# Patient Record
Sex: MALE | Race: BLACK OR AFRICAN AMERICAN | NOT HISPANIC OR LATINO | URBAN - METROPOLITAN AREA
[De-identification: names, ages, dates, MRNs, and addresses within clinical notes are randomized per-mention and may not be internally consistent; named-entity substitution may affect disease eponyms.]

---

## 2023-11-27 ENCOUNTER — EMERGENCY (EMERGENCY)
Facility: HOSPITAL | Age: 28
LOS: 0 days | Discharge: HOME | End: 2023-11-28
Attending: STUDENT IN AN ORGANIZED HEALTH CARE EDUCATION/TRAINING PROGRAM
Payer: MEDICAID

## 2023-11-27 VITALS
TEMPERATURE: 98 F | RESPIRATION RATE: 18 BRPM | DIASTOLIC BLOOD PRESSURE: 83 MMHG | HEIGHT: 65 IN | SYSTOLIC BLOOD PRESSURE: 137 MMHG | OXYGEN SATURATION: 99 % | HEART RATE: 64 BPM | WEIGHT: 154.98 LBS

## 2023-11-27 DIAGNOSIS — R10.12 LEFT UPPER QUADRANT PAIN: ICD-10-CM

## 2023-11-27 DIAGNOSIS — L93.2 OTHER LOCAL LUPUS ERYTHEMATOSUS: ICD-10-CM

## 2023-11-27 DIAGNOSIS — K59.00 CONSTIPATION, UNSPECIFIED: ICD-10-CM

## 2023-11-27 DIAGNOSIS — R51.9 HEADACHE, UNSPECIFIED: ICD-10-CM

## 2023-11-27 DIAGNOSIS — R11.2 NAUSEA WITH VOMITING, UNSPECIFIED: ICD-10-CM

## 2023-11-27 DIAGNOSIS — R50.9 FEVER, UNSPECIFIED: ICD-10-CM

## 2023-11-27 LAB
ALBUMIN SERPL ELPH-MCNC: 3.6 G/DL — SIGNIFICANT CHANGE UP (ref 3.3–5)
ALBUMIN SERPL ELPH-MCNC: 3.6 G/DL — SIGNIFICANT CHANGE UP (ref 3.3–5)
ALP SERPL-CCNC: 51 U/L — SIGNIFICANT CHANGE UP (ref 40–120)
ALP SERPL-CCNC: 51 U/L — SIGNIFICANT CHANGE UP (ref 40–120)
ALT FLD-CCNC: 42 U/L — SIGNIFICANT CHANGE UP (ref 12–78)
ALT FLD-CCNC: 42 U/L — SIGNIFICANT CHANGE UP (ref 12–78)
ANION GAP SERPL CALC-SCNC: 5 MMOL/L — SIGNIFICANT CHANGE UP (ref 5–17)
ANION GAP SERPL CALC-SCNC: 5 MMOL/L — SIGNIFICANT CHANGE UP (ref 5–17)
AST SERPL-CCNC: 42 U/L — HIGH (ref 15–37)
AST SERPL-CCNC: 42 U/L — HIGH (ref 15–37)
BASOPHILS # BLD AUTO: 0 K/UL — SIGNIFICANT CHANGE UP (ref 0–0.2)
BASOPHILS # BLD AUTO: 0 K/UL — SIGNIFICANT CHANGE UP (ref 0–0.2)
BASOPHILS NFR BLD AUTO: 0 % — SIGNIFICANT CHANGE UP (ref 0–2)
BASOPHILS NFR BLD AUTO: 0 % — SIGNIFICANT CHANGE UP (ref 0–2)
BILIRUB SERPL-MCNC: 0.7 MG/DL — SIGNIFICANT CHANGE UP (ref 0.2–1.2)
BILIRUB SERPL-MCNC: 0.7 MG/DL — SIGNIFICANT CHANGE UP (ref 0.2–1.2)
BUN SERPL-MCNC: 7 MG/DL — SIGNIFICANT CHANGE UP (ref 7–23)
BUN SERPL-MCNC: 7 MG/DL — SIGNIFICANT CHANGE UP (ref 7–23)
CALCIUM SERPL-MCNC: 9.3 MG/DL — SIGNIFICANT CHANGE UP (ref 8.5–10.1)
CALCIUM SERPL-MCNC: 9.3 MG/DL — SIGNIFICANT CHANGE UP (ref 8.5–10.1)
CHLORIDE SERPL-SCNC: 105 MMOL/L — SIGNIFICANT CHANGE UP (ref 96–108)
CHLORIDE SERPL-SCNC: 105 MMOL/L — SIGNIFICANT CHANGE UP (ref 96–108)
CO2 SERPL-SCNC: 28 MMOL/L — SIGNIFICANT CHANGE UP (ref 22–31)
CO2 SERPL-SCNC: 28 MMOL/L — SIGNIFICANT CHANGE UP (ref 22–31)
CREAT SERPL-MCNC: 1.33 MG/DL — HIGH (ref 0.5–1.3)
CREAT SERPL-MCNC: 1.33 MG/DL — HIGH (ref 0.5–1.3)
EGFR: 74 ML/MIN/1.73M2 — SIGNIFICANT CHANGE UP
EGFR: 74 ML/MIN/1.73M2 — SIGNIFICANT CHANGE UP
EOSINOPHIL # BLD AUTO: 0 K/UL — SIGNIFICANT CHANGE UP (ref 0–0.5)
EOSINOPHIL # BLD AUTO: 0 K/UL — SIGNIFICANT CHANGE UP (ref 0–0.5)
EOSINOPHIL NFR BLD AUTO: 0 % — SIGNIFICANT CHANGE UP (ref 0–6)
EOSINOPHIL NFR BLD AUTO: 0 % — SIGNIFICANT CHANGE UP (ref 0–6)
GLUCOSE SERPL-MCNC: 97 MG/DL — SIGNIFICANT CHANGE UP (ref 70–99)
GLUCOSE SERPL-MCNC: 97 MG/DL — SIGNIFICANT CHANGE UP (ref 70–99)
HCT VFR BLD CALC: 36.2 % — LOW (ref 39–50)
HCT VFR BLD CALC: 36.2 % — LOW (ref 39–50)
HGB BLD-MCNC: 12.7 G/DL — LOW (ref 13–17)
HGB BLD-MCNC: 12.7 G/DL — LOW (ref 13–17)
LIDOCAIN IGE QN: 37 U/L — SIGNIFICANT CHANGE UP (ref 13–75)
LIDOCAIN IGE QN: 37 U/L — SIGNIFICANT CHANGE UP (ref 13–75)
LYMPHOCYTES # BLD AUTO: 2.48 K/UL — SIGNIFICANT CHANGE UP (ref 1–3.3)
LYMPHOCYTES # BLD AUTO: 2.48 K/UL — SIGNIFICANT CHANGE UP (ref 1–3.3)
LYMPHOCYTES # BLD AUTO: 50 % — HIGH (ref 13–44)
LYMPHOCYTES # BLD AUTO: 50 % — HIGH (ref 13–44)
MANUAL SMEAR VERIFICATION: SIGNIFICANT CHANGE UP
MANUAL SMEAR VERIFICATION: SIGNIFICANT CHANGE UP
MCHC RBC-ENTMCNC: 31.5 PG — SIGNIFICANT CHANGE UP (ref 27–34)
MCHC RBC-ENTMCNC: 31.5 PG — SIGNIFICANT CHANGE UP (ref 27–34)
MCHC RBC-ENTMCNC: 35.1 G/DL — SIGNIFICANT CHANGE UP (ref 32–36)
MCHC RBC-ENTMCNC: 35.1 G/DL — SIGNIFICANT CHANGE UP (ref 32–36)
MCV RBC AUTO: 89.8 FL — SIGNIFICANT CHANGE UP (ref 80–100)
MCV RBC AUTO: 89.8 FL — SIGNIFICANT CHANGE UP (ref 80–100)
MONOCYTES # BLD AUTO: 0.4 K/UL — SIGNIFICANT CHANGE UP (ref 0–0.9)
MONOCYTES # BLD AUTO: 0.4 K/UL — SIGNIFICANT CHANGE UP (ref 0–0.9)
MONOCYTES NFR BLD AUTO: 8 % — SIGNIFICANT CHANGE UP (ref 2–14)
MONOCYTES NFR BLD AUTO: 8 % — SIGNIFICANT CHANGE UP (ref 2–14)
NEUTROPHILS # BLD AUTO: 2.08 K/UL — SIGNIFICANT CHANGE UP (ref 1.8–7.4)
NEUTROPHILS # BLD AUTO: 2.08 K/UL — SIGNIFICANT CHANGE UP (ref 1.8–7.4)
NEUTROPHILS NFR BLD AUTO: 41 % — LOW (ref 43–77)
NEUTROPHILS NFR BLD AUTO: 41 % — LOW (ref 43–77)
NEUTS BAND # BLD: 1 % — SIGNIFICANT CHANGE UP (ref 0–8)
NEUTS BAND # BLD: 1 % — SIGNIFICANT CHANGE UP (ref 0–8)
NRBC # BLD: 0 /100 — SIGNIFICANT CHANGE UP (ref 0–0)
NRBC # BLD: 0 /100 — SIGNIFICANT CHANGE UP (ref 0–0)
NRBC # BLD: SIGNIFICANT CHANGE UP /100 WBCS (ref 0–0)
NRBC # BLD: SIGNIFICANT CHANGE UP /100 WBCS (ref 0–0)
PLAT MORPH BLD: NORMAL — SIGNIFICANT CHANGE UP
PLAT MORPH BLD: NORMAL — SIGNIFICANT CHANGE UP
PLATELET # BLD AUTO: 141 K/UL — LOW (ref 150–400)
PLATELET # BLD AUTO: 141 K/UL — LOW (ref 150–400)
POTASSIUM SERPL-MCNC: 4.3 MMOL/L — SIGNIFICANT CHANGE UP (ref 3.5–5.3)
POTASSIUM SERPL-MCNC: 4.3 MMOL/L — SIGNIFICANT CHANGE UP (ref 3.5–5.3)
POTASSIUM SERPL-SCNC: 4.3 MMOL/L — SIGNIFICANT CHANGE UP (ref 3.5–5.3)
POTASSIUM SERPL-SCNC: 4.3 MMOL/L — SIGNIFICANT CHANGE UP (ref 3.5–5.3)
PROT SERPL-MCNC: 8.8 GM/DL — HIGH (ref 6–8.3)
PROT SERPL-MCNC: 8.8 GM/DL — HIGH (ref 6–8.3)
RBC # BLD: 4.03 M/UL — LOW (ref 4.2–5.8)
RBC # BLD: 4.03 M/UL — LOW (ref 4.2–5.8)
RBC # FLD: 13.2 % — SIGNIFICANT CHANGE UP (ref 10.3–14.5)
RBC # FLD: 13.2 % — SIGNIFICANT CHANGE UP (ref 10.3–14.5)
RBC BLD AUTO: NORMAL — SIGNIFICANT CHANGE UP
RBC BLD AUTO: NORMAL — SIGNIFICANT CHANGE UP
SODIUM SERPL-SCNC: 138 MMOL/L — SIGNIFICANT CHANGE UP (ref 135–145)
SODIUM SERPL-SCNC: 138 MMOL/L — SIGNIFICANT CHANGE UP (ref 135–145)
WBC # BLD: 4.96 K/UL — SIGNIFICANT CHANGE UP (ref 3.8–10.5)
WBC # BLD: 4.96 K/UL — SIGNIFICANT CHANGE UP (ref 3.8–10.5)
WBC # FLD AUTO: 4.96 K/UL — SIGNIFICANT CHANGE UP (ref 3.8–10.5)
WBC # FLD AUTO: 4.96 K/UL — SIGNIFICANT CHANGE UP (ref 3.8–10.5)

## 2023-11-27 PROCEDURE — 74177 CT ABD & PELVIS W/CONTRAST: CPT | Mod: 26,MA

## 2023-11-27 PROCEDURE — 99285 EMERGENCY DEPT VISIT HI MDM: CPT

## 2023-11-27 RX ORDER — ACETAMINOPHEN 500 MG
1000 TABLET ORAL ONCE
Refills: 0 | Status: COMPLETED | OUTPATIENT
Start: 2023-11-27 | End: 2023-11-27

## 2023-11-27 RX ORDER — SODIUM CHLORIDE 9 MG/ML
1000 INJECTION INTRAMUSCULAR; INTRAVENOUS; SUBCUTANEOUS ONCE
Refills: 0 | Status: COMPLETED | OUTPATIENT
Start: 2023-11-27 | End: 2023-11-27

## 2023-11-27 RX ORDER — METOCLOPRAMIDE HCL 10 MG
10 TABLET ORAL ONCE
Refills: 0 | Status: COMPLETED | OUTPATIENT
Start: 2023-11-27 | End: 2023-11-27

## 2023-11-27 RX ADMIN — SODIUM CHLORIDE 1000 MILLILITER(S): 9 INJECTION INTRAMUSCULAR; INTRAVENOUS; SUBCUTANEOUS at 21:26

## 2023-11-27 RX ADMIN — Medication 400 MILLIGRAM(S): at 21:27

## 2023-11-27 RX ADMIN — Medication 10 MILLIGRAM(S): at 21:27

## 2023-11-27 NOTE — ED PROVIDER NOTE - OBJECTIVE STATEMENT
28 yr old with hx of Lupus on Plaquenil presenting with 3 days of abdominal and HA. His abdominal pain is concentrated to the LUQ with radiating pain to his back. His HA is frontal and constant. Last normal bowel movement 4 days ago. Denies blood per rectum, toilet bowl, or toilet paper. Denies recent travel or sick contacts. Endorses constipation, nausea, subjective fever, and one episode of non-bloody vomiting today. No dizziness, urinary symptoms, CP, SoB, Diarrhea.

## 2023-11-27 NOTE — ED PROVIDER NOTE - PHYSICAL EXAMINATION
GEN: Pt in NAD, A&O x3. GCS 15  RESP: No chest wall tenderness, CTA b/l, no wheezes, rales, or rhonchi.   CARDIAC: RRR, clear distinct S1, S2, no murmurs, gallops, or rubs.   ABD: Abdomen non-distended, firm, (+) LUQ, LLQ. (-) RUQ, RLQ, Epigastric or Suprapubic TTP.   VASC: 2+ radial pulses b/l. No edema or tenderness of the lower extremities.

## 2023-11-27 NOTE — ED PROVIDER NOTE - NSFOLLOWUPINSTRUCTIONS_ED_ALL_ED_FT
You were seen in the ED for abdominal pain.    Your labs and imaging did not show acute findings.    Follow up with your primary care doctor.     Many things can cause abdominal pain. Many times, abdominal pain is not caused by a disease and will improve without treatment. Your health care provider will do a physical exam to determine if there is a dangerous cause of your pain; blood tests and imaging may help determine the cause of your pain. However, in many cases, no cause may be found and you may need further testing as an outpatient. Monitor your abdominal pain for any changes.     SEEK IMMEDIATE MEDICAL CARE IF YOU HAVE ANY OF THE FOLLOWING SYMPTOMS: worsening abdominal pain, uncontrollable vomiting, profuse diarrhea, inability to have bowel movements or pass gas, black or bloody stools, fever accompanying chest pain or back pain, or fainting. These symptoms may represent a serious problem that is an emergency. Do not wait to see if the symptoms will go away. Get medical help right away. Call 911 and do not drive yourself to the hospital.

## 2023-11-27 NOTE — ED ADULT TRIAGE NOTE - CHIEF COMPLAINT QUOTE
pt c/o headache,  LUQ abdominal radiating to the back  and nausea for 3 days. denies vomiting, diarrhea or gu symptoms. denies fever or chills. history of Lupus.

## 2023-11-27 NOTE — ED ADULT NURSE NOTE - BREATH SOUNDS, MLM
CLINICAL PHARMACY NOTE: MEDS TO BEDS    Total # of Prescriptions Filled: 3   The following medications were delivered to the patient:  ASPIRIN 81 MG CHEW   LIPITOR 40 MG   TOPROL XL 25 MG     Additional Documentation:   PICKED UP @ PHARMACY @ 4:09PM Clear

## 2023-11-27 NOTE — ED PROVIDER NOTE - PATIENT PORTAL LINK FT
You can access the FollowMyHealth Patient Portal offered by [facility] by registering at the following website: [email or web address]. By joining [facility]’s FollowMyHealth portal, you will also be able to view your health information using other applications (apps) compatible with our system.

## 2023-11-27 NOTE — ED PROVIDER NOTE - PROGRESS NOTE DETAILS
Labs and imaging WNL. UA negative.  Patient reports feeling improved.  Will discharge home to f/u with PMD.

## 2023-11-28 VITALS
SYSTOLIC BLOOD PRESSURE: 122 MMHG | RESPIRATION RATE: 18 BRPM | HEART RATE: 58 BPM | TEMPERATURE: 98 F | DIASTOLIC BLOOD PRESSURE: 77 MMHG | OXYGEN SATURATION: 99 %

## 2023-11-28 LAB
APPEARANCE UR: CLEAR — SIGNIFICANT CHANGE UP
APPEARANCE UR: CLEAR — SIGNIFICANT CHANGE UP
BILIRUB UR-MCNC: NEGATIVE — SIGNIFICANT CHANGE UP
BILIRUB UR-MCNC: NEGATIVE — SIGNIFICANT CHANGE UP
COLOR SPEC: YELLOW — SIGNIFICANT CHANGE UP
COLOR SPEC: YELLOW — SIGNIFICANT CHANGE UP
DIFF PNL FLD: NEGATIVE — SIGNIFICANT CHANGE UP
DIFF PNL FLD: NEGATIVE — SIGNIFICANT CHANGE UP
GLUCOSE UR QL: NEGATIVE MG/DL — SIGNIFICANT CHANGE UP
GLUCOSE UR QL: NEGATIVE MG/DL — SIGNIFICANT CHANGE UP
KETONES UR-MCNC: NEGATIVE MG/DL — SIGNIFICANT CHANGE UP
KETONES UR-MCNC: NEGATIVE MG/DL — SIGNIFICANT CHANGE UP
LEUKOCYTE ESTERASE UR-ACNC: NEGATIVE — SIGNIFICANT CHANGE UP
LEUKOCYTE ESTERASE UR-ACNC: NEGATIVE — SIGNIFICANT CHANGE UP
NITRITE UR-MCNC: NEGATIVE — SIGNIFICANT CHANGE UP
NITRITE UR-MCNC: NEGATIVE — SIGNIFICANT CHANGE UP
PH UR: 6.5 — SIGNIFICANT CHANGE UP (ref 5–8)
PH UR: 6.5 — SIGNIFICANT CHANGE UP (ref 5–8)
PROT UR-MCNC: NEGATIVE MG/DL — SIGNIFICANT CHANGE UP
PROT UR-MCNC: NEGATIVE MG/DL — SIGNIFICANT CHANGE UP
SP GR SPEC: >1.03 — HIGH (ref 1–1.03)
SP GR SPEC: >1.03 — HIGH (ref 1–1.03)
UROBILINOGEN FLD QL: 0.2 MG/DL — SIGNIFICANT CHANGE UP (ref 0.2–1)
UROBILINOGEN FLD QL: 0.2 MG/DL — SIGNIFICANT CHANGE UP (ref 0.2–1)

## 2023-12-12 ENCOUNTER — INPATIENT (INPATIENT)
Facility: HOSPITAL | Age: 28
LOS: 9 days | Discharge: ANOTHER HOSPITAL | End: 2023-12-22
Attending: INTERNAL MEDICINE | Admitting: INTERNAL MEDICINE
Payer: MEDICAID

## 2023-12-12 VITALS
TEMPERATURE: 98 F | HEIGHT: 65 IN | HEART RATE: 93 BPM | SYSTOLIC BLOOD PRESSURE: 132 MMHG | OXYGEN SATURATION: 98 % | DIASTOLIC BLOOD PRESSURE: 75 MMHG | WEIGHT: 154.98 LBS | RESPIRATION RATE: 18 BRPM

## 2023-12-12 DIAGNOSIS — M32.9 SYSTEMIC LUPUS ERYTHEMATOSUS, UNSPECIFIED: ICD-10-CM

## 2023-12-12 DIAGNOSIS — R59.0 LOCALIZED ENLARGED LYMPH NODES: ICD-10-CM

## 2023-12-12 DIAGNOSIS — R09.89 OTHER SPECIFIED SYMPTOMS AND SIGNS INVOLVING THE CIRCULATORY AND RESPIRATORY SYSTEMS: ICD-10-CM

## 2023-12-12 DIAGNOSIS — I26.99 OTHER PULMONARY EMBOLISM WITHOUT ACUTE COR PULMONALE: ICD-10-CM

## 2023-12-12 DIAGNOSIS — E87.1 HYPO-OSMOLALITY AND HYPONATREMIA: ICD-10-CM

## 2023-12-12 LAB
ALBUMIN SERPL ELPH-MCNC: 2.8 G/DL — LOW (ref 3.3–5)
ALBUMIN SERPL ELPH-MCNC: 2.8 G/DL — LOW (ref 3.3–5)
ALP SERPL-CCNC: 58 U/L — SIGNIFICANT CHANGE UP (ref 40–120)
ALP SERPL-CCNC: 58 U/L — SIGNIFICANT CHANGE UP (ref 40–120)
ALT FLD-CCNC: 66 U/L — SIGNIFICANT CHANGE UP (ref 12–78)
ALT FLD-CCNC: 66 U/L — SIGNIFICANT CHANGE UP (ref 12–78)
ANION GAP SERPL CALC-SCNC: 6 MMOL/L — SIGNIFICANT CHANGE UP (ref 5–17)
ANION GAP SERPL CALC-SCNC: 6 MMOL/L — SIGNIFICANT CHANGE UP (ref 5–17)
ANION GAP SERPL CALC-SCNC: 8 MMOL/L — SIGNIFICANT CHANGE UP (ref 5–17)
ANION GAP SERPL CALC-SCNC: 8 MMOL/L — SIGNIFICANT CHANGE UP (ref 5–17)
APTT BLD: 29.8 SEC — SIGNIFICANT CHANGE UP (ref 24.5–35.6)
APTT BLD: 29.8 SEC — SIGNIFICANT CHANGE UP (ref 24.5–35.6)
APTT BLD: 63.1 SEC — HIGH (ref 24.5–35.6)
APTT BLD: 63.1 SEC — HIGH (ref 24.5–35.6)
AST SERPL-CCNC: 53 U/L — HIGH (ref 15–37)
AST SERPL-CCNC: 53 U/L — HIGH (ref 15–37)
BASOPHILS # BLD AUTO: 0 K/UL — SIGNIFICANT CHANGE UP (ref 0–0.2)
BASOPHILS # BLD AUTO: 0 K/UL — SIGNIFICANT CHANGE UP (ref 0–0.2)
BASOPHILS # BLD AUTO: 0.03 K/UL — SIGNIFICANT CHANGE UP (ref 0–0.2)
BASOPHILS # BLD AUTO: 0.03 K/UL — SIGNIFICANT CHANGE UP (ref 0–0.2)
BASOPHILS NFR BLD AUTO: 0 % — SIGNIFICANT CHANGE UP (ref 0–2)
BASOPHILS NFR BLD AUTO: 0 % — SIGNIFICANT CHANGE UP (ref 0–2)
BASOPHILS NFR BLD AUTO: 0.3 % — SIGNIFICANT CHANGE UP (ref 0–2)
BASOPHILS NFR BLD AUTO: 0.3 % — SIGNIFICANT CHANGE UP (ref 0–2)
BILIRUB SERPL-MCNC: 1.1 MG/DL — SIGNIFICANT CHANGE UP (ref 0.2–1.2)
BILIRUB SERPL-MCNC: 1.1 MG/DL — SIGNIFICANT CHANGE UP (ref 0.2–1.2)
BUN SERPL-MCNC: 14 MG/DL — SIGNIFICANT CHANGE UP (ref 7–23)
BUN SERPL-MCNC: 14 MG/DL — SIGNIFICANT CHANGE UP (ref 7–23)
BUN SERPL-MCNC: 15 MG/DL — SIGNIFICANT CHANGE UP (ref 7–23)
BUN SERPL-MCNC: 15 MG/DL — SIGNIFICANT CHANGE UP (ref 7–23)
BURR CELLS BLD QL SMEAR: PRESENT — SIGNIFICANT CHANGE UP
BURR CELLS BLD QL SMEAR: PRESENT — SIGNIFICANT CHANGE UP
CALCIUM SERPL-MCNC: 8.3 MG/DL — LOW (ref 8.5–10.1)
CALCIUM SERPL-MCNC: 8.3 MG/DL — LOW (ref 8.5–10.1)
CALCIUM SERPL-MCNC: 8.7 MG/DL — SIGNIFICANT CHANGE UP (ref 8.5–10.1)
CALCIUM SERPL-MCNC: 8.7 MG/DL — SIGNIFICANT CHANGE UP (ref 8.5–10.1)
CHLORIDE SERPL-SCNC: 95 MMOL/L — LOW (ref 96–108)
CHLORIDE SERPL-SCNC: 95 MMOL/L — LOW (ref 96–108)
CHLORIDE SERPL-SCNC: 97 MMOL/L — SIGNIFICANT CHANGE UP (ref 96–108)
CHLORIDE SERPL-SCNC: 97 MMOL/L — SIGNIFICANT CHANGE UP (ref 96–108)
CO2 SERPL-SCNC: 25 MMOL/L — SIGNIFICANT CHANGE UP (ref 22–31)
CREAT SERPL-MCNC: 1.24 MG/DL — SIGNIFICANT CHANGE UP (ref 0.5–1.3)
CREAT SERPL-MCNC: 1.24 MG/DL — SIGNIFICANT CHANGE UP (ref 0.5–1.3)
CREAT SERPL-MCNC: 1.27 MG/DL — SIGNIFICANT CHANGE UP (ref 0.5–1.3)
CREAT SERPL-MCNC: 1.27 MG/DL — SIGNIFICANT CHANGE UP (ref 0.5–1.3)
D DIMER BLD IA.RAPID-MCNC: 1364 NG/ML DDU — HIGH
D DIMER BLD IA.RAPID-MCNC: 1364 NG/ML DDU — HIGH
DACRYOCYTES BLD QL SMEAR: SLIGHT — SIGNIFICANT CHANGE UP
DACRYOCYTES BLD QL SMEAR: SLIGHT — SIGNIFICANT CHANGE UP
EGFR: 78 ML/MIN/1.73M2 — SIGNIFICANT CHANGE UP
EGFR: 78 ML/MIN/1.73M2 — SIGNIFICANT CHANGE UP
EGFR: 81 ML/MIN/1.73M2 — SIGNIFICANT CHANGE UP
EGFR: 81 ML/MIN/1.73M2 — SIGNIFICANT CHANGE UP
EOSINOPHIL # BLD AUTO: 0 K/UL — SIGNIFICANT CHANGE UP (ref 0–0.5)
EOSINOPHIL NFR BLD AUTO: 0 % — SIGNIFICANT CHANGE UP (ref 0–6)
GLUCOSE SERPL-MCNC: 111 MG/DL — HIGH (ref 70–99)
GLUCOSE SERPL-MCNC: 111 MG/DL — HIGH (ref 70–99)
GLUCOSE SERPL-MCNC: 97 MG/DL — SIGNIFICANT CHANGE UP (ref 70–99)
GLUCOSE SERPL-MCNC: 97 MG/DL — SIGNIFICANT CHANGE UP (ref 70–99)
HCT VFR BLD CALC: 30.7 % — LOW (ref 39–50)
HCT VFR BLD CALC: 30.7 % — LOW (ref 39–50)
HCT VFR BLD CALC: 31.8 % — LOW (ref 39–50)
HCT VFR BLD CALC: 31.8 % — LOW (ref 39–50)
HGB BLD-MCNC: 10.8 G/DL — LOW (ref 13–17)
HGB BLD-MCNC: 10.8 G/DL — LOW (ref 13–17)
HGB BLD-MCNC: 11.4 G/DL — LOW (ref 13–17)
HGB BLD-MCNC: 11.4 G/DL — LOW (ref 13–17)
IMM GRANULOCYTES NFR BLD AUTO: 0.9 % — SIGNIFICANT CHANGE UP (ref 0–0.9)
IMM GRANULOCYTES NFR BLD AUTO: 0.9 % — SIGNIFICANT CHANGE UP (ref 0–0.9)
LACTATE SERPL-SCNC: 1.2 MMOL/L — SIGNIFICANT CHANGE UP (ref 0.7–2)
LACTATE SERPL-SCNC: 1.2 MMOL/L — SIGNIFICANT CHANGE UP (ref 0.7–2)
LYMPHOCYTES # BLD AUTO: 0.76 K/UL — LOW (ref 1–3.3)
LYMPHOCYTES # BLD AUTO: 0.76 K/UL — LOW (ref 1–3.3)
LYMPHOCYTES # BLD AUTO: 1.93 K/UL — SIGNIFICANT CHANGE UP (ref 1–3.3)
LYMPHOCYTES # BLD AUTO: 1.93 K/UL — SIGNIFICANT CHANGE UP (ref 1–3.3)
LYMPHOCYTES # BLD AUTO: 10 % — LOW (ref 13–44)
LYMPHOCYTES # BLD AUTO: 10 % — LOW (ref 13–44)
LYMPHOCYTES # BLD AUTO: 20.6 % — SIGNIFICANT CHANGE UP (ref 13–44)
LYMPHOCYTES # BLD AUTO: 20.6 % — SIGNIFICANT CHANGE UP (ref 13–44)
MAGNESIUM SERPL-MCNC: 2 MG/DL — SIGNIFICANT CHANGE UP (ref 1.6–2.6)
MAGNESIUM SERPL-MCNC: 2 MG/DL — SIGNIFICANT CHANGE UP (ref 1.6–2.6)
MANUAL SMEAR VERIFICATION: SIGNIFICANT CHANGE UP
MANUAL SMEAR VERIFICATION: SIGNIFICANT CHANGE UP
MCHC RBC-ENTMCNC: 31 PG — SIGNIFICANT CHANGE UP (ref 27–34)
MCHC RBC-ENTMCNC: 31 PG — SIGNIFICANT CHANGE UP (ref 27–34)
MCHC RBC-ENTMCNC: 31.7 PG — SIGNIFICANT CHANGE UP (ref 27–34)
MCHC RBC-ENTMCNC: 31.7 PG — SIGNIFICANT CHANGE UP (ref 27–34)
MCHC RBC-ENTMCNC: 35.2 G/DL — SIGNIFICANT CHANGE UP (ref 32–36)
MCHC RBC-ENTMCNC: 35.2 G/DL — SIGNIFICANT CHANGE UP (ref 32–36)
MCHC RBC-ENTMCNC: 35.8 G/DL — SIGNIFICANT CHANGE UP (ref 32–36)
MCHC RBC-ENTMCNC: 35.8 G/DL — SIGNIFICANT CHANGE UP (ref 32–36)
MCV RBC AUTO: 88.2 FL — SIGNIFICANT CHANGE UP (ref 80–100)
MCV RBC AUTO: 88.2 FL — SIGNIFICANT CHANGE UP (ref 80–100)
MCV RBC AUTO: 88.3 FL — SIGNIFICANT CHANGE UP (ref 80–100)
MCV RBC AUTO: 88.3 FL — SIGNIFICANT CHANGE UP (ref 80–100)
MONOCYTES # BLD AUTO: 0.46 K/UL — SIGNIFICANT CHANGE UP (ref 0–0.9)
MONOCYTES # BLD AUTO: 0.46 K/UL — SIGNIFICANT CHANGE UP (ref 0–0.9)
MONOCYTES # BLD AUTO: 1.19 K/UL — HIGH (ref 0–0.9)
MONOCYTES # BLD AUTO: 1.19 K/UL — HIGH (ref 0–0.9)
MONOCYTES NFR BLD AUTO: 12.7 % — SIGNIFICANT CHANGE UP (ref 2–14)
MONOCYTES NFR BLD AUTO: 12.7 % — SIGNIFICANT CHANGE UP (ref 2–14)
MONOCYTES NFR BLD AUTO: 6 % — SIGNIFICANT CHANGE UP (ref 2–14)
MONOCYTES NFR BLD AUTO: 6 % — SIGNIFICANT CHANGE UP (ref 2–14)
NEUTROPHILS # BLD AUTO: 5.62 K/UL — SIGNIFICANT CHANGE UP (ref 1.8–7.4)
NEUTROPHILS # BLD AUTO: 5.62 K/UL — SIGNIFICANT CHANGE UP (ref 1.8–7.4)
NEUTROPHILS # BLD AUTO: 6.12 K/UL — SIGNIFICANT CHANGE UP (ref 1.8–7.4)
NEUTROPHILS # BLD AUTO: 6.12 K/UL — SIGNIFICANT CHANGE UP (ref 1.8–7.4)
NEUTROPHILS NFR BLD AUTO: 65.5 % — SIGNIFICANT CHANGE UP (ref 43–77)
NEUTROPHILS NFR BLD AUTO: 65.5 % — SIGNIFICANT CHANGE UP (ref 43–77)
NEUTROPHILS NFR BLD AUTO: 74 % — SIGNIFICANT CHANGE UP (ref 43–77)
NEUTROPHILS NFR BLD AUTO: 74 % — SIGNIFICANT CHANGE UP (ref 43–77)
NRBC # BLD: 0 /100 WBCS — SIGNIFICANT CHANGE UP (ref 0–0)
NRBC # BLD: 0 /100 WBCS — SIGNIFICANT CHANGE UP (ref 0–0)
NRBC # BLD: 0 /100 — SIGNIFICANT CHANGE UP (ref 0–0)
NRBC # BLD: 0 /100 — SIGNIFICANT CHANGE UP (ref 0–0)
NRBC # BLD: SIGNIFICANT CHANGE UP /100 WBCS (ref 0–0)
NRBC # BLD: SIGNIFICANT CHANGE UP /100 WBCS (ref 0–0)
NT-PROBNP SERPL-SCNC: 96 PG/ML — SIGNIFICANT CHANGE UP (ref 0–125)
NT-PROBNP SERPL-SCNC: 96 PG/ML — SIGNIFICANT CHANGE UP (ref 0–125)
PHOSPHATE SERPL-MCNC: 3.2 MG/DL — SIGNIFICANT CHANGE UP (ref 2.5–4.5)
PHOSPHATE SERPL-MCNC: 3.2 MG/DL — SIGNIFICANT CHANGE UP (ref 2.5–4.5)
PLAT MORPH BLD: NORMAL — SIGNIFICANT CHANGE UP
PLAT MORPH BLD: NORMAL — SIGNIFICANT CHANGE UP
PLATELET # BLD AUTO: 205 K/UL — SIGNIFICANT CHANGE UP (ref 150–400)
PLATELET # BLD AUTO: 205 K/UL — SIGNIFICANT CHANGE UP (ref 150–400)
PLATELET # BLD AUTO: 255 K/UL — SIGNIFICANT CHANGE UP (ref 150–400)
PLATELET # BLD AUTO: 255 K/UL — SIGNIFICANT CHANGE UP (ref 150–400)
POTASSIUM SERPL-MCNC: 3.8 MMOL/L — SIGNIFICANT CHANGE UP (ref 3.5–5.3)
POTASSIUM SERPL-MCNC: 3.8 MMOL/L — SIGNIFICANT CHANGE UP (ref 3.5–5.3)
POTASSIUM SERPL-MCNC: 4.1 MMOL/L — SIGNIFICANT CHANGE UP (ref 3.5–5.3)
POTASSIUM SERPL-MCNC: 4.1 MMOL/L — SIGNIFICANT CHANGE UP (ref 3.5–5.3)
POTASSIUM SERPL-SCNC: 3.8 MMOL/L — SIGNIFICANT CHANGE UP (ref 3.5–5.3)
POTASSIUM SERPL-SCNC: 3.8 MMOL/L — SIGNIFICANT CHANGE UP (ref 3.5–5.3)
POTASSIUM SERPL-SCNC: 4.1 MMOL/L — SIGNIFICANT CHANGE UP (ref 3.5–5.3)
POTASSIUM SERPL-SCNC: 4.1 MMOL/L — SIGNIFICANT CHANGE UP (ref 3.5–5.3)
PROCALCITONIN SERPL-MCNC: 16.8 NG/ML — HIGH (ref 0.02–0.1)
PROCALCITONIN SERPL-MCNC: 16.8 NG/ML — HIGH (ref 0.02–0.1)
PROT SERPL-MCNC: 8 GM/DL — SIGNIFICANT CHANGE UP (ref 6–8.3)
PROT SERPL-MCNC: 8 GM/DL — SIGNIFICANT CHANGE UP (ref 6–8.3)
RAPID RVP RESULT: SIGNIFICANT CHANGE UP
RAPID RVP RESULT: SIGNIFICANT CHANGE UP
RBC # BLD: 3.48 M/UL — LOW (ref 4.2–5.8)
RBC # BLD: 3.48 M/UL — LOW (ref 4.2–5.8)
RBC # BLD: 3.6 M/UL — LOW (ref 4.2–5.8)
RBC # BLD: 3.6 M/UL — LOW (ref 4.2–5.8)
RBC # FLD: 13.9 % — SIGNIFICANT CHANGE UP (ref 10.3–14.5)
RBC # FLD: 13.9 % — SIGNIFICANT CHANGE UP (ref 10.3–14.5)
RBC # FLD: 14.1 % — SIGNIFICANT CHANGE UP (ref 10.3–14.5)
RBC # FLD: 14.1 % — SIGNIFICANT CHANGE UP (ref 10.3–14.5)
RBC BLD AUTO: NORMAL — SIGNIFICANT CHANGE UP
RBC BLD AUTO: NORMAL — SIGNIFICANT CHANGE UP
SARS-COV-2 RNA SPEC QL NAA+PROBE: SIGNIFICANT CHANGE UP
SARS-COV-2 RNA SPEC QL NAA+PROBE: SIGNIFICANT CHANGE UP
SODIUM SERPL-SCNC: 126 MMOL/L — LOW (ref 135–145)
SODIUM SERPL-SCNC: 126 MMOL/L — LOW (ref 135–145)
SODIUM SERPL-SCNC: 130 MMOL/L — LOW (ref 135–145)
SODIUM SERPL-SCNC: 130 MMOL/L — LOW (ref 135–145)
T4 FREE SERPL-MCNC: 1.4 NG/DL — SIGNIFICANT CHANGE UP (ref 0.9–1.8)
T4 FREE SERPL-MCNC: 1.4 NG/DL — SIGNIFICANT CHANGE UP (ref 0.9–1.8)
TROPONIN I, HIGH SENSITIVITY RESULT: 15.5 NG/L — SIGNIFICANT CHANGE UP
TROPONIN I, HIGH SENSITIVITY RESULT: 15.5 NG/L — SIGNIFICANT CHANGE UP
TSH SERPL-MCNC: 1.97 UU/ML — SIGNIFICANT CHANGE UP (ref 0.36–3.74)
TSH SERPL-MCNC: 1.97 UU/ML — SIGNIFICANT CHANGE UP (ref 0.36–3.74)
VARIANT LYMPHS # BLD: 10 % — HIGH (ref 0–6)
VARIANT LYMPHS # BLD: 10 % — HIGH (ref 0–6)
WBC # BLD: 7.6 K/UL — SIGNIFICANT CHANGE UP (ref 3.8–10.5)
WBC # BLD: 7.6 K/UL — SIGNIFICANT CHANGE UP (ref 3.8–10.5)
WBC # BLD: 9.35 K/UL — SIGNIFICANT CHANGE UP (ref 3.8–10.5)
WBC # BLD: 9.35 K/UL — SIGNIFICANT CHANGE UP (ref 3.8–10.5)
WBC # FLD AUTO: 7.6 K/UL — SIGNIFICANT CHANGE UP (ref 3.8–10.5)
WBC # FLD AUTO: 7.6 K/UL — SIGNIFICANT CHANGE UP (ref 3.8–10.5)
WBC # FLD AUTO: 9.35 K/UL — SIGNIFICANT CHANGE UP (ref 3.8–10.5)
WBC # FLD AUTO: 9.35 K/UL — SIGNIFICANT CHANGE UP (ref 3.8–10.5)

## 2023-12-12 PROCEDURE — 36000 PLACE NEEDLE IN VEIN: CPT

## 2023-12-12 PROCEDURE — 99223 1ST HOSP IP/OBS HIGH 75: CPT

## 2023-12-12 PROCEDURE — 71275 CT ANGIOGRAPHY CHEST: CPT | Mod: 26,MA

## 2023-12-12 PROCEDURE — 93010 ELECTROCARDIOGRAM REPORT: CPT

## 2023-12-12 PROCEDURE — 71045 X-RAY EXAM CHEST 1 VIEW: CPT | Mod: 26

## 2023-12-12 PROCEDURE — 99291 CRITICAL CARE FIRST HOUR: CPT

## 2023-12-12 PROCEDURE — 76937 US GUIDE VASCULAR ACCESS: CPT | Mod: 26

## 2023-12-12 PROCEDURE — 93970 EXTREMITY STUDY: CPT | Mod: 26

## 2023-12-12 RX ORDER — ACETAMINOPHEN 500 MG
650 TABLET ORAL EVERY 6 HOURS
Refills: 0 | Status: DISCONTINUED | OUTPATIENT
Start: 2023-12-12 | End: 2023-12-15

## 2023-12-12 RX ORDER — SODIUM CHLORIDE 9 MG/ML
1000 INJECTION INTRAMUSCULAR; INTRAVENOUS; SUBCUTANEOUS
Refills: 0 | Status: DISCONTINUED | OUTPATIENT
Start: 2023-12-12 | End: 2023-12-13

## 2023-12-12 RX ORDER — HEPARIN SODIUM 5000 [USP'U]/ML
5500 INJECTION INTRAVENOUS; SUBCUTANEOUS ONCE
Refills: 0 | Status: COMPLETED | OUTPATIENT
Start: 2023-12-12 | End: 2023-12-12

## 2023-12-12 RX ORDER — GUAIFENESIN/DEXTROMETHORPHAN 600MG-30MG
10 TABLET, EXTENDED RELEASE 12 HR ORAL EVERY 4 HOURS
Refills: 0 | Status: DISCONTINUED | OUTPATIENT
Start: 2023-12-12 | End: 2023-12-16

## 2023-12-12 RX ORDER — KETOROLAC TROMETHAMINE 30 MG/ML
15 SYRINGE (ML) INJECTION ONCE
Refills: 0 | Status: DISCONTINUED | OUTPATIENT
Start: 2023-12-12 | End: 2023-12-12

## 2023-12-12 RX ORDER — FAMOTIDINE 10 MG/ML
20 INJECTION INTRAVENOUS ONCE
Refills: 0 | Status: COMPLETED | OUTPATIENT
Start: 2023-12-12 | End: 2023-12-12

## 2023-12-12 RX ORDER — IPRATROPIUM/ALBUTEROL SULFATE 18-103MCG
3 AEROSOL WITH ADAPTER (GRAM) INHALATION EVERY 6 HOURS
Refills: 0 | Status: DISCONTINUED | OUTPATIENT
Start: 2023-12-12 | End: 2023-12-20

## 2023-12-12 RX ORDER — NEOMYCIN/POLYMYXIN B/DEXAMETHA 0.1 %
1 SUSPENSION, DROPS(FINAL DOSAGE FORM)(ML) OPHTHALMIC (EYE) THREE TIMES A DAY
Refills: 0 | Status: DISCONTINUED | OUTPATIENT
Start: 2023-12-12 | End: 2023-12-20

## 2023-12-12 RX ORDER — NEOMYCIN/POLYMYXIN B/HYDROCORT
1 SUSPENSION, DROPS(FINAL DOSAGE FORM)(ML) OTIC (EAR) DAILY
Refills: 0 | Status: DISCONTINUED | OUTPATIENT
Start: 2023-12-12 | End: 2023-12-12

## 2023-12-12 RX ORDER — LANOLIN ALCOHOL/MO/W.PET/CERES
3 CREAM (GRAM) TOPICAL AT BEDTIME
Refills: 0 | Status: DISCONTINUED | OUTPATIENT
Start: 2023-12-12 | End: 2023-12-22

## 2023-12-12 RX ORDER — HEPARIN SODIUM 5000 [USP'U]/ML
5500 INJECTION INTRAVENOUS; SUBCUTANEOUS EVERY 6 HOURS
Refills: 0 | Status: DISCONTINUED | OUTPATIENT
Start: 2023-12-12 | End: 2023-12-13

## 2023-12-12 RX ORDER — OXYCODONE HYDROCHLORIDE 5 MG/1
5 TABLET ORAL EVERY 6 HOURS
Refills: 0 | Status: DISCONTINUED | OUTPATIENT
Start: 2023-12-12 | End: 2023-12-15

## 2023-12-12 RX ORDER — IBUPROFEN 200 MG
400 TABLET ORAL ONCE
Refills: 0 | Status: COMPLETED | OUTPATIENT
Start: 2023-12-12 | End: 2023-12-12

## 2023-12-12 RX ORDER — HEPARIN SODIUM 5000 [USP'U]/ML
INJECTION INTRAVENOUS; SUBCUTANEOUS
Qty: 25000 | Refills: 0 | Status: DISCONTINUED | OUTPATIENT
Start: 2023-12-12 | End: 2023-12-13

## 2023-12-12 RX ORDER — HYDROXYCHLOROQUINE SULFATE 200 MG
400 TABLET ORAL
Refills: 0 | Status: DISCONTINUED | OUTPATIENT
Start: 2023-12-12 | End: 2023-12-21

## 2023-12-12 RX ORDER — SODIUM CHLORIDE 9 MG/ML
1000 INJECTION INTRAMUSCULAR; INTRAVENOUS; SUBCUTANEOUS ONCE
Refills: 0 | Status: COMPLETED | OUTPATIENT
Start: 2023-12-12 | End: 2023-12-12

## 2023-12-12 RX ORDER — MORPHINE SULFATE 50 MG/1
2 CAPSULE, EXTENDED RELEASE ORAL EVERY 6 HOURS
Refills: 0 | Status: DISCONTINUED | OUTPATIENT
Start: 2023-12-12 | End: 2023-12-16

## 2023-12-12 RX ORDER — SODIUM CHLORIDE 9 MG/ML
4 INJECTION INTRAMUSCULAR; INTRAVENOUS; SUBCUTANEOUS EVERY 12 HOURS
Refills: 0 | Status: DISCONTINUED | OUTPATIENT
Start: 2023-12-12 | End: 2023-12-20

## 2023-12-12 RX ORDER — HEPARIN SODIUM 5000 [USP'U]/ML
2500 INJECTION INTRAVENOUS; SUBCUTANEOUS EVERY 6 HOURS
Refills: 0 | Status: DISCONTINUED | OUTPATIENT
Start: 2023-12-12 | End: 2023-12-13

## 2023-12-12 RX ORDER — ACETAMINOPHEN 500 MG
1000 TABLET ORAL ONCE
Refills: 0 | Status: COMPLETED | OUTPATIENT
Start: 2023-12-12 | End: 2023-12-12

## 2023-12-12 RX ADMIN — Medication 650 MILLIGRAM(S): at 17:30

## 2023-12-12 RX ADMIN — Medication 100 MILLIGRAM(S): at 16:40

## 2023-12-12 RX ADMIN — SODIUM CHLORIDE 1000 MILLILITER(S): 9 INJECTION INTRAMUSCULAR; INTRAVENOUS; SUBCUTANEOUS at 11:16

## 2023-12-12 RX ADMIN — Medication 10 MILLILITER(S): at 22:08

## 2023-12-12 RX ADMIN — Medication 3 MILLIGRAM(S): at 21:38

## 2023-12-12 RX ADMIN — Medication 400 MILLIGRAM(S): at 17:43

## 2023-12-12 RX ADMIN — HEPARIN SODIUM 1200 UNIT(S)/HR: 5000 INJECTION INTRAVENOUS; SUBCUTANEOUS at 21:47

## 2023-12-12 RX ADMIN — HEPARIN SODIUM 5500 UNIT(S): 5000 INJECTION INTRAVENOUS; SUBCUTANEOUS at 14:34

## 2023-12-12 RX ADMIN — Medication 30 MILLILITER(S): at 09:31

## 2023-12-12 RX ADMIN — SODIUM CHLORIDE 1000 MILLILITER(S): 9 INJECTION INTRAMUSCULAR; INTRAVENOUS; SUBCUTANEOUS at 10:16

## 2023-12-12 RX ADMIN — Medication 1 DROP(S): at 22:09

## 2023-12-12 RX ADMIN — MORPHINE SULFATE 2 MILLIGRAM(S): 50 CAPSULE, EXTENDED RELEASE ORAL at 19:22

## 2023-12-12 RX ADMIN — HEPARIN SODIUM 1200 UNIT(S)/HR: 5000 INJECTION INTRAVENOUS; SUBCUTANEOUS at 19:39

## 2023-12-12 RX ADMIN — Medication 400 MILLIGRAM(S): at 22:08

## 2023-12-12 RX ADMIN — Medication 400 MILLIGRAM(S): at 23:11

## 2023-12-12 RX ADMIN — Medication 15 MILLIGRAM(S): at 10:00

## 2023-12-12 RX ADMIN — Medication 15 MILLIGRAM(S): at 09:30

## 2023-12-12 RX ADMIN — MORPHINE SULFATE 2 MILLIGRAM(S): 50 CAPSULE, EXTENDED RELEASE ORAL at 13:46

## 2023-12-12 RX ADMIN — Medication 650 MILLIGRAM(S): at 16:39

## 2023-12-12 RX ADMIN — Medication 400 MILLIGRAM(S): at 20:40

## 2023-12-12 RX ADMIN — FAMOTIDINE 20 MILLIGRAM(S): 10 INJECTION INTRAVENOUS at 09:30

## 2023-12-12 RX ADMIN — MORPHINE SULFATE 2 MILLIGRAM(S): 50 CAPSULE, EXTENDED RELEASE ORAL at 20:09

## 2023-12-12 RX ADMIN — Medication 1000 MILLIGRAM(S): at 21:54

## 2023-12-12 RX ADMIN — HEPARIN SODIUM 1200 UNIT(S)/HR: 5000 INJECTION INTRAVENOUS; SUBCUTANEOUS at 14:37

## 2023-12-12 RX ADMIN — SODIUM CHLORIDE 125 MILLILITER(S): 9 INJECTION INTRAMUSCULAR; INTRAVENOUS; SUBCUTANEOUS at 17:43

## 2023-12-12 RX ADMIN — HEPARIN SODIUM 1200 UNIT(S)/HR: 5000 INJECTION INTRAVENOUS; SUBCUTANEOUS at 16:34

## 2023-12-12 RX ADMIN — Medication 3 MILLILITER(S): at 19:01

## 2023-12-12 NOTE — CONSULT NOTE ADULT - ASSESSMENT
Assessment: 29 y/o M with PMH lupus (dx in Sept & on Plaquenil) presenting to the ED c/o CP and SOB. Patient endorses 1-3 days of chest pain, mostly left sided, and difficulty breathing.    Dx: Sugmental/subsegmental PE, b/l pleural effusions (small), ?pulmonary infarcts     Recommendations:   - CTA shows acute right upper lobe and left lower lobe segmental/subsegmental   pulmonary emboli. No CT evidence of right heart strain. + elevated d-dimer.   - Full dose AC per protocol w/ close therapeutic monitoring   - Full PE w/u with DVT studies to r/o DVTs & echo to assess for heart strain   - Upon assessment patient hemodynamically stable on RA with o2 saturation >95%   - Does have SOB, more so on exertion. Also endorses chest pain on the left side correlates with dx   - Pain management, IV morphine can consider adding lidocaine patch to area of most concern   - Denies recent travel, personal or family hx of bleeding or clotting disorders. Most likely r/t hypercoagulable state from autoimmune deficiency and lupus as dx   - CT of chest shows "new bilateral lower lobe consolidations with areas of central clearing. Pneumonia and pulmonary infarcts are in the differential. New bilateral pleural effusions, small on the left and trace on the right. Bilateral axillary and supraclavicular adenopathy of unclear etiology"   - Given patient has no fevers, chills or leukocytosis the findings are most likely due to pulmonary infarct and less likely PNA.   - Would monitor off abx for now and if has clinical evidence of infection can consider starting.   - Check procal, RVP, sputum culture   - Start duonebs & hypersal with chest PT to promote airway clearance   - Incentive spirometry   - Heme consulted for for adenopathy on CT and recent hx of lupus on active tx.   - Rest of care per primary team     *Discussed with Pulmonary attending MD Beasley.    Assessment: 29 y/o M with PMH lupus (dx in Sept & on Plaquenil) presenting to the ED c/o CP and SOB. Patient endorses 1-3 days of chest pain, mostly left sided, and difficulty breathing.    Dx: Sugmental/subsegmental PE, b/l pleural effusions (small), ?pulmonary infarcts     Recommendations:   - CTA shows acute right upper lobe and left lower lobe segmental/subsegmental   pulmonary emboli. No CT evidence of right heart strain. + elevated d-dimer.   - Full dose AC per protocol w/ close therapeutic monitoring   - Full PE w/u with DVT studies to r/o DVTs & echo to assess for heart strain   - Upon assessment patient hemodynamically stable on RA with o2 saturation >95%   - Does have SOB, more so on exertion. Also endorses chest pain on the left side correlates with dx   - Pain management, IV morphine can consider adding lidocaine patch to area of most concern   - Denies recent travel, personal or family hx of bleeding or clotting disorders. Most likely d/t hypercoagulable state from autoimmune deficiency and lupus as dx   - CT of chest shows "new bilateral lower lobe consolidations with areas of central clearing. Pneumonia and pulmonary infarcts are in the differential. New bilateral pleural effusions, small on the left and trace on the right. Bilateral axillary and supraclavicular adenopathy of unclear etiology"   - Given patient has no fevers, chills or leukocytosis the findings are most likely due to pulmonary infarct and less likely PNA.   - Would monitor off abx for now and if has clinical evidence of infection can consider starting.   - Check procal, RVP, sputum culture   - Start duonebs & hypersal with chest PT to promote airway clearance   - Incentive spirometry   - Heme consulted for adenopathy on CT and recent hx of lupus on active tx.   - Rest of care per primary team     *Discussed with Pulmonary attending MD Beasley.    Assessment: 27 y/o M with PMH lupus (dx in Sept & on Plaquenil) presenting to the ED c/o CP and SOB. Patient endorses 1-3 days of chest pain, mostly left sided, and difficulty breathing.    Dx: Sugmental/subsegmental PE, b/l pleural effusions (small), ?pulmonary infarcts     Recommendations:   - CTA shows acute right upper lobe and left lower lobe segmental/subsegmental   pulmonary emboli. No CT evidence of right heart strain. + elevated d-dimer.   - Full dose AC per protocol w/ close therapeutic monitoring   - Full PE w/u with DVT studies to r/o DVTs & echo to assess for heart strain   - Upon assessment patient hemodynamically stable on RA with o2 saturation >95%   - Does have SOB, more so on exertion. Also endorses chest pain on the left side correlates with dx   - Pain management, IV morphine can consider adding lidocaine patch to area of most concern   - Denies recent travel, personal or family hx of bleeding or clotting disorders. Most likely d/t hypercoagulable state from autoimmune deficiency and lupus as dx   - CT of chest shows "new bilateral lower lobe consolidations with areas of central clearing. Pneumonia and pulmonary infarcts are in the differential. New bilateral pleural effusions, small on the left and trace on the right. Bilateral axillary and supraclavicular adenopathy of unclear etiology"   - Given patient has no fevers, chills or leukocytosis the findings are most likely due to pulmonary infarct and less likely PNA.   - Would monitor off abx for now and if has clinical evidence of infection can consider starting.   - Check procal, RVP, sputum culture   - Start duonebs & hypersal with chest PT to promote airway clearance   - Incentive spirometry   - Heme consulted for adenopathy on CT and recent hx of lupus on active tx.   - Rest of care per primary team     *Discussed with Pulmonary attending MD Beaslye.

## 2023-12-12 NOTE — CONSULT NOTE ADULT - NS ATTEND AMEND GEN_ALL_CORE FT
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 (i.e., aPL), but given slight elevation in creatinine, would also need to rule out nephrotic syndrome as well  - Given no evidence of infectious etiology, pulmonary infarction is likely cause of pulmonary consolidation  - Agree with full-dose anticoagulation for PE  - Recommend rheumatology and/or hematology F/U for w/u of hypercoagulable state  - Oxygen as needed to maintain spO2 =95%  - Repeat chest CT in 4–6 weeks to evaluate interval change  - DuoNEBs with hyperSAL and chest PT to improve airway clearance  - Incentive spirometry, OOB to chair when possible to reduce chances of atelectasis, as well as symptomatic treatment of pleurisy to allow him to take deeper breaths / avoid splinting  - Please feel free to call with any questions

## 2023-12-12 NOTE — ED ADULT NURSE NOTE - CHIEF COMPLAINT QUOTE
pt c/o chest pain and difficulty breathing started yesterday. seen in [facility] yesterday for lung pain. hx: lupus pt c/o chest pain and difficulty breathing started yesterday. seen in [facility] yesterday for lung pain. hx: lupus

## 2023-12-12 NOTE — ED ADULT NURSE NOTE - NSFALLUNIVINTERV_ED_ALL_ED
Bed/Stretcher in lowest position, wheels locked, appropriate side rails in place/Call bell, personal items and telephone in reach/Instruct patient to call for assistance before getting out of bed/chair/stretcher/Non-slip footwear applied when patient is off stretcher/[place] to call system/Physically safe environment - no spills, clutter or unnecessary equipment/Purposeful proactive rounding/Room/bathroom lighting operational, light cord in reach Bed/Stretcher in lowest position, wheels locked, appropriate side rails in place/Call bell, personal items and telephone in reach/Instruct patient to call for assistance before getting out of bed/chair/stretcher/Non-slip footwear applied when patient is off stretcher/[place] to call system/Physically safe environment - no spills, clutter or unnecessary equipment/Purposeful proactive rounding/Room/bathroom lighting operational, light cord in reach

## 2023-12-12 NOTE — ED ADULT NURSE NOTE - OBJECTIVE STATEMENT
29 yo male,A&Ox4, presents to ED c/o chest pain and difficulty breathing started yesterday, worsened this morning. Patient reports pain is sharp and nonradiating that started while at rest. No aggravating or relieving factors. Pt denies dizziness, N/V/diarrhea, diaphoresis, abdominal pain, no history of similar pain in the past. Seen in [facility] yesterday for lung pain, per patient "nothing was found" PMH: lupus on Plaquenil 27 yo male,A&Ox4, presents to ED c/o chest pain and difficulty breathing started yesterday, worsened this morning. Patient reports pain is sharp and nonradiating that started while at rest. No aggravating or relieving factors. Pt denies dizziness, N/V/diarrhea, diaphoresis, abdominal pain, no history of similar pain in the past. Seen in [facility] yesterday for lung pain, per patient "nothing was found" PMH: lupus on Plaquenil

## 2023-12-12 NOTE — CONSULT NOTE ADULT - ASSESSMENT
28-year-old male with recently diagnosed lupus now presents with unprovoked  bilateral pulmonary emboli and is noted to have mild generalized lymphadenopathy on CT scan.  Agree with IV heparin and plans for transition to Eliquis, to be continued for 3 to 6 months.  Thrombophilia workup can be pursued as an outpatient.  In the setting of recently diagnosed lupus, one wonders about the possibility of an autoimmune etiology, and anticardiolipin and beta-2 glycoprotein antibody studies can be sent during this hospitalization.    The adenopathy noted on CT scan is relatively small in diameter, and can certainly be seen in the setting of lupus.  Would defer lymph node biopsy at the present time given the need for full-dose anticoagulation and the relatively low likelihood of malignancy.

## 2023-12-12 NOTE — H&P ADULT - PROBLEM SELECTOR PLAN 3
CT chest  ( I personally review) noted Bilateral axillary and supraclavicular adenopathy of unclear etiology  Hematology consulted--> team message sent to Dr Amaya

## 2023-12-12 NOTE — ED PROVIDER NOTE - CLINICAL SUMMARY MEDICAL DECISION MAKING FREE TEXT BOX
27 y/o M with PMH lupus on plaquenil presenting to the ED c/o CP.  Vitals stable.  EKG w/ TWI V4-V6.  Given hx of lupus, concern for PE vs ACS vs other acute pathology  Plan for labs/CXR including d-dimer to evaluate for PE, trop/bnp to eval for ACS/CHF in setting of lupus    D-dimer elevated, CTA w/bilateral PE, no evidence of R heart strain, trop/bnp negative. Will start heparin gtt and admit, Dr. Carl aware

## 2023-12-12 NOTE — H&P ADULT - NSHPPHYSICALEXAM_GEN_ALL_CORE
CONSTITUTIONAL: alert and cooperative, mild distress due to pain  EYES: PERRL, no scleral icterus  ENT: Mucosa moist, tongue normal  NECK: Neck supple, trachea midline, non-tender.  CARDIAC: Normal S1 and S2. Regular rate and rhythms. No Pedal edema. Peripheral pulses intact  LUNGS: Equal air entry both lungs. No rales, rhonchi, wheezing. Increase respiratory effort.   ABDOMEN: Soft, nondistended, nontender. No guarding or rebound tenderness. No hepatomegaly or splenomegaly. Bowel sound normal.   MUSCULOSKELETAL: Normocephalic, atraumatic. No significant deformity or joint abnormality  NEUROLOGICAL: No gross motor or sensory deficits  SKIN: no lesions or eruptions. Normal turgor  PSYCHIATRIC: A&O x 3, appropriate mood and affect

## 2023-12-12 NOTE — CONSULT NOTE ADULT - SUBJECTIVE AND OBJECTIVE BOX
Patient is a 28y old  Male who presents with a chief complaint of Chest pain & SOB.    HPI: 29 y/o M with PMH lupus (dx in Sept & on Plaquenil) presenting to the ED c/o CP and SOB. Patient endorses 1-3 days of chest pain, mostly left sided, and difficulty breathing. Patient endorses 1-3 days of chest pain, mostly left sided, and difficulty breathing. Reports he went to [place] yesterday and was discharged home on Po abx after a CXR.      *CTA in ER showed acute right upper lobe and left lower lobe segmental/subsegmental pulmonary emboli with no evidence of heart strain. New bilateral lower lobe consolidations with areas of central clearing. Pneumonia and pulmonary infarcts are in the differential. New bilateral pleural effusions, small on the left and trace on the right. Bilateral axillary and supraclavicular adenopathy of unclear etiology       Allergies: No Known Allergies      MEDICATIONS  (STANDING):  heparin   Injectable 5500 Unit(s) IV Push once  heparin  Infusion.  Unit(s)/Hr (12 mL/Hr) IV Continuous <Continuous>  hydroxychloroquine 400 milliGRAM(s) Oral two times a day  sodium chloride 0.9%. 1000 milliLiter(s) (125 mL/Hr) IV Continuous <Continuous>    MEDICATIONS  (PRN):  acetaminophen     Tablet .. 650 milliGRAM(s) Oral every 6 hours PRN Mild Pain (1 - 3)  benzonatate 100 milliGRAM(s) Oral three times a day PRN Cough  heparin   Injectable 2500 Unit(s) IV Push every 6 hours PRN For aPTT between 40 - 57  heparin   Injectable 5500 Unit(s) IV Push every 6 hours PRN For aPTT less than 40  melatonin 3 milliGRAM(s) Oral at bedtime PRN Insomnia  morphine  - Injectable 2 milliGRAM(s) IV Push every 6 hours PRN Severe Pain (7 - 10)  oxyCODONE    IR 5 milliGRAM(s) Oral every 6 hours PRN Moderate Pain (4 - 6)      Daily Height in cm: 165.1 (12 Dec 2023 08:40)    Daily     Drug Dosing Weight  Height (cm): 165.1 (12 Dec 2023 08:40)  Weight (kg): 70.3 (12 Dec 2023 08:40)  BMI (kg/m2): 25.8 (12 Dec 2023 08:40)  BSA (m2): 1.77 (12 Dec 2023 08:40)    PAST MEDICAL & SURGICAL HISTORY:  Lupus     Social:  - Denies ETOH, smoking or illicit drug use   - No family hx of clotting or bleeding disorders    REVIEW OF SYSTEMS: See HPI     Vital Signs Last 24 Hrs  T(C): 36.9 (12 Dec 2023 08:40), Max: 36.9 (12 Dec 2023 08:40)  T(F): 98.5 (12 Dec 2023 08:40), Max: 98.5 (12 Dec 2023 08:40)  HR: 87 (12 Dec 2023 10:52) (87 - 93)  BP: 135/83 (12 Dec 2023 10:52) (132/75 - 135/83)  BP(mean): --  ABP: --  ABP(mean): --  RR: 20 (12 Dec 2023 10:52) (18 - 20)  SpO2: 99% (12 Dec 2023 10:52) (98% - 99%)    O2 Parameters below as of 12 Dec 2023 10:52  Patient On (Oxygen Delivery Method): room air    PE:   General: ill appearing male sitting at the edge of the bed, + pain guarding on left side near ribs   NEURO: A0x4. cooperative   HEENT: Pupils equal and symmetrically reactive to light.  PULM: Clear to auscultation bilaterally.  CVS: Regular rate and rhythm, no murmurs, rubs, or gallops.  ABD: Soft, nondistended, no masses.  EXT: No edema.  SKIN: Warm and well perfused, no rashes.      LABS:  CBC Full  -  ( 12 Dec 2023 09:05 )  WBC Count : 7.60 K/uL  RBC Count : 3.48 M/uL  Hemoglobin : 10.8 g/dL  Hematocrit : 30.7 %  Platelet Count - Automated : 205 K/uL  Mean Cell Volume : 88.2 fl  Mean Cell Hemoglobin : 31.0 pg  Mean Cell Hemoglobin Concentration : 35.2 g/dL  Auto Neutrophil # : 5.62 K/uL  Auto Lymphocyte # : 0.76 K/uL  Auto Monocyte # : 0.46 K/uL  Auto Eosinophil # : 0.00 K/uL  Auto Basophil # : 0.00 K/uL  Auto Neutrophil % : 74.0 %  Auto Lymphocyte % : 10.0 %  Auto Monocyte % : 6.0 %  Auto Eosinophil % : 0.0 %  Auto Basophil % : 0.0 %    12-12    126<L>  |  95<L>  |  15  ----------------------------<  97  3.8   |  25  |  1.27    Ca    8.3<L>      12 Dec 2023 09:05    TPro  8.0  /  Alb  2.8<L>  /  TBili  1.1  /  DBili  x   /  AST  53<H>  /  ALT  66  /  AlkPhos  58  12-12    CAPILLARY BLOOD GLUCOSE        PT/INR - ( 12 Dec 2023 09:05 )   PT: 16.3 sec;   INR: 1.38 ratio         PTT - ( 12 Dec 2023 09:05 )  PTT:36.4 sec  Urinalysis Basic - ( 12 Dec 2023 09:05 )    Color: x / Appearance: x / SG: x / pH: x  Gluc: 97 mg/dL / Ketone: x  / Bili: x / Urobili: x   Blood: x / Protein: x / Nitrite: x   Leuk Esterase: x / RBC: x / WBC x   Sq Epi: x / Non Sq Epi: x / Bacteria: x        Radiology     < from: Xray Chest 1 View- PORTABLE-Urgent (12.12.23 @ 09:13) >  FINDINGS/  IMPRESSION:  Infiltrate left lung base. Follow-up recommended.    No significant pleural effusion    Heart size cannot be accurately assessed in this projection.    --- End of Report ---    < end of copied text >    < from: CT Angio Chest PE Protocol w/ IV Cont (12.12.23 @ 10:27) >    IMPRESSION:    Acute right upper lobe and left lower lobe segmental/subsegmental   pulmonary emboli. No CT evidence of right heart strain.    New bilateral lower lobe consolidations with areas of central clearing.   Pneumonia and pulmonary infarcts are in the differential. New bilateral   pleural effusions, small on the left and trace on the right. Follow to   resolution with repeat chest CT in one month, or sooner as clinically   warranted.    Bilateral axillary and supraclavicular adenopathy of unclear etiology.   Consider broad differential including infectious, inflammatory, and   neoplastic etiologies.     discussed these above findings with Dr. Carson Blum on   12/12/2023 at 12:33 PM, with read back.    Probable hepatic steatosis while the partially imaged liver. Correlated   with LFTs.    --- End of Report ---    < end of copied text >   Patient is a 28y old  Male who presents with a chief complaint of Chest pain & SOB.    HPI: 27 y/o M with PMH lupus (dx in Sept & on Plaquenil) presenting to the ED c/o CP and SOB. Patient endorses 1-3 days of chest pain, mostly left sided, and difficulty breathing. Patient endorses 1-3 days of chest pain, mostly left sided, and difficulty breathing. Reports he went to [place] yesterday and was discharged home on Po abx after a CXR.      *CTA in ER showed acute right upper lobe and left lower lobe segmental/subsegmental pulmonary emboli with no evidence of heart strain. New bilateral lower lobe consolidations with areas of central clearing. Pneumonia and pulmonary infarcts are in the differential. New bilateral pleural effusions, small on the left and trace on the right. Bilateral axillary and supraclavicular adenopathy of unclear etiology       Allergies: No Known Allergies      MEDICATIONS  (STANDING):  heparin   Injectable 5500 Unit(s) IV Push once  heparin  Infusion.  Unit(s)/Hr (12 mL/Hr) IV Continuous <Continuous>  hydroxychloroquine 400 milliGRAM(s) Oral two times a day  sodium chloride 0.9%. 1000 milliLiter(s) (125 mL/Hr) IV Continuous <Continuous>    MEDICATIONS  (PRN):  acetaminophen     Tablet .. 650 milliGRAM(s) Oral every 6 hours PRN Mild Pain (1 - 3)  benzonatate 100 milliGRAM(s) Oral three times a day PRN Cough  heparin   Injectable 2500 Unit(s) IV Push every 6 hours PRN For aPTT between 40 - 57  heparin   Injectable 5500 Unit(s) IV Push every 6 hours PRN For aPTT less than 40  melatonin 3 milliGRAM(s) Oral at bedtime PRN Insomnia  morphine  - Injectable 2 milliGRAM(s) IV Push every 6 hours PRN Severe Pain (7 - 10)  oxyCODONE    IR 5 milliGRAM(s) Oral every 6 hours PRN Moderate Pain (4 - 6)      Daily Height in cm: 165.1 (12 Dec 2023 08:40)    Daily     Drug Dosing Weight  Height (cm): 165.1 (12 Dec 2023 08:40)  Weight (kg): 70.3 (12 Dec 2023 08:40)  BMI (kg/m2): 25.8 (12 Dec 2023 08:40)  BSA (m2): 1.77 (12 Dec 2023 08:40)    PAST MEDICAL & SURGICAL HISTORY:  Lupus     Social:  - Denies ETOH, smoking or illicit drug use   - No family hx of clotting or bleeding disorders    REVIEW OF SYSTEMS: See HPI     Vital Signs Last 24 Hrs  T(C): 36.9 (12 Dec 2023 08:40), Max: 36.9 (12 Dec 2023 08:40)  T(F): 98.5 (12 Dec 2023 08:40), Max: 98.5 (12 Dec 2023 08:40)  HR: 87 (12 Dec 2023 10:52) (87 - 93)  BP: 135/83 (12 Dec 2023 10:52) (132/75 - 135/83)  BP(mean): --  ABP: --  ABP(mean): --  RR: 20 (12 Dec 2023 10:52) (18 - 20)  SpO2: 99% (12 Dec 2023 10:52) (98% - 99%)    O2 Parameters below as of 12 Dec 2023 10:52  Patient On (Oxygen Delivery Method): room air    PE:   General: ill appearing male sitting at the edge of the bed, + pain guarding on left side near ribs   NEURO: A0x4. cooperative   HEENT: Pupils equal and symmetrically reactive to light.  PULM: Clear to auscultation bilaterally.  CVS: Regular rate and rhythm, no murmurs, rubs, or gallops.  ABD: Soft, nondistended, no masses.  EXT: No edema.  SKIN: Warm and well perfused, no rashes.      LABS:  CBC Full  -  ( 12 Dec 2023 09:05 )  WBC Count : 7.60 K/uL  RBC Count : 3.48 M/uL  Hemoglobin : 10.8 g/dL  Hematocrit : 30.7 %  Platelet Count - Automated : 205 K/uL  Mean Cell Volume : 88.2 fl  Mean Cell Hemoglobin : 31.0 pg  Mean Cell Hemoglobin Concentration : 35.2 g/dL  Auto Neutrophil # : 5.62 K/uL  Auto Lymphocyte # : 0.76 K/uL  Auto Monocyte # : 0.46 K/uL  Auto Eosinophil # : 0.00 K/uL  Auto Basophil # : 0.00 K/uL  Auto Neutrophil % : 74.0 %  Auto Lymphocyte % : 10.0 %  Auto Monocyte % : 6.0 %  Auto Eosinophil % : 0.0 %  Auto Basophil % : 0.0 %    12-12    126<L>  |  95<L>  |  15  ----------------------------<  97  3.8   |  25  |  1.27    Ca    8.3<L>      12 Dec 2023 09:05    TPro  8.0  /  Alb  2.8<L>  /  TBili  1.1  /  DBili  x   /  AST  53<H>  /  ALT  66  /  AlkPhos  58  12-12    CAPILLARY BLOOD GLUCOSE        PT/INR - ( 12 Dec 2023 09:05 )   PT: 16.3 sec;   INR: 1.38 ratio         PTT - ( 12 Dec 2023 09:05 )  PTT:36.4 sec  Urinalysis Basic - ( 12 Dec 2023 09:05 )    Color: x / Appearance: x / SG: x / pH: x  Gluc: 97 mg/dL / Ketone: x  / Bili: x / Urobili: x   Blood: x / Protein: x / Nitrite: x   Leuk Esterase: x / RBC: x / WBC x   Sq Epi: x / Non Sq Epi: x / Bacteria: x        Radiology     < from: Xray Chest 1 View- PORTABLE-Urgent (12.12.23 @ 09:13) >  FINDINGS/  IMPRESSION:  Infiltrate left lung base. Follow-up recommended.    No significant pleural effusion    Heart size cannot be accurately assessed in this projection.    --- End of Report ---    < end of copied text >    < from: CT Angio Chest PE Protocol w/ IV Cont (12.12.23 @ 10:27) >    IMPRESSION:    Acute right upper lobe and left lower lobe segmental/subsegmental   pulmonary emboli. No CT evidence of right heart strain.    New bilateral lower lobe consolidations with areas of central clearing.   Pneumonia and pulmonary infarcts are in the differential. New bilateral   pleural effusions, small on the left and trace on the right. Follow to   resolution with repeat chest CT in one month, or sooner as clinically   warranted.    Bilateral axillary and supraclavicular adenopathy of unclear etiology.   Consider broad differential including infectious, inflammatory, and   neoplastic etiologies.     discussed these above findings with Dr. Carson Blum on   12/12/2023 at 12:33 PM, with read back.    Probable hepatic steatosis while the partially imaged liver. Correlated   with LFTs.    --- End of Report ---    < end of copied text >

## 2023-12-12 NOTE — ED PROVIDER NOTE - CRITICAL CARE ATTENDING CONTRIBUTION TO CARE
(Inserted Image. Unable to display)                       2021  Re:  ELI ECHOLS  :  1972        [facility]   [address], [address]  [place] [address]      Dear    [facility],    The following patient has been referred to your office/practice:  ELI ECHOLS     Appointment is pending. Please contact patient to schedule.        Please refer to the attached clinical documentation for a summary of ELI's care.  Please do not hesitate to contact our office if any additional clinical questions arise. All relevant records and transition of care documents should be mailed or faxed.     Your assistance in providing continuity of care is appreciated.         Sincerely,   [address]  P) [phone] (F) [phone]   HPI/ROS/PE/MDM

## 2023-12-12 NOTE — H&P ADULT - PROBLEM SELECTOR PLAN 4
diagnosed in 09/2023 after noted to have skin rash  Currently on Plaquenil 400mg bid  Continue outpatient rheumatology follow up

## 2023-12-12 NOTE — ED ADULT TRIAGE NOTE - CHIEF COMPLAINT QUOTE
pt c/o chest pain and difficulty breathing started yesterday. seen in [facility] yesterday for lung pain. hx: lupus pt c/o chest pain and difficulty breathing started yesterday. seen in [facility] yesterday for lung pain. hx: lupus

## 2023-12-12 NOTE — H&P ADULT - ASSESSMENT
28 years old male with h/o Lupus ( diagnosed in 09/2023, on Plaquenil 400mg bid) present to ED with complain of chest pain and SOB. Patient reported left sided pleuritic chest pain which started 3 days ago. Pain is progressively worsened, associated with SOB and cough. Patient was seen in OSH ED yesterday and was prescribed antibiotics. Patient reported significantly worsening of left sided pleuritic chest pain today. No fever or chills. Patient reported loss of appetite and had a few episode of diarrhea for last 2 days. No recent travel history. No family h/o clotting disorder  Hemodynamically stable, afebrile, sat well at [facility]. No leukocytosis, plt 205, ddimer 1364, Na 126, Cl 95, Cr 1.27, hsTnT 15.5, BNP 96. CTA chest with acute right upper lobe and left lower lobe segmental/subsegmental pulmonary emboli. No CT evidence of right heart strain. New bilateral lower lobe consolidations with areas of central clearing. Pneumonia and pulmonary infarcts are in the differential. New bilateral pleural effusions, small on the left and trace on the right. Bilateral axillary and supraclavicular adenopathy of unclear etiology

## 2023-12-12 NOTE — H&P ADULT - PROBLEM SELECTOR PLAN 2
Low Na and low chloride. Recent decrease oral intake and has diarrhea for 2 days  Likely hypovolemic hyponatremia  Received 1L NS, continue with gentle IV NS  Repeat BMP, check serum/urine osmolality, TSH, free thyroxine, AM cortisol

## 2023-12-12 NOTE — CONSULT NOTE ADULT - SUBJECTIVE AND OBJECTIVE BOX
This 28-year-old male was admitted with left pleuritic chest pain and dyspnea over the prior 3 days, and was found on CT angiography to have acute right upper and left lower lobe segmental/subsegmental pulmonary embolization.  Heparin infusion has been initiated.  A diagnosis of lupus was recently made in September of this year, and patient started on Plaquenil 400 mg twice daily.  Lower extremity ultrasonography is negative for DVT    CHETS CT ANGIO:  LUNGS AND AIRWAYS: Distal airways impaction of the lower lobes. New   bilateral lower lobe consolidation with areas of central clearing for   example on images [id number] series 4.  PLEURA: New small layering left pleural effusion and new trace right   pleural effusion. No pneumothorax.  MEDIASTINUM AND MARY ELLEN: Bilateral axillary and supraclavicular adenopathy.   For reference, right axillary lymph node measures 2.2 x 1.7 cm, image 47   series 5. Partially imaged left axillary node measures 1.9 x 1.6 cm,   image 68 series 5. Right supraclavicular node measures 1.8 x 1.6 cm,   image 7 series 5. Left supraclavicular node measures 1.6 x 1.0 cm, image   9 series 5. Small volume mediastinal and hilar nodes. The esophagus is   nondistended.  VESSELS: Acute right upper lobe and left lower lobe   segmental/subsegmental pulmonary emboli. Main pulmonary artery size is   within normal limits. Normal caliber of the thoracic aorta. Imaged great   vessels are patent.  HEART: Heart size is qualitatively prominent. Trace pericardial fluid.  CHEST WALL AND LOWER NECK: No chest wall hematoma. Axillary and   supraclavicular adenopathy as described above. Thyroid is unremarkable.  VISUALIZED UPPER ABDOMEN: Probable hepatic steatosis while the partially   imaged liver. Correlated with LFTs.  BONES: Degenerative changes.    IMPRESSION:    Acute right upper lobe and left lower lobe segmental/subsegmental   pulmonary emboli. No CT evidence of right heart strain.    New bilateral lower lobe consolidations with areas of central clearing.   Pneumonia and pulmonary infarcts are in the differential. New bilateral   pleural effusions, small on the left and trace on the right. Follow to   resolution with repeat chest CT in one month, or sooner as clinically   warranted.    Bilateral axillary and supraclavicular adenopathy of unclear etiology.   Consider broad differential including infectious, inflammatory, and   neoplastic etiologies.    On Abdomina/pelvic CT:    RETROPERITONEUM/LYMPH NODES: Mildly enlarged bilateral external iliac   chain lymph nodes; representative right sided lymph node measuring 2.6 x   1.4 cm (series 2:117), and representative left-sided lymph node measuring   2.2 x 0.9 cm (series 2:114),

## 2023-12-12 NOTE — PATIENT PROFILE ADULT - FALL HARM RISK - HARM RISK INTERVENTIONS
Communicate Risk of Fall with Harm to all staff/Orthostatic vital signs/Reinforce activity limits and safety measures with patient and family/Tailored Fall Risk Interventions/Visual Cue: Yellow wristband and red socks/Bed in lowest position, wheels locked, appropriate side rails in place/Call bell, personal items and telephone in reach/Instruct patient to call for assistance before getting out of bed or chair/Non-slip footwear when patient is out of bed/[place] to call system/Physically safe environment - no spills, clutter or unnecessary equipment/Purposeful Proactive Rounding/Room/bathroom lighting operational, light cord in reach Communicate Risk of Fall with Harm to all staff/Orthostatic vital signs/Reinforce activity limits and safety measures with patient and family/Tailored Fall Risk Interventions/Visual Cue: Yellow wristband and red socks/Bed in lowest position, wheels locked, appropriate side rails in place/Call bell, personal items and telephone in reach/Instruct patient to call for assistance before getting out of bed or chair/Non-slip footwear when patient is out of bed/[place] to call system/Physically safe environment - no spills, clutter or unnecessary equipment/Purposeful Proactive Rounding/Room/bathroom lighting operational, light cord in reach

## 2023-12-12 NOTE — ED PROVIDER NOTE - PHYSICAL EXAMINATION
GENERAL: Awake, alert, appears uncomfortable  HEENT: NC/AT, moist mucous membranes  LUNGS: CTAB, no wheezes or crackles   CARDIAC: RRR, no m/r/g  ABDOMEN: Soft, non tender, non distended, no rebound, no guarding  EXT: No edema, no calf tenderness, no deformities.  NEURO: A&Ox3. Moving all extremities.  SKIN: Warm and dry. No rash.  PSYCH: Normal affect.

## 2023-12-12 NOTE — ED PROVIDER NOTE - OBJECTIVE STATEMENT
27 y/o M with PMH lupus on plaquenil presenting to the ED c/o CP.  Patient endorses 1 day of chest pain, mostly left sided, and difficulty breathing.  No fever, chills, N/V/D.  Reports he went to [place] yesterday and was discharged.  No hx of DVT, no recent travel, no leg swelling. 27 y/o M with PMH lupus on plaquenil presenting to the ED c/o CP.  Patient endorses 1 day of chest pain, mostly left sided, and difficulty breathing.  No fever, chills, N/V/D.  Reports he went to [place] yesterday and was discharged.  No hx of DVT, no recent travel, no leg swelling.

## 2023-12-12 NOTE — ED ADULT TRIAGE NOTE - NS ED NURSE AMBULANCES
[facility] [place]

## 2023-12-12 NOTE — ED PROVIDER NOTE - EKG ADDITIONAL QUESTION - PERFORMED INDEPENDENT VISUALIZATION
If you need to see a   -  just ask the nurse to call us. We look forward to serving your family!!         Syd Ivey Yes

## 2023-12-12 NOTE — H&P ADULT - HISTORY OF PRESENT ILLNESS
28 years old male with h/o Lupus ( diagnosed in 09/2023, on Plaquenil 400mg bid) present to ED with complain of chest pain and SOB. Patient reported left sided pleuritic chest pain which started 3 days ago. Pain is progressively worsened, associated with SOB and cough. Patient was seen in OSH ED yesterday and was prescribed antibiotics. Patient reported significantly worsening of left sided pleuritic chest pain today. No fever or chills. Patient reported loss of appetite and had a few episode of diarrhea for last 2 days. No recent travel history. No family h/o clotting disorder  Hemodynamically stable, afebrile, sat well at [facility]. No leukocytosis, plt 205, ddimer 1364, Na 126, Cl 95, Cr 1.27, hsTnT 15.5, BNP 96. CTA chest with acute right upper lobe and left lower lobe segmental/subsegmental pulmonary emboli. No CT evidence of right heart strain. New bilateral lower lobe consolidations with areas of central clearing. Pneumonia and pulmonary infarcts are in the differential. New bilateral pleural effusions, small on the left and trace on the right. Bilateral axillary and supraclavicular adenopathy of unclear etiology    SH: no toxic habits  FH: no family h/o autoimmune or clotting disorder

## 2023-12-13 LAB
ALBUMIN SERPL ELPH-MCNC: 2.3 G/DL — LOW (ref 3.3–5)
ALBUMIN SERPL ELPH-MCNC: 2.3 G/DL — LOW (ref 3.3–5)
ALP SERPL-CCNC: 54 U/L — SIGNIFICANT CHANGE UP (ref 40–120)
ALP SERPL-CCNC: 54 U/L — SIGNIFICANT CHANGE UP (ref 40–120)
ALT FLD-CCNC: 55 U/L — SIGNIFICANT CHANGE UP (ref 12–78)
ALT FLD-CCNC: 55 U/L — SIGNIFICANT CHANGE UP (ref 12–78)
ANION GAP SERPL CALC-SCNC: 5 MMOL/L — SIGNIFICANT CHANGE UP (ref 5–17)
ANION GAP SERPL CALC-SCNC: 5 MMOL/L — SIGNIFICANT CHANGE UP (ref 5–17)
APTT BLD: 42.9 SEC — HIGH (ref 24.5–35.6)
APTT BLD: 42.9 SEC — HIGH (ref 24.5–35.6)
APTT BLD: 68.4 SEC — HIGH (ref 24.5–35.6)
APTT BLD: 68.4 SEC — HIGH (ref 24.5–35.6)
APTT BLD: 69.7 SEC — HIGH (ref 24.5–35.6)
APTT BLD: 69.7 SEC — HIGH (ref 24.5–35.6)
AST SERPL-CCNC: 36 U/L — SIGNIFICANT CHANGE UP (ref 15–37)
AST SERPL-CCNC: 36 U/L — SIGNIFICANT CHANGE UP (ref 15–37)
B2 GLYCOPROT1 AB SER QL: NEGATIVE — SIGNIFICANT CHANGE UP
BILIRUB SERPL-MCNC: 0.7 MG/DL — SIGNIFICANT CHANGE UP (ref 0.2–1.2)
BILIRUB SERPL-MCNC: 0.7 MG/DL — SIGNIFICANT CHANGE UP (ref 0.2–1.2)
BUN SERPL-MCNC: 11 MG/DL — SIGNIFICANT CHANGE UP (ref 7–23)
BUN SERPL-MCNC: 11 MG/DL — SIGNIFICANT CHANGE UP (ref 7–23)
CALCIUM SERPL-MCNC: 8 MG/DL — LOW (ref 8.5–10.1)
CALCIUM SERPL-MCNC: 8 MG/DL — LOW (ref 8.5–10.1)
CARDIOLIPIN AB SER-ACNC: POSITIVE
CARDIOLIPIN AB SER-ACNC: POSITIVE
CHLORIDE SERPL-SCNC: 95 MMOL/L — LOW (ref 96–108)
CHLORIDE SERPL-SCNC: 95 MMOL/L — LOW (ref 96–108)
CO2 SERPL-SCNC: 23 MMOL/L — SIGNIFICANT CHANGE UP (ref 22–31)
CO2 SERPL-SCNC: 23 MMOL/L — SIGNIFICANT CHANGE UP (ref 22–31)
CREAT SERPL-MCNC: 1.14 MG/DL — SIGNIFICANT CHANGE UP (ref 0.5–1.3)
CREAT SERPL-MCNC: 1.14 MG/DL — SIGNIFICANT CHANGE UP (ref 0.5–1.3)
DRVVT RATIO: 1.17 RATIO — SIGNIFICANT CHANGE UP (ref 0–1.21)
DRVVT RATIO: 1.17 RATIO — SIGNIFICANT CHANGE UP (ref 0–1.21)
DRVVT SCREEN TO CONFIRM RATIO: SIGNIFICANT CHANGE UP
DRVVT SCREEN TO CONFIRM RATIO: SIGNIFICANT CHANGE UP
EGFR: 89 ML/MIN/1.73M2 — SIGNIFICANT CHANGE UP
EGFR: 89 ML/MIN/1.73M2 — SIGNIFICANT CHANGE UP
GLUCOSE SERPL-MCNC: 125 MG/DL — HIGH (ref 70–99)
GLUCOSE SERPL-MCNC: 125 MG/DL — HIGH (ref 70–99)
HCT VFR BLD CALC: 31 % — LOW (ref 39–50)
HCT VFR BLD CALC: 31 % — LOW (ref 39–50)
HCT VFR BLD CALC: 31.8 % — LOW (ref 39–50)
HCT VFR BLD CALC: 31.8 % — LOW (ref 39–50)
HGB BLD-MCNC: 10.8 G/DL — LOW (ref 13–17)
HGB BLD-MCNC: 10.8 G/DL — LOW (ref 13–17)
HGB BLD-MCNC: 11 G/DL — LOW (ref 13–17)
HGB BLD-MCNC: 11 G/DL — LOW (ref 13–17)
MAGNESIUM SERPL-MCNC: 2.1 MG/DL — SIGNIFICANT CHANGE UP (ref 1.6–2.6)
MAGNESIUM SERPL-MCNC: 2.1 MG/DL — SIGNIFICANT CHANGE UP (ref 1.6–2.6)
MCHC RBC-ENTMCNC: 31.1 PG — SIGNIFICANT CHANGE UP (ref 27–34)
MCHC RBC-ENTMCNC: 31.1 PG — SIGNIFICANT CHANGE UP (ref 27–34)
MCHC RBC-ENTMCNC: 31.2 PG — SIGNIFICANT CHANGE UP (ref 27–34)
MCHC RBC-ENTMCNC: 31.2 PG — SIGNIFICANT CHANGE UP (ref 27–34)
MCHC RBC-ENTMCNC: 34.6 G/DL — SIGNIFICANT CHANGE UP (ref 32–36)
MCHC RBC-ENTMCNC: 34.6 G/DL — SIGNIFICANT CHANGE UP (ref 32–36)
MCHC RBC-ENTMCNC: 34.8 G/DL — SIGNIFICANT CHANGE UP (ref 32–36)
MCHC RBC-ENTMCNC: 34.8 G/DL — SIGNIFICANT CHANGE UP (ref 32–36)
MCV RBC AUTO: 89.6 FL — SIGNIFICANT CHANGE UP (ref 80–100)
MCV RBC AUTO: 89.6 FL — SIGNIFICANT CHANGE UP (ref 80–100)
MCV RBC AUTO: 89.8 FL — SIGNIFICANT CHANGE UP (ref 80–100)
MCV RBC AUTO: 89.8 FL — SIGNIFICANT CHANGE UP (ref 80–100)
NORMALIZED SCT PPP-RTO: 0.7 RATIO — SIGNIFICANT CHANGE UP (ref 0–1.16)
NORMALIZED SCT PPP-RTO: 0.7 RATIO — SIGNIFICANT CHANGE UP (ref 0–1.16)
NORMALIZED SCT PPP-RTO: SIGNIFICANT CHANGE UP
NORMALIZED SCT PPP-RTO: SIGNIFICANT CHANGE UP
NRBC # BLD: 0 /100 WBCS — SIGNIFICANT CHANGE UP (ref 0–0)
OSMOLALITY SERPL: 268 MOSMOL/KG — LOW (ref 275–300)
OSMOLALITY SERPL: 268 MOSMOL/KG — LOW (ref 275–300)
OSMOLALITY UR: 490 MOSM/KG — SIGNIFICANT CHANGE UP (ref 50–1200)
OSMOLALITY UR: 490 MOSM/KG — SIGNIFICANT CHANGE UP (ref 50–1200)
PHOSPHATE SERPL-MCNC: 3.7 MG/DL — SIGNIFICANT CHANGE UP (ref 2.5–4.5)
PHOSPHATE SERPL-MCNC: 3.7 MG/DL — SIGNIFICANT CHANGE UP (ref 2.5–4.5)
PLATELET # BLD AUTO: 233 K/UL — SIGNIFICANT CHANGE UP (ref 150–400)
PLATELET # BLD AUTO: 233 K/UL — SIGNIFICANT CHANGE UP (ref 150–400)
PLATELET # BLD AUTO: 275 K/UL — SIGNIFICANT CHANGE UP (ref 150–400)
PLATELET # BLD AUTO: 275 K/UL — SIGNIFICANT CHANGE UP (ref 150–400)
POTASSIUM SERPL-MCNC: 4.1 MMOL/L — SIGNIFICANT CHANGE UP (ref 3.5–5.3)
POTASSIUM SERPL-MCNC: 4.1 MMOL/L — SIGNIFICANT CHANGE UP (ref 3.5–5.3)
POTASSIUM SERPL-SCNC: 4.1 MMOL/L — SIGNIFICANT CHANGE UP (ref 3.5–5.3)
POTASSIUM SERPL-SCNC: 4.1 MMOL/L — SIGNIFICANT CHANGE UP (ref 3.5–5.3)
PROT SERPL-MCNC: 7.5 GM/DL — SIGNIFICANT CHANGE UP (ref 6–8.3)
PROT SERPL-MCNC: 7.5 GM/DL — SIGNIFICANT CHANGE UP (ref 6–8.3)
RBC # BLD: 3.46 M/UL — LOW (ref 4.2–5.8)
RBC # BLD: 3.46 M/UL — LOW (ref 4.2–5.8)
RBC # BLD: 3.54 M/UL — LOW (ref 4.2–5.8)
RBC # BLD: 3.54 M/UL — LOW (ref 4.2–5.8)
RBC # FLD: 14.4 % — SIGNIFICANT CHANGE UP (ref 10.3–14.5)
RBC # FLD: 14.4 % — SIGNIFICANT CHANGE UP (ref 10.3–14.5)
RBC # FLD: 14.6 % — HIGH (ref 10.3–14.5)
RBC # FLD: 14.6 % — HIGH (ref 10.3–14.5)
SODIUM SERPL-SCNC: 123 MMOL/L — LOW (ref 135–145)
SODIUM SERPL-SCNC: 123 MMOL/L — LOW (ref 135–145)
URATE SERPL-MCNC: 6 MG/DL — SIGNIFICANT CHANGE UP (ref 3.4–8.8)
URATE SERPL-MCNC: 6 MG/DL — SIGNIFICANT CHANGE UP (ref 3.4–8.8)
WBC # BLD: 9.66 K/UL — SIGNIFICANT CHANGE UP (ref 3.8–10.5)
WBC # BLD: 9.66 K/UL — SIGNIFICANT CHANGE UP (ref 3.8–10.5)
WBC # BLD: 9.99 K/UL — SIGNIFICANT CHANGE UP (ref 3.8–10.5)
WBC # BLD: 9.99 K/UL — SIGNIFICANT CHANGE UP (ref 3.8–10.5)
WBC # FLD AUTO: 9.66 K/UL — SIGNIFICANT CHANGE UP (ref 3.8–10.5)
WBC # FLD AUTO: 9.66 K/UL — SIGNIFICANT CHANGE UP (ref 3.8–10.5)
WBC # FLD AUTO: 9.99 K/UL — SIGNIFICANT CHANGE UP (ref 3.8–10.5)
WBC # FLD AUTO: 9.99 K/UL — SIGNIFICANT CHANGE UP (ref 3.8–10.5)

## 2023-12-13 PROCEDURE — 99233 SBSQ HOSP IP/OBS HIGH 50: CPT

## 2023-12-13 RX ORDER — HEPARIN SODIUM 5000 [USP'U]/ML
3000 INJECTION INTRAVENOUS; SUBCUTANEOUS EVERY 6 HOURS
Refills: 0 | Status: DISCONTINUED | OUTPATIENT
Start: 2023-12-13 | End: 2023-12-19

## 2023-12-13 RX ORDER — HEPARIN SODIUM 5000 [USP'U]/ML
1200 INJECTION INTRAVENOUS; SUBCUTANEOUS
Qty: 25000 | Refills: 0 | Status: DISCONTINUED | OUTPATIENT
Start: 2023-12-13 | End: 2023-12-19

## 2023-12-13 RX ORDER — SENNA PLUS 8.6 MG/1
2 TABLET ORAL AT BEDTIME
Refills: 0 | Status: DISCONTINUED | OUTPATIENT
Start: 2023-12-13 | End: 2023-12-20

## 2023-12-13 RX ORDER — POLYETHYLENE GLYCOL 3350 17 G/17G
17 POWDER, FOR SOLUTION ORAL EVERY 12 HOURS
Refills: 0 | Status: DISCONTINUED | OUTPATIENT
Start: 2023-12-13 | End: 2023-12-20

## 2023-12-13 RX ORDER — ACETAMINOPHEN 500 MG
1000 TABLET ORAL ONCE
Refills: 0 | Status: COMPLETED | OUTPATIENT
Start: 2023-12-13 | End: 2023-12-13

## 2023-12-13 RX ORDER — HEPARIN SODIUM 5000 [USP'U]/ML
6000 INJECTION INTRAVENOUS; SUBCUTANEOUS EVERY 6 HOURS
Refills: 0 | Status: DISCONTINUED | OUTPATIENT
Start: 2023-12-13 | End: 2023-12-19

## 2023-12-13 RX ORDER — LIDOCAINE 4 G/100G
1 CREAM TOPICAL DAILY
Refills: 0 | Status: DISCONTINUED | OUTPATIENT
Start: 2023-12-13 | End: 2023-12-22

## 2023-12-13 RX ADMIN — MORPHINE SULFATE 2 MILLIGRAM(S): 50 CAPSULE, EXTENDED RELEASE ORAL at 10:20

## 2023-12-13 RX ADMIN — Medication 1 DROP(S): at 15:36

## 2023-12-13 RX ADMIN — HEPARIN SODIUM 1200 UNIT(S)/HR: 5000 INJECTION INTRAVENOUS; SUBCUTANEOUS at 07:12

## 2023-12-13 RX ADMIN — Medication 400 MILLIGRAM(S): at 17:35

## 2023-12-13 RX ADMIN — OXYCODONE HYDROCHLORIDE 5 MILLIGRAM(S): 5 TABLET ORAL at 14:56

## 2023-12-13 RX ADMIN — MORPHINE SULFATE 2 MILLIGRAM(S): 50 CAPSULE, EXTENDED RELEASE ORAL at 03:22

## 2023-12-13 RX ADMIN — OXYCODONE HYDROCHLORIDE 5 MILLIGRAM(S): 5 TABLET ORAL at 08:56

## 2023-12-13 RX ADMIN — Medication 1 DROP(S): at 21:40

## 2023-12-13 RX ADMIN — MORPHINE SULFATE 2 MILLIGRAM(S): 50 CAPSULE, EXTENDED RELEASE ORAL at 04:22

## 2023-12-13 RX ADMIN — SENNA PLUS 2 TABLET(S): 8.6 TABLET ORAL at 21:39

## 2023-12-13 RX ADMIN — Medication 100 MILLIGRAM(S): at 20:41

## 2023-12-13 RX ADMIN — HEPARIN SODIUM 1200 UNIT(S)/HR: 5000 INJECTION INTRAVENOUS; SUBCUTANEOUS at 05:43

## 2023-12-13 RX ADMIN — LIDOCAINE 1 PATCH: 4 CREAM TOPICAL at 17:33

## 2023-12-13 RX ADMIN — Medication 400 MILLIGRAM(S): at 20:41

## 2023-12-13 RX ADMIN — HEPARIN SODIUM 1400 UNIT(S)/HR: 5000 INJECTION INTRAVENOUS; SUBCUTANEOUS at 19:33

## 2023-12-13 RX ADMIN — HEPARIN SODIUM 1400 UNIT(S)/HR: 5000 INJECTION INTRAVENOUS; SUBCUTANEOUS at 18:51

## 2023-12-13 RX ADMIN — Medication 100 MILLIGRAM(S): at 05:21

## 2023-12-13 RX ADMIN — SODIUM CHLORIDE 4 MILLILITER(S): 9 INJECTION INTRAMUSCULAR; INTRAVENOUS; SUBCUTANEOUS at 05:11

## 2023-12-13 RX ADMIN — Medication 1000 MILLIGRAM(S): at 23:36

## 2023-12-13 RX ADMIN — Medication 3 MILLILITER(S): at 11:10

## 2023-12-13 RX ADMIN — MORPHINE SULFATE 2 MILLIGRAM(S): 50 CAPSULE, EXTENDED RELEASE ORAL at 09:16

## 2023-12-13 RX ADMIN — Medication 10 MILLILITER(S): at 23:31

## 2023-12-13 RX ADMIN — HEPARIN SODIUM 1200 UNIT(S)/HR: 5000 INJECTION INTRAVENOUS; SUBCUTANEOUS at 00:21

## 2023-12-13 RX ADMIN — OXYCODONE HYDROCHLORIDE 5 MILLIGRAM(S): 5 TABLET ORAL at 07:48

## 2023-12-13 RX ADMIN — Medication 3 MILLILITER(S): at 05:10

## 2023-12-13 RX ADMIN — SODIUM CHLORIDE 4 MILLILITER(S): 9 INJECTION INTRAMUSCULAR; INTRAVENOUS; SUBCUTANEOUS at 17:10

## 2023-12-13 RX ADMIN — Medication 3 MILLILITER(S): at 17:10

## 2023-12-13 RX ADMIN — LIDOCAINE 1 PATCH: 4 CREAM TOPICAL at 20:16

## 2023-12-13 RX ADMIN — Medication 100 MILLIGRAM(S): at 00:14

## 2023-12-13 RX ADMIN — OXYCODONE HYDROCHLORIDE 5 MILLIGRAM(S): 5 TABLET ORAL at 01:20

## 2023-12-13 RX ADMIN — HEPARIN SODIUM 1400 UNIT(S)/HR: 5000 INJECTION INTRAVENOUS; SUBCUTANEOUS at 13:15

## 2023-12-13 RX ADMIN — Medication 400 MILLIGRAM(S): at 05:21

## 2023-12-13 RX ADMIN — OXYCODONE HYDROCHLORIDE 5 MILLIGRAM(S): 5 TABLET ORAL at 00:13

## 2023-12-13 RX ADMIN — Medication 1 DROP(S): at 05:21

## 2023-12-13 RX ADMIN — HEPARIN SODIUM 3000 UNIT(S): 5000 INJECTION INTRAVENOUS; SUBCUTANEOUS at 11:50

## 2023-12-13 RX ADMIN — HEPARIN SODIUM 1400 UNIT(S)/HR: 5000 INJECTION INTRAVENOUS; SUBCUTANEOUS at 11:47

## 2023-12-13 RX ADMIN — SODIUM CHLORIDE 125 MILLILITER(S): 9 INJECTION INTRAMUSCULAR; INTRAVENOUS; SUBCUTANEOUS at 03:42

## 2023-12-13 RX ADMIN — Medication 3 MILLILITER(S): at 01:21

## 2023-12-13 RX ADMIN — SODIUM CHLORIDE 4 MILLILITER(S): 9 INJECTION INTRAMUSCULAR; INTRAVENOUS; SUBCUTANEOUS at 01:21

## 2023-12-13 RX ADMIN — OXYCODONE HYDROCHLORIDE 5 MILLIGRAM(S): 5 TABLET ORAL at 13:13

## 2023-12-13 RX ADMIN — MORPHINE SULFATE 2 MILLIGRAM(S): 50 CAPSULE, EXTENDED RELEASE ORAL at 19:59

## 2023-12-13 RX ADMIN — Medication 650 MILLIGRAM(S): at 01:31

## 2023-12-13 RX ADMIN — Medication 650 MILLIGRAM(S): at 00:13

## 2023-12-13 RX ADMIN — Medication 10 MILLILITER(S): at 03:44

## 2023-12-13 RX ADMIN — MORPHINE SULFATE 2 MILLIGRAM(S): 50 CAPSULE, EXTENDED RELEASE ORAL at 20:58

## 2023-12-13 NOTE — CONSULT NOTE ADULT - SUBJECTIVE AND OBJECTIVE BOX
[facility] NEPHROLOGY SERVICES, [facility]  NEPHROLOGY AND HYPERTENSION  [address]  [address]  [place] [address]  [phone]    MD AMBER HURT MD YELENA ROSENBERG, MD BINNY KOSHY, MD CHRISTOPHER CAPUTO, MD EDWARD BOVER, MD      Information from chart:  "Patient is a 28y old  Male who presents with a chief complaint of acute pulmonary embolism (12 Dec 2023 22:14)    HPI:  28 years old male with h/o Lupus ( diagnosed in 2023, on Plaquenil 400mg bid) present to ED with complain of chest pain and SOB. Patient reported left sided pleuritic chest pain which started 3 days ago. Pain is progressively worsened, associated with SOB and cough. Patient was seen in OSH ED yesterday and was prescribed antibiotics. Patient reported significantly worsening of left sided pleuritic chest pain today. No fever or chills. Patient reported loss of appetite and had a few episode of diarrhea for last 2 days. No recent travel history. No family h/o clotting disorder  Hemodynamically stable, afebrile, sat well at [facility]. No leukocytosis, plt 205, ddimer 1364, Na 126, Cl 95, Cr 1.27, hsTnT 15.5, BNP 96. CTA chest with acute right upper lobe and left lower lobe segmental/subsegmental pulmonary emboli. No CT evidence of right heart strain. New bilateral lower lobe consolidations with areas of central clearing. Pneumonia and pulmonary infarcts are in the differential. New bilateral pleural effusions, small on the left and trace on the right. Bilateral axillary and supraclavicular adenopathy of unclear etiology.     Noted Na trending lower with normal saline       SH: no toxic habits  FH: no family h/o autoimmune or clotting disorder (12 Dec 2023 13:27)   "    PAST MEDICAL & SURGICAL HISTORY:  No pertinent past medical history        FAMILY HISTORY:    Allergies    No Known Allergies    Intolerances      Home Medications:  hydroxychloroquine 400 mg oral tablet: 1 tab(s) orally 2 times a day (12 Dec 2023 13:20)    MEDICATIONS  (STANDING):  albuterol/ipratropium for Nebulization 3 milliLiter(s) Nebulizer every 6 hours  dexamethasone/neomycin/polymyxin Suspension 1 Drop(s) Left EYE three times a day  heparin  Infusion. 1200 Unit(s)/Hr (12 mL/Hr) IV Continuous <Continuous>  hydroxychloroquine 400 milliGRAM(s) Oral two times a day  lidocaine   4% Patch 1 Patch Transdermal daily  sodium chloride 3%  Inhalation 4 milliLiter(s) Inhalation every 12 hours    MEDICATIONS  (PRN):  acetaminophen     Tablet .. 650 milliGRAM(s) Oral every 6 hours PRN Mild Pain (1 - 3)  benzonatate 100 milliGRAM(s) Oral three times a day PRN Cough  guaifenesin/dextromethorphan Oral Liquid 10 milliLiter(s) Oral every 4 hours PRN Cough  heparin   Injectable 3000 Unit(s) IV Push every 6 hours PRN For aPTT between 40 - 57  heparin   Injectable 6000 Unit(s) IV Push every 6 hours PRN For aPTT less than 40  melatonin 3 milliGRAM(s) Oral at bedtime PRN Insomnia  morphine  - Injectable 2 milliGRAM(s) IV Push every 6 hours PRN Severe Pain (7 - 10)  oxyCODONE    IR 5 milliGRAM(s) Oral every 6 hours PRN Moderate Pain (4 - 6)    Vital Signs Last 24 Hrs  T(C): 37 (13 Dec 2023 16:28), Max: 39.2 (12 Dec 2023 20:00)  T(F): 98.6 (13 Dec 2023 16:28), Max: 102.6 (12 Dec 2023 20:00)  HR: 112 (13 Dec 2023 16:28) (75 - 112)  BP: 134/77 (13 Dec 2023 16:28) (118/84 - 159/93)  BP(mean): --  RR: 20 (13 Dec 2023 16:28) (17 - 30)  SpO2: 93% (13 Dec 2023 16:28) (93% - 100%)    Parameters below as of 13 Dec 2023 10:34  Patient On (Oxygen Delivery Method): room air        Daily     Daily Weight in k (13 Dec 2023 05:15)    23 @ 07:01  -  23 @ 07:00  --------------------------------------------------------  IN: 0 mL / OUT: 400 mL / NET: -400 mL    12-13-23 @ 07:01  -  23 @ 17:33  --------------------------------------------------------  IN: 640 mL / OUT: 0 mL / NET: 640 mL      CAPILLARY BLOOD GLUCOSE        PHYSICAL EXAM:      T(C): 37 (23 @ 16:28), Max: 39.2 (23 @ 20:00)  HR: 112 (23 @ 16:28) (75 - 112)  BP: 134/77 (23 @ 16:28) (118/84 - 159/93)  RR: 20 (23 @ 16:28) (17 - 30)  SpO2: 93% (23 @ 16:28) (93% - 100%)  Wt(kg): --  Lungs clear  Heart S1S2  Abd soft NT ND  Extremities:   tr edema                  123<L>  |  95<L>  |  11  ----------------------------<  125<H>  4.1   |  23  |  1.14    Ca    8.0<L>      13 Dec 2023 04:37  Phos  3.7       Mg     2.1         TPro  7.5  /  Alb  2.3<L>  /  TBili  0.7  /  DBili  x   /  AST  36  /  ALT  55  /  AlkPhos  54                            11.0   9.99  )-----------( 275      ( 13 Dec 2023 11:00 )             31.8     Creatinine Trend: 1.14<--, 1.24<--, 1.27<--, 1.33<--  Urinalysis Basic - ( 13 Dec 2023 04:37 )    Color: x / Appearance: x / SG: x / pH: x  Gluc: 125 mg/dL / Ketone: x  / Bili: x / Urobili: x   Blood: x / Protein: x / Nitrite: x   Leuk Esterase: x / RBC: x / WBC x   Sq Epi: x / Non Sq Epi: x / Bacteria: x    Trend Bun/Cr  23 @ 04:37  BUN/CR -  11 / 1.14  23 @ 13:20  BUN/CR -  14 / .24  23 @ 09:05  BUN/CR -  15 / .          Assessment   Acute on chronic hyponatremia suspected SIADH due to pain   Pulm embolism, lupus anticoagulant   Risk for SLE nephritis     Plan  Discontinue NS  Loop diuretic challenge  Follow CrCl;   UA micro urine prot/ creatinine  ABDIAS; DsDNA, C3, C4    Tanner Boyd MD [facility] NEPHROLOGY SERVICES, [facility]  NEPHROLOGY AND HYPERTENSION  [address]  [address]  [place] [address]  [phone]    MD AMBER HURT MD YELENA ROSENBERG, MD BINNY KOSHY, MD CHRISTOPHER CAPUTO, MD EDWARD BOVER, MD      Information from chart:  "Patient is a 28y old  Male who presents with a chief complaint of acute pulmonary embolism (12 Dec 2023 22:14)    HPI:  28 years old male with h/o Lupus ( diagnosed in 2023, on Plaquenil 400mg bid) present to ED with complain of chest pain and SOB. Patient reported left sided pleuritic chest pain which started 3 days ago. Pain is progressively worsened, associated with SOB and cough. Patient was seen in OSH ED yesterday and was prescribed antibiotics. Patient reported significantly worsening of left sided pleuritic chest pain today. No fever or chills. Patient reported loss of appetite and had a few episode of diarrhea for last 2 days. No recent travel history. No family h/o clotting disorder  Hemodynamically stable, afebrile, sat well at [facility]. No leukocytosis, plt 205, ddimer 1364, Na 126, Cl 95, Cr 1.27, hsTnT 15.5, BNP 96. CTA chest with acute right upper lobe and left lower lobe segmental/subsegmental pulmonary emboli. No CT evidence of right heart strain. New bilateral lower lobe consolidations with areas of central clearing. Pneumonia and pulmonary infarcts are in the differential. New bilateral pleural effusions, small on the left and trace on the right. Bilateral axillary and supraclavicular adenopathy of unclear etiology.     Noted Na trending lower with normal saline       SH: no toxic habits  FH: no family h/o autoimmune or clotting disorder (12 Dec 2023 13:27)   "    PAST MEDICAL & SURGICAL HISTORY:  No pertinent past medical history        FAMILY HISTORY:    Allergies    No Known Allergies    Intolerances      Home Medications:  hydroxychloroquine 400 mg oral tablet: 1 tab(s) orally 2 times a day (12 Dec 2023 13:20)    MEDICATIONS  (STANDING):  albuterol/ipratropium for Nebulization 3 milliLiter(s) Nebulizer every 6 hours  dexamethasone/neomycin/polymyxin Suspension 1 Drop(s) Left EYE three times a day  heparin  Infusion. 1200 Unit(s)/Hr (12 mL/Hr) IV Continuous <Continuous>  hydroxychloroquine 400 milliGRAM(s) Oral two times a day  lidocaine   4% Patch 1 Patch Transdermal daily  sodium chloride 3%  Inhalation 4 milliLiter(s) Inhalation every 12 hours    MEDICATIONS  (PRN):  acetaminophen     Tablet .. 650 milliGRAM(s) Oral every 6 hours PRN Mild Pain (1 - 3)  benzonatate 100 milliGRAM(s) Oral three times a day PRN Cough  guaifenesin/dextromethorphan Oral Liquid 10 milliLiter(s) Oral every 4 hours PRN Cough  heparin   Injectable 3000 Unit(s) IV Push every 6 hours PRN For aPTT between 40 - 57  heparin   Injectable 6000 Unit(s) IV Push every 6 hours PRN For aPTT less than 40  melatonin 3 milliGRAM(s) Oral at bedtime PRN Insomnia  morphine  - Injectable 2 milliGRAM(s) IV Push every 6 hours PRN Severe Pain (7 - 10)  oxyCODONE    IR 5 milliGRAM(s) Oral every 6 hours PRN Moderate Pain (4 - 6)    Vital Signs Last 24 Hrs  T(C): 37 (13 Dec 2023 16:28), Max: 39.2 (12 Dec 2023 20:00)  T(F): 98.6 (13 Dec 2023 16:28), Max: 102.6 (12 Dec 2023 20:00)  HR: 112 (13 Dec 2023 16:28) (75 - 112)  BP: 134/77 (13 Dec 2023 16:28) (118/84 - 159/93)  BP(mean): --  RR: 20 (13 Dec 2023 16:28) (17 - 30)  SpO2: 93% (13 Dec 2023 16:28) (93% - 100%)    Parameters below as of 13 Dec 2023 10:34  Patient On (Oxygen Delivery Method): room air        Daily     Daily Weight in k (13 Dec 2023 05:15)    23 @ 07:01  -  23 @ 07:00  --------------------------------------------------------  IN: 0 mL / OUT: 400 mL / NET: -400 mL    12-13-23 @ 07:01  -  23 @ 17:33  --------------------------------------------------------  IN: 640 mL / OUT: 0 mL / NET: 640 mL      CAPILLARY BLOOD GLUCOSE        PHYSICAL EXAM:      T(C): 37 (23 @ 16:28), Max: 39.2 (23 @ 20:00)  HR: 112 (23 @ 16:28) (75 - 112)  BP: 134/77 (23 @ 16:28) (118/84 - 159/93)  RR: 20 (23 @ 16:28) (17 - 30)  SpO2: 93% (23 @ 16:28) (93% - 100%)  Wt(kg): --  Lungs clear  Heart S1S2  Abd soft NT ND  Extremities:   tr edema                  123<L>  |  95<L>  |  11  ----------------------------<  125<H>  4.1   |  23  |  1.14    Ca    8.0<L>      13 Dec 2023 04:37  Phos  3.7       Mg     2.1         TPro  7.5  /  Alb  2.3<L>  /  TBili  0.7  /  DBili  x   /  AST  36  /  ALT  55  /  AlkPhos  54                            11.0   9.99  )-----------( 275      ( 13 Dec 2023 11:00 )             31.8     Creatinine Trend: 1.14<--, 1.24<--, 1.27<--, 1.33<--  Urinalysis Basic - ( 13 Dec 2023 04:37 )    Color: x / Appearance: x / SG: x / pH: x  Gluc: 125 mg/dL / Ketone: x  / Bili: x / Urobili: x   Blood: x / Protein: x / Nitrite: x   Leuk Esterase: x / RBC: x / WBC x   Sq Epi: x / Non Sq Epi: x / Bacteria: x    Trend Bun/Cr  23 @ 04:37  BUN/CR -  11 / 1.14  23 @ 13:20  BUN/CR -  14 / .24  23 @ 09:05  BUN/CR -  15 / .          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

## 2023-12-13 NOTE — PROGRESS NOTE ADULT - SUBJECTIVE AND OBJECTIVE BOX
[facility] Division of Hospital Medicine  Echo Fishman MD  Available via MS Teams  Pager: [phone]    SUBJECTIVE / OVERNIGHT EVENTS:  - no events overnght, this will significant chest pain, mostly pleuritic in nature. osman any nausea, vomiting, headaches, diarrhea, constipation.     MEDICATIONS  (STANDING):  albuterol/ipratropium for Nebulization 3 milliLiter(s) Nebulizer every 6 hours  dexamethasone/neomycin/polymyxin Suspension 1 Drop(s) Left EYE three times a day  heparin  Infusion. 1200 Unit(s)/Hr (12 mL/Hr) IV Continuous <Continuous>  hydroxychloroquine 400 milliGRAM(s) Oral two times a day  lidocaine   4% Patch 1 Patch Transdermal daily  sodium chloride 3%  Inhalation 4 milliLiter(s) Inhalation every 12 hours    MEDICATIONS  (PRN):  acetaminophen     Tablet .. 650 milliGRAM(s) Oral every 6 hours PRN Mild Pain (1 - 3)  benzonatate 100 milliGRAM(s) Oral three times a day PRN Cough  guaifenesin/dextromethorphan Oral Liquid 10 milliLiter(s) Oral every 4 hours PRN Cough  heparin   Injectable 6000 Unit(s) IV Push every 6 hours PRN For aPTT less than 40  heparin   Injectable 3000 Unit(s) IV Push every 6 hours PRN For aPTT between 40 - 57  melatonin 3 milliGRAM(s) Oral at bedtime PRN Insomnia  morphine  - Injectable 2 milliGRAM(s) IV Push every 6 hours PRN Severe Pain (7 - 10)  oxyCODONE    IR 5 milliGRAM(s) Oral every 6 hours PRN Moderate Pain (4 - 6)      I&O's Summary    12 Dec 2023 07:01  -  13 Dec 2023 07:00  --------------------------------------------------------  IN: 0 mL / OUT: 400 mL / NET: -400 mL    13 Dec 2023 07:01  -  13 Dec 2023 18:18  --------------------------------------------------------  IN: 640 mL / OUT: 0 mL / NET: 640 mL        PHYSICAL EXAM:  Vital Signs Last 24 Hrs  T(C): 37 (13 Dec 2023 16:28), Max: 39.2 (12 Dec 2023 20:00)  T(F): 98.6 (13 Dec 2023 16:28), Max: 102.6 (12 Dec 2023 20:00)  HR: 112 (13 Dec 2023 16:28) (75 - 112)  BP: 134/77 (13 Dec 2023 16:28) (118/84 - 159/93)  BP(mean): --  RR: 20 (13 Dec 2023 16:28) (17 - 30)  SpO2: 93% (13 Dec 2023 16:28) (93% - 100%)    Parameters below as of 13 Dec 2023 10:34  Patient On (Oxygen Delivery Method): room air      CONSTITUTIONAL: alert and cooperative, mild distress due to pain  EYES: PERRL, no scleral icterus  ENT: Mucosa moist, tongue normal  NECK: Neck supple, trachea midline, non-tender.  CARDIAC: Normal S1 and S2. Regular rate and rhythms. No Pedal edema. Peripheral pulses intact  LUNGS: Equal air entry both lungs. No rales, rhonchi, wheezing. Increase respiratory effort.   ABDOMEN: Soft, nondistended, nontender. No guarding or rebound tenderness. No hepatomegaly or splenomegaly. Bowel sound normal.   MUSCULOSKELETAL: Normocephalic, atraumatic. No significant deformity or joint abnormality  NEUROLOGICAL: No gross motor or sensory deficits  SKIN: no lesions or eruptions. Normal turgor  PSYCHIATRIC: A&O x 3, appropriate mood and affect    LABS:                        11.0   9.99  )-----------( 275      ( 13 Dec 2023 11:00 )             31.8     12-13    123<L>  |  95<L>  |  11  ----------------------------<  125<H>  4.1   |  23  |  1.14    Ca    8.0<L>      13 Dec 2023 04:37  Phos  3.7     12-13  Mg     2.1     12-13    TPro  7.5  /  Alb  2.3<L>  /  TBili  0.7  /  DBili  x   /  AST  36  /  ALT  55  /  AlkPhos  54  12-13    PT/INR - ( 12 Dec 2023 09:05 )   PT: 16.3 sec;   INR: 1.38 ratio         PTT - ( 13 Dec 2023 11:00 )  PTT:42.9 sec      Urinalysis Basic - ( 13 Dec 2023 04:37 )    Color: x / Appearance: x / SG: x / pH: x  Gluc: 125 mg/dL / Ketone: x  / Bili: x / Urobili: x   Blood: x / Protein: x / Nitrite: x   Leuk Esterase: x / RBC: x / WBC x   Sq Epi: x / Non Sq Epi: x / Bacteria: x        SARS-CoV-2: NotDetec (12 Dec 2023 15:30) [facility] Division of Hospital Medicine  Echo Fishman MD  Available via MS Teams  Pager: [phone]    SUBJECTIVE / OVERNIGHT EVENTS:  - no events overnght, this will significant chest pain, mostly pleuritic in nature. osman any nausea, vomiting, headaches, diarrhea, constipation.     MEDICATIONS  (STANDING):  albuterol/ipratropium for Nebulization 3 milliLiter(s) Nebulizer every 6 hours  dexamethasone/neomycin/polymyxin Suspension 1 Drop(s) Left EYE three times a day  heparin  Infusion. 1200 Unit(s)/Hr (12 mL/Hr) IV Continuous <Continuous>  hydroxychloroquine 400 milliGRAM(s) Oral two times a day  lidocaine   4% Patch 1 Patch Transdermal daily  sodium chloride 3%  Inhalation 4 milliLiter(s) Inhalation every 12 hours    MEDICATIONS  (PRN):  acetaminophen     Tablet .. 650 milliGRAM(s) Oral every 6 hours PRN Mild Pain (1 - 3)  benzonatate 100 milliGRAM(s) Oral three times a day PRN Cough  guaifenesin/dextromethorphan Oral Liquid 10 milliLiter(s) Oral every 4 hours PRN Cough  heparin   Injectable 6000 Unit(s) IV Push every 6 hours PRN For aPTT less than 40  heparin   Injectable 3000 Unit(s) IV Push every 6 hours PRN For aPTT between 40 - 57  melatonin 3 milliGRAM(s) Oral at bedtime PRN Insomnia  morphine  - Injectable 2 milliGRAM(s) IV Push every 6 hours PRN Severe Pain (7 - 10)  oxyCODONE    IR 5 milliGRAM(s) Oral every 6 hours PRN Moderate Pain (4 - 6)      I&O's Summary    12 Dec 2023 07:01  -  13 Dec 2023 07:00  --------------------------------------------------------  IN: 0 mL / OUT: 400 mL / NET: -400 mL    13 Dec 2023 07:01  -  13 Dec 2023 18:18  --------------------------------------------------------  IN: 640 mL / OUT: 0 mL / NET: 640 mL        PHYSICAL EXAM:  Vital Signs Last 24 Hrs  T(C): 37 (13 Dec 2023 16:28), Max: 39.2 (12 Dec 2023 20:00)  T(F): 98.6 (13 Dec 2023 16:28), Max: 102.6 (12 Dec 2023 20:00)  HR: 112 (13 Dec 2023 16:28) (75 - 112)  BP: 134/77 (13 Dec 2023 16:28) (118/84 - 159/93)  BP(mean): --  RR: 20 (13 Dec 2023 16:28) (17 - 30)  SpO2: 93% (13 Dec 2023 16:28) (93% - 100%)    Parameters below as of 13 Dec 2023 10:34  Patient On (Oxygen Delivery Method): room air      CONSTITUTIONAL: alert and cooperative, mild distress due to pain  EYES: PERRL, no scleral icterus  ENT: Mucosa moist, tongue normal  NECK: Neck supple, trachea midline, non-tender.  CARDIAC: Normal S1 and S2. Regular rate and rhythms. No Pedal edema. Peripheral pulses intact  LUNGS: Equal air entry both lungs. No rales, rhonchi, wheezing. Increase respiratory effort.   ABDOMEN: Soft, nondistended, nontender. No guarding or rebound tenderness. No hepatomegaly or splenomegaly. Bowel sound normal.   MUSCULOSKELETAL: Normocephalic, atraumatic. No significant deformity or joint abnormality  NEUROLOGICAL: No gross motor or sensory deficits  SKIN: no lesions or eruptions. Normal turgor  PSYCHIATRIC: A&O x 3, appropriate mood and affect    LABS:                        11.0   9.99  )-----------( 275      ( 13 Dec 2023 11:00 )             31.8     12-13    123<L>  |  95<L>  |  11  ----------------------------<  125<H>  4.1   |  23  |  1.14    Ca    8.0<L>      13 Dec 2023 04:37  Phos  3.7     12-13  Mg     2.1     12-13    TPro  7.5  /  Alb  2.3<L>  /  TBili  0.7  /  DBili  x   /  AST  36  /  ALT  55  /  AlkPhos  54  12-13    PT/INR - ( 12 Dec 2023 09:05 )   PT: 16.3 sec;   INR: 1.38 ratio         PTT - ( 13 Dec 2023 11:00 )  PTT:42.9 sec      Urinalysis Basic - ( 13 Dec 2023 04:37 )    Color: x / Appearance: x / SG: x / pH: x  Gluc: 125 mg/dL / Ketone: x  / Bili: x / Urobili: x   Blood: x / Protein: x / Nitrite: x   Leuk Esterase: x / RBC: x / WBC x   Sq Epi: x / Non Sq Epi: x / Bacteria: x        SARS-CoV-2: NotDetec (12 Dec 2023 15:30)

## 2023-12-14 DIAGNOSIS — R50.9 FEVER, UNSPECIFIED: ICD-10-CM

## 2023-12-14 LAB
ALBUMIN SERPL ELPH-MCNC: 2.2 G/DL — LOW (ref 3.3–5)
ALBUMIN SERPL ELPH-MCNC: 2.2 G/DL — LOW (ref 3.3–5)
ALP SERPL-CCNC: 55 U/L — SIGNIFICANT CHANGE UP (ref 40–120)
ALP SERPL-CCNC: 55 U/L — SIGNIFICANT CHANGE UP (ref 40–120)
ALT FLD-CCNC: 64 U/L — SIGNIFICANT CHANGE UP (ref 12–78)
ALT FLD-CCNC: 64 U/L — SIGNIFICANT CHANGE UP (ref 12–78)
ANION GAP SERPL CALC-SCNC: 8 MMOL/L — SIGNIFICANT CHANGE UP (ref 5–17)
ANION GAP SERPL CALC-SCNC: 8 MMOL/L — SIGNIFICANT CHANGE UP (ref 5–17)
APPEARANCE UR: CLEAR — SIGNIFICANT CHANGE UP
APPEARANCE UR: CLEAR — SIGNIFICANT CHANGE UP
APTT BLD: 82.9 SEC — HIGH (ref 24.5–35.6)
APTT BLD: 82.9 SEC — HIGH (ref 24.5–35.6)
AST SERPL-CCNC: 87 U/L — HIGH (ref 15–37)
AST SERPL-CCNC: 87 U/L — HIGH (ref 15–37)
B2 GLYCOPROT1 AB SER QL: NEGATIVE — SIGNIFICANT CHANGE UP
B2 GLYCOPROT1 AB SER QL: NEGATIVE — SIGNIFICANT CHANGE UP
BACTERIA # UR AUTO: ABNORMAL /HPF
BACTERIA # UR AUTO: ABNORMAL /HPF
BILIRUB SERPL-MCNC: 0.7 MG/DL — SIGNIFICANT CHANGE UP (ref 0.2–1.2)
BILIRUB SERPL-MCNC: 0.7 MG/DL — SIGNIFICANT CHANGE UP (ref 0.2–1.2)
BILIRUB UR-MCNC: NEGATIVE — SIGNIFICANT CHANGE UP
BILIRUB UR-MCNC: NEGATIVE — SIGNIFICANT CHANGE UP
BUN SERPL-MCNC: 12 MG/DL — SIGNIFICANT CHANGE UP (ref 7–23)
BUN SERPL-MCNC: 12 MG/DL — SIGNIFICANT CHANGE UP (ref 7–23)
C3 SERPL-MCNC: 79 MG/DL — LOW (ref 81–157)
C3 SERPL-MCNC: 79 MG/DL — LOW (ref 81–157)
C4 SERPL-MCNC: 12 MG/DL — LOW (ref 13–39)
C4 SERPL-MCNC: 12 MG/DL — LOW (ref 13–39)
CALCIUM SERPL-MCNC: 8.3 MG/DL — LOW (ref 8.5–10.1)
CALCIUM SERPL-MCNC: 8.3 MG/DL — LOW (ref 8.5–10.1)
CARDIOLIPIN AB SER-ACNC: POSITIVE
CARDIOLIPIN AB SER-ACNC: POSITIVE
CARDIOLIPIN IGM SER-MCNC: 13 MPL — HIGH (ref 0–12.5)
CARDIOLIPIN IGM SER-MCNC: 13 MPL — HIGH (ref 0–12.5)
CARDIOLIPIN IGM SER-MCNC: 13.4 GPL — HIGH (ref 0–12.5)
CARDIOLIPIN IGM SER-MCNC: 13.4 GPL — HIGH (ref 0–12.5)
CHLORIDE SERPL-SCNC: 94 MMOL/L — LOW (ref 96–108)
CHLORIDE SERPL-SCNC: 94 MMOL/L — LOW (ref 96–108)
CO2 SERPL-SCNC: 21 MMOL/L — LOW (ref 22–31)
CO2 SERPL-SCNC: 21 MMOL/L — LOW (ref 22–31)
COLOR SPEC: YELLOW — SIGNIFICANT CHANGE UP
COLOR SPEC: YELLOW — SIGNIFICANT CHANGE UP
CORTIS AM PEAK SERPL-MCNC: 20.2 UG/DL — HIGH (ref 6–18.4)
CORTIS AM PEAK SERPL-MCNC: 20.2 UG/DL — HIGH (ref 6–18.4)
CREAT SERPL-MCNC: 1.08 MG/DL — SIGNIFICANT CHANGE UP (ref 0.5–1.3)
CREAT SERPL-MCNC: 1.08 MG/DL — SIGNIFICANT CHANGE UP (ref 0.5–1.3)
DEPRECATED CARDIOLIPIN IGA SER: 5.4 APL — SIGNIFICANT CHANGE UP (ref 0–12.5)
DEPRECATED CARDIOLIPIN IGA SER: 5.4 APL — SIGNIFICANT CHANGE UP (ref 0–12.5)
DIFF PNL FLD: ABNORMAL
DIFF PNL FLD: ABNORMAL
DSDNA AB SER-ACNC: 15 IU/ML — SIGNIFICANT CHANGE UP
DSDNA AB SER-ACNC: 15 IU/ML — SIGNIFICANT CHANGE UP
EGFR: 95 ML/MIN/1.73M2 — SIGNIFICANT CHANGE UP
EGFR: 95 ML/MIN/1.73M2 — SIGNIFICANT CHANGE UP
EPI CELLS # UR: PRESENT
EPI CELLS # UR: PRESENT
GLUCOSE SERPL-MCNC: 113 MG/DL — HIGH (ref 70–99)
GLUCOSE SERPL-MCNC: 113 MG/DL — HIGH (ref 70–99)
GLUCOSE UR QL: NEGATIVE MG/DL — SIGNIFICANT CHANGE UP
GLUCOSE UR QL: NEGATIVE MG/DL — SIGNIFICANT CHANGE UP
HCT VFR BLD CALC: 29.3 % — LOW (ref 39–50)
HCT VFR BLD CALC: 29.3 % — LOW (ref 39–50)
HGB BLD-MCNC: 10.2 G/DL — LOW (ref 13–17)
HGB BLD-MCNC: 10.2 G/DL — LOW (ref 13–17)
KETONES UR-MCNC: NEGATIVE MG/DL — SIGNIFICANT CHANGE UP
KETONES UR-MCNC: NEGATIVE MG/DL — SIGNIFICANT CHANGE UP
LEUKOCYTE ESTERASE UR-ACNC: NEGATIVE — SIGNIFICANT CHANGE UP
LEUKOCYTE ESTERASE UR-ACNC: NEGATIVE — SIGNIFICANT CHANGE UP
MAGNESIUM SERPL-MCNC: 1.9 MG/DL — SIGNIFICANT CHANGE UP (ref 1.6–2.6)
MAGNESIUM SERPL-MCNC: 1.9 MG/DL — SIGNIFICANT CHANGE UP (ref 1.6–2.6)
MCHC RBC-ENTMCNC: 31.4 PG — SIGNIFICANT CHANGE UP (ref 27–34)
MCHC RBC-ENTMCNC: 31.4 PG — SIGNIFICANT CHANGE UP (ref 27–34)
MCHC RBC-ENTMCNC: 34.8 G/DL — SIGNIFICANT CHANGE UP (ref 32–36)
MCHC RBC-ENTMCNC: 34.8 G/DL — SIGNIFICANT CHANGE UP (ref 32–36)
MCV RBC AUTO: 90.2 FL — SIGNIFICANT CHANGE UP (ref 80–100)
MCV RBC AUTO: 90.2 FL — SIGNIFICANT CHANGE UP (ref 80–100)
NITRITE UR-MCNC: NEGATIVE — SIGNIFICANT CHANGE UP
NITRITE UR-MCNC: NEGATIVE — SIGNIFICANT CHANGE UP
NRBC # BLD: 0 /100 WBCS — SIGNIFICANT CHANGE UP (ref 0–0)
NRBC # BLD: 0 /100 WBCS — SIGNIFICANT CHANGE UP (ref 0–0)
PH UR: 6 — SIGNIFICANT CHANGE UP (ref 5–8)
PH UR: 6 — SIGNIFICANT CHANGE UP (ref 5–8)
PHOSPHATE SERPL-MCNC: 3.2 MG/DL — SIGNIFICANT CHANGE UP (ref 2.5–4.5)
PHOSPHATE SERPL-MCNC: 3.2 MG/DL — SIGNIFICANT CHANGE UP (ref 2.5–4.5)
PLATELET # BLD AUTO: 240 K/UL — SIGNIFICANT CHANGE UP (ref 150–400)
PLATELET # BLD AUTO: 240 K/UL — SIGNIFICANT CHANGE UP (ref 150–400)
POTASSIUM SERPL-MCNC: 4.2 MMOL/L — SIGNIFICANT CHANGE UP (ref 3.5–5.3)
POTASSIUM SERPL-MCNC: 4.2 MMOL/L — SIGNIFICANT CHANGE UP (ref 3.5–5.3)
POTASSIUM SERPL-SCNC: 4.2 MMOL/L — SIGNIFICANT CHANGE UP (ref 3.5–5.3)
POTASSIUM SERPL-SCNC: 4.2 MMOL/L — SIGNIFICANT CHANGE UP (ref 3.5–5.3)
PROT SERPL-MCNC: 7.4 GM/DL — SIGNIFICANT CHANGE UP (ref 6–8.3)
PROT SERPL-MCNC: 7.4 GM/DL — SIGNIFICANT CHANGE UP (ref 6–8.3)
PROT UR-MCNC: SIGNIFICANT CHANGE UP MG/DL
PROT UR-MCNC: SIGNIFICANT CHANGE UP MG/DL
RBC # BLD: 3.25 M/UL — LOW (ref 4.2–5.8)
RBC # BLD: 3.25 M/UL — LOW (ref 4.2–5.8)
RBC # FLD: 14.5 % — SIGNIFICANT CHANGE UP (ref 10.3–14.5)
RBC # FLD: 14.5 % — SIGNIFICANT CHANGE UP (ref 10.3–14.5)
RBC CASTS # UR COMP ASSIST: SIGNIFICANT CHANGE UP /HPF (ref 0–4)
RBC CASTS # UR COMP ASSIST: SIGNIFICANT CHANGE UP /HPF (ref 0–4)
SODIUM SERPL-SCNC: 123 MMOL/L — LOW (ref 135–145)
SODIUM SERPL-SCNC: 123 MMOL/L — LOW (ref 135–145)
SP GR SPEC: 1 — SIGNIFICANT CHANGE UP (ref 1–1.03)
SP GR SPEC: 1 — SIGNIFICANT CHANGE UP (ref 1–1.03)
UROBILINOGEN FLD QL: 0.2 MG/DL — SIGNIFICANT CHANGE UP (ref 0.2–1)
UROBILINOGEN FLD QL: 0.2 MG/DL — SIGNIFICANT CHANGE UP (ref 0.2–1)
WBC # BLD: 12.3 K/UL — HIGH (ref 3.8–10.5)
WBC # BLD: 12.3 K/UL — HIGH (ref 3.8–10.5)
WBC # FLD AUTO: 12.3 K/UL — HIGH (ref 3.8–10.5)
WBC # FLD AUTO: 12.3 K/UL — HIGH (ref 3.8–10.5)
WBC UR QL: SIGNIFICANT CHANGE UP /HPF (ref 0–5)
WBC UR QL: SIGNIFICANT CHANGE UP /HPF (ref 0–5)

## 2023-12-14 PROCEDURE — 71045 X-RAY EXAM CHEST 1 VIEW: CPT | Mod: 26

## 2023-12-14 PROCEDURE — 76700 US EXAM ABDOM COMPLETE: CPT | Mod: 26

## 2023-12-14 PROCEDURE — 99233 SBSQ HOSP IP/OBS HIGH 50: CPT

## 2023-12-14 RX ORDER — AZITHROMYCIN 500 MG/1
TABLET, FILM COATED ORAL
Refills: 0 | Status: DISCONTINUED | OUTPATIENT
Start: 2023-12-14 | End: 2023-12-16

## 2023-12-14 RX ORDER — PIPERACILLIN AND TAZOBACTAM 4; .5 G/20ML; G/20ML
3.38 INJECTION, POWDER, LYOPHILIZED, FOR SOLUTION INTRAVENOUS EVERY 8 HOURS
Refills: 0 | Status: COMPLETED | OUTPATIENT
Start: 2023-12-15 | End: 2023-12-22

## 2023-12-14 RX ORDER — AZITHROMYCIN 500 MG/1
500 TABLET, FILM COATED ORAL EVERY 24 HOURS
Refills: 0 | Status: DISCONTINUED | OUTPATIENT
Start: 2023-12-15 | End: 2023-12-16

## 2023-12-14 RX ORDER — PIPERACILLIN AND TAZOBACTAM 4; .5 G/20ML; G/20ML
3.38 INJECTION, POWDER, LYOPHILIZED, FOR SOLUTION INTRAVENOUS ONCE
Refills: 0 | Status: COMPLETED | OUTPATIENT
Start: 2023-12-14 | End: 2023-12-14

## 2023-12-14 RX ORDER — SODIUM CHLORIDE 9 MG/ML
1 INJECTION INTRAMUSCULAR; INTRAVENOUS; SUBCUTANEOUS THREE TIMES A DAY
Refills: 0 | Status: DISCONTINUED | OUTPATIENT
Start: 2023-12-14 | End: 2023-12-19

## 2023-12-14 RX ORDER — AZITHROMYCIN 500 MG/1
500 TABLET, FILM COATED ORAL ONCE
Refills: 0 | Status: COMPLETED | OUTPATIENT
Start: 2023-12-14 | End: 2023-12-14

## 2023-12-14 RX ORDER — FUROSEMIDE 40 MG
20 TABLET ORAL ONCE
Refills: 0 | Status: COMPLETED | OUTPATIENT
Start: 2023-12-14 | End: 2023-12-14

## 2023-12-14 RX ORDER — ACETAMINOPHEN 500 MG
1000 TABLET ORAL ONCE
Refills: 0 | Status: COMPLETED | OUTPATIENT
Start: 2023-12-14 | End: 2023-12-14

## 2023-12-14 RX ORDER — PIPERACILLIN AND TAZOBACTAM 4; .5 G/20ML; G/20ML
3.38 INJECTION, POWDER, LYOPHILIZED, FOR SOLUTION INTRAVENOUS ONCE
Refills: 0 | Status: COMPLETED | OUTPATIENT
Start: 2023-12-15 | End: 2023-12-15

## 2023-12-14 RX ADMIN — Medication 400 MILLIGRAM(S): at 18:20

## 2023-12-14 RX ADMIN — OXYCODONE HYDROCHLORIDE 5 MILLIGRAM(S): 5 TABLET ORAL at 01:08

## 2023-12-14 RX ADMIN — HEPARIN SODIUM 1400 UNIT(S)/HR: 5000 INJECTION INTRAVENOUS; SUBCUTANEOUS at 02:07

## 2023-12-14 RX ADMIN — SODIUM CHLORIDE 4 MILLILITER(S): 9 INJECTION INTRAMUSCULAR; INTRAVENOUS; SUBCUTANEOUS at 05:45

## 2023-12-14 RX ADMIN — Medication 3 MILLILITER(S): at 00:41

## 2023-12-14 RX ADMIN — LIDOCAINE 1 PATCH: 4 CREAM TOPICAL at 20:19

## 2023-12-14 RX ADMIN — HEPARIN SODIUM 1400 UNIT(S)/HR: 5000 INJECTION INTRAVENOUS; SUBCUTANEOUS at 07:31

## 2023-12-14 RX ADMIN — Medication 10 MILLILITER(S): at 17:06

## 2023-12-14 RX ADMIN — Medication 3 MILLILITER(S): at 12:00

## 2023-12-14 RX ADMIN — Medication 1 DROP(S): at 22:11

## 2023-12-14 RX ADMIN — LIDOCAINE 1 PATCH: 4 CREAM TOPICAL at 23:07

## 2023-12-14 RX ADMIN — Medication 1 DROP(S): at 05:03

## 2023-12-14 RX ADMIN — Medication 20 MILLIGRAM(S): at 11:39

## 2023-12-14 RX ADMIN — Medication 1 DROP(S): at 13:08

## 2023-12-14 RX ADMIN — Medication 400 MILLIGRAM(S): at 05:03

## 2023-12-14 RX ADMIN — MORPHINE SULFATE 2 MILLIGRAM(S): 50 CAPSULE, EXTENDED RELEASE ORAL at 12:39

## 2023-12-14 RX ADMIN — SODIUM CHLORIDE 1 GRAM(S): 9 INJECTION INTRAMUSCULAR; INTRAVENOUS; SUBCUTANEOUS at 22:11

## 2023-12-14 RX ADMIN — Medication 3 MILLILITER(S): at 05:45

## 2023-12-14 RX ADMIN — PIPERACILLIN AND TAZOBACTAM 200 GRAM(S): 4; .5 INJECTION, POWDER, LYOPHILIZED, FOR SOLUTION INTRAVENOUS at 18:16

## 2023-12-14 RX ADMIN — AZITHROMYCIN 255 MILLIGRAM(S): 500 TABLET, FILM COATED ORAL at 16:57

## 2023-12-14 RX ADMIN — Medication 400 MILLIGRAM(S): at 17:07

## 2023-12-14 RX ADMIN — OXYCODONE HYDROCHLORIDE 5 MILLIGRAM(S): 5 TABLET ORAL at 00:24

## 2023-12-14 RX ADMIN — HEPARIN SODIUM 1400 UNIT(S)/HR: 5000 INJECTION INTRAVENOUS; SUBCUTANEOUS at 06:13

## 2023-12-14 RX ADMIN — OXYCODONE HYDROCHLORIDE 5 MILLIGRAM(S): 5 TABLET ORAL at 14:40

## 2023-12-14 RX ADMIN — HEPARIN SODIUM 1400 UNIT(S)/HR: 5000 INJECTION INTRAVENOUS; SUBCUTANEOUS at 19:41

## 2023-12-14 RX ADMIN — MORPHINE SULFATE 2 MILLIGRAM(S): 50 CAPSULE, EXTENDED RELEASE ORAL at 04:58

## 2023-12-14 RX ADMIN — OXYCODONE HYDROCHLORIDE 5 MILLIGRAM(S): 5 TABLET ORAL at 15:40

## 2023-12-14 RX ADMIN — PIPERACILLIN AND TAZOBACTAM 25 GRAM(S): 4; .5 INJECTION, POWDER, LYOPHILIZED, FOR SOLUTION INTRAVENOUS at 20:15

## 2023-12-14 RX ADMIN — LIDOCAINE 1 PATCH: 4 CREAM TOPICAL at 05:06

## 2023-12-14 RX ADMIN — LIDOCAINE 1 PATCH: 4 CREAM TOPICAL at 11:41

## 2023-12-14 RX ADMIN — MORPHINE SULFATE 2 MILLIGRAM(S): 50 CAPSULE, EXTENDED RELEASE ORAL at 05:49

## 2023-12-14 RX ADMIN — SODIUM CHLORIDE 1 GRAM(S): 9 INJECTION INTRAMUSCULAR; INTRAVENOUS; SUBCUTANEOUS at 16:00

## 2023-12-14 RX ADMIN — Medication 400 MILLIGRAM(S): at 06:10

## 2023-12-14 RX ADMIN — Medication 1000 MILLIGRAM(S): at 20:18

## 2023-12-14 RX ADMIN — MORPHINE SULFATE 2 MILLIGRAM(S): 50 CAPSULE, EXTENDED RELEASE ORAL at 11:39

## 2023-12-14 NOTE — PROGRESS NOTE ADULT - ASSESSMENT
28 years old male with h/o Lupus ( diagnosed in 09/2023, on Plaquenil 400mg bid) present to ED with complain of chest pain and SOB. Patient reported left sided pleuritic chest pain which started 3 days ago. Found to have acute bilateral PE, started on heparin gtt. Course complicated by persist fever, worsening Left infiltrate, now being treated also for Pneumonia? Course also complicated by worsening hyponatremia, likely SIADH.

## 2023-12-14 NOTE — PROGRESS NOTE ADULT - SUBJECTIVE AND OBJECTIVE BOX
[facility] Division of Hospital Medicine  Echo Fishman MD  Available via MS Teams  Pager: [phone]    SUBJECTIVE / OVERNIGHT EVENTS:  - overnight events noted. patient febrile, has ongoing chest pain. has LE edema. says his chest pain has improved but still persists at this time.     MEDICATIONS  (STANDING):  albuterol/ipratropium for Nebulization 3 milliLiter(s) Nebulizer every 6 hours  azithromycin  IVPB      dexamethasone/neomycin/polymyxin Suspension 1 Drop(s) Left EYE three times a day  heparin  Infusion. 1200 Unit(s)/Hr (12 mL/Hr) IV Continuous <Continuous>  hydroxychloroquine 400 milliGRAM(s) Oral two times a day  lidocaine   4% Patch 1 Patch Transdermal daily  piperacillin/tazobactam IVPB. 3.375 Gram(s) IV Intermittent once  piperacillin/tazobactam IVPB.- 3.375 Gram(s) IV Intermittent once  polyethylene glycol 3350 17 Gram(s) Oral every 12 hours  senna 2 Tablet(s) Oral at bedtime  sodium chloride 1 Gram(s) Oral three times a day  sodium chloride 3%  Inhalation 4 milliLiter(s) Inhalation every 12 hours    MEDICATIONS  (PRN):  acetaminophen     Tablet .. 650 milliGRAM(s) Oral every 6 hours PRN Mild Pain (1 - 3)  benzonatate 100 milliGRAM(s) Oral three times a day PRN Cough  guaifenesin/dextromethorphan Oral Liquid 10 milliLiter(s) Oral every 4 hours PRN Cough  heparin   Injectable 3000 Unit(s) IV Push every 6 hours PRN For aPTT between 40 - 57  heparin   Injectable 6000 Unit(s) IV Push every 6 hours PRN For aPTT less than 40  melatonin 3 milliGRAM(s) Oral at bedtime PRN Insomnia  morphine  - Injectable 2 milliGRAM(s) IV Push every 6 hours PRN Severe Pain (7 - 10)  oxyCODONE    IR 5 milliGRAM(s) Oral every 6 hours PRN Moderate Pain (4 - 6)      I&O's Summary    13 Dec 2023 07:01  -  14 Dec 2023 07:00  --------------------------------------------------------  IN: 640 mL / OUT: 700 mL / NET: -60 mL    14 Dec 2023 07:01  -  14 Dec 2023 17:10  --------------------------------------------------------  IN: 480 mL / OUT: 0 mL / NET: 480 mL        PHYSICAL EXAM:  Vital Signs Last 24 Hrs  T(C): 39.4 (14 Dec 2023 16:47), Max: 39.4 (14 Dec 2023 16:47)  T(F): 102.9 (14 Dec 2023 16:47), Max: 102.9 (14 Dec 2023 16:47)  HR: 109 (14 Dec 2023 16:47) (84 - 123)  BP: 135/79 (14 Dec 2023 16:47) (109/89 - 135/79)  BP(mean): --  RR: 20 (14 Dec 2023 16:47) (19 - 20)  SpO2: 94% (14 Dec 2023 16:47) (92% - 98%)    Parameters below as of 14 Dec 2023 16:47  Patient On (Oxygen Delivery Method): room air      CONSTITUTIONAL: alert and cooperative, mild distress due to pain  EYES: PERRL, no scleral icterus  ENT: Mucosa moist, tongue normal  NECK: Neck supple, trachea midline, non-tender.  CARDIAC: Normal S1 and S2. Regular rate and rhythms. No Pedal edema. Peripheral pulses intact  LUNGS: decreased breath sounds in the left lung field. no wheezing, rales, rhonchi   ABDOMEN: Soft, nondistended, nontender. No guarding or rebound tenderness. No hepatomegaly or splenomegaly. Bowel sound normal.   MUSCULOSKELETAL: Normocephalic, atraumatic. No significant deformity or joint abnormality  NEUROLOGICAL: No gross motor or sensory deficits  SKIN: no lesions or eruptions. Normal turgor  PSYCHIATRIC: A&O x 3, appropriate mood and affect    LABS:                        10.2   12.30 )-----------( 240      ( 14 Dec 2023 07:00 )             29.3     12-14    123<L>  |  94<L>  |  12  ----------------------------<  113<H>  4.2   |  21<L>  |  1.08    Ca    8.3<L>      14 Dec 2023 07:00  Phos  3.2     12  Mg     1.9     -    TPro  7.4  /  Alb  2.2<L>  /  TBili  0.7  /  DBili  x   /  AST  87<H>  /  ALT  64  /  AlkPhos  55  12-14    PTT - ( 14 Dec 2023 00:30 )  PTT:82.9 sec      Urinalysis Basic - ( 14 Dec 2023 13:25 )    Color: Yellow / Appearance: Clear / S.005 / pH: x  Gluc: x / Ketone: Negative mg/dL  / Bili: Negative / Urobili: 0.2 mg/dL   Blood: x / Protein: Trace mg/dL / Nitrite: Negative   Leuk Esterase: Negative / RBC: x / WBC x   Sq Epi: x / Non Sq Epi: x / Bacteria: x        Culture - Blood (collected 12 Dec 2023 20:44)  Source: .Blood Blood-Peripheral  Preliminary Report (14 Dec 2023 03:03):    No growth at 24 hours    Culture - Blood (collected 12 Dec 2023 20:30)  Source: .Blood Blood-Peripheral  Preliminary Report (14 Dec 2023 03:03):    No growth at 24 hours      SARS-CoV-2: NotDetec (12 Dec 2023 15:30)   [facility] Division of Hospital Medicine  Echo Fishman MD  Available via MS Teams  Pager: [phone]    SUBJECTIVE / OVERNIGHT EVENTS:  - overnight events noted. patient febrile, has ongoing chest pain. has LE edema. says his chest pain has improved but still persists at this time.     MEDICATIONS  (STANDING):  albuterol/ipratropium for Nebulization 3 milliLiter(s) Nebulizer every 6 hours  azithromycin  IVPB      dexamethasone/neomycin/polymyxin Suspension 1 Drop(s) Left EYE three times a day  heparin  Infusion. 1200 Unit(s)/Hr (12 mL/Hr) IV Continuous <Continuous>  hydroxychloroquine 400 milliGRAM(s) Oral two times a day  lidocaine   4% Patch 1 Patch Transdermal daily  piperacillin/tazobactam IVPB. 3.375 Gram(s) IV Intermittent once  piperacillin/tazobactam IVPB.- 3.375 Gram(s) IV Intermittent once  polyethylene glycol 3350 17 Gram(s) Oral every 12 hours  senna 2 Tablet(s) Oral at bedtime  sodium chloride 1 Gram(s) Oral three times a day  sodium chloride 3%  Inhalation 4 milliLiter(s) Inhalation every 12 hours    MEDICATIONS  (PRN):  acetaminophen     Tablet .. 650 milliGRAM(s) Oral every 6 hours PRN Mild Pain (1 - 3)  benzonatate 100 milliGRAM(s) Oral three times a day PRN Cough  guaifenesin/dextromethorphan Oral Liquid 10 milliLiter(s) Oral every 4 hours PRN Cough  heparin   Injectable 3000 Unit(s) IV Push every 6 hours PRN For aPTT between 40 - 57  heparin   Injectable 6000 Unit(s) IV Push every 6 hours PRN For aPTT less than 40  melatonin 3 milliGRAM(s) Oral at bedtime PRN Insomnia  morphine  - Injectable 2 milliGRAM(s) IV Push every 6 hours PRN Severe Pain (7 - 10)  oxyCODONE    IR 5 milliGRAM(s) Oral every 6 hours PRN Moderate Pain (4 - 6)      I&O's Summary    13 Dec 2023 07:01  -  14 Dec 2023 07:00  --------------------------------------------------------  IN: 640 mL / OUT: 700 mL / NET: -60 mL    14 Dec 2023 07:01  -  14 Dec 2023 17:10  --------------------------------------------------------  IN: 480 mL / OUT: 0 mL / NET: 480 mL        PHYSICAL EXAM:  Vital Signs Last 24 Hrs  T(C): 39.4 (14 Dec 2023 16:47), Max: 39.4 (14 Dec 2023 16:47)  T(F): 102.9 (14 Dec 2023 16:47), Max: 102.9 (14 Dec 2023 16:47)  HR: 109 (14 Dec 2023 16:47) (84 - 123)  BP: 135/79 (14 Dec 2023 16:47) (109/89 - 135/79)  BP(mean): --  RR: 20 (14 Dec 2023 16:47) (19 - 20)  SpO2: 94% (14 Dec 2023 16:47) (92% - 98%)    Parameters below as of 14 Dec 2023 16:47  Patient On (Oxygen Delivery Method): room air      CONSTITUTIONAL: alert and cooperative, mild distress due to pain  EYES: PERRL, no scleral icterus  ENT: Mucosa moist, tongue normal  NECK: Neck supple, trachea midline, non-tender.  CARDIAC: Normal S1 and S2. Regular rate and rhythms. No Pedal edema. Peripheral pulses intact  LUNGS: decreased breath sounds in the left lung field. no wheezing, rales, rhonchi   ABDOMEN: Soft, nondistended, nontender. No guarding or rebound tenderness. No hepatomegaly or splenomegaly. Bowel sound normal.   MUSCULOSKELETAL: Normocephalic, atraumatic. No significant deformity or joint abnormality  NEUROLOGICAL: No gross motor or sensory deficits  SKIN: no lesions or eruptions. Normal turgor  PSYCHIATRIC: A&O x 3, appropriate mood and affect    LABS:                        10.2   12.30 )-----------( 240      ( 14 Dec 2023 07:00 )             29.3     12-14    123<L>  |  94<L>  |  12  ----------------------------<  113<H>  4.2   |  21<L>  |  1.08    Ca    8.3<L>      14 Dec 2023 07:00  Phos  3.2     12  Mg     1.9     -    TPro  7.4  /  Alb  2.2<L>  /  TBili  0.7  /  DBili  x   /  AST  87<H>  /  ALT  64  /  AlkPhos  55  12-14    PTT - ( 14 Dec 2023 00:30 )  PTT:82.9 sec      Urinalysis Basic - ( 14 Dec 2023 13:25 )    Color: Yellow / Appearance: Clear / S.005 / pH: x  Gluc: x / Ketone: Negative mg/dL  / Bili: Negative / Urobili: 0.2 mg/dL   Blood: x / Protein: Trace mg/dL / Nitrite: Negative   Leuk Esterase: Negative / RBC: x / WBC x   Sq Epi: x / Non Sq Epi: x / Bacteria: x        Culture - Blood (collected 12 Dec 2023 20:44)  Source: .Blood Blood-Peripheral  Preliminary Report (14 Dec 2023 03:03):    No growth at 24 hours    Culture - Blood (collected 12 Dec 2023 20:30)  Source: .Blood Blood-Peripheral  Preliminary Report (14 Dec 2023 03:03):    No growth at 24 hours      SARS-CoV-2: NotDetec (12 Dec 2023 15:30)

## 2023-12-14 NOTE — PROVIDER CONTACT NOTE (MEDICATION) - SITUATION
Phlebotomist attempted to draw blood cultures but unsuccessful. Another phlebotomist will attempt.   Patient febrile at this time with temp. 102.9

## 2023-12-14 NOTE — PROGRESS NOTE ADULT - SUBJECTIVE AND OBJECTIVE BOX
NEPHROLOGY PROGRESS NOTE    CHIEF COMPLAINT:  Hyponatremia    HPI:  Serum Na about same.  Still with chest wall pain and sob at rest.      EXAM:  Vital Signs Last 24 Hrs  T(C): 36.6 (14 Dec 2023 09:14), Max: 39.3 (13 Dec 2023 19:41)  T(F): 97.9 (14 Dec 2023 09:14), Max: 102.8 (13 Dec 2023 19:41)  HR: 94 (14 Dec 2023 09:14) (84 - 123)  BP: 133/84 (14 Dec 2023 09:14) (109/89 - 134/82)  BP(mean): --  RR: 19 (14 Dec 2023 09:14) (19 - 20)  SpO2: 97% (14 Dec 2023 09:14) (92% - 98%)    Parameters below as of 14 Dec 2023 09:14  Patient On (Oxygen Delivery Method): room air      I&O's Summary    13 Dec 2023 07:01  -  14 Dec 2023 07:00  --------------------------------------------------------  IN: 640 mL / OUT: 700 mL / NET: -60 mL    14 Dec 2023 07:01  -  14 Dec 2023 12:31  --------------------------------------------------------  IN: 240 mL / OUT: 0 mL / NET: 240 mL      Daily     Daily Weight in k.5 (14 Dec 2023 04:54)    Conversant, in no apparent distress  Normal respiratory effort, lungs clear bilaterally  Heart RRR with no murmur, no peripheral edema    LABS                        10.2   12.30 )-----------( 240      ( 14 Dec 2023 07:00 )             29.3     12-14    123<L>  |  94<L>  |  12  ----------------------------<  113<H>  4.2   |  21<L>  |  1.08    Ca    8.3<L>      14 Dec 2023 07:00  Phos  3.2     12-14  Mg     1.9     12-14    TPro  7.4  /  Alb  2.2<L>  /  TBili  0.7  /  DBili  x   /  AST  87<H>  /  ALT  64  /  AlkPhos  55  12-14    UA 6.0    Urine osmolality 490      Assessment   Acute on chronic hyponatremia 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

## 2023-12-15 LAB
ALBUMIN SERPL ELPH-MCNC: 2.1 G/DL — LOW (ref 3.3–5)
ALBUMIN SERPL ELPH-MCNC: 2.1 G/DL — LOW (ref 3.3–5)
ALP SERPL-CCNC: 52 U/L — SIGNIFICANT CHANGE UP (ref 40–120)
ALP SERPL-CCNC: 52 U/L — SIGNIFICANT CHANGE UP (ref 40–120)
ALT FLD-CCNC: 59 U/L — SIGNIFICANT CHANGE UP (ref 12–78)
ALT FLD-CCNC: 59 U/L — SIGNIFICANT CHANGE UP (ref 12–78)
ANION GAP SERPL CALC-SCNC: 7 MMOL/L — SIGNIFICANT CHANGE UP (ref 5–17)
ANION GAP SERPL CALC-SCNC: 7 MMOL/L — SIGNIFICANT CHANGE UP (ref 5–17)
APTT BLD: 57.6 SEC — HIGH (ref 24.5–35.6)
APTT BLD: 57.6 SEC — HIGH (ref 24.5–35.6)
AST SERPL-CCNC: 72 U/L — HIGH (ref 15–37)
AST SERPL-CCNC: 72 U/L — HIGH (ref 15–37)
BILIRUB SERPL-MCNC: 0.7 MG/DL — SIGNIFICANT CHANGE UP (ref 0.2–1.2)
BILIRUB SERPL-MCNC: 0.7 MG/DL — SIGNIFICANT CHANGE UP (ref 0.2–1.2)
BUN SERPL-MCNC: 13 MG/DL — SIGNIFICANT CHANGE UP (ref 7–23)
BUN SERPL-MCNC: 13 MG/DL — SIGNIFICANT CHANGE UP (ref 7–23)
CALCIUM SERPL-MCNC: 8.2 MG/DL — LOW (ref 8.5–10.1)
CALCIUM SERPL-MCNC: 8.2 MG/DL — LOW (ref 8.5–10.1)
CHLORIDE SERPL-SCNC: 98 MMOL/L — SIGNIFICANT CHANGE UP (ref 96–108)
CHLORIDE SERPL-SCNC: 98 MMOL/L — SIGNIFICANT CHANGE UP (ref 96–108)
CHLORIDE UR-SCNC: <20 MMOL/L — SIGNIFICANT CHANGE UP
CHLORIDE UR-SCNC: <20 MMOL/L — SIGNIFICANT CHANGE UP
CO2 SERPL-SCNC: 22 MMOL/L — SIGNIFICANT CHANGE UP (ref 22–31)
CO2 SERPL-SCNC: 22 MMOL/L — SIGNIFICANT CHANGE UP (ref 22–31)
CREAT SERPL-MCNC: 1.3 MG/DL — SIGNIFICANT CHANGE UP (ref 0.5–1.3)
CREAT SERPL-MCNC: 1.3 MG/DL — SIGNIFICANT CHANGE UP (ref 0.5–1.3)
EGFR: 76 ML/MIN/1.73M2 — SIGNIFICANT CHANGE UP
EGFR: 76 ML/MIN/1.73M2 — SIGNIFICANT CHANGE UP
EPEC DNA STL QL NAA+PROBE: DETECTED
EPEC DNA STL QL NAA+PROBE: DETECTED
GI PCR PANEL: DETECTED
GI PCR PANEL: DETECTED
GLUCOSE SERPL-MCNC: 124 MG/DL — HIGH (ref 70–99)
GLUCOSE SERPL-MCNC: 124 MG/DL — HIGH (ref 70–99)
HCT VFR BLD CALC: 27.7 % — LOW (ref 39–50)
HCT VFR BLD CALC: 27.7 % — LOW (ref 39–50)
HGB BLD-MCNC: 9.8 G/DL — LOW (ref 13–17)
HGB BLD-MCNC: 9.8 G/DL — LOW (ref 13–17)
LEGIONELLA AG UR QL: NEGATIVE — SIGNIFICANT CHANGE UP
LEGIONELLA AG UR QL: NEGATIVE — SIGNIFICANT CHANGE UP
MAGNESIUM SERPL-MCNC: 2.3 MG/DL — SIGNIFICANT CHANGE UP (ref 1.6–2.6)
MAGNESIUM SERPL-MCNC: 2.3 MG/DL — SIGNIFICANT CHANGE UP (ref 1.6–2.6)
MCHC RBC-ENTMCNC: 31.3 PG — SIGNIFICANT CHANGE UP (ref 27–34)
MCHC RBC-ENTMCNC: 31.3 PG — SIGNIFICANT CHANGE UP (ref 27–34)
MCHC RBC-ENTMCNC: 35.4 G/DL — SIGNIFICANT CHANGE UP (ref 32–36)
MCHC RBC-ENTMCNC: 35.4 G/DL — SIGNIFICANT CHANGE UP (ref 32–36)
MCV RBC AUTO: 88.5 FL — SIGNIFICANT CHANGE UP (ref 80–100)
MCV RBC AUTO: 88.5 FL — SIGNIFICANT CHANGE UP (ref 80–100)
NRBC # BLD: 0 /100 WBCS — SIGNIFICANT CHANGE UP (ref 0–0)
NRBC # BLD: 0 /100 WBCS — SIGNIFICANT CHANGE UP (ref 0–0)
PHOSPHATE SERPL-MCNC: 3.3 MG/DL — SIGNIFICANT CHANGE UP (ref 2.5–4.5)
PHOSPHATE SERPL-MCNC: 3.3 MG/DL — SIGNIFICANT CHANGE UP (ref 2.5–4.5)
PLATELET # BLD AUTO: 278 K/UL — SIGNIFICANT CHANGE UP (ref 150–400)
PLATELET # BLD AUTO: 278 K/UL — SIGNIFICANT CHANGE UP (ref 150–400)
POTASSIUM SERPL-MCNC: 4.3 MMOL/L — SIGNIFICANT CHANGE UP (ref 3.5–5.3)
POTASSIUM SERPL-MCNC: 4.3 MMOL/L — SIGNIFICANT CHANGE UP (ref 3.5–5.3)
POTASSIUM SERPL-SCNC: 4.3 MMOL/L — SIGNIFICANT CHANGE UP (ref 3.5–5.3)
POTASSIUM SERPL-SCNC: 4.3 MMOL/L — SIGNIFICANT CHANGE UP (ref 3.5–5.3)
PROCALCITONIN SERPL-MCNC: 5.49 NG/ML — HIGH (ref 0.02–0.1)
PROCALCITONIN SERPL-MCNC: 5.49 NG/ML — HIGH (ref 0.02–0.1)
PROT SERPL-MCNC: 7.4 GM/DL — SIGNIFICANT CHANGE UP (ref 6–8.3)
PROT SERPL-MCNC: 7.4 GM/DL — SIGNIFICANT CHANGE UP (ref 6–8.3)
RBC # BLD: 3.13 M/UL — LOW (ref 4.2–5.8)
RBC # BLD: 3.13 M/UL — LOW (ref 4.2–5.8)
RBC # FLD: 14.3 % — SIGNIFICANT CHANGE UP (ref 10.3–14.5)
RBC # FLD: 14.3 % — SIGNIFICANT CHANGE UP (ref 10.3–14.5)
S PNEUM AG UR QL: NEGATIVE — SIGNIFICANT CHANGE UP
S PNEUM AG UR QL: NEGATIVE — SIGNIFICANT CHANGE UP
SODIUM SERPL-SCNC: 127 MMOL/L — LOW (ref 135–145)
SODIUM SERPL-SCNC: 127 MMOL/L — LOW (ref 135–145)
SODIUM UR-SCNC: <20 MMOL/L — SIGNIFICANT CHANGE UP
SODIUM UR-SCNC: <20 MMOL/L — SIGNIFICANT CHANGE UP
WBC # BLD: 12.5 K/UL — HIGH (ref 3.8–10.5)
WBC # BLD: 12.5 K/UL — HIGH (ref 3.8–10.5)
WBC # FLD AUTO: 12.5 K/UL — HIGH (ref 3.8–10.5)
WBC # FLD AUTO: 12.5 K/UL — HIGH (ref 3.8–10.5)

## 2023-12-15 PROCEDURE — 99233 SBSQ HOSP IP/OBS HIGH 50: CPT | Mod: 25

## 2023-12-15 RX ORDER — SODIUM CHLORIDE 9 MG/ML
1000 INJECTION INTRAMUSCULAR; INTRAVENOUS; SUBCUTANEOUS
Refills: 0 | Status: DISCONTINUED | OUTPATIENT
Start: 2023-12-15 | End: 2023-12-16

## 2023-12-15 RX ORDER — ACETAMINOPHEN 500 MG
1000 TABLET ORAL ONCE
Refills: 0 | Status: COMPLETED | OUTPATIENT
Start: 2023-12-15 | End: 2023-12-15

## 2023-12-15 RX ORDER — ACETAMINOPHEN 500 MG
650 TABLET ORAL EVERY 6 HOURS
Refills: 0 | Status: DISCONTINUED | OUTPATIENT
Start: 2023-12-15 | End: 2023-12-22

## 2023-12-15 RX ADMIN — Medication 3 MILLILITER(S): at 23:43

## 2023-12-15 RX ADMIN — Medication 650 MILLIGRAM(S): at 16:09

## 2023-12-15 RX ADMIN — PIPERACILLIN AND TAZOBACTAM 25 GRAM(S): 4; .5 INJECTION, POWDER, LYOPHILIZED, FOR SOLUTION INTRAVENOUS at 05:30

## 2023-12-15 RX ADMIN — Medication 400 MILLIGRAM(S): at 05:11

## 2023-12-15 RX ADMIN — Medication 1 DROP(S): at 05:12

## 2023-12-15 RX ADMIN — SODIUM CHLORIDE 1 GRAM(S): 9 INJECTION INTRAMUSCULAR; INTRAVENOUS; SUBCUTANEOUS at 13:38

## 2023-12-15 RX ADMIN — HEPARIN SODIUM 1400 UNIT(S)/HR: 5000 INJECTION INTRAVENOUS; SUBCUTANEOUS at 19:29

## 2023-12-15 RX ADMIN — LIDOCAINE 1 PATCH: 4 CREAM TOPICAL at 11:18

## 2023-12-15 RX ADMIN — OXYCODONE HYDROCHLORIDE 5 MILLIGRAM(S): 5 TABLET ORAL at 20:39

## 2023-12-15 RX ADMIN — Medication 400 MILLIGRAM(S): at 17:14

## 2023-12-15 RX ADMIN — Medication 10 MILLILITER(S): at 11:23

## 2023-12-15 RX ADMIN — LIDOCAINE 1 PATCH: 4 CREAM TOPICAL at 20:23

## 2023-12-15 RX ADMIN — OXYCODONE HYDROCHLORIDE 5 MILLIGRAM(S): 5 TABLET ORAL at 22:00

## 2023-12-15 RX ADMIN — HEPARIN SODIUM 1400 UNIT(S)/HR: 5000 INJECTION INTRAVENOUS; SUBCUTANEOUS at 08:30

## 2023-12-15 RX ADMIN — PIPERACILLIN AND TAZOBACTAM 25 GRAM(S): 4; .5 INJECTION, POWDER, LYOPHILIZED, FOR SOLUTION INTRAVENOUS at 23:16

## 2023-12-15 RX ADMIN — LIDOCAINE 1 PATCH: 4 CREAM TOPICAL at 23:03

## 2023-12-15 RX ADMIN — SODIUM CHLORIDE 1 GRAM(S): 9 INJECTION INTRAMUSCULAR; INTRAVENOUS; SUBCUTANEOUS at 05:13

## 2023-12-15 RX ADMIN — Medication 650 MILLIGRAM(S): at 18:47

## 2023-12-15 RX ADMIN — PIPERACILLIN AND TAZOBACTAM 25 GRAM(S): 4; .5 INJECTION, POWDER, LYOPHILIZED, FOR SOLUTION INTRAVENOUS at 19:28

## 2023-12-15 RX ADMIN — Medication 1 DROP(S): at 13:37

## 2023-12-15 RX ADMIN — Medication 1 DROP(S): at 22:04

## 2023-12-15 RX ADMIN — Medication 1000 MILLIGRAM(S): at 07:30

## 2023-12-15 RX ADMIN — PIPERACILLIN AND TAZOBACTAM 25 GRAM(S): 4; .5 INJECTION, POWDER, LYOPHILIZED, FOR SOLUTION INTRAVENOUS at 13:38

## 2023-12-15 RX ADMIN — HEPARIN SODIUM 1400 UNIT(S)/HR: 5000 INJECTION INTRAVENOUS; SUBCUTANEOUS at 07:28

## 2023-12-15 RX ADMIN — SODIUM CHLORIDE 100 MILLILITER(S): 9 INJECTION INTRAMUSCULAR; INTRAVENOUS; SUBCUTANEOUS at 23:44

## 2023-12-15 RX ADMIN — HEPARIN SODIUM 1400 UNIT(S)/HR: 5000 INJECTION INTRAVENOUS; SUBCUTANEOUS at 00:38

## 2023-12-15 RX ADMIN — AZITHROMYCIN 255 MILLIGRAM(S): 500 TABLET, FILM COATED ORAL at 17:52

## 2023-12-15 RX ADMIN — Medication 400 MILLIGRAM(S): at 05:13

## 2023-12-15 RX ADMIN — SODIUM CHLORIDE 4 MILLILITER(S): 9 INJECTION INTRAMUSCULAR; INTRAVENOUS; SUBCUTANEOUS at 17:15

## 2023-12-15 RX ADMIN — Medication 10 MILLILITER(S): at 20:41

## 2023-12-15 RX ADMIN — HEPARIN SODIUM 1400 UNIT(S)/HR: 5000 INJECTION INTRAVENOUS; SUBCUTANEOUS at 19:32

## 2023-12-15 RX ADMIN — Medication 3 MILLILITER(S): at 17:02

## 2023-12-15 RX ADMIN — SODIUM CHLORIDE 1 GRAM(S): 9 INJECTION INTRAMUSCULAR; INTRAVENOUS; SUBCUTANEOUS at 22:01

## 2023-12-15 NOTE — CHART NOTE - NSCHARTNOTEFT_GEN_A_CORE
12/15/2023  [facility]  [address], [address]  [phone]      To Whom It May Concern:    Please be advised that HunterRafal was admitted to [facility] from 12/12/2023- ongoing.  Thank-you.      Please feel free to contact me should you have any questions.         Sincerely,    Bruno ELIZONDO 12/15/2023  [facility]  [address], [address]  [phone]      To Whom It May Concern:    Please be advised that HunterRafal was admitted to [facility] from 12/12/2023- ongoing.  Thank-you.      Please feel free to contact me should you have any questions.         Sincerely,    Bruno ELIZONDO

## 2023-12-15 NOTE — PROGRESS NOTE ADULT - PROBLEM SELECTOR PLAN 3
CT chest  ( I personally review) noted Bilateral axillary and supraclavicular adenopathy of unclear etiology  Hematology consulted--> team message sent to Dr Amaya
Likely SIADH in nature  - nephro consulted, trial 20 iv lasix, starting salt tabs as of 12/14  - Improving
Likely SIADH in nature  - nephro consulted, trial 20 iv lasix, starting salt tabs as of 12/14

## 2023-12-15 NOTE — PROGRESS NOTE ADULT - PROBLEM SELECTOR PLAN 1
presents with pleuritic chest pain and CTA chest with acute right upper lobe and left lower lobe segmental/subsegmental pulmonary emboli. No CT evidence of right heart strain. New bilateral lower lobe consolidations with areas of central clearing. Pneumonia and pulmonary infarcts are in the differential. New bilateral pleural effusions, small on the left and trace on the right  -IV heparin, monitor PTT per protocol. Therapeutic monitoring is necessary with IV heparin  -ECHO w/o right heart strain  -Pain control---> IV morphine prn for severe pain  -Hematology consulted---> f/u lupus anticoagulant and other hypercoagulable workup. pending workup may need to be on  coumadin instead of a DOAC
presents with pleuritic chest pain and CTA chest with acute right upper lobe and left lower lobe segmental/subsegmental pulmonary emboli. No CT evidence of right heart strain. New bilateral lower lobe consolidations with areas of central clearing. Pneumonia and pulmonary infarcts are in the differential. New bilateral pleural effusions, small on the left and trace on the right  -IV heparin, monitor PTT per protocol. Therapeutic monitoring is necessary with IV heparin  -ECHO w/o right heart strain  -Pain control---> IV morphine prn for severe pain  -Hematology consulted---> f/u lupus anticoagulant and other hypercoagulable workup. pending workup may need to be on  coumadin instead of a DOAC
present with pleuritic chest pain and CTA chest with acute right upper lobe and left lower lobe segmental/subsegmental pulmonary emboli. No CT evidence of right heart strain. New bilateral lower lobe consolidations with areas of central clearing. Pneumonia and pulmonary infarcts are in the differential. New bilateral pleural effusions, small on the left and trace on the right  -IV heparin, monitor PTT per protocol. Therapeutic monitoring is necessary with IV heparin  -ECHO w/o right heart strain  -Pain control---> IV morphine prn for severe pain  -Hematology consulted---> f/u lupus anticoagulant and other hypercoagulable workup

## 2023-12-15 NOTE — CHART NOTE - NSCHARTNOTEFT_GEN_A_CORE
S) 28 years old male with h/o Lupus ( diagnosed in 09/2023, on Plaquenil 400mg bid) present to ED with complain of chest pain and SOB. Patient reported left sided pleuritic chest pain which started 3 days ago. Found to have acute bilateral PE, started on heparin gtt. Course complicated by persist fever, worsening Left infiltrate, now being treated also for Pneumonia? Course also complicated by worsening hyponatremia, likely SIADH.  RN called with + Stool for E. Coli (EPEC), and persistent fever. Patient has been on Zosyn and Azithromax to include atypical and pseudomonas coverage.   Creat increased from 1.08 to 1.3 today  Patient admits to 3 episodes of Diarrhea today, no hematochezia.  Blood cultures no growth at 24 hours, Neg Legionella.   Meds  acetaminophen     Tablet .. 650 milliGRAM(s) Oral every 6 hours PRN  albuterol/ipratropium for Nebulization 3 milliLiter(s) Nebulizer every 6 hours  azithromycin  IVPB 500 milliGRAM(s) IV Intermittent every 24 hours  azithromycin  IVPB      benzonatate 100 milliGRAM(s) Oral three times a day PRN  dexamethasone/neomycin/polymyxin Suspension 1 Drop(s) Left EYE three times a day  guaifenesin/dextromethorphan Oral Liquid 10 milliLiter(s) Oral every 4 hours PRN  heparin   Injectable 6000 Unit(s) IV Push every 6 hours PRN  heparin   Injectable 3000 Unit(s) IV Push every 6 hours PRN  heparin  Infusion. 1200 Unit(s)/Hr IV Continuous <Continuous>  hydroxychloroquine 400 milliGRAM(s) Oral two times a day  lidocaine   4% Patch 1 Patch Transdermal daily  melatonin 3 milliGRAM(s) Oral at bedtime PRN  morphine  - Injectable 2 milliGRAM(s) IV Push every 6 hours PRN  oxyCODONE    IR 5 milliGRAM(s) Oral every 6 hours PRN  piperacillin/tazobactam IVPB.. 3.375 Gram(s) IV Intermittent every 8 hours  polyethylene glycol 3350 17 Gram(s) Oral every 12 hours  senna 2 Tablet(s) Oral at bedtime  sodium chloride 1 Gram(s) Oral three times a day  sodium chloride 3%  Inhalation 4 milliLiter(s) Inhalation every 12 hours    O)  T(C): 38.9 (12-15-23 @ 18:53), Max: 39.3 (12-15-23 @ 04:36)  HR: 99 (12-15-23 @ 20:25) (93 - 110)  BP: 131/83 (12-15-23 @ 20:25) (117/78 - 149/80)  RR: 18 (12-15-23 @ 16:06) (18 - 20)  SpO2: 100% (12-15-23 @ 16:06) (92% - 100%)  PHYSICAL EXAM:  GENERAL: well-groomed, well-developed male sitting in chair  NECK: Supple, No JVD, Normal thyroid  HEART: Regular rate and rhythm; No murmurs, rubs, or gallops  RESPIRATORY: CTA B/L, No W/R/R  ABDOMEN: Soft, Nontender, Nondistended; Bowel sounds present  NEUROLOGY: A&Ox3, nonfocal, moving all extremities  EXTREMITIES:  2+ Peripheral Pulses, No clubbing, cyanosis, or edema  SKIN: warm, dry, normal color, no rash or abnormal lesions      12-15    127<L>  |  98  |  13  ----------------------------<  124<H>  4.3   |  22  |  1.30    Ca    8.2<L>      15 Dec 2023 07:25  Phos  3.3     12-15  Mg     2.3     12-15    TPro  7.4  /  Alb  2.1<L>  /  TBili  0.7  /  DBili  x   /  AST  72<H>  /  ALT  59  /  AlkPhos  52  12-15                          9.8    12.50 )-----------( 278      ( 15 Dec 2023 07:25 )             27.7     LIVER FUNCTIONS - ( 15 Dec 2023 07:25 )  Alb: 2.1 g/dL / Pro: 7.4 gm/dL / ALK PHOS: 52 U/L / ALT: 59 U/L / AST: 72 U/L / GGT: x         A/P  28 years old male with h/o Lupus ( diagnosed in 09/2023, on Plaquenil 400mg bid) present to ED with complain of chest pain and SOB. Patient reported left sided pleuritic chest pain which started 3 days ago. Found to have acute bilateral PE, started on heparin gtt. Course complicated by persist fever, worsening Left infiltrate, now being treated also for Pneumonia? Course also complicated by worsening hyponatremia, likely SIADH.  RN called with + Stool for E. Coli (EPEC), and persistent fever. Patient has been on Zosyn and Azithromax to include atypical and pseudomonas coverage.   Creat increased from 1.08 to 1.3 today  Patient admits to 3 episodes of Diarrhea today, no hematochezia.  Blood cultures no growth at 24 hours, Neg Legionella.   -IV fluids  ml hour  -IV Tylenol 1000mg IVPB  -Follow up Blood cultures

## 2023-12-15 NOTE — PROGRESS NOTE ADULT - PROBLEM SELECTOR PLAN 2
Persistent fever despite being on anticoagulation for PE  - procalcitonin significantly elevated  - CXR 12/14 Showing left pleff with infiltrate.  treating now for pneumonia, starting on Zosyn to cover pseudomonas because patient has been in multiple hospital settings for the past 3 months, also starting azithromycin to cover atypicals and legionella ( pt also has hyponatremia and elevated lfts)  - neg urine legionella and urine strep  - UA negative  - f/u repeat BCx x2 (drawn 12/14)  - ID Consult in the AM
Low Na and low chloride. Recent decrease oral intake and has diarrhea for 2 days  Likely hypovolemic hyponatremia  Received 1L NS, continue with gentle IV NS  Repeat BMP, check serum/urine osmolality, TSH, free thyroxine, AM cortisol  nephro consulted given worsening Na, f/u recs
Persistent fever despite being on anticoagulation for PE  - procalcitonin significantly elevated  - CXR 12/14 howing left pleff with infiltrate.  treating now for pneumonia, starting on Zosyn to cover pseudomonas because patient has been in multiple hospital settings for the past 3 months, also starting azithromycin to cover atypicals and legionella ( pt also has hyponatremia and elevated lfts)  - f/u urine legionella and urine strep  - UA negative  - f/u BCx x2 (drawn 12/14)  - if fever persists despite abx, would consult ID

## 2023-12-15 NOTE — PROGRESS NOTE ADULT - SUBJECTIVE AND OBJECTIVE BOX
[facility] NEPHROLOGY SERVICES, [facility]  NEPHROLOGY AND HYPERTENSION  [address]  [address]  [place] [address]  [phone]    MD AMBER HURT MD YELENA ROSENBERG, MD BINNY KOSHY, MD CHRISTOPHER CAPUTO, MD EDWARD BOVER, MD          Patient events noted    MEDICATIONS  (STANDING):  albuterol/ipratropium for Nebulization 3 milliLiter(s) Nebulizer every 6 hours  azithromycin  IVPB      azithromycin  IVPB 500 milliGRAM(s) IV Intermittent every 24 hours  dexamethasone/neomycin/polymyxin Suspension 1 Drop(s) Left EYE three times a day  heparin  Infusion. 1200 Unit(s)/Hr (12 mL/Hr) IV Continuous <Continuous>  hydroxychloroquine 400 milliGRAM(s) Oral two times a day  lidocaine   4% Patch 1 Patch Transdermal daily  piperacillin/tazobactam IVPB.. 3.375 Gram(s) IV Intermittent every 8 hours  polyethylene glycol 3350 17 Gram(s) Oral every 12 hours  senna 2 Tablet(s) Oral at bedtime  sodium chloride 1 Gram(s) Oral three times a day  sodium chloride 0.9%. 1000 milliLiter(s) (100 mL/Hr) IV Continuous <Continuous>  sodium chloride 3%  Inhalation 4 milliLiter(s) Inhalation every 12 hours    MEDICATIONS  (PRN):  acetaminophen     Tablet .. 650 milliGRAM(s) Oral every 6 hours PRN Temp greater or equal to 38C (100.4F), Mild Pain (1 - 3)  benzonatate 100 milliGRAM(s) Oral three times a day PRN Cough  guaifenesin/dextromethorphan Oral Liquid 10 milliLiter(s) Oral every 4 hours PRN Cough  heparin   Injectable 3000 Unit(s) IV Push every 6 hours PRN For aPTT between 40 - 57  heparin   Injectable 6000 Unit(s) IV Push every 6 hours PRN For aPTT less than 40  melatonin 3 milliGRAM(s) Oral at bedtime PRN Insomnia  morphine  - Injectable 2 milliGRAM(s) IV Push every 6 hours PRN Severe Pain (7 - 10)  oxyCODONE    IR 5 milliGRAM(s) Oral every 6 hours PRN Moderate Pain (4 - 6)      12-14-23 @ 07:01  -  12-15-23 @ 07:00  --------------------------------------------------------  IN: 754 mL / OUT: 1800 mL / NET: -1046 mL    12-15-23 @ 07:01  -  12-15-23 @ 23:21  --------------------------------------------------------  IN: 714 mL / OUT: 600 mL / NET: 114 mL      PHYSICAL EXAM:      T(C): 38.9 (12-15-23 @ 18:53), Max: 39.3 (12-15-23 @ 04:36)  HR: 99 (12-15-23 @ 20:25) (93 - 110)  BP: 131/83 (12-15-23 @ 20:25) (117/78 - 149/80)  RR: 18 (12-15-23 @ 16:06) (18 - 20)  SpO2: 100% (12-15-23 @ 16:06) (92% - 100%)  Wt(kg): --  Lungs clear  Heart S1S2  Abd soft NT ND  Extremities:   tr edema                                    9.8    12.50 )-----------( 278      ( 15 Dec 2023 07:25 )             27.7     12-15    127<L>  |  98  |  13  ----------------------------<  124<H>  4.3   |  22  |  1.30    Ca    8.2<L>      15 Dec 2023 07:25  Phos  3.3     12-15  Mg     2.3     12-15    TPro  7.4  /  Alb  2.1<L>  /  TBili  0.7  /  DBili  x   /  AST  72<H>  /  ALT  59  /  AlkPhos  52  12-15      LIVER FUNCTIONS - ( 15 Dec 2023 07:25 )  Alb: 2.1 g/dL / Pro: 7.4 gm/dL / ALK PHOS: 52 U/L / ALT: 59 U/L / AST: 72 U/L / GGT: x           Creatinine Trend: 1.30<--, 1.08<--, 1.14<--, 1.24<--, 1.27<--, 1.33<--      Assessment   Acute on chronic hyponatremia 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 [facility] NEPHROLOGY SERVICES, [facility]  NEPHROLOGY AND HYPERTENSION  [address]  [address]  [place] [address]  [phone]    MD AMBER HURT MD YELENA ROSENBERG, MD BINNY KOSHY, MD CHRISTOPHER CAPUTO, MD EDWARD BOVER, MD          Patient events noted    MEDICATIONS  (STANDING):  albuterol/ipratropium for Nebulization 3 milliLiter(s) Nebulizer every 6 hours  azithromycin  IVPB      azithromycin  IVPB 500 milliGRAM(s) IV Intermittent every 24 hours  dexamethasone/neomycin/polymyxin Suspension 1 Drop(s) Left EYE three times a day  heparin  Infusion. 1200 Unit(s)/Hr (12 mL/Hr) IV Continuous <Continuous>  hydroxychloroquine 400 milliGRAM(s) Oral two times a day  lidocaine   4% Patch 1 Patch Transdermal daily  piperacillin/tazobactam IVPB.. 3.375 Gram(s) IV Intermittent every 8 hours  polyethylene glycol 3350 17 Gram(s) Oral every 12 hours  senna 2 Tablet(s) Oral at bedtime  sodium chloride 1 Gram(s) Oral three times a day  sodium chloride 0.9%. 1000 milliLiter(s) (100 mL/Hr) IV Continuous <Continuous>  sodium chloride 3%  Inhalation 4 milliLiter(s) Inhalation every 12 hours    MEDICATIONS  (PRN):  acetaminophen     Tablet .. 650 milliGRAM(s) Oral every 6 hours PRN Temp greater or equal to 38C (100.4F), Mild Pain (1 - 3)  benzonatate 100 milliGRAM(s) Oral three times a day PRN Cough  guaifenesin/dextromethorphan Oral Liquid 10 milliLiter(s) Oral every 4 hours PRN Cough  heparin   Injectable 3000 Unit(s) IV Push every 6 hours PRN For aPTT between 40 - 57  heparin   Injectable 6000 Unit(s) IV Push every 6 hours PRN For aPTT less than 40  melatonin 3 milliGRAM(s) Oral at bedtime PRN Insomnia  morphine  - Injectable 2 milliGRAM(s) IV Push every 6 hours PRN Severe Pain (7 - 10)  oxyCODONE    IR 5 milliGRAM(s) Oral every 6 hours PRN Moderate Pain (4 - 6)      12-14-23 @ 07:01  -  12-15-23 @ 07:00  --------------------------------------------------------  IN: 754 mL / OUT: 1800 mL / NET: -1046 mL    12-15-23 @ 07:01  -  12-15-23 @ 23:21  --------------------------------------------------------  IN: 714 mL / OUT: 600 mL / NET: 114 mL      PHYSICAL EXAM:      T(C): 38.9 (12-15-23 @ 18:53), Max: 39.3 (12-15-23 @ 04:36)  HR: 99 (12-15-23 @ 20:25) (93 - 110)  BP: 131/83 (12-15-23 @ 20:25) (117/78 - 149/80)  RR: 18 (12-15-23 @ 16:06) (18 - 20)  SpO2: 100% (12-15-23 @ 16:06) (92% - 100%)  Wt(kg): --  Lungs clear  Heart S1S2  Abd soft NT ND  Extremities:   tr edema                                    9.8    12.50 )-----------( 278      ( 15 Dec 2023 07:25 )             27.7     12-15    127<L>  |  98  |  13  ----------------------------<  124<H>  4.3   |  22  |  1.30    Ca    8.2<L>      15 Dec 2023 07:25  Phos  3.3     12-15  Mg     2.3     12-15    TPro  7.4  /  Alb  2.1<L>  /  TBili  0.7  /  DBili  x   /  AST  72<H>  /  ALT  59  /  AlkPhos  52  12-15      LIVER FUNCTIONS - ( 15 Dec 2023 07:25 )  Alb: 2.1 g/dL / Pro: 7.4 gm/dL / ALK PHOS: 52 U/L / ALT: 59 U/L / AST: 72 U/L / GGT: x           Creatinine Trend: 1.30<--, 1.08<--, 1.14<--, 1.24<--, 1.27<--, 1.33<--      Assessment   Acute on chronic hyponatremia 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

## 2023-12-15 NOTE — PROGRESS NOTE ADULT - PROBLEM SELECTOR PLAN 4
[IUD Placement] : intrauterine device (IUD) placement
CT chest  noted Bilateral axillary and supraclavicular adenopathy of unclear etiology  Hematology consulted--> team message sent to Dr Amaya
[Time out performed] : Pre-procedure time out performed.  Patient's name, date of birth and procedure confirmed.
[Consent Obtained] : Consent obtained
[Risks] : risks
[Benefits] : benefits
[Alternatives] : alternatives
[Patient] : patient
[Infection] : infection
[Bleeding] : bleeding
[Pain] : pain
[Expulsion] : expulsion
[Failure] : failure
[Uterine Perforation] : uterine perforation
[Neg Pregnancy Test] : negative pregnancy test
[Neg GC/Chlamydia] : negative GC/Chlamydia
[History of Unprotected Ali Molina] : no history of unprotected intercourse
[Ibuprofen ___ mg] : ibuprofen [unfilled] ~Umg
[Betadine] : Betadine
[Tenaculum] : Tenaculum
[Easy Passage] : Easy passage
CT chest  ( I personally review) noted Bilateral axillary and supraclavicular adenopathy of unclear etiology  Hematology consulted--> team message sent to Dr Amaya
[Sounded to ___ cm] : sounded to [unfilled] ~Ucm
[Post Placement Transvag. US] : post placement transvaginal ultrasound
[Mirena IUD] : Mirena IUD
[Tolerated Well] : Patient tolerated the procedure well
[No Complications] : No complications
[Motrin/Ibuprofen] : Motrin/Ibuprofen
[LMPDate] : 10/4/21
[de-identified] : [id number]
diagnosed in 09/2023 after noted to have skin rash  Currently on Plaquenil 400mg bid  Continue outpatient rheumatology follow up
[de-identified] : Dec 2023
[de-identified] : Oct 2027/2028
[de-identified] : Hemostatic w/ silver nitrite

## 2023-12-15 NOTE — PROGRESS NOTE ADULT - SUBJECTIVE AND OBJECTIVE BOX
[address]        Patient is a 28y old  Male who presents with a chief complaint of acute pulmonary embolism (14 Dec 2023 17:10)      SUBJECTIVE / OVERNIGHT EVENTS: No acute overnight events. This morning pt doing "okay." Complaining of diarrhea, multiple episodes this morning. Also w/ ongoing fevers and cough. Otherwise w/o complaints      MEDICATIONS  (STANDING):  albuterol/ipratropium for Nebulization 3 milliLiter(s) Nebulizer every 6 hours  azithromycin  IVPB 500 milliGRAM(s) IV Intermittent every 24 hours  azithromycin  IVPB      dexamethasone/neomycin/polymyxin Suspension 1 Drop(s) Left EYE three times a day  heparin  Infusion. 1200 Unit(s)/Hr (12 mL/Hr) IV Continuous <Continuous>  hydroxychloroquine 400 milliGRAM(s) Oral two times a day  lidocaine   4% Patch 1 Patch Transdermal daily  piperacillin/tazobactam IVPB.- 3.375 Gram(s) IV Intermittent once  piperacillin/tazobactam IVPB.. 3.375 Gram(s) IV Intermittent every 8 hours  polyethylene glycol 3350 17 Gram(s) Oral every 12 hours  senna 2 Tablet(s) Oral at bedtime  sodium chloride 1 Gram(s) Oral three times a day  sodium chloride 3%  Inhalation 4 milliLiter(s) Inhalation every 12 hours    MEDICATIONS  (PRN):  acetaminophen     Tablet .. 650 milliGRAM(s) Oral every 6 hours PRN Temp greater or equal to 38C (100.4F), Mild Pain (1 - 3)  benzonatate 100 milliGRAM(s) Oral three times a day PRN Cough  guaifenesin/dextromethorphan Oral Liquid 10 milliLiter(s) Oral every 4 hours PRN Cough  heparin   Injectable 3000 Unit(s) IV Push every 6 hours PRN For aPTT between 40 - 57  heparin   Injectable 6000 Unit(s) IV Push every 6 hours PRN For aPTT less than 40  melatonin 3 milliGRAM(s) Oral at bedtime PRN Insomnia  morphine  - Injectable 2 milliGRAM(s) IV Push every 6 hours PRN Severe Pain (7 - 10)  oxyCODONE    IR 5 milliGRAM(s) Oral every 6 hours PRN Moderate Pain (4 - 6)    Allergies    No Known Allergies    Intolerances        Vital Signs Last 24 Hrs  T(C): 39.2 (15 Dec 2023 16:06), Max: 39.3 (14 Dec 2023 18:49)  T(F): 102.6 (15 Dec 2023 16:06), Max: 102.8 (14 Dec 2023 18:49)  HR: 104 (15 Dec 2023 16:06) (93 - 110)  BP: 149/80 (15 Dec 2023 16:06) (117/78 - 149/80)  BP(mean): 91 (15 Dec 2023 10:16) (91 - 91)  RR: 18 (15 Dec 2023 16:06) (18 - 20)  SpO2: 100% (15 Dec 2023 16:06) (92% - 100%)    Parameters below as of 15 Dec 2023 16:06  Patient On (Oxygen Delivery Method): room air      Daily     Daily Weight in k (15 Dec 2023 04:36)  CAPILLARY BLOOD GLUCOSE        I&O's Summary    14 Dec 2023 07:01  -  15 Dec 2023 07:00  --------------------------------------------------------  IN: 754 mL / OUT: 1800 mL / NET: -1046 mL    15 Dec 2023 07:01  -  15 Dec 2023 17:18  --------------------------------------------------------  IN: 84 mL / OUT: 0 mL / NET: 84 mL        PHYSICAL EXAM:  GENERAL: NAD  HEAD:  Atraumatic, Normocephalic  EYES: EOMI,  conjunctiva and sclera clear  NECK: Supple  CHEST/LUNG: Decreased breath sounds B/L  HEART: Regular rate and rhythm; Normal S1 S2, No murmurs, rubs, or gallops  ABDOMEN: Soft, Nontender, Nondistended; Bowel sounds present  EXTREMITIES:  2+ Peripheral Pulses, No edema  PSYCH: AAOx3  NEUROLOGY: non-focal  SKIN: Warm and dry    LABS:                        9.8    12.50 )-----------( 278      ( 15 Dec 2023 07:25 )             27.7     Hgb Trend: 9.8<--, 10.2<--, 11.0<--, 10.8<--, 11.4<--  12-15    127<L>  |  98  |  13  ----------------------------<  124<H>  4.3   |  22  |  1.30    Ca    8.2<L>      15 Dec 2023 07:25  Phos  3.3     12-15  Mg     2.3     15    TPro  7.4  /  Alb  2.1<L>  /  TBili  0.7  /  DBili  x   /  AST  72<H>  /  ALT  59  /  AlkPhos  52  12-15    Creatinine Trend: 1.30<--, 1.08<--, 1.14<--, 1.24<--, 1.27<--, 1.33<--  LIVER FUNCTIONS - ( 15 Dec 2023 07:25 )  Alb: 2.1 g/dL / Pro: 7.4 gm/dL / ALK PHOS: 52 U/L / ALT: 59 U/L / AST: 72 U/L / GGT: x           PTT - ( 15 Dec 2023 07:25 )  PTT:57.6 sec      Urinalysis Basic - ( 15 Dec 2023 07:25 )    Color: x / Appearance: x / SG: x / pH: x  Gluc: 124 mg/dL / Ketone: x  / Bili: x / Urobili: x   Blood: x / Protein: x / Nitrite: x   Leuk Esterase: x / RBC: x / WBC x   Sq Epi: x / Non Sq Epi: x / Bacteria: x        RADIOLOGY & ADDITIONAL TESTS:    Imaging Personally Reviewed.    Consultant(s) Notes Reviewed.    Care Discussed with Consultants/Other Providers.

## 2023-12-15 NOTE — PROGRESS NOTE ADULT - PROBLEM SELECTOR PLAN 5
diagnosed in 09/2023 after noted to have skin rash  Currently on Plaquenil 400mg bid  Continue outpatient rheumatology follow up      DVT: HEPARIN gtt  DIET: Regular
diagnosed in 09/2023 after noted to have skin rash  Currently on Plaquenil 400mg bid  Continue outpatient rheumatology follow up

## 2023-12-15 NOTE — PROGRESS NOTE ADULT - TIME BILLING
reviewing laboratory data, consultants' recommendations, documentation in [place], performing medically appropriate examinations/evaluations, discussion with patient/family/RN/ACP/Residents and interdisciplinary staff (such as , social workers, etc), counseling patient/family/care giver, ordering medically appropriate medication, tests, or procedures reviewing laboratory data, consultants' recommendations, documentation in [place], performing medically appropriate examinations/evaluations, discussion with patient/family/RN/ACP/Residents and interdisciplinary staff (such as , social workers, etc), counseling patient/family/care giver, ordering medically appropriate medication, tests, or procedures

## 2023-12-16 LAB
ANA PAT FLD IF-IMP: ABNORMAL
ANA PAT FLD IF-IMP: ABNORMAL
ANA TITR SER: ABNORMAL
ANA TITR SER: ABNORMAL
ANION GAP SERPL CALC-SCNC: 7 MMOL/L — SIGNIFICANT CHANGE UP (ref 5–17)
ANION GAP SERPL CALC-SCNC: 7 MMOL/L — SIGNIFICANT CHANGE UP (ref 5–17)
APTT BLD: 61.6 SEC — HIGH (ref 24.5–35.6)
APTT BLD: 61.6 SEC — HIGH (ref 24.5–35.6)
BUN SERPL-MCNC: 14 MG/DL — SIGNIFICANT CHANGE UP (ref 7–23)
BUN SERPL-MCNC: 14 MG/DL — SIGNIFICANT CHANGE UP (ref 7–23)
CALCIUM SERPL-MCNC: 8.5 MG/DL — SIGNIFICANT CHANGE UP (ref 8.5–10.1)
CALCIUM SERPL-MCNC: 8.5 MG/DL — SIGNIFICANT CHANGE UP (ref 8.5–10.1)
CHLORIDE SERPL-SCNC: 99 MMOL/L — SIGNIFICANT CHANGE UP (ref 96–108)
CHLORIDE SERPL-SCNC: 99 MMOL/L — SIGNIFICANT CHANGE UP (ref 96–108)
CO2 SERPL-SCNC: 24 MMOL/L — SIGNIFICANT CHANGE UP (ref 22–31)
CO2 SERPL-SCNC: 24 MMOL/L — SIGNIFICANT CHANGE UP (ref 22–31)
CREAT SERPL-MCNC: 1.28 MG/DL — SIGNIFICANT CHANGE UP (ref 0.5–1.3)
CREAT SERPL-MCNC: 1.28 MG/DL — SIGNIFICANT CHANGE UP (ref 0.5–1.3)
EGFR: 78 ML/MIN/1.73M2 — SIGNIFICANT CHANGE UP
EGFR: 78 ML/MIN/1.73M2 — SIGNIFICANT CHANGE UP
GLUCOSE SERPL-MCNC: 131 MG/DL — HIGH (ref 70–99)
GLUCOSE SERPL-MCNC: 131 MG/DL — HIGH (ref 70–99)
HCT VFR BLD CALC: 28.3 % — LOW (ref 39–50)
HCT VFR BLD CALC: 28.3 % — LOW (ref 39–50)
HGB BLD-MCNC: 9.7 G/DL — LOW (ref 13–17)
HGB BLD-MCNC: 9.7 G/DL — LOW (ref 13–17)
LACTATE SERPL-SCNC: 0.8 MMOL/L — SIGNIFICANT CHANGE UP (ref 0.7–2)
LACTATE SERPL-SCNC: 0.8 MMOL/L — SIGNIFICANT CHANGE UP (ref 0.7–2)
MCHC RBC-ENTMCNC: 30.8 PG — SIGNIFICANT CHANGE UP (ref 27–34)
MCHC RBC-ENTMCNC: 30.8 PG — SIGNIFICANT CHANGE UP (ref 27–34)
MCHC RBC-ENTMCNC: 34.3 G/DL — SIGNIFICANT CHANGE UP (ref 32–36)
MCHC RBC-ENTMCNC: 34.3 G/DL — SIGNIFICANT CHANGE UP (ref 32–36)
MCV RBC AUTO: 89.8 FL — SIGNIFICANT CHANGE UP (ref 80–100)
MCV RBC AUTO: 89.8 FL — SIGNIFICANT CHANGE UP (ref 80–100)
MRSA PCR RESULT.: SIGNIFICANT CHANGE UP
MRSA PCR RESULT.: SIGNIFICANT CHANGE UP
NRBC # BLD: 0 /100 WBCS — SIGNIFICANT CHANGE UP (ref 0–0)
NRBC # BLD: 0 /100 WBCS — SIGNIFICANT CHANGE UP (ref 0–0)
PLATELET # BLD AUTO: 277 K/UL — SIGNIFICANT CHANGE UP (ref 150–400)
PLATELET # BLD AUTO: 277 K/UL — SIGNIFICANT CHANGE UP (ref 150–400)
POTASSIUM SERPL-MCNC: 4 MMOL/L — SIGNIFICANT CHANGE UP (ref 3.5–5.3)
POTASSIUM SERPL-MCNC: 4 MMOL/L — SIGNIFICANT CHANGE UP (ref 3.5–5.3)
POTASSIUM SERPL-SCNC: 4 MMOL/L — SIGNIFICANT CHANGE UP (ref 3.5–5.3)
POTASSIUM SERPL-SCNC: 4 MMOL/L — SIGNIFICANT CHANGE UP (ref 3.5–5.3)
RBC # BLD: 3.15 M/UL — LOW (ref 4.2–5.8)
RBC # BLD: 3.15 M/UL — LOW (ref 4.2–5.8)
RBC # FLD: 14.5 % — SIGNIFICANT CHANGE UP (ref 10.3–14.5)
RBC # FLD: 14.5 % — SIGNIFICANT CHANGE UP (ref 10.3–14.5)
S AUREUS DNA NOSE QL NAA+PROBE: DETECTED
S AUREUS DNA NOSE QL NAA+PROBE: DETECTED
SODIUM SERPL-SCNC: 130 MMOL/L — LOW (ref 135–145)
SODIUM SERPL-SCNC: 130 MMOL/L — LOW (ref 135–145)
WBC # BLD: 14.41 K/UL — HIGH (ref 3.8–10.5)
WBC # BLD: 14.41 K/UL — HIGH (ref 3.8–10.5)
WBC # FLD AUTO: 14.41 K/UL — HIGH (ref 3.8–10.5)
WBC # FLD AUTO: 14.41 K/UL — HIGH (ref 3.8–10.5)

## 2023-12-16 PROCEDURE — 99232 SBSQ HOSP IP/OBS MODERATE 35: CPT

## 2023-12-16 RX ORDER — IBUPROFEN 200 MG
400 TABLET ORAL ONCE
Refills: 0 | Status: COMPLETED | OUTPATIENT
Start: 2023-12-16 | End: 2023-12-16

## 2023-12-16 RX ADMIN — LIDOCAINE 1 PATCH: 4 CREAM TOPICAL at 20:45

## 2023-12-16 RX ADMIN — HEPARIN SODIUM 1400 UNIT(S)/HR: 5000 INJECTION INTRAVENOUS; SUBCUTANEOUS at 07:40

## 2023-12-16 RX ADMIN — LIDOCAINE 1 PATCH: 4 CREAM TOPICAL at 11:22

## 2023-12-16 RX ADMIN — Medication 3 MILLILITER(S): at 11:04

## 2023-12-16 RX ADMIN — Medication 1 DROP(S): at 05:32

## 2023-12-16 RX ADMIN — PIPERACILLIN AND TAZOBACTAM 25 GRAM(S): 4; .5 INJECTION, POWDER, LYOPHILIZED, FOR SOLUTION INTRAVENOUS at 22:17

## 2023-12-16 RX ADMIN — SODIUM CHLORIDE 4 MILLILITER(S): 9 INJECTION INTRAMUSCULAR; INTRAVENOUS; SUBCUTANEOUS at 05:19

## 2023-12-16 RX ADMIN — Medication 3 MILLILITER(S): at 23:55

## 2023-12-16 RX ADMIN — PIPERACILLIN AND TAZOBACTAM 25 GRAM(S): 4; .5 INJECTION, POWDER, LYOPHILIZED, FOR SOLUTION INTRAVENOUS at 05:32

## 2023-12-16 RX ADMIN — HEPARIN SODIUM 1400 UNIT(S)/HR: 5000 INJECTION INTRAVENOUS; SUBCUTANEOUS at 19:12

## 2023-12-16 RX ADMIN — Medication 600 MILLIGRAM(S): at 18:19

## 2023-12-16 RX ADMIN — Medication 1 DROP(S): at 22:18

## 2023-12-16 RX ADMIN — PIPERACILLIN AND TAZOBACTAM 25 GRAM(S): 4; .5 INJECTION, POWDER, LYOPHILIZED, FOR SOLUTION INTRAVENOUS at 13:23

## 2023-12-16 RX ADMIN — Medication 650 MILLIGRAM(S): at 23:58

## 2023-12-16 RX ADMIN — SODIUM CHLORIDE 1 GRAM(S): 9 INJECTION INTRAMUSCULAR; INTRAVENOUS; SUBCUTANEOUS at 13:24

## 2023-12-16 RX ADMIN — LIDOCAINE 1 PATCH: 4 CREAM TOPICAL at 23:45

## 2023-12-16 RX ADMIN — Medication 650 MILLIGRAM(S): at 16:50

## 2023-12-16 RX ADMIN — Medication 400 MILLIGRAM(S): at 02:23

## 2023-12-16 RX ADMIN — Medication 3 MILLILITER(S): at 05:19

## 2023-12-16 RX ADMIN — Medication 400 MILLIGRAM(S): at 17:16

## 2023-12-16 RX ADMIN — Medication 650 MILLIGRAM(S): at 15:54

## 2023-12-16 RX ADMIN — HEPARIN SODIUM 1400 UNIT(S)/HR: 5000 INJECTION INTRAVENOUS; SUBCUTANEOUS at 07:21

## 2023-12-16 RX ADMIN — SODIUM CHLORIDE 1 GRAM(S): 9 INJECTION INTRAMUSCULAR; INTRAVENOUS; SUBCUTANEOUS at 22:18

## 2023-12-16 RX ADMIN — Medication 400 MILLIGRAM(S): at 01:47

## 2023-12-16 RX ADMIN — Medication 3 MILLILITER(S): at 17:20

## 2023-12-16 RX ADMIN — SODIUM CHLORIDE 100 MILLILITER(S): 9 INJECTION INTRAMUSCULAR; INTRAVENOUS; SUBCUTANEOUS at 07:23

## 2023-12-16 RX ADMIN — SODIUM CHLORIDE 4 MILLILITER(S): 9 INJECTION INTRAMUSCULAR; INTRAVENOUS; SUBCUTANEOUS at 17:20

## 2023-12-16 RX ADMIN — Medication 400 MILLIGRAM(S): at 05:32

## 2023-12-16 RX ADMIN — SODIUM CHLORIDE 1 GRAM(S): 9 INJECTION INTRAMUSCULAR; INTRAVENOUS; SUBCUTANEOUS at 05:32

## 2023-12-16 RX ADMIN — HEPARIN SODIUM 1400 UNIT(S)/HR: 5000 INJECTION INTRAVENOUS; SUBCUTANEOUS at 15:52

## 2023-12-16 RX ADMIN — Medication 1 DROP(S): at 13:23

## 2023-12-16 NOTE — PHARMACOTHERAPY INTERVENTION NOTE - COMMENTS
Recommended discontinuation of azithromycin since legionella antigen test came back as negative. 
Recommended Maxitrol (Neomycin, Polymyxin B, Dexamethasone) opthalmic solution to be used as otic solution for pt's ear infection due to Cortisporin (Neomycin, Polymyxin B, Hydrocortisone) otic solution being OOS. Both solutions contain antibiotics + corticosteroid. Also recommended increasing frequency (from daily to 3-4 times daily) due to larger surface area.

## 2023-12-16 NOTE — PROGRESS NOTE ADULT - ASSESSMENT
28 years old male with h/o Lupus ( diagnosed in 09/2023, on Plaquenil 400mg bid) present to ED with complain of chest pain and SOB. Patient reported left sided pleuritic chest pain which started 3 days ago. Found to have acute bilateral PE, started on heparin gtt. Course complicated by persist fever, worsening Left infiltrate, now being treated also for Pneumonia? Course also complicated by worsening hyponatremia, likely SIADH.  28 years old male with h/o Lupus ( diagnosed in 09/2023, on Plaquenil 400mg bid) present to ED with complain of chest pain and SOB. Patient reported left sided pleuritic chest pain which started 3 days ago.       Found to have acute bilateral PE, started on heparin gtt. Course complicated by persist fever, worsening Left infiltrate, now being     treated also for Pneumonia? Course also complicated by worsening hyponatremia, likely SIADH.     Continue NACL tabs TID.     Continue Zosyn for possible LLL pneumonia and Duonebs every 6 hours.     Plaquenil 400 mg bid for SLE.     Follow SMA-7 in AM.       28 years old male with h/o Lupus ( diagnosed in 09/2023, on Plaquenil 400mg bid) present to ED with complain of chest pain and SOB. Patient reported left sided pleuritic chest pain which started 3 days ago.       Found to have acute bilateral PE, started on heparin gtt. Course complicated by persist fever, worsening Left infiltrate, now being     treated also for Pneumonia with Zosyn.     Course also complicated by worsening hyponatremia, continue NACL tabs TID.     Continue Zosyn for possible LLL pneumonia and Duonebs every 6 hours.     Plaquenil 400 mg bid for SLE.     Follow SMA-7 in AM.     Transition to NOAC on Monday.

## 2023-12-16 NOTE — PROGRESS NOTE ADULT - SUBJECTIVE AND OBJECTIVE BOX
INTERVAL HPI/OVERNIGHT EVENTS:  Pt seen and examined at bedside.     Allergies/Intolerance: No Known Allergies      MEDICATIONS  (STANDING):  albuterol/ipratropium for Nebulization 3 milliLiter(s) Nebulizer every 6 hours  azithromycin  IVPB      azithromycin  IVPB 500 milliGRAM(s) IV Intermittent every 24 hours  dexamethasone/neomycin/polymyxin Suspension 1 Drop(s) Left EYE three times a day  heparin  Infusion. 1200 Unit(s)/Hr (12 mL/Hr) IV Continuous <Continuous>  hydroxychloroquine 400 milliGRAM(s) Oral two times a day  lidocaine   4% Patch 1 Patch Transdermal daily  piperacillin/tazobactam IVPB.. 3.375 Gram(s) IV Intermittent every 8 hours  polyethylene glycol 3350 17 Gram(s) Oral every 12 hours  senna 2 Tablet(s) Oral at bedtime  sodium chloride 1 Gram(s) Oral three times a day  sodium chloride 0.9%. 1000 milliLiter(s) (100 mL/Hr) IV Continuous <Continuous>  sodium chloride 3%  Inhalation 4 milliLiter(s) Inhalation every 12 hours    MEDICATIONS  (PRN):  acetaminophen     Tablet .. 650 milliGRAM(s) Oral every 6 hours PRN Temp greater or equal to 38C (100.4F), Mild Pain (1 - 3)  benzonatate 100 milliGRAM(s) Oral three times a day PRN Cough  guaifenesin/dextromethorphan Oral Liquid 10 milliLiter(s) Oral every 4 hours PRN Cough  heparin   Injectable 3000 Unit(s) IV Push every 6 hours PRN For aPTT between 40 - 57  heparin   Injectable 6000 Unit(s) IV Push every 6 hours PRN For aPTT less than 40  melatonin 3 milliGRAM(s) Oral at bedtime PRN Insomnia  morphine  - Injectable 2 milliGRAM(s) IV Push every 6 hours PRN Severe Pain (7 - 10)  oxyCODONE    IR 5 milliGRAM(s) Oral every 6 hours PRN Moderate Pain (4 - 6)        ROS: all systems reviewed and wnl      PHYSICAL EXAMINATION:  Vital Signs Last 24 Hrs  T(C): 36.4 (16 Dec 2023 10:16), Max: 39.4 (15 Dec 2023 23:48)  T(F): 97.6 (16 Dec 2023 10:16), Max: 103 (15 Dec 2023 23:48)  HR: 87 (16 Dec 2023 10:16) (79 - 106)  BP: 125/82 (16 Dec 2023 10:16) (112/70 - 149/80)  BP(mean): 96 (16 Dec 2023 10:16) (96 - 96)  RR: 18 (16 Dec 2023 10:16) (18 - 19)  SpO2: 96% (16 Dec 2023 10:16) (93% - 100%)    Parameters below as of 16 Dec 2023 06:31  Patient On (Oxygen Delivery Method): room air      CAPILLARY BLOOD GLUCOSE          12-15 @ 07:01  -  12-16 @ 07:00  --------------------------------------------------------  IN: 714 mL / OUT: 1200 mL / NET: -486 mL        GENERAL:   NECK: supple, No JVD  CHEST/LUNG: clear to auscultation bilaterally; no rales, rhonchi, or wheezing b/l  HEART: normal S1, S2  ABDOMEN: BS+, soft, ND, NT   EXTREMITIES:  pulses palpable; no clubbing, cyanosis, or edema b/l LEs    LABS:                        9.7    14.41 )-----------( 277      ( 16 Dec 2023 05:40 )             28.3     12-16    130<L>  |  99  |  14  ----------------------------<  131<H>  4.0   |  24  |  1.28    Ca    8.5      16 Dec 2023 05:40  Phos  3.3     12-15  Mg     2.3     12-15    TPro  7.4  /  Alb  2.1<L>  /  TBili  0.7  /  DBili  x   /  AST  72<H>  /  ALT  59  /  AlkPhos  52  12-15    PTT - ( 16 Dec 2023 05:40 )  PTT:61.6 sec  Urinalysis Basic - ( 16 Dec 2023 05:40 )    Color: x / Appearance: x / SG: x / pH: x  Gluc: 131 mg/dL / Ketone: x  / Bili: x / Urobili: x   Blood: x / Protein: x / Nitrite: x   Leuk Esterase: x / RBC: x / WBC x   Sq Epi: x / Non Sq Epi: x / Bacteria: x

## 2023-12-17 LAB
ANION GAP SERPL CALC-SCNC: 7 MMOL/L — SIGNIFICANT CHANGE UP (ref 5–17)
ANION GAP SERPL CALC-SCNC: 7 MMOL/L — SIGNIFICANT CHANGE UP (ref 5–17)
APTT BLD: 56 SEC — HIGH (ref 24.5–35.6)
APTT BLD: 56 SEC — HIGH (ref 24.5–35.6)
APTT BLD: 69.3 SEC — HIGH (ref 24.5–35.6)
APTT BLD: 69.3 SEC — HIGH (ref 24.5–35.6)
APTT BLD: 69.4 SEC — HIGH (ref 24.5–35.6)
APTT BLD: 69.4 SEC — HIGH (ref 24.5–35.6)
BUN SERPL-MCNC: 14 MG/DL — SIGNIFICANT CHANGE UP (ref 7–23)
BUN SERPL-MCNC: 14 MG/DL — SIGNIFICANT CHANGE UP (ref 7–23)
CALCIUM SERPL-MCNC: 8.8 MG/DL — SIGNIFICANT CHANGE UP (ref 8.5–10.1)
CALCIUM SERPL-MCNC: 8.8 MG/DL — SIGNIFICANT CHANGE UP (ref 8.5–10.1)
CHLORIDE SERPL-SCNC: 101 MMOL/L — SIGNIFICANT CHANGE UP (ref 96–108)
CHLORIDE SERPL-SCNC: 101 MMOL/L — SIGNIFICANT CHANGE UP (ref 96–108)
CO2 SERPL-SCNC: 23 MMOL/L — SIGNIFICANT CHANGE UP (ref 22–31)
CO2 SERPL-SCNC: 23 MMOL/L — SIGNIFICANT CHANGE UP (ref 22–31)
CREAT SERPL-MCNC: 1.1 MG/DL — SIGNIFICANT CHANGE UP (ref 0.5–1.3)
CREAT SERPL-MCNC: 1.1 MG/DL — SIGNIFICANT CHANGE UP (ref 0.5–1.3)
CULTURE RESULTS: SIGNIFICANT CHANGE UP
CULTURE RESULTS: SIGNIFICANT CHANGE UP
EGFR: 93 ML/MIN/1.73M2 — SIGNIFICANT CHANGE UP
EGFR: 93 ML/MIN/1.73M2 — SIGNIFICANT CHANGE UP
GLUCOSE SERPL-MCNC: 117 MG/DL — HIGH (ref 70–99)
GLUCOSE SERPL-MCNC: 117 MG/DL — HIGH (ref 70–99)
POTASSIUM SERPL-MCNC: 3.9 MMOL/L — SIGNIFICANT CHANGE UP (ref 3.5–5.3)
POTASSIUM SERPL-MCNC: 3.9 MMOL/L — SIGNIFICANT CHANGE UP (ref 3.5–5.3)
POTASSIUM SERPL-SCNC: 3.9 MMOL/L — SIGNIFICANT CHANGE UP (ref 3.5–5.3)
POTASSIUM SERPL-SCNC: 3.9 MMOL/L — SIGNIFICANT CHANGE UP (ref 3.5–5.3)
SODIUM SERPL-SCNC: 131 MMOL/L — LOW (ref 135–145)
SODIUM SERPL-SCNC: 131 MMOL/L — LOW (ref 135–145)
SPECIMEN SOURCE: SIGNIFICANT CHANGE UP
SPECIMEN SOURCE: SIGNIFICANT CHANGE UP

## 2023-12-17 PROCEDURE — 99232 SBSQ HOSP IP/OBS MODERATE 35: CPT

## 2023-12-17 RX ORDER — ACETAMINOPHEN 500 MG
1000 TABLET ORAL ONCE
Refills: 0 | Status: COMPLETED | OUTPATIENT
Start: 2023-12-17 | End: 2023-12-17

## 2023-12-17 RX ADMIN — Medication 1 DROP(S): at 05:41

## 2023-12-17 RX ADMIN — Medication 400 MILLIGRAM(S): at 02:00

## 2023-12-17 RX ADMIN — HEPARIN SODIUM 1600 UNIT(S)/HR: 5000 INJECTION INTRAVENOUS; SUBCUTANEOUS at 16:20

## 2023-12-17 RX ADMIN — Medication 3 MILLILITER(S): at 23:07

## 2023-12-17 RX ADMIN — Medication 650 MILLIGRAM(S): at 12:22

## 2023-12-17 RX ADMIN — Medication 3 MILLILITER(S): at 17:05

## 2023-12-17 RX ADMIN — PIPERACILLIN AND TAZOBACTAM 25 GRAM(S): 4; .5 INJECTION, POWDER, LYOPHILIZED, FOR SOLUTION INTRAVENOUS at 13:01

## 2023-12-17 RX ADMIN — HEPARIN SODIUM 1600 UNIT(S)/HR: 5000 INJECTION INTRAVENOUS; SUBCUTANEOUS at 19:24

## 2023-12-17 RX ADMIN — Medication 600 MILLIGRAM(S): at 17:26

## 2023-12-17 RX ADMIN — HEPARIN SODIUM 1400 UNIT(S)/HR: 5000 INJECTION INTRAVENOUS; SUBCUTANEOUS at 07:05

## 2023-12-17 RX ADMIN — HEPARIN SODIUM 1600 UNIT(S)/HR: 5000 INJECTION INTRAVENOUS; SUBCUTANEOUS at 08:26

## 2023-12-17 RX ADMIN — Medication 3 MILLIGRAM(S): at 19:42

## 2023-12-17 RX ADMIN — Medication 3 MILLILITER(S): at 05:30

## 2023-12-17 RX ADMIN — Medication 600 MILLIGRAM(S): at 05:40

## 2023-12-17 RX ADMIN — SODIUM CHLORIDE 1 GRAM(S): 9 INJECTION INTRAMUSCULAR; INTRAVENOUS; SUBCUTANEOUS at 05:41

## 2023-12-17 RX ADMIN — PIPERACILLIN AND TAZOBACTAM 25 GRAM(S): 4; .5 INJECTION, POWDER, LYOPHILIZED, FOR SOLUTION INTRAVENOUS at 22:10

## 2023-12-17 RX ADMIN — Medication 400 MILLIGRAM(S): at 17:26

## 2023-12-17 RX ADMIN — PIPERACILLIN AND TAZOBACTAM 25 GRAM(S): 4; .5 INJECTION, POWDER, LYOPHILIZED, FOR SOLUTION INTRAVENOUS at 05:40

## 2023-12-17 RX ADMIN — SODIUM CHLORIDE 4 MILLILITER(S): 9 INJECTION INTRAMUSCULAR; INTRAVENOUS; SUBCUTANEOUS at 17:51

## 2023-12-17 RX ADMIN — Medication 1 DROP(S): at 13:01

## 2023-12-17 RX ADMIN — Medication 1000 MILLIGRAM(S): at 03:06

## 2023-12-17 RX ADMIN — Medication 3 MILLILITER(S): at 11:06

## 2023-12-17 RX ADMIN — SODIUM CHLORIDE 4 MILLILITER(S): 9 INJECTION INTRAMUSCULAR; INTRAVENOUS; SUBCUTANEOUS at 05:30

## 2023-12-17 RX ADMIN — HEPARIN SODIUM 1600 UNIT(S)/HR: 5000 INJECTION INTRAVENOUS; SUBCUTANEOUS at 09:45

## 2023-12-17 RX ADMIN — Medication 650 MILLIGRAM(S): at 11:24

## 2023-12-17 RX ADMIN — SENNA PLUS 2 TABLET(S): 8.6 TABLET ORAL at 22:10

## 2023-12-17 RX ADMIN — HEPARIN SODIUM 3000 UNIT(S): 5000 INJECTION INTRAVENOUS; SUBCUTANEOUS at 08:31

## 2023-12-17 RX ADMIN — Medication 1 DROP(S): at 22:10

## 2023-12-17 RX ADMIN — SODIUM CHLORIDE 1 GRAM(S): 9 INJECTION INTRAMUSCULAR; INTRAVENOUS; SUBCUTANEOUS at 22:10

## 2023-12-17 RX ADMIN — SODIUM CHLORIDE 1 GRAM(S): 9 INJECTION INTRAMUSCULAR; INTRAVENOUS; SUBCUTANEOUS at 13:01

## 2023-12-17 RX ADMIN — HEPARIN SODIUM 1600 UNIT(S)/HR: 5000 INJECTION INTRAVENOUS; SUBCUTANEOUS at 22:59

## 2023-12-17 RX ADMIN — Medication 400 MILLIGRAM(S): at 05:40

## 2023-12-17 RX ADMIN — Medication 650 MILLIGRAM(S): at 01:36

## 2023-12-17 NOTE — PROGRESS NOTE ADULT - SUBJECTIVE AND OBJECTIVE BOX
INTERVAL HPI/OVERNIGHT EVENTS:  Pt seen and examined at bedside.     Allergies/Intolerance: No Known Allergies      MEDICATIONS  (STANDING):  albuterol/ipratropium for Nebulization 3 milliLiter(s) Nebulizer every 6 hours  dexamethasone/neomycin/polymyxin Suspension 1 Drop(s) Left EYE three times a day  guaiFENesin  milliGRAM(s) Oral every 12 hours  heparin  Infusion. 1200 Unit(s)/Hr (12 mL/Hr) IV Continuous <Continuous>  hydroxychloroquine 400 milliGRAM(s) Oral two times a day  lidocaine   4% Patch 1 Patch Transdermal daily  piperacillin/tazobactam IVPB.. 3.375 Gram(s) IV Intermittent every 8 hours  polyethylene glycol 3350 17 Gram(s) Oral every 12 hours  senna 2 Tablet(s) Oral at bedtime  sodium chloride 1 Gram(s) Oral three times a day  sodium chloride 3%  Inhalation 4 milliLiter(s) Inhalation every 12 hours    MEDICATIONS  (PRN):  acetaminophen     Tablet .. 650 milliGRAM(s) Oral every 6 hours PRN Temp greater or equal to 38C (100.4F), Mild Pain (1 - 3)  heparin   Injectable 3000 Unit(s) IV Push every 6 hours PRN For aPTT between 40 - 57  heparin   Injectable 6000 Unit(s) IV Push every 6 hours PRN For aPTT less than 40  melatonin 3 milliGRAM(s) Oral at bedtime PRN Insomnia  oxyCODONE    IR 5 milliGRAM(s) Oral every 6 hours PRN Moderate Pain (4 - 6)        ROS: all systems reviewed and wnl      PHYSICAL EXAMINATION:  Vital Signs Last 24 Hrs  T(C): 36.7 (17 Dec 2023 04:40), Max: 39.2 (16 Dec 2023 23:34)  T(F): 98 (17 Dec 2023 04:40), Max: 102.6 (16 Dec 2023 23:34)  HR: 80 (17 Dec 2023 05:58) (80 - 113)  BP: 121/77 (17 Dec 2023 04:40) (121/77 - 163/97)  BP(mean): 96 (16 Dec 2023 10:16) (96 - 96)  RR: 18 (17 Dec 2023 04:40) (18 - 18)  SpO2: 98% (17 Dec 2023 05:58) (92% - 99%)    Parameters below as of 17 Dec 2023 05:58  Patient On (Oxygen Delivery Method): room air      CAPILLARY BLOOD GLUCOSE          12-16 @ 07:01  -  12-17 @ 07:00  --------------------------------------------------------  IN: 1968 mL / OUT: 0 mL / NET: 1968 mL        GENERAL: stable, in chair, on NC, no wheeze, some pleurisy.   NECK: supple, No JVD  CHEST/LUNG: clear to auscultation bilaterally; no rales, rhonchi, or wheezing b/l  HEART: normal S1, S2  ABDOMEN: BS+, soft, ND, NT   EXTREMITIES:  pulses palpable; no clubbing, cyanosis, or edema b/l LEs    LABS:                        9.7    14.41 )-----------( 277      ( 16 Dec 2023 05:40 )             28.3     12-17    131<L>  |  101  |  14  ----------------------------<  117<H>  3.9   |  23  |  1.10    Ca    8.8      17 Dec 2023 05:50      PTT - ( 17 Dec 2023 05:50 )  PTT:56.0 sec  Urinalysis Basic - ( 17 Dec 2023 05:50 )    Color: x / Appearance: x / SG: x / pH: x  Gluc: 117 mg/dL / Ketone: x  / Bili: x / Urobili: x   Blood: x / Protein: x / Nitrite: x   Leuk Esterase: x / RBC: x / WBC x   Sq Epi: x / Non Sq Epi: x / Bacteria: x

## 2023-12-17 NOTE — PROVIDER CONTACT NOTE (OTHER) - RECOMMENDATIONS
MD Magallanes made aware.
WESLEY Marin notified and made aware.
MD Magallanes and ROCKY Pardo made aware.

## 2023-12-17 NOTE — PROGRESS NOTE ADULT - ASSESSMENT
28 years old male with h/o Lupus ( diagnosed in 09/2023, on Plaquenil 400mg bid) present to ED with complain of chest pain and SOB. Patient reported left sided pleuritic chest pain which started 3 days ago.       Found to have acute bilateral PE, started on heparin gtt. Course complicated by persist fever, worsening Left infiltrate, now being     treated also for Pneumonia with Zosyn.     Course also complicated by worsening hyponatremia, continue NACL tabs TID.     Continue Zosyn for possible LLL pneumonia and Duonebs every 6 hours.     Plaquenil 400 mg bid for SLE.     Follow SMA-7 in AM.     Transition to NOAC on Monday.       28 years old male with h/o Lupus ( diagnosed in 09/2023, on Plaquenil 400mg bid) present to ED with complain of chest pain and SOB. Patient reported left sided pleuritic chest pain which started 3 days ago.       Found to have acute bilateral PE, started on heparin gtt. Course complicated by persist fever, worsening Left infiltrate, now being     treated also for Pneumonia with Zosyn.     Course also complicated by worsening hyponatremia, continue NACL tabs TID. NA improved to 131.     Continue Zosyn for possible LLL pneumonia and Duonebs every 6 hours.     Plaquenil 400 mg bid for SLE.     Follow SMA-7 in AM.     Transition to NOAC on Monday.     Will ask for Rheum eval Monday for SLE and PE.

## 2023-12-18 LAB
APTT BLD: 71.9 SEC — HIGH (ref 24.5–35.6)
APTT BLD: 71.9 SEC — HIGH (ref 24.5–35.6)
CULTURE RESULTS: SIGNIFICANT CHANGE UP
SPECIMEN SOURCE: SIGNIFICANT CHANGE UP

## 2023-12-18 PROCEDURE — 99232 SBSQ HOSP IP/OBS MODERATE 35: CPT

## 2023-12-18 RX ORDER — APIXABAN 2.5 MG/1
1 TABLET, FILM COATED ORAL
Qty: 70 | Refills: 1
Start: 2023-12-18 | End: 2024-02-15

## 2023-12-18 RX ORDER — GUAIFENESIN/DEXTROMETHORPHAN 600MG-30MG
10 TABLET, EXTENDED RELEASE 12 HR ORAL EVERY 4 HOURS
Refills: 0 | Status: DISCONTINUED | OUTPATIENT
Start: 2023-12-18 | End: 2023-12-22

## 2023-12-18 RX ADMIN — Medication 3 MILLIGRAM(S): at 21:54

## 2023-12-18 RX ADMIN — Medication 400 MILLIGRAM(S): at 05:44

## 2023-12-18 RX ADMIN — Medication 650 MILLIGRAM(S): at 17:00

## 2023-12-18 RX ADMIN — Medication 10 MILLIGRAM(S): at 15:51

## 2023-12-18 RX ADMIN — HEPARIN SODIUM 1600 UNIT(S)/HR: 5000 INJECTION INTRAVENOUS; SUBCUTANEOUS at 19:29

## 2023-12-18 RX ADMIN — Medication 3 MILLILITER(S): at 17:31

## 2023-12-18 RX ADMIN — Medication 650 MILLIGRAM(S): at 05:44

## 2023-12-18 RX ADMIN — Medication 10 MILLILITER(S): at 13:34

## 2023-12-18 RX ADMIN — PIPERACILLIN AND TAZOBACTAM 25 GRAM(S): 4; .5 INJECTION, POWDER, LYOPHILIZED, FOR SOLUTION INTRAVENOUS at 05:47

## 2023-12-18 RX ADMIN — SODIUM CHLORIDE 4 MILLILITER(S): 9 INJECTION INTRAMUSCULAR; INTRAVENOUS; SUBCUTANEOUS at 05:43

## 2023-12-18 RX ADMIN — Medication 400 MILLIGRAM(S): at 17:09

## 2023-12-18 RX ADMIN — SODIUM CHLORIDE 1 GRAM(S): 9 INJECTION INTRAMUSCULAR; INTRAVENOUS; SUBCUTANEOUS at 13:20

## 2023-12-18 RX ADMIN — PIPERACILLIN AND TAZOBACTAM 25 GRAM(S): 4; .5 INJECTION, POWDER, LYOPHILIZED, FOR SOLUTION INTRAVENOUS at 13:20

## 2023-12-18 RX ADMIN — HEPARIN SODIUM 1600 UNIT(S)/HR: 5000 INJECTION INTRAVENOUS; SUBCUTANEOUS at 01:49

## 2023-12-18 RX ADMIN — HEPARIN SODIUM 1600 UNIT(S)/HR: 5000 INJECTION INTRAVENOUS; SUBCUTANEOUS at 07:11

## 2023-12-18 RX ADMIN — POLYETHYLENE GLYCOL 3350 17 GRAM(S): 17 POWDER, FOR SOLUTION ORAL at 17:09

## 2023-12-18 RX ADMIN — HEPARIN SODIUM 1600 UNIT(S)/HR: 5000 INJECTION INTRAVENOUS; SUBCUTANEOUS at 09:02

## 2023-12-18 RX ADMIN — Medication 3 MILLILITER(S): at 11:05

## 2023-12-18 RX ADMIN — Medication 650 MILLIGRAM(S): at 16:06

## 2023-12-18 RX ADMIN — Medication 600 MILLIGRAM(S): at 05:44

## 2023-12-18 RX ADMIN — PIPERACILLIN AND TAZOBACTAM 25 GRAM(S): 4; .5 INJECTION, POWDER, LYOPHILIZED, FOR SOLUTION INTRAVENOUS at 21:54

## 2023-12-18 RX ADMIN — Medication 1 DROP(S): at 13:20

## 2023-12-18 RX ADMIN — HEPARIN SODIUM 1600 UNIT(S)/HR: 5000 INJECTION INTRAVENOUS; SUBCUTANEOUS at 18:15

## 2023-12-18 RX ADMIN — SENNA PLUS 2 TABLET(S): 8.6 TABLET ORAL at 21:54

## 2023-12-18 RX ADMIN — SODIUM CHLORIDE 1 GRAM(S): 9 INJECTION INTRAMUSCULAR; INTRAVENOUS; SUBCUTANEOUS at 05:44

## 2023-12-18 RX ADMIN — SODIUM CHLORIDE 4 MILLILITER(S): 9 INJECTION INTRAMUSCULAR; INTRAVENOUS; SUBCUTANEOUS at 18:09

## 2023-12-18 RX ADMIN — Medication 3 MILLILITER(S): at 05:43

## 2023-12-18 RX ADMIN — Medication 1 DROP(S): at 05:43

## 2023-12-18 RX ADMIN — SODIUM CHLORIDE 1 GRAM(S): 9 INJECTION INTRAMUSCULAR; INTRAVENOUS; SUBCUTANEOUS at 21:54

## 2023-12-18 NOTE — PROGRESS NOTE ADULT - SUBJECTIVE AND OBJECTIVE BOX
INTERVAL HPI/OVERNIGHT EVENTS:  Pt seen and examined at bedside.     Allergies/Intolerance: No Known Allergies      MEDICATIONS  (STANDING):  albuterol/ipratropium for Nebulization 3 milliLiter(s) Nebulizer every 6 hours  dexamethasone/neomycin/polymyxin Suspension 1 Drop(s) Left EYE three times a day  guaiFENesin  milliGRAM(s) Oral every 12 hours  heparin  Infusion. 1200 Unit(s)/Hr (12 mL/Hr) IV Continuous <Continuous>  hydroxychloroquine 400 milliGRAM(s) Oral two times a day  lidocaine   4% Patch 1 Patch Transdermal daily  piperacillin/tazobactam IVPB.. 3.375 Gram(s) IV Intermittent every 8 hours  polyethylene glycol 3350 17 Gram(s) Oral every 12 hours  senna 2 Tablet(s) Oral at bedtime  sodium chloride 1 Gram(s) Oral three times a day  sodium chloride 3%  Inhalation 4 milliLiter(s) Inhalation every 12 hours    MEDICATIONS  (PRN):  acetaminophen     Tablet .. 650 milliGRAM(s) Oral every 6 hours PRN Temp greater or equal to 38C (100.4F), Mild Pain (1 - 3)  heparin   Injectable 3000 Unit(s) IV Push every 6 hours PRN For aPTT between 40 - 57  heparin   Injectable 6000 Unit(s) IV Push every 6 hours PRN For aPTT less than 40  melatonin 3 milliGRAM(s) Oral at bedtime PRN Insomnia  oxyCODONE    IR 5 milliGRAM(s) Oral every 6 hours PRN Moderate Pain (4 - 6)        ROS: all systems reviewed and wnl      PHYSICAL EXAMINATION:  Vital Signs Last 24 Hrs  T(C): 38.1 (18 Dec 2023 05:09), Max: 39.1 (17 Dec 2023 11:08)  T(F): 100.6 (18 Dec 2023 05:09), Max: 102.4 (17 Dec 2023 11:08)  HR: 100 (18 Dec 2023 06:50) (81 - 119)  BP: 144/77 (18 Dec 2023 05:09) (142/92 - 146/77)  BP(mean): --  RR: 18 (18 Dec 2023 05:09) (18 - 18)  SpO2: 98% (18 Dec 2023 06:50) (93% - 98%)    Parameters below as of 18 Dec 2023 06:50  Patient On (Oxygen Delivery Method): room air      CAPILLARY BLOOD GLUCOSE          12-17 @ 07:01  -  12-18 @ 07:00  --------------------------------------------------------  IN: 892 mL / OUT: 600 mL / NET: 292 mL        GENERAL: stable, less cough yesterday, less pleurisy.   NECK: supple, No JVD  CHEST/LUNG: clear to auscultation bilaterally; no rales, rhonchi, or wheezing b/l  HEART: normal S1, S2  ABDOMEN: BS+, soft, ND, NT   EXTREMITIES:  pulses palpable; no clubbing, cyanosis, or edema b/l LEs    LABS:    12-17    131<L>  |  101  |  14  ----------------------------<  117<H>  3.9   |  23  |  1.10    Ca    8.8      17 Dec 2023 05:50      PTT - ( 18 Dec 2023 07:30 )  PTT:71.9 sec  Urinalysis Basic - ( 17 Dec 2023 05:50 )    Color: x / Appearance: x / SG: x / pH: x  Gluc: 117 mg/dL / Ketone: x  / Bili: x / Urobili: x   Blood: x / Protein: x / Nitrite: x   Leuk Esterase: x / RBC: x / WBC x   Sq Epi: x / Non Sq Epi: x / Bacteria: x             INTERVAL HPI/OVERNIGHT EVENTS:  Pt seen and examined at bedside.     Allergies/Intolerance: No Known Allergies      MEDICATIONS  (STANDING):  albuterol/ipratropium for Nebulization 3 milliLiter(s) Nebulizer every 6 hours  dexamethasone/neomycin/polymyxin Suspension 1 Drop(s) Left EYE three times a day  guaiFENesin  milliGRAM(s) Oral every 12 hours  heparin  Infusion. 1200 Unit(s)/Hr (12 mL/Hr) IV Continuous <Continuous>  hydroxychloroquine 400 milliGRAM(s) Oral two times a day  lidocaine   4% Patch 1 Patch Transdermal daily  piperacillin/tazobactam IVPB.. 3.375 Gram(s) IV Intermittent every 8 hours  polyethylene glycol 3350 17 Gram(s) Oral every 12 hours  senna 2 Tablet(s) Oral at bedtime  sodium chloride 1 Gram(s) Oral three times a day  sodium chloride 3%  Inhalation 4 milliLiter(s) Inhalation every 12 hours    MEDICATIONS  (PRN):  acetaminophen     Tablet .. 650 milliGRAM(s) Oral every 6 hours PRN Temp greater or equal to 38C (100.4F), Mild Pain (1 - 3)  heparin   Injectable 3000 Unit(s) IV Push every 6 hours PRN For aPTT between 40 - 57  heparin   Injectable 6000 Unit(s) IV Push every 6 hours PRN For aPTT less than 40  melatonin 3 milliGRAM(s) Oral at bedtime PRN Insomnia  oxyCODONE    IR 5 milliGRAM(s) Oral every 6 hours PRN Moderate Pain (4 - 6)        ROS: all systems reviewed and wnl      PHYSICAL EXAMINATION:  Vital Signs Last 24 Hrs  T(C): 38.1 (18 Dec 2023 05:09), Max: 39.1 (17 Dec 2023 11:08)  T(F): 100.6 (18 Dec 2023 05:09), Max: 102.4 (17 Dec 2023 11:08)  HR: 100 (18 Dec 2023 06:50) (81 - 119)  BP: 144/77 (18 Dec 2023 05:09) (142/92 - 146/77)  BP(mean): --  RR: 18 (18 Dec 2023 05:09) (18 - 18)  SpO2: 98% (18 Dec 2023 06:50) (93% - 98%)    Parameters below as of 18 Dec 2023 06:50  Patient On (Oxygen Delivery Method): room air      CAPILLARY BLOOD GLUCOSE          12-17 @ 07:01  -  12-18 @ 07:00  --------------------------------------------------------  IN: 892 mL / OUT: 600 mL / NET: 292 mL        GENERAL: stable, less cough yesterday, less pleurisy. RA sats normal.    NECK: supple, No JVD  CHEST/LUNG: clear to auscultation bilaterally; no rales, rhonchi, or wheezing b/l  HEART: normal S1, S2  ABDOMEN: BS+, soft, ND, NT   EXTREMITIES:  pulses palpable; no clubbing, cyanosis, or edema b/l LEs    LABS:    12-17    131<L>  |  101  |  14  ----------------------------<  117<H>  3.9   |  23  |  1.10    Ca    8.8      17 Dec 2023 05:50      PTT - ( 18 Dec 2023 07:30 )  PTT:71.9 sec  Urinalysis Basic - ( 17 Dec 2023 05:50 )    Color: x / Appearance: x / SG: x / pH: x  Gluc: 117 mg/dL / Ketone: x  / Bili: x / Urobili: x   Blood: x / Protein: x / Nitrite: x   Leuk Esterase: x / RBC: x / WBC x   Sq Epi: x / Non Sq Epi: x / Bacteria: x

## 2023-12-18 NOTE — PROGRESS NOTE ADULT - SUBJECTIVE AND OBJECTIVE BOX
[facility] NEPHROLOGY SERVICES, [facility]  NEPHROLOGY AND HYPERTENSION  [address]  [address]  [place] [address]  [phone]    MD AMBER HURT MD YELENA ROSENBERG, MD BINNY KOSHY, MD CHRISTOPHER CAPUTO, MD EDWARD BOVER, MD          Patient events noted  No distress    MEDICATIONS  (STANDING):  albuterol/ipratropium for Nebulization 3 milliLiter(s) Nebulizer every 6 hours  bisacodyl Suppository 10 milliGRAM(s) Rectal daily  dexamethasone/neomycin/polymyxin Suspension 1 Drop(s) Left EYE three times a day  heparin  Infusion. 1200 Unit(s)/Hr (12 mL/Hr) IV Continuous <Continuous>  hydroxychloroquine 400 milliGRAM(s) Oral two times a day  lidocaine   4% Patch 1 Patch Transdermal daily  piperacillin/tazobactam IVPB.. 3.375 Gram(s) IV Intermittent every 8 hours  polyethylene glycol 3350 17 Gram(s) Oral every 12 hours  senna 2 Tablet(s) Oral at bedtime  sodium chloride 1 Gram(s) Oral three times a day  sodium chloride 3%  Inhalation 4 milliLiter(s) Inhalation every 12 hours    MEDICATIONS  (PRN):  acetaminophen     Tablet .. 650 milliGRAM(s) Oral every 6 hours PRN Temp greater or equal to 38C (100.4F), Mild Pain (1 - 3)  guaifenesin/dextromethorphan Oral Liquid 10 milliLiter(s) Oral every 4 hours PRN Cough  heparin   Injectable 6000 Unit(s) IV Push every 6 hours PRN For aPTT less than 40  heparin   Injectable 3000 Unit(s) IV Push every 6 hours PRN For aPTT between 40 - 57  melatonin 3 milliGRAM(s) Oral at bedtime PRN Insomnia  oxyCODONE    IR 5 milliGRAM(s) Oral every 6 hours PRN Moderate Pain (4 - 6)      12-17-23 @ 07:01  -  12-18-23 @ 07:00  --------------------------------------------------------  IN: 892 mL / OUT: 600 mL / NET: 292 mL    12-18-23 @ 07:01  -  12-18-23 @ 22:58  --------------------------------------------------------  IN: 892 mL / OUT: 0 mL / NET: 892 mL      PHYSICAL EXAM:      T(C): 36.6 (12-18-23 @ 17:00), Max: 38.4 (12-18-23 @ 16:06)  HR: 106 (12-18-23 @ 18:09) (85 - 106)  BP: 166/76 (12-18-23 @ 16:06) (107/69 - 166/76)  RR: 18 (12-18-23 @ 16:06) (18 - 18)  SpO2: 98% (12-18-23 @ 18:09) (93% - 99%)  Wt(kg): --  Lungs clear  Heart S1S2  Abd soft NT ND  Extremities:   tr edema      Double Stranded DNA Antibody (12.14.23 @ 07:00)    Double Stranded DNA Antibody: 15: Method: EIA            Reference Ranges            Interpretation            <= 29    IU/mL     Negative            30 - 75  IU/mL     Borderline            > 75      IU/mL     Positive IU/mL    Anti-Nuclear Antibody in AM (12.14.23 @ 07:00)    Anti Nuclear Factor Titer: 1:1280: Antinuclear AB (ABDIAS), IFA Method   ABDIAS Pattern: Speckled              12-17    131<L>  |  101  |  14  ----------------------------<  117<H>  3.9   |  23  |  1.10    Ca    8.8      17 Dec 2023 05:50          Creatinine Trend: 1.10<--, 1.28<--, 1.30<--, 1.08<--, 1.14<--, 1.24<--      Assessment   Acute on chronic hyponatremia 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 [facility] NEPHROLOGY SERVICES, [facility]  NEPHROLOGY AND HYPERTENSION  [address]  [address]  [place] [address]  [phone]    MD AMBER HURT MD YELENA ROSENBERG, MD BINNY KOSHY, MD CHRISTOPHER CAPUTO, MD EDWARD BOVER, MD          Patient events noted  No distress    MEDICATIONS  (STANDING):  albuterol/ipratropium for Nebulization 3 milliLiter(s) Nebulizer every 6 hours  bisacodyl Suppository 10 milliGRAM(s) Rectal daily  dexamethasone/neomycin/polymyxin Suspension 1 Drop(s) Left EYE three times a day  heparin  Infusion. 1200 Unit(s)/Hr (12 mL/Hr) IV Continuous <Continuous>  hydroxychloroquine 400 milliGRAM(s) Oral two times a day  lidocaine   4% Patch 1 Patch Transdermal daily  piperacillin/tazobactam IVPB.. 3.375 Gram(s) IV Intermittent every 8 hours  polyethylene glycol 3350 17 Gram(s) Oral every 12 hours  senna 2 Tablet(s) Oral at bedtime  sodium chloride 1 Gram(s) Oral three times a day  sodium chloride 3%  Inhalation 4 milliLiter(s) Inhalation every 12 hours    MEDICATIONS  (PRN):  acetaminophen     Tablet .. 650 milliGRAM(s) Oral every 6 hours PRN Temp greater or equal to 38C (100.4F), Mild Pain (1 - 3)  guaifenesin/dextromethorphan Oral Liquid 10 milliLiter(s) Oral every 4 hours PRN Cough  heparin   Injectable 6000 Unit(s) IV Push every 6 hours PRN For aPTT less than 40  heparin   Injectable 3000 Unit(s) IV Push every 6 hours PRN For aPTT between 40 - 57  melatonin 3 milliGRAM(s) Oral at bedtime PRN Insomnia  oxyCODONE    IR 5 milliGRAM(s) Oral every 6 hours PRN Moderate Pain (4 - 6)      12-17-23 @ 07:01  -  12-18-23 @ 07:00  --------------------------------------------------------  IN: 892 mL / OUT: 600 mL / NET: 292 mL    12-18-23 @ 07:01  -  12-18-23 @ 22:58  --------------------------------------------------------  IN: 892 mL / OUT: 0 mL / NET: 892 mL      PHYSICAL EXAM:      T(C): 36.6 (12-18-23 @ 17:00), Max: 38.4 (12-18-23 @ 16:06)  HR: 106 (12-18-23 @ 18:09) (85 - 106)  BP: 166/76 (12-18-23 @ 16:06) (107/69 - 166/76)  RR: 18 (12-18-23 @ 16:06) (18 - 18)  SpO2: 98% (12-18-23 @ 18:09) (93% - 99%)  Wt(kg): --  Lungs clear  Heart S1S2  Abd soft NT ND  Extremities:   tr edema      Double Stranded DNA Antibody (12.14.23 @ 07:00)    Double Stranded DNA Antibody: 15: Method: EIA            Reference Ranges            Interpretation            <= 29    IU/mL     Negative            30 - 75  IU/mL     Borderline            > 75      IU/mL     Positive IU/mL    Anti-Nuclear Antibody in AM (12.14.23 @ 07:00)    Anti Nuclear Factor Titer: 1:1280: Antinuclear AB (ABDIAS), IFA Method   ABDIAS Pattern: Speckled              12-17    131<L>  |  101  |  14  ----------------------------<  117<H>  3.9   |  23  |  1.10    Ca    8.8      17 Dec 2023 05:50          Creatinine Trend: 1.10<--, 1.28<--, 1.30<--, 1.08<--, 1.14<--, 1.24<--      Assessment   Acute on chronic hyponatremia 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

## 2023-12-18 NOTE — PROGRESS NOTE ADULT - ASSESSMENT
28 years old male with h/o Lupus ( diagnosed in 09/2023, on Plaquenil 400mg bid) present to ED with complain of chest pain and SOB. Patient reported left sided pleuritic chest pain which started 3 days ago.       Found to have acute bilateral PE, started on heparin gtt. Course complicated by persist fever, worsening Left infiltrate, now being     treated also for Pneumonia with Zosyn.     Course also complicated by worsening hyponatremia, continue NACL tabs TID. NA improved to 131.     Continue Zosyn for possible LLL pneumonia and Duonebs every 6 hours.     Plaquenil 400 mg bid for SLE.     Follow SMA-7 in AM.     Transition to NOAC on Monday.     Will ask for Rheum eval Monday for SLE and PE.        28 years old male with h/o Lupus ( diagnosed in 09/2023, on Plaquenil 400mg bid) present to ED with complain of chest pain and SOB. Patient reported left sided pleuritic chest pain which started 3 days ago.         Found to have acute bilateral PE, started on heparin gtt. Course complicated by persist fever, worsening Left infiltrate, now being     treated also for Pneumonia with Zosyn. Urine legionella AG negative, stopped Zithromax.     Course also complicated by worsening hyponatremia, continue NACL tabs TID. NA improved to 131.     Continue Zosyn for possible LLL pneumonia and Duonebs every 6 hours.     Plaquenil 400 mg bid for SLE.     Follow SMA-7 in AM.     Transition to NOAC on Monday.     Will ask for Rheum eval Monday for SLE and PE.

## 2023-12-19 LAB
APTT BLD: 54.7 SEC — HIGH (ref 24.5–35.6)
APTT BLD: 54.7 SEC — HIGH (ref 24.5–35.6)
CULTURE RESULTS: SIGNIFICANT CHANGE UP
SPECIMEN SOURCE: SIGNIFICANT CHANGE UP

## 2023-12-19 PROCEDURE — 99232 SBSQ HOSP IP/OBS MODERATE 35: CPT

## 2023-12-19 RX ORDER — APIXABAN 2.5 MG/1
10 TABLET, FILM COATED ORAL
Refills: 0 | Status: DISCONTINUED | OUTPATIENT
Start: 2023-12-19 | End: 2023-12-21

## 2023-12-19 RX ADMIN — Medication 10 MILLILITER(S): at 10:57

## 2023-12-19 RX ADMIN — Medication 650 MILLIGRAM(S): at 15:51

## 2023-12-19 RX ADMIN — Medication 3 MILLILITER(S): at 11:01

## 2023-12-19 RX ADMIN — HEPARIN SODIUM 1600 UNIT(S)/HR: 5000 INJECTION INTRAVENOUS; SUBCUTANEOUS at 07:31

## 2023-12-19 RX ADMIN — Medication 400 MILLIGRAM(S): at 19:42

## 2023-12-19 RX ADMIN — SODIUM CHLORIDE 1 GRAM(S): 9 INJECTION INTRAMUSCULAR; INTRAVENOUS; SUBCUTANEOUS at 05:43

## 2023-12-19 RX ADMIN — Medication 3 MILLILITER(S): at 00:08

## 2023-12-19 RX ADMIN — SODIUM CHLORIDE 4 MILLILITER(S): 9 INJECTION INTRAMUSCULAR; INTRAVENOUS; SUBCUTANEOUS at 05:16

## 2023-12-19 RX ADMIN — HEPARIN SODIUM 1800 UNIT(S)/HR: 5000 INJECTION INTRAVENOUS; SUBCUTANEOUS at 09:31

## 2023-12-19 RX ADMIN — PIPERACILLIN AND TAZOBACTAM 25 GRAM(S): 4; .5 INJECTION, POWDER, LYOPHILIZED, FOR SOLUTION INTRAVENOUS at 15:15

## 2023-12-19 RX ADMIN — APIXABAN 10 MILLIGRAM(S): 2.5 TABLET, FILM COATED ORAL at 22:18

## 2023-12-19 RX ADMIN — HEPARIN SODIUM 1800 UNIT(S)/HR: 5000 INJECTION INTRAVENOUS; SUBCUTANEOUS at 09:27

## 2023-12-19 RX ADMIN — PIPERACILLIN AND TAZOBACTAM 25 GRAM(S): 4; .5 INJECTION, POWDER, LYOPHILIZED, FOR SOLUTION INTRAVENOUS at 05:45

## 2023-12-19 RX ADMIN — Medication 400 MILLIGRAM(S): at 05:43

## 2023-12-19 RX ADMIN — Medication 650 MILLIGRAM(S): at 05:44

## 2023-12-19 RX ADMIN — HEPARIN SODIUM 3000 UNIT(S): 5000 INJECTION INTRAVENOUS; SUBCUTANEOUS at 09:36

## 2023-12-19 RX ADMIN — Medication 650 MILLIGRAM(S): at 23:57

## 2023-12-19 RX ADMIN — Medication 3 MILLILITER(S): at 05:11

## 2023-12-19 RX ADMIN — PIPERACILLIN AND TAZOBACTAM 25 GRAM(S): 4; .5 INJECTION, POWDER, LYOPHILIZED, FOR SOLUTION INTRAVENOUS at 22:18

## 2023-12-19 RX ADMIN — Medication 650 MILLIGRAM(S): at 23:25

## 2023-12-19 RX ADMIN — Medication 650 MILLIGRAM(S): at 16:55

## 2023-12-19 RX ADMIN — APIXABAN 10 MILLIGRAM(S): 2.5 TABLET, FILM COATED ORAL at 10:56

## 2023-12-19 NOTE — PROGRESS NOTE ADULT - ASSESSMENT
28 years old male with h/o Lupus ( diagnosed in 09/2023, on Plaquenil 400mg bid) present to ED with complain of chest pain and SOB. Patient reported left sided pleuritic chest pain which started 3 days ago.         Found to have acute bilateral PE, started on heparin gtt. Course complicated by persist fever, worsening Left infiltrate, now being     treated also for Pneumonia with Zosyn. Urine legionella AG negative, stopped Zithromax.     Course also complicated by worsening hyponatremia, continue NACL tabs TID. NA improved to 131.     Continue Zosyn for possible LLL pneumonia and Duonebs every 6 hours.     Plaquenil 400 mg bid for SLE.     Follow SMA-7 in AM.     Transition to NOAC on Monday.     Will ask for Rheum eval Monday for SLE and PE.        28 years old male with h/o Lupus ( diagnosed in 09/2023, on Plaquenil 400mg bid) present to ED with complain of chest pain and SOB. Patient reported left sided pleuritic chest pain which started 3 days ago.         Found to have acute bilateral PE, started on heparin gtt. Course complicated by persist fever, worsening Left infiltrate, now being     treated also for Pneumonia with Zosyn. Urine legionella AG negative, stopped Zithromax.     Course also complicated by worsening hyponatremia, continue NACL tabs TID. NA improved to 131.     Continue Zosyn for possible LLL pneumonia and Duonebs every 6 hours.     Plaquenil 400 mg bid for SLE.     Follow SMA-7 in AM.     Spoke to Heme attending on call in [facility], agree with transition to NOAC today. Stop IV Heparin.      Will ask for Rheum eval Monday for SLE and PE.      Pulmonary was called to manage superimposed pneumonia.     PT eval, mobilize.       28 years old male with h/o Lupus ( diagnosed in 09/2023, on Plaquenil 400mg bid) present to ED with complain of chest pain and SOB. Patient reported left sided pleuritic chest pain which started 3 days ago.         Found to have acute bilateral PE, started on heparin gtt. Course complicated by persist fever, worsening Left infiltrate, now being     treated also for Pneumonia with Zosyn. Urine legionella AG negative, stopped Zithromax.     Course also complicated by worsening hyponatremia, continue NACL tabs TID. NA improved to 131.     Continue Zosyn for possible LLL pneumonia and Duonebs every 6 hours.     Plaquenil 400 mg bid for SLE.     Follow SMA-7 in AM.     Spoke to Heme attending on call in [facility], agree with transition to NOAC today. Stop IV Heparin.      Will ask for Rheum eval Monday for SLE and PE.      Pulmonary was called to manage superimposed pneumonia.     PT eval, mobilize.       28 years old male with h/o Lupus ( diagnosed in 09/2023, on Plaquenil 400mg bid) present to ED with complain of chest pain and SOB. Patient reported left sided pleuritic chest pain which started 3 days ago.         Found to have acute bilateral PE, started on heparin gtt. Course complicated by persist fever, worsening Left infiltrate, now being     treated also for Pneumonia with Zosyn. Urine legionella AG negative, stopped Zithromax.     Course also complicated by worsening hyponatremia, continue NACL tabs TID. NA improved to 131.     Continue Zosyn for possible LLL pneumonia and Duonebs every 6 hours.     Plaquenil 400 mg bid for SLE.     Follow SMA-7 in AM.     Spoke to Heme attending on call in [facility], agree with transition to NOAC today. Stop IV Heparin.      Will ask for Rheum eval Monday for SLE and PE.      Pulmonary was called to manage superimposed pneumonia.     PT eval, mobilize.  CXR in AM.       28 years old male with h/o Lupus ( diagnosed in 09/2023, on Plaquenil 400mg bid) present to ED with complain of chest pain and SOB. Patient reported left sided pleuritic chest pain which started 3 days ago.         Found to have acute bilateral PE, started on heparin gtt. Course complicated by persist fever, worsening Left infiltrate, now being     treated also for Pneumonia with Zosyn. Urine legionella AG negative, stopped Zithromax.     Course also complicated by worsening hyponatremia, continue NACL tabs TID. NA improved to 131.     Continue Zosyn for possible LLL pneumonia and Duonebs every 6 hours.     Plaquenil 400 mg bid for SLE.     Follow SMA-7 in AM.     Spoke to Heme attending on call in [facility], agree with transition to NOAC today. Stop IV Heparin.      Will ask for Rheum eval Monday for SLE and PE.      Pulmonary was called to manage superimposed pneumonia.     PT eval, mobilize.  CXR in AM.       28 years old male with h/o Lupus ( diagnosed in 09/2023, on Plaquenil 400mg bid) present to ED with complain of chest pain and SOB. Patient reported left sided pleuritic chest pain which started 3 days ago.         Found to have acute bilateral PE, started on heparin gtt. Course complicated by persist fever, worsening Left infiltrate, now being     treated also for Pneumonia with Zosyn. Urine legionella AG negative, stopped Zithromax.     Course also complicated by worsening hyponatremia, continue NACL tabs TID. NA improved to 131.     Continue Zosyn for possible LLL pneumonia and Duonebs every 6 hours.     Plaquenil 400 mg bid for SLE.     Follow SMA-7 in AM.     Spoke to Heme attending on call in [facility], agree with transition to NOAC today. Stop IV Heparin.      Will ask for Rheum eval Monday for SLE and PE.      Pulmonary was called to manage superimposed pneumonia.     PT eval, mobilize.  CXR in AM.     Spoke to Mom on phone today, all questions were answered. [phone]. Michele.       28 years old male with h/o Lupus ( diagnosed in 09/2023, on Plaquenil 400mg bid) present to ED with complain of chest pain and SOB. Patient reported left sided pleuritic chest pain which started 3 days ago.         Found to have acute bilateral PE, started on heparin gtt. Course complicated by persist fever, worsening Left infiltrate, now being     treated also for Pneumonia with Zosyn. Urine legionella AG negative, stopped Zithromax.     Course also complicated by worsening hyponatremia, continue NACL tabs TID. NA improved to 131.     Continue Zosyn for possible LLL pneumonia and Duonebs every 6 hours.     Plaquenil 400 mg bid for SLE.     Follow SMA-7 in AM.     Spoke to Heme attending on call in [facility], agree with transition to NOAC today. Stop IV Heparin.      Will ask for Rheum eval Monday for SLE and PE.      Pulmonary was called to manage superimposed pneumonia.     PT eval, mobilize.  CXR in AM.     Spoke to Mom on phone today, all questions were answered. [phone]. Michele.       28 years old male with h/o Lupus ( diagnosed in 09/2023, on Plaquenil 400mg bid) present to ED with complain of chest pain and SOB. Patient reported left sided pleuritic chest pain which started 3 days ago.         Found to have acute bilateral PE, started on heparin gtt. Course complicated by persist fever, worsening Left infiltrate, now being     treated also for Pneumonia with Zosyn. Urine legionella AG negative, stopped Zithromax.     Course also complicated by worsening hyponatremia, continue NACL tabs TID. NA improved to 131.     Continue Zosyn for possible LLL pneumonia and Duonebs every 6 hours.     Plaquenil 400 mg bid for SLE.     Follow SMA-7 in AM.     Spoke to Heme attending on call in [facility], agree with transition to NOAC today. Stop IV Heparin.      Will ask for Rheum eval Monday for SLE and PE.      Pulmonary was called to manage superimposed pneumonia.     PT eval, mobilize.  CXR in AM.     Plan: Spoke to Mom on phone today, all questions were answered. [phone]. Aprile.     Recovering well from PE and bilateral pneumonia. Ambulated well today with PCA.       28 years old male with h/o Lupus ( diagnosed in 09/2023, on Plaquenil 400mg bid) present to ED with complain of chest pain and SOB. Patient reported left sided pleuritic chest pain which started 3 days ago.         Found to have acute bilateral PE, started on heparin gtt. Course complicated by persist fever, worsening Left infiltrate, now being     treated also for Pneumonia with Zosyn. Urine legionella AG negative, stopped Zithromax.     Course also complicated by worsening hyponatremia, continue NACL tabs TID. NA improved to 131.     Continue Zosyn for possible LLL pneumonia and Duonebs every 6 hours.     Plaquenil 400 mg bid for SLE.     Follow SMA-7 in AM.     Spoke to Heme attending on call in [facility], agree with transition to NOAC today. Stop IV Heparin.      Will ask for Rheum eval Monday for SLE and PE.      Pulmonary was called to manage superimposed pneumonia.     PT eval, mobilize.  CXR in AM.     Plan: Spoke to Mom on phone today, all questions were answered. [phone]. Aprile.     Recovering well from PE and bilateral pneumonia. Ambulated well today with PCA.

## 2023-12-19 NOTE — PROGRESS NOTE ADULT - SUBJECTIVE AND OBJECTIVE BOX
INTERVAL HPI/OVERNIGHT EVENTS:  Pt seen and examined at bedside.     Allergies/Intolerance: No Known Allergies      MEDICATIONS  (STANDING):  albuterol/ipratropium for Nebulization 3 milliLiter(s) Nebulizer every 6 hours  bisacodyl Suppository 10 milliGRAM(s) Rectal daily  dexamethasone/neomycin/polymyxin Suspension 1 Drop(s) Left EYE three times a day  heparin  Infusion. 1200 Unit(s)/Hr (12 mL/Hr) IV Continuous <Continuous>  hydroxychloroquine 400 milliGRAM(s) Oral two times a day  lidocaine   4% Patch 1 Patch Transdermal daily  piperacillin/tazobactam IVPB.. 3.375 Gram(s) IV Intermittent every 8 hours  polyethylene glycol 3350 17 Gram(s) Oral every 12 hours  senna 2 Tablet(s) Oral at bedtime  sodium chloride 1 Gram(s) Oral three times a day  sodium chloride 3%  Inhalation 4 milliLiter(s) Inhalation every 12 hours    MEDICATIONS  (PRN):  acetaminophen     Tablet .. 650 milliGRAM(s) Oral every 6 hours PRN Temp greater or equal to 38C (100.4F), Mild Pain (1 - 3)  guaifenesin/dextromethorphan Oral Liquid 10 milliLiter(s) Oral every 4 hours PRN Cough  heparin   Injectable 3000 Unit(s) IV Push every 6 hours PRN For aPTT between 40 - 57  heparin   Injectable 6000 Unit(s) IV Push every 6 hours PRN For aPTT less than 40  melatonin 3 milliGRAM(s) Oral at bedtime PRN Insomnia  oxyCODONE    IR 5 milliGRAM(s) Oral every 6 hours PRN Moderate Pain (4 - 6)        ROS: all systems reviewed and wnl      PHYSICAL EXAMINATION:  Vital Signs Last 24 Hrs  T(C): 38.2 (19 Dec 2023 06:45), Max: 39.4 (19 Dec 2023 04:57)  T(F): 100.7 (19 Dec 2023 06:45), Max: 102.9 (19 Dec 2023 04:57)  HR: 96 (19 Dec 2023 06:35) (85 - 106)  BP: 160/90 (19 Dec 2023 04:57) (107/69 - 166/76)  BP(mean): --  RR: 20 (19 Dec 2023 04:57) (18 - 20)  SpO2: 95% (19 Dec 2023 06:35) (93% - 99%)    Parameters below as of 19 Dec 2023 06:35  Patient On (Oxygen Delivery Method): room air      CAPILLARY BLOOD GLUCOSE          12-18 @ 07:01  -  12-19 @ 07:00  --------------------------------------------------------  IN: 892 mL / OUT: 200 mL / NET: 692 mL        GENERAL: stable, RA sats normal, less cough.   NECK: supple, No JVD  CHEST/LUNG: clear to auscultation bilaterally; no rales, rhonchi, or wheezing b/l  HEART: normal S1, S2  ABDOMEN: BS+, soft, ND, NT   EXTREMITIES:  pulses palpable; no clubbing, cyanosis, or edema b/l LEs      LABS:          PTT - ( 18 Dec 2023 07:30 )  PTT:71.9 sec

## 2023-12-19 NOTE — DIETITIAN INITIAL EVALUATION ADULT - PERTINENT MEDS FT
MEDICATIONS  (STANDING):  albuterol/ipratropium for Nebulization 3 milliLiter(s) Nebulizer every 6 hours  apixaban 10 milliGRAM(s) Oral <User Schedule>  bisacodyl Suppository 10 milliGRAM(s) Rectal daily  dexamethasone/neomycin/polymyxin Suspension 1 Drop(s) Left EYE three times a day  hydroxychloroquine 400 milliGRAM(s) Oral two times a day  lidocaine   4% Patch 1 Patch Transdermal daily  piperacillin/tazobactam IVPB.. 3.375 Gram(s) IV Intermittent every 8 hours  polyethylene glycol 3350 17 Gram(s) Oral every 12 hours  senna 2 Tablet(s) Oral at bedtime  sodium chloride 1 Gram(s) Oral three times a day  sodium chloride 3%  Inhalation 4 milliLiter(s) Inhalation every 12 hours    MEDICATIONS  (PRN):  acetaminophen     Tablet .. 650 milliGRAM(s) Oral every 6 hours PRN Temp greater or equal to 38C (100.4F), Mild Pain (1 - 3)  guaifenesin/dextromethorphan Oral Liquid 10 milliLiter(s) Oral every 4 hours PRN Cough  melatonin 3 milliGRAM(s) Oral at bedtime PRN Insomnia  oxyCODONE    IR 5 milliGRAM(s) Oral every 6 hours PRN Moderate Pain (4 - 6)

## 2023-12-19 NOTE — DIETITIAN INITIAL EVALUATION ADULT - NS FNS DIET ORDER
Diet, Regular:   1000mL Fluid Restriction ([id number]) (12-14-23 @ 12:34) [Active] Diet, Regular:   1000mL Fluid Restriction ([id number]) (12-14-23 @ 12:34) [Active]

## 2023-12-19 NOTE — DIETITIAN INITIAL EVALUATION ADULT - ADD RECOMMEND
Continue to provide assistance and encouragement with PO intake; Monitor and replete electrolytes as needed

## 2023-12-19 NOTE — DIETITIAN INITIAL EVALUATION ADULT - ORAL INTAKE PTA/DIET HISTORY
Pt reports good appetite and PO intake PTA. States no diet restrictions and no known food allergies PTA.

## 2023-12-19 NOTE — DIETITIAN INITIAL EVALUATION ADULT - PROBLEM SELECTOR PLAN 1
present with pleuritic chest pain  elevated ddimer  CTA chest  ( I personally review) with acute right upper lobe and left lower lobe segmental/subsegmental pulmonary emboli. No CT evidence of right heart strain. New bilateral lower lobe consolidations with areas of central clearing. Pneumonia and pulmonary infarcts are in the differential. New bilateral pleural effusions, small on the left and trace on the right  IV heparin, monitor PTT per protocol. Therapeutic monitoring is necessary with IV heparin  ECHO to evaluate for right heart strain  LE doppler to R/O DVT  CT chest finding of consolidation likely due to pulmonary infarct. Less likely pneumonia. Monitor off antibiotics. Check procalcitonin  Pain control---> IV morphine prn for severe pain  Check lupus profile  Hematology consulted---> team message send to Dr Amaya  Pul consulted

## 2023-12-19 NOTE — DIETITIAN INITIAL EVALUATION ADULT - OTHER INFO
Denies difficulty chewing/swallowing. Reports weight stable. States  pounds. Amenable to Ensure Plus High Protein x 2/day (provides 700 kcal, 40 g protein); discussed using nutritional supplement to optimize PO intake. Made aware RD remains available.

## 2023-12-19 NOTE — DIETITIAN INITIAL EVALUATION ADULT - PERTINENT LABORATORY DATA
12-17 Na131 mmol/L<L> Glu 117 mg/dL<H> K+ 3.9 mmol/L Cr  1.10 mg/dL BUN 14 mg/dL 12-15 Phos 3.3 mg/dL 12-15 Alb 2.1 g/dL<L>

## 2023-12-19 NOTE — CONSULT NOTE ADULT - SUBJECTIVE AND OBJECTIVE BOX
Patient is a 28y old  Male who presents with a chief complaint of PULMONARY EMBOLISM    HPI:  28 years old male with Lupus ( diagnosed in 09/2023, on Plaquenil 400mg bid).   Presented to ED with complain of chest pain and SOB which started 3 days ago. Pain progressively worsened, associated with SOB and cough. Was seen in [facility] ED day prior. told has Rhinovirus  was prescribed antibiotics.   Came with worsening symptoms, also had fever, chills and cough.  Admitted with bilateral PE.   Hospital course complicated with pneumonia and hyponatremia.  Denies tobacco or substance abuse    PAST MEDICAL & SURGICAL HISTORY:  No pertinent past medical history    SOCIAL HISTORY:     Allergies  No Known Allergies    MEDICATIONS  (STANDING):  albuterol/ipratropium for Nebulization 3 milliLiter(s) Nebulizer every 6 hours  apixaban 10 milliGRAM(s) Oral <User Schedule>  bisacodyl Suppository 10 milliGRAM(s) Rectal daily  dexamethasone/neomycin/polymyxin Suspension 1 Drop(s) Left EYE three times a day  hydroxychloroquine 400 milliGRAM(s) Oral two times a day  lidocaine   4% Patch 1 Patch Transdermal daily  piperacillin/tazobactam IVPB.. 3.375 Gram(s) IV Intermittent every 8 hours  polyethylene glycol 3350 17 Gram(s) Oral every 12 hours  senna 2 Tablet(s) Oral at bedtime  sodium chloride 3%  Inhalation 4 milliLiter(s) Inhalation every 12 hours    MEDICATIONS  (PRN):  acetaminophen     Tablet .. 650 milliGRAM(s) Oral every 6 hours PRN Temp greater or equal to 38C (100.4F), Mild Pain (1 - 3)  guaifenesin/dextromethorphan Oral Liquid 10 milliLiter(s) Oral every 4 hours PRN Cough  melatonin 3 milliGRAM(s) Oral at bedtime PRN Insomnia  oxyCODONE    IR 5 milliGRAM(s) Oral every 6 hours PRN Moderate Pain (4 - 6)    Vital Signs Last 24 Hrs  T(C): 37.3 (19 Dec 2023 18:51), Max: 39.4 (19 Dec 2023 04:57)  T(F): 99.1 (19 Dec 2023 18:51), Max: 102.9 (19 Dec 2023 04:57)  HR: 74 (19 Dec 2023 18:51) (74 - 101)  BP: 144/77 (19 Dec 2023 18:51) (138/86 - 160/90)  BP(mean): --  RR: 19 (19 Dec 2023 18:51) (19 - 20)  SpO2: 95% (19 Dec 2023 18:51) (93% - 99%)    Parameters below as of 19 Dec 2023 18:51  Patient On (Oxygen Delivery Method): room air    PHYSICAL EXAM:  GEN:         Awake, responsive and comfortable.  HEENT:     Normal.    RESP:     no wheezing  CVS:         Regular rate and rhythm.   ABD:         Soft, non-tender, non-distended;   SKIN:           Warm and dry.  EXTR:            No clubbing, cyanosis or edema  CNS:              Intact sensory and motor function.  PSYCH:        cooperative, no anxiety or depression    PTT - ( 19 Dec 2023 07:50 )  PTT:54.7 sec    Culture - Stool (collected 12-15-23 @ 13:20)  Source: .Stool Feces  Final Report (12-17-23 @ 17:28):    No enteric pathogens isolated.    (Stool culture examined for Salmonella,    Shigella, Campylobacter, Aeromonas, Plesiomonas,    Vibrio, E.coli O157 and Yersinia)    Culture - Blood (collected 12-14-23 @ 16:45)  Source: .Blood Blood-Peripheral  Preliminary Report (12-18-23 @ 22:01):    No growth at 4 days    Culture - Blood (collected 12-14-23 @ 16:35)  Source: .Blood Blood-Peripheral  Preliminary Report (12-18-23 @ 22:01):    No growth at 4 days    Culture - Blood (collected 12-12-23 @ 20:44)  Source: .Blood Blood-Peripheral  Final Report (12-18-23 @ 03:00):    No growth at 5 days    Culture - Blood (collected 12-12-23 @ 20:30)  Source: .Blood Blood-Peripheral  Final Report (12-18-23 @ 03:00):    No growth at 5 days    RADIOLOGY & ADDITIONAL STUDIES:  < from: Xray Chest 1 View- PORTABLE-Urgent (Xray Chest 1 View- PORTABLE-Urgent .) (12.14.23 @ 10:24) >  ACC: [id number] EXAM:  XR CHEST PORTABLE URGENT 1V   ORDERED BY: ERIN MINOR     PROCEDURE DATE:  12/14/2023      INTERPRETATION:  AP chest on December 14, 2023 at 10:17 AM. Patient has   increasing fever and leukocytosis.    Heart magnified bytechnique.    On December 12 there is a left base infiltrate.    On present examination this infiltrate has increased and now there is an   adjacent moderate effusion.    IMPRESSION: Significantly increasing left base lung and pleural findings.    ANNIE FARRIS MD; Attending Radiologist  This document has been electronically signed. Dec 14 2023 12:09PM    ASSESSMENT AND PLAN:   ·	SOB.  ·	Bilateral PE.  ·	Pneumonia.  ·	Suspect Atelectasis.  ·	Hyponatremia.  ·	Lupus    SPO2 95% on room air at rest. No distress noted.  Changed to Eliquis.  Continue antibiotics, will get procalcitonin in am.  Encourage Incentive spirometry.  Discussed with mom at bed side.  Patient is a 28y old  Male who presents with a chief complaint of PULMONARY EMBOLISM    HPI:  28 years old male with Lupus ( diagnosed in 09/2023, on Plaquenil 400mg bid).   Presented to ED with complain of chest pain and SOB which started 3 days ago. Pain progressively worsened, associated with SOB and cough. Was seen in [facility] ED day prior. told has Rhinovirus  was prescribed antibiotics.   Came with worsening symptoms, also had fever, chills and cough.  Admitted with bilateral PE.   Hospital course complicated with pneumonia and hyponatremia.  Denies tobacco or substance abuse    PAST MEDICAL & SURGICAL HISTORY:  No pertinent past medical history    SOCIAL HISTORY:     Allergies  No Known Allergies    MEDICATIONS  (STANDING):  albuterol/ipratropium for Nebulization 3 milliLiter(s) Nebulizer every 6 hours  apixaban 10 milliGRAM(s) Oral <User Schedule>  bisacodyl Suppository 10 milliGRAM(s) Rectal daily  dexamethasone/neomycin/polymyxin Suspension 1 Drop(s) Left EYE three times a day  hydroxychloroquine 400 milliGRAM(s) Oral two times a day  lidocaine   4% Patch 1 Patch Transdermal daily  piperacillin/tazobactam IVPB.. 3.375 Gram(s) IV Intermittent every 8 hours  polyethylene glycol 3350 17 Gram(s) Oral every 12 hours  senna 2 Tablet(s) Oral at bedtime  sodium chloride 3%  Inhalation 4 milliLiter(s) Inhalation every 12 hours    MEDICATIONS  (PRN):  acetaminophen     Tablet .. 650 milliGRAM(s) Oral every 6 hours PRN Temp greater or equal to 38C (100.4F), Mild Pain (1 - 3)  guaifenesin/dextromethorphan Oral Liquid 10 milliLiter(s) Oral every 4 hours PRN Cough  melatonin 3 milliGRAM(s) Oral at bedtime PRN Insomnia  oxyCODONE    IR 5 milliGRAM(s) Oral every 6 hours PRN Moderate Pain (4 - 6)    Vital Signs Last 24 Hrs  T(C): 37.3 (19 Dec 2023 18:51), Max: 39.4 (19 Dec 2023 04:57)  T(F): 99.1 (19 Dec 2023 18:51), Max: 102.9 (19 Dec 2023 04:57)  HR: 74 (19 Dec 2023 18:51) (74 - 101)  BP: 144/77 (19 Dec 2023 18:51) (138/86 - 160/90)  BP(mean): --  RR: 19 (19 Dec 2023 18:51) (19 - 20)  SpO2: 95% (19 Dec 2023 18:51) (93% - 99%)    Parameters below as of 19 Dec 2023 18:51  Patient On (Oxygen Delivery Method): room air    PHYSICAL EXAM:  GEN:         Awake, responsive and comfortable.  HEENT:     Normal.    RESP:     no wheezing  CVS:         Regular rate and rhythm.   ABD:         Soft, non-tender, non-distended;   SKIN:           Warm and dry.  EXTR:            No clubbing, cyanosis or edema  CNS:              Intact sensory and motor function.  PSYCH:        cooperative, no anxiety or depression    PTT - ( 19 Dec 2023 07:50 )  PTT:54.7 sec    Culture - Stool (collected 12-15-23 @ 13:20)  Source: .Stool Feces  Final Report (12-17-23 @ 17:28):    No enteric pathogens isolated.    (Stool culture examined for Salmonella,    Shigella, Campylobacter, Aeromonas, Plesiomonas,    Vibrio, E.coli O157 and Yersinia)    Culture - Blood (collected 12-14-23 @ 16:45)  Source: .Blood Blood-Peripheral  Preliminary Report (12-18-23 @ 22:01):    No growth at 4 days    Culture - Blood (collected 12-14-23 @ 16:35)  Source: .Blood Blood-Peripheral  Preliminary Report (12-18-23 @ 22:01):    No growth at 4 days    Culture - Blood (collected 12-12-23 @ 20:44)  Source: .Blood Blood-Peripheral  Final Report (12-18-23 @ 03:00):    No growth at 5 days    Culture - Blood (collected 12-12-23 @ 20:30)  Source: .Blood Blood-Peripheral  Final Report (12-18-23 @ 03:00):    No growth at 5 days    RADIOLOGY & ADDITIONAL STUDIES:  < from: Xray Chest 1 View- PORTABLE-Urgent (Xray Chest 1 View- PORTABLE-Urgent .) (12.14.23 @ 10:24) >  ACC: [id number] EXAM:  XR CHEST PORTABLE URGENT 1V   ORDERED BY: ERIN MINOR     PROCEDURE DATE:  12/14/2023      INTERPRETATION:  AP chest on December 14, 2023 at 10:17 AM. Patient has   increasing fever and leukocytosis.    Heart magnified bytechnique.    On December 12 there is a left base infiltrate.    On present examination this infiltrate has increased and now there is an   adjacent moderate effusion.    IMPRESSION: Significantly increasing left base lung and pleural findings.    ANNIE FARRIS MD; Attending Radiologist  This document has been electronically signed. Dec 14 2023 12:09PM    ASSESSMENT AND PLAN:   ·	SOB.  ·	Bilateral PE.  ·	Pneumonia.  ·	Suspect Atelectasis.  ·	Hyponatremia.  ·	Lupus    SPO2 95% on room air at rest. No distress noted.  Changed to Eliquis.  Continue antibiotics, will get procalcitonin in am.  Encourage Incentive spirometry.  Discussed with mom at bed side.

## 2023-12-20 LAB
ANION GAP SERPL CALC-SCNC: 8 MMOL/L — SIGNIFICANT CHANGE UP (ref 5–17)
ANION GAP SERPL CALC-SCNC: 8 MMOL/L — SIGNIFICANT CHANGE UP (ref 5–17)
BUN SERPL-MCNC: 13 MG/DL — SIGNIFICANT CHANGE UP (ref 7–23)
BUN SERPL-MCNC: 13 MG/DL — SIGNIFICANT CHANGE UP (ref 7–23)
CALCIUM SERPL-MCNC: 8.5 MG/DL — SIGNIFICANT CHANGE UP (ref 8.5–10.1)
CALCIUM SERPL-MCNC: 8.5 MG/DL — SIGNIFICANT CHANGE UP (ref 8.5–10.1)
CHLORIDE SERPL-SCNC: 102 MMOL/L — SIGNIFICANT CHANGE UP (ref 96–108)
CHLORIDE SERPL-SCNC: 102 MMOL/L — SIGNIFICANT CHANGE UP (ref 96–108)
CO2 SERPL-SCNC: 21 MMOL/L — LOW (ref 22–31)
CO2 SERPL-SCNC: 21 MMOL/L — LOW (ref 22–31)
CREAT SERPL-MCNC: 0.96 MG/DL — SIGNIFICANT CHANGE UP (ref 0.5–1.3)
CREAT SERPL-MCNC: 0.96 MG/DL — SIGNIFICANT CHANGE UP (ref 0.5–1.3)
EGFR: 110 ML/MIN/1.73M2 — SIGNIFICANT CHANGE UP
EGFR: 110 ML/MIN/1.73M2 — SIGNIFICANT CHANGE UP
GLUCOSE SERPL-MCNC: 96 MG/DL — SIGNIFICANT CHANGE UP (ref 70–99)
GLUCOSE SERPL-MCNC: 96 MG/DL — SIGNIFICANT CHANGE UP (ref 70–99)
GRAM STN FLD: ABNORMAL
GRAM STN FLD: ABNORMAL
HCT VFR BLD CALC: 27.8 % — LOW (ref 39–50)
HCT VFR BLD CALC: 27.8 % — LOW (ref 39–50)
HGB BLD-MCNC: 9.4 G/DL — LOW (ref 13–17)
HGB BLD-MCNC: 9.4 G/DL — LOW (ref 13–17)
MCHC RBC-ENTMCNC: 30.9 PG — SIGNIFICANT CHANGE UP (ref 27–34)
MCHC RBC-ENTMCNC: 30.9 PG — SIGNIFICANT CHANGE UP (ref 27–34)
MCHC RBC-ENTMCNC: 33.8 G/DL — SIGNIFICANT CHANGE UP (ref 32–36)
MCHC RBC-ENTMCNC: 33.8 G/DL — SIGNIFICANT CHANGE UP (ref 32–36)
MCV RBC AUTO: 91.4 FL — SIGNIFICANT CHANGE UP (ref 80–100)
MCV RBC AUTO: 91.4 FL — SIGNIFICANT CHANGE UP (ref 80–100)
NRBC # BLD: 0 /100 WBCS — SIGNIFICANT CHANGE UP (ref 0–0)
NRBC # BLD: 0 /100 WBCS — SIGNIFICANT CHANGE UP (ref 0–0)
PLATELET # BLD AUTO: 231 K/UL — SIGNIFICANT CHANGE UP (ref 150–400)
PLATELET # BLD AUTO: 231 K/UL — SIGNIFICANT CHANGE UP (ref 150–400)
POTASSIUM SERPL-MCNC: 4.1 MMOL/L — SIGNIFICANT CHANGE UP (ref 3.5–5.3)
POTASSIUM SERPL-MCNC: 4.1 MMOL/L — SIGNIFICANT CHANGE UP (ref 3.5–5.3)
POTASSIUM SERPL-SCNC: 4.1 MMOL/L — SIGNIFICANT CHANGE UP (ref 3.5–5.3)
POTASSIUM SERPL-SCNC: 4.1 MMOL/L — SIGNIFICANT CHANGE UP (ref 3.5–5.3)
PROCALCITONIN SERPL-MCNC: 0.8 NG/ML — HIGH (ref 0.02–0.1)
PROCALCITONIN SERPL-MCNC: 0.8 NG/ML — HIGH (ref 0.02–0.1)
RBC # BLD: 3.04 M/UL — LOW (ref 4.2–5.8)
RBC # BLD: 3.04 M/UL — LOW (ref 4.2–5.8)
RBC # FLD: 14.3 % — SIGNIFICANT CHANGE UP (ref 10.3–14.5)
RBC # FLD: 14.3 % — SIGNIFICANT CHANGE UP (ref 10.3–14.5)
SODIUM SERPL-SCNC: 131 MMOL/L — LOW (ref 135–145)
SODIUM SERPL-SCNC: 131 MMOL/L — LOW (ref 135–145)
SPECIMEN SOURCE: SIGNIFICANT CHANGE UP
SPECIMEN SOURCE: SIGNIFICANT CHANGE UP
WBC # BLD: 12.96 K/UL — HIGH (ref 3.8–10.5)
WBC # BLD: 12.96 K/UL — HIGH (ref 3.8–10.5)
WBC # FLD AUTO: 12.96 K/UL — HIGH (ref 3.8–10.5)
WBC # FLD AUTO: 12.96 K/UL — HIGH (ref 3.8–10.5)

## 2023-12-20 PROCEDURE — 71045 X-RAY EXAM CHEST 1 VIEW: CPT | Mod: 26

## 2023-12-20 PROCEDURE — 99222 1ST HOSP IP/OBS MODERATE 55: CPT

## 2023-12-20 PROCEDURE — 99232 SBSQ HOSP IP/OBS MODERATE 35: CPT

## 2023-12-20 RX ORDER — AZITHROMYCIN 500 MG/1
500 TABLET, FILM COATED ORAL ONCE
Refills: 0 | Status: COMPLETED | OUTPATIENT
Start: 2023-12-20 | End: 2023-12-20

## 2023-12-20 RX ORDER — ALBUTEROL 90 UG/1
2 AEROSOL, METERED ORAL EVERY 6 HOURS
Refills: 0 | Status: DISCONTINUED | OUTPATIENT
Start: 2023-12-20 | End: 2023-12-20

## 2023-12-20 RX ORDER — AZITHROMYCIN 500 MG/1
TABLET, FILM COATED ORAL
Refills: 0 | Status: DISCONTINUED | OUTPATIENT
Start: 2023-12-20 | End: 2023-12-21

## 2023-12-20 RX ORDER — LACTOBACILLUS ACIDOPHILUS 100MM CELL
1 CAPSULE ORAL
Refills: 0 | Status: DISCONTINUED | OUTPATIENT
Start: 2023-12-20 | End: 2023-12-22

## 2023-12-20 RX ORDER — IPRATROPIUM/ALBUTEROL SULFATE 18-103MCG
3 AEROSOL WITH ADAPTER (GRAM) INHALATION EVERY 6 HOURS
Refills: 0 | Status: DISCONTINUED | OUTPATIENT
Start: 2023-12-20 | End: 2023-12-20

## 2023-12-20 RX ORDER — IPRATROPIUM/ALBUTEROL SULFATE 18-103MCG
3 AEROSOL WITH ADAPTER (GRAM) INHALATION EVERY 6 HOURS
Refills: 0 | Status: DISCONTINUED | OUTPATIENT
Start: 2023-12-20 | End: 2023-12-22

## 2023-12-20 RX ORDER — AZITHROMYCIN 500 MG/1
500 TABLET, FILM COATED ORAL EVERY 24 HOURS
Refills: 0 | Status: DISCONTINUED | OUTPATIENT
Start: 2023-12-21 | End: 2023-12-21

## 2023-12-20 RX ADMIN — Medication 3 MILLILITER(S): at 00:30

## 2023-12-20 RX ADMIN — Medication 650 MILLIGRAM(S): at 12:00

## 2023-12-20 RX ADMIN — Medication 650 MILLIGRAM(S): at 05:16

## 2023-12-20 RX ADMIN — PIPERACILLIN AND TAZOBACTAM 25 GRAM(S): 4; .5 INJECTION, POWDER, LYOPHILIZED, FOR SOLUTION INTRAVENOUS at 22:04

## 2023-12-20 RX ADMIN — Medication 650 MILLIGRAM(S): at 17:10

## 2023-12-20 RX ADMIN — PIPERACILLIN AND TAZOBACTAM 25 GRAM(S): 4; .5 INJECTION, POWDER, LYOPHILIZED, FOR SOLUTION INTRAVENOUS at 13:00

## 2023-12-20 RX ADMIN — Medication 650 MILLIGRAM(S): at 06:15

## 2023-12-20 RX ADMIN — APIXABAN 10 MILLIGRAM(S): 2.5 TABLET, FILM COATED ORAL at 10:25

## 2023-12-20 RX ADMIN — APIXABAN 10 MILLIGRAM(S): 2.5 TABLET, FILM COATED ORAL at 22:04

## 2023-12-20 RX ADMIN — PIPERACILLIN AND TAZOBACTAM 25 GRAM(S): 4; .5 INJECTION, POWDER, LYOPHILIZED, FOR SOLUTION INTRAVENOUS at 05:17

## 2023-12-20 RX ADMIN — Medication 1 TABLET(S): at 17:10

## 2023-12-20 RX ADMIN — Medication 400 MILLIGRAM(S): at 17:10

## 2023-12-20 RX ADMIN — Medication 400 MILLIGRAM(S): at 05:16

## 2023-12-20 RX ADMIN — AZITHROMYCIN 500 MILLIGRAM(S): 500 TABLET, FILM COATED ORAL at 15:50

## 2023-12-20 RX ADMIN — Medication 650 MILLIGRAM(S): at 23:11

## 2023-12-20 NOTE — PROGRESS NOTE ADULT - ASSESSMENT
28 years old male with h/o Lupus ( diagnosed in 09/2023, on Plaquenil 400mg bid) present to ED with complain of chest pain and SOB. Patient reported left sided pleuritic chest pain which started 3 days ago.         Found to have acute bilateral PE, started on heparin gtt. Course complicated by persist fever, worsening Left infiltrate, now being     treated also for Pneumonia with Zosyn. Urine legionella AG negative, stopped Zithromax.     Course also complicated by worsening hyponatremia, continue NACL tabs TID. NA improved to 131.     Continue Zosyn for possible LLL pneumonia and Duonebs every 6 hours.     Plaquenil 400 mg bid for SLE.     Follow SMA-7 in AM.     Spoke to Heme attending on call in [facility], agree with transition to NOAC today. Stop IV Heparin.      Will ask for Rheum eval Monday for SLE and PE.      Pulmonary was called to manage superimposed pneumonia.     PT eval, mobilize.  CXR in AM.     Plan: Spoke to Mom on phone today, all questions were answered. [phone]. [employer].     Recovering well from PE and bilateral pneumonia. Ambulated well today with PCA.       28 years old male with h/o Lupus ( diagnosed in 09/2023, on Plaquenil 400mg bid) present to ED with complain of chest pain and SOB. Patient reported left sided pleuritic chest pain which started 3 days ago.         Found to have acute bilateral PE, started on heparin gtt. Course complicated by persist fever, worsening Left infiltrate, now being     treated also for Pneumonia with Zosyn. Urine legionella AG negative, stopped Zithromax.     Course also complicated by worsening hyponatremia, continue NACL tabs TID. NA improved to 131.     Continue Zosyn for possible LLL pneumonia and Duonebs every 6 hours.     Plaquenil 400 mg bid for SLE.     Follow SMA-7 in AM.     Spoke to Heme attending on call in [facility], agree with transition to NOAC today. Stop IV Heparin.      Will ask for Rheum eval Monday for SLE and PE.      Pulmonary was called to manage superimposed pneumonia.     PT eval, mobilize.  CXR in AM.     Plan: Spoke to Mom on phone today, all questions were answered. [phone]. [employer].     Recovering well from PE and bilateral pneumonia. Ambulated well today with PCA.       28 years old male with h/o Lupus ( diagnosed in 09/2023, on Plaquenil 400mg bid) present to ED with complain of chest pain and SOB. Patient reported left sided pleuritic chest pain which started 3 days ago.         Found to have acute bilateral PE, started on heparin gtt. Course complicated by persist fever, worsening Left infiltrate, now being     treated also for Pneumonia with Zosyn. Urine legionella AG negative, stopped Zithromax.     Course also complicated by worsening hyponatremia, continue NACL tabs TID. NA improved to 131.     Continue Zosyn for possible LLL pneumonia and Duonebs every 6 hours.     Plaquenil 400 mg bid for SLE.     Follow SMA-7 in AM.       Spoke to Heme attending on call in [facility], agree with transition to NOAC today. Stop IV Heparin.  No evidence   for APL syndrome per Heme.       Pulmonary was called to manage superimposed pneumonia.     PT eval, mobilize.  CXR in AM.     Plan: Spoke to Mom on phone today, all questions were answered. [phone]. [place].     Recovering well from PE and bilateral pneumonia. Ambulated well today with PCA.     Discharge home next 1-2 days.       28 years old male with h/o Lupus ( diagnosed in 09/2023, on Plaquenil 400mg bid) present to ED with complain of chest pain and SOB. Patient reported left sided pleuritic chest pain which started 3 days ago.         Found to have acute bilateral PE, started on heparin gtt. Course complicated by persist fever, worsening Left infiltrate, now being     treated also for Pneumonia with Zosyn. Urine legionella AG negative, stopped Zithromax.     Course also complicated by worsening hyponatremia, continue NACL tabs TID. NA improved to 131.     Continue Zosyn for possible LLL pneumonia and Duonebs every 6 hours.     Plaquenil 400 mg bid for SLE.     Follow SMA-7 in AM.       Spoke to Heme attending on call in [facility], agree with transition to NOAC today. Stop IV Heparin.  No evidence   for APL syndrome per Heme.       Pulmonary was called to manage superimposed pneumonia.     PT eval, mobilize.  CXR in AM.     Plan: Spoke to Mom on phone today, all questions were answered. [phone]. [employer].     Recovering well from PE and bilateral pneumonia. Ambulated well today with PCA.     Discharge home next 1-2 days.

## 2023-12-20 NOTE — CONSULT NOTE ADULT - ASSESSMENT
A/P-  28 year old male with lupus admitted with sob and found to have PE and superimposed pneumonia based on chest Ct report.    patient seems to have improved clinically as far as sob and he saturates well on RA  minimal leukocytosis  low grade temp   blood cx- negative x4 from 12/12 and 12/14  urine legionell aGA- negative    plan-  check sputum cx  check mycoplasma IGM.  no objection to continue with current zosyn pending further results.  advise to also add zithromax to cover for atypicals pending legionella and mycoplasma results.  check UA and urine cx as well .  low grade  temps could also be secondary to PE itself.  will monitor temp       All labs and imaging and chart notes reviewed.     All above discussed with patient and patient verbalizes full understanding of all above and agrees with above plan of care.    Thank you for this consultation.    also d/w  the hsopitalist.    Toya Arreaga MD  Infectious Disease Attending    for any questions please do not hesitate to contact me either via teams or by calling [phone]         A/P-  28 year old male with lupus admitted with sob and found to have PE and superimposed pneumonia based on chest Ct report.    patient seems to have improved clinically as far as sob and he saturates well on RA  minimal leukocytosis  low grade temp   blood cx- negative x4 from 12/12 and 12/14  urine legionell aGA- negative    plan-  check sputum cx  check mycoplasma IGM.  no objection to continue with current zosyn pending further results.  advise to also add zithromax to cover for atypicals pending legionella and mycoplasma results.  check UA and urine cx as well .  low grade  temps could also be secondary to PE itself.  will monitor temp       All labs and imaging and chart notes reviewed.     All above discussed with patient and patient verbalizes full understanding of all above and agrees with above plan of care.    Thank you for this consultation.    also d/w  the hsopitalist.    Toya Arreaga MD  Infectious Disease Attending    for any questions please do not hesitate to contact me either via teams or by calling [phone]

## 2023-12-20 NOTE — PHYSICAL THERAPY INITIAL EVALUATION ADULT - PERTINENT HX OF CURRENT PROBLEM, REHAB EVAL
28 years old male with Lupus ( diagnosed in 09/2023, on Plaquenil 400mg bid).   Presented to ED with complain of chest pain and SOB which started 3 days ago. Pain progressively worsened, associated with SOB and cough. Was seen in [facility] ED day prior. told has Rhinovirus  was prescribed antibiotics.   Came with worsening symptoms, also had fever, chills and cough.  Admitted with bilateral PE.   Hospital course complicated with pneumonia and hyponatremia. 28 years old male with Lupus ( diagnosed in 09/2023, on Plaquenil 400mg bid).   Presented to ED with complain of chest pain and SOB which started 3 days ago. Pain progressively worsened, associated with SOB and cough. Was seen in [facility] ED day prior. told has Rhinovirus  was prescribed antibiotics.   Came with worsening symptoms, also had fever, chills and cough.  Admitted with bilateral PE.   Hospital course complicated with pneumonia and hyponatremia.

## 2023-12-20 NOTE — PROGRESS NOTE ADULT - SUBJECTIVE AND OBJECTIVE BOX
Testing for antiphospholipid syndrome in this patient yielded a negative beta-2 glycoprotein, negative lupus anticoagulant, and a positive screening total anticardiolipin antibody, with low titer IgG and IgM subtypes.  Suggest Eliquis 5 mg twice daily, and repeat anticardiolipin IgG and IgM testing in 12 weeks.

## 2023-12-20 NOTE — PHYSICAL THERAPY INITIAL EVALUATION ADULT - GENERAL OBSERVATIONS, REHAB EVAL
Pt was seen in supine, hep lock+,  alert and Ox4. Pt was instructed abdominal breathing prior to P.T. intervention.

## 2023-12-20 NOTE — CONSULT NOTE ADULT - SUBJECTIVE AND OBJECTIVE BOX
HPI:  28 years old male with h/o Lupus ( diagnosed in 09/2023, on Plaquenil 400mg bid) present to ED with complain of chest pain and SOB. Patient reported left sided pleuritic chest pain which started 3 days ago. Pain is progressively worsened, associated with SOB and cough. Patient was seen in OSH ED yesterday and was prescribed antibiotics. Patient reported significantly worsening of left sided pleuritic chest pain today. No fever or chills. Patient reported loss of appetite and had a few episode of diarrhea for last 2 days. No recent travel history. No family h/o clotting disorder  Hemodynamically stable, afebrile, sat well at [facility]. No leukocytosis, plt 205, ddimer 1364, Na 126, Cl 95, Cr 1.27, hsTnT 15.5, BNP 96. CTA chest with acute right upper lobe and left lower lobe segmental/subsegmental pulmonary emboli. No CT evidence of right heart strain. New bilateral lower lobe consolidations with areas of central clearing. Pneumonia and pulmonary infarcts are in the differential. New bilateral pleural effusions, small on the left and trace on the right. Bilateral axillary and supraclavicular adenopathy of unclear etiology    SH: no toxic habits  FH: no family h/o autoimmune or clotting disorder (12 Dec 2023 13:27)      PAST MEDICAL & SURGICAL HISTORY:  No pertinent past medical history          Allergies    No Known Allergies    Intolerances        ANTIMICROBIALS:  hydroxychloroquine 400 two times a day  piperacillin/tazobactam IVPB.. 3.375 every 8 hours      OTHER MEDS:  acetaminophen     Tablet .. 650 milliGRAM(s) Oral every 6 hours PRN  albuterol    90 MICROgram(s) HFA Inhaler 2 Puff(s) Inhalation every 6 hours PRN  apixaban 10 milliGRAM(s) Oral <User Schedule>  guaifenesin/dextromethorphan Oral Liquid 10 milliLiter(s) Oral every 4 hours PRN  lidocaine   4% Patch 1 Patch Transdermal daily  melatonin 3 milliGRAM(s) Oral at bedtime PRN      SOCIAL HISTORY:    Marital Status:    Occupation:   Lives with:     Substance Use (street drugs):   Tobacco Usage:    Alcohol Usage: Social EtOH    FAMILY HISTORY:      ROS:  Unobtainable because:   All other systems negative     Constitutional: no fever, no chills, no weight loss, no night sweats  Eye: no eye pain, no redness, no vision changes  ENT:  no sore throat, no rhinorrhea  Cardiovascular:  no chest pain, no palpitation  Respiratory:  no SOB, no cough  GI:  no abd pain, no vomiting, no diarrhea  urinary: no dysuria, no hematuria, no flank pain  : no  discharge or bleeding  musculoskeletal:  no joint pain, no joint swelling  skin:  no rash  neurology:  no headache, no seizure, no change in mental status  psych: no anxiety, no depression     Physical Exam:    General:    NAD, non toxic  Head: atraumatic, normocephalic  Eyes: normal sclera and conjunctiva  ENT:   no oropharyngeal lesions, no LAD, neck supple  Cardio:    regular S1,S2, no murmur  Respiratory:   clear to auscultation b/l, no wheezing  abd:   soft, BS +, not tender, no hepatosplenomegaly  :     no CVAT, no suprapubic tenderness, no omer  Musculoskeletal : no joint swelling, no edema  Skin:    no rash  vascular:  normal pulses  Neurologic:     no focal deficits  psych: normal affect, no suicidal ideation      Drug Dosing Weight  Height (cm): 165.1 (12 Dec 2023 08:40)  Weight (kg): 75.1 (13 Dec 2023 00:08)  BMI (kg/m2): 27.6 (13 Dec 2023 00:08)  BSA (m2): 1.83 (13 Dec 2023 00:08)    Vital Signs Last 24 Hrs  T(F): 101 (12-20-23 @ 10:41), Max: 103 (12-15-23 @ 23:48)    Vital Signs Last 24 Hrs  HR: 97 (12-20-23 @ 10:41) (74 - 100)  BP: 147/94 (12-20-23 @ 10:41) (131/90 - 158/102)  RR: 18 (12-20-23 @ 10:41)  SpO2: 96% (12-20-23 @ 10:41) (93% - 100%)  Wt(kg): --                          9.4    12.96 )-----------( 231      ( 20 Dec 2023 06:53 )             27.8       12-20    131<L>  |  102  |  13  ----------------------------<  96  4.1   |  21<L>  |  0.96    Ca    8.5      20 Dec 2023 06:53        Urinalysis Basic - ( 20 Dec 2023 06:53 )    Color: x / Appearance: x / SG: x / pH: x  Gluc: 96 mg/dL / Ketone: x  / Bili: x / Urobili: x   Blood: x / Protein: x / Nitrite: x   Leuk Esterase: x / RBC: x / WBC x   Sq Epi: x / Non Sq Epi: x / Bacteria: x        MICROBIOLOGY:  v  .Stool Feces  12-15-23   No enteric pathogens isolated.  (Stool culture examined for Salmonella,  Shigella, Campylobacter, Aeromonas, Plesiomonas,  Vibrio, E.coli O157 and Yersinia)  --  --      .Blood Blood-Peripheral  12-14-23   No growth at 5 days  --  --      .Blood Blood-Peripheral  12-14-23   No growth at 5 days  --  --      .Blood Blood-Peripheral  12-12-23   No growth at 5 days  --  --      .Blood Blood-Peripheral  12-12-23   No growth at 5 days  --  --                  RADIOLOGY:       HPI:    Patient is a 28 year old male with pmh of  Lupus ( diagnosed in 09/2023, on Plaquenil 400mg bid) presented on 12/12  to ED with complain of chest pain and SOB.  Patient states he had  left sided pleuritic chest pain which started 3 days prior to admission here.  on admission here patient was found to have PE along with b/l pleural effusions   patient has been on on eliquis now for the PE and ID consultation is requested to help with antibiotic management for ? pneumonia.  He has been on zosyn for past 5 days as per hospitalist and clinically has  improved as per hospitalist     SH: no toxic habits  FH: no family h/o autoimmune or clotting disorder (12 Dec 2023 13:27)      PAST MEDICAL & SURGICAL HISTORY:  No pertinent past medical history      Allergies    No Known Drug  Allergies        ANTIMICROBIALS:  hydroxychloroquine 400 two times a day  piperacillin/tazobactam IVPB.. 3.375 every 8 hours      OTHER MEDS:  acetaminophen     Tablet .. 650 milliGRAM(s) Oral every 6 hours PRN  albuterol    90 MICROgram(s) HFA Inhaler 2 Puff(s) Inhalation every 6 hours PRN  apixaban 10 milliGRAM(s) Oral <User Schedule>  guaifenesin/dextromethorphan Oral Liquid 10 milliLiter(s) Oral every 4 hours PRN  lidocaine   4% Patch 1 Patch Transdermal daily  melatonin 3 milliGRAM(s) Oral at bedtime PRN      SOCIAL HISTORY:    Substance Use (street drugs):   Tobacco Usage:    Alcohol Usage: Social EtOH      ROS:      Constitutional: on and off fever, no chills, no weight loss, no night sweats  Eye: no eye pain, no redness, no vision changes  ENT:  no sore throat, no rhinorrhea  Cardiovascular:  no chest pain, no palpitation  Respiratory:  sob has decreased   GI:  no abd pain, no vomiting, no diarrhea  urinary: no dysuria, no hematuria, no flank pain  : no  discharge or bleeding  musculoskeletal:  no joint pain, no joint swelling  skin:  no rash  neurology:  no headache, no seizure, no change in mental status  psych: no anxiety, no depression     Physical Exam:    General:    NAD, non toxic  Head: atraumatic, normocephalic  Eyes: normal sclera and conjunctiva  ENT:   no oropharyngeal lesions, no LAD, neck supple  Cardio:    regular S1,S2, no murmur  Respiratory:   decreased breath sounds at bases, no wheezing  abd:   soft, BS +, not tender, no distention  :     no CVAT, no suprapubic tenderness, no omer  Musculoskeletal : no joint swelling, no edema  Skin:    no rash  vascular:  normal pulses  Neurologic:     no focal deficits  psych: normal affect      Drug Dosing Weight  Height (cm): 165.1 (12 Dec 2023 08:40)  Weight (kg): 75.1 (13 Dec 2023 00:08)  BMI (kg/m2): 27.6 (13 Dec 2023 00:08)  BSA (m2): 1.83 (13 Dec 2023 00:08)    Vital Signs Last 24 Hrs  T(F): 101 (12-20-23 @ 10:41), Max: 103 (12-15-23 @ 23:48)    Vital Signs Last 24 Hrs  HR: 97 (12-20-23 @ 10:41) (74 - 100)  BP: 147/94 (12-20-23 @ 10:41) (131/90 - 158/102)  RR: 18 (12-20-23 @ 10:41)  SpO2: 96% (12-20-23 @ 10:41) (93% - 100%)  Wt(kg): --                          9.4    12.96 )-----------( 231      ( 20 Dec 2023 06:53 )             27.8       12-20    131<L>  |  102  |  13  ----------------------------<  96  4.1   |  21<L>  |  0.96    Ca    8.5      20 Dec 2023 06:53        Urinalysis Basic - ( 20 Dec 2023 06:53 )    Color: x / Appearance: x / SG: x / pH: x  Gluc: 96 mg/dL / Ketone: x  / Bili: x / Urobili: x   Blood: x / Protein: x / Nitrite: x   Leuk Esterase: x / RBC: x / WBC x   Sq Epi: x / Non Sq Epi: x / Bacteria: x        MICROBIOLOGY:  v  .Stool Feces  12-15-23   No enteric pathogens isolated.  (Stool culture examined for Salmonella,  Shigella, Campylobacter, Aeromonas, Plesiomonas,  Vibrio, E.coli O157 and Yersinia)  --  --      .Blood Blood-Peripheral  12-14-23   No growth at 5 days  --  --      .Blood Blood-Peripheral  12-14-23   No growth at 5 days  --  --      .Blood Blood-Peripheral  12-12-23   No growth at 5 days  --  --      .Blood Blood-Peripheral  12-12-23   No growth at 5 days  --  --          RADIOLOGY:    < from: CT Angio Chest PE Protocol w/ IV Cont (12.12.23 @ 10:27) >    ACC: [id number] EXAM:  CT ANGIO CHEST PULM ART [facility]   ORDERED BY: LOLA BLUM     PROCEDURE DATE:  12/12/2023          INTERPRETATION:  CLINICAL INFORMATION: Chest pain, lupus. Elevated   d-dimer.    COMPARISON: CT abdomen pelvis 11/27/2023.    CONTRAST/COMPLICATIONS:  IV Contrast: Omnipaque 350  70 cc administered   30 cc discarded  Oral Contrast: NONE  Complications: None reported at time of study completion    PROCEDURE:  CT Angiography of the Chest.  Sagittal and coronal reformats were performed as well as 3D (MIP)   reconstructions.    FINDINGS:    LUNGS AND AIRWAYS: Distal airways impaction of the lower lobes. New   bilateral lower lobe consolidation with areas of central clearing for   example on images 303-305 series 4.  PLEURA: New small layering left pleural effusion and new trace right   pleural effusion. No pneumothorax.  MEDIASTINUM AND MARY ELLEN: Bilateral axillary and supraclavicular adenopathy.   For reference, right axillary lymph node measures 2.2 x 1.7 cm, image 47   series 5. Partially imaged left axillary node measures 1.9 x 1.6 cm,   image 68 series 5. Right supraclavicular node measures 1.8 x 1.6 cm,   image 7 series 5. Left supraclavicular node measures 1.6 x 1.0 cm, image   9 series 5. Small volume mediastinal and hilar nodes. The esophagus is   nondistended.  VESSELS: Acute right upper lobe and left lower lobe   segmental/subsegmental pulmonary emboli. Main pulmonary artery size is   within normal limits. Normal caliber of the thoracic aorta. Imaged great   vessels are patent.  HEART: Heart size is qualitatively prominent. Trace pericardial fluid.  CHEST WALL AND LOWER NECK: No chest wall hematoma. Axillary and   supraclavicular adenopathy as described above. Thyroid is unremarkable.  VISUALIZED UPPER ABDOMEN: Probable hepatic steatosis while the partially   imaged liver. Correlated with LFTs.  BONES: Degenerative changes.    IMPRESSION:    Acute right upper lobe and left lower lobe segmental/subsegmental   pulmonary emboli. No CT evidence of right heart strain.    New bilateral lower lobe consolidations with areas of central clearing.   Pneumonia and pulmonary infarcts are in the differential. New bilateral   pleural effusions, small on the left and trace on the right. Follow to   resolution with repeat chest CT in one month, or sooner as clinically   warranted.    Bilateral axillary and supraclavicular adenopathy of unclear etiology.   Consider broad differential including infectious, inflammatory, and   neoplastic etiologies.     discussed these above findings with Dr. Lola Blum on   12/12/2023 at 12:33 PM, with read back.    Probable hepatic steatosis while the partially imaged liver. Correlated   with LFTs.    --- End of Report ---            ERIC SETH M.D., ATTENDING RADIOLOGIST  This document has been electronically signed. Dec 12 2023 12:36PM    < end of copied text >       HPI:    Patient is a 28 year old male with pmh of  Lupus ( diagnosed in 09/2023, on Plaquenil 400mg bid) presented on 12/12  to ED with complain of chest pain and SOB.  Patient states he had  left sided pleuritic chest pain which started 3 days prior to admission here.  on admission here patient was found to have PE along with b/l pleural effusions   patient has been on on eliquis now for the PE and ID consultation is requested to help with antibiotic management for ? pneumonia.  He has been on zosyn for past 5 days as per hospitalist and clinically has  improved as per hospitalist     SH: no toxic habits  FH: no family h/o autoimmune or clotting disorder (12 Dec 2023 13:27)      PAST MEDICAL & SURGICAL HISTORY:  No pertinent past medical history      Allergies    No Known Drug  Allergies        ANTIMICROBIALS:  hydroxychloroquine 400 two times a day  piperacillin/tazobactam IVPB.. 3.375 every 8 hours      OTHER MEDS:  acetaminophen     Tablet .. 650 milliGRAM(s) Oral every 6 hours PRN  albuterol    90 MICROgram(s) HFA Inhaler 2 Puff(s) Inhalation every 6 hours PRN  apixaban 10 milliGRAM(s) Oral <User Schedule>  guaifenesin/dextromethorphan Oral Liquid 10 milliLiter(s) Oral every 4 hours PRN  lidocaine   4% Patch 1 Patch Transdermal daily  melatonin 3 milliGRAM(s) Oral at bedtime PRN      SOCIAL HISTORY:    Substance Use (street drugs):   Tobacco Usage:    Alcohol Usage: Social EtOH      ROS:      Constitutional: on and off fever, no chills, no weight loss, no night sweats  Eye: no eye pain, no redness, no vision changes  ENT:  no sore throat, no rhinorrhea  Cardiovascular:  no chest pain, no palpitation  Respiratory:  sob has decreased   GI:  no abd pain, no vomiting, no diarrhea  urinary: no dysuria, no hematuria, no flank pain  : no  discharge or bleeding  musculoskeletal:  no joint pain, no joint swelling  skin:  no rash  neurology:  no headache, no seizure, no change in mental status  psych: no anxiety, no depression     Physical Exam:    General:    NAD, non toxic  Head: atraumatic, normocephalic  Eyes: normal sclera and conjunctiva  ENT:   no oropharyngeal lesions, no LAD, neck supple  Cardio:    regular S1,S2, no murmur  Respiratory:   decreased breath sounds at bases, no wheezing  abd:   soft, BS +, not tender, no distention  :     no CVAT, no suprapubic tenderness, no omer  Musculoskeletal : no joint swelling, no edema  Skin:    no rash  vascular:  normal pulses  Neurologic:     no focal deficits  psych: normal affect      Drug Dosing Weight  Height (cm): 165.1 (12 Dec 2023 08:40)  Weight (kg): 75.1 (13 Dec 2023 00:08)  BMI (kg/m2): 27.6 (13 Dec 2023 00:08)  BSA (m2): 1.83 (13 Dec 2023 00:08)    Vital Signs Last 24 Hrs  T(F): 101 (12-20-23 @ 10:41), Max: 103 (12-15-23 @ 23:48)    Vital Signs Last 24 Hrs  HR: 97 (12-20-23 @ 10:41) (74 - 100)  BP: 147/94 (12-20-23 @ 10:41) (131/90 - 158/102)  RR: 18 (12-20-23 @ 10:41)  SpO2: 96% (12-20-23 @ 10:41) (93% - 100%)  Wt(kg): --                          9.4    12.96 )-----------( 231      ( 20 Dec 2023 06:53 )             27.8       12-20    131<L>  |  102  |  13  ----------------------------<  96  4.1   |  21<L>  |  0.96    Ca    8.5      20 Dec 2023 06:53        Urinalysis Basic - ( 20 Dec 2023 06:53 )    Color: x / Appearance: x / SG: x / pH: x  Gluc: 96 mg/dL / Ketone: x  / Bili: x / Urobili: x   Blood: x / Protein: x / Nitrite: x   Leuk Esterase: x / RBC: x / WBC x   Sq Epi: x / Non Sq Epi: x / Bacteria: x        MICROBIOLOGY:  v  .Stool Feces  12-15-23   No enteric pathogens isolated.  (Stool culture examined for Salmonella,  Shigella, Campylobacter, Aeromonas, Plesiomonas,  Vibrio, E.coli O157 and Yersinia)  --  --      .Blood Blood-Peripheral  12-14-23   No growth at 5 days  --  --      .Blood Blood-Peripheral  12-14-23   No growth at 5 days  --  --      .Blood Blood-Peripheral  12-12-23   No growth at 5 days  --  --      .Blood Blood-Peripheral  12-12-23   No growth at 5 days  --  --          RADIOLOGY:    < from: CT Angio Chest PE Protocol w/ IV Cont (12.12.23 @ 10:27) >    ACC: [id number] EXAM:  CT ANGIO CHEST PULM ART [facility]   ORDERED BY: LOLA BLUM     PROCEDURE DATE:  12/12/2023          INTERPRETATION:  CLINICAL INFORMATION: Chest pain, lupus. Elevated   d-dimer.    COMPARISON: CT abdomen pelvis 11/27/2023.    CONTRAST/COMPLICATIONS:  IV Contrast: Omnipaque 350  70 cc administered   30 cc discarded  Oral Contrast: NONE  Complications: None reported at time of study completion    PROCEDURE:  CT Angiography of the Chest.  Sagittal and coronal reformats were performed as well as 3D (MIP)   reconstructions.    FINDINGS:    LUNGS AND AIRWAYS: Distal airways impaction of the lower lobes. New   bilateral lower lobe consolidation with areas of central clearing for   example on images [id number] series 4.  PLEURA: New small layering left pleural effusion and new trace right   pleural effusion. No pneumothorax.  MEDIASTINUM AND MARY ELLEN: Bilateral axillary and supraclavicular adenopathy.   For reference, right axillary lymph node measures 2.2 x 1.7 cm, image 47   series 5. Partially imaged left axillary node measures 1.9 x 1.6 cm,   image 68 series 5. Right supraclavicular node measures 1.8 x 1.6 cm,   image 7 series 5. Left supraclavicular node measures 1.6 x 1.0 cm, image   9 series 5. Small volume mediastinal and hilar nodes. The esophagus is   nondistended.  VESSELS: Acute right upper lobe and left lower lobe   segmental/subsegmental pulmonary emboli. Main pulmonary artery size is   within normal limits. Normal caliber of the thoracic aorta. Imaged great   vessels are patent.  HEART: Heart size is qualitatively prominent. Trace pericardial fluid.  CHEST WALL AND LOWER NECK: No chest wall hematoma. Axillary and   supraclavicular adenopathy as described above. Thyroid is unremarkable.  VISUALIZED UPPER ABDOMEN: Probable hepatic steatosis while the partially   imaged liver. Correlated with LFTs.  BONES: Degenerative changes.    IMPRESSION:    Acute right upper lobe and left lower lobe segmental/subsegmental   pulmonary emboli. No CT evidence of right heart strain.    New bilateral lower lobe consolidations with areas of central clearing.   Pneumonia and pulmonary infarcts are in the differential. New bilateral   pleural effusions, small on the left and trace on the right. Follow to   resolution with repeat chest CT in one month, or sooner as clinically   warranted.    Bilateral axillary and supraclavicular adenopathy of unclear etiology.   Consider broad differential including infectious, inflammatory, and   neoplastic etiologies.     discussed these above findings with Dr. Lola Blum on   12/12/2023 at 12:33 PM, with read back.    Probable hepatic steatosis while the partially imaged liver. Correlated   with LFTs.    --- End of Report ---            ERIC ESTH M.D., ATTENDING RADIOLOGIST  This document has been electronically signed. Dec 12 2023 12:36PM    < end of copied text >

## 2023-12-21 DIAGNOSIS — I26.99 OTHER PULMONARY EMBOLISM WITHOUT ACUTE COR PULMONALE: ICD-10-CM

## 2023-12-21 LAB
ALBUMIN SERPL ELPH-MCNC: 1.7 G/DL — LOW (ref 3.3–5)
ALBUMIN SERPL ELPH-MCNC: 1.7 G/DL — LOW (ref 3.3–5)
ALP SERPL-CCNC: 72 U/L — SIGNIFICANT CHANGE UP (ref 40–120)
ALP SERPL-CCNC: 72 U/L — SIGNIFICANT CHANGE UP (ref 40–120)
ALT FLD-CCNC: 97 U/L — HIGH (ref 12–78)
ALT FLD-CCNC: 97 U/L — HIGH (ref 12–78)
ANION GAP SERPL CALC-SCNC: 7 MMOL/L — SIGNIFICANT CHANGE UP (ref 5–17)
ANION GAP SERPL CALC-SCNC: 7 MMOL/L — SIGNIFICANT CHANGE UP (ref 5–17)
AST SERPL-CCNC: 85 U/L — HIGH (ref 15–37)
AST SERPL-CCNC: 85 U/L — HIGH (ref 15–37)
BASOPHILS # BLD AUTO: 0.02 K/UL — SIGNIFICANT CHANGE UP (ref 0–0.2)
BASOPHILS # BLD AUTO: 0.02 K/UL — SIGNIFICANT CHANGE UP (ref 0–0.2)
BASOPHILS NFR BLD AUTO: 0.2 % — SIGNIFICANT CHANGE UP (ref 0–2)
BASOPHILS NFR BLD AUTO: 0.2 % — SIGNIFICANT CHANGE UP (ref 0–2)
BILIRUB SERPL-MCNC: 0.7 MG/DL — SIGNIFICANT CHANGE UP (ref 0.2–1.2)
BILIRUB SERPL-MCNC: 0.7 MG/DL — SIGNIFICANT CHANGE UP (ref 0.2–1.2)
BUN SERPL-MCNC: 9 MG/DL — SIGNIFICANT CHANGE UP (ref 7–23)
BUN SERPL-MCNC: 9 MG/DL — SIGNIFICANT CHANGE UP (ref 7–23)
CALCIUM SERPL-MCNC: 7.8 MG/DL — LOW (ref 8.5–10.1)
CALCIUM SERPL-MCNC: 7.8 MG/DL — LOW (ref 8.5–10.1)
CHLORIDE SERPL-SCNC: 98 MMOL/L — SIGNIFICANT CHANGE UP (ref 96–108)
CHLORIDE SERPL-SCNC: 98 MMOL/L — SIGNIFICANT CHANGE UP (ref 96–108)
CO2 SERPL-SCNC: 22 MMOL/L — SIGNIFICANT CHANGE UP (ref 22–31)
CO2 SERPL-SCNC: 22 MMOL/L — SIGNIFICANT CHANGE UP (ref 22–31)
CREAT SERPL-MCNC: 0.99 MG/DL — SIGNIFICANT CHANGE UP (ref 0.5–1.3)
CREAT SERPL-MCNC: 0.99 MG/DL — SIGNIFICANT CHANGE UP (ref 0.5–1.3)
EGFR: 106 ML/MIN/1.73M2 — SIGNIFICANT CHANGE UP
EGFR: 106 ML/MIN/1.73M2 — SIGNIFICANT CHANGE UP
EOSINOPHIL # BLD AUTO: 0.01 K/UL — SIGNIFICANT CHANGE UP (ref 0–0.5)
EOSINOPHIL # BLD AUTO: 0.01 K/UL — SIGNIFICANT CHANGE UP (ref 0–0.5)
EOSINOPHIL NFR BLD AUTO: 0.1 % — SIGNIFICANT CHANGE UP (ref 0–6)
EOSINOPHIL NFR BLD AUTO: 0.1 % — SIGNIFICANT CHANGE UP (ref 0–6)
GLUCOSE SERPL-MCNC: 108 MG/DL — HIGH (ref 70–99)
GLUCOSE SERPL-MCNC: 108 MG/DL — HIGH (ref 70–99)
HCT VFR BLD CALC: 26.3 % — LOW (ref 39–50)
HCT VFR BLD CALC: 26.3 % — LOW (ref 39–50)
HGB BLD-MCNC: 9 G/DL — LOW (ref 13–17)
HGB BLD-MCNC: 9 G/DL — LOW (ref 13–17)
IMM GRANULOCYTES NFR BLD AUTO: 1.3 % — HIGH (ref 0–0.9)
IMM GRANULOCYTES NFR BLD AUTO: 1.3 % — HIGH (ref 0–0.9)
LYMPHOCYTES # BLD AUTO: 1.96 K/UL — SIGNIFICANT CHANGE UP (ref 1–3.3)
LYMPHOCYTES # BLD AUTO: 1.96 K/UL — SIGNIFICANT CHANGE UP (ref 1–3.3)
LYMPHOCYTES # BLD AUTO: 19.5 % — SIGNIFICANT CHANGE UP (ref 13–44)
LYMPHOCYTES # BLD AUTO: 19.5 % — SIGNIFICANT CHANGE UP (ref 13–44)
M PNEUMO IGM SER-ACNC: 0.21 INDEX — SIGNIFICANT CHANGE UP (ref 0–0.9)
M PNEUMO IGM SER-ACNC: 0.21 INDEX — SIGNIFICANT CHANGE UP (ref 0–0.9)
MAGNESIUM SERPL-MCNC: 1.9 MG/DL — SIGNIFICANT CHANGE UP (ref 1.6–2.6)
MAGNESIUM SERPL-MCNC: 1.9 MG/DL — SIGNIFICANT CHANGE UP (ref 1.6–2.6)
MCHC RBC-ENTMCNC: 30.9 PG — SIGNIFICANT CHANGE UP (ref 27–34)
MCHC RBC-ENTMCNC: 30.9 PG — SIGNIFICANT CHANGE UP (ref 27–34)
MCHC RBC-ENTMCNC: 34.2 G/DL — SIGNIFICANT CHANGE UP (ref 32–36)
MCHC RBC-ENTMCNC: 34.2 G/DL — SIGNIFICANT CHANGE UP (ref 32–36)
MCV RBC AUTO: 90.4 FL — SIGNIFICANT CHANGE UP (ref 80–100)
MCV RBC AUTO: 90.4 FL — SIGNIFICANT CHANGE UP (ref 80–100)
MONOCYTES # BLD AUTO: 0.99 K/UL — HIGH (ref 0–0.9)
MONOCYTES # BLD AUTO: 0.99 K/UL — HIGH (ref 0–0.9)
MONOCYTES NFR BLD AUTO: 9.9 % — SIGNIFICANT CHANGE UP (ref 2–14)
MONOCYTES NFR BLD AUTO: 9.9 % — SIGNIFICANT CHANGE UP (ref 2–14)
MYCOPLASMA AG SPEC QL: NEGATIVE — SIGNIFICANT CHANGE UP
MYCOPLASMA AG SPEC QL: NEGATIVE — SIGNIFICANT CHANGE UP
NEUTROPHILS # BLD AUTO: 6.93 K/UL — SIGNIFICANT CHANGE UP (ref 1.8–7.4)
NEUTROPHILS # BLD AUTO: 6.93 K/UL — SIGNIFICANT CHANGE UP (ref 1.8–7.4)
NEUTROPHILS NFR BLD AUTO: 69 % — SIGNIFICANT CHANGE UP (ref 43–77)
NEUTROPHILS NFR BLD AUTO: 69 % — SIGNIFICANT CHANGE UP (ref 43–77)
NRBC # BLD: 0 /100 WBCS — SIGNIFICANT CHANGE UP (ref 0–0)
NRBC # BLD: 0 /100 WBCS — SIGNIFICANT CHANGE UP (ref 0–0)
PHOSPHATE SERPL-MCNC: 2.8 MG/DL — SIGNIFICANT CHANGE UP (ref 2.5–4.5)
PHOSPHATE SERPL-MCNC: 2.8 MG/DL — SIGNIFICANT CHANGE UP (ref 2.5–4.5)
PLATELET # BLD AUTO: 219 K/UL — SIGNIFICANT CHANGE UP (ref 150–400)
PLATELET # BLD AUTO: 219 K/UL — SIGNIFICANT CHANGE UP (ref 150–400)
POTASSIUM SERPL-MCNC: 4 MMOL/L — SIGNIFICANT CHANGE UP (ref 3.5–5.3)
POTASSIUM SERPL-MCNC: 4 MMOL/L — SIGNIFICANT CHANGE UP (ref 3.5–5.3)
POTASSIUM SERPL-SCNC: 4 MMOL/L — SIGNIFICANT CHANGE UP (ref 3.5–5.3)
POTASSIUM SERPL-SCNC: 4 MMOL/L — SIGNIFICANT CHANGE UP (ref 3.5–5.3)
PROT SERPL-MCNC: 6.7 GM/DL — SIGNIFICANT CHANGE UP (ref 6–8.3)
PROT SERPL-MCNC: 6.7 GM/DL — SIGNIFICANT CHANGE UP (ref 6–8.3)
RAPID RVP RESULT: SIGNIFICANT CHANGE UP
RAPID RVP RESULT: SIGNIFICANT CHANGE UP
RBC # BLD: 2.91 M/UL — LOW (ref 4.2–5.8)
RBC # BLD: 2.91 M/UL — LOW (ref 4.2–5.8)
RBC # FLD: 13.9 % — SIGNIFICANT CHANGE UP (ref 10.3–14.5)
RBC # FLD: 13.9 % — SIGNIFICANT CHANGE UP (ref 10.3–14.5)
SARS-COV-2 RNA SPEC QL NAA+PROBE: SIGNIFICANT CHANGE UP
SARS-COV-2 RNA SPEC QL NAA+PROBE: SIGNIFICANT CHANGE UP
SODIUM SERPL-SCNC: 127 MMOL/L — LOW (ref 135–145)
SODIUM SERPL-SCNC: 127 MMOL/L — LOW (ref 135–145)
WBC # BLD: 10.04 K/UL — SIGNIFICANT CHANGE UP (ref 3.8–10.5)
WBC # BLD: 10.04 K/UL — SIGNIFICANT CHANGE UP (ref 3.8–10.5)
WBC # FLD AUTO: 10.04 K/UL — SIGNIFICANT CHANGE UP (ref 3.8–10.5)
WBC # FLD AUTO: 10.04 K/UL — SIGNIFICANT CHANGE UP (ref 3.8–10.5)

## 2023-12-21 PROCEDURE — 76937 US GUIDE VASCULAR ACCESS: CPT | Mod: 26

## 2023-12-21 PROCEDURE — 99255 IP/OBS CONSLTJ NEW/EST HI 80: CPT

## 2023-12-21 PROCEDURE — 99233 SBSQ HOSP IP/OBS HIGH 50: CPT

## 2023-12-21 PROCEDURE — 93010 ELECTROCARDIOGRAM REPORT: CPT

## 2023-12-21 PROCEDURE — 99223 1ST HOSP IP/OBS HIGH 75: CPT

## 2023-12-21 PROCEDURE — 99232 SBSQ HOSP IP/OBS MODERATE 35: CPT

## 2023-12-21 PROCEDURE — 70481 CT ORBIT/EAR/FOSSA W/DYE: CPT | Mod: 26

## 2023-12-21 PROCEDURE — 93308 TTE F-UP OR LMTD: CPT | Mod: 26

## 2023-12-21 PROCEDURE — 36600 WITHDRAWAL OF ARTERIAL BLOOD: CPT

## 2023-12-21 PROCEDURE — 76604 US EXAM CHEST: CPT | Mod: 26

## 2023-12-21 RX ORDER — IBUPROFEN 200 MG
400 TABLET ORAL ONCE
Refills: 0 | Status: COMPLETED | OUTPATIENT
Start: 2023-12-21 | End: 2023-12-21

## 2023-12-21 RX ORDER — ACETAMINOPHEN 500 MG
1000 TABLET ORAL ONCE
Refills: 0 | Status: COMPLETED | OUTPATIENT
Start: 2023-12-21 | End: 2023-12-21

## 2023-12-21 RX ORDER — HYDROXYCHLOROQUINE SULFATE 200 MG
200 TABLET ORAL
Refills: 0 | Status: DISCONTINUED | OUTPATIENT
Start: 2023-12-21 | End: 2023-12-22

## 2023-12-21 RX ORDER — KETOROLAC TROMETHAMINE 30 MG/ML
30 SYRINGE (ML) INJECTION ONCE
Refills: 0 | Status: DISCONTINUED | OUTPATIENT
Start: 2023-12-21 | End: 2023-12-21

## 2023-12-21 RX ORDER — MORPHINE SULFATE 50 MG/1
2 CAPSULE, EXTENDED RELEASE ORAL ONCE
Refills: 0 | Status: DISCONTINUED | OUTPATIENT
Start: 2023-12-21 | End: 2023-12-21

## 2023-12-21 RX ADMIN — Medication 30 MILLIGRAM(S): at 18:34

## 2023-12-21 RX ADMIN — PIPERACILLIN AND TAZOBACTAM 25 GRAM(S): 4; .5 INJECTION, POWDER, LYOPHILIZED, FOR SOLUTION INTRAVENOUS at 21:35

## 2023-12-21 RX ADMIN — Medication 400 MILLIGRAM(S): at 00:18

## 2023-12-21 RX ADMIN — Medication 1000 MILLIGRAM(S): at 01:21

## 2023-12-21 RX ADMIN — PIPERACILLIN AND TAZOBACTAM 25 GRAM(S): 4; .5 INJECTION, POWDER, LYOPHILIZED, FOR SOLUTION INTRAVENOUS at 05:32

## 2023-12-21 RX ADMIN — MORPHINE SULFATE 2 MILLIGRAM(S): 50 CAPSULE, EXTENDED RELEASE ORAL at 17:12

## 2023-12-21 RX ADMIN — Medication 400 MILLIGRAM(S): at 05:32

## 2023-12-21 RX ADMIN — Medication 650 MILLIGRAM(S): at 00:25

## 2023-12-21 RX ADMIN — Medication 1 TABLET(S): at 11:40

## 2023-12-21 RX ADMIN — Medication 400 MILLIGRAM(S): at 19:26

## 2023-12-21 RX ADMIN — APIXABAN 10 MILLIGRAM(S): 2.5 TABLET, FILM COATED ORAL at 09:01

## 2023-12-21 RX ADMIN — Medication 1 TABLET(S): at 09:01

## 2023-12-21 RX ADMIN — Medication 650 MILLIGRAM(S): at 10:53

## 2023-12-21 RX ADMIN — MORPHINE SULFATE 2 MILLIGRAM(S): 50 CAPSULE, EXTENDED RELEASE ORAL at 16:13

## 2023-12-21 RX ADMIN — Medication 1 TABLET(S): at 17:10

## 2023-12-21 RX ADMIN — Medication 30 MILLIGRAM(S): at 18:07

## 2023-12-21 RX ADMIN — PIPERACILLIN AND TAZOBACTAM 25 GRAM(S): 4; .5 INJECTION, POWDER, LYOPHILIZED, FOR SOLUTION INTRAVENOUS at 14:15

## 2023-12-21 RX ADMIN — AZITHROMYCIN 255 MILLIGRAM(S): 500 TABLET, FILM COATED ORAL at 13:09

## 2023-12-21 RX ADMIN — Medication 400 MILLIGRAM(S): at 18:38

## 2023-12-21 RX ADMIN — Medication 200 MILLIGRAM(S): at 17:10

## 2023-12-21 RX ADMIN — Medication 650 MILLIGRAM(S): at 11:50

## 2023-12-21 NOTE — PROGRESS NOTE ADULT - ASSESSMENT
28 years old male with h/o Lupus ( diagnosed in 09/2023, on Plaquenil 400mg bid) present to ED with complain of chest pain and SOB. Patient reported left sided pleuritic chest pain which started 3 days ago.         Acute bilateral PE - was on heparin drip.  Now on eliquis   PNA - on zosyn/azithromycin.  Patient continues to have fever    ID reassessment.   Urine legionella AG negative    Hyponatremia, continue NACL tabs TID. NA improved to 131.   Plaquenil 400 mg bid for SLE.       Plan: Spoke to Mom on phone today, all questions were answered. [phone]. April.     Recovering well from PE and bilateral pneumonia. Ambulated well today with PCA.           28 years old male with h/o Lupus ( diagnosed in 09/2023, on Plaquenil 400mg bid) present to ED with complain of chest pain and SOB. Patient reported left sided pleuritic chest pain which started 3 days ago.         Acute bilateral PE - was on heparin drip.  Now on eliquis   PNA - on zosyn/azithromycin.  Patient continues to have fever    ID reassessment.   Urine legionella AG negative    Hyponatremia, continue NACL tabs TID. NA improved to 131.   Plaquenil 400 mg bid for SLE.       Plan: Spoke to Mom on phone today, all questions were answered. [phone]. April.     Recovering well from PE and bilateral pneumonia. Ambulated well today with PCA.

## 2023-12-21 NOTE — ACUTE INTERFACILITY TRANSFER NOTE - HOSPITAL COURSE
28 years old male with h/o Lupus ( diagnosed in 09/2023, on Plaquenil 400mg bid) present to ED with complain of chest pain and SOB. Patient reported left sided pleuritic chest pain which started 3 days ago. Pain is progressively worsened, associated with SOB and cough. Patient was seen in OSH ED yesterday and was prescribed antibiotics. Patient reported significantly worsening of left sided pleuritic chest pain today. No fever or chills. Patient reported loss of appetite and had a few episode of diarrhea for last 2 days. No recent travel history. No family h/o clotting disorder  Hemodynamically stable, afebrile, sat well at [facility]. No leukocytosis, plt 205, ddimer 1364, Na 126, Cl 95, Cr 1.27, hsTnT 15.5, BNP 96. CTA chest with acute right upper lobe and left lower lobe segmental/subsegmental pulmonary emboli. No CT evidence of right heart strain. New bilateral lower lobe consolidations with areas of central clearing. Pneumonia and pulmonary infarcts are in the differential. New bilateral pleural effusions, small on the left and trace on the right. Bilateral axillary and supraclavicular adenopathy of unclear etiology.     Pt with continued fevers while being treated for SLE and A/C for PE. Pt started on antibiotics. Repeat CXR with worsening left pleural effusion and pt with new hypoxemia requiring NC. Thoracic consulted for eval and recommending transfer to [facility] under Dr Sosa for further thoracic eval.  28 years old male with h/o Lupus ( diagnosed in 09/2023, on Plaquenil 400mg bid) present to ED with complain of chest pain and SOB. Patient reported left sided pleuritic chest pain which started 3 days ago. Pain is progressively worsened, associated with SOB and cough. Patient was seen in OSH ED yesterday and was prescribed antibiotics. Patient reported significantly worsening of left sided pleuritic chest pain today. No fever or chills. Patient reported loss of appetite and had a few episode of diarrhea for last 2 days. No recent travel history. No family h/o clotting disorder  Hemodynamically stable, afebrile, sat well at [facility]. No leukocytosis, plt 205, ddimer 1364, Na 126, Cl 95, Cr 1.27, hsTnT 15.5, BNP 96. CTA chest with acute right upper lobe and left lower lobe segmental/subsegmental pulmonary emboli. No CT evidence of right heart strain. New bilateral lower lobe consolidations with areas of central clearing. Pneumonia and pulmonary infarcts are in the differential. New bilateral pleural effusions, small on the left and trace on the right. Bilateral axillary and supraclavicular adenopathy of unclear etiology.     Pt with continued fevers while being treated for SLE and A/C for PE. Pt started on antibiotics. Repeat CXR with worsening left pleural effusion and pt with new hypoxemia requiring NC. Thoracic consulted for eval and recommending transfer to [facility] under Dr Sosa for further thoracic eval.

## 2023-12-21 NOTE — PROGRESS NOTE ADULT - ASSESSMENT
A/P-  28 year old male with lupus admitted with sob and found to have PE and superimposed pneumonia based on chest Ct report.    patient seems to have improved clinically as far as sob and he saturates well on RA  minimal leukocytosis  low grade temp on and off  blood cx- negative x4 from 12/12 and 12/14  urine legionella AG- negative  mycoplasma IGM- negative  sputum cx- Negative      plan-  no objection to continue with current zosyn for possible pneumonia as per Ct report.  d/c zithromax.  check another set of blood cx.  check another RVP now in light of fever today.  check UA and urine cx as well .  low grade  temps could also be secondary to PE itself or rhumatologic etiology as patient does have elevated NA and anti- ds DNA .  rhum consult.  as for the left ear region/mastoid tenderness advise to obtain left ear/mastoid region CT to evaluate further.      all above was d/w patient and his Mother and they verbalize full understanding of all above and agree with above plan of care.      d/w hospitalist .    Toya Arreaga MD  Infectious Disease Attending    for any questions please do not hesitate to contact me either via teams or by calling [phone]     A/P-  28 year old male with lupus admitted with sob and found to have PE and superimposed pneumonia based on chest Ct report.    patient seems to have improved clinically as far as sob and he saturates well on RA  minimal leukocytosis  low grade temp on and off  blood cx- negative x4 from 12/12 and 12/14  urine legionella AG- negative  mycoplasma IGM- negative  sputum cx- Negative      plan-  no objection to continue with current zosyn for possible pneumonia as per Ct report.  d/c zithromax.  check another set of blood cx.  check another RVP now in light of fever today.  check UA and urine cx as well .  low grade  temps could also be secondary to PE itself or rhumatologic etiology as patient does have elevated NA and anti- ds DNA .  rhum consult.  as for the left ear region/mastoid tenderness advise to obtain left ear/mastoid region CT to evaluate further.      all above was d/w patient and his Mother and they verbalize full understanding of all above and agree with above plan of care.      d/w hospitalist .    Toya Arreaga MD  Infectious Disease Attending    for any questions please do not hesitate to contact me either via teams or by calling [phone]     A/P-  28 year old male with lupus admitted with sob and found to have PE and superimposed pneumonia based on chest Ct report.    patient seems to have improved clinically as far as sob and he saturates well on RA  minimal leukocytosis  T-max-102.8 today  blood cx- negative x4 from 12/12 and 12/14  urine legionella AG- negative  mycoplasma IGM- negative  sputum cx- Negative      plan-  no objection to continue with current zosyn for possible pneumonia as per Ct report.  d/c zithromax.  check another set of blood cx.  check another RVP now in light of fever today.  check UA and urine cx as well .  low grade  temps could also be secondary to PE itself or rhumatologic etiology as patient does have elevated NA and anti- ds DNA .  rhum consult.  as for the left ear region/mastoid tenderness advise to obtain left ear/mastoid region CT to evaluate further.      all above was d/w patient and his Mother and they verbalize full understanding of all above and agree with above plan of care.      d/w hospitalist .    Toya Arreaga MD  Infectious Disease Attending    for any questions please do not hesitate to contact me either via teams or by calling [phone]     A/P-  28 year old male with lupus admitted with sob and found to have PE and superimposed pneumonia based on chest Ct report.    patient seems to have improved clinically as far as sob and he saturates well on RA  minimal leukocytosis  T-max-102.8 today  blood cx- negative x4 from 12/12 and 12/14  urine legionella AG- negative  mycoplasma IGM- negative  sputum cx- Negative      plan-  no objection to continue with current zosyn for possible pneumonia as per Ct report.  d/c zithromax.  check another set of blood cx.  check another RVP now in light of fever today.  check UA and urine cx as well .  low grade  temps could also be secondary to PE itself or rhumatologic etiology as patient does have elevated NA and anti- ds DNA .  rhum consult.  as for the left ear region/mastoid tenderness advise to obtain left ear/mastoid region CT to evaluate further.      all above was d/w patient and his Mother and they verbalize full understanding of all above and agree with above plan of care.      d/w hospitalist .    Toya Arreaga MD  Infectious Disease Attending    for any questions please do not hesitate to contact me either via teams or by calling [phone]

## 2023-12-21 NOTE — CONSULT NOTE ADULT - SUBJECTIVE AND OBJECTIVE BOX
Pt seen and examined.  Full consult to follow. HISTORY OF PRESENT ILLNESS:    Patient is a 28y Male    HPI:  28 years old male with h/o Lupus ( diagnosed in 09/2023, on Plaquenil 400mg bid) present to ED with complain of chest pain and SOB. Patient reported left sided pleuritic chest pain which started 3 days ago. Pain is progressively worsened, associated with SOB and cough. Patient was seen in OSH ED yesterday and was prescribed antibiotics. Patient reported significantly worsening of left sided pleuritic chest pain today. No fever or chills. Patient reported loss of appetite and had a few episode of diarrhea for last 2 days. No recent travel history. No family h/o clotting disorder  Hemodynamically stable, afebrile, sat well at [facility]. No leukocytosis, plt 205, ddimer 1364, Na 126, Cl 95, Cr 1.27, hsTnT 15.5, BNP 96. CTA chest with acute right upper lobe and left lower lobe segmental/subsegmental pulmonary emboli. No CT evidence of right heart strain. New bilateral lower lobe consolidations with areas of central clearing. Pneumonia and pulmonary infarcts are in the differential. New bilateral pleural effusions, small on the left and trace on the right. Bilateral axillary and supraclavicular adenopathy of unclear etiology.  Currently feeling better on A/C.  Of note, pt reports that he was diagnosed with SLE in 9/23, after developing a rash, described as "dark spots".  A skin bx reportedly was c/w lupus.  He also c/o oral ulcers.      SH: no toxic habits  FH: no family h/o autoimmune or clotting disorder (12 Dec 2023 13:27)      PAST MEDICAL & SURGICAL HISTORY:  No pertinent past medical history      ROS:  (+)intermittent arthralgias, (+)alopecia.  No fever/chills, wt loss, night sweats, photosensitivity, dry eye/dry mouth, Raynaud's, dysphagia, focal weakness, or eye symptoms.      MEDICATIONS  (STANDING):  apixaban 10 milliGRAM(s) Oral <User Schedule>  azithromycin  IVPB 500 milliGRAM(s) IV Intermittent every 24 hours  azithromycin  IVPB      hydroxychloroquine 400 milliGRAM(s) Oral two times a day  lactobacillus acidophilus 1 Tablet(s) Oral three times a day with meals  lidocaine   4% Patch 1 Patch Transdermal daily  piperacillin/tazobactam IVPB.. 3.375 Gram(s) IV Intermittent every 8 hours    MEDICATIONS  (PRN):  acetaminophen     Tablet .. 650 milliGRAM(s) Oral every 6 hours PRN Temp greater or equal to 38C (100.4F), Mild Pain (1 - 3)  albuterol/ipratropium for Nebulization 3 milliLiter(s) Nebulizer every 6 hours PRN Shortness of Breath and/or Wheezing  guaifenesin/dextromethorphan Oral Liquid 10 milliLiter(s) Oral every 4 hours PRN Cough  melatonin 3 milliGRAM(s) Oral at bedtime PRN Insomnia      Allergies    No Known Allergies    Intolerances        FAMILY HISTORY: NC      SOCIAL HISTORY:  Tobacco--   none            Vital Signs Last 24 Hrs  T(C): 39.3 (21 Dec 2023 11:50), Max: 39.4 (20 Dec 2023 16:22)  T(F): 102.8 (21 Dec 2023 11:50), Max: 102.9 (20 Dec 2023 16:22)  HR: 98 (21 Dec 2023 10:51) (84 - 101)  BP: 150/81 (21 Dec 2023 10:51) (143/87 - 171/96)  BP(mean): --  RR: 18 (21 Dec 2023 10:51) (18 - 18)  SpO2: 92% (21 Dec 2023 10:51) (92% - 96%)    Parameters below as of 21 Dec 2023 10:51  Patient On (Oxygen Delivery Method): room air        PHYSICAL EXAM:  General :  NAD  HEENT--  (+)oral ulcer on hard palate  Nodes--  no LAD  Lungs--  CTA B/L  Heart--  RRR, nlS1 &S2 normal;   Abdomen--  soft, NT, ND +BS  Skin:  no rashes  Musculoskeletal exam:  No synovitis;  normal ROM in all joints    LABS:                        9.4    12.96 )-----------( 231      ( 20 Dec 2023 06:53 )             27.8     12-20    131<L>  |  102  |  13  ----------------------------<  96  4.1   |  21<L>  |  0.96    Ca    8.5      20 Dec 2023 06:53        Urinalysis Basic - ( 20 Dec 2023 06:53 )    Color: x / Appearance: x / SG: x / pH: x  Gluc: 96 mg/dL / Ketone: x  / Bili: x / Urobili: x   Blood: x / Protein: x / Nitrite: x   Leuk Esterase: x / RBC: x / WBC x   Sq Epi: x / Non Sq Epi: x / Bacteria: x    Double Stranded DNA Antibody (12.14.23 @ 07:00)    Double Stranded DNA Antibody: 15: Method: EIA            Reference Ranges            Interpretation            <= 29    IU/mL     Negative            30 - 75  IU/mL     Borderline            > 75      IU/mL     Positive IU/mL    C3 Complement, Serum (12.14.23 @ 07:00)    C3 Complement, Serum: 79 mg/dL    C4 Complement, Serum (12.14.23 @ 07:00)    C4 Complement, Serum: 12 mg/dL    Lupus Profile (12.12.23 @ 13:20)    Anticardiolipin Antibody Level, Total: Positive: Some false positive results are to be expected when comparing the JORGE  Screen to specific IgG and IgM anticardiolipin tests.  A slight  oversensitivity is built into the JORGE Screen as a margin to ensure that  weakly positive samples will not be missed.  In addition, some samples  may have low-level concentrations of IgG, IgA, and IgM anticardiolipin  antibodies that would be found below the positive cutoff for each  individual test yet the additive effect might cause the JORGE Screen assay  resultsto be positive.  Method: EIA   Beta 2 Glycoprotein 1 Antibody Screen: Negative   Activated Partial Thromboplastin Time: 29.8: The recommended therapeutic heparin range (full dose) is 58-99 seconds.  Argatroban range is 1.5 to 3.0 times of the baseline APTT value, not to  exceed 100 seconds.  Routine coagulation results should be interpreted with caution when  taking Factor Xa inhibitors or direct thrombin inhibitors; blood sampling  prior to drug intake is recommended. sec   DRVVT Ratio: 1.17 Ratio   DRVVT Interpretation: LA NEG: Either positive Silica Clotting Time (SCT) or positive Diluted Matti  Viper Venom Time (dRVVT) indicate the presence of Lupus Anticoagulant.  The results should be interpreted with caution when taking Factor Xa  inhibitors or direct thrombin inhibitors (DOACs). Lupus anticoagulant  testing should be performed at least 48 hours after the last dose, and  longer in patients with renal impairment   Silica Clotting Time S/C Ratio: 0.70 Ratio   Silica Clotting Time Interpretation: LA NEG: Either positive Silica Clotting Time (SCT) or positive Diluted Matti  Viper Venom Time (dRVVT) indicate the presence of Lupus Anticoagulant.  The results should be interpreted with caution when taking heparin, LMW  heparin, Factor Xa inhibitors or direct thrombin inhibitors (DOACs).  Lupus anticoagulant testing should be performed at least 12 hours after  the last dose of heparin or LMW heparin, 48 hours after the last dose of  DOACs, and longer in patients with renal impairment.    Anticardiolipin Antibody Level, Total (12.13.23 @ 11:00)    Anticardiolipin Antibody Level, Total: Positive: Some false positive results are to be expected when comparing the JORGE  Screen to specific IgG and IgM anticardiolipin tests.  A slight  oversensitivity is built into the JORGE Screen as a margin to ensure that  weakly positive samples will not be missed.  In addition, some samples  may have low-level concentrations of IgG, IgA, and IgM anticardiolipin  antibodies that would be found below the positive cutoff for each  individual test yet the additive effect might cause the JORGE Screen assay  resultsto be positive.  Method: EIA    Anticardiolipin IgM, Serum (12.12.23 @ 13:20)    Anticardiolipin IgM, Serum: 13.0: Phospholipid Units (APL) or (GPL) or (MPL)  Negative <12.5  Indeterminate 12.6-19.9  Weak Positive 20.0-80.0  Positive >80.0 MPL    Anticardiolipin IgG, Serum (12.12.23 @ 13:20)    Anticardiolipin IgG, Serum: 13.4 GPL    Beta 2 Glycoprotein 1 Antibody Screen (12.13.23 @ 11:00)    Beta 2 Glycoprotein 1 Antibody Screen: Negative      RADIOLOGY & ADDITIONAL STUDIES:    < from: CT Angio Chest PE Protocol w/ IV Cont (12.12.23 @ 10:27) >  EXAM:  CT ANGIO CHEST PULM ART [facility]   ORDERED BY: CARSON BLUM     PROCEDURE DATE:  12/12/2023          INTERPRETATION:  CLINICAL INFORMATION: Chest pain, lupus. Elevated   d-dimer.    COMPARISON: CT abdomen pelvis 11/27/2023.    CONTRAST/COMPLICATIONS:  IV Contrast: Omnipaque 350  70 cc administered   30 cc discarded  Oral Contrast: NONE  Complications: None reported at time of study completion    PROCEDURE:  CT Angiography of the Chest.  Sagittal and coronal reformats were performed as well as 3D (MIP)   reconstructions.    FINDINGS:    LUNGS AND AIRWAYS: Distal airways impaction of the lower lobes. New   bilateral lower lobe consolidation with areas of central clearing for   example on images 303-305 series 4.  PLEURA: New small layering left pleural effusion and new trace right   pleural effusion. No pneumothorax.  MEDIASTINUM AND MARY ELLEN: Bilateral axillary and supraclavicular adenopathy.   For reference, right axillary lymph node measures 2.2 x 1.7 cm, image 47   series 5. Partially imaged left axillary node measures 1.9 x 1.6 cm,   image 68 series 5. Right supraclavicular node measures 1.8 x 1.6 cm,   image 7 series 5. Left supraclavicular node measures 1.6 x 1.0 cm, image   9 series 5. Small volume mediastinal and hilar nodes. The esophagus is   nondistended.  VESSELS: Acute right upper lobe and left lower lobe   segmental/subsegmental pulmonary emboli. Main pulmonary artery size is   within normal limits. Normal caliber of the thoracic aorta. Imaged great   vessels are patent.  HEART: Heart size is qualitatively prominent. Trace pericardial fluid.  CHEST WALL AND LOWER NECK: No chest wall hematoma. Axillary and   supraclavicular adenopathy as described above. Thyroid is unremarkable.  VISUALIZED UPPER ABDOMEN: Probable hepatic steatosis while the partially   imaged liver. Correlated with LFTs.  BONES: Degenerative changes.    IMPRESSION:    Acute right upper lobe and left lower lobe segmental/subsegmental   pulmonary emboli. No CT evidence of right heart strain.    New bilateral lower lobe consolidations with areas of central clearing.   Pneumonia and pulmonary infarcts are in the differential. New bilateral   pleural effusions, small on the left and trace on the right. Follow to   resolution with repeat chest CT in one month, or sooner as clinically   warranted.    Bilateral axillary and supraclavicular adenopathy of unclear etiology.   Consider broad differential including infectious, inflammatory, and   neoplastic etiologies.     discussed these above findings with Dr. Carson Blum on   12/12/2023 at 12:33 PM, with read back.    Probable hepatic steatosis while the partially imaged liver. Correlated   with LFTs.    < end of copied text >   HISTORY OF PRESENT ILLNESS:    Patient is a 28y Male    HPI:  28 years old male with h/o Lupus ( diagnosed in 09/2023, on Plaquenil 400mg bid) present to ED with complain of chest pain and SOB. Patient reported left sided pleuritic chest pain which started 3 days ago. Pain is progressively worsened, associated with SOB and cough. Patient was seen in OSH ED yesterday and was prescribed antibiotics. Patient reported significantly worsening of left sided pleuritic chest pain today. No fever or chills. Patient reported loss of appetite and had a few episode of diarrhea for last 2 days. No recent travel history. No family h/o clotting disorder  Hemodynamically stable, afebrile, sat well at [facility]. No leukocytosis, plt 205, ddimer 1364, Na 126, Cl 95, Cr 1.27, hsTnT 15.5, BNP 96. CTA chest with acute right upper lobe and left lower lobe segmental/subsegmental pulmonary emboli. No CT evidence of right heart strain. New bilateral lower lobe consolidations with areas of central clearing. Pneumonia and pulmonary infarcts are in the differential. New bilateral pleural effusions, small on the left and trace on the right. Bilateral axillary and supraclavicular adenopathy of unclear etiology.  Currently feeling better on A/C.  Of note, pt reports that he was diagnosed with SLE in 9/23, after developing a rash, described as "dark spots".  A skin bx reportedly was c/w lupus.  He also c/o oral ulcers.      SH: no toxic habits  FH: no family h/o autoimmune or clotting disorder (12 Dec 2023 13:27)      PAST MEDICAL & SURGICAL HISTORY:  No pertinent past medical history      ROS:  (+)intermittent arthralgias, (+)alopecia.  No fever/chills, wt loss, night sweats, photosensitivity, dry eye/dry mouth, Raynaud's, dysphagia, focal weakness, or eye symptoms.      MEDICATIONS  (STANDING):  apixaban 10 milliGRAM(s) Oral <User Schedule>  azithromycin  IVPB 500 milliGRAM(s) IV Intermittent every 24 hours  azithromycin  IVPB      hydroxychloroquine 400 milliGRAM(s) Oral two times a day  lactobacillus acidophilus 1 Tablet(s) Oral three times a day with meals  lidocaine   4% Patch 1 Patch Transdermal daily  piperacillin/tazobactam IVPB.. 3.375 Gram(s) IV Intermittent every 8 hours    MEDICATIONS  (PRN):  acetaminophen     Tablet .. 650 milliGRAM(s) Oral every 6 hours PRN Temp greater or equal to 38C (100.4F), Mild Pain (1 - 3)  albuterol/ipratropium for Nebulization 3 milliLiter(s) Nebulizer every 6 hours PRN Shortness of Breath and/or Wheezing  guaifenesin/dextromethorphan Oral Liquid 10 milliLiter(s) Oral every 4 hours PRN Cough  melatonin 3 milliGRAM(s) Oral at bedtime PRN Insomnia      Allergies    No Known Allergies    Intolerances        FAMILY HISTORY: NC      SOCIAL HISTORY:  Tobacco--   none            Vital Signs Last 24 Hrs  T(C): 39.3 (21 Dec 2023 11:50), Max: 39.4 (20 Dec 2023 16:22)  T(F): 102.8 (21 Dec 2023 11:50), Max: 102.9 (20 Dec 2023 16:22)  HR: 98 (21 Dec 2023 10:51) (84 - 101)  BP: 150/81 (21 Dec 2023 10:51) (143/87 - 171/96)  BP(mean): --  RR: 18 (21 Dec 2023 10:51) (18 - 18)  SpO2: 92% (21 Dec 2023 10:51) (92% - 96%)    Parameters below as of 21 Dec 2023 10:51  Patient On (Oxygen Delivery Method): room air        PHYSICAL EXAM:  General :  NAD  HEENT--  (+)oral ulcer on hard palate  Nodes--  no LAD  Lungs--  CTA B/L  Heart--  RRR, nlS1 &S2 normal;   Abdomen--  soft, NT, ND +BS  Skin:  no rashes  Musculoskeletal exam:  No synovitis;  normal ROM in all joints    LABS:                        9.4    12.96 )-----------( 231      ( 20 Dec 2023 06:53 )             27.8     12-20    131<L>  |  102  |  13  ----------------------------<  96  4.1   |  21<L>  |  0.96    Ca    8.5      20 Dec 2023 06:53        Urinalysis Basic - ( 20 Dec 2023 06:53 )    Color: x / Appearance: x / SG: x / pH: x  Gluc: 96 mg/dL / Ketone: x  / Bili: x / Urobili: x   Blood: x / Protein: x / Nitrite: x   Leuk Esterase: x / RBC: x / WBC x   Sq Epi: x / Non Sq Epi: x / Bacteria: x    Double Stranded DNA Antibody (12.14.23 @ 07:00)    Double Stranded DNA Antibody: 15: Method: EIA            Reference Ranges            Interpretation            <= 29    IU/mL     Negative            30 - 75  IU/mL     Borderline            > 75      IU/mL     Positive IU/mL    C3 Complement, Serum (12.14.23 @ 07:00)    C3 Complement, Serum: 79 mg/dL    C4 Complement, Serum (12.14.23 @ 07:00)    C4 Complement, Serum: 12 mg/dL    Lupus Profile (12.12.23 @ 13:20)    Anticardiolipin Antibody Level, Total: Positive: Some false positive results are to be expected when comparing the JORGE  Screen to specific IgG and IgM anticardiolipin tests.  A slight  oversensitivity is built into the JORGE Screen as a margin to ensure that  weakly positive samples will not be missed.  In addition, some samples  may have low-level concentrations of IgG, IgA, and IgM anticardiolipin  antibodies that would be found below the positive cutoff for each  individual test yet the additive effect might cause the JORGE Screen assay  resultsto be positive.  Method: EIA   Beta 2 Glycoprotein 1 Antibody Screen: Negative   Activated Partial Thromboplastin Time: 29.8: The recommended therapeutic heparin range (full dose) is 58-99 seconds.  Argatroban range is 1.5 to 3.0 times of the baseline APTT value, not to  exceed 100 seconds.  Routine coagulation results should be interpreted with caution when  taking Factor Xa inhibitors or direct thrombin inhibitors; blood sampling  prior to drug intake is recommended. sec   DRVVT Ratio: 1.17 Ratio   DRVVT Interpretation: LA NEG: Either positive Silica Clotting Time (SCT) or positive Diluted Matti  Viper Venom Time (dRVVT) indicate the presence of Lupus Anticoagulant.  The results should be interpreted with caution when taking Factor Xa  inhibitors or direct thrombin inhibitors (DOACs). Lupus anticoagulant  testing should be performed at least 48 hours after the last dose, and  longer in patients with renal impairment   Silica Clotting Time S/C Ratio: 0.70 Ratio   Silica Clotting Time Interpretation: LA NEG: Either positive Silica Clotting Time (SCT) or positive Diluted Matti  Viper Venom Time (dRVVT) indicate the presence of Lupus Anticoagulant.  The results should be interpreted with caution when taking heparin, LMW  heparin, Factor Xa inhibitors or direct thrombin inhibitors (DOACs).  Lupus anticoagulant testing should be performed at least 12 hours after  the last dose of heparin or LMW heparin, 48 hours after the last dose of  DOACs, and longer in patients with renal impairment.    Anticardiolipin Antibody Level, Total (12.13.23 @ 11:00)    Anticardiolipin Antibody Level, Total: Positive: Some false positive results are to be expected when comparing the JORGE  Screen to specific IgG and IgM anticardiolipin tests.  A slight  oversensitivity is built into the JORGE Screen as a margin to ensure that  weakly positive samples will not be missed.  In addition, some samples  may have low-level concentrations of IgG, IgA, and IgM anticardiolipin  antibodies that would be found below the positive cutoff for each  individual test yet the additive effect might cause the JORGE Screen assay  resultsto be positive.  Method: EIA    Anticardiolipin IgM, Serum (12.12.23 @ 13:20)    Anticardiolipin IgM, Serum: 13.0: Phospholipid Units (APL) or (GPL) or (MPL)  Negative <12.5  Indeterminate 12.6-19.9  Weak Positive 20.0-80.0  Positive >80.0 MPL    Anticardiolipin IgG, Serum (12.12.23 @ 13:20)    Anticardiolipin IgG, Serum: 13.4 GPL    Beta 2 Glycoprotein 1 Antibody Screen (12.13.23 @ 11:00)    Beta 2 Glycoprotein 1 Antibody Screen: Negative      RADIOLOGY & ADDITIONAL STUDIES:    < from: CT Angio Chest PE Protocol w/ IV Cont (12.12.23 @ 10:27) >  EXAM:  CT ANGIO CHEST PULM ART [facility]   ORDERED BY: CARSON BLUM     PROCEDURE DATE:  12/12/2023          INTERPRETATION:  CLINICAL INFORMATION: Chest pain, lupus. Elevated   d-dimer.    COMPARISON: CT abdomen pelvis 11/27/2023.    CONTRAST/COMPLICATIONS:  IV Contrast: Omnipaque 350  70 cc administered   30 cc discarded  Oral Contrast: NONE  Complications: None reported at time of study completion    PROCEDURE:  CT Angiography of the Chest.  Sagittal and coronal reformats were performed as well as 3D (MIP)   reconstructions.    FINDINGS:    LUNGS AND AIRWAYS: Distal airways impaction of the lower lobes. New   bilateral lower lobe consolidation with areas of central clearing for   example on images [id number] series 4.  PLEURA: New small layering left pleural effusion and new trace right   pleural effusion. No pneumothorax.  MEDIASTINUM AND MARY ELLEN: Bilateral axillary and supraclavicular adenopathy.   For reference, right axillary lymph node measures 2.2 x 1.7 cm, image 47   series 5. Partially imaged left axillary node measures 1.9 x 1.6 cm,   image 68 series 5. Right supraclavicular node measures 1.8 x 1.6 cm,   image 7 series 5. Left supraclavicular node measures 1.6 x 1.0 cm, image   9 series 5. Small volume mediastinal and hilar nodes. The esophagus is   nondistended.  VESSELS: Acute right upper lobe and left lower lobe   segmental/subsegmental pulmonary emboli. Main pulmonary artery size is   within normal limits. Normal caliber of the thoracic aorta. Imaged great   vessels are patent.  HEART: Heart size is qualitatively prominent. Trace pericardial fluid.  CHEST WALL AND LOWER NECK: No chest wall hematoma. Axillary and   supraclavicular adenopathy as described above. Thyroid is unremarkable.  VISUALIZED UPPER ABDOMEN: Probable hepatic steatosis while the partially   imaged liver. Correlated with LFTs.  BONES: Degenerative changes.    IMPRESSION:    Acute right upper lobe and left lower lobe segmental/subsegmental   pulmonary emboli. No CT evidence of right heart strain.    New bilateral lower lobe consolidations with areas of central clearing.   Pneumonia and pulmonary infarcts are in the differential. New bilateral   pleural effusions, small on the left and trace on the right. Follow to   resolution with repeat chest CT in one month, or sooner as clinically   warranted.    Bilateral axillary and supraclavicular adenopathy of unclear etiology.   Consider broad differential including infectious, inflammatory, and   neoplastic etiologies.     discussed these above findings with Dr. Carson Blum on   12/12/2023 at 12:33 PM, with read back.    Probable hepatic steatosis while the partially imaged liver. Correlated   with LFTs.    < end of copied text >

## 2023-12-21 NOTE — CONSULT NOTE ADULT - SUBJECTIVE AND OBJECTIVE BOX
HPI:  28 years old male with h/o Lupus ( diagnosed in 09/2023, on Plaquenil 400mg bid) present to ED with complain of chest pain and SOB. Patient reported left sided pleuritic chest pain which started 3 days ago. Pain is progressively worsened, associated with SOB and cough. Patient was seen in OSH ED yesterday and was prescribed antibiotics. Patient reported significantly worsening of left sided pleuritic chest pain today. No fever or chills. Patient reported loss of appetite and had a few episode of diarrhea for last 2 days. No recent travel history. No family h/o clotting disorder  Hemodynamically stable, afebrile, sat well at [facility]. No leukocytosis, plt 205, ddimer 1364, Na 126, Cl 95, Cr 1.27, hsTnT 15.5, BNP 96.     CTA chest with acute right upper lobe and left lower lobe segmental/subsegmental pulmonary emboli.   No CT evidence of right heart strain.   New bilateral lower lobe consolidations with areas of central clearing. Pneumonia and pulmonary infarcts are in the differential. New bilateral pleural effusions, small on the left and trace on the right. Bilateral axillary and supraclavicular adenopathy of unclear etiology    Patient was started on heparin ggt transitioned to eliquis on 12/19,  patient with worsening SOB/ hypoxia requiring nasal cannula oxygen supplementation  12/21 Repeat Xray shows increased left Pleural effusion,   Thoracic team consulted for possible pigtail insertion   Bedside US: Large left ffusion with septations. Right simple effusion , + pericardial effusion       SH: no toxic habits  FH: no family h/o autoimmune or clotting disorder (12 Dec 2023 13:27)      PAST MEDICAL & SURGICAL HISTORY:  12/2023 Lupus       REVIEW OF SYSTEMS  General:No Weight change/ Fatigue/ HA/Dizzy	  Skin/Breast: No Rashes/ Lesions/ Masses  Ophthalmologic: No Blurry vision/ Glaucoma/ Blindness	  ENMT: No Hearing loss/ Drainage/ Lesions	    Respiratory and Thorax: + cough, +left sided chest pain with inspriation  	  Cardiovascular: No Chest pain/ Palpitations/ Diaphoresis	  Gastrointestinal: No Nausea/ Vomiting/ Constipation/ Appetite Change	  Genitourinary: No Heamturia/ Dysuria/ Frequency change/ Impotence	  Musculoskeletal: No Pain/ Weakness/ Claudication	  Neurological: No Seizures/ TIA/CVA/ Parastesias	  Psychiatric: No Dementia/ Depression/ SI/HI	  Hematology/Lymphatics: No hx of bleeding/ Edema	  Endocrine:	No Hyperglycemia/ Hypoglycemia  Allergic/Immunologic:	 No Anaphylaxis/ Intolerance/ Recent illnesses    MEDICATIONS  (STANDING):  hydroxychloroquine 200 milliGRAM(s) Oral two times a day  ketorolac   Injectable 30 milliGRAM(s) IV Push once  lactobacillus acidophilus 1 Tablet(s) Oral three times a day with meals  lidocaine   4% Patch 1 Patch Transdermal daily  piperacillin/tazobactam IVPB.. 3.375 Gram(s) IV Intermittent every 8 hours    MEDICATIONS  (PRN):  acetaminophen     Tablet .. 650 milliGRAM(s) Oral every 6 hours PRN Temp greater or equal to 38C (100.4F), Mild Pain (1 - 3)  albuterol/ipratropium for Nebulization 3 milliLiter(s) Nebulizer every 6 hours PRN Shortness of Breath and/or Wheezing  guaifenesin/dextromethorphan Oral Liquid 10 milliLiter(s) Oral every 4 hours PRN Cough  melatonin 3 milliGRAM(s) Oral at bedtime PRN Insomnia      Allergies:  No Known Allergies    SOCIAL HISTORY:  FAMILY HISTORY:      Vital Signs Last 24 Hrs  T(C): 39.3 (21 Dec 2023 15:59), Max: 39.4 (20 Dec 2023 23:39)  T(F): 102.8 (21 Dec 2023 15:59), Max: 102.9 (20 Dec 2023 23:39)  HR: 93 (21 Dec 2023 15:59) (84 - 99)  BP: 163/86 (21 Dec 2023 15:59) (143/87 - 163/86)  BP(mean): --  RR: 18 (21 Dec 2023 15:59) (18 - 18)  SpO2: 92% (21 Dec 2023 15:59) (92% - 96%)    Parameters below as of 21 Dec 2023 10:51  Patient On (Oxygen Delivery Method): room air    General: WN/WD NAD  Neurology: Awake, nonfocal, BROWN x 4  Eyes: Scleras clear, PERRLA/ EOMI, Gross vision intact  ENT:Gross hearing intact, grossly patent pharynx, no stridor  Neck: Neck supple, trachea midline, No JVD,   Respiratory: CTA B/L, No wheezing, rales, rhonchi, poor inspiration effort   CV: RRR, S1S2 tachycardic , no murmurs, rubs or gallops  Abdominal: Soft, NT, ND +BS,   Extremities: No edema, + peripheral pulses  Skin: No Rashes, Hematoma, Ecchymosis  Lymphatic: No Neck, axilla, groin LAD  Psych: Oriented x 3, normal affect      LABS:                        9.4    12.96 )-----------( 231      ( 20 Dec 2023 06:53 )             27.8     12-20    131<L>  |  102  |  13  ----------------------------<  96  4.1   |  21<L>  |  0.96    Ca    8.5      20 Dec 2023 06:53        Urinalysis Basic - ( 20 Dec 2023 06:53 )    Color: x / Appearance: x / SG: x / pH: x  Gluc: 96 mg/dL / Ketone: x  / Bili: x / Urobili: x   Blood: x / Protein: x / Nitrite: x   Leuk Esterase: x / RBC: x / WBC x   Sq Epi: x / Non Sq Epi: x / Bacteria: x    RADIOLOGY & ADDITIONAL STUDIES:   Xray Chest 1 View- PORTABLE-Routine (Xray Chest 1 View- PORTABLE-Routine in AM.) (12.20.23 @ 07:37) >  IMPRESSION:    Large left pleural effusion and compressive atelectasis.  Trace right effusion and linear atelectasis in the right midlung    < end of copied text >  < from: CT Angio Chest PE Protocol w/ IV Cont (12.12.23 @ 10:27) >  IMPRESSION:    Acute right upper lobe and left lower lobe segmental/subsegmental   pulmonary emboli. No CT evidence of right heart strain.    New bilateral lower lobe consolidations with areas of central clearing.   Pneumonia and pulmonary infarcts are in the differential. New bilateral   pleural effusions, small on the left and trace on the right. Follow to   resolution with repeat chest CT in one month, or sooner as clinically   warranted.    Bilateral axillary and supraclavicular adenopathy of unclear etiology.   Consider broad differential including infectious, inflammatory, and   neoplastic etiologies.     discussed these above findings with Dr. Carson Blum on   12/12/2023 at 12:33 PM, with read back.    Probable hepatic steatosis while the partially imaged liver. Correlated   with LFTs.    < end of copied text >      ASSESSMENT:   28y Male with recent diagnosis of Lupus ( 9/2023) admitted 12/12 with B/L Pulmonary embolism. Now with increased dyspnea, pleuritic chest pain,  hypoxia, and fever. CXR shows increased left pleural effusion. Bedside US shows large left septated effusion anterior, lateral and posterior. Right: smalll effusion simple appearing. Cardiac + pericardial effusion noted.   - lower extremity dopplers negative for DVT,   - GI PCR + e coli  - mRSA PCR + Staph aureus   - w/u for APS negative to date (low-positive anticardiolipin IgM and IgG not significant)       PLAN:    - patient with complex/ septated effusion on left increased in size   - now requiring nasal cannula 2l   - patient currently on eliquis for B/L PE  - will discontinue eliquis , patient will need to be started on heparin ggt in the AM in anticipation of procedure  - will transfer [facility] under Dr Sosa service   - will further evaluate best plan of action , pigtail v. surgical intervention   - continue  antibiotics pip-tazo for Pna ( last day will be 12/22)  - continue hydroxychloroquine for SLE   - rheum/ hemonc recommend follow up test 12 weeks for APS    - transfer center notified by primary team   - family / patient updated, hospitalist aware of transfer   -discussed with Dr Sosa, Dr Mcbride,  Or[he    HPI:  28 years old male with h/o Lupus ( diagnosed in 09/2023, on Plaquenil 400mg bid) present to ED with complain of chest pain and SOB. Patient reported left sided pleuritic chest pain which started 3 days ago. Pain is progressively worsened, associated with SOB and cough. Patient was seen in OSH ED yesterday and was prescribed antibiotics. Patient reported significantly worsening of left sided pleuritic chest pain today. No fever or chills. Patient reported loss of appetite and had a few episode of diarrhea for last 2 days. No recent travel history. No family h/o clotting disorder  Hemodynamically stable, afebrile, sat well at [facility]. No leukocytosis, plt 205, ddimer 1364, Na 126, Cl 95, Cr 1.27, hsTnT 15.5, BNP 96.     CTA chest with acute right upper lobe and left lower lobe segmental/subsegmental pulmonary emboli.   No CT evidence of right heart strain.   New bilateral lower lobe consolidations with areas of central clearing. Pneumonia and pulmonary infarcts are in the differential. New bilateral pleural effusions, small on the left and trace on the right. Bilateral axillary and supraclavicular adenopathy of unclear etiology    Patient was started on heparin ggt transitioned to eliquis on 12/19,  patient with worsening SOB/ hypoxia requiring nasal cannula oxygen supplementation  12/21 Repeat Xray shows increased left Pleural effusion,   Thoracic team consulted for possible pigtail insertion   Bedside US: Large left ffusion with septations. Right simple effusion , + pericardial effusion       SH: no toxic habits  FH: no family h/o autoimmune or clotting disorder (12 Dec 2023 13:27)      PAST MEDICAL & SURGICAL HISTORY:  12/2023 Lupus       REVIEW OF SYSTEMS  General:No Weight change/ Fatigue/ HA/Dizzy	  Skin/Breast: No Rashes/ Lesions/ Masses  Ophthalmologic: No Blurry vision/ Glaucoma/ Blindness	  ENMT: No Hearing loss/ Drainage/ Lesions	    Respiratory and Thorax: + cough, +left sided chest pain with inspriation  	  Cardiovascular: No Chest pain/ Palpitations/ Diaphoresis	  Gastrointestinal: No Nausea/ Vomiting/ Constipation/ Appetite Change	  Genitourinary: No Heamturia/ Dysuria/ Frequency change/ Impotence	  Musculoskeletal: No Pain/ Weakness/ Claudication	  Neurological: No Seizures/ TIA/CVA/ Parastesias	  Psychiatric: No Dementia/ Depression/ SI/HI	  Hematology/Lymphatics: No hx of bleeding/ Edema	  Endocrine:	No Hyperglycemia/ Hypoglycemia  Allergic/Immunologic:	 No Anaphylaxis/ Intolerance/ Recent illnesses    MEDICATIONS  (STANDING):  hydroxychloroquine 200 milliGRAM(s) Oral two times a day  ketorolac   Injectable 30 milliGRAM(s) IV Push once  lactobacillus acidophilus 1 Tablet(s) Oral three times a day with meals  lidocaine   4% Patch 1 Patch Transdermal daily  piperacillin/tazobactam IVPB.. 3.375 Gram(s) IV Intermittent every 8 hours    MEDICATIONS  (PRN):  acetaminophen     Tablet .. 650 milliGRAM(s) Oral every 6 hours PRN Temp greater or equal to 38C (100.4F), Mild Pain (1 - 3)  albuterol/ipratropium for Nebulization 3 milliLiter(s) Nebulizer every 6 hours PRN Shortness of Breath and/or Wheezing  guaifenesin/dextromethorphan Oral Liquid 10 milliLiter(s) Oral every 4 hours PRN Cough  melatonin 3 milliGRAM(s) Oral at bedtime PRN Insomnia      Allergies:  No Known Allergies    SOCIAL HISTORY:  FAMILY HISTORY:      Vital Signs Last 24 Hrs  T(C): 39.3 (21 Dec 2023 15:59), Max: 39.4 (20 Dec 2023 23:39)  T(F): 102.8 (21 Dec 2023 15:59), Max: 102.9 (20 Dec 2023 23:39)  HR: 93 (21 Dec 2023 15:59) (84 - 99)  BP: 163/86 (21 Dec 2023 15:59) (143/87 - 163/86)  BP(mean): --  RR: 18 (21 Dec 2023 15:59) (18 - 18)  SpO2: 92% (21 Dec 2023 15:59) (92% - 96%)    Parameters below as of 21 Dec 2023 10:51  Patient On (Oxygen Delivery Method): room air    General: WN/WD NAD  Neurology: Awake, nonfocal, BROWN x 4  Eyes: Scleras clear, PERRLA/ EOMI, Gross vision intact  ENT:Gross hearing intact, grossly patent pharynx, no stridor  Neck: Neck supple, trachea midline, No JVD,   Respiratory: CTA B/L, No wheezing, rales, rhonchi, poor inspiration effort   CV: RRR, S1S2 tachycardic , no murmurs, rubs or gallops  Abdominal: Soft, NT, ND +BS,   Extremities: No edema, + peripheral pulses  Skin: No Rashes, Hematoma, Ecchymosis  Lymphatic: No Neck, axilla, groin LAD  Psych: Oriented x 3, normal affect      LABS:                        9.4    12.96 )-----------( 231      ( 20 Dec 2023 06:53 )             27.8     12-20    131<L>  |  102  |  13  ----------------------------<  96  4.1   |  21<L>  |  0.96    Ca    8.5      20 Dec 2023 06:53        Urinalysis Basic - ( 20 Dec 2023 06:53 )    Color: x / Appearance: x / SG: x / pH: x  Gluc: 96 mg/dL / Ketone: x  / Bili: x / Urobili: x   Blood: x / Protein: x / Nitrite: x   Leuk Esterase: x / RBC: x / WBC x   Sq Epi: x / Non Sq Epi: x / Bacteria: x    RADIOLOGY & ADDITIONAL STUDIES:   Xray Chest 1 View- PORTABLE-Routine (Xray Chest 1 View- PORTABLE-Routine in AM.) (12.20.23 @ 07:37) >  IMPRESSION:    Large left pleural effusion and compressive atelectasis.  Trace right effusion and linear atelectasis in the right midlung    < end of copied text >  < from: CT Angio Chest PE Protocol w/ IV Cont (12.12.23 @ 10:27) >  IMPRESSION:    Acute right upper lobe and left lower lobe segmental/subsegmental   pulmonary emboli. No CT evidence of right heart strain.    New bilateral lower lobe consolidations with areas of central clearing.   Pneumonia and pulmonary infarcts are in the differential. New bilateral   pleural effusions, small on the left and trace on the right. Follow to   resolution with repeat chest CT in one month, or sooner as clinically   warranted.    Bilateral axillary and supraclavicular adenopathy of unclear etiology.   Consider broad differential including infectious, inflammatory, and   neoplastic etiologies.     discussed these above findings with Dr. Casron Blum on   12/12/2023 at 12:33 PM, with read back.    Probable hepatic steatosis while the partially imaged liver. Correlated   with LFTs.    < end of copied text >      ASSESSMENT:   28y Male with recent diagnosis of Lupus ( 9/2023) admitted 12/12 with B/L Pulmonary embolism. Now with increased dyspnea, pleuritic chest pain,  hypoxia, and fever. CXR shows increased left pleural effusion. Bedside US shows large left septated effusion anterior, lateral and posterior. Right: smalll effusion simple appearing. Cardiac + pericardial effusion noted.   - lower extremity dopplers negative for DVT,   - GI PCR + e coli  - mRSA PCR + Staph aureus   - w/u for APS negative to date (low-positive anticardiolipin IgM and IgG not significant)       PLAN:    - patient with complex/ septated effusion on left increased in size   - now requiring nasal cannula 2l   - patient currently on eliquis for B/L PE  - will discontinue eliquis , patient will need to be started on heparin ggt in the AM in anticipation of procedure  - will transfer to [facility] under Dr Sosa service   - will further evaluate best plan of action , pigtail v. surgical intervention   - continue  antibiotics pip-tazo for Pna ( last day will be 12/22)  - continue hydroxychloroquine for SLE   - rheum/ hemonc recommend follow up test 12 weeks for APS    - transfer center notified by primary team   - family / patient updated, hospitalist aware of transfer   -discussed with Dr Sosa, Dr Mcbride,  Or[he

## 2023-12-21 NOTE — CHART NOTE - NSCHARTNOTEFT_GEN_A_CORE
: Reed BROWN    INDICATION: SOB, hypoxia, chest pain     PROCEDURE:  [x ] LIMITED ECHO  [x ] LIMITED CHEST  [ ] LIMITED RETROPERITONEAL  [ ] LIMITED ABDOMINAL  [ ] LIMITED DVT  [ ] NEEDLE GUIDANCE VASCULAR  [ ] NEEDLE GUIDANCE THORACENTESIS  [ ] NEEDLE GUIDANCE PARACENTESIS  [ ] NEEDLE GUIDANCE PERICARDIOCENTESIS  [ ] OTHER    FINDINGS:    Large L complex pleural effusion with septations and near complete atelectasis vs consolidation of L lung   Small to moderate simple appearing R pleural effusion. Scattered focal B-lines in anterior R lung field    Grossly normal LV function, RV < LV in size. Small pericardial effusion, no echo evidence of RA/RV collapse. IVC indeterminate    INTERPRETATION:    Large L complex pleural effusion with concern for empyema vs hemorrhagic effusion with adjacent consolidation vs atelectasis   Grossly normal LV function with small pericardial effusion         Images uploaded on [employer] : Reed BROWN    INDICATION: SOB, hypoxia, chest pain     PROCEDURE:  [x ] LIMITED ECHO  [x ] LIMITED CHEST  [ ] LIMITED RETROPERITONEAL  [ ] LIMITED ABDOMINAL  [ ] LIMITED DVT  [ ] NEEDLE GUIDANCE VASCULAR  [ ] NEEDLE GUIDANCE THORACENTESIS  [ ] NEEDLE GUIDANCE PARACENTESIS  [ ] NEEDLE GUIDANCE PERICARDIOCENTESIS  [ ] OTHER    FINDINGS:    Large L complex pleural effusion with septations and near complete atelectasis vs consolidation of L lung   Small to moderate simple appearing R pleural effusion. Scattered focal B-lines in anterior R lung field    Grossly normal LV function, RV < LV in size. Small pericardial effusion, no echo evidence of RA/RV collapse. IVC indeterminate    INTERPRETATION:    Large L complex pleural effusion with concern for empyema vs hemorrhagic effusion with adjacent consolidation vs atelectasis   Grossly normal LV function with small pericardial effusion         Images uploaded on "[employer] Path

## 2023-12-21 NOTE — PROGRESS NOTE ADULT - SUBJECTIVE AND OBJECTIVE BOX
28 years old male with h/o Lupus ( diagnosed in 09/2023, on Plaquenil 400mg bid) present to ED with complain of chest pain and SOB. Patient reported left sided pleuritic chest pain which started 3 days ago.  Has been found to have acute bilateral PE (now on eliquis) and PNA      Patient seen and examined at bedside  Patient noted to have fever 102.9 overnight and rpt temp of 101.2 this am  Patient currently using ice packs, has no other active complaints  Will reach out to ID team today for reassessment of antibiotics  Case discussed with mother - April - over the phone ()         MEDICATIONS  (STANDING):  albuterol/ipratropium for Nebulization 3 milliLiter(s) Nebulizer every 6 hours  apixaban 10 milliGRAM(s) Oral <User Schedule>  bisacodyl Suppository 10 milliGRAM(s) Rectal daily  dexamethasone/neomycin/polymyxin Suspension 1 Drop(s) Left EYE three times a day  hydroxychloroquine 400 milliGRAM(s) Oral two times a day  lidocaine   4% Patch 1 Patch Transdermal daily  piperacillin/tazobactam IVPB.. 3.375 Gram(s) IV Intermittent every 8 hours  polyethylene glycol 3350 17 Gram(s) Oral every 12 hours  senna 2 Tablet(s) Oral at bedtime  sodium chloride 3%  Inhalation 4 milliLiter(s) Inhalation every 12 hours    MEDICATIONS  (PRN):  acetaminophen     Tablet .. 650 milliGRAM(s) Oral every 6 hours PRN Temp greater or equal to 38C (100.4F), Mild Pain (1 - 3)  guaifenesin/dextromethorphan Oral Liquid 10 milliLiter(s) Oral every 4 hours PRN Cough  melatonin 3 milliGRAM(s) Oral at bedtime PRN Insomnia        Physical exam:  General: patient in no acute distress, resting comfortably  Head:  Atraumatic, Normocephalic  Eyes: EOMI, PERRLA, clear sclera  Neck: Supple, thyroid nontender, non enlarged  Cardio: S1/S2 +ve, regular rate and rhythm, no M/G/R  Resp: decreased breath sounds on the L side, worse at base   GI: abdomen soft, nontender, non distended, no guarding, BS +ve x 4  Ext: no significant pedal edema  Neuro: CN 2-12 intact, no significant motor or sensory deficits.  Skin: No rashes or lesions         Recent Vitals  T(C): 39.3 (12-21-23 @ 11:50), Max: 39.4 (12-20-23 @ 16:22)  HR: 98 (12-21-23 @ 10:51) (84 - 101)  BP: 150/81 (12-21-23 @ 10:51) (143/87 - 171/96)  RR: 18 (12-21-23 @ 10:51) (18 - 18)  SpO2: 92% (12-21-23 @ 10:51) (92% - 96%)                        9.4    12.96 )-----------( 231      ( 20 Dec 2023 06:53 )             27.8     12-20    131<L>  |  102  |  13  ----------------------------<  96  4.1   |  21<L>  |  0.96    Ca    8.5      20 Dec 2023 06:53          Urinalysis Basic - ( 20 Dec 2023 06:53 )    Color: x / Appearance: x / SG: x / pH: x  Gluc: 96 mg/dL / Ketone: x  / Bili: x / Urobili: x   Blood: x / Protein: x / Nitrite: x   Leuk Esterase: x / RBC: x / WBC x   Sq Epi: x / Non Sq Epi: x / Bacteria: x         28 years old male with h/o Lupus ( diagnosed in 09/2023, on Plaquenil 400mg bid) present to ED with complain of chest pain and SOB. Patient reported left sided pleuritic chest pain which started 3 days ago.  Has been found to have acute bilateral PE (now on eliquis) and PNA      Patient seen and examined at bedside  Patient noted to have fever 102.9 overnight and rpt temp of 101.2 this am  Patient currently using ice packs, has no other active complaints  Will reach out to ID team today for reassessment of antibiotics  Case discussed with mother - April - over the phone ()     Addendum  Patient reports substernal CP and worsening dyspnea  EKG, morphine stat ordered  Case discussed with pulm/crit care for thorocentesis      MEDICATIONS  (STANDING):  albuterol/ipratropium for Nebulization 3 milliLiter(s) Nebulizer every 6 hours  apixaban 10 milliGRAM(s) Oral <User Schedule>  bisacodyl Suppository 10 milliGRAM(s) Rectal daily  dexamethasone/neomycin/polymyxin Suspension 1 Drop(s) Left EYE three times a day  hydroxychloroquine 400 milliGRAM(s) Oral two times a day  lidocaine   4% Patch 1 Patch Transdermal daily  piperacillin/tazobactam IVPB.. 3.375 Gram(s) IV Intermittent every 8 hours  polyethylene glycol 3350 17 Gram(s) Oral every 12 hours  senna 2 Tablet(s) Oral at bedtime  sodium chloride 3%  Inhalation 4 milliLiter(s) Inhalation every 12 hours    MEDICATIONS  (PRN):  acetaminophen     Tablet .. 650 milliGRAM(s) Oral every 6 hours PRN Temp greater or equal to 38C (100.4F), Mild Pain (1 - 3)  guaifenesin/dextromethorphan Oral Liquid 10 milliLiter(s) Oral every 4 hours PRN Cough  melatonin 3 milliGRAM(s) Oral at bedtime PRN Insomnia        Physical exam:  General: patient in no acute distress, resting comfortably  Head:  Atraumatic, Normocephalic  Eyes: EOMI, PERRLA, clear sclera  Neck: Supple, thyroid nontender, non enlarged  Cardio: S1/S2 +ve, regular rate and rhythm, no M/G/R  Resp: decreased breath sounds on the L side, worse at base   GI: abdomen soft, nontender, non distended, no guarding, BS +ve x 4  Ext: no significant pedal edema  Neuro: CN 2-12 intact, no significant motor or sensory deficits.  Skin: No rashes or lesions         Recent Vitals  T(C): 39.3 (12-21-23 @ 11:50), Max: 39.4 (12-20-23 @ 16:22)  HR: 98 (12-21-23 @ 10:51) (84 - 101)  BP: 150/81 (12-21-23 @ 10:51) (143/87 - 171/96)  RR: 18 (12-21-23 @ 10:51) (18 - 18)  SpO2: 92% (12-21-23 @ 10:51) (92% - 96%)                        9.4    12.96 )-----------( 231      ( 20 Dec 2023 06:53 )             27.8     12-20    131<L>  |  102  |  13  ----------------------------<  96  4.1   |  21<L>  |  0.96    Ca    8.5      20 Dec 2023 06:53          Urinalysis Basic - ( 20 Dec 2023 06:53 )    Color: x / Appearance: x / SG: x / pH: x  Gluc: 96 mg/dL / Ketone: x  / Bili: x / Urobili: x   Blood: x / Protein: x / Nitrite: x   Leuk Esterase: x / RBC: x / WBC x   Sq Epi: x / Non Sq Epi: x / Bacteria: x

## 2023-12-21 NOTE — PROGRESS NOTE ADULT - SUBJECTIVE AND OBJECTIVE BOX
[facility]  INFECTIOUS DISEASES   [address] [place] [address]  Tel: [phone]     Fax: [phone]  ==============================================================================  MD Jim Abrams, DO Bharati Murcia, NP   ==============================================================================      TAYLA MARIE  MRN-[id number]  28y ([birth year])      Interval History:    ROS:    [ ] Unobtainable because:  [ ] All other systems negative except as noted    Constitutional: no fever, no chills  Head: no trauma  Eyes: no vision changes, no eye pain  ENT:  no sore throat, no rhinorrhea  Cardiovascular:  no chest pain, no palpitation  Respiratory:  no SOB, no cough  GI:  no abd pain, no vomiting, no diarrhea  urinary: no dysuria, no hematuria, no flank pain  musculoskeletal:  no joint pain, no joint swelling  skin:  no rash  neurology:  no headache, no seizure, no change in mental status  psych: no anxiety, no depression         Allergies  No Known Allergies        ANTIMICROBIALS:  azithromycin  IVPB 500 every 24 hours  azithromycin  IVPB    hydroxychloroquine 400 two times a day  piperacillin/tazobactam IVPB.. 3.375 every 8 hours        Physical Exam:  Vital Signs Last 24 Hrs  T(C): 39.3 (21 Dec 2023 11:50), Max: 39.4 (20 Dec 2023 16:22)  T(F): 102.8 (21 Dec 2023 11:50), Max: 102.9 (20 Dec 2023 16:22)  HR: 98 (21 Dec 2023 10:51) (84 - 101)  BP: 150/81 (21 Dec 2023 10:51) (143/87 - 171/96)  BP(mean): --  RR: 18 (21 Dec 2023 10:51) (18 - 18)  SpO2: 92% (21 Dec 2023 10:51) (92% - 96%)    Parameters below as of 21 Dec 2023 10:51  Patient On (Oxygen Delivery Method): room air        12-20-23 @ 07:01  -  12-21-23 @ 07:00  --------------------------------------------------------  IN: 480 mL / OUT: 1000 mL / NET: -520 mL      General:    NAD,  non toxic  Head: atraumatic, normocephalic  Eye: normal sclera and conjunctiva  ENT:    no oral lesions, neck supple  Cardio:     regular S1, S2,  no murmur  Respiratory:    clear b/l,    no wheezing  abd:     soft,   BS +,   no tenderness  :   no CVAT,  no suprapubic tenderness,   no  omer  Musculoskeletal:   no joint swelling,   no edema  vascular: no central lines, +PIV   Skin:    no rash  Neurologic:     no focal deficit  psych: normal affect    WBC Count: 12.96 K/uL (12-20 @ 06:53)  WBC Count: 14.41 K/uL (12-16 @ 05:40)  WBC Count: 12.50 K/uL (12-15 @ 07:25)                            9.4    12.96 )-----------( 231      ( 20 Dec 2023 06:53 )             27.8       12-20    131<L>  |  102  |  13  ----------------------------<  96  4.1   |  21<L>  |  0.96    Ca    8.5      20 Dec 2023 06:53        Urinalysis Basic - ( 20 Dec 2023 06:53 )    Color: x / Appearance: x / SG: x / pH: x  Gluc: 96 mg/dL / Ketone: x  / Bili: x / Urobili: x   Blood: x / Protein: x / Nitrite: x   Leuk Esterase: x / RBC: x / WBC x   Sq Epi: x / Non Sq Epi: x / Bacteria: x          Creatinine Trend: 0.96<--, 1.10<--, 1.28<--, 1.30<--, 1.08<--, 1.14<--      MICROBIOLOGY:  v  .Sputum Sputum  12-20-23 --  --    Few Squamous epithelial cells per low power field  Few polymorphonuclear leukocytes per low power field  Few Gram Positive Cocci in Pairs and Chains per oil power field      .Stool Feces  12-15-23   No enteric pathogens isolated.  (Stool culture examined for Salmonella,  Shigella, Campylobacter, Aeromonas, Plesiomonas,  Vibrio, E.coli O157 and Yersinia)  --  --      .Blood Blood-Peripheral  12-14-23   No growth at 5 days  --  --      .Blood Blood-Peripheral  12-14-23   No growth at 5 days  --  --      .Blood Blood-Peripheral  12-12-23   No growth at 5 days  --  --      .Blood Blood-Peripheral  12-12-23   No growth at 5 days  --  --                        D-Dimer Assay, Quantitative: 1364 (12-12)    Procalcitonin, Serum: 0.80 (12-20-23 @ 06:53)  Procalcitonin, Serum: 5.49 (12-14-23 @ 16:45)          RADIOLOGY:   [facility]  INFECTIOUS DISEASES   [address] [place] [address]  Tel: [phone]     Fax: [phone]  ==============================================================================  MD Jim Abrams, DO Bharati Murcia, NP   ==============================================================================      TAYLA MARIE  MRN-[id number]  28y ([birth year])      Interval History:    ROS:    [ ] Unobtainable because:  [ ] All other systems negative except as noted    Constitutional: no fever, no chills  Head: no trauma  Eyes: no vision changes, no eye pain  ENT:  no sore throat, no rhinorrhea  Cardiovascular:  no chest pain, no palpitation  Respiratory:  no SOB, no cough  GI:  no abd pain, no vomiting, no diarrhea  urinary: no dysuria, no hematuria, no flank pain  musculoskeletal:  no joint pain, no joint swelling  skin:  no rash  neurology:  no headache, no seizure, no change in mental status  psych: no anxiety, no depression         Allergies  No Known Allergies        ANTIMICROBIALS:  azithromycin  IVPB 500 every 24 hours  azithromycin  IVPB    hydroxychloroquine 400 two times a day  piperacillin/tazobactam IVPB.. 3.375 every 8 hours        Physical Exam:  Vital Signs Last 24 Hrs  T(C): 39.3 (21 Dec 2023 11:50), Max: 39.4 (20 Dec 2023 16:22)  T(F): 102.8 (21 Dec 2023 11:50), Max: 102.9 (20 Dec 2023 16:22)  HR: 98 (21 Dec 2023 10:51) (84 - 101)  BP: 150/81 (21 Dec 2023 10:51) (143/87 - 171/96)  BP(mean): --  RR: 18 (21 Dec 2023 10:51) (18 - 18)  SpO2: 92% (21 Dec 2023 10:51) (92% - 96%)    Parameters below as of 21 Dec 2023 10:51  Patient On (Oxygen Delivery Method): room air        12-20-23 @ 07:01  -  12-21-23 @ 07:00  --------------------------------------------------------  IN: 480 mL / OUT: 1000 mL / NET: -520 mL      General:    NAD,  non toxic  Head: atraumatic, normocephalic  Eye: normal sclera and conjunctiva  ENT:    no oral lesions, neck supple  Cardio:     regular S1, S2,  no murmur  Respiratory:    clear b/l,    no wheezing  abd:     soft,   BS +,   no tenderness  :   no CVAT,  no suprapubic tenderness,   no  omer  Musculoskeletal:   no joint swelling,   no edema  vascular: no central lines, +PIV   Skin:    no rash  Neurologic:     no focal deficit  psych: normal affect    WBC Count: 12.96 K/uL (12-20 @ 06:53)  WBC Count: 14.41 K/uL (12-16 @ 05:40)  WBC Count: 12.50 K/uL (12-15 @ 07:25)                            9.4    12.96 )-----------( 231      ( 20 Dec 2023 06:53 )             27.8       12-20    131<L>  |  102  |  13  ----------------------------<  96  4.1   |  21<L>  |  0.96    Ca    8.5      20 Dec 2023 06:53        Urinalysis Basic - ( 20 Dec 2023 06:53 )    Color: x / Appearance: x / SG: x / pH: x  Gluc: 96 mg/dL / Ketone: x  / Bili: x / Urobili: x   Blood: x / Protein: x / Nitrite: x   Leuk Esterase: x / RBC: x / WBC x   Sq Epi: x / Non Sq Epi: x / Bacteria: x          Creatinine Trend: 0.96<--, 1.10<--, 1.28<--, 1.30<--, 1.08<--, 1.14<--      MICROBIOLOGY:  v  .Sputum Sputum  12-20-23 --  --    Few Squamous epithelial cells per low power field  Few polymorphonuclear leukocytes per low power field  Few Gram Positive Cocci in Pairs and Chains per oil power field      .Stool Feces  12-15-23   No enteric pathogens isolated.  (Stool culture examined for Salmonella,  Shigella, Campylobacter, Aeromonas, Plesiomonas,  Vibrio, E.coli O157 and Yersinia)  --  --      .Blood Blood-Peripheral  12-14-23   No growth at 5 days  --  --      .Blood Blood-Peripheral  12-14-23   No growth at 5 days  --  --      .Blood Blood-Peripheral  12-12-23   No growth at 5 days  --  --      .Blood Blood-Peripheral  12-12-23   No growth at 5 days  --  --                        D-Dimer Assay, Quantitative: 1364 (12-12)    Procalcitonin, Serum: 0.80 (12-20-23 @ 06:53)  Procalcitonin, Serum: 5.49 (12-14-23 @ 16:45)          RADIOLOGY:   [facility]  INFECTIOUS DISEASES   [address] [place] [address]  Tel: [phone]     Fax: [phone]  ==============================================================================  MD Jim Abrams, DO Bharati Murcia, NP   ==============================================================================      TAYLA MARIE  MRN-[id number]  28y ([birth year])      Interval History:  Patient seen and examined today.  he continues to have low grade temps.  denies  any sob at rest.  denies any dysurea, denies any diarrhea.    he requested for me to speak to his Mother Day youssef  Called Day Marie ( patient's mother) at [phone] and she explains that patient also has been having left ear pain and that his hearing is diminished in left ear and that he ahs been using ear drops since admission here.  stated to patient's mom that this is first time i'm told of this but will evaluate this further.      ROS:      Constitutional: + fever on and off, no chills  Head: no trauma  Eyes: no vision changes, no eye pain  ENT:  no sore throat, no rhinorrhea  upon asking patient further he does mention left ear slight pain but denies any discharge from it  Cardiovascular:  no chest pain, no palpitation  Respiratory:  no SOB at rest, dry cough  GI:  no abd pain, no vomiting, no diarrhea  urinary: no dysuria, no hematuria, no flank pain  musculoskeletal:  no joint pain, no joint swelling  neurology:  no headache, no seizure, no change in mental status  psych: no anxiety, no depression         Allergies  No Known Drug Allergies        ANTIMICROBIALS:  azithromycin  IVPB 500 every 24 hours  azithromycin  IVPB    hydroxychloroquine 400 two times a day  piperacillin/tazobactam IVPB.. 3.375 every 8 hours        Physical Exam:  Vital Signs Last 24 Hrs  T(C): 39.3 (21 Dec 2023 11:50), Max: 39.4 (20 Dec 2023 16:22)  T(F): 102.8 (21 Dec 2023 11:50), Max: 102.9 (20 Dec 2023 16:22)  HR: 98 (21 Dec 2023 10:51) (84 - 101)  BP: 150/81 (21 Dec 2023 10:51) (143/87 - 171/96)  BP(mean): --  RR: 18 (21 Dec 2023 10:51) (18 - 18)  SpO2: 92% (21 Dec 2023 10:51) (92% - 96%)    Parameters below as of 21 Dec 2023 10:51  Patient On (Oxygen Delivery Method): room air        12-20-23 @ 07:01  -  12-21-23 @ 07:00  --------------------------------------------------------  IN: 480 mL / OUT: 1000 mL / NET: -520 mL      General:    NAD,  non toxic  Head: atraumatic, normocephalic  Eye: normal sclera and conjunctiva  ENT:    no oral lesions, neck supple  left ear no erythema, no discharge seen, TM not well visualized, no pain on ear lobe but minimal tenderness in the surrounding anterior /mastoid region .  Cardio:     regular S1, S2,  no murmur  Respiratory:    no wheezing, decreased breath sounds at bases   abd:     soft,   BS +,   no tenderness, ND  :   no CVAT,  no suprapubic tenderness  Musculoskeletal:   no joint swelling,   no edema  vascular: no central lines, +PIV   Skin:    no rash  Neurologic:     no focal deficit  psych: normal affect    WBC Count: 12.96 K/uL (12-20 @ 06:53)  WBC Count: 14.41 K/uL (12-16 @ 05:40)  WBC Count: 12.50 K/uL (12-15 @ 07:25)                            9.4    12.96 )-----------( 231      ( 20 Dec 2023 06:53 )             27.8       12-20    131<L>  |  102  |  13  ----------------------------<  96  4.1   |  21<L>  |  0.96    Ca    8.5      20 Dec 2023 06:53        Urinalysis Basic - ( 20 Dec 2023 06:53 )    Color: x / Appearance: x / SG: x / pH: x  Gluc: 96 mg/dL / Ketone: x  / Bili: x / Urobili: x   Blood: x / Protein: x / Nitrite: x   Leuk Esterase: x / RBC: x / WBC x   Sq Epi: x / Non Sq Epi: x / Bacteria: x          Creatinine Trend: 0.96<--, 1.10<--, 1.28<--, 1.30<--, 1.08<--, 1.14<--      MICROBIOLOGY:  v  .Sputum Sputum  12-20-23 --  --    Few Squamous epithelial cells per low power field  Few polymorphonuclear leukocytes per low power field  Few Gram Positive Cocci in Pairs and Chains per oil power field      .Stool Feces  12-15-23   No enteric pathogens isolated.  (Stool culture examined for Salmonella,  Shigella, Campylobacter, Aeromonas, Plesiomonas,  Vibrio, E.coli O157 and Yersinia)  --  --      .Blood Blood-Peripheral  12-14-23   No growth at 5 days  --  --      .Blood Blood-Peripheral  12-14-23   No growth at 5 days  --  --      .Blood Blood-Peripheral  12-12-23   No growth at 5 days  --  --      .Blood Blood-Peripheral  12-12-23   No growth at 5 days  --  --      D-Dimer Assay, Quantitative: 1364 (12-12)    Procalcitonin, Serum: 0.80 (12-20-23 @ 06:53)  Procalcitonin, Serum: 5.49 (12-14-23 @ 16:45)          RADIOLOGY:   [facility]  INFECTIOUS DISEASES   [address] [place] [address]  Tel: [phone]     Fax: [phone]  ==============================================================================  MD Jim Abrams, DO Bharati Murcia, NP   ==============================================================================      TAYLA MARIE  MRN-[id number]  28y ([birth year])      Interval History:  Patient seen and examined today.  he continues to have low grade temps.  denies  any sob at rest.  denies any dysurea, denies any diarrhea.    he requested for me to speak to his Mother Day youssef  Called Day Marie ( patient's mother) at [phone] and she explains that patient also has been having left ear pain and that his hearing is diminished in left ear and that he ahs been using ear drops since admission here.  stated to patient's mom that this is first time i'm told of this but will evaluate this further.      ROS:      Constitutional: + fever on and off, no chills  Head: no trauma  Eyes: no vision changes, no eye pain  ENT:  no sore throat, no rhinorrhea  upon asking patient further he does mention left ear slight pain but denies any discharge from it  Cardiovascular:  no chest pain, no palpitation  Respiratory:  no SOB at rest, dry cough  GI:  no abd pain, no vomiting, no diarrhea  urinary: no dysuria, no hematuria, no flank pain  musculoskeletal:  no joint pain, no joint swelling  neurology:  no headache, no seizure, no change in mental status  psych: no anxiety, no depression         Allergies  No Known Drug Allergies        ANTIMICROBIALS:  azithromycin  IVPB 500 every 24 hours  azithromycin  IVPB    hydroxychloroquine 400 two times a day  piperacillin/tazobactam IVPB.. 3.375 every 8 hours        Physical Exam:  Vital Signs Last 24 Hrs  T(C): 39.3 (21 Dec 2023 11:50), Max: 39.4 (20 Dec 2023 16:22)  T(F): 102.8 (21 Dec 2023 11:50), Max: 102.9 (20 Dec 2023 16:22)  HR: 98 (21 Dec 2023 10:51) (84 - 101)  BP: 150/81 (21 Dec 2023 10:51) (143/87 - 171/96)  BP(mean): --  RR: 18 (21 Dec 2023 10:51) (18 - 18)  SpO2: 92% (21 Dec 2023 10:51) (92% - 96%)    Parameters below as of 21 Dec 2023 10:51  Patient On (Oxygen Delivery Method): room air        12-20-23 @ 07:01  -  12-21-23 @ 07:00  --------------------------------------------------------  IN: 480 mL / OUT: 1000 mL / NET: -520 mL      General:    NAD,  non toxic  Head: atraumatic, normocephalic  Eye: normal sclera and conjunctiva  ENT:    no oral lesions, neck supple  left ear no erythema, no discharge seen, TM not well visualized, no pain on ear lobe but minimal tenderness in the surrounding anterior /mastoid region .  Cardio:     regular S1, S2,  no murmur  Respiratory:    no wheezing, decreased breath sounds at bases   abd:     soft,   BS +,   no tenderness, ND  :   no CVAT,  no suprapubic tenderness  Musculoskeletal:   no joint swelling,   no edema  vascular: no central lines, +PIV   Skin:    no rash  Neurologic:     no focal deficit  psych: normal affect    WBC Count: 12.96 K/uL (12-20 @ 06:53)  WBC Count: 14.41 K/uL (12-16 @ 05:40)  WBC Count: 12.50 K/uL (12-15 @ 07:25)                            9.4    12.96 )-----------( 231      ( 20 Dec 2023 06:53 )             27.8       12-20    131<L>  |  102  |  13  ----------------------------<  96  4.1   |  21<L>  |  0.96    Ca    8.5      20 Dec 2023 06:53        Urinalysis Basic - ( 20 Dec 2023 06:53 )    Color: x / Appearance: x / SG: x / pH: x  Gluc: 96 mg/dL / Ketone: x  / Bili: x / Urobili: x   Blood: x / Protein: x / Nitrite: x   Leuk Esterase: x / RBC: x / WBC x   Sq Epi: x / Non Sq Epi: x / Bacteria: x          Creatinine Trend: 0.96<--, 1.10<--, 1.28<--, 1.30<--, 1.08<--, 1.14<--      MICROBIOLOGY:  v  .Sputum Sputum  12-20-23 --  --    Few Squamous epithelial cells per low power field  Few polymorphonuclear leukocytes per low power field  Few Gram Positive Cocci in Pairs and Chains per oil power field      .Stool Feces  12-15-23   No enteric pathogens isolated.  (Stool culture examined for Salmonella,  Shigella, Campylobacter, Aeromonas, Plesiomonas,  Vibrio, E.coli O157 and Yersinia)  --  --      .Blood Blood-Peripheral  12-14-23   No growth at 5 days  --  --      .Blood Blood-Peripheral  12-14-23   No growth at 5 days  --  --      .Blood Blood-Peripheral  12-12-23   No growth at 5 days  --  --      .Blood Blood-Peripheral  12-12-23   No growth at 5 days  --  --      D-Dimer Assay, Quantitative: 1364 (12-12)    Procalcitonin, Serum: 0.80 (12-20-23 @ 06:53)  Procalcitonin, Serum: 5.49 (12-14-23 @ 16:45)          RADIOLOGY:

## 2023-12-21 NOTE — PROCEDURE NOTE - NSINDICATIONS_GEN_A_CORE
couldn't draw venous blood/arterial puncture to obtain ABG's
emergency venous access/fluid administration

## 2023-12-21 NOTE — CHART NOTE - NSCHARTNOTEFT_GEN_A_CORE
Reason for Evaluation: Pulmonary team consulted by Dr. Vaughn for evaluation of enlarging left pleural effusion with dyspnea and hypoxia requiring 2 L NC.     Interval Events since last House Pulm evaluation on 12/12/23:  28 year old M with h/o SLE dxd in 9/23 on Plaquenil, initially presented on 12/12 with SOB and chest pain. He was found to have acute b/l segmental/ subsegmental PEs with b/l lower lobe consolidation with central clearing as well as b/l axillary and supraclavicular lymphadenopathy. He was anticoagulated with a heparin gtt and then transitioned to Eliquis. His course was complicated by progressive SOB, fever,  left sided chest pain and fever. On 12/14 his serum procalcitonin level was 5.49. He has been treated with IV Zosyn and received a few doses of Azithromycin. Bcxs NGTD. SCx with NRF and Staph aureus. GI PCR with enteropathogenic E coli. He continues to have 9/10, sharp left sided, non radiating chest pain with breathing which is minimally relieved with Morphine IV. He is also requiring 2 L O2 via NC to maintain sats. Of note his H/H has been trending down over the course of his hospitalization.     Exam:  Gen: Appears in pain, splinting significantly when trying to breathe in, no accessory mx use  HEENT: No palpable cervical LN, trachea midline  Pulm: Minimal air entry b/l lung fields, L BS absent in most of the lung, R decreased BS in base, no wheezing or crackles   Abd: Soft, NT, ND, BS+  LE: Minimal b/l LE edema, pulses b/l LEs  Neuro: Awake, grossly non focal     Labs:   Reviewed    POCUS:  Large L pleural effusion with near complete atelectasis of the L lung, extensive septations and stranding  throughout effusion   Small to moderate simple R pleural effusion, no stranding. Scattered B-lines in R anterior lung field    A/P:  - Complex L pleural effusion (DDx parapneumonic vs hemorrhagic vs serositis from Lupus)   - Given complexity of effusion Thoracic surgery consulted for CT placement   - Please stop Eliquis for now. Can start a heparin gtt tomorrow morning with a plan to hold 6 hours prior to procedure   - Please optimize pain control to minimize splinting. Consider Toradol prn   - Cont IV Abx coverage per ID  - Requires incentive spirometry   - Cont supplemental O2 to keep sat > 90 %  - Low threshold for ICU evalv if any change in clinical status   - Plan discussed in detail with patient and his Mom at bedside. All Qs answered to the best of my ability  - Plan d/w pulm NP Makayla, hospitalist team and thoracic surgery

## 2023-12-21 NOTE — CONSULT NOTE ADULT - ASSESSMENT
28 year old male with recently diagnosed SLE (rashes, oral ulcers, +ABDIAS) was admitted w/ pleuritic chest pain and found to have acute PE.  Etiology of the PE is unclear.  Thrombosis is not a common manifestation of SLE itself, though more commonly can be associated with antiphospholipid syndrome, which often occurs concurrently w/ SLE.  w/u for APS negative to date (low-positive anticardiolipin IgM and IgG not significant).  Pt does not exhibit any obvious signs/symptoms of active SLE at this time.    - Will decrease Plaquenil to 200mg BID (max dose is 400mg/day).    - Continue Eliquis as per heme.    - Agree w/ re-testing for APS in 12 weeks.    - Pt should f/u with his outpatient rheumatologist within 1-2 weeks after D/C.

## 2023-12-21 NOTE — PROGRESS NOTE ADULT - SUBJECTIVE AND OBJECTIVE BOX
INTERVAL HPI:  28 years old male with Lupus ( diagnosed in 09/2023, on Plaquenil 400mg bid).   Presented to ED with complain of chest pain and SOB which started 3 days ago. Pain progressively worsened, associated with SOB and cough. Was seen in [facility] ED day prior. told has Rhinovirus  was prescribed antibiotics.   Came with worsening symptoms, also had fever, chills and cough.  Admitted with bilateral PE.   Hospital course complicated with pneumonia and hyponatremia.  Denies tobacco or substance abuse    OVERNIGHT EVENTS:  Temperature spike to 102.8 noted.  Feeling sick.    Bedside US shows large left septated effusion anterior, lateral and posterior.    Vital Signs Last 24 Hrs  T(C): 37.5 (21 Dec 2023 19:26), Max: 39.4 (20 Dec 2023 23:39)  T(F): 99.5 (21 Dec 2023 19:26), Max: 102.9 (20 Dec 2023 23:39)  HR: 93 (21 Dec 2023 15:59) (84 - 98)  BP: 163/86 (21 Dec 2023 15:59) (150/81 - 163/86)  BP(mean): --  RR: 18 (21 Dec 2023 15:59) (18 - 18)  SpO2: 92% (21 Dec 2023 15:59) (92% - 96%)    Parameters below as of 21 Dec 2023 15:59  Patient On (Oxygen Delivery Method): room air    PHYSICAL EXAM:  GEN:         Awake, responsive.  HEENT:    Normal.    RESP:       no distress  CVS:          Regular rate and rhythm.     MEDICATIONS  (STANDING):  hydroxychloroquine 200 milliGRAM(s) Oral two times a day  lactobacillus acidophilus 1 Tablet(s) Oral three times a day with meals  lidocaine   4% Patch 1 Patch Transdermal daily  piperacillin/tazobactam IVPB.. 3.375 Gram(s) IV Intermittent every 8 hours    MEDICATIONS  (PRN):  acetaminophen     Tablet .. 650 milliGRAM(s) Oral every 6 hours PRN Temp greater or equal to 38C (100.4F), Mild Pain (1 - 3)  albuterol/ipratropium for Nebulization 3 milliLiter(s) Nebulizer every 6 hours PRN Shortness of Breath and/or Wheezing  guaifenesin/dextromethorphan Oral Liquid 10 milliLiter(s) Oral every 4 hours PRN Cough  melatonin 3 milliGRAM(s) Oral at bedtime PRN Insomnia    LABS:                        9.0    10.04 )-----------( 219      ( 21 Dec 2023 19:00 )             26.3     12-21    127<L>  |  98  |  9   ----------------------------<  108<H>  4.0   |  22  |  0.99    Ca    7.8<L>      21 Dec 2023 19:00  Phos  2.8     12-21  Mg     1.9     12-21    TPro  6.7  /  Alb  1.7<L>  /  TBili  0.7  /  DBili  x   /  AST  85<H>  /  ALT  97<H>  /  AlkPhos  72  12-21    Urinalysis Basic - ( 21 Dec 2023 19:00 )    Color: x / Appearance: x / SG: x / pH: x  Gluc: 108 mg/dL / Ketone: x  / Bili: x / Urobili: x   Blood: x / Protein: x / Nitrite: x   Leuk Esterase: x / RBC: x / WBC x   Sq Epi: x / Non Sq Epi: x / Bacteria: x    ASSESSMENT AND PLAN:   SOB.  Bilateral PE.  Pneumonia.  Suspect Atelectasis.  Hyponatremia.  Lupus    SPO2 95% on room air at rest. No distress noted.  Changed to Eliquis.  Continue antibiotics, will get procalcitonin in am.  Encourage Incentive spirometry.  Discussed with mom at bed side.    12/20/23:  Awake, responsive and comfortable. Temperature up to 101, SPO2 94% on room air.  Seen by ID, Now on Zosyn and Zithromax.  Dr. Magallanes discussed with Hematology, no evidence of Anti phospholipid syndrome, so apixaban continued.    12/21/23: Temperature spike to 102.8 noted.  Feeling sick.    Bedside US shows large left septated effusion anterior, lateral and posterior.  Being transferred to [facility] for left pigtail/VATs.  Continue O2 and antibiotics.  Discussed with mother at bed side.         INTERVAL HPI:  28 years old male with Lupus ( diagnosed in 09/2023, on Plaquenil 400mg bid).   Presented to ED with complain of chest pain and SOB which started 3 days ago. Pain progressively worsened, associated with SOB and cough. Was seen in [facility] ED day prior. told has Rhinovirus  was prescribed antibiotics.   Came with worsening symptoms, also had fever, chills and cough.  Admitted with bilateral PE.   Hospital course complicated with pneumonia and hyponatremia.  Denies tobacco or substance abuse    OVERNIGHT EVENTS:  Temperature spike to 102.8 noted.  Feeling sick.    Bedside US shows large left septated effusion anterior, lateral and posterior.    Vital Signs Last 24 Hrs  T(C): 37.5 (21 Dec 2023 19:26), Max: 39.4 (20 Dec 2023 23:39)  T(F): 99.5 (21 Dec 2023 19:26), Max: 102.9 (20 Dec 2023 23:39)  HR: 93 (21 Dec 2023 15:59) (84 - 98)  BP: 163/86 (21 Dec 2023 15:59) (150/81 - 163/86)  BP(mean): --  RR: 18 (21 Dec 2023 15:59) (18 - 18)  SpO2: 92% (21 Dec 2023 15:59) (92% - 96%)    Parameters below as of 21 Dec 2023 15:59  Patient On (Oxygen Delivery Method): room air    PHYSICAL EXAM:  GEN:         Awake, responsive.  HEENT:    Normal.    RESP:       no distress  CVS:          Regular rate and rhythm.     MEDICATIONS  (STANDING):  hydroxychloroquine 200 milliGRAM(s) Oral two times a day  lactobacillus acidophilus 1 Tablet(s) Oral three times a day with meals  lidocaine   4% Patch 1 Patch Transdermal daily  piperacillin/tazobactam IVPB.. 3.375 Gram(s) IV Intermittent every 8 hours    MEDICATIONS  (PRN):  acetaminophen     Tablet .. 650 milliGRAM(s) Oral every 6 hours PRN Temp greater or equal to 38C (100.4F), Mild Pain (1 - 3)  albuterol/ipratropium for Nebulization 3 milliLiter(s) Nebulizer every 6 hours PRN Shortness of Breath and/or Wheezing  guaifenesin/dextromethorphan Oral Liquid 10 milliLiter(s) Oral every 4 hours PRN Cough  melatonin 3 milliGRAM(s) Oral at bedtime PRN Insomnia    LABS:                        9.0    10.04 )-----------( 219      ( 21 Dec 2023 19:00 )             26.3     12-21    127<L>  |  98  |  9   ----------------------------<  108<H>  4.0   |  22  |  0.99    Ca    7.8<L>      21 Dec 2023 19:00  Phos  2.8     12-21  Mg     1.9     12-21    TPro  6.7  /  Alb  1.7<L>  /  TBili  0.7  /  DBili  x   /  AST  85<H>  /  ALT  97<H>  /  AlkPhos  72  12-21    Urinalysis Basic - ( 21 Dec 2023 19:00 )    Color: x / Appearance: x / SG: x / pH: x  Gluc: 108 mg/dL / Ketone: x  / Bili: x / Urobili: x   Blood: x / Protein: x / Nitrite: x   Leuk Esterase: x / RBC: x / WBC x   Sq Epi: x / Non Sq Epi: x / Bacteria: x    ASSESSMENT AND PLAN:   SOB.  Bilateral PE.  Pneumonia.  Suspect Atelectasis.  Hyponatremia.  Lupus    SPO2 95% on room air at rest. No distress noted.  Changed to Eliquis.  Continue antibiotics, will get procalcitonin in am.  Encourage Incentive spirometry.  Discussed with mom at bed side.    12/20/23:  Awake, responsive and comfortable. Temperature up to 101, SPO2 94% on room air.  Seen by ID, Now on Zosyn and Zithromax.  Dr. Magallanes discussed with Hematology, no evidence of Anti phospholipid syndrome, so apixaban continued.    12/21/23: Temperature spike to 102.8 noted.  Feeling sick.    Bedside US shows large left septated effusion anterior, lateral and posterior.  Being transferred to [facility] for left pigtail/VATs.  Continue O2 and antibiotics.  Discussed with mother at bed side.

## 2023-12-21 NOTE — CONSULT NOTE ADULT - REASON FOR ADMISSION
acute pulmonary embolism
PE/SOB

## 2023-12-21 NOTE — CONSULT NOTE ADULT - CONSULT REQUESTED DATE/TIME
12-Dec-2023 22:15
20-Dec-2023 12:16
21-Dec-2023 18:00
13-Dec-2023 17:33
12-Dec-2023 13:00
19-Dec-2023 19:09
21-Dec-2023 08:47

## 2023-12-22 ENCOUNTER — INPATIENT (INPATIENT)
Facility: HOSPITAL | Age: 28
LOS: 37 days | Discharge: HOME | End: 2024-01-29
Attending: INTERNAL MEDICINE | Admitting: INTERNAL MEDICINE
Payer: MEDICAID

## 2023-12-22 VITALS
RESPIRATION RATE: 18 BRPM | TEMPERATURE: 99 F | WEIGHT: 165.79 LBS | OXYGEN SATURATION: 92 % | HEART RATE: 92 BPM | SYSTOLIC BLOOD PRESSURE: 148 MMHG | DIASTOLIC BLOOD PRESSURE: 89 MMHG

## 2023-12-22 VITALS
TEMPERATURE: 102 F | OXYGEN SATURATION: 96 % | HEART RATE: 92 BPM | WEIGHT: 152.56 LBS | RESPIRATION RATE: 20 BRPM | SYSTOLIC BLOOD PRESSURE: 161 MMHG | DIASTOLIC BLOOD PRESSURE: 92 MMHG | HEIGHT: 65 IN

## 2023-12-22 DIAGNOSIS — I26.99 OTHER PULMONARY EMBOLISM WITHOUT ACUTE COR PULMONALE: ICD-10-CM

## 2023-12-22 DIAGNOSIS — J90 PLEURAL EFFUSION, NOT ELSEWHERE CLASSIFIED: ICD-10-CM

## 2023-12-22 LAB
ALP SERPL-CCNC: 83 U/L — SIGNIFICANT CHANGE UP (ref 40–120)
ALP SERPL-CCNC: 83 U/L — SIGNIFICANT CHANGE UP (ref 40–120)
ALT FLD-CCNC: 111 U/L — HIGH (ref 12–78)
ALT FLD-CCNC: 111 U/L — HIGH (ref 12–78)
ANION GAP SERPL CALC-SCNC: 7 MMOL/L — SIGNIFICANT CHANGE UP (ref 5–17)
ANION GAP SERPL CALC-SCNC: 7 MMOL/L — SIGNIFICANT CHANGE UP (ref 5–17)
APTT BLD: 34.7 SEC — SIGNIFICANT CHANGE UP (ref 24.5–35.6)
APTT BLD: 34.7 SEC — SIGNIFICANT CHANGE UP (ref 24.5–35.6)
APTT BLD: 48.3 SEC — HIGH (ref 24.5–35.6)
APTT BLD: 48.3 SEC — HIGH (ref 24.5–35.6)
AST SERPL-CCNC: 104 U/L — HIGH (ref 15–37)
AST SERPL-CCNC: 104 U/L — HIGH (ref 15–37)
BILIRUB SERPL-MCNC: 0.7 MG/DL — SIGNIFICANT CHANGE UP (ref 0.2–1.2)
BILIRUB SERPL-MCNC: 0.7 MG/DL — SIGNIFICANT CHANGE UP (ref 0.2–1.2)
BUN SERPL-MCNC: 14 MG/DL — SIGNIFICANT CHANGE UP (ref 7–23)
BUN SERPL-MCNC: 14 MG/DL — SIGNIFICANT CHANGE UP (ref 7–23)
CALCIUM SERPL-MCNC: 8.4 MG/DL — LOW (ref 8.5–10.1)
CALCIUM SERPL-MCNC: 8.4 MG/DL — LOW (ref 8.5–10.1)
CHLORIDE SERPL-SCNC: 99 MMOL/L — SIGNIFICANT CHANGE UP (ref 96–108)
CHLORIDE SERPL-SCNC: 99 MMOL/L — SIGNIFICANT CHANGE UP (ref 96–108)
CO2 SERPL-SCNC: 22 MMOL/L — SIGNIFICANT CHANGE UP (ref 22–31)
CO2 SERPL-SCNC: 22 MMOL/L — SIGNIFICANT CHANGE UP (ref 22–31)
CREAT SERPL-MCNC: 1.13 MG/DL — SIGNIFICANT CHANGE UP (ref 0.5–1.3)
CREAT SERPL-MCNC: 1.13 MG/DL — SIGNIFICANT CHANGE UP (ref 0.5–1.3)
CULTURE RESULTS: ABNORMAL
CULTURE RESULTS: ABNORMAL
EGFR: 90 ML/MIN/1.73M2 — SIGNIFICANT CHANGE UP
EGFR: 90 ML/MIN/1.73M2 — SIGNIFICANT CHANGE UP
GLUCOSE SERPL-MCNC: 137 MG/DL — HIGH (ref 70–99)
GLUCOSE SERPL-MCNC: 137 MG/DL — HIGH (ref 70–99)
GRAM STN FLD: ABNORMAL
GRAM STN FLD: ABNORMAL
HCT VFR BLD CALC: 27.5 % — LOW (ref 39–50)
HCT VFR BLD CALC: 27.5 % — LOW (ref 39–50)
HCT VFR BLD CALC: 29.2 % — LOW (ref 39–50)
HCT VFR BLD CALC: 29.2 % — LOW (ref 39–50)
HGB BLD-MCNC: 9.4 G/DL — LOW (ref 13–17)
HGB BLD-MCNC: 9.4 G/DL — LOW (ref 13–17)
HGB BLD-MCNC: 9.8 G/DL — LOW (ref 13–17)
HGB BLD-MCNC: 9.8 G/DL — LOW (ref 13–17)
INR BLD: 2.33 RATIO — HIGH (ref 0.85–1.18)
INR BLD: 2.33 RATIO — HIGH (ref 0.85–1.18)
MAGNESIUM SERPL-MCNC: 1.8 MG/DL — SIGNIFICANT CHANGE UP (ref 1.6–2.6)
MAGNESIUM SERPL-MCNC: 1.8 MG/DL — SIGNIFICANT CHANGE UP (ref 1.6–2.6)
MCHC RBC-ENTMCNC: 30.3 PG — SIGNIFICANT CHANGE UP (ref 27–34)
MCHC RBC-ENTMCNC: 30.3 PG — SIGNIFICANT CHANGE UP (ref 27–34)
MCHC RBC-ENTMCNC: 30.9 PG — SIGNIFICANT CHANGE UP (ref 27–34)
MCHC RBC-ENTMCNC: 30.9 PG — SIGNIFICANT CHANGE UP (ref 27–34)
MCHC RBC-ENTMCNC: 33.6 G/DL — SIGNIFICANT CHANGE UP (ref 32–36)
MCHC RBC-ENTMCNC: 33.6 G/DL — SIGNIFICANT CHANGE UP (ref 32–36)
MCHC RBC-ENTMCNC: 34.2 G/DL — SIGNIFICANT CHANGE UP (ref 32–36)
MCHC RBC-ENTMCNC: 34.2 G/DL — SIGNIFICANT CHANGE UP (ref 32–36)
MCV RBC AUTO: 90.4 FL — SIGNIFICANT CHANGE UP (ref 80–100)
MCV RBC AUTO: 90.4 FL — SIGNIFICANT CHANGE UP (ref 80–100)
MCV RBC AUTO: 90.5 FL — SIGNIFICANT CHANGE UP (ref 80–100)
MCV RBC AUTO: 90.5 FL — SIGNIFICANT CHANGE UP (ref 80–100)
NRBC # BLD: 0 /100 WBCS — SIGNIFICANT CHANGE UP (ref 0–0)
PLATELET # BLD AUTO: 261 K/UL — SIGNIFICANT CHANGE UP (ref 150–400)
PLATELET # BLD AUTO: 261 K/UL — SIGNIFICANT CHANGE UP (ref 150–400)
PLATELET # BLD AUTO: 266 K/UL — SIGNIFICANT CHANGE UP (ref 150–400)
PLATELET # BLD AUTO: 266 K/UL — SIGNIFICANT CHANGE UP (ref 150–400)
POTASSIUM SERPL-MCNC: 4.1 MMOL/L — SIGNIFICANT CHANGE UP (ref 3.5–5.3)
POTASSIUM SERPL-MCNC: 4.1 MMOL/L — SIGNIFICANT CHANGE UP (ref 3.5–5.3)
POTASSIUM SERPL-SCNC: 4.1 MMOL/L — SIGNIFICANT CHANGE UP (ref 3.5–5.3)
POTASSIUM SERPL-SCNC: 4.1 MMOL/L — SIGNIFICANT CHANGE UP (ref 3.5–5.3)
PROTHROM AB SERPL-ACNC: 27.1 SEC — HIGH (ref 9.5–13)
PROTHROM AB SERPL-ACNC: 27.1 SEC — HIGH (ref 9.5–13)
RBC # BLD: 3.04 M/UL — LOW (ref 4.2–5.8)
RBC # BLD: 3.04 M/UL — LOW (ref 4.2–5.8)
RBC # BLD: 3.23 M/UL — LOW (ref 4.2–5.8)
RBC # BLD: 3.23 M/UL — LOW (ref 4.2–5.8)
RBC # FLD: 14 % — SIGNIFICANT CHANGE UP (ref 10.3–14.5)
RBC # FLD: 14 % — SIGNIFICANT CHANGE UP (ref 10.3–14.5)
RBC # FLD: 14.3 % — SIGNIFICANT CHANGE UP (ref 10.3–14.5)
RBC # FLD: 14.3 % — SIGNIFICANT CHANGE UP (ref 10.3–14.5)
SODIUM SERPL-SCNC: 128 MMOL/L — LOW (ref 135–145)
SODIUM SERPL-SCNC: 128 MMOL/L — LOW (ref 135–145)
SPECIMEN SOURCE: SIGNIFICANT CHANGE UP
SPECIMEN SOURCE: SIGNIFICANT CHANGE UP
WBC # BLD: 10.38 K/UL — SIGNIFICANT CHANGE UP (ref 3.8–10.5)
WBC # BLD: 10.38 K/UL — SIGNIFICANT CHANGE UP (ref 3.8–10.5)
WBC # BLD: 8.1 K/UL — SIGNIFICANT CHANGE UP (ref 3.8–10.5)
WBC # BLD: 8.1 K/UL — SIGNIFICANT CHANGE UP (ref 3.8–10.5)
WBC # FLD AUTO: 10.38 K/UL — SIGNIFICANT CHANGE UP (ref 3.8–10.5)
WBC # FLD AUTO: 10.38 K/UL — SIGNIFICANT CHANGE UP (ref 3.8–10.5)
WBC # FLD AUTO: 8.1 K/UL — SIGNIFICANT CHANGE UP (ref 3.8–10.5)
WBC # FLD AUTO: 8.1 K/UL — SIGNIFICANT CHANGE UP (ref 3.8–10.5)

## 2023-12-22 PROCEDURE — 99232 SBSQ HOSP IP/OBS MODERATE 35: CPT

## 2023-12-22 PROCEDURE — 99233 SBSQ HOSP IP/OBS HIGH 50: CPT

## 2023-12-22 RX ORDER — KETOROLAC TROMETHAMINE 30 MG/ML
30 SYRINGE (ML) INJECTION ONCE
Refills: 0 | Status: DISCONTINUED | OUTPATIENT
Start: 2023-12-22 | End: 2023-12-22

## 2023-12-22 RX ORDER — HEPARIN SODIUM 5000 [USP'U]/ML
6000 INJECTION INTRAVENOUS; SUBCUTANEOUS EVERY 6 HOURS
Refills: 0 | Status: DISCONTINUED | OUTPATIENT
Start: 2023-12-22 | End: 2023-12-22

## 2023-12-22 RX ORDER — IBUPROFEN 200 MG
400 TABLET ORAL ONCE
Refills: 0 | Status: COMPLETED | OUTPATIENT
Start: 2023-12-22 | End: 2023-12-22

## 2023-12-22 RX ORDER — HEPARIN SODIUM 5000 [USP'U]/ML
3000 INJECTION INTRAVENOUS; SUBCUTANEOUS EVERY 6 HOURS
Refills: 0 | Status: DISCONTINUED | OUTPATIENT
Start: 2023-12-22 | End: 2023-12-22

## 2023-12-22 RX ORDER — DIPHENHYDRAMINE HCL 50 MG
25 CAPSULE ORAL ONCE
Refills: 0 | Status: COMPLETED | OUTPATIENT
Start: 2023-12-22 | End: 2023-12-22

## 2023-12-22 RX ORDER — SODIUM CHLORIDE 9 MG/ML
2 INJECTION INTRAMUSCULAR; INTRAVENOUS; SUBCUTANEOUS EVERY 8 HOURS
Refills: 0 | Status: COMPLETED | OUTPATIENT
Start: 2023-12-22 | End: 2023-12-23

## 2023-12-22 RX ORDER — DEXAMETHASONE 0.5 MG/5ML
6 ELIXIR ORAL ONCE
Refills: 0 | Status: COMPLETED | OUTPATIENT
Start: 2023-12-22 | End: 2023-12-22

## 2023-12-22 RX ORDER — KETOROLAC TROMETHAMINE 30 MG/ML
15 SYRINGE (ML) INJECTION EVERY 8 HOURS
Refills: 0 | Status: DISCONTINUED | OUTPATIENT
Start: 2023-12-22 | End: 2023-12-22

## 2023-12-22 RX ORDER — PIPERACILLIN AND TAZOBACTAM 4; .5 G/20ML; G/20ML
3.38 INJECTION, POWDER, LYOPHILIZED, FOR SOLUTION INTRAVENOUS EVERY 8 HOURS
Refills: 0 | Status: DISCONTINUED | OUTPATIENT
Start: 2023-12-22 | End: 2023-12-25

## 2023-12-22 RX ORDER — HYDROXYCHLOROQUINE SULFATE 200 MG
200 TABLET ORAL
Refills: 0 | Status: DISCONTINUED | OUTPATIENT
Start: 2023-12-22 | End: 2024-01-29

## 2023-12-22 RX ORDER — MORPHINE SULFATE 50 MG/1
2 CAPSULE, EXTENDED RELEASE ORAL ONCE
Refills: 0 | Status: DISCONTINUED | OUTPATIENT
Start: 2023-12-22 | End: 2023-12-22

## 2023-12-22 RX ORDER — CIPROFLOXACIN HCL 0.3 %
2 DROPS OPHTHALMIC (EYE)
Refills: 0 | Status: DISCONTINUED | OUTPATIENT
Start: 2023-12-22 | End: 2024-01-26

## 2023-12-22 RX ORDER — ACETAMINOPHEN 500 MG
650 TABLET ORAL EVERY 6 HOURS
Refills: 0 | Status: DISCONTINUED | OUTPATIENT
Start: 2023-12-22 | End: 2023-12-24

## 2023-12-22 RX ORDER — FUROSEMIDE 40 MG
20 TABLET ORAL DAILY
Refills: 0 | Status: DISCONTINUED | OUTPATIENT
Start: 2023-12-22 | End: 2023-12-22

## 2023-12-22 RX ORDER — HEPARIN SODIUM 5000 [USP'U]/ML
1500 INJECTION INTRAVENOUS; SUBCUTANEOUS
Qty: 25000 | Refills: 0 | Status: DISCONTINUED | OUTPATIENT
Start: 2023-12-22 | End: 2023-12-23

## 2023-12-22 RX ORDER — LIDOCAINE 4 G/100G
5 CREAM TOPICAL THREE TIMES A DAY
Refills: 0 | Status: DISCONTINUED | OUTPATIENT
Start: 2023-12-22 | End: 2024-01-29

## 2023-12-22 RX ORDER — PIPERACILLIN AND TAZOBACTAM 4; .5 G/20ML; G/20ML
3.38 INJECTION, POWDER, LYOPHILIZED, FOR SOLUTION INTRAVENOUS EVERY 8 HOURS
Refills: 0 | Status: DISCONTINUED | OUTPATIENT
Start: 2023-12-22 | End: 2023-12-22

## 2023-12-22 RX ORDER — LIDOCAINE 4 G/100G
1 CREAM TOPICAL DAILY
Refills: 0 | Status: DISCONTINUED | OUTPATIENT
Start: 2023-12-22 | End: 2023-12-27

## 2023-12-22 RX ORDER — CIPROFLOXACIN HCL 0.3 %
2 DROPS OPHTHALMIC (EYE)
Refills: 0 | Status: DISCONTINUED | OUTPATIENT
Start: 2023-12-22 | End: 2023-12-22

## 2023-12-22 RX ORDER — HEPARIN SODIUM 5000 [USP'U]/ML
INJECTION INTRAVENOUS; SUBCUTANEOUS
Qty: 25000 | Refills: 0 | Status: DISCONTINUED | OUTPATIENT
Start: 2023-12-22 | End: 2023-12-22

## 2023-12-22 RX ORDER — OXYCODONE AND ACETAMINOPHEN 5; 325 MG/1; MG/1
2 TABLET ORAL EVERY 6 HOURS
Refills: 0 | Status: DISCONTINUED | OUTPATIENT
Start: 2023-12-22 | End: 2023-12-22

## 2023-12-22 RX ORDER — LACTOBACILLUS ACIDOPHILUS 100MM CELL
1 CAPSULE ORAL
Refills: 0 | Status: DISCONTINUED | OUTPATIENT
Start: 2023-12-22 | End: 2023-12-25

## 2023-12-22 RX ORDER — FUROSEMIDE 40 MG
2 TABLET ORAL DAILY
Refills: 0 | Status: DISCONTINUED | OUTPATIENT
Start: 2023-12-22 | End: 2023-12-22

## 2023-12-22 RX ORDER — IPRATROPIUM/ALBUTEROL SULFATE 18-103MCG
3 AEROSOL WITH ADAPTER (GRAM) INHALATION EVERY 6 HOURS
Refills: 0 | Status: DISCONTINUED | OUTPATIENT
Start: 2023-12-22 | End: 2024-01-29

## 2023-12-22 RX ORDER — SODIUM CHLORIDE 9 MG/ML
2 INJECTION INTRAMUSCULAR; INTRAVENOUS; SUBCUTANEOUS EVERY 8 HOURS
Refills: 0 | Status: DISCONTINUED | OUTPATIENT
Start: 2023-12-22 | End: 2023-12-22

## 2023-12-22 RX ORDER — LIDOCAINE 4 G/100G
5 CREAM TOPICAL THREE TIMES A DAY
Refills: 0 | Status: DISCONTINUED | OUTPATIENT
Start: 2023-12-22 | End: 2023-12-22

## 2023-12-22 RX ADMIN — PIPERACILLIN AND TAZOBACTAM 25 GRAM(S): 4; .5 INJECTION, POWDER, LYOPHILIZED, FOR SOLUTION INTRAVENOUS at 13:45

## 2023-12-22 RX ADMIN — HEPARIN SODIUM 3000 UNIT(S): 5000 INJECTION INTRAVENOUS; SUBCUTANEOUS at 17:13

## 2023-12-22 RX ADMIN — Medication 6 MILLIGRAM(S): at 17:06

## 2023-12-22 RX ADMIN — SODIUM CHLORIDE 2 GRAM(S): 9 INJECTION INTRAMUSCULAR; INTRAVENOUS; SUBCUTANEOUS at 11:53

## 2023-12-22 RX ADMIN — Medication 650 MILLIGRAM(S): at 23:14

## 2023-12-22 RX ADMIN — Medication 20 MILLIGRAM(S): at 11:53

## 2023-12-22 RX ADMIN — Medication 1 TABLET(S): at 19:17

## 2023-12-22 RX ADMIN — Medication 400 MILLIGRAM(S): at 06:19

## 2023-12-22 RX ADMIN — Medication 200 MILLIGRAM(S): at 05:57

## 2023-12-22 RX ADMIN — Medication 650 MILLIGRAM(S): at 16:09

## 2023-12-22 RX ADMIN — SODIUM CHLORIDE 2 GRAM(S): 9 INJECTION INTRAMUSCULAR; INTRAVENOUS; SUBCUTANEOUS at 13:50

## 2023-12-22 RX ADMIN — Medication 1 TABLET(S): at 09:16

## 2023-12-22 RX ADMIN — Medication 650 MILLIGRAM(S): at 23:39

## 2023-12-22 RX ADMIN — Medication 25 MILLIGRAM(S): at 19:17

## 2023-12-22 RX ADMIN — HEPARIN SODIUM 1500 UNIT(S)/HR: 5000 INJECTION INTRAVENOUS; SUBCUTANEOUS at 19:36

## 2023-12-22 RX ADMIN — HEPARIN SODIUM 1300 UNIT(S)/HR: 5000 INJECTION INTRAVENOUS; SUBCUTANEOUS at 09:19

## 2023-12-22 RX ADMIN — HEPARIN SODIUM 1500 UNIT(S)/HR: 5000 INJECTION INTRAVENOUS; SUBCUTANEOUS at 18:07

## 2023-12-22 RX ADMIN — Medication 30 MILLIGRAM(S): at 09:31

## 2023-12-22 RX ADMIN — Medication 30 MILLIGRAM(S): at 08:55

## 2023-12-22 RX ADMIN — Medication 650 MILLIGRAM(S): at 05:50

## 2023-12-22 RX ADMIN — HEPARIN SODIUM 1500 UNIT(S)/HR: 5000 INJECTION INTRAVENOUS; SUBCUTANEOUS at 23:24

## 2023-12-22 RX ADMIN — HEPARIN SODIUM 1500 UNIT(S)/HR: 5000 INJECTION INTRAVENOUS; SUBCUTANEOUS at 17:09

## 2023-12-22 RX ADMIN — Medication 1 TABLET(S): at 11:52

## 2023-12-22 RX ADMIN — Medication 3 MILLIGRAM(S): at 03:31

## 2023-12-22 RX ADMIN — PIPERACILLIN AND TAZOBACTAM 25 GRAM(S): 4; .5 INJECTION, POWDER, LYOPHILIZED, FOR SOLUTION INTRAVENOUS at 05:57

## 2023-12-22 RX ADMIN — Medication 200 MILLIGRAM(S): at 19:17

## 2023-12-22 RX ADMIN — Medication 650 MILLIGRAM(S): at 04:30

## 2023-12-22 NOTE — PROGRESS NOTE ADULT - SUBJECTIVE AND OBJECTIVE BOX
Patient is a 28y old  Male who presents with a chief complaint of acute pulmonary embolism (22 Dec 2023 14:04)    INTERVAL HPI/OVERNIGHT EVENTS:    MEDICATIONS  (STANDING):  ciprofloxacin  0.3% Ophthalmic Solution for Otic Use 2 Drop(s) Both Ears two times a day  heparin  Infusion.  Unit(s)/Hr (13 mL/Hr) IV Continuous <Continuous>  hydroxychloroquine 200 milliGRAM(s) Oral two times a day  lactobacillus acidophilus 1 Tablet(s) Oral three times a day with meals  lidocaine   4% Patch 1 Patch Transdermal daily  piperacillin/tazobactam IVPB.. 3.375 Gram(s) IV Intermittent every 8 hours  sodium chloride 2 Gram(s) Oral every 8 hours    MEDICATIONS  (PRN):  acetaminophen     Tablet .. 650 milliGRAM(s) Oral every 6 hours PRN Temp greater or equal to 38C (100.4F), Mild Pain (1 - 3)  albuterol/ipratropium for Nebulization 3 milliLiter(s) Nebulizer every 6 hours PRN Shortness of Breath and/or Wheezing  guaifenesin/dextromethorphan Oral Liquid 10 milliLiter(s) Oral every 4 hours PRN Cough  heparin   Injectable 3000 Unit(s) IV Push every 6 hours PRN For aPTT between 40 - 57  heparin   Injectable 6000 Unit(s) IV Push every 6 hours PRN For aPTT less than 40  lidocaine 2% Viscous 5 milliLiter(s) Swish and Spit three times a day PRN Mouth Care  melatonin 3 milliGRAM(s) Oral at bedtime PRN Insomnia    Allergies    No Known Allergies    Intolerances      REVIEW OF SYSTEMS:  All other systems reviewed and are negative    Vital Signs Last 24 Hrs  T(C): 37.2 (22 Dec 2023 18:24), Max: 39.4 (22 Dec 2023 04:30)  T(F): 98.9 (22 Dec 2023 18:24), Max: 103 (22 Dec 2023 04:30)  HR: 92 (22 Dec 2023 18:24) (74 - 108)  BP: 148/89 (22 Dec 2023 18:24) (133/69 - 154/80)  BP(mean): --  RR: 18 (22 Dec 2023 18:24) (18 - 18)  SpO2: 92% (22 Dec 2023 18:24) (92% - 97%)    Parameters below as of 22 Dec 2023 18:24  Patient On (Oxygen Delivery Method): room air      Daily     Daily Weight in k.1 (22 Dec 2023 04:30)  I&O's Summary    21 Dec 2023 07:01  -  22 Dec 2023 07:00  --------------------------------------------------------  IN: 830 mL / OUT: 300 mL / NET: 530 mL    22 Dec 2023 07:01  -  22 Dec 2023 20:34  --------------------------------------------------------  IN: 292 mL / OUT: 0 mL / NET: 292 mL      CAPILLARY BLOOD GLUCOSE        PHYSICAL EXAM:  GENERAL: NAD, well-groomed, well-developed. comfortable  HEAD:  Atraumatic, Normocephalic  NECK: Supple, No JVD, Normal thyroid  NERVOUS SYSTEM:  Alert & Oriented X 4. speaks in short sentences   CHEST/LUNG: no abnormal breathe sounds   HEART: Regular rate and rhythm; No murmurs, rubs, or gallops  ABDOMEN: Soft, Nontender, Nondistended; Bowel sounds present  EXTREMITIES: no edema    Labs                          9.4    8.10  )-----------( 261      ( 22 Dec 2023 14:56 )             27.5     12-    128<L>  |  99  |  14  ----------------------------<  137<H>  4.1   |  22  |  1.13    Ca    8.4<L>      22 Dec 2023 06:50  Phos  2.8     12-  Mg     1.8         TPro  x   /  Alb  x   /  TBili  0.7  /  DBili  x   /  AST  104<H>  /  ALT  111<H>  /  AlkPhos  83  12-    PT/INR - ( 22 Dec 2023 09:15 )   PT: 27.1 sec;   INR: 2.33 ratio         PTT - ( 22 Dec 2023 14:56 )  PTT:48.3 sec      Urinalysis Basic - ( 22 Dec 2023 06:50 )    Color: x / Appearance: x / SG: x / pH: x  Gluc: 137 mg/dL / Ketone: x  / Bili: x / Urobili: x   Blood: x / Protein: x / Nitrite: x   Leuk Esterase: x / RBC: x / WBC x   Sq Epi: x / Non Sq Epi: x / Bacteria: x        Culture - Sputum (collected 20 Dec 2023 13:50)  Source: .Sputum Sputum  Gram Stain (22 Dec 2023 08:24):    Few Squamous epithelial cells per low power field    Few polymorphonuclear leukocytes per low power field    Few Gram Positive Cocci in Pairs and Chains per oil power field  Final Report (22 Dec 2023 08:24):    Normal Respiratory Inge present

## 2023-12-22 NOTE — PROVIDER CONTACT NOTE (OTHER) - REASON
If aPTT result is needed before starting heparin drip
Pt on heparin drip and 2 aPTT not therapeutic yet; aPTT being drawn q24 hours instead q6
Pt's temperature was 100.6F
Patient febrile. Complaining of 8/10 chest pain
Pt temp was 102.4F, gave Tylenol PO. After reassessment, temp is 100.8

## 2023-12-22 NOTE — H&P ADULT - HISTORY OF PRESENT ILLNESS
28 years old male with h/o Lupus ( diagnosed in 09/2023, on Plaquenil present to [place] ED on 12/12/23  with complain of chest pain and SOB. Patient reported left sided pleuritic chest pain which started 3 days ago. Pain is progressively worsened, associated with SOB and cough. Patient was seen in OSH ED and was prescribed antibiotics. Patient reported significantly worsening of left sided pleuritic chest pain today. No fever or chills. Patient reported loss of appetite and had a few episode of diarrhea for last 2 days. CTA chest with acute right upper lobe and left lower lobe segmental/subsegmental pulmonary emboli. No CT evidence of right heart strain. New bilateral lower lobe consolidations with areas of central clearing. Pneumonia and pulmonary infarcts are in the differential. New bilateral pleural effusions, small on the left and trace on the right. Bilateral axillary and supraclavicular adenopathy of unclear etiology. Patient was started on heparin ggt transitioned to eliquis on 12/19,  patient with worsening SOB/ hypoxia requiring nasal cannula oxygen supplementation. 12/20  Repeat Xray shows increased large left pleural effusion and compressive atelectasis trace right effusion and linear atelectasis. Thoracic team consulted for possible pigtail insertion Bedside US: Large left effusion with septations. Right simple effusion , + pericardial effusion- lower extremity dopplers negative for DVT,   - GI PCR + e coli  - mRSA PCR + Staph aureus   . Patient transferred to [facility] for further management.     28 years old male with h/o Lupus ( diagnosed in 09/2023, on Plaquenil present to [place] ED on 12/12/23  with complain of chest pain and SOB. Patient reported left sided pleuritic chest pain which started 3 days ago. Pain is progressively worsened, associated with SOB and cough. Patient was seen in OSH ED and was prescribed antibiotics. Patient reported significantly worsening of left sided pleuritic chest pain today. No fever or chills. Patient reported loss of appetite and had a few episode of diarrhea for last 2 days. CTA chest with acute right upper lobe and left lower lobe segmental/subsegmental pulmonary emboli. No CT evidence of right heart strain. New bilateral lower lobe consolidations with areas of central clearing. Pneumonia and pulmonary infarcts are in the differential. New bilateral pleural effusions, small on the left and trace on the right. Bilateral axillary and supraclavicular adenopathy of unclear etiology. Patient was started on heparin ggt transitioned to eliquis on 12/19,  patient with worsening SOB/ hypoxia requiring nasal cannula oxygen supplementation. 12/20  Repeat Xray shows increased large left pleural effusion and compressive atelectasis trace right effusion and linear atelectasis. Thoracic team consulted for possible pigtail insertion Bedside US: Large left effusion with septations. Right simple effusion , + pericardial effusion- lower extremity dopplers negative for DVT,   - GI PCR + e coli  - mRSA PCR + Staph aureus   . Patient transferred to [facility] for further management.

## 2023-12-22 NOTE — H&P ADULT - NSHPLABSRESULTS_GEN_ALL_CORE
(12-22 @ 14:56)                      9.4  8.10 )-----------( 261                 27.5    Neutrophils = -- (--%)  Lymphocytes = -- (--%)  Eosinophils = -- (--%)  Basophils = -- (--%)  Monocytes = -- (--%)  Bands = --%    12-22    128<L>  |  99  |  14  ----------------------------<  137<H>  4.1   |  22  |  1.13    Ca    8.4<L>      22 Dec 2023 06:50  Phos  2.8     12-21  Mg     1.8     12-22    TPro  x   /  Alb  x   /  TBili  0.7  /  DBili  x   /  AST  104<H>  /  ALT  111<H>  /  AlkPhos  83  12-22    ( 22 Dec 2023 09:15 )   PT: 27.1 sec;   INR: 2.33 ratio;       PTT:48.3 sec      RVP:(12-21 @ 18:50)  NotDetec    Urinalysis Basic - ( 22 Dec 2023 06:50 )    Color: x / Appearance: x / SG: x / pH: x  Gluc: 137 mg/dL / Ketone: x  / Bili: x / Urobili: x   Blood: x / Protein: x / Nitrite: x   Leuk Esterase: x / RBC: x / WBC x   Sq Epi: x / Non Sq Epi: x / Bacteria: x       CXR: 12/20  shows increased large left pleural effusion and compressive atelectasis trace right effusion and linear atelectasis

## 2023-12-22 NOTE — PROVIDER CONTACT NOTE (OTHER) - SITUATION
Pt is on a heparin drip at 14ml/hr. aPTT is being drawn every 24 hours, but aPTT has only one therapeutic value as of today. Therapeutic is 58-99.
Pt complained of chills. Temperature was taken and was 100.6F.
Pt spiked a fever of 102.4F. Tylenol PO was given. After reassessment of the temp, pt temp is 100.8F
patient axox4 reports chest pain 8/10 generalized worse on left side. Patient febrile to 102.8
Pt will be starting on a heparin drip this morning. As per ROCKY Benavides, aPTT just needs to get drawn and do not need the aPTT result before starting the heparin infusion.

## 2023-12-22 NOTE — PROGRESS NOTE ADULT - SUBJECTIVE AND OBJECTIVE BOX
CHIEF COMPLAINT:  ===============  CHEST PAIN  PULMONARY EMBOLISM    INTERVAL EVENTS:  ==================    - T max 103  - pending transfer to [facility] under Dr Sosa      REVIEW OF SYSTEMS:  ==================  - + fever, no chills  - no HA, no dizziness  - no visual changes, no auditory changes  - no sore throat, no sinus congestion  - +SOB, + cough  - no chest pain, no palpitations  - no abdominal pain, no N/V/D  - no dysuria, no hematuria  - no myalgias, no arthralgias  - no pain in extremity, no swelling   - no rashes, no pruritis  - no neuro deficits  - no psychiatric concerns       HPI:  28 years old male with h/o Lupus ( diagnosed in 09/2023, on Plaquenil 400mg bid) present to ED with complain of chest pain and SOB. Patient reported left sided pleuritic chest pain which started 3 days ago. Pain is progressively worsened, associated with SOB and cough. Patient was seen in OSH ED yesterday and was prescribed antibiotics. Patient reported significantly worsening of left sided pleuritic chest pain today. No fever or chills. Patient reported loss of appetite and had a few episode of diarrhea for last 2 days. No recent travel history. No family h/o clotting disorder  Hemodynamically stable, afebrile, sat well at [facility]. No leukocytosis, plt 205, ddimer 1364, Na 126, Cl 95, Cr 1.27, hsTnT 15.5, BNP 96.     CTA chest with acute right upper lobe and left lower lobe segmental/subsegmental pulmonary emboli.   No CT evidence of right heart strain.   New bilateral lower lobe consolidations with areas of central clearing. Pneumonia and pulmonary infarcts are in the differential. New bilateral pleural effusions, small on the left and trace on the right. Bilateral axillary and supraclavicular adenopathy of unclear etiology    Patient was started on heparin ggt transitioned to eliquis on 12/19,  patient with worsening SOB/ hypoxia requiring nasal cannula oxygen supplementation  12/21 Repeat Xray shows increased left Pleural effusion,   Thoracic team consulted for possible pigtail insertion   Bedside US: Large left ffusion with septations. Right simple effusion , + pericardial effusion         OBJECTIVE:  ===========    ICU Vital Signs Last 24 Hrs  T(C): 38.1 (22 Dec 2023 07:06), Max: 39.4 (22 Dec 2023 04:30)  T(F): 100.5 (22 Dec 2023 07:06), Max: 103 (22 Dec 2023 04:30)  HR: 108 (22 Dec 2023 04:30) (74 - 108)  BP: 154/80 (22 Dec 2023 04:30) (138/87 - 163/86)  RR: 18 (22 Dec 2023 04:30) (18 - 18)  SpO2: 92% (22 Dec 2023 04:30) (92% - 97%)    O2 Parameters below as of 22 Dec 2023 04:30  Patient On (Oxygen Delivery Method): room air        12-21 @ 07:01  -  12-22 @ 07:00  --------------------------------------------------------  IN: 830 mL / OUT: 300 mL / NET: 530 mL      CAPILLARY BLOOD GLUCOSE      PHYSICAL EXAM:  ==============  GENERAL: NAD, well-groomed, well-developed  HEAD:  Atraumatic, Normocephalic  EYES: EOMI, PERRLA, conjunctiva and sclera clear  ENMT: No tonsillar erythema, exudates, or enlargement; Moist mucous membranes, Good dentition, No lesions  NECK: Supple, No JVD, Normal thyroid  NERVOUS SYSTEM:  Alert & Oriented X3, Good concentration; Motor Strength 5/5 B/L upper and lower extremities; DTRs 2+ intact and symmetric  CHEST/LUNG:   HEART: Regular rate and rhythm; No murmurs, rubs, or gallops  ABDOMEN: Soft, Nontender, Nondistended; Bowel sounds present  EXTREMITIES:  2+ Peripheral Pulses, No clubbing, cyanosis, or edema  LYMPH: No lymphadenopathy noted  SKIN: No rashes or lesions        HOSPITAL MEDICATIONS:  ======================  heparin  Infusion.  Unit(s)/Hr IV Continuous <Continuous>  hydroxychloroquine 200 milliGRAM(s) Oral two times a day  albuterol/ipratropium for Nebulization 3 milliLiter(s) Nebulizer every 6 hours PRN  guaifenesin/dextromethorphan Oral Liquid 10 milliLiter(s) Oral every 4 hours PRN  acetaminophen     Tablet .. 650 milliGRAM(s) Oral every 6 hours PRN  ketorolac   Injectable 30 milliGRAM(s) IV Push once  melatonin 3 milliGRAM(s) Oral at bedtime PRN  lidocaine   4% Patch 1 Patch Transdermal daily  lactobacillus acidophilus 1 Tablet(s) Oral three times a day with meals      LABS:  =====                          9.8    10.38 )-----------( 266      ( 22 Dec 2023 06:50 )             29.2     Hgb Trend: 9.8<--, 9.0<--, 9.4<--, 9.7<--  12-22    128<L>  |  99  |  14  ----------------------------<  137<H>  4.1   |  22  |  1.13    Ca    8.4<L>      22 Dec 2023 06:50  Phos  2.8     12-21  Mg     1.8     12-22    TPro  x   /  Alb  x   /  TBili  0.7  /  DBili  x   /  AST  104<H>  /  ALT  111<H>  /  AlkPhos  83  12-22    Creatinine Trend: 1.13<--, 0.99<--, 0.96<--, 1.10<--, 1.28<--, 1.30<--    Lactate, Blood: 1.2  Lactate, Blood: 0.8  Procalcitonin, Serum: 0.80 ng/mL (12-20-23 @ 06:53)  Procalcitonin, Serum: 5.49 ng/mL (12-14-23 @ 16:45)  Procalcitonin, Serum: 16.80 ng/mL (12-12-23 @ 13:20)    Urinalysis Basic - ( 22 Dec 2023 06:50 )    Color: x / Appearance: x / SG: x / pH: x  Gluc: 137 mg/dL / Ketone: x  / Bili: x / Urobili: x   Blood: x / Protein: x / Nitrite: x   Leuk Esterase: x / RBC: x / WBC x   Sq Epi: x / Non Sq Epi: x / Bacteria: x      MICROBIOLOGY:     Culture - Sputum  Source: .Sputum Sputum  Gram Stain (prelim) (12-21-23 @ 15:04):    Few Squamous epithelial cells per low power field    Few polymorphonuclear leukocytes per low power field    Few Gram Positive Cocci in Pairs and Chains per oil power field  Preliminary Report (12-21-23 @ 15:04):    Normal Respiratory Inge present    Culture - Blood  Source: .Blood Blood-Peripheral  Final Report (12-19-23 @ 22:01):    No growth at 5 days    Culture - Blood  Source: .Blood Blood-Peripheral  Final Report (12-19-23 @ 22:01):    No growth at 5 days    Culture - Blood  Source: .Blood Blood-Peripheral  Final Report (12-18-23 @ 03:00):    No growth at 5 days    Culture - Blood  Source: .Blood Blood-Peripheral  Final Report (12-18-23 @ 03:00):    No growth at 5 days    hydroxychloroquine 200 two times a day      Legionella Antigen, Urine: Negative (12-14-23 @ 13:25)  MRSA PCR Result.: NotDetec (12-15-23 @ 05:00)  Rapid RVP Result: NotDetec (12-21-23 @ 18:50)  Rapid RVP Result: NotDetec (12-12-23 @ 15:30)      RADIOLOGY:  ==========  < from: CT Internal Auditory Canals w/ IV Cont (12.21.23 @ 18:24) >  IMPRESSION:  Right temporal bone: Normal study.  Left temporal bone: Nonspecific partial opacification of the middle ear   and mastoid air cells. No bony erosive changes indicative of mastoiditis.   Clinical correlation with otoscopy is recommended for possible infectious   etiology.    Moderate symmetrical enlargement nasopharyngeal soft tissues,   nonspecific. Inflammatory paranasal sinusdisease; please correlate   clinically for acute sinusitis.    < end of copied text >  < from: US Abdomen Complete (12.14.23 @ 09:37) >  IMPRESSION:  No specific acute pathology.  Mild hepatomegaly.  Small bilateral pleural effusions    < end of copied text >  < from: CT Angio Chest PE Protocol w/ IV Cont (12.12.23 @ 10:27) >  IMPRESSION:    Acute right upper lobe and left lower lobe segmental/subsegmental   pulmonary emboli. No CT evidence of right heart strain.    New bilateral lower lobe consolidations with areas of central clearing.   Pneumonia and pulmonary infarcts are in the differential. New bilateral   pleural effusions, small on the left and trace on the right. Follow to   resolution with repeat chest CT in one month, or sooner as clinically   warranted.    Bilateral axillary and supraclavicular adenopathy of unclear etiology.   Consider broad differential including infectious, inflammatory, and   neoplastic etiologies.     discussed these above findings with Dr. Carson Blum on   12/12/2023 at 12:33 PM, with read back.    Probable hepatic steatosis while the partially imaged liver. Correlated   with LFTs.    < end of copied text >    PULMONARY:  ============    albuterol/ipratropium for Nebulization 3 Nebulizer every 6 hours PRN  guaifenesin/dextromethorphan Oral Liquid 10 Oral every 4 hours PRN      CARDIAC:  ========  PHYSICIAN INTERPRETATION:  Left Ventricle: Normal left ventricular size and wall thicknesses, with   normal systolic and diastolic function.  Global LV systolic function was normal. Spectral Doppler shows normal   pattern of LV diastolic filling. Normal LV filling pressures.  Right Ventricle: Normal right ventricular size and function.  Left Atrium: The left atrium is normal in size.  Right Atrium: The right atrium is normal in size.  Pericardium: There is no evidence of pericardial effusion.  Mitral Valve: Structurally normal mitral valve, with normal leaflet   excursion. Trace mitral valve regurgitation is seen.  Tricuspid Valve: Structurally normal tricuspid valve, with normal leaflet   excursion. Trivial tricuspid regurgitation is visualized.  Aortic Valve: Normal trileaflet aortic valve with normal opening. No   evidence of aortic valve regurgitation is seen.  Pulmonic Valve: Structurally normal pulmonic valve, with normal leaflet   excursion. Trace pulmonic valve regurgitation.  Aorta: The aortic root and ascending aortaare structurally normal, with   no evidence of dilitation.  Pulmonary Artery: The main pulmonary artery is normal in size.      Summary:   1. Normal global left ventricular systolic function.   2. Trace mitral valve regurgitation.   CHIEF COMPLAINT:  ===============  CHEST PAIN  PULMONARY EMBOLISM    INTERVAL EVENTS:  ==================    - T max 103  - pending transfer to [facility] under Dr Sosa      REVIEW OF SYSTEMS:  ==================  - + fever, no chills  - no HA, no dizziness  - no visual changes, no auditory changes  - no sore throat, no sinus congestion  - +SOB, + cough  - no chest pain, no palpitations  - no abdominal pain, no N/V/D  - no dysuria, no hematuria  - no myalgias, no arthralgias  - no pain in extremity, no swelling   - no rashes, no pruritis  - no neuro deficits  - no psychiatric concerns       HPI:  28 years old male with h/o Lupus ( diagnosed in 09/2023, on Plaquenil 400mg bid) present to ED with complain of chest pain and SOB. Patient reported left sided pleuritic chest pain which started 3 days ago. Pain is progressively worsened, associated with SOB and cough. Patient was seen in OSH ED yesterday and was prescribed antibiotics. Patient reported significantly worsening of left sided pleuritic chest pain today. No fever or chills. Patient reported loss of appetite and had a few episode of diarrhea for last 2 days. No recent travel history. No family h/o clotting disorder  Hemodynamically stable, afebrile, sat well at [facility]. No leukocytosis, plt 205, ddimer 1364, Na 126, Cl 95, Cr 1.27, hsTnT 15.5, BNP 96.     CTA chest with acute right upper lobe and left lower lobe segmental/subsegmental pulmonary emboli.   No CT evidence of right heart strain.   New bilateral lower lobe consolidations with areas of central clearing. Pneumonia and pulmonary infarcts are in the differential. New bilateral pleural effusions, small on the left and trace on the right. Bilateral axillary and supraclavicular adenopathy of unclear etiology    Patient was started on heparin ggt transitioned to eliquis on 12/19,  patient with worsening SOB/ hypoxia requiring nasal cannula oxygen supplementation  12/21 Repeat Xray shows increased left Pleural effusion,   Thoracic team consulted for possible pigtail insertion   Bedside US: Large left ffusion with septations. Right simple effusion , + pericardial effusion         OBJECTIVE:  ===========    ICU Vital Signs Last 24 Hrs  T(C): 38.1 (22 Dec 2023 07:06), Max: 39.4 (22 Dec 2023 04:30)  T(F): 100.5 (22 Dec 2023 07:06), Max: 103 (22 Dec 2023 04:30)  HR: 108 (22 Dec 2023 04:30) (74 - 108)  BP: 154/80 (22 Dec 2023 04:30) (138/87 - 163/86)  RR: 18 (22 Dec 2023 04:30) (18 - 18)  SpO2: 92% (22 Dec 2023 04:30) (92% - 97%)    O2 Parameters below as of 22 Dec 2023 04:30  Patient On (Oxygen Delivery Method): room air        12-21 @ 07:01  -  12-22 @ 07:00  --------------------------------------------------------  IN: 830 mL / OUT: 300 mL / NET: 530 mL      CAPILLARY BLOOD GLUCOSE      PHYSICAL EXAM:  ==============  GENERAL: NAD, well-groomed, well-developed  HEAD:  Atraumatic, Normocephalic  EYES: EOMI, PERRLA, conjunctiva and sclera clear  ENMT: No tonsillar erythema, exudates, or enlargement; Moist mucous membranes, Good dentition, No lesions  NECK: Supple, No JVD, Normal thyroid  NERVOUS SYSTEM:  Alert & Oriented X3, Good concentration; Motor Strength 5/5 B/L upper and lower extremities; DTRs 2+ intact and symmetric  CHEST/LUNG:   HEART: Regular rate and rhythm; No murmurs, rubs, or gallops  ABDOMEN: Soft, Nontender, Nondistended; Bowel sounds present  EXTREMITIES:  2+ Peripheral Pulses, No clubbing, cyanosis, or edema  LYMPH: No lymphadenopathy noted  SKIN: No rashes or lesions        HOSPITAL MEDICATIONS:  ======================  heparin  Infusion.  Unit(s)/Hr IV Continuous <Continuous>  hydroxychloroquine 200 milliGRAM(s) Oral two times a day  albuterol/ipratropium for Nebulization 3 milliLiter(s) Nebulizer every 6 hours PRN  guaifenesin/dextromethorphan Oral Liquid 10 milliLiter(s) Oral every 4 hours PRN  acetaminophen     Tablet .. 650 milliGRAM(s) Oral every 6 hours PRN  ketorolac   Injectable 30 milliGRAM(s) IV Push once  melatonin 3 milliGRAM(s) Oral at bedtime PRN  lidocaine   4% Patch 1 Patch Transdermal daily  lactobacillus acidophilus 1 Tablet(s) Oral three times a day with meals      LABS:  =====                          9.8    10.38 )-----------( 266      ( 22 Dec 2023 06:50 )             29.2     Hgb Trend: 9.8<--, 9.0<--, 9.4<--, 9.7<--  12-22    128<L>  |  99  |  14  ----------------------------<  137<H>  4.1   |  22  |  1.13    Ca    8.4<L>      22 Dec 2023 06:50  Phos  2.8     12-21  Mg     1.8     12-22    TPro  x   /  Alb  x   /  TBili  0.7  /  DBili  x   /  AST  104<H>  /  ALT  111<H>  /  AlkPhos  83  12-22    Creatinine Trend: 1.13<--, 0.99<--, 0.96<--, 1.10<--, 1.28<--, 1.30<--    Lactate, Blood: 1.2  Lactate, Blood: 0.8  Procalcitonin, Serum: 0.80 ng/mL (12-20-23 @ 06:53)  Procalcitonin, Serum: 5.49 ng/mL (12-14-23 @ 16:45)  Procalcitonin, Serum: 16.80 ng/mL (12-12-23 @ 13:20)    Urinalysis Basic - ( 22 Dec 2023 06:50 )    Color: x / Appearance: x / SG: x / pH: x  Gluc: 137 mg/dL / Ketone: x  / Bili: x / Urobili: x   Blood: x / Protein: x / Nitrite: x   Leuk Esterase: x / RBC: x / WBC x   Sq Epi: x / Non Sq Epi: x / Bacteria: x      MICROBIOLOGY:     Culture - Sputum  Source: .Sputum Sputum  Gram Stain (prelim) (12-21-23 @ 15:04):    Few Squamous epithelial cells per low power field    Few polymorphonuclear leukocytes per low power field    Few Gram Positive Cocci in Pairs and Chains per oil power field  Preliminary Report (12-21-23 @ 15:04):    Normal Respiratory Inge present    Culture - Blood  Source: .Blood Blood-Peripheral  Final Report (12-19-23 @ 22:01):    No growth at 5 days    Culture - Blood  Source: .Blood Blood-Peripheral  Final Report (12-19-23 @ 22:01):    No growth at 5 days    Culture - Blood  Source: .Blood Blood-Peripheral  Final Report (12-18-23 @ 03:00):    No growth at 5 days    Culture - Blood  Source: .Blood Blood-Peripheral  Final Report (12-18-23 @ 03:00):    No growth at 5 days    hydroxychloroquine 200 two times a day      Legionella Antigen, Urine: Negative (12-14-23 @ 13:25)  MRSA PCR Result.: NotDetec (12-15-23 @ 05:00)  Rapid RVP Result: NotDetec (12-21-23 @ 18:50)  Rapid RVP Result: NotDetec (12-12-23 @ 15:30)      RADIOLOGY:  ==========  < from: CT Internal Auditory Canals w/ IV Cont (12.21.23 @ 18:24) >  IMPRESSION:  Right temporal bone: Normal study.  Left temporal bone: Nonspecific partial opacification of the middle ear   and mastoid air cells. No bony erosive changes indicative of mastoiditis.   Clinical correlation with otoscopy is recommended for possible infectious   etiology.    Moderate symmetrical enlargement nasopharyngeal soft tissues,   nonspecific. Inflammatory paranasal sinusdisease; please correlate   clinically for acute sinusitis.    < end of copied text >  < from: US Abdomen Complete (12.14.23 @ 09:37) >  IMPRESSION:  No specific acute pathology.  Mild hepatomegaly.  Small bilateral pleural effusions    < end of copied text >  < from: CT Angio Chest PE Protocol w/ IV Cont (12.12.23 @ 10:27) >  IMPRESSION:    Acute right upper lobe and left lower lobe segmental/subsegmental   pulmonary emboli. No CT evidence of right heart strain.    New bilateral lower lobe consolidations with areas of central clearing.   Pneumonia and pulmonary infarcts are in the differential. New bilateral   pleural effusions, small on the left and trace on the right. Follow to   resolution with repeat chest CT in one month, or sooner as clinically   warranted.    Bilateral axillary and supraclavicular adenopathy of unclear etiology.   Consider broad differential including infectious, inflammatory, and   neoplastic etiologies.     discussed these above findings with Dr. Carson Blum on   12/12/2023 at 12:33 PM, with read back.    Probable hepatic steatosis while the partially imaged liver. Correlated   with LFTs.    < end of copied text >    PULMONARY:  ============    albuterol/ipratropium for Nebulization 3 Nebulizer every 6 hours PRN  guaifenesin/dextromethorphan Oral Liquid 10 Oral every 4 hours PRN      CARDIAC:  ========  PHYSICIAN INTERPRETATION:  Left Ventricle: Normal left ventricular size and wall thicknesses, with   normal systolic and diastolic function.  Global LV systolic function was normal. Spectral Doppler shows normal   pattern of LV diastolic filling. Normal LV filling pressures.  Right Ventricle: Normal right ventricular size and function.  Left Atrium: The left atrium is normal in size.  Right Atrium: The right atrium is normal in size.  Pericardium: There is no evidence of pericardial effusion.  Mitral Valve: Structurally normal mitral valve, with normal leaflet   excursion. Trace mitral valve regurgitation is seen.  Tricuspid Valve: Structurally normal tricuspid valve, with normal leaflet   excursion. Trivial tricuspid regurgitation is visualized.  Aortic Valve: Normal trileaflet aortic valve with normal opening. No   evidence of aortic valve regurgitation is seen.  Pulmonic Valve: Structurally normal pulmonic valve, with normal leaflet   excursion. Trace pulmonic valve regurgitation.  Aorta: The aortic root and ascending aortaare structurally normal, with   no evidence of dilitation.  Pulmonary Artery: The main pulmonary artery is normal in size.      Summary:   1. Normal global left ventricular systolic function.   2. Trace mitral valve regurgitation.

## 2023-12-22 NOTE — PROVIDER CONTACT NOTE (OTHER) - ASSESSMENT
No distress noted. Ice packs was given to pt.
No signs of distress noted.
Pt complaining of pain; NP Makayla ordered Toradol. Toradol will be given
No signs of distress noted. Ice packs were given to pt.
102.8 oral  117 HR  130/80   20RR  95% on RA

## 2023-12-22 NOTE — CHART NOTE - NSCHARTNOTEFT_GEN_A_CORE
Pt seen and examined at bedside with NP  Brought to my attention that pt was having trouble breathing, has neck pain, unable to speak  Pt is in no acute distress  Mild erythema in the back of the throat, no tonsillitis or inflamed tonsils  Airway is patent, spo2 >90% on room air breathing comfortably  Per pt's mother at bedside, pt has history of where speaking would come and go   Per pt pain started after he took percocet  Unclear if symptoms coincide with percocet. my suspicion is low but due to pt's sick state will treat with abundance of caution and treat    PLAN  - transfer to TELE for closer monitoring  - decadron 6 mg IV x 1, benadryl for possible reaction to percocet  - CT neck with IV contrast to rule out abscess (retrophargyneal)   - no changes to abx at this time     - care plan discussed with pt and pt's mother at bedside. all questions answered

## 2023-12-22 NOTE — PROVIDER CONTACT NOTE (OTHER) - ACTION/TREATMENT ORDERED:
No actions at this time. MD Magallanes notified.
Tylenol PO given. MD Magallanes notified.
will start heparin infusion once aPTT is drawn
Morphine 2mg IV push for chest pain  IV tylenols 1000mg to be given for fever
WESLEY Marin stated to draw another aPTT in 6 hours; keep the heparin drip at 14ml/hr.

## 2023-12-22 NOTE — PROGRESS NOTE ADULT - SUBJECTIVE AND OBJECTIVE BOX
[facility]  INFECTIOUS DISEASES   [address] [place] [address]  Tel: [phone]     Fax: [phone]  ==============================================================================  MD Jim Abrams, DO Bharati Murcia, NP   ==============================================================================      TAYLA MARIE  MRN-[id number]  28y ([birth year])      Interval History:    ROS:    [ ] Unobtainable because:  [ ] All other systems negative except as noted    Constitutional: no fever, no chills  Head: no trauma  Eyes: no vision changes, no eye pain  ENT:  no sore throat, no rhinorrhea  Cardiovascular:  no chest pain, no palpitation  Respiratory:  no SOB, no cough  GI:  no abd pain, no vomiting, no diarrhea  urinary: no dysuria, no hematuria, no flank pain  musculoskeletal:  no joint pain, no joint swelling  skin:  no rash  neurology:  no headache, no seizure, no change in mental status  psych: no anxiety, no depression         Allergies  No Known Allergies        ANTIMICROBIALS:  hydroxychloroquine 200 two times a day  piperacillin/tazobactam IVPB.. 3.375 every 8 hours        Physical Exam:  Vital Signs Last 24 Hrs  T(C): 38.1 (22 Dec 2023 07:06), Max: 39.4 (22 Dec 2023 04:30)  T(F): 100.5 (22 Dec 2023 07:06), Max: 103 (22 Dec 2023 04:30)  HR: 108 (22 Dec 2023 04:30) (74 - 108)  BP: 154/80 (22 Dec 2023 04:30) (138/87 - 163/86)  BP(mean): --  RR: 18 (22 Dec 2023 04:30) (18 - 18)  SpO2: 92% (22 Dec 2023 04:30) (92% - 97%)    Parameters below as of 22 Dec 2023 04:30  Patient On (Oxygen Delivery Method): room air        12-21-23 @ 07:01  -  12-22-23 @ 07:00  --------------------------------------------------------  IN: 830 mL / OUT: 300 mL / NET: 530 mL      General:    NAD,  non toxic  Head: atraumatic, normocephalic  Eye: normal sclera and conjunctiva  ENT:    no oral lesions, neck supple  Cardio:     regular S1, S2,  no murmur  Respiratory:    clear b/l,    no wheezing  abd:     soft,   BS +,   no tenderness  :   no CVAT,  no suprapubic tenderness,   no  omer  Musculoskeletal:   no joint swelling,   no edema  vascular: no central lines, +PIV   Skin:    no rash  Neurologic:     no focal deficit  psych: normal affect    WBC Count: 10.38 K/uL (12-22 @ 06:50)  WBC Count: 10.04 K/uL (12-21 @ 19:00)  WBC Count: 12.96 K/uL (12-20 @ 06:53)  WBC Count: 14.41 K/uL (12-16 @ 05:40)                            9.8    10.38 )-----------( 266      ( 22 Dec 2023 06:50 )             29.2       12-22    128<L>  |  99  |  14  ----------------------------<  137<H>  4.1   |  22  |  1.13    Ca    8.4<L>      22 Dec 2023 06:50  Phos  2.8     12-21  Mg     1.8     12-22    TPro  x   /  Alb  x   /  TBili  0.7  /  DBili  x   /  AST  104<H>  /  ALT  111<H>  /  AlkPhos  83  12-22      Urinalysis Basic - ( 22 Dec 2023 06:50 )    Color: x / Appearance: x / SG: x / pH: x  Gluc: 137 mg/dL / Ketone: x  / Bili: x / Urobili: x   Blood: x / Protein: x / Nitrite: x   Leuk Esterase: x / RBC: x / WBC x   Sq Epi: x / Non Sq Epi: x / Bacteria: x          Creatinine Trend: 1.13<--, 0.99<--, 0.96<--, 1.10<--, 1.28<--, 1.30<--      MICROBIOLOGY:  v  .Sputum Sputum  12-20-23   Normal Respiratory Inge present  --    Few Squamous epithelial cells per low power field  Few polymorphonuclear leukocytes per low power field  Few Gram Positive Cocci in Pairs and Chains per oil power field      .Stool Feces  12-15-23   No enteric pathogens isolated.  (Stool culture examined for Salmonella,  Shigella, Campylobacter, Aeromonas, Plesiomonas,  Vibrio, E.coli O157 and Yersinia)  --  --      .Blood Blood-Peripheral  12-14-23   No growth at 5 days  --  --      .Blood Blood-Peripheral  12-14-23   No growth at 5 days  --  --      .Blood Blood-Peripheral  12-12-23   No growth at 5 days  --  --      .Blood Blood-Peripheral  12-12-23   No growth at 5 days  --  --            Rapid RVP Result: NotDetec (12-21 @ 18:50)              D-Dimer Assay, Quantitative: 1364 (12-12)    Procalcitonin, Serum: 0.80 (12-20-23 @ 06:53)    SARS-CoV-2: NotDetec (12-21-23 @ 18:50)  Rapid RVP Result: NotDetec (12-21-23 @ 18:50)        RADIOLOGY:   [facility]  INFECTIOUS DISEASES   [address] [place] [address]  Tel: [phone]     Fax: [phone]  ==============================================================================  MD Jim Abrams, DO Bharati Murcia, NP   ==============================================================================      TAYLA MARIE  MRN-[id number]  28y ([birth year])      Interval History:    ROS:    [ ] Unobtainable because:  [ ] All other systems negative except as noted    Constitutional: no fever, no chills  Head: no trauma  Eyes: no vision changes, no eye pain  ENT:  no sore throat, no rhinorrhea  Cardiovascular:  no chest pain, no palpitation  Respiratory:  no SOB, no cough  GI:  no abd pain, no vomiting, no diarrhea  urinary: no dysuria, no hematuria, no flank pain  musculoskeletal:  no joint pain, no joint swelling  skin:  no rash  neurology:  no headache, no seizure, no change in mental status  psych: no anxiety, no depression         Allergies  No Known Allergies        ANTIMICROBIALS:  hydroxychloroquine 200 two times a day  piperacillin/tazobactam IVPB.. 3.375 every 8 hours        Physical Exam:  Vital Signs Last 24 Hrs  T(C): 38.1 (22 Dec 2023 07:06), Max: 39.4 (22 Dec 2023 04:30)  T(F): 100.5 (22 Dec 2023 07:06), Max: 103 (22 Dec 2023 04:30)  HR: 108 (22 Dec 2023 04:30) (74 - 108)  BP: 154/80 (22 Dec 2023 04:30) (138/87 - 163/86)  BP(mean): --  RR: 18 (22 Dec 2023 04:30) (18 - 18)  SpO2: 92% (22 Dec 2023 04:30) (92% - 97%)    Parameters below as of 22 Dec 2023 04:30  Patient On (Oxygen Delivery Method): room air        12-21-23 @ 07:01  -  12-22-23 @ 07:00  --------------------------------------------------------  IN: 830 mL / OUT: 300 mL / NET: 530 mL      General:    NAD,  non toxic  Head: atraumatic, normocephalic  Eye: normal sclera and conjunctiva  ENT:    no oral lesions, neck supple  Cardio:     regular S1, S2,  no murmur  Respiratory:    clear b/l,    no wheezing  abd:     soft,   BS +,   no tenderness  :   no CVAT,  no suprapubic tenderness,   no  omer  Musculoskeletal:   no joint swelling,   no edema  vascular: no central lines, +PIV   Skin:    no rash  Neurologic:     no focal deficit  psych: normal affect    WBC Count: 10.38 K/uL (12-22 @ 06:50)  WBC Count: 10.04 K/uL (12-21 @ 19:00)  WBC Count: 12.96 K/uL (12-20 @ 06:53)  WBC Count: 14.41 K/uL (12-16 @ 05:40)                            9.8    10.38 )-----------( 266      ( 22 Dec 2023 06:50 )             29.2       12-22    128<L>  |  99  |  14  ----------------------------<  137<H>  4.1   |  22  |  1.13    Ca    8.4<L>      22 Dec 2023 06:50  Phos  2.8     12-21  Mg     1.8     12-22    TPro  x   /  Alb  x   /  TBili  0.7  /  DBili  x   /  AST  104<H>  /  ALT  111<H>  /  AlkPhos  83  12-22      Urinalysis Basic - ( 22 Dec 2023 06:50 )    Color: x / Appearance: x / SG: x / pH: x  Gluc: 137 mg/dL / Ketone: x  / Bili: x / Urobili: x   Blood: x / Protein: x / Nitrite: x   Leuk Esterase: x / RBC: x / WBC x   Sq Epi: x / Non Sq Epi: x / Bacteria: x          Creatinine Trend: 1.13<--, 0.99<--, 0.96<--, 1.10<--, 1.28<--, 1.30<--      MICROBIOLOGY:  v  .Sputum Sputum  12-20-23   Normal Respiratory Inge present  --    Few Squamous epithelial cells per low power field  Few polymorphonuclear leukocytes per low power field  Few Gram Positive Cocci in Pairs and Chains per oil power field      .Stool Feces  12-15-23   No enteric pathogens isolated.  (Stool culture examined for Salmonella,  Shigella, Campylobacter, Aeromonas, Plesiomonas,  Vibrio, E.coli O157 and Yersinia)  --  --      .Blood Blood-Peripheral  12-14-23   No growth at 5 days  --  --      .Blood Blood-Peripheral  12-14-23   No growth at 5 days  --  --      .Blood Blood-Peripheral  12-12-23   No growth at 5 days  --  --      .Blood Blood-Peripheral  12-12-23   No growth at 5 days  --  --            Rapid RVP Result: NotDetec (12-21 @ 18:50)              D-Dimer Assay, Quantitative: 1364 (12-12)    Procalcitonin, Serum: 0.80 (12-20-23 @ 06:53)    SARS-CoV-2: NotDetec (12-21-23 @ 18:50)  Rapid RVP Result: NotDetec (12-21-23 @ 18:50)        RADIOLOGY:   [facility]  INFECTIOUS DISEASES   [address] [place] [address]  Tel: [phone]     Fax: [phone]  ==============================================================================  MD Jim Abrams, DO Bharati Murcia, NP   ==============================================================================      TAYLA MARIE  MRN-[id number]  28y ([birth year])      Interval History:  patient seen and evaluated today.  sitting in chair watching movie on his phone.  had t-max of 103  still has some cough  denies sob at rest.  also states his left ear feels same, denies pain but states feels pressure inside ear    ROS:        Constitutional: + fever no chills  Head: no trauma  Eyes: no vision changes, no eye pain  ENT: left ear area pressure  Cardiovascular:  no chest pain, no palpitation  Respiratory:  no SOB at rest, + cough  GI:  no abd pain, no vomiting, no diarrhea  urinary: no dysuria, no hematuria, no flank pain  musculoskeletal:  no joint pain, no joint swelling  skin:  no rash  neurology:  no headache, no seizure, no change in mental status          Allergies  No Known Drug Allergies        ANTIMICROBIALS:  hydroxychloroquine 200 two times a day  piperacillin/tazobactam IVPB.. 3.375 every 8 hours        Physical Exam:  Vital Signs Last 24 Hrs  T(C): 38.1 (22 Dec 2023 07:06), Max: 39.4 (22 Dec 2023 04:30)  T(F): 100.5 (22 Dec 2023 07:06), Max: 103 (22 Dec 2023 04:30)  HR: 108 (22 Dec 2023 04:30) (74 - 108)  BP: 154/80 (22 Dec 2023 04:30) (138/87 - 163/86)  BP(mean): --  RR: 18 (22 Dec 2023 04:30) (18 - 18)  SpO2: 92% (22 Dec 2023 04:30) (92% - 97%)    Parameters below as of 22 Dec 2023 04:30  Patient On (Oxygen Delivery Method): room air        12-21-23 @ 07:01  -  12-22-23 @ 07:00  --------------------------------------------------------  IN: 830 mL / OUT: 300 mL / NET: 530 mL    Head: atraumatic, normocephalic  Eye: normal sclera and conjunctiva  ENT:    no oral lesions, neck supple  left ear no erythema, no discharge seen, TM not well visualized, no pain on ear lobe but minimal tenderness in the surrounding anterior /mastoid region .  Cardio:     regular S1, S2,  no murmur  Respiratory:    no wheezing, decreased breath sounds at bases   abd:     soft,   BS +,   no tenderness, ND  :   no CVAT,  no suprapubic tenderness  Musculoskeletal:   no joint swelling,   no edema  vascular: no central lines, +PIV   Skin:    no rash  Neurologic:     no focal deficit      WBC Count: 10.38 K/uL (12-22 @ 06:50)  WBC Count: 10.04 K/uL (12-21 @ 19:00)  WBC Count: 12.96 K/uL (12-20 @ 06:53)  WBC Count: 14.41 K/uL (12-16 @ 05:40)                            9.8    10.38 )-----------( 266      ( 22 Dec 2023 06:50 )             29.2       12-22    128<L>  |  99  |  14  ----------------------------<  137<H>  4.1   |  22  |  1.13    Ca    8.4<L>      22 Dec 2023 06:50  Phos  2.8     12-21  Mg     1.8     12-22    TPro  x   /  Alb  x   /  TBili  0.7  /  DBili  x   /  AST  104<H>  /  ALT  111<H>  /  AlkPhos  83  12-22      Urinalysis Basic - ( 22 Dec 2023 06:50 )    Color: x / Appearance: x / SG: x / pH: x  Gluc: 137 mg/dL / Ketone: x  / Bili: x / Urobili: x   Blood: x / Protein: x / Nitrite: x   Leuk Esterase: x / RBC: x / WBC x   Sq Epi: x / Non Sq Epi: x / Bacteria: x          Creatinine Trend: 1.13<--, 0.99<--, 0.96<--, 1.10<--, 1.28<--, 1.30<--      MICROBIOLOGY:  v  .Sputum Sputum  12-20-23   Normal Respiratory Inge present  --    Few Squamous epithelial cells per low power field  Few polymorphonuclear leukocytes per low power field  Few Gram Positive Cocci in Pairs and Chains per oil power field      .Stool Feces  12-15-23   No enteric pathogens isolated.  (Stool culture examined for Salmonella,  Shigella, Campylobacter, Aeromonas, Plesiomonas,  Vibrio, E.coli O157 and Yersinia)  --  --      .Blood Blood-Peripheral  12-14-23   No growth at 5 days  --  --      .Blood Blood-Peripheral  12-14-23   No growth at 5 days  --  --      .Blood Blood-Peripheral  12-12-23   No growth at 5 days  --  --      .Blood Blood-Peripheral  12-12-23   No growth at 5 days  --  --        Rapid RVP Result: NotDetec (12-21 @ 18:50)      D-Dimer Assay, Quantitative: 1364 (12-12)    Procalcitonin, Serum: 0.80 (12-20-23 @ 06:53)    SARS-CoV-2: NotDetec (12-21-23 @ 18:50)  Rapid RVP Result: NotDetec (12-21-23 @ 18:50)        RADIOLOGY:    < from: CT Internal Auditory Canals w/ IV Cont (12.21.23 @ 18:24) >  ACC: [id number] EXAM:  CT IAC IC   ORDERED BY: VICENTE JOHNSON     PROCEDURE DATE:  12/21/2023          INTERPRETATION:  HISTORY: Minimal low-grade temp and leukocytosis.   History of lupus with acute bilateral PTE as and possible pneumonia.   Evaluate left mastoid.    TECHNIQUE: CT scan of the temporal bones with contrast.  Axial thin slice images with coronal and sagittal reformations were also   obtained.  80 ml of Omnipaque 350 was administered without untoward event.  20 ml   discarded.    COMPARISON: No prior study available for comparison.    FINDINGS:  Right temporal bone:  The external auditory canal is patent. The mastoid air cells and middle   ear cavities are well aerated. The ossicles are intact. The cochlea   demonstrates a normal turn with an intact central bony modiolus. The   vestibules and semicircular canals are unremarkable. The vestibular   aqueducts are not dilated. The round and oval windows appear patent. The   course of the facial nerve is normal. The bony coursefor the carotid   artery and jugular fossa at the skullbase is unremarkable with respect to   the middle ear cavity.    Left temporal bone:  The external auditory canal is patent. Nonspecific opacification of the   left middle ear cavity and mastoid air cells. No bony erosive changes.   The ossicles are intact. The cochlea demonstrates a normal turn with an   intact central bony modiolus. The vestibules and semicircular canals are   unremarkable. The vestibular aqueducts are not dilated. The round and   oval windows appear patent. The course of the facial nerve is normal. The   bony course for the carotid artery and jugular fossa at the skullbase is   unremarkable with respect to the middle ear cavity.    Nonspecific moderate symmetrical prominence of the nasopharyngeal soft   tissues which near occlusion of the nasopharyngeal airway. Mild mucosal   thickening with foamy secretions in both maxillary sinuses and small   air-fluid level on the right. Small amount of foamy secretions posterior   right ethmoid air cell. The visualized intracranial compartment   demonstrates no acute abnormality.    IMPRESSION:  Right temporal bone: Normal study.  Left temporal bone: Nonspecific partial opacification of the middle ear   and mastoid air cells. No bony erosive changes indicative of mastoiditis.   Clinical correlation with otoscopy is recommended for possible infectious   etiology.    Moderate symmetrical enlargement nasopharyngeal soft tissues,   nonspecific. Inflammatory paranasal sinusdisease; please correlate   clinically for acute sinusitis.    --- End of Report ---            DURGA JOHNSON MD; Attending Radiologist  This document has been electronically signed. Dec 21 2023  8:01PM    < end of copied text >   [facility]  INFECTIOUS DISEASES   [address] [place] [address]  Tel: [phone]     Fax: [phone]  ==============================================================================  MD Jim Abrams, DO Bharati Murcia, NP   ==============================================================================      TAYLA MARIE  MRN-[id number]  28y ([birth year])      Interval History:  patient seen and evaluated today.  sitting in chair watching movie on his phone.  had t-max of 103  still has some cough  denies sob at rest.  also states his left ear feels same, denies pain but states feels pressure inside ear    ROS:        Constitutional: + fever no chills  Head: no trauma  Eyes: no vision changes, no eye pain  ENT: left ear area pressure  Cardiovascular:  no chest pain, no palpitation  Respiratory:  no SOB at rest, + cough  GI:  no abd pain, no vomiting, no diarrhea  urinary: no dysuria, no hematuria, no flank pain  musculoskeletal:  no joint pain, no joint swelling  skin:  no rash  neurology:  no headache, no seizure, no change in mental status          Allergies  No Known Drug Allergies        ANTIMICROBIALS:  hydroxychloroquine 200 two times a day  piperacillin/tazobactam IVPB.. 3.375 every 8 hours        Physical Exam:  Vital Signs Last 24 Hrs  T(C): 38.1 (22 Dec 2023 07:06), Max: 39.4 (22 Dec 2023 04:30)  T(F): 100.5 (22 Dec 2023 07:06), Max: 103 (22 Dec 2023 04:30)  HR: 108 (22 Dec 2023 04:30) (74 - 108)  BP: 154/80 (22 Dec 2023 04:30) (138/87 - 163/86)  BP(mean): --  RR: 18 (22 Dec 2023 04:30) (18 - 18)  SpO2: 92% (22 Dec 2023 04:30) (92% - 97%)    Parameters below as of 22 Dec 2023 04:30  Patient On (Oxygen Delivery Method): room air        12-21-23 @ 07:01  -  12-22-23 @ 07:00  --------------------------------------------------------  IN: 830 mL / OUT: 300 mL / NET: 530 mL    Head: atraumatic, normocephalic  Eye: normal sclera and conjunctiva  ENT:    no oral lesions, neck supple  left ear no erythema, no discharge seen, TM not well visualized, no pain on ear lobe but minimal tenderness in the surrounding anterior /mastoid region .  Cardio:     regular S1, S2,  no murmur  Respiratory:    no wheezing, decreased breath sounds at bases   abd:     soft,   BS +,   no tenderness, ND  :   no CVAT,  no suprapubic tenderness  Musculoskeletal:   no joint swelling,   no edema  vascular: no central lines, +PIV   Skin:    no rash  Neurologic:     no focal deficit      WBC Count: 10.38 K/uL (12-22 @ 06:50)  WBC Count: 10.04 K/uL (12-21 @ 19:00)  WBC Count: 12.96 K/uL (12-20 @ 06:53)  WBC Count: 14.41 K/uL (12-16 @ 05:40)                            9.8    10.38 )-----------( 266      ( 22 Dec 2023 06:50 )             29.2       12-22    128<L>  |  99  |  14  ----------------------------<  137<H>  4.1   |  22  |  1.13    Ca    8.4<L>      22 Dec 2023 06:50  Phos  2.8     12-21  Mg     1.8     12-22    TPro  x   /  Alb  x   /  TBili  0.7  /  DBili  x   /  AST  104<H>  /  ALT  111<H>  /  AlkPhos  83  12-22      Urinalysis Basic - ( 22 Dec 2023 06:50 )    Color: x / Appearance: x / SG: x / pH: x  Gluc: 137 mg/dL / Ketone: x  / Bili: x / Urobili: x   Blood: x / Protein: x / Nitrite: x   Leuk Esterase: x / RBC: x / WBC x   Sq Epi: x / Non Sq Epi: x / Bacteria: x          Creatinine Trend: 1.13<--, 0.99<--, 0.96<--, 1.10<--, 1.28<--, 1.30<--      MICROBIOLOGY:  v  .Sputum Sputum  12-20-23   Normal Respiratory Inge present  --    Few Squamous epithelial cells per low power field  Few polymorphonuclear leukocytes per low power field  Few Gram Positive Cocci in Pairs and Chains per oil power field      .Stool Feces  12-15-23   No enteric pathogens isolated.  (Stool culture examined for Salmonella,  Shigella, Campylobacter, Aeromonas, Plesiomonas,  Vibrio, E.coli O157 and Yersinia)  --  --      .Blood Blood-Peripheral  12-14-23   No growth at 5 days  --  --      .Blood Blood-Peripheral  12-14-23   No growth at 5 days  --  --      .Blood Blood-Peripheral  12-12-23   No growth at 5 days  --  --      .Blood Blood-Peripheral  12-12-23   No growth at 5 days  --  --        Rapid RVP Result: NotDetec (12-21 @ 18:50)      D-Dimer Assay, Quantitative: 1364 (12-12)    Procalcitonin, Serum: 0.80 (12-20-23 @ 06:53)    SARS-CoV-2: NotDetec (12-21-23 @ 18:50)  Rapid RVP Result: NotDetec (12-21-23 @ 18:50)        RADIOLOGY:    < from: CT Internal Auditory Canals w/ IV Cont (12.21.23 @ 18:24) >  ACC: [id number] EXAM:  CT IAC IC   ORDERED BY: VICENTE JOHNSON     PROCEDURE DATE:  12/21/2023          INTERPRETATION:  HISTORY: Minimal low-grade temp and leukocytosis.   History of lupus with acute bilateral PTE as and possible pneumonia.   Evaluate left mastoid.    TECHNIQUE: CT scan of the temporal bones with contrast.  Axial thin slice images with coronal and sagittal reformations were also   obtained.  80 ml of Omnipaque 350 was administered without untoward event.  20 ml   discarded.    COMPARISON: No prior study available for comparison.    FINDINGS:  Right temporal bone:  The external auditory canal is patent. The mastoid air cells and middle   ear cavities are well aerated. The ossicles are intact. The cochlea   demonstrates a normal turn with an intact central bony modiolus. The   vestibules and semicircular canals are unremarkable. The vestibular   aqueducts are not dilated. The round and oval windows appear patent. The   course of the facial nerve is normal. The bony coursefor the carotid   artery and jugular fossa at the skullbase is unremarkable with respect to   the middle ear cavity.    Left temporal bone:  The external auditory canal is patent. Nonspecific opacification of the   left middle ear cavity and mastoid air cells. No bony erosive changes.   The ossicles are intact. The cochlea demonstrates a normal turn with an   intact central bony modiolus. The vestibules and semicircular canals are   unremarkable. The vestibular aqueducts are not dilated. The round and   oval windows appear patent. The course of the facial nerve is normal. The   bony course for the carotid artery and jugular fossa at the skullbase is   unremarkable with respect to the middle ear cavity.    Nonspecific moderate symmetrical prominence of the nasopharyngeal soft   tissues which near occlusion of the nasopharyngeal airway. Mild mucosal   thickening with foamy secretions in both maxillary sinuses and small   air-fluid level on the right. Small amount of foamy secretions posterior   right ethmoid air cell. The visualized intracranial compartment   demonstrates no acute abnormality.    IMPRESSION:  Right temporal bone: Normal study.  Left temporal bone: Nonspecific partial opacification of the middle ear   and mastoid air cells. No bony erosive changes indicative of mastoiditis.   Clinical correlation with otoscopy is recommended for possible infectious   etiology.    Moderate symmetrical enlargement nasopharyngeal soft tissues,   nonspecific. Inflammatory paranasal sinusdisease; please correlate   clinically for acute sinusitis.    --- End of Report ---            DURGA JOHNSON MD; Attending Radiologist  This document has been electronically signed. Dec 21 2023  8:01PM    < end of copied text >

## 2023-12-22 NOTE — H&P ADULT - NSHPREVIEWOFSYSTEMS_GEN_ALL_CORE
REVIEW OF SYSTEMS      General:No Weight change/ Fatigue/ HA/Dizzy	    Skin/Breast: No Rashes/ Lesions/ Masses  	  Ophthalmologic: No Blurry vision/ Glaucoma/ Blindness  	  ENMT: No Hearing loss/ Drainage/ Lesions	    Respiratory and Thorax:+ Cough/ SOB  	  Cardiovascular: +Chest pain    Gastrointestinal: No Nausea/ Vomiting/ Constipation/ +Loss of Appetite 	    Genitourinary: No Heamturia/ Dysuria/ Frequency change/ Impotence	    Musculoskeletal: No Pain/ Weakness/ Claudication	    Neurological: No Seizures/ TIA/CVA/ Parastesias	    Psychiatric: No Dementia/ Depression/ SI/HI	    Hematology/Lymphatics: No hx of bleeding/ Edema	    Endocrine:	No Hyperglycemia/ Hypoglycemia    Allergic/Immunologic:	 No Anaphylaxis/ Intolerance/ Recent illnesses

## 2023-12-22 NOTE — CHART NOTE - NSCHARTNOTEFT_GEN_A_CORE
Transfer update;    12/22 1600: spoke to transfer center regarding pending transfer. Still no bed availability.

## 2023-12-22 NOTE — CHART NOTE - NSCHARTNOTEFT_GEN_A_CORE
To whom it may concern,  Pt Rafal Hunter- March 17,1995- has been hospitalized at [facility] since 12/10/2023-12/22/2023. His mother Michele Harrison has been by bedside today on 12/22/2023.  Please reach out to me at [place] [phone] if there are any questions.    very respectfully,  Sukhdev Cintron DO  Hospitalist To whom it may concern,  Pt Rafal Hunter- March 17,1995- has been hospitalized at [facility] since 12/10/2023-12/22/2023. His mother Michele Harrison has been by bedside today on 12/22/2023.  Please reach out to me at [place] [phone] if there are any questions.    very respectfully,  Sukhdev Cintron DO  Hospitalist

## 2023-12-22 NOTE — PROGRESS NOTE ADULT - REASON FOR ADMISSION
acute pulmonary embolism

## 2023-12-22 NOTE — PROGRESS NOTE ADULT - SUBJECTIVE AND OBJECTIVE BOX
HPI:  28 years old male with h/o Lupus ( diagnosed in 09/2023, on Plaquenil 400mg bid) present to ED with complain of chest pain and SOB. Patient reported left sided pleuritic chest pain which started 3 days ago. Pain is progressively worsened, associated with SOB and cough. Patient was seen in OSH ED yesterday and was prescribed antibiotics. Patient reported significantly worsening of left sided pleuritic chest pain today. No fever or chills. Patient reported loss of appetite and had a few episode of diarrhea for last 2 days. No recent travel history. No family h/o clotting disorder  Hemodynamically stable, afebrile, sat well at [facility]. No leukocytosis, plt 205, ddimer 1364, Na 126, Cl 95, Cr 1.27, hsTnT 15.5, BNP 96. CTA chest with acute right upper lobe and left lower lobe segmental/subsegmental pulmonary emboli. No CT evidence of right heart strain. New bilateral lower lobe consolidations with areas of central clearing. Pneumonia and pulmonary infarcts are in the differential. New bilateral pleural effusions, small on the left and trace on the right. Bilateral axillary and supraclavicular adenopathy of unclear etiology    SH: no toxic habits  FH: no family h/o autoimmune or clotting disorder (12 Dec 2023 13:27)      PAST MEDICAL & SURGICAL HISTORY:  No pertinent past medical history    REVIEW OF SYSTEMS  General:No Weight change/ Fatigue/ HA/Dizzy	  Skin/Breast: No Rashes/ Lesions/ Masses  Ophthalmologic: No Blurry vision/ Glaucoma/ Blindness	  ENMT: No Hearing loss/ Drainage/ Lesions	  Respiratory and Thorax: + cough, +left sided chest pain with inspriation	  Cardiovascular: No Chest pain/ Palpitations/ Diaphoresis	  Gastrointestinal: No Nausea/ Vomiting/ Constipation/ Appetite Change	  Genitourinary: No Heamturia/ Dysuria/ Frequency change/ Impotence	  Musculoskeletal: No Pain/ Weakness/ Claudication	  Neurological: No Seizures/ TIA/CVA/ Parastesias	  Psychiatric: No Dementia/ Depression/ SI/HI	  Hematology/Lymphatics: No hx of bleeding/ Edema	  Allergic/Immunologic:	 No Anaphylaxis/ Intolerance/ Recent illnesses    MEDICATIONS  (STANDING):  heparin  Infusion.  Unit(s)/Hr (13 mL/Hr) IV Continuous <Continuous>  hydroxychloroquine 200 milliGRAM(s) Oral two times a day  lactobacillus acidophilus 1 Tablet(s) Oral three times a day with meals  lidocaine   4% Patch 1 Patch Transdermal daily  piperacillin/tazobactam IVPB.. 3.375 Gram(s) IV Intermittent every 8 hours  sodium chloride 2 Gram(s) Oral every 8 hours    MEDICATIONS  (PRN):  acetaminophen     Tablet .. 650 milliGRAM(s) Oral every 6 hours PRN Temp greater or equal to 38C (100.4F), Mild Pain (1 - 3)  albuterol/ipratropium for Nebulization 3 milliLiter(s) Nebulizer every 6 hours PRN Shortness of Breath and/or Wheezing  guaifenesin/dextromethorphan Oral Liquid 10 milliLiter(s) Oral every 4 hours PRN Cough  heparin   Injectable 3000 Unit(s) IV Push every 6 hours PRN For aPTT between 40 - 57  heparin   Injectable 6000 Unit(s) IV Push every 6 hours PRN For aPTT less than 40  melatonin 3 milliGRAM(s) Oral at bedtime PRN Insomnia      Allergies  No Known Allergies      Vital Signs Last 24 Hrs  T(C): 37.5 (22 Dec 2023 13:15), Max: 39.4 (22 Dec 2023 04:30)  T(F): 99.5 (22 Dec 2023 13:15), Max: 103 (22 Dec 2023 04:30)  HR: 102 (22 Dec 2023 13:15) (74 - 108)  BP: 152/98 (22 Dec 2023 13:15) (138/87 - 163/86)  BP(mean): --  RR: 18 (22 Dec 2023 13:15) (18 - 18)  SpO2: 93% (22 Dec 2023 13:15) (92% - 97%)    Parameters below as of 22 Dec 2023 13:15  Patient On (Oxygen Delivery Method): room air    General: WN/WD NAD  Neurology: Awake, nonfocal, BROWN x 4  Eyes: Scleras clear, PERRLA/ EOMI, Gross vision intact  ENT:Gross hearing intact, grossly patent pharynx, no stridor  Neck: Neck supple, trachea midline, No JVD,   Respiratory: + coarse BS on left , CTA R   CV: RRR, S1S2, no murmurs, rubs or gallops  Abdominal: Soft, NT, ND +BS,   Extremities: No edema, + peripheral pulses  Skin: No Rashes, Hematoma, Ecchymosis  Lymphatic: No Neck, axilla, groin LAD  Psych: Oriented x 3, normal affect      LABS:                        9.8    10.38 )-----------( 266      ( 22 Dec 2023 06:50 )             29.2     12-22    128<L>  |  99  |  14  ----------------------------<  137<H>  4.1   |  22  |  1.13    Ca    8.4<L>      22 Dec 2023 06:50  Phos  2.8     12-21  Mg     1.8     12-22    TPro  x   /  Alb  x   /  TBili  0.7  /  DBili  x   /  AST  104<H>  /  ALT  111<H>  /  AlkPhos  83  12-22    PT/INR - ( 22 Dec 2023 09:15 )   PT: 27.1 sec;   INR: 2.33 ratio         PTT - ( 22 Dec 2023 09:15 )  PTT:34.7 sec  Urinalysis Basic - ( 22 Dec 2023 06:50 )    Color: x / Appearance: x / SG: x / pH: x  Gluc: 137 mg/dL / Ketone: x  / Bili: x / Urobili: x   Blood: x / Protein: x / Nitrite: x   Leuk Esterase: x / RBC: x / WBC x   Sq Epi: x / Non Sq Epi: x / Bacteria: x    RADIOLOGY & ADDITIONAL STUDIES:      ASSESSMENT:   28y Male with recent diagnosis of Lupus ( 9/2023) admitted 12/12 with B/L Pulmonary embolism. Now with increased dyspnea, pleuritic chest pain,  hypoxia, and fever. CXR shows increased left pleural effusion. Bedside US shows large left septated effusion anterior, lateral and posterior. Right: smalll effusion simple appearing. Cardiac + pericardial effusion noted.   - lower extremity dopplers negative for DVT,   - GI PCR + e coli  - mRSA PCR + Staph aureus   - w/u for APS negative to date (low-positive anticardiolipin IgM and IgG not significant)       PLAN:    - patient with complex/ septated effusion on left increased in size   - on room air today - o2 saturation 93%  - heparin ggt started this AM   - transfer to [facility] under Dr Sosa service - pending    - will further evaluate best plan of action , pigtail v. surgical intervention   - resumed antibiotics pip-tazo for Pna ( last day will be 12/22)  - continue hydroxychloroquine for SLE   - rheum/ hemonc recommend follow up test 12 weeks for APS    - transfer center notified by primary team   - family / patient updated, hospitalist aware of transfer   -discussed with Dr Sosa, Dr Mcbride,  Or[he            HPI:  28 years old male with h/o Lupus ( diagnosed in 09/2023, on Plaquenil 400mg bid) present to ED with complain of chest pain and SOB. Patient reported left sided pleuritic chest pain which started 3 days ago. Pain is progressively worsened, associated with SOB and cough. Patient was seen in OSH ED yesterday and was prescribed antibiotics. Patient reported significantly worsening of left sided pleuritic chest pain today. No fever or chills. Patient reported loss of appetite and had a few episode of diarrhea for last 2 days. No recent travel history. No family h/o clotting disorder  Hemodynamically stable, afebrile, sat well at [facility]. No leukocytosis, plt 205, ddimer 1364, Na 126, Cl 95, Cr 1.27, hsTnT 15.5, BNP 96. CTA chest with acute right upper lobe and left lower lobe segmental/subsegmental pulmonary emboli. No CT evidence of right heart strain. New bilateral lower lobe consolidations with areas of central clearing. Pneumonia and pulmonary infarcts are in the differential. New bilateral pleural effusions, small on the left and trace on the right. Bilateral axillary and supraclavicular adenopathy of unclear etiology    SH: no toxic habits  FH: no family h/o autoimmune or clotting disorder (12 Dec 2023 13:27)      PAST MEDICAL & SURGICAL HISTORY:  No pertinent past medical history    REVIEW OF SYSTEMS  General:No Weight change/ Fatigue/ HA/Dizzy	  Skin/Breast: No Rashes/ Lesions/ Masses  Ophthalmologic: No Blurry vision/ Glaucoma/ Blindness	  ENMT: No Hearing loss/ Drainage/ Lesions	  Respiratory and Thorax: + cough, +left sided chest pain with inspriation	  Cardiovascular: No Chest pain/ Palpitations/ Diaphoresis	  Gastrointestinal: No Nausea/ Vomiting/ Constipation/ Appetite Change	  Genitourinary: No Heamturia/ Dysuria/ Frequency change/ Impotence	  Musculoskeletal: No Pain/ Weakness/ Claudication	  Neurological: No Seizures/ TIA/CVA/ Parastesias	  Psychiatric: No Dementia/ Depression/ SI/HI	  Hematology/Lymphatics: No hx of bleeding/ Edema	  Allergic/Immunologic:	 No Anaphylaxis/ Intolerance/ Recent illnesses    MEDICATIONS  (STANDING):  heparin  Infusion.  Unit(s)/Hr (13 mL/Hr) IV Continuous <Continuous>  hydroxychloroquine 200 milliGRAM(s) Oral two times a day  lactobacillus acidophilus 1 Tablet(s) Oral three times a day with meals  lidocaine   4% Patch 1 Patch Transdermal daily  piperacillin/tazobactam IVPB.. 3.375 Gram(s) IV Intermittent every 8 hours  sodium chloride 2 Gram(s) Oral every 8 hours    MEDICATIONS  (PRN):  acetaminophen     Tablet .. 650 milliGRAM(s) Oral every 6 hours PRN Temp greater or equal to 38C (100.4F), Mild Pain (1 - 3)  albuterol/ipratropium for Nebulization 3 milliLiter(s) Nebulizer every 6 hours PRN Shortness of Breath and/or Wheezing  guaifenesin/dextromethorphan Oral Liquid 10 milliLiter(s) Oral every 4 hours PRN Cough  heparin   Injectable 3000 Unit(s) IV Push every 6 hours PRN For aPTT between 40 - 57  heparin   Injectable 6000 Unit(s) IV Push every 6 hours PRN For aPTT less than 40  melatonin 3 milliGRAM(s) Oral at bedtime PRN Insomnia      Allergies  No Known Allergies      Vital Signs Last 24 Hrs  T(C): 37.5 (22 Dec 2023 13:15), Max: 39.4 (22 Dec 2023 04:30)  T(F): 99.5 (22 Dec 2023 13:15), Max: 103 (22 Dec 2023 04:30)  HR: 102 (22 Dec 2023 13:15) (74 - 108)  BP: 152/98 (22 Dec 2023 13:15) (138/87 - 163/86)  BP(mean): --  RR: 18 (22 Dec 2023 13:15) (18 - 18)  SpO2: 93% (22 Dec 2023 13:15) (92% - 97%)    Parameters below as of 22 Dec 2023 13:15  Patient On (Oxygen Delivery Method): room air    General: WN/WD NAD  Neurology: Awake, nonfocal, BROWN x 4  Eyes: Scleras clear, PERRLA/ EOMI, Gross vision intact  ENT:Gross hearing intact, grossly patent pharynx, no stridor  Neck: Neck supple, trachea midline, No JVD,   Respiratory: + coarse BS on left , CTA R   CV: RRR, S1S2, no murmurs, rubs or gallops  Abdominal: Soft, NT, ND +BS,   Extremities: No edema, + peripheral pulses  Skin: No Rashes, Hematoma, Ecchymosis  Lymphatic: No Neck, axilla, groin LAD  Psych: Oriented x 3, normal affect      LABS:                        9.8    10.38 )-----------( 266      ( 22 Dec 2023 06:50 )             29.2     12-22    128<L>  |  99  |  14  ----------------------------<  137<H>  4.1   |  22  |  1.13    Ca    8.4<L>      22 Dec 2023 06:50  Phos  2.8     12-21  Mg     1.8     12-22    TPro  x   /  Alb  x   /  TBili  0.7  /  DBili  x   /  AST  104<H>  /  ALT  111<H>  /  AlkPhos  83  12-22    PT/INR - ( 22 Dec 2023 09:15 )   PT: 27.1 sec;   INR: 2.33 ratio         PTT - ( 22 Dec 2023 09:15 )  PTT:34.7 sec  Urinalysis Basic - ( 22 Dec 2023 06:50 )    Color: x / Appearance: x / SG: x / pH: x  Gluc: 137 mg/dL / Ketone: x  / Bili: x / Urobili: x   Blood: x / Protein: x / Nitrite: x   Leuk Esterase: x / RBC: x / WBC x   Sq Epi: x / Non Sq Epi: x / Bacteria: x    RADIOLOGY & ADDITIONAL STUDIES:      ASSESSMENT:   28y Male with recent diagnosis of Lupus ( 9/2023) admitted 12/12 with B/L Pulmonary embolism. Now with increased dyspnea, pleuritic chest pain,  hypoxia, and fever. CXR shows increased left pleural effusion. Bedside US shows large left septated effusion anterior, lateral and posterior. Right: smalll effusion simple appearing. Cardiac + pericardial effusion noted.   - lower extremity dopplers negative for DVT,   - GI PCR + e coli  - mRSA PCR + Staph aureus   - w/u for APS negative to date (low-positive anticardiolipin IgM and IgG not significant)       PLAN:    - patient with complex/ septated effusion on left increased in size   - on room air today - o2 saturation 93%  - heparin ggt started this AM   - transfer to [facility] under Dr Sosa service - pending    - will further evaluate best plan of action , pigtail v. surgical intervention   - resumed antibiotics pip-tazo for Pna ( last day will be 12/22)  - continue hydroxychloroquine for SLE   - rheum/ hemonc recommend follow up test 12 weeks for APS    - transfer center notified by primary team   - family / patient updated, hospitalist aware of transfer   -discussed with Dr Sosa, Dr Mcbride,  Or[he

## 2023-12-22 NOTE — H&P ADULT - NSHPPHYSICALEXAM_GEN_ALL_CORE
Vital Signs Last 24 Hrs  T(C): 38.7 (22 Dec 2023 22:28), Max: 39.4 (22 Dec 2023 04:30)  T(F): 101.6 (22 Dec 2023 22:28), Max: 103 (22 Dec 2023 04:30)  HR: 92 (22 Dec 2023 22:28) (74 - 108)  BP: 161/92 (22 Dec 2023 22:28) (133/69 - 161/92)  BP(mean): --  RR: 20 (22 Dec 2023 22:28) (18 - 20)  SpO2: 96% (22 Dec 2023 22:28) (92% - 97%)    Parameters below as of 22 Dec 2023 22:28  Patient On (Oxygen Delivery Method): room air    General: NAD  Neurology: A&Ox3, nonfocal, BROWN x 4  Eyes: PERRLA/ EOMI, Gross vision intact  ENT/Neck: Neck supple, trachea midline, No JVD, Gross hearing intact  Respiratory: CTA B/L, No wheezing, rales, rhonchi  CV: RRR, S1S2, no murmurs, rubs or gallops  Abdominal: Soft, NT, ND +BS,   Extremities: No edema, + peripheral pulses  Skin: No Rashes, Hematoma, Ecchymosis

## 2023-12-22 NOTE — PROGRESS NOTE ADULT - ASSESSMENT
A/P:    28 male recent Diagnosis with Lupus ( Plaquenil), admitted with B/L PE ( no heart strain) now found to have large complex left pleural effusion.      - Complex L pleural effusion (DDx parapneumonic vs hemorrhagic vs serositis from Lupus)   - CT surgery consulted and accepted to service , pending transfer to [facility] when bed available  - eliquis discontinued, will start heparin ggt this AM,  will hold prior to procedure   - Please optimize pain control to minimize splinting. Consider Toradol prn   - Cont IV Abx coverage per ID  - Requires incentive spirometry   - Cont supplemental O2 to keep sat > 90 %  - Low threshold for ICU evaluation  if any change in clinical status     - GI PCR + ecoli EPEC   - CT auditory canals - +Nonspecific partial opacification of the middle ear and mastoid air cells. possible sinusitis   -appreciate ID recommendations   -continue zosyn    - spoke with transfer center this Am, patient will transfer pending bed availability , will d/c telemetry      A/P:    28 male recent Diagnosis with Lupus ( Plaquenil), admitted with B/L PE ( no heart strain) now found to have large complex left pleural effusion.      - Complex L pleural effusion (DDx parapneumonic vs hemorrhagic vs serositis from Lupus)   - CT surgery consulted and accepted to service , pending transfer to [facility] when bed available  - eliquis discontinued, will start heparin ggt this AM,  will hold prior to procedure   - Please optimize pain control to minimize splinting. Consider Toradol prn   - Cont IV Abx coverage per ID  - Requires incentive spirometry   - Cont supplemental O2 to keep sat > 90 %  - Low threshold for ICU evaluation  if any change in clinical status     - GI PCR + ecoli EPEC   - CT auditory canals - +Nonspecific partial opacification of the middle ear and mastoid air cells. possible sinusitis   -appreciate ID recommendations   -continue zosyn    - spoke with transfer center this Am, patient will transfer pending bed availability , will d/c telemetry

## 2023-12-22 NOTE — PROGRESS NOTE ADULT - SUBJECTIVE AND OBJECTIVE BOX
[facility] NEPHROLOGY SERVICES, [facility]  NEPHROLOGY AND HYPERTENSION  [address]  [address]  [place] [address]  [phone]    MD AMBER HURT MD YELENA ROSENBERG, MD BINNY KOSHY, MD CHRISTOPHER CAPUTO, MD EDWARD BOVER, MD          Patient events noted    MEDICATIONS  (STANDING):  ciprofloxacin  0.3% Ophthalmic Solution for Otic Use 2 Drop(s) Both Ears two times a day  heparin  Infusion.  Unit(s)/Hr (13 mL/Hr) IV Continuous <Continuous>  hydroxychloroquine 200 milliGRAM(s) Oral two times a day  lactobacillus acidophilus 1 Tablet(s) Oral three times a day with meals  lidocaine   4% Patch 1 Patch Transdermal daily  piperacillin/tazobactam IVPB.. 3.375 Gram(s) IV Intermittent every 8 hours  sodium chloride 2 Gram(s) Oral every 8 hours    MEDICATIONS  (PRN):  acetaminophen     Tablet .. 650 milliGRAM(s) Oral every 6 hours PRN Temp greater or equal to 38C (100.4F), Mild Pain (1 - 3)  albuterol/ipratropium for Nebulization 3 milliLiter(s) Nebulizer every 6 hours PRN Shortness of Breath and/or Wheezing  guaifenesin/dextromethorphan Oral Liquid 10 milliLiter(s) Oral every 4 hours PRN Cough  heparin   Injectable 6000 Unit(s) IV Push every 6 hours PRN For aPTT less than 40  heparin   Injectable 3000 Unit(s) IV Push every 6 hours PRN For aPTT between 40 - 57  lidocaine 2% Viscous 5 milliLiter(s) Swish and Spit three times a day PRN Mouth Care  melatonin 3 milliGRAM(s) Oral at bedtime PRN Insomnia      12-21-23 @ 07:01  -  12-22-23 @ 07:00  --------------------------------------------------------  IN: 830 mL / OUT: 300 mL / NET: 530 mL    12-22-23 @ 07:01  -  12-22-23 @ 22:23  --------------------------------------------------------  IN: 292 mL / OUT: 0 mL / NET: 292 mL      PHYSICAL EXAM:      T(C): 37.2 (12-22-23 @ 18:24), Max: 39.4 (12-22-23 @ 04:30)  HR: 92 (12-22-23 @ 18:24) (74 - 108)  BP: 148/89 (12-22-23 @ 18:24) (133/69 - 154/80)  RR: 18 (12-22-23 @ 18:24) (18 - 18)  SpO2: 92% (12-22-23 @ 18:24) (92% - 97%)  Wt(kg): --  Lungs clear ant decreased BS L>R   Heart S1S2  Abd soft NT ND  Extremities:   tr edema                                    9.4    8.10  )-----------( 261      ( 22 Dec 2023 14:56 )             27.5     12-22    128<L>  |  99  |  14  ----------------------------<  137<H>  4.1   |  22  |  1.13    Ca    8.4<L>      22 Dec 2023 06:50  Phos  2.8     12-21  Mg     1.8     12-22    TPro  x   /  Alb  x   /  TBili  0.7  /  DBili  x   /  AST  104<H>  /  ALT  111<H>  /  AlkPhos  83  12-22      LIVER FUNCTIONS - ( 22 Dec 2023 06:50 )  Alb: x     / Pro: x     / ALK PHOS: 83 U/L / ALT: 111 U/L / AST: 104 U/L / GGT: x           Creatinine Trend: 1.13<--, 0.99<--, 0.96<--, 1.10<--, 1.28<--, 1.30<--      Assessment   Acute on chronic hyponatremia 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  Unit(s)/Hr (13 mL/Hr) IV Continuous <Continuous>  hydroxychloroquine 200 milliGRAM(s) Oral two times a day  lactobacillus acidophilus 1 Tablet(s) Oral three times a day with meals  lidocaine   4% Patch 1 Patch Transdermal daily  piperacillin/tazobactam IVPB.. 3.375 Gram(s) IV Intermittent every 8 hours  sodium chloride 2 Gram(s) Oral every 8 hours    MEDICATIONS  (PRN):  acetaminophen     Tablet .. 650 milliGRAM(s) Oral every 6 hours PRN Temp greater or equal to 38C (100.4F), Mild Pain (1 - 3)  albuterol/ipratropium for Nebulization 3 milliLiter(s) Nebulizer every 6 hours PRN Shortness of Breath and/or Wheezing  guaifenesin/dextromethorphan Oral Liquid 10 milliLiter(s) Oral every 4 hours PRN Cough  heparin   Injectable 6000 Unit(s) IV Push every 6 hours PRN For aPTT less than 40  heparin   Injectable 3000 Unit(s) IV Push every 6 hours PRN For aPTT between 40 - 57  lidocaine 2% Viscous 5 milliLiter(s) Swish and Spit three times a day PRN Mouth Care  melatonin 3 milliGRAM(s) Oral at bedtime PRN Insomnia      12-21-23 @ 07:01  -  12-22-23 @ 07:00  --------------------------------------------------------  IN: 830 mL / OUT: 300 mL / NET: 530 mL    12-22-23 @ 07:01  -  12-22-23 @ 22:23  --------------------------------------------------------  IN: 292 mL / OUT: 0 mL / NET: 292 mL      PHYSICAL EXAM:      T(C): 37.2 (12-22-23 @ 18:24), Max: 39.4 (12-22-23 @ 04:30)  HR: 92 (12-22-23 @ 18:24) (74 - 108)  BP: 148/89 (12-22-23 @ 18:24) (133/69 - 154/80)  RR: 18 (12-22-23 @ 18:24) (18 - 18)  SpO2: 92% (12-22-23 @ 18:24) (92% - 97%)  Wt(kg): --  Lungs clear ant decreased BS L>R   Heart S1S2  Abd soft NT ND  Extremities:   tr edema                                    9.4    8.10  )-----------( 261      ( 22 Dec 2023 14:56 )             27.5     12-22    128<L>  |  99  |  14  ----------------------------<  137<H>  4.1   |  22  |  1.13    Ca    8.4<L>      22 Dec 2023 06:50  Phos  2.8     12-21  Mg     1.8     12-22    TPro  x   /  Alb  x   /  TBili  0.7  /  DBili  x   /  AST  104<H>  /  ALT  111<H>  /  AlkPhos  83  12-22      LIVER FUNCTIONS - ( 22 Dec 2023 06:50 )  Alb: x     / Pro: x     / ALK PHOS: 83 U/L / ALT: 111 U/L / AST: 104 U/L / GGT: x           Creatinine Trend: 1.13<--, 0.99<--, 0.96<--, 1.10<--, 1.28<--, 1.30<--      Assessment   Acute on chronic hyponatremia 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

## 2023-12-22 NOTE — PROGRESS NOTE ADULT - PROVIDER SPECIALTY LIST ADULT
Hospitalist
Hospitalist
Infectious Disease
Infectious Disease
Nephrology
Thoracic Surgery
Hospitalist
Pulmonology
Internal Medicine
Nephrology
Pulmonology
Heme/Onc
Hospitalist
Nephrology
Nephrology
Pulmonology
Hospitalist
Hospitalist
Internal Medicine

## 2023-12-22 NOTE — PROGRESS NOTE ADULT - ASSESSMENT
27 yo male w/ recently diagnosed Lupus admitted to TELE for acute PE and PNA. Hospital course complicated by 1) discovery of large increasing in size left sided complex pleural effusion. CT Surgery consulted and recommends transfer to [facility] under the Dr Sosa's service 2) mild hyponatremia 128 and started on salt tablets.     HEME/ONC  # Lupus  # bilateral PE  # bilateral axillary/ supraclavicular lymphadenopathy  - RHEUMATOLOGY consulted - plaquenil 400 mg po bid -> 200 mg po bid. no evidence of APS at this time. will need follow up in 12 weeks for retesting ~ March 2024  - US doppler ble- no dvt   - on heparin drip for PE     PULMNOLOGY  # Community acquired pneumonia  # bilateral PE  # recurrent fever  # complex left pleural effusion  - CT surgery recommended transfer to [facility] to Dr Sosa service for evaluation for possible VATS  - CT chest shows area of bilateral consolidation   - ECHO showed no right sided heart strain  - on zosyn  - recurrent fever may be secondary to PE    NEPHROLOGY  # hyponatremia  - sodium 128  - on Nacl 2 gram TID  - goal sodium 131    PLAN  - c/w TELE  - transfer to [facility]   - outpt follow with HEME/ONC for lymphadenopathy. APS workup  - c/w heparin drip 27 yo male w/ recently diagnosed Lupus admitted to TELE for acute PE and PNA. Hospital course complicated by 1) discovery of large increasing in size left sided complex pleural effusion. CT Surgery consulted and recommends transfer to [facility] under the Dr Sosa's service 2) mild hyponatremia 128 and started on salt tablets.     HEME/ONC  # Lupus  # bilateral PE  # bilateral axillary/ supraclavicular lymphadenopathy  - RHEUMATOLOGY consulted - plaquenil 400 mg po bid -> 200 mg po bid. no evidence of APS at this time. will need follow up in 12 weeks for retesting ~ March 2024  - US doppler ble- no dvt   - on heparin drip for PE     PULMNOLOGY  # Community acquired pneumonia  # bilateral PE  # recurrent fever  # complex left pleural effusion  - CT surgery recommended transfer to [facility] to Dr Sosa service for evaluation for possible VATS  - CT chest shows area of bilateral consolidation   - ECHO showed no right sided heart strain  - on zosyn  - recurrent fever may be secondary to PE    NEPHROLOGY  # hyponatremia  - sodium 128  - on Nacl 2 gram TID  - goal sodium 131    PLAN  - c/w TELE  - transfer to [facility]   - outpt follow with HEME/ONC for lymphadenopathy. APS workup  - c/w heparin drip

## 2023-12-22 NOTE — PROGRESS NOTE ADULT - NS ATTEND AMEND GEN_ALL_CORE FT
28 male recent Diagnosis with Lupus ( on Plaquenil), admitted with B/L PE (no RV strain) now found to have large complex left pleural effusion.   Pt appears comfortable in chair, on ra w/ sat 94%. Denies SOB now but has pleuritic L CP assoc most w/ coughing.   Planned for transfer to [facility] for possible VATS. Chest tube deferred pt clinically stable and he has been on full a/c making risk of bleeding high    - cont abx, f/u ID reccs  - sputum cx negative and mrsa negative  - maintain sat>90%, currently on room air w/ good sat  - cont to monitor clinically  - f/u Thoracic surgery reccs, transfer to [facility] today pending bed availability  - cont a/c for PE w/ hep gtt  - monitor for bleeding  - seen by RHeum and not felt to be APLS 28 male recent Diagnosis with Lupus ( on Plaquenil), admitted with B/L PE (no RV strain) now found to have large complex left pleural effusion.   Pt appears comfortable in chair, on ra w/ sat 94%. Denies SOB now but has pleuritic L CP assoc most w/ coughing.   Planned for transfer to [facility] for possible VATS. Chest tube deferred pt clinically stable and he has been on full a/c making risk of bleeding high    - cont abx, f/u ID reccs  - sputum cx negative and mrsa negative  - maintain sat>90%, currently on room air w/ good sat  - cont to monitor clinically  - f/u Thoracic surgery reccs, transfer to [facility] today pending bed availability  - cont a/c for PE w/ hep gtt  - monitor for bleeding  - seen by RHeum and not felt to be APLS 28 male recent Diagnosis with Lupus ( on Plaquenil), admitted with B/L PE (no RV strain) now found to have large complex left pleural effusion.   Pt appears comfortable in chair, on ra w/ sat 94%. Denies SOB now but has pleuritic L CP assoc most w/ coughing.   Planned for transfer to [facility] for possible VATS. Chest tube deferred for right now as pt clinically stable and he has been on full a/c w/ noac up until yesterday making risk of bleeding high    - cont abx, f/u ID reccs  - sputum cx negative and mrsa negative  - maintain sat>90%, currently on room air w/ good sat  - cont to monitor clinically  - f/u Thoracic surgery reccs, transfer to [facility] today pending bed availability  - cont a/c for PE w/ hep gtt  - monitor for bleeding  - seen by RHeum and not felt to be APLS 28 male recent Diagnosis with Lupus ( on Plaquenil), admitted with B/L PE (no RV strain) now found to have large complex left pleural effusion.   Pt appears comfortable in chair, on ra w/ sat 94%. Denies SOB now but has pleuritic L CP assoc most w/ coughing.   Planned for transfer to [facility] for possible VATS. Chest tube deferred for right now as pt clinically stable and he has been on full a/c w/ noac up until yesterday making risk of bleeding high    - cont abx, f/u ID reccs  - sputum cx negative and mrsa negative  - maintain sat>90%, currently on room air w/ good sat  - cont to monitor clinically  - f/u Thoracic surgery reccs, transfer to [facility] today pending bed availability  - cont a/c for PE w/ hep gtt  - monitor for bleeding  - seen by RHeum and not felt to be APLS

## 2023-12-22 NOTE — CHART NOTE - NSCHARTNOTEFT_GEN_A_CORE
Patient is a 28y old  Male who presents with a chief complaint of acute pulmonary embolism (22 Dec 2023 14:04)    Called by RN to evaluate pt. with c/o throat pain and inability to speak   28 years old male with h/o Lupus ( diagnosed in 09/2023, on Plaquenil 400mg bid) present to ED with complain of chest pain and SOB. Patient reported left sided pleuritic chest pain which started 3 days ago.  Has been found to have acute bilateral PE (now on eliquis) and PNA  Dr. Cintron at bedside, pt's mother at bedside pt. reported that after taking percocet his throat started to hurt. My reports that patient has episodes where one minute he is able to speak then later he is not able to speak       Vital Signs Last 24 Hrs  T(C): 37.4 (22 Dec 2023 17:11), Max: 39.4 (22 Dec 2023 04:30)  T(F): 99.4 (22 Dec 2023 17:11), Max: 103 (22 Dec 2023 04:30)  HR: 101 (22 Dec 2023 17:11) (74 - 108)  BP: 135/83 (22 Dec 2023 17:11) (133/69 - 154/80)  BP(mean): --  RR: 18 (22 Dec 2023 17:11) (18 - 18)  SpO2: 93% (22 Dec 2023 17:11) (92% - 97%)        PT/INR - ( 22 Dec 2023 09:15 )   PT: 27.1 sec;   INR: 2.33 ratio         PTT - ( 22 Dec 2023 14:56 )  PTT:48.3 sec                        9.4    8.10  )-----------( 261      ( 22 Dec 2023 14:56 )             27.5     12-22    128<L>  |  99  |  14  ----------------------------<  137<H>  4.1   |  22  |  1.13    Ca    8.4<L>      22 Dec 2023 06:50  Phos  2.8     12-21  Mg     1.8     12-22    TPro  x   /  Alb  x   /  TBili  0.7  /  DBili  x   /  AST  104<H>  /  ALT  111<H>  /  AlkPhos  83  12-22    LIVER FUNCTIONS - ( 22 Dec 2023 06:50 )  Alb: x     / Pro: x     / ALK PHOS: 83 U/L / ALT: 111 U/L / AST: 104 U/L / GGT: x                                   CAPILLARY BLOOD GLUCOSE        acetaminophen     Tablet .. 650 milliGRAM(s) Oral every 6 hours PRN  albuterol/ipratropium for Nebulization 3 milliLiter(s) Nebulizer every 6 hours PRN  diphenhydrAMINE Injectable 25 milliGRAM(s) IV Push once  guaifenesin/dextromethorphan Oral Liquid 10 milliLiter(s) Oral every 4 hours PRN  heparin   Injectable 3000 Unit(s) IV Push every 6 hours PRN  heparin   Injectable 6000 Unit(s) IV Push every 6 hours PRN  heparin  Infusion.  Unit(s)/Hr IV Continuous <Continuous>  hydroxychloroquine 200 milliGRAM(s) Oral two times a day  ketorolac   Injectable 15 milliGRAM(s) IV Push every 8 hours PRN  lactobacillus acidophilus 1 Tablet(s) Oral three times a day with meals  lidocaine   4% Patch 1 Patch Transdermal daily  lidocaine 2% Viscous 5 milliLiter(s) Swish and Spit three times a day PRN  melatonin 3 milliGRAM(s) Oral at bedtime PRN  oxycodone    5 mG/acetaminophen 325 mG 2 Tablet(s) Oral every 6 hours PRN  oxycodone    5 mG/acetaminophen 325 mG 1 Tablet(s) Oral every 6 hours PRN  piperacillin/tazobactam IVPB.. 3.375 Gram(s) IV Intermittent every 8 hours  sodium chloride 2 Gram(s) Oral every 8 hours            REVIEW OF SYSTEMS:  CONSTITUTIONAL: No fever, weight loss, or fatigue  EYES: No eye pain, visual disturbances, or discharge  ENMT: + throat pain  NECK: No pain or stiffness  BREASTS: No pain, masses, or nipple discharge  RESPIRATORY: No cough, wheezing, chills or hemoptysis; No shortness of breath  CARDIOVASCULAR: No chest pain, palpitations, dizziness, or leg swelling  GASTROINTESTINAL: No abdominal or epigastric pain. No nausea, vomiting, or hematemesis; No diarrhea or constipation. No melena or hematochezia.  GENITOURINARY: No dysuria, frequency, hematuria, or incontinence  NEUROLOGICAL: No headaches, memory loss, loss of strength, numbness, or tremors  SKIN: No itching, burning, rashes, or lesions   ENDOCRINE: No heat or cold intolerance; No hair loss  MUSCULOSKELETAL: No joint pain or swelling; No muscle, back, or extremity pain  PSYCHIATRIC: No depression, anxiety, mood swings, or difficulty sleeping    PHYSICAL EXAM:  GENERAL: NAD  ENMT: No tonsillar erythema, exudates, or enlargement; Moist mucous membranes, +redness   NECK: Supple, No JVD, Normal thyroid  NERVOUS SYSTEM:  Alert & Oriented X3,   CHEST/LUNG: Clear to auscultation bilaterally;  HEART: Regular rate and rhythm; No murmurs, rubs, or gallops  ABDOMEN: Soft, Nontender, Nondistended; Bowel sounds present  EXTREMITIES:  2+ Peripheral Pulses, No clubbing, cyanosis, or edema  LYMPH: No lymphadenopathy noted          Assessment:   Patient is a 28y old  Male who presents with a chief complaint of acute pulmonary embolism (22 Dec 2023 14:04)    Called by RN to evaluate pt. with c/o throat pain and inability to speak   28 years old male with h/o Lupus ( diagnosed in 09/2023, on Plaquenil 400mg bid) present to ED with complain of chest pain and SOB. Patient reported left sided pleuritic chest pain which started 3 days ago.  Has been found to have acute bilateral PE (now on eliquis) and PNA  Dr. Cintron at bedside, pt's mother at bedside pt. reported that after taking percocet his throat started to hurt. My reports that patient has episodes where one minute he is able to speak then later he is not able to speak         Plan:  -Decadron 6mg IV x1   -Benadryl 25mg IVP x1   -Cat scan of neck with contrast ordered   -Percocet discontinued  -Lidocaine swish and spit ordered prn  -Pt. transferred to telemetry unit 2C   - D/w Dr. Cintron  aware and agrees with the plan  -Awaiting transfer to [facility] for further medical management Patient is a 28y old  Male who presents with a chief complaint of acute pulmonary embolism (22 Dec 2023 14:04)    Called by RN to evaluate pt. with c/o throat pain and inability to speak   28 years old male with h/o Lupus ( diagnosed in 09/2023, on Plaquenil 400mg bid) present to ED with complain of chest pain and SOB. Patient reported left sided pleuritic chest pain which started 3 days ago.  Has been found to have acute bilateral PE (now on eliquis) and PNA  Dr. Cintron at bedside, pt's mother at bedside pt. reported that after taking percocet his throat started to hurt. My reports that patient has episodes where one minute he is able to speak then later he is not able to speak       Vital Signs Last 24 Hrs  T(C): 37.4 (22 Dec 2023 17:11), Max: 39.4 (22 Dec 2023 04:30)  T(F): 99.4 (22 Dec 2023 17:11), Max: 103 (22 Dec 2023 04:30)  HR: 101 (22 Dec 2023 17:11) (74 - 108)  BP: 135/83 (22 Dec 2023 17:11) (133/69 - 154/80)  BP(mean): --  RR: 18 (22 Dec 2023 17:11) (18 - 18)  SpO2: 93% (22 Dec 2023 17:11) (92% - 97%)        PT/INR - ( 22 Dec 2023 09:15 )   PT: 27.1 sec;   INR: 2.33 ratio         PTT - ( 22 Dec 2023 14:56 )  PTT:48.3 sec                        9.4    8.10  )-----------( 261      ( 22 Dec 2023 14:56 )             27.5     12-22    128<L>  |  99  |  14  ----------------------------<  137<H>  4.1   |  22  |  1.13    Ca    8.4<L>      22 Dec 2023 06:50  Phos  2.8     12-21  Mg     1.8     12-22    TPro  x   /  Alb  x   /  TBili  0.7  /  DBili  x   /  AST  104<H>  /  ALT  111<H>  /  AlkPhos  83  12-22    LIVER FUNCTIONS - ( 22 Dec 2023 06:50 )  Alb: x     / Pro: x     / ALK PHOS: 83 U/L / ALT: 111 U/L / AST: 104 U/L / GGT: x                                   CAPILLARY BLOOD GLUCOSE        acetaminophen     Tablet .. 650 milliGRAM(s) Oral every 6 hours PRN  albuterol/ipratropium for Nebulization 3 milliLiter(s) Nebulizer every 6 hours PRN  diphenhydrAMINE Injectable 25 milliGRAM(s) IV Push once  guaifenesin/dextromethorphan Oral Liquid 10 milliLiter(s) Oral every 4 hours PRN  heparin   Injectable 3000 Unit(s) IV Push every 6 hours PRN  heparin   Injectable 6000 Unit(s) IV Push every 6 hours PRN  heparin  Infusion.  Unit(s)/Hr IV Continuous <Continuous>  hydroxychloroquine 200 milliGRAM(s) Oral two times a day  ketorolac   Injectable 15 milliGRAM(s) IV Push every 8 hours PRN  lactobacillus acidophilus 1 Tablet(s) Oral three times a day with meals  lidocaine   4% Patch 1 Patch Transdermal daily  lidocaine 2% Viscous 5 milliLiter(s) Swish and Spit three times a day PRN  melatonin 3 milliGRAM(s) Oral at bedtime PRN  oxycodone    5 mG/acetaminophen 325 mG 2 Tablet(s) Oral every 6 hours PRN  oxycodone    5 mG/acetaminophen 325 mG 1 Tablet(s) Oral every 6 hours PRN  piperacillin/tazobactam IVPB.. 3.375 Gram(s) IV Intermittent every 8 hours  sodium chloride 2 Gram(s) Oral every 8 hours            REVIEW OF SYSTEMS:  CONSTITUTIONAL: No fever, weight loss, or fatigue  EYES: No eye pain, visual disturbances, or discharge  ENMT: + throat pain  NECK: No pain or stiffness  BREASTS: No pain, masses, or nipple discharge  RESPIRATORY: No cough, wheezing, chills or hemoptysis; No shortness of breath  CARDIOVASCULAR: No chest pain, palpitations, dizziness, or leg swelling  GASTROINTESTINAL: No abdominal or epigastric pain. No nausea, vomiting, or hematemesis; No diarrhea or constipation. No melena or hematochezia.  GENITOURINARY: No dysuria, frequency, hematuria, or incontinence  NEUROLOGICAL: No headaches, memory loss, loss of strength, numbness, or tremors  SKIN: No itching, burning, rashes, or lesions   ENDOCRINE: No heat or cold intolerance; No hair loss  MUSCULOSKELETAL: No joint pain or swelling; No muscle, back, or extremity pain  PSYCHIATRIC: No depression, anxiety, mood swings, or difficulty sleeping    PHYSICAL EXAM:  GENERAL: NAD  ENMT: No tonsillar erythema, exudates, or enlargement; Moist mucous membranes, +redness   NECK: Supple, No JVD, Normal thyroid  NERVOUS SYSTEM:  Alert & Oriented X3,   CHEST/LUNG: Clear to auscultation bilaterally;  HEART: Regular rate and rhythm; No murmurs, rubs, or gallops  ABDOMEN: Soft, Nontender, Nondistended; Bowel sounds present  EXTREMITIES:  2+ Peripheral Pulses, No clubbing, cyanosis, or edema  LYMPH: No lymphadenopathy noted          Assessment:   Patient is a 28y old  Male who presents with a chief complaint of acute pulmonary embolism (22 Dec 2023 14:04)    Called by RN to evaluate pt. with c/o throat pain and inability to speak   28 years old male with h/o Lupus ( diagnosed in 09/2023, on Plaquenil 400mg bid) present to ED with complain of chest pain and SOB. Patient reported left sided pleuritic chest pain which started 3 days ago.  Has been found to have acute bilateral PE (now on eliquis) and PNA  Dr. Cintron at bedside, pt's mother at bedside pt. reported that after taking percocet his throat started to hurt. My reports that patient has episodes where one minute he is able to speak then later he is not able to speak         Plan:  -Decadron 6mg IV x1   -Benadryl 25mg IVP x1   -Cat scan of neck with contrast ordered   -Percocet discontinued  -Lidocaine swish and spit ordered prn  -Pt. transferred to telemetry unit 2C   - D/w Dr. Cintron  aware and agrees with the plan  -Awaiting transfer to [facility] for further medical management Patient is a 28y old  Male who presents with a chief complaint of acute pulmonary embolism (22 Dec 2023 14:04)    Called by RN to evaluate pt. with c/o throat pain and inability to speak   28 years old male with h/o Lupus ( diagnosed in 09/2023, on Plaquenil 400mg bid) present to ED with complain of chest pain and SOB. Patient reported left sided pleuritic chest pain which started 3 days ago.  Has been found to have acute bilateral PE (now on eliquis) and PNA  Dr. Cintron at bedside, pt's mother at bedside pt. reported that after taking percocet his throat started to hurt. Mother reports that patient has episodes where one minute he is able to speak then later he is not able to speak       Vital Signs Last 24 Hrs  T(C): 37.4 (22 Dec 2023 17:11), Max: 39.4 (22 Dec 2023 04:30)  T(F): 99.4 (22 Dec 2023 17:11), Max: 103 (22 Dec 2023 04:30)  HR: 101 (22 Dec 2023 17:11) (74 - 108)  BP: 135/83 (22 Dec 2023 17:11) (133/69 - 154/80)  BP(mean): --  RR: 18 (22 Dec 2023 17:11) (18 - 18)  SpO2: 93% (22 Dec 2023 17:11) (92% - 97%)        PT/INR - ( 22 Dec 2023 09:15 )   PT: 27.1 sec;   INR: 2.33 ratio         PTT - ( 22 Dec 2023 14:56 )  PTT:48.3 sec                        9.4    8.10  )-----------( 261      ( 22 Dec 2023 14:56 )             27.5     12-22    128<L>  |  99  |  14  ----------------------------<  137<H>  4.1   |  22  |  1.13    Ca    8.4<L>      22 Dec 2023 06:50  Phos  2.8     12-21  Mg     1.8     12-22    TPro  x   /  Alb  x   /  TBili  0.7  /  DBili  x   /  AST  104<H>  /  ALT  111<H>  /  AlkPhos  83  12-22    LIVER FUNCTIONS - ( 22 Dec 2023 06:50 )  Alb: x     / Pro: x     / ALK PHOS: 83 U/L / ALT: 111 U/L / AST: 104 U/L / GGT: x                                   CAPILLARY BLOOD GLUCOSE        acetaminophen     Tablet .. 650 milliGRAM(s) Oral every 6 hours PRN  albuterol/ipratropium for Nebulization 3 milliLiter(s) Nebulizer every 6 hours PRN  diphenhydrAMINE Injectable 25 milliGRAM(s) IV Push once  guaifenesin/dextromethorphan Oral Liquid 10 milliLiter(s) Oral every 4 hours PRN  heparin   Injectable 3000 Unit(s) IV Push every 6 hours PRN  heparin   Injectable 6000 Unit(s) IV Push every 6 hours PRN  heparin  Infusion.  Unit(s)/Hr IV Continuous <Continuous>  hydroxychloroquine 200 milliGRAM(s) Oral two times a day  ketorolac   Injectable 15 milliGRAM(s) IV Push every 8 hours PRN  lactobacillus acidophilus 1 Tablet(s) Oral three times a day with meals  lidocaine   4% Patch 1 Patch Transdermal daily  lidocaine 2% Viscous 5 milliLiter(s) Swish and Spit three times a day PRN  melatonin 3 milliGRAM(s) Oral at bedtime PRN  oxycodone    5 mG/acetaminophen 325 mG 2 Tablet(s) Oral every 6 hours PRN  oxycodone    5 mG/acetaminophen 325 mG 1 Tablet(s) Oral every 6 hours PRN  piperacillin/tazobactam IVPB.. 3.375 Gram(s) IV Intermittent every 8 hours  sodium chloride 2 Gram(s) Oral every 8 hours            REVIEW OF SYSTEMS:  CONSTITUTIONAL: No fever, weight loss, or fatigue  EYES: No eye pain, visual disturbances, or discharge  ENMT: + throat pain  NECK: No pain or stiffness  BREASTS: No pain, masses, or nipple discharge  RESPIRATORY: No cough, wheezing, chills or hemoptysis; No shortness of breath  CARDIOVASCULAR: No chest pain, palpitations, dizziness, or leg swelling  GASTROINTESTINAL: No abdominal or epigastric pain. No nausea, vomiting, or hematemesis; No diarrhea or constipation. No melena or hematochezia.  GENITOURINARY: No dysuria, frequency, hematuria, or incontinence  NEUROLOGICAL: No headaches, memory loss, loss of strength, numbness, or tremors  SKIN: No itching, burning, rashes, or lesions   ENDOCRINE: No heat or cold intolerance; No hair loss  MUSCULOSKELETAL: No joint pain or swelling; No muscle, back, or extremity pain  PSYCHIATRIC: No depression, anxiety, mood swings, or difficulty sleeping    PHYSICAL EXAM:  GENERAL: NAD  ENMT: No tonsillar erythema, exudates, or enlargement; Moist mucous membranes, +redness   NECK: Supple, No JVD, Normal thyroid  NERVOUS SYSTEM:  Alert & Oriented X3,   CHEST/LUNG: Clear to auscultation bilaterally;  HEART: Regular rate and rhythm; No murmurs, rubs, or gallops  ABDOMEN: Soft, Nontender, Nondistended; Bowel sounds present  EXTREMITIES:  2+ Peripheral Pulses, No clubbing, cyanosis, or edema  LYMPH: No lymphadenopathy noted          Assessment:   Patient is a 28y old  Male who presents with a chief complaint of acute pulmonary embolism (22 Dec 2023 14:04)    Called by RN to evaluate pt. with c/o throat pain and inability to speak   28 years old male with h/o Lupus ( diagnosed in 09/2023, on Plaquenil 400mg bid) present to ED with complain of chest pain and SOB. Patient reported left sided pleuritic chest pain which started 3 days ago.  Has been found to have acute bilateral PE (now on eliquis) and PNA  Dr. Cintron at bedside, pt's mother at bedside pt. reported that after taking percocet his throat started to hurt. Mother reports that patient has episodes where one minute he is able to speak then later he is not able to speak         Plan:  -Decadron 6mg IV x1   -Benadryl 25mg IVP x1   -Cat scan of neck with contrast ordered   -Percocet discontinued  -Lidocaine swish and spit ordered prn  -Pt. transferred to telemetry unit 2C   - D/w Dr. Cintron  aware and agrees with the plan  -Awaiting transfer to [facility] for further medical management Patient is a 28y old  Male who presents with a chief complaint of acute pulmonary embolism (22 Dec 2023 14:04)    Called by RN to evaluate pt. with c/o throat pain and inability to speak   28 years old male with h/o Lupus ( diagnosed in 09/2023, on Plaquenil 400mg bid) present to ED with complain of chest pain and SOB. Patient reported left sided pleuritic chest pain which started 3 days ago.  Has been found to have acute bilateral PE (now on eliquis) and PNA  Dr. Cintron at bedside, pt's mother at bedside pt. reported that after taking percocet his throat started to hurt. Mother reports that patient has episodes where one minute he is able to speak then later he is not able to speak       Vital Signs Last 24 Hrs  T(C): 37.4 (22 Dec 2023 17:11), Max: 39.4 (22 Dec 2023 04:30)  T(F): 99.4 (22 Dec 2023 17:11), Max: 103 (22 Dec 2023 04:30)  HR: 101 (22 Dec 2023 17:11) (74 - 108)  BP: 135/83 (22 Dec 2023 17:11) (133/69 - 154/80)  BP(mean): --  RR: 18 (22 Dec 2023 17:11) (18 - 18)  SpO2: 93% (22 Dec 2023 17:11) (92% - 97%)        PT/INR - ( 22 Dec 2023 09:15 )   PT: 27.1 sec;   INR: 2.33 ratio         PTT - ( 22 Dec 2023 14:56 )  PTT:48.3 sec                        9.4    8.10  )-----------( 261      ( 22 Dec 2023 14:56 )             27.5     12-22    128<L>  |  99  |  14  ----------------------------<  137<H>  4.1   |  22  |  1.13    Ca    8.4<L>      22 Dec 2023 06:50  Phos  2.8     12-21  Mg     1.8     12-22    TPro  x   /  Alb  x   /  TBili  0.7  /  DBili  x   /  AST  104<H>  /  ALT  111<H>  /  AlkPhos  83  12-22    LIVER FUNCTIONS - ( 22 Dec 2023 06:50 )  Alb: x     / Pro: x     / ALK PHOS: 83 U/L / ALT: 111 U/L / AST: 104 U/L / GGT: x                                   CAPILLARY BLOOD GLUCOSE        acetaminophen     Tablet .. 650 milliGRAM(s) Oral every 6 hours PRN  albuterol/ipratropium for Nebulization 3 milliLiter(s) Nebulizer every 6 hours PRN  diphenhydrAMINE Injectable 25 milliGRAM(s) IV Push once  guaifenesin/dextromethorphan Oral Liquid 10 milliLiter(s) Oral every 4 hours PRN  heparin   Injectable 3000 Unit(s) IV Push every 6 hours PRN  heparin   Injectable 6000 Unit(s) IV Push every 6 hours PRN  heparin  Infusion.  Unit(s)/Hr IV Continuous <Continuous>  hydroxychloroquine 200 milliGRAM(s) Oral two times a day  ketorolac   Injectable 15 milliGRAM(s) IV Push every 8 hours PRN  lactobacillus acidophilus 1 Tablet(s) Oral three times a day with meals  lidocaine   4% Patch 1 Patch Transdermal daily  lidocaine 2% Viscous 5 milliLiter(s) Swish and Spit three times a day PRN  melatonin 3 milliGRAM(s) Oral at bedtime PRN  oxycodone    5 mG/acetaminophen 325 mG 2 Tablet(s) Oral every 6 hours PRN  oxycodone    5 mG/acetaminophen 325 mG 1 Tablet(s) Oral every 6 hours PRN  piperacillin/tazobactam IVPB.. 3.375 Gram(s) IV Intermittent every 8 hours  sodium chloride 2 Gram(s) Oral every 8 hours            REVIEW OF SYSTEMS:  CONSTITUTIONAL: No fever, weight loss, or fatigue  EYES: No eye pain, visual disturbances, or discharge  ENMT: + throat pain  NECK: No pain or stiffness  BREASTS: No pain, masses, or nipple discharge  RESPIRATORY: No cough, wheezing, chills or hemoptysis; No shortness of breath  CARDIOVASCULAR: No chest pain, palpitations, dizziness, or leg swelling  GASTROINTESTINAL: No abdominal or epigastric pain. No nausea, vomiting, or hematemesis; No diarrhea or constipation. No melena or hematochezia.  GENITOURINARY: No dysuria, frequency, hematuria, or incontinence  NEUROLOGICAL: No headaches, memory loss, loss of strength, numbness, or tremors  SKIN: No itching, burning, rashes, or lesions   ENDOCRINE: No heat or cold intolerance; No hair loss  MUSCULOSKELETAL: No joint pain or swelling; No muscle, back, or extremity pain  PSYCHIATRIC: No depression, anxiety, mood swings, or difficulty sleeping    PHYSICAL EXAM:  GENERAL: NAD  ENMT: No tonsillar erythema, exudates, or enlargement; Moist mucous membranes, +redness   NECK: Supple, No JVD, Normal thyroid  NERVOUS SYSTEM:  Alert & Oriented X3,   CHEST/LUNG: Clear to auscultation bilaterally;  HEART: Regular rate and rhythm; No murmurs, rubs, or gallops  ABDOMEN: Soft, Nontender, Nondistended; Bowel sounds present  EXTREMITIES:  2+ Peripheral Pulses, No clubbing, cyanosis, or edema  LYMPH: No lymphadenopathy noted          Assessment:   Patient is a 28y old  Male who presents with a chief complaint of acute pulmonary embolism (22 Dec 2023 14:04)    Called by RN to evaluate pt. with c/o throat pain and inability to speak   28 years old male with h/o Lupus ( diagnosed in 09/2023, on Plaquenil 400mg bid) present to ED with complain of chest pain and SOB. Patient reported left sided pleuritic chest pain which started 3 days ago.  Has been found to have acute bilateral PE (now on eliquis) and PNA  Dr. Cintron at bedside, pt's mother at bedside pt. reported that after taking percocet his throat started to hurt. Mother reports that patient has episodes where one minute he is able to speak then later he is not able to speak         Plan:  -Decadron 6mg IV x1   -Benadryl 25mg IVP x1   -Cat scan of neck with contrast ordered   -Percocet discontinued  -Lidocaine swish and spit ordered prn  -Pt. transferred to telemetry unit 2C   - D/w Dr. Cintron  aware and agrees with the plan  -Awaiting transfer to [facility] for further medical management

## 2023-12-22 NOTE — H&P ADULT - ASSESSMENT
28y Male with recent diagnosis of Lupus ( 9/2023) admitted 12/12 with B/L Pulmonary embolism. Now with increased dyspnea, pleuritic chest pain,  hypoxia, and fever. CXR shows increased left pleural effusion. Bedside US shows large left septated effusion anterior, lateral and posterior. Right: smalll effusion simple appearing. Cardiac + pericardial effusion noted.   - lower extremity dopplers negative for DVT,   - GI PCR + e coli  - mRSA PCR + Staph aureus     Plan:   Admit to Thoracic Surgery- Dr. Sosa   Neuro: Pain management  Pulm: Encourage coughing, deep breathing and use of incentive spirometry. Wean off supplemental oxygen as able. Daily CXR. CT chest   Cardio: Monitor telemetry/alarms  GI: Tolerating diet.   Renal: monitor urine output, supplement electrolytes as needed, sodium tabs, restrict po fluid intake   Vasc: Heparin Gtt; hold heparin gtt for PTC placement   Heme: Stable H/H. .   ID: Continue Zosyn, consult ID in am   Therapy: OOB/ambulate  Tubes: Hold heparin for placement of PTC   Discussed with Ian

## 2023-12-22 NOTE — PROVIDER CONTACT NOTE (OTHER) - BACKGROUND
Pt was admitted for pulmonary embolism. Pt also is being treated for pneumonia
Patient admitted with PE on heparin drip.  Received with Lidocaine patch present on left chest wall.
Pt admitted for pulmonary embolism, now on heparin drip
Pt was admitted for pulmonary embolism and pneumonia. Pt was discontinued on heparin and put on eliquis. Pt will be restarted on heparin as per NP Makayla.
Pt came to ED complaining of chest pain and SOB. Pt was admitted for pulmonary embolism.

## 2023-12-22 NOTE — PROGRESS NOTE ADULT - ASSESSMENT
A/P-  28 year old male with lupus admitted with sob and found to have PE and superimposed pneumonia based on chest Ct report.    as pr hospitalist  patient is awaiting transfer to [facility] for pleural effusions tap since there are complex  septated effusions and high risk tap as per pulmonary note   no leukocytosis   T-max-103 today  blood cx- negative x 4 from 12/12 and 12/14  urine legionella AG- negative  mycoplasma IGM- negative  sputum cx- Negative  RVP- negative   auditory canal CT report-IMPRESSION:  Right temporal bone: Normal study.  Left temporal bone: Nonspecific partial opacification of the middle ear   and mastoid air cells. No bony erosive changes indicative of mastoiditis.   Clinical correlation with otoscopy is recommended for possible infectious   etiology.        plan-  no objection to continue with current zosyn for possible pneumonia as per Ct report.  check another set of blood cx.  check UA and urine cx as well .  low grade  temps could also be secondary to PE itself or rhumatologic etiology as patient does have elevated NA and anti- ds DNA .  rhum consult.  as for the left ear region/mastoid tenderness as per Ct report no bony erosive changes but partial opacification of left middle ear.  advise ENT eval and advise cipro otic drops to be applied to left ear pending ENT eval.    d/w hospitalist  hospitalist     Toya Arreaga MD  Infectious Disease Attending     is covering the ID service this weekend and next Monday and tuesday and if any questions he can be reached  either via teams or by calling [phone]   A/P-  28 year old male with lupus admitted with sob and found to have PE and superimposed pneumonia based on chest Ct report.    as pr hospitalist  patient is awaiting transfer to [facility] for pleural effusions tap since there are complex  septated effusions and high risk tap as per pulmonary note   no leukocytosis   T-max-103 today  blood cx- negative x 4 from 12/12 and 12/14  urine legionella AG- negative  mycoplasma IGM- negative  sputum cx- Negative  RVP- negative   auditory canal CT report-IMPRESSION:  Right temporal bone: Normal study.  Left temporal bone: Nonspecific partial opacification of the middle ear   and mastoid air cells. No bony erosive changes indicative of mastoiditis.   Clinical correlation with otoscopy is recommended for possible infectious   etiology.        plan-  no objection to continue with current zosyn for possible pneumonia as per Ct report.  check another set of blood cx.  check UA and urine cx as well .  low grade  temps could also be secondary to PE itself or rhumatologic etiology as patient does have elevated NA and anti- ds DNA .  rhum consult.  as for the left ear region/mastoid tenderness as per Ct report no bony erosive changes but partial opacification of left middle ear.  advise ENT eval and advise cipro otic drops to be applied to left ear pending ENT eval.    d/w hospitalist  hospitalist     Toya Arreaga MD  Infectious Disease Attending     is covering the ID service this weekend and next Monday and tuesday and if any questions he can be reached  either via teams or by calling [phone]

## 2023-12-22 NOTE — CHART NOTE - NSCHARTNOTESELECT_GEN_ALL_CORE
Event Note
Event Note
POCUS Pulmonary Team/Event Note
Pulmonary Attending Follow Up/Event Note
Event Note
Work Note

## 2023-12-23 LAB
ALBUMIN SERPL ELPH-MCNC: 3 G/DL — LOW (ref 3.3–5)
ALBUMIN SERPL ELPH-MCNC: 3 G/DL — LOW (ref 3.3–5)
ALP SERPL-CCNC: 86 U/L — SIGNIFICANT CHANGE UP (ref 40–120)
ALP SERPL-CCNC: 86 U/L — SIGNIFICANT CHANGE UP (ref 40–120)
ALT FLD-CCNC: 88 U/L — HIGH (ref 4–41)
ALT FLD-CCNC: 88 U/L — HIGH (ref 4–41)
ANION GAP SERPL CALC-SCNC: 12 MMOL/L — SIGNIFICANT CHANGE UP (ref 7–14)
ANION GAP SERPL CALC-SCNC: 12 MMOL/L — SIGNIFICANT CHANGE UP (ref 7–14)
ANION GAP SERPL CALC-SCNC: 14 MMOL/L — SIGNIFICANT CHANGE UP (ref 7–14)
ANION GAP SERPL CALC-SCNC: 14 MMOL/L — SIGNIFICANT CHANGE UP (ref 7–14)
APTT BLD: 65.6 SEC — HIGH (ref 24.5–35.6)
APTT BLD: 65.6 SEC — HIGH (ref 24.5–35.6)
AST SERPL-CCNC: 100 U/L — HIGH (ref 4–40)
AST SERPL-CCNC: 100 U/L — HIGH (ref 4–40)
B PERT DNA SPEC QL NAA+PROBE: SIGNIFICANT CHANGE UP
B PERT DNA SPEC QL NAA+PROBE: SIGNIFICANT CHANGE UP
B PERT+PARAPERT DNA PNL SPEC NAA+PROBE: SIGNIFICANT CHANGE UP
B PERT+PARAPERT DNA PNL SPEC NAA+PROBE: SIGNIFICANT CHANGE UP
BILIRUB SERPL-MCNC: 0.6 MG/DL — SIGNIFICANT CHANGE UP (ref 0.2–1.2)
BILIRUB SERPL-MCNC: 0.6 MG/DL — SIGNIFICANT CHANGE UP (ref 0.2–1.2)
BLD GP AB SCN SERPL QL: NEGATIVE — SIGNIFICANT CHANGE UP
BLD GP AB SCN SERPL QL: NEGATIVE — SIGNIFICANT CHANGE UP
BORDETELLA PARAPERTUSSIS (RAPRVP): SIGNIFICANT CHANGE UP
BORDETELLA PARAPERTUSSIS (RAPRVP): SIGNIFICANT CHANGE UP
BUN SERPL-MCNC: 16 MG/DL — SIGNIFICANT CHANGE UP (ref 7–23)
C PNEUM DNA SPEC QL NAA+PROBE: SIGNIFICANT CHANGE UP
C PNEUM DNA SPEC QL NAA+PROBE: SIGNIFICANT CHANGE UP
CALCIUM SERPL-MCNC: 8.5 MG/DL — SIGNIFICANT CHANGE UP (ref 8.4–10.5)
CALCIUM SERPL-MCNC: 8.5 MG/DL — SIGNIFICANT CHANGE UP (ref 8.4–10.5)
CALCIUM SERPL-MCNC: 8.7 MG/DL — SIGNIFICANT CHANGE UP (ref 8.4–10.5)
CALCIUM SERPL-MCNC: 8.7 MG/DL — SIGNIFICANT CHANGE UP (ref 8.4–10.5)
CHLORIDE SERPL-SCNC: 94 MMOL/L — LOW (ref 98–107)
CHLORIDE SERPL-SCNC: 94 MMOL/L — LOW (ref 98–107)
CHLORIDE SERPL-SCNC: 96 MMOL/L — LOW (ref 98–107)
CHLORIDE SERPL-SCNC: 96 MMOL/L — LOW (ref 98–107)
CO2 SERPL-SCNC: 20 MMOL/L — LOW (ref 22–31)
CO2 SERPL-SCNC: 20 MMOL/L — LOW (ref 22–31)
CO2 SERPL-SCNC: 21 MMOL/L — LOW (ref 22–31)
CO2 SERPL-SCNC: 21 MMOL/L — LOW (ref 22–31)
CREAT SERPL-MCNC: 1 MG/DL — SIGNIFICANT CHANGE UP (ref 0.5–1.3)
CREAT SERPL-MCNC: 1 MG/DL — SIGNIFICANT CHANGE UP (ref 0.5–1.3)
CREAT SERPL-MCNC: 1.02 MG/DL — SIGNIFICANT CHANGE UP (ref 0.5–1.3)
CREAT SERPL-MCNC: 1.02 MG/DL — SIGNIFICANT CHANGE UP (ref 0.5–1.3)
EGFR: 102 ML/MIN/1.73M2 — SIGNIFICANT CHANGE UP
EGFR: 102 ML/MIN/1.73M2 — SIGNIFICANT CHANGE UP
EGFR: 104 ML/MIN/1.73M2 — SIGNIFICANT CHANGE UP
EGFR: 104 ML/MIN/1.73M2 — SIGNIFICANT CHANGE UP
FLUAV SUBTYP SPEC NAA+PROBE: SIGNIFICANT CHANGE UP
FLUAV SUBTYP SPEC NAA+PROBE: SIGNIFICANT CHANGE UP
FLUBV RNA SPEC QL NAA+PROBE: SIGNIFICANT CHANGE UP
FLUBV RNA SPEC QL NAA+PROBE: SIGNIFICANT CHANGE UP
GLUCOSE SERPL-MCNC: 107 MG/DL — HIGH (ref 70–99)
GLUCOSE SERPL-MCNC: 107 MG/DL — HIGH (ref 70–99)
GLUCOSE SERPL-MCNC: 136 MG/DL — HIGH (ref 70–99)
GLUCOSE SERPL-MCNC: 136 MG/DL — HIGH (ref 70–99)
GRAM STN FLD: ABNORMAL
GRAM STN FLD: ABNORMAL
HADV DNA SPEC QL NAA+PROBE: SIGNIFICANT CHANGE UP
HADV DNA SPEC QL NAA+PROBE: SIGNIFICANT CHANGE UP
HCOV 229E RNA SPEC QL NAA+PROBE: SIGNIFICANT CHANGE UP
HCOV 229E RNA SPEC QL NAA+PROBE: SIGNIFICANT CHANGE UP
HCOV HKU1 RNA SPEC QL NAA+PROBE: SIGNIFICANT CHANGE UP
HCOV HKU1 RNA SPEC QL NAA+PROBE: SIGNIFICANT CHANGE UP
HCOV NL63 RNA SPEC QL NAA+PROBE: SIGNIFICANT CHANGE UP
HCOV NL63 RNA SPEC QL NAA+PROBE: SIGNIFICANT CHANGE UP
HCOV OC43 RNA SPEC QL NAA+PROBE: SIGNIFICANT CHANGE UP
HCOV OC43 RNA SPEC QL NAA+PROBE: SIGNIFICANT CHANGE UP
HCT VFR BLD CALC: 28.3 % — LOW (ref 39–50)
HCT VFR BLD CALC: 28.3 % — LOW (ref 39–50)
HCT VFR BLD CALC: 32.2 % — LOW (ref 39–50)
HCT VFR BLD CALC: 32.2 % — LOW (ref 39–50)
HGB BLD-MCNC: 10.8 G/DL — LOW (ref 13–17)
HGB BLD-MCNC: 10.8 G/DL — LOW (ref 13–17)
HGB BLD-MCNC: 9.8 G/DL — LOW (ref 13–17)
HGB BLD-MCNC: 9.8 G/DL — LOW (ref 13–17)
HMPV RNA SPEC QL NAA+PROBE: SIGNIFICANT CHANGE UP
HMPV RNA SPEC QL NAA+PROBE: SIGNIFICANT CHANGE UP
HPIV1 RNA SPEC QL NAA+PROBE: SIGNIFICANT CHANGE UP
HPIV1 RNA SPEC QL NAA+PROBE: SIGNIFICANT CHANGE UP
HPIV2 RNA SPEC QL NAA+PROBE: SIGNIFICANT CHANGE UP
HPIV2 RNA SPEC QL NAA+PROBE: SIGNIFICANT CHANGE UP
HPIV3 RNA SPEC QL NAA+PROBE: SIGNIFICANT CHANGE UP
HPIV3 RNA SPEC QL NAA+PROBE: SIGNIFICANT CHANGE UP
HPIV4 RNA SPEC QL NAA+PROBE: SIGNIFICANT CHANGE UP
HPIV4 RNA SPEC QL NAA+PROBE: SIGNIFICANT CHANGE UP
INR BLD: 2.08 RATIO — HIGH (ref 0.85–1.18)
INR BLD: 2.08 RATIO — HIGH (ref 0.85–1.18)
INR BLD: 2.1 RATIO — HIGH (ref 0.85–1.18)
INR BLD: 2.1 RATIO — HIGH (ref 0.85–1.18)
M PNEUMO DNA SPEC QL NAA+PROBE: SIGNIFICANT CHANGE UP
M PNEUMO DNA SPEC QL NAA+PROBE: SIGNIFICANT CHANGE UP
MAGNESIUM SERPL-MCNC: 2 MG/DL — SIGNIFICANT CHANGE UP (ref 1.6–2.6)
MAGNESIUM SERPL-MCNC: 2 MG/DL — SIGNIFICANT CHANGE UP (ref 1.6–2.6)
MCHC RBC-ENTMCNC: 30.6 PG — SIGNIFICANT CHANGE UP (ref 27–34)
MCHC RBC-ENTMCNC: 30.6 PG — SIGNIFICANT CHANGE UP (ref 27–34)
MCHC RBC-ENTMCNC: 30.7 PG — SIGNIFICANT CHANGE UP (ref 27–34)
MCHC RBC-ENTMCNC: 30.7 PG — SIGNIFICANT CHANGE UP (ref 27–34)
MCHC RBC-ENTMCNC: 33.5 GM/DL — SIGNIFICANT CHANGE UP (ref 32–36)
MCHC RBC-ENTMCNC: 33.5 GM/DL — SIGNIFICANT CHANGE UP (ref 32–36)
MCHC RBC-ENTMCNC: 34.6 GM/DL — SIGNIFICANT CHANGE UP (ref 32–36)
MCHC RBC-ENTMCNC: 34.6 GM/DL — SIGNIFICANT CHANGE UP (ref 32–36)
MCV RBC AUTO: 88.7 FL — SIGNIFICANT CHANGE UP (ref 80–100)
MCV RBC AUTO: 88.7 FL — SIGNIFICANT CHANGE UP (ref 80–100)
MCV RBC AUTO: 91.2 FL — SIGNIFICANT CHANGE UP (ref 80–100)
MCV RBC AUTO: 91.2 FL — SIGNIFICANT CHANGE UP (ref 80–100)
NRBC # BLD: 0 /100 WBCS — SIGNIFICANT CHANGE UP (ref 0–0)
NRBC # FLD: 0 K/UL — SIGNIFICANT CHANGE UP (ref 0–0)
PHOSPHATE SERPL-MCNC: 3.5 MG/DL — SIGNIFICANT CHANGE UP (ref 2.5–4.5)
PHOSPHATE SERPL-MCNC: 3.5 MG/DL — SIGNIFICANT CHANGE UP (ref 2.5–4.5)
PLATELET # BLD AUTO: 309 K/UL — SIGNIFICANT CHANGE UP (ref 150–400)
PLATELET # BLD AUTO: 309 K/UL — SIGNIFICANT CHANGE UP (ref 150–400)
PLATELET # BLD AUTO: 348 K/UL — SIGNIFICANT CHANGE UP (ref 150–400)
PLATELET # BLD AUTO: 348 K/UL — SIGNIFICANT CHANGE UP (ref 150–400)
POTASSIUM SERPL-MCNC: 4.8 MMOL/L — SIGNIFICANT CHANGE UP (ref 3.5–5.3)
POTASSIUM SERPL-MCNC: 4.8 MMOL/L — SIGNIFICANT CHANGE UP (ref 3.5–5.3)
POTASSIUM SERPL-MCNC: 4.9 MMOL/L — SIGNIFICANT CHANGE UP (ref 3.5–5.3)
POTASSIUM SERPL-MCNC: 4.9 MMOL/L — SIGNIFICANT CHANGE UP (ref 3.5–5.3)
POTASSIUM SERPL-SCNC: 4.8 MMOL/L — SIGNIFICANT CHANGE UP (ref 3.5–5.3)
POTASSIUM SERPL-SCNC: 4.8 MMOL/L — SIGNIFICANT CHANGE UP (ref 3.5–5.3)
POTASSIUM SERPL-SCNC: 4.9 MMOL/L — SIGNIFICANT CHANGE UP (ref 3.5–5.3)
POTASSIUM SERPL-SCNC: 4.9 MMOL/L — SIGNIFICANT CHANGE UP (ref 3.5–5.3)
PROT SERPL-MCNC: 7.9 G/DL — SIGNIFICANT CHANGE UP (ref 6–8.3)
PROT SERPL-MCNC: 7.9 G/DL — SIGNIFICANT CHANGE UP (ref 6–8.3)
PROTHROM AB SERPL-ACNC: 22.8 SEC — HIGH (ref 9.5–13)
PROTHROM AB SERPL-ACNC: 22.8 SEC — HIGH (ref 9.5–13)
PROTHROM AB SERPL-ACNC: 23.2 SEC — HIGH (ref 9.5–13)
PROTHROM AB SERPL-ACNC: 23.2 SEC — HIGH (ref 9.5–13)
RAPID RVP RESULT: SIGNIFICANT CHANGE UP
RAPID RVP RESULT: SIGNIFICANT CHANGE UP
RBC # BLD: 3.19 M/UL — LOW (ref 4.2–5.8)
RBC # BLD: 3.19 M/UL — LOW (ref 4.2–5.8)
RBC # BLD: 3.53 M/UL — LOW (ref 4.2–5.8)
RBC # BLD: 3.53 M/UL — LOW (ref 4.2–5.8)
RBC # FLD: 14 % — SIGNIFICANT CHANGE UP (ref 10.3–14.5)
RBC # FLD: 14 % — SIGNIFICANT CHANGE UP (ref 10.3–14.5)
RBC # FLD: 14.4 % — SIGNIFICANT CHANGE UP (ref 10.3–14.5)
RBC # FLD: 14.4 % — SIGNIFICANT CHANGE UP (ref 10.3–14.5)
RH IG SCN BLD-IMP: POSITIVE — SIGNIFICANT CHANGE UP
RH IG SCN BLD-IMP: POSITIVE — SIGNIFICANT CHANGE UP
RSV RNA SPEC QL NAA+PROBE: SIGNIFICANT CHANGE UP
RSV RNA SPEC QL NAA+PROBE: SIGNIFICANT CHANGE UP
RV+EV RNA SPEC QL NAA+PROBE: SIGNIFICANT CHANGE UP
RV+EV RNA SPEC QL NAA+PROBE: SIGNIFICANT CHANGE UP
SARS-COV-2 RNA SPEC QL NAA+PROBE: SIGNIFICANT CHANGE UP
SARS-COV-2 RNA SPEC QL NAA+PROBE: SIGNIFICANT CHANGE UP
SODIUM SERPL-SCNC: 128 MMOL/L — LOW (ref 135–145)
SODIUM SERPL-SCNC: 128 MMOL/L — LOW (ref 135–145)
SODIUM SERPL-SCNC: 129 MMOL/L — LOW (ref 135–145)
SODIUM SERPL-SCNC: 129 MMOL/L — LOW (ref 135–145)
SPECIMEN SOURCE: SIGNIFICANT CHANGE UP
SPECIMEN SOURCE: SIGNIFICANT CHANGE UP
WBC # BLD: 7.92 K/UL — SIGNIFICANT CHANGE UP (ref 3.8–10.5)
WBC # BLD: 7.92 K/UL — SIGNIFICANT CHANGE UP (ref 3.8–10.5)
WBC # BLD: 8.28 K/UL — SIGNIFICANT CHANGE UP (ref 3.8–10.5)
WBC # BLD: 8.28 K/UL — SIGNIFICANT CHANGE UP (ref 3.8–10.5)
WBC # FLD AUTO: 7.92 K/UL — SIGNIFICANT CHANGE UP (ref 3.8–10.5)
WBC # FLD AUTO: 7.92 K/UL — SIGNIFICANT CHANGE UP (ref 3.8–10.5)
WBC # FLD AUTO: 8.28 K/UL — SIGNIFICANT CHANGE UP (ref 3.8–10.5)
WBC # FLD AUTO: 8.28 K/UL — SIGNIFICANT CHANGE UP (ref 3.8–10.5)

## 2023-12-23 PROCEDURE — 99255 IP/OBS CONSLTJ NEW/EST HI 80: CPT | Mod: GC

## 2023-12-23 PROCEDURE — 71045 X-RAY EXAM CHEST 1 VIEW: CPT | Mod: 26

## 2023-12-23 PROCEDURE — 99232 SBSQ HOSP IP/OBS MODERATE 35: CPT

## 2023-12-23 PROCEDURE — 99254 IP/OBS CNSLTJ NEW/EST MOD 60: CPT | Mod: GC

## 2023-12-23 PROCEDURE — 71260 CT THORAX DX C+: CPT | Mod: 26

## 2023-12-23 PROCEDURE — 93010 ELECTROCARDIOGRAM REPORT: CPT

## 2023-12-23 PROCEDURE — 70491 CT SOFT TISSUE NECK W/DYE: CPT | Mod: 26

## 2023-12-23 RX ORDER — HEPARIN SODIUM 5000 [USP'U]/ML
1500 INJECTION INTRAVENOUS; SUBCUTANEOUS
Qty: 25000 | Refills: 0 | Status: DISCONTINUED | OUTPATIENT
Start: 2023-12-23 | End: 2023-12-24

## 2023-12-23 RX ORDER — PHYTONADIONE (VIT K1) 5 MG
10 TABLET ORAL ONCE
Refills: 0 | Status: COMPLETED | OUTPATIENT
Start: 2023-12-23 | End: 2023-12-23

## 2023-12-23 RX ORDER — VANCOMYCIN HCL 1 G
1000 VIAL (EA) INTRAVENOUS EVERY 12 HOURS
Refills: 0 | Status: DISCONTINUED | OUTPATIENT
Start: 2023-12-23 | End: 2023-12-23

## 2023-12-23 RX ORDER — IBUPROFEN 200 MG
600 TABLET ORAL ONCE
Refills: 0 | Status: COMPLETED | OUTPATIENT
Start: 2023-12-23 | End: 2023-12-23

## 2023-12-23 RX ORDER — ACETAMINOPHEN 500 MG
1000 TABLET ORAL ONCE
Refills: 0 | Status: DISCONTINUED | OUTPATIENT
Start: 2023-12-23 | End: 2023-12-24

## 2023-12-23 RX ORDER — HEPARIN SODIUM 5000 [USP'U]/ML
15 INJECTION INTRAVENOUS; SUBCUTANEOUS
Qty: 25000 | Refills: 0 | Status: DISCONTINUED | OUTPATIENT
Start: 2023-12-23 | End: 2023-12-23

## 2023-12-23 RX ADMIN — Medication 200 MILLIGRAM(S): at 00:15

## 2023-12-23 RX ADMIN — Medication 650 MILLIGRAM(S): at 06:35

## 2023-12-23 RX ADMIN — HEPARIN SODIUM 1500 UNIT(S)/HR: 5000 INJECTION INTRAVENOUS; SUBCUTANEOUS at 01:28

## 2023-12-23 RX ADMIN — Medication 40 MILLIGRAM(S): at 17:26

## 2023-12-23 RX ADMIN — SODIUM CHLORIDE 2 GRAM(S): 9 INJECTION INTRAMUSCULAR; INTRAVENOUS; SUBCUTANEOUS at 14:14

## 2023-12-23 RX ADMIN — PIPERACILLIN AND TAZOBACTAM 25 GRAM(S): 4; .5 INJECTION, POWDER, LYOPHILIZED, FOR SOLUTION INTRAVENOUS at 06:00

## 2023-12-23 RX ADMIN — HEPARIN SODIUM 1500 UNIT(S)/HR: 5000 INJECTION INTRAVENOUS; SUBCUTANEOUS at 01:25

## 2023-12-23 RX ADMIN — Medication 200 MILLIGRAM(S): at 05:59

## 2023-12-23 RX ADMIN — Medication 650 MILLIGRAM(S): at 00:01

## 2023-12-23 RX ADMIN — Medication 600 MILLIGRAM(S): at 17:28

## 2023-12-23 RX ADMIN — Medication 600 MILLIGRAM(S): at 16:01

## 2023-12-23 RX ADMIN — Medication 650 MILLIGRAM(S): at 14:37

## 2023-12-23 RX ADMIN — Medication 1 TABLET(S): at 08:38

## 2023-12-23 RX ADMIN — HEPARIN SODIUM 15 UNIT(S)/HR: 5000 INJECTION INTRAVENOUS; SUBCUTANEOUS at 19:18

## 2023-12-23 RX ADMIN — Medication 2 DROP(S): at 05:59

## 2023-12-23 RX ADMIN — Medication 650 MILLIGRAM(S): at 14:13

## 2023-12-23 RX ADMIN — Medication 200 MILLIGRAM(S): at 17:28

## 2023-12-23 RX ADMIN — Medication 1 TABLET(S): at 17:28

## 2023-12-23 RX ADMIN — SODIUM CHLORIDE 2 GRAM(S): 9 INJECTION INTRAMUSCULAR; INTRAVENOUS; SUBCUTANEOUS at 05:58

## 2023-12-23 RX ADMIN — PIPERACILLIN AND TAZOBACTAM 25 GRAM(S): 4; .5 INJECTION, POWDER, LYOPHILIZED, FOR SOLUTION INTRAVENOUS at 14:14

## 2023-12-23 RX ADMIN — PIPERACILLIN AND TAZOBACTAM 25 GRAM(S): 4; .5 INJECTION, POWDER, LYOPHILIZED, FOR SOLUTION INTRAVENOUS at 23:11

## 2023-12-23 RX ADMIN — HEPARIN SODIUM 15 UNIT(S)/HR: 5000 INJECTION INTRAVENOUS; SUBCUTANEOUS at 16:16

## 2023-12-23 RX ADMIN — Medication 1 TABLET(S): at 11:36

## 2023-12-23 RX ADMIN — Medication 102 MILLIGRAM(S): at 15:13

## 2023-12-23 RX ADMIN — Medication 2 DROP(S): at 17:29

## 2023-12-23 NOTE — CONSULT NOTE ADULT - SUBJECTIVE AND OBJECTIVE BOX
28 years old male with h/o Lupus ( diagnosed in 09/2023, on Plaquenil present to [place] ED on 12/12/23  with complain of chest pain and SOB. Patient reported left sided pleuritic chest pain which started 3 days ago. Pain is progressively worsened, associated with SOB and cough. Patient was seen in OSH ED and was prescribed antibiotics. Patient reported significantly worsening of left sided pleuritic chest pain today. No fever or chills. Patient reported loss of appetite and had a few episode of diarrhea for last 2 days. CTA chest with acute right upper lobe and left lower lobe segmental/subsegmental pulmonary emboli. No CT evidence of right heart strain. New bilateral lower lobe consolidations with areas of central clearing. Pneumonia and pulmonary infarcts are in the differential. New bilateral pleural effusions, small on the left and trace on the right. Bilateral axillary and supraclavicular adenopathy of unclear etiology. Patient was started on heparin ggt transitioned to eliquis on 12/19,  patient with worsening SOB/ hypoxia requiring nasal cannula oxygen supplementation. 12/20  Repeat Xray shows increased moderate left pleural effusion and compressive atelectasis trace right effusion and linear atelectasis. Thoracic team consulted for possible pigtail insertion Bedside US: Large left effusion with septations. Right simple effusion , + pericardial effusion- lower extremity dopplers negative for DVT.      Subjective: Patient seen and examined. No new events except as noted.     REVIEW OF SYSTEMS:    CONSTITUTIONAL: No weakness, fevers or chills  EYES/ENT: No visual changes;  No vertigo or throat pain   NECK: No pain or stiffness  RESPIRATORY: No cough, wheezing, hemoptysis; No shortness of breath  CARDIOVASCULAR: No chest pain or palpitations  GASTROINTESTINAL: No abdominal or epigastric pain. No nausea, vomiting, or hematemesis; No diarrhea or constipation. No melena or hematochezia.  GENITOURINARY: No dysuria, frequency or hematuria  NEUROLOGICAL: No numbness or weakness  SKIN: No itching, burning, rashes, or lesions   All other review of systems is negative unless indicated above.    PAST MEDICAL & SURGICAL HISTORY:  LE (lupus erythematosus)  Pulmonary embolism  No significant past surgical history    MEDICATIONS:  MEDICATIONS  (STANDING):  acetaminophen   IVPB .. 1000 milliGRAM(s) IV Intermittent once  ciprofloxacin  0.3% Ophthalmic Solution for Otic Use 2 Drop(s) Both Ears two times a day  heparin  Infusion 1500 Unit(s)/Hr (15 mL/Hr) IV Continuous <Continuous>  hydroxychloroquine 200 milliGRAM(s) Oral two times a day  lactobacillus acidophilus 1 Tablet(s) Oral three times a day with meals  lidocaine   4% Patch 1 Patch Transdermal daily  methylPREDNISolone sodium succinate Injectable 40 milliGRAM(s) IV Push daily  piperacillin/tazobactam IVPB.. 3.375 Gram(s) IV Intermittent every 8 hours    Home Medications:  hydroxychloroquine 400 mg oral tablet: 1 tab(s) orally 2 times a day (22 Dec 2023 23:06)    Allergies    No Known Allergies    Intolerances          PHYSICAL EXAM:  T(C): 38.7 (12-23-23 @ 20:16), Max: 39.5 (12-23-23 @ 16:04)  HR: 105 (12-23-23 @ 20:16) (92 - 113)  BP: 152/74 (12-23-23 @ 20:16) (146/74 - 170/91)  RR: 18 (12-23-23 @ 20:16) (17 - 20)  SpO2: 100% (12-23-23 @ 20:16) (94% - 100%)  Wt(kg): --  I&O's Summary    23 Dec 2023 07:01  -  23 Dec 2023 21:57  --------------------------------------------------------  IN: 420 mL / OUT: 700 mL / NET: -280 mL      Height (cm): 165.1 (12-22 @ 22:28)  Weight (kg): 69.2 (12-22 @ 22:28)  BMI (kg/m2): 25.4 (12-22 @ 22:28)  BSA (m2): 1.76 (12-22 @ 22:28)    Appearance: Normal	  HEENT:  PERRLA   Lymphatic: No lymphadenopathy   Cardiovascular: Normal S1 S2, no JVD  Respiratory: normal effort , clear  Gastrointestinal:  Soft, Non-tender  Skin: No rashes,  warm to touch  Psychiatry:  Mood & affect appropriate  Musculuskeletal: No edema    recent labs, Imaging and EKGs personally reviewed     Culture - Sputum (collected 12-23-23 @ 14:20)  Source: .Sputum Sputum  Gram Stain (12-23-23 @ 21:09):    Few polymorphonuclear leukocytes per low power field    Moderate Squamous epithelial cells per low power field    Rare Gram Negative Rods per oil power field    12-23-23 @ 07:01  -  12-23-23 @ 21:57  --------------------------------------------------------  IN: 420 mL / OUT: 700 mL / NET: -280 mL                          10.8   8.28  )-----------( 348      ( 23 Dec 2023 05:50 )             32.2               12-23    128<L>  |  94<L>  |  16  ----------------------------<  107<H>  4.8   |  20<L>  |  1.02    Ca    8.7      23 Dec 2023 05:50  Phos  3.5     12-23  Mg     2.00     12-23    TPro  7.9  /  Alb  3.0<L>  /  TBili  0.6  /  DBili  x   /  AST  100<H>  /  ALT  88<H>  /  AlkPhos  86  12-23    PT/INR - ( 23 Dec 2023 05:50 )   PT: 23.2 sec;   INR: 2.10 ratio         PTT - ( 23 Dec 2023 00:01 )  PTT:65.6 sec                   Urinalysis Basic - ( 23 Dec 2023 05:50 )    Color: x / Appearance: x / SG: x / pH: x  Gluc: 107 mg/dL / Ketone: x  / Bili: x / Urobili: x   Blood: x / Protein: x / Nitrite: x   Leuk Esterase: x / RBC: x / WBC x   Sq Epi: x / Non Sq Epi: x / Bacteria: x               28 years old male with h/o Lupus ( diagnosed in 09/2023, on Plaquenil present to [place] ED on 12/12/23  with complain of chest pain and SOB. Patient reported left sided pleuritic chest pain which started 3 days ago. Pain is progressively worsened, associated with SOB and cough. Patient was seen in OSH ED and was prescribed antibiotics. Patient reported significantly worsening of left sided pleuritic chest pain today. No fever or chills. Patient reported loss of appetite and had a few episode of diarrhea for last 2 days. CTA chest with acute right upper lobe and left lower lobe segmental/subsegmental pulmonary emboli. No CT evidence of right heart strain. New bilateral lower lobe consolidations with areas of central clearing. Pneumonia and pulmonary infarcts are in the differential. New bilateral pleural effusions, small on the left and trace on the right. Bilateral axillary and supraclavicular adenopathy of unclear etiology. Patient was started on heparin ggt transitioned to eliquis on 12/19,  patient with worsening SOB/ hypoxia requiring nasal cannula oxygen supplementation. 12/20  Repeat Xray shows increased moderate left pleural effusion and compressive atelectasis trace right effusion and linear atelectasis. Thoracic team consulted for possible pigtail insertion Bedside US: Large left effusion with septations. Right simple effusion , + pericardial effusion- lower extremity dopplers negative for DVT.      Subjective: Patient seen and examined. No new events except as noted.     REVIEW OF SYSTEMS:    CONSTITUTIONAL: No weakness, fevers or chills  EYES/ENT: No visual changes;  No vertigo or throat pain   NECK: No pain or stiffness  RESPIRATORY: No cough, wheezing, hemoptysis; No shortness of breath  CARDIOVASCULAR: No chest pain or palpitations  GASTROINTESTINAL: No abdominal or epigastric pain. No nausea, vomiting, or hematemesis; No diarrhea or constipation. No melena or hematochezia.  GENITOURINARY: No dysuria, frequency or hematuria  NEUROLOGICAL: No numbness or weakness  SKIN: No itching, burning, rashes, or lesions   All other review of systems is negative unless indicated above.    PAST MEDICAL & SURGICAL HISTORY:  LE (lupus erythematosus)  Pulmonary embolism  No significant past surgical history    MEDICATIONS:  MEDICATIONS  (STANDING):  acetaminophen   IVPB .. 1000 milliGRAM(s) IV Intermittent once  ciprofloxacin  0.3% Ophthalmic Solution for Otic Use 2 Drop(s) Both Ears two times a day  heparin  Infusion 1500 Unit(s)/Hr (15 mL/Hr) IV Continuous <Continuous>  hydroxychloroquine 200 milliGRAM(s) Oral two times a day  lactobacillus acidophilus 1 Tablet(s) Oral three times a day with meals  lidocaine   4% Patch 1 Patch Transdermal daily  methylPREDNISolone sodium succinate Injectable 40 milliGRAM(s) IV Push daily  piperacillin/tazobactam IVPB.. 3.375 Gram(s) IV Intermittent every 8 hours    Home Medications:  hydroxychloroquine 400 mg oral tablet: 1 tab(s) orally 2 times a day (22 Dec 2023 23:06)    Allergies    No Known Allergies    Intolerances          PHYSICAL EXAM:  T(C): 38.7 (12-23-23 @ 20:16), Max: 39.5 (12-23-23 @ 16:04)  HR: 105 (12-23-23 @ 20:16) (92 - 113)  BP: 152/74 (12-23-23 @ 20:16) (146/74 - 170/91)  RR: 18 (12-23-23 @ 20:16) (17 - 20)  SpO2: 100% (12-23-23 @ 20:16) (94% - 100%)  Wt(kg): --  I&O's Summary    23 Dec 2023 07:01  -  23 Dec 2023 21:57  --------------------------------------------------------  IN: 420 mL / OUT: 700 mL / NET: -280 mL      Height (cm): 165.1 (12-22 @ 22:28)  Weight (kg): 69.2 (12-22 @ 22:28)  BMI (kg/m2): 25.4 (12-22 @ 22:28)  BSA (m2): 1.76 (12-22 @ 22:28)    Appearance: Normal	  HEENT:  PERRLA   Lymphatic: No lymphadenopathy   Cardiovascular: Normal S1 S2, no JVD  Respiratory: normal effort , clear  Gastrointestinal:  Soft, Non-tender  Skin: No rashes,  warm to touch  Psychiatry:  Mood & affect appropriate  Musculuskeletal: No edema    recent labs, Imaging and EKGs personally reviewed     Culture - Sputum (collected 12-23-23 @ 14:20)  Source: .Sputum Sputum  Gram Stain (12-23-23 @ 21:09):    Few polymorphonuclear leukocytes per low power field    Moderate Squamous epithelial cells per low power field    Rare Gram Negative Rods per oil power field    12-23-23 @ 07:01  -  12-23-23 @ 21:57  --------------------------------------------------------  IN: 420 mL / OUT: 700 mL / NET: -280 mL                          10.8   8.28  )-----------( 348      ( 23 Dec 2023 05:50 )             32.2               12-23    128<L>  |  94<L>  |  16  ----------------------------<  107<H>  4.8   |  20<L>  |  1.02    Ca    8.7      23 Dec 2023 05:50  Phos  3.5     12-23  Mg     2.00     12-23    TPro  7.9  /  Alb  3.0<L>  /  TBili  0.6  /  DBili  x   /  AST  100<H>  /  ALT  88<H>  /  AlkPhos  86  12-23    PT/INR - ( 23 Dec 2023 05:50 )   PT: 23.2 sec;   INR: 2.10 ratio         PTT - ( 23 Dec 2023 00:01 )  PTT:65.6 sec                   Urinalysis Basic - ( 23 Dec 2023 05:50 )    Color: x / Appearance: x / SG: x / pH: x  Gluc: 107 mg/dL / Ketone: x  / Bili: x / Urobili: x   Blood: x / Protein: x / Nitrite: x   Leuk Esterase: x / RBC: x / WBC x   Sq Epi: x / Non Sq Epi: x / Bacteria: x

## 2023-12-23 NOTE — CONSULT NOTE ADULT - ATTENDING COMMENTS
I personally interviewed and examined the patient. Agree with rheumatology fellow's note with my edits as above   will start steroids empirically if no evidence of infection  d/w with heme service , ID and ct sx service

## 2023-12-23 NOTE — CONSULT NOTE ADULT - SUBJECTIVE AND OBJECTIVE BOX
HPI:  28 years old male with h/o Lupus ( diagnosed in 09/2023, on Plaquenil present to [place] ED on 12/12/23  with complain of chest pain and SOB. Patient reported left sided pleuritic chest pain which started 3 days ago. Pain is progressively worsened, associated with SOB and cough. Patient was seen in OSH ED and was prescribed antibiotics. Patient reported significantly worsening of left sided pleuritic chest pain today. No fever or chills. Patient reported loss of appetite and had a few episode of diarrhea for last 2 days. CTA chest with acute right upper lobe and left lower lobe segmental/subsegmental pulmonary emboli. No CT evidence of right heart strain. New bilateral lower lobe consolidations with areas of central clearing. Pneumonia and pulmonary infarcts are in the differential. New bilateral pleural effusions, small on the left and trace on the right. Bilateral axillary and supraclavicular adenopathy of unclear etiology. Patient was started on heparin ggt transitioned to eliquis on 12/19,  patient with worsening SOB/ hypoxia requiring nasal cannula oxygen supplementation. 12/20  Repeat Xray shows increased large left pleural effusion and compressive atelectasis trace right effusion and linear atelectasis. Thoracic team consulted for possible pigtail insertion Bedside US: Large left effusion with septations. Right simple effusion , + pericardial effusion- lower extremity dopplers negative for DVT,   - GI PCR + e coli  - mRSA PCR + Staph aureus   . Patient transferred to [facility] for further management.     (22 Dec 2023 22:52)      14 point ROS otherwise negative    PAST MEDICAL & SURGICAL HISTORY:  LE (lupus erythematosus)      Pulmonary embolism      No significant past surgical history          Allergies    No Known Allergies    Intolerances        MEDICATIONS  (STANDING):  acetaminophen   IVPB .. 1000 milliGRAM(s) IV Intermittent once  ciprofloxacin  0.3% Ophthalmic Solution for Otic Use 2 Drop(s) Both Ears two times a day  heparin  Infusion. 1500 Unit(s)/Hr (15 mL/Hr) IV Continuous <Continuous>  hydroxychloroquine 200 milliGRAM(s) Oral two times a day  lactobacillus acidophilus 1 Tablet(s) Oral three times a day with meals  lidocaine   4% Patch 1 Patch Transdermal daily  phytonadione  IVPB 10 milliGRAM(s) IV Intermittent once  piperacillin/tazobactam IVPB.. 3.375 Gram(s) IV Intermittent every 8 hours  sodium chloride 2 Gram(s) Oral every 8 hours  vancomycin  IVPB 1000 milliGRAM(s) IV Intermittent every 12 hours    MEDICATIONS  (PRN):  acetaminophen     Tablet .. 650 milliGRAM(s) Oral every 6 hours PRN Temp greater or equal to 38C (100.4F)  albuterol/ipratropium for Nebulization 3 milliLiter(s) Nebulizer every 6 hours PRN Shortness of Breath and/or Wheezing  guaiFENesin Oral Liquid (Sugar-Free) 200 milliGRAM(s) Oral every 6 hours PRN Cough  lidocaine 2% Viscous 5 milliLiter(s) Swish and Spit three times a day PRN Mouth Care      FAMILY HISTORY:  No pertinent family history in first degree relatives        SOCIAL HISTORY: No EtOH, no tobacco    Height (cm): 165.1 (12-22 @ 22:28)  Weight (kg): 69.2 (12-22 @ 22:28), 73.2 (12-22 @ 18:24)  BMI (kg/m2): 25.4 (12-22 @ 22:28)  BSA (m2): 1.76 (12-22 @ 22:28)    VITALS:   T(F): 102.9 (12-23-23 @ 10:00), Max: 102.9 (12-23-23 @ 06:30)  HR: 98 (12-23-23 @ 10:00)  BP: 158/108 (12-23-23 @ 10:00)  RR: 18 (12-23-23 @ 10:00)  SpO2: 100% (12-23-23 @ 10:00)  Wt(kg): --    PHYSICAL EXAM    GENERAL: NAD, well-developed  HEAD:  Atraumatic, Normocephalic  EYES: EOMI, PERRLA, conjunctiva and sclera clear  NECK: Supple, No JVD  CHEST/LUNG: Clear to auscultation bilaterally; No wheeze  HEART: Regular rate and rhythm; No murmurs, rubs, or gallops  ABDOMEN: Soft, Nontender, Nondistended; Bowel sounds present  EXTREMITIES:  2+ Peripheral Pulses, No clubbing, cyanosis, or edema  NEUROLOGY: non-focal  SKIN: No rashes or lesions    LABS:                         10.8   8.28  )-----------( 348      ( 23 Dec 2023 05:50 )             32.2     12-23    128<L>  |  94<L>  |  16  ----------------------------<  107<H>  4.8   |  20<L>  |  1.02    Ca    8.7      23 Dec 2023 05:50  Phos  3.5     12-23  Mg     2.00     12-23    TPro  7.9  /  Alb  3.0<L>  /  TBili  0.6  /  DBili  x   /  AST  100<H>  /  ALT  88<H>  /  AlkPhos  86  12-23    Magnesium: 2.00 mg/dL (12-23 @ 00:01)  Phosphorus: 3.5 mg/dL (12-23 @ 00:01)    PT/INR - ( 23 Dec 2023 05:50 )   PT: 23.2 sec;   INR: 2.10 ratio         PTT - ( 23 Dec 2023 00:01 )  PTT:65.6 sec  .Blood Blood-Peripheral  12-21 @ 19:00   No growth at 24 hours  --  --      .Blood Blood-Peripheral  12-21 @ 18:45   No growth at 24 hours  --  --      .Sputum Sputum  12-20 @ 13:50   Normal Respiratory Inge present  --    Few Squamous epithelial cells per low power field  Few polymorphonuclear leukocytes per low power field  Few Gram Positive Cocci in Pairs and Chains per oil power field      .Stool Feces  12-15 @ 13:20   No enteric pathogens isolated.  (Stool culture examined for Salmonella,  Shigella, Campylobacter, Aeromonas, Plesiomonas,  Vibrio, E.coli O157 and Yersinia)  --  --      .Blood Blood-Peripheral  12-14 @ 16:45   No growth at 5 days  --  --      .Blood Blood-Peripheral  12-14 @ 16:35   No growth at 5 days  --  --      .Blood Blood-Peripheral  12-12 @ 20:44   No growth at 5 days  --  --      .Blood Blood-Peripheral  12-12 @ 20:30   No growth at 5 days  --  --          IMAGING: HPI:  28 years old male with h/o Lupus ( diagnosed in 09/2023, on Plaquenil present to [place] ED on 12/12/23  with complain of chest pain and SOB. Patient reported left sided pleuritic chest pain which started 3 days ago. Pain is progressively worsened, associated with SOB and cough. Patient was seen in OSH ED and was prescribed antibiotics. Patient reported significantly worsening of left sided pleuritic chest pain today. No fever or chills. Patient reported loss of appetite and had a few episode of diarrhea for last 2 days. CTA chest with acute right upper lobe and left lower lobe segmental/subsegmental pulmonary emboli. No CT evidence of right heart strain. New bilateral lower lobe consolidations with areas of central clearing. Pneumonia and pulmonary infarcts are in the differential. New bilateral pleural effusions, small on the left and trace on the right. Bilateral axillary and supraclavicular adenopathy of unclear etiology. Patient was started on heparin ggt transitioned to eliquis on 12/19,  patient with worsening SOB/ hypoxia requiring nasal cannula oxygen supplementation. 12/20  Repeat Xray shows increased large left pleural effusion and compressive atelectasis trace right effusion and linear atelectasis. Thoracic team consulted for possible pigtail insertion Bedside US: Large left effusion with septations. Right simple effusion , + pericardial effusion- lower extremity dopplers negative for DVT,   - GI PCR + e coli  - mRSA PCR + Staph aureus   . Patient transferred to [facility] for further management.     (22 Dec 2023 22:52)      14 point ROS otherwise negative    PAST MEDICAL & SURGICAL HISTORY:  LE (lupus erythematosus)      Pulmonary embolism      No significant past surgical history          Allergies    No Known Allergies    Intolerances        MEDICATIONS  (STANDING):  acetaminophen   IVPB .. 1000 milliGRAM(s) IV Intermittent once  ciprofloxacin  0.3% Ophthalmic Solution for Otic Use 2 Drop(s) Both Ears two times a day  heparin  Infusion. 1500 Unit(s)/Hr (15 mL/Hr) IV Continuous <Continuous>  hydroxychloroquine 200 milliGRAM(s) Oral two times a day  lactobacillus acidophilus 1 Tablet(s) Oral three times a day with meals  lidocaine   4% Patch 1 Patch Transdermal daily  phytonadione  IVPB 10 milliGRAM(s) IV Intermittent once  piperacillin/tazobactam IVPB.. 3.375 Gram(s) IV Intermittent every 8 hours  sodium chloride 2 Gram(s) Oral every 8 hours  vancomycin  IVPB 1000 milliGRAM(s) IV Intermittent every 12 hours    MEDICATIONS  (PRN):  acetaminophen     Tablet .. 650 milliGRAM(s) Oral every 6 hours PRN Temp greater or equal to 38C (100.4F)  albuterol/ipratropium for Nebulization 3 milliLiter(s) Nebulizer every 6 hours PRN Shortness of Breath and/or Wheezing  guaiFENesin Oral Liquid (Sugar-Free) 200 milliGRAM(s) Oral every 6 hours PRN Cough  lidocaine 2% Viscous 5 milliLiter(s) Swish and Spit three times a day PRN Mouth Care      FAMILY HISTORY:  No pertinent family history in first degree relatives        SOCIAL HISTORY: No EtOH, no tobacco    Height (cm): 165.1 (12-22 @ 22:28)  Weight (kg): 69.2 (12-22 @ 22:28), 73.2 (12-22 @ 18:24)  BMI (kg/m2): 25.4 (12-22 @ 22:28)  BSA (m2): 1.76 (12-22 @ 22:28)    VITALS:   T(F): 102.9 (12-23-23 @ 10:00), Max: 102.9 (12-23-23 @ 06:30)  HR: 98 (12-23-23 @ 10:00)  BP: 158/108 (12-23-23 @ 10:00)  RR: 18 (12-23-23 @ 10:00)  SpO2: 100% (12-23-23 @ 10:00)  Wt(kg): --    PHYSICAL EXAM    GENERAL: NAD, well-developed  HEAD:  Atraumatic, Normocephalic  EYES: EOMI, PERRLA, conjunctiva and sclera clear  NECK: Supple, No JVD  CHEST/LUNG: Clear to auscultation bilaterally; No wheeze  HEART: Regular rate and rhythm; No murmurs, rubs, or gallops  ABDOMEN: Soft, Nontender, Nondistended; Bowel sounds present  EXTREMITIES:  2+ Peripheral Pulses, No clubbing, cyanosis, or edema  NEUROLOGY: non-focal  SKIN: No rashes or lesions    LABS:                         10.8   8.28  )-----------( 348      ( 23 Dec 2023 05:50 )             32.2     12-23    128<L>  |  94<L>  |  16  ----------------------------<  107<H>  4.8   |  20<L>  |  1.02    Ca    8.7      23 Dec 2023 05:50  Phos  3.5     12-23  Mg     2.00     12-23    TPro  7.9  /  Alb  3.0<L>  /  TBili  0.6  /  DBili  x   /  AST  100<H>  /  ALT  88<H>  /  AlkPhos  86  12-23    Magnesium: 2.00 mg/dL (12-23 @ 00:01)  Phosphorus: 3.5 mg/dL (12-23 @ 00:01)    PT/INR - ( 23 Dec 2023 05:50 )   PT: 23.2 sec;   INR: 2.10 ratio         PTT - ( 23 Dec 2023 00:01 )  PTT:65.6 sec  .Blood Blood-Peripheral  12-21 @ 19:00   No growth at 24 hours  --  --      .Blood Blood-Peripheral  12-21 @ 18:45   No growth at 24 hours  --  --      .Sputum Sputum  12-20 @ 13:50   Normal Respiratory Inge present  --    Few Squamous epithelial cells per low power field  Few polymorphonuclear leukocytes per low power field  Few Gram Positive Cocci in Pairs and Chains per oil power field      .Stool Feces  12-15 @ 13:20   No enteric pathogens isolated.  (Stool culture examined for Salmonella,  Shigella, Campylobacter, Aeromonas, Plesiomonas,  Vibrio, E.coli O157 and Yersinia)  --  --      .Blood Blood-Peripheral  12-14 @ 16:45   No growth at 5 days  --  --      .Blood Blood-Peripheral  12-14 @ 16:35   No growth at 5 days  --  --      .Blood Blood-Peripheral  12-12 @ 20:44   No growth at 5 days  --  --      .Blood Blood-Peripheral  12-12 @ 20:30   No growth at 5 days  --  --          IMAGING: HPI:  28 years old male with h/o Lupus ( diagnosed in 09/2023, on Plaquenil present to [place] ED on 12/12/23  with complain of chest pain and SOB. Patient reported left sided pleuritic chest pain which started 3 days ago. Pain is progressively worsened, associated with SOB and cough. Patient was seen in OSH ED and was prescribed antibiotics. Patient reported significantly worsening of left sided pleuritic chest pain today. No fever or chills. Patient reported loss of appetite and had a few episode of diarrhea for last 2 days. CTA chest with acute right upper lobe and left lower lobe segmental/subsegmental pulmonary emboli. No CT evidence of right heart strain. New bilateral lower lobe consolidations with areas of central clearing. Pneumonia and pulmonary infarcts are in the differential. New bilateral pleural effusions, small on the left and trace on the right. Bilateral axillary and supraclavicular adenopathy of unclear etiology. Patient was started on heparin ggt transitioned to eliquis on 12/19,  patient with worsening SOB/ hypoxia requiring nasal cannula oxygen supplementation. 12/20  Repeat Xray shows increased large left pleural effusion and compressive atelectasis trace right effusion and linear atelectasis. Thoracic team consulted for possible pigtail insertion Bedside US: Large left effusion with septations. Right simple effusion , + pericardial effusion- lower extremity dopplers negative for DVT,   - GI PCR + e coli  - mRSA PCR + Staph aureus   . Patient transferred to [facility] for further management.     (22 Dec 2023 22:52)      14 point ROS otherwise negative    PAST MEDICAL & SURGICAL HISTORY:  LE (lupus erythematosus)      Pulmonary embolism      No significant past surgical history          Allergies    No Known Allergies    Intolerances        MEDICATIONS  (STANDING):  acetaminophen   IVPB .. 1000 milliGRAM(s) IV Intermittent once  ciprofloxacin  0.3% Ophthalmic Solution for Otic Use 2 Drop(s) Both Ears two times a day  heparin  Infusion. 1500 Unit(s)/Hr (15 mL/Hr) IV Continuous <Continuous>  hydroxychloroquine 200 milliGRAM(s) Oral two times a day  lactobacillus acidophilus 1 Tablet(s) Oral three times a day with meals  lidocaine   4% Patch 1 Patch Transdermal daily  phytonadione  IVPB 10 milliGRAM(s) IV Intermittent once  piperacillin/tazobactam IVPB.. 3.375 Gram(s) IV Intermittent every 8 hours  sodium chloride 2 Gram(s) Oral every 8 hours  vancomycin  IVPB 1000 milliGRAM(s) IV Intermittent every 12 hours    MEDICATIONS  (PRN):  acetaminophen     Tablet .. 650 milliGRAM(s) Oral every 6 hours PRN Temp greater or equal to 38C (100.4F)  albuterol/ipratropium for Nebulization 3 milliLiter(s) Nebulizer every 6 hours PRN Shortness of Breath and/or Wheezing  guaiFENesin Oral Liquid (Sugar-Free) 200 milliGRAM(s) Oral every 6 hours PRN Cough  lidocaine 2% Viscous 5 milliLiter(s) Swish and Spit three times a day PRN Mouth Care      FAMILY HISTORY:  No pertinent family history in first degree relatives        SOCIAL HISTORY: No EtOH, no tobacco    Height (cm): 165.1 (12-22 @ 22:28)  Weight (kg): 69.2 (12-22 @ 22:28), 73.2 (12-22 @ 18:24)  BMI (kg/m2): 25.4 (12-22 @ 22:28)  BSA (m2): 1.76 (12-22 @ 22:28)    VITALS:   T(F): 102.9 (12-23-23 @ 10:00), Max: 102.9 (12-23-23 @ 06:30)  HR: 98 (12-23-23 @ 10:00)  BP: 158/108 (12-23-23 @ 10:00)  RR: 18 (12-23-23 @ 10:00)  SpO2: 100% (12-23-23 @ 10:00)  Wt(kg): --    PHYSICAL EXAM    GENERAL: +Distressed, diaphoretic, well-developed, leaning forward for comfort  HEAD:  Atraumatic, Normocephalic  EYES: EOMI, PERRLA, conjunctiva and sclera clear  NECK: Supple, No JVD  CHEST/LUNG: Clear to auscultation bilaterally; No wheeze  HEART: Regular rate and rhythm; No murmurs, rubs, or gallops  ABDOMEN: Soft, Nontender, Nondistended; Bowel sounds present  EXTREMITIES:  2+ Peripheral Pulses, No clubbing, cyanosis, or edema  NEUROLOGY: non-focal  SKIN: No rashes or lesions    LABS:                         10.8   8.28  )-----------( 348      ( 23 Dec 2023 05:50 )             32.2     12-23    128<L>  |  94<L>  |  16  ----------------------------<  107<H>  4.8   |  20<L>  |  1.02    Ca    8.7      23 Dec 2023 05:50  Phos  3.5     12-23  Mg     2.00     12-23    TPro  7.9  /  Alb  3.0<L>  /  TBili  0.6  /  DBili  x   /  AST  100<H>  /  ALT  88<H>  /  AlkPhos  86  12-23    Magnesium: 2.00 mg/dL (12-23 @ 00:01)  Phosphorus: 3.5 mg/dL (12-23 @ 00:01)    PT/INR - ( 23 Dec 2023 05:50 )   PT: 23.2 sec;   INR: 2.10 ratio         PTT - ( 23 Dec 2023 00:01 )  PTT:65.6 sec  .Blood Blood-Peripheral  12-21 @ 19:00   No growth at 24 hours  --  --      .Blood Blood-Peripheral  12-21 @ 18:45   No growth at 24 hours  --  --      .Sputum Sputum  12-20 @ 13:50   Normal Respiratory Inge present  --    Few Squamous epithelial cells per low power field  Few polymorphonuclear leukocytes per low power field  Few Gram Positive Cocci in Pairs and Chains per oil power field      .Stool Feces  12-15 @ 13:20   No enteric pathogens isolated.  (Stool culture examined for Salmonella,  Shigella, Campylobacter, Aeromonas, Plesiomonas,  Vibrio, E.coli O157 and Yersinia)  --  --      .Blood Blood-Peripheral  12-14 @ 16:45   No growth at 5 days  --  --      .Blood Blood-Peripheral  12-14 @ 16:35   No growth at 5 days  --  --      .Blood Blood-Peripheral  12-12 @ 20:44   No growth at 5 days  --  --      .Blood Blood-Peripheral  12-12 @ 20:30   No growth at 5 days  --  --          IMAGING: HPI:  28 years old male with h/o Lupus ( diagnosed in 09/2023, on Plaquenil present to [place] ED on 12/12/23  with complain of chest pain and SOB. Patient reported left sided pleuritic chest pain which started 3 days ago. Pain is progressively worsened, associated with SOB and cough. Patient was seen in OSH ED and was prescribed antibiotics. Patient reported significantly worsening of left sided pleuritic chest pain today. No fever or chills. Patient reported loss of appetite and had a few episode of diarrhea for last 2 days. CTA chest with acute right upper lobe and left lower lobe segmental/subsegmental pulmonary emboli. No CT evidence of right heart strain. New bilateral lower lobe consolidations with areas of central clearing. Pneumonia and pulmonary infarcts are in the differential. New bilateral pleural effusions, small on the left and trace on the right. Bilateral axillary and supraclavicular adenopathy of unclear etiology. Patient was started on heparin ggt transitioned to eliquis on 12/19,  patient with worsening SOB/ hypoxia requiring nasal cannula oxygen supplementation. 12/20  Repeat Xray shows increased large left pleural effusion and compressive atelectasis trace right effusion and linear atelectasis. Thoracic team consulted for possible pigtail insertion Bedside US: Large left effusion with septations. Right simple effusion , + pericardial effusion- lower extremity dopplers negative for DVT,   - GI PCR + e coli  - mRSA PCR + Staph aureus   . Patient transferred to [facility] for further management.     (22 Dec 2023 22:52)      14 point ROS otherwise negative    PAST MEDICAL & SURGICAL HISTORY:  LE (lupus erythematosus)      Pulmonary embolism      No significant past surgical history          Allergies    No Known Allergies    Intolerances        MEDICATIONS  (STANDING):  acetaminophen   IVPB .. 1000 milliGRAM(s) IV Intermittent once  ciprofloxacin  0.3% Ophthalmic Solution for Otic Use 2 Drop(s) Both Ears two times a day  heparin  Infusion. 1500 Unit(s)/Hr (15 mL/Hr) IV Continuous <Continuous>  hydroxychloroquine 200 milliGRAM(s) Oral two times a day  lactobacillus acidophilus 1 Tablet(s) Oral three times a day with meals  lidocaine   4% Patch 1 Patch Transdermal daily  phytonadione  IVPB 10 milliGRAM(s) IV Intermittent once  piperacillin/tazobactam IVPB.. 3.375 Gram(s) IV Intermittent every 8 hours  sodium chloride 2 Gram(s) Oral every 8 hours  vancomycin  IVPB 1000 milliGRAM(s) IV Intermittent every 12 hours    MEDICATIONS  (PRN):  acetaminophen     Tablet .. 650 milliGRAM(s) Oral every 6 hours PRN Temp greater or equal to 38C (100.4F)  albuterol/ipratropium for Nebulization 3 milliLiter(s) Nebulizer every 6 hours PRN Shortness of Breath and/or Wheezing  guaiFENesin Oral Liquid (Sugar-Free) 200 milliGRAM(s) Oral every 6 hours PRN Cough  lidocaine 2% Viscous 5 milliLiter(s) Swish and Spit three times a day PRN Mouth Care      FAMILY HISTORY:  No pertinent family history in first degree relatives        SOCIAL HISTORY: No EtOH, no tobacco    Height (cm): 165.1 (12-22 @ 22:28)  Weight (kg): 69.2 (12-22 @ 22:28), 73.2 (12-22 @ 18:24)  BMI (kg/m2): 25.4 (12-22 @ 22:28)  BSA (m2): 1.76 (12-22 @ 22:28)    VITALS:   T(F): 102.9 (12-23-23 @ 10:00), Max: 102.9 (12-23-23 @ 06:30)  HR: 98 (12-23-23 @ 10:00)  BP: 158/108 (12-23-23 @ 10:00)  RR: 18 (12-23-23 @ 10:00)  SpO2: 100% (12-23-23 @ 10:00)  Wt(kg): --    PHYSICAL EXAM    GENERAL: +Distressed, diaphoretic, well-developed, leaning forward for comfort  HEAD:  Atraumatic, Normocephalic  EYES: EOMI, PERRLA, conjunctiva and sclera clear  NECK: Supple, No JVD  CHEST/LUNG: Clear to auscultation bilaterally; No wheeze  HEART: Regular rate and rhythm; No murmurs, rubs, or gallops  ABDOMEN: Soft, Nontender, Nondistended; Bowel sounds present  EXTREMITIES:  2+ Peripheral Pulses, No clubbing, cyanosis, or edema  NEUROLOGY: non-focal  SKIN: No rashes or lesions    LABS:                         10.8   8.28  )-----------( 348      ( 23 Dec 2023 05:50 )             32.2     12-23    128<L>  |  94<L>  |  16  ----------------------------<  107<H>  4.8   |  20<L>  |  1.02    Ca    8.7      23 Dec 2023 05:50  Phos  3.5     12-23  Mg     2.00     12-23    TPro  7.9  /  Alb  3.0<L>  /  TBili  0.6  /  DBili  x   /  AST  100<H>  /  ALT  88<H>  /  AlkPhos  86  12-23    Magnesium: 2.00 mg/dL (12-23 @ 00:01)  Phosphorus: 3.5 mg/dL (12-23 @ 00:01)    PT/INR - ( 23 Dec 2023 05:50 )   PT: 23.2 sec;   INR: 2.10 ratio         PTT - ( 23 Dec 2023 00:01 )  PTT:65.6 sec  .Blood Blood-Peripheral  12-21 @ 19:00   No growth at 24 hours  --  --      .Blood Blood-Peripheral  12-21 @ 18:45   No growth at 24 hours  --  --      .Sputum Sputum  12-20 @ 13:50   Normal Respiratory Inge present  --    Few Squamous epithelial cells per low power field  Few polymorphonuclear leukocytes per low power field  Few Gram Positive Cocci in Pairs and Chains per oil power field      .Stool Feces  12-15 @ 13:20   No enteric pathogens isolated.  (Stool culture examined for Salmonella,  Shigella, Campylobacter, Aeromonas, Plesiomonas,  Vibrio, E.coli O157 and Yersinia)  --  --      .Blood Blood-Peripheral  12-14 @ 16:45   No growth at 5 days  --  --      .Blood Blood-Peripheral  12-14 @ 16:35   No growth at 5 days  --  --      .Blood Blood-Peripheral  12-12 @ 20:44   No growth at 5 days  --  --      .Blood Blood-Peripheral  12-12 @ 20:30   No growth at 5 days  --  --          IMAGING: HPI:  28 years old male with h/o Lupus ( diagnosed in 09/2023, on Plaquenil present to [place] ED on 12/12/23  with complain of chest pain and SOB. Patient reported left sided pleuritic chest pain which started 3 days ago. Pain is progressively worsened, associated with SOB and cough. Patient was seen in OSH ED and was prescribed antibiotics. Patient reported significantly worsening of left sided pleuritic chest pain today. No fever or chills. Patient reported loss of appetite and had a few episode of diarrhea for last 2 days. CTA chest with acute right upper lobe and left lower lobe segmental/subsegmental pulmonary emboli. No CT evidence of right heart strain. New bilateral lower lobe consolidations with areas of central clearing. Pneumonia and pulmonary infarcts are in the differential. New bilateral pleural effusions, small on the left and trace on the right. Bilateral axillary and supraclavicular adenopathy of unclear etiology. Patient was started on heparin ggt transitioned to eliquis on 12/19,  patient with worsening SOB/ hypoxia requiring nasal cannula oxygen supplementation. 12/20  Repeat Xray shows increased moderate left pleural effusion and compressive atelectasis trace right effusion and linear atelectasis. Thoracic team consulted for possible pigtail insertion Bedside US: Large left effusion with septations. Right simple effusion , + pericardial effusion- lower extremity dopplers negative for DVT,   - GI PCR + e coli  - mRSA PCR + Staph aureus   . Patient transferred to [facility] for [facility] management.     (22 Dec 2023 22:52)      14 point ROS otherwise negative    PAST MEDICAL & SURGICAL HISTORY:  LE (lupus erythematosus)      Pulmonary embolism      No significant past surgical history          Allergies    No Known Allergies    Intolerances        MEDICATIONS  (STANDING):  acetaminophen   IVPB .. 1000 milliGRAM(s) IV Intermittent once  ciprofloxacin  0.3% Ophthalmic Solution for Otic Use 2 Drop(s) Both Ears two times a day  heparin  Infusion. 1500 Unit(s)/Hr (15 mL/Hr) IV Continuous <Continuous>  hydroxychloroquine 200 milliGRAM(s) Oral two times a day  lactobacillus acidophilus 1 Tablet(s) Oral three times a day with meals  lidocaine   4% Patch 1 Patch Transdermal daily  phytonadione  IVPB 10 milliGRAM(s) IV Intermittent once  piperacillin/tazobactam IVPB.. 3.375 Gram(s) IV Intermittent every 8 hours  sodium chloride 2 Gram(s) Oral every 8 hours  vancomycin  IVPB 1000 milliGRAM(s) IV Intermittent every 12 hours    MEDICATIONS  (PRN):  acetaminophen     Tablet .. 650 milliGRAM(s) Oral every 6 hours PRN Temp greater or equal to 38C (100.4F)  albuterol/ipratropium for Nebulization 3 milliLiter(s) Nebulizer every 6 hours PRN Shortness of Breath and/or Wheezing  guaiFENesin Oral Liquid (Sugar-Free) 200 milliGRAM(s) Oral every 6 hours PRN Cough  lidocaine 2% Viscous 5 milliLiter(s) Swish and Spit three times a day PRN Mouth Care      FAMILY HISTORY:  No pertinent family history in first degree relatives        SOCIAL HISTORY: No EtOH, no tobacco    Height (cm): 165.1 (12-22 @ 22:28)  Weight (kg): 69.2 (12-22 @ 22:28), 73.2 (12-22 @ 18:24)  BMI (kg/m2): 25.4 (12-22 @ 22:28)  BSA (m2): 1.76 (12-22 @ 22:28)    VITALS:   T(F): 102.9 (12-23-23 @ 10:00), Max: 102.9 (12-23-23 @ 06:30)  HR: 98 (12-23-23 @ 10:00)  BP: 158/108 (12-23-23 @ 10:00)  RR: 18 (12-23-23 @ 10:00)  SpO2: 100% (12-23-23 @ 10:00)  Wt(kg): --    PHYSICAL EXAM    GENERAL: +Distressed, diaphoretic, well-developed, leaning forward for comfort  HEAD:  Atraumatic, Normocephalic  EYES: EOMI, PERRLA, conjunctiva and sclera clear  NECK: Supple, No JVD  CHEST/LUNG: Clear to auscultation bilaterally; No wheeze  HEART: Regular rate and rhythm; No murmurs, rubs, or gallops  ABDOMEN: Soft, Nontender, Nondistended; Bowel sounds present  EXTREMITIES:  2+ Peripheral Pulses, No clubbing, cyanosis, or edema  NEUROLOGY: non-focal  SKIN: No rashes or lesions    LABS:                         10.8   8.28  )-----------( 348      ( 23 Dec 2023 05:50 )             32.2     12-23    128<L>  |  94<L>  |  16  ----------------------------<  107<H>  4.8   |  20<L>  |  1.02    Ca    8.7      23 Dec 2023 05:50  Phos  3.5     12-23  Mg     2.00     12-23    TPro  7.9  /  Alb  3.0<L>  /  TBili  0.6  /  DBili  x   /  AST  100<H>  /  ALT  88<H>  /  AlkPhos  86  12-23    Magnesium: 2.00 mg/dL (12-23 @ 00:01)  Phosphorus: 3.5 mg/dL (12-23 @ 00:01)    PT/INR - ( 23 Dec 2023 05:50 )   PT: 23.2 sec;   INR: 2.10 ratio         PTT - ( 23 Dec 2023 00:01 )  PTT:65.6 sec  .Blood Blood-Peripheral  12-21 @ 19:00   No growth at 24 hours  --  --      .Blood Blood-Peripheral  12-21 @ 18:45   No growth at 24 hours  --  --      .Sputum Sputum  12-20 @ 13:50   Normal Respiratory Inge present  --    Few Squamous epithelial cells per low power field  Few polymorphonuclear leukocytes per low power field  Few Gram Positive Cocci in Pairs and Chains per oil power field      .Stool Feces  12-15 @ 13:20   No enteric pathogens isolated.  (Stool culture examined for Salmonella,  Shigella, Campylobacter, Aeromonas, Plesiomonas,  Vibrio, E.coli O157 and Yersinia)  --  --      .Blood Blood-Peripheral  12-14 @ 16:45   No growth at 5 days  --  --      .Blood Blood-Peripheral  12-14 @ 16:35   No growth at 5 days  --  --      .Blood Blood-Peripheral  12-12 @ 20:44   No growth at 5 days  --  --      .Blood Blood-Peripheral  12-12 @ 20:30   No growth at 5 days  --  --          IMAGING: HPI:  28 years old male with h/o Lupus ( diagnosed in 09/2023, on Plaquenil present to [place] ED on 12/12/23  with complain of chest pain and SOB. Patient reported left sided pleuritic chest pain which started 3 days ago. Pain is progressively worsened, associated with SOB and cough. Patient was seen in OSH ED and was prescribed antibiotics. Patient reported significantly worsening of left sided pleuritic chest pain today. No fever or chills. Patient reported loss of appetite and had a few episode of diarrhea for last 2 days. CTA chest with acute right upper lobe and left lower lobe segmental/subsegmental pulmonary emboli. No CT evidence of right heart strain. New bilateral lower lobe consolidations with areas of central clearing. Pneumonia and pulmonary infarcts are in the differential. New bilateral pleural effusions, small on the left and trace on the right. Bilateral axillary and supraclavicular adenopathy of unclear etiology. Patient was started on heparin ggt transitioned to eliquis on 12/19,  patient with worsening SOB/ hypoxia requiring nasal cannula oxygen supplementation. 12/20  Repeat Xray shows increased moderate left pleural effusion and compressive atelectasis trace right effusion and linear atelectasis. Thoracic team consulted for possible pigtail insertion Bedside US: Large left effusion with septations. Right simple effusion , + pericardial effusion- lower extremity dopplers negative for DVT,   - GI PCR + e coli  - mRSA PCR + Staph aureus   . Patient transferred to [facility] for further management.     (22 Dec 2023 22:52)      14 point ROS otherwise negative    PAST MEDICAL & SURGICAL HISTORY:  LE (lupus erythematosus)      Pulmonary embolism      No significant past surgical history          Allergies    No Known Allergies    Intolerances        MEDICATIONS  (STANDING):  acetaminophen   IVPB .. 1000 milliGRAM(s) IV Intermittent once  ciprofloxacin  0.3% Ophthalmic Solution for Otic Use 2 Drop(s) Both Ears two times a day  heparin  Infusion. 1500 Unit(s)/Hr (15 mL/Hr) IV Continuous <Continuous>  hydroxychloroquine 200 milliGRAM(s) Oral two times a day  lactobacillus acidophilus 1 Tablet(s) Oral three times a day with meals  lidocaine   4% Patch 1 Patch Transdermal daily  phytonadione  IVPB 10 milliGRAM(s) IV Intermittent once  piperacillin/tazobactam IVPB.. 3.375 Gram(s) IV Intermittent every 8 hours  sodium chloride 2 Gram(s) Oral every 8 hours  vancomycin  IVPB 1000 milliGRAM(s) IV Intermittent every 12 hours    MEDICATIONS  (PRN):  acetaminophen     Tablet .. 650 milliGRAM(s) Oral every 6 hours PRN Temp greater or equal to 38C (100.4F)  albuterol/ipratropium for Nebulization 3 milliLiter(s) Nebulizer every 6 hours PRN Shortness of Breath and/or Wheezing  guaiFENesin Oral Liquid (Sugar-Free) 200 milliGRAM(s) Oral every 6 hours PRN Cough  lidocaine 2% Viscous 5 milliLiter(s) Swish and Spit three times a day PRN Mouth Care      FAMILY HISTORY:  No pertinent family history in first degree relatives        SOCIAL HISTORY: No EtOH, no tobacco    Height (cm): 165.1 (12-22 @ 22:28)  Weight (kg): 69.2 (12-22 @ 22:28), 73.2 (12-22 @ 18:24)  BMI (kg/m2): 25.4 (12-22 @ 22:28)  BSA (m2): 1.76 (12-22 @ 22:28)    VITALS:   T(F): 102.9 (12-23-23 @ 10:00), Max: 102.9 (12-23-23 @ 06:30)  HR: 98 (12-23-23 @ 10:00)  BP: 158/108 (12-23-23 @ 10:00)  RR: 18 (12-23-23 @ 10:00)  SpO2: 100% (12-23-23 @ 10:00)  Wt(kg): --    PHYSICAL EXAM    GENERAL: +Distressed, diaphoretic, well-developed, leaning forward for comfort  HEAD:  Atraumatic, Normocephalic  EYES: EOMI, PERRLA, conjunctiva and sclera clear  NECK: Supple, No JVD  CHEST/LUNG: Clear to auscultation bilaterally; No wheeze  HEART: Regular rate and rhythm; No murmurs, rubs, or gallops  ABDOMEN: Soft, Nontender, Nondistended; Bowel sounds present  EXTREMITIES:  2+ Peripheral Pulses, No clubbing, cyanosis, or edema  NEUROLOGY: non-focal  SKIN: No rashes or lesions    LABS:                         10.8   8.28  )-----------( 348      ( 23 Dec 2023 05:50 )             32.2     12-23    128<L>  |  94<L>  |  16  ----------------------------<  107<H>  4.8   |  20<L>  |  1.02    Ca    8.7      23 Dec 2023 05:50  Phos  3.5     12-23  Mg     2.00     12-23    TPro  7.9  /  Alb  3.0<L>  /  TBili  0.6  /  DBili  x   /  AST  100<H>  /  ALT  88<H>  /  AlkPhos  86  12-23    Magnesium: 2.00 mg/dL (12-23 @ 00:01)  Phosphorus: 3.5 mg/dL (12-23 @ 00:01)    PT/INR - ( 23 Dec 2023 05:50 )   PT: 23.2 sec;   INR: 2.10 ratio         PTT - ( 23 Dec 2023 00:01 )  PTT:65.6 sec  .Blood Blood-Peripheral  12-21 @ 19:00   No growth at 24 hours  --  --      .Blood Blood-Peripheral  12-21 @ 18:45   No growth at 24 hours  --  --      .Sputum Sputum  12-20 @ 13:50   Normal Respiratory Inge present  --    Few Squamous epithelial cells per low power field  Few polymorphonuclear leukocytes per low power field  Few Gram Positive Cocci in Pairs and Chains per oil power field      .Stool Feces  12-15 @ 13:20   No enteric pathogens isolated.  (Stool culture examined for Salmonella,  Shigella, Campylobacter, Aeromonas, Plesiomonas,  Vibrio, E.coli O157 and Yersinia)  --  --      .Blood Blood-Peripheral  12-14 @ 16:45   No growth at 5 days  --  --      .Blood Blood-Peripheral  12-14 @ 16:35   No growth at 5 days  --  --      .Blood Blood-Peripheral  12-12 @ 20:44   No growth at 5 days  --  --      .Blood Blood-Peripheral  12-12 @ 20:30   No growth at 5 days  --  --          IMAGING:

## 2023-12-23 NOTE — CONSULT NOTE ADULT - SUBJECTIVE AND OBJECTIVE BOX
TAYLA MARIE  [id number]    HISTORY OF PRESENT ILLNESS:  HPI:  28 years old male with h/o Lupus ( diagnosed in 09/2023, on Plaquenil present to [place] ED on 12/12/23  with complain of chest pain and SOB. Patient reported left sided pleuritic chest pain which started 3 days ago. Pain is progressively worsened, associated with SOB and cough. Patient was seen in OSH ED and was prescribed antibiotics. Patient reported significantly worsening of left sided pleuritic chest pain today. No fever or chills. Patient reported loss of appetite and had a few episode of diarrhea for last 2 days. CTA chest with acute right upper lobe and left lower lobe segmental/subsegmental pulmonary emboli. No CT evidence of right heart strain. New bilateral lower lobe consolidations with areas of central clearing. Pneumonia and pulmonary infarcts are in the differential. New bilateral pleural effusions, small on the left and trace on the right. Bilateral axillary and supraclavicular adenopathy of unclear etiology. Patient was started on heparin ggt transitioned to eliquis on 12/19,  patient with worsening SOB/ hypoxia requiring nasal cannula oxygen supplementation. 12/20  Repeat Xray shows increased large left pleural effusion and compressive atelectasis trace right effusion and linear atelectasis. Thoracic team consulted for possible pigtail insertion Bedside US: Large left effusion with septations. Right simple effusion , + pericardial effusion- lower extremity dopplers negative for DVT,   - GI PCR + e coli  - mRSA PCR + Staph aureus   . Patient transferred to [facility] for further management.     (22 Dec 2023 22:52)    Rheumatology consulted for c/f SLE flare.   Per pt and his mother who was bedside, pt dx with SLE in 9/2023. Presented for oral ulcers, rash on back of head that caused hair loss. Also noted to have mild CP and dyspnea since September as well. [facility] [place], does not know his rheumatologists name.   Has been on broad spectrum abx since 12/14, however continues to have fevers. In setting of known SLE, pleural effusions, new PE (possibly from APS?) and persistent fevers despite abx, there is higher concern for SLE flare.   Currently only on Plaquenil, was not previously prescribed prednisone.  Has had a tough time getting follow up with his primary rheumatologist.   Reports improvement in his rash and oral ulcers. Denies joint pain.   However, continues to have pleuritic chest pain and fevers. Also endorsing weight loss and poor PO intake.        Rheum ROS  See above       MEDICATIONS  (STANDING):  acetaminophen   IVPB .. 1000 milliGRAM(s) IV Intermittent once  ciprofloxacin  0.3% Ophthalmic Solution for Otic Use 2 Drop(s) Both Ears two times a day  heparin  Infusion. 1500 Unit(s)/Hr (15 mL/Hr) IV Continuous <Continuous>  hydroxychloroquine 200 milliGRAM(s) Oral two times a day  lactobacillus acidophilus 1 Tablet(s) Oral three times a day with meals  lidocaine   4% Patch 1 Patch Transdermal daily  phytonadione  IVPB 10 milliGRAM(s) IV Intermittent once  piperacillin/tazobactam IVPB.. 3.375 Gram(s) IV Intermittent every 8 hours  vancomycin  IVPB 1000 milliGRAM(s) IV Intermittent every 12 hours    MEDICATIONS  (PRN):  acetaminophen     Tablet .. 650 milliGRAM(s) Oral every 6 hours PRN Temp greater or equal to 38C (100.4F)  albuterol/ipratropium for Nebulization 3 milliLiter(s) Nebulizer every 6 hours PRN Shortness of Breath and/or Wheezing  guaiFENesin Oral Liquid (Sugar-Free) 200 milliGRAM(s) Oral every 6 hours PRN Cough  lidocaine 2% Viscous 5 milliLiter(s) Swish and Spit three times a day PRN Mouth Care      Allergies    No Known Allergies    Intolerances        PERTINENT MEDICATION HISTORY:    SOCIAL HISTORY:  OCCUPATION:  TRAVEL HISTORY:    FAMILY HISTORY:  No pertinent family history in first degree relatives        Vital Signs Last 24 Hrs  T(C): 39 (23 Dec 2023 12:05), Max: 39.4 (23 Dec 2023 06:30)  T(F): 102.2 (23 Dec 2023 12:05), Max: 102.9 (23 Dec 2023 06:30)  HR: 113 (23 Dec 2023 12:05) (92 - 113)  BP: 148/95 (23 Dec 2023 12:05) (133/69 - 170/91)  BP(mean): --  RR: 18 (23 Dec 2023 12:05) (18 - 20)  SpO2: 95% (23 Dec 2023 12:05) (92% - 100%)    Parameters below as of 23 Dec 2023 12:05  Patient On (Oxygen Delivery Method): room air        Physical Exam:  General: NAD, +rigors   HEENT: EOMI, no oral ulcers   CVS: RRR  Resp: Decreased breath sounds bilaterally in lower lung fields   GI: non-distended   MSK: no synovitis   Neuro: AAOx3  Skin: no longer has rash on back of head (healing), however has dark/purple flat lesions on bilateral hands. No tender or itchy     LABS:                        10.8   8.28  )-----------( 348      ( 23 Dec 2023 05:50 )             32.2     12-23    128<L>  |  94<L>  |  16  ----------------------------<  107<H>  4.8   |  20<L>  |  1.02    Ca    8.7      23 Dec 2023 05:50  Phos  3.5     12-23  Mg     2.00     12-23    TPro  7.9  /  Alb  3.0<L>  /  TBili  0.6  /  DBili  x   /  AST  100<H>  /  ALT  88<H>  /  AlkPhos  86  12-23    PT/INR - ( 23 Dec 2023 05:50 )   PT: 23.2 sec;   INR: 2.10 ratio         PTT - ( 23 Dec 2023 00:01 )  PTT:65.6 sec  Urinalysis Basic - ( 23 Dec 2023 05:50 )    Color: x / Appearance: x / SG: x / pH: x  Gluc: 107 mg/dL / Ketone: x  / Bili: x / Urobili: x   Blood: x / Protein: x / Nitrite: x   Leuk Esterase: x / RBC: x / WBC x   Sq Epi: x / Non Sq Epi: x / Bacteria: x        Rheumatology Work Up    C3 Complement, Serum: 79 mg/dL *L* [81 - 157] (12-14-23 @ 07:00)  C4 Complement, Serum: 12 mg/dL *L* [13 - 39] (12-14-23 @ 07:00)  Anti Nuclear Factor Titer: 1:1280 *!* [Antinuclear AB (ABDIAS), IFA Method] (12-14-23 @ 07:00)  Double Stranded DNA Antibody: 15 IU/mL [Method: EIA            Reference Ranges            Interpretation            <= 29    IU/mL     Negative            30 - 75  IU/mL     Borderline            > 75      IU/mL     Positive] (12-14-23 @ 07:00)  C3 Complement, Serum: 79 mg/dL (12.14.23 @ 07:00)  C4 Complement, Serum: 12 mg/dL (12.14.23 @ 07:00)      RADIOLOGY & ADDITIONAL STUDIES:    < from: CT Chest w/ IV Cont (12.23.23 @ 13:03) >  IMPRESSION:    Moderate pleural effusions, loculated on the left, new since 12/12/2023.    New nonspecific the very small peripheral groundglass in the right upper   lobe.    < end of copied text >    < from: CT Angio Chest PE Protocol w/ IV Cont (12.12.23 @ 10:27) >  IMPRESSION:    Acute right upper lobe and left lower lobe segmental/subsegmental   pulmonary emboli. No CT evidence of right heart strain.    New bilateral lower lobe consolidations with areas of central clearing.   Pneumonia and pulmonary infarcts are in the differential. New bilateral   pleural effusions, small on the left and trace on the right. Follow to   resolution with repeat chest CT in one month, or sooner as clinically   warranted.    Bilateral axillary and supraclavicular adenopathy of unclear etiology.   Consider broad differential including infectious, inflammatory, and   neoplastic etiologies.    < end of copied text >     TAYLA MARIE  [id number]    HISTORY OF PRESENT ILLNESS:  HPI:  28 years old male with h/o Lupus ( diagnosed in 09/2023, on Plaquenil present to [place] ED on 12/12/23  with complain of chest pain and SOB. Patient reported left sided pleuritic chest pain which started 3 days ago. Pain is progressively worsened, associated with SOB and cough. Patient was seen in OSH ED and was prescribed antibiotics. Patient reported significantly worsening of left sided pleuritic chest pain today. No fever or chills. Patient reported loss of appetite and had a few episode of diarrhea for last 2 days. CTA chest with acute right upper lobe and left lower lobe segmental/subsegmental pulmonary emboli. No CT evidence of right heart strain. New bilateral lower lobe consolidations with areas of central clearing. Pneumonia and pulmonary infarcts are in the differential. New bilateral pleural effusions, small on the left and trace on the right. Bilateral axillary and supraclavicular adenopathy of unclear etiology. Patient was started on heparin ggt transitioned to eliquis on 12/19,  patient with worsening SOB/ hypoxia requiring nasal cannula oxygen supplementation. 12/20  Repeat Xray shows increased large left pleural effusion and compressive atelectasis trace right effusion and linear atelectasis. Thoracic team consulted for possible pigtail insertion Bedside US: Large left effusion with septations. Right simple effusion , + pericardial effusion- lower extremity dopplers negative for DVT,   - GI PCR + e coli  - mRSA PCR + Staph aureus   . Patient transferred to [facility] for further management.     (22 Dec 2023 22:52)    Rheumatology consulted for c/f SLE flare.   Per pt and his mother who was bedside, pt dx with SLE in 9/2023. Presented for oral ulcers, rash on back of head that caused hair loss. Also noted to have mild CP and dyspnea since September as well. [facility] [place], does not know his rheumatologists name.   Has been on broad spectrum abx since 12/14, however continues to have fevers. In setting of known SLE, pleural effusions, new PE (possibly from APS?) and persistent fevers despite abx, there is higher concern for SLE flare.   Currently only on Plaquenil, was not previously prescribed prednisone.  Has had a tough time getting follow up with his primary rheumatologist.   Reports improvement in his rash and oral ulcers. Denies joint pain.   However, continues to have pleuritic chest pain and fevers. Also endorsing weight loss and poor PO intake.        Rheum ROS  See above       MEDICATIONS  (STANDING):  acetaminophen   IVPB .. 1000 milliGRAM(s) IV Intermittent once  ciprofloxacin  0.3% Ophthalmic Solution for Otic Use 2 Drop(s) Both Ears two times a day  heparin  Infusion. 1500 Unit(s)/Hr (15 mL/Hr) IV Continuous <Continuous>  hydroxychloroquine 200 milliGRAM(s) Oral two times a day  lactobacillus acidophilus 1 Tablet(s) Oral three times a day with meals  lidocaine   4% Patch 1 Patch Transdermal daily  phytonadione  IVPB 10 milliGRAM(s) IV Intermittent once  piperacillin/tazobactam IVPB.. 3.375 Gram(s) IV Intermittent every 8 hours  vancomycin  IVPB 1000 milliGRAM(s) IV Intermittent every 12 hours    MEDICATIONS  (PRN):  acetaminophen     Tablet .. 650 milliGRAM(s) Oral every 6 hours PRN Temp greater or equal to 38C (100.4F)  albuterol/ipratropium for Nebulization 3 milliLiter(s) Nebulizer every 6 hours PRN Shortness of Breath and/or Wheezing  guaiFENesin Oral Liquid (Sugar-Free) 200 milliGRAM(s) Oral every 6 hours PRN Cough  lidocaine 2% Viscous 5 milliLiter(s) Swish and Spit three times a day PRN Mouth Care      Allergies    No Known Allergies    Intolerances        PERTINENT MEDICATION HISTORY:    SOCIAL HISTORY:  OCCUPATION:  TRAVEL HISTORY:    FAMILY HISTORY:  No pertinent family history in first degree relatives        Vital Signs Last 24 Hrs  T(C): 39 (23 Dec 2023 12:05), Max: 39.4 (23 Dec 2023 06:30)  T(F): 102.2 (23 Dec 2023 12:05), Max: 102.9 (23 Dec 2023 06:30)  HR: 113 (23 Dec 2023 12:05) (92 - 113)  BP: 148/95 (23 Dec 2023 12:05) (133/69 - 170/91)  BP(mean): --  RR: 18 (23 Dec 2023 12:05) (18 - 20)  SpO2: 95% (23 Dec 2023 12:05) (92% - 100%)    Parameters below as of 23 Dec 2023 12:05  Patient On (Oxygen Delivery Method): room air        Physical Exam:  General: NAD, +rigors   HEENT: EOMI, no oral ulcers   CVS: RRR  Resp: Decreased breath sounds bilaterally in lower lung fields   GI: non-distended   MSK: no synovitis   Neuro: AAOx3  Skin: no longer has rash on back of head (healing), however has dark/purple flat lesions on bilateral hands. No tender or itchy     LABS:                        10.8   8.28  )-----------( 348      ( 23 Dec 2023 05:50 )             32.2     12-23    128<L>  |  94<L>  |  16  ----------------------------<  107<H>  4.8   |  20<L>  |  1.02    Ca    8.7      23 Dec 2023 05:50  Phos  3.5     12-23  Mg     2.00     12-23    TPro  7.9  /  Alb  3.0<L>  /  TBili  0.6  /  DBili  x   /  AST  100<H>  /  ALT  88<H>  /  AlkPhos  86  12-23    PT/INR - ( 23 Dec 2023 05:50 )   PT: 23.2 sec;   INR: 2.10 ratio         PTT - ( 23 Dec 2023 00:01 )  PTT:65.6 sec  Urinalysis Basic - ( 23 Dec 2023 05:50 )    Color: x / Appearance: x / SG: x / pH: x  Gluc: 107 mg/dL / Ketone: x  / Bili: x / Urobili: x   Blood: x / Protein: x / Nitrite: x   Leuk Esterase: x / RBC: x / WBC x   Sq Epi: x / Non Sq Epi: x / Bacteria: x        Rheumatology Work Up    C3 Complement, Serum: 79 mg/dL *L* [81 - 157] (12-14-23 @ 07:00)  C4 Complement, Serum: 12 mg/dL *L* [13 - 39] (12-14-23 @ 07:00)  Anti Nuclear Factor Titer: 1:1280 *!* [Antinuclear AB (ABDIAS), IFA Method] (12-14-23 @ 07:00)  Double Stranded DNA Antibody: 15 IU/mL [Method: EIA            Reference Ranges            Interpretation            <= 29    IU/mL     Negative            30 - 75  IU/mL     Borderline            > 75      IU/mL     Positive] (12-14-23 @ 07:00)  C3 Complement, Serum: 79 mg/dL (12.14.23 @ 07:00)  C4 Complement, Serum: 12 mg/dL (12.14.23 @ 07:00)      RADIOLOGY & ADDITIONAL STUDIES:    < from: CT Chest w/ IV Cont (12.23.23 @ 13:03) >  IMPRESSION:    Moderate pleural effusions, loculated on the left, new since 12/12/2023.    New nonspecific the very small peripheral groundglass in the right upper   lobe.    < end of copied text >    < from: CT Angio Chest PE Protocol w/ IV Cont (12.12.23 @ 10:27) >  IMPRESSION:    Acute right upper lobe and left lower lobe segmental/subsegmental   pulmonary emboli. No CT evidence of right heart strain.    New bilateral lower lobe consolidations with areas of central clearing.   Pneumonia and pulmonary infarcts are in the differential. New bilateral   pleural effusions, small on the left and trace on the right. Follow to   resolution with repeat chest CT in one month, or sooner as clinically   warranted.    Bilateral axillary and supraclavicular adenopathy of unclear etiology.   Consider broad differential including infectious, inflammatory, and   neoplastic etiologies.    < end of copied text >

## 2023-12-23 NOTE — PROGRESS NOTE ADULT - SUBJECTIVE AND OBJECTIVE BOX
Patient seen and examined at bedside, resting comfortably in bed, on RA, in NAD.  Patient transferred from [facility] overnight.  Patient noted to be febrile on arrival, again this morning. Tylenol given with improvement.  L PTC to be placed at bedside today. Hep ggt off at 06:00.    Vital Signs Last 24 Hrs  T(C): 36.8 (23 Dec 2023 08:39), Max: 39.4 (23 Dec 2023 06:30)  T(F): 98.3 (23 Dec 2023 08:39), Max: 102.9 (23 Dec 2023 06:30)  HR: 97 (23 Dec 2023 08:39) (92 - 102)  BP: 146/89 (23 Dec 2023 08:39) (133/69 - 170/91)  BP(mean): --  RR: 18 (23 Dec 2023 08:39) (18 - 20)  SpO2: 94% (23 Dec 2023 08:39) (92% - 97%)    Parameters below as of 23 Dec 2023 08:39  Patient On (Oxygen Delivery Method): room air        General: A&Ox3, NAD  HEENT: Normocephalic. Vision and hearing grossly intact, EOMI. Airway grossly patent, no stridor. Complaint of throat pain.  CV: S1S2, RRR, well perfused  Resp: Breathing comfortably on RA, no resp distress, no accessory muscle use  GI: Soft, NT, ND  MSK: BROWN x4  Pysch: Appropriate affect  Tubes: n/a    Relevant labs, radiology and Medications reviewed                        10.8   8.28  )-----------( 348      ( 23 Dec 2023 05:50 )             32.2     12-23    128<L>  |  94<L>  |  16  ----------------------------<  107<H>  4.8   |  20<L>  |  1.02    Ca    8.7      23 Dec 2023 05:50  Phos  3.5     12-23  Mg     2.00     12-23    TPro  7.9  /  Alb  3.0<L>  /  TBili  0.6  /  DBili  x   /  AST  100<H>  /  ALT  88<H>  /  AlkPhos  86  12-23    PT/INR - ( 23 Dec 2023 05:50 )   PT: 23.2 sec;   INR: 2.10 ratio         PTT - ( 23 Dec 2023 00:01 )  PTT:65.6 sec  MEDICATIONS  (STANDING):  ciprofloxacin  0.3% Ophthalmic Solution for Otic Use 2 Drop(s) Both Ears two times a day  heparin  Infusion. 1500 Unit(s)/Hr (15 mL/Hr) IV Continuous <Continuous>  hydroxychloroquine 200 milliGRAM(s) Oral two times a day  lactobacillus acidophilus 1 Tablet(s) Oral three times a day with meals  lidocaine   4% Patch 1 Patch Transdermal daily  piperacillin/tazobactam IVPB.. 3.375 Gram(s) IV Intermittent every 8 hours  sodium chloride 2 Gram(s) Oral every 8 hours    MEDICATIONS  (PRN):  acetaminophen     Tablet .. 650 milliGRAM(s) Oral every 6 hours PRN Temp greater or equal to 38C (100.4F)  albuterol/ipratropium for Nebulization 3 milliLiter(s) Nebulizer every 6 hours PRN Shortness of Breath and/or Wheezing  guaiFENesin Oral Liquid (Sugar-Free) 200 milliGRAM(s) Oral every 6 hours PRN Cough  lidocaine 2% Viscous 5 milliLiter(s) Swish and Spit three times a day PRN Mouth Care    Pertinent Physical Exam  I&O's Summary    23 Dec 2023 07:01  -  23 Dec 2023 08:45  --------------------------------------------------------  IN: 300 mL / OUT: 0 mL / NET: 300 mL      Assessment  28y Male with recent diagnosis of Lupus ( 9/2023) admitted 12/12 with B/L Pulmonary embolism. Now with increased dyspnea, pleuritic chest pain,  hypoxia, and fever. CXR shows increased left pleural effusion. Bedside US shows large left septated effusion anterior, lateral and posterior. Right: smalll effusion simple appearing. Cardiac + pericardial effusion noted. LE Doppler Neg for DVT. GI PCR + e coli, mRSA PCR + Staph aureus.  12/23 - plan for L PTC at bedside today.    Plan:   Neuro: Pain management  Pulm: Encourage coughing, deep breathing and use of incentive spirometry. Wean off supplemental oxygen as able. Daily CXR. CT chest   Cardio: Monitor telemetry/alarms  GI: Tolerating diet.   Renal: monitor urine output, supplement electrolytes as needed, sodium tabs, restrict po fluid intake   Vasc: Heparin Gtt; holding heparin gtt for PTC placement, can restart once PTC in place.  Heme: Stable H/H.   ID: Continue Zosyn. ID consulted, tylenol for fevers.  Therapy: OOB/ambulate  Tubes: Holding heparin for placement of PTC  Discussed with Ian      Patient seen and examined at bedside, resting comfortably in bed, on RA, in NAD.  Patient transferred from [facility] overnight.  Patient noted to be febrile on arrival, again this morning. Tylenol given with improvement.  L PTC to be placed at bedside today. Hep ggt off at 06:00.    Vital Signs Last 24 Hrs  T(C): 36.8 (23 Dec 2023 08:39), Max: 39.4 (23 Dec 2023 06:30)  T(F): 98.3 (23 Dec 2023 08:39), Max: 102.9 (23 Dec 2023 06:30)  HR: 97 (23 Dec 2023 08:39) (92 - 102)  BP: 146/89 (23 Dec 2023 08:39) (133/69 - 170/91)  BP(mean): --  RR: 18 (23 Dec 2023 08:39) (18 - 20)  SpO2: 94% (23 Dec 2023 08:39) (92% - 97%)    Parameters below as of 23 Dec 2023 08:39  Patient On (Oxygen Delivery Method): room air        General: A&Ox3, NAD  HEENT: Normocephalic. Vision and hearing grossly intact, EOMI. Airway grossly patent, no stridor. Complaint of throat pain.  CV: S1S2, RRR, well perfused  Resp: Breathing comfortably on RA, no resp distress, no accessory muscle use  GI: Soft, NT, ND  MSK: BROWN x4  Pysch: Appropriate affect  Tubes: n/a    Relevant labs, radiology and Medications reviewed                        10.8   8.28  )-----------( 348      ( 23 Dec 2023 05:50 )             32.2     12-23    128<L>  |  94<L>  |  16  ----------------------------<  107<H>  4.8   |  20<L>  |  1.02    Ca    8.7      23 Dec 2023 05:50  Phos  3.5     12-23  Mg     2.00     12-23    TPro  7.9  /  Alb  3.0<L>  /  TBili  0.6  /  DBili  x   /  AST  100<H>  /  ALT  88<H>  /  AlkPhos  86  12-23    PT/INR - ( 23 Dec 2023 05:50 )   PT: 23.2 sec;   INR: 2.10 ratio         PTT - ( 23 Dec 2023 00:01 )  PTT:65.6 sec  MEDICATIONS  (STANDING):  ciprofloxacin  0.3% Ophthalmic Solution for Otic Use 2 Drop(s) Both Ears two times a day  heparin  Infusion. 1500 Unit(s)/Hr (15 mL/Hr) IV Continuous <Continuous>  hydroxychloroquine 200 milliGRAM(s) Oral two times a day  lactobacillus acidophilus 1 Tablet(s) Oral three times a day with meals  lidocaine   4% Patch 1 Patch Transdermal daily  piperacillin/tazobactam IVPB.. 3.375 Gram(s) IV Intermittent every 8 hours  sodium chloride 2 Gram(s) Oral every 8 hours    MEDICATIONS  (PRN):  acetaminophen     Tablet .. 650 milliGRAM(s) Oral every 6 hours PRN Temp greater or equal to 38C (100.4F)  albuterol/ipratropium for Nebulization 3 milliLiter(s) Nebulizer every 6 hours PRN Shortness of Breath and/or Wheezing  guaiFENesin Oral Liquid (Sugar-Free) 200 milliGRAM(s) Oral every 6 hours PRN Cough  lidocaine 2% Viscous 5 milliLiter(s) Swish and Spit three times a day PRN Mouth Care    Pertinent Physical Exam  I&O's Summary    23 Dec 2023 07:01  -  23 Dec 2023 08:45  --------------------------------------------------------  IN: 300 mL / OUT: 0 mL / NET: 300 mL      Assessment  28y Male with recent diagnosis of Lupus ( 9/2023) admitted 12/12 with B/L Pulmonary embolism. Now with increased dyspnea, pleuritic chest pain,  hypoxia, and fever. CXR shows increased left pleural effusion. Bedside US shows large left septated effusion anterior, lateral and posterior. Right: smalll effusion simple appearing. Cardiac + pericardial effusion noted. LE Doppler Neg for DVT. GI PCR + e coli, mRSA PCR + Staph aureus.  12/23 - plan for L PTC at bedside today.    Plan:   Neuro: Pain management  Pulm: Encourage coughing, deep breathing and use of incentive spirometry. Wean off supplemental oxygen as able. Daily CXR. CT chest   Cardio: Monitor telemetry/alarms  GI: Tolerating diet.   Renal: monitor urine output, supplement electrolytes as needed, sodium tabs, restrict po fluid intake   Vasc: Heparin Gtt; holding heparin gtt for PTC placement, can restart once PTC in place.  Heme: Stable H/H.   ID: Continue Zosyn. ID consulted, tylenol for fevers.  Therapy: OOB/ambulate  Tubes: Holding heparin for placement of PTC  Discussed with Ian      Patient seen and examined at bedside, resting comfortably in bed, on RA, in NAD.  Patient transferred from [facility] overnight.  Patient noted to be febrile on arrival, again this morning. Tylenol given with improvement.  L PTC to be placed at bedside today if INR ok. Hep ggt off at 06:00.    Vital Signs Last 24 Hrs  T(C): 36.8 (23 Dec 2023 08:39), Max: 39.4 (23 Dec 2023 06:30)  T(F): 98.3 (23 Dec 2023 08:39), Max: 102.9 (23 Dec 2023 06:30)  HR: 97 (23 Dec 2023 08:39) (92 - 102)  BP: 146/89 (23 Dec 2023 08:39) (133/69 - 170/91)  BP(mean): --  RR: 18 (23 Dec 2023 08:39) (18 - 20)  SpO2: 94% (23 Dec 2023 08:39) (92% - 97%)    Parameters below as of 23 Dec 2023 08:39  Patient On (Oxygen Delivery Method): room air      General: A&Ox3, NAD  HEENT: Normocephalic. Vision and hearing grossly intact, EOMI. Airway grossly patent, no stridor. Complaint of throat pain.  CV: S1S2, RRR, well perfused  Resp: Breathing comfortably on RA, no resp distress, no accessory muscle use  GI: Soft, NT, ND  MSK: BROWN x4  Pysch: Appropriate affect  Tubes: n/a    Relevant labs, radiology and Medications reviewed                        10.8   8.28  )-----------( 348      ( 23 Dec 2023 05:50 )             32.2     12-23    128<L>  |  94<L>  |  16  ----------------------------<  107<H>  4.8   |  20<L>  |  1.02    Ca    8.7      23 Dec 2023 05:50  Phos  3.5     12-23  Mg     2.00     12-23    TPro  7.9  /  Alb  3.0<L>  /  TBili  0.6  /  DBili  x   /  AST  100<H>  /  ALT  88<H>  /  AlkPhos  86  12-23    PT/INR - ( 23 Dec 2023 05:50 )   PT: 23.2 sec;   INR: 2.10 ratio         PTT - ( 23 Dec 2023 00:01 )  PTT:65.6 sec  MEDICATIONS  (STANDING):  ciprofloxacin  0.3% Ophthalmic Solution for Otic Use 2 Drop(s) Both Ears two times a day  heparin  Infusion. 1500 Unit(s)/Hr (15 mL/Hr) IV Continuous <Continuous>  hydroxychloroquine 200 milliGRAM(s) Oral two times a day  lactobacillus acidophilus 1 Tablet(s) Oral three times a day with meals  lidocaine   4% Patch 1 Patch Transdermal daily  piperacillin/tazobactam IVPB.. 3.375 Gram(s) IV Intermittent every 8 hours  sodium chloride 2 Gram(s) Oral every 8 hours    MEDICATIONS  (PRN):  acetaminophen     Tablet .. 650 milliGRAM(s) Oral every 6 hours PRN Temp greater or equal to 38C (100.4F)  albuterol/ipratropium for Nebulization 3 milliLiter(s) Nebulizer every 6 hours PRN Shortness of Breath and/or Wheezing  guaiFENesin Oral Liquid (Sugar-Free) 200 milliGRAM(s) Oral every 6 hours PRN Cough  lidocaine 2% Viscous 5 milliLiter(s) Swish and Spit three times a day PRN Mouth Care    Pertinent Physical Exam  I&O's Summary    23 Dec 2023 07:01  -  23 Dec 2023 08:45  --------------------------------------------------------  IN: 300 mL / OUT: 0 mL / NET: 300 mL      Assessment  28y Male with recent diagnosis of Lupus ( 9/2023) admitted 12/12 with B/L Pulmonary embolism. Now with increased dyspnea, pleuritic chest pain,  hypoxia, and fever. CXR shows increased left pleural effusion. Bedside US shows large left septated effusion anterior, lateral and posterior. Right: smalll effusion simple appearing. Cardiac + pericardial effusion noted. LE Doppler Neg for DVT. GI PCR + e coli, mRSA PCR + Staph aureus.  12/23 - plan for L PTC at bedside today if PTT/INR ok.    Plan:   Neuro: Pain management  Pulm: Encourage coughing, deep breathing and use of incentive spirometry. Wean off supplemental oxygen as able. Daily CXR. CT chest   Cardio: Monitor telemetry/alarms  GI: Tolerating diet.   Renal: monitor urine output, supplement electrolytes as needed, sodium tabs, restrict po fluid intake   Vasc: Heparin Gtt; holding heparin gtt for PTC placement, can restart once PTC in place.  Heme: Stable H/H.   ID: Continue Zosyn. ID consulted, tylenol for fevers.  Therapy: OOB/ambulate  Tubes: Holding heparin for placement of PTC  Discussed with Ian      Patient seen and examined at bedside, resting comfortably in bed, on RA, in NAD.  Patient transferred from [facility] overnight.  Patient noted to be febrile on arrival, again this morning. Tylenol given with improvement.  L PTC to be placed at bedside today if INR ok. Hep ggt off at 06:00.    Vital Signs Last 24 Hrs  T(C): 36.8 (23 Dec 2023 08:39), Max: 39.4 (23 Dec 2023 06:30)  T(F): 98.3 (23 Dec 2023 08:39), Max: 102.9 (23 Dec 2023 06:30)  HR: 97 (23 Dec 2023 08:39) (92 - 102)  BP: 146/89 (23 Dec 2023 08:39) (133/69 - 170/91)  BP(mean): --  RR: 18 (23 Dec 2023 08:39) (18 - 20)  SpO2: 94% (23 Dec 2023 08:39) (92% - 97%)    Parameters below as of 23 Dec 2023 08:39  Patient On (Oxygen Delivery Method): room air      General: A&Ox3, NAD  HEENT: Normocephalic. Vision and hearing grossly intact, EOMI. Airway grossly patent, no stridor. Complaint of throat pain.  CV: S1S2, RRR, well perfused  Resp: Breathing comfortably on RA, no resp distress, no accessory muscle use  GI: Soft, NT, ND  MSK: BROWN x4  Pysch: Appropriate affect  Tubes: n/a    Relevant labs, radiology and Medications reviewed                        10.8   8.28  )-----------( 348      ( 23 Dec 2023 05:50 )             32.2     12-23    128<L>  |  94<L>  |  16  ----------------------------<  107<H>  4.8   |  20<L>  |  1.02    Ca    8.7      23 Dec 2023 05:50  Phos  3.5     12-23  Mg     2.00     12-23    TPro  7.9  /  Alb  3.0<L>  /  TBili  0.6  /  DBili  x   /  AST  100<H>  /  ALT  88<H>  /  AlkPhos  86  12-23    PT/INR - ( 23 Dec 2023 05:50 )   PT: 23.2 sec;   INR: 2.10 ratio         PTT - ( 23 Dec 2023 00:01 )  PTT:65.6 sec  MEDICATIONS  (STANDING):  ciprofloxacin  0.3% Ophthalmic Solution for Otic Use 2 Drop(s) Both Ears two times a day  heparin  Infusion. 1500 Unit(s)/Hr (15 mL/Hr) IV Continuous <Continuous>  hydroxychloroquine 200 milliGRAM(s) Oral two times a day  lactobacillus acidophilus 1 Tablet(s) Oral three times a day with meals  lidocaine   4% Patch 1 Patch Transdermal daily  piperacillin/tazobactam IVPB.. 3.375 Gram(s) IV Intermittent every 8 hours  sodium chloride 2 Gram(s) Oral every 8 hours    MEDICATIONS  (PRN):  acetaminophen     Tablet .. 650 milliGRAM(s) Oral every 6 hours PRN Temp greater or equal to 38C (100.4F)  albuterol/ipratropium for Nebulization 3 milliLiter(s) Nebulizer every 6 hours PRN Shortness of Breath and/or Wheezing  guaiFENesin Oral Liquid (Sugar-Free) 200 milliGRAM(s) Oral every 6 hours PRN Cough  lidocaine 2% Viscous 5 milliLiter(s) Swish and Spit three times a day PRN Mouth Care    Pertinent Physical Exam  I&O's Summary    23 Dec 2023 07:01  -  23 Dec 2023 08:45  --------------------------------------------------------  IN: 300 mL / OUT: 0 mL / NET: 300 mL      Assessment  28y Male with recent diagnosis of Lupus ( 9/2023) admitted 12/12 with B/L Pulmonary embolism. Now with increased dyspnea, pleuritic chest pain,  hypoxia, and fever. CXR shows increased left pleural effusion. Bedside US shows large left septated effusion anterior, lateral and posterior. Right: smalll effusion simple appearing. Cardiac + pericardial effusion noted. LE Doppler Neg for DVT. GI PCR + e coli, mRSA PCR + Staph aureus.  12/23 - plan for L PTC at bedside today if PTT/INR ok.    Plan:   Neuro: Pain management  Pulm: Encourage coughing, deep breathing and use of incentive spirometry. Wean off supplemental oxygen as able. Daily CXR. CT chest   Cardio: Monitor telemetry/alarms  GI: Tolerating diet.   Renal: monitor urine output, supplement electrolytes as needed, sodium tabs, restrict po fluid intake   Vasc: Heparin Gtt; holding heparin gtt for PTC placement, can restart once PTC in place.  Heme: Stable H/H.   ID: Continue Zosyn. ID consulted, tylenol for fevers.  Therapy: OOB/ambulate  Tubes: Holding heparin for placement of PTC  Discussed with Ian

## 2023-12-23 NOTE — PATIENT PROFILE ADULT - FALL HARM RISK - HARM RISK INTERVENTIONS
Communicate Risk of Fall with Harm to all staff/Reinforce activity limits and safety measures with patient and family/Tailored Fall Risk Interventions/Visual Cue: Yellow wristband and red socks/Bed in lowest position, wheels locked, appropriate side rails in place/Call bell, personal items and telephone in reach/Instruct patient to call for assistance before getting out of bed or chair/Non-slip footwear when patient is out of bed/[place] to call system/Physically safe environment - no spills, clutter or unnecessary equipment/Purposeful Proactive Rounding/Room/bathroom lighting operational, light cord in reach Communicate Risk of Fall with Harm to all staff/Reinforce activity limits and safety measures with patient and family/Tailored Fall Risk Interventions/Visual Cue: Yellow wristband and red socks/Bed in lowest position, wheels locked, appropriate side rails in place/Call bell, personal items and telephone in reach/Instruct patient to call for assistance before getting out of bed or chair/Non-slip footwear when patient is out of bed/[place] to call system/Physically safe environment - no spills, clutter or unnecessary equipment/Purposeful Proactive Rounding/Room/bathroom lighting operational, light cord in reach

## 2023-12-23 NOTE — CONSULT NOTE ADULT - ATTENDING COMMENTS
27 yo M with recent diagnosis of Lupus (9/2023) admitted 12/12 with B/L Pulmonary embolism. Now with increased dyspnea, pleuritic chest pain,  hypoxia, and fever. CXR shows increased left pleural effusion. Bedside US shows moderate left septated effusion anterior, lateral and posterior. Right: small effusion simple appearing. Pericardial effusion noted (although recent echo was negative for pericardial effusion). Lower extremity dopplers negative for DVT. The patient has an elevated PT which is likely due to poor po intake as he has lost 30 pounds since 9/23. Would replete with 10 mg po vitamin K and repeat PT/INR in AM. He should have heparin resumed immediately as it is critical to anticoagulate with recent PE. Would recommend holding off on thoracentesis and starting steroids as per rheumatology as effusions may improve with steroid. Hypercoagulable work up with high suspicion for APLS. Would plan to discharge patient on warfarin with monitoring of INR setup outpatient. Would need bridge to warfarin f/ hep ggt with goal INR 2-3.  Will set up with hematology at [facility] upon discharge. Will need a second set of labs to confirm diagnosis of APLS. Hematology will follow with you. 27 yo M with recent diagnosis of Lupus (9/2023) admitted 12/12 with B/L Pulmonary embolism. Now with increased dyspnea, pleuritic chest pain,  hypoxia, and fever. CXR shows increased left pleural effusion. Bedside US shows moderate left septated effusion anterior, lateral and posterior. Right: small effusion simple appearing. Pericardial effusion noted (although recent echo was negative for pericardial effusion). Lower extremity dopplers negative for DVT. The patient has an elevated PT which is likely due to poor po intake as he has lost 30 pounds since 9/23. Would replete with 10 mg po vitamin K and repeat PT/INR in AM. He should have heparin resumed immediately as it is critical to anticoagulate with recent PE. Would recommend holding off on thoracentesis and starting steroids as per rheumatology as effusions may improve with steroid. Hypercoagulable work up with high suspicion for APLS. Would plan to discharge patient on warfarin with monitoring of INR setup outpatient. Would need bridge to warfarin f/ hep ggt with goal INR 2-3.  Will set up with hematology at [facility] upon discharge. Will need a second set of labs to confirm diagnosis of APLS. Hematology will follow with you.

## 2023-12-23 NOTE — CONSULT NOTE ADULT - ASSESSMENT
28 years old male with h/o Lupus ( diagnosed in 09/2023, on Plaquenil present to [place] ED on 12/12/23  with complain of chest pain and SOB. Patient reported left sided pleuritic chest pain which started 3 days ago. Pain is progressively worsened, associated with SOB and cough. Patient was seen in OSH ED and was prescribed antibiotics. Patient reported significantly worsening of left sided pleuritic chest pain today. No fever or chills. Patient reported loss of appetite and had a few episode of diarrhea for last 2 days. CTA chest with acute right upper lobe and left lower lobe segmental/subsegmental pulmonary emboli. No CT evidence of right heart strain. New bilateral lower lobe consolidations with areas of central clearing. Pneumonia and pulmonary infarcts are in the differential. New bilateral pleural effusions, small on the left and trace on the right. Bilateral axillary and supraclavicular adenopathy of unclear etiology. Patient was started on heparin ggt transitioned to eliquis on 12/19,  patient with worsening SOB/ hypoxia requiring nasal cannula oxygen supplementation. 12/20  Repeat Xray shows increased moderate left pleural effusion and compressive atelectasis trace right effusion and linear atelectasis. Thoracic team consulted for possible pigtail insertion Bedside US: Large left effusion with septations. Right simple effusion , + pericardial effusion- lower extremity dopplers negative for DVT.    # Pulm effusion/ Fever   S/P Chest tube   TS care appreciated   O2 supplement   monitor output   Zosyn for now     #PE   on heparin   DVT negative  Hemeonceval     #Hxof lupus  on hydroxychloroquine   Heme eval called     DVT andgIPPX 28 years old male with h/o Lupus ( diagnosed in 09/2023, on Plaquenil present to [place] ED on 12/12/23  with complain of chest pain and SOB. Patient reported left sided pleuritic chest pain which started 3 days ago. Pain is progressively worsened, associated with SOB and cough. Patient was seen in OSH ED and was prescribed antibiotics. Patient reported significantly worsening of left sided pleuritic chest pain today. No fever or chills. Patient reported loss of appetite and had a few episode of diarrhea for last 2 days. CTA chest with acute right upper lobe and left lower lobe segmental/subsegmental pulmonary emboli. No CT evidence of right heart strain. New bilateral lower lobe consolidations with areas of central clearing. Pneumonia and pulmonary infarcts are in the differential. New bilateral pleural effusions, small on the left and trace on the right. Bilateral axillary and supraclavicular adenopathy of unclear etiology. Patient was started on heparin ggt transitioned to eliquis on 12/19,  patient with worsening SOB/ hypoxia requiring nasal cannula oxygen supplementation. 12/20  Repeat Xray shows increased moderate left pleural effusion and compressive atelectasis trace right effusion and linear atelectasis. Thoracic team consulted for possible pigtail insertion Bedside US: Large left effusion with septations. Right simple effusion , + pericardial effusion- lower extremity dopplers negative for DVT.    # Pulm effusion/ Fever   S/P Chest tube   TS care appreciated   O2 supplement   monitor output   Zosyn for now     #PE   on heparin   DVT negative  Hemeonceval     #Hxof lupus  on hydroxychloroquine   Heme eval called     DVT andgIPPX

## 2023-12-23 NOTE — CONSULT NOTE ADULT - TIME BILLING
Reviewing the chart, interpreting lab data, discussing case with fellow, interview and examination of patient, and documentation. Pt is at high risk of mortality due to his disease.

## 2023-12-23 NOTE — CONSULT NOTE ADULT - ASSESSMENT
28 years old male with h/o Lupus ( diagnosed in 09/2023, on Plaquenil present to [place] ED on 12/12/23  with complain of chest pain and SOB, found to have bilateral acute PE and bilateral pleural effusions. Transferred to [facility] for CT surgery evaluation. Also with fevers, has been on broad spectrum abx with no improvement. Rheumatology consulted for evaluation of SLE flare.     #C/f SLE Flare  #Fevers  #Bilateral pleural effusions/pleuritis   -SLE dx in Sept 2023 due to rash, oral ulcers, CP and dyspnea. Per pt's mother, underwent skin biopsy that confirmed lesions consistent with lupus. Has been on Plaquenil has monotherapy.   -At [place]: ABDIAS 1:1280, dsDNA 15, C3 79, C4 12.   -Despite broad spectrum abx, pt continues to fever. Also noted to not have leukocytosis and unremarkable recent infectious workup. Fevers, pleural effusions, pleuritic chest pain, weight loss and poor PO intake could be manifestations of SLE flare  -CT Chest with moderate pleural effusions, loculated on L (new since 12/12) and new nonspecific very small peripheral GGO in RUL   Blood cultures (12/12, 12/14, 12/21): no growth  Stool Cx (12/15): no growth, GI PCR +EPEC  Sputum (12/20): normal [place]    #Bilateral Acute PE  #C/f APS   -Currently on heparin drip   -Hematology following-working up for APS and causes of elevated PT-INR. At present have high suspicion for APS, will discharge with warfarin    Recommendations  -Will order SLE serologies to monitor disease activity   -If not objection with ID, recommend starting pt on IV solumedrol 40mg for possible SLE flare. Will discuss with ID service.   -Hematology following, appreciate recs  -Will continue to follow     Discussed with Dr. Ulysses Landaverde MD   PGY-4  Reachable on TEAMS  Pager [phone]  Rheumatology Fellow     28 years old male with h/o Lupus ( diagnosed in 09/2023, on Plaquenil present to [place] ED on 12/12/23  with complain of chest pain and SOB, found to have bilateral acute PE and bilateral pleural effusions. Transferred to [facility] for CT surgery evaluation. Also with fevers, has been on broad spectrum abx with no improvement. Rheumatology consulted for evaluation of SLE flare.     #C/f SLE Flare  #Fevers  #Bilateral pleural effusions/pleuritis   -SLE dx in Sept 2023 due to rash, oral ulcers, CP and dyspnea. Per pt's mother, underwent skin biopsy that confirmed lesions consistent with lupus. Has been on Plaquenil has monotherapy.   -At [place]: ABDIAS 1:1280, dsDNA 15, C3 79, C4 12.   -Despite broad spectrum abx, pt continues to fever. Also noted to not have leukocytosis and unremarkable recent infectious workup. Fevers, pleural effusions, pleuritic chest pain, weight loss and poor PO intake could be manifestations of SLE flare  -CT Chest with moderate pleural effusions, loculated on L (new since 12/12) and new nonspecific very small peripheral GGO in RUL   Blood cultures (12/12, 12/14, 12/21): no growth  Stool Cx (12/15): no growth, GI PCR +EPEC  Sputum (12/20): normal [place]    #Bilateral Acute PE  #C/f APS   -Currently on heparin drip   -Hematology following-working up for APS and causes of elevated PT-INR. At present have high suspicion for APS, will discharge with warfarin    Recommendations  -Will order SLE serologies to monitor disease activity   -If not objection with ID, recommend starting pt on IV solumedrol 40mg for possible SLE flare. Will discuss with ID service.   -Hematology following, appreciate recs  -Will continue to follow     Discussed with Dr. Ulysses Landaverde MD   PGY-4  Reachable on TEAMS  Pager [phone]  Rheumatology Fellow     28 years old male with h/o Lupus ( diagnosed in 09/2023, on Plaquenil present to [place] ED on 12/12/23  with complain of chest pain and SOB, found to have bilateral acute PE and bilateral pleural effusions. Transferred to [facility] for CT surgery evaluation. Also with fevers, has been on broad spectrum abx with no improvement. Rheumatology consulted for evaluation of SLE flare.     #C/f SLE Flare  #Fevers  #Bilateral pleural effusions/pleuritis   -SLE dx in Sept 2023 due to rash, oral ulcers, CP and dyspnea. Per pt's mother, underwent skin biopsy that confirmed lesions consistent with lupus. Has been on Plaquenil has monotherapy.   -At [place]: ABDIAS 1:1280, dsDNA 15, C3 79, C4 12.   -Despite broad spectrum abx, pt continues to fever. Also noted to not have leukocytosis and unremarkable recent infectious workup. Fevers, pleural effusions, pleuritic chest pain, weight loss and poor PO intake could be manifestations of SLE flare  -CT Chest with moderate pleural effusions, loculated on L (new since 12/12) and new nonspecific very small peripheral GGO in RUL   Blood cultures (12/12, 12/14, 12/21): no growth  Stool Cx (12/15): no growth, GI PCR +EPEC  Sputum (12/20): normal [place]    #Bilateral Acute PE  #C/f APS   -Currently on heparin drip   -Hematology following-working up for APS and causes of elevated PT-INR. At present have high suspicion for APS, will discharge with warfarin    Recommendations  -Will order SLE serologies to monitor disease activity   -If not objection with ID, recommend starting pt on IV solumedrol 40mg daily for possible SLE flare. Will discuss with ID service.   -Hematology following, appreciate recs  -Will continue to follow     Discussed with Dr. Ulysses Landaverde MD   PGY-4  Reachable on TEAMS  Pager [phone]  Rheumatology Fellow     28 years old male with h/o Lupus ( diagnosed in 09/2023, on Plaquenil present to [place] ED on 12/12/23  with complain of chest pain and SOB, found to have bilateral acute PE and bilateral pleural effusions. Transferred to [facility] for CT surgery evaluation. Also with fevers, has been on broad spectrum abx with no improvement. Rheumatology consulted for evaluation of SLE flare.     #C/f SLE Flare  #Fevers  #Bilateral pleural effusions/pleuritis   -SLE dx in Sept 2023 due to rash, oral ulcers, CP and dyspnea. Per pt's mother, underwent skin biopsy that confirmed lesions consistent with lupus. Has been on Plaquenil has monotherapy.   -At [place]: ABDIAS 1:1280, dsDNA 15, C3 79, C4 12.   -Despite broad spectrum abx, pt continues to fever. Also noted to not have leukocytosis and unremarkable recent infectious workup. Fevers, pleural effusions, pleuritic chest pain, weight loss and poor PO intake could be manifestations of SLE flare  -CT Chest with moderate pleural effusions, loculated on L (new since 12/12) and new nonspecific very small peripheral GGO in RUL   Blood cultures (12/12, 12/14, 12/21): no growth  Stool Cx (12/15): no growth, GI PCR +EPEC  Sputum (12/20): normal [place]    #Bilateral Acute PE  #C/f APS   -Currently on heparin drip   -Hematology following-working up for APS and causes of elevated PT-INR. At present have high suspicion for APS, will discharge with warfarin    Recommendations  -Will order SLE serologies to monitor disease activity   -If not objection with ID, recommend starting pt on IV solumedrol 40mg daily for possible SLE flare. Will discuss with ID service.   -Hematology following, appreciate recs  -Will continue to follow     Discussed with Dr. Ulysses Landaverde MD   PGY-4  Reachable on TEAMS  Pager [phone]  Rheumatology Fellow     28 years old male with h/o Lupus ( diagnosed in 09/2023, on Plaquenil present to [place] ED on 12/12/23  with complain of chest pain and SOB, found to have bilateral acute PE and bilateral pleural effusions. Transferred to [facility] for CT surgery evaluation. Also with fevers, has been on broad spectrum abx with no improvement. Rheumatology consulted for evaluation of SLE flare.   was dx with SLE in Sept 2023 due to rash, oral ulcers, CP and dyspnea. Per pt's mother, underwent skin biopsy that confirmed lesions consistent with lupus. Has been on Plaquenil has monotherapy.   -At [place]: ABDIAS 1:1280, dsDNA 15, C3 79, C4 12.     Despite broad spectrum abx, pt continues to fever. Also noted to not have leukocytosis and unremarkable recent infectious workup. Fevers, pleural effusions, pleuritic chest pain, weight loss and poor PO intake could be manifestations of SLE flare  -CT Chest with moderate pleural effusions, loculated on L (new since 12/12) and new nonspecific very small peripheral GGO in RUL   Blood cultures (12/12, 12/14, 12/21): no growth  Stool Cx (12/15): no growth, GI PCR +EPEC  Sputum (12/20): normal [place]      # SLE, high clinical activity ( patchy alopecia, discoid lesions and erythematous rash, oral ulcers, serositis, fever),   -- Fevers likely due to lupus activity rather then infection  -- Bilateral pleural effusions/pleuritis - sms started in 9/2023 and progressed in last few weeks- highly suggestive that it is lupus related    # Bilateral Acute PE with pulmonary infarcts  - r/o APS - serological evaluation for APS- pending  -Currently on heparin drip   -Hematology following-working up for APS and causes of elevated PT-INR. At present have high suspicion for APS, will discharge with warfarin    Recommendations  -Will order SLE serologies to monitor disease activity   -If not objection with ID, recommend starting pt on IV solumedrol 40mg daily for possible SLE flare. Will discuss with ID service.   -Hematology following, appreciate recs  -Will continue to follow     Discussed with Dr. Ulysses Landaverde MD   PGY-4  Reachable on TEAMS  Pager [phone]  Rheumatology Fellow     28 years old male with h/o Lupus ( diagnosed in 09/2023, on Plaquenil present to [place] ED on 12/12/23  with complain of chest pain and SOB, found to have bilateral acute PE and bilateral pleural effusions. Transferred to [facility] for CT surgery evaluation. Also with fevers, has been on broad spectrum abx with no improvement. Rheumatology consulted for evaluation of SLE flare.   was dx with SLE in Sept 2023 due to rash, oral ulcers, CP and dyspnea. Per pt's mother, underwent skin biopsy that confirmed lesions consistent with lupus. Has been on Plaquenil has monotherapy.   -At [place]: ABDIAS 1:1280, dsDNA 15, C3 79, C4 12.     Despite broad spectrum abx, pt continues to fever. Also noted to not have leukocytosis and unremarkable recent infectious workup. Fevers, pleural effusions, pleuritic chest pain, weight loss and poor PO intake could be manifestations of SLE flare  -CT Chest with moderate pleural effusions, loculated on L (new since 12/12) and new nonspecific very small peripheral GGO in RUL   Blood cultures (12/12, 12/14, 12/21): no growth  Stool Cx (12/15): no growth, GI PCR +EPEC  Sputum (12/20): normal [place]      # SLE, high clinical activity ( patchy alopecia, discoid lesions and erythematous rash, oral ulcers, serositis, fever),   -- Fevers likely due to lupus activity rather then infection  -- Bilateral pleural effusions/pleuritis - sms started in 9/2023 and progressed in last few weeks- highly suggestive that it is lupus related    # Bilateral Acute PE with pulmonary infarcts  - r/o APS - serological evaluation for APS- pending  -Currently on heparin drip   -Hematology following-working up for APS and causes of elevated PT-INR. At present have high suspicion for APS, will discharge with warfarin    Recommendations  -Will order SLE serologies to monitor disease activity   -If not objection with ID, recommend starting pt on IV solumedrol 40mg daily for possible SLE flare. Will discuss with ID service.   -Hematology following, appreciate recs  -Will continue to follow     Discussed with Dr. Ulysses Landaverde MD   PGY-4  Reachable on TEAMS  Pager [phone]  Rheumatology Fellow

## 2023-12-23 NOTE — CONSULT NOTE ADULT - ASSESSMENT
28y Male with recent diagnosis of Lupus ( 9/2023) admitted 12/12 with B/L Pulmonary embolism. Now with increased dyspnea, pleuritic chest pain,  hypoxia, and fever. CXR shows increased left pleural effusion. Bedside US shows large left septated effusion anterior, lateral and posterior. Right: smalll effusion simple appearing. Cardiac + pericardial effusion noted. Lower extremity dopplers negative for DVT. GI PCR + e coli. mRSA PCR + Staph aureus. Hematology consulted for hypercoagulable workup and correction of INR myke-procedurally.     Labs today: WBC 8.3, Hgb 10.8, , INR 2.1, PT 23.2, , ALT 88.     Patient's mother endorses weight loss and poor po intake, which could have led to vitamin deficiencies such as Vit K. Will replete Vit K and recheck INR tomorrow. High INR can also occur in liver disease. Patient has elevated LFTs.    Plan:   # Hypercoagulable workup  # High INR  - Please restart hep ggt given high risk of coagulopathy, suspect APLS (anticardiolipin Ab positive x 1).  - Would add oxygen for comfort  - Management of lupus per Rheumatology   - Hypercoagulable work up appropriate: obtain APLS workup (lupus anticoagulation profile, beta-2-glycoprotein).   - Please give 10 mg IV Vitamin K x 1 today, team will check INR tomorrow to see if it corrected. Check PT/INR tomorrow am.  - Recommend workup for transaminitis per primary team (HIV, hepatitis panel, etc)  - CBC w/ diff daily  - Can obtain Factor V Leiden, JAK2 mutation, and Prothrombin Gene Mutation.   - Given high suspicion for APLS, would plan to discharge patient on warfarin. Would need bridge to warfarin with goal INR 2-3  - Will set up with hematology at [facility] upon discharge. Will need a second set of labs to confirm diagnosis of APLS.     Note incomplete until attending signs    ***************************************************************  Carolyn Cabello, PGY4  Fellow Hematology/Oncology  pager: [phone]   Available on [employer] Teams  After 5pm or on weekends please contact  to page on-call fellow   ***************************************************************   28y Male with recent diagnosis of Lupus ( 9/2023) admitted 12/12 with B/L Pulmonary embolism. Now with increased dyspnea, pleuritic chest pain,  hypoxia, and fever. CXR shows increased left pleural effusion. Bedside US shows large left septated effusion anterior, lateral and posterior. Right: smalll effusion simple appearing. Cardiac + pericardial effusion noted. Lower extremity dopplers negative for DVT. GI PCR + e coli. mRSA PCR + Staph aureus. Hematology consulted for hypercoagulable workup and correction of INR myke-procedurally.     Labs today: WBC 8.3, Hgb 10.8, , INR 2.1, PT 23.2, , ALT 88.     Patient's mother endorses weight loss and poor po intake, which could have led to vitamin deficiencies such as Vit K. Will replete Vit K and recheck INR tomorrow. High INR can also occur in liver disease. Patient has elevated LFTs.    Plan:   # Hypercoagulable workup  # High INR  - Please restart hep ggt given high risk of coagulopathy, suspect APLS (anticardiolipin Ab positive x 1).  - Would add oxygen for comfort  - Management of lupus per Rheumatology   - Hypercoagulable work up appropriate: obtain APLS workup (lupus anticoagulation profile, beta-2-glycoprotein).   - Please give 10 mg IV Vitamin K x 1 today, team will check INR tomorrow to see if it corrected. Check PT/INR tomorrow am.  - Recommend workup for transaminitis per primary team (HIV, hepatitis panel, etc)  - CBC w/ diff daily  - Can obtain Factor V Leiden, JAK2 mutation, and Prothrombin Gene Mutation.   - Given high suspicion for APLS, would plan to discharge patient on warfarin. Would need bridge to warfarin with goal INR 2-3  - Will set up with hematology at [facility] upon discharge. Will need a second set of labs to confirm diagnosis of APLS.     Note incomplete until attending signs    ***************************************************************  Carolyn Cabello, PGY4  Fellow Hematology/Oncology  pager: [phone]   Available on [employer] Teams  After 5pm or on weekends please contact  to page on-call fellow   ***************************************************************   28y Male with recent diagnosis of Lupus ( 9/2023) admitted 12/12 with B/L Pulmonary embolism. Now with increased dyspnea, pleuritic chest pain,  hypoxia, and fever. CXR shows increased left pleural effusion. Bedside US shows large left septated effusion anterior, lateral and posterior. Right: smalll effusion simple appearing. Cardiac + pericardial effusion noted. Lower extremity dopplers negative for DVT. GI PCR + e coli. mRSA PCR + Staph aureus. Hematology consulted for hypercoagulable workup and correction of INR myke-procedurally.     Labs today: WBC 8.3, Hgb 10.8, , INR 2.1, PT 23.2, , ALT 88.   CTA chest (12/12/23): Acute right upper lobe and left lower lobe   segmental/subsegmental pulmonary emboli. Main pulmonary artery size is   within normal limits. Normal caliber of the thoracic aorta. Imaged great   vessels are patent.  CT chest (12/23/23): new left located pleural effusion    Patient's mother endorses weight loss and poor po intake, which could have led to vitamin deficiencies such as Vit K. Will replete Vit K and recheck INR tomorrow. High INR can also occur in liver disease. Patient has elevated LFTs.    Plan:   # Hypercoagulable workup  # High INR  - Please restart hep ggt given high risk of coagulopathy, suspect APLS (anticardiolipin Ab positive x 1).  - Would add oxygen for comfort  - Management of lupus per Rheumatology   - Infectious workup and management per ID  - Hypercoagulable work up appropriate: obtain APLS workup (lupus anticoagulation profile, beta-2-glycoprotein).   - Please give 10 mg IV Vitamin K x 1 today, team will check INR tomorrow to see if it corrected. Check PT/INR tomorrow am.  - Please repeat TTE, given no pericardial effusion seen on TTE 12/11/23 with new ultrasound findings of pericardial effusion in the ED.   - Recommend workup for transaminitis per primary team (HIV, hepatitis panel, etc)  - CBC w/ diff daily  - Can obtain Factor V Leiden, JAK2 mutation, and Prothrombin Gene Mutation.   - Given high suspicion for APLS, would plan to discharge patient on warfarin with monitoring of INR setup outpatient. Would need bridge to warfarin f/ hep ggt with goal INR 2-3.  - Will set up with hematology at [facility] upon discharge. Will need a second set of labs to confirm diagnosis of APLS.     Note incomplete until attending signs    ***************************************************************  Carolyn Cabello, PGY4  Fellow Hematology/Oncology  pager: [phone]   Available on [employer] Teams  After 5pm or on weekends please contact  to page on-call fellow   ***************************************************************   28y Male with recent diagnosis of Lupus ( 9/2023) admitted 12/12 with B/L Pulmonary embolism. Now with increased dyspnea, pleuritic chest pain,  hypoxia, and fever. CXR shows increased left pleural effusion. Bedside US shows large left septated effusion anterior, lateral and posterior. Right: smalll effusion simple appearing. Cardiac + pericardial effusion noted. Lower extremity dopplers negative for DVT. GI PCR + e coli. mRSA PCR + Staph aureus. Hematology consulted for hypercoagulable workup and correction of INR myke-procedurally.     Labs today: WBC 8.3, Hgb 10.8, , INR 2.1, PT 23.2, , ALT 88.   CTA chest (12/12/23): Acute right upper lobe and left lower lobe   segmental/subsegmental pulmonary emboli. Main pulmonary artery size is   within normal limits. Normal caliber of the thoracic aorta. Imaged great   vessels are patent.  CT chest (12/23/23): new left located pleural effusion    Patient's mother endorses weight loss and poor po intake, which could have led to vitamin deficiencies such as Vit K. Will replete Vit K and recheck INR tomorrow. High INR can also occur in liver disease. Patient has elevated LFTs.    Plan:   # Hypercoagulable workup  # High INR  - Please restart hep ggt given high risk of coagulopathy, suspect APLS (anticardiolipin Ab positive x 1).  - Would add oxygen for comfort  - Management of lupus per Rheumatology   - Infectious workup and management per ID  - Hypercoagulable work up appropriate: obtain APLS workup (lupus anticoagulation profile, beta-2-glycoprotein).   - Please give 10 mg IV Vitamin K x 1 today, team will check INR tomorrow to see if it corrected. Check PT/INR tomorrow am.  - Please repeat TTE, given no pericardial effusion seen on TTE 12/11/23 with new ultrasound findings of pericardial effusion in the ED.   - Recommend workup for transaminitis per primary team (HIV, hepatitis panel, etc)  - CBC w/ diff daily  - Can obtain Factor V Leiden, JAK2 mutation, and Prothrombin Gene Mutation.   - Given high suspicion for APLS, would plan to discharge patient on warfarin with monitoring of INR setup outpatient. Would need bridge to warfarin f/ hep ggt with goal INR 2-3.  - Will set up with hematology at [facility] upon discharge. Will need a second set of labs to confirm diagnosis of APLS.     Note incomplete until attending signs    ***************************************************************  Carolyn Cabello, PGY4  Fellow Hematology/Oncology  pager: [phone]   Available on [employer] Teams  After 5pm or on weekends please contact  to page on-call fellow   ***************************************************************   29 yo Male with recent diagnosis of Lupus (9/2023) admitted 12/12 with B/L Pulmonary embolism. Now with increased dyspnea, pleuritic chest pain,  hypoxia, and fever. CXR shows increased left pleural effusion. Bedside US shows moderate left septated effusion anterior, lateral and posterior. Right: small effusion simple appearing. Cardiac and pericardial effusion noted (although recent echo was negative for pericardial effusion) Lower extremity dopplers negative for DVT. GI PCR + e coli. mRSA PCR + Staph aureus. Hematology consulted for hypercoagulable workup and correction of INR myke-procedurally.     Labs today: WBC 8.3, Hgb 10.8, , INR 2.1, PT 23.2, , ALT 88.   CTA chest (12/12/23): Acute right upper lobe and left lower lobe   segmental/subsegmental pulmonary emboli. Main pulmonary artery size is   within normal limits. Normal caliber of the thoracic aorta. Imaged great   vessels are patent.  CT chest (12/23/23): new left located pleural effusion    Patient's mother endorses weight loss and poor po intake, which could have led to vitamin deficiencies such as Vit K. Will replete Vit K and recheck INR tomorrow. High INR can also occur in liver disease. Patient has elevated LFTs.    Plan:   # Hypercoagulable workup  # High INR  - Please restart heparin ggt given high risk of coagulopathy, suspect APLS (anticardiolipin Ab positive x 1).  - Would add oxygen for comfort  - Management of lupus per Rheumatology   - Infectious workup and management per ID  - Hypercoagulable work up appropriate: obtain APLS workup (lupus anticoagulation profile, beta-2-glycoprotein).   - Please give 10 mg PO Vitamin K x 1 today, team will check INR tomorrow to see if it corrected. Check PT/INR tomorrow am.  - Please repeat TTE, given no pericardial effusion seen on TTE 12/11/23 with new ultrasound findings of pericardial effusion in the ED.   - Recommend workup for transaminitis per primary team (HIV, hepatitis panel, etc)  - CBC w/ diff daily  - Can obtain Factor V Leiden, JAK2 mutation, and Prothrombin Gene Mutation.   - Given high suspicion for APLS, would plan to discharge patient on warfarin with monitoring of INR setup outpatient. Would need bridge to warfarin f/ hep ggt with goal INR 2-3.  - Will set up with hematology at [facility] upon discharge. Will need a second set of labs to confirm diagnosis of APLS.       Carolyn Cabello, PGY4  Fellow Hematology/Oncology  pager: [phone]   Available on [employer] Teams  After 5pm or on weekends please contact  to page on-call fellow   ***************************************************************   27 yo Male with recent diagnosis of Lupus (9/2023) admitted 12/12 with B/L Pulmonary embolism. Now with increased dyspnea, pleuritic chest pain,  hypoxia, and fever. CXR shows increased left pleural effusion. Bedside US shows moderate left septated effusion anterior, lateral and posterior. Right: small effusion simple appearing. Cardiac and pericardial effusion noted (although recent echo was negative for pericardial effusion) Lower extremity dopplers negative for DVT. GI PCR + e coli. mRSA PCR + Staph aureus. Hematology consulted for hypercoagulable workup and correction of INR myke-procedurally.     Labs today: WBC 8.3, Hgb 10.8, , INR 2.1, PT 23.2, , ALT 88.   CTA chest (12/12/23): Acute right upper lobe and left lower lobe   segmental/subsegmental pulmonary emboli. Main pulmonary artery size is   within normal limits. Normal caliber of the thoracic aorta. Imaged great   vessels are patent.  CT chest (12/23/23): new left located pleural effusion    Patient's mother endorses weight loss and poor po intake, which could have led to vitamin deficiencies such as Vit K. Will replete Vit K and recheck INR tomorrow. High INR can also occur in liver disease. Patient has elevated LFTs.    Plan:   # Hypercoagulable workup  # High INR  - Please restart heparin ggt given high risk of coagulopathy, suspect APLS (anticardiolipin Ab positive x 1).  - Would add oxygen for comfort  - Management of lupus per Rheumatology   - Infectious workup and management per ID  - Hypercoagulable work up appropriate: obtain APLS workup (lupus anticoagulation profile, beta-2-glycoprotein).   - Please give 10 mg PO Vitamin K x 1 today, team will check INR tomorrow to see if it corrected. Check PT/INR tomorrow am.  - Please repeat TTE, given no pericardial effusion seen on TTE 12/11/23 with new ultrasound findings of pericardial effusion in the ED.   - Recommend workup for transaminitis per primary team (HIV, hepatitis panel, etc)  - CBC w/ diff daily  - Can obtain Factor V Leiden, JAK2 mutation, and Prothrombin Gene Mutation.   - Given high suspicion for APLS, would plan to discharge patient on warfarin with monitoring of INR setup outpatient. Would need bridge to warfarin f/ hep ggt with goal INR 2-3.  - Will set up with hematology at [facility] upon discharge. Will need a second set of labs to confirm diagnosis of APLS.       Carolyn Cabello, PGY4  Fellow Hematology/Oncology  pager: [phone]   Available on [employer] Teams  After 5pm or on weekends please contact  to page on-call fellow   ***************************************************************

## 2023-12-23 NOTE — CONSULT NOTE ADULT - SUBJECTIVE AND OBJECTIVE BOX
Patient is a 28y old  Male who presents with a chief complaint of   HPI:  28 years old male with h/o Lupus ( diagnosed in 09/2023, on Plaquenil present to [place] ED on 12/12/23  with complain of chest pain and SOB. Patient reported left sided pleuritic chest pain which started 3 days ago. Pain is progressively worsened, associated with SOB and cough. Patient was seen in [facility] and was prescribed antibiotics. Patient reported significantly worsening of left sided pleuritic chest pain today. No fever or chills. Patient reported loss of appetite and had a few episode of diarrhea for last 2 days. CTA chest with acute right upper lobe and left lower lobe segmental/subsegmental pulmonary emboli. No CT evidence of right heart strain. New bilateral lower lobe consolidations with areas of central clearing. Pneumonia and pulmonary infarcts are in the differential. New bilateral pleural effusions, small on the left and trace on the right. Bilateral axillary and supraclavicular adenopathy of unclear etiology. Patient was started on heparin ggt transitioned to eliquis on 12/19,  patient with worsening SOB/ hypoxia requiring nasal cannula oxygen supplementation. 12/20  Repeat Xray shows increased large left pleural effusion and compressive atelectasis trace right effusion and linear atelectasis. Thoracic team consulted for possible pigtail insertion Bedside US: Large left effusion with septations. Right simple effusion , + pericardial effusion- lower extremity dopplers negative for DVT,   - GI PCR + e coli  - mRSA PCR + Staph aureus   . Patient transferred to [facility] for further management.     (22 Dec 2023 22:52)       REVIEW OF SYSTEMS  Constitutional: No fevers, chills, weight loss or fatigue   Skin: No rash, no phlebitis	  Eyes: No discharge	  ENMT: No sore throat, oral thrush, ulcers or exudate  Respiratory: No cough, no SOB  Cardiovascular:  No chest pain, palpitations or edema   Gastrointestinal: No pain, nausea, vomiting, diarrhea or constipation	  Genitourinary: No dysuria, discharge or flank pain  MSK: No arthralgias or back pain   Neurological: No HA, no weakness, no seizures, no AMS       prior hospital charts reviewed [V]  primary team notes reviewed [V]  other consultant notes reviewed [V]    PAST MEDICAL & SURGICAL HISTORY:  LE (lupus erythematosus)      Pulmonary embolism      No significant past surgical history          SOCIAL HISTORY:  - Denied smoking/vaping/alcohol/recreational drug use    FAMILY HISTORY:  No pertinent family history in first degree relatives        Allergies  No Known Allergies        ANTIMICROBIALS:  hydroxychloroquine 200 two times a day  piperacillin/tazobactam IVPB.. 3.375 every 8 hours  vancomycin  IVPB 1000 every 12 hours      ANTIMICROBIALS (past 90 days):  MEDICATIONS  (STANDING):  hydroxychloroquine   200 milliGRAM(s) Oral (12-23-23 @ 05:59)    piperacillin/tazobactam IVPB..   25 mL/Hr IV Intermittent (12-23-23 @ 06:00)        OTHER MEDS:   MEDICATIONS  (STANDING):  acetaminophen     Tablet .. 650 every 6 hours PRN  albuterol/ipratropium for Nebulization 3 every 6 hours PRN  guaiFENesin Oral Liquid (Sugar-Free) 200 every 6 hours PRN  heparin  Infusion. 1500 <Continuous>      VITALS:  Vital Signs Last 24 Hrs  T(F): 102.9 (12-23-23 @ 10:00), Max: 103 (12-22-23 @ 04:30)    Vital Signs Last 24 Hrs  HR: 98 (12-23-23 @ 10:00) (92 - 102)  BP: 158/108 (12-23-23 @ 10:00) (133/69 - 170/91)  RR: 18 (12-23-23 @ 10:00)  SpO2: 100% (12-23-23 @ 10:00) (92% - 100%)  Wt(kg): --    EXAM:  General: Patient in no acute distress   HEENT: NCAT, EOMI, PERRL, no oral lesions  CV: S1+S2, no m/r/g appreciated   Lungs: No respiratory distress, CTAB  Abd: Soft, nontender, no guarding, no rebound tenderness, + bowel sounds   Ext: No cyanosis, no edema  Neuro: Alert and oriented, no focal deficits, CN II-XII grossly intact   Skin: No rash   IV: No phlebitis      Labs:                        10.8   8.28  )-----------( 348      ( 23 Dec 2023 05:50 )             32.2     12-23    128<L>  |  94<L>  |  16  ----------------------------<  107<H>  4.8   |  20<L>  |  1.02    Ca    8.7      23 Dec 2023 05:50  Phos  3.5     12-23  Mg     2.00     12-23    TPro  7.9  /  Alb  3.0<L>  /  TBili  0.6  /  DBili  x   /  AST  100<H>  /  ALT  88<H>  /  AlkPhos  86  12-23      WBC Trend:  WBC Count: 8.28 (12-23-23 @ 05:50)  WBC Count: 7.92 (12-23-23 @ 00:01)  WBC Count: 8.10 (12-22-23 @ 14:56)  WBC Count: 10.38 (12-22-23 @ 06:50)      Auto Neutrophil #: 6.93 K/uL (12-21-23 @ 19:00)  Auto Neutrophil #: 6.12 K/uL (12-12-23 @ 20:44)  Auto Neutrophil #: 5.62 K/uL (12-12-23 @ 09:05)  Auto Neutrophil #: 2.08 K/uL (11-27-23 @ 21:30)  Band Neutrophils %: 1.0 % (11-27-23 @ 21:30)      Creatine Trend:  Creatinine: 1.02 (12-23)  Creatinine: 1.00 (12-23)  Creatinine: 1.13 (12-22)  Creatinine: 0.99 (12-21)      Liver Biochemical Testing Trend:  Alanine Aminotransferase (ALT/SGPT): 88 *H* (12-23)  Alanine Aminotransferase (ALT/SGPT): 111 *H* (12-22)  Alanine Aminotransferase (ALT/SGPT): 97 *H* (12-21)  Alanine Aminotransferase (ALT/SGPT): 59 (12-15)  Alanine Aminotransferase (ALT/SGPT): 64 (12-14)  Aspartate Aminotransferase (AST/SGOT): 100 (12-23-23 @ 05:50)  Aspartate Aminotransferase (AST/SGOT): 104 (12-22-23 @ 06:50)  Aspartate Aminotransferase (AST/SGOT): 85 (12-21-23 @ 19:00)  Aspartate Aminotransferase (AST/SGOT): 72 (12-15-23 @ 07:25)  Aspartate Aminotransferase (AST/SGOT): 87 (12-14-23 @ 07:00)  Bilirubin Total: 0.6 (12-23)  Bilirubin Total: 0.7 (12-22)  Bilirubin Total: 0.7 (12-21)  Bilirubin Total: 0.7 (12-15)  Bilirubin Total: 0.7 (12-14)      Trend LDH      Auto Eosinophil %: 0.1 % (12-21-23 @ 19:00)      Urinalysis Basic - ( 23 Dec 2023 05:50 )    Color: x / Appearance: x / SG: x / pH: x  Gluc: 107 mg/dL / Ketone: x  / Bili: x / Urobili: x   Blood: x / Protein: x / Nitrite: x   Leuk Esterase: x / RBC: x / WBC x   Sq Epi: x / Non Sq Epi: x / Bacteria: x        MICROBIOLOGY:    MRSA PCR Result.: NotDetec (12-15-23 @ 05:00)      Culture - Blood (collected 21 Dec 2023 19:00)  Source: .Blood Blood-Peripheral  Preliminary Report:    No growth at 24 hours    Culture - Blood (collected 21 Dec 2023 18:45)  Source: .Blood Blood-Peripheral  Preliminary Report:    No growth at 24 hours    Culture - Sputum (collected 20 Dec 2023 13:50)  Source: .Sputum Sputum  Final Report:    Normal Respiratory Inge present    Culture - Stool (collected 15 Dec 2023 13:20)  Source: .Stool Feces  Final Report:    No enteric pathogens isolated.    (Stool culture examined for Salmonella,    Shigella, Campylobacter, Aeromonas, Plesiomonas,    Vibrio, E.coli O157 and Yersinia)    Culture - Blood (collected 14 Dec 2023 16:45)  Source: .Blood Blood-Peripheral  Final Report:    No growth at 5 days    Culture - Blood (collected 14 Dec 2023 16:35)  Source: .Blood Blood-Peripheral  Final Report:    No growth at 5 days    Culture - Blood (collected 12 Dec 2023 20:44)  Source: .Blood Blood-Peripheral  Final Report:    No growth at 5 days    Culture - Blood (collected 12 Dec 2023 20:30)  Source: .Blood Blood-Peripheral  Final Report:    No growth at 5 days    Rapid RVP Result: NotDetec (12-21 @ 18:50)    Procalcitonin, Serum: 0.80 (12-20)    RADIOLOGY:  imaging below personally reviewed    < from: CT Internal Auditory Canals w/ IV Cont (12.21.23 @ 18:24) >  IMPRESSION:  Right temporal bone: Normal study.  Left temporal bone: Nonspecific partial opacification of the middle ear   and mastoid air cells. No bony erosive changes indicative of mastoiditis.   Clinical correlation with otoscopy is recommended for possible infectious   etiology.    Moderate symmetrical enlargement nasopharyngeal soft tissues,   nonspecific. Inflammatory paranasal sinusdisease; please correlate   clinically for acute sinusitis.    < end of copied text >  < from: Xray Chest 1 View- PORTABLE-Routine (Xray Chest 1 View- PORTABLE-Routine in AM.) (12.20.23 @ 07:37) >  IMPRESSION:    Large left pleural effusion and compressive atelectasis.  Trace right effusion and linear atelectasis in the right midlung    < end of copied text >  < from: CT Angio Chest PE Protocol w/ IV Cont (12.12.23 @ 10:27) >  IMPRESSION:    Acute right upper lobe and left lower lobe segmental/subsegmental   pulmonary emboli. No CT evidence of right heart strain.    New bilateral lower lobe consolidations with areas of central clearing.   Pneumonia and pulmonary infarcts are in the differential. New bilateral   pleural effusions, small on the left and trace on the right. Follow to   resolution with repeat chest CT in one month, or sooner as clinically   warranted.    Bilateral axillary and supraclavicular adenopathy of unclear etiology.    < end of copied text >   Patient is a 28y old  Male who presents with a chief complaint of   HPI:  28 years old male with h/o Lupus ( diagnosed in 09/2023, on Plaquenil present to [place] ED on 12/12/23  with complain of chest pain and SOB. Patient reported left sided pleuritic chest pain which started 3 days ago. Pain is progressively worsened, associated with SOB and cough. Patient was seen in [facility] and was prescribed antibiotics. Patient reported significantly worsening of left sided pleuritic chest pain today. No fever or chills. Patient reported loss of appetite and had a few episode of diarrhea for last 2 days. CTA chest with acute right upper lobe and left lower lobe segmental/subsegmental pulmonary emboli. No CT evidence of right heart strain. New bilateral lower lobe consolidations with areas of central clearing. Pneumonia and pulmonary infarcts are in the differential. New bilateral pleural effusions, small on the left and trace on the right. Bilateral axillary and supraclavicular adenopathy of unclear etiology. Patient was started on heparin ggt transitioned to eliquis on 12/19,  patient with worsening SOB/ hypoxia requiring nasal cannula oxygen supplementation. 12/20  Repeat Xray shows increased large left pleural effusion and compressive atelectasis trace right effusion and linear atelectasis. Thoracic team consulted for possible pigtail insertion Bedside US: Large left effusion with septations. Right simple effusion , + pericardial effusion- lower extremity dopplers negative for DVT,   - GI PCR + e coli  - mRSA PCR + Staph aureus   . Patient transferred to [facility] for further management.     (22 Dec 2023 22:52)       REVIEW OF SYSTEMS  Constitutional: No fevers, chills, weight loss or fatigue   Skin: No rash, no phlebitis	  Eyes: No discharge	  ENMT: No sore throat, oral thrush, ulcers or exudate  Respiratory: No cough, no SOB  Cardiovascular:  No chest pain, palpitations or edema   Gastrointestinal: No pain, nausea, vomiting, diarrhea or constipation	  Genitourinary: No dysuria, discharge or flank pain  MSK: No arthralgias or back pain   Neurological: No HA, no weakness, no seizures, no AMS       prior hospital charts reviewed [V]  primary team notes reviewed [V]  other consultant notes reviewed [V]    PAST MEDICAL & SURGICAL HISTORY:  LE (lupus erythematosus)      Pulmonary embolism      No significant past surgical history          SOCIAL HISTORY:  - Denied smoking/vaping/alcohol/recreational drug use    FAMILY HISTORY:  No pertinent family history in first degree relatives        Allergies  No Known Allergies        ANTIMICROBIALS:  hydroxychloroquine 200 two times a day  piperacillin/tazobactam IVPB.. 3.375 every 8 hours  vancomycin  IVPB 1000 every 12 hours      ANTIMICROBIALS (past 90 days):  MEDICATIONS  (STANDING):  hydroxychloroquine   200 milliGRAM(s) Oral (12-23-23 @ 05:59)    piperacillin/tazobactam IVPB..   25 mL/Hr IV Intermittent (12-23-23 @ 06:00)        OTHER MEDS:   MEDICATIONS  (STANDING):  acetaminophen     Tablet .. 650 every 6 hours PRN  albuterol/ipratropium for Nebulization 3 every 6 hours PRN  guaiFENesin Oral Liquid (Sugar-Free) 200 every 6 hours PRN  heparin  Infusion. 1500 <Continuous>      VITALS:  Vital Signs Last 24 Hrs  T(F): 102.9 (12-23-23 @ 10:00), Max: 103 (12-22-23 @ 04:30)    Vital Signs Last 24 Hrs  HR: 98 (12-23-23 @ 10:00) (92 - 102)  BP: 158/108 (12-23-23 @ 10:00) (133/69 - 170/91)  RR: 18 (12-23-23 @ 10:00)  SpO2: 100% (12-23-23 @ 10:00) (92% - 100%)  Wt(kg): --    EXAM:  General: Patient in no acute distress   HEENT: NCAT, EOMI, PERRL, no oral lesions  CV: S1+S2, no m/r/g appreciated   Lungs: No respiratory distress, CTAB  Abd: Soft, nontender, no guarding, no rebound tenderness, + bowel sounds   Ext: No cyanosis, no edema  Neuro: Alert and oriented, no focal deficits, CN II-XII grossly intact   Skin: No rash   IV: No phlebitis      Labs:                        10.8   8.28  )-----------( 348      ( 23 Dec 2023 05:50 )             32.2     12-23    128<L>  |  94<L>  |  16  ----------------------------<  107<H>  4.8   |  20<L>  |  1.02    Ca    8.7      23 Dec 2023 05:50  Phos  3.5     12-23  Mg     2.00     12-23    TPro  7.9  /  Alb  3.0<L>  /  TBili  0.6  /  DBili  x   /  AST  100<H>  /  ALT  88<H>  /  AlkPhos  86  12-23      WBC Trend:  WBC Count: 8.28 (12-23-23 @ 05:50)  WBC Count: 7.92 (12-23-23 @ 00:01)  WBC Count: 8.10 (12-22-23 @ 14:56)  WBC Count: 10.38 (12-22-23 @ 06:50)      Auto Neutrophil #: 6.93 K/uL (12-21-23 @ 19:00)  Auto Neutrophil #: 6.12 K/uL (12-12-23 @ 20:44)  Auto Neutrophil #: 5.62 K/uL (12-12-23 @ 09:05)  Auto Neutrophil #: 2.08 K/uL (11-27-23 @ 21:30)  Band Neutrophils %: 1.0 % (11-27-23 @ 21:30)      Creatine Trend:  Creatinine: 1.02 (12-23)  Creatinine: 1.00 (12-23)  Creatinine: 1.13 (12-22)  Creatinine: 0.99 (12-21)      Liver Biochemical Testing Trend:  Alanine Aminotransferase (ALT/SGPT): 88 *H* (12-23)  Alanine Aminotransferase (ALT/SGPT): 111 *H* (12-22)  Alanine Aminotransferase (ALT/SGPT): 97 *H* (12-21)  Alanine Aminotransferase (ALT/SGPT): 59 (12-15)  Alanine Aminotransferase (ALT/SGPT): 64 (12-14)  Aspartate Aminotransferase (AST/SGOT): 100 (12-23-23 @ 05:50)  Aspartate Aminotransferase (AST/SGOT): 104 (12-22-23 @ 06:50)  Aspartate Aminotransferase (AST/SGOT): 85 (12-21-23 @ 19:00)  Aspartate Aminotransferase (AST/SGOT): 72 (12-15-23 @ 07:25)  Aspartate Aminotransferase (AST/SGOT): 87 (12-14-23 @ 07:00)  Bilirubin Total: 0.6 (12-23)  Bilirubin Total: 0.7 (12-22)  Bilirubin Total: 0.7 (12-21)  Bilirubin Total: 0.7 (12-15)  Bilirubin Total: 0.7 (12-14)      Trend LDH      Auto Eosinophil %: 0.1 % (12-21-23 @ 19:00)      Urinalysis Basic - ( 23 Dec 2023 05:50 )    Color: x / Appearance: x / SG: x / pH: x  Gluc: 107 mg/dL / Ketone: x  / Bili: x / Urobili: x   Blood: x / Protein: x / Nitrite: x   Leuk Esterase: x / RBC: x / WBC x   Sq Epi: x / Non Sq Epi: x / Bacteria: x        MICROBIOLOGY:    MRSA PCR Result.: NotDetec (12-15-23 @ 05:00)      Culture - Blood (collected 21 Dec 2023 19:00)  Source: .Blood Blood-Peripheral  Preliminary Report:    No growth at 24 hours    Culture - Blood (collected 21 Dec 2023 18:45)  Source: .Blood Blood-Peripheral  Preliminary Report:    No growth at 24 hours    Culture - Sputum (collected 20 Dec 2023 13:50)  Source: .Sputum Sputum  Final Report:    Normal Respiratory Inge present    Culture - Stool (collected 15 Dec 2023 13:20)  Source: .Stool Feces  Final Report:    No enteric pathogens isolated.    (Stool culture examined for Salmonella,    Shigella, Campylobacter, Aeromonas, Plesiomonas,    Vibrio, E.coli O157 and Yersinia)    Culture - Blood (collected 14 Dec 2023 16:45)  Source: .Blood Blood-Peripheral  Final Report:    No growth at 5 days    Culture - Blood (collected 14 Dec 2023 16:35)  Source: .Blood Blood-Peripheral  Final Report:    No growth at 5 days    Culture - Blood (collected 12 Dec 2023 20:44)  Source: .Blood Blood-Peripheral  Final Report:    No growth at 5 days    Culture - Blood (collected 12 Dec 2023 20:30)  Source: .Blood Blood-Peripheral  Final Report:    No growth at 5 days    Rapid RVP Result: NotDetec (12-21 @ 18:50)    Procalcitonin, Serum: 0.80 (12-20)    RADIOLOGY:  imaging below personally reviewed    < from: CT Internal Auditory Canals w/ IV Cont (12.21.23 @ 18:24) >  IMPRESSION:  Right temporal bone: Normal study.  Left temporal bone: Nonspecific partial opacification of the middle ear   and mastoid air cells. No bony erosive changes indicative of mastoiditis.   Clinical correlation with otoscopy is recommended for possible infectious   etiology.    Moderate symmetrical enlargement nasopharyngeal soft tissues,   nonspecific. Inflammatory paranasal sinusdisease; please correlate   clinically for acute sinusitis.    < end of copied text >  < from: Xray Chest 1 View- PORTABLE-Routine (Xray Chest 1 View- PORTABLE-Routine in AM.) (12.20.23 @ 07:37) >  IMPRESSION:    Large left pleural effusion and compressive atelectasis.  Trace right effusion and linear atelectasis in the right midlung    < end of copied text >  < from: CT Angio Chest PE Protocol w/ IV Cont (12.12.23 @ 10:27) >  IMPRESSION:    Acute right upper lobe and left lower lobe segmental/subsegmental   pulmonary emboli. No CT evidence of right heart strain.    New bilateral lower lobe consolidations with areas of central clearing.   Pneumonia and pulmonary infarcts are in the differential. New bilateral   pleural effusions, small on the left and trace on the right. Follow to   resolution with repeat chest CT in one month, or sooner as clinically   warranted.    Bilateral axillary and supraclavicular adenopathy of unclear etiology.    < end of copied text >   Patient is a 28y old  Male who presents with a chief complaint of   HPI:  28 years old male with h/o Lupus ( diagnosed in 09/2023, on Plaquenil present to [place] ED on 12/12/23  with complain of chest pain and SOB. Patient reported left sided pleuritic chest pain which started 3 days ago. Pain is progressively worsened, associated with SOB and cough. Patient was seen in [facility] and was prescribed antibiotics. Patient reported significantly worsening of left sided pleuritic chest pain today. No fever or chills. Patient reported loss of appetite and had a few episode of diarrhea for last 2 days. CTA chest with acute right upper lobe and left lower lobe segmental/subsegmental pulmonary emboli. No CT evidence of right heart strain. New bilateral lower lobe consolidations with areas of central clearing. Pneumonia and pulmonary infarcts are in the differential. New bilateral pleural effusions, small on the left and trace on the right. Bilateral axillary and supraclavicular adenopathy of unclear etiology. Patient was started on heparin ggt transitioned to eliquis on 12/19,  patient with worsening SOB/ hypoxia requiring nasal cannula oxygen supplementation. 12/20  Repeat Xray shows increased large left pleural effusion and compressive atelectasis trace right effusion and linear atelectasis. Thoracic team consulted for possible pigtail insertion Bedside US: Large left effusion with septations. Right simple effusion , + pericardial effusion- lower extremity dopplers negative for DVT,   - GI PCR + e coli  - mRSA PCR + Staph aureus   . Patient transferred to [facility] for further management.     (22 Dec 2023 22:52)       REVIEW OF SYSTEMS  Constitutional: +Fever, fatigue  Skin: No rash, no phlebitis	  Eyes: No discharge	  ENMT: +Sore throat  Respiratory: +Intermittent cough, no SOB  Cardiovascular:  No chest pain, palpitations or edema   Gastrointestinal: No pain, nausea, vomiting, diarrhea or constipation	  Genitourinary: No dysuria, discharge or flank pain  MSK: No arthralgias or back pain   Neurological: No HA, no weakness, no seizures, no AMS       prior hospital charts reviewed [V]  primary team notes reviewed [V]  other consultant notes reviewed [V]    PAST MEDICAL & SURGICAL HISTORY:  LE (lupus erythematosus)      Pulmonary embolism      No significant past surgical history      SOCIAL HISTORY:  - Denied smoking/vaping/alcohol/recreational drug use    FAMILY HISTORY:  No pertinent family history in first degree relatives        Allergies  No Known Allergies        ANTIMICROBIALS:  hydroxychloroquine 200 two times a day  piperacillin/tazobactam IVPB.. 3.375 every 8 hours  vancomycin  IVPB 1000 every 12 hours      ANTIMICROBIALS (past 90 days):  MEDICATIONS  (STANDING):  hydroxychloroquine   200 milliGRAM(s) Oral (12-23-23 @ 05:59)    piperacillin/tazobactam IVPB..   25 mL/Hr IV Intermittent (12-23-23 @ 06:00)        OTHER MEDS:   MEDICATIONS  (STANDING):  acetaminophen     Tablet .. 650 every 6 hours PRN  albuterol/ipratropium for Nebulization 3 every 6 hours PRN  guaiFENesin Oral Liquid (Sugar-Free) 200 every 6 hours PRN  heparin  Infusion. 1500 <Continuous>      VITALS:  Vital Signs Last 24 Hrs  T(F): 102.9 (12-23-23 @ 10:00), Max: 103 (12-22-23 @ 04:30)    Vital Signs Last 24 Hrs  HR: 98 (12-23-23 @ 10:00) (92 - 102)  BP: 158/108 (12-23-23 @ 10:00) (133/69 - 170/91)  RR: 18 (12-23-23 @ 10:00)  SpO2: 100% (12-23-23 @ 10:00) (92% - 100%)  Wt(kg): --    EXAM:  General: Patient in no acute distress  HEENT: erythematous throat  CV: S1+S2, no m/r/g appreciated  Lungs: No respiratory distress, decreased breath sounds to mid-lung fields b/l  Abd: Soft, nontender, no guarding  Ext: No edema  Neuro: Alert and oriented, no focal deficits  Skin: No rash   IV: No phlebitis      Labs:                        10.8   8.28  )-----------( 348      ( 23 Dec 2023 05:50 )             32.2     12-23    128<L>  |  94<L>  |  16  ----------------------------<  107<H>  4.8   |  20<L>  |  1.02    Ca    8.7      23 Dec 2023 05:50  Phos  3.5     12-23  Mg     2.00     12-23    TPro  7.9  /  Alb  3.0<L>  /  TBili  0.6  /  DBili  x   /  AST  100<H>  /  ALT  88<H>  /  AlkPhos  86  12-23      WBC Trend:  WBC Count: 8.28 (12-23-23 @ 05:50)  WBC Count: 7.92 (12-23-23 @ 00:01)  WBC Count: 8.10 (12-22-23 @ 14:56)  WBC Count: 10.38 (12-22-23 @ 06:50)      Auto Neutrophil #: 6.93 K/uL (12-21-23 @ 19:00)  Auto Neutrophil #: 6.12 K/uL (12-12-23 @ 20:44)  Auto Neutrophil #: 5.62 K/uL (12-12-23 @ 09:05)  Auto Neutrophil #: 2.08 K/uL (11-27-23 @ 21:30)  Band Neutrophils %: 1.0 % (11-27-23 @ 21:30)      Creatine Trend:  Creatinine: 1.02 (12-23)  Creatinine: 1.00 (12-23)  Creatinine: 1.13 (12-22)  Creatinine: 0.99 (12-21)      Liver Biochemical Testing Trend:  Alanine Aminotransferase (ALT/SGPT): 88 *H* (12-23)  Alanine Aminotransferase (ALT/SGPT): 111 *H* (12-22)  Alanine Aminotransferase (ALT/SGPT): 97 *H* (12-21)  Alanine Aminotransferase (ALT/SGPT): 59 (12-15)  Alanine Aminotransferase (ALT/SGPT): 64 (12-14)  Aspartate Aminotransferase (AST/SGOT): 100 (12-23-23 @ 05:50)  Aspartate Aminotransferase (AST/SGOT): 104 (12-22-23 @ 06:50)  Aspartate Aminotransferase (AST/SGOT): 85 (12-21-23 @ 19:00)  Aspartate Aminotransferase (AST/SGOT): 72 (12-15-23 @ 07:25)  Aspartate Aminotransferase (AST/SGOT): 87 (12-14-23 @ 07:00)  Bilirubin Total: 0.6 (12-23)  Bilirubin Total: 0.7 (12-22)  Bilirubin Total: 0.7 (12-21)  Bilirubin Total: 0.7 (12-15)  Bilirubin Total: 0.7 (12-14)    Auto Eosinophil %: 0.1 % (12-21-23 @ 19:00)      Urinalysis Basic - ( 23 Dec 2023 05:50 )    Color: x / Appearance: x / SG: x / pH: x  Gluc: 107 mg/dL / Ketone: x  / Bili: x / Urobili: x   Blood: x / Protein: x / Nitrite: x   Leuk Esterase: x / RBC: x / WBC x   Sq Epi: x / Non Sq Epi: x / Bacteria: x        MICROBIOLOGY:    MRSA PCR Result.: NotDetec (12-15-23 @ 05:00)      Culture - Blood (collected 21 Dec 2023 19:00)  Source: .Blood Blood-Peripheral  Preliminary Report:    No growth at 24 hours    Culture - Blood (collected 21 Dec 2023 18:45)  Source: .Blood Blood-Peripheral  Preliminary Report:    No growth at 24 hours    Culture - Sputum (collected 20 Dec 2023 13:50)  Source: .Sputum Sputum  Final Report:    Normal Respiratory Inge present    Culture - Stool (collected 15 Dec 2023 13:20)  Source: .Stool Feces  Final Report:    No enteric pathogens isolated.    (Stool culture examined for Salmonella,    Shigella, Campylobacter, Aeromonas, Plesiomonas,    Vibrio, E.coli O157 and Yersinia)    Culture - Blood (collected 14 Dec 2023 16:45)  Source: .Blood Blood-Peripheral  Final Report:    No growth at 5 days    Culture - Blood (collected 14 Dec 2023 16:35)  Source: .Blood Blood-Peripheral  Final Report:    No growth at 5 days    Culture - Blood (collected 12 Dec 2023 20:44)  Source: .Blood Blood-Peripheral  Final Report:    No growth at 5 days    Culture - Blood (collected 12 Dec 2023 20:30)  Source: .Blood Blood-Peripheral  Final Report:    No growth at 5 days    Rapid RVP Result: NotDetec (12-21 @ 18:50)    Procalcitonin, Serum: 0.80 (12-20)    RADIOLOGY:  imaging below personally reviewed    < from: CT Internal Auditory Canals w/ IV Cont (12.21.23 @ 18:24) >  IMPRESSION:  Right temporal bone: Normal study.  Left temporal bone: Nonspecific partial opacification of the middle ear   and mastoid air cells. No bony erosive changes indicative of mastoiditis.   Clinical correlation with otoscopy is recommended for possible infectious   etiology.    Moderate symmetrical enlargement nasopharyngeal soft tissues,   nonspecific. Inflammatory paranasal sinusdisease; please correlate   clinically for acute sinusitis.    < end of copied text >  < from: Xray Chest 1 View- PORTABLE-Routine (Xray Chest 1 View- PORTABLE-Routine in AM.) (12.20.23 @ 07:37) >  IMPRESSION:    Large left pleural effusion and compressive atelectasis.  Trace right effusion and linear atelectasis in the right midlung    < end of copied text >  < from: CT Angio Chest PE Protocol w/ IV Cont (12.12.23 @ 10:27) >  IMPRESSION:    Acute right upper lobe and left lower lobe segmental/subsegmental   pulmonary emboli. No CT evidence of right heart strain.    New bilateral lower lobe consolidations with areas of central clearing.   Pneumonia and pulmonary infarcts are in the differential. New bilateral   pleural effusions, small on the left and trace on the right. Follow to   resolution with repeat chest CT in one month, or sooner as clinically   warranted.    Bilateral axillary and supraclavicular adenopathy of unclear etiology.    < end of copied text >   Patient is a 28y old  Male who presents with a chief complaint of   HPI:  28 years old male with h/o Lupus ( diagnosed in 09/2023, on Plaquenil present to [place] ED on 12/12/23  with complain of chest pain and SOB. Patient reported left sided pleuritic chest pain which started 3 days ago. Pain is progressively worsened, associated with SOB and cough. Patient was seen in [facility] and was prescribed antibiotics. Patient reported significantly worsening of left sided pleuritic chest pain today. No fever or chills. Patient reported loss of appetite and had a few episode of diarrhea for last 2 days. CTA chest with acute right upper lobe and left lower lobe segmental/subsegmental pulmonary emboli. No CT evidence of right heart strain. New bilateral lower lobe consolidations with areas of central clearing. Pneumonia and pulmonary infarcts are in the differential. New bilateral pleural effusions, small on the left and trace on the right. Bilateral axillary and supraclavicular adenopathy of unclear etiology. Patient was started on heparin ggt transitioned to eliquis on 12/19,  patient with worsening SOB/ hypoxia requiring nasal cannula oxygen supplementation. 12/20  Repeat Xray shows increased large left pleural effusion and compressive atelectasis trace right effusion and linear atelectasis. Thoracic team consulted for possible pigtail insertion Bedside US: Large left effusion with septations. Right simple effusion , + pericardial effusion- lower extremity dopplers negative for DVT,   - GI PCR + e coli  - mRSA PCR + Staph aureus   . Patient transferred to [facility] for further management.     (22 Dec 2023 22:52)       REVIEW OF SYSTEMS  Constitutional: +Fever, fatigue  Skin: No rash, no phlebitis	  Eyes: No discharge	  ENMT: +Sore throat  Respiratory: +Intermittent cough, no SOB  Cardiovascular:  No chest pain, palpitations or edema   Gastrointestinal: No pain, nausea, vomiting, diarrhea or constipation	  Genitourinary: No dysuria, discharge or flank pain  MSK: No arthralgias or back pain   Neurological: No HA, no weakness, no seizures, no AMS       prior hospital charts reviewed [V]  primary team notes reviewed [V]  other consultant notes reviewed [V]    PAST MEDICAL & SURGICAL HISTORY:  LE (lupus erythematosus)      Pulmonary embolism      No significant past surgical history      SOCIAL HISTORY:  - Denied smoking/vaping/alcohol/recreational drug use    FAMILY HISTORY:  No pertinent family history in first degree relatives        Allergies  No Known Allergies        ANTIMICROBIALS:  hydroxychloroquine 200 two times a day  piperacillin/tazobactam IVPB.. 3.375 every 8 hours  vancomycin  IVPB 1000 every 12 hours      ANTIMICROBIALS (past 90 days):  MEDICATIONS  (STANDING):  hydroxychloroquine   200 milliGRAM(s) Oral (12-23-23 @ 05:59)    piperacillin/tazobactam IVPB..   25 mL/Hr IV Intermittent (12-23-23 @ 06:00)        OTHER MEDS:   MEDICATIONS  (STANDING):  acetaminophen     Tablet .. 650 every 6 hours PRN  albuterol/ipratropium for Nebulization 3 every 6 hours PRN  guaiFENesin Oral Liquid (Sugar-Free) 200 every 6 hours PRN  heparin  Infusion. 1500 <Continuous>      VITALS:  Vital Signs Last 24 Hrs  T(F): 102.9 (12-23-23 @ 10:00), Max: 103 (12-22-23 @ 04:30)    Vital Signs Last 24 Hrs  HR: 98 (12-23-23 @ 10:00) (92 - 102)  BP: 158/108 (12-23-23 @ 10:00) (133/69 - 170/91)  RR: 18 (12-23-23 @ 10:00)  SpO2: 100% (12-23-23 @ 10:00) (92% - 100%)  Wt(kg): --    EXAM:  General: Patient in no acute distress  HEENT: erythematous throat  CV: S1+S2, no m/r/g appreciated  Lungs: No respiratory distress, decreased breath sounds to mid-lung fields b/l  Abd: Soft, nontender, no guarding  Ext: No edema  Neuro: Alert and oriented, no focal deficits  Skin: No rash   IV: No phlebitis      Labs:                        10.8   8.28  )-----------( 348      ( 23 Dec 2023 05:50 )             32.2     12-23    128<L>  |  94<L>  |  16  ----------------------------<  107<H>  4.8   |  20<L>  |  1.02    Ca    8.7      23 Dec 2023 05:50  Phos  3.5     12-23  Mg     2.00     12-23    TPro  7.9  /  Alb  3.0<L>  /  TBili  0.6  /  DBili  x   /  AST  100<H>  /  ALT  88<H>  /  AlkPhos  86  12-23      WBC Trend:  WBC Count: 8.28 (12-23-23 @ 05:50)  WBC Count: 7.92 (12-23-23 @ 00:01)  WBC Count: 8.10 (12-22-23 @ 14:56)  WBC Count: 10.38 (12-22-23 @ 06:50)      Auto Neutrophil #: 6.93 K/uL (12-21-23 @ 19:00)  Auto Neutrophil #: 6.12 K/uL (12-12-23 @ 20:44)  Auto Neutrophil #: 5.62 K/uL (12-12-23 @ 09:05)  Auto Neutrophil #: 2.08 K/uL (11-27-23 @ 21:30)  Band Neutrophils %: 1.0 % (11-27-23 @ 21:30)      Creatine Trend:  Creatinine: 1.02 (12-23)  Creatinine: 1.00 (12-23)  Creatinine: 1.13 (12-22)  Creatinine: 0.99 (12-21)      Liver Biochemical Testing Trend:  Alanine Aminotransferase (ALT/SGPT): 88 *H* (12-23)  Alanine Aminotransferase (ALT/SGPT): 111 *H* (12-22)  Alanine Aminotransferase (ALT/SGPT): 97 *H* (12-21)  Alanine Aminotransferase (ALT/SGPT): 59 (12-15)  Alanine Aminotransferase (ALT/SGPT): 64 (12-14)  Aspartate Aminotransferase (AST/SGOT): 100 (12-23-23 @ 05:50)  Aspartate Aminotransferase (AST/SGOT): 104 (12-22-23 @ 06:50)  Aspartate Aminotransferase (AST/SGOT): 85 (12-21-23 @ 19:00)  Aspartate Aminotransferase (AST/SGOT): 72 (12-15-23 @ 07:25)  Aspartate Aminotransferase (AST/SGOT): 87 (12-14-23 @ 07:00)  Bilirubin Total: 0.6 (12-23)  Bilirubin Total: 0.7 (12-22)  Bilirubin Total: 0.7 (12-21)  Bilirubin Total: 0.7 (12-15)  Bilirubin Total: 0.7 (12-14)    Auto Eosinophil %: 0.1 % (12-21-23 @ 19:00)      Urinalysis Basic - ( 23 Dec 2023 05:50 )    Color: x / Appearance: x / SG: x / pH: x  Gluc: 107 mg/dL / Ketone: x  / Bili: x / Urobili: x   Blood: x / Protein: x / Nitrite: x   Leuk Esterase: x / RBC: x / WBC x   Sq Epi: x / Non Sq Epi: x / Bacteria: x        MICROBIOLOGY:    MRSA PCR Result.: NotDetec (12-15-23 @ 05:00)      Culture - Blood (collected 21 Dec 2023 19:00)  Source: .Blood Blood-Peripheral  Preliminary Report:    No growth at 24 hours    Culture - Blood (collected 21 Dec 2023 18:45)  Source: .Blood Blood-Peripheral  Preliminary Report:    No growth at 24 hours    Culture - Sputum (collected 20 Dec 2023 13:50)  Source: .Sputum Sputum  Final Report:    Normal Respiratory Inge present    Culture - Stool (collected 15 Dec 2023 13:20)  Source: .Stool Feces  Final Report:    No enteric pathogens isolated.    (Stool culture examined for Salmonella,    Shigella, Campylobacter, Aeromonas, Plesiomonas,    Vibrio, E.coli O157 and Yersinia)    Culture - Blood (collected 14 Dec 2023 16:45)  Source: .Blood Blood-Peripheral  Final Report:    No growth at 5 days    Culture - Blood (collected 14 Dec 2023 16:35)  Source: .Blood Blood-Peripheral  Final Report:    No growth at 5 days    Culture - Blood (collected 12 Dec 2023 20:44)  Source: .Blood Blood-Peripheral  Final Report:    No growth at 5 days    Culture - Blood (collected 12 Dec 2023 20:30)  Source: .Blood Blood-Peripheral  Final Report:    No growth at 5 days    Rapid RVP Result: NotDetec (12-21 @ 18:50)    Procalcitonin, Serum: 0.80 (12-20)    RADIOLOGY:  imaging below personally reviewed    < from: CT Internal Auditory Canals w/ IV Cont (12.21.23 @ 18:24) >  IMPRESSION:  Right temporal bone: Normal study.  Left temporal bone: Nonspecific partial opacification of the middle ear   and mastoid air cells. No bony erosive changes indicative of mastoiditis.   Clinical correlation with otoscopy is recommended for possible infectious   etiology.    Moderate symmetrical enlargement nasopharyngeal soft tissues,   nonspecific. Inflammatory paranasal sinusdisease; please correlate   clinically for acute sinusitis.    < end of copied text >  < from: Xray Chest 1 View- PORTABLE-Routine (Xray Chest 1 View- PORTABLE-Routine in AM.) (12.20.23 @ 07:37) >  IMPRESSION:    Large left pleural effusion and compressive atelectasis.  Trace right effusion and linear atelectasis in the right midlung    < end of copied text >  < from: CT Angio Chest PE Protocol w/ IV Cont (12.12.23 @ 10:27) >  IMPRESSION:    Acute right upper lobe and left lower lobe segmental/subsegmental   pulmonary emboli. No CT evidence of right heart strain.    New bilateral lower lobe consolidations with areas of central clearing.   Pneumonia and pulmonary infarcts are in the differential. New bilateral   pleural effusions, small on the left and trace on the right. Follow to   resolution with repeat chest CT in one month, or sooner as clinically   warranted.    Bilateral axillary and supraclavicular adenopathy of unclear etiology.    < end of copied text >   Patient is a 28y old  Male who presents with a chief complaint of   HPI:  28 years old male with h/o Lupus ( diagnosed in 09/2023, on Plaquenil present to [place] ED on 12/12/23  with complain of chest pain and SOB. Patient reported left sided pleuritic chest pain which started 3 days ago. Pain is progressively worsened, associated with SOB and cough. Patient was seen in [facility] ED and was prescribed antibiotics. Patient reported significantly worsening of left sided pleuritic chest pain today. No fever or chills. Patient reported loss of appetite and had a few episode of diarrhea for last 2 days. CTA chest with acute right upper lobe and left lower lobe segmental/subsegmental pulmonary emboli. No CT evidence of right heart strain. New bilateral lower lobe consolidations with areas of central clearing. Pneumonia and pulmonary infarcts are in the differential. New bilateral pleural effusions, small on the left and trace on the right. Bilateral axillary and supraclavicular adenopathy of unclear etiology. Patient was started on heparin ggt transitioned to eliquis on 12/19,  patient with worsening SOB/ hypoxia requiring nasal cannula oxygen supplementation. 12/20  Repeat Xray shows increased large left pleural effusion and compressive atelectasis trace right effusion and linear atelectasis. Thoracic team consulted for possible pigtail insertion Bedside US: Large left effusion with septations. Right simple effusion , + pericardial effusion- lower extremity dopplers negative for DVT,   - GI PCR + e coli  - mRSA PCR + Staph aureus   . Patient transferred to [facility] for further management.     (22 Dec 2023 22:52)       REVIEW OF SYSTEMS  Constitutional: +Fever, fatigue  Skin: No rash, no phlebitis	  Eyes: No discharge	  ENMT: +Sore throat  Respiratory: +Intermittent cough, no SOB  Cardiovascular:  No chest pain, palpitations or edema   Gastrointestinal: No pain, nausea, vomiting, diarrhea or constipation	  Genitourinary: No dysuria, discharge or flank pain  MSK: No arthralgias or back pain   Neurological: No HA, no weakness, no seizures, no AMS       prior hospital charts reviewed [V]  primary team notes reviewed [V]  other consultant notes reviewed [V]    PAST MEDICAL & SURGICAL HISTORY:  LE (lupus erythematosus)      Pulmonary embolism      No significant past surgical history      SOCIAL HISTORY:  - Denied smoking/vaping/alcohol/recreational drug use    FAMILY HISTORY:  No pertinent family history in first degree relatives        Allergies  No Known Allergies        ANTIMICROBIALS:  hydroxychloroquine 200 two times a day  piperacillin/tazobactam IVPB.. 3.375 every 8 hours  vancomycin  IVPB 1000 every 12 hours      ANTIMICROBIALS (past 90 days):  MEDICATIONS  (STANDING):  hydroxychloroquine   200 milliGRAM(s) Oral (12-23-23 @ 05:59)    piperacillin/tazobactam IVPB..   25 mL/Hr IV Intermittent (12-23-23 @ 06:00)        OTHER MEDS:   MEDICATIONS  (STANDING):  acetaminophen     Tablet .. 650 every 6 hours PRN  albuterol/ipratropium for Nebulization 3 every 6 hours PRN  guaiFENesin Oral Liquid (Sugar-Free) 200 every 6 hours PRN  heparin  Infusion. 1500 <Continuous>      VITALS:  Vital Signs Last 24 Hrs  T(F): 102.9 (12-23-23 @ 10:00), Max: 103 (12-22-23 @ 04:30)    Vital Signs Last 24 Hrs  HR: 98 (12-23-23 @ 10:00) (92 - 102)  BP: 158/108 (12-23-23 @ 10:00) (133/69 - 170/91)  RR: 18 (12-23-23 @ 10:00)  SpO2: 100% (12-23-23 @ 10:00) (92% - 100%)  Wt(kg): --    EXAM:  General: Mild respiratory distress  HEENT: erythematous throat  CV: S1+S2, no m/r/g appreciated  Lungs: Mild respiratory distress, decreased breath sounds to mid-lung fields b/l  Abd: Soft, nontender, no guarding  Ext: No edema  Neuro: Alert and oriented, no focal deficits  Skin: No rash   IV: No phlebitis      Labs:                        10.8   8.28  )-----------( 348      ( 23 Dec 2023 05:50 )             32.2     12-23    128<L>  |  94<L>  |  16  ----------------------------<  107<H>  4.8   |  20<L>  |  1.02    Ca    8.7      23 Dec 2023 05:50  Phos  3.5     12-23  Mg     2.00     12-23    TPro  7.9  /  Alb  3.0<L>  /  TBili  0.6  /  DBili  x   /  AST  100<H>  /  ALT  88<H>  /  AlkPhos  86  12-23      WBC Trend:  WBC Count: 8.28 (12-23-23 @ 05:50)  WBC Count: 7.92 (12-23-23 @ 00:01)  WBC Count: 8.10 (12-22-23 @ 14:56)  WBC Count: 10.38 (12-22-23 @ 06:50)      Auto Neutrophil #: 6.93 K/uL (12-21-23 @ 19:00)  Auto Neutrophil #: 6.12 K/uL (12-12-23 @ 20:44)  Auto Neutrophil #: 5.62 K/uL (12-12-23 @ 09:05)  Auto Neutrophil #: 2.08 K/uL (11-27-23 @ 21:30)  Band Neutrophils %: 1.0 % (11-27-23 @ 21:30)      Creatine Trend:  Creatinine: 1.02 (12-23)  Creatinine: 1.00 (12-23)  Creatinine: 1.13 (12-22)  Creatinine: 0.99 (12-21)      Liver Biochemical Testing Trend:  Alanine Aminotransferase (ALT/SGPT): 88 *H* (12-23)  Alanine Aminotransferase (ALT/SGPT): 111 *H* (12-22)  Alanine Aminotransferase (ALT/SGPT): 97 *H* (12-21)  Alanine Aminotransferase (ALT/SGPT): 59 (12-15)  Alanine Aminotransferase (ALT/SGPT): 64 (12-14)  Aspartate Aminotransferase (AST/SGOT): 100 (12-23-23 @ 05:50)  Aspartate Aminotransferase (AST/SGOT): 104 (12-22-23 @ 06:50)  Aspartate Aminotransferase (AST/SGOT): 85 (12-21-23 @ 19:00)  Aspartate Aminotransferase (AST/SGOT): 72 (12-15-23 @ 07:25)  Aspartate Aminotransferase (AST/SGOT): 87 (12-14-23 @ 07:00)  Bilirubin Total: 0.6 (12-23)  Bilirubin Total: 0.7 (12-22)  Bilirubin Total: 0.7 (12-21)  Bilirubin Total: 0.7 (12-15)  Bilirubin Total: 0.7 (12-14)    Auto Eosinophil %: 0.1 % (12-21-23 @ 19:00)      Urinalysis Basic - ( 23 Dec 2023 05:50 )    Color: x / Appearance: x / SG: x / pH: x  Gluc: 107 mg/dL / Ketone: x  / Bili: x / Urobili: x   Blood: x / Protein: x / Nitrite: x   Leuk Esterase: x / RBC: x / WBC x   Sq Epi: x / Non Sq Epi: x / Bacteria: x        MICROBIOLOGY:    MRSA PCR Result.: NotDetec (12-15-23 @ 05:00)      Culture - Blood (collected 21 Dec 2023 19:00)  Source: .Blood Blood-Peripheral  Preliminary Report:    No growth at 24 hours    Culture - Blood (collected 21 Dec 2023 18:45)  Source: .Blood Blood-Peripheral  Preliminary Report:    No growth at 24 hours    Culture - Sputum (collected 20 Dec 2023 13:50)  Source: .Sputum Sputum  Final Report:    Normal Respiratory Inge present    Culture - Stool (collected 15 Dec 2023 13:20)  Source: .Stool Feces  Final Report:    No enteric pathogens isolated.    (Stool culture examined for Salmonella,    Shigella, Campylobacter, Aeromonas, Plesiomonas,    Vibrio, E.coli O157 and Yersinia)    Culture - Blood (collected 14 Dec 2023 16:45)  Source: .Blood Blood-Peripheral  Final Report:    No growth at 5 days    Culture - Blood (collected 14 Dec 2023 16:35)  Source: .Blood Blood-Peripheral  Final Report:    No growth at 5 days    Culture - Blood (collected 12 Dec 2023 20:44)  Source: .Blood Blood-Peripheral  Final Report:    No growth at 5 days    Culture - Blood (collected 12 Dec 2023 20:30)  Source: .Blood Blood-Peripheral  Final Report:    No growth at 5 days    Rapid RVP Result: NotDetec (12-21 @ 18:50)    Procalcitonin, Serum: 0.80 (12-20)    RADIOLOGY:  imaging below personally reviewed    < from: CT Internal Auditory Canals w/ IV Cont (12.21.23 @ 18:24) >  IMPRESSION:  Right temporal bone: Normal study.  Left temporal bone: Nonspecific partial opacification of the middle ear   and mastoid air cells. No bony erosive changes indicative of mastoiditis.   Clinical correlation with otoscopy is recommended for possible infectious   etiology.    Moderate symmetrical enlargement nasopharyngeal soft tissues,   nonspecific. Inflammatory paranasal sinusdisease; please correlate   clinically for acute sinusitis.    < end of copied text >  < from: Xray Chest 1 View- PORTABLE-Routine (Xray Chest 1 View- PORTABLE-Routine in AM.) (12.20.23 @ 07:37) >  IMPRESSION:    Large left pleural effusion and compressive atelectasis.  Trace right effusion and linear atelectasis in the right midlung    < end of copied text >  < from: CT Angio Chest PE Protocol w/ IV Cont (12.12.23 @ 10:27) >  IMPRESSION:    Acute right upper lobe and left lower lobe segmental/subsegmental   pulmonary emboli. No CT evidence of right heart strain.    New bilateral lower lobe consolidations with areas of central clearing.   Pneumonia and pulmonary infarcts are in the differential. New bilateral   pleural effusions, small on the left and trace on the right. Follow to   resolution with repeat chest CT in one month, or sooner as clinically   warranted.    Bilateral axillary and supraclavicular adenopathy of unclear etiology.    < end of copied text >   Patient is a 28y old  Male who presents with a chief complaint of   HPI:  28 years old male with h/o Lupus ( diagnosed in 09/2023, on Plaquenil present to [place] ED on 12/12/23  with complain of chest pain and SOB. Patient reported left sided pleuritic chest pain which started 3 days ago. Pain is progressively worsened, associated with SOB and cough. Patient was seen in [facility] and was prescribed antibiotics. Patient reported significantly worsening of left sided pleuritic chest pain today. No fever or chills. Patient reported loss of appetite and had a few episode of diarrhea for last 2 days. CTA chest with acute right upper lobe and left lower lobe segmental/subsegmental pulmonary emboli. No CT evidence of right heart strain. New bilateral lower lobe consolidations with areas of central clearing. Pneumonia and pulmonary infarcts are in the differential. New bilateral pleural effusions, small on the left and trace on the right. Bilateral axillary and supraclavicular adenopathy of unclear etiology. Patient was started on heparin ggt transitioned to eliquis on 12/19,  patient with worsening SOB/ hypoxia requiring nasal cannula oxygen supplementation. 12/20  Repeat Xray shows increased large left pleural effusion and compressive atelectasis trace right effusion and linear atelectasis. Thoracic team consulted for possible pigtail insertion Bedside US: Large left effusion with septations. Right simple effusion , + pericardial effusion- lower extremity dopplers negative for DVT,   - GI PCR + e coli  - mRSA PCR + Staph aureus   . Patient transferred to [facility] for [facility] management.     (22 Dec 2023 22:52)       REVIEW OF SYSTEMS  Constitutional: +Fever, fatigue  Skin: No rash, no phlebitis	  Eyes: No discharge	  ENMT: +Sore throat  Respiratory: +Intermittent cough, no SOB  Cardiovascular:  No chest pain, palpitations or edema   Gastrointestinal: No pain, nausea, vomiting, diarrhea or constipation	  Genitourinary: No dysuria, discharge or flank pain  MSK: No arthralgias or back pain   Neurological: No HA, no weakness, no seizures, no AMS       prior hospital charts reviewed [V]  primary team notes reviewed [V]  other consultant notes reviewed [V]    PAST MEDICAL & SURGICAL HISTORY:  LE (lupus erythematosus)      Pulmonary embolism      No significant past surgical history      SOCIAL HISTORY:  - Denied smoking/vaping/alcohol/recreational drug use    FAMILY HISTORY:  No pertinent family history in first degree relatives        Allergies  No Known Allergies        ANTIMICROBIALS:  hydroxychloroquine 200 two times a day  piperacillin/tazobactam IVPB.. 3.375 every 8 hours  vancomycin  IVPB 1000 every 12 hours      ANTIMICROBIALS (past 90 days):  MEDICATIONS  (STANDING):  hydroxychloroquine   200 milliGRAM(s) Oral (12-23-23 @ 05:59)    piperacillin/tazobactam IVPB..   25 mL/Hr IV Intermittent (12-23-23 @ 06:00)        OTHER MEDS:   MEDICATIONS  (STANDING):  acetaminophen     Tablet .. 650 every 6 hours PRN  albuterol/ipratropium for Nebulization 3 every 6 hours PRN  guaiFENesin Oral Liquid (Sugar-Free) 200 every 6 hours PRN  heparin  Infusion. 1500 <Continuous>      VITALS:  Vital Signs Last 24 Hrs  T(F): 102.9 (12-23-23 @ 10:00), Max: 103 (12-22-23 @ 04:30)    Vital Signs Last 24 Hrs  HR: 98 (12-23-23 @ 10:00) (92 - 102)  BP: 158/108 (12-23-23 @ 10:00) (133/69 - 170/91)  RR: 18 (12-23-23 @ 10:00)  SpO2: 100% (12-23-23 @ 10:00) (92% - 100%)  Wt(kg): --    EXAM:  General: Mild respiratory distress  HEENT: erythematous throat  CV: S1+S2, no m/r/g appreciated  Lungs: Mild respiratory distress, decreased breath sounds to mid-lung fields b/l  Abd: Soft, nontender, no guarding  Ext: No edema  Neuro: Alert and oriented, no focal deficits  Skin: No rash   IV: No phlebitis      Labs:                        10.8   8.28  )-----------( 348      ( 23 Dec 2023 05:50 )             32.2     12-23    128<L>  |  94<L>  |  16  ----------------------------<  107<H>  4.8   |  20<L>  |  1.02    Ca    8.7      23 Dec 2023 05:50  Phos  3.5     12-23  Mg     2.00     12-23    TPro  7.9  /  Alb  3.0<L>  /  TBili  0.6  /  DBili  x   /  AST  100<H>  /  ALT  88<H>  /  AlkPhos  86  12-23      WBC Trend:  WBC Count: 8.28 (12-23-23 @ 05:50)  WBC Count: 7.92 (12-23-23 @ 00:01)  WBC Count: 8.10 (12-22-23 @ 14:56)  WBC Count: 10.38 (12-22-23 @ 06:50)      Auto Neutrophil #: 6.93 K/uL (12-21-23 @ 19:00)  Auto Neutrophil #: 6.12 K/uL (12-12-23 @ 20:44)  Auto Neutrophil #: 5.62 K/uL (12-12-23 @ 09:05)  Auto Neutrophil #: 2.08 K/uL (11-27-23 @ 21:30)  Band Neutrophils %: 1.0 % (11-27-23 @ 21:30)      Creatine Trend:  Creatinine: 1.02 (12-23)  Creatinine: 1.00 (12-23)  Creatinine: 1.13 (12-22)  Creatinine: 0.99 (12-21)      Liver Biochemical Testing Trend:  Alanine Aminotransferase (ALT/SGPT): 88 *H* (12-23)  Alanine Aminotransferase (ALT/SGPT): 111 *H* (12-22)  Alanine Aminotransferase (ALT/SGPT): 97 *H* (12-21)  Alanine Aminotransferase (ALT/SGPT): 59 (12-15)  Alanine Aminotransferase (ALT/SGPT): 64 (12-14)  Aspartate Aminotransferase (AST/SGOT): 100 (12-23-23 @ 05:50)  Aspartate Aminotransferase (AST/SGOT): 104 (12-22-23 @ 06:50)  Aspartate Aminotransferase (AST/SGOT): 85 (12-21-23 @ 19:00)  Aspartate Aminotransferase (AST/SGOT): 72 (12-15-23 @ 07:25)  Aspartate Aminotransferase (AST/SGOT): 87 (12-14-23 @ 07:00)  Bilirubin Total: 0.6 (12-23)  Bilirubin Total: 0.7 (12-22)  Bilirubin Total: 0.7 (12-21)  Bilirubin Total: 0.7 (12-15)  Bilirubin Total: 0.7 (12-14)    Auto Eosinophil %: 0.1 % (12-21-23 @ 19:00)      Urinalysis Basic - ( 23 Dec 2023 05:50 )    Color: x / Appearance: x / SG: x / pH: x  Gluc: 107 mg/dL / Ketone: x  / Bili: x / Urobili: x   Blood: x / Protein: x / Nitrite: x   Leuk Esterase: x / RBC: x / WBC x   Sq Epi: x / Non Sq Epi: x / Bacteria: x        MICROBIOLOGY:    MRSA PCR Result.: NotDetec (12-15-23 @ 05:00)      Culture - Blood (collected 21 Dec 2023 19:00)  Source: .Blood Blood-Peripheral  Preliminary Report:    No growth at 24 hours    Culture - Blood (collected 21 Dec 2023 18:45)  Source: .Blood Blood-Peripheral  Preliminary Report:    No growth at 24 hours    Culture - Sputum (collected 20 Dec 2023 13:50)  Source: .Sputum Sputum  Final Report:    Normal Respiratory Inge present    Culture - Stool (collected 15 Dec 2023 13:20)  Source: .Stool Feces  Final Report:    No enteric pathogens isolated.    (Stool culture examined for Salmonella,    Shigella, Campylobacter, Aeromonas, Plesiomonas,    Vibrio, E.coli O157 and Yersinia)    Culture - Blood (collected 14 Dec 2023 16:45)  Source: .Blood Blood-Peripheral  Final Report:    No growth at 5 days    Culture - Blood (collected 14 Dec 2023 16:35)  Source: .Blood Blood-Peripheral  Final Report:    No growth at 5 days    Culture - Blood (collected 12 Dec 2023 20:44)  Source: .Blood Blood-Peripheral  Final Report:    No growth at 5 days    Culture - Blood (collected 12 Dec 2023 20:30)  Source: .Blood Blood-Peripheral  Final Report:    No growth at 5 days    Rapid RVP Result: NotDetec (12-21 @ 18:50)    Procalcitonin, Serum: 0.80 (12-20)    RADIOLOGY:  imaging below personally reviewed    < from: CT Internal Auditory Canals w/ IV Cont (12.21.23 @ 18:24) >  IMPRESSION:  Right temporal bone: Normal study.  Left temporal bone: Nonspecific partial opacification of the middle ear   and mastoid air cells. No bony erosive changes indicative of mastoiditis.   Clinical correlation with otoscopy is recommended for possible infectious   etiology.    Moderate symmetrical enlargement nasopharyngeal soft tissues,   nonspecific. Inflammatory paranasal sinusdisease; please correlate   clinically for acute sinusitis.    < end of copied text >  < from: Xray Chest 1 View- PORTABLE-Routine (Xray Chest 1 View- PORTABLE-Routine in AM.) (12.20.23 @ 07:37) >  IMPRESSION:    Large left pleural effusion and compressive atelectasis.  Trace right effusion and linear atelectasis in the right midlung    < end of copied text >  < from: CT Angio Chest PE Protocol w/ IV Cont (12.12.23 @ 10:27) >  IMPRESSION:    Acute right upper lobe and left lower lobe segmental/subsegmental   pulmonary emboli. No CT evidence of right heart strain.    New bilateral lower lobe consolidations with areas of central clearing.   Pneumonia and pulmonary infarcts are in the differential. New bilateral   pleural effusions, small on the left and trace on the right. Follow to   resolution with repeat chest CT in one month, or sooner as clinically   warranted.    Bilateral axillary and supraclavicular adenopathy of unclear etiology.    < end of copied text >

## 2023-12-23 NOTE — CONSULT NOTE ADULT - ASSESSMENT
28y Male with recent diagnosis of Lupus ( 9/2023) admitted 12/12 with B/L Pulmonary embolism. Now with increased dyspnea, pleuritic chest pain,  hypoxia, and fever.    Was being seen at [facility] and transferred for thoracic eval  At OSH was found to have PE and possible superimposed pneumonia. Also with large left effusion with septations  Had leukocytosis, now resolved, received decadron 12/22  Fever to 102.9  Elevated LFTs  UA no pyuria  RVP negative  Strep Pneumo Ag, mycoplasma IgM negative  MRSA PCR negative  EPEC+ 12/15    Radiology  CTA chest 12/12: Acute right upper lobe and left lower lobe segmental/subsegmental   pulmonary emboli. No CT evidence of right heart strain. New bilateral lower lobe consolidations with areas of central clearing. Pneumonia and pulmonary infarcts are in the differential. New bilateral pleural effusions, small on the left and trace on the right. Bilateral axillary and supraclavicular adenopathy of unclear etiology.  TTE 12/12: grossly wnl  US abdomen 12/14: No specific acute pathology.Mild hepatomegaly. Small bilateral pleural effusions  CXR 12/20: Large left pleural effusion and compressive atelectasis. Trace right effusion and linear atelectasis in the right midlung    Micro:  Blood cultures (12/12, 12/14, 12/21): no growth  Stool Cx (12/15): no growth, GI PCR +EPEC  Sputum (12/20): normal [place]    Abx:  Zosyn (12/14-)  Vanco (12/23-)  Azithro (12/14-12/16, 12/20) 28y Male with recent diagnosis of Lupus ( 9/2023) admitted 12/12 with B/L Pulmonary embolism. Now with increased dyspnea, pleuritic chest pain,  hypoxia, and fever.    Was being seen at [facility] and transferred for thoracic eval  At OSH was found to have PE and possible superimposed pneumonia. Also with large left effusion with septations  Had leukocytosis, now resolved, received decadron 12/22  Fever to 102.9  Elevated LFTs  UA no pyuria  RVP negative  Strep Pneumo Ag, mycoplasma IgM negative  MRSA PCR negative  EPEC+ 12/15    Radiology  CTA chest 12/12: Acute right upper lobe and left lower lobe segmental/subsegmental   pulmonary emboli. No CT evidence of right heart strain. New bilateral lower lobe consolidations with areas of central clearing. Pneumonia and pulmonary infarcts are in the differential. New bilateral pleural effusions, small on the left and trace on the right. Bilateral axillary and supraclavicular adenopathy of unclear etiology.  TTE 12/12: grossly wnl  US abdomen 12/14: No specific acute pathology.Mild hepatomegaly. Small bilateral pleural effusions  CXR 12/20: Large left pleural effusion and compressive atelectasis. Trace right effusion and linear atelectasis in the right midlung    Micro:  Blood cultures (12/12, 12/14, 12/21): no growth  Stool Cx (12/15): no growth, GI PCR +EPEC  Sputum (12/20): normal [place]    Abx:  Zosyn (12/14-)  Vanco (12/23-)  Azithro (12/14-12/16, 12/20) 28y Male with recent diagnosis of Lupus ( 9/2023) admitted 12/12 with B/L Pulmonary embolism. Now with increased dyspnea, pleuritic chest pain,  hypoxia, and fever.    Was being seen at [facility] and transferred for thoracic eval  At OSH was found to have PE and possible superimposed pneumonia. Also with large left effusion with septations  Had leukocytosis, now resolved, received decadron 12/22  Fever to 102.9  Elevated LFTs  UA no pyuria  RVP negative  Strep Pneumo Ag, legionella, mycoplasma IgM negative  MRSA PCR negative  EPEC+ 12/15 - diarrhea has since resolved    Radiology  CTA chest 12/12: Acute right upper lobe and left lower lobe segmental/subsegmental pulmonary emboli. No CT evidence of right heart strain. New bilateral lower lobe consolidations with areas of central clearing. Pneumonia and pulmonary infarcts are in the differential. New bilateral pleural effusions, small on the left and trace on the right. Bilateral axillary and supraclavicular adenopathy of unclear etiology.  TTE 12/12: grossly wnl  US abdomen 12/14: No specific acute pathology. Mild hepatomegaly. Small bilateral pleural effusions  CXR 12/20: Large left pleural effusion and compressive atelectasis. Trace right effusion and linear atelectasis in the right midlung  CT chest 12/23: Moderate pleural effusions, loculated on the left, new since 12/12/2023. New nonspecific the very small peripheral groundglass in the right upper lobe  CT neck 12/23: Small level 1 and level 2 and level 5 lymph nodes without significant enlargement. No drainable collections    Micro:  Blood cultures (12/12, 12/14, 12/21): no growth  Stool Cx (12/15): no growth, GI PCR +EPEC  Sputum (12/20): normal [place]    Abx:  Zosyn (12/14-)  Vanco (12/23-)  Azithro (12/14-12/16, 12/20)    #Fever  #Pleural effusions  #Possible superimposed pneumonia  #Pulmonary embolism  #Lymphadenoapthy  #Known SLE    The persistence of fever despite broad-spectrum antibiotics for over a week suggests either an underlying untreated source of infection (in this case, possibly infected pleural fluid) or in the setting of his rheumatological disorder. Also with possible contribution from known PE.    Suggest:  - Continue Zosyn  - Would dc vanco  - F/u pending cultures (blood, urine, sputum - in lab)  - If draining pleural fluid, please send for cell count, LDH, protein, glucose, bacterial culture, fungal culture, and AFB culture  - No ID contraindication at this time for steroid if needed    Mendel Maurer, PGY4  ID Fellow  [employer] Teams Preferred  After 5pm/weekends call [phone] 28y Male with recent diagnosis of Lupus ( 9/2023) admitted 12/12 with B/L Pulmonary embolism. Now with increased dyspnea, pleuritic chest pain,  hypoxia, and fever.    Was being seen at [facility] and transferred for thoracic eval  At OSH was found to have PE and possible superimposed pneumonia. Also with large left effusion with septations  Had leukocytosis, now resolved, received decadron 12/22  Fever to 102.9  Elevated LFTs  UA no pyuria  RVP negative  Strep Pneumo Ag, legionella, mycoplasma IgM negative  MRSA PCR negative  EPEC+ 12/15 - diarrhea has since resolved    Radiology  CTA chest 12/12: Acute right upper lobe and left lower lobe segmental/subsegmental pulmonary emboli. No CT evidence of right heart strain. New bilateral lower lobe consolidations with areas of central clearing. Pneumonia and pulmonary infarcts are in the differential. New bilateral pleural effusions, small on the left and trace on the right. Bilateral axillary and supraclavicular adenopathy of unclear etiology.  TTE 12/12: grossly wnl  US abdomen 12/14: No specific acute pathology. Mild hepatomegaly. Small bilateral pleural effusions  CXR 12/20: Large left pleural effusion and compressive atelectasis. Trace right effusion and linear atelectasis in the right midlung  CT chest 12/23: Moderate pleural effusions, loculated on the left, new since 12/12/2023. New nonspecific the very small peripheral groundglass in the right upper lobe  CT neck 12/23: Small level 1 and level 2 and level 5 lymph nodes without significant enlargement. No drainable collections    Micro:  Blood cultures (12/12, 12/14, 12/21): no growth  Stool Cx (12/15): no growth, GI PCR +EPEC  Sputum (12/20): normal [place]    Abx:  Zosyn (12/14-)  Vanco (12/23-)  Azithro (12/14-12/16, 12/20)    #Fever  #Pleural effusions  #Possible superimposed pneumonia  #Pulmonary embolism  #Lymphadenoapthy  #Known SLE    The persistence of fever despite broad-spectrum antibiotics for over a week suggests either an underlying untreated source of infection (in this case, possibly infected pleural fluid) or in the setting of his rheumatological disorder. Also with possible contribution from known PE.    Suggest:  - Continue Zosyn  - Would dc vanco  - F/u pending cultures (blood, urine, sputum - in lab)  - If draining pleural fluid, please send for cell count, LDH, protein, glucose, bacterial culture, fungal culture, and AFB culture  - No ID contraindication at this time for steroid if needed    Mendel Maurer, PGY4  ID Fellow  [employer] Teams Preferred  After 5pm/weekends call [phone] 28y Male with recent diagnosis of Lupus ( 9/2023) admitted 12/12 with B/L Pulmonary embolism. Now with increased dyspnea, pleuritic chest pain,  hypoxia, and fever.    Was being seen at [facility] and transferred for thoracic eval  At OSH was found to have PE and possible superimposed pneumonia. Also with large left effusion with septations  Had leukocytosis, now resolved, received decadron 12/22  Fever to 102.9  Elevated LFTs  UA no pyuria  RVP negative  Strep Pneumo Ag, legionella, mycoplasma IgM negative  MRSA PCR negative  EPEC+ 12/15 - diarrhea has since resolved    Radiology  CTA chest 12/12: Acute right upper lobe and left lower lobe segmental/subsegmental pulmonary emboli. No CT evidence of right heart strain. New bilateral lower lobe consolidations with areas of central clearing. Pneumonia and pulmonary infarcts are in the differential. New bilateral pleural effusions, small on the left and trace on the right. Bilateral axillary and supraclavicular adenopathy of unclear etiology.  TTE 12/12: grossly wnl  US abdomen 12/14: No specific acute pathology. Mild hepatomegaly. Small bilateral pleural effusions  CXR 12/20: Large left pleural effusion and compressive atelectasis. Trace right effusion and linear atelectasis in the right midlung  CT chest 12/23: Moderate pleural effusions, loculated on the left, new since 12/12/2023. New nonspecific the very small peripheral groundglass in the right upper lobe  CT neck 12/23: Small level 1 and level 2 and level 5 lymph nodes without significant enlargement. No drainable collections    Micro:  Blood cultures (12/12, 12/14, 12/21): no growth  Stool Cx (12/15): no growth, GI PCR +EPEC  Sputum (12/20): normal [place]    Abx:  Zosyn (12/14-)  Vanco (12/23-)  Azithro (12/14-12/16, 12/20)    #Fever  #Pleural effusions  #Possible superimposed pneumonia  #Pulmonary embolism  #Lymphadenoapthy  #Known SLE    The persistence of fever despite broad-spectrum antibiotics for over a week suggests either an underlying untreated source of infection (in this case, possibly infected pleural fluid) or in the setting of his rheumatological disorder. Also with possible contribution from known PE.    Suggest:  - Continue Zosyn  - Would dc vanco  - F/u pending cultures (blood, urine, sputum - in lab)  - If draining pleural fluid, please send for cell count, LDH, protein, glucose, bacterial culture, fungal culture  - No ID contraindication at this time for steroid if needed    Mendel Maurer, PGY4  ID Fellow  [employer] Teams Preferred  After 5pm/weekends call [phone] 28y Male with recent diagnosis of Lupus ( 9/2023) admitted 12/12 with B/L Pulmonary embolism. Now with increased dyspnea, pleuritic chest pain,  hypoxia, and fever.    Was being seen at [facility] and transferred for thoracic eval  At OSH was found to have PE and possible superimposed pneumonia. Also with large left effusion with septations  Had leukocytosis, now resolved, received decadron 12/22  Fever to 102.9  Elevated LFTs  UA no pyuria  RVP negative  Strep Pneumo Ag, legionella, mycoplasma IgM negative  MRSA PCR negative  EPEC+ 12/15 - diarrhea has since resolved    Radiology  CTA chest 12/12: Acute right upper lobe and left lower lobe segmental/subsegmental pulmonary emboli. No CT evidence of right heart strain. New bilateral lower lobe consolidations with areas of central clearing. Pneumonia and pulmonary infarcts are in the differential. New bilateral pleural effusions, small on the left and trace on the right. Bilateral axillary and supraclavicular adenopathy of unclear etiology.  TTE 12/12: grossly wnl  US abdomen 12/14: No specific acute pathology. Mild hepatomegaly. Small bilateral pleural effusions  CXR 12/20: Large left pleural effusion and compressive atelectasis. Trace right effusion and linear atelectasis in the right midlung  CT chest 12/23: Moderate pleural effusions, loculated on the left, new since 12/12/2023. New nonspecific the very small peripheral groundglass in the right upper lobe  CT neck 12/23: Small level 1 and level 2 and level 5 lymph nodes without significant enlargement. No drainable collections    Micro:  Blood cultures (12/12, 12/14, 12/21): no growth  Stool Cx (12/15): no growth, GI PCR +EPEC  Sputum (12/20): normal [place]    Abx:  Zosyn (12/14-)  Vanco (12/23-)  Azithro (12/14-12/16, 12/20)    #Fever  #Pleural effusions  #Possible superimposed pneumonia  #Pulmonary embolism  #Lymphadenoapthy  #Known SLE    The persistence of fever despite broad-spectrum antibiotics for over a week suggests either an underlying untreated source of infection (in this case, possibly infected pleural fluid) or in the setting of his rheumatological disorder. Also with possible contribution from known PE.    Suggest:  - Continue Zosyn  - Would dc vanco  - F/u pending cultures (blood, urine, sputum - in lab)  - If draining pleural fluid, please send for cell count, LDH, protein, glucose, bacterial culture, fungal culture  - No ID contraindication at this time for steroid if needed    Mendel Maurer, PGY4  ID Fellow  [employer] Teams Preferred  After 5pm/weekends call [phone]

## 2023-12-24 LAB
ALBUMIN FLD-MCNC: 2.3 G/DL — SIGNIFICANT CHANGE UP
ALBUMIN FLD-MCNC: 2.3 G/DL — SIGNIFICANT CHANGE UP
ALBUMIN SERPL ELPH-MCNC: 2.9 G/DL — LOW (ref 3.3–5)
ALBUMIN SERPL ELPH-MCNC: 2.9 G/DL — LOW (ref 3.3–5)
ALP SERPL-CCNC: 80 U/L — SIGNIFICANT CHANGE UP (ref 40–120)
ALP SERPL-CCNC: 80 U/L — SIGNIFICANT CHANGE UP (ref 40–120)
ALT FLD-CCNC: 90 U/L — HIGH (ref 4–41)
ALT FLD-CCNC: 90 U/L — HIGH (ref 4–41)
ANION GAP SERPL CALC-SCNC: 18 MMOL/L — HIGH (ref 7–14)
ANION GAP SERPL CALC-SCNC: 18 MMOL/L — HIGH (ref 7–14)
APTT BLD: 69.1 SEC — HIGH (ref 24.5–35.6)
APTT BLD: 69.1 SEC — HIGH (ref 24.5–35.6)
AST SERPL-CCNC: 136 U/L — HIGH (ref 4–40)
AST SERPL-CCNC: 136 U/L — HIGH (ref 4–40)
B PERT IGG+IGM PNL SER: ABNORMAL
B PERT IGG+IGM PNL SER: ABNORMAL
BASOPHILS # BLD AUTO: 0.02 K/UL — SIGNIFICANT CHANGE UP (ref 0–0.2)
BASOPHILS # BLD AUTO: 0.02 K/UL — SIGNIFICANT CHANGE UP (ref 0–0.2)
BASOPHILS NFR BLD AUTO: 0.2 % — SIGNIFICANT CHANGE UP (ref 0–2)
BASOPHILS NFR BLD AUTO: 0.2 % — SIGNIFICANT CHANGE UP (ref 0–2)
BILIRUB DIRECT SERPL-MCNC: <0.2 MG/DL — SIGNIFICANT CHANGE UP (ref 0–0.3)
BILIRUB DIRECT SERPL-MCNC: <0.2 MG/DL — SIGNIFICANT CHANGE UP (ref 0–0.3)
BILIRUB INDIRECT FLD-MCNC: >0.4 MG/DL — SIGNIFICANT CHANGE UP (ref 0–1)
BILIRUB INDIRECT FLD-MCNC: >0.4 MG/DL — SIGNIFICANT CHANGE UP (ref 0–1)
BILIRUB SERPL-MCNC: 0.6 MG/DL — SIGNIFICANT CHANGE UP (ref 0.2–1.2)
BILIRUB SERPL-MCNC: 0.6 MG/DL — SIGNIFICANT CHANGE UP (ref 0.2–1.2)
BUN SERPL-MCNC: 18 MG/DL — SIGNIFICANT CHANGE UP (ref 7–23)
BUN SERPL-MCNC: 18 MG/DL — SIGNIFICANT CHANGE UP (ref 7–23)
C3 SERPL-MCNC: 83 MG/DL — LOW (ref 90–180)
C3 SERPL-MCNC: 83 MG/DL — LOW (ref 90–180)
C4 SERPL-MCNC: 13 MG/DL — SIGNIFICANT CHANGE UP (ref 10–40)
C4 SERPL-MCNC: 13 MG/DL — SIGNIFICANT CHANGE UP (ref 10–40)
CALCIUM SERPL-MCNC: 8.5 MG/DL — SIGNIFICANT CHANGE UP (ref 8.4–10.5)
CALCIUM SERPL-MCNC: 8.5 MG/DL — SIGNIFICANT CHANGE UP (ref 8.4–10.5)
CHLORIDE SERPL-SCNC: 95 MMOL/L — LOW (ref 98–107)
CHLORIDE SERPL-SCNC: 95 MMOL/L — LOW (ref 98–107)
CO2 SERPL-SCNC: 16 MMOL/L — LOW (ref 22–31)
CO2 SERPL-SCNC: 16 MMOL/L — LOW (ref 22–31)
COLOR FLD: ABNORMAL
COLOR FLD: ABNORMAL
CREAT SERPL-MCNC: 1 MG/DL — SIGNIFICANT CHANGE UP (ref 0.5–1.3)
CREAT SERPL-MCNC: 1 MG/DL — SIGNIFICANT CHANGE UP (ref 0.5–1.3)
CRP SERPL-MCNC: 193.4 MG/L — HIGH
CRP SERPL-MCNC: 193.4 MG/L — HIGH
CULTURE RESULTS: NO GROWTH — SIGNIFICANT CHANGE UP
CULTURE RESULTS: NO GROWTH — SIGNIFICANT CHANGE UP
EGFR: 104 ML/MIN/1.73M2 — SIGNIFICANT CHANGE UP
EGFR: 104 ML/MIN/1.73M2 — SIGNIFICANT CHANGE UP
EOSINOPHIL # BLD AUTO: 0 K/UL — SIGNIFICANT CHANGE UP (ref 0–0.5)
EOSINOPHIL # BLD AUTO: 0 K/UL — SIGNIFICANT CHANGE UP (ref 0–0.5)
EOSINOPHIL # FLD: 0 % — SIGNIFICANT CHANGE UP
EOSINOPHIL # FLD: 0 % — SIGNIFICANT CHANGE UP
EOSINOPHIL NFR BLD AUTO: 0 % — SIGNIFICANT CHANGE UP (ref 0–6)
EOSINOPHIL NFR BLD AUTO: 0 % — SIGNIFICANT CHANGE UP (ref 0–6)
FLUID INTAKE SUBSTANCE CLASS: SIGNIFICANT CHANGE UP
FLUID INTAKE SUBSTANCE CLASS: SIGNIFICANT CHANGE UP
FOLATE+VIT B12 SERBLD-IMP: 0 % — SIGNIFICANT CHANGE UP
FOLATE+VIT B12 SERBLD-IMP: 0 % — SIGNIFICANT CHANGE UP
GLUCOSE FLD-MCNC: 118 MG/DL — SIGNIFICANT CHANGE UP
GLUCOSE FLD-MCNC: 118 MG/DL — SIGNIFICANT CHANGE UP
GLUCOSE SERPL-MCNC: 163 MG/DL — HIGH (ref 70–99)
GLUCOSE SERPL-MCNC: 163 MG/DL — HIGH (ref 70–99)
GRAM STN FLD: SIGNIFICANT CHANGE UP
GRAM STN FLD: SIGNIFICANT CHANGE UP
HAV IGM SER-ACNC: SIGNIFICANT CHANGE UP
HAV IGM SER-ACNC: SIGNIFICANT CHANGE UP
HBV CORE IGM SER-ACNC: SIGNIFICANT CHANGE UP
HBV CORE IGM SER-ACNC: SIGNIFICANT CHANGE UP
HBV SURFACE AG SER-ACNC: SIGNIFICANT CHANGE UP
HBV SURFACE AG SER-ACNC: SIGNIFICANT CHANGE UP
HCT VFR BLD CALC: 31.4 % — LOW (ref 39–50)
HCT VFR BLD CALC: 31.4 % — LOW (ref 39–50)
HCV AB S/CO SERPL IA: 0.13 S/CO — SIGNIFICANT CHANGE UP (ref 0–0.99)
HCV AB S/CO SERPL IA: 0.13 S/CO — SIGNIFICANT CHANGE UP (ref 0–0.99)
HCV AB SERPL-IMP: SIGNIFICANT CHANGE UP
HCV AB SERPL-IMP: SIGNIFICANT CHANGE UP
HGB BLD-MCNC: 10.5 G/DL — LOW (ref 13–17)
HGB BLD-MCNC: 10.5 G/DL — LOW (ref 13–17)
HIV 1+2 AB+HIV1 P24 AG SERPL QL IA: SIGNIFICANT CHANGE UP
HIV 1+2 AB+HIV1 P24 AG SERPL QL IA: SIGNIFICANT CHANGE UP
IANC: 6.85 K/UL — SIGNIFICANT CHANGE UP (ref 1.8–7.4)
IANC: 6.85 K/UL — SIGNIFICANT CHANGE UP (ref 1.8–7.4)
IMM GRANULOCYTES NFR BLD AUTO: 4.3 % — HIGH (ref 0–0.9)
IMM GRANULOCYTES NFR BLD AUTO: 4.3 % — HIGH (ref 0–0.9)
INR BLD: 1.54 RATIO — HIGH (ref 0.85–1.18)
INR BLD: 1.54 RATIO — HIGH (ref 0.85–1.18)
LDH SERPL L TO P-CCNC: 478 U/L — SIGNIFICANT CHANGE UP
LDH SERPL L TO P-CCNC: 478 U/L — SIGNIFICANT CHANGE UP
LDH SERPL L TO P-CCNC: 532 U/L — HIGH (ref 135–225)
LDH SERPL L TO P-CCNC: 532 U/L — HIGH (ref 135–225)
LUPUS ANTICOAGULANT PROFILE RESULT: SIGNIFICANT CHANGE UP
LUPUS ANTICOAGULANT PROFILE RESULT: SIGNIFICANT CHANGE UP
LYMPHOCYTES # BLD AUTO: 1.25 K/UL — SIGNIFICANT CHANGE UP (ref 1–3.3)
LYMPHOCYTES # BLD AUTO: 1.25 K/UL — SIGNIFICANT CHANGE UP (ref 1–3.3)
LYMPHOCYTES # BLD AUTO: 14.5 % — SIGNIFICANT CHANGE UP (ref 13–44)
LYMPHOCYTES # BLD AUTO: 14.5 % — SIGNIFICANT CHANGE UP (ref 13–44)
LYMPHOCYTES # FLD: 19 % — SIGNIFICANT CHANGE UP
LYMPHOCYTES # FLD: 19 % — SIGNIFICANT CHANGE UP
MAGNESIUM SERPL-MCNC: 2 MG/DL — SIGNIFICANT CHANGE UP (ref 1.6–2.6)
MAGNESIUM SERPL-MCNC: 2 MG/DL — SIGNIFICANT CHANGE UP (ref 1.6–2.6)
MCHC RBC-ENTMCNC: 31 PG — SIGNIFICANT CHANGE UP (ref 27–34)
MCHC RBC-ENTMCNC: 31 PG — SIGNIFICANT CHANGE UP (ref 27–34)
MCHC RBC-ENTMCNC: 33.4 GM/DL — SIGNIFICANT CHANGE UP (ref 32–36)
MCHC RBC-ENTMCNC: 33.4 GM/DL — SIGNIFICANT CHANGE UP (ref 32–36)
MCV RBC AUTO: 92.6 FL — SIGNIFICANT CHANGE UP (ref 80–100)
MCV RBC AUTO: 92.6 FL — SIGNIFICANT CHANGE UP (ref 80–100)
MESOTHL CELL # FLD: 0 % — SIGNIFICANT CHANGE UP
MESOTHL CELL # FLD: 0 % — SIGNIFICANT CHANGE UP
MONOCYTES # BLD AUTO: 0.15 K/UL — SIGNIFICANT CHANGE UP (ref 0–0.9)
MONOCYTES # BLD AUTO: 0.15 K/UL — SIGNIFICANT CHANGE UP (ref 0–0.9)
MONOCYTES NFR BLD AUTO: 1.7 % — LOW (ref 2–14)
MONOCYTES NFR BLD AUTO: 1.7 % — LOW (ref 2–14)
MONOS+MACROS # FLD: 50 % — SIGNIFICANT CHANGE UP
MONOS+MACROS # FLD: 50 % — SIGNIFICANT CHANGE UP
NEUTROPHILS # BLD AUTO: 6.85 K/UL — SIGNIFICANT CHANGE UP (ref 1.8–7.4)
NEUTROPHILS # BLD AUTO: 6.85 K/UL — SIGNIFICANT CHANGE UP (ref 1.8–7.4)
NEUTROPHILS NFR BLD AUTO: 79.3 % — HIGH (ref 43–77)
NEUTROPHILS NFR BLD AUTO: 79.3 % — HIGH (ref 43–77)
NEUTROPHILS-BODY FLUID: 29 % — SIGNIFICANT CHANGE UP
NEUTROPHILS-BODY FLUID: 29 % — SIGNIFICANT CHANGE UP
NRBC # BLD: 0 /100 WBCS — SIGNIFICANT CHANGE UP (ref 0–0)
NRBC # BLD: 0 /100 WBCS — SIGNIFICANT CHANGE UP (ref 0–0)
NRBC # FLD: 0 K/UL — SIGNIFICANT CHANGE UP (ref 0–0)
NRBC # FLD: 0 K/UL — SIGNIFICANT CHANGE UP (ref 0–0)
OTHER CELLS FLD MANUAL: 2 % — SIGNIFICANT CHANGE UP
OTHER CELLS FLD MANUAL: 2 % — SIGNIFICANT CHANGE UP
PHOSPHATE SERPL-MCNC: 3.6 MG/DL — SIGNIFICANT CHANGE UP (ref 2.5–4.5)
PHOSPHATE SERPL-MCNC: 3.6 MG/DL — SIGNIFICANT CHANGE UP (ref 2.5–4.5)
PLATELET # BLD AUTO: 371 K/UL — SIGNIFICANT CHANGE UP (ref 150–400)
PLATELET # BLD AUTO: 371 K/UL — SIGNIFICANT CHANGE UP (ref 150–400)
POTASSIUM SERPL-MCNC: 5.1 MMOL/L — SIGNIFICANT CHANGE UP (ref 3.5–5.3)
POTASSIUM SERPL-MCNC: 5.1 MMOL/L — SIGNIFICANT CHANGE UP (ref 3.5–5.3)
POTASSIUM SERPL-SCNC: 5.1 MMOL/L — SIGNIFICANT CHANGE UP (ref 3.5–5.3)
POTASSIUM SERPL-SCNC: 5.1 MMOL/L — SIGNIFICANT CHANGE UP (ref 3.5–5.3)
PROT FLD-MCNC: 5.3 G/DL — SIGNIFICANT CHANGE UP
PROT FLD-MCNC: 5.3 G/DL — SIGNIFICANT CHANGE UP
PROT SERPL-MCNC: 7.8 G/DL — SIGNIFICANT CHANGE UP (ref 6–8.3)
PROT SERPL-MCNC: 7.8 G/DL — SIGNIFICANT CHANGE UP (ref 6–8.3)
PROTHROM AB SERPL-ACNC: 17 SEC — HIGH (ref 9.5–13)
PROTHROM AB SERPL-ACNC: 17 SEC — HIGH (ref 9.5–13)
RBC # BLD: 3.39 M/UL — LOW (ref 4.2–5.8)
RBC # BLD: 3.39 M/UL — LOW (ref 4.2–5.8)
RBC # FLD: 14.2 % — SIGNIFICANT CHANGE UP (ref 10.3–14.5)
RBC # FLD: 14.2 % — SIGNIFICANT CHANGE UP (ref 10.3–14.5)
RCV VOL RI: HIGH CELLS/UL (ref 0–5)
RCV VOL RI: HIGH CELLS/UL (ref 0–5)
SODIUM SERPL-SCNC: 129 MMOL/L — LOW (ref 135–145)
SODIUM SERPL-SCNC: 129 MMOL/L — LOW (ref 135–145)
SPECIMEN SOURCE FLD: SIGNIFICANT CHANGE UP
SPECIMEN SOURCE FLD: SIGNIFICANT CHANGE UP
SPECIMEN SOURCE: SIGNIFICANT CHANGE UP
TOTAL CELLS COUNTED, BODY FLUID: 100 CELLS — SIGNIFICANT CHANGE UP
TOTAL CELLS COUNTED, BODY FLUID: 100 CELLS — SIGNIFICANT CHANGE UP
TOTAL NUCLEATED CELL COUNT, BODY FLUID: 728 CELLS/UL — HIGH (ref 0–5)
TOTAL NUCLEATED CELL COUNT, BODY FLUID: 728 CELLS/UL — HIGH (ref 0–5)
TUBE TYPE: SIGNIFICANT CHANGE UP
TUBE TYPE: SIGNIFICANT CHANGE UP
WBC # BLD: 8.64 K/UL — SIGNIFICANT CHANGE UP (ref 3.8–10.5)
WBC # BLD: 8.64 K/UL — SIGNIFICANT CHANGE UP (ref 3.8–10.5)
WBC # FLD AUTO: 8.64 K/UL — SIGNIFICANT CHANGE UP (ref 3.8–10.5)
WBC # FLD AUTO: 8.64 K/UL — SIGNIFICANT CHANGE UP (ref 3.8–10.5)

## 2023-12-24 PROCEDURE — 32555 ASPIRATE PLEURA W/ IMAGING: CPT

## 2023-12-24 PROCEDURE — 99232 SBSQ HOSP IP/OBS MODERATE 35: CPT

## 2023-12-24 PROCEDURE — 93010 ELECTROCARDIOGRAM REPORT: CPT

## 2023-12-24 PROCEDURE — 88108 CYTOPATH CONCENTRATE TECH: CPT | Mod: 26

## 2023-12-24 PROCEDURE — 99232 SBSQ HOSP IP/OBS MODERATE 35: CPT | Mod: 25

## 2023-12-24 PROCEDURE — 71045 X-RAY EXAM CHEST 1 VIEW: CPT | Mod: 26

## 2023-12-24 PROCEDURE — 99233 SBSQ HOSP IP/OBS HIGH 50: CPT | Mod: GC

## 2023-12-24 RX ORDER — HEPARIN SODIUM 5000 [USP'U]/ML
1500 INJECTION INTRAVENOUS; SUBCUTANEOUS
Qty: 25000 | Refills: 0 | Status: DISCONTINUED | OUTPATIENT
Start: 2023-12-24 | End: 2023-12-25

## 2023-12-24 RX ORDER — ONDANSETRON 8 MG/1
4 TABLET, FILM COATED ORAL ONCE
Refills: 0 | Status: COMPLETED | OUTPATIENT
Start: 2023-12-24 | End: 2023-12-24

## 2023-12-24 RX ORDER — PHYTONADIONE (VIT K1) 5 MG
5 TABLET ORAL ONCE
Refills: 0 | Status: COMPLETED | OUTPATIENT
Start: 2023-12-24 | End: 2023-12-24

## 2023-12-24 RX ORDER — ACETAMINOPHEN 500 MG
1000 TABLET ORAL ONCE
Refills: 0 | Status: COMPLETED | OUTPATIENT
Start: 2023-12-24 | End: 2023-12-24

## 2023-12-24 RX ADMIN — Medication 1 TABLET(S): at 12:48

## 2023-12-24 RX ADMIN — Medication 400 MILLIGRAM(S): at 09:52

## 2023-12-24 RX ADMIN — Medication 400 MILLIGRAM(S): at 16:35

## 2023-12-24 RX ADMIN — HEPARIN SODIUM 15 UNIT(S)/HR: 5000 INJECTION INTRAVENOUS; SUBCUTANEOUS at 19:23

## 2023-12-24 RX ADMIN — PIPERACILLIN AND TAZOBACTAM 25 GRAM(S): 4; .5 INJECTION, POWDER, LYOPHILIZED, FOR SOLUTION INTRAVENOUS at 14:50

## 2023-12-24 RX ADMIN — Medication 1 TABLET(S): at 16:41

## 2023-12-24 RX ADMIN — PIPERACILLIN AND TAZOBACTAM 25 GRAM(S): 4; .5 INJECTION, POWDER, LYOPHILIZED, FOR SOLUTION INTRAVENOUS at 22:41

## 2023-12-24 RX ADMIN — Medication 200 MILLIGRAM(S): at 05:58

## 2023-12-24 RX ADMIN — Medication 1 TABLET(S): at 09:02

## 2023-12-24 RX ADMIN — ONDANSETRON 4 MILLIGRAM(S): 8 TABLET, FILM COATED ORAL at 14:50

## 2023-12-24 RX ADMIN — PIPERACILLIN AND TAZOBACTAM 25 GRAM(S): 4; .5 INJECTION, POWDER, LYOPHILIZED, FOR SOLUTION INTRAVENOUS at 06:00

## 2023-12-24 RX ADMIN — Medication 2 DROP(S): at 22:41

## 2023-12-24 RX ADMIN — Medication 5 MILLIGRAM(S): at 22:41

## 2023-12-24 RX ADMIN — Medication 200 MILLIGRAM(S): at 17:43

## 2023-12-24 RX ADMIN — Medication 40 MILLIGRAM(S): at 05:58

## 2023-12-24 RX ADMIN — HEPARIN SODIUM 15 UNIT(S)/HR: 5000 INJECTION INTRAVENOUS; SUBCUTANEOUS at 16:34

## 2023-12-24 NOTE — PROGRESS NOTE ADULT - SUBJECTIVE AND OBJECTIVE BOX
Subjective: "I feel better today. I can breathe better and my appetite is better"     Vital Signs:  Vital Signs Last 24 Hrs  T(C): 36.8 (12-24-23 @ 03:59), Max: 39.5 (12-23-23 @ 16:04)  T(F): 98.3 (12-24-23 @ 03:59), Max: 103.1 (12-23-23 @ 16:04)  HR: 108 (12-24-23 @ 03:59) (87 - 113)  BP: 135/90 (12-24-23 @ 03:59) (135/90 - 158/108)  RR: 18 (12-24-23 @ 03:59) (17 - 18)  SpO2: 99% (12-24-23 @ 03:59) (95% - 100%) on (O2)    Telemetry/Alarms: ST to 120s overnight  Relevant labs, radiology and Medications reviewed           CXR with left loculated pleural effusion.              10.5   8.64  )-----------( 371      ( 24 Dec 2023 05:38 )             31.4     12-24    129<L>  |  95<L>  |  18  ----------------------------<  163<H>  5.1   |  16<L>  |  1.00    Ca    8.5      24 Dec 2023 05:38  Phos  3.6     12-24  Mg     2.00     12-24    TPro  7.9  /  Alb  3.0<L>  /  TBili  0.6  /  DBili  x   /  AST  100<H>  /  ALT  88<H>  /  AlkPhos  86  12-23    PT/INR - ( 24 Dec 2023 05:38 )   PT: 17.0 sec;   INR: 1.54 ratio         PTT - ( 24 Dec 2023 05:38 )  PTT:69.1 sec  MEDICATIONS  (STANDING):  acetaminophen   IVPB .. 1000 milliGRAM(s) IV Intermittent once  ciprofloxacin  0.3% Ophthalmic Solution for Otic Use 2 Drop(s) Both Ears two times a day  hydroxychloroquine 200 milliGRAM(s) Oral two times a day  lactobacillus acidophilus 1 Tablet(s) Oral three times a day with meals  lidocaine   4% Patch 1 Patch Transdermal daily  methylPREDNISolone sodium succinate Injectable 40 milliGRAM(s) IV Push daily  piperacillin/tazobactam IVPB.. 3.375 Gram(s) IV Intermittent every 8 hours    MEDICATIONS  (PRN):  albuterol/ipratropium for Nebulization 3 milliLiter(s) Nebulizer every 6 hours PRN Shortness of Breath and/or Wheezing  guaiFENesin Oral Liquid (Sugar-Free) 200 milliGRAM(s) Oral every 6 hours PRN Cough  lidocaine 2% Viscous 5 milliLiter(s) Swish and Spit three times a day PRN Mouth Care    General: WD NAD  Neurology: A&Ox3, nonfocal, BROWN x 4  Eyes: PERRLA/ EOMI, Gross vision intact  ENT/Neck: Neck supple, trachea midline, No JVD, Gross hearing intact  Respiratory: CTA B/L, No wheezing, rales, rhonchi  CV: RRR, S1S2, no murmurs, rubs or gallops + tachycardia  Abdominal: Soft, NT, ND +BS, + BM yesterday. Voiding.   Extremities: No edema, + peripheral pulses  Skin: No Rashes, Hematoma, Ecchymosis    I&O's Summary    23 Dec 2023 07:01  -  24 Dec 2023 07:00  --------------------------------------------------------  IN: 915 mL / OUT: 1975 mL / NET: -1060 mL    Social: Pt is a student  Not   No smoking. NO ETOH    Assessment  28y Male  w/ PAST MEDICAL & SURGICAL HISTORY:  LE (lupus erythematosus)      Pulmonary embolism      No significant past surgical history    Assessment  28y Male with recent diagnosis of Lupus ( 9/2023) admitted 12/12 with B/L Pulmonary embolism. Now with increased dyspnea, pleuritic chest pain,  hypoxia, and fever. CXR shows increased left pleural effusion. Bedside US shows large left septated effusion anterior, lateral and posterior. Right: smalll effusion simple appearing. Cardiac + pericardial effusion noted. LE Doppler Neg for DVT. GI PCR + e coli, mRSA PCR + Staph aureus.  12/23 - plan for L PTC at bedside however INR too high. Pt seen by Rheum, Heme/Onc, Medicine, ID. Cont to be febrile to 103. Cultures sent. Pt to cont on Zosyn only per ID> Started on Steroids. Mult bld tests ordered for continued w/u. Given Vitamin K x 1 to reduce INR.       PLAN  Neuro: Pain management + tremor to hands/upper extremities. Per pt, been ongoing for weeks. Possible Rigors.   Pulm: Encourage coughing, deep breathing and use of incentive spirometry. Wean off supplemental oxygen as able. Daily CXR. Plan left sided Thoracentesis today. Continue steroids per Rheum  Cardio: Monitor telemetry/alarms. Pt ST. Likely from fevers. Will hold Hep gtt this am for planned Thora today. Will resume once completed. MOnitor tele. Pt hypertensive, will monitor b/p.   GI: Tolerating diet. Continue stool softeners.  Renal: monitor urine output, supplement electrolytes as needed  Vasc: Heparin gtt for recent PE. Holding for procedure today.   Heme: Stable H/H. . INR improved to 1.5.   ID: Cont Zosyn. F/u cultures. Trend fevers.   Therapy: OOB/ambulate  Disposition: Aim to D/C to home once medically stable.   Discussed with Cardiothoracic Team at AM rounds.

## 2023-12-24 NOTE — PROGRESS NOTE ADULT - ASSESSMENT
This is a 29 y/o M new diagnosis of SLE (9/23, started on hydroxychloroquine) initially admitted to [facility] on 12/12 w/ CP and SOB, found to have RUL and LLL segmental/subsegmental PE, started on heparin, c/f superimposed PNA, on Zosyn. Pt with b/l effusions, L > R with loculations, transferred to [facility] for thoracic evaluation. Pt with persistent fevers despite Zosyn from 12/14/23.  ID now consulted for persistent fevers     Work up:  Had leukocytosis, now resolved, received decadron 12/22  Fever to 102.9  Elevated LFTs  UA no pyuria  RVP negative  Strep Pneumo Ag, legionella, mycoplasma IgM negative  MRSA PCR negative  EPEC+ 12/15 - diarrhea has since resolved    Imaging:   CTA chest 12/12: Acute right upper lobe and left lower lobe segmental/subsegmental pulmonary emboli. No CT evidence of right heart strain. New bilateral lower lobe consolidations with areas of central clearing. Pneumonia and pulmonary infarcts are in the differential. New bilateral pleural effusions, small on the left and trace on the right. Bilateral axillary and supraclavicular adenopathy of unclear etiology.  TTE 12/12: grossly wnl  CXR 12/20: Large left pleural effusion and compressive atelectasis. Trace right effusion and linear atelectasis in the right midlung  CT chest 12/23: Moderate pleural effusions, loculated on the left, new since 12/12/2023. New nonspecific the very small peripheral groundglass in the right upper lobe  CT neck 12/23: Small level 1 and level 2 and level 5 lymph nodes without significant enlargement. No drainable collections    Micro:  Blood cultures (12/12, 12/14, 12/21): no growth  Stool Cx (12/15): no growth, GI PCR +EPEC  Sputum (12/20): normal [place]    Abx:  Zosyn (12/14-)  Vanco (12/23-)  Azithro (12/14-12/16, 12/20)    #Fever  #Pleural effusions  #Possible superimposed pneumonia  #Pulmonary embolism  #Lymphadenopathy  #Known SLE    Overall 29 y/o M w/ SLE on Hydroxychloroquine admitted to [facility] for b/l PE w/ superimposed PNA, transferred to [facility] on 12/22 for thoracic eval of b/l L > R effusions, L loculated. Pt was Zosyn since 12/14, started on Vancomycin here. Despite board therapy with Zosyn, pt continues to have fevers to 102. No leukocytosis.   Fevers if 2/2 PNA should have responded w/ 10 day course of Zosyn, MRSA swab was negative, Vancomycin likely not necessary.   May be 2/2 SLE flare rather than infectious process, however would continue with Zosyn for now pending R thoracentesis and studies.   Pt improved today, likely 2/2 initiation of steroids     Plan:   - C/w Zosyn 3.375 g q8  - F/u pending cultures   - If draining pleural fluid, please send for cell count, LDH, protein, glucose, bacterial culture, fungal culture  - No ID contraindication at this time for steroid if needed    Thank you for this consult. Inpatient ID team will follow.    Logna Ku M.D.  Attending Physician  Division of Infectious Diseases  Department of Medicine    Please contact through MS Teams message.  Office: [phone] (after 5 PM or weekend). This is a 27 y/o M new diagnosis of SLE (9/23, started on hydroxychloroquine) initially admitted to [facility] on 12/12 w/ CP and SOB, found to have RUL and LLL segmental/subsegmental PE, started on heparin, c/f superimposed PNA, on Zosyn. Pt with b/l effusions, L > R with loculations, transferred to [facility] for thoracic evaluation. Pt with persistent fevers despite Zosyn from 12/14/23.  ID now consulted for persistent fevers     Work up:  Had leukocytosis, now resolved, received decadron 12/22  Fever to 102.9  Elevated LFTs  UA no pyuria  RVP negative  Strep Pneumo Ag, legionella, mycoplasma IgM negative  MRSA PCR negative  EPEC+ 12/15 - diarrhea has since resolved    Imaging:   CTA chest 12/12: Acute right upper lobe and left lower lobe segmental/subsegmental pulmonary emboli. No CT evidence of right heart strain. New bilateral lower lobe consolidations with areas of central clearing. Pneumonia and pulmonary infarcts are in the differential. New bilateral pleural effusions, small on the left and trace on the right. Bilateral axillary and supraclavicular adenopathy of unclear etiology.  TTE 12/12: grossly wnl  CXR 12/20: Large left pleural effusion and compressive atelectasis. Trace right effusion and linear atelectasis in the right midlung  CT chest 12/23: Moderate pleural effusions, loculated on the left, new since 12/12/2023. New nonspecific the very small peripheral groundglass in the right upper lobe  CT neck 12/23: Small level 1 and level 2 and level 5 lymph nodes without significant enlargement. No drainable collections    Micro:  Blood cultures (12/12, 12/14, 12/21): no growth  Stool Cx (12/15): no growth, GI PCR +EPEC  Sputum (12/20): normal [place]    Abx:  Zosyn (12/14-)  Vanco (12/23-)  Azithro (12/14-12/16, 12/20)    #Fever  #Pleural effusions  #Possible superimposed pneumonia  #Pulmonary embolism  #Lymphadenopathy  #Known SLE    Overall 27 y/o M w/ SLE on Hydroxychloroquine admitted to [facility] for b/l PE w/ superimposed PNA, transferred to [facility] on 12/22 for thoracic eval of b/l L > R effusions, L loculated. Pt was Zosyn since 12/14, started on Vancomycin here. Despite board therapy with Zosyn, pt continues to have fevers to 102. No leukocytosis.   Fevers if 2/2 PNA should have responded w/ 10 day course of Zosyn, MRSA swab was negative, Vancomycin likely not necessary.   May be 2/2 SLE flare rather than infectious process, however would continue with Zosyn for now pending R thoracentesis and studies.   Pt improved today, likely 2/2 initiation of steroids     Plan:   - C/w Zosyn 3.375 g q8  - F/u pending cultures   - If draining pleural fluid, please send for cell count, LDH, protein, glucose, bacterial culture, fungal culture  - No ID contraindication at this time for steroid if needed    Thank you for this consult. Inpatient ID team will follow.    Logan Ku M.D.  Attending Physician  Division of Infectious Diseases  Department of Medicine    Please contact through MS Teams message.  Office: [phone] (after 5 PM or weekend).

## 2023-12-24 NOTE — PROGRESS NOTE ADULT - SUBJECTIVE AND OBJECTIVE BOX
INTERVAL HPI/OVERNIGHT EVENTS:  Patient S&E at bedside. No o/n events,     VITAL SIGNS:  T(F): 100.3 (12-24-23 @ 12:54)  HR: 106 (12-24-23 @ 12:54)  BP: 141/85 (12-24-23 @ 12:54)  RR: 18 (12-24-23 @ 12:54)  SpO2: 98% (12-24-23 @ 12:54)  Wt(kg): --    PHYSICAL EXAM:    Constitutional: NAD  Eyes: EOMI, sclera non-icteric  Neck: supple, no masses, no JVD  Respiratory: CTA b/l, good air entry b/l  Cardiovascular: RRR, no M/R/G  Gastrointestinal: soft, NTND, no masses palpable, + BS, no hepatosplenomegaly  Extremities: no c/c/e  Neurological: AAOx3      MEDICATIONS  (STANDING):  ciprofloxacin  0.3% Ophthalmic Solution for Otic Use 2 Drop(s) Both Ears two times a day  hydroxychloroquine 200 milliGRAM(s) Oral two times a day  lactobacillus acidophilus 1 Tablet(s) Oral three times a day with meals  lidocaine   4% Patch 1 Patch Transdermal daily  methylPREDNISolone sodium succinate Injectable 40 milliGRAM(s) IV Push daily  ondansetron Injectable 4 milliGRAM(s) IV Push once  piperacillin/tazobactam IVPB.. 3.375 Gram(s) IV Intermittent every 8 hours    MEDICATIONS  (PRN):  albuterol/ipratropium for Nebulization 3 milliLiter(s) Nebulizer every 6 hours PRN Shortness of Breath and/or Wheezing  guaiFENesin Oral Liquid (Sugar-Free) 200 milliGRAM(s) Oral every 6 hours PRN Cough  lidocaine 2% Viscous 5 milliLiter(s) Swish and Spit three times a day PRN Mouth Care      Allergies    No Known Allergies    Intolerances        LABS:                        10.5   8.64  )-----------( 371      ( 24 Dec 2023 05:38 )             31.4     12-24    129<L>  |  95<L>  |  18  ----------------------------<  163<H>  5.1   |  16<L>  |  1.00    Ca    8.5      24 Dec 2023 05:38  Phos  3.6     12-24  Mg     2.00     12-24    TPro  7.9  /  Alb  3.0<L>  /  TBili  0.6  /  DBili  x   /  AST  100<H>  /  ALT  88<H>  /  AlkPhos  86  12-23    PT/INR - ( 24 Dec 2023 05:38 )   PT: 17.0 sec;   INR: 1.54 ratio         PTT - ( 24 Dec 2023 05:38 )  PTT:69.1 sec  Urinalysis Basic - ( 24 Dec 2023 05:38 )    Color: x / Appearance: x / SG: x / pH: x  Gluc: 163 mg/dL / Ketone: x  / Bili: x / Urobili: x   Blood: x / Protein: x / Nitrite: x   Leuk Esterase: x / RBC: x / WBC x   Sq Epi: x / Non Sq Epi: x / Bacteria: x        RADIOLOGY & ADDITIONAL TESTS:  Studies reviewed.   INTERVAL HPI/OVERNIGHT EVENTS:  Patient S&E at bedside. No o/n events, feeling much better since starting steroids    VITAL SIGNS:  T(F): 100.3 (12-24-23 @ 12:54)  HR: 106 (12-24-23 @ 12:54)  BP: 141/85 (12-24-23 @ 12:54)  RR: 18 (12-24-23 @ 12:54)  SpO2: 98% (12-24-23 @ 12:54)  Wt(kg): --    PHYSICAL EXAM:    Constitutional: NAD  Eyes: EOMI, sclera non-icteric  Neck: supple, no masses, no JVD  Respiratory: CTA b/l, good air entry b/l  Cardiovascular: RRR, no M/R/G  Gastrointestinal: soft, NTND, no masses palpable, + BS, no hepatosplenomegaly  Extremities: no c/c/e  Neurological: AAOx3      MEDICATIONS  (STANDING):  ciprofloxacin  0.3% Ophthalmic Solution for Otic Use 2 Drop(s) Both Ears two times a day  hydroxychloroquine 200 milliGRAM(s) Oral two times a day  lactobacillus acidophilus 1 Tablet(s) Oral three times a day with meals  lidocaine   4% Patch 1 Patch Transdermal daily  methylPREDNISolone sodium succinate Injectable 40 milliGRAM(s) IV Push daily  ondansetron Injectable 4 milliGRAM(s) IV Push once  piperacillin/tazobactam IVPB.. 3.375 Gram(s) IV Intermittent every 8 hours    MEDICATIONS  (PRN):  albuterol/ipratropium for Nebulization 3 milliLiter(s) Nebulizer every 6 hours PRN Shortness of Breath and/or Wheezing  guaiFENesin Oral Liquid (Sugar-Free) 200 milliGRAM(s) Oral every 6 hours PRN Cough  lidocaine 2% Viscous 5 milliLiter(s) Swish and Spit three times a day PRN Mouth Care      Allergies    No Known Allergies    Intolerances        LABS:                        10.5   8.64  )-----------( 371      ( 24 Dec 2023 05:38 )             31.4     12-24    129<L>  |  95<L>  |  18  ----------------------------<  163<H>  5.1   |  16<L>  |  1.00    Ca    8.5      24 Dec 2023 05:38  Phos  3.6     12-24  Mg     2.00     12-24    TPro  7.9  /  Alb  3.0<L>  /  TBili  0.6  /  DBili  x   /  AST  100<H>  /  ALT  88<H>  /  AlkPhos  86  12-23    PT/INR - ( 24 Dec 2023 05:38 )   PT: 17.0 sec;   INR: 1.54 ratio         PTT - ( 24 Dec 2023 05:38 )  PTT:69.1 sec  Urinalysis Basic - ( 24 Dec 2023 05:38 )    Color: x / Appearance: x / SG: x / pH: x  Gluc: 163 mg/dL / Ketone: x  / Bili: x / Urobili: x   Blood: x / Protein: x / Nitrite: x   Leuk Esterase: x / RBC: x / WBC x   Sq Epi: x / Non Sq Epi: x / Bacteria: x        RADIOLOGY & ADDITIONAL TESTS:  Studies reviewed.

## 2023-12-24 NOTE — PROGRESS NOTE ADULT - ASSESSMENT
28 years old male with h/o Lupus ( diagnosed in 09/2023, on Plaquenil present to [place] ED on 12/12/23  with complain of chest pain and SOB. Patient reported left sided pleuritic chest pain which started 3 days ago. Pain is progressively worsened, associated with SOB and cough. Patient was seen in OSH ED and was prescribed antibiotics. Patient reported significantly worsening of left sided pleuritic chest pain today. No fever or chills. Patient reported loss of appetite and had a few episode of diarrhea for last 2 days. CTA chest with acute right upper lobe and left lower lobe segmental/subsegmental pulmonary emboli. No CT evidence of right heart strain. New bilateral lower lobe consolidations with areas of central clearing. Pneumonia and pulmonary infarcts are in the differential. New bilateral pleural effusions, small on the left and trace on the right. Bilateral axillary and supraclavicular adenopathy of unclear etiology. Patient was started on heparin ggt transitioned to eliquis on 12/19,  patient with worsening SOB/ hypoxia requiring nasal cannula oxygen supplementation. 12/20  Repeat Xray shows increased moderate left pleural effusion and compressive atelectasis trace right effusion and linear atelectasis. Thoracic team consulted for possible pigtail insertion Bedside US: Large left effusion with septations. Right simple effusion , + pericardial effusion- lower extremity dopplers negative for DVT.    # Pulm effusion/ Fever   Plan for thoracentesis  cont to hold heprin   TS care appreciated   O2 supplement   monitor output   Zosyn for now     #PE   on heparin , resume after thoracentesis   DVT negative  Heme onceval   belloley lupus related     #Hxof lupus  on hydroxychloroquine   Heme eval called   Rheum eval   steroids per rheum     DVT andgIPPX 28 years old male with h/o Lupus ( diagnosed in 09/2023, on Plaquenil present to [place] ED on 12/12/23  with complain of chest pain and SOB. Patient reported left sided pleuritic chest pain which started 3 days ago. Pain is progressively worsened, associated with SOB and cough. Patient was seen in OSH ED and was prescribed antibiotics. Patient reported significantly worsening of left sided pleuritic chest pain today. No fever or chills. Patient reported loss of appetite and had a few episode of diarrhea for last 2 days. CTA chest with acute right upper lobe and left lower lobe segmental/subsegmental pulmonary emboli. No CT evidence of right heart strain. New bilateral lower lobe consolidations with areas of central clearing. Pneumonia and pulmonary infarcts are in the differential. New bilateral pleural effusions, small on the left and trace on the right. Bilateral axillary and supraclavicular adenopathy of unclear etiology. Patient was started on heparin ggt transitioned to eliquis on 12/19,  patient with worsening SOB/ hypoxia requiring nasal cannula oxygen supplementation. 12/20  Repeat Xray shows increased moderate left pleural effusion and compressive atelectasis trace right effusion and linear atelectasis. Thoracic team consulted for possible pigtail insertion Bedside US: Large left effusion with septations. Right simple effusion , + pericardial effusion- lower extremity dopplers negative for DVT.    # Pulm effusion/ Fever   Plan for thoracentesis  cont to hold heprin   TS care appreciated   O2 supplement   monitor output   Zosyn for now     #PE   on heparin , resume after thoracentesis   DVT negative  Heme onceval   belloley lupus related     #Hxof lupus  on hydroxychloroquine   Heme eval called   Rheum eval   steroids per rheum     DVT andgIPPX

## 2023-12-24 NOTE — CHART NOTE - NSCHARTNOTEFT_GEN_A_CORE
[facility] [employer] ACP COVERAGE    Prelim CXR: INTERPRETATION:  Moderate left loculated effusion. Small riight effusion.   No pneumothorax.    Heparin gtt resumed at 15cc/hr based on last therapeutic value. Repeat ptt in 6 hrs.    RADHA Burgos PA-C  [id number] [facility] MEDICINE ACP COVERAGE    Prelim CXR: INTERPRETATION:  Moderate left loculated effusion. Small riight effusion.   No pneumothorax.    Heparin gtt resumed at 15cc/hr based on last therapeutic value. Repeat ptt in 6 hrs.    RADHA Burgos PA-C  [id number]

## 2023-12-24 NOTE — PROGRESS NOTE ADULT - SUBJECTIVE AND OBJECTIVE BOX
TAYLA MARIE  [id number]    Subjective:  started on IV solumedrol, s/p 2 doses. Reports feeling much better, about 50% (energy and including pleuritic chest pain).   Was afebrile early AM, however spiked fever of 104.1 and was noted to be tachycardic.   Undergoing diagnostic thoracentesis today.     Objective:   Vital Signs Last 24 Hrs  T(C): 39.3 (24 Dec 2023 10:30), Max: 40.1 (24 Dec 2023 09:00)  T(F): 102.8 (24 Dec 2023 10:30), Max: 104.1 (24 Dec 2023 09:00)  HR: 104 (24 Dec 2023 10:30) (87 - 119)  BP: 150/88 (24 Dec 2023 10:30) (135/90 - 161/69)  BP(mean): --  RR: 18 (24 Dec 2023 09:00) (17 - 18)  SpO2: 93% (24 Dec 2023 09:00) (93% - 100%)    Parameters below as of 24 Dec 2023 09:00  Patient On (Oxygen Delivery Method): room air      Physical Exam:  General: NAD  HEENT: EOMI, +ulcers on palate   CVS: tachycardic  Resp: Decreased breath sounds bilaterally in lower lung fields   GI: non-distended   MSK: no synovitis   Neuro: AAOx3  Skin: no longer has rash on back of head (healing), however has dark/purple flat lesions on bilateral hands. No tender or itchy       LABS:  cret                        10.5   8.64  )-----------( 371      ( 24 Dec 2023 05:38 )             31.4     12-24    129<L>  |  95<L>  |  18  ----------------------------<  163<H>  5.1   |  16<L>  |  1.00    Ca    8.5      24 Dec 2023 05:38  Phos  3.6     12-24  Mg     2.00     12-24    TPro  7.9  /  Alb  3.0<L>  /  TBili  0.6  /  DBili  x   /  AST  100<H>  /  ALT  88<H>  /  AlkPhos  86  12-23    PT/INR - ( 24 Dec 2023 05:38 )   PT: 17.0 sec;   INR: 1.54 ratio         PTT - ( 24 Dec 2023 05:38 )  PTT:69.1 sec          Rheumatology Work Up    C3 Complement, Serum: 83 mg/dL (12.24.23 @ 05:38)  C4 Complement, Serum: 13 mg/dL (12.24.23 @ 05:38)  C3 Complement, Serum: 79 mg/dL *L* [81 - 157] (12-14-23 @ 07:00)  C4 Complement, Serum: 12 mg/dL *L* [13 - 39] (12-14-23 @ 07:00)  Anti Nuclear Factor Titer: 1:1280 *!* [Antinuclear AB (ABDIAS), IFA Method] (12-14-23 @ 07:00)  Double Stranded DNA Antibody: 15 IU/mL [Method: EIA            Reference Ranges            Interpretation            <= 29    IU/mL     Negative            30 - 75  IU/mL     Borderline            > 75      IU/mL     Positive] (12-14-23 @ 07:00    RADIOLOGY & ADDITIONAL STUDIES:    < from: CT Chest w/ IV Cont (12.23.23 @ 13:03) >  IMPRESSION:    Moderate pleural effusions, loculated on the left, new since 12/12/2023.    New nonspecific the very small peripheral groundglass in the right upper   lobe.    < end of copied text >    < from: CT Angio Chest PE Protocol w/ IV Cont (12.12.23 @ 10:27) >  IMPRESSION:    Acute right upper lobe and left lower lobe segmental/subsegmental   pulmonary emboli. No CT evidence of right heart strain.    New bilateral lower lobe consolidations with areas of central clearing.   Pneumonia and pulmonary infarcts are in the differential. New bilateral   pleural effusions, small on the left and trace on the right. Follow to   resolution with repeat chest CT in one month, or sooner as clinically   warranted.    Bilateral axillary and supraclavicular adenopathy of unclear etiology.   Consider broad differential including infectious, inflammatory, and   neoplastic etiologies.    < end of copied text >     TAYLA MARIE  [id number]    Subjective:  started on IV solumedrol, s/p 2 doses. Reports feeling much better, about 50% (energy and including pleuritic chest pain).   Was afebrile early AM, however spiked fever of 104.1 and was noted to be tachycardic.   Undergoing diagnostic thoracentesis today.     Objective:   Vital Signs Last 24 Hrs  T(C): 39.3 (24 Dec 2023 10:30), Max: 40.1 (24 Dec 2023 09:00)  T(F): 102.8 (24 Dec 2023 10:30), Max: 104.1 (24 Dec 2023 09:00)  HR: 104 (24 Dec 2023 10:30) (87 - 119)  BP: 150/88 (24 Dec 2023 10:30) (135/90 - 161/69)  BP(mean): --  RR: 18 (24 Dec 2023 09:00) (17 - 18)  SpO2: 93% (24 Dec 2023 09:00) (93% - 100%)    Parameters below as of 24 Dec 2023 09:00  Patient On (Oxygen Delivery Method): room air      Physical Exam:  General: NAD  HEENT: EOMI, +ulcers on palate   CVS: tachycardic  Resp: Decreased breath sounds bilaterally in lower lung fields   GI: non-distended   MSK: no synovitis   Neuro: AAOx3  Skin: no longer has rash on back of head (healing), however has dark/purple flat lesions on bilateral hands. No tender or itchy       LABS:  cret                        10.5   8.64  )-----------( 371      ( 24 Dec 2023 05:38 )             31.4     12-24    129<L>  |  95<L>  |  18  ----------------------------<  163<H>  5.1   |  16<L>  |  1.00    Ca    8.5      24 Dec 2023 05:38  Phos  3.6     12-24  Mg     2.00     12-24    TPro  7.9  /  Alb  3.0<L>  /  TBili  0.6  /  DBili  x   /  AST  100<H>  /  ALT  88<H>  /  AlkPhos  86  12-23    PT/INR - ( 24 Dec 2023 05:38 )   PT: 17.0 sec;   INR: 1.54 ratio         PTT - ( 24 Dec 2023 05:38 )  PTT:69.1 sec          Rheumatology Work Up    C3 Complement, Serum: 83 mg/dL (12.24.23 @ 05:38)  C4 Complement, Serum: 13 mg/dL (12.24.23 @ 05:38)  C3 Complement, Serum: 79 mg/dL *L* [81 - 157] (12-14-23 @ 07:00)  C4 Complement, Serum: 12 mg/dL *L* [13 - 39] (12-14-23 @ 07:00)  Anti Nuclear Factor Titer: 1:1280 *!* [Antinuclear AB (ABDIAS), IFA Method] (12-14-23 @ 07:00)  Double Stranded DNA Antibody: 15 IU/mL [Method: EIA            Reference Ranges            Interpretation            <= 29    IU/mL     Negative            30 - 75  IU/mL     Borderline            > 75      IU/mL     Positive] (12-14-23 @ 07:00    RADIOLOGY & ADDITIONAL STUDIES:    < from: CT Chest w/ IV Cont (12.23.23 @ 13:03) >  IMPRESSION:    Moderate pleural effusions, loculated on the left, new since 12/12/2023.    New nonspecific the very small peripheral groundglass in the right upper   lobe.    < end of copied text >    < from: CT Angio Chest PE Protocol w/ IV Cont (12.12.23 @ 10:27) >  IMPRESSION:    Acute right upper lobe and left lower lobe segmental/subsegmental   pulmonary emboli. No CT evidence of right heart strain.    New bilateral lower lobe consolidations with areas of central clearing.   Pneumonia and pulmonary infarcts are in the differential. New bilateral   pleural effusions, small on the left and trace on the right. Follow to   resolution with repeat chest CT in one month, or sooner as clinically   warranted.    Bilateral axillary and supraclavicular adenopathy of unclear etiology.   Consider broad differential including infectious, inflammatory, and   neoplastic etiologies.    < end of copied text >     TAYLA MARIE  [id number]    Subjective:  started on IV solumedrol, s/p 2 doses. Reports feeling much better, about 50% (energy and including pleuritic chest pain).   Was afebrile early AM, however spiked fever of 104.1 and was noted to be tachycardic.   Undergoing diagnostic thoracentesis today.     Objective:   Vital Signs Last 24 Hrs  T(C): 39.3 (24 Dec 2023 10:30), Max: 40.1 (24 Dec 2023 09:00)  T(F): 102.8 (24 Dec 2023 10:30), Max: 104.1 (24 Dec 2023 09:00)  HR: 104 (24 Dec 2023 10:30) (87 - 119)  BP: 150/88 (24 Dec 2023 10:30) (135/90 - 161/69)  BP(mean): --  RR: 18 (24 Dec 2023 09:00) (17 - 18)  SpO2: 93% (24 Dec 2023 09:00) (93% - 100%)    Parameters below as of 24 Dec 2023 09:00  Patient On (Oxygen Delivery Method): room air      Physical Exam:  General: NAD  HEENT: EOMI, +ulcers on palate   CVS: tachycardic  Resp: Decreased breath sounds bilaterally in lower lung fields   GI: non-distended   MSK: no synovitis   Neuro: AAOx3  Skin: no longer has rash on back of head (healing), however has dark/purple flat lesions on bilateral hands. No tender or itchy       LABS:  cret                        10.5   8.64  )-----------( 371      ( 24 Dec 2023 05:38 )             31.4     12-24    129<L>  |  95<L>  |  18  ----------------------------<  163<H>  5.1   |  16<L>  |  1.00    Ca    8.5      24 Dec 2023 05:38  Phos  3.6     12-24  Mg     2.00     12-24    TPro  7.9  /  Alb  3.0<L>  /  TBili  0.6  /  DBili  x   /  AST  100<H>  /  ALT  88<H>  /  AlkPhos  86  12-23    PT/INR - ( 24 Dec 2023 05:38 )   PT: 17.0 sec;   INR: 1.54 ratio         PTT - ( 24 Dec 2023 05:38 )  PTT:69.1 sec          Rheumatology Work Up    C3 Complement, Serum: 83 mg/dL (12.24.23 @ 05:38)  C4 Complement, Serum: 13 mg/dL (12.24.23 @ 05:38)  C3 Complement, Serum: 79 mg/dL *L* [81 - 157] (12-14-23 @ 07:00)  C4 Complement, Serum: 12 mg/dL *L* [13 - 39] (12-14-23 @ 07:00)  Anti Nuclear Factor Titer: 1:1280 *!* [Antinuclear AB (ABDIAS), IFA Method] (12-14-23 @ 07:00)  Double Stranded DNA Antibody: 15 IU/mL [Method: EIA            Reference Ranges            Interpretation            <= 29    IU/mL     Negative            30 - 75  IU/mL     Borderline            > 75      IU/mL     Positive] (12-14-23 @ 07:00        RADIOLOGY & ADDITIONAL STUDIES:    < from: CT Chest w/ IV Cont (12.23.23 @ 13:03) >  IMPRESSION:    Moderate pleural effusions, loculated on the left, new since 12/12/2023.    New nonspecific the very small peripheral groundglass in the right upper   lobe.    < end of copied text >    < from: CT Angio Chest PE Protocol w/ IV Cont (12.12.23 @ 10:27) >  IMPRESSION:    Acute right upper lobe and left lower lobe segmental/subsegmental   pulmonary emboli. No CT evidence of right heart strain.    New bilateral lower lobe consolidations with areas of central clearing.   Pneumonia and pulmonary infarcts are in the differential. New bilateral   pleural effusions, small on the left and trace on the right. Follow to   resolution with repeat chest CT in one month, or sooner as clinically   warranted.    Bilateral axillary and supraclavicular adenopathy of unclear etiology.   Consider broad differential including infectious, inflammatory, and   neoplastic etiologies.    < end of copied text >     TAYLA MARIE  [id number]    Subjective:  started on IV solumedrol, s/p 2 doses. Reports feeling much better, about 50% (energy and including pleuritic chest pain).   Was afebrile early AM, however spiked fever of 104.1 and was noted to be tachycardic.   Undergoing diagnostic thoracentesis today.     Objective:   Vital Signs Last 24 Hrs  T(C): 39.3 (24 Dec 2023 10:30), Max: 40.1 (24 Dec 2023 09:00)  T(F): 102.8 (24 Dec 2023 10:30), Max: 104.1 (24 Dec 2023 09:00)  HR: 104 (24 Dec 2023 10:30) (87 - 119)  BP: 150/88 (24 Dec 2023 10:30) (135/90 - 161/69)  BP(mean): --  RR: 18 (24 Dec 2023 09:00) (17 - 18)  SpO2: 93% (24 Dec 2023 09:00) (93% - 100%)    Parameters below as of 24 Dec 2023 09:00  Patient On (Oxygen Delivery Method): room air      Physical Exam:  General: NAD  HEENT: EOMI, +ulcers on palate   CVS: tachycardic  Resp: Decreased breath sounds bilaterally in lower lung fields   GI: non-distended   MSK: no synovitis   Neuro: AAOx3  Skin: no longer has rash on back of head (healing), however has dark/purple flat lesions on bilateral hands. No tender or itchy       LABS:  cret                        10.5   8.64  )-----------( 371      ( 24 Dec 2023 05:38 )             31.4     12-24    129<L>  |  95<L>  |  18  ----------------------------<  163<H>  5.1   |  16<L>  |  1.00    Ca    8.5      24 Dec 2023 05:38  Phos  3.6     12-24  Mg     2.00     12-24    TPro  7.9  /  Alb  3.0<L>  /  TBili  0.6  /  DBili  x   /  AST  100<H>  /  ALT  88<H>  /  AlkPhos  86  12-23    PT/INR - ( 24 Dec 2023 05:38 )   PT: 17.0 sec;   INR: 1.54 ratio         PTT - ( 24 Dec 2023 05:38 )  PTT:69.1 sec          Rheumatology Work Up    C3 Complement, Serum: 83 mg/dL (12.24.23 @ 05:38)  C4 Complement, Serum: 13 mg/dL (12.24.23 @ 05:38)  C3 Complement, Serum: 79 mg/dL *L* [81 - 157] (12-14-23 @ 07:00)  C4 Complement, Serum: 12 mg/dL *L* [13 - 39] (12-14-23 @ 07:00)  Anti Nuclear Factor Titer: 1:1280 *!* [Antinuclear AB (ABDIAS), IFA Method] (12-14-23 @ 07:00)  Double Stranded DNA Antibody: 15 IU/mL [Method: EIA            Reference Ranges            Interpretation            <= 29    IU/mL     Negative            30 - 75  IU/mL     Borderline            > 75      IU/mL     Positive] (12-14-23 @ 07:00        RADIOLOGY & ADDITIONAL STUDIES:    < from: CT Chest w/ IV Cont (12.23.23 @ 13:03) >  IMPRESSION:    Moderate pleural effusions, loculated on the left, new since 12/12/2023.    New nonspecific the very small peripheral groundglass in the right upper   lobe.    < end of copied text >    < from: CT Angio Chest PE Protocol w/ IV Cont (12.12.23 @ 10:27) >  IMPRESSION:    Acute right upper lobe and left lower lobe segmental/subsegmental   pulmonary emboli. No CT evidence of right heart strain.    New bilateral lower lobe consolidations with areas of central clearing.   Pneumonia and pulmonary infarcts are in the differential. New bilateral   pleural effusions, small on the left and trace on the right. Follow to   resolution with repeat chest CT in one month, or sooner as clinically   warranted.    Bilateral axillary and supraclavicular adenopathy of unclear etiology.   Consider broad differential including infectious, inflammatory, and   neoplastic etiologies.    < end of copied text >

## 2023-12-24 NOTE — PROGRESS NOTE ADULT - SUBJECTIVE AND OBJECTIVE BOX
Infectious Diseases Follow Up:    Patient is a 28y old  Male who presents with a chief complaint of Dyspnea and CP (23 Dec 2023 15:07)      Interval History/ROS:  Pt afebrile overnight, last fever at 8 pm.  Pt feeling much better today, off O2.     Allergies  No Known Allergies        ANTIMICROBIALS:  hydroxychloroquine 200 two times a day  piperacillin/tazobactam IVPB.. 3.375 every 8 hours      Current Abx:     Previous Abx     OTHER MEDS:  MEDICATIONS  (STANDING):  acetaminophen     Tablet .. 650 every 6 hours PRN  acetaminophen   IVPB .. 1000 once  albuterol/ipratropium for Nebulization 3 every 6 hours PRN  guaiFENesin Oral Liquid (Sugar-Free) 200 every 6 hours PRN  methylPREDNISolone sodium succinate Injectable 40 daily      Vital Signs Last 24 Hrs  T(C): 36.8 (24 Dec 2023 03:59), Max: 39.5 (23 Dec 2023 16:04)  T(F): 98.3 (24 Dec 2023 03:59), Max: 103.1 (23 Dec 2023 16:04)  HR: 108 (24 Dec 2023 03:59) (87 - 113)  BP: 135/90 (24 Dec 2023 03:59) (135/90 - 158/108)  BP(mean): --  RR: 18 (24 Dec 2023 03:59) (17 - 18)  SpO2: 99% (24 Dec 2023 03:59) (95% - 100%)    Parameters below as of 24 Dec 2023 03:59  Patient On (Oxygen Delivery Method): room air        PHYSICAL EXAM:  GENERAL: NAD, well-developed  HEAD:  Atraumatic, Normocephalic  EYES: EOMI, PERRLA, conjunctiva and sclera clear  NECK: Supple, No JVD  CHEST/LUNG: Clear to auscultation bilaterally; No wheeze  HEART: Regular rate and rhythm; No murmurs, rubs, or gallops  ABDOMEN: Soft, Nontender, Nondistended; Bowel sounds present  EXTREMITIES:  2+ Peripheral Pulses, No clubbing, cyanosis, or edema  PSYCH: AAOx3,                           10.5   8.64  )-----------( 371      ( 24 Dec 2023 05:38 )             31.4       12-24    129<L>  |  95<L>  |  18  ----------------------------<  163<H>  5.1   |  16<L>  |  1.00    Ca    8.5      24 Dec 2023 05:38  Phos  3.6     12-24  Mg     2.00     12-24    TPro  7.9  /  Alb  3.0<L>  /  TBili  0.6  /  DBili  x   /  AST  100<H>  /  ALT  88<H>  /  AlkPhos  86  12-23      Urinalysis Basic - ( 24 Dec 2023 05:38 )    Color: x / Appearance: x / SG: x / pH: x  Gluc: 163 mg/dL / Ketone: x  / Bili: x / Urobili: x   Blood: x / Protein: x / Nitrite: x   Leuk Esterase: x / RBC: x / WBC x   Sq Epi: x / Non Sq Epi: x / Bacteria: x        MICROBIOLOGY:  v  .Sputum Sputum  12-23-23 --  --    Few polymorphonuclear leukocytes per low power field  Moderate Squamous epithelial cells per low power field  Rare Gram Negative Rods per oil power field      .Blood Blood-Peripheral  12-21-23   No growth at 48 Hours  --  --      .Blood Blood-Peripheral  12-21-23   No growth at 48 Hours  --  --      .Sputum Sputum  12-20-23   Normal Respiratory Inge present  --    Few Squamous epithelial cells per low power field  Few polymorphonuclear leukocytes per low power field  Few Gram Positive Cocci in Pairs and Chains per oil power field      .Stool Feces  12-15-23   No enteric pathogens isolated.  (Stool culture examined for Salmonella,  Shigella, Campylobacter, Aeromonas, Plesiomonas,  Vibrio, E.coli O157 and Yersinia)  --  --      .Blood Blood-Peripheral  12-14-23   No growth at 5 days  --  --      .Blood Blood-Peripheral  12-14-23   No growth at 5 days  --  --      .Blood Blood-Peripheral  12-12-23   No growth at 5 days  --  --      .Blood Blood-Peripheral  12-12-23   No growth at 5 days  --  --          Rapid RVP Result: NotDetec (12-23 @ 14:34)  Rapid RVP Result: NotDetec (12-21 @ 18:50)        RADIOLOGY:

## 2023-12-24 NOTE — CHART NOTE - NSCHARTNOTEFT_GEN_A_CORE
Spoke with attending in regards to Heme/Onc recommendations of an additional Vitamin K 5mg IV. INR today is 1.52 and s/p thoracentesis. Will hold off on administering Vitamin K at this time. Spoke with medicine attending in regards to Heme/Onc recommendations of an additional Vitamin K 5mg PO. INR today is 1.52 and s/p thoracentesis. Will hold off on administering Vitamin K at this time.    Update: Spoke with Heme/Onc, okay to give Vitamin K 5mg PO. Repeat PT/INR & APTT to resume Hep Gtt once CXR is resulted.

## 2023-12-24 NOTE — PROGRESS NOTE ADULT - SUBJECTIVE AND OBJECTIVE BOX
Name of Patient : TAYLA MARIE  MRN: [id number]  Date of visit: 12-24-23       Subjective: Patient seen and examined. No new events except as noted.     REVIEW OF SYSTEMS:    CONSTITUTIONAL: No weakness, fevers or chills  EYES/ENT: No visual changes;  No vertigo or throat pain   NECK: No pain or stiffness  RESPIRATORY: No cough, wheezing, hemoptysis; No shortness of breath  CARDIOVASCULAR: No chest pain or palpitations  GASTROINTESTINAL: No abdominal or epigastric pain. No nausea, vomiting, or hematemesis; No diarrhea or constipation. No melena or hematochezia.  GENITOURINARY: No dysuria, frequency or hematuria  NEUROLOGICAL: No numbness or weakness  SKIN: No itching, burning, rashes, or lesions   All other review of systems is negative unless indicated above.    MEDICATIONS:  MEDICATIONS  (STANDING):  ciprofloxacin  0.3% Ophthalmic Solution for Otic Use 2 Drop(s) Both Ears two times a day  heparin  Infusion 1500 Unit(s)/Hr (15 mL/Hr) IV Continuous <Continuous>  hydroxychloroquine 200 milliGRAM(s) Oral two times a day  lactobacillus acidophilus 1 Tablet(s) Oral three times a day with meals  lidocaine   4% Patch 1 Patch Transdermal daily  methylPREDNISolone sodium succinate Injectable 40 milliGRAM(s) IV Push daily  phytonadione   Solution 5 milliGRAM(s) Oral once  piperacillin/tazobactam IVPB.. 3.375 Gram(s) IV Intermittent every 8 hours      PHYSICAL EXAM:  T(C): 39.4 (12-24-23 @ 16:11), Max: 40.1 (12-24-23 @ 09:00)  HR: 103 (12-24-23 @ 16:11) (87 - 119)  BP: 138/80 (12-24-23 @ 16:11) (135/90 - 161/69)  RR: 17 (12-24-23 @ 16:11) (17 - 18)  SpO2: 98% (12-24-23 @ 16:11) (93% - 100%)  Wt(kg): --  I&O's Summary    23 Dec 2023 07:01  -  24 Dec 2023 07:00  --------------------------------------------------------  IN: 915 mL / OUT: 1975 mL / NET: -1060 mL    24 Dec 2023 07:01  -  24 Dec 2023 18:28  --------------------------------------------------------  IN: 380 mL / OUT: 800 mL / NET: -420 mL          Appearance: Normal	  HEENT:  PERRLA   Lymphatic: No lymphadenopathy   Cardiovascular: Normal S1 S2, no JVD  Respiratory: normal effort , clear  Gastrointestinal:  Soft, Non-tender  Skin: No rashes,  warm to touch  Psychiatry:  Mood & affect appropriate  Musculuskeletal: No edema    recent labs, Imaging and EKGs personally reviewed     12-23-23 @ 07:01  -  12-24-23 @ 07:00  --------------------------------------------------------  IN: 915 mL / OUT: 1975 mL / NET: -1060 mL    12-24-23 @ 07:01  -  12-24-23 @ 18:28  --------------------------------------------------------  IN: 380 mL / OUT: 800 mL / NET: -420 mL                          10.5   8.64  )-----------( 371      ( 24 Dec 2023 05:38 )             31.4               12-24    129<L>  |  95<L>  |  18  ----------------------------<  163<H>  5.1   |  16<L>  |  1.00    Ca    8.5      24 Dec 2023 05:38  Phos  3.6     12-24  Mg     2.00     12-24    TPro  7.9  /  Alb  3.0<L>  /  TBili  0.6  /  DBili  x   /  AST  100<H>  /  ALT  88<H>  /  AlkPhos  86  12-23    PT/INR - ( 24 Dec 2023 05:38 )   PT: 17.0 sec;   INR: 1.54 ratio         PTT - ( 24 Dec 2023 05:38 )  PTT:69.1 sec                   Urinalysis Basic - ( 24 Dec 2023 05:38 )    Color: x / Appearance: x / SG: x / pH: x  Gluc: 163 mg/dL / Ketone: x  / Bili: x / Urobili: x   Blood: x / Protein: x / Nitrite: x   Leuk Esterase: x / RBC: x / WBC x   Sq Epi: x / Non Sq Epi: x / Bacteria: x               Name of Patient : TAYLA MARIE  MRN: [id number]  Date of visit: 12-24-23       Subjective: Patient seen and examined. No new events except as noted.     REVIEW OF SYSTEMS:    CONSTITUTIONAL: No weakness, fevers or chills  EYES/ENT: No visual changes;  No vertigo or throat pain   NECK: No pain or stiffness  RESPIRATORY: No cough, wheezing, hemoptysis; No shortness of breath  CARDIOVASCULAR: No chest pain or palpitations  GASTROINTESTINAL: No abdominal or epigastric pain. No nausea, vomiting, or hematemesis; No diarrhea or constipation. No melena or hematochezia.  GENITOURINARY: No dysuria, frequency or hematuria  NEUROLOGICAL: No numbness or weakness  SKIN: No itching, burning, rashes, or lesions   All other review of systems is negative unless indicated above.    MEDICATIONS:  MEDICATIONS  (STANDING):  ciprofloxacin  0.3% Ophthalmic Solution for Otic Use 2 Drop(s) Both Ears two times a day  heparin  Infusion 1500 Unit(s)/Hr (15 mL/Hr) IV Continuous <Continuous>  hydroxychloroquine 200 milliGRAM(s) Oral two times a day  lactobacillus acidophilus 1 Tablet(s) Oral three times a day with meals  lidocaine   4% Patch 1 Patch Transdermal daily  methylPREDNISolone sodium succinate Injectable 40 milliGRAM(s) IV Push daily  phytonadione   Solution 5 milliGRAM(s) Oral once  piperacillin/tazobactam IVPB.. 3.375 Gram(s) IV Intermittent every 8 hours      PHYSICAL EXAM:  T(C): 39.4 (12-24-23 @ 16:11), Max: 40.1 (12-24-23 @ 09:00)  HR: 103 (12-24-23 @ 16:11) (87 - 119)  BP: 138/80 (12-24-23 @ 16:11) (135/90 - 161/69)  RR: 17 (12-24-23 @ 16:11) (17 - 18)  SpO2: 98% (12-24-23 @ 16:11) (93% - 100%)  Wt(kg): --  I&O's Summary    23 Dec 2023 07:01  -  24 Dec 2023 07:00  --------------------------------------------------------  IN: 915 mL / OUT: 1975 mL / NET: -1060 mL    24 Dec 2023 07:01  -  24 Dec 2023 18:28  --------------------------------------------------------  IN: 380 mL / OUT: 800 mL / NET: -420 mL          Appearance: Normal	  HEENT:  PERRLA   Lymphatic: No lymphadenopathy   Cardiovascular: Normal S1 S2, no JVD  Respiratory: normal effort , clear  Gastrointestinal:  Soft, Non-tender  Skin: No rashes,  warm to touch  Psychiatry:  Mood & affect appropriate  Musculuskeletal: No edema    recent labs, Imaging and EKGs personally reviewed     12-23-23 @ 07:01  -  12-24-23 @ 07:00  --------------------------------------------------------  IN: 915 mL / OUT: 1975 mL / NET: -1060 mL    12-24-23 @ 07:01  -  12-24-23 @ 18:28  --------------------------------------------------------  IN: 380 mL / OUT: 800 mL / NET: -420 mL                          10.5   8.64  )-----------( 371      ( 24 Dec 2023 05:38 )             31.4               12-24    129<L>  |  95<L>  |  18  ----------------------------<  163<H>  5.1   |  16<L>  |  1.00    Ca    8.5      24 Dec 2023 05:38  Phos  3.6     12-24  Mg     2.00     12-24    TPro  7.9  /  Alb  3.0<L>  /  TBili  0.6  /  DBili  x   /  AST  100<H>  /  ALT  88<H>  /  AlkPhos  86  12-23    PT/INR - ( 24 Dec 2023 05:38 )   PT: 17.0 sec;   INR: 1.54 ratio         PTT - ( 24 Dec 2023 05:38 )  PTT:69.1 sec                   Urinalysis Basic - ( 24 Dec 2023 05:38 )    Color: x / Appearance: x / SG: x / pH: x  Gluc: 163 mg/dL / Ketone: x  / Bili: x / Urobili: x   Blood: x / Protein: x / Nitrite: x   Leuk Esterase: x / RBC: x / WBC x   Sq Epi: x / Non Sq Epi: x / Bacteria: x

## 2023-12-24 NOTE — PROGRESS NOTE ADULT - ASSESSMENT
27 yo Male with recent diagnosis of Lupus (9/2023) admitted 12/12 with B/L Pulmonary embolism. Now with increased dyspnea, pleuritic chest pain,  hypoxia, and fever. CXR shows increased left pleural effusion. Bedside US shows moderate left septated effusion anterior, lateral and posterior. Right: small effusion simple appearing. Cardiac and pericardial effusion noted (although recent echo was negative for pericardial effusion) Lower extremity dopplers negative for DVT. GI PCR + e coli. mRSA PCR + Staph aureus. Hematology consulted for hypercoagulable workup and correction of INR myke-procedurally.     CTA chest (12/12/23): Acute right upper lobe and left lower lobe   segmental/subsegmental pulmonary emboli. Main pulmonary artery size is   within normal limits. Normal caliber of the thoracic aorta. Imaged great   vessels are patent.  CT chest (12/23/23): new left located pleural effusion    Patient's mother endorses weight loss and poor po intake, which could have led to vitamin deficiencies such as Vit K. Will replete Vit K and recheck INR tomorrow. High INR can also occur in liver disease. Patient has elevated LFTs.  -INR improved to 1.54 with one dose of vitamin K supporting diagnosis of nutritional deficiency    Plan:   # Hypercoagulable workup  - Continue heparin ggt given high risk of coagulopathy, suspect APLS (anticardiolipin Ab positive x 1).  - Heparin held for thoracentesis on 12/24, but should resume post procedure given bilateral PE.  - Management of lupus per Rheumatology. Patient with decreased chest pain since starting steroid.  - Infectious workup and management per ID  - Hypercoagulable work up appropriate: obtain APLS workup (lupus anticoagulation profile, beta-2-glycoprotein).   - Can obtain Factor V Leiden, JAK2 mutation, and Prothrombin Gene Mutation.   - Would give another dose of vitamin K 5 mg po and monitor PT  - Please repeat TTE, given no pericardial effusion seen on TTE 12/11/23 with new ultrasound findings of pericardial effusion in the ED.   - Given high suspicion for APLS, would plan to discharge patient on warfarin with monitoring of INR setup outpatient. Would need bridge to warfarin f/ hep ggt with goal INR 2-3.  - Will set up with hematology at [facility] upon discharge. Will need a second set of labs to confirm diagnosis of APLS. 27 yo Male with recent diagnosis of Lupus (9/2023) admitted 12/12 with B/L Pulmonary embolism. Now with increased dyspnea, pleuritic chest pain,  hypoxia, and fever. CXR shows increased left pleural effusion. Bedside US shows moderate left septated effusion anterior, lateral and posterior. Right: small effusion simple appearing. Cardiac and pericardial effusion noted (although recent echo was negative for pericardial effusion) Lower extremity dopplers negative for DVT. GI PCR + e coli. mRSA PCR + Staph aureus. Hematology consulted for hypercoagulable workup and correction of INR myke-procedurally.     CTA chest (12/12/23): Acute right upper lobe and left lower lobe   segmental/subsegmental pulmonary emboli. Main pulmonary artery size is   within normal limits. Normal caliber of the thoracic aorta. Imaged great   vessels are patent.  CT chest (12/23/23): new left located pleural effusion    Patient's mother endorses weight loss and poor po intake, which could have led to vitamin deficiencies such as Vit K. Will replete Vit K and recheck INR tomorrow. High INR can also occur in liver disease. Patient has elevated LFTs.  -INR improved to 1.54 with one dose of vitamin K supporting diagnosis of nutritional deficiency    Plan:   # Hypercoagulable workup  - Continue heparin ggt given high risk of coagulopathy, suspect APLS (anticardiolipin Ab positive x 1).  - Heparin held for thoracentesis on 12/24, but should resume post procedure given bilateral PE.  - Management of lupus per Rheumatology. Patient with decreased chest pain since starting steroid.  - Infectious workup and management per ID  - Hypercoagulable work up appropriate: obtain APLS workup (lupus anticoagulation profile, beta-2-glycoprotein).   - Can obtain Factor V Leiden, JAK2 mutation, and Prothrombin Gene Mutation.   - Would give another dose of vitamin K 5 mg po and monitor PT  - Please repeat TTE, given no pericardial effusion seen on TTE 12/11/23 with new ultrasound findings of pericardial effusion in the ED.   - Given high suspicion for APLS, would plan to discharge patient on warfarin with monitoring of INR setup outpatient. Would need bridge to warfarin f/ hep ggt with goal INR 2-3.  - Will set up with hematology at [facility] upon discharge. Will need a second set of labs to confirm diagnosis of APLS.

## 2023-12-24 NOTE — PROGRESS NOTE ADULT - ATTENDING COMMENTS
This is a 29 y/o M new diagnosis of SLE (9/23, started on hydroxychloroquine) initially admitted to [facility] on 12/12 w/ CP and SOB, found to have RUL and LLL segmental/subsegmental PE, started on heparin, c/f superimposed PNA, on Zosyn. Pt with b/l effusions, L > R with loculations, transferred to [facility] for thoracic evaluation. Pt with persistent fevers despite Zosyn from 12/14/23.  ID now consulted for persistent fevers     Work up:  Had leukocytosis, now resolved, received decadron 12/22  Fever to 102.9  Elevated LFTs  UA no pyuria  RVP negative  Strep Pneumo Ag, legionella, mycoplasma IgM negative  MRSA PCR negative  EPEC+ 12/15 - diarrhea has since resolved    Imaging:   CTA chest 12/12: Acute right upper lobe and left lower lobe segmental/subsegmental pulmonary emboli. No CT evidence of right heart strain. New bilateral lower lobe consolidations with areas of central clearing. Pneumonia and pulmonary infarcts are in the differential. New bilateral pleural effusions, small on the left and trace on the right. Bilateral axillary and supraclavicular adenopathy of unclear etiology.  TTE 12/12: grossly wnl  CXR 12/20: Large left pleural effusion and compressive atelectasis. Trace right effusion and linear atelectasis in the right midlung  CT chest 12/23: Moderate pleural effusions, loculated on the left, new since 12/12/2023. New nonspecific the very small peripheral groundglass in the right upper lobe  CT neck 12/23: Small level 1 and level 2 and level 5 lymph nodes without significant enlargement. No drainable collections    Micro:  Blood cultures (12/12, 12/14, 12/21): no growth  Stool Cx (12/15): no growth, GI PCR +EPEC  Sputum (12/20): normal [place]    Abx:  Zosyn (12/14-)  Vanco (12/23-)  Azithro (12/14-12/16, 12/20)    #Fever  #Pleural effusions  #Possible superimposed pneumonia  #Pulmonary embolism  #Lymphadenopathy  #Known SLE    Overall 29 y/o M w/ SLE on Hydroxychloroquine admitted to [facility] for b/l PE w/ superimposed PNA, transferred to [facility] on 12/22 for thoracic eval of b/l L > R effusions, L loculated. Pt was Zosyn since 12/14, started on Vancomycin here. Despite board therapy with Zosyn, pt continues to have fevers to 102. No leukocytosis.   Fevers if 2/2 PNA should have responded w/ 10 day course of Zosyn, MRSA swab was negative, Vancomycin likely not necessary.   May be 2/2 SLE flare rather than infectious process, however would continue with Zosyn for now pending R thoracentesis and studies.     Plan:   - Can d/c Vancomycin at this time, c/w Zosyn 3.375 g q8  - F/u pending cultures (blood, urine, sputum - in lab)  - If draining pleural fluid, please send for cell count, LDH, protein, glucose, bacterial culture, fungal culture  - No ID contraindication at this time for steroid if needed    Thank you for this consult. Inpatient ID team will follow.    Logan Ku M.D.  Attending Physician  Division of Infectious Diseases  Department of Medicine    Please contact through MS Teams message.  Office: [phone] (after 5 PM or weekend) This is a 29 y/o M new diagnosis of SLE (9/23, started on hydroxychloroquine) initially admitted to [facility] on 12/12 w/ CP and SOB, found to have RUL and LLL segmental/subsegmental PE, started on heparin, c/f superimposed PNA, on Zosyn. Pt with b/l effusions, L > R with loculations, transferred to [facility] for thoracic evaluation. Pt with persistent fevers despite Zosyn from 12/14/23.  ID now consulted for persistent fevers     Work up:  Had leukocytosis, now resolved, received decadron 12/22  Fever to 102.9  Elevated LFTs  UA no pyuria  RVP negative  Strep Pneumo Ag, legionella, mycoplasma IgM negative  MRSA PCR negative  EPEC+ 12/15 - diarrhea has since resolved    Imaging:   CTA chest 12/12: Acute right upper lobe and left lower lobe segmental/subsegmental pulmonary emboli. No CT evidence of right heart strain. New bilateral lower lobe consolidations with areas of central clearing. Pneumonia and pulmonary infarcts are in the differential. New bilateral pleural effusions, small on the left and trace on the right. Bilateral axillary and supraclavicular adenopathy of unclear etiology.  TTE 12/12: grossly wnl  CXR 12/20: Large left pleural effusion and compressive atelectasis. Trace right effusion and linear atelectasis in the right midlung  CT chest 12/23: Moderate pleural effusions, loculated on the left, new since 12/12/2023. New nonspecific the very small peripheral groundglass in the right upper lobe  CT neck 12/23: Small level 1 and level 2 and level 5 lymph nodes without significant enlargement. No drainable collections    Micro:  Blood cultures (12/12, 12/14, 12/21): no growth  Stool Cx (12/15): no growth, GI PCR +EPEC  Sputum (12/20): normal [place]    Abx:  Zosyn (12/14-)  Vanco (12/23-)  Azithro (12/14-12/16, 12/20)    #Fever  #Pleural effusions  #Possible superimposed pneumonia  #Pulmonary embolism  #Lymphadenopathy  #Known SLE    Overall 29 y/o M w/ SLE on Hydroxychloroquine admitted to [facility] for b/l PE w/ superimposed PNA, transferred to [facility] on 12/22 for thoracic eval of b/l L > R effusions, L loculated. Pt was Zosyn since 12/14, started on Vancomycin here. Despite board therapy with Zosyn, pt continues to have fevers to 102. No leukocytosis.   Fevers if 2/2 PNA should have responded w/ 10 day course of Zosyn, MRSA swab was negative, Vancomycin likely not necessary.   May be 2/2 SLE flare rather than infectious process, however would continue with Zosyn for now pending R thoracentesis and studies.     Plan:   - Can d/c Vancomycin at this time, c/w Zosyn 3.375 g q8  - F/u pending cultures (blood, urine, sputum - in lab)  - If draining pleural fluid, please send for cell count, LDH, protein, glucose, bacterial culture, fungal culture  - No ID contraindication at this time for steroid if needed    Thank you for this consult. Inpatient ID team will follow.    Logan Ku M.D.  Attending Physician  Division of Infectious Diseases  Department of Medicine    Please contact through MS Teams message.  Office: [phone] (after 5 PM or weekend)

## 2023-12-24 NOTE — PROGRESS NOTE ADULT - ASSESSMENT
28 years old male with h/o Lupus ( diagnosed in 09/2023, on Plaquenil present to [place] ED on 12/12/23  with complain of chest pain and SOB, found to have bilateral acute PE and bilateral pleural effusions. Transferred to [facility] for CT surgery evaluation. Also with fevers, has been on broad spectrum abx with no improvement. Rheumatology consulted for evaluation of SLE flare.   was dx with SLE in Sept 2023 due to rash, oral ulcers, CP and dyspnea. Per pt's mother, underwent skin biopsy that confirmed lesions consistent with lupus. Has been on Plaquenil has monotherapy.   -At [place]: ABDIAS 1:1280, dsDNA 15, C3 79, C4 12.     Despite broad spectrum abx, pt continues to fever. Also noted to not have leukocytosis and unremarkable recent infectious workup. Fevers, pleural effusions, pleuritic chest pain, weight loss and poor PO intake could be manifestations of SLE flare  -CT Chest with moderate pleural effusions, loculated on L (new since 12/12) and new nonspecific very small peripheral GGO in RUL   Blood cultures (12/12, 12/14, 12/21): no growth  Stool Cx (12/15): no growth, GI PCR +EPEC  Sputum (12/20): normal [place]      # SLE, high clinical activity ( patchy alopecia, discoid lesions and erythematous rash, oral ulcers, serositis, fever),   -- Fevers likely due to lupus activity rather then infection. However does have elevated CRP (which is not typical in SLE) and loculated L pleural effusion. Will undergo diagnostic thoracentesis   -- However, does feel better with the start of IV solumedrol 40mg (started 12/23)   -- Bilateral pleural effusions/pleuritis - sms started in 9/2023 and progressed in last few weeks- highly suggestive that it is lupus related    # Bilateral Acute PE with pulmonary infarcts  - r/o APS - serological evaluation for APS- pending  -Currently on heparin drip   -Hematology following-working up for APS and causes of elevated PT-INR. At present have high suspicion for APS, will discharge with warfarin    Recommendations  - F/u GOLDEN, Sjogren's, dsDNA, and APS labs  - Will continue with IV solumedrol 40mg daily (okay with ID), however agree with complete infectious work up including dx thoracentesis with loculated L pleural effusion   - Hematology following, appreciate recs  - Will continue to follow     Discussed with Dr. Ulysses Landaverde MD   PGY-4  Reachable on TEAMS  Pager [phone]  Rheumatology Fellow     28 years old male with h/o Lupus ( diagnosed in 09/2023, on Plaquenil present to [place] ED on 12/12/23  with complain of chest pain and SOB, found to have bilateral acute PE and bilateral pleural effusions. Transferred to [facility] for CT surgery evaluation. Also with fevers, has been on broad spectrum abx with no improvement. Rheumatology consulted for evaluation of SLE flare.   was dx with SLE in Sept 2023 due to rash, oral ulcers, CP and dyspnea. Per pt's mother, underwent skin biopsy that confirmed lesions consistent with lupus. Has been on Plaquenil has monotherapy.   -At [place]: ABDIAS 1:1280, dsDNA 15, C3 79, C4 12.     Despite broad spectrum abx, pt continues to fever. Also noted to not have leukocytosis and unremarkable recent infectious workup. Fevers, pleural effusions, pleuritic chest pain, weight loss and poor PO intake could be manifestations of SLE flare  -CT Chest with moderate pleural effusions, loculated on L (new since 12/12) and new nonspecific very small peripheral GGO in RUL   Blood cultures (12/12, 12/14, 12/21): no growth  Stool Cx (12/15): no growth, GI PCR +EPEC  Sputum (12/20): normal [place]      # SLE, high clinical activity ( patchy alopecia, discoid lesions and erythematous rash, oral ulcers, serositis, fever),   -- Fevers likely due to lupus activity rather then infection. However does have elevated CRP (which is not typical in SLE) and loculated L pleural effusion. Will undergo diagnostic thoracentesis   -- However, does feel better with the start of IV solumedrol 40mg (started 12/23)   -- Bilateral pleural effusions/pleuritis - sms started in 9/2023 and progressed in last few weeks- highly suggestive that it is lupus related    # Bilateral Acute PE with pulmonary infarcts  - r/o APS - serological evaluation for APS- pending  -Currently on heparin drip   -Hematology following-working up for APS and causes of elevated PT-INR. At present have high suspicion for APS, will discharge with warfarin    Recommendations  - F/u GOLDEN, Sjogren's, dsDNA, and APS labs  - Will continue with IV solumedrol 40mg daily (okay with ID), however agree with complete infectious work up including dx thoracentesis with loculated L pleural effusion   - Hematology following, appreciate recs  - Will continue to follow     Discussed with Dr. Ulysses Landaverde MD   PGY-4  Reachable on TEAMS  Pager [phone]  Rheumatology Fellow     28 years old male with h/o Lupus ( diagnosed in 09/2023, on Plaquenil present to [place] ED on 12/12/23  with complain of chest pain and SOB, found to have bilateral acute PE and bilateral pleural effusions. Transferred to [facility] for CT surgery evaluation. Also with fevers, has been on broad spectrum abx with no improvement. Rheumatology consulted for evaluation of SLE flare.   was dx with SLE in Sept 2023 due to rash, oral ulcers, CP and dyspnea. Per pt's mother, underwent skin biopsy that confirmed lesions consistent with lupus. Has been on Plaquenil has monotherapy.   -At [place]: ABDIAS 1:1280, dsDNA 15, C3 79, C4 12.     Despite broad spectrum abx, pt continues to fever. Also noted to not have leukocytosis and unremarkable recent infectious workup. Fevers, pleural effusions, pleuritic chest pain, weight loss and poor PO intake could be manifestations of SLE flare  -CT Chest with moderate pleural effusions, loculated on L (new since 12/12) and new nonspecific very small peripheral GGO in RUL   Blood cultures (12/12, 12/14, 12/21): no growth  Stool Cx (12/15): no growth, GI PCR +EPEC  Sputum (12/20): normal [place]      # SLE, high clinical activity ( patchy alopecia, discoid lesions and erythematous rash, oral ulcers, serositis, fever),   -- Fevers is possible due to lupus activity rather then infection. However does have elevated CRP (which is not typical in SLE) and loculated L pleural effusion. Will undergo diagnostic thoracentesis   -- However, does feel better with the start of IV solumedrol 40mg (started 12/23)   -- Bilateral pleural effusions/pleuritis - sms started in 9/2023 and progressed in last few weeks- highly suggestive that it is lupus related    # Bilateral Acute PE with pulmonary infarcts  - r/o APS - serological evaluation for APS- pending  -Currently on heparin drip   -Hematology following-working up for APS and causes of elevated PT-INR. At present have high suspicion for APS, will discharge with warfarin    Recommendations  - F/u GOLDEN, Sjogren's, dsDNA, and APS labs  - Will continue with IV solumedrol 40mg daily (okay with ID), however agree with complete infectious work up including dx thoracentesis with loculated L pleural effusion   - Hematology following, appreciate recs  - Will continue to follow     Discussed with Dr. Ulysses Landaverde MD   PGY-4  Reachable on TEAMS  Pager [phone]  Rheumatology Fellow     28 years old male with h/o Lupus ( diagnosed in 09/2023, on Plaquenil present to [place] ED on 12/12/23  with complain of chest pain and SOB, found to have bilateral acute PE and bilateral pleural effusions. Transferred to [facility] for CT surgery evaluation. Also with fevers, has been on broad spectrum abx with no improvement. Rheumatology consulted for evaluation of SLE flare.   was dx with SLE in Sept 2023 due to rash, oral ulcers, CP and dyspnea. Per pt's mother, underwent skin biopsy that confirmed lesions consistent with lupus. Has been on Plaquenil has monotherapy.   -At [place]: ABDIAS 1:1280, dsDNA 15, C3 79, C4 12.     Despite broad spectrum abx, pt continues to fever. Also noted to not have leukocytosis and unremarkable recent infectious workup. Fevers, pleural effusions, pleuritic chest pain, weight loss and poor PO intake could be manifestations of SLE flare  -CT Chest with moderate pleural effusions, loculated on L (new since 12/12) and new nonspecific very small peripheral GGO in RUL   Blood cultures (12/12, 12/14, 12/21): no growth  Stool Cx (12/15): no growth, GI PCR +EPEC  Sputum (12/20): normal [place]      # SLE, high clinical activity ( patchy alopecia, discoid lesions and erythematous rash, oral ulcers, serositis, fever),   -- Fevers is possible due to lupus activity rather then infection. However does have elevated CRP (which is not typical in SLE) and loculated L pleural effusion. Will undergo diagnostic thoracentesis   -- However, does feel better with the start of IV solumedrol 40mg (started 12/23)   -- Bilateral pleural effusions/pleuritis - sms started in 9/2023 and progressed in last few weeks- highly suggestive that it is lupus related    # Bilateral Acute PE with pulmonary infarcts  - r/o APS - serological evaluation for APS- pending  -Currently on heparin drip   -Hematology following-working up for APS and causes of elevated PT-INR. At present have high suspicion for APS, will discharge with warfarin    Recommendations  - F/u GOLDEN, Sjogren's, dsDNA, and APS labs  - Will continue with IV solumedrol 40mg daily (okay with ID), however agree with complete infectious work up including dx thoracentesis with loculated L pleural effusion   - Hematology following, appreciate recs  - Will continue to follow     Discussed with Dr. Ulysses Landaverde MD   PGY-4  Reachable on TEAMS  Pager [phone]  Rheumatology Fellow

## 2023-12-25 LAB
A1C WITH ESTIMATED AVERAGE GLUCOSE RESULT: 5.5 % — SIGNIFICANT CHANGE UP (ref 4–5.6)
A1C WITH ESTIMATED AVERAGE GLUCOSE RESULT: 5.5 % — SIGNIFICANT CHANGE UP (ref 4–5.6)
ALBUMIN FLD-MCNC: 2.1 G/DL — SIGNIFICANT CHANGE UP
ALBUMIN FLD-MCNC: 2.1 G/DL — SIGNIFICANT CHANGE UP
ALBUMIN SERPL ELPH-MCNC: 2.7 G/DL — LOW (ref 3.3–5)
ALP SERPL-CCNC: 70 U/L — SIGNIFICANT CHANGE UP (ref 40–120)
ALP SERPL-CCNC: 70 U/L — SIGNIFICANT CHANGE UP (ref 40–120)
ALP SERPL-CCNC: 75 U/L — SIGNIFICANT CHANGE UP (ref 40–120)
ALP SERPL-CCNC: 75 U/L — SIGNIFICANT CHANGE UP (ref 40–120)
ALT FLD-CCNC: 112 U/L — HIGH (ref 4–41)
ALT FLD-CCNC: 112 U/L — HIGH (ref 4–41)
ALT FLD-CCNC: 98 U/L — HIGH (ref 4–41)
ALT FLD-CCNC: 98 U/L — HIGH (ref 4–41)
ANION GAP SERPL CALC-SCNC: 10 MMOL/L — SIGNIFICANT CHANGE UP (ref 7–14)
ANION GAP SERPL CALC-SCNC: 10 MMOL/L — SIGNIFICANT CHANGE UP (ref 7–14)
ANION GAP SERPL CALC-SCNC: 12 MMOL/L — SIGNIFICANT CHANGE UP (ref 7–14)
ANION GAP SERPL CALC-SCNC: 12 MMOL/L — SIGNIFICANT CHANGE UP (ref 7–14)
ANISOCYTOSIS BLD QL: SLIGHT — SIGNIFICANT CHANGE UP
ANISOCYTOSIS BLD QL: SLIGHT — SIGNIFICANT CHANGE UP
APPEARANCE CSF: ABNORMAL
APPEARANCE CSF: ABNORMAL
APPEARANCE SPUN FLD: COLORLESS — SIGNIFICANT CHANGE UP
APPEARANCE SPUN FLD: COLORLESS — SIGNIFICANT CHANGE UP
APPEARANCE UR: CLEAR — SIGNIFICANT CHANGE UP
APPEARANCE UR: CLEAR — SIGNIFICANT CHANGE UP
APTT BLD: 102.2 SEC — HIGH (ref 24.5–35.6)
APTT BLD: 102.2 SEC — HIGH (ref 24.5–35.6)
APTT BLD: 38.9 SEC — HIGH (ref 24.5–35.6)
APTT BLD: 38.9 SEC — HIGH (ref 24.5–35.6)
APTT BLD: 68.5 SEC — HIGH (ref 24.5–35.6)
APTT BLD: 68.5 SEC — HIGH (ref 24.5–35.6)
AST SERPL-CCNC: 152 U/L — HIGH (ref 4–40)
AST SERPL-CCNC: 152 U/L — HIGH (ref 4–40)
AST SERPL-CCNC: 217 U/L — HIGH (ref 4–40)
AST SERPL-CCNC: 217 U/L — HIGH (ref 4–40)
B PERT IGG+IGM PNL SER: ABNORMAL
B PERT IGG+IGM PNL SER: ABNORMAL
BACTERIA # UR AUTO: NEGATIVE /HPF — SIGNIFICANT CHANGE UP
BACTERIA # UR AUTO: NEGATIVE /HPF — SIGNIFICANT CHANGE UP
BASE EXCESS BLDV CALC-SCNC: -1.9 MMOL/L — SIGNIFICANT CHANGE UP (ref -2–3)
BASE EXCESS BLDV CALC-SCNC: -1.9 MMOL/L — SIGNIFICANT CHANGE UP (ref -2–3)
BASE EXCESS BLDV CALC-SCNC: -3.3 MMOL/L — LOW (ref -2–3)
BASE EXCESS BLDV CALC-SCNC: -3.3 MMOL/L — LOW (ref -2–3)
BASOPHILS # BLD AUTO: 0.01 K/UL — SIGNIFICANT CHANGE UP (ref 0–0.2)
BASOPHILS # BLD AUTO: 0.01 K/UL — SIGNIFICANT CHANGE UP (ref 0–0.2)
BASOPHILS NFR BLD AUTO: 0.2 % — SIGNIFICANT CHANGE UP (ref 0–2)
BASOPHILS NFR BLD AUTO: 0.2 % — SIGNIFICANT CHANGE UP (ref 0–2)
BILIRUB SERPL-MCNC: 0.6 MG/DL — SIGNIFICANT CHANGE UP (ref 0.2–1.2)
BILIRUB SERPL-MCNC: 0.6 MG/DL — SIGNIFICANT CHANGE UP (ref 0.2–1.2)
BILIRUB SERPL-MCNC: 0.8 MG/DL — SIGNIFICANT CHANGE UP (ref 0.2–1.2)
BILIRUB SERPL-MCNC: 0.8 MG/DL — SIGNIFICANT CHANGE UP (ref 0.2–1.2)
BILIRUB UR-MCNC: NEGATIVE — SIGNIFICANT CHANGE UP
BILIRUB UR-MCNC: NEGATIVE — SIGNIFICANT CHANGE UP
BLOOD GAS VENOUS COMPREHENSIVE RESULT: SIGNIFICANT CHANGE UP
BUN SERPL-MCNC: 18 MG/DL — SIGNIFICANT CHANGE UP (ref 7–23)
BUN SERPL-MCNC: 18 MG/DL — SIGNIFICANT CHANGE UP (ref 7–23)
BUN SERPL-MCNC: 19 MG/DL — SIGNIFICANT CHANGE UP (ref 7–23)
BUN SERPL-MCNC: 19 MG/DL — SIGNIFICANT CHANGE UP (ref 7–23)
CALCIUM SERPL-MCNC: 8.2 MG/DL — LOW (ref 8.4–10.5)
CALCIUM SERPL-MCNC: 8.2 MG/DL — LOW (ref 8.4–10.5)
CALCIUM SERPL-MCNC: 8.3 MG/DL — LOW (ref 8.4–10.5)
CALCIUM SERPL-MCNC: 8.3 MG/DL — LOW (ref 8.4–10.5)
CHLORIDE BLDV-SCNC: 92 MMOL/L — LOW (ref 96–108)
CHLORIDE BLDV-SCNC: 92 MMOL/L — LOW (ref 96–108)
CHLORIDE BLDV-SCNC: 93 MMOL/L — LOW (ref 96–108)
CHLORIDE BLDV-SCNC: 93 MMOL/L — LOW (ref 96–108)
CHLORIDE SERPL-SCNC: 90 MMOL/L — LOW (ref 98–107)
CHLORIDE SERPL-SCNC: 90 MMOL/L — LOW (ref 98–107)
CHLORIDE SERPL-SCNC: 91 MMOL/L — LOW (ref 98–107)
CHLORIDE SERPL-SCNC: 91 MMOL/L — LOW (ref 98–107)
CK MB BLD-MCNC: 0.1 % — SIGNIFICANT CHANGE UP (ref 0–2.5)
CK MB BLD-MCNC: 0.1 % — SIGNIFICANT CHANGE UP (ref 0–2.5)
CK MB CFR SERPL CALC: 1.3 NG/ML — SIGNIFICANT CHANGE UP
CK MB CFR SERPL CALC: 1.3 NG/ML — SIGNIFICANT CHANGE UP
CK SERPL-CCNC: 1031 U/L — HIGH (ref 30–200)
CK SERPL-CCNC: 1031 U/L — HIGH (ref 30–200)
CK SERPL-CCNC: 1188 U/L — HIGH (ref 30–200)
CK SERPL-CCNC: 1188 U/L — HIGH (ref 30–200)
CO2 BLDV-SCNC: 22.2 MMOL/L — SIGNIFICANT CHANGE UP (ref 22–26)
CO2 BLDV-SCNC: 22.2 MMOL/L — SIGNIFICANT CHANGE UP (ref 22–26)
CO2 BLDV-SCNC: 22.3 MMOL/L — SIGNIFICANT CHANGE UP (ref 22–26)
CO2 BLDV-SCNC: 22.3 MMOL/L — SIGNIFICANT CHANGE UP (ref 22–26)
CO2 SERPL-SCNC: 20 MMOL/L — LOW (ref 22–31)
COLOR CSF: SIGNIFICANT CHANGE UP
COLOR CSF: SIGNIFICANT CHANGE UP
COLOR FLD: ABNORMAL
COLOR FLD: ABNORMAL
COLOR SPEC: YELLOW — SIGNIFICANT CHANGE UP
COLOR SPEC: YELLOW — SIGNIFICANT CHANGE UP
CREAT ?TM UR-MCNC: 99 MG/DL — SIGNIFICANT CHANGE UP
CREAT ?TM UR-MCNC: 99 MG/DL — SIGNIFICANT CHANGE UP
CREAT SERPL-MCNC: 1.18 MG/DL — SIGNIFICANT CHANGE UP (ref 0.5–1.3)
CREAT SERPL-MCNC: 1.18 MG/DL — SIGNIFICANT CHANGE UP (ref 0.5–1.3)
CREAT SERPL-MCNC: 1.23 MG/DL — SIGNIFICANT CHANGE UP (ref 0.5–1.3)
CREAT SERPL-MCNC: 1.23 MG/DL — SIGNIFICANT CHANGE UP (ref 0.5–1.3)
CULTURE RESULTS: ABNORMAL
CULTURE RESULTS: ABNORMAL
DIFF PNL FLD: ABNORMAL
DIFF PNL FLD: ABNORMAL
EGFR: 82 ML/MIN/1.73M2 — SIGNIFICANT CHANGE UP
EGFR: 82 ML/MIN/1.73M2 — SIGNIFICANT CHANGE UP
EGFR: 86 ML/MIN/1.73M2 — SIGNIFICANT CHANGE UP
EGFR: 86 ML/MIN/1.73M2 — SIGNIFICANT CHANGE UP
EOSINOPHIL # BLD AUTO: 0 K/UL — SIGNIFICANT CHANGE UP (ref 0–0.5)
EOSINOPHIL # BLD AUTO: 0 K/UL — SIGNIFICANT CHANGE UP (ref 0–0.5)
EOSINOPHIL NFR BLD AUTO: 0 % — SIGNIFICANT CHANGE UP (ref 0–6)
EOSINOPHIL NFR BLD AUTO: 0 % — SIGNIFICANT CHANGE UP (ref 0–6)
ERYTHROCYTE [SEDIMENTATION RATE] IN BLOOD: 108 MM/HR — HIGH (ref 1–15)
ERYTHROCYTE [SEDIMENTATION RATE] IN BLOOD: 108 MM/HR — HIGH (ref 1–15)
ESTIMATED AVERAGE GLUCOSE: 111 — SIGNIFICANT CHANGE UP
ESTIMATED AVERAGE GLUCOSE: 111 — SIGNIFICANT CHANGE UP
FLUID INTAKE SUBSTANCE CLASS: SIGNIFICANT CHANGE UP
FLUID INTAKE SUBSTANCE CLASS: SIGNIFICANT CHANGE UP
GAS PNL BLDV: 120 MMOL/L — CRITICAL LOW (ref 136–145)
GAS PNL BLDV: 120 MMOL/L — CRITICAL LOW (ref 136–145)
GAS PNL BLDV: SIGNIFICANT CHANGE UP MMOL/L (ref 136–145)
GAS PNL BLDV: SIGNIFICANT CHANGE UP MMOL/L (ref 136–145)
GLUCOSE BLDC GLUCOMTR-MCNC: 162 MG/DL — HIGH (ref 70–99)
GLUCOSE BLDC GLUCOMTR-MCNC: 162 MG/DL — HIGH (ref 70–99)
GLUCOSE BLDC GLUCOMTR-MCNC: 168 MG/DL — HIGH (ref 70–99)
GLUCOSE BLDC GLUCOMTR-MCNC: 168 MG/DL — HIGH (ref 70–99)
GLUCOSE BLDV-MCNC: 143 MG/DL — HIGH (ref 70–99)
GLUCOSE BLDV-MCNC: 143 MG/DL — HIGH (ref 70–99)
GLUCOSE BLDV-MCNC: 152 MG/DL — HIGH (ref 70–99)
GLUCOSE BLDV-MCNC: 152 MG/DL — HIGH (ref 70–99)
GLUCOSE CSF-MCNC: 52 MG/DL — SIGNIFICANT CHANGE UP (ref 40–70)
GLUCOSE CSF-MCNC: 52 MG/DL — SIGNIFICANT CHANGE UP (ref 40–70)
GLUCOSE FLD-MCNC: 122 MG/DL — SIGNIFICANT CHANGE UP
GLUCOSE FLD-MCNC: 122 MG/DL — SIGNIFICANT CHANGE UP
GLUCOSE SERPL-MCNC: 111 MG/DL — HIGH (ref 70–99)
GLUCOSE SERPL-MCNC: 111 MG/DL — HIGH (ref 70–99)
GLUCOSE SERPL-MCNC: 149 MG/DL — HIGH (ref 70–99)
GLUCOSE SERPL-MCNC: 149 MG/DL — HIGH (ref 70–99)
GLUCOSE UR QL: NEGATIVE MG/DL — SIGNIFICANT CHANGE UP
GLUCOSE UR QL: NEGATIVE MG/DL — SIGNIFICANT CHANGE UP
GRAN CASTS # UR COMP ASSIST: PRESENT
GRAN CASTS # UR COMP ASSIST: PRESENT
HCO3 BLDV-SCNC: 21 MMOL/L — LOW (ref 22–29)
HCT VFR BLD CALC: 26.1 % — LOW (ref 39–50)
HCT VFR BLD CALC: 26.1 % — LOW (ref 39–50)
HCT VFR BLDA CALC: 30 % — LOW (ref 39–51)
HGB BLD CALC-MCNC: 10.1 G/DL — LOW (ref 12.6–17.4)
HGB BLD-MCNC: 9.1 G/DL — LOW (ref 13–17)
HGB BLD-MCNC: 9.1 G/DL — LOW (ref 13–17)
IANC: 3.29 K/UL — SIGNIFICANT CHANGE UP (ref 1.8–7.4)
IANC: 3.29 K/UL — SIGNIFICANT CHANGE UP (ref 1.8–7.4)
IMM GRANULOCYTES NFR BLD AUTO: 2.2 % — HIGH (ref 0–0.9)
IMM GRANULOCYTES NFR BLD AUTO: 2.2 % — HIGH (ref 0–0.9)
INR BLD: 1.47 RATIO — HIGH (ref 0.85–1.18)
INR BLD: 1.47 RATIO — HIGH (ref 0.85–1.18)
INR BLD: 1.59 RATIO — HIGH (ref 0.85–1.18)
KETONES UR-MCNC: NEGATIVE MG/DL — SIGNIFICANT CHANGE UP
KETONES UR-MCNC: NEGATIVE MG/DL — SIGNIFICANT CHANGE UP
LACTATE BLDV-MCNC: 1.3 MMOL/L — SIGNIFICANT CHANGE UP (ref 0.5–2)
LACTATE BLDV-MCNC: 1.3 MMOL/L — SIGNIFICANT CHANGE UP (ref 0.5–2)
LACTATE BLDV-MCNC: 1.5 MMOL/L — SIGNIFICANT CHANGE UP (ref 0.5–2)
LACTATE BLDV-MCNC: 1.5 MMOL/L — SIGNIFICANT CHANGE UP (ref 0.5–2)
LDH SERPL L TO P-CCNC: 354 U/L — SIGNIFICANT CHANGE UP
LDH SERPL L TO P-CCNC: 354 U/L — SIGNIFICANT CHANGE UP
LEUKOCYTE ESTERASE UR-ACNC: NEGATIVE — SIGNIFICANT CHANGE UP
LEUKOCYTE ESTERASE UR-ACNC: NEGATIVE — SIGNIFICANT CHANGE UP
LYMPHOCYTES # BLD AUTO: 1.84 K/UL — SIGNIFICANT CHANGE UP (ref 1–3.3)
LYMPHOCYTES # BLD AUTO: 1.84 K/UL — SIGNIFICANT CHANGE UP (ref 1–3.3)
LYMPHOCYTES # BLD AUTO: 33.3 % — SIGNIFICANT CHANGE UP (ref 13–44)
LYMPHOCYTES # BLD AUTO: 33.3 % — SIGNIFICANT CHANGE UP (ref 13–44)
LYMPHOCYTES # CSF: 24 % — SIGNIFICANT CHANGE UP
LYMPHOCYTES # CSF: 24 % — SIGNIFICANT CHANGE UP
LYMPHOCYTES # FLD: 76 % — SIGNIFICANT CHANGE UP
LYMPHOCYTES # FLD: 76 % — SIGNIFICANT CHANGE UP
MAGNESIUM SERPL-MCNC: 1.9 MG/DL — SIGNIFICANT CHANGE UP (ref 1.6–2.6)
MAGNESIUM SERPL-MCNC: 1.9 MG/DL — SIGNIFICANT CHANGE UP (ref 1.6–2.6)
MANUAL SMEAR VERIFICATION: SIGNIFICANT CHANGE UP
MANUAL SMEAR VERIFICATION: SIGNIFICANT CHANGE UP
MCHC RBC-ENTMCNC: 30.7 PG — SIGNIFICANT CHANGE UP (ref 27–34)
MCHC RBC-ENTMCNC: 30.7 PG — SIGNIFICANT CHANGE UP (ref 27–34)
MCHC RBC-ENTMCNC: 34.9 GM/DL — SIGNIFICANT CHANGE UP (ref 32–36)
MCHC RBC-ENTMCNC: 34.9 GM/DL — SIGNIFICANT CHANGE UP (ref 32–36)
MCV RBC AUTO: 88.2 FL — SIGNIFICANT CHANGE UP (ref 80–100)
MCV RBC AUTO: 88.2 FL — SIGNIFICANT CHANGE UP (ref 80–100)
METAMYELOCYTES # FLD: 0.9 % — SIGNIFICANT CHANGE UP (ref 0–1)
METAMYELOCYTES # FLD: 0.9 % — SIGNIFICANT CHANGE UP (ref 0–1)
MONOCYTES # BLD AUTO: 0.27 K/UL — SIGNIFICANT CHANGE UP (ref 0–0.9)
MONOCYTES # BLD AUTO: 0.27 K/UL — SIGNIFICANT CHANGE UP (ref 0–0.9)
MONOCYTES NFR BLD AUTO: 4.9 % — SIGNIFICANT CHANGE UP (ref 2–14)
MONOCYTES NFR BLD AUTO: 4.9 % — SIGNIFICANT CHANGE UP (ref 2–14)
MONOS+MACROS # FLD: 19 % — SIGNIFICANT CHANGE UP
MONOS+MACROS # FLD: 19 % — SIGNIFICANT CHANGE UP
MONOS+MACROS NFR CSF: 1 % — SIGNIFICANT CHANGE UP
MONOS+MACROS NFR CSF: 1 % — SIGNIFICANT CHANGE UP
NEUTROPHILS # BLD AUTO: 3.29 K/UL — SIGNIFICANT CHANGE UP (ref 1.8–7.4)
NEUTROPHILS # BLD AUTO: 3.29 K/UL — SIGNIFICANT CHANGE UP (ref 1.8–7.4)
NEUTROPHILS # CSF: 74 % — SIGNIFICANT CHANGE UP
NEUTROPHILS # CSF: 74 % — SIGNIFICANT CHANGE UP
NEUTROPHILS NFR BLD AUTO: 59.4 % — SIGNIFICANT CHANGE UP (ref 43–77)
NEUTROPHILS NFR BLD AUTO: 59.4 % — SIGNIFICANT CHANGE UP (ref 43–77)
NEUTROPHILS-BODY FLUID: 5 % — SIGNIFICANT CHANGE UP
NEUTROPHILS-BODY FLUID: 5 % — SIGNIFICANT CHANGE UP
NEUTS BAND # BLD: 1.8 % — SIGNIFICANT CHANGE UP (ref 0–6)
NEUTS BAND # BLD: 1.8 % — SIGNIFICANT CHANGE UP (ref 0–6)
NITRITE UR-MCNC: NEGATIVE — SIGNIFICANT CHANGE UP
NITRITE UR-MCNC: NEGATIVE — SIGNIFICANT CHANGE UP
NRBC # BLD: 0 /100 WBCS — SIGNIFICANT CHANGE UP (ref 0–0)
NRBC # BLD: 0 /100 WBCS — SIGNIFICANT CHANGE UP (ref 0–0)
NRBC # FLD: 0 K/UL — SIGNIFICANT CHANGE UP (ref 0–0)
NRBC # FLD: 0 K/UL — SIGNIFICANT CHANGE UP (ref 0–0)
NRBC NFR CSF: 6 CELLS/UL — HIGH (ref 0–5)
NRBC NFR CSF: 6 CELLS/UL — HIGH (ref 0–5)
OVALOCYTES BLD QL SMEAR: SLIGHT — SIGNIFICANT CHANGE UP
OVALOCYTES BLD QL SMEAR: SLIGHT — SIGNIFICANT CHANGE UP
PCO2 BLDV: 30 MMHG — LOW (ref 42–55)
PCO2 BLDV: 30 MMHG — LOW (ref 42–55)
PCO2 BLDV: 35 MMHG — LOW (ref 42–55)
PCO2 BLDV: 35 MMHG — LOW (ref 42–55)
PH BLDV: 7.39 — SIGNIFICANT CHANGE UP (ref 7.32–7.43)
PH BLDV: 7.39 — SIGNIFICANT CHANGE UP (ref 7.32–7.43)
PH BLDV: 7.46 — HIGH (ref 7.32–7.43)
PH BLDV: 7.46 — HIGH (ref 7.32–7.43)
PH UR: 6 — SIGNIFICANT CHANGE UP (ref 5–8)
PH UR: 6 — SIGNIFICANT CHANGE UP (ref 5–8)
PHOSPHATE SERPL-MCNC: 2.3 MG/DL — LOW (ref 2.5–4.5)
PHOSPHATE SERPL-MCNC: 2.3 MG/DL — LOW (ref 2.5–4.5)
PLAT MORPH BLD: NORMAL — SIGNIFICANT CHANGE UP
PLAT MORPH BLD: NORMAL — SIGNIFICANT CHANGE UP
PLATELET # BLD AUTO: 332 K/UL — SIGNIFICANT CHANGE UP (ref 150–400)
PLATELET # BLD AUTO: 332 K/UL — SIGNIFICANT CHANGE UP (ref 150–400)
PLATELET COUNT - ESTIMATE: NORMAL — SIGNIFICANT CHANGE UP
PLATELET COUNT - ESTIMATE: NORMAL — SIGNIFICANT CHANGE UP
PO2 BLDV: 57 MMHG — HIGH (ref 25–45)
PO2 BLDV: 57 MMHG — HIGH (ref 25–45)
PO2 BLDV: 94 MMHG — HIGH (ref 25–45)
PO2 BLDV: 94 MMHG — HIGH (ref 25–45)
POIKILOCYTOSIS BLD QL AUTO: SLIGHT — SIGNIFICANT CHANGE UP
POIKILOCYTOSIS BLD QL AUTO: SLIGHT — SIGNIFICANT CHANGE UP
POLYCHROMASIA BLD QL SMEAR: SLIGHT — SIGNIFICANT CHANGE UP
POLYCHROMASIA BLD QL SMEAR: SLIGHT — SIGNIFICANT CHANGE UP
POTASSIUM BLDV-SCNC: 4.4 MMOL/L — SIGNIFICANT CHANGE UP (ref 3.5–5.1)
POTASSIUM BLDV-SCNC: 4.4 MMOL/L — SIGNIFICANT CHANGE UP (ref 3.5–5.1)
POTASSIUM BLDV-SCNC: 4.7 MMOL/L — SIGNIFICANT CHANGE UP (ref 3.5–5.1)
POTASSIUM BLDV-SCNC: 4.7 MMOL/L — SIGNIFICANT CHANGE UP (ref 3.5–5.1)
POTASSIUM SERPL-MCNC: 4.4 MMOL/L — SIGNIFICANT CHANGE UP (ref 3.5–5.3)
POTASSIUM SERPL-MCNC: 4.4 MMOL/L — SIGNIFICANT CHANGE UP (ref 3.5–5.3)
POTASSIUM SERPL-MCNC: 4.6 MMOL/L — SIGNIFICANT CHANGE UP (ref 3.5–5.3)
POTASSIUM SERPL-MCNC: 4.6 MMOL/L — SIGNIFICANT CHANGE UP (ref 3.5–5.3)
POTASSIUM SERPL-SCNC: 4.4 MMOL/L — SIGNIFICANT CHANGE UP (ref 3.5–5.3)
POTASSIUM SERPL-SCNC: 4.4 MMOL/L — SIGNIFICANT CHANGE UP (ref 3.5–5.3)
POTASSIUM SERPL-SCNC: 4.6 MMOL/L — SIGNIFICANT CHANGE UP (ref 3.5–5.3)
POTASSIUM SERPL-SCNC: 4.6 MMOL/L — SIGNIFICANT CHANGE UP (ref 3.5–5.3)
PROCALCITONIN SERPL-MCNC: 8.09 NG/ML — HIGH (ref 0.02–0.1)
PROCALCITONIN SERPL-MCNC: 8.09 NG/ML — HIGH (ref 0.02–0.1)
PROLACTIN SERPL-MCNC: 15.4 NG/ML — SIGNIFICANT CHANGE UP (ref 4.1–18.4)
PROLACTIN SERPL-MCNC: 15.4 NG/ML — SIGNIFICANT CHANGE UP (ref 4.1–18.4)
PROT ?TM UR-MCNC: 243 MG/DL — SIGNIFICANT CHANGE UP
PROT ?TM UR-MCNC: 243 MG/DL — SIGNIFICANT CHANGE UP
PROT CSF-MCNC: 50 MG/DL — HIGH (ref 15–45)
PROT CSF-MCNC: 50 MG/DL — HIGH (ref 15–45)
PROT FLD-MCNC: 4.8 G/DL — SIGNIFICANT CHANGE UP
PROT FLD-MCNC: 4.8 G/DL — SIGNIFICANT CHANGE UP
PROT SERPL-MCNC: 7.1 G/DL — SIGNIFICANT CHANGE UP (ref 6–8.3)
PROT UR-MCNC: 300 MG/DL
PROT UR-MCNC: 300 MG/DL
PROTHROM AB SERPL-ACNC: 16.4 SEC — HIGH (ref 9.5–13)
PROTHROM AB SERPL-ACNC: 16.4 SEC — HIGH (ref 9.5–13)
PROTHROM AB SERPL-ACNC: 17.7 SEC — HIGH (ref 9.5–13)
RBC # BLD: 2.96 M/UL — LOW (ref 4.2–5.8)
RBC # BLD: 2.96 M/UL — LOW (ref 4.2–5.8)
RBC # CSF: HIGH CELLS/UL (ref 0–0)
RBC # CSF: HIGH CELLS/UL (ref 0–0)
RBC # FLD: 13.9 % — SIGNIFICANT CHANGE UP (ref 10.3–14.5)
RBC # FLD: 13.9 % — SIGNIFICANT CHANGE UP (ref 10.3–14.5)
RBC BLD AUTO: ABNORMAL
RBC BLD AUTO: ABNORMAL
RBC CASTS # UR COMP ASSIST: 2 /HPF — SIGNIFICANT CHANGE UP (ref 0–4)
RBC CASTS # UR COMP ASSIST: 2 /HPF — SIGNIFICANT CHANGE UP (ref 0–4)
RCV VOL RI: HIGH CELLS/UL (ref 0–5)
RCV VOL RI: HIGH CELLS/UL (ref 0–5)
REVIEW: SIGNIFICANT CHANGE UP
REVIEW: SIGNIFICANT CHANGE UP
SAO2 % BLDV: 85.9 % — SIGNIFICANT CHANGE UP (ref 67–88)
SAO2 % BLDV: 85.9 % — SIGNIFICANT CHANGE UP (ref 67–88)
SAO2 % BLDV: 98.3 % — HIGH (ref 67–88)
SAO2 % BLDV: 98.3 % — HIGH (ref 67–88)
SODIUM SERPL-SCNC: 121 MMOL/L — LOW (ref 135–145)
SODIUM SERPL-SCNC: 121 MMOL/L — LOW (ref 135–145)
SODIUM SERPL-SCNC: 122 MMOL/L — LOW (ref 135–145)
SODIUM SERPL-SCNC: 122 MMOL/L — LOW (ref 135–145)
SODIUM UR-SCNC: 86 MMOL/L — SIGNIFICANT CHANGE UP
SODIUM UR-SCNC: 86 MMOL/L — SIGNIFICANT CHANGE UP
SP GR SPEC: 1.03 — SIGNIFICANT CHANGE UP (ref 1–1.03)
SP GR SPEC: 1.03 — SIGNIFICANT CHANGE UP (ref 1–1.03)
SPECIMEN SOURCE: SIGNIFICANT CHANGE UP
SPECIMEN SOURCE: SIGNIFICANT CHANGE UP
SQUAMOUS # UR AUTO: 4 /HPF — SIGNIFICANT CHANGE UP (ref 0–5)
SQUAMOUS # UR AUTO: 4 /HPF — SIGNIFICANT CHANGE UP (ref 0–5)
TOTAL CELLS COUNTED, BODY FLUID: 100 CELLS — SIGNIFICANT CHANGE UP
TOTAL CELLS COUNTED, BODY FLUID: 100 CELLS — SIGNIFICANT CHANGE UP
TOTAL CELLS COUNTED, SPINAL FLUID: 100 CELLS — SIGNIFICANT CHANGE UP
TOTAL CELLS COUNTED, SPINAL FLUID: 100 CELLS — SIGNIFICANT CHANGE UP
TOTAL NUCLEATED CELL COUNT, BODY FLUID: 285 CELLS/UL — HIGH (ref 0–5)
TOTAL NUCLEATED CELL COUNT, BODY FLUID: 285 CELLS/UL — HIGH (ref 0–5)
TROPONIN T, HIGH SENSITIVITY RESULT: 26 NG/L — SIGNIFICANT CHANGE UP
TROPONIN T, HIGH SENSITIVITY RESULT: 26 NG/L — SIGNIFICANT CHANGE UP
TUBE TYPE: SIGNIFICANT CHANGE UP
UROBILINOGEN FLD QL: 0.2 MG/DL — SIGNIFICANT CHANGE UP (ref 0.2–1)
UROBILINOGEN FLD QL: 0.2 MG/DL — SIGNIFICANT CHANGE UP (ref 0.2–1)
UUN UR-MCNC: 800.3 MG/DL — SIGNIFICANT CHANGE UP
UUN UR-MCNC: 800.3 MG/DL — SIGNIFICANT CHANGE UP
VARIANT LYMPHS # BLD: 2.7 % — SIGNIFICANT CHANGE UP (ref 0–6)
VARIANT LYMPHS # BLD: 2.7 % — SIGNIFICANT CHANGE UP (ref 0–6)
WBC # BLD: 5.53 K/UL — SIGNIFICANT CHANGE UP (ref 3.8–10.5)
WBC # BLD: 5.53 K/UL — SIGNIFICANT CHANGE UP (ref 3.8–10.5)
WBC # FLD AUTO: 5.53 K/UL — SIGNIFICANT CHANGE UP (ref 3.8–10.5)
WBC # FLD AUTO: 5.53 K/UL — SIGNIFICANT CHANGE UP (ref 3.8–10.5)
WBC UR QL: 2 /HPF — SIGNIFICANT CHANGE UP (ref 0–5)
WBC UR QL: 2 /HPF — SIGNIFICANT CHANGE UP (ref 0–5)

## 2023-12-25 PROCEDURE — 93306 TTE W/DOPPLER COMPLETE: CPT | Mod: 26

## 2023-12-25 PROCEDURE — 70450 CT HEAD/BRAIN W/O DYE: CPT | Mod: 26

## 2023-12-25 PROCEDURE — 71045 X-RAY EXAM CHEST 1 VIEW: CPT | Mod: 26,76

## 2023-12-25 PROCEDURE — 99291 CRITICAL CARE FIRST HOUR: CPT | Mod: GC

## 2023-12-25 PROCEDURE — 99232 SBSQ HOSP IP/OBS MODERATE 35: CPT

## 2023-12-25 PROCEDURE — 95720 EEG PHY/QHP EA INCR W/VEEG: CPT

## 2023-12-25 PROCEDURE — 99291 CRITICAL CARE FIRST HOUR: CPT

## 2023-12-25 PROCEDURE — ZZZZZ: CPT

## 2023-12-25 PROCEDURE — 93308 TTE F-UP OR LMTD: CPT | Mod: 26,GC

## 2023-12-25 PROCEDURE — 93010 ELECTROCARDIOGRAM REPORT: CPT

## 2023-12-25 PROCEDURE — 99233 SBSQ HOSP IP/OBS HIGH 50: CPT | Mod: GC

## 2023-12-25 PROCEDURE — 76604 US EXAM CHEST: CPT | Mod: 26,GC

## 2023-12-25 RX ORDER — VANCOMYCIN HCL 1 G
1000 VIAL (EA) INTRAVENOUS EVERY 12 HOURS
Refills: 0 | Status: DISCONTINUED | OUTPATIENT
Start: 2023-12-25 | End: 2023-12-27

## 2023-12-25 RX ORDER — SODIUM CHLORIDE 9 MG/ML
1 INJECTION INTRAMUSCULAR; INTRAVENOUS; SUBCUTANEOUS THREE TIMES A DAY
Refills: 0 | Status: DISCONTINUED | OUTPATIENT
Start: 2023-12-25 | End: 2024-01-29

## 2023-12-25 RX ORDER — DEXMEDETOMIDINE HYDROCHLORIDE IN 0.9% SODIUM CHLORIDE 4 UG/ML
1 INJECTION INTRAVENOUS
Qty: 400 | Refills: 0 | Status: DISCONTINUED | OUTPATIENT
Start: 2023-12-25 | End: 2023-12-26

## 2023-12-25 RX ORDER — LEVETIRACETAM 250 MG/1
1000 TABLET, FILM COATED ORAL ONCE
Refills: 0 | Status: DISCONTINUED | OUTPATIENT
Start: 2023-12-25 | End: 2023-12-25

## 2023-12-25 RX ORDER — LANOLIN ALCOHOL/MO/W.PET/CERES
3 CREAM (GRAM) TOPICAL AT BEDTIME
Refills: 0 | Status: DISCONTINUED | OUTPATIENT
Start: 2023-12-25 | End: 2023-12-26

## 2023-12-25 RX ORDER — MORPHINE SULFATE 50 MG/1
2 CAPSULE, EXTENDED RELEASE ORAL ONCE
Refills: 0 | Status: DISCONTINUED | OUTPATIENT
Start: 2023-12-25 | End: 2023-12-25

## 2023-12-25 RX ORDER — DEXMEDETOMIDINE HYDROCHLORIDE IN 0.9% SODIUM CHLORIDE 4 UG/ML
1 INJECTION INTRAVENOUS
Qty: 200 | Refills: 0 | Status: DISCONTINUED | OUTPATIENT
Start: 2023-12-25 | End: 2023-12-25

## 2023-12-25 RX ORDER — HEPARIN SODIUM 5000 [USP'U]/ML
1600 INJECTION INTRAVENOUS; SUBCUTANEOUS
Qty: 25000 | Refills: 0 | Status: DISCONTINUED | OUTPATIENT
Start: 2023-12-25 | End: 2023-12-25

## 2023-12-25 RX ORDER — SODIUM CHLORIDE 9 MG/ML
1000 INJECTION, SOLUTION INTRAVENOUS
Refills: 0 | Status: DISCONTINUED | OUTPATIENT
Start: 2023-12-25 | End: 2023-12-25

## 2023-12-25 RX ORDER — ACETAMINOPHEN 500 MG
1000 TABLET ORAL ONCE
Refills: 0 | Status: COMPLETED | OUTPATIENT
Start: 2023-12-25 | End: 2023-12-25

## 2023-12-25 RX ORDER — CEFEPIME 1 G/1
2000 INJECTION, POWDER, FOR SOLUTION INTRAMUSCULAR; INTRAVENOUS EVERY 8 HOURS
Refills: 0 | Status: DISCONTINUED | OUTPATIENT
Start: 2023-12-25 | End: 2024-01-02

## 2023-12-25 RX ADMIN — HEPARIN SODIUM 16 UNIT(S)/HR: 5000 INJECTION INTRAVENOUS; SUBCUTANEOUS at 07:58

## 2023-12-25 RX ADMIN — PIPERACILLIN AND TAZOBACTAM 25 GRAM(S): 4; .5 INJECTION, POWDER, LYOPHILIZED, FOR SOLUTION INTRAVENOUS at 06:33

## 2023-12-25 RX ADMIN — Medication 200 MILLIGRAM(S): at 06:33

## 2023-12-25 RX ADMIN — Medication 2 DROP(S): at 17:26

## 2023-12-25 RX ADMIN — Medication 400 MILLIGRAM(S): at 02:29

## 2023-12-25 RX ADMIN — Medication 250 MILLIGRAM(S): at 17:23

## 2023-12-25 RX ADMIN — CEFEPIME 100 MILLIGRAM(S): 1 INJECTION, POWDER, FOR SOLUTION INTRAMUSCULAR; INTRAVENOUS at 22:46

## 2023-12-25 RX ADMIN — CEFEPIME 100 MILLIGRAM(S): 1 INJECTION, POWDER, FOR SOLUTION INTRAMUSCULAR; INTRAVENOUS at 14:45

## 2023-12-25 RX ADMIN — Medication 1 MILLIGRAM(S): at 10:49

## 2023-12-25 RX ADMIN — HEPARIN SODIUM 16 UNIT(S)/HR: 5000 INJECTION INTRAVENOUS; SUBCUTANEOUS at 02:30

## 2023-12-25 RX ADMIN — Medication 1000 MILLIGRAM(S): at 02:29

## 2023-12-25 RX ADMIN — Medication 40 MILLIGRAM(S): at 06:33

## 2023-12-25 RX ADMIN — MORPHINE SULFATE 2 MILLIGRAM(S): 50 CAPSULE, EXTENDED RELEASE ORAL at 10:06

## 2023-12-25 RX ADMIN — Medication 2 DROP(S): at 06:34

## 2023-12-25 RX ADMIN — Medication 400 MILLIGRAM(S): at 19:01

## 2023-12-25 RX ADMIN — Medication 200 MILLIGRAM(S): at 01:05

## 2023-12-25 RX ADMIN — Medication 400 MILLIGRAM(S): at 09:45

## 2023-12-25 RX ADMIN — SODIUM CHLORIDE 1 GRAM(S): 9 INJECTION INTRAMUSCULAR; INTRAVENOUS; SUBCUTANEOUS at 23:03

## 2023-12-25 RX ADMIN — Medication 200 MILLIGRAM(S): at 22:46

## 2023-12-25 NOTE — PROGRESS NOTE ADULT - SUBJECTIVE AND OBJECTIVE BOX
Name of Patient : TAYLA MARIE  MRN: [id number]  Date of visit: 23       Subjective: Patient seen and examined. No new events except as noted.   worsening SOba nd chest pain  cont to have fever  RRT   ICU     REVIEW OF SYSTEMS:  limited     MEDICATIONS:  MEDICATIONS  (STANDING):  cefepime   IVPB 2000 milliGRAM(s) IV Intermittent every 8 hours  ciprofloxacin  0.3% Ophthalmic Solution for Otic Use 2 Drop(s) Both Ears two times a day  dexMEDEtomidine Infusion 1 MICROgram(s)/kG/Hr (17.3 mL/Hr) IV Continuous <Continuous>  hydroxychloroquine 200 milliGRAM(s) Oral two times a day  lidocaine   4% Patch 1 Patch Transdermal daily  sodium chloride 1 Gram(s) Oral three times a day  vancomycin  IVPB 1000 milliGRAM(s) IV Intermittent every 12 hours      PHYSICAL EXAM:  T(C): 37.4 (23 @ 21:00), Max: 39.5 (23 @ 01:09)  HR: 95 (23 @ 21:00) (89 - 135)  BP: 122/73 (23 @ 21:00) (114/62 - 144/80)  RR: 17 (23 @ 21:00) (16 - 25)  SpO2: 99% (23 @ 21:00) (95% - 100%)  Wt(kg): --  I&O's Summary    24 Dec 2023 07:  -  25 Dec 2023 07:00  --------------------------------------------------------  IN: 1119 mL / OUT: 1180 mL / NET: -61 mL    25 Dec 2023 07:  -  25 Dec 2023 22:32  --------------------------------------------------------  IN: 398 mL / OUT: 725 mL / NET: -327 mL          Appearance: Normal	  HEENT:  PERRLA   Lymphatic: No lymphadenopathy   Cardiovascular: Normal S1 S2, no JVD  Respiratory: normal effort , clear  Gastrointestinal:  Soft, Non-tender  Skin: No rashes,  warm to touch  Psychiatry:  Mood & affect appropriate  Musculuskeletal: No edema    recent labs, Imaging and EKGs personally reviewed     23 @ 07:01  -  23 @ 07:00  --------------------------------------------------------  IN: 1119 mL / OUT: 1180 mL / NET: -61 mL    23 @ 07:01  -  23 @ 22:32  --------------------------------------------------------  IN: 398 mL / OUT: 725 mL / NET: -327 mL                          9.1    5.53  )-----------( 332      ( 25 Dec 2023 03:15 )             26.1               12    122<L>  |  90<L>  |  18  ----------------------------<  149<H>  4.4   |  20<L>  |  1.23    Ca    8.2<L>      25 Dec 2023 10:10  Phos  2.3       Mg     1.90         TPro  7.1  /  Alb  2.7<L>  /  TBili  0.8  /  DBili  x   /  AST  217<H>  /  ALT  112<H>  /  AlkPhos  75      PT/INR - ( 25 Dec 2023 14:50 )   PT: 16.4 sec;   INR: 1.47 ratio         PTT - ( 25 Dec 2023 14:50 )  PTT:38.9 sec       CARDIAC MARKERS ( 25 Dec 2023 10:10 )  x     / x     / 1188 U/L / x     / 1.3 ng/mL  CARDIAC MARKERS ( 25 Dec 2023 03:15 )  x     / x     / 1031 U/L / x     / x                  Urinalysis Basic - ( 25 Dec 2023 12:55 )    Color: Yellow / Appearance: Clear / S.026 / pH: x  Gluc: x / Ketone: Negative mg/dL  / Bili: Negative / Urobili: 0.2 mg/dL   Blood: x / Protein: 300 mg/dL / Nitrite: Negative   Leuk Esterase: Negative / RBC: 2 /HPF / WBC 2 /HPF   Sq Epi: x / Non Sq Epi: 4 /HPF / Bacteria: Negative /HPF               Name of Patient : TAYLA MARIE  MRN: [id number]  Date of visit: 23       Subjective: Patient seen and examined. No new events except as noted.   worsening SOba nd chest pain  cont to have fever  RRT   ICU     REVIEW OF SYSTEMS:  limited     MEDICATIONS:  MEDICATIONS  (STANDING):  cefepime   IVPB 2000 milliGRAM(s) IV Intermittent every 8 hours  ciprofloxacin  0.3% Ophthalmic Solution for Otic Use 2 Drop(s) Both Ears two times a day  dexMEDEtomidine Infusion 1 MICROgram(s)/kG/Hr (17.3 mL/Hr) IV Continuous <Continuous>  hydroxychloroquine 200 milliGRAM(s) Oral two times a day  lidocaine   4% Patch 1 Patch Transdermal daily  sodium chloride 1 Gram(s) Oral three times a day  vancomycin  IVPB 1000 milliGRAM(s) IV Intermittent every 12 hours      PHYSICAL EXAM:  T(C): 37.4 (23 @ 21:00), Max: 39.5 (23 @ 01:09)  HR: 95 (23 @ 21:00) (89 - 135)  BP: 122/73 (23 @ 21:00) (114/62 - 144/80)  RR: 17 (23 @ 21:00) (16 - 25)  SpO2: 99% (23 @ 21:00) (95% - 100%)  Wt(kg): --  I&O's Summary    24 Dec 2023 07:  -  25 Dec 2023 07:00  --------------------------------------------------------  IN: 1119 mL / OUT: 1180 mL / NET: -61 mL    25 Dec 2023 07:  -  25 Dec 2023 22:32  --------------------------------------------------------  IN: 398 mL / OUT: 725 mL / NET: -327 mL          Appearance: Normal	  HEENT:  PERRLA   Lymphatic: No lymphadenopathy   Cardiovascular: Normal S1 S2, no JVD  Respiratory: normal effort , clear  Gastrointestinal:  Soft, Non-tender  Skin: No rashes,  warm to touch  Psychiatry:  Mood & affect appropriate  Musculuskeletal: No edema    recent labs, Imaging and EKGs personally reviewed     23 @ 07:01  -  23 @ 07:00  --------------------------------------------------------  IN: 1119 mL / OUT: 1180 mL / NET: -61 mL    23 @ 07:01  -  23 @ 22:32  --------------------------------------------------------  IN: 398 mL / OUT: 725 mL / NET: -327 mL                          9.1    5.53  )-----------( 332      ( 25 Dec 2023 03:15 )             26.1               12    122<L>  |  90<L>  |  18  ----------------------------<  149<H>  4.4   |  20<L>  |  1.23    Ca    8.2<L>      25 Dec 2023 10:10  Phos  2.3       Mg     1.90         TPro  7.1  /  Alb  2.7<L>  /  TBili  0.8  /  DBili  x   /  AST  217<H>  /  ALT  112<H>  /  AlkPhos  75      PT/INR - ( 25 Dec 2023 14:50 )   PT: 16.4 sec;   INR: 1.47 ratio         PTT - ( 25 Dec 2023 14:50 )  PTT:38.9 sec       CARDIAC MARKERS ( 25 Dec 2023 10:10 )  x     / x     / 1188 U/L / x     / 1.3 ng/mL  CARDIAC MARKERS ( 25 Dec 2023 03:15 )  x     / x     / 1031 U/L / x     / x                  Urinalysis Basic - ( 25 Dec 2023 12:55 )    Color: Yellow / Appearance: Clear / S.026 / pH: x  Gluc: x / Ketone: Negative mg/dL  / Bili: Negative / Urobili: 0.2 mg/dL   Blood: x / Protein: 300 mg/dL / Nitrite: Negative   Leuk Esterase: Negative / RBC: 2 /HPF / WBC 2 /HPF   Sq Epi: x / Non Sq Epi: 4 /HPF / Bacteria: Negative /HPF

## 2023-12-25 NOTE — PROGRESS NOTE ADULT - SUBJECTIVE AND OBJECTIVE BOX
Infectious Diseases Follow Up:    Patient is a 28y old  Male who presents with a chief complaint of Dyspnea, chest pain, PE (24 Dec 2023 14:49)      Interval History/ROS:  Pt continues to have high fevers. Pt w/ cough, chest pain, diarrhea.     Allergies  No Known Allergies        ANTIMICROBIALS:  hydroxychloroquine 200 two times a day  piperacillin/tazobactam IVPB.. 3.375 every 8 hours      Current Abx:     Previous Abx     OTHER MEDS:  MEDICATIONS  (STANDING):  acetaminophen   IVPB .. 1000 once  albuterol/ipratropium for Nebulization 3 every 6 hours PRN  guaiFENesin Oral Liquid (Sugar-Free) 200 every 6 hours PRN  heparin  Infusion 1600 <Continuous>  methylPREDNISolone sodium succinate Injectable 40 daily      Vital Signs Last 24 Hrs  T(C): 37.8 (25 Dec 2023 04:07), Max: 39.5 (25 Dec 2023 01:09)  T(F): 100 (25 Dec 2023 04:07), Max: 103.1 (25 Dec 2023 01:09)  HR: 89 (25 Dec 2023 04:07) (86 - 106)  BP: 142/84 (25 Dec 2023 04:07) (137/80 - 150/88)  BP(mean): --  RR: 18 (25 Dec 2023 04:07) (17 - 18)  SpO2: 99% (25 Dec 2023 04:07) (98% - 100%)    Parameters below as of 25 Dec 2023 04:07  Patient On (Oxygen Delivery Method): room air        PHYSICAL EXAM:  GENERAL: NAD, tired appearing   HEAD:  Atraumatic, Normocephalic  EYES: EOMI, PERRLA, conjunctiva and sclera clear  NECK: Supple, No JVD  CHEST/LUNG: Clear to auscultation bilaterally; No wheeze  HEART: +tachy  ABDOMEN: Soft, Nontender, Nondistended; Bowel sounds present  EXTREMITIES:  2+ Peripheral Pulses, No clubbing, cyanosis, or edema  PSYCH: AAOx3                          9.1    5.53  )-----------( 332      ( 25 Dec 2023 03:15 )             26.1       12-25    121<L>  |  91<L>  |  19  ----------------------------<  111<H>  4.6   |  20<L>  |  1.18    Ca    8.3<L>      25 Dec 2023 03:15  Phos  3.6     12-24  Mg     2.00     12-24    TPro  7.1  /  Alb  2.7<L>  /  TBili  0.6  /  DBili  x   /  AST  152<H>  /  ALT  98<H>  /  AlkPhos  70  12-25      Urinalysis Basic - ( 25 Dec 2023 03:15 )    Color: x / Appearance: x / SG: x / pH: x  Gluc: 111 mg/dL / Ketone: x  / Bili: x / Urobili: x   Blood: x / Protein: x / Nitrite: x   Leuk Esterase: x / RBC: x / WBC x   Sq Epi: x / Non Sq Epi: x / Bacteria: x        MICROBIOLOGY:  v  Pleural Fl Pleural Fluid  12-24-23 --  --    polymorphonuclear leukocytes seen  No organisms seen  by cytocentrifuge      Clean Catch Clean Catch (Midstream)  12-23-23   No growth  --    Few polymorphonuclear leukocytes per low power field  Moderate Squamous epithelial cells per low power field  Rare Gram Negative Rods per oil power field      .Blood Blood-Peripheral  12-23-23   No growth at 24 hours  --  --      .Blood Blood-Peripheral  12-21-23   No growth at 72 Hours  --  --      .Blood Blood-Peripheral  12-21-23   No growth at 72 Hours  --  --      .Sputum Sputum  12-20-23   Normal Respiratory Inge present  --    Few Squamous epithelial cells per low power field  Few polymorphonuclear leukocytes per low power field  Few Gram Positive Cocci in Pairs and Chains per oil power field      .Stool Feces  12-15-23   No enteric pathogens isolated.  (Stool culture examined for Salmonella,  Shigella, Campylobacter, Aeromonas, Plesiomonas,  Vibrio, E.coli O157 and Yersinia)  --  --      .Blood Blood-Peripheral  12-14-23   No growth at 5 days  --  --      .Blood Blood-Peripheral  12-14-23   No growth at 5 days  --  --      .Blood Blood-Peripheral  12-12-23   No growth at 5 days  --  --      .Blood Blood-Peripheral  12-12-23   No growth at 5 days  --  --          Rapid RVP Result: NotDetec (12-23 @ 14:34)  Rapid RVP Result: NotDetec (12-21 @ 18:50)        RADIOLOGY:

## 2023-12-25 NOTE — RAPID RESPONSE TEAM SUMMARY - NSUNITLOCATIONRRT_GEN_ALL_CORE
[facility] Discharge Summary    Jodi Biswas MRN# [id number]   Age: 34 year old YOB: 1989     Date of Admission:  10/21/2023  Date of Discharge::  10/21/23   Admitting Physician:  Harjit Marinelli MD  Discharge Physician:  Harjit Marinelli MD    PCP: La [facility]          Admission Diagnoses:   Nephrolithiasis [N20.0]            Discharge Diagnosis:     UTI, obstructing kidney stone in ureter          Procedures:   Cystoscopy and right JJ ureteral stent placement.          Medications Prior to Admission:     No medications prior to admission.             Discharge Medications:     Discharge Medication List as of 10/21/2023  4:19 PM        START taking these medications    Details   acetaminophen (TYLENOL) 500 MG tablet Take 1-2 tablets (500-1,000 mg) by mouth every 6 hours as needed for mild pain, Disp-30 tablet, R-0, E-Prescribe      nitroFURantoin macrocrystal-monohydrate (MACROBID) 100 MG capsule Take 1 capsule (100 mg) by mouth 2 times daily for 5 days, Disp-10 capsule, R-0, E-Prescribe      oxyBUTYnin (DITROPAN) 5 MG tablet Take 1 tablet (5 mg) by mouth 3 times daily as needed for bladder spasms, Disp-30 tablet, R-0, E-Prescribe      phenazopyridine (PYRIDIUM) 100 MG tablet Take 1 tablet (100 mg) by mouth 3 times daily as needed for urinary tract discomfort, Disp-9 tablet, R-0, E-Prescribe      polyethylene glycol (MIRALAX) 17 GM/Dose powder Take 17 g (1 Capful) by mouth daily, Disp-289 g, R-0, E-Prescribe      !! tamsulosin (FLOMAX) 0.4 MG capsule Take 1 capsule (0.4 mg) by mouth daily, Disp-30 capsule, R-0, E-Prescribe       !! - Potential duplicate medications found. Please discuss with provider.        CONTINUE these medications which have NOT CHANGED    Details   ketoconazole (NIZORAL) 2 % external shampoo Apply topically daily Leave on scalp for 5-10 minutes before rinsingHistorical      ketorolac (TORADOL) 10 MG tablet Take 1 tablet (10 mg) by mouth every 6 hours as needed  for moderate pain, Disp-20 tablet, R-0, E-Prescribe      ondansetron (ZOFRAN ODT) 4 MG ODT tab Take 1 tablet (4 mg) by mouth every 8 hours as needed for nausea, Disp-10 tablet, R-0, E-Prescribe      !! tamsulosin (FLOMAX) 0.4 MG capsule Take 1 capsule (0.4 mg) by mouth daily, Disp-7 capsule, R-0, E-Prescribe      terbinafine (LAMISIL) 250 MG tablet Take 250 mg by mouth daily, Historical       !! - Potential duplicate medications found. Please discuss with provider.                Consultations:   No consultations were requested during this admission          Brief History of Illness:   Jodi Biswas is a 34 year old female who presented with a right obstructing ureteral stone in setting of fever           Hospital Course:   Taken to OR, cystoscopy and right JJ stent placed without complication.            Discharge Instructions and Follow-Up:     Discharge diet: Regular   Discharge activity: Activity as tolerated   Discharge follow-up: Urology [facility] will arrange outpatient ureteroscopy holmium laser lithotripsy.   Wound care: none           Discharge Disposition:     Discharged to home      Attestation:  I have reviewed today's vital signs, notes, medications, labs and imaging.  Amount of time performed on this discharge summary: 5 minutes.    Harjit Marinelli MD                     8T

## 2023-12-25 NOTE — PROCEDURE NOTE - ADDITIONAL PROCEDURE DETAILS
CSF sampled and drained clear; while collecting third tube, CSF became serosanguinous; procedure then aborted and needle removed. Patient sedated although no change in mental status; PERRLA and neuro exam unremarkable with intact, unchanged strength in extremities and pt denied numbness/tingling.
Ultrasound guided assessment of most accessible pocket

## 2023-12-25 NOTE — RAPID RESPONSE TEAM SUMMARY - NSSITUATIONBACKGROUNDRRT_GEN_ALL_CORE
27 y/o M new diagnosis of SLE (9/23, started on hydroxychloroquine) initially admitted to [facility] on 12/12 w/ CP and SOB, found to have RUL and LLL segmental/subsegmental PE, started on heparin, c/f superimposed PNA, on Zosyn. Pt with b/l effusions, L > R with loculations, transferred to [facility] and now s/p thoracentesis 12/24 (held hep gtt on 12/24, but resumed 12/25). Pt with persistent fevers despite Zosyn from 12/14/23, bcx 12/23 NGTD. Pt followed by heme/onc, ID, rheum, and CT thoracic for pleuff. Two rapids called consecutively.   1st Rapid called today for acute central chest pain, pleuritic in nature.   2nd Rapid for witnessed sz by RN/MAR s/p pocus with MICU  29 y/o M new diagnosis of SLE (9/23, started on hydroxychloroquine) initially admitted to [facility] on 12/12 w/ CP and SOB, found to have RUL and LLL segmental/subsegmental PE, started on heparin, c/f superimposed PNA, on Zosyn. Pt with b/l effusions, L > R with loculations, transferred to [facility] and now s/p thoracentesis 12/24 (held hep gtt on 12/24, but resumed 12/25). Pt with persistent fevers despite Zosyn from 12/14/23, bcx 12/23 NGTD. Pt followed by heme/onc, ID, rheum, and CT thoracic for pleuff. Two rapids called consecutively.   1st Rapid called today for acute central chest pain, pleuritic in nature.   2nd Rapid for witnessed sz by RN/MAR s/p pocus with MICU

## 2023-12-25 NOTE — CONSULT NOTE ADULT - SUBJECTIVE AND OBJECTIVE BOX
Neurology - Consult Note    -  Spectra: [phone] ([facility]), [phone] ([facility])  -    HPI: Patient TAYLA MARIE is a 28y (1995) wo/man with a PMHx significant for ***      Review of Systems:  INCOMPLETE   CONSTITUTIONAL: No fevers or chills  EYES AND ENT: No visual changes or no throat pain   NECK: No pain or stiffness  RESPIRATORY: No hemoptysis or shortness of breath  CARDIOVASCULAR: No chest pain or palpitations  GASTROINTESTINAL: No melena or hematochezia  GENITOURINARY: No dysuria or hematuria  NEUROLOGICAL: +As stated in HPI above  SKIN: No itching, burning, rashes, or lesions   All other review of systems is negative unless indicated above.    Allergies:  No Known Allergies      PMHx/PSHx/Family Hx: As above, otherwise see below   No pertinent past medical history    LE (lupus erythematosus)    Pulmonary embolism        Social Hx:  No current use of tobacco, alcohol, or illicit drugs  Lives with ***    Medications:  MEDICATIONS  (STANDING):  cefepime   IVPB 2000 milliGRAM(s) IV Intermittent every 8 hours  ciprofloxacin  0.3% Ophthalmic Solution for Otic Use 2 Drop(s) Both Ears two times a day  hydroxychloroquine 200 milliGRAM(s) Oral two times a day  lactated ringers. 1000 milliLiter(s) (200 mL/Hr) IV Continuous <Continuous>  lidocaine   4% Patch 1 Patch Transdermal daily  sodium chloride 1 Gram(s) Oral three times a day  vancomycin  IVPB 1000 milliGRAM(s) IV Intermittent every 12 hours    MEDICATIONS  (PRN):  albuterol/ipratropium for Nebulization 3 milliLiter(s) Nebulizer every 6 hours PRN Shortness of Breath and/or Wheezing  guaiFENesin Oral Liquid (Sugar-Free) 200 milliGRAM(s) Oral every 6 hours PRN Cough  lidocaine 2% Viscous 5 milliLiter(s) Swish and Spit three times a day PRN Mouth Care      Vitals:  T(C): 39.2 (12-25-23 @ 12:00), Max: 39.5 (12-25-23 @ 01:09)  HR: 99 (12-25-23 @ 14:00) (86 - 135)  BP: 116/72 (12-25-23 @ 14:00) (114/62 - 144/80)  RR: 16 (12-25-23 @ 14:00) (16 - 25)  SpO2: 98% (12-25-23 @ 14:00) (98% - 100%)    Physical Examination: INCOMPLETE  General - NAD  Cardiovascular - Peripheral pulses palpable, no edema  Eyes - Fundoscopy with flat, sharp optic discs and no hemorrhage or exudates; Fundoscopy not well visualized; Fundoscopy not performed due to safety precautions in the setting of the COVID-19 pandemic    Neurologic Exam:  Mental status - Awake, Alert, Oriented to person, place, and time. Speech fluent, repetition and naming intact. Follows simple and complex commands. Attention/concentration, recent and remote memory (including registration and recall), and fund of knowledge intact    Cranial nerves - PERRLA, VFF, EOMI, face sensation (V1-V3) intact b/l, facial strength intact without asymmetry b/l, hearing intact b/l, palate with symmetric elevation, trapezius OR sternocleidomastiod 5/5 strength b/l, tongue midline on protrusion with full lateral movement    Motor - Normal bulk and tone throughout. No pronator drift.  Strength testing            Deltoid      Biceps      Triceps     Wrist Extension    Wrist Flexion     Interossei         R            5                 5               5                     5                              5                        5                 5  L             5                 5               5                     5                              5                        5                 5              Hip Flexion    Hip Extension    Knee Flexion    Knee Extension    Dorsiflexion    Plantar Flexion  R              5                           5                       5                           5                            5                          5  L              5                           5                        5                           5                            5                          5    Sensation - Light touch/temperature OR pain/vibration intact throughout    DTR's -             Biceps      Triceps     Brachioradialis      Patellar    Ankle    Toes/plantar response  R             2+             2+                  2+                       2+            2+                 Down  L              2+             2+                 2+                        2+           2+                 Down    Coordination - Finger to Nose intact b/l. No tremors appreciated    Gait and station - Normal casual gait. Romberg (-)    Labs:                        9.1    5.53  )-----------( 332      ( 25 Dec 2023 03:15 )             26.1     12-25    122<L>  |  90<L>  |  18  ----------------------------<  149<H>  4.4   |  20<L>  |  1.23    Ca    8.2<L>      25 Dec 2023 10:10  Phos  2.3     12-25  Mg     1.90     12-25    TPro  7.1  /  Alb  2.7<L>  /  TBili  0.8  /  DBili  x   /  AST  217<H>  /  ALT  112<H>  /  AlkPhos  75  12-25    CAPILLARY BLOOD GLUCOSE      POCT Blood Glucose.: 162 mg/dL (25 Dec 2023 10:43)    LIVER FUNCTIONS - ( 25 Dec 2023 10:10 )  Alb: 2.7 g/dL / Pro: 7.1 g/dL / ALK PHOS: 75 U/L / ALT: 112 U/L / AST: 217 U/L / GGT: x             Culture - Body Fluid with Gram Stain (collected 24 Dec 2023 17:18)  Source: Pleural Fl Pleural Fluid  Gram Stain (24 Dec 2023 23:39):    polymorphonuclear leukocytes seen    No organisms seen    by cytocentrifuge    Culture - Sputum (collected 23 Dec 2023 14:20)  Source: .Sputum Sputum  Gram Stain (23 Dec 2023 21:09):    Few polymorphonuclear leukocytes per low power field    Moderate Squamous epithelial cells per low power field    Rare Gram Negative Rods per oil power field  Final Report (25 Dec 2023 08:17):    Normal Respiratory Inge present    Culture - Urine (collected 23 Dec 2023 14:20)  Source: Clean Catch Clean Catch (Midstream)  Final Report (24 Dec 2023 19:40):    No growth    Culture - Blood (collected 23 Dec 2023 11:36)  Source: .Blood Blood-Peripheral  Preliminary Report (25 Dec 2023 15:01):    No growth at 48 Hours      PT/INR - ( 25 Dec 2023 14:50 )   PT: 16.4 sec;   INR: 1.47 ratio         PTT - ( 25 Dec 2023 14:50 )  PTT:38.9 sec        Radiology:    CT Head No Cont (12.25.23 @ 13:53)   Findings:  The lateral ventricles have a normal configuration.  There is no evidence of acute hemorrhage, mass or mass-effect in the   posterior fossa or in the supratentorial region.  Evaluation of the osseous structures with the appropriate window appears   unremarkable.  The visualized paranasal sinuses mastoid and middle ear regions appear   clear.    Impression:  Unremarkable noncontrast head CT.  If symptoms continue MRI can be done for further evaluation if there are   no contraindications.   Neurology - Consult Note    -  Spectra: [phone] ([facility]), [phone] ([facility])  -    HPI: Patient TAYLA MARIE is a 28y (1995) wo/man with a PMHx significant for ***      Review of Systems:  INCOMPLETE   CONSTITUTIONAL: No fevers or chills  EYES AND ENT: No visual changes or no throat pain   NECK: No pain or stiffness  RESPIRATORY: No hemoptysis or shortness of breath  CARDIOVASCULAR: No chest pain or palpitations  GASTROINTESTINAL: No melena or hematochezia  GENITOURINARY: No dysuria or hematuria  NEUROLOGICAL: +As stated in HPI above  SKIN: No itching, burning, rashes, or lesions   All other review of systems is negative unless indicated above.    Allergies:  No Known Allergies      PMHx/PSHx/Family Hx: As above, otherwise see below   No pertinent past medical history    LE (lupus erythematosus)    Pulmonary embolism        Social Hx:  No current use of tobacco, alcohol, or illicit drugs  Lives with ***    Medications:  MEDICATIONS  (STANDING):  cefepime   IVPB 2000 milliGRAM(s) IV Intermittent every 8 hours  ciprofloxacin  0.3% Ophthalmic Solution for Otic Use 2 Drop(s) Both Ears two times a day  hydroxychloroquine 200 milliGRAM(s) Oral two times a day  lactated ringers. 1000 milliLiter(s) (200 mL/Hr) IV Continuous <Continuous>  lidocaine   4% Patch 1 Patch Transdermal daily  sodium chloride 1 Gram(s) Oral three times a day  vancomycin  IVPB 1000 milliGRAM(s) IV Intermittent every 12 hours    MEDICATIONS  (PRN):  albuterol/ipratropium for Nebulization 3 milliLiter(s) Nebulizer every 6 hours PRN Shortness of Breath and/or Wheezing  guaiFENesin Oral Liquid (Sugar-Free) 200 milliGRAM(s) Oral every 6 hours PRN Cough  lidocaine 2% Viscous 5 milliLiter(s) Swish and Spit three times a day PRN Mouth Care      Vitals:  T(C): 39.2 (12-25-23 @ 12:00), Max: 39.5 (12-25-23 @ 01:09)  HR: 99 (12-25-23 @ 14:00) (86 - 135)  BP: 116/72 (12-25-23 @ 14:00) (114/62 - 144/80)  RR: 16 (12-25-23 @ 14:00) (16 - 25)  SpO2: 98% (12-25-23 @ 14:00) (98% - 100%)    Physical Examination: INCOMPLETE  General - NAD  Cardiovascular - Peripheral pulses palpable, no edema  Eyes - Fundoscopy with flat, sharp optic discs and no hemorrhage or exudates; Fundoscopy not well visualized; Fundoscopy not performed due to safety precautions in the setting of the COVID-19 pandemic    Neurologic Exam:  Mental status - Awake, Alert, Oriented to person, place, and time. Speech fluent, repetition and naming intact. Follows simple and complex commands. Attention/concentration, recent and remote memory (including registration and recall), and fund of knowledge intact    Cranial nerves - PERRLA, VFF, EOMI, face sensation (V1-V3) intact b/l, facial strength intact without asymmetry b/l, hearing intact b/l, palate with symmetric elevation, trapezius OR sternocleidomastiod 5/5 strength b/l, tongue midline on protrusion with full lateral movement    Motor - Normal bulk and tone throughout. No pronator drift.  Strength testing            Deltoid      Biceps      Triceps     Wrist Extension    Wrist Flexion     Interossei         R            5                 5               5                     5                              5                        5                 5  L             5                 5               5                     5                              5                        5                 5              Hip Flexion    Hip Extension    Knee Flexion    Knee Extension    Dorsiflexion    Plantar Flexion  R              5                           5                       5                           5                            5                          5  L              5                           5                        5                           5                            5                          5    Sensation - Light touch/temperature OR pain/vibration intact throughout    DTR's -             Biceps      Triceps     Brachioradialis      Patellar    Ankle    Toes/plantar response  R             2+             2+                  2+                       2+            2+                 Down  L              2+             2+                 2+                        2+           2+                 Down    Coordination - Finger to Nose intact b/l. No tremors appreciated    Gait and station - Normal casual gait. Romberg (-)    Labs:                        9.1    5.53  )-----------( 332      ( 25 Dec 2023 03:15 )             26.1     12-25    122<L>  |  90<L>  |  18  ----------------------------<  149<H>  4.4   |  20<L>  |  1.23    Ca    8.2<L>      25 Dec 2023 10:10  Phos  2.3     12-25  Mg     1.90     12-25    TPro  7.1  /  Alb  2.7<L>  /  TBili  0.8  /  DBili  x   /  AST  217<H>  /  ALT  112<H>  /  AlkPhos  75  12-25    CAPILLARY BLOOD GLUCOSE      POCT Blood Glucose.: 162 mg/dL (25 Dec 2023 10:43)    LIVER FUNCTIONS - ( 25 Dec 2023 10:10 )  Alb: 2.7 g/dL / Pro: 7.1 g/dL / ALK PHOS: 75 U/L / ALT: 112 U/L / AST: 217 U/L / GGT: x             Culture - Body Fluid with Gram Stain (collected 24 Dec 2023 17:18)  Source: Pleural Fl Pleural Fluid  Gram Stain (24 Dec 2023 23:39):    polymorphonuclear leukocytes seen    No organisms seen    by cytocentrifuge    Culture - Sputum (collected 23 Dec 2023 14:20)  Source: .Sputum Sputum  Gram Stain (23 Dec 2023 21:09):    Few polymorphonuclear leukocytes per low power field    Moderate Squamous epithelial cells per low power field    Rare Gram Negative Rods per oil power field  Final Report (25 Dec 2023 08:17):    Normal Respiratory Inge present    Culture - Urine (collected 23 Dec 2023 14:20)  Source: Clean Catch Clean Catch (Midstream)  Final Report (24 Dec 2023 19:40):    No growth    Culture - Blood (collected 23 Dec 2023 11:36)  Source: .Blood Blood-Peripheral  Preliminary Report (25 Dec 2023 15:01):    No growth at 48 Hours      PT/INR - ( 25 Dec 2023 14:50 )   PT: 16.4 sec;   INR: 1.47 ratio         PTT - ( 25 Dec 2023 14:50 )  PTT:38.9 sec        Radiology:    CT Head No Cont (12.25.23 @ 13:53)   Findings:  The lateral ventricles have a normal configuration.  There is no evidence of acute hemorrhage, mass or mass-effect in the   posterior fossa or in the supratentorial region.  Evaluation of the osseous structures with the appropriate window appears   unremarkable.  The visualized paranasal sinuses mastoid and middle ear regions appear   clear.    Impression:  Unremarkable noncontrast head CT.  If symptoms continue MRI can be done for further evaluation if there are   no contraindications.   Neurology - Consult Note    -  Spectra: [phone] ([facility]), [phone] ([facility])  -    HPI: Patient TAYLA MARIE is a 28y (1995) man with a PMHx significant for      27 y/o M w/ SLE on Hydroxychloroquine admitted to [facility] VS for b/l PE w/ superimposed PNA, transferred to [facility] on 12/22 for thoracic eval of b/l L > R effusions, L loculated. Pt was Zosyn since 12/14, started on Vancomycin here. Despite board therapy with Zosyn, pt continues to have fevers to 102. No leukocytosis.   Fevers if 2/2 PNA should have responded w/ 10 day course of Zosyn, MRSA swab was negative, Vancomycin likely not necessary.   May be 2/2 SLE flare rather than infectious process, however would continue with Zosyn for now pending R thoracentesis and studies.   Pt improved today, likely 2/2 initiation of steroids, however continues to have high fevers, without leukocytosis. Does not appear that the fevers are infectious, however not improved on steroids. Will have to consider further imaging if continues.     PE - on heparin drip  sepsis- unclear source. followed by ID  RRT called for SOB and CP  Witnessed 40 sec episode of tonic clonic convulsion, urination. spontaneously resolved. Gave 1mg ativa  Temp 104    No AMS  rigors, stiff    Review of Systems:  INCOMPLETE   CONSTITUTIONAL: No fevers or chills  EYES AND ENT: No visual changes or no throat pain   NECK: No pain or stiffness  RESPIRATORY: No hemoptysis or shortness of breath  CARDIOVASCULAR: No chest pain or palpitations  GASTROINTESTINAL: No melena or hematochezia  GENITOURINARY: No dysuria or hematuria  NEUROLOGICAL: +As stated in HPI above  SKIN: No itching, burning, rashes, or lesions   All other review of systems is negative unless indicated above.    Allergies:  No Known Allergies      PMHx/PSHx/Family Hx: As above, otherwise see below   No pertinent past medical history    LE (lupus erythematosus)    Pulmonary embolism        Social Hx:  No current use of tobacco, alcohol, or illicit drugs  Lives with ***    Medications:  MEDICATIONS  (STANDING):  cefepime   IVPB 2000 milliGRAM(s) IV Intermittent every 8 hours  ciprofloxacin  0.3% Ophthalmic Solution for Otic Use 2 Drop(s) Both Ears two times a day  hydroxychloroquine 200 milliGRAM(s) Oral two times a day  lactated ringers. 1000 milliLiter(s) (200 mL/Hr) IV Continuous <Continuous>  lidocaine   4% Patch 1 Patch Transdermal daily  sodium chloride 1 Gram(s) Oral three times a day  vancomycin  IVPB 1000 milliGRAM(s) IV Intermittent every 12 hours    MEDICATIONS  (PRN):  albuterol/ipratropium for Nebulization 3 milliLiter(s) Nebulizer every 6 hours PRN Shortness of Breath and/or Wheezing  guaiFENesin Oral Liquid (Sugar-Free) 200 milliGRAM(s) Oral every 6 hours PRN Cough  lidocaine 2% Viscous 5 milliLiter(s) Swish and Spit three times a day PRN Mouth Care      Vitals:  T(C): 39.2 (12-25-23 @ 12:00), Max: 39.5 (12-25-23 @ 01:09)  HR: 99 (12-25-23 @ 14:00) (86 - 135)  BP: 116/72 (12-25-23 @ 14:00) (114/62 - 144/80)  RR: 16 (12-25-23 @ 14:00) (16 - 25)  SpO2: 98% (12-25-23 @ 14:00) (98% - 100%)    Physical Examination: INCOMPLETE  General - NAD  Cardiovascular - Peripheral pulses palpable, no edema  Eyes - Fundoscopy with flat, sharp optic discs and no hemorrhage or exudates; Fundoscopy not well visualized; Fundoscopy not performed due to safety precautions in the setting of the COVID-19 pandemic    Neurologic Exam:  Mental status - Awake, Alert, Oriented to person, place, and time. Speech fluent, repetition and naming intact. Follows simple and complex commands. Attention/concentration, recent and remote memory (including registration and recall), and fund of knowledge intact    Cranial nerves - PERRLA, VFF, EOMI, face sensation (V1-V3) intact b/l, facial strength intact without asymmetry b/l, hearing intact b/l, palate with symmetric elevation, trapezius OR sternocleidomastiod 5/5 strength b/l, tongue midline on protrusion with full lateral movement    Motor - Normal bulk and tone throughout. No pronator drift.  Strength testing            Deltoid      Biceps      Triceps     Wrist Extension    Wrist Flexion     Interossei         R            5                 5               5                     5                              5                        5                 5  L             5                 5               5                     5                              5                        5                 5              Hip Flexion    Hip Extension    Knee Flexion    Knee Extension    Dorsiflexion    Plantar Flexion  R              5                           5                       5                           5                            5                          5  L              5                           5                        5                           5                            5                          5    Sensation - Light touch/temperature OR pain/vibration intact throughout    DTR's -             Biceps      Triceps     Brachioradialis      Patellar    Ankle    Toes/plantar response  R             2+             2+                  2+                       2+            2+                 Down  L              2+             2+                 2+                        2+           2+                 Down    Coordination - Finger to Nose intact b/l. No tremors appreciated    Gait and station - Normal casual gait. Romberg (-)    Labs:                        9.1    5.53  )-----------( 332      ( 25 Dec 2023 03:15 )             26.1     12-25    122<L>  |  90<L>  |  18  ----------------------------<  149<H>  4.4   |  20<L>  |  1.23    Ca    8.2<L>      25 Dec 2023 10:10  Phos  2.3     12-25  Mg     1.90     12-25    TPro  7.1  /  Alb  2.7<L>  /  TBili  0.8  /  DBili  x   /  AST  217<H>  /  ALT  112<H>  /  AlkPhos  75  12-25    CAPILLARY BLOOD GLUCOSE      POCT Blood Glucose.: 162 mg/dL (25 Dec 2023 10:43)    LIVER FUNCTIONS - ( 25 Dec 2023 10:10 )  Alb: 2.7 g/dL / Pro: 7.1 g/dL / ALK PHOS: 75 U/L / ALT: 112 U/L / AST: 217 U/L / GGT: x             Culture - Body Fluid with Gram Stain (collected 24 Dec 2023 17:18)  Source: Pleural Fl Pleural Fluid  Gram Stain (24 Dec 2023 23:39):    polymorphonuclear leukocytes seen    No organisms seen    by cytocentrifuge    Culture - Sputum (collected 23 Dec 2023 14:20)  Source: .Sputum Sputum  Gram Stain (23 Dec 2023 21:09):    Few polymorphonuclear leukocytes per low power field    Moderate Squamous epithelial cells per low power field    Rare Gram Negative Rods per oil power field  Final Report (25 Dec 2023 08:17):    Normal Respiratory Inge present    Culture - Urine (collected 23 Dec 2023 14:20)  Source: Clean Catch Clean Catch (Midstream)  Final Report (24 Dec 2023 19:40):    No growth    Culture - Blood (collected 23 Dec 2023 11:36)  Source: .Blood Blood-Peripheral  Preliminary Report (25 Dec 2023 15:01):    No growth at 48 Hours      PT/INR - ( 25 Dec 2023 14:50 )   PT: 16.4 sec;   INR: 1.47 ratio         PTT - ( 25 Dec 2023 14:50 )  PTT:38.9 sec        Radiology:    CT Head No Cont (12.25.23 @ 13:53)   Findings:  The lateral ventricles have a normal configuration.  There is no evidence of acute hemorrhage, mass or mass-effect in the   posterior fossa or in the supratentorial region.  Evaluation of the osseous structures with the appropriate window appears   unremarkable.  The visualized paranasal sinuses mastoid and middle ear regions appear   clear.    Impression:  Unremarkable noncontrast head CT.  If symptoms continue MRI can be done for further evaluation if there are   no contraindications.   Neurology - Consult Note    -  Spectra: [phone] ([facility]), [phone] ([facility])  -    HPI: Patient TAYLA MARIE is a 28y (1995) man with a PMHx significant for      29 y/o M w/ SLE on Hydroxychloroquine admitted to [facility] VS for b/l PE w/ superimposed PNA, transferred to [facility] on 12/22 for thoracic eval of b/l L > R effusions, L loculated. Pt was Zosyn since 12/14, started on Vancomycin here. Despite board therapy with Zosyn, pt continues to have fevers to 102. No leukocytosis.   Fevers if 2/2 PNA should have responded w/ 10 day course of Zosyn, MRSA swab was negative, Vancomycin likely not necessary.   May be 2/2 SLE flare rather than infectious process, however would continue with Zosyn for now pending R thoracentesis and studies.   Pt improved today, likely 2/2 initiation of steroids, however continues to have high fevers, without leukocytosis. Does not appear that the fevers are infectious, however not improved on steroids. Will have to consider further imaging if continues.     PE - on heparin drip  sepsis- unclear source. followed by ID  RRT called for SOB and CP  Witnessed 40 sec episode of tonic clonic convulsion, urination. spontaneously resolved. Gave 1mg ativa  Temp 104    No AMS  rigors, stiff    Review of Systems:  INCOMPLETE   CONSTITUTIONAL: No fevers or chills  EYES AND ENT: No visual changes or no throat pain   NECK: No pain or stiffness  RESPIRATORY: No hemoptysis or shortness of breath  CARDIOVASCULAR: No chest pain or palpitations  GASTROINTESTINAL: No melena or hematochezia  GENITOURINARY: No dysuria or hematuria  NEUROLOGICAL: +As stated in HPI above  SKIN: No itching, burning, rashes, or lesions   All other review of systems is negative unless indicated above.    Allergies:  No Known Allergies      PMHx/PSHx/Family Hx: As above, otherwise see below   No pertinent past medical history    LE (lupus erythematosus)    Pulmonary embolism        Social Hx:  No current use of tobacco, alcohol, or illicit drugs  Lives with ***    Medications:  MEDICATIONS  (STANDING):  cefepime   IVPB 2000 milliGRAM(s) IV Intermittent every 8 hours  ciprofloxacin  0.3% Ophthalmic Solution for Otic Use 2 Drop(s) Both Ears two times a day  hydroxychloroquine 200 milliGRAM(s) Oral two times a day  lactated ringers. 1000 milliLiter(s) (200 mL/Hr) IV Continuous <Continuous>  lidocaine   4% Patch 1 Patch Transdermal daily  sodium chloride 1 Gram(s) Oral three times a day  vancomycin  IVPB 1000 milliGRAM(s) IV Intermittent every 12 hours    MEDICATIONS  (PRN):  albuterol/ipratropium for Nebulization 3 milliLiter(s) Nebulizer every 6 hours PRN Shortness of Breath and/or Wheezing  guaiFENesin Oral Liquid (Sugar-Free) 200 milliGRAM(s) Oral every 6 hours PRN Cough  lidocaine 2% Viscous 5 milliLiter(s) Swish and Spit three times a day PRN Mouth Care      Vitals:  T(C): 39.2 (12-25-23 @ 12:00), Max: 39.5 (12-25-23 @ 01:09)  HR: 99 (12-25-23 @ 14:00) (86 - 135)  BP: 116/72 (12-25-23 @ 14:00) (114/62 - 144/80)  RR: 16 (12-25-23 @ 14:00) (16 - 25)  SpO2: 98% (12-25-23 @ 14:00) (98% - 100%)    Physical Examination: INCOMPLETE  General - NAD  Cardiovascular - Peripheral pulses palpable, no edema  Eyes - Fundoscopy with flat, sharp optic discs and no hemorrhage or exudates; Fundoscopy not well visualized; Fundoscopy not performed due to safety precautions in the setting of the COVID-19 pandemic    Neurologic Exam:  Mental status - Awake, Alert, Oriented to person, place, and time. Speech fluent, repetition and naming intact. Follows simple and complex commands. Attention/concentration, recent and remote memory (including registration and recall), and fund of knowledge intact    Cranial nerves - PERRLA, VFF, EOMI, face sensation (V1-V3) intact b/l, facial strength intact without asymmetry b/l, hearing intact b/l, palate with symmetric elevation, trapezius OR sternocleidomastiod 5/5 strength b/l, tongue midline on protrusion with full lateral movement    Motor - Normal bulk and tone throughout. No pronator drift.  Strength testing            Deltoid      Biceps      Triceps     Wrist Extension    Wrist Flexion     Interossei         R            5                 5               5                     5                              5                        5                 5  L             5                 5               5                     5                              5                        5                 5              Hip Flexion    Hip Extension    Knee Flexion    Knee Extension    Dorsiflexion    Plantar Flexion  R              5                           5                       5                           5                            5                          5  L              5                           5                        5                           5                            5                          5    Sensation - Light touch/temperature OR pain/vibration intact throughout    DTR's -             Biceps      Triceps     Brachioradialis      Patellar    Ankle    Toes/plantar response  R             2+             2+                  2+                       2+            2+                 Down  L              2+             2+                 2+                        2+           2+                 Down    Coordination - Finger to Nose intact b/l. No tremors appreciated    Gait and station - Normal casual gait. Romberg (-)    Labs:                        9.1    5.53  )-----------( 332      ( 25 Dec 2023 03:15 )             26.1     12-25    122<L>  |  90<L>  |  18  ----------------------------<  149<H>  4.4   |  20<L>  |  1.23    Ca    8.2<L>      25 Dec 2023 10:10  Phos  2.3     12-25  Mg     1.90     12-25    TPro  7.1  /  Alb  2.7<L>  /  TBili  0.8  /  DBili  x   /  AST  217<H>  /  ALT  112<H>  /  AlkPhos  75  12-25    CAPILLARY BLOOD GLUCOSE      POCT Blood Glucose.: 162 mg/dL (25 Dec 2023 10:43)    LIVER FUNCTIONS - ( 25 Dec 2023 10:10 )  Alb: 2.7 g/dL / Pro: 7.1 g/dL / ALK PHOS: 75 U/L / ALT: 112 U/L / AST: 217 U/L / GGT: x             Culture - Body Fluid with Gram Stain (collected 24 Dec 2023 17:18)  Source: Pleural Fl Pleural Fluid  Gram Stain (24 Dec 2023 23:39):    polymorphonuclear leukocytes seen    No organisms seen    by cytocentrifuge    Culture - Sputum (collected 23 Dec 2023 14:20)  Source: .Sputum Sputum  Gram Stain (23 Dec 2023 21:09):    Few polymorphonuclear leukocytes per low power field    Moderate Squamous epithelial cells per low power field    Rare Gram Negative Rods per oil power field  Final Report (25 Dec 2023 08:17):    Normal Respiratory Inge present    Culture - Urine (collected 23 Dec 2023 14:20)  Source: Clean Catch Clean Catch (Midstream)  Final Report (24 Dec 2023 19:40):    No growth    Culture - Blood (collected 23 Dec 2023 11:36)  Source: .Blood Blood-Peripheral  Preliminary Report (25 Dec 2023 15:01):    No growth at 48 Hours      PT/INR - ( 25 Dec 2023 14:50 )   PT: 16.4 sec;   INR: 1.47 ratio         PTT - ( 25 Dec 2023 14:50 )  PTT:38.9 sec        Radiology:    CT Head No Cont (12.25.23 @ 13:53)   Findings:  The lateral ventricles have a normal configuration.  There is no evidence of acute hemorrhage, mass or mass-effect in the   posterior fossa or in the supratentorial region.  Evaluation of the osseous structures with the appropriate window appears   unremarkable.  The visualized paranasal sinuses mastoid and middle ear regions appear   clear.    Impression:  Unremarkable noncontrast head CT.  If symptoms continue MRI can be done for further evaluation if there are   no contraindications.   Neurology - Consult Note    -  Spectra: [id number] ([facility]), [id number] ([facility])  -    HPI: Patient TAYLA MARIE is a 28y (1995) man with a PMHx significant for      29 y/o M w/ SLE on Hydroxychloroquine admitted to [facility] VS for b/l PE w/ superimposed PNA, transferred to [facility] on 12/22 for thoracic eval of b/l L > R effusions, L loculated. Pt was Zosyn since 12/14, started on Vancomycin here. Despite board therapy with Zosyn, pt continues to have fevers to 102. No leukocytosis.   Fevers if 2/2 PNA should have responded w/ 10 day course of Zosyn, MRSA swab was negative, Vancomycin likely not necessary.   May be 2/2 SLE flare rather than infectious process, however would continue with Zosyn for now pending R thoracentesis and studies.   Pt improved today, likely 2/2 initiation of steroids, however continues to have high fevers, without leukocytosis. Does not appear that the fevers are infectious, however not improved on steroids. Will have to consider further imaging if continues.     PE - on heparin drip  sepsis- unclear source. followed by ID  RRT called for SOB and CP  Witnessed 40 sec episode of tonic clonic convulsion, urination. spontaneously resolved. Gave 1mg ativan   Temp 104    No AMS  rigors, stiff    Review of Systems:    CONSTITUTIONAL: No fevers or chills  EYES AND ENT: No visual changes or no throat pain   NECK: No pain or stiffness  RESPIRATORY: No hemoptysis or shortness of breath  CARDIOVASCULAR: No chest pain or palpitations  GASTROINTESTINAL: No melena or hematochezia  GENITOURINARY: No dysuria or hematuria  NEUROLOGICAL: +As stated in HPI above  SKIN: No itching, burning, rashes, or lesions   All other review of systems is negative unless indicated above.    Allergies:  No Known Allergies      PMHx/PSHx/Family Hx: As above, otherwise see below   LE (lupus erythematosus)  Pulmonary embolism      Social Hx:  No current use of tobacco, alcohol, or illicit drugs    Medications:  MEDICATIONS  (STANDING):  cefepime   IVPB 2000 milliGRAM(s) IV Intermittent every 8 hours  ciprofloxacin  0.3% Ophthalmic Solution for Otic Use 2 Drop(s) Both Ears two times a day  hydroxychloroquine 200 milliGRAM(s) Oral two times a day  lactated ringers. 1000 milliLiter(s) (200 mL/Hr) IV Continuous <Continuous>  lidocaine   4% Patch 1 Patch Transdermal daily  sodium chloride 1 Gram(s) Oral three times a day  vancomycin  IVPB 1000 milliGRAM(s) IV Intermittent every 12 hours    MEDICATIONS  (PRN):  albuterol/ipratropium for Nebulization 3 milliLiter(s) Nebulizer every 6 hours PRN Shortness of Breath and/or Wheezing  guaiFENesin Oral Liquid (Sugar-Free) 200 milliGRAM(s) Oral every 6 hours PRN Cough  lidocaine 2% Viscous 5 milliLiter(s) Swish and Spit three times a day PRN Mouth Care      Vitals:  T(C): 39.2 (12-25-23 @ 12:00), Max: 39.5 (12-25-23 @ 01:09)  HR: 99 (12-25-23 @ 14:00) (86 - 135)  BP: 116/72 (12-25-23 @ 14:00) (114/62 - 144/80)  RR: 16 (12-25-23 @ 14:00) (16 - 25)  SpO2: 98% (12-25-23 @ 14:00) (98% - 100%)    Physical Examination:   General - NAD  Cardiovascular - Peripheral pulses palpable, no edema  Eyes -  Fundoscopy not performed due to safety precautions in the setting of the COVID-19 pandemic    Neurologic Exam:  Mental status - Awake, Alert, Oriented to person, place- hospital, and time Dec 2023. Speech fluent, repetition and naming intact. Follows simple and complex commands. Attention/concentration, recent and remote memory (including registration and recall), and fund of knowledge intact    Cranial nerves - PERRLA, VFF, EOMI, face sensation (V1-V3) intact b/l, facial strength intact without asymmetry b/l, hearing intact b/l, palate with symmetric elevation, trapezius  5/5 strength b/l, tongue midline on protrusion with full lateral movement    Motor - Normal bulk and tone throughout. No pronator drift.  Strength testing            Deltoid      Biceps      Triceps     Wrist Extension    Wrist Flexion     Interossei         R            5                 5               5                     5               5                    5                 5  L             5                 5               5                     5               5                   5                 5              Hip Flexion    Hip Extension    Knee Flexion    Knee Extension    Dorsiflexion    Plantar Flexion  R              5                           5                       5                   5                   5                          5  L              5                           5                        5                   5                   5                          5    Sensation - Light touch intact throughout    DTR's -             Biceps      Triceps     Brachioradialis      Patellar    Ankle    Toes/plantar response  R             2+             2+                  2+               4+            4+                 Down  L              2+             2+                 2+                4+           4+                 Down    B/L nonsustained ankle clonus    Coordination - Finger to Nose intact b/l. No tremors appreciated    Gait and station - Not assessed ue to mental status    Labs:                        9.1    5.53  )-----------( 332      ( 25 Dec 2023 03:15 )             26.1     12-25    122<L>  |  90<L>  |  18  ----------------------------<  149<H>  4.4   |  20<L>  |  1.23    Ca    8.2<L>      25 Dec 2023 10:10  Phos  2.3     12-25  Mg     1.90     12-25    TPro  7.1  /  Alb  2.7<L>  /  TBili  0.8  /  DBili  x   /  AST  217<H>  /  ALT  112<H>  /  AlkPhos  75  12-25    CAPILLARY BLOOD GLUCOSE      POCT Blood Glucose.: 162 mg/dL (25 Dec 2023 10:43)    LIVER FUNCTIONS - ( 25 Dec 2023 10:10 )  Alb: 2.7 g/dL / Pro: 7.1 g/dL / ALK PHOS: 75 U/L / ALT: 112 U/L / AST: 217 U/L / GGT: x             Culture - Body Fluid with Gram Stain (collected 24 Dec 2023 17:18)  Source: Pleural Fl Pleural Fluid  Gram Stain (24 Dec 2023 23:39):    polymorphonuclear leukocytes seen    No organisms seen    by cytocentrifuge    Culture - Sputum (collected 23 Dec 2023 14:20)  Source: .Sputum Sputum  Gram Stain (23 Dec 2023 21:09):    Few polymorphonuclear leukocytes per low power field    Moderate Squamous epithelial cells per low power field    Rare Gram Negative Rods per oil power field  Final Report (25 Dec 2023 08:17):    Normal Respiratory Inge present    Culture - Urine (collected 23 Dec 2023 14:20)  Source: Clean Catch Clean Catch (Midstream)  Final Report (24 Dec 2023 19:40):    No growth    Culture - Blood (collected 23 Dec 2023 11:36)  Source: .Blood Blood-Peripheral  Preliminary Report (25 Dec 2023 15:01):    No growth at 48 Hours      PT/INR - ( 25 Dec 2023 14:50 )   PT: 16.4 sec;   INR: 1.47 ratio         PTT - ( 25 Dec 2023 14:50 )  PTT:38.9 sec      Radiology:    CT Head No Cont (12.25.23 @ 13:53)   Findings:  The lateral ventricles have a normal configuration.  There is no evidence of acute hemorrhage, mass or mass-effect in the   posterior fossa or in the supratentorial region.  Evaluation of the osseous structures with the appropriate window appears   unremarkable.  The visualized paranasal sinuses mastoid and middle ear regions appear   clear.    Impression:  Unremarkable noncontrast head CT.  If symptoms continue MRI can be done for further evaluation if there are   no contraindications.    EEG Prelim 12/25  EEG preliminary read (not final) on the initial recording hour(s) = x 1.5  No seizures recorded.  Final report to follow tomorrow morning after completion of study.     Neurology - Consult Note    -  Spectra: [phone] ([facility]), [phone] ([facility])  -    HPI: Patient TAYLA MARIE is a 28y (1995) man with a PMHx significant for      27 y/o M w/ SLE on Hydroxychloroquine admitted to [facility] VS for b/l PE w/ superimposed PNA, transferred to [facility] on 12/22 for thoracic eval of b/l L > R effusions, L loculated. Pt was Zosyn since 12/14, started on Vancomycin here. Despite board therapy with Zosyn, pt continues to have fevers to 102. No leukocytosis.   Fevers if 2/2 PNA should have responded w/ 10 day course of Zosyn, MRSA swab was negative, Vancomycin likely not necessary.   May be 2/2 SLE flare rather than infectious process, however would continue with Zosyn for now pending R thoracentesis and studies.   Pt improved today, likely 2/2 initiation of steroids, however continues to have high fevers, without leukocytosis. Does not appear that the fevers are infectious, however not improved on steroids. Will have to consider further imaging if continues.     PE - on heparin drip  sepsis- unclear source. followed by ID  RRT called for SOB and CP  Witnessed 40 sec episode of tonic clonic convulsion, urination. spontaneously resolved. Gave 1mg ativan   Temp 104    No AMS  rigors, stiff    Review of Systems:    CONSTITUTIONAL: No fevers or chills  EYES AND ENT: No visual changes or no throat pain   NECK: No pain or stiffness  RESPIRATORY: No hemoptysis or shortness of breath  CARDIOVASCULAR: No chest pain or palpitations  GASTROINTESTINAL: No melena or hematochezia  GENITOURINARY: No dysuria or hematuria  NEUROLOGICAL: +As stated in HPI above  SKIN: No itching, burning, rashes, or lesions   All other review of systems is negative unless indicated above.    Allergies:  No Known Allergies      PMHx/PSHx/Family Hx: As above, otherwise see below   LE (lupus erythematosus)  Pulmonary embolism      Social Hx:  No current use of tobacco, alcohol, or illicit drugs    Medications:  MEDICATIONS  (STANDING):  cefepime   IVPB 2000 milliGRAM(s) IV Intermittent every 8 hours  ciprofloxacin  0.3% Ophthalmic Solution for Otic Use 2 Drop(s) Both Ears two times a day  hydroxychloroquine 200 milliGRAM(s) Oral two times a day  lactated ringers. 1000 milliLiter(s) (200 mL/Hr) IV Continuous <Continuous>  lidocaine   4% Patch 1 Patch Transdermal daily  sodium chloride 1 Gram(s) Oral three times a day  vancomycin  IVPB 1000 milliGRAM(s) IV Intermittent every 12 hours    MEDICATIONS  (PRN):  albuterol/ipratropium for Nebulization 3 milliLiter(s) Nebulizer every 6 hours PRN Shortness of Breath and/or Wheezing  guaiFENesin Oral Liquid (Sugar-Free) 200 milliGRAM(s) Oral every 6 hours PRN Cough  lidocaine 2% Viscous 5 milliLiter(s) Swish and Spit three times a day PRN Mouth Care      Vitals:  T(C): 39.2 (12-25-23 @ 12:00), Max: 39.5 (12-25-23 @ 01:09)  HR: 99 (12-25-23 @ 14:00) (86 - 135)  BP: 116/72 (12-25-23 @ 14:00) (114/62 - 144/80)  RR: 16 (12-25-23 @ 14:00) (16 - 25)  SpO2: 98% (12-25-23 @ 14:00) (98% - 100%)    Physical Examination:   General - NAD  Cardiovascular - Peripheral pulses palpable, no edema  Eyes -  Fundoscopy not performed due to safety precautions in the setting of the COVID-19 pandemic    Neurologic Exam:  Mental status - Awake, Alert, Oriented to person, place- hospital, and time Dec 2023. Speech fluent, repetition and naming intact. Follows simple and complex commands. Attention/concentration, recent and remote memory (including registration and recall), and fund of knowledge intact    Cranial nerves - PERRLA, VFF, EOMI, face sensation (V1-V3) intact b/l, facial strength intact without asymmetry b/l, hearing intact b/l, palate with symmetric elevation, trapezius  5/5 strength b/l, tongue midline on protrusion with full lateral movement    Motor - Normal bulk and tone throughout. No pronator drift.  Strength testing            Deltoid      Biceps      Triceps     Wrist Extension    Wrist Flexion     Interossei         R            5                 5               5                     5               5                    5                 5  L             5                 5               5                     5               5                   5                 5              Hip Flexion    Hip Extension    Knee Flexion    Knee Extension    Dorsiflexion    Plantar Flexion  R              5                           5                       5                   5                   5                          5  L              5                           5                        5                   5                   5                          5    Sensation - Light touch intact throughout    DTR's -             Biceps      Triceps     Brachioradialis      Patellar    Ankle    Toes/plantar response  R             2+             2+                  2+               4+            4+                 Down  L              2+             2+                 2+                4+           4+                 Down    B/L nonsustained ankle clonus    Coordination - Finger to Nose intact b/l. No tremors appreciated    Gait and station - Not assessed ue to mental status    Labs:                        9.1    5.53  )-----------( 332      ( 25 Dec 2023 03:15 )             26.1     12-25    122<L>  |  90<L>  |  18  ----------------------------<  149<H>  4.4   |  20<L>  |  1.23    Ca    8.2<L>      25 Dec 2023 10:10  Phos  2.3     12-25  Mg     1.90     12-25    TPro  7.1  /  Alb  2.7<L>  /  TBili  0.8  /  DBili  x   /  AST  217<H>  /  ALT  112<H>  /  AlkPhos  75  12-25    CAPILLARY BLOOD GLUCOSE      POCT Blood Glucose.: 162 mg/dL (25 Dec 2023 10:43)    LIVER FUNCTIONS - ( 25 Dec 2023 10:10 )  Alb: 2.7 g/dL / Pro: 7.1 g/dL / ALK PHOS: 75 U/L / ALT: 112 U/L / AST: 217 U/L / GGT: x             Culture - Body Fluid with Gram Stain (collected 24 Dec 2023 17:18)  Source: Pleural Fl Pleural Fluid  Gram Stain (24 Dec 2023 23:39):    polymorphonuclear leukocytes seen    No organisms seen    by cytocentrifuge    Culture - Sputum (collected 23 Dec 2023 14:20)  Source: .Sputum Sputum  Gram Stain (23 Dec 2023 21:09):    Few polymorphonuclear leukocytes per low power field    Moderate Squamous epithelial cells per low power field    Rare Gram Negative Rods per oil power field  Final Report (25 Dec 2023 08:17):    Normal Respiratory Inge present    Culture - Urine (collected 23 Dec 2023 14:20)  Source: Clean Catch Clean Catch (Midstream)  Final Report (24 Dec 2023 19:40):    No growth    Culture - Blood (collected 23 Dec 2023 11:36)  Source: .Blood Blood-Peripheral  Preliminary Report (25 Dec 2023 15:01):    No growth at 48 Hours      PT/INR - ( 25 Dec 2023 14:50 )   PT: 16.4 sec;   INR: 1.47 ratio         PTT - ( 25 Dec 2023 14:50 )  PTT:38.9 sec      Radiology:    CT Head No Cont (12.25.23 @ 13:53)   Findings:  The lateral ventricles have a normal configuration.  There is no evidence of acute hemorrhage, mass or mass-effect in the   posterior fossa or in the supratentorial region.  Evaluation of the osseous structures with the appropriate window appears   unremarkable.  The visualized paranasal sinuses mastoid and middle ear regions appear   clear.    Impression:  Unremarkable noncontrast head CT.  If symptoms continue MRI can be done for further evaluation if there are   no contraindications.    EEG Prelim 12/25  EEG preliminary read (not final) on the initial recording hour(s) = x 1.5  No seizures recorded.  Final report to follow tomorrow morning after completion of study.     Neurology - Consult Note    -  Spectra: [phone] ([facility]), [phone] ([facility])  -    HPI: Patient TAYLA MARIE is a 28y (1995) man with a PMHx significant for SLE on Hydroxychloroquine initially admitted to [facility] VS on 12/12, found to have b/l PE w/ superimposed PNA, transferred to [facility] on 12/22 for thoracic eval of b/l L > R effusions, L loculated. Patient was on heparin drip. Followed by ID and rheum due to perisistent fevers, no clear source identified (was on Vanc, Zosyn). RRT called on 12/25 for SOB and chest pain. During RRT, patient was witnessed to have an episode of generalized convulsion lasting ~45 seconds that resolved spontaneously, associated with urinary incontinence. Patient given 1mg IV ativan. Patient opened eyes to voice and noxious stimuli but unable to follow commands or respond to questions, withdrawing all extremities spontaneously. Appeared to be shivering. Patient found to be febrile to 104F, tachycardic, hyponatremic to 121.  CTH non-con was normal.  Prior to event, patient A&Ox4, denied headache. No history of seizures previously. Patient transferred to MICU and re-evaluated, found to be A&Ox3, sleepy but arousable. Nonfocal exam, but found to have B/L ankle clonus. Patient was started on EEG, prelim read with no seizures.       Review of Systems:    CONSTITUTIONAL: No fevers or chills  EYES AND ENT: No visual changes or no throat pain   NECK: No pain or stiffness  RESPIRATORY: No hemoptysis or shortness of breath  CARDIOVASCULAR: No chest pain or palpitations  GASTROINTESTINAL: No melena or hematochezia  GENITOURINARY: No dysuria or hematuria  NEUROLOGICAL: +As stated in HPI above  SKIN: No itching, burning, rashes, or lesions   All other review of systems is negative unless indicated above.    Allergies:  No Known Allergies      PMHx/PSHx/Family Hx: As above, otherwise see below   LE (lupus erythematosus)  Pulmonary embolism      Social Hx:  No current use of tobacco, alcohol, or illicit drugs    Medications:  MEDICATIONS  (STANDING):  cefepime   IVPB 2000 milliGRAM(s) IV Intermittent every 8 hours  ciprofloxacin  0.3% Ophthalmic Solution for Otic Use 2 Drop(s) Both Ears two times a day  hydroxychloroquine 200 milliGRAM(s) Oral two times a day  lactated ringers. 1000 milliLiter(s) (200 mL/Hr) IV Continuous <Continuous>  lidocaine   4% Patch 1 Patch Transdermal daily  sodium chloride 1 Gram(s) Oral three times a day  vancomycin  IVPB 1000 milliGRAM(s) IV Intermittent every 12 hours    MEDICATIONS  (PRN):  albuterol/ipratropium for Nebulization 3 milliLiter(s) Nebulizer every 6 hours PRN Shortness of Breath and/or Wheezing  guaiFENesin Oral Liquid (Sugar-Free) 200 milliGRAM(s) Oral every 6 hours PRN Cough  lidocaine 2% Viscous 5 milliLiter(s) Swish and Spit three times a day PRN Mouth Care      Vitals:  T(C): 39.2 (12-25-23 @ 12:00), Max: 39.5 (12-25-23 @ 01:09)  HR: 99 (12-25-23 @ 14:00) (86 - 135)  BP: 116/72 (12-25-23 @ 14:00) (114/62 - 144/80)  RR: 16 (12-25-23 @ 14:00) (16 - 25)  SpO2: 98% (12-25-23 @ 14:00) (98% - 100%)    Physical Examination:   General - NAD  Cardiovascular - Peripheral pulses palpable, no edema  Eyes -  Fundoscopy not performed due to safety precautions in the setting of the COVID-19 pandemic  No nuchal rigidity     Neurologic Exam:  Mental status - Awake, Alert, Oriented to person, place- hospital, and time Dec 2023. Speech fluent, repetition and naming intact. Follows simple and complex commands. Attention/concentration, recent and remote memory (including registration and recall), and fund of knowledge intact    Cranial nerves - PERRLA, VFF, EOMI, face sensation (V1-V3) intact b/l, facial strength intact without asymmetry b/l, hearing intact b/l, palate with symmetric elevation, trapezius  5/5 strength b/l, tongue midline on protrusion with full lateral movement    Motor - Normal bulk and tone throughout. No pronator drift.  Strength testing            Deltoid      Biceps      Triceps     Wrist Extension    Wrist Flexion     Interossei         R            5                 5               5                     5               5                    5                 5  L             5                 5               5                     5               5                   5                 5              Hip Flexion    Hip Extension    Knee Flexion    Knee Extension    Dorsiflexion    Plantar Flexion  R              5                           5                       5                   5                   5                          5  L              5                           5                        5                   5                   5                          5    Sensation - Light touch intact throughout    DTR's -             Biceps      Triceps     Brachioradialis      Patellar    Ankle    Toes/plantar response  R             2+             2+                  2+               4+            4+                 Down  L              2+             2+                 2+                4+           4+                 Down    B/L nonsustained ankle clonus    Coordination - Finger to Nose intact b/l. No tremors appreciated    Gait and station - Not assessed ue to mental status    Labs:                        9.1    5.53  )-----------( 332      ( 25 Dec 2023 03:15 )             26.1     12-25    122<L>  |  90<L>  |  18  ----------------------------<  149<H>  4.4   |  20<L>  |  1.23    Ca    8.2<L>      25 Dec 2023 10:10  Phos  2.3     12-25  Mg     1.90     12-25    TPro  7.1  /  Alb  2.7<L>  /  TBili  0.8  /  DBili  x   /  AST  217<H>  /  ALT  112<H>  /  AlkPhos  75  12-25    CAPILLARY BLOOD GLUCOSE      POCT Blood Glucose.: 162 mg/dL (25 Dec 2023 10:43)    LIVER FUNCTIONS - ( 25 Dec 2023 10:10 )  Alb: 2.7 g/dL / Pro: 7.1 g/dL / ALK PHOS: 75 U/L / ALT: 112 U/L / AST: 217 U/L / GGT: x             Culture - Body Fluid with Gram Stain (collected 24 Dec 2023 17:18)  Source: Pleural Fl Pleural Fluid  Gram Stain (24 Dec 2023 23:39):    polymorphonuclear leukocytes seen    No organisms seen    by cytocentrifuge    Culture - Sputum (collected 23 Dec 2023 14:20)  Source: .Sputum Sputum  Gram Stain (23 Dec 2023 21:09):    Few polymorphonuclear leukocytes per low power field    Moderate Squamous epithelial cells per low power field    Rare Gram Negative Rods per oil power field  Final Report (25 Dec 2023 08:17):    Normal Respiratory Inge present    Culture - Urine (collected 23 Dec 2023 14:20)  Source: Clean Catch Clean Catch (Midstream)  Final Report (24 Dec 2023 19:40):    No growth    Culture - Blood (collected 23 Dec 2023 11:36)  Source: .Blood Blood-Peripheral  Preliminary Report (25 Dec 2023 15:01):    No growth at 48 Hours      PT/INR - ( 25 Dec 2023 14:50 )   PT: 16.4 sec;   INR: 1.47 ratio         PTT - ( 25 Dec 2023 14:50 )  PTT:38.9 sec      Radiology:    CT Head No Cont (12.25.23 @ 13:53)   Findings:  The lateral ventricles have a normal configuration.  There is no evidence of acute hemorrhage, mass or mass-effect in the   posterior fossa or in the supratentorial region.  Evaluation of the osseous structures with the appropriate window appears   unremarkable.  The visualized paranasal sinuses mastoid and middle ear regions appear   clear.    Impression:  Unremarkable noncontrast head CT.  If symptoms continue MRI can be done for further evaluation if there are   no contraindications.    EEG Prelim 12/25  EEG preliminary read (not final) on the initial recording hour(s) = x 1.5  No seizures recorded.  Final report to follow tomorrow morning after completion of study.     Neurology - Consult Note    -  Spectra: [id number] ([facility]), [id number] ([facility])  -    HPI: Patient TAYLA MARIE is a 28y (1995) man with a PMHx significant for SLE on Hydroxychloroquine initially admitted to [facility] VS on 12/12, found to have b/l PE w/ superimposed PNA, transferred to [facility] on 12/22 for thoracic eval of b/l L > R effusions, L loculated. Patient was on heparin drip. Followed by ID and rheum due to perisistent fevers, no clear source identified (was on Vanc, Zosyn). RRT called on 12/25 for SOB and chest pain. During RRT, patient was witnessed to have an episode of generalized convulsion lasting ~45 seconds that resolved spontaneously, associated with urinary incontinence. Patient given 1mg IV ativan. Patient opened eyes to voice and noxious stimuli but unable to follow commands or respond to questions, withdrawing all extremities spontaneously. Appeared to be shivering. Patient found to be febrile to 104F, tachycardic, hyponatremic to 121.  CTH non-con was normal.  Prior to event, patient A&Ox4, denied headache. No history of seizures previously. Patient transferred to MICU and re-evaluated, found to be A&Ox3, sleepy but arousable. Nonfocal exam, but found to have B/L ankle clonus. Patient was started on EEG, prelim read with no seizures.       Review of Systems:    CONSTITUTIONAL: No fevers or chills  EYES AND ENT: No visual changes or no throat pain   NECK: No pain or stiffness  RESPIRATORY: No hemoptysis or shortness of breath  CARDIOVASCULAR: No chest pain or palpitations  GASTROINTESTINAL: No melena or hematochezia  GENITOURINARY: No dysuria or hematuria  NEUROLOGICAL: +As stated in HPI above  SKIN: No itching, burning, rashes, or lesions   All other review of systems is negative unless indicated above.    Allergies:  No Known Allergies      PMHx/PSHx/Family Hx: As above, otherwise see below   LE (lupus erythematosus)  Pulmonary embolism      Social Hx:  No current use of tobacco, alcohol, or illicit drugs    Medications:  MEDICATIONS  (STANDING):  cefepime   IVPB 2000 milliGRAM(s) IV Intermittent every 8 hours  ciprofloxacin  0.3% Ophthalmic Solution for Otic Use 2 Drop(s) Both Ears two times a day  hydroxychloroquine 200 milliGRAM(s) Oral two times a day  lactated ringers. 1000 milliLiter(s) (200 mL/Hr) IV Continuous <Continuous>  lidocaine   4% Patch 1 Patch Transdermal daily  sodium chloride 1 Gram(s) Oral three times a day  vancomycin  IVPB 1000 milliGRAM(s) IV Intermittent every 12 hours    MEDICATIONS  (PRN):  albuterol/ipratropium for Nebulization 3 milliLiter(s) Nebulizer every 6 hours PRN Shortness of Breath and/or Wheezing  guaiFENesin Oral Liquid (Sugar-Free) 200 milliGRAM(s) Oral every 6 hours PRN Cough  lidocaine 2% Viscous 5 milliLiter(s) Swish and Spit three times a day PRN Mouth Care      Vitals:  T(C): 39.2 (12-25-23 @ 12:00), Max: 39.5 (12-25-23 @ 01:09)  HR: 99 (12-25-23 @ 14:00) (86 - 135)  BP: 116/72 (12-25-23 @ 14:00) (114/62 - 144/80)  RR: 16 (12-25-23 @ 14:00) (16 - 25)  SpO2: 98% (12-25-23 @ 14:00) (98% - 100%)    Physical Examination:   General - NAD  Cardiovascular - Peripheral pulses palpable, no edema  Eyes -  Fundoscopy not performed due to safety precautions in the setting of the COVID-19 pandemic  No nuchal rigidity     Neurologic Exam:  Mental status - Awake, Alert, Oriented to person, place- hospital, and time Dec 2023. Speech fluent, repetition and naming intact. Follows simple and complex commands. Attention/concentration, recent and remote memory (including registration and recall), and fund of knowledge intact    Cranial nerves - PERRLA, VFF, EOMI, face sensation (V1-V3) intact b/l, facial strength intact without asymmetry b/l, hearing intact b/l, palate with symmetric elevation, trapezius  5/5 strength b/l, tongue midline on protrusion with full lateral movement    Motor - Normal bulk and tone throughout. No pronator drift.  Strength testing            Deltoid      Biceps      Triceps     Wrist Extension    Wrist Flexion     Interossei         R            5                 5               5                     5               5                    5                 5  L             5                 5               5                     5               5                   5                 5              Hip Flexion    Hip Extension    Knee Flexion    Knee Extension    Dorsiflexion    Plantar Flexion  R              5                           5                       5                   5                   5                          5  L              5                           5                        5                   5                   5                          5    Sensation - Light touch intact throughout    DTR's -             Biceps      Triceps     Brachioradialis      Patellar    Ankle    Toes/plantar response  R             2+             2+                  2+               4+            4+                 Down  L              2+             2+                 2+                4+           4+                 Down    B/L nonsustained ankle clonus    Coordination - Finger to Nose intact b/l. No tremors appreciated    Gait and station - Not assessed ue to mental status    Labs:                        9.1    5.53  )-----------( 332      ( 25 Dec 2023 03:15 )             26.1     12-25    122<L>  |  90<L>  |  18  ----------------------------<  149<H>  4.4   |  20<L>  |  1.23    Ca    8.2<L>      25 Dec 2023 10:10  Phos  2.3     12-25  Mg     1.90     12-25    TPro  7.1  /  Alb  2.7<L>  /  TBili  0.8  /  DBili  x   /  AST  217<H>  /  ALT  112<H>  /  AlkPhos  75  12-25    CAPILLARY BLOOD GLUCOSE      POCT Blood Glucose.: 162 mg/dL (25 Dec 2023 10:43)    LIVER FUNCTIONS - ( 25 Dec 2023 10:10 )  Alb: 2.7 g/dL / Pro: 7.1 g/dL / ALK PHOS: 75 U/L / ALT: 112 U/L / AST: 217 U/L / GGT: x             Culture - Body Fluid with Gram Stain (collected 24 Dec 2023 17:18)  Source: Pleural Fl Pleural Fluid  Gram Stain (24 Dec 2023 23:39):    polymorphonuclear leukocytes seen    No organisms seen    by cytocentrifuge    Culture - Sputum (collected 23 Dec 2023 14:20)  Source: .Sputum Sputum  Gram Stain (23 Dec 2023 21:09):    Few polymorphonuclear leukocytes per low power field    Moderate Squamous epithelial cells per low power field    Rare Gram Negative Rods per oil power field  Final Report (25 Dec 2023 08:17):    Normal Respiratory Inge present    Culture - Urine (collected 23 Dec 2023 14:20)  Source: Clean Catch Clean Catch (Midstream)  Final Report (24 Dec 2023 19:40):    No growth    Culture - Blood (collected 23 Dec 2023 11:36)  Source: .Blood Blood-Peripheral  Preliminary Report (25 Dec 2023 15:01):    No growth at 48 Hours      PT/INR - ( 25 Dec 2023 14:50 )   PT: 16.4 sec;   INR: 1.47 ratio         PTT - ( 25 Dec 2023 14:50 )  PTT:38.9 sec      Radiology:    CT Head No Cont (12.25.23 @ 13:53)   Findings:  The lateral ventricles have a normal configuration.  There is no evidence of acute hemorrhage, mass or mass-effect in the   posterior fossa or in the supratentorial region.  Evaluation of the osseous structures with the appropriate window appears   unremarkable.  The visualized paranasal sinuses mastoid and middle ear regions appear   clear.    Impression:  Unremarkable noncontrast head CT.  If symptoms continue MRI can be done for further evaluation if there are   no contraindications.    EEG Prelim 12/25  EEG preliminary read (not final) on the initial recording hour(s) = x 1.5  No seizures recorded.  Final report to follow tomorrow morning after completion of study.

## 2023-12-25 NOTE — PROGRESS NOTE ADULT - ASSESSMENT
This is a 29 y/o M new diagnosis of SLE (9/23, started on hydroxychloroquine) initially admitted to [facility] on 12/12 w/ CP and SOB, found to have RUL and LLL segmental/subsegmental PE, started on heparin, c/f superimposed PNA, on Zosyn. Pt with b/l effusions, L > R with loculations, transferred to [facility] for thoracic evaluation. Pt with persistent fevers despite Zosyn from 12/14/23.  ID now consulted for persistent fevers     Work up:  Had leukocytosis, now resolved, received decadron 12/22  Fever to 102.9  Elevated LFTs  UA no pyuria  RVP negative  Strep Pneumo Ag, legionella, mycoplasma IgM negative  MRSA PCR negative  EPEC+ 12/15 - diarrhea has since resolved    Imaging:   CTA chest 12/12: Acute right upper lobe and left lower lobe segmental/subsegmental pulmonary emboli. No CT evidence of right heart strain. New bilateral lower lobe consolidations with areas of central clearing. Pneumonia and pulmonary infarcts are in the differential. New bilateral pleural effusions, small on the left and trace on the right. Bilateral axillary and supraclavicular adenopathy of unclear etiology.  TTE 12/12: grossly wnl  CXR 12/20: Large left pleural effusion and compressive atelectasis. Trace right effusion and linear atelectasis in the right midlung  CT chest 12/23: Moderate pleural effusions, loculated on the left, new since 12/12/2023. New nonspecific the very small peripheral groundglass in the right upper lobe  CT neck 12/23: Small level 1 and level 2 and level 5 lymph nodes without significant enlargement. No drainable collections    Micro:  Blood cultures (12/12, 12/14, 12/21): no growth  Stool Cx (12/15): no growth, GI PCR +EPEC  Sputum (12/20): normal [place]    Abx:  Zosyn (12/14-)  Vanco (12/23-)  Azithro (12/14-12/16, 12/20)    #Fever  #Pleural effusions  #Possible superimposed pneumonia  #Pulmonary embolism  #Lymphadenopathy  #Known SLE    Overall 29 y/o M w/ SLE on Hydroxychloroquine admitted to [facility] for b/l PE w/ superimposed PNA, transferred to [facility] on 12/22 for thoracic eval of b/l L > R effusions, L loculated. Pt was Zosyn since 12/14, started on Vancomycin here. Despite board therapy with Zosyn, pt continues to have fevers to 102. No leukocytosis.   Fevers if 2/2 PNA should have responded w/ 10 day course of Zosyn, MRSA swab was negative, Vancomycin likely not necessary.   May be 2/2 SLE flare rather than infectious process, however would continue with Zosyn for now pending R thoracentesis and studies.   Pt improved today, likely 2/2 initiation of steroids, however continues to have high fevers, without leukocytosis. Does not appear that the fevers are infectious, however not improved on steroids. Will have to consider further imaging if continues.     S/p R thoracentesis on 12/24, nucleated cell count ~400 when corrected for RBCs, does not appear to be infected    Plan:   - C/w Zosyn 3.375 g q8  - F/u Cx   - F/u plan for loculated L pleural effusion  - appreciate rheumatology recs     Thank you for this consult. Inpatient ID team will follow.    Logan Ku M.D.  Attending Physician  Division of Infectious Diseases  Department of Medicine    Please contact through MS Teams message.  Office: [phone] (after 5 PM or weekend). This is a 29 y/o M new diagnosis of SLE (9/23, started on hydroxychloroquine) initially admitted to [facility] on 12/12 w/ CP and SOB, found to have RUL and LLL segmental/subsegmental PE, started on heparin, c/f superimposed PNA, on Zosyn. Pt with b/l effusions, L > R with loculations, transferred to [facility] for thoracic evaluation. Pt with persistent fevers despite Zosyn from 12/14/23.  ID now consulted for persistent fevers     Work up:  Had leukocytosis, now resolved, received decadron 12/22  Fever to 102.9  Elevated LFTs  UA no pyuria  RVP negative  Strep Pneumo Ag, legionella, mycoplasma IgM negative  MRSA PCR negative  EPEC+ 12/15 - diarrhea has since resolved    Imaging:   CTA chest 12/12: Acute right upper lobe and left lower lobe segmental/subsegmental pulmonary emboli. No CT evidence of right heart strain. New bilateral lower lobe consolidations with areas of central clearing. Pneumonia and pulmonary infarcts are in the differential. New bilateral pleural effusions, small on the left and trace on the right. Bilateral axillary and supraclavicular adenopathy of unclear etiology.  TTE 12/12: grossly wnl  CXR 12/20: Large left pleural effusion and compressive atelectasis. Trace right effusion and linear atelectasis in the right midlung  CT chest 12/23: Moderate pleural effusions, loculated on the left, new since 12/12/2023. New nonspecific the very small peripheral groundglass in the right upper lobe  CT neck 12/23: Small level 1 and level 2 and level 5 lymph nodes without significant enlargement. No drainable collections    Micro:  Blood cultures (12/12, 12/14, 12/21): no growth  Stool Cx (12/15): no growth, GI PCR +EPEC  Sputum (12/20): normal [place]    Abx:  Zosyn (12/14-)  Vanco (12/23-)  Azithro (12/14-12/16, 12/20)    #Fever  #Pleural effusions  #Possible superimposed pneumonia  #Pulmonary embolism  #Lymphadenopathy  #Known SLE    Overall 29 y/o M w/ SLE on Hydroxychloroquine admitted to [facility] for b/l PE w/ superimposed PNA, transferred to [facility] on 12/22 for thoracic eval of b/l L > R effusions, L loculated. Pt was Zosyn since 12/14, started on Vancomycin here. Despite board therapy with Zosyn, pt continues to have fevers to 102. No leukocytosis.   Fevers if 2/2 PNA should have responded w/ 10 day course of Zosyn, MRSA swab was negative, Vancomycin likely not necessary.   May be 2/2 SLE flare rather than infectious process, however would continue with Zosyn for now pending R thoracentesis and studies.   Pt improved today, likely 2/2 initiation of steroids, however continues to have high fevers, without leukocytosis. Does not appear that the fevers are infectious, however not improved on steroids. Will have to consider further imaging if continues.     S/p R thoracentesis on 12/24, nucleated cell count ~400 when corrected for RBCs, does not appear to be infected    Plan:   - C/w Zosyn 3.375 g q8  - F/u Cx   - F/u plan for loculated L pleural effusion  - appreciate rheumatology recs     Thank you for this consult. Inpatient ID team will follow.    Logan Ku M.D.  Attending Physician  Division of Infectious Diseases  Department of Medicine    Please contact through MS Teams message.  Office: [phone] (after 5 PM or weekend).

## 2023-12-25 NOTE — RAPID RESPONSE TEAM SUMMARY - NSADDTLFINDINGSRRT_GEN_ALL_CORE
1st Rapid   Vitals: -110, on RA sat 100%, BP 140s/, BGL +120, Febrile oral 103F/Rectal 104F  Exam: AOX3, appears uncomfortable, clear lung sounds, tachycardic, no murmur, no rubs; soft abd, +urinary incontinent  - Pt with tachycardia and hyperthermia 103oral/rectal 104, initiated cooling protocol,   - Pt with CP, obtained bedside EKG c/f sinus tachy with +v2/v3 changes in t wave, otherwise no ST changes, ordered cardiac enzymes, troponin, +routine labs and POCUS echo, previous echo from [facility] 12/12 with mild MR and no pericardial effusion  - Pt with SOB, sat > 95%, placed on NC for comfort   - Pain ordered 2 mg morphine   - CXR 12/24 left loculated pleural effusion and right pleural effusion  - CXR 12/25 prior to rapid: consistent with right pleural effusion, sat on RA   - Meds: Pt on IV tylenol, hep gtt, and zosyn s/p pred 40 IV   - Infectious: 12/24 cultured, h/o of persistently elevated fevers 103+, ID following, cultures neg, no wbc, +esr/crp, on zosyn, no nuchal rigidity, cough pending repeat rvp/covid ; rheum: pending workup on steriods  - DDx: pericardial effusion vs. pericarditis vs. PE vs cardiac etiology vs. other unknown source   - Cardio consulted, but deferred till lab works resulted   - Hyponatremia: 120, prior 129, acute drop, pending repeat, monitoring closely  - Given that the pt's vitals were stable, hyperthermic protocol started, already cultured, cardiac workup initiated, rapid was concluded with a good plan in place. In the interim, MICU fellow/res arrived at bedside for POCUS echo which showed +r complex pleural effusion, small pericardial effusion, and no right heart starin on limited exam. Discussed findings with primary team and discussed starting on NSAID if concerned for pericarditis, however given recent thoracentesis and risk for bleeding and reccs form CT thoracic to hold, we held any NSAID.   - As soon as the bedside echo was completed and I was reviewing with primary team the RN informed me that the pt was having a sz and a 2nd rapid was called   - @ approximatley 1030, pt had witnessed tonic/clonic sz lasting 40 seconds with post ictal confusion, agitation, urinary/bowel incontinence. Foaming at mouth, s/p suctioned Neurology at bedside, and pt with rigidity, vitals stabalized during post-ictal period, BGL 160s  - At this time MICU at bedside, discussed plan with neuro and MICU fellow, gave 1 mg ativan in post-ictal period and ordered CTH stat, broadened abx to vancomycin  - sz most likely 2/2 high fever vs. hyponatremia (held hypertonic 3% saline until MICU) vs infectious vs. med/other etiology  - Pt stabilized and transferred to MICU  1st Rapid   Vitals: -110, on RA sat 100%, BP 140s/, BGL +120, Febrile oral 103F/Rectal 104F  Exam: AOX3, appears uncomfortable, clear lung sounds, tachycardic, no murmur, no rubs; soft abd, +urinary incontinent  - Pt with tachycardia and hyperthermia 103oral/rectal 104, initiated cooling protocol,   - Pt with CP, obtained bedside EKG c/f sinus tachy with +v2/v3 changes in t wave, otherwise no ST changes, ordered cardiac enzymes, troponin, +routine labs and POCUS echo, previous echo from [facility] 12/12 with mild MR and no pericardial effusion  - Pt with SOB, sat > 95%, placed on NC for comfort   - Pain ordered 2 mg morphine   - CXR 12/24 left loculated pleural effusion and right pleural effusion  - CXR 12/25 prior to rapid: consistent with right pleural effusion, sat on RA   - Meds: Pt on IV tylenol, hep gtt, and zosyn s/p pred 40 IV   - Infectious: 12/24 cultured, h/o of persistently elevated fevers 103+, ID following, cultures neg, no wbc, +esr/crp, on zosyn, no nuchal rigidity, cough pending repeat rvp/covid ; rheum: pending workup on steriods  - DDx: pericardial effusion vs. pericarditis vs. PE vs cardiac etiology vs. other unknown source   - Cardio consulted, but deferred till lab works resulted   - Hyponatremia: 120, prior 129, acute drop, pending repeat, monitoring closely  - Given that the pt's vitals were stable, hyperthermic protocol started, already cultured, cardiac workup initiated, rapid was concluded with a good plan in place. In the interim, MICU fellow/res arrived at bedside for POCUS echo which showed +r complex pleural effusion, small pericardial effusion, and no right heart starin on limited exam. Discussed findings with primary team and discussed starting on NSAID if concerned for pericarditis, however given recent thoracentesis and risk for bleeding and reccs form CT thoracic to hold, we held any NSAID.   - As soon as the bedside echo was completed and I was reviewing with primary team the RN informed me that the pt was having a sz and a 2nd rapid was called   - @ approximatley 1030, pt had witnessed tonic/clonic sz lasting 40 seconds with post ictal confusion, agitation, urinary/bowel incontinence. Foaming at mouth, s/p suctioned Neurology at bedside, and pt with rigidity, vitals stabalized during post-ictal period, BGL 160s  - At this time MICU at bedside, discussed plan with neuro and MICU fellow, gave 1 mg ativan in post-ictal period and ordered CTH stat, broadened abx to vancomycin  - sz most likely 2/2 high fever vs. hyponatremia (held hypertonic 3% saline until MICU) vs infectious vs. med/other etiology  - Pt stabilized and transferred to MICU  1st Rapid   Vitals: -110, on RA sat 100%, BP 140s/, BGL +120, Febrile oral 103F/Rectal 104F  Exam: AOX3, appears uncomfortable, clear lung sounds, tachycardic, no murmur, no rubs; soft abd, +urinary incontinent  - Pt with tachycardia and hyperthermia 103oral/rectal 104, initiated cooling protocol,   - Pt with CP, obtained bedside EKG c/f sinus tachy with +v2/v3 changes in t wave, otherwise no ST changes, ordered cardiac enzymes, troponin, +routine labs and POCUS echo, previous echo from [facility] 12/12 with mild MR and no pericardial effusion  - Pt with SOB, sat > 95%, placed on NC for comfort   - Pain ordered 2 mg morphine   - CXR 12/24 left loculated pleural effusion and right pleural effusion  - CXR 12/25 prior to rapid: consistent with right pleural effusion, sat on RA   - Meds: Pt on IV tylenol, hep gtt, and zosyn s/p pred 40 IV   - Infectious: 12/24 cultured, h/o of persistently elevated fevers 103+, ID following, cultures neg, no wbc, +esr/crp, on zosyn, no nuchal rigidity, cough pending repeat rvp/covid ; rheum: pending workup on steriods  - DDx: pericardial effusion vs. pericarditis vs. PE vs cardiac etiology vs. other unknown source   - Cardio consulted, but deferred till lab works resulted   - Hyponatremia: 120, prior 129, acute drop, pending repeat, monitoring closely  - Given that the pt's vitals were stable, hyperthermic protocol started, already cultured, cardiac workup initiated, rapid was concluded with a good plan in place. In the interim, MICU fellow/res arrived at bedside for POCUS echo which showed +r complex pleural effusion, small pericardial effusion, and no right heart starin on limited exam. Discussed findings with primary team and discussed starting on NSAID if concerned for pericarditis, however given recent thoracentesis and risk for bleeding and reccs form CT thoracic to hold, we held any NSAID.   - As soon as the bedside echo was completed and I was reviewing with primary team the RN informed me that the pt was having a sz and a 2nd rapid was called   - @ approximatley 1030, pt had witnessed tonic/clonic sz lasting 40 seconds with post ictal confusion, agitation, urinary/bowel incontinence. Foaming at mouth, s/p suctioned Neurology at bedside, and pt with rigidity, vitals stabalized during post-ictal period, BGL 160s  - At this time MICU at bedside, discussed plan with neuro and MICU fellow, gave 1 mg ativan in post-ictal period and ordered CTH stat, broadened abx to vancomycin  - sz most likely 2/2 high fever vs. hyponatremia (held hypertonic 3% saline until MICU) vs bleed (hep gtt held) vs. infectious vs. med/other etiology, pending CTH, possible LP, omer placed, baseline mental status AOX4 - sent full set of cultures/bcx/ucx  - Pt stabilized and transferred to MICU  1st Rapid   Vitals: -110, on RA sat 100%, BP 140s/, BGL +120, Febrile oral 103F/Rectal 104F  Exam: AOX3, appears uncomfortable, clear lung sounds, tachycardic, no murmur, no rubs; soft abd, +urinary incontinent  - Pt with tachycardia and hyperthermia 103oral/rectal 104, initiated cooling protocol,   - Pt with CP, obtained bedside EKG c/f sinus tachy with +v2/v3 changes in t wave, otherwise no ST changes, ordered cardiac enzymes, troponin, +routine labs and POCUS echo, previous echo from [facility] 12/12 with mild MR and no pericardial effusion  - Pt with SOB, sat > 95%, placed on NC for comfort   - Pain ordered 2 mg morphine   - CXR 12/24 left loculated pleural effusion and right pleural effusion  - CXR 12/25 prior to rapid: consistent with right pleural effusion, sat on RA   - Meds: Pt on IV tylenol, hep gtt, and zosyn s/p pred 40 IV   - Infectious: 12/24 cultured, h/o of persistently elevated fevers 103+, ID following, cultures neg, no wbc, +esr/crp, on zosyn, no nuchal rigidity, cough pending repeat rvp/covid ; rheum: pending workup on steriods  - DDx: pericardial effusion vs. pericarditis vs. PE vs cardiac etiology vs. other unknown source   - Cardio consulted, but deferred till lab works resulted   - Hyponatremia: 120, prior 129, acute drop, pending repeat, monitoring closely  - Given that the pt's vitals were stable, hyperthermic protocol started, already cultured, cardiac workup initiated, rapid was concluded with a good plan in place. In the interim, MICU fellow/res arrived at bedside for POCUS echo which showed +r complex pleural effusion, small pericardial effusion, and no right heart starin on limited exam. Discussed findings with primary team and discussed starting on NSAID if concerned for pericarditis, however given recent thoracentesis and risk for bleeding and reccs form CT thoracic to hold, we held any NSAID.   - As soon as the bedside echo was completed and I was reviewing with primary team the RN informed me that the pt was having a sz and a 2nd rapid was called   - @ approximatley 1030, pt had witnessed tonic/clonic sz lasting 40 seconds with post ictal confusion, agitation, urinary/bowel incontinence. Foaming at mouth, s/p suctioned Neurology at bedside, and pt with rigidity, vitals stabalized during post-ictal period, BGL 160s  - At this time MICU at bedside, discussed plan with neuro and MICU fellow, gave 1 mg ativan in post-ictal period and ordered CTH stat, broadened abx to vancomycin  - sz most likely 2/2 high fever vs. hyponatremia (held hypertonic 3% saline until MICU) vs bleed (hep gtt held) vs. infectious vs. med/other etiology, pending CTH, possible LP, omer placed, baseline mental status AOX4 - sent full set of cultures/bcx/ucx  - Pt stabilized and transferred to MICU

## 2023-12-25 NOTE — CHART NOTE - NSCHARTNOTEFT_GEN_A_CORE
: Jose Ramon Canales    INDICATION:  AMS. Evaluation of Pleural effusions and Pericardial effusion    PROCEDURE:  [ x] LIMITED ECHO  [ x] LIMITED CHEST  [ ] LIMITED RETROPERITONEAL  [ ] LIMITED ABDOMINAL  [ ] LIMITED DVT  [ ] NEEDLE GUIDANCE VASCULAR  [ ] NEEDLE GUIDANCE THORACENTESIS  [ ] NEEDLE GUIDANCE PARACENTESIS  [ ] NEEDLE GUIDANCE PERICARDIOCENTESIS  [ ] OTHER    FINDINGS:  Lungs: A line predominance. Right small pleural effusion with fibrinous stand. Left lung moderate pleural effusion with septations.  Heart: Normal LVSF. LV > RV. LVOT VTI 19.03 cm. MV E 75 .12 cm/s. MV A 51.79. MV E/A 1.45. MV lateral e' 9 cm/s. MV E/e' 8.3. TAPSE 2.4 cm. Trace circumferential pericardial effusion. No evidence of RA/RV diastolic collapse. Minimal variation noted on mitral inflow. IVC 1.71 cm with >50% respirophasic variation.     INTERPRETATION:  Complex pleural effusions.  Small circumferential pericardial effusion without evidence of tamponade.     Images uploaded on [employer] : Jose Ramon Canales    INDICATION:  AMS. Evaluation of Pleural effusions and Pericardial effusion    PROCEDURE:  [ x] LIMITED ECHO  [ x] LIMITED CHEST  [ ] LIMITED RETROPERITONEAL  [ ] LIMITED ABDOMINAL  [ ] LIMITED DVT  [ ] NEEDLE GUIDANCE VASCULAR  [ ] NEEDLE GUIDANCE THORACENTESIS  [ ] NEEDLE GUIDANCE PARACENTESIS  [ ] NEEDLE GUIDANCE PERICARDIOCENTESIS  [ ] OTHER    FINDINGS:  Lungs: A line predominance. Right small pleural effusion with fibrinous stand. Left lung moderate pleural effusion with septations.  Heart: Normal LVSF. LV > RV. LVOT VTI 19.03 cm. MV E 75 .12 cm/s. MV A 51.79. MV E/A 1.45. MV lateral e' 9 cm/s. MV E/e' 8.3. TAPSE 2.4 cm. Trace circumferential pericardial effusion. No evidence of RA/RV diastolic collapse. Minimal variation noted on mitral inflow. IVC 1.71 cm with >50% respirophasic variation.     INTERPRETATION:  Complex pleural effusions.  Small circumferential pericardial effusion without evidence of tamponade.     Images uploaded on [employer]    Attending Attestation:  I was present during the key portions of the procedure and immediately available during the entire procedure.  Sunny Mcleod MD  Attending  Pulmonary & Critical Care Medicine : Jose Ramon Canales    INDICATION:  AMS. Evaluation of Pleural effusions and Pericardial effusion    PROCEDURE:  [ x] LIMITED ECHO  [ x] LIMITED CHEST  [ ] LIMITED RETROPERITONEAL  [ ] LIMITED ABDOMINAL  [ ] LIMITED DVT  [ ] NEEDLE GUIDANCE VASCULAR  [ ] NEEDLE GUIDANCE THORACENTESIS  [ ] NEEDLE GUIDANCE PARACENTESIS  [ ] NEEDLE GUIDANCE PERICARDIOCENTESIS  [ ] OTHER    FINDINGS:  Lungs: A line predominance. Right small pleural effusion with fibrinous stand. Left lung moderate pleural effusion with septations.  Heart: Normal LVSF. LV > RV. LVOT VTI 19.03 cm. MV E 75 .12 cm/s. MV A 51.79. MV E/A 1.45. MV lateral e' 9 cm/s. MV E/e' 8.3. TAPSE 2.4 cm. Trace circumferential pericardial effusion. No evidence of RA/RV diastolic collapse. Minimal variation noted on mitral inflow. IVC 1.71 cm with >50% respirophasic variation.     INTERPRETATION:  Complex pleural effusions.  Small circumferential pericardial effusion without evidence of tamponade.     Images uploaded on [employer]    Attending Attestation:  I was present during the key portions of the procedure and immediately available during the entire procedure.  Sunny Mcleod MD  Attending  Pulmonary & Critical Care Medicine

## 2023-12-25 NOTE — CHART NOTE - NSCHARTNOTEFT_GEN_A_CORE
Notified by RN patient complaining of chest pain w/ shortness of breath. Patient was seen sitting upright in room in apparent discomfort and A&Ox3. Patient was transferred to bed and notable loose bowel movement was on chair. Patient endorses 6/10 non-radiating chest pain that is accompanied with difficulty breathing that had some relief when moved from an upright position on the chair to a supine position in their bed. Patient also endorses minor symptoms of nausea but denies abdominal pain, headache, changes in vision, palpitations, or coolness to extremities.     T(C): 103.1F  HR: 120bpm  BP: 144/72  RR: 20rpm  SpO2: 100% on room air  Finger Stick Glucose: 162    CONSTITUTIONAL: Well groomed, in apparent distress  EYES: PERRLA and symmetric, EOMI, No conjunctival or scleral injection, non-icteric  RESP: short of breath, no use of accessory muscles; CTA b/l  CV: sinus tachycardia, +S1S2, no MRG; no JVD; no peripheral edema  GI: Soft, NT, ND, no rebound, no guarding; no palpable masses; no hepatosplenomegaly; no hernia palpated  SKIN: warm to touch, No rashes or ulcers noted; no subcutaneous nodules or induration palpable  NEURO: CN II-XII intact; normal reflexes in upper and lower extremities, sensation intact in upper and lower extremities b/l to light touch   PSYCH: Appropriate insight/judgment; A+O x 3, mood and affect appropriate, recent/remote memory intact    EKG w/ no ST elevations, heightened T wave in V3   CXR on 12/24 w/ unremarkable heart size, small left loculated pleural effusion and small right pleural effusion, w/ no pneumothorax    Attending made aware. Patient was given 1G Ofirmev. Chest Xray, Troponin, and comprehensive metabolic panel were ordered.     With all work-up being negative, patient continued chest pain, SOB, and apparent distress. A medical rapid response was called.     Medical Rapid Response team arrived at bedside. MICU was called to have POCUS performed which showed R heart strain, complex R-sided pleural effusion, CBC, CMP. MG, Phos, and VBG, were drawn. Cardiology was called, suggested CP may be secondary to pericarditis as morning ESR and CRP were elevated. Patient was placed on 2L NC and given 2mg Morphine IV which gave relief of the chest pain. POCUS notable for urinary retention, omer catheter with UA and UCx were ordered. Rapid response ended by MAR.     Shortly after patient had a tonic-clonic seizure lasting ~40 seconds with a post-ictal phase. Medical Rapid Response team was called again. Patient was placed on Zoll, non-rebreather, and given cooling blankets. Neuro came to bedside and patient given Ativan 1mg IV. Patient ultimately transferred to MICU.     Attending made aware. Spoke with family.

## 2023-12-25 NOTE — CONSULT NOTE ADULT - ASSESSMENT
Impression:      Recommendations:    [] CTH noncon   [] Continuous Video EEG  [] Recommend LP  CSF - antineural antibodies, c3 c4  [] Recommend   [] Obtain MRI brain w and w/o when stable   [] Seizure, fall and aspiration precautions.  Avoid sleep deprivation.  [] Seizure rescue medications for focal seizure lasting >3 min which doesn't resolve and/or any GTC:  ---->1st line: 2mg ativan IVP. May repeat x 1 if doesn't break within 3 minutes. (Max dose 0.1 mg/kg)    ---->2nd line: Vimpat 100mg IVP x 1.   [] If any seizure activity noted, please note: time, if upper/lower or focal onset symptoms (e.g. head turn, eye deviation, upper/lower tonic/clonic arm initially), vital sign derangements, airway protection, urinary or bowel incontinence, duration of seizure, tongue bite, and/or post-ictal time to return of baseline.   [] Evaluate for provoking factors including UA, Urine Cx, Blood Cx x2, CMP, Mg, Utox, CBC w/ diff, EtOH level, CXR, COVID-19 test  [] Given concern for seizure, advise patient to not drive, operate heavy machinery, avoid heights, pools, bathtubs, locked doors until cleared by further follow up outpatient by neurology.      Case seen and discussed with Neurology attending Dr. De La Cruz, please await attending attestation.       Impression:  Episode of witnessed generalized convulsion lasting ~45 seconds that resolved spontaneously, associated with urinary incontinence and confusion afterwards concerning for seizure. First lifetime event. Patient found to be febrile to 104F, tachycardic, hyponatremic to 121. Likely provoked seizure in setting of acute illness and metabolic abnormalities. Prior to event, patient A&Ox3, denied headache.  Lower concern for menin      Recommendations:    [x] CTH noncon, reviewed   [x] s/p Ativan 1mg   [ ] Continuous Video EEG, will monitor off anti-seizure medications for now   [ ] Recommend LP given fever without source and new onset seizure. Can obtain   Obtain CSF C3, C4, antineuronal antibodies  [ ] Obtain MRI brain w and w/o when stable   [ ] Seizure, fall and aspiration precautions.  Avoid sleep deprivation.  [ ] Seizure rescue medications for focal seizure lasting >3 min which doesn't resolve and/or any GTC:  ---->1st line: 1mg ativan IVP. May repeat x 1 if doesn't break within 3 minutes. (Max dose 0.1 mg/kg)    [ ] If any seizure activity noted, please note: time, if upper/lower or focal onset symptoms (e.g. head turn, eye deviation, upper/lower tonic/clonic arm initially), vital sign derangements, airway protection, urinary or bowel incontinence, duration of seizure, tongue bite, and/or post-ictal time to return of baseline.   [ ] Given concern for seizure, advise patient to not drive, operate heavy machinery, avoid heights, pools, bathtubs, locked doors until cleared by further follow up outpatient by neurology.  [] Remainder of care per MICU team      Case seen and discussed with Neurology attending Dr. De La Cruz, please await attending attestation.       Impression:  Episode of witnessed generalized convulsion lasting ~45 seconds that resolved spontaneously, associated with urinary incontinence and confusion afterwards concerning for seizure. First lifetime event. Patient found to be febrile to 104F, tachycardic, hyponatremic to 121. Likely provoked seizure in setting of acute illness and metabolic abnormalities. Prior to event, patient A&Ox3, denied headache.  Lower suspicion for meningoencephalitis (mental status at baseline, denies headache, no nuchal rigidity on exam) however will need to rule out CNS infection given fever and new seizure    Recommendations:    [x] CTH noncon, reviewed   [x] s/p Ativan 1mg   [ ] Continuous Video EEG, will monitor off anti-seizure medications for now   [ ] Recommend LP given fever without source and new onset seizure  Please order CSF glucose, protein, cell count, cryptococcal, IgG, Meningoencephalitis PCR Panel, culture/ gram stain, fungal, HSV, syphilis, west nile, lyme, AFB smear and culture, flow cytometry and cytology, CSF lyme, CSF electrophoresis. Per Rheum, please obtain CSF C3, C4, antineuronal antibodies  [ ] Obtain MRI brain w and w/o when stable   [ ] Seizure, fall and aspiration precautions.  Avoid sleep deprivation.  [ ] Seizure rescue medications for focal seizure lasting >3 min which doesn't resolve and/or any GTC:  ---->1st line: 1mg ativan IVP. May repeat x 1 if doesn't break within 3 minutes. (Max dose 0.1 mg/kg)    [ ] If any seizure activity noted, please note: time, if upper/lower or focal onset symptoms (e.g. head turn, eye deviation, upper/lower tonic/clonic arm initially), vital sign derangements, airway protection, urinary or bowel incontinence, duration of seizure, tongue bite, and/or post-ictal time to return of baseline.   [ ] Given concern for seizure, advise patient to not drive, operate heavy machinery, avoid heights, pools, bathtubs, locked doors until cleared by further follow up outpatient by neurology.  [] Remainder of care per MICU team      Case seen and discussed with Neurology attending Dr. De La Cruz, please await attending attestation. Mr. MARIE is a 28y (1995) man with a PMHx significant for SLE on Hydroxychloroquine initially admitted to [facility] on 12/12, found to have b/l PE w/ superimposed PNA, transferred to [facility] on 12/22 for thoracic eval of b/l L > R effusions, L loculated. Patient was on heparin drip. Followed by ID and rheum due to perisistent fevers, no clear source identified (was on Vanc, Zosyn). RRT called on 12/25 for SOB and chest pain. During RRT, patient was witnessed to have an episode of generalized convulsion lasting ~45 seconds that resolved spontaneously, associated with urinary incontinence. Patient given 1mg IV ativan. Patient opened eyes to voice and noxious stimuli but unable to follow commands or respond to questions, withdrawing all extremities spontaneously. Appeared to be shivering. Patient found to be febrile to 104F, tachycardic, hyponatremic to 121.  CTH non-con was normal.  Prior to event, patient A&Ox4, denied headache. No history of seizures previously. Patient transferred to MICU and re-evaluated, found to be A&Ox3, sleepy but arousable. Nonfocal exam, but found to have B/L ankle clonus. Patient was started on EEG, prelim read with no seizures.     Impression:  Episode of witnessed generalized convulsion lasting ~45 seconds that resolved spontaneously, associated with urinary incontinence and confusion afterwards concerning for seizure. First lifetime event. Patient found to be febrile to 104F, tachycardic, hyponatremic to 121. Likely provoked seizure in setting of acute illness and metabolic abnormalities. Prior to event, patient A&Ox3, denied headache.  Lower suspicion for meningoencephalitis (mental status at baseline, denies headache, no nuchal rigidity on exam) however will need to rule out CNS infection given fever and new seizure    Recommendations:    [x] CTH noncon, reviewed   [x] s/p Ativan 1mg   [ ] Continuous Video EEG, will monitor off anti-seizure medications for now   [ ] Recommend LP given fever without source and new onset seizure  Please order CSF glucose, protein, cell count, cryptococcal, IgG, Meningoencephalitis PCR Panel, culture/ gram stain, fungal, HSV, syphilis, [place] n[place], lyme, AFB smear and culture, flow cytometry and cytology, CSF lyme, CSF electrophoresis. Per Rheum, please obtain CSF C3, C4, antineuronal antibodies  [ ] Obtain MRI brain w and w/o when stable   [ ] Seizure, fall and aspiration precautions.  Avoid sleep deprivation.  [ ] Seizure rescue medications for focal seizure lasting >3 min which doesn't resolve and/or any GTC:  ---->1st line: 1mg ativan IVP. May repeat x 1 if doesn't break within 3 minutes. (Max dose 0.1 mg/kg)    [ ] If any seizure activity noted, please note: time, if upper/lower or focal onset symptoms (e.g. head turn, eye deviation, upper/lower tonic/clonic arm initially), vital sign derangements, airway protection, urinary or bowel incontinence, duration of seizure, tongue bite, and/or post-ictal time to return of baseline.   [ ] Given concern for seizure, advise patient to not drive, operate heavy machinery, avoid heights, pools, bathtubs, locked doors until cleared by further follow up outpatient by neurology.  [] Remainder of care per MICU team      Case seen and discussed with Neurology attending Dr. De La Cruz, please await attending attestation. Mr. MARIE is a 28y (1995) man with a PMHx significant for SLE on Hydroxychloroquine initially admitted to [facility] on 12/12, found to have b/l PE w/ superimposed PNA, transferred to [facility] on 12/22 for thoracic eval of b/l L > R effusions, L loculated. Patient was on heparin drip. Followed by ID and rheum due to perisistent fevers, no clear source identified (was on Vanc, Zosyn). RRT called on 12/25 for SOB and chest pain. During RRT, patient was witnessed to have an episode of generalized convulsion lasting ~45 seconds that resolved spontaneously, associated with urinary incontinence. Patient given 1mg IV ativan. Patient opened eyes to voice and noxious stimuli but unable to follow commands or respond to questions, withdrawing all extremities spontaneously. Appeared to be shivering. Patient found to be febrile to 104F, tachycardic, hyponatremic to 121.  CTH non-con was normal.  Prior to event, patient A&Ox4, denied headache. No history of seizures previously. Patient transferred to MICU and re-evaluated, found to be A&Ox3, sleepy but arousable. Nonfocal exam, but found to have B/L ankle clonus. Patient was started on EEG, prelim read with no seizures.     Impression:  Episode of witnessed generalized convulsion lasting ~45 seconds that resolved spontaneously, associated with urinary incontinence and confusion afterwards concerning for seizure. First lifetime event. Patient found to be febrile to 104F, tachycardic, hyponatremic to 121. Likely provoked seizure in setting of acute illness and metabolic abnormalities. Prior to event, patient A&Ox3, denied headache.  Lower suspicion for meningoencephalitis (mental status at baseline, denies headache, no nuchal rigidity on exam) however will need to rule out CNS infection given fever and new seizure    Recommendations:    [x] CTH noncon, reviewed   [x] s/p Ativan 1mg   [ ] Continuous Video EEG, will monitor off anti-seizure medications for now   [ ] Recommend LP given fever without source and new onset seizure  Please order CSF glucose, protein, cell count, cryptococcal, IgG, Meningoencephalitis PCR Panel, culture/ gram stain, fungal, HSV, syphilis, [place] n[place], lyme, AFB smear and culture, flow cytometry and cytology, CSF lyme, CSF electrophoresis. Per Rheum, please obtain CSF C3, C4, antineuronal antibodies  [ ] Obtain MRI brain w and w/o when stable   [ ] Seizure, fall and aspiration precautions.  Avoid sleep deprivation.  [ ] Seizure rescue medications for focal seizure lasting >3 min which doesn't resolve and/or any GTC:  ---->1st line: 1mg ativan IVP. May repeat x 1 if doesn't break within 3 minutes. (Max dose 0.1 mg/kg)    [ ] If any seizure activity noted, please note: time, if upper/lower or focal onset symptoms (e.g. head turn, eye deviation, upper/lower tonic/clonic arm initially), vital sign derangements, airway protection, urinary or bowel incontinence, duration of seizure, tongue bite, and/or post-ictal time to return of baseline.   [ ] Given concern for seizure, advise patient to not drive, operate heavy machinery, avoid heights, pools, bathtubs, locked doors until cleared by further follow up outpatient by neurology.  [] Remainder of care per MICU team      Case seen and discussed with Neurology attending Dr. De La Cruz, please await attending attestation. Mr. MARIE is a 28y (1995) man with a PMHx significant for SLE on Hydroxychloroquine initially admitted to [facility] on 12/12, found to have b/l PE w/ superimposed PNA, transferred to [facility] on 12/22 for thoracic eval of b/l L > R effusions, L loculated. Patient was on heparin drip. Followed by ID and rheum due to perisistent fevers, no clear source identified (was on Vanc, Zosyn). RRT called on 12/25 for SOB and chest pain. During RRT, patient was witnessed to have an episode of generalized convulsion lasting ~45 seconds that resolved spontaneously, associated with urinary incontinence. Patient given 1mg IV ativan. Patient opened eyes to voice and noxious stimuli but unable to follow commands or respond to questions, withdrawing all extremities spontaneously. Appeared to be shivering. Patient found to be febrile to 104F, tachycardic, hyponatremic to 121.  CTH non-con was normal.  Prior to event, patient A&Ox4, denied headache. No history of seizures previously. Patient transferred to MICU and re-evaluated, found to be A&Ox3, sleepy but arousable. Nonfocal exam, but found to have B/L ankle clonus. Patient was started on EEG, prelim read with no seizures.     Impression:  Episode of witnessed generalized convulsion lasting ~45 seconds that resolved spontaneously, associated with urinary incontinence and confusion afterwards concerning for seizure. First lifetime event. Patient found to be febrile to 104F, tachycardic, hyponatremic to 121. Likely provoked seizure in setting of acute illness and metabolic abnormalities. Prior to event, patient A&Ox3, denied headache.  Lower suspicion for meningoencephalitis (mental status at baseline, denies headache, no nuchal rigidity on exam) however will need to rule out CNS infection given fever and new seizure    Recommendations:    [x] CTH noncon, reviewed   [x] s/p Ativan 1mg   [ ] Continuous Video EEG, will monitor off anti-seizure medications for now   [ ] Recommend LP given fever without source and new onset seizure  Please order CSF glucose, protein, cell count, cryptococcal, IgG, Meningoencephalitis PCR Panel, culture/ gram stain, fungal, HSV, syphilis, [place], lyme, AFB smear and culture, flow cytometry and cytology, CSF lyme, CSF electrophoresis. Per Rheum, please obtain CSF C3, C4, antineuronal antibodies  [ ] Obtain MRI brain w and w/o when stable   [ ] Seizure, fall and aspiration precautions.  Avoid sleep deprivation.  [ ] Seizure rescue medications for focal seizure lasting >3 min which doesn't resolve and/or any GTC:  ---->1st line: 1mg ativan IVP. May repeat x 1 if doesn't break within 3 minutes. (Max dose 0.1 mg/kg)    [ ] If any seizure activity noted, please note: time, if upper/lower or focal onset symptoms (e.g. head turn, eye deviation, upper/lower tonic/clonic arm initially), vital sign derangements, airway protection, urinary or bowel incontinence, duration of seizure, tongue bite, and/or post-ictal time to return of baseline.   [ ] Given concern for seizure, advise patient to not drive, operate heavy machinery, avoid heights, pools, bathtubs, locked doors until cleared by further follow up outpatient by neurology.  [] Address underlying metabolic, electrolyte abnormalities, evaluate for infection as you are doing  [] Remainder of care per MICU team      Case seen and discussed with Neurology attending Dr. De La Cruz, please await attending attestation. Mr. MARIE is a 28y (1995) man with a PMHx significant for SLE on Hydroxychloroquine initially admitted to [facility] on 12/12, found to have b/l PE w/ superimposed PNA, transferred to [facility] on 12/22 for thoracic eval of b/l L > R effusions, L loculated. Patient was on heparin drip. Followed by ID and rheum due to perisistent fevers, no clear source identified (was on Vanc, Zosyn). RRT called on 12/25 for SOB and chest pain. During RRT, patient was witnessed to have an episode of generalized convulsion lasting ~45 seconds that resolved spontaneously, associated with urinary incontinence. Patient given 1mg IV ativan. Patient opened eyes to voice and noxious stimuli but unable to follow commands or respond to questions, withdrawing all extremities spontaneously. Appeared to be shivering. Patient found to be febrile to 104F, tachycardic, hyponatremic to 121.  CTH non-con was normal.  Prior to event, patient A&Ox4, denied headache. No history of seizures previously. Patient transferred to MICU and re-evaluated, found to be A&Ox3, sleepy but arousable. Nonfocal exam, but found to have B/L ankle clonus. Patient was started on EEG, prelim read with no seizures.     Impression:  Episode of witnessed generalized convulsion lasting ~45 seconds that resolved spontaneously, associated with urinary incontinence and confusion afterwards concerning for seizure. First lifetime event. Patient found to be febrile to 104F, tachycardic, hyponatremic to 121. Likely provoked seizure in setting of acute illness and metabolic abnormalities. Prior to event, patient A&Ox3, denied headache.  Lower suspicion for meningoencephalitis (mental status at baseline, denies headache, no nuchal rigidity on exam) however will need to rule out CNS infection given fever and new seizure    Recommendations:    [x] CTH noncon, reviewed   [x] s/p Ativan 1mg   [ ] Continuous Video EEG, will monitor off anti-seizure medications for now   [ ] Recommend LP given fever without source and new onset seizure  Please order CSF glucose, protein, cell count, cryptococcal, IgG, Meningoencephalitis PCR Panel, culture/ gram stain, fungal, HSV, syphilis, [place] n[place], lyme, AFB smear and culture, flow cytometry and cytology, CSF lyme, CSF electrophoresis. Per Rheum, please obtain CSF C3, C4, antineuronal antibodies  [ ] Obtain MRI brain w and w/o when stable   [ ] Seizure, fall and aspiration precautions.  Avoid sleep deprivation.  [ ] Seizure rescue medications for focal seizure lasting >3 min which doesn't resolve and/or any GTC:  ---->1st line: 1mg ativan IVP. May repeat x 1 if doesn't break within 3 minutes. (Max dose 0.1 mg/kg)    [ ] If any seizure activity noted, please note: time, if upper/lower or focal onset symptoms (e.g. head turn, eye deviation, upper/lower tonic/clonic arm initially), vital sign derangements, airway protection, urinary or bowel incontinence, duration of seizure, tongue bite, and/or post-ictal time to return of baseline.   [ ] Given concern for seizure, advise patient to not drive, operate heavy machinery, avoid heights, pools, bathtubs, locked doors until cleared by further follow up outpatient by neurology.  [] Address underlying metabolic, electrolyte abnormalities, evaluate for infection as you are doing  [] Remainder of care per MICU team      Case seen and discussed with Neurology attending Dr. De La Cruz, please await attending attestation.

## 2023-12-25 NOTE — PROGRESS NOTE ADULT - ASSESSMENT
28 years old male with h/o Lupus ( diagnosed in 09/2023, on Plaquenil present to [place] ED on 12/12/23  with complain of chest pain and SOB. Patient reported left sided pleuritic chest pain which started 3 days ago. Pain is progressively worsened, associated with SOB and cough. Patient was seen in OSH ED and was prescribed antibiotics. Patient reported significantly worsening of left sided pleuritic chest pain today. No fever or chills. Patient reported loss of appetite and had a few episode of diarrhea for last 2 days. CTA chest with acute right upper lobe and left lower lobe segmental/subsegmental pulmonary emboli. No CT evidence of right heart strain. New bilateral lower lobe consolidations with areas of central clearing. Pneumonia and pulmonary infarcts are in the differential. New bilateral pleural effusions, small on the left and trace on the right. Bilateral axillary and supraclavicular adenopathy of unclear etiology. Patient was started on heparin ggt transitioned to eliquis on 12/19,  patient with worsening SOB/ hypoxia requiring nasal cannula oxygen supplementation. 12/20  Repeat Xray shows increased moderate left pleural effusion and compressive atelectasis trace right effusion and linear atelectasis. Thoracic team consulted for possible pigtail insertion Bedside US: Large left effusion with septations. Right simple effusion , + pericardial effusion- lower extremity dopplers negative for DVT.    # Pulm effusion/ Fever   Plan for thoracentesis  cont to hold heprin   TS care appreciated   O2 supplement   monitor output   Zosyn for now       # Fever  SIRS   ICU care   Abx       # Hyponatremia/ Seizure   RRT   ativan ICU eval called       #PE   on heparin , resume after thoracentesis   DVT negative  Heme onceval   belloley lupus related     #Hxof lupus  on hydroxychloroquine   Heme eval called   Rheum eval   steroids per rheum     DVT andgIPPX 28 years old male with h/o Lupus ( diagnosed in 09/2023, on Plaquenil present to [place] ED on 12/12/23  with complain of chest pain and SOB. Patient reported left sided pleuritic chest pain which started 3 days ago. Pain is progressively worsened, associated with SOB and cough. Patient was seen in OSH ED and was prescribed antibiotics. Patient reported significantly worsening of left sided pleuritic chest pain today. No fever or chills. Patient reported loss of appetite and had a few episode of diarrhea for last 2 days. CTA chest with acute right upper lobe and left lower lobe segmental/subsegmental pulmonary emboli. No CT evidence of right heart strain. New bilateral lower lobe consolidations with areas of central clearing. Pneumonia and pulmonary infarcts are in the differential. New bilateral pleural effusions, small on the left and trace on the right. Bilateral axillary and supraclavicular adenopathy of unclear etiology. Patient was started on heparin ggt transitioned to eliquis on 12/19,  patient with worsening SOB/ hypoxia requiring nasal cannula oxygen supplementation. 12/20  Repeat Xray shows increased moderate left pleural effusion and compressive atelectasis trace right effusion and linear atelectasis. Thoracic team consulted for possible pigtail insertion Bedside US: Large left effusion with septations. Right simple effusion , + pericardial effusion- lower extremity dopplers negative for DVT.    # Pulm effusion/ Fever   Plan for thoracentesis  cont to hold heprin   TS care appreciated   O2 supplement   monitor output   Zosyn for now       # Fever  SIRS   ICU care   Abx       # Hyponatremia/ Seizure   RRT   ativan ICU eval called       #PE   on heparin , resume after thoracentesis   DVT negative  Heme onceval   belloley lupus related     #Hxof lupus  on hydroxychloroquine   Heme eval called   Rheum eval   steroids per rheum     DVT andgIPPX 28 years old male with h/o Lupus ( diagnosed in 09/2023, on Plaquenil present to [place] ED on 12/12/23  with complain of chest pain and SOB. Patient reported left sided pleuritic chest pain which started 3 days ago. Pain is progressively worsened, associated with SOB and cough. Patient was seen in OSH ED and was prescribed antibiotics. Patient reported significantly worsening of left sided pleuritic chest pain today. No fever or chills. Patient reported loss of appetite and had a few episode of diarrhea for last 2 days. CTA chest with acute right upper lobe and left lower lobe segmental/subsegmental pulmonary emboli. No CT evidence of right heart strain. New bilateral lower lobe consolidations with areas of central clearing. Pneumonia and pulmonary infarcts are in the differential. New bilateral pleural effusions, small on the left and trace on the right. Bilateral axillary and supraclavicular adenopathy of unclear etiology. Patient was started on heparin ggt transitioned to eliquis on 12/19,  patient with worsening SOB/ hypoxia requiring nasal cannula oxygen supplementation. 12/20  Repeat Xray shows increased moderate left pleural effusion and compressive atelectasis trace right effusion and linear atelectasis. Thoracic team consulted for possible pigtail insertion Bedside US: Large left effusion with septations. Right simple effusion , + pericardial effusion- lower extremity dopplers negative for DVT.    # Pulm effusion/ Fever   S/P thoracentesis , followupp results   cont to hold heprin   TS care appreciated   O2 supplement   monitor output   Zosyn for now       # Fever  SIRS   ICU care   Abx       # Hyponatremia/ Seizure   RRT   ativan ICU eval called       #PE   on heparin , resume after thoracentesis   DVT negative  Heme onceval   belloley lupus related     #Hxof lupus  on hydroxychloroquine   Heme eval called   Rheum eval   steroids per rheum     DVT andgIPPX 28 years old male with h/o Lupus ( diagnosed in 09/2023, on Plaquenil present to [place] ED on 12/12/23  with complain of chest pain and SOB. Patient reported left sided pleuritic chest pain which started 3 days ago. Pain is progressively worsened, associated with SOB and cough. Patient was seen in OSH ED and was prescribed antibiotics. Patient reported significantly worsening of left sided pleuritic chest pain today. No fever or chills. Patient reported loss of appetite and had a few episode of diarrhea for last 2 days. CTA chest with acute right upper lobe and left lower lobe segmental/subsegmental pulmonary emboli. No CT evidence of right heart strain. New bilateral lower lobe consolidations with areas of central clearing. Pneumonia and pulmonary infarcts are in the differential. New bilateral pleural effusions, small on the left and trace on the right. Bilateral axillary and supraclavicular adenopathy of unclear etiology. Patient was started on heparin ggt transitioned to eliquis on 12/19,  patient with worsening SOB/ hypoxia requiring nasal cannula oxygen supplementation. 12/20  Repeat Xray shows increased moderate left pleural effusion and compressive atelectasis trace right effusion and linear atelectasis. Thoracic team consulted for possible pigtail insertion Bedside US: Large left effusion with septations. Right simple effusion , + pericardial effusion- lower extremity dopplers negative for DVT.    # Pulm effusion/ Fever   S/P thoracentesis , followupp results   cont to hold heprin   TS care appreciated   O2 supplement   monitor output   Zosyn for now       # Fever  SIRS   ICU care   Abx       # Hyponatremia/ Seizure   RRT   ativan ICU eval called       #PE   on heparin , resume after thoracentesis   DVT negative  Heme onceval   belloley lupus related     #Hxof lupus  on hydroxychloroquine   Heme eval called   Rheum eval   steroids per rheum     DVT andgIPPX

## 2023-12-25 NOTE — CONSULT NOTE ADULT - ATTENDING COMMENTS
Mr. Harrison is a 27 yo man with a witnessed GTC seizure - probable acute symptomatic seizure.   I agree with work up and management as above.   Thank you.    There is a high probability of sudden, clinically significant, or life threatening deterioration in the patient’s condition which requires the highest level of physician preparedness to intervene urgently.  Critical care time:  55 minutes. Mr. Harrison is a 29 yo man with a witnessed GTC seizure - probable acute symptomatic seizure.   I agree with work up and management as above.   Thank you.    There is a high probability of sudden, clinically significant, or life threatening deterioration in the patient’s condition which requires the highest level of physician preparedness to intervene urgently.  Critical care time:  55 minutes.

## 2023-12-25 NOTE — CHART NOTE - NSCHARTNOTEFT_GEN_A_CORE
MICU Accept Note    CHIEF COMPLAINT: PE    HPI / INTERVAL HISTORY:  28 years old male with h/o Lupus ( diagnosed in 09/2023, on Plaquenil present to [facility] on 12/12/23  with complain of chest pain and SOB. Patient reported left sided pleuritic chest pain which started 3 days ago. Pain is progressively worsened, associated with SOB and cough. Patient was seen in OSH ED and was prescribed antibiotics. Patient reported significantly worsening of left sided pleuritic chest pain today. No fever or chills. Patient reported loss of appetite and had a few episode of diarrhea for last 2 days. CTA chest with acute right upper lobe and left lower lobe segmental/subsegmental pulmonary emboli. No CT evidence of right heart strain. New bilateral lower lobe consolidations with areas of central clearing. Pneumonia and pulmonary infarcts are in the differential. New bilateral pleural effusions, small on the left and trace on the right. Bilateral axillary and supraclavicular adenopathy of unclear etiology. Patient was started on heparin ggt transitioned to eliquis on 12/19,  patient with worsening SOB/ hypoxia requiring nasal cannula oxygen supplementation. 12/20  Repeat Xray shows increased large left pleural effusion and compressive atelectasis trace right effusion and linear atelectasis. Thoracic team consulted for possible pigtail insertion Bedside US: Large left effusion with septations. Right simple effusion , + pericardial effusion- lower extremity dopplers negative for DVT,   - GI PCR + e coli  - mRSA PCR + Staph aureus   . Patient transferred to [facility] for further management    Patient had thoracentesis of L pleural effusion, 12/23 by thoracic surgery. Patient was on zosyn for fevers, no clear source, had RRT on 12/25 for acute chest pain and dyspnea but hemodynamically stable. Patient had a subsequent RRT shortly after for tonic-clonic seizure, 1 ativan given, seizure reportedly for 40 seconds or so. Patient was post-ictal after seizure, but responsive. His baseline mental status shortly prior was AOx4, describing where his chest pain as.     Patient transferred to MICU for further monitoring post-seizure, including infectious workup.     PAST MEDICAL & SURGICAL HISTORY:  LE (lupus erythematosus)      Pulmonary embolism      No significant past surgical history          FAMILY HISTORY:  No pertinent family history in first degree relatives        SOCIAL HISTORY:  See h&p    HOME MEDICATIONS:      Allergies    No Known Allergies    Intolerances          REVIEW OF SYSTEMS:  unable to obtain presently due to mental status.     OBJECTIVE:  ICU Vital Signs Last 24 Hrs  T(C): 39.2 (25 Dec 2023 12:00), Max: 39.5 (25 Dec 2023 01:09)  T(F): 102.5 (25 Dec 2023 12:00), Max: 103.1 (25 Dec 2023 01:09)  HR: 106 (25 Dec 2023 12:00) (86 - 135)  BP: 114/62 (25 Dec 2023 12:00) (114/62 - 144/80)  BP(mean): 73 (25 Dec 2023 12:00) (73 - 86)  ABP: --  ABP(mean): --  RR: 18 (25 Dec 2023 12:00) (17 - 25)  SpO2: 100% (25 Dec 2023 12:00) (98% - 100%)    O2 Parameters below as of 25 Dec 2023 12:00  Patient On (Oxygen Delivery Method): nasal cannula  O2 Flow (L/min): 3            12-24 @ 07:01  -  12-25 @ 07:00  --------------------------------------------------------  IN: 1119 mL / OUT: 1180 mL / NET: -61 mL      CAPILLARY BLOOD GLUCOSE      POCT Blood Glucose.: 162 mg/dL (25 Dec 2023 10:43)      PHYSICAL EXAM:  Appearance: No acute distress. lethargic s/p seizure / ativan.   HEENT:  PERRLA   Lymphatic: No lymphadenopathy   Cardiovascular: Normal S1 S2, no JVD  Respiratory: normal effort , clear  Gastrointestinal:  Soft, Non-tender  Skin: No rashes,  warm to touch  Psychiatry:  Lethargic.   Musculuskeletal: No edema or joint swelling appreciated.     LINES:     HOSPITAL MEDICATIONS:  MEDICATIONS  (STANDING):  acetaminophen   IVPB .. 1000 milliGRAM(s) IV Intermittent once  cefepime   IVPB 2000 milliGRAM(s) IV Intermittent every 8 hours  ciprofloxacin  0.3% Ophthalmic Solution for Otic Use 2 Drop(s) Both Ears two times a day  hydroxychloroquine 200 milliGRAM(s) Oral two times a day  lactated ringers. 1000 milliLiter(s) (200 mL/Hr) IV Continuous <Continuous>  lidocaine   4% Patch 1 Patch Transdermal daily  methylPREDNISolone sodium succinate Injectable 40 milliGRAM(s) IV Push daily  sodium chloride 1 Gram(s) Oral three times a day  vancomycin  IVPB 1000 milliGRAM(s) IV Intermittent every 12 hours    MEDICATIONS  (PRN):  albuterol/ipratropium for Nebulization 3 milliLiter(s) Nebulizer every 6 hours PRN Shortness of Breath and/or Wheezing  guaiFENesin Oral Liquid (Sugar-Free) 200 milliGRAM(s) Oral every 6 hours PRN Cough  lidocaine 2% Viscous 5 milliLiter(s) Swish and Spit three times a day PRN Mouth Care      LABS:                        9.1    5.53  )-----------( 332      ( 25 Dec 2023 03:15 )             26.1     Hgb Trend: 9.1<--, 10.5<--, 10.8<--, 9.8<--, 9.4<--  12-25    122<L>  |  90<L>  |  18  ----------------------------<  149<H>  4.4   |  20<L>  |  1.23    Ca    8.2<L>      25 Dec 2023 10:10  Phos  2.3     12-25  Mg     1.90     12-25    TPro  7.1  /  Alb  2.7<L>  /  TBili  0.8  /  DBili  x   /  AST  217<H>  /  ALT  112<H>  /  AlkPhos  75  12-25    Creatinine Trend: 1.23<--, 1.18<--, 1.00<--, 1.02<--, 1.00<--, 1.13<--  PT/INR - ( 25 Dec 2023 10:10 )   PT: 17.7 sec;   INR: 1.59 ratio         PTT - ( 25 Dec 2023 10:10 )  PTT:102.2 sec  Urinalysis Basic - ( 25 Dec 2023 10:10 )    Color: x / Appearance: x / SG: x / pH: x  Gluc: 149 mg/dL / Ketone: x  / Bili: x / Urobili: x   Blood: x / Protein: x / Nitrite: x   Leuk Esterase: x / RBC: x / WBC x   Sq Epi: x / Non Sq Epi: x / Bacteria: x    Venous Blood Gas:  12-25 @ 10:10  7.46/30/57/21/85.9  VBG Lactate: 1.5      MICROBIOLOGY:     RADIOLOGY & ADDITIONAL TESTS:      ASSESSMENT AND PLAN:  28 years old male with h/o Lupus ( diagnosed in 09/2023, on Plaquenil present to [facility] ED on 12/12/23  with complain of chest pain and SOB admitted for fevers, PE, pleural effusions, course c/b high fever and tonic-clonic seizure, transferred to MICU for post-ictal and infectious monitoring.     Neuro  #Seizure  Patient is lethargic i/s/o likely post-ictal / post- ativan state.  Protecting airway.  - monitor mental status  - VBG to eval   - neuro consulted, will f/u official recs though concerned this might be due to high fevers so was not keppra loaded  - vEEG when able  - CT head to r/o hemorrhage since was on heparin (hold hep gtt until CT head negative)    #Cardiovascular    #Respiratory    #GI/Nutrition    #/Renal    #Skin    #ID    #Endocrine    #Hematologic/DVT ppx    #Ethics MICU Accept Note    CHIEF COMPLAINT: PE    HPI / INTERVAL HISTORY:  28 years old male with h/o Lupus ( diagnosed in 09/2023, on Plaquenil present to [place] ED on 12/12/23  with complain of chest pain and SOB. Patient reported left sided pleuritic chest pain which started 3 days ago. Pain is progressively worsened, associated with SOB and cough. Patient was seen in OSH ED and was prescribed antibiotics. Patient reported significantly worsening of left sided pleuritic chest pain today. No fever or chills. Patient reported loss of appetite and had a few episode of diarrhea for last 2 days. CTA chest with acute right upper lobe and left lower lobe segmental/subsegmental pulmonary emboli. No CT evidence of right heart strain. New bilateral lower lobe consolidations with areas of central clearing. Pneumonia and pulmonary infarcts are in the differential. New bilateral pleural effusions, small on the left and trace on the right. Bilateral axillary and supraclavicular adenopathy of unclear etiology. Patient was started on heparin ggt transitioned to eliquis on 12/19,  patient with worsening SOB/ hypoxia requiring nasal cannula oxygen supplementation. 12/20  Repeat Xray shows increased large left pleural effusion and compressive atelectasis trace right effusion and linear atelectasis. Thoracic team consulted for possible pigtail insertion Bedside US: Large left effusion with septations. Right simple effusion , + pericardial effusion- lower extremity dopplers negative for DVT,   - GI PCR + e coli  - mRSA PCR + Staph aureus   . Patient transferred to [facility] for further management    Patient had thoracentesis of L pleural effusion, 12/23 by thoracic surgery. Patient was on zosyn for fevers, no clear source, had RRT on 12/25 for acute chest pain and dyspnea but hemodynamically stable. Patient had a subsequent RRT shortly after for tonic-clonic seizure, 1 ativan given, seizure reportedly for 40 seconds or so. Patient was post-ictal after seizure, but responsive. His baseline mental status shortly prior was AOx4, describing where his chest pain as.     Patient transferred to MICU for further monitoring post-seizure, including infectious workup.     PAST MEDICAL & SURGICAL HISTORY:  LE (lupus erythematosus)      Pulmonary embolism      No significant past surgical history          FAMILY HISTORY:  No pertinent family history in first degree relatives        SOCIAL HISTORY:  See h&p    HOME MEDICATIONS:      Allergies    No Known Allergies    Intolerances          REVIEW OF SYSTEMS:  unable to obtain presently due to mental status.     OBJECTIVE:  ICU Vital Signs Last 24 Hrs  T(C): 39.2 (25 Dec 2023 12:00), Max: 39.5 (25 Dec 2023 01:09)  T(F): 102.5 (25 Dec 2023 12:00), Max: 103.1 (25 Dec 2023 01:09)  HR: 106 (25 Dec 2023 12:00) (86 - 135)  BP: 114/62 (25 Dec 2023 12:00) (114/62 - 144/80)  BP(mean): 73 (25 Dec 2023 12:00) (73 - 86)  ABP: --  ABP(mean): --  RR: 18 (25 Dec 2023 12:00) (17 - 25)  SpO2: 100% (25 Dec 2023 12:00) (98% - 100%)    O2 Parameters below as of 25 Dec 2023 12:00  Patient On (Oxygen Delivery Method): nasal cannula  O2 Flow (L/min): 3            12-24 @ 07:01  -  12-25 @ 07:00  --------------------------------------------------------  IN: 1119 mL / OUT: 1180 mL / NET: -61 mL      CAPILLARY BLOOD GLUCOSE      POCT Blood Glucose.: 162 mg/dL (25 Dec 2023 10:43)      PHYSICAL EXAM:  Appearance: No acute distress. lethargic s/p seizure / ativan.   HEENT:  PERRLA   Lymphatic: No lymphadenopathy   Cardiovascular: Normal S1 S2, no JVD  Respiratory: normal effort , clear  Gastrointestinal:  Soft, Non-tender  Skin: No rashes,  warm to touch  Psychiatry:  Lethargic.   Musculuskeletal: No edema or joint swelling appreciated.     LINES:     HOSPITAL MEDICATIONS:  MEDICATIONS  (STANDING):  acetaminophen   IVPB .. 1000 milliGRAM(s) IV Intermittent once  cefepime   IVPB 2000 milliGRAM(s) IV Intermittent every 8 hours  ciprofloxacin  0.3% Ophthalmic Solution for Otic Use 2 Drop(s) Both Ears two times a day  hydroxychloroquine 200 milliGRAM(s) Oral two times a day  lactated ringers. 1000 milliLiter(s) (200 mL/Hr) IV Continuous <Continuous>  lidocaine   4% Patch 1 Patch Transdermal daily  methylPREDNISolone sodium succinate Injectable 40 milliGRAM(s) IV Push daily  sodium chloride 1 Gram(s) Oral three times a day  vancomycin  IVPB 1000 milliGRAM(s) IV Intermittent every 12 hours    MEDICATIONS  (PRN):  albuterol/ipratropium for Nebulization 3 milliLiter(s) Nebulizer every 6 hours PRN Shortness of Breath and/or Wheezing  guaiFENesin Oral Liquid (Sugar-Free) 200 milliGRAM(s) Oral every 6 hours PRN Cough  lidocaine 2% Viscous 5 milliLiter(s) Swish and Spit three times a day PRN Mouth Care      LABS:                        9.1    5.53  )-----------( 332      ( 25 Dec 2023 03:15 )             26.1     Hgb Trend: 9.1<--, 10.5<--, 10.8<--, 9.8<--, 9.4<--  12-25    122<L>  |  90<L>  |  18  ----------------------------<  149<H>  4.4   |  20<L>  |  1.23    Ca    8.2<L>      25 Dec 2023 10:10  Phos  2.3     12-25  Mg     1.90     12-25    TPro  7.1  /  Alb  2.7<L>  /  TBili  0.8  /  DBili  x   /  AST  217<H>  /  ALT  112<H>  /  AlkPhos  75  12-25    Creatinine Trend: 1.23<--, 1.18<--, 1.00<--, 1.02<--, 1.00<--, 1.13<--  PT/INR - ( 25 Dec 2023 10:10 )   PT: 17.7 sec;   INR: 1.59 ratio         PTT - ( 25 Dec 2023 10:10 )  PTT:102.2 sec  Urinalysis Basic - ( 25 Dec 2023 10:10 )    Color: x / Appearance: x / SG: x / pH: x  Gluc: 149 mg/dL / Ketone: x  / Bili: x / Urobili: x   Blood: x / Protein: x / Nitrite: x   Leuk Esterase: x / RBC: x / WBC x   Sq Epi: x / Non Sq Epi: x / Bacteria: x    Venous Blood Gas:  12-25 @ 10:10  7.46/30/57/21/85.9  VBG Lactate: 1.5      MICROBIOLOGY:     RADIOLOGY & ADDITIONAL TESTS:      ASSESSMENT AND PLAN:  28 years old male with h/o Lupus ( diagnosed in 09/2023, on Plaquenil present to [place] ED on 12/12/23  with complain of chest pain and SOB admitted for fevers, PE, pleural effusions, course c/b high fever and tonic-clonic seizure, transferred to MICU for post-ictal and infectious monitoring.     Neuro  #Seizure  Patient is lethargic i/s/o likely post-ictal / post- ativan state.  Protecting airway.  - monitor mental status  - VBG to eval   - neuro consulted, will f/u official recs though concerned this might be due to high fevers so was not keppra loaded  - vEEG when able  - CT head to r/o hemorrhage since was on heparin (hold hep gtt until CT head negative)    #Cardiovascular    #Respiratory    #GI/Nutrition    #/Renal    #Skin    #ID    #Endocrine    #Hematologic/DVT ppx    #Ethics MICU Accept Note    CHIEF COMPLAINT: PE    HPI / INTERVAL HISTORY:  28 years old male with h/o Lupus ( diagnosed in 09/2023, on Plaquenil present to [facility] ED on 12/12/23  with complain of chest pain and SOB. Patient reported left sided pleuritic chest pain which started 3 days ago. Pain is progressively worsened, associated with SOB and cough. Patient was seen in OSH ED and was prescribed antibiotics. Patient reported significantly worsening of left sided pleuritic chest pain today. No fever or chills. Patient reported loss of appetite and had a few episode of diarrhea for last 2 days. CTA chest with acute right upper lobe and left lower lobe segmental/subsegmental pulmonary emboli. No CT evidence of right heart strain. New bilateral lower lobe consolidations with areas of central clearing. Pneumonia and pulmonary infarcts are in the differential. New bilateral pleural effusions, small on the left and trace on the right. Bilateral axillary and supraclavicular adenopathy of unclear etiology. Patient was started on heparin ggt transitioned to eliquis on 12/19,  patient with worsening SOB/ hypoxia requiring nasal cannula oxygen supplementation. 12/20  Repeat Xray shows increased large left pleural effusion and compressive atelectasis trace right effusion and linear atelectasis. Thoracic team consulted for possible pigtail insertion Bedside US: Large left effusion with septations. Right simple effusion , + pericardial effusion- lower extremity dopplers negative for DVT,   - GI PCR + e coli  - mRSA PCR + Staph aureus   . Patient transferred to [facility] for further management    Patient had thoracentesis of L pleural effusion, 12/23 by thoracic surgery. Patient was on zosyn for fevers, no clear source, had RRT on 12/25 for acute chest pain and dyspnea but hemodynamically stable. Patient had a subsequent RRT shortly after for tonic-clonic seizure, 1 ativan given, seizure reportedly for 40 seconds or so. Patient was post-ictal after seizure, but responsive. His baseline mental status shortly prior was AOx4, describing where his chest pain as.     Patient transferred to MICU for further monitoring post-seizure, including infectious workup.     PAST MEDICAL & SURGICAL HISTORY:  LE (lupus erythematosus)      Pulmonary embolism      No significant past surgical history          FAMILY HISTORY:  No pertinent family history in first degree relatives        SOCIAL HISTORY:  See h&p    HOME MEDICATIONS:      Allergies    No Known Allergies    Intolerances          REVIEW OF SYSTEMS:  unable to obtain presently due to mental status.     OBJECTIVE:  ICU Vital Signs Last 24 Hrs  T(C): 39.2 (25 Dec 2023 12:00), Max: 39.5 (25 Dec 2023 01:09)  T(F): 102.5 (25 Dec 2023 12:00), Max: 103.1 (25 Dec 2023 01:09)  HR: 106 (25 Dec 2023 12:00) (86 - 135)  BP: 114/62 (25 Dec 2023 12:00) (114/62 - 144/80)  BP(mean): 73 (25 Dec 2023 12:00) (73 - 86)  ABP: --  ABP(mean): --  RR: 18 (25 Dec 2023 12:00) (17 - 25)  SpO2: 100% (25 Dec 2023 12:00) (98% - 100%)    O2 Parameters below as of 25 Dec 2023 12:00  Patient On (Oxygen Delivery Method): nasal cannula  O2 Flow (L/min): 3            12-24 @ 07:01  -  12-25 @ 07:00  --------------------------------------------------------  IN: 1119 mL / OUT: 1180 mL / NET: -61 mL      CAPILLARY BLOOD GLUCOSE      POCT Blood Glucose.: 162 mg/dL (25 Dec 2023 10:43)      PHYSICAL EXAM:  Appearance: No acute distress. lethargic s/p seizure / ativan.   HEENT:  PERRLA   Lymphatic: No lymphadenopathy   Cardiovascular: Normal S1 S2, no JVD  Respiratory: normal effort , clear  Gastrointestinal:  Soft, Non-tender  Skin: No rashes,  warm to touch  Psychiatry:  Lethargic.   Musculuskeletal: No edema or joint swelling appreciated.     LINES:     HOSPITAL MEDICATIONS:  MEDICATIONS  (STANDING):  acetaminophen   IVPB .. 1000 milliGRAM(s) IV Intermittent once  cefepime   IVPB 2000 milliGRAM(s) IV Intermittent every 8 hours  ciprofloxacin  0.3% Ophthalmic Solution for Otic Use 2 Drop(s) Both Ears two times a day  hydroxychloroquine 200 milliGRAM(s) Oral two times a day  lactated ringers. 1000 milliLiter(s) (200 mL/Hr) IV Continuous <Continuous>  lidocaine   4% Patch 1 Patch Transdermal daily  methylPREDNISolone sodium succinate Injectable 40 milliGRAM(s) IV Push daily  sodium chloride 1 Gram(s) Oral three times a day  vancomycin  IVPB 1000 milliGRAM(s) IV Intermittent every 12 hours    MEDICATIONS  (PRN):  albuterol/ipratropium for Nebulization 3 milliLiter(s) Nebulizer every 6 hours PRN Shortness of Breath and/or Wheezing  guaiFENesin Oral Liquid (Sugar-Free) 200 milliGRAM(s) Oral every 6 hours PRN Cough  lidocaine 2% Viscous 5 milliLiter(s) Swish and Spit three times a day PRN Mouth Care      LABS:                        9.1    5.53  )-----------( 332      ( 25 Dec 2023 03:15 )             26.1     Hgb Trend: 9.1<--, 10.5<--, 10.8<--, 9.8<--, 9.4<--  12-25    122<L>  |  90<L>  |  18  ----------------------------<  149<H>  4.4   |  20<L>  |  1.23    Ca    8.2<L>      25 Dec 2023 10:10  Phos  2.3     12-25  Mg     1.90     12-25    TPro  7.1  /  Alb  2.7<L>  /  TBili  0.8  /  DBili  x   /  AST  217<H>  /  ALT  112<H>  /  AlkPhos  75  12-25    Creatinine Trend: 1.23<--, 1.18<--, 1.00<--, 1.02<--, 1.00<--, 1.13<--  PT/INR - ( 25 Dec 2023 10:10 )   PT: 17.7 sec;   INR: 1.59 ratio         PTT - ( 25 Dec 2023 10:10 )  PTT:102.2 sec  Urinalysis Basic - ( 25 Dec 2023 10:10 )    Color: x / Appearance: x / SG: x / pH: x  Gluc: 149 mg/dL / Ketone: x  / Bili: x / Urobili: x   Blood: x / Protein: x / Nitrite: x   Leuk Esterase: x / RBC: x / WBC x   Sq Epi: x / Non Sq Epi: x / Bacteria: x    Venous Blood Gas:  12-25 @ 10:10  7.46/30/57/21/85.9  VBG Lactate: 1.5      MICROBIOLOGY:     RADIOLOGY & ADDITIONAL TESTS:      ASSESSMENT AND PLAN:  28 years old male with h/o Lupus ( diagnosed in 09/2023, on Plaquenil present to [facility] ED on 12/12/23  with complain of chest pain and SOB admitted for fevers, PE, pleural effusions, course c/b high fever and tonic-clonic seizure, transferred to MICU for post-ictal and infectious monitoring.     Neuro  #Seizure  Patient is lethargic i/s/o likely post-ictal / post- ativan state.  Protecting airway.  - monitor mental status  - VBG to eval   - neuro consulted, will f/u official recs though concerned this might be due to high fevers so was not keppra loaded  - vEEG after CT  - CT head to r/o hemorrhage since was on heparin (hold hep gtt until CT head negative)    #Cardiovascular  Pulmonary Embolism  - on hep gtt, holding until CT head to r/o hemorrhage, then can resume.   - no hypoxia  - no RV starin on POCUS  -- evaluate w formal TTE, ordered    #Respiratory  Saturating well on RA, PE treatment as above    #GI/Nutrition  ?Diarrhea  - reportedly + E coli at [facility] previously  - no further witnessed diarrhea, will monitor    #/Renal  Hyponatremia  ?ADH from pain. ? from urinary retention.   Questionable retention on POCUS  Omer inserted  - c/w omer, eventual TOV  - urine lytes  - serum osm    #ID  Fevers as high as 104.5, unclear source ?pnuemonia   Was on vanc and zosyn  - monitor for diarrhea, if has diarrhea can resend GI PCR and stool cx  - broadened to Vanc, cefepime   - repeat BCx  - sent UA and UCx  - repeat RVP  - considering LP to r/o meningitis   - f/u CT head    #Endocrine  ?Steroid induced hyperglycemia to 150s  - monitor FS glucose     #Hematologic/DVT ppx  SLE  - unclear if in active flair though ,   - rheum following, f/u recs  - heme following, f/u recs  - solumedrol 40 qd, no contraindications per ID    #Ethics  FULL CODE MICU Accept Note    CHIEF COMPLAINT: PE    HPI / INTERVAL HISTORY:  28 years old male with h/o Lupus ( diagnosed in 09/2023, on Plaquenil present to [place] ED on 12/12/23  with complain of chest pain and SOB. Patient reported left sided pleuritic chest pain which started 3 days ago. Pain is progressively worsened, associated with SOB and cough. Patient was seen in OSH ED and was prescribed antibiotics. Patient reported significantly worsening of left sided pleuritic chest pain today. No fever or chills. Patient reported loss of appetite and had a few episode of diarrhea for last 2 days. CTA chest with acute right upper lobe and left lower lobe segmental/subsegmental pulmonary emboli. No CT evidence of right heart strain. New bilateral lower lobe consolidations with areas of central clearing. Pneumonia and pulmonary infarcts are in the differential. New bilateral pleural effusions, small on the left and trace on the right. Bilateral axillary and supraclavicular adenopathy of unclear etiology. Patient was started on heparin ggt transitioned to eliquis on 12/19,  patient with worsening SOB/ hypoxia requiring nasal cannula oxygen supplementation. 12/20  Repeat Xray shows increased large left pleural effusion and compressive atelectasis trace right effusion and linear atelectasis. Thoracic team consulted for possible pigtail insertion Bedside US: Large left effusion with septations. Right simple effusion , + pericardial effusion- lower extremity dopplers negative for DVT,   - GI PCR + e coli  - mRSA PCR + Staph aureus   . Patient transferred to [facility] for further management    Patient had thoracentesis of L pleural effusion, 12/23 by thoracic surgery. Patient was on zosyn for fevers, no clear source, had RRT on 12/25 for acute chest pain and dyspnea but hemodynamically stable. Patient had a subsequent RRT shortly after for tonic-clonic seizure, 1 ativan given, seizure reportedly for 40 seconds or so. Patient was post-ictal after seizure, but responsive. His baseline mental status shortly prior was AOx4, describing where his chest pain as.     Patient transferred to MICU for further monitoring post-seizure, including infectious workup.     PAST MEDICAL & SURGICAL HISTORY:  LE (lupus erythematosus)      Pulmonary embolism      No significant past surgical history          FAMILY HISTORY:  No pertinent family history in first degree relatives        SOCIAL HISTORY:  See h&p    HOME MEDICATIONS:      Allergies    No Known Allergies    Intolerances          REVIEW OF SYSTEMS:  unable to obtain presently due to mental status.     OBJECTIVE:  ICU Vital Signs Last 24 Hrs  T(C): 39.2 (25 Dec 2023 12:00), Max: 39.5 (25 Dec 2023 01:09)  T(F): 102.5 (25 Dec 2023 12:00), Max: 103.1 (25 Dec 2023 01:09)  HR: 106 (25 Dec 2023 12:00) (86 - 135)  BP: 114/62 (25 Dec 2023 12:00) (114/62 - 144/80)  BP(mean): 73 (25 Dec 2023 12:00) (73 - 86)  ABP: --  ABP(mean): --  RR: 18 (25 Dec 2023 12:00) (17 - 25)  SpO2: 100% (25 Dec 2023 12:00) (98% - 100%)    O2 Parameters below as of 25 Dec 2023 12:00  Patient On (Oxygen Delivery Method): nasal cannula  O2 Flow (L/min): 3            12-24 @ 07:01  -  12-25 @ 07:00  --------------------------------------------------------  IN: 1119 mL / OUT: 1180 mL / NET: -61 mL      CAPILLARY BLOOD GLUCOSE      POCT Blood Glucose.: 162 mg/dL (25 Dec 2023 10:43)      PHYSICAL EXAM:  Appearance: No acute distress. lethargic s/p seizure / ativan.   HEENT:  PERRLA   Lymphatic: No lymphadenopathy   Cardiovascular: Normal S1 S2, no JVD  Respiratory: normal effort , clear  Gastrointestinal:  Soft, Non-tender  Skin: No rashes,  warm to touch  Psychiatry:  Lethargic.   Musculuskeletal: No edema or joint swelling appreciated.     LINES:     HOSPITAL MEDICATIONS:  MEDICATIONS  (STANDING):  acetaminophen   IVPB .. 1000 milliGRAM(s) IV Intermittent once  cefepime   IVPB 2000 milliGRAM(s) IV Intermittent every 8 hours  ciprofloxacin  0.3% Ophthalmic Solution for Otic Use 2 Drop(s) Both Ears two times a day  hydroxychloroquine 200 milliGRAM(s) Oral two times a day  lactated ringers. 1000 milliLiter(s) (200 mL/Hr) IV Continuous <Continuous>  lidocaine   4% Patch 1 Patch Transdermal daily  methylPREDNISolone sodium succinate Injectable 40 milliGRAM(s) IV Push daily  sodium chloride 1 Gram(s) Oral three times a day  vancomycin  IVPB 1000 milliGRAM(s) IV Intermittent every 12 hours    MEDICATIONS  (PRN):  albuterol/ipratropium for Nebulization 3 milliLiter(s) Nebulizer every 6 hours PRN Shortness of Breath and/or Wheezing  guaiFENesin Oral Liquid (Sugar-Free) 200 milliGRAM(s) Oral every 6 hours PRN Cough  lidocaine 2% Viscous 5 milliLiter(s) Swish and Spit three times a day PRN Mouth Care      LABS:                        9.1    5.53  )-----------( 332      ( 25 Dec 2023 03:15 )             26.1     Hgb Trend: 9.1<--, 10.5<--, 10.8<--, 9.8<--, 9.4<--  12-25    122<L>  |  90<L>  |  18  ----------------------------<  149<H>  4.4   |  20<L>  |  1.23    Ca    8.2<L>      25 Dec 2023 10:10  Phos  2.3     12-25  Mg     1.90     12-25    TPro  7.1  /  Alb  2.7<L>  /  TBili  0.8  /  DBili  x   /  AST  217<H>  /  ALT  112<H>  /  AlkPhos  75  12-25    Creatinine Trend: 1.23<--, 1.18<--, 1.00<--, 1.02<--, 1.00<--, 1.13<--  PT/INR - ( 25 Dec 2023 10:10 )   PT: 17.7 sec;   INR: 1.59 ratio         PTT - ( 25 Dec 2023 10:10 )  PTT:102.2 sec  Urinalysis Basic - ( 25 Dec 2023 10:10 )    Color: x / Appearance: x / SG: x / pH: x  Gluc: 149 mg/dL / Ketone: x  / Bili: x / Urobili: x   Blood: x / Protein: x / Nitrite: x   Leuk Esterase: x / RBC: x / WBC x   Sq Epi: x / Non Sq Epi: x / Bacteria: x    Venous Blood Gas:  12-25 @ 10:10  7.46/30/57/21/85.9  VBG Lactate: 1.5      MICROBIOLOGY:     RADIOLOGY & ADDITIONAL TESTS:      ASSESSMENT AND PLAN:  28 years old male with h/o Lupus ( diagnosed in 09/2023, on Plaquenil present to [place] ED on 12/12/23  with complain of chest pain and SOB admitted for fevers, PE, pleural effusions, course c/b high fever and tonic-clonic seizure, transferred to MICU for post-ictal and infectious monitoring.     Neuro  #Seizure  Patient is lethargic i/s/o likely post-ictal / post- ativan state.  Protecting airway.  - monitor mental status  - VBG to eval   - neuro consulted, will f/u official recs though concerned this might be due to high fevers so was not keppra loaded  - vEEG after CT  - CT head to r/o hemorrhage since was on heparin (hold hep gtt until CT head negative)    #Cardiovascular  Pulmonary Embolism  - on hep gtt, holding until CT head to r/o hemorrhage, then can resume.   - no hypoxia  - no RV starin on POCUS  -- evaluate w formal TTE, ordered    #Respiratory  Saturating well on RA, PE treatment as above    #GI/Nutrition  ?Diarrhea  - reportedly + E coli at [place] previously  - no further witnessed diarrhea, will monitor    #/Renal  Hyponatremia  ?ADH from pain. ? from urinary retention.   Questionable retention on POCUS  Omer inserted  - c/w omer, eventual TOV  - urine lytes  - serum osm    #ID  Fevers as high as 104.5, unclear source ?pnuemonia   Was on vanc and zosyn  - monitor for diarrhea, if has diarrhea can resend GI PCR and stool cx  - broadened to Vanc, cefepime   - repeat BCx  - sent UA and UCx  - repeat RVP  - considering LP to r/o meningitis   - f/u CT head    #Endocrine  ?Steroid induced hyperglycemia to 150s  - monitor FS glucose     #Hematologic/DVT ppx  SLE  - unclear if in active flair though ,   - rheum following, f/u recs  - heme following, f/u recs  - solumedrol 40 qd, no contraindications per ID    #Ethics  FULL CODE MICU Accept Note    CHIEF COMPLAINT: PE    HPI / INTERVAL HISTORY:  28 years old male with h/o Lupus ( diagnosed in 09/2023, on Plaquenil present to [place] ED on 12/12/23  with complain of chest pain and SOB. Patient reported left sided pleuritic chest pain which started 3 days ago. Pain is progressively worsened, associated with SOB and cough. Patient was seen in OSH ED and was prescribed antibiotics. Patient reported significantly worsening of left sided pleuritic chest pain today. No fever or chills. Patient reported loss of appetite and had a few episode of diarrhea for last 2 days. CTA chest with acute right upper lobe and left lower lobe segmental/subsegmental pulmonary emboli. No CT evidence of right heart strain. New bilateral lower lobe consolidations with areas of central clearing. Pneumonia and pulmonary infarcts are in the differential. New bilateral pleural effusions, small on the left and trace on the right. Bilateral axillary and supraclavicular adenopathy of unclear etiology. Patient was started on heparin ggt transitioned to eliquis on 12/19,  patient with worsening SOB/ hypoxia requiring nasal cannula oxygen supplementation. 12/20  Repeat Xray shows increased large left pleural effusion and compressive atelectasis trace right effusion and linear atelectasis. Thoracic team consulted for possible pigtail insertion Bedside US: Large left effusion with septations. Right simple effusion , + pericardial effusion- lower extremity dopplers negative for DVT,   - GI PCR + e coli  - mRSA PCR + Staph aureus   . Patient transferred to [facility] for further management    Patient had thoracentesis of L pleural effusion, 12/23 by thoracic surgery. Patient was on zosyn for fevers, no clear source, had RRT on 12/25 for acute chest pain and dyspnea but hemodynamically stable. Patient had a subsequent RRT shortly after for tonic-clonic seizure, 1 ativan given, seizure reportedly for 40 seconds or so. Patient was post-ictal after seizure, but responsive. His baseline mental status shortly prior was AOx4, describing where his chest pain as.     Patient transferred to MICU for further monitoring post-seizure, including infectious workup.     PAST MEDICAL & SURGICAL HISTORY:  LE (lupus erythematosus)      Pulmonary embolism      No significant past surgical history          FAMILY HISTORY:  No pertinent family history in first degree relatives        SOCIAL HISTORY:  See h&p    HOME MEDICATIONS:      Allergies    No Known Allergies    Intolerances          REVIEW OF SYSTEMS:  unable to obtain presently due to mental status.     OBJECTIVE:  ICU Vital Signs Last 24 Hrs  T(C): 39.2 (25 Dec 2023 12:00), Max: 39.5 (25 Dec 2023 01:09)  T(F): 102.5 (25 Dec 2023 12:00), Max: 103.1 (25 Dec 2023 01:09)  HR: 106 (25 Dec 2023 12:00) (86 - 135)  BP: 114/62 (25 Dec 2023 12:00) (114/62 - 144/80)  BP(mean): 73 (25 Dec 2023 12:00) (73 - 86)  ABP: --  ABP(mean): --  RR: 18 (25 Dec 2023 12:00) (17 - 25)  SpO2: 100% (25 Dec 2023 12:00) (98% - 100%)    O2 Parameters below as of 25 Dec 2023 12:00  Patient On (Oxygen Delivery Method): nasal cannula  O2 Flow (L/min): 3            12-24 @ 07:01  -  12-25 @ 07:00  --------------------------------------------------------  IN: 1119 mL / OUT: 1180 mL / NET: -61 mL      CAPILLARY BLOOD GLUCOSE      POCT Blood Glucose.: 162 mg/dL (25 Dec 2023 10:43)      PHYSICAL EXAM:  Appearance: No acute distress. lethargic s/p seizure / ativan.   HEENT:  PERRLA   Lymphatic: No lymphadenopathy   Cardiovascular: Normal S1 S2, no JVD  Respiratory: normal effort , clear  Gastrointestinal:  Soft, Non-tender  Skin: No rashes,  warm to touch  Psychiatry:  Lethargic.   Musculuskeletal: No edema or joint swelling appreciated.     LINES:     HOSPITAL MEDICATIONS:  MEDICATIONS  (STANDING):  acetaminophen   IVPB .. 1000 milliGRAM(s) IV Intermittent once  cefepime   IVPB 2000 milliGRAM(s) IV Intermittent every 8 hours  ciprofloxacin  0.3% Ophthalmic Solution for Otic Use 2 Drop(s) Both Ears two times a day  hydroxychloroquine 200 milliGRAM(s) Oral two times a day  lactated ringers. 1000 milliLiter(s) (200 mL/Hr) IV Continuous <Continuous>  lidocaine   4% Patch 1 Patch Transdermal daily  methylPREDNISolone sodium succinate Injectable 40 milliGRAM(s) IV Push daily  sodium chloride 1 Gram(s) Oral three times a day  vancomycin  IVPB 1000 milliGRAM(s) IV Intermittent every 12 hours    MEDICATIONS  (PRN):  albuterol/ipratropium for Nebulization 3 milliLiter(s) Nebulizer every 6 hours PRN Shortness of Breath and/or Wheezing  guaiFENesin Oral Liquid (Sugar-Free) 200 milliGRAM(s) Oral every 6 hours PRN Cough  lidocaine 2% Viscous 5 milliLiter(s) Swish and Spit three times a day PRN Mouth Care      LABS:                        9.1    5.53  )-----------( 332      ( 25 Dec 2023 03:15 )             26.1     Hgb Trend: 9.1<--, 10.5<--, 10.8<--, 9.8<--, 9.4<--  12-25    122<L>  |  90<L>  |  18  ----------------------------<  149<H>  4.4   |  20<L>  |  1.23    Ca    8.2<L>      25 Dec 2023 10:10  Phos  2.3     12-25  Mg     1.90     12-25    TPro  7.1  /  Alb  2.7<L>  /  TBili  0.8  /  DBili  x   /  AST  217<H>  /  ALT  112<H>  /  AlkPhos  75  12-25    Creatinine Trend: 1.23<--, 1.18<--, 1.00<--, 1.02<--, 1.00<--, 1.13<--  PT/INR - ( 25 Dec 2023 10:10 )   PT: 17.7 sec;   INR: 1.59 ratio         PTT - ( 25 Dec 2023 10:10 )  PTT:102.2 sec  Urinalysis Basic - ( 25 Dec 2023 10:10 )    Color: x / Appearance: x / SG: x / pH: x  Gluc: 149 mg/dL / Ketone: x  / Bili: x / Urobili: x   Blood: x / Protein: x / Nitrite: x   Leuk Esterase: x / RBC: x / WBC x   Sq Epi: x / Non Sq Epi: x / Bacteria: x    Venous Blood Gas:  12-25 @ 10:10  7.46/30/57/21/85.9  VBG Lactate: 1.5      MICROBIOLOGY:     RADIOLOGY & ADDITIONAL TESTS:      ASSESSMENT AND PLAN:  28 years old male with h/o Lupus ( diagnosed in 09/2023, on Plaquenil present to [place] ED on 12/12/23  with complain of chest pain and SOB admitted for fevers, PE, pleural effusions, course c/b high fever and tonic-clonic seizure, transferred to MICU for post-ictal and infectious monitoring.     Neuro  #Seizure  Patient is lethargic i/s/o likely post-ictal / post- ativan state.  Protecting airway.  - monitor mental status  - VBG to eval   - neuro consulted, will f/u official recs though concerned this might be due to high fevers so was not keppra loaded  - vEEG after CT  - CT head to r/o hemorrhage since was on heparin (hold hep gtt until CT head negative)    #Cardiovascular  Pulmonary Embolism  - on hep gtt, holding until CT head to r/o hemorrhage, then can resume.   - no hypoxia  - no RV starin on POCUS  -- evaluate w formal TTE, ordered    #Respiratory  Saturating well on RA, PE treatment as above    #GI/Nutrition  ?Diarrhea  - reportedly + E coli at [place] previously  - no further witnessed diarrhea, will monitor    #/Renal  Hyponatremia  ?ADH from pain. ? from urinary retention.   Questionable retention on POCUS  Omer inserted  - c/w omer, eventual TOV  - urine lytes  - serum osm    #ID  Fevers as high as 104.5, unclear source ?pnuemonia   Was on vanc and zosyn  - monitor for diarrhea, if has diarrhea can resend GI PCR and stool cx  - broadened to Vanc, cefepime   - repeat BCx  - sent UA and UCx  - repeat RVP  - considering LP to r/o meningitis   - f/u CT head    #Endocrine  ?Steroid induced hyperglycemia to 150s  - monitor FS glucose     #Hematologic/DVT ppx  SLE  - unclear if in active flair though ,   - rheum following, f/u recs  - heme following, f/u recs  - solumedrol 40 qd, no contraindications per ID    #Ethics  FULL CODE    ######################################ATTENDING ATTESTATION######################################    Critically ill patient requiring frequent bedside visits with therapy changes.    Attending: I have personally and independently provided 41 minutes of critical care services.  This excludes any time spent on separate procedures or teaching.    Patient is a 29 yo M w/ SLE (dx 09/2023, on Plaquenil) who initially p/w L pleuritc CP and SOB to [facility] 12/12/23. Patient was found to have acute RUL and LLL lobe segmental/subsegmental pulmonary emboli as well as  bilateral lower lobe consolidations with areas of central clearing, concerning for PNA vs pulmonary infarct. Patient was started on AC with course c/b worsening hypoxemic respiratory failure with development of pleural effusions. Patient was transferred to [facility] 12/22 for thoracic evaluation and management. Patient underwent R diagnostic thoracentesis on 12/22, which was exudative in nature with cultures NGTD. Patient has been on Zosyn empirically for fevers. Patient was also started on Solumedrol 40mg IV daily on 12/23 as per Rheum for likely SLE flare.     RRT on 12/25 AM for acute chest pain and dyspnea and with subsequent tonic-clonic seizure lasting 40 seconds. Transferred to MICU for closer monitoring and managment    #SIRS  #Seizure  #SLE  #PE  - Will change abx to Vanc/Cefepime for now given breakthrough fevers on Zosyn  - f/u ID recs  - f/u EEG, f/u prolactin  - Monitor Airway  - Hold heparin gtt for now, plan for LP and R thoracentesis  - Will repeat pan cultures, f/u procalcitonin  - Repeat RVP  - Hold steroids for now after discussion with Rheum. f/u Rheum recs  - f/u hypercoaguable labs/ f/u Heme recs    #GOC - Full Code    #POCUS - See POCUS note    #DVT ppx - Resume heparin gtt after procedures    Sunny Mcleod MD  Pulmonary & Critical Care MICU Accept Note    CHIEF COMPLAINT: PE    HPI / INTERVAL HISTORY:  28 years old male with h/o Lupus ( diagnosed in 09/2023, on Plaquenil present to [place] ED on 12/12/23  with complain of chest pain and SOB. Patient reported left sided pleuritic chest pain which started 3 days ago. Pain is progressively worsened, associated with SOB and cough. Patient was seen in OSH ED and was prescribed antibiotics. Patient reported significantly worsening of left sided pleuritic chest pain today. No fever or chills. Patient reported loss of appetite and had a few episode of diarrhea for last 2 days. CTA chest with acute right upper lobe and left lower lobe segmental/subsegmental pulmonary emboli. No CT evidence of right heart strain. New bilateral lower lobe consolidations with areas of central clearing. Pneumonia and pulmonary infarcts are in the differential. New bilateral pleural effusions, small on the left and trace on the right. Bilateral axillary and supraclavicular adenopathy of unclear etiology. Patient was started on heparin ggt transitioned to eliquis on 12/19,  patient with worsening SOB/ hypoxia requiring nasal cannula oxygen supplementation. 12/20  Repeat Xray shows increased large left pleural effusion and compressive atelectasis trace right effusion and linear atelectasis. Thoracic team consulted for possible pigtail insertion Bedside US: Large left effusion with septations. Right simple effusion , + pericardial effusion- lower extremity dopplers negative for DVT,   - GI PCR + e coli  - mRSA PCR + Staph aureus   . Patient transferred to [facility] for further management    Patient had thoracentesis of L pleural effusion, 12/23 by thoracic surgery. Patient was on zosyn for fevers, no clear source, had RRT on 12/25 for acute chest pain and dyspnea but hemodynamically stable. Patient had a subsequent RRT shortly after for tonic-clonic seizure, 1 ativan given, seizure reportedly for 40 seconds or so. Patient was post-ictal after seizure, but responsive. His baseline mental status shortly prior was AOx4, describing where his chest pain as.     Patient transferred to MICU for further monitoring post-seizure, including infectious workup.     PAST MEDICAL & SURGICAL HISTORY:  LE (lupus erythematosus)      Pulmonary embolism      No significant past surgical history          FAMILY HISTORY:  No pertinent family history in first degree relatives        SOCIAL HISTORY:  See h&p    HOME MEDICATIONS:      Allergies    No Known Allergies    Intolerances          REVIEW OF SYSTEMS:  unable to obtain presently due to mental status.     OBJECTIVE:  ICU Vital Signs Last 24 Hrs  T(C): 39.2 (25 Dec 2023 12:00), Max: 39.5 (25 Dec 2023 01:09)  T(F): 102.5 (25 Dec 2023 12:00), Max: 103.1 (25 Dec 2023 01:09)  HR: 106 (25 Dec 2023 12:00) (86 - 135)  BP: 114/62 (25 Dec 2023 12:00) (114/62 - 144/80)  BP(mean): 73 (25 Dec 2023 12:00) (73 - 86)  ABP: --  ABP(mean): --  RR: 18 (25 Dec 2023 12:00) (17 - 25)  SpO2: 100% (25 Dec 2023 12:00) (98% - 100%)    O2 Parameters below as of 25 Dec 2023 12:00  Patient On (Oxygen Delivery Method): nasal cannula  O2 Flow (L/min): 3            12-24 @ 07:01  -  12-25 @ 07:00  --------------------------------------------------------  IN: 1119 mL / OUT: 1180 mL / NET: -61 mL      CAPILLARY BLOOD GLUCOSE      POCT Blood Glucose.: 162 mg/dL (25 Dec 2023 10:43)      PHYSICAL EXAM:  Appearance: No acute distress. lethargic s/p seizure / ativan.   HEENT:  PERRLA   Lymphatic: No lymphadenopathy   Cardiovascular: Normal S1 S2, no JVD  Respiratory: normal effort , clear  Gastrointestinal:  Soft, Non-tender  Skin: No rashes,  warm to touch  Psychiatry:  Lethargic.   Musculuskeletal: No edema or joint swelling appreciated.     LINES:     HOSPITAL MEDICATIONS:  MEDICATIONS  (STANDING):  acetaminophen   IVPB .. 1000 milliGRAM(s) IV Intermittent once  cefepime   IVPB 2000 milliGRAM(s) IV Intermittent every 8 hours  ciprofloxacin  0.3% Ophthalmic Solution for Otic Use 2 Drop(s) Both Ears two times a day  hydroxychloroquine 200 milliGRAM(s) Oral two times a day  lactated ringers. 1000 milliLiter(s) (200 mL/Hr) IV Continuous <Continuous>  lidocaine   4% Patch 1 Patch Transdermal daily  methylPREDNISolone sodium succinate Injectable 40 milliGRAM(s) IV Push daily  sodium chloride 1 Gram(s) Oral three times a day  vancomycin  IVPB 1000 milliGRAM(s) IV Intermittent every 12 hours    MEDICATIONS  (PRN):  albuterol/ipratropium for Nebulization 3 milliLiter(s) Nebulizer every 6 hours PRN Shortness of Breath and/or Wheezing  guaiFENesin Oral Liquid (Sugar-Free) 200 milliGRAM(s) Oral every 6 hours PRN Cough  lidocaine 2% Viscous 5 milliLiter(s) Swish and Spit three times a day PRN Mouth Care      LABS:                        9.1    5.53  )-----------( 332      ( 25 Dec 2023 03:15 )             26.1     Hgb Trend: 9.1<--, 10.5<--, 10.8<--, 9.8<--, 9.4<--  12-25    122<L>  |  90<L>  |  18  ----------------------------<  149<H>  4.4   |  20<L>  |  1.23    Ca    8.2<L>      25 Dec 2023 10:10  Phos  2.3     12-25  Mg     1.90     12-25    TPro  7.1  /  Alb  2.7<L>  /  TBili  0.8  /  DBili  x   /  AST  217<H>  /  ALT  112<H>  /  AlkPhos  75  12-25    Creatinine Trend: 1.23<--, 1.18<--, 1.00<--, 1.02<--, 1.00<--, 1.13<--  PT/INR - ( 25 Dec 2023 10:10 )   PT: 17.7 sec;   INR: 1.59 ratio         PTT - ( 25 Dec 2023 10:10 )  PTT:102.2 sec  Urinalysis Basic - ( 25 Dec 2023 10:10 )    Color: x / Appearance: x / SG: x / pH: x  Gluc: 149 mg/dL / Ketone: x  / Bili: x / Urobili: x   Blood: x / Protein: x / Nitrite: x   Leuk Esterase: x / RBC: x / WBC x   Sq Epi: x / Non Sq Epi: x / Bacteria: x    Venous Blood Gas:  12-25 @ 10:10  7.46/30/57/21/85.9  VBG Lactate: 1.5      MICROBIOLOGY:     RADIOLOGY & ADDITIONAL TESTS:      ASSESSMENT AND PLAN:  28 years old male with h/o Lupus ( diagnosed in 09/2023, on Plaquenil present to [place] ED on 12/12/23  with complain of chest pain and SOB admitted for fevers, PE, pleural effusions, course c/b high fever and tonic-clonic seizure, transferred to MICU for post-ictal and infectious monitoring.     Neuro  #Seizure  Patient is lethargic i/s/o likely post-ictal / post- ativan state.  Protecting airway.  - monitor mental status  - VBG to eval   - neuro consulted, will f/u official recs though concerned this might be due to high fevers so was not keppra loaded  - vEEG after CT  - CT head to r/o hemorrhage since was on heparin (hold hep gtt until CT head negative)    #Cardiovascular  Pulmonary Embolism  - on hep gtt, holding until CT head to r/o hemorrhage, then can resume.   - no hypoxia  - no RV starin on POCUS  -- evaluate w formal TTE, ordered    #Respiratory  Saturating well on RA, PE treatment as above    #GI/Nutrition  ?Diarrhea  - reportedly + E coli at [place] previously  - no further witnessed diarrhea, will monitor    #/Renal  Hyponatremia  ?ADH from pain. ? from urinary retention.   Questionable retention on POCUS  Omer inserted  - c/w omer, eventual TOV  - urine lytes  - serum osm    #ID  Fevers as high as 104.5, unclear source ?pnuemonia   Was on vanc and zosyn  - monitor for diarrhea, if has diarrhea can resend GI PCR and stool cx  - broadened to Vanc, cefepime   - repeat BCx  - sent UA and UCx  - repeat RVP  - considering LP to r/o meningitis   - f/u CT head    #Endocrine  ?Steroid induced hyperglycemia to 150s  - monitor FS glucose     #Hematologic/DVT ppx  SLE  - unclear if in active flair though ,   - rheum following, f/u recs  - heme following, f/u recs  - solumedrol 40 qd, no contraindications per ID    #Ethics  FULL CODE    ######################################ATTENDING ATTESTATION######################################    Critically ill patient requiring frequent bedside visits with therapy changes.    Attending: I have personally and independently provided 41 minutes of critical care services.  This excludes any time spent on separate procedures or teaching.    Patient is a 29 yo M w/ SLE (dx 09/2023, on Plaquenil) who initially p/w L pleuritc CP and SOB to [facility] 12/12/23. Patient was found to have acute RUL and LLL lobe segmental/subsegmental pulmonary emboli as well as  bilateral lower lobe consolidations with areas of central clearing, concerning for PNA vs pulmonary infarct. Patient was started on AC with course c/b worsening hypoxemic respiratory failure with development of pleural effusions. Patient was transferred to [facility] 12/22 for thoracic evaluation and management. Patient underwent R diagnostic thoracentesis on 12/22, which was exudative in nature with cultures NGTD. Patient has been on Zosyn empirically for fevers. Patient was also started on Solumedrol 40mg IV daily on 12/23 as per Rheum for likely SLE flare.     RRT on 12/25 AM for acute chest pain and dyspnea and with subsequent tonic-clonic seizure lasting 40 seconds. Transferred to MICU for closer monitoring and managment    #SIRS  #Seizure  #SLE  #PE  - Will change abx to Vanc/Cefepime for now given breakthrough fevers on Zosyn  - f/u ID recs  - f/u EEG, f/u prolactin  - Monitor Airway  - Hold heparin gtt for now, plan for LP and R thoracentesis  - Will repeat pan cultures, f/u procalcitonin  - Repeat RVP  - Hold steroids for now after discussion with Rheum. f/u Rheum recs  - f/u hypercoaguable labs/ f/u Heme recs    #GOC - Full Code    #POCUS - See POCUS note    #DVT ppx - Resume heparin gtt after procedures    Sunny Mcleod MD  Pulmonary & Critical Care MICU Accept Note    CHIEF COMPLAINT: PE    HPI / INTERVAL HISTORY:  28 years old male with h/o Lupus ( diagnosed in 09/2023, on Plaquenil present to [place] ED on 12/12/23  with complain of chest pain and SOB. Patient reported left sided pleuritic chest pain which started 3 days ago. Pain is progressively worsened, associated with SOB and cough. Patient was seen in OSH ED and was prescribed antibiotics. Patient reported significantly worsening of left sided pleuritic chest pain today. No fever or chills. Patient reported loss of appetite and had a few episode of diarrhea for last 2 days. CTA chest with acute right upper lobe and left lower lobe segmental/subsegmental pulmonary emboli. No CT evidence of right heart strain. New bilateral lower lobe consolidations with areas of central clearing. Pneumonia and pulmonary infarcts are in the differential. New bilateral pleural effusions, small on the left and trace on the right. Bilateral axillary and supraclavicular adenopathy of unclear etiology. Patient was started on heparin ggt transitioned to eliquis on 12/19,  patient with worsening SOB/ hypoxia requiring nasal cannula oxygen supplementation. 12/20  Repeat Xray shows increased large left pleural effusion and compressive atelectasis trace right effusion and linear atelectasis. Thoracic team consulted for possible pigtail insertion Bedside US: Large left effusion with septations. Right simple effusion , + pericardial effusion- lower extremity dopplers negative for DVT,   - GI PCR + e coli  - mRSA PCR + Staph aureus   . Patient transferred to [facility] for further management    Patient had thoracentesis of L pleural effusion, 12/23 by thoracic surgery. Patient was on zosyn for fevers, no clear source, had RRT on 12/25 for acute chest pain and dyspnea but hemodynamically stable. Patient had a subsequent RRT shortly after for tonic-clonic seizure, 1 ativan given, seizure reportedly for 40 seconds or so. Patient was post-ictal after seizure, but responsive. His baseline mental status shortly prior was AOx4, describing where his chest pain as.     Patient transferred to MICU for further monitoring post-seizure, including infectious workup.     PAST MEDICAL & SURGICAL HISTORY:  LE (lupus erythematosus)      Pulmonary embolism      No significant past surgical history          FAMILY HISTORY:  No pertinent family history in first degree relatives        SOCIAL HISTORY:  See h&p    HOME MEDICATIONS:      Allergies    No Known Allergies    Intolerances          REVIEW OF SYSTEMS:  unable to obtain presently due to mental status.     OBJECTIVE:  ICU Vital Signs Last 24 Hrs  T(C): 39.2 (25 Dec 2023 12:00), Max: 39.5 (25 Dec 2023 01:09)  T(F): 102.5 (25 Dec 2023 12:00), Max: 103.1 (25 Dec 2023 01:09)  HR: 106 (25 Dec 2023 12:00) (86 - 135)  BP: 114/62 (25 Dec 2023 12:00) (114/62 - 144/80)  BP(mean): 73 (25 Dec 2023 12:00) (73 - 86)  ABP: --  ABP(mean): --  RR: 18 (25 Dec 2023 12:00) (17 - 25)  SpO2: 100% (25 Dec 2023 12:00) (98% - 100%)    O2 Parameters below as of 25 Dec 2023 12:00  Patient On (Oxygen Delivery Method): nasal cannula  O2 Flow (L/min): 3            12-24 @ 07:01  -  12-25 @ 07:00  --------------------------------------------------------  IN: 1119 mL / OUT: 1180 mL / NET: -61 mL      CAPILLARY BLOOD GLUCOSE      POCT Blood Glucose.: 162 mg/dL (25 Dec 2023 10:43)      PHYSICAL EXAM:  Appearance: No acute distress. lethargic s/p seizure / ativan.   HEENT:  PERRLA   Lymphatic: No lymphadenopathy   Cardiovascular: Normal S1 S2, no JVD  Respiratory: normal effort , clear  Gastrointestinal:  Soft, Non-tender  Skin: No rashes,  warm to touch  Psychiatry:  Lethargic.   Musculuskeletal: No edema or joint swelling appreciated.     LINES:     HOSPITAL MEDICATIONS:  MEDICATIONS  (STANDING):  acetaminophen   IVPB .. 1000 milliGRAM(s) IV Intermittent once  cefepime   IVPB 2000 milliGRAM(s) IV Intermittent every 8 hours  ciprofloxacin  0.3% Ophthalmic Solution for Otic Use 2 Drop(s) Both Ears two times a day  hydroxychloroquine 200 milliGRAM(s) Oral two times a day  lactated ringers. 1000 milliLiter(s) (200 mL/Hr) IV Continuous <Continuous>  lidocaine   4% Patch 1 Patch Transdermal daily  methylPREDNISolone sodium succinate Injectable 40 milliGRAM(s) IV Push daily  sodium chloride 1 Gram(s) Oral three times a day  vancomycin  IVPB 1000 milliGRAM(s) IV Intermittent every 12 hours    MEDICATIONS  (PRN):  albuterol/ipratropium for Nebulization 3 milliLiter(s) Nebulizer every 6 hours PRN Shortness of Breath and/or Wheezing  guaiFENesin Oral Liquid (Sugar-Free) 200 milliGRAM(s) Oral every 6 hours PRN Cough  lidocaine 2% Viscous 5 milliLiter(s) Swish and Spit three times a day PRN Mouth Care      LABS:                        9.1    5.53  )-----------( 332      ( 25 Dec 2023 03:15 )             26.1     Hgb Trend: 9.1<--, 10.5<--, 10.8<--, 9.8<--, 9.4<--  12-25    122<L>  |  90<L>  |  18  ----------------------------<  149<H>  4.4   |  20<L>  |  1.23    Ca    8.2<L>      25 Dec 2023 10:10  Phos  2.3     12-25  Mg     1.90     12-25    TPro  7.1  /  Alb  2.7<L>  /  TBili  0.8  /  DBili  x   /  AST  217<H>  /  ALT  112<H>  /  AlkPhos  75  12-25    Creatinine Trend: 1.23<--, 1.18<--, 1.00<--, 1.02<--, 1.00<--, 1.13<--  PT/INR - ( 25 Dec 2023 10:10 )   PT: 17.7 sec;   INR: 1.59 ratio         PTT - ( 25 Dec 2023 10:10 )  PTT:102.2 sec  Urinalysis Basic - ( 25 Dec 2023 10:10 )    Color: x / Appearance: x / SG: x / pH: x  Gluc: 149 mg/dL / Ketone: x  / Bili: x / Urobili: x   Blood: x / Protein: x / Nitrite: x   Leuk Esterase: x / RBC: x / WBC x   Sq Epi: x / Non Sq Epi: x / Bacteria: x    Venous Blood Gas:  12-25 @ 10:10  7.46/30/57/21/85.9  VBG Lactate: 1.5      MICROBIOLOGY:     RADIOLOGY & ADDITIONAL TESTS:      ASSESSMENT AND PLAN:  28 years old male with h/o Lupus ( diagnosed in 09/2023, on Plaquenil present to [place] ED on 12/12/23  with complain of chest pain and SOB admitted for fevers, PE, pleural effusions, course c/b high fever and tonic-clonic seizure, transferred to MICU for post-ictal and infectious monitoring.     Neuro  #Seizure  Patient is lethargic i/s/o likely post-ictal / post- ativan state.  Protecting airway.  - monitor mental status  - VBG to eval   - neuro consulted, will f/u official recs though concerned this might be due to high fevers so was not keppra loaded  - vEEG after CT  - CT head to r/o hemorrhage since was on heparin (hold hep gtt until CT head negative)    #Cardiovascular  Pulmonary Embolism  - on hep gtt, holding until CT head to r/o hemorrhage, then can resume.   - no hypoxia  - no RV starin on POCUS  -- evaluate w formal TTE, ordered    #Respiratory  Saturating well on RA, PE treatment as above    #GI/Nutrition  ?Diarrhea  - reportedly + E coli at [place] previously  - no further witnessed diarrhea, will monitor    #/Renal  Hyponatremia  ?ADH from pain. ? from urinary retention.   Questionable retention on POCUS  Omer inserted  - c/w omer, eventual TOV  - urine lytes  - serum osm    #ID  Fevers as high as 104.5, unclear source ?pnuemonia   Was on vanc and zosyn  - monitor for diarrhea, if has diarrhea can resend GI PCR and stool cx  - broadened to Vanc, cefepime   - repeat BCx  - sent UA and UCx  - repeat RVP  - considering LP to r/o meningitis   - f/u CT head    #Endocrine  ?Steroid induced hyperglycemia to 150s  - monitor FS glucose     #Hematologic/DVT ppx  SLE  - unclear if in active flair though ,   - rheum following, f/u recs  - heme following, f/u recs  - solumedrol 40 qd, no contraindications per ID    #Ethics  FULL CODE    ######################################ATTENDING ATTESTATION######################################    Critically ill patient requiring frequent bedside visits with therapy changes.    Attending: I have personally and independently provided 41 minutes of critical care services.  This excludes any time spent on separate procedures or teaching.    Patient is a 29 yo M w/ SLE (dx 09/2023, on Plaquenil) who initially p/w L pleuritc CP and SOB to [facility] 12/12/23. Patient was found to have acute RUL and LLL lobe segmental/subsegmental pulmonary emboli as well as  bilateral lower lobe consolidations with areas of central clearing, concerning for PNA vs pulmonary infarct. Patient was started on AC with course c/b worsening hypoxemic respiratory failure with development of pleural effusions. Patient was transferred to [facility] 12/22 for thoracic evaluation and management. Patient underwent R diagnostic thoracentesis on 12/22, which was exudative in nature with cultures NGTD. Patient has been on Zosyn empirically for fevers. Patient was also started on Solumedrol 40mg IV daily on 12/23 as per Rheum for likely SLE flare.     RRT on 12/25 AM for acute chest pain and dyspnea and with subsequent tonic-clonic seizure lasting 40 seconds. Transferred to MICU for closer monitoring and managment    #SIRS  #Seizure  #SLE  #PE  #Hyponatremia  - Will change abx to Vanc/Cefepime for now given breakthrough fevers on Zosyn  - f/u ID recs  - f/u EEG, f/u prolactin  - Monitor Airway  - Hold heparin gtt for now, plan for LP and R thoracentesis  - Will repeat pan cultures, f/u procalcitonin  - Repeat RVP  - Hold steroids for now after discussion with Rheum. f/u Rheum recs  - f/u hypercoaguable labs/ f/u Heme recs  - Check urine osm/lytes. Renal eval    #GOC - Full Code    #POCUS - See POCUS note    #DVT ppx - Resume heparin gtt after procedures    Sunny Mcleod MD  Pulmonary & Critical Care MICU Accept Note    CHIEF COMPLAINT: PE    HPI / INTERVAL HISTORY:  28 years old male with h/o Lupus ( diagnosed in 09/2023, on Plaquenil present to [place] ED on 12/12/23  with complain of chest pain and SOB. Patient reported left sided pleuritic chest pain which started 3 days ago. Pain is progressively worsened, associated with SOB and cough. Patient was seen in OSH ED and was prescribed antibiotics. Patient reported significantly worsening of left sided pleuritic chest pain today. No fever or chills. Patient reported loss of appetite and had a few episode of diarrhea for last 2 days. CTA chest with acute right upper lobe and left lower lobe segmental/subsegmental pulmonary emboli. No CT evidence of right heart strain. New bilateral lower lobe consolidations with areas of central clearing. Pneumonia and pulmonary infarcts are in the differential. New bilateral pleural effusions, small on the left and trace on the right. Bilateral axillary and supraclavicular adenopathy of unclear etiology. Patient was started on heparin ggt transitioned to eliquis on 12/19,  patient with worsening SOB/ hypoxia requiring nasal cannula oxygen supplementation. 12/20  Repeat Xray shows increased large left pleural effusion and compressive atelectasis trace right effusion and linear atelectasis. Thoracic team consulted for possible pigtail insertion Bedside US: Large left effusion with septations. Right simple effusion , + pericardial effusion- lower extremity dopplers negative for DVT,   - GI PCR + e coli  - mRSA PCR + Staph aureus   . Patient transferred to [facility] for further management    Patient had thoracentesis of L pleural effusion, 12/23 by thoracic surgery. Patient was on zosyn for fevers, no clear source, had RRT on 12/25 for acute chest pain and dyspnea but hemodynamically stable. Patient had a subsequent RRT shortly after for tonic-clonic seizure, 1 ativan given, seizure reportedly for 40 seconds or so. Patient was post-ictal after seizure, but responsive. His baseline mental status shortly prior was AOx4, describing where his chest pain as.     Patient transferred to MICU for further monitoring post-seizure, including infectious workup.     PAST MEDICAL & SURGICAL HISTORY:  LE (lupus erythematosus)      Pulmonary embolism      No significant past surgical history          FAMILY HISTORY:  No pertinent family history in first degree relatives        SOCIAL HISTORY:  See h&p    HOME MEDICATIONS:      Allergies    No Known Allergies    Intolerances          REVIEW OF SYSTEMS:  unable to obtain presently due to mental status.     OBJECTIVE:  ICU Vital Signs Last 24 Hrs  T(C): 39.2 (25 Dec 2023 12:00), Max: 39.5 (25 Dec 2023 01:09)  T(F): 102.5 (25 Dec 2023 12:00), Max: 103.1 (25 Dec 2023 01:09)  HR: 106 (25 Dec 2023 12:00) (86 - 135)  BP: 114/62 (25 Dec 2023 12:00) (114/62 - 144/80)  BP(mean): 73 (25 Dec 2023 12:00) (73 - 86)  ABP: --  ABP(mean): --  RR: 18 (25 Dec 2023 12:00) (17 - 25)  SpO2: 100% (25 Dec 2023 12:00) (98% - 100%)    O2 Parameters below as of 25 Dec 2023 12:00  Patient On (Oxygen Delivery Method): nasal cannula  O2 Flow (L/min): 3            12-24 @ 07:01  -  12-25 @ 07:00  --------------------------------------------------------  IN: 1119 mL / OUT: 1180 mL / NET: -61 mL      CAPILLARY BLOOD GLUCOSE      POCT Blood Glucose.: 162 mg/dL (25 Dec 2023 10:43)      PHYSICAL EXAM:  Appearance: No acute distress. lethargic s/p seizure / ativan.   HEENT:  PERRLA   Lymphatic: No lymphadenopathy   Cardiovascular: Normal S1 S2, no JVD  Respiratory: normal effort , clear  Gastrointestinal:  Soft, Non-tender  Skin: No rashes,  warm to touch  Psychiatry:  Lethargic.   Musculuskeletal: No edema or joint swelling appreciated.     LINES:     HOSPITAL MEDICATIONS:  MEDICATIONS  (STANDING):  acetaminophen   IVPB .. 1000 milliGRAM(s) IV Intermittent once  cefepime   IVPB 2000 milliGRAM(s) IV Intermittent every 8 hours  ciprofloxacin  0.3% Ophthalmic Solution for Otic Use 2 Drop(s) Both Ears two times a day  hydroxychloroquine 200 milliGRAM(s) Oral two times a day  lactated ringers. 1000 milliLiter(s) (200 mL/Hr) IV Continuous <Continuous>  lidocaine   4% Patch 1 Patch Transdermal daily  methylPREDNISolone sodium succinate Injectable 40 milliGRAM(s) IV Push daily  sodium chloride 1 Gram(s) Oral three times a day  vancomycin  IVPB 1000 milliGRAM(s) IV Intermittent every 12 hours    MEDICATIONS  (PRN):  albuterol/ipratropium for Nebulization 3 milliLiter(s) Nebulizer every 6 hours PRN Shortness of Breath and/or Wheezing  guaiFENesin Oral Liquid (Sugar-Free) 200 milliGRAM(s) Oral every 6 hours PRN Cough  lidocaine 2% Viscous 5 milliLiter(s) Swish and Spit three times a day PRN Mouth Care      LABS:                        9.1    5.53  )-----------( 332      ( 25 Dec 2023 03:15 )             26.1     Hgb Trend: 9.1<--, 10.5<--, 10.8<--, 9.8<--, 9.4<--  12-25    122<L>  |  90<L>  |  18  ----------------------------<  149<H>  4.4   |  20<L>  |  1.23    Ca    8.2<L>      25 Dec 2023 10:10  Phos  2.3     12-25  Mg     1.90     12-25    TPro  7.1  /  Alb  2.7<L>  /  TBili  0.8  /  DBili  x   /  AST  217<H>  /  ALT  112<H>  /  AlkPhos  75  12-25    Creatinine Trend: 1.23<--, 1.18<--, 1.00<--, 1.02<--, 1.00<--, 1.13<--  PT/INR - ( 25 Dec 2023 10:10 )   PT: 17.7 sec;   INR: 1.59 ratio         PTT - ( 25 Dec 2023 10:10 )  PTT:102.2 sec  Urinalysis Basic - ( 25 Dec 2023 10:10 )    Color: x / Appearance: x / SG: x / pH: x  Gluc: 149 mg/dL / Ketone: x  / Bili: x / Urobili: x   Blood: x / Protein: x / Nitrite: x   Leuk Esterase: x / RBC: x / WBC x   Sq Epi: x / Non Sq Epi: x / Bacteria: x    Venous Blood Gas:  12-25 @ 10:10  7.46/30/57/21/85.9  VBG Lactate: 1.5      MICROBIOLOGY:     RADIOLOGY & ADDITIONAL TESTS:      ASSESSMENT AND PLAN:  28 years old male with h/o Lupus ( diagnosed in 09/2023, on Plaquenil present to [place] ED on 12/12/23  with complain of chest pain and SOB admitted for fevers, PE, pleural effusions, course c/b high fever and tonic-clonic seizure, transferred to MICU for post-ictal and infectious monitoring.     Neuro  #Seizure  Patient is lethargic i/s/o likely post-ictal / post- ativan state.  Protecting airway.  - monitor mental status  - VBG to eval   - neuro consulted, will f/u official recs though concerned this might be due to high fevers so was not keppra loaded  - vEEG after CT  - CT head to r/o hemorrhage since was on heparin (hold hep gtt until CT head negative)    #Cardiovascular  Pulmonary Embolism  - on hep gtt, holding until CT head to r/o hemorrhage, then can resume.   - no hypoxia  - no RV starin on POCUS  -- evaluate w formal TTE, ordered    #Respiratory  Saturating well on RA, PE treatment as above    #GI/Nutrition  ?Diarrhea  - reportedly + E coli at [place] previously  - no further witnessed diarrhea, will monitor    #/Renal  Hyponatremia  ?ADH from pain. ? from urinary retention.   Questionable retention on POCUS  Omer inserted  - c/w omer, eventual TOV  - urine lytes  - serum osm    #ID  Fevers as high as 104.5, unclear source ?pnuemonia   Was on vanc and zosyn  - monitor for diarrhea, if has diarrhea can resend GI PCR and stool cx  - broadened to Vanc, cefepime   - repeat BCx  - sent UA and UCx  - repeat RVP  - considering LP to r/o meningitis   - f/u CT head    #Endocrine  ?Steroid induced hyperglycemia to 150s  - monitor FS glucose     #Hematologic/DVT ppx  SLE  - unclear if in active flair though ,   - rheum following, f/u recs  - heme following, f/u recs  - solumedrol 40 qd, no contraindications per ID    #Ethics  FULL CODE    ######################################ATTENDING ATTESTATION######################################    Critically ill patient requiring frequent bedside visits with therapy changes.    Attending: I have personally and independently provided 41 minutes of critical care services.  This excludes any time spent on separate procedures or teaching.    Patient is a 27 yo M w/ SLE (dx 09/2023, on Plaquenil) who initially p/w L pleuritc CP and SOB to [facility] 12/12/23. Patient was found to have acute RUL and LLL lobe segmental/subsegmental pulmonary emboli as well as  bilateral lower lobe consolidations with areas of central clearing, concerning for PNA vs pulmonary infarct. Patient was started on AC with course c/b worsening hypoxemic respiratory failure with development of pleural effusions. Patient was transferred to [facility] 12/22 for thoracic evaluation and management. Patient underwent R diagnostic thoracentesis on 12/22, which was exudative in nature with cultures NGTD. Patient has been on Zosyn empirically for fevers. Patient was also started on Solumedrol 40mg IV daily on 12/23 as per Rheum for likely SLE flare.     RRT on 12/25 AM for acute chest pain and dyspnea and with subsequent tonic-clonic seizure lasting 40 seconds. Transferred to MICU for closer monitoring and managment    #SIRS  #Seizure  #SLE  #PE  #Hyponatremia  - Will change abx to Vanc/Cefepime for now given breakthrough fevers on Zosyn  - f/u ID recs  - f/u EEG, f/u prolactin  - Monitor Airway  - Hold heparin gtt for now, plan for LP and R thoracentesis  - Will repeat pan cultures, f/u procalcitonin  - Repeat RVP  - Hold steroids for now after discussion with Rheum. f/u Rheum recs  - f/u hypercoaguable labs/ f/u Heme recs  - Check urine osm/lytes. Renal eval    #GOC - Full Code    #POCUS - See POCUS note    #DVT ppx - Resume heparin gtt after procedures    Sunny Mcleod MD  Pulmonary & Critical Care MICU Accept Note    CHIEF COMPLAINT: PE    HPI / INTERVAL HISTORY:  28 years old male with h/o Lupus ( diagnosed in 09/2023, on Plaquenil present to [place] ED on 12/12/23  with complain of chest pain and SOB. Patient reported left sided pleuritic chest pain which started 3 days ago. Pain is progressively worsened, associated with SOB and cough. Patient was seen in OSH ED and was prescribed antibiotics. Patient reported significantly worsening of left sided pleuritic chest pain today. No fever or chills. Patient reported loss of appetite and had a few episode of diarrhea for last 2 days. CTA chest with acute right upper lobe and left lower lobe segmental/subsegmental pulmonary emboli. No CT evidence of right heart strain. New bilateral lower lobe consolidations with areas of central clearing. Pneumonia and pulmonary infarcts are in the differential. New bilateral pleural effusions, small on the left and trace on the right. Bilateral axillary and supraclavicular adenopathy of unclear etiology. Patient was started on heparin ggt transitioned to eliquis on 12/19,  patient with worsening SOB/ hypoxia requiring nasal cannula oxygen supplementation. 12/20  Repeat Xray shows increased large left pleural effusion and compressive atelectasis trace right effusion and linear atelectasis. Thoracic team consulted for possible pigtail insertion Bedside US: Large left effusion with septations. Right simple effusion , + pericardial effusion- lower extremity dopplers negative for DVT,   - GI PCR + e coli  - mRSA PCR + Staph aureus   . Patient transferred to [facility] for [facility] management    Patient had thoracentesis of L pleural effusion, 12/23 by thoracic surgery. Patient was on zosyn for fevers, no clear source, had RRT on 12/25 for acute chest pain and dyspnea but hemodynamically stable. Patient had a subsequent RRT shortly after for tonic-clonic seizure, 1 ativan given, seizure reportedly for 40 seconds or so. Patient was post-ictal after seizure, but responsive. His baseline mental status shortly prior was AOx4, describing where his chest pain as.     Patient transferred to MICU for further monitoring post-seizure, including infectious workup.     PAST MEDICAL & SURGICAL HISTORY:  LE (lupus erythematosus)      Pulmonary embolism      No significant past surgical history          FAMILY HISTORY:  No pertinent family history in first degree relatives        SOCIAL HISTORY:  See h&p    HOME MEDICATIONS:      Allergies    No Known Allergies    Intolerances          REVIEW OF SYSTEMS:  unable to obtain presently due to mental status.     OBJECTIVE:  ICU Vital Signs Last 24 Hrs  T(C): 39.2 (25 Dec 2023 12:00), Max: 39.5 (25 Dec 2023 01:09)  T(F): 102.5 (25 Dec 2023 12:00), Max: 103.1 (25 Dec 2023 01:09)  HR: 106 (25 Dec 2023 12:00) (86 - 135)  BP: 114/62 (25 Dec 2023 12:00) (114/62 - 144/80)  BP(mean): 73 (25 Dec 2023 12:00) (73 - 86)  ABP: --  ABP(mean): --  RR: 18 (25 Dec 2023 12:00) (17 - 25)  SpO2: 100% (25 Dec 2023 12:00) (98% - 100%)    O2 Parameters below as of 25 Dec 2023 12:00  Patient On (Oxygen Delivery Method): nasal cannula  O2 Flow (L/min): 3            12-24 @ 07:01  -  12-25 @ 07:00  --------------------------------------------------------  IN: 1119 mL / OUT: 1180 mL / NET: -61 mL      CAPILLARY BLOOD GLUCOSE      POCT Blood Glucose.: 162 mg/dL (25 Dec 2023 10:43)      PHYSICAL EXAM:  Appearance: No acute distress. lethargic s/p seizure / ativan.   HEENT:  PERRLA   Lymphatic: No lymphadenopathy   Cardiovascular: Normal S1 S2, no JVD  Respiratory: normal effort , clear  Gastrointestinal:  Soft, Non-tender  Skin: No rashes,  warm to touch  Psychiatry:  Lethargic.   Musculuskeletal: No edema or joint swelling appreciated.     LINES:     HOSPITAL MEDICATIONS:  MEDICATIONS  (STANDING):  acetaminophen   IVPB .. 1000 milliGRAM(s) IV Intermittent once  cefepime   IVPB 2000 milliGRAM(s) IV Intermittent every 8 hours  ciprofloxacin  0.3% Ophthalmic Solution for Otic Use 2 Drop(s) Both Ears two times a day  hydroxychloroquine 200 milliGRAM(s) Oral two times a day  lactated ringers. 1000 milliLiter(s) (200 mL/Hr) IV Continuous <Continuous>  lidocaine   4% Patch 1 Patch Transdermal daily  methylPREDNISolone sodium succinate Injectable 40 milliGRAM(s) IV Push daily  sodium chloride 1 Gram(s) Oral three times a day  vancomycin  IVPB 1000 milliGRAM(s) IV Intermittent every 12 hours    MEDICATIONS  (PRN):  albuterol/ipratropium for Nebulization 3 milliLiter(s) Nebulizer every 6 hours PRN Shortness of Breath and/or Wheezing  guaiFENesin Oral Liquid (Sugar-Free) 200 milliGRAM(s) Oral every 6 hours PRN Cough  lidocaine 2% Viscous 5 milliLiter(s) Swish and Spit three times a day PRN Mouth Care      LABS:                        9.1    5.53  )-----------( 332      ( 25 Dec 2023 03:15 )             26.1     Hgb Trend: 9.1<--, 10.5<--, 10.8<--, 9.8<--, 9.4<--  12-25    122<L>  |  90<L>  |  18  ----------------------------<  149<H>  4.4   |  20<L>  |  1.23    Ca    8.2<L>      25 Dec 2023 10:10  Phos  2.3     12-25  Mg     1.90     12-25    TPro  7.1  /  Alb  2.7<L>  /  TBili  0.8  /  DBili  x   /  AST  217<H>  /  ALT  112<H>  /  AlkPhos  75  12-25    Creatinine Trend: 1.23<--, 1.18<--, 1.00<--, 1.02<--, 1.00<--, 1.13<--  PT/INR - ( 25 Dec 2023 10:10 )   PT: 17.7 sec;   INR: 1.59 ratio         PTT - ( 25 Dec 2023 10:10 )  PTT:102.2 sec  Urinalysis Basic - ( 25 Dec 2023 10:10 )    Color: x / Appearance: x / SG: x / pH: x  Gluc: 149 mg/dL / Ketone: x  / Bili: x / Urobili: x   Blood: x / Protein: x / Nitrite: x   Leuk Esterase: x / RBC: x / WBC x   Sq Epi: x / Non Sq Epi: x / Bacteria: x    Venous Blood Gas:  12-25 @ 10:10  7.46/30/57/21/85.9  VBG Lactate: 1.5      MICROBIOLOGY:     RADIOLOGY & ADDITIONAL TESTS:      ASSESSMENT AND PLAN:  28 years old male with h/o Lupus ( diagnosed in 09/2023, on Plaquenil present to [place] ED on 12/12/23  with complain of chest pain and SOB admitted for fevers, PE, pleural effusions, course c/b high fever and tonic-clonic seizure, transferred to MICU for post-ictal and infectious monitoring.     Neuro  #Seizure  Patient is lethargic i/s/o likely post-ictal / post- ativan state.  Protecting airway.  - monitor mental status  - VBG to eval   - neuro consulted, will f/u official recs though concerned this might be due to high fevers so was not keppra loaded  - vEEG after CT  - CT head to r/o hemorrhage since was on heparin (hold hep gtt until CT head negative)    #Cardiovascular  Pulmonary Embolism  - on hep gtt, holding until CT head to r/o hemorrhage, then can resume.   - no hypoxia  - no RV starin on POCUS  -- evaluate w formal TTE, ordered    #Respiratory  Saturating well on RA, PE treatment as above    #GI/Nutrition  ?Diarrhea  - reportedly + E coli at [place] previously  - no further witnessed diarrhea, will monitor    #/Renal  Hyponatremia  ?ADH from pain. ? from urinary retention.   Questionable retention on POCUS  Omer inserted  - c/w omer, eventual TOV  - urine lytes  - serum osm    #ID  Fevers as high as 104.5, unclear source ?pnuemonia   Was on vanc and zosyn  - monitor for diarrhea, if has diarrhea can resend GI PCR and stool cx  - broadened to Vanc, cefepime   - repeat BCx  - sent UA and UCx  - repeat RVP  - considering LP to r/o meningitis   - f/u CT head    #Endocrine  ?Steroid induced hyperglycemia to 150s  - monitor FS glucose     #Hematologic/DVT ppx  SLE  - unclear if in active flair though ,   - rheum following, f/u recs  - heme following, f/u recs  - solumedrol 40 qd, no contraindications per ID    #Ethics  FULL CODE    ######################################ATTENDING ATTESTATION######################################    Critically ill patient requiring frequent bedside visits with therapy changes.    Attending: I have personally and independently provided 41 minutes of critical care services.  This excludes any time spent on separate procedures or teaching.    Patient is a 29 yo M w/ SLE (dx 09/2023, on Plaquenil) who initially p/w L pleuritc CP and SOB to [facility] 12/12/23. Patient was found to have acute RUL and LLL lobe segmental/subsegmental pulmonary emboli as well as  bilateral lower lobe consolidations with areas of central clearing, concerning for PNA vs pulmonary infarct. Patient was started on AC with course c/b worsening hypoxemic respiratory failure with development of pleural effusions. Patient was transferred to [facility] 12/22 for thoracic evaluation and management. Patient underwent R diagnostic thoracentesis on 12/22, which was exudative in nature with cultures NGTD. Patient has been on Zosyn empirically for fevers. Patient was also started on Solumedrol 40mg IV daily on 12/23 as per Rheum for likely SLE flare.     RRT on 12/25 AM for acute chest pain and dyspnea and with subsequent tonic-clonic seizure lasting 40 seconds. Transferred to MICU for closer monitoring and management    Labs notable for no leukocytosis WBC 5.5, Na 122, Creatinine 1.23, INR 1.59, , , , , CK 1188, Procal 8.    #SIRS  #Seizure  #SLE  #PE  #Hyponatremia  #Rhabdo  - Will change abx to Vanc/Cefepime for now given breakthrough fevers on Zosyn  - f/u ID recs  - f/u CTH  - f/u EEG, f/u prolactin  - Monitor Airway  - Hold heparin gtt for now, plan for LP and R thoracentesis  - Will repeat pan cultures, f/u procalcitonin  - Repeat RVP  - Hold steroids for now after discussion with Rheum. f/u Rheum recs  - f/u hypercoaguable labs/ f/u Heme recs  - Check urine osm/lytes. Renal eval    #GOC - Full Code    #POCUS - See POCUS note    #DVT ppx - Resume heparin gtt after procedures    Sunny Mcleod MD  Pulmonary & Critical Care MICU Accept Note    CHIEF COMPLAINT: PE    HPI / INTERVAL HISTORY:  28 years old male with h/o Lupus ( diagnosed in 09/2023, on Plaquenil present to [facility] ED on 12/12/23  with complain of chest pain and SOB. Patient reported left sided pleuritic chest pain which started 3 days ago. Pain is progressively worsened, associated with SOB and cough. Patient was seen in OSH ED and was prescribed antibiotics. Patient reported significantly worsening of left sided pleuritic chest pain today. No fever or chills. Patient reported loss of appetite and had a few episode of diarrhea for last 2 days. CTA chest with acute right upper lobe and left lower lobe segmental/subsegmental pulmonary emboli. No CT evidence of right heart strain. New bilateral lower lobe consolidations with areas of central clearing. Pneumonia and pulmonary infarcts are in the differential. New bilateral pleural effusions, small on the left and trace on the right. Bilateral axillary and supraclavicular adenopathy of unclear etiology. Patient was started on heparin ggt transitioned to eliquis on 12/19,  patient with worsening SOB/ hypoxia requiring nasal cannula oxygen supplementation. 12/20  Repeat Xray shows increased large left pleural effusion and compressive atelectasis trace right effusion and linear atelectasis. Thoracic team consulted for possible pigtail insertion Bedside US: Large left effusion with septations. Right simple effusion , + pericardial effusion- lower extremity dopplers negative for DVT,   - GI PCR + e coli  - mRSA PCR + Staph aureus   . Patient transferred to [facility] for further management    Patient had thoracentesis of L pleural effusion, 12/23 by thoracic surgery. Patient was on zosyn for fevers, no clear source, had RRT on 12/25 for acute chest pain and dyspnea but hemodynamically stable. Patient had a subsequent RRT shortly after for tonic-clonic seizure, 1 ativan given, seizure reportedly for 40 seconds or so. Patient was post-ictal after seizure, but responsive. His baseline mental status shortly prior was AOx4, describing where his chest pain as.     Patient transferred to MICU for further monitoring post-seizure, including infectious workup.     PAST MEDICAL & SURGICAL HISTORY:  LE (lupus erythematosus)      Pulmonary embolism      No significant past surgical history          FAMILY HISTORY:  No pertinent family history in first degree relatives        SOCIAL HISTORY:  See h&p    HOME MEDICATIONS:      Allergies    No Known Allergies    Intolerances          REVIEW OF SYSTEMS:  unable to obtain presently due to mental status.     OBJECTIVE:  ICU Vital Signs Last 24 Hrs  T(C): 39.2 (25 Dec 2023 12:00), Max: 39.5 (25 Dec 2023 01:09)  T(F): 102.5 (25 Dec 2023 12:00), Max: 103.1 (25 Dec 2023 01:09)  HR: 106 (25 Dec 2023 12:00) (86 - 135)  BP: 114/62 (25 Dec 2023 12:00) (114/62 - 144/80)  BP(mean): 73 (25 Dec 2023 12:00) (73 - 86)  ABP: --  ABP(mean): --  RR: 18 (25 Dec 2023 12:00) (17 - 25)  SpO2: 100% (25 Dec 2023 12:00) (98% - 100%)    O2 Parameters below as of 25 Dec 2023 12:00  Patient On (Oxygen Delivery Method): nasal cannula  O2 Flow (L/min): 3            12-24 @ 07:01  -  12-25 @ 07:00  --------------------------------------------------------  IN: 1119 mL / OUT: 1180 mL / NET: -61 mL      CAPILLARY BLOOD GLUCOSE      POCT Blood Glucose.: 162 mg/dL (25 Dec 2023 10:43)      PHYSICAL EXAM:  Appearance: No acute distress. lethargic s/p seizure / ativan.   HEENT:  PERRLA   Lymphatic: No lymphadenopathy   Cardiovascular: Normal S1 S2, no JVD  Respiratory: normal effort , clear  Gastrointestinal:  Soft, Non-tender  Skin: No rashes,  warm to touch  Psychiatry:  Lethargic.   Musculuskeletal: No edema or joint swelling appreciated.     LINES:     HOSPITAL MEDICATIONS:  MEDICATIONS  (STANDING):  acetaminophen   IVPB .. 1000 milliGRAM(s) IV Intermittent once  cefepime   IVPB 2000 milliGRAM(s) IV Intermittent every 8 hours  ciprofloxacin  0.3% Ophthalmic Solution for Otic Use 2 Drop(s) Both Ears two times a day  hydroxychloroquine 200 milliGRAM(s) Oral two times a day  lactated ringers. 1000 milliLiter(s) (200 mL/Hr) IV Continuous <Continuous>  lidocaine   4% Patch 1 Patch Transdermal daily  methylPREDNISolone sodium succinate Injectable 40 milliGRAM(s) IV Push daily  sodium chloride 1 Gram(s) Oral three times a day  vancomycin  IVPB 1000 milliGRAM(s) IV Intermittent every 12 hours    MEDICATIONS  (PRN):  albuterol/ipratropium for Nebulization 3 milliLiter(s) Nebulizer every 6 hours PRN Shortness of Breath and/or Wheezing  guaiFENesin Oral Liquid (Sugar-Free) 200 milliGRAM(s) Oral every 6 hours PRN Cough  lidocaine 2% Viscous 5 milliLiter(s) Swish and Spit three times a day PRN Mouth Care      LABS:                        9.1    5.53  )-----------( 332      ( 25 Dec 2023 03:15 )             26.1     Hgb Trend: 9.1<--, 10.5<--, 10.8<--, 9.8<--, 9.4<--  12-25    122<L>  |  90<L>  |  18  ----------------------------<  149<H>  4.4   |  20<L>  |  1.23    Ca    8.2<L>      25 Dec 2023 10:10  Phos  2.3     12-25  Mg     1.90     12-25    TPro  7.1  /  Alb  2.7<L>  /  TBili  0.8  /  DBili  x   /  AST  217<H>  /  ALT  112<H>  /  AlkPhos  75  12-25    Creatinine Trend: 1.23<--, 1.18<--, 1.00<--, 1.02<--, 1.00<--, 1.13<--  PT/INR - ( 25 Dec 2023 10:10 )   PT: 17.7 sec;   INR: 1.59 ratio         PTT - ( 25 Dec 2023 10:10 )  PTT:102.2 sec  Urinalysis Basic - ( 25 Dec 2023 10:10 )    Color: x / Appearance: x / SG: x / pH: x  Gluc: 149 mg/dL / Ketone: x  / Bili: x / Urobili: x   Blood: x / Protein: x / Nitrite: x   Leuk Esterase: x / RBC: x / WBC x   Sq Epi: x / Non Sq Epi: x / Bacteria: x    Venous Blood Gas:  12-25 @ 10:10  7.46/30/57/21/85.9  VBG Lactate: 1.5      MICROBIOLOGY:     RADIOLOGY & ADDITIONAL TESTS:      ASSESSMENT AND PLAN:  28 years old male with h/o Lupus ( diagnosed in 09/2023, on Plaquenil present to [facility] ED on 12/12/23  with complain of chest pain and SOB admitted for fevers, PE, pleural effusions, course c/b high fever and tonic-clonic seizure, transferred to MICU for post-ictal and infectious monitoring.     Neuro  #Seizure  Patient is lethargic i/s/o likely post-ictal / post- ativan state.  Protecting airway.  - monitor mental status  - VBG to eval   - neuro consulted, will f/u official recs though concerned this might be due to high fevers so was not keppra loaded  - vEEG after CT  - CT head to r/o hemorrhage since was on heparin (hold hep gtt until CT head negative)    #Cardiovascular  Pulmonary Embolism  - on hep gtt, holding until CT head to r/o hemorrhage, then can resume.   - no hypoxia  - no RV starin on POCUS  -- evaluate w formal TTE, ordered    #Respiratory  Saturating well on RA, PE treatment as above    #GI/Nutrition  ?Diarrhea  - reportedly + E coli at [place] previously  - no further witnessed diarrhea, will monitor    #/Renal  Hyponatremia  ?ADH from pain. ? from urinary retention.   Questionable retention on POCUS  Omer inserted  - c/w omer, eventual TOV  - urine lytes  - serum osm    #ID  Fevers as high as 104.5, unclear source ?pnuemonia   Was on vanc and zosyn  - monitor for diarrhea, if has diarrhea can resend GI PCR and stool cx  - broadened to Vanc, cefepime   - repeat BCx  - sent UA and UCx  - repeat RVP  - considering LP to r/o meningitis   - f/u CT head    #Endocrine  ?Steroid induced hyperglycemia to 150s  - monitor FS glucose     #Hematologic/DVT ppx  SLE  - unclear if in active flair though ,   - rheum following, f/u recs  - heme following, f/u recs  - solumedrol 40 qd, no contraindications per ID    #Ethics  FULL CODE    ######################################ATTENDING ATTESTATION######################################    Critically ill patient requiring frequent bedside visits with therapy changes.    Attending: I have personally and independently provided 41 minutes of critical care services.  This excludes any time spent on separate procedures or teaching.    Patient is a 29 yo M w/ SLE (dx 09/2023, on Plaquenil) who initially p/w L pleuritc CP and SOB to [facility] 12/12/23. Patient was found to have acute RUL and LLL lobe segmental/subsegmental pulmonary emboli as well as  bilateral lower lobe consolidations with areas of central clearing, concerning for PNA vs pulmonary infarct. Patient was started on AC with course c/b worsening hypoxemic respiratory failure with development of pleural effusions. Patient was transferred to [facility] 12/22 for thoracic evaluation and management. Patient underwent R diagnostic thoracentesis on 12/22, which was exudative in nature with cultures NGTD. Patient has been on Zosyn empirically for fevers. Patient was also started on Solumedrol 40mg IV daily on 12/23 as per Rheum for likely SLE flare.     RRT on 12/25 AM for acute chest pain and dyspnea and with subsequent tonic-clonic seizure lasting 40 seconds. Transferred to MICU for closer monitoring and management    Labs notable for no leukocytosis WBC 5.5, Na 122, Creatinine 1.23, INR 1.59, , , , , CK 1188, Procal 8.    #SIRS  #Seizure  #SLE  #PE  #Hyponatremia  #Rhabdo  - Will change abx to Vanc/Cefepime for now given breakthrough fevers on Zosyn  - f/u ID recs  - f/u CTH  - f/u EEG, f/u prolactin  - Monitor Airway  - Hold heparin gtt for now, plan for LP and R thoracentesis  - Will repeat pan cultures, f/u procalcitonin  - Repeat RVP  - Hold steroids for now after discussion with Rheum. f/u Rheum recs  - f/u hypercoaguable labs/ f/u Heme recs  - Check urine osm/lytes. Renal eval    #GOC - Full Code    #POCUS - See POCUS note    #DVT ppx - Resume heparin gtt after procedures    Sunny Mcleod MD  Pulmonary & Critical Care

## 2023-12-25 NOTE — PROCEDURE NOTE - NSPROCDETAILS_GEN_ALL_CORE
location identified, draped/prepped, sterile technique used, needle inserted/introduced R sided thoracentesis/location identified, draped/prepped, sterile technique used, needle inserted/introduced

## 2023-12-25 NOTE — PROGRESS NOTE ADULT - SUBJECTIVE AND OBJECTIVE BOX
TAYLA MARIE  [id number]    Subjective:  RRT called on pt 2/2 tonic-clonic seizure and subsequently transferred to MICU.   Pt evaluated in MICU, awake. Stated earlier in the night was having CP, now subsided.   Continues to spike fevers despite IV solumedrol and abx.        Objective:   Vital Signs Last 24 Hrs  T(C): 39.3 (24 Dec 2023 10:30), Max: 40.1 (24 Dec 2023 09:00)  T(F): 102.8 (24 Dec 2023 10:30), Max: 104.1 (24 Dec 2023 09:00)  HR: 104 (24 Dec 2023 10:30) (87 - 119)  BP: 150/88 (24 Dec 2023 10:30) (135/90 - 161/69)  BP(mean): --  RR: 18 (24 Dec 2023 09:00) (17 - 18)  SpO2: 93% (24 Dec 2023 09:00) (93% - 100%)    Parameters below as of 24 Dec 2023 09:00  Patient On (Oxygen Delivery Method): room air      Physical Exam:  General: NAD  HEENT: EOMI, +ulcers on palate   CVS: tachycardic  Resp: Decreased breath sounds bilaterally in lower lung fields   GI: non-distended   MSK: no synovitis   Neuro: Awake, 5/5 upper and lower motor strength   Skin: no longer has rash on back of head (healing), however has dark/purple flat lesions on bilateral hands. No tender or itchy         LABS:  12-25 @ 10:10 Creatine 1188 U/L<H> [30 - 200]  12-25 @ 03:15 Creatine 1031 U/L<H> [30 - 200]  cret                        9.1    5.53  )-----------( 332      ( 25 Dec 2023 03:15 )             26.1     12-25    122<L>  |  90<L>  |  18  ----------------------------<  149<H>  4.4   |  20<L>  |  1.23    Ca    8.2<L>      25 Dec 2023 10:10  Phos  2.3     12-25  Mg     1.90     12-25    TPro  7.1  /  Alb  2.7<L>  /  TBili  0.8  /  DBili  x   /  AST  217<H>  /  ALT  112<H>  /  AlkPhos  75  12-25    PT/INR - ( 25 Dec 2023 14:50 )   PT: 16.4 sec;   INR: 1.47 ratio         PTT - ( 25 Dec 2023 14:50 )  PTT:38.9 sec        Rheumatology Work Up    C3 Complement, Serum: 83 mg/dL (12.24.23 @ 05:38)  C4 Complement, Serum: 13 mg/dL (12.24.23 @ 05:38)  C3 Complement, Serum: 79 mg/dL *L* [81 - 157] (12-14-23 @ 07:00)  C4 Complement, Serum: 12 mg/dL *L* [13 - 39] (12-14-23 @ 07:00)  Anti Nuclear Factor Titer: 1:1280 *!* [Antinuclear AB (ABDIAS), IFA Method] (12-14-23 @ 07:00)  Double Stranded DNA Antibody: 15 IU/mL [Method: EIA            Reference Ranges            Interpretation            <= 29    IU/mL     Negative            30 - 75  IU/mL     Borderline            > 75      IU/mL     Positive] (12-14-23 @ 07:00        RADIOLOGY & ADDITIONAL STUDIES:    < from: CT Chest w/ IV Cont (12.23.23 @ 13:03) >  IMPRESSION:    Moderate pleural effusions, loculated on the left, new since 12/12/2023.    New nonspecific the very small peripheral groundglass in the right upper   lobe.    < end of copied text >    < from: CT Angio Chest PE Protocol w/ IV Cont (12.12.23 @ 10:27) >  IMPRESSION:    Acute right upper lobe and left lower lobe segmental/subsegmental   pulmonary emboli. No CT evidence of right heart strain.    New bilateral lower lobe consolidations with areas of central clearing.   Pneumonia and pulmonary infarcts are in the differential. New bilateral   pleural effusions, small on the left and trace on the right. Follow to   resolution with repeat chest CT in one month, or sooner as clinically   warranted.    Bilateral axillary and supraclavicular adenopathy of unclear etiology.   Consider broad differential including infectious, inflammatory, and   neoplastic etiologies.    < end of copied text >     TAYLA MARIE  [id number]    Subjective:  RRT called on pt 2/2 tonic-clonic seizure and subsequently transferred to MICU.   Pt evaluated in MICU, awake. Stated earlier in the night was having CP, now subsided.   Continues to spike fevers despite IV solumedrol and abx.        Objective:   Vital Signs Last 24 Hrs  T(C): 39.3 (24 Dec 2023 10:30), Max: 40.1 (24 Dec 2023 09:00)  T(F): 102.8 (24 Dec 2023 10:30), Max: 104.1 (24 Dec 2023 09:00)  HR: 104 (24 Dec 2023 10:30) (87 - 119)  BP: 150/88 (24 Dec 2023 10:30) (135/90 - 161/69)  BP(mean): --  RR: 18 (24 Dec 2023 09:00) (17 - 18)  SpO2: 93% (24 Dec 2023 09:00) (93% - 100%)    Parameters below as of 24 Dec 2023 09:00  Patient On (Oxygen Delivery Method): room air      Physical Exam:  General: NAD  HEENT: EOMI, +ulcers on palate   CVS: tachycardic  Resp: Decreased breath sounds bilaterally in lower lung fields   GI: non-distended   MSK: no synovitis   Neuro: Awake, 5/5 upper and lower motor strength   Skin: no longer has rash on back of head (healing), however has dark/purple flat lesions on bilateral hands. No tender or itchy         LABS:  12-25 @ 10:10 Creatine 1188 U/L<H> [30 - 200]  12-25 @ 03:15 Creatine 1031 U/L<H> [30 - 200]  cret                        9.1    5.53  )-----------( 332      ( 25 Dec 2023 03:15 )             26.1     12-25    122<L>  |  90<L>  |  18  ----------------------------<  149<H>  4.4   |  20<L>  |  1.23    Ca    8.2<L>      25 Dec 2023 10:10  Phos  2.3     12-25  Mg     1.90     12-25    TPro  7.1  /  Alb  2.7<L>  /  TBili  0.8  /  DBili  x   /  AST  217<H>  /  ALT  112<H>  /  AlkPhos  75  12-25    PT/INR - ( 25 Dec 2023 14:50 )   PT: 16.4 sec;   INR: 1.47 ratio         PTT - ( 25 Dec 2023 14:50 )  PTT:38.9 sec        Rheumatology Work Up    C3 Complement, Serum: 83 mg/dL (12.24.23 @ 05:38)  C4 Complement, Serum: 13 mg/dL (12.24.23 @ 05:38)  C3 Complement, Serum: 79 mg/dL *L* [81 - 157] (12-14-23 @ 07:00)  C4 Complement, Serum: 12 mg/dL *L* [13 - 39] (12-14-23 @ 07:00)  Anti Nuclear Factor Titer: 1:1280 *!* [Antinuclear AB (ABDIAS), IFA Method] (12-14-23 @ 07:00)  Double Stranded DNA Antibody: 15 IU/mL [Method: EIA            Reference Ranges            Interpretation            <= 29    IU/mL     Negative            30 - 75  IU/mL     Borderline            > 75      IU/mL     Positive] (12-14-23 @ 07:00        RADIOLOGY & ADDITIONAL STUDIES:    < from: CT Chest w/ IV Cont (12.23.23 @ 13:03) >  IMPRESSION:    Moderate pleural effusions, loculated on the left, new since 12/12/2023.    New nonspecific the very small peripheral groundglass in the right upper   lobe.    < end of copied text >    < from: CT Angio Chest PE Protocol w/ IV Cont (12.12.23 @ 10:27) >  IMPRESSION:    Acute right upper lobe and left lower lobe segmental/subsegmental   pulmonary emboli. No CT evidence of right heart strain.    New bilateral lower lobe consolidations with areas of central clearing.   Pneumonia and pulmonary infarcts are in the differential. New bilateral   pleural effusions, small on the left and trace on the right. Follow to   resolution with repeat chest CT in one month, or sooner as clinically   warranted.    Bilateral axillary and supraclavicular adenopathy of unclear etiology.   Consider broad differential including infectious, inflammatory, and   neoplastic etiologies.    < end of copied text >     TAYLA MARIE  [id number]    Subjective:  RRT called on pt 2/2 tonic-clonic seizure and subsequently transferred to MICU.   Pt evaluated in MICU, awake. Stated earlier in the night was having CP, now subsided.   Continues to spike fevers despite IV solumedrol and abx.        Objective:   Vital Signs Last 24 Hrs  T(C): 39.3 (24 Dec 2023 10:30), Max: 40.1 (24 Dec 2023 09:00)  T(F): 102.8 (24 Dec 2023 10:30), Max: 104.1 (24 Dec 2023 09:00)  HR: 104 (24 Dec 2023 10:30) (87 - 119)  BP: 150/88 (24 Dec 2023 10:30) (135/90 - 161/69)  BP(mean): --  RR: 18 (24 Dec 2023 09:00) (17 - 18)  SpO2: 93% (24 Dec 2023 09:00) (93% - 100%)    Parameters below as of 24 Dec 2023 09:00  Patient On (Oxygen Delivery Method): room air      Physical Exam:  General: NAD  HEENT: EOMI, +ulcers on palate   CVS: tachycardic  Resp: Decreased breath sounds bilaterally in lower lung fields   GI: non-distended   MSK: no synovitis   Neuro: Awake, 5/5 upper and lower motor strength   Skin: no longer has rash on back of head (healing), however has dark/purple flat lesions on bilateral hands. No tender or itchy         LABS:  12-25 @ 10:10 Creatine 1188 U/L<H> [30 - 200]  12-25 @ 03:15 Creatine 1031 U/L<H> [30 - 200]  cret                        9.1    5.53  )-----------( 332      ( 25 Dec 2023 03:15 )             26.1     12-25    122<L>  |  90<L>  |  18  ----------------------------<  149<H>  4.4   |  20<L>  |  1.23    Ca    8.2<L>      25 Dec 2023 10:10  Phos  2.3     12-25  Mg     1.90     12-25    TPro  7.1  /  Alb  2.7<L>  /  TBili  0.8  /  DBili  x   /  AST  217<H>  /  ALT  112<H>  /  AlkPhos  75  12-25    PT/INR - ( 25 Dec 2023 14:50 )   PT: 16.4 sec;   INR: 1.47 ratio         PTT - ( 25 Dec 2023 14:50 )  PTT:38.9 sec        Rheumatology Work Up    C3 Complement, Serum: 83 mg/dL (12.24.23 @ 05:38)  C4 Complement, Serum: 13 mg/dL (12.24.23 @ 05:38)  C3 Complement, Serum: 79 mg/dL *L* [81 - 157] (12-14-23 @ 07:00)  C4 Complement, Serum: 12 mg/dL *L* [13 - 39] (12-14-23 @ 07:00)  Anti Nuclear Factor Titer: 1:1280 *!* [Antinuclear AB (ABDIAS), IFA Method] (12-14-23 @ 07:00)  Double Stranded DNA Antibody: 15 IU/mL [Method: EIA            Reference Ranges            Interpretation            <= 29    IU/mL     Negative            30 - 75  IU/mL     Borderline            > 75      IU/mL     Positive] (12-14-23 @ 07:00    GOLDEN ab - pending  Sjogren's ab- pending  Janine-1 ab - pending    RADIOLOGY & ADDITIONAL STUDIES:    < from: CT Chest w/ IV Cont (12.23.23 @ 13:03) >  IMPRESSION:    Moderate pleural effusions, loculated on the left, new since 12/12/2023.    New nonspecific the very small peripheral ground-glass in the right upper   lobe.    < end of copied text >    < from: CT Angio Chest PE Protocol w/ IV Cont (12.12.23 @ 10:27) >  IMPRESSION:    Acute right upper lobe and left lower lobe segmental/subsegmental   pulmonary emboli. No CT evidence of right heart strain.    New bilateral lower lobe consolidations with areas of central clearing.   Pneumonia and pulmonary infarcts are in the differential. New bilateral   pleural effusions, small on the left and trace on the right. Follow to   resolution with repeat chest CT in one month, or sooner as clinically   warranted.    Bilateral axillary and supraclavicular adenopathy of unclear etiology.   Consider broad differential including infectious, inflammatory, and   neoplastic etiologies.    < end of copied text >     TAYLA MARIE  [id number]    Subjective:  RRT called on pt 2/2 tonic-clonic seizure and subsequently transferred to MICU.   Pt evaluated in MICU, awake. Stated earlier in the night was having CP, now subsided.   Continues to spike fevers despite IV solumedrol and abx.        Objective:   Vital Signs Last 24 Hrs  T(C): 39.3 (24 Dec 2023 10:30), Max: 40.1 (24 Dec 2023 09:00)  T(F): 102.8 (24 Dec 2023 10:30), Max: 104.1 (24 Dec 2023 09:00)  HR: 104 (24 Dec 2023 10:30) (87 - 119)  BP: 150/88 (24 Dec 2023 10:30) (135/90 - 161/69)  BP(mean): --  RR: 18 (24 Dec 2023 09:00) (17 - 18)  SpO2: 93% (24 Dec 2023 09:00) (93% - 100%)    Parameters below as of 24 Dec 2023 09:00  Patient On (Oxygen Delivery Method): room air      Physical Exam:  General: NAD  HEENT: EOMI, +ulcers on palate   CVS: tachycardic  Resp: Decreased breath sounds bilaterally in lower lung fields   GI: non-distended   MSK: no synovitis   Neuro: Awake, 5/5 upper and lower motor strength   Skin: no longer has rash on back of head (healing), however has dark/purple flat lesions on bilateral hands. No tender or itchy         LABS:  12-25 @ 10:10 Creatine 1188 U/L<H> [30 - 200]  12-25 @ 03:15 Creatine 1031 U/L<H> [30 - 200]  cret                        9.1    5.53  )-----------( 332      ( 25 Dec 2023 03:15 )             26.1     12-25    122<L>  |  90<L>  |  18  ----------------------------<  149<H>  4.4   |  20<L>  |  1.23    Ca    8.2<L>      25 Dec 2023 10:10  Phos  2.3     12-25  Mg     1.90     12-25    TPro  7.1  /  Alb  2.7<L>  /  TBili  0.8  /  DBili  x   /  AST  217<H>  /  ALT  112<H>  /  AlkPhos  75  12-25    PT/INR - ( 25 Dec 2023 14:50 )   PT: 16.4 sec;   INR: 1.47 ratio         PTT - ( 25 Dec 2023 14:50 )  PTT:38.9 sec        Rheumatology Work Up    C3 Complement, Serum: 83 mg/dL (12.24.23 @ 05:38)  C4 Complement, Serum: 13 mg/dL (12.24.23 @ 05:38)  C3 Complement, Serum: 79 mg/dL *L* [81 - 157] (12-14-23 @ 07:00)  C4 Complement, Serum: 12 mg/dL *L* [13 - 39] (12-14-23 @ 07:00)  Anti Nuclear Factor Titer: 1:1280 *!* [Antinuclear AB (ABDIAS), IFA Method] (12-14-23 @ 07:00)  Double Stranded DNA Antibody: 15 IU/mL [Method: EIA            Reference Ranges            Interpretation            <= 29    IU/mL     Negative            30 - 75  IU/mL     Borderline            > 75      IU/mL     Positive] (12-14-23 @ 07:00    GOLDEN ab - pending  Sjogren's ab- pending  Janine-1 ab - pending    RADIOLOGY & ADDITIONAL STUDIES:    < from: CT Chest w/ IV Cont (12.23.23 @ 13:03) >  IMPRESSION:    Moderate pleural effusions, loculated on the left, new since 12/12/2023.    New nonspecific the very small peripheral ground-glass in the right upper   lobe.    < end of copied text >    < from: CT Angio Chest PE Protocol w/ IV Cont (12.12.23 @ 10:27) >  IMPRESSION:    Acute right upper lobe and left lower lobe segmental/subsegmental   pulmonary emboli. No CT evidence of right heart strain.    New bilateral lower lobe consolidations with areas of central clearing.   Pneumonia and pulmonary infarcts are in the differential. New bilateral   pleural effusions, small on the left and trace on the right. Follow to   resolution with repeat chest CT in one month, or sooner as clinically   warranted.    Bilateral axillary and supraclavicular adenopathy of unclear etiology.   Consider broad differential including infectious, inflammatory, and   neoplastic etiologies.    < end of copied text >

## 2023-12-25 NOTE — CHART NOTE - NSCHARTNOTEFT_GEN_A_CORE
EEG preliminary read (not final) on the initial recording hour(s) = x 1.5    No seizures recorded.    Final report to follow tomorrow morning after completion of study.    [facility] EEG Reading Room Ph#: [phone]  Epilepsy Answering Service after 5PM and before 8:30AM: Ph#: [phone] EEG preliminary read (not final) on the initial recording hour(s) = x 1.5    No seizures recorded.    Final report to follow tomorrow morning after completion of study.    [facility] EEG Reading Room Ph#: [phone]  Epilepsy Answering Service after 5PM and before 8:30AM: Ph#: [phone]

## 2023-12-25 NOTE — PROCEDURE NOTE - NSPROCDETAILS_GEN_ALL_CORE
see below/location identified, draped/prepped, sterile technique used, needle inserted/introduced/procedure aborted/area cleaned in sterile fashion

## 2023-12-26 LAB
24R-OH-CALCIDIOL SERPL-MCNC: 21 NG/ML — LOW (ref 30–80)
24R-OH-CALCIDIOL SERPL-MCNC: 21 NG/ML — LOW (ref 30–80)
ALBUMIN CSF-MCNC: 27.6 MG/DL — HIGH (ref 14–25)
ALBUMIN CSF-MCNC: 27.6 MG/DL — HIGH (ref 14–25)
ALBUMIN FLD-MCNC: 1.8 G/DL — SIGNIFICANT CHANGE UP
ALBUMIN FLD-MCNC: 1.8 G/DL — SIGNIFICANT CHANGE UP
ALBUMIN SERPL ELPH-MCNC: 2.7 G/DL — LOW (ref 3.3–5)
ALBUMIN SERPL ELPH-MCNC: 2.7 G/DL — LOW (ref 3.3–5)
ALBUMIN SERPL ELPH-MCNC: 2.8 G/DL — LOW (ref 3.3–5)
ALBUMIN SERPL ELPH-MCNC: 2.8 G/DL — LOW (ref 3.3–5)
ALBUMIN SERPL ELPH-MCNC: 2120 MG/DL — LOW (ref 3500–5200)
ALBUMIN SERPL ELPH-MCNC: 2120 MG/DL — LOW (ref 3500–5200)
ALP SERPL-CCNC: 75 U/L — SIGNIFICANT CHANGE UP (ref 40–120)
ALP SERPL-CCNC: 75 U/L — SIGNIFICANT CHANGE UP (ref 40–120)
ALP SERPL-CCNC: 79 U/L — SIGNIFICANT CHANGE UP (ref 40–120)
ALP SERPL-CCNC: 79 U/L — SIGNIFICANT CHANGE UP (ref 40–120)
ALT FLD-CCNC: 129 U/L — HIGH (ref 4–41)
ALT FLD-CCNC: 129 U/L — HIGH (ref 4–41)
ALT FLD-CCNC: 139 U/L — HIGH (ref 4–41)
ALT FLD-CCNC: 139 U/L — HIGH (ref 4–41)
ANION GAP SERPL CALC-SCNC: 9 MMOL/L — SIGNIFICANT CHANGE UP (ref 7–14)
ANTI-RIBONUCLEAR PROTEIN: >8 AI — HIGH
ANTI-RIBONUCLEAR PROTEIN: >8 AI — HIGH
APPEARANCE UR: CLEAR — SIGNIFICANT CHANGE UP
APPEARANCE UR: CLEAR — SIGNIFICANT CHANGE UP
APTT BLD: 28.2 SEC — SIGNIFICANT CHANGE UP (ref 24.5–35.6)
APTT BLD: 28.2 SEC — SIGNIFICANT CHANGE UP (ref 24.5–35.6)
APTT BLD: 29.2 SEC — SIGNIFICANT CHANGE UP (ref 24.5–35.6)
APTT BLD: 29.2 SEC — SIGNIFICANT CHANGE UP (ref 24.5–35.6)
AST SERPL-CCNC: 202 U/L — HIGH (ref 4–40)
AST SERPL-CCNC: 202 U/L — HIGH (ref 4–40)
AST SERPL-CCNC: 236 U/L — HIGH (ref 4–40)
AST SERPL-CCNC: 236 U/L — HIGH (ref 4–40)
B PERT DNA SPEC QL NAA+PROBE: SIGNIFICANT CHANGE UP
B PERT DNA SPEC QL NAA+PROBE: SIGNIFICANT CHANGE UP
B PERT IGG+IGM PNL SER: ABNORMAL
B PERT+PARAPERT DNA PNL SPEC NAA+PROBE: SIGNIFICANT CHANGE UP
B PERT+PARAPERT DNA PNL SPEC NAA+PROBE: SIGNIFICANT CHANGE UP
B2 GLYCOPROT1 AB SER QL: NEGATIVE — SIGNIFICANT CHANGE UP
B2 GLYCOPROT1 AB SER QL: NEGATIVE — SIGNIFICANT CHANGE UP
BACTERIA # UR AUTO: NEGATIVE /HPF — SIGNIFICANT CHANGE UP
BACTERIA # UR AUTO: NEGATIVE /HPF — SIGNIFICANT CHANGE UP
BASOPHILS # BLD AUTO: 0.04 K/UL — SIGNIFICANT CHANGE UP (ref 0–0.2)
BASOPHILS # BLD AUTO: 0.04 K/UL — SIGNIFICANT CHANGE UP (ref 0–0.2)
BASOPHILS # BLD AUTO: 0.08 K/UL — SIGNIFICANT CHANGE UP (ref 0–0.2)
BASOPHILS # BLD AUTO: 0.08 K/UL — SIGNIFICANT CHANGE UP (ref 0–0.2)
BASOPHILS NFR BLD AUTO: 0.9 % — SIGNIFICANT CHANGE UP (ref 0–2)
BASOPHILS NFR BLD AUTO: 0.9 % — SIGNIFICANT CHANGE UP (ref 0–2)
BASOPHILS NFR BLD AUTO: 1.3 % — SIGNIFICANT CHANGE UP (ref 0–2)
BASOPHILS NFR BLD AUTO: 1.3 % — SIGNIFICANT CHANGE UP (ref 0–2)
BILIRUB SERPL-MCNC: 0.6 MG/DL — SIGNIFICANT CHANGE UP (ref 0.2–1.2)
BILIRUB SERPL-MCNC: 0.6 MG/DL — SIGNIFICANT CHANGE UP (ref 0.2–1.2)
BILIRUB SERPL-MCNC: 0.7 MG/DL — SIGNIFICANT CHANGE UP (ref 0.2–1.2)
BILIRUB SERPL-MCNC: 0.7 MG/DL — SIGNIFICANT CHANGE UP (ref 0.2–1.2)
BILIRUB UR-MCNC: NEGATIVE — SIGNIFICANT CHANGE UP
BILIRUB UR-MCNC: NEGATIVE — SIGNIFICANT CHANGE UP
BLD GP AB SCN SERPL QL: NEGATIVE — SIGNIFICANT CHANGE UP
BLD GP AB SCN SERPL QL: NEGATIVE — SIGNIFICANT CHANGE UP
BORDETELLA PARAPERTUSSIS (RAPRVP): SIGNIFICANT CHANGE UP
BORDETELLA PARAPERTUSSIS (RAPRVP): SIGNIFICANT CHANGE UP
BUN SERPL-MCNC: 16 MG/DL — SIGNIFICANT CHANGE UP (ref 7–23)
BUN SERPL-MCNC: 16 MG/DL — SIGNIFICANT CHANGE UP (ref 7–23)
BUN SERPL-MCNC: 19 MG/DL — SIGNIFICANT CHANGE UP (ref 7–23)
BUN SERPL-MCNC: 19 MG/DL — SIGNIFICANT CHANGE UP (ref 7–23)
C NEOFORM RRNA SPEC NAA+PROBE-ACNC: SIGNIFICANT CHANGE UP
C NEOFORM RRNA SPEC NAA+PROBE-ACNC: SIGNIFICANT CHANGE UP
C PNEUM DNA SPEC QL NAA+PROBE: SIGNIFICANT CHANGE UP
C PNEUM DNA SPEC QL NAA+PROBE: SIGNIFICANT CHANGE UP
CALCIUM SERPL-MCNC: 8.3 MG/DL — LOW (ref 8.4–10.5)
CALCIUM SERPL-MCNC: 8.3 MG/DL — LOW (ref 8.4–10.5)
CALCIUM SERPL-MCNC: 8.5 MG/DL — SIGNIFICANT CHANGE UP (ref 8.4–10.5)
CALCIUM SERPL-MCNC: 8.5 MG/DL — SIGNIFICANT CHANGE UP (ref 8.4–10.5)
CAST: 2 /LPF — SIGNIFICANT CHANGE UP (ref 0–4)
CAST: 2 /LPF — SIGNIFICANT CHANGE UP (ref 0–4)
CHLORIDE SERPL-SCNC: 93 MMOL/L — LOW (ref 98–107)
CHLORIDE SERPL-SCNC: 93 MMOL/L — LOW (ref 98–107)
CHLORIDE SERPL-SCNC: 94 MMOL/L — LOW (ref 98–107)
CHLORIDE SERPL-SCNC: 94 MMOL/L — LOW (ref 98–107)
CK SERPL-CCNC: 1094 U/L — HIGH (ref 30–200)
CK SERPL-CCNC: 1094 U/L — HIGH (ref 30–200)
CK SERPL-CCNC: 1203 U/L — HIGH (ref 30–200)
CK SERPL-CCNC: 1203 U/L — HIGH (ref 30–200)
CMV DNA CSF QL NAA+PROBE: SIGNIFICANT CHANGE UP
CMV DNA CSF QL NAA+PROBE: SIGNIFICANT CHANGE UP
CO2 SERPL-SCNC: 21 MMOL/L — LOW (ref 22–31)
CO2 SERPL-SCNC: 21 MMOL/L — LOW (ref 22–31)
CO2 SERPL-SCNC: 22 MMOL/L — SIGNIFICANT CHANGE UP (ref 22–31)
CO2 SERPL-SCNC: 22 MMOL/L — SIGNIFICANT CHANGE UP (ref 22–31)
COLOR FLD: ABNORMAL
COLOR SPEC: YELLOW — SIGNIFICANT CHANGE UP
COLOR SPEC: YELLOW — SIGNIFICANT CHANGE UP
COMMENT - FLUIDS: SIGNIFICANT CHANGE UP
COMMENT - FLUIDS: SIGNIFICANT CHANGE UP
CREAT ?TM UR-MCNC: 97 MG/DL — SIGNIFICANT CHANGE UP
CREAT ?TM UR-MCNC: 97 MG/DL — SIGNIFICANT CHANGE UP
CREAT SERPL-MCNC: 0.9 MG/DL — SIGNIFICANT CHANGE UP (ref 0.5–1.3)
CREAT SERPL-MCNC: 0.9 MG/DL — SIGNIFICANT CHANGE UP (ref 0.5–1.3)
CREAT SERPL-MCNC: 1.04 MG/DL — SIGNIFICANT CHANGE UP (ref 0.5–1.3)
CREAT SERPL-MCNC: 1.04 MG/DL — SIGNIFICANT CHANGE UP (ref 0.5–1.3)
CSF PCR RESULT: SIGNIFICANT CHANGE UP
CSF PCR RESULT: SIGNIFICANT CHANGE UP
DIFF PNL FLD: ABNORMAL
DIFF PNL FLD: ABNORMAL
DSDNA AB FLD-ACNC: 0.2 AI — SIGNIFICANT CHANGE UP
DSDNA AB FLD-ACNC: 0.2 AI — SIGNIFICANT CHANGE UP
DSDNA AB SER-ACNC: 28 IU/ML — SIGNIFICANT CHANGE UP
DSDNA AB SER-ACNC: 28 IU/ML — SIGNIFICANT CHANGE UP
E COLI K1 DNA CSF QL NAA+NON-PROBE: SIGNIFICANT CHANGE UP
E COLI K1 DNA CSF QL NAA+NON-PROBE: SIGNIFICANT CHANGE UP
EGFR: 100 ML/MIN/1.73M2 — SIGNIFICANT CHANGE UP
EGFR: 100 ML/MIN/1.73M2 — SIGNIFICANT CHANGE UP
EGFR: 119 ML/MIN/1.73M2 — SIGNIFICANT CHANGE UP
EGFR: 119 ML/MIN/1.73M2 — SIGNIFICANT CHANGE UP
ENA JO1 AB SER-ACNC: <0.2 AI — SIGNIFICANT CHANGE UP
ENA JO1 AB SER-ACNC: <0.2 AI — SIGNIFICANT CHANGE UP
ENA SM AB FLD QL: >8 AI — HIGH
ENA SM AB FLD QL: >8 AI — HIGH
ENA SS-A AB FLD IA-ACNC: >8 AI — HIGH
ENA SS-A AB FLD IA-ACNC: >8 AI — HIGH
EOSINOPHIL # BLD AUTO: 0 K/UL — SIGNIFICANT CHANGE UP (ref 0–0.5)
EOSINOPHIL # BLD AUTO: 0 K/UL — SIGNIFICANT CHANGE UP (ref 0–0.5)
EOSINOPHIL # BLD AUTO: 0.01 K/UL — SIGNIFICANT CHANGE UP (ref 0–0.5)
EOSINOPHIL # BLD AUTO: 0.01 K/UL — SIGNIFICANT CHANGE UP (ref 0–0.5)
EOSINOPHIL # FLD: 0 % — SIGNIFICANT CHANGE UP
EOSINOPHIL NFR BLD AUTO: 0 % — SIGNIFICANT CHANGE UP (ref 0–6)
EOSINOPHIL NFR BLD AUTO: 0 % — SIGNIFICANT CHANGE UP (ref 0–6)
EOSINOPHIL NFR BLD AUTO: 0.2 % — SIGNIFICANT CHANGE UP (ref 0–6)
EOSINOPHIL NFR BLD AUTO: 0.2 % — SIGNIFICANT CHANGE UP (ref 0–6)
ESCHERICHIA COLI K1: SIGNIFICANT CHANGE UP
ESCHERICHIA COLI K1: SIGNIFICANT CHANGE UP
EV RNA CSF QL NAA+PROBE: SIGNIFICANT CHANGE UP
EV RNA CSF QL NAA+PROBE: SIGNIFICANT CHANGE UP
FLUAV SUBTYP SPEC NAA+PROBE: SIGNIFICANT CHANGE UP
FLUAV SUBTYP SPEC NAA+PROBE: SIGNIFICANT CHANGE UP
FLUBV RNA SPEC QL NAA+PROBE: SIGNIFICANT CHANGE UP
FLUBV RNA SPEC QL NAA+PROBE: SIGNIFICANT CHANGE UP
FLUID INTAKE SUBSTANCE CLASS: SIGNIFICANT CHANGE UP
FOLATE+VIT B12 SERBLD-IMP: 0 % — SIGNIFICANT CHANGE UP
GLUCOSE FLD-MCNC: 76 MG/DL — SIGNIFICANT CHANGE UP
GLUCOSE FLD-MCNC: 76 MG/DL — SIGNIFICANT CHANGE UP
GLUCOSE SERPL-MCNC: 108 MG/DL — HIGH (ref 70–99)
GLUCOSE SERPL-MCNC: 108 MG/DL — HIGH (ref 70–99)
GLUCOSE SERPL-MCNC: 115 MG/DL — HIGH (ref 70–99)
GLUCOSE SERPL-MCNC: 115 MG/DL — HIGH (ref 70–99)
GLUCOSE UR QL: NEGATIVE MG/DL — SIGNIFICANT CHANGE UP
GLUCOSE UR QL: NEGATIVE MG/DL — SIGNIFICANT CHANGE UP
GP B STREP DNA SPEC QL NAA+PROBE: SIGNIFICANT CHANGE UP
GP B STREP DNA SPEC QL NAA+PROBE: SIGNIFICANT CHANGE UP
GRAM STN FLD: SIGNIFICANT CHANGE UP
HADV DNA SPEC QL NAA+PROBE: SIGNIFICANT CHANGE UP
HADV DNA SPEC QL NAA+PROBE: SIGNIFICANT CHANGE UP
HAEM INFLU DNA SPEC QL NAA+PROBE: SIGNIFICANT CHANGE UP
HAEM INFLU DNA SPEC QL NAA+PROBE: SIGNIFICANT CHANGE UP
HAPTOGLOB SERPL-MCNC: 305 MG/DL — HIGH (ref 34–200)
HAPTOGLOB SERPL-MCNC: 305 MG/DL — HIGH (ref 34–200)
HAV IGM SER-ACNC: SIGNIFICANT CHANGE UP
HAV IGM SER-ACNC: SIGNIFICANT CHANGE UP
HBV CORE IGM SER-ACNC: SIGNIFICANT CHANGE UP
HBV CORE IGM SER-ACNC: SIGNIFICANT CHANGE UP
HBV SURFACE AB SER-ACNC: >1000 MIU/ML — SIGNIFICANT CHANGE UP
HBV SURFACE AB SER-ACNC: >1000 MIU/ML — SIGNIFICANT CHANGE UP
HBV SURFACE AG SER-ACNC: SIGNIFICANT CHANGE UP
HBV SURFACE AG SER-ACNC: SIGNIFICANT CHANGE UP
HCOV 229E RNA SPEC QL NAA+PROBE: SIGNIFICANT CHANGE UP
HCOV 229E RNA SPEC QL NAA+PROBE: SIGNIFICANT CHANGE UP
HCOV HKU1 RNA SPEC QL NAA+PROBE: SIGNIFICANT CHANGE UP
HCOV HKU1 RNA SPEC QL NAA+PROBE: SIGNIFICANT CHANGE UP
HCOV NL63 RNA SPEC QL NAA+PROBE: SIGNIFICANT CHANGE UP
HCOV NL63 RNA SPEC QL NAA+PROBE: SIGNIFICANT CHANGE UP
HCOV OC43 RNA SPEC QL NAA+PROBE: SIGNIFICANT CHANGE UP
HCOV OC43 RNA SPEC QL NAA+PROBE: SIGNIFICANT CHANGE UP
HCT VFR BLD CALC: 27.4 % — LOW (ref 39–50)
HCT VFR BLD CALC: 27.4 % — LOW (ref 39–50)
HCT VFR BLD CALC: 27.5 % — LOW (ref 39–50)
HCT VFR BLD CALC: 27.5 % — LOW (ref 39–50)
HCV AB S/CO SERPL IA: 0.12 S/CO — SIGNIFICANT CHANGE UP (ref 0–0.99)
HCV AB S/CO SERPL IA: 0.12 S/CO — SIGNIFICANT CHANGE UP (ref 0–0.99)
HCV AB SERPL-IMP: SIGNIFICANT CHANGE UP
HCV AB SERPL-IMP: SIGNIFICANT CHANGE UP
HGB BLD-MCNC: 9.2 G/DL — LOW (ref 13–17)
HGB BLD-MCNC: 9.2 G/DL — LOW (ref 13–17)
HGB BLD-MCNC: 9.3 G/DL — LOW (ref 13–17)
HGB BLD-MCNC: 9.3 G/DL — LOW (ref 13–17)
HHV6 DNA CSF QL NAA+PROBE: SIGNIFICANT CHANGE UP
HHV6 DNA CSF QL NAA+PROBE: SIGNIFICANT CHANGE UP
HMPV RNA SPEC QL NAA+PROBE: SIGNIFICANT CHANGE UP
HMPV RNA SPEC QL NAA+PROBE: SIGNIFICANT CHANGE UP
HPIV1 RNA SPEC QL NAA+PROBE: SIGNIFICANT CHANGE UP
HPIV1 RNA SPEC QL NAA+PROBE: SIGNIFICANT CHANGE UP
HPIV2 RNA SPEC QL NAA+PROBE: SIGNIFICANT CHANGE UP
HPIV2 RNA SPEC QL NAA+PROBE: SIGNIFICANT CHANGE UP
HPIV3 RNA SPEC QL NAA+PROBE: SIGNIFICANT CHANGE UP
HPIV3 RNA SPEC QL NAA+PROBE: SIGNIFICANT CHANGE UP
HPIV4 RNA SPEC QL NAA+PROBE: SIGNIFICANT CHANGE UP
HPIV4 RNA SPEC QL NAA+PROBE: SIGNIFICANT CHANGE UP
HSV1 DNA CSF QL NAA+PROBE: SIGNIFICANT CHANGE UP
HSV1 DNA CSF QL NAA+PROBE: SIGNIFICANT CHANGE UP
HSV2 DNA CSF QL NAA+PROBE: SIGNIFICANT CHANGE UP
HSV2 DNA CSF QL NAA+PROBE: SIGNIFICANT CHANGE UP
IANC: 2.66 K/UL — SIGNIFICANT CHANGE UP (ref 1.8–7.4)
IANC: 2.66 K/UL — SIGNIFICANT CHANGE UP (ref 1.8–7.4)
IANC: 2.69 K/UL — SIGNIFICANT CHANGE UP (ref 1.8–7.4)
IANC: 2.69 K/UL — SIGNIFICANT CHANGE UP (ref 1.8–7.4)
IGG CSF-MCNC: 12.8 MG/DL — HIGH
IGG CSF-MCNC: 12.8 MG/DL — HIGH
IGG FLD-MCNC: 1855 MG/DL — HIGH (ref 610–1660)
IGG FLD-MCNC: 1855 MG/DL — HIGH (ref 610–1660)
IGG SYNTH RATE SER+CSF CALC-MRATE: 4.3 MG/DAY — SIGNIFICANT CHANGE UP
IGG SYNTH RATE SER+CSF CALC-MRATE: 4.3 MG/DAY — SIGNIFICANT CHANGE UP
IGG/ALB CLEAR SER+CSF-RTO: 0.5 — SIGNIFICANT CHANGE UP
IGG/ALB CLEAR SER+CSF-RTO: 0.5 — SIGNIFICANT CHANGE UP
IGG/ALB CSF: 0.46 RATIO — HIGH
IGG/ALB CSF: 0.46 RATIO — HIGH
IGG/ALB SER: 0.88 RATIO — SIGNIFICANT CHANGE UP
IGG/ALB SER: 0.88 RATIO — SIGNIFICANT CHANGE UP
IMM GRANULOCYTES NFR BLD AUTO: 0.8 % — SIGNIFICANT CHANGE UP (ref 0–0.9)
IMM GRANULOCYTES NFR BLD AUTO: 0.8 % — SIGNIFICANT CHANGE UP (ref 0–0.9)
IMM GRANULOCYTES NFR BLD AUTO: 0.9 % — SIGNIFICANT CHANGE UP (ref 0–0.9)
IMM GRANULOCYTES NFR BLD AUTO: 0.9 % — SIGNIFICANT CHANGE UP (ref 0–0.9)
INR BLD: 1.23 RATIO — HIGH (ref 0.85–1.18)
INR BLD: 1.23 RATIO — HIGH (ref 0.85–1.18)
INR BLD: 1.28 RATIO — HIGH (ref 0.85–1.18)
INR BLD: 1.28 RATIO — HIGH (ref 0.85–1.18)
KETONES UR-MCNC: NEGATIVE MG/DL — SIGNIFICANT CHANGE UP
KETONES UR-MCNC: NEGATIVE MG/DL — SIGNIFICANT CHANGE UP
L MONOCYTOG DNA SPEC QL NAA+PROBE: SIGNIFICANT CHANGE UP
L MONOCYTOG DNA SPEC QL NAA+PROBE: SIGNIFICANT CHANGE UP
LDH CSF L TO P-CCNC: 27 U/L — SIGNIFICANT CHANGE UP
LDH CSF L TO P-CCNC: 27 U/L — SIGNIFICANT CHANGE UP
LDH FLD-CCNC: 27 U/L — SIGNIFICANT CHANGE UP
LDH FLD-CCNC: 27 U/L — SIGNIFICANT CHANGE UP
LDH SERPL L TO P-CCNC: 611 U/L — HIGH (ref 135–225)
LDH SERPL L TO P-CCNC: 611 U/L — HIGH (ref 135–225)
LDH SERPL L TO P-CCNC: 773 U/L — SIGNIFICANT CHANGE UP
LDH SERPL L TO P-CCNC: 773 U/L — SIGNIFICANT CHANGE UP
LDH SERPL L TO P-CCNC: 838 U/L — HIGH (ref 135–225)
LDH SERPL L TO P-CCNC: 838 U/L — HIGH (ref 135–225)
LEUKOCYTE ESTERASE UR-ACNC: NEGATIVE — SIGNIFICANT CHANGE UP
LEUKOCYTE ESTERASE UR-ACNC: NEGATIVE — SIGNIFICANT CHANGE UP
LYMPHOCYTES # BLD AUTO: 1.62 K/UL — SIGNIFICANT CHANGE UP (ref 1–3.3)
LYMPHOCYTES # BLD AUTO: 1.62 K/UL — SIGNIFICANT CHANGE UP (ref 1–3.3)
LYMPHOCYTES # BLD AUTO: 2.96 K/UL — SIGNIFICANT CHANGE UP (ref 1–3.3)
LYMPHOCYTES # BLD AUTO: 2.96 K/UL — SIGNIFICANT CHANGE UP (ref 1–3.3)
LYMPHOCYTES # BLD AUTO: 34.8 % — SIGNIFICANT CHANGE UP (ref 13–44)
LYMPHOCYTES # BLD AUTO: 34.8 % — SIGNIFICANT CHANGE UP (ref 13–44)
LYMPHOCYTES # BLD AUTO: 47.3 % — HIGH (ref 13–44)
LYMPHOCYTES # BLD AUTO: 47.3 % — HIGH (ref 13–44)
LYMPHOCYTES # FLD: 33 % — SIGNIFICANT CHANGE UP
LYMPHOCYTES # FLD: 33 % — SIGNIFICANT CHANGE UP
LYMPHOCYTES # FLD: 71 % — SIGNIFICANT CHANGE UP
LYMPHOCYTES # FLD: 71 % — SIGNIFICANT CHANGE UP
M PNEUMO DNA SPEC QL NAA+PROBE: SIGNIFICANT CHANGE UP
M PNEUMO DNA SPEC QL NAA+PROBE: SIGNIFICANT CHANGE UP
MCHC RBC-ENTMCNC: 30.3 PG — SIGNIFICANT CHANGE UP (ref 27–34)
MCHC RBC-ENTMCNC: 30.3 PG — SIGNIFICANT CHANGE UP (ref 27–34)
MCHC RBC-ENTMCNC: 30.5 PG — SIGNIFICANT CHANGE UP (ref 27–34)
MCHC RBC-ENTMCNC: 30.5 PG — SIGNIFICANT CHANGE UP (ref 27–34)
MCHC RBC-ENTMCNC: 33.6 GM/DL — SIGNIFICANT CHANGE UP (ref 32–36)
MCHC RBC-ENTMCNC: 33.6 GM/DL — SIGNIFICANT CHANGE UP (ref 32–36)
MCHC RBC-ENTMCNC: 33.8 GM/DL — SIGNIFICANT CHANGE UP (ref 32–36)
MCHC RBC-ENTMCNC: 33.8 GM/DL — SIGNIFICANT CHANGE UP (ref 32–36)
MCV RBC AUTO: 90.1 FL — SIGNIFICANT CHANGE UP (ref 80–100)
MCV RBC AUTO: 90.1 FL — SIGNIFICANT CHANGE UP (ref 80–100)
MCV RBC AUTO: 90.2 FL — SIGNIFICANT CHANGE UP (ref 80–100)
MCV RBC AUTO: 90.2 FL — SIGNIFICANT CHANGE UP (ref 80–100)
MESOTHL CELL # FLD: 0 % — SIGNIFICANT CHANGE UP
MONOCYTES # BLD AUTO: 0.27 K/UL — SIGNIFICANT CHANGE UP (ref 0–0.9)
MONOCYTES # BLD AUTO: 0.27 K/UL — SIGNIFICANT CHANGE UP (ref 0–0.9)
MONOCYTES # BLD AUTO: 0.5 K/UL — SIGNIFICANT CHANGE UP (ref 0–0.9)
MONOCYTES # BLD AUTO: 0.5 K/UL — SIGNIFICANT CHANGE UP (ref 0–0.9)
MONOCYTES NFR BLD AUTO: 5.8 % — SIGNIFICANT CHANGE UP (ref 2–14)
MONOCYTES NFR BLD AUTO: 5.8 % — SIGNIFICANT CHANGE UP (ref 2–14)
MONOCYTES NFR BLD AUTO: 8 % — SIGNIFICANT CHANGE UP (ref 2–14)
MONOCYTES NFR BLD AUTO: 8 % — SIGNIFICANT CHANGE UP (ref 2–14)
MONOS+MACROS # FLD: 10 % — SIGNIFICANT CHANGE UP
MONOS+MACROS # FLD: 10 % — SIGNIFICANT CHANGE UP
MONOS+MACROS # FLD: 15 % — SIGNIFICANT CHANGE UP
MONOS+MACROS # FLD: 15 % — SIGNIFICANT CHANGE UP
MYOGLOBIN SERPL-MCNC: 220 NG/ML — HIGH (ref 28–72)
MYOGLOBIN SERPL-MCNC: 220 NG/ML — HIGH (ref 28–72)
N MEN DNA SPEC QL NAA+PROBE: SIGNIFICANT CHANGE UP
N MEN DNA SPEC QL NAA+PROBE: SIGNIFICANT CHANGE UP
NEUTROPHILS # BLD AUTO: 2.66 K/UL — SIGNIFICANT CHANGE UP (ref 1.8–7.4)
NEUTROPHILS # BLD AUTO: 2.66 K/UL — SIGNIFICANT CHANGE UP (ref 1.8–7.4)
NEUTROPHILS # BLD AUTO: 2.69 K/UL — SIGNIFICANT CHANGE UP (ref 1.8–7.4)
NEUTROPHILS # BLD AUTO: 2.69 K/UL — SIGNIFICANT CHANGE UP (ref 1.8–7.4)
NEUTROPHILS NFR BLD AUTO: 42.4 % — LOW (ref 43–77)
NEUTROPHILS NFR BLD AUTO: 42.4 % — LOW (ref 43–77)
NEUTROPHILS NFR BLD AUTO: 57.6 % — SIGNIFICANT CHANGE UP (ref 43–77)
NEUTROPHILS NFR BLD AUTO: 57.6 % — SIGNIFICANT CHANGE UP (ref 43–77)
NEUTROPHILS-BODY FLUID: 14 % — SIGNIFICANT CHANGE UP
NEUTROPHILS-BODY FLUID: 14 % — SIGNIFICANT CHANGE UP
NEUTROPHILS-BODY FLUID: 57 % — SIGNIFICANT CHANGE UP
NEUTROPHILS-BODY FLUID: 57 % — SIGNIFICANT CHANGE UP
NIGHT BLUE STAIN TISS: SIGNIFICANT CHANGE UP
NIGHT BLUE STAIN TISS: SIGNIFICANT CHANGE UP
NITRITE UR-MCNC: NEGATIVE — SIGNIFICANT CHANGE UP
NITRITE UR-MCNC: NEGATIVE — SIGNIFICANT CHANGE UP
NRBC # BLD: 0 /100 WBCS — SIGNIFICANT CHANGE UP (ref 0–0)
NRBC # FLD: 0 K/UL — SIGNIFICANT CHANGE UP (ref 0–0)
OSMOLALITY SERPL: 273 MOSM/KG — LOW (ref 275–295)
OSMOLALITY SERPL: 273 MOSM/KG — LOW (ref 275–295)
OTHER CELLS FLD MANUAL: 0 % — SIGNIFICANT CHANGE UP
PARECHOVIRUS A RNA SPEC QL NAA+PROBE: SIGNIFICANT CHANGE UP
PARECHOVIRUS A RNA SPEC QL NAA+PROBE: SIGNIFICANT CHANGE UP
PH FLD: 8 — SIGNIFICANT CHANGE UP
PH FLD: 8 — SIGNIFICANT CHANGE UP
PH UR: 6 — SIGNIFICANT CHANGE UP (ref 5–8)
PH UR: 6 — SIGNIFICANT CHANGE UP (ref 5–8)
PLATELET # BLD AUTO: 269 K/UL — SIGNIFICANT CHANGE UP (ref 150–400)
PLATELET # BLD AUTO: 269 K/UL — SIGNIFICANT CHANGE UP (ref 150–400)
PLATELET # BLD AUTO: 273 K/UL — SIGNIFICANT CHANGE UP (ref 150–400)
PLATELET # BLD AUTO: 273 K/UL — SIGNIFICANT CHANGE UP (ref 150–400)
POTASSIUM SERPL-MCNC: 4.5 MMOL/L — SIGNIFICANT CHANGE UP (ref 3.5–5.3)
POTASSIUM SERPL-MCNC: 4.5 MMOL/L — SIGNIFICANT CHANGE UP (ref 3.5–5.3)
POTASSIUM SERPL-MCNC: 4.6 MMOL/L — SIGNIFICANT CHANGE UP (ref 3.5–5.3)
POTASSIUM SERPL-MCNC: 4.6 MMOL/L — SIGNIFICANT CHANGE UP (ref 3.5–5.3)
POTASSIUM SERPL-SCNC: 4.5 MMOL/L — SIGNIFICANT CHANGE UP (ref 3.5–5.3)
POTASSIUM SERPL-SCNC: 4.5 MMOL/L — SIGNIFICANT CHANGE UP (ref 3.5–5.3)
POTASSIUM SERPL-SCNC: 4.6 MMOL/L — SIGNIFICANT CHANGE UP (ref 3.5–5.3)
POTASSIUM SERPL-SCNC: 4.6 MMOL/L — SIGNIFICANT CHANGE UP (ref 3.5–5.3)
PROT ?TM UR-MCNC: 117 MG/DL — SIGNIFICANT CHANGE UP
PROT ?TM UR-MCNC: 117 MG/DL — SIGNIFICANT CHANGE UP
PROT FLD-MCNC: 4.3 G/DL — SIGNIFICANT CHANGE UP
PROT FLD-MCNC: 4.3 G/DL — SIGNIFICANT CHANGE UP
PROT SERPL-MCNC: 7.3 G/DL — SIGNIFICANT CHANGE UP (ref 6–8.3)
PROT SERPL-MCNC: 7.3 G/DL — SIGNIFICANT CHANGE UP (ref 6–8.3)
PROT SERPL-MCNC: 7.4 G/DL — SIGNIFICANT CHANGE UP (ref 6–8.3)
PROT SERPL-MCNC: 7.4 G/DL — SIGNIFICANT CHANGE UP (ref 6–8.3)
PROT UR-MCNC: 100 MG/DL
PROT UR-MCNC: 100 MG/DL
PROT/CREAT UR-RTO: 1.2 RATIO — HIGH (ref 0–0.2)
PROT/CREAT UR-RTO: 1.2 RATIO — HIGH (ref 0–0.2)
PROTHROM AB SERPL-ACNC: 13.7 SEC — HIGH (ref 9.5–13)
PROTHROM AB SERPL-ACNC: 13.7 SEC — HIGH (ref 9.5–13)
PROTHROM AB SERPL-ACNC: 14.4 SEC — HIGH (ref 9.5–13)
PROTHROM AB SERPL-ACNC: 14.4 SEC — HIGH (ref 9.5–13)
RAPID RVP RESULT: SIGNIFICANT CHANGE UP
RAPID RVP RESULT: SIGNIFICANT CHANGE UP
RBC # BLD: 2.72 M/UL — LOW (ref 4.2–5.8)
RBC # BLD: 2.72 M/UL — LOW (ref 4.2–5.8)
RBC # BLD: 3.04 M/UL — LOW (ref 4.2–5.8)
RBC # BLD: 3.04 M/UL — LOW (ref 4.2–5.8)
RBC # BLD: 3.05 M/UL — LOW (ref 4.2–5.8)
RBC # BLD: 3.05 M/UL — LOW (ref 4.2–5.8)
RBC # FLD: 14.4 % — SIGNIFICANT CHANGE UP (ref 10.3–14.5)
RBC CASTS # UR COMP ASSIST: 4 /HPF — SIGNIFICANT CHANGE UP (ref 0–4)
RBC CASTS # UR COMP ASSIST: 4 /HPF — SIGNIFICANT CHANGE UP (ref 0–4)
RCV VOL RI: HIGH CELLS/UL (ref 0–5)
RETICS #: 33.5 K/UL — SIGNIFICANT CHANGE UP (ref 25–125)
RETICS #: 33.5 K/UL — SIGNIFICANT CHANGE UP (ref 25–125)
RETICS/RBC NFR: 1.2 % — SIGNIFICANT CHANGE UP (ref 0.5–2.5)
RETICS/RBC NFR: 1.2 % — SIGNIFICANT CHANGE UP (ref 0.5–2.5)
REVIEW: SIGNIFICANT CHANGE UP
REVIEW: SIGNIFICANT CHANGE UP
RH IG SCN BLD-IMP: POSITIVE — SIGNIFICANT CHANGE UP
RH IG SCN BLD-IMP: POSITIVE — SIGNIFICANT CHANGE UP
RIBOSOMAL P AB SER-ACNC: 0.2 AI — SIGNIFICANT CHANGE UP
RIBOSOMAL P AB SER-ACNC: 0.2 AI — SIGNIFICANT CHANGE UP
RSV RNA SPEC QL NAA+PROBE: SIGNIFICANT CHANGE UP
RSV RNA SPEC QL NAA+PROBE: SIGNIFICANT CHANGE UP
RV+EV RNA SPEC QL NAA+PROBE: SIGNIFICANT CHANGE UP
RV+EV RNA SPEC QL NAA+PROBE: SIGNIFICANT CHANGE UP
S PNEUM DNA SPEC QL NAA+PROBE: SIGNIFICANT CHANGE UP
S PNEUM DNA SPEC QL NAA+PROBE: SIGNIFICANT CHANGE UP
SARS-COV-2 RNA SPEC QL NAA+PROBE: SIGNIFICANT CHANGE UP
SARS-COV-2 RNA SPEC QL NAA+PROBE: SIGNIFICANT CHANGE UP
SODIUM SERPL-SCNC: 123 MMOL/L — LOW (ref 135–145)
SODIUM SERPL-SCNC: 123 MMOL/L — LOW (ref 135–145)
SODIUM SERPL-SCNC: 125 MMOL/L — LOW (ref 135–145)
SODIUM SERPL-SCNC: 125 MMOL/L — LOW (ref 135–145)
SP GR SPEC: 1.02 — SIGNIFICANT CHANGE UP (ref 1–1.03)
SP GR SPEC: 1.02 — SIGNIFICANT CHANGE UP (ref 1–1.03)
SPECIMEN SOURCE FLD: 330 — SIGNIFICANT CHANGE UP
SPECIMEN SOURCE FLD: 330 — SIGNIFICANT CHANGE UP
SPECIMEN SOURCE: SIGNIFICANT CHANGE UP
SQUAMOUS # UR AUTO: 1 /HPF — SIGNIFICANT CHANGE UP (ref 0–5)
SQUAMOUS # UR AUTO: 1 /HPF — SIGNIFICANT CHANGE UP (ref 0–5)
TOTAL CELLS COUNTED, BODY FLUID: 100 CELLS — SIGNIFICANT CHANGE UP
TOTAL NUCLEATED CELL COUNT, BODY FLUID: 330 CELLS/UL — HIGH (ref 0–5)
TOTAL NUCLEATED CELL COUNT, BODY FLUID: 330 CELLS/UL — HIGH (ref 0–5)
TOTAL NUCLEATED CELL COUNT, BODY FLUID: 5683 CELLS/UL — HIGH (ref 0–5)
TOTAL NUCLEATED CELL COUNT, BODY FLUID: 5683 CELLS/UL — HIGH (ref 0–5)
TUBE TYPE: SIGNIFICANT CHANGE UP
UROBILINOGEN FLD QL: 0.2 MG/DL — SIGNIFICANT CHANGE UP (ref 0.2–1)
UROBILINOGEN FLD QL: 0.2 MG/DL — SIGNIFICANT CHANGE UP (ref 0.2–1)
VZV DNA CSF QL NAA+PROBE: SIGNIFICANT CHANGE UP
VZV DNA CSF QL NAA+PROBE: SIGNIFICANT CHANGE UP
WBC # BLD: 4.66 K/UL — SIGNIFICANT CHANGE UP (ref 3.8–10.5)
WBC # BLD: 4.66 K/UL — SIGNIFICANT CHANGE UP (ref 3.8–10.5)
WBC # BLD: 6.26 K/UL — SIGNIFICANT CHANGE UP (ref 3.8–10.5)
WBC # BLD: 6.26 K/UL — SIGNIFICANT CHANGE UP (ref 3.8–10.5)
WBC # FLD AUTO: 4.66 K/UL — SIGNIFICANT CHANGE UP (ref 3.8–10.5)
WBC # FLD AUTO: 4.66 K/UL — SIGNIFICANT CHANGE UP (ref 3.8–10.5)
WBC # FLD AUTO: 6.26 K/UL — SIGNIFICANT CHANGE UP (ref 3.8–10.5)
WBC # FLD AUTO: 6.26 K/UL — SIGNIFICANT CHANGE UP (ref 3.8–10.5)
WBC UR QL: 2 /HPF — SIGNIFICANT CHANGE UP (ref 0–5)
WBC UR QL: 2 /HPF — SIGNIFICANT CHANGE UP (ref 0–5)

## 2023-12-26 PROCEDURE — 99232 SBSQ HOSP IP/OBS MODERATE 35: CPT

## 2023-12-26 PROCEDURE — 76604 US EXAM CHEST: CPT | Mod: 26,GC

## 2023-12-26 PROCEDURE — 71045 X-RAY EXAM CHEST 1 VIEW: CPT | Mod: 26

## 2023-12-26 PROCEDURE — 99233 SBSQ HOSP IP/OBS HIGH 50: CPT | Mod: 57

## 2023-12-26 PROCEDURE — 99233 SBSQ HOSP IP/OBS HIGH 50: CPT | Mod: GC

## 2023-12-26 PROCEDURE — 99233 SBSQ HOSP IP/OBS HIGH 50: CPT

## 2023-12-26 PROCEDURE — 32557 INSERT CATH PLEURA W/ IMAGE: CPT | Mod: GC

## 2023-12-26 PROCEDURE — 99291 CRITICAL CARE FIRST HOUR: CPT | Mod: GC,25

## 2023-12-26 RX ORDER — HEPARIN SODIUM 5000 [USP'U]/ML
1600 INJECTION INTRAVENOUS; SUBCUTANEOUS
Qty: 25000 | Refills: 0 | Status: DISCONTINUED | OUTPATIENT
Start: 2023-12-26 | End: 2023-12-26

## 2023-12-26 RX ORDER — DORNASE ALFA 1 MG/ML
5 SOLUTION RESPIRATORY (INHALATION) EVERY 12 HOURS
Refills: 0 | Status: DISCONTINUED | OUTPATIENT
Start: 2023-12-26 | End: 2023-12-27

## 2023-12-26 RX ORDER — ACETAMINOPHEN 500 MG
650 TABLET ORAL EVERY 6 HOURS
Refills: 0 | Status: DISCONTINUED | OUTPATIENT
Start: 2023-12-26 | End: 2024-01-03

## 2023-12-26 RX ORDER — HEPARIN SODIUM 5000 [USP'U]/ML
1300 INJECTION INTRAVENOUS; SUBCUTANEOUS
Qty: 25000 | Refills: 0 | Status: DISCONTINUED | OUTPATIENT
Start: 2023-12-26 | End: 2024-01-10

## 2023-12-26 RX ORDER — DEXMEDETOMIDINE HYDROCHLORIDE IN 0.9% SODIUM CHLORIDE 4 UG/ML
0.2 INJECTION INTRAVENOUS
Qty: 400 | Refills: 0 | Status: DISCONTINUED | OUTPATIENT
Start: 2023-12-26 | End: 2023-12-28

## 2023-12-26 RX ORDER — LANOLIN ALCOHOL/MO/W.PET/CERES
3 CREAM (GRAM) TOPICAL
Refills: 0 | Status: DISCONTINUED | OUTPATIENT
Start: 2023-12-26 | End: 2024-01-29

## 2023-12-26 RX ORDER — SODIUM CHLORIDE 9 MG/ML
1000 INJECTION, SOLUTION INTRAVENOUS
Refills: 0 | Status: DISCONTINUED | OUTPATIENT
Start: 2023-12-26 | End: 2023-12-27

## 2023-12-26 RX ORDER — BENZOCAINE AND MENTHOL 5; 1 G/100ML; G/100ML
1 LIQUID ORAL
Refills: 0 | Status: DISCONTINUED | OUTPATIENT
Start: 2023-12-26 | End: 2024-01-29

## 2023-12-26 RX ORDER — HEPARIN SODIUM 5000 [USP'U]/ML
5500 INJECTION INTRAVENOUS; SUBCUTANEOUS EVERY 6 HOURS
Refills: 0 | Status: DISCONTINUED | OUTPATIENT
Start: 2023-12-26 | End: 2024-01-10

## 2023-12-26 RX ORDER — HEPARIN SODIUM 5000 [USP'U]/ML
2500 INJECTION INTRAVENOUS; SUBCUTANEOUS EVERY 6 HOURS
Refills: 0 | Status: DISCONTINUED | OUTPATIENT
Start: 2023-12-26 | End: 2024-01-10

## 2023-12-26 RX ORDER — SODIUM CHLORIDE 9 MG/ML
30 INJECTION INTRAMUSCULAR; INTRAVENOUS; SUBCUTANEOUS EVERY 12 HOURS
Refills: 0 | Status: DISCONTINUED | OUTPATIENT
Start: 2023-12-26 | End: 2023-12-27

## 2023-12-26 RX ORDER — CHLORHEXIDINE GLUCONATE 213 G/1000ML
1 SOLUTION TOPICAL DAILY
Refills: 0 | Status: DISCONTINUED | OUTPATIENT
Start: 2023-12-26 | End: 2024-01-29

## 2023-12-26 RX ORDER — ALTEPLASE 100 MG
10 KIT INTRAVENOUS EVERY 12 HOURS
Refills: 0 | Status: DISCONTINUED | OUTPATIENT
Start: 2023-12-26 | End: 2023-12-27

## 2023-12-26 RX ADMIN — Medication 200 MILLIGRAM(S): at 05:33

## 2023-12-26 RX ADMIN — Medication 200 MILLIGRAM(S): at 17:32

## 2023-12-26 RX ADMIN — LIDOCAINE 5 MILLILITER(S): 4 CREAM TOPICAL at 08:20

## 2023-12-26 RX ADMIN — Medication 2 DROP(S): at 17:32

## 2023-12-26 RX ADMIN — SODIUM CHLORIDE 1 GRAM(S): 9 INJECTION INTRAMUSCULAR; INTRAVENOUS; SUBCUTANEOUS at 21:02

## 2023-12-26 RX ADMIN — SODIUM CHLORIDE 1000 MILLILITER(S): 9 INJECTION, SOLUTION INTRAVENOUS at 18:46

## 2023-12-26 RX ADMIN — Medication 650 MILLIGRAM(S): at 17:32

## 2023-12-26 RX ADMIN — Medication 250 MILLIGRAM(S): at 05:34

## 2023-12-26 RX ADMIN — HEPARIN SODIUM 1300 UNIT(S)/HR: 5000 INJECTION INTRAVENOUS; SUBCUTANEOUS at 19:00

## 2023-12-26 RX ADMIN — SODIUM CHLORIDE 100 MILLILITER(S): 9 INJECTION, SOLUTION INTRAVENOUS at 17:33

## 2023-12-26 RX ADMIN — Medication 3 MILLIGRAM(S): at 21:02

## 2023-12-26 RX ADMIN — HEPARIN SODIUM 1600 UNIT(S)/HR: 5000 INJECTION INTRAVENOUS; SUBCUTANEOUS at 08:32

## 2023-12-26 RX ADMIN — CEFEPIME 100 MILLIGRAM(S): 1 INJECTION, POWDER, FOR SOLUTION INTRAMUSCULAR; INTRAVENOUS at 05:34

## 2023-12-26 RX ADMIN — Medication 250 MILLIGRAM(S): at 17:33

## 2023-12-26 RX ADMIN — Medication 2 DROP(S): at 05:34

## 2023-12-26 RX ADMIN — HEPARIN SODIUM 1300 UNIT(S)/HR: 5000 INJECTION INTRAVENOUS; SUBCUTANEOUS at 19:30

## 2023-12-26 RX ADMIN — CEFEPIME 100 MILLIGRAM(S): 1 INJECTION, POWDER, FOR SOLUTION INTRAMUSCULAR; INTRAVENOUS at 13:57

## 2023-12-26 RX ADMIN — Medication 650 MILLIGRAM(S): at 18:32

## 2023-12-26 RX ADMIN — CEFEPIME 100 MILLIGRAM(S): 1 INJECTION, POWDER, FOR SOLUTION INTRAMUSCULAR; INTRAVENOUS at 21:03

## 2023-12-26 RX ADMIN — SODIUM CHLORIDE 100 MILLILITER(S): 9 INJECTION, SOLUTION INTRAVENOUS at 19:30

## 2023-12-26 RX ADMIN — SODIUM CHLORIDE 1 GRAM(S): 9 INJECTION INTRAMUSCULAR; INTRAVENOUS; SUBCUTANEOUS at 13:58

## 2023-12-26 RX ADMIN — Medication 200 MILLIGRAM(S): at 23:50

## 2023-12-26 RX ADMIN — SODIUM CHLORIDE 1 GRAM(S): 9 INJECTION INTRAMUSCULAR; INTRAVENOUS; SUBCUTANEOUS at 05:33

## 2023-12-26 RX ADMIN — CHLORHEXIDINE GLUCONATE 1 APPLICATION(S): 213 SOLUTION TOPICAL at 17:34

## 2023-12-26 RX ADMIN — BENZOCAINE AND MENTHOL 1 LOZENGE: 5; 1 LIQUID ORAL at 08:54

## 2023-12-26 NOTE — DIETITIAN INITIAL EVALUATION ADULT - FACTORS AFF FOOD INTAKE
reports of "swollen tongue" & "sores in mouth", as well as "black spots on roof of mouth"/other (specify)

## 2023-12-26 NOTE — PROGRESS NOTE ADULT - ASSESSMENT
28 years old male with h/o Lupus ( diagnosed in 09/2023, on Plaquenil present to [place] ED on 12/12/23  with complain of chest pain and SOB. Patient reported left sided pleuritic chest pain which started 3 days ago. Pain is progressively worsened, associated with SOB and cough. Patient was seen in OSH ED and was prescribed antibiotics. Patient reported significantly worsening of left sided pleuritic chest pain today. No fever or chills. Patient reported loss of appetite and had a few episode of diarrhea for last 2 days. CTA chest with acute right upper lobe and left lower lobe segmental/subsegmental pulmonary emboli. No CT evidence of right heart strain. New bilateral lower lobe consolidations with areas of central clearing. Pneumonia and pulmonary infarcts are in the differential. New bilateral pleural effusions, small on the left and trace on the right. Bilateral axillary and supraclavicular adenopathy of unclear etiology. Patient was started on heparin ggt transitioned to eliquis on 12/19,  patient with worsening SOB/ hypoxia requiring nasal cannula oxygen supplementation. 12/20  Repeat Xray shows increased moderate left pleural effusion and compressive atelectasis trace right effusion and linear atelectasis. Thoracic team consulted for possible pigtail insertion Bedside US: Large left effusion with septations. Right simple effusion , + pericardial effusion- lower extremity dopplers negative for DVT.    # Pulm effusion/ Fever   S/P thoracentesis , followupp results   cont to hold heprin   TS care appreciated   O2 supplement   monitor output   Zosyn for now       # Fever  SIRS   ICU care appreciated   Abx       # [facility]atremia/ Seizure   [facility]       #PE   on heparin , resume after thoracentesis   DVT negative  Heme onceval   ailin lupus related     #Hxof lupus  on hydroxychloroquine   Heme eval called   Rheum eval   steroids per rheum     DVT andgIPPX 28 years old male with h/o Lupus ( diagnosed in 09/2023, on Plaquenil present to [place] ED on 12/12/23  with complain of chest pain and SOB. Patient reported left sided pleuritic chest pain which started 3 days ago. Pain is progressively worsened, associated with SOB and cough. Patient was seen in OSH ED and was prescribed antibiotics. Patient reported significantly worsening of left sided pleuritic chest pain today. No fever or chills. Patient reported loss of appetite and had a few episode of diarrhea for last 2 days. CTA chest with acute right upper lobe and left lower lobe segmental/subsegmental pulmonary emboli. No CT evidence of right heart strain. New bilateral lower lobe consolidations with areas of central clearing. Pneumonia and pulmonary infarcts are in the differential. New bilateral pleural effusions, small on the left and trace on the right. Bilateral axillary and supraclavicular adenopathy of unclear etiology. Patient was started on heparin ggt transitioned to eliquis on 12/19,  patient with worsening SOB/ hypoxia requiring nasal cannula oxygen supplementation. 12/20  Repeat Xray shows increased moderate left pleural effusion and compressive atelectasis trace right effusion and linear atelectasis. Thoracic team consulted for possible pigtail insertion Bedside US: Large left effusion with septations. Right simple effusion , + pericardial effusion- lower extremity dopplers negative for DVT.    # Pulm effusion/ Fever   S/P thoracentesis , followupp results   cont to hold heprin   TS care appreciated   O2 supplement   monitor output   Zosyn for now       # Fever  SIRS   ICU care appreciated   Abx       # Hyponatremia/ Seizure   [facility]       #PE   on heparin , resume after thoracentesis   DVT negative  Heme onceval   ailin lupus related     #Hxof lupus  on hydroxychloroquine   Heme eval called   Rheum eval   steroids per rheum     DVT andgIPPX

## 2023-12-26 NOTE — PROGRESS NOTE ADULT - SUBJECTIVE AND OBJECTIVE BOX
Name of Patient : TAYLA MARIE  MRN: [id number]  Date of visit: 23       Subjective: Patient seen and examined. No new events except as noted.   chest tube    REVIEW OF SYSTEMS:    CONSTITUTIONAL: No weakness, fevers or chills  EYES/ENT: No visual changes;  No vertigo or throat pain   NECK: No pain or stiffness  RESPIRATORY: No cough, wheezing, hemoptysis; No shortness of breath  CARDIOVASCULAR: No chest pain or palpitations  GASTROINTESTINAL: No abdominal or epigastric pain. No nausea, vomiting, or hematemesis; No diarrhea or constipation. No melena or hematochezia.  GENITOURINARY: No dysuria, frequency or hematuria  NEUROLOGICAL: No numbness or weakness  SKIN: No itching, burning, rashes, or lesions   All other review of systems is negative unless indicated above.    MEDICATIONS:  MEDICATIONS  (STANDING):  alteplase  Injectable for Pleural Effusion 10 milliGRAM(s) IntraPleural. every 12 hours  cefepime   IVPB 2000 milliGRAM(s) IV Intermittent every 8 hours  chlorhexidine 2% Cloths 1 Application(s) Topical daily  ciprofloxacin  0.3% Ophthalmic Solution for Otic Use 2 Drop(s) Both Ears two times a day  dexMEDEtomidine Infusion 0.2 MICROgram(s)/kG/Hr (3.46 mL/Hr) IV Continuous <Continuous>  dornase laura Solution for Pleural Effusion 5 milliGRAM(s) IntraPleural. every 12 hours  heparin  Infusion. 1300 Unit(s)/Hr (13 mL/Hr) IV Continuous <Continuous>  hydroxychloroquine 200 milliGRAM(s) Oral two times a day  lactated ringers. 1000 milliLiter(s) (100 mL/Hr) IV Continuous <Continuous>  lactated ringers. 1000 milliLiter(s) (1000 mL/Hr) IV Continuous <Continuous>  lidocaine   4% Patch 1 Patch Transdermal daily  melatonin 3 milliGRAM(s) Oral <User Schedule>  sodium chloride 1 Gram(s) Oral three times a day  sodium chloride 0.9% Solution for Pleural Effusion 30 milliLiter(s) IntraPleural. every 12 hours  vancomycin  IVPB 1000 milliGRAM(s) IV Intermittent every 12 hours      PHYSICAL EXAM:  T(C): 36.3 (23 @ 20:00), Max: 37.9 (23 @ 16:00)  HR: 82 (23 @ 22:00) (80 - 103)  BP: 117/75 (23 @ 22:00) (110/68 - 137/84)  RR: 16 (23 @ 22:00) (15 - 26)  SpO2: 100% (23 @ 22:00) (98% - 100%)  Wt(kg): --  I&O's Summary    25 Dec 2023 07:01  -  26 Dec 2023 07:  --------------------------------------------------------  IN: 848 mL / OUT: 1455 mL / NET: -607 mL    26 Dec 2023 07:  -  26 Dec 2023 23:01  --------------------------------------------------------  IN: 2503 mL / OUT: 1795 mL / NET: 708 mL          Appearance: Normal	  HEENT:  PERRLA   Lymphatic: No lymphadenopathy   Cardiovascular: Normal S1 S2, no JVD  Respiratory: normal effort , clear  Gastrointestinal:  Soft, Non-tender  Skin: No rashes,  warm to touch  Psychiatry:  Mood & affect appropriate  Musculuskeletal: No edema    recent labs, Imaging and EKGs personally reviewed     23 @ 07:01  -  23 @ 07:00  --------------------------------------------------------  IN: 848 mL / OUT: 1455 mL / NET: -607 mL    23 @ 07:01  -  23 @ 23:01  --------------------------------------------------------  IN: 2503 mL / OUT: 1795 mL / NET: 708 mL                            9.3    6.26  )-----------( 273      ( 26 Dec 2023 12:10 )             27.5                   125<L>  |  94<L>  |  16  ----------------------------<  115<H>  4.5   |  22  |  0.90    Ca    8.3<L>      26 Dec 2023 12:10  Phos  2.3       Mg     1.90         TPro  7.3  /  Alb  2.7<L>  /  TBili  0.6  /  DBili  x   /  AST  202<H>  /  ALT  129<H>  /  AlkPhos  75      PT/INR - ( 26 Dec 2023 12:10 )   PT: 13.7 sec;   INR: 1.23 ratio         PTT - ( 26 Dec 2023 12:10 )  PTT:29.2 sec       CARDIAC MARKERS ( 26 Dec 2023 12:10 )  x     / x     / 1094 U/L / x     / x      CARDIAC MARKERS ( 26 Dec 2023 00:50 )  x     / x     / 1203 U/L / x     / x      CARDIAC MARKERS ( 25 Dec 2023 10:10 )  x     / x     / 1188 U/L / x     / 1.3 ng/mL  CARDIAC MARKERS ( 25 Dec 2023 03:15 )  x     / x     / 1031 U/L / x     / x                  Urinalysis Basic - ( 26 Dec 2023 19:40 )    Color: Yellow / Appearance: Clear / S.020 / pH: x  Gluc: x / Ketone: Negative mg/dL  / Bili: Negative / Urobili: 0.2 mg/dL   Blood: x / Protein: 100 mg/dL / Nitrite: Negative   Leuk Esterase: Negative / RBC: 4 /HPF / WBC 2 /HPF   Sq Epi: x / Non Sq Epi: 1 /HPF / Bacteria: Negative /HPF             Name of Patient : TAYLA MARIE  MRN: [id number]  Date of visit: 23       Subjective: Patient seen and examined. No new events except as noted.   chest tube    REVIEW OF SYSTEMS:    CONSTITUTIONAL: No weakness, fevers or chills  EYES/ENT: No visual changes;  No vertigo or throat pain   NECK: No pain or stiffness  RESPIRATORY: No cough, wheezing, hemoptysis; No shortness of breath  CARDIOVASCULAR: No chest pain or palpitations  GASTROINTESTINAL: No abdominal or epigastric pain. No nausea, vomiting, or hematemesis; No diarrhea or constipation. No melena or hematochezia.  GENITOURINARY: No dysuria, frequency or hematuria  NEUROLOGICAL: No numbness or weakness  SKIN: No itching, burning, rashes, or lesions   All other review of systems is negative unless indicated above.    MEDICATIONS:  MEDICATIONS  (STANDING):  alteplase  Injectable for Pleural Effusion 10 milliGRAM(s) IntraPleural. every 12 hours  cefepime   IVPB 2000 milliGRAM(s) IV Intermittent every 8 hours  chlorhexidine 2% Cloths 1 Application(s) Topical daily  ciprofloxacin  0.3% Ophthalmic Solution for Otic Use 2 Drop(s) Both Ears two times a day  dexMEDEtomidine Infusion 0.2 MICROgram(s)/kG/Hr (3.46 mL/Hr) IV Continuous <Continuous>  dornase laura Solution for Pleural Effusion 5 milliGRAM(s) IntraPleural. every 12 hours  heparin  Infusion. 1300 Unit(s)/Hr (13 mL/Hr) IV Continuous <Continuous>  hydroxychloroquine 200 milliGRAM(s) Oral two times a day  lactated ringers. 1000 milliLiter(s) (100 mL/Hr) IV Continuous <Continuous>  lactated ringers. 1000 milliLiter(s) (1000 mL/Hr) IV Continuous <Continuous>  lidocaine   4% Patch 1 Patch Transdermal daily  melatonin 3 milliGRAM(s) Oral <User Schedule>  sodium chloride 1 Gram(s) Oral three times a day  sodium chloride 0.9% Solution for Pleural Effusion 30 milliLiter(s) IntraPleural. every 12 hours  vancomycin  IVPB 1000 milliGRAM(s) IV Intermittent every 12 hours      PHYSICAL EXAM:  T(C): 36.3 (23 @ 20:00), Max: 37.9 (23 @ 16:00)  HR: 82 (23 @ 22:00) (80 - 103)  BP: 117/75 (23 @ 22:00) (110/68 - 137/84)  RR: 16 (23 @ 22:00) (15 - 26)  SpO2: 100% (23 @ 22:00) (98% - 100%)  Wt(kg): --  I&O's Summary    25 Dec 2023 07:01  -  26 Dec 2023 07:  --------------------------------------------------------  IN: 848 mL / OUT: 1455 mL / NET: -607 mL    26 Dec 2023 07:  -  26 Dec 2023 23:01  --------------------------------------------------------  IN: 2503 mL / OUT: 1795 mL / NET: 708 mL          Appearance: Normal	  HEENT:  PERRLA   Lymphatic: No lymphadenopathy   Cardiovascular: Normal S1 S2, no JVD  Respiratory: normal effort , clear  Gastrointestinal:  Soft, Non-tender  Skin: No rashes,  warm to touch  Psychiatry:  Mood & affect appropriate  Musculuskeletal: No edema    recent labs, Imaging and EKGs personally reviewed     23 @ 07:01  -  23 @ 07:00  --------------------------------------------------------  IN: 848 mL / OUT: 1455 mL / NET: -607 mL    23 @ 07:01  -  23 @ 23:01  --------------------------------------------------------  IN: 2503 mL / OUT: 1795 mL / NET: 708 mL                            9.3    6.26  )-----------( 273      ( 26 Dec 2023 12:10 )             27.5                   125<L>  |  94<L>  |  16  ----------------------------<  115<H>  4.5   |  22  |  0.90    Ca    8.3<L>      26 Dec 2023 12:10  Phos  2.3       Mg     1.90         TPro  7.3  /  Alb  2.7<L>  /  TBili  0.6  /  DBili  x   /  AST  202<H>  /  ALT  129<H>  /  AlkPhos  75      PT/INR - ( 26 Dec 2023 12:10 )   PT: 13.7 sec;   INR: 1.23 ratio         PTT - ( 26 Dec 2023 12:10 )  PTT:29.2 sec       CARDIAC MARKERS ( 26 Dec 2023 12:10 )  x     / x     / 1094 U/L / x     / x      CARDIAC MARKERS ( 26 Dec 2023 00:50 )  x     / x     / 1203 U/L / x     / x      CARDIAC MARKERS ( 25 Dec 2023 10:10 )  x     / x     / 1188 U/L / x     / 1.3 ng/mL  CARDIAC MARKERS ( 25 Dec 2023 03:15 )  x     / x     / 1031 U/L / x     / x                  Urinalysis Basic - ( 26 Dec 2023 19:40 )    Color: Yellow / Appearance: Clear / S.020 / pH: x  Gluc: x / Ketone: Negative mg/dL  / Bili: Negative / Urobili: 0.2 mg/dL   Blood: x / Protein: 100 mg/dL / Nitrite: Negative   Leuk Esterase: Negative / RBC: 4 /HPF / WBC 2 /HPF   Sq Epi: x / Non Sq Epi: 1 /HPF / Bacteria: Negative /HPF

## 2023-12-26 NOTE — PROGRESS NOTE ADULT - SUBJECTIVE AND OBJECTIVE BOX
Infectious Diseases Follow Up:    Patient is a 28y old  Male who presents with a chief complaint of Dyspnea and CP (25 Dec 2023 16:21)      Interval History/ROS:  Persistent fevers yesterday to 104, pt with seizure, transferred to the MICU, S/p LP and thoracentesis  Pt was changed to Vancomycin/Cefepime, fevers improved ON.   Seen at bedside with mother, only complaint is chest pain. Pt not very talkative, seems to have difficulty with speaking.     Allergies  No Known Allergies        ANTIMICROBIALS:  cefepime   IVPB 2000 every 8 hours  hydroxychloroquine 200 two times a day  vancomycin  IVPB 1000 every 12 hours      Current Abx:     Previous Abx     OTHER MEDS:  MEDICATIONS  (STANDING):  albuterol/ipratropium for Nebulization 3 every 6 hours PRN  guaiFENesin Oral Liquid (Sugar-Free) 200 every 6 hours PRN  melatonin 3 at bedtime      Vital Signs Last 24 Hrs  T(C): 37.8 (26 Dec 2023 12:00), Max: 38.3 (25 Dec 2023 16:00)  T(F): 100.1 (26 Dec 2023 12:00), Max: 101 (25 Dec 2023 16:00)  HR: 103 (26 Dec 2023 13:00) (80 - 103)  BP: 127/85 (26 Dec 2023 13:00) (116/72 - 135/85)  BP(mean): 95 (26 Dec 2023 13:00) (79 - 101)  RR: 22 (26 Dec 2023 13:00) (15 - 26)  SpO2: 99% (26 Dec 2023 13:00) (91% - 100%)    Parameters below as of 26 Dec 2023 12:00  Patient On (Oxygen Delivery Method): room air        PHYSICAL EXAM:  GENERAL: NAD, well-developed  HEAD:  Atraumatic, Normocephalic  EYES: EOMI, PERRLA, conjunctiva and sclera clear  CHEST/LUNG: Clear to auscultation bilaterally; No wheeze  HEART: Regular rate and rhythm; No murmurs, rubs, or gallops  ABDOMEN: Soft, Nontender, Nondistended; Bowel sounds present  EXTREMITIES:  2+ Peripheral Pulses, No clubbing, cyanosis, or edema  PSYCH: AAOx3                        9.3    6.26  )-----------( 273      ( 26 Dec 2023 12:10 )             27.5       12-26    125<L>  |  94<L>  |  16  ----------------------------<  115<H>  4.5   |  22  |  0.90    Ca    8.3<L>      26 Dec 2023 12:10  Phos  2.3     12-25  Mg     1.90     12-25    TPro  7.3  /  Alb  2.7<L>  /  TBili  0.6  /  DBili  x   /  AST  202<H>  /  ALT  129<H>  /  AlkPhos  75  12-26      Urinalysis Basic - ( 26 Dec 2023 12:10 )    Color: x / Appearance: x / SG: x / pH: x  Gluc: 115 mg/dL / Ketone: x  / Bili: x / Urobili: x   Blood: x / Protein: x / Nitrite: x   Leuk Esterase: x / RBC: x / WBC x   Sq Epi: x / Non Sq Epi: x / Bacteria: x        MICROBIOLOGY:  v  Pleural Fl Pleural Fluid  12-25-23   Testing in progress  --  --      .CSF CSF  12-25-23   Testing in progress  --    No polymorphonuclear leukocytes seen  No organisms seen  by cytocentrifuge      .Blood Blood-Venous  12-25-23   No growth at 24 hours  --  --      Pleural Fl Pleural Fluid  12-24-23   No growth  --    polymorphonuclear leukocytes seen  No organisms seen  by cytocentrifuge      Clean Catch Clean Catch (Midstream)  12-23-23   No growth  --    Few polymorphonuclear leukocytes per low power field  Moderate Squamous epithelial cells per low power field  Rare Gram Negative Rods per oil power field      .Blood Blood-Peripheral  12-23-23   No growth at 48 Hours  --  --      .Blood Blood-Peripheral  12-21-23   No growth at 4 days  --  --      .Blood Blood-Peripheral  12-21-23   No growth at 4 days  --  --      .Sputum Sputum  12-20-23   Normal Respiratory Inge present  --    Few Squamous epithelial cells per low power field  Few polymorphonuclear leukocytes per low power field  Few Gram Positive Cocci in Pairs and Chains per oil power field      .Stool Feces  12-15-23   No enteric pathogens isolated.  (Stool culture examined for Salmonella,  Shigella, Campylobacter, Aeromonas, Plesiomonas,  Vibrio, E.coli O157 and Yersinia)  --  --      .Blood Blood-Peripheral  12-14-23   No growth at 5 days  --  --      .Blood Blood-Peripheral  12-14-23   No growth at 5 days  --  --      .Blood Blood-Peripheral  12-12-23   No growth at 5 days  --  --      .Blood Blood-Peripheral  12-12-23   No growth at 5 days  --  --          Rapid RVP Result: NotDetec (12-23 @ 14:34)  Rapid RVP Result: NotDetec (12-21 @ 18:50)        RADIOLOGY:

## 2023-12-26 NOTE — PROGRESS NOTE ADULT - ASSESSMENT
Mr. MARIE is a 28y (1995) man with a PMHx significant for SLE on Hydroxychloroquine initially admitted to [facility] VS on 12/12, found to have b/l PE w/ superimposed PNA, transferred to [facility] on 12/22 for thoracic eval of b/l L > R effusions, L loculated. Patient was on heparin drip. Followed by ID and rheum due to perisistent fevers, no clear source identified (was on Vanc, Zosyn). RRT called on 12/25 for SOB and chest pain. During RRT, patient was witnessed to have an episode of generalized convulsion lasting ~45 seconds that resolved spontaneously, associated with urinary incontinence. Patient given 1mg IV ativan. Patient transferred to MICU and re-evaluated, found to be A&Ox3, sleepy but arousable. Nonfocal exam, but found to have B/L ankle clonus. Patient was started on EEG, prelim read with no seizures. S/p LP, lower suspicion for CNS infection.     Impression:  Episode of witnessed generalized convulsion lasting ~45 seconds that resolved spontaneously, associated with urinary incontinence and confusion afterwards concerning for seizure. First lifetime event. Patient found to be febrile to 104F, tachycardic, hyponatremic to 121. Likely provoked seizure in setting of acute illness and metabolic abnormalities. Prior to event, patient A&Ox3, denied headache.  Lower suspicion for meningoencephalitis (mental status at baseline, denies headache, no nuchal rigidity on exam) however will need to rule out CNS infection given fever and new seizure. No further events overnight.     Recommendations:    [x] CTH noncon, reviewed   [x] s/p Ativan 1mg   [ ] Can discontinue EEG if final report shows no seizures or epileptiform abnormalities. Will monitor off anti-seizure medications for now   [ ] S/p LP- Prelim studies without evidence of CNS infection. glucose 50, protein 52. TNC 6, RBC 40611. CSF PCR panel negative. Will follow remainder of studies   [ ] Obtain MRI brain w and w/o when able  [ ] Seizure, fall and aspiration precautions.  Avoid sleep deprivation.  [ ] Seizure rescue medications for focal seizure lasting >3 min which doesn't resolve and/or any GTC:  ---->1st line: 1mg ativan IVP. May repeat x 1 if doesn't break within 3 minutes. (Max dose 0.1 mg/kg)    [ ] If any seizure activity noted, please note: time, if upper/lower or focal onset symptoms (e.g. head turn, eye deviation, upper/lower tonic/clonic arm initially), vital sign derangements, airway protection, urinary or bowel incontinence, duration of seizure, tongue bite, and/or post-ictal time to return of baseline.   [ ] Given concern for seizure, advise patient to not drive, operate heavy machinery, avoid heights, pools, bathtubs, locked doors until cleared by further follow up outpatient by neurology.  [] Address underlying metabolic, electrolyte abnormalities (hyponatremia), evaluate for infection as you are doing  [] Appreciate ID and rheumatology evaluation  [] Remainder of care per MICU team    Case seen and discussed with Neurology attending Dr. Noland, please await attending attestation.  Mr. MARIE is a 28y (1995) man with a PMHx significant for SLE on Hydroxychloroquine initially admitted to [facility] VS on 12/12, found to have b/l PE w/ superimposed PNA, transferred to [facility] on 12/22 for thoracic eval of b/l L > R effusions, L loculated. Patient was on heparin drip. Followed by ID and rheum due to perisistent fevers, no clear source identified (was on Vanc, Zosyn). RRT called on 12/25 for SOB and chest pain. During RRT, patient was witnessed to have an episode of generalized convulsion lasting ~45 seconds that resolved spontaneously, associated with urinary incontinence. Patient given 1mg IV ativan. Patient transferred to MICU and re-evaluated, found to be A&Ox3, sleepy but arousable. Nonfocal exam, but found to have B/L ankle clonus. Patient was started on EEG, prelim read with no seizures. S/p LP, lower suspicion for CNS infection.     Impression:  Episode of witnessed generalized convulsion lasting ~45 seconds that resolved spontaneously, associated with urinary incontinence and confusion afterwards concerning for seizure. First lifetime event. Patient found to be febrile to 104F, tachycardic, hyponatremic to 121. Likely provoked seizure in setting of acute illness and metabolic abnormalities. Prior to event, patient A&Ox3, denied headache.  Lower suspicion for meningoencephalitis (mental status at baseline, denies headache, no nuchal rigidity on exam) however will need to rule out CNS infection given fever and new seizure. No further events overnight.     Recommendations:    [x] CTH noncon, reviewed   [x] s/p Ativan 1mg   [ ] Can discontinue EEG if final report shows no seizures or epileptiform abnormalities. Will monitor off anti-seizure medications for now   [ ] S/p LP- Prelim studies without evidence of CNS infection. glucose 50, protein 52. TNC 6, RBC 71340. CSF PCR panel negative. Will follow remainder of studies   [ ] Obtain MRI brain w and w/o when able  [ ] Seizure, fall and aspiration precautions.  Avoid sleep deprivation.  [ ] Seizure rescue medications for focal seizure lasting >3 min which doesn't resolve and/or any GTC:  ---->1st line: 1mg ativan IVP. May repeat x 1 if doesn't break within 3 minutes. (Max dose 0.1 mg/kg)    [ ] If any seizure activity noted, please note: time, if upper/lower or focal onset symptoms (e.g. head turn, eye deviation, upper/lower tonic/clonic arm initially), vital sign derangements, airway protection, urinary or bowel incontinence, duration of seizure, tongue bite, and/or post-ictal time to return of baseline.   [ ] Given concern for seizure, advise patient to not drive, operate heavy machinery, avoid heights, pools, bathtubs, locked doors until cleared by further follow up outpatient by neurology.  [] Address underlying metabolic, electrolyte abnormalities (hyponatremia), evaluate for infection as you are doing  [] Appreciate ID and rheumatology evaluation  [] Remainder of care per MICU team    Case seen and discussed with Neurology attending Dr. Noland, please await attending attestation.

## 2023-12-26 NOTE — PROGRESS NOTE ADULT - ATTENDING COMMENTS
pt seen and examined by me personally  agree with above   pt's mother was at the bedside during the visit - she is aware of and in agreement with our impression and plans  will follow as data is collected

## 2023-12-26 NOTE — PROGRESS NOTE ADULT - SUBJECTIVE AND OBJECTIVE BOX
Subjective:    Pt seen and examined at bedside. ROS limited due to patient being intubated/sedated.    MEDICATIONS  (STANDING):  cefepime   IVPB 2000 milliGRAM(s) IV Intermittent every 8 hours  ciprofloxacin  0.3% Ophthalmic Solution for Otic Use 2 Drop(s) Both Ears two times a day  dexMEDEtomidine Infusion 1 MICROgram(s)/kG/Hr (17.3 mL/Hr) IV Continuous <Continuous>  hydroxychloroquine 200 milliGRAM(s) Oral two times a day  lactated ringers. 1000 milliLiter(s) (100 mL/Hr) IV Continuous <Continuous>  lidocaine   4% Patch 1 Patch Transdermal daily  melatonin 3 milliGRAM(s) Oral at bedtime  sodium chloride 1 Gram(s) Oral three times a day  vancomycin  IVPB 1000 milliGRAM(s) IV Intermittent every 12 hours    MEDICATIONS  (PRN):  albuterol/ipratropium for Nebulization 3 milliLiter(s) Nebulizer every 6 hours PRN Shortness of Breath and/or Wheezing  benzocaine/menthol Lozenge 1 Lozenge Oral four times a day PRN Sore Throat  guaiFENesin Oral Liquid (Sugar-Free) 200 milliGRAM(s) Oral every 6 hours PRN Cough  lidocaine 2% Viscous 5 milliLiter(s) Swish and Spit three times a day PRN Mouth Care      Allergies    No Known Allergies    Intolerances        DVT Prophylaxis: [ ] YES [ ] NO      Antibiotics: [ ] YES [ ] NO    Pain Scale (1-10):       Location:    Vital Signs Last 24 Hrs  T(C): 37.8 (26 Dec 2023 12:00), Max: 38.3 (25 Dec 2023 16:00)  T(F): 100.1 (26 Dec 2023 12:00), Max: 101 (25 Dec 2023 16:00)  HR: 99 (26 Dec 2023 14:00) (80 - 103)  BP: 129/86 (26 Dec 2023 14:00) (117/83 - 135/85)  BP(mean): 94 (26 Dec 2023 14:00) (79 - 101)  RR: 21 (26 Dec 2023 14:00) (15 - 26)  SpO2: 99% (26 Dec 2023 14:00) (91% - 100%)    Parameters below as of 26 Dec 2023 12:00  Patient On (Oxygen Delivery Method): room air        Drug Dosing Weight  Height (cm): 165.1 (22 Dec 2023 22:28)  Weight (kg): 69.2 (22 Dec 2023 22:28)  BMI (kg/m2): 25.4 (22 Dec 2023 22:28)  BSA (m2): 1.76 (22 Dec 2023 22:28)    PHYSICAL EXAM:  GENERAL: NAD, well-developed, intubated, sedated  CHEST/LUNG: Clear to auscultation bilaterally; No wheeze  HEART: Regular rate and rhythm; No murmurs, rubs, or gallops  ABDOMEN: Soft, Nontender, Nondistended; Bowel sounds present  EXTREMITIES:  2+ Peripheral Pulses, No clubbing, cyanosis, or edema  PSYCH: unable to assess  NEUROLOGY: non-focal, sedated  SKIN: No rashes or lesions        URINARY CATHETER: [ ] YES [ ] NO     LABS:  CBC Full  -  ( 26 Dec 2023 12:10 )  WBC Count : 6.26 K/uL  RBC Count : 3.05 M/uL  Hemoglobin : 9.3 g/dL  Hematocrit : 27.5 %  Platelet Count - Automated : 273 K/uL  Mean Cell Volume : 90.2 fL  Mean Cell Hemoglobin : 30.5 pg  Mean Cell Hemoglobin Concentration : 33.8 gm/dL  Auto Neutrophil # : 2.66 K/uL  Auto Lymphocyte # : 2.96 K/uL  Auto Monocyte # : 0.50 K/uL  Auto Eosinophil # : 0.01 K/uL  Auto Basophil # : 0.08 K/uL  Auto Neutrophil % : 42.4 %  Auto Lymphocyte % : 47.3 %  Auto Monocyte % : 8.0 %  Auto Eosinophil % : 0.2 %  Auto Basophil % : 1.3 %    12-26    125<L>  |  94<L>  |  16  ----------------------------<  115<H>  4.5   |  22  |  0.90    Ca    8.3<L>      26 Dec 2023 12:10  Phos  2.3     12-25  Mg     1.90     12-25    TPro  7.3  /  Alb  2.7<L>  /  TBili  0.6  /  DBili  x   /  AST  202<H>  /  ALT  129<H>  /  AlkPhos  75  12-26    PT/INR - ( 26 Dec 2023 12:10 )   PT: 13.7 sec;   INR: 1.23 ratio         PTT - ( 26 Dec 2023 12:10 )  PTT:29.2 sec  Urinalysis Basic - ( 26 Dec 2023 12:10 )    Color: x / Appearance: x / SG: x / pH: x  Gluc: 115 mg/dL / Ketone: x  / Bili: x / Urobili: x  Blood: x / Protein: x / Nitrite: x  Leuk Esterase: x / RBC: x / WBC x  Sq Epi: x / Non Sq Epi: x / Bacteria: x        CULTURES:    RADIOLOGY & ADDITIONAL STUDIES:

## 2023-12-26 NOTE — PROGRESS NOTE ADULT - ASSESSMENT
Subjective:    Pt seen and examined at bedside. ROS limited due to patient being intubated/sedated.    MEDICATIONS  (STANDING):  cefepime   IVPB 2000 milliGRAM(s) IV Intermittent every 8 hours  ciprofloxacin  0.3% Ophthalmic Solution for Otic Use 2 Drop(s) Both Ears two times a day  dexMEDEtomidine Infusion 1 MICROgram(s)/kG/Hr (17.3 mL/Hr) IV Continuous <Continuous>  hydroxychloroquine 200 milliGRAM(s) Oral two times a day  lactated ringers. 1000 milliLiter(s) (100 mL/Hr) IV Continuous <Continuous>  lidocaine   4% Patch 1 Patch Transdermal daily  melatonin 3 milliGRAM(s) Oral at bedtime  sodium chloride 1 Gram(s) Oral three times a day  vancomycin  IVPB 1000 milliGRAM(s) IV Intermittent every 12 hours    MEDICATIONS  (PRN):  albuterol/ipratropium for Nebulization 3 milliLiter(s) Nebulizer every 6 hours PRN Shortness of Breath and/or Wheezing  benzocaine/menthol Lozenge 1 Lozenge Oral four times a day PRN Sore Throat  guaiFENesin Oral Liquid (Sugar-Free) 200 milliGRAM(s) Oral every 6 hours PRN Cough  lidocaine 2% Viscous 5 milliLiter(s) Swish and Spit three times a day PRN Mouth Care      Allergies    No Known Allergies    Intolerances        DVT Prophylaxis: [ ] YES [ ] NO      Antibiotics: [ ] YES [ ] NO    Pain Scale (1-10):       Location:    Vital Signs Last 24 Hrs  T(C): 37.8 (26 Dec 2023 12:00), Max: 38.3 (25 Dec 2023 16:00)  T(F): 100.1 (26 Dec 2023 12:00), Max: 101 (25 Dec 2023 16:00)  HR: 99 (26 Dec 2023 14:00) (80 - 103)  BP: 129/86 (26 Dec 2023 14:00) (117/83 - 135/85)  BP(mean): 94 (26 Dec 2023 14:00) (79 - 101)  RR: 21 (26 Dec 2023 14:00) (15 - 26)  SpO2: 99% (26 Dec 2023 14:00) (91% - 100%)    Parameters below as of 26 Dec 2023 12:00  Patient On (Oxygen Delivery Method): room air        Drug Dosing Weight  Height (cm): 165.1 (22 Dec 2023 22:28)  Weight (kg): 69.2 (22 Dec 2023 22:28)  BMI (kg/m2): 25.4 (22 Dec 2023 22:28)  BSA (m2): 1.76 (22 Dec 2023 22:28)    PHYSICAL EXAM:  GENERAL: NAD, well-developed, intubated, sedated  CHEST/LUNG: Clear to auscultation bilaterally; No wheeze  HEART: Regular rate and rhythm; No murmurs, rubs, or gallops  ABDOMEN: Soft, Nontender, Nondistended; Bowel sounds present  EXTREMITIES:  2+ Peripheral Pulses, No clubbing, cyanosis, or edema  PSYCH: unable to assess  NEUROLOGY: non-focal, sedated  SKIN: No rashes or lesions        URINARY CATHETER: [ ] YES [ ] NO     LABS:  CBC Full  -  ( 26 Dec 2023 12:10 )  WBC Count : 6.26 K/uL  RBC Count : 3.05 M/uL  Hemoglobin : 9.3 g/dL  Hematocrit : 27.5 %  Platelet Count - Automated : 273 K/uL  Mean Cell Volume : 90.2 fL  Mean Cell Hemoglobin : 30.5 pg  Mean Cell Hemoglobin Concentration : 33.8 gm/dL  Auto Neutrophil # : 2.66 K/uL  Auto Lymphocyte # : 2.96 K/uL  Auto Monocyte # : 0.50 K/uL  Auto Eosinophil # : 0.01 K/uL  Auto Basophil # : 0.08 K/uL  Auto Neutrophil % : 42.4 %  Auto Lymphocyte % : 47.3 %  Auto Monocyte % : 8.0 %  Auto Eosinophil % : 0.2 %  Auto Basophil % : 1.3 %    12-26    125<L>  |  94<L>  |  16  ----------------------------<  115<H>  4.5   |  22  |  0.90    Ca    8.3<L>      26 Dec 2023 12:10  Phos  2.3     12-25  Mg     1.90     12-25    TPro  7.3  /  Alb  2.7<L>  /  TBili  0.6  /  DBili  x   /  AST  202<H>  /  ALT  129<H>  /  AlkPhos  75  12-26    PT/INR - ( 26 Dec 2023 12:10 )   PT: 13.7 sec;   INR: 1.23 ratio         PTT - ( 26 Dec 2023 12:10 )  PTT:29.2 sec  Urinalysis Basic - ( 26 Dec 2023 12:10 )    Color: x / Appearance: x / SG: x / pH: x  Gluc: 115 mg/dL / Ketone: x  / Bili: x / Urobili: x  Blood: x / Protein: x / Nitrite: x  Leuk Esterase: x / RBC: x / WBC x  Sq Epi: x / Non Sq Epi: x / Bacteria: x        CULTURES:    RADIOLOGY & ADDITIONAL STUDIES:    Johanna Rivers M.D.  Hematology and Oncology Fellow  [facility]  Email: [email or web address]  Cell: [phone] Subjective:    Pt seen and examined at bedside. ROS limited due to patient being intubated/sedated.    MEDICATIONS  (STANDING):  cefepime   IVPB 2000 milliGRAM(s) IV Intermittent every 8 hours  ciprofloxacin  0.3% Ophthalmic Solution for Otic Use 2 Drop(s) Both Ears two times a day  dexMEDEtomidine Infusion 1 MICROgram(s)/kG/Hr (17.3 mL/Hr) IV Continuous <Continuous>  hydroxychloroquine 200 milliGRAM(s) Oral two times a day  lactated ringers. 1000 milliLiter(s) (100 mL/Hr) IV Continuous <Continuous>  lidocaine   4% Patch 1 Patch Transdermal daily  melatonin 3 milliGRAM(s) Oral at bedtime  sodium chloride 1 Gram(s) Oral three times a day  vancomycin  IVPB 1000 milliGRAM(s) IV Intermittent every 12 hours    MEDICATIONS  (PRN):  albuterol/ipratropium for Nebulization 3 milliLiter(s) Nebulizer every 6 hours PRN Shortness of Breath and/or Wheezing  benzocaine/menthol Lozenge 1 Lozenge Oral four times a day PRN Sore Throat  guaiFENesin Oral Liquid (Sugar-Free) 200 milliGRAM(s) Oral every 6 hours PRN Cough  lidocaine 2% Viscous 5 milliLiter(s) Swish and Spit three times a day PRN Mouth Care      Allergies    No Known Allergies    Intolerances        DVT Prophylaxis: [ ] YES [ ] NO      Antibiotics: [ ] YES [ ] NO    Pain Scale (1-10):       Location:    Vital Signs Last 24 Hrs  T(C): 37.8 (26 Dec 2023 12:00), Max: 38.3 (25 Dec 2023 16:00)  T(F): 100.1 (26 Dec 2023 12:00), Max: 101 (25 Dec 2023 16:00)  HR: 99 (26 Dec 2023 14:00) (80 - 103)  BP: 129/86 (26 Dec 2023 14:00) (117/83 - 135/85)  BP(mean): 94 (26 Dec 2023 14:00) (79 - 101)  RR: 21 (26 Dec 2023 14:00) (15 - 26)  SpO2: 99% (26 Dec 2023 14:00) (91% - 100%)    Parameters below as of 26 Dec 2023 12:00  Patient On (Oxygen Delivery Method): room air        Drug Dosing Weight  Height (cm): 165.1 (22 Dec 2023 22:28)  Weight (kg): 69.2 (22 Dec 2023 22:28)  BMI (kg/m2): 25.4 (22 Dec 2023 22:28)  BSA (m2): 1.76 (22 Dec 2023 22:28)    PHYSICAL EXAM:  GENERAL: NAD, well-developed, intubated, sedated  CHEST/LUNG: Clear to auscultation bilaterally; No wheeze  HEART: Regular rate and rhythm; No murmurs, rubs, or gallops  ABDOMEN: Soft, Nontender, Nondistended; Bowel sounds present  EXTREMITIES:  2+ Peripheral Pulses, No clubbing, cyanosis, or edema  PSYCH: unable to assess  NEUROLOGY: non-focal, sedated  SKIN: No rashes or lesions        URINARY CATHETER: [ ] YES [ ] NO     LABS:  CBC Full  -  ( 26 Dec 2023 12:10 )  WBC Count : 6.26 K/uL  RBC Count : 3.05 M/uL  Hemoglobin : 9.3 g/dL  Hematocrit : 27.5 %  Platelet Count - Automated : 273 K/uL  Mean Cell Volume : 90.2 fL  Mean Cell Hemoglobin : 30.5 pg  Mean Cell Hemoglobin Concentration : 33.8 gm/dL  Auto Neutrophil # : 2.66 K/uL  Auto Lymphocyte # : 2.96 K/uL  Auto Monocyte # : 0.50 K/uL  Auto Eosinophil # : 0.01 K/uL  Auto Basophil # : 0.08 K/uL  Auto Neutrophil % : 42.4 %  Auto Lymphocyte % : 47.3 %  Auto Monocyte % : 8.0 %  Auto Eosinophil % : 0.2 %  Auto Basophil % : 1.3 %    12-26    125<L>  |  94<L>  |  16  ----------------------------<  115<H>  4.5   |  22  |  0.90    Ca    8.3<L>      26 Dec 2023 12:10  Phos  2.3     12-25  Mg     1.90     12-25    TPro  7.3  /  Alb  2.7<L>  /  TBili  0.6  /  DBili  x   /  AST  202<H>  /  ALT  129<H>  /  AlkPhos  75  12-26    PT/INR - ( 26 Dec 2023 12:10 )   PT: 13.7 sec;   INR: 1.23 ratio         PTT - ( 26 Dec 2023 12:10 )  PTT:29.2 sec  Urinalysis Basic - ( 26 Dec 2023 12:10 )    Color: x / Appearance: x / SG: x / pH: x  Gluc: 115 mg/dL / Ketone: x  / Bili: x / Urobili: x  Blood: x / Protein: x / Nitrite: x  Leuk Esterase: x / RBC: x / WBC x  Sq Epi: x / Non Sq Epi: x / Bacteria: x        CULTURES:    RADIOLOGY & ADDITIONAL STUDIES:    Johanna Rivers M.D.  Hematology and Oncology Fellow  [facility]  Email: [email or web address]  Cell: [phone] 27 yo male with a recent diagnosis of Lupus who was transferred fro [facility] for [facility] for a thoracic surgery evaluation for a bilateral pulmonary embolism as well as bilateral lower lobe consolidations with areas of central clearing, concerning for PNA vs pulmonary infarct. Hospital course has been prolonged and complicated acute hypoxic respiratory failure requiring intubation with development of pleural and cardiac effusions s/p r diagnostic thoracentesis (on 12/22, showing exudative fluid, as well as fevers (being treated with steroids and broad spectrum antibiotics. Hematology consulted for hypercoagulable workup given concern for extensive PE and coagulopathy.    #Pulmonary Embolism  #Hypercoagulable Workup  APLS labs showed a negative beta-2 glycoprotein, negative lupus anticoagulant, and a positive screening total anticardiolipin antibody, with low titer IgG and IgM subtypes. This is not consistent with APLS as patients need to have positive titers 12 weeks apart with IgG/IgM >40. Suspect that these clots were likely provoked in the setting of critical illness and chronic inflammatory state from Lupus (not APLS syndrome).  - Agree with Heparin gtt for now. Once no plan for further procedures, would transition patient to Eliquis 5 mg BID.    Case d/w dr. dias.    Johanna Rivers M.D.  Hematology and Medical Oncology Fellow  Pager: [phone]  For weekends and evenings (5 pm - 8 am), please page Heme/Onc fellow on call. 27 yo male with a recent diagnosis of Lupus who was transferred fro [facility] for [facility] for a thoracic surgery evaluation for a bilateral pulmonary embolism as well as bilateral lower lobe consolidations with areas of central clearing, concerning for PNA vs pulmonary infarct. Hospital course has been prolonged and complicated acute hypoxic respiratory failure requiring intubation with development of pleural and cardiac effusions s/p r diagnostic thoracentesis (on 12/22, showing exudative fluid, as well as fevers (being treated with steroids and broad spectrum antibiotics. Hematology consulted for hypercoagulable workup given concern for extensive PE and coagulopathy.    #Pulmonary Embolism  #Hypercoagulable Workup  APLS labs showed a negative beta-2 glycoprotein, negative lupus anticoagulant, and a positive screening total anticardiolipin antibody, with low titer IgG and IgM subtypes. This is not consistent with APLS as patients need to have positive titers 12 weeks apart with IgG/IgM >40. Suspect that these clots were likely provoked in the setting of critical illness and chronic inflammatory state from Lupus (not APLS syndrome).  - Agree with Heparin gtt for now. Once no plan for further procedures, would transition patient to Eliquis 5 mg BID.    Case d/w dr. dias.    Johanna Rivers M.D.  Hematology and Medical Oncology Fellow  Pager: [phone]  For weekends and evenings (5 pm - 8 am), please page Heme/Onc fellow on call. 27 yo male with a recent diagnosis of Lupus who was transferred fro [facility] for [facility] for a thoracic surgery evaluation for a bilateral pulmonary embolism as well as bilateral lower lobe consolidations with areas of central clearing, concerning for PNA vs pulmonary infarct. Hospital course has been prolonged and complicated acute hypoxic respiratory failure requiring intubation with development of pleural and cardiac effusions s/p r diagnostic thoracentesis (on 12/22, showing exudative fluid, as well as fevers (being treated with steroids and broad spectrum antibiotics. Hematology consulted for hypercoagulable workup given concern for extensive PE and coagulopathy.    #Pulmonary Embolism  #Hypercoagulable Workup  Per pt's mother, pt has no history of blood clots, was active the last few months, no family hx of clots, no recent injury or cigarette use, no steroids/testosterone use.  APLS labs showed a negative beta-2 glycoprotein, negative lupus anticoagulant, and a positive screening total anticardiolipin antibody, with low titer IgG and IgM subtypes. This is not consistent with APLS as patients need to have positive titers 12 weeks apart with IgG/IgM >40. Suspect that these clots were likely provoked in the setting of critical illness and chronic inflammatory state from Lupus (not APLS syndrome).  - Agree with Heparin gtt for now. Once no plan for further procedures, would transition patient to Eliquis 5 mg BID.    Case d/w Dr. Burch.    Johanna Rivers M.D.  Hematology and Medical Oncology Fellow  Pager: [phone]  For weekends and evenings (5 pm - 8 am), please page Heme/Onc fellow on call. 29 yo male with a recent diagnosis of Lupus who was transferred fro [facility] for [facility] for a thoracic surgery evaluation for a bilateral pulmonary embolism as well as bilateral lower lobe consolidations with areas of central clearing, concerning for PNA vs pulmonary infarct. Hospital course has been prolonged and complicated acute hypoxic respiratory failure requiring intubation with development of pleural and cardiac effusions s/p r diagnostic thoracentesis (on 12/22, showing exudative fluid, as well as fevers (being treated with steroids and broad spectrum antibiotics. Hematology consulted for hypercoagulable workup given concern for extensive PE and coagulopathy.    #Pulmonary Embolism  #Hypercoagulable Workup  Per pt's mother, pt has no history of blood clots, was active the last few months, no family hx of clots, no recent injury or cigarette use, no steroids/testosterone use.  APLS labs showed a negative beta-2 glycoprotein, negative lupus anticoagulant, and a positive screening total anticardiolipin antibody, with low titer IgG and IgM subtypes. This is not consistent with APLS as patients need to have positive titers 12 weeks apart with IgG/IgM >40. Suspect that these clots were likely provoked in the setting of critical illness and chronic inflammatory state from Lupus (not APLS syndrome).  - Agree with Heparin gtt for now. Once no plan for further procedures, would transition patient to Eliquis 5 mg BID.    Case d/w Dr. Burch.    Johanna Rivers M.D.  Hematology and Medical Oncology Fellow  Pager: [phone]  For weekends and evenings (5 pm - 8 am), please page Heme/Onc fellow on call.

## 2023-12-26 NOTE — DIETITIAN INITIAL EVALUATION ADULT - ORAL INTAKE PTA/DIET HISTORY
Per mother, Pt. was preparing his own foods, including soups, smoothies w fruit since Dx of lupus.  Is uncertain of vitamin/mineral/herbal supplement use.      Reports Pt. with nausea after initiating Rx with Plaquenil.

## 2023-12-26 NOTE — PROGRESS NOTE ADULT - ATTENDING COMMENTS
28M with recently diagnosed SLE (9/2023), who initially presented to [facility] with chest pain and found to have bilateral PE as well pericardial and pleural effusion. Hematology consulted for hypercoagulable workup and management of PE.     # PE: CT-A chest (12/12/23): acute right upper lobe and left lower lobe segmental/subsegmental pulmonary emboli. Lower extremity Dopplers negative. Mother reports that he has been very active prior to this hospitalization. APLS work up is negative. Suspect that his PE may have been in the setting of inflammation given his elevated CRP/ESR and pericardial/pleural effusions thought to be inflammatory in etiology.   - Heparin drip currently on hold for placement of chest tube today. Please resume when safe from a bleeding perspective. Can transition to therapeutic Eliquis when no more procedures are planned.  - Management of SLE per Rheumatology  - Follow up remainder of thrombophilia work up (Factor V Leiden, JAK2, Prothrombin gene mutation), but these are unlikely to change inpatient management.  - We will help arrange follow up at [facility] for outpatient continuity of care when ready for discharge.     # Anemia: Hgb 9.2. Most likely secondary to chronic disease/inflammation ().   - Trend CBC with differential daily. Continue supportive transfusions as needed to maintain Hgb > 7 and plt > 10  (> 20 if febrile, > 50 if bleeding). 28M with recently diagnosed SLE (9/2023), who initially presented to [facility] with chest pain and found to have bilateral PE as well pericardial and pleural effusion. Hematology consulted for hypercoagulable workup and management of PE.     # PE: CT-A chest (12/12/23): acute right upper lobe and left lower lobe segmental/subsegmental pulmonary emboli. Lower extremity Dopplers negative. Mother reports that he has been very active prior to this hospitalization. APLS work up is negative. Suspect that his PE may have been in the setting of inflammation given his elevated CRP/ESR and pericardial/pleural effusions thought to be inflammatory in etiology.   - Heparin drip currently on hold for placement of chest tube today. Please resume when safe from a bleeding perspective. Can transition to therapeutic Eliquis when no more procedures are planned.  - Management of SLE per Rheumatology  - Follow up remainder of thrombophilia work up (Factor V Leiden, JAK2, Prothrombin gene mutation), but these are unlikely to change inpatient management.  - We will help arrange follow up at [facility] for outpatient continuity of care when ready for discharge.     # Anemia: Hgb 9.2. Most likely secondary to chronic disease/inflammation ().   - Trend CBC with differential daily. Continue supportive transfusions as needed to maintain Hgb > 7 and plt > 10  (> 20 if febrile, > 50 if bleeding).

## 2023-12-26 NOTE — PROGRESS NOTE ADULT - SUBJECTIVE AND OBJECTIVE BOX
TAYLA MARIE  [id number]    INTERVAL HPI/OVERNIGHT EVENTS: No acute events. Evaluated in MICU. Mother at bedside. Reports occasional chest pain. Denies joint pain or new rashes. No additional seizures.       MEDICATIONS  (STANDING):  cefepime   IVPB 2000 milliGRAM(s) IV Intermittent every 8 hours  ciprofloxacin  0.3% Ophthalmic Solution for Otic Use 2 Drop(s) Both Ears two times a day  hydroxychloroquine 200 milliGRAM(s) Oral two times a day  lactated ringers. 1000 milliLiter(s) (100 mL/Hr) IV Continuous <Continuous>  lidocaine   4% Patch 1 Patch Transdermal daily  melatonin 3 milliGRAM(s) Oral at bedtime  sodium chloride 1 Gram(s) Oral three times a day  vancomycin  IVPB 1000 milliGRAM(s) IV Intermittent every 12 hours    MEDICATIONS  (PRN):  albuterol/ipratropium for Nebulization 3 milliLiter(s) Nebulizer every 6 hours PRN Shortness of Breath and/or Wheezing  benzocaine/menthol Lozenge 1 Lozenge Oral four times a day PRN Sore Throat  guaiFENesin Oral Liquid (Sugar-Free) 200 milliGRAM(s) Oral every 6 hours PRN Cough  lidocaine 2% Viscous 5 milliLiter(s) Swish and Spit three times a day PRN Mouth Care      Allergies    No Known Allergies    Intolerances        Review of Systems:   General: +fevers   HEENT: No blurry vision, dysphagia, or odynophagia  CVS: +chest pain   Resp: No SOB/wheezing  GI: No N/V/C/D/abdominal pain  MSK: no joint pain or swelling   Skin: No new rashes  Neuro: No headaches      Vital Signs Last 24 Hrs  T(C): 37.2 (26 Dec 2023 08:00), Max: 38.3 (25 Dec 2023 16:00)  T(F): 99 (26 Dec 2023 08:00), Max: 101 (25 Dec 2023 16:00)  HR: 99 (26 Dec 2023 11:00) (80 - 101)  BP: 131/86 (26 Dec 2023 11:00) (116/72 - 135/85)  BP(mean): 95 (26 Dec 2023 11:00) (79 - 101)  RR: 18 (26 Dec 2023 11:00) (15 - 26)  SpO2: 100% (26 Dec 2023 11:00) (91% - 100%)    Parameters below as of 26 Dec 2023 08:00  Patient On (Oxygen Delivery Method): room air        Physical Exam:  General: NAD, EEG in place, lethargic   HEENT: EOMI, +ulcers on palate   CVS: tachycardic, no mrumurs   Resp: Decreased breath sounds bilaterally in lower lung fields   GI: non-distended   MSK: no synovitis   Neuro: Awake, moving all extremities   Skin: dark/purple flat lesions on bilateral hands. No tender or itchy     LABS:                        9.2    4.66  )-----------( 269      ( 26 Dec 2023 00:50 )             27.4     12-26    123<L>  |  93<L>  |  19  ----------------------------<  108<H>  4.6   |  21<L>  |  1.04    Ca    8.5      26 Dec 2023 00:50  Phos  2.3     12-25  Mg     1.90     12-25    TPro  7.4  /  Alb  2.8<L>  /  TBili  0.7  /  DBili  x   /  AST  236<H>  /  ALT  139<H>  /  AlkPhos  79  12-26    PT/INR - ( 26 Dec 2023 00:50 )   PT: 14.4 sec;   INR: 1.28 ratio         PTT - ( 26 Dec 2023 00:50 )  PTT:28.2 sec  Urinalysis Basic - ( 26 Dec 2023 00:50 )    Color: x / Appearance: x / SG: x / pH: x  Gluc: 108 mg/dL / Ketone: x  / Bili: x / Urobili: x   Blood: x / Protein: x / Nitrite: x   Leuk Esterase: x / RBC: x / WBC x   Sq Epi: x / Non Sq Epi: x / Bacteria: x      C3 Complement, Serum: 83 mg/dL (12.24.23 @ 05:38)  C4 Complement, Serum: 13 mg/dL (12.24.23 @ 05:38)  C3 Complement, Serum: 79 mg/dL *L* [81 - 157] (12-14-23 @ 07:00)  C4 Complement, Serum: 12 mg/dL *L* [13 - 39] (12-14-23 @ 07:00)  Anti Nuclear Factor Titer: 1:1280 *!* [Antinuclear AB (ABDIAS), IFA Method] (12-14-23 @ 07:00)  Double Stranded DNA Antibody: 15 IU/mL [Method: EIA            Reference Ranges            Interpretation            <= 29    IU/mL     Negative            30 - 75  IU/mL     Borderline            > 75      IU/mL     Positive] (12-14-23 @ 07:00  Creatine Kinase, Serum: 1203 U/L (12.26.23 @ 00:50)     Procalcitonin, Serum: 8.09      RADIOLOGY & ADDITIONAL STUDIES:    < from: CT Chest w/ IV Cont (12.23.23 @ 13:03) >  IMPRESSION:    Moderate pleural effusions, loculated on the left, new since 12/12/2023.    New nonspecific the very small peripheral ground-glass in the right upper   lobe.    < end of copied text >    < from: CT Angio Chest PE Protocol w/ IV Cont (12.12.23 @ 10:27) >  IMPRESSION:    Acute right upper lobe and left lower lobe segmental/subsegmental   pulmonary emboli. No CT evidence of right heart strain.    New bilateral lower lobe consolidations with areas of central clearing.   Pneumonia and pulmonary infarcts are in the differential. New bilateral   pleural effusions, small on the left and trace on the right. Follow to   resolution with repeat chest CT in one month, or sooner as clinically   warranted.    Bilateral axillary and supraclavicular adenopathy of unclear etiology.   Consider broad differential including infectious, inflammatory, and   neoplastic etiologies.    < end of copied text >     TAYLA MARIE  [id number]    INTERVAL HPI/OVERNIGHT EVENTS: No acute events. Evaluated in MICU. Mother at bedside. Reports occasional chest pain. Denies joint pain or new rashes. No additional seizures.       MEDICATIONS  (STANDING):  cefepime   IVPB 2000 milliGRAM(s) IV Intermittent every 8 hours  ciprofloxacin  0.3% Ophthalmic Solution for Otic Use 2 Drop(s) Both Ears two times a day  hydroxychloroquine 200 milliGRAM(s) Oral two times a day  lactated ringers. 1000 milliLiter(s) (100 mL/Hr) IV Continuous <Continuous>  lidocaine   4% Patch 1 Patch Transdermal daily  melatonin 3 milliGRAM(s) Oral at bedtime  sodium chloride 1 Gram(s) Oral three times a day  vancomycin  IVPB 1000 milliGRAM(s) IV Intermittent every 12 hours    MEDICATIONS  (PRN):  albuterol/ipratropium for Nebulization 3 milliLiter(s) Nebulizer every 6 hours PRN Shortness of Breath and/or Wheezing  benzocaine/menthol Lozenge 1 Lozenge Oral four times a day PRN Sore Throat  guaiFENesin Oral Liquid (Sugar-Free) 200 milliGRAM(s) Oral every 6 hours PRN Cough  lidocaine 2% Viscous 5 milliLiter(s) Swish and Spit three times a day PRN Mouth Care      Allergies    No Known Allergies    Intolerances        Review of Systems:   General: +fevers   HEENT: No blurry vision, dysphagia, or odynophagia  CVS: +chest pain   Resp: No SOB/wheezing  GI: No N/V/C/D/abdominal pain  MSK: no joint pain or swelling   Skin: No new rashes  Neuro: No headaches      Vital Signs Last 24 Hrs  T(C): 37.2 (26 Dec 2023 08:00), Max: 38.3 (25 Dec 2023 16:00)  T(F): 99 (26 Dec 2023 08:00), Max: 101 (25 Dec 2023 16:00)  HR: 99 (26 Dec 2023 11:00) (80 - 101)  BP: 131/86 (26 Dec 2023 11:00) (116/72 - 135/85)  BP(mean): 95 (26 Dec 2023 11:00) (79 - 101)  RR: 18 (26 Dec 2023 11:00) (15 - 26)  SpO2: 100% (26 Dec 2023 11:00) (91% - 100%)    Parameters below as of 26 Dec 2023 08:00  Patient On (Oxygen Delivery Method): room air        Physical Exam:  General: NAD, EEG in place, lethargic   HEENT: EOMI, +ulcers on palate   CVS: tachycardic, no mrumurs   Resp: Decreased breath sounds bilaterally in lower lung fields   GI: non-distended   MSK: no synovitis   Neuro: Awake, moving all extremities   Skin: dark/purple flat lesions on bilateral hands. No tender or itchy     LABS:                        9.2    4.66  )-----------( 269      ( 26 Dec 2023 00:50 )             27.4     12-26    123<L>  |  93<L>  |  19  ----------------------------<  108<H>  4.6   |  21<L>  |  1.04    Ca    8.5      26 Dec 2023 00:50  Phos  2.3     12-25  Mg     1.90     12-25    TPro  7.4  /  Alb  2.8<L>  /  TBili  0.7  /  DBili  x   /  AST  236<H>  /  ALT  139<H>  /  AlkPhos  79  12-26    PT/INR - ( 26 Dec 2023 00:50 )   PT: 14.4 sec;   INR: 1.28 ratio         PTT - ( 26 Dec 2023 00:50 )  PTT:28.2 sec  Urinalysis Basic - ( 26 Dec 2023 00:50 )    Color: x / Appearance: x / SG: x / pH: x  Gluc: 108 mg/dL / Ketone: x  / Bili: x / Urobili: x   Blood: x / Protein: x / Nitrite: x   Leuk Esterase: x / RBC: x / WBC x   Sq Epi: x / Non Sq Epi: x / Bacteria: x      C3 Complement, Serum: 83 mg/dL (12.24.23 @ 05:38)  C4 Complement, Serum: 13 mg/dL (12.24.23 @ 05:38)  C3 Complement, Serum: 79 mg/dL *L* [81 - 157] (12-14-23 @ 07:00)  C4 Complement, Serum: 12 mg/dL *L* [13 - 39] (12-14-23 @ 07:00)  Anti Nuclear Factor Titer: 1:1280 *!* [Antinuclear AB (ABDIAS), IFA Method] (12-14-23 @ 07:00)  Double Stranded DNA Antibody: 15 IU/mL [Method: EIA            Reference Ranges            Interpretation            <= 29    IU/mL     Negative            30 - 75  IU/mL     Borderline            > 75      IU/mL     Positive] (12-14-23 @ 07:00  Creatine Kinase, Serum: 1203 U/L (12.26.23 @ 00:50)     Procalcitonin, Serum: 8.09      RADIOLOGY & ADDITIONAL STUDIES:    < from: CT Chest w/ IV Cont (12.23.23 @ 13:03) >  IMPRESSION:    Moderate pleural effusions, loculated on the left, new since 12/12/2023.    New nonspecific the very small peripheral ground-glass in the right upper   lobe.    < end of copied text >    < from: CT Angio Chest PE Protocol w/ IV Cont (12.12.23 @ 10:27) >  IMPRESSION:    Acute right upper lobe and left lower lobe segmental/subsegmental   pulmonary emboli. No CT evidence of right heart strain.    New bilateral lower lobe consolidations with areas of central clearing.   Pneumonia and pulmonary infarcts are in the differential. New bilateral   pleural effusions, small on the left and trace on the right. Follow to   resolution with repeat chest CT in one month, or sooner as clinically   warranted.    Bilateral axillary and supraclavicular adenopathy of unclear etiology.   Consider broad differential including infectious, inflammatory, and   neoplastic etiologies.    < end of copied text >     TAYLA MARIE  [id number]    INTERVAL HPI/OVERNIGHT EVENTS: No acute events. Evaluated in MICU. Mother at bedside, providing most history. Reports occasional chest pain. Denies joint pain or new rashes. No additional seizures.     MEDICATIONS  (STANDING):  cefepime   IVPB 2000 milliGRAM(s) IV Intermittent every 8 hours  ciprofloxacin  0.3% Ophthalmic Solution for Otic Use 2 Drop(s) Both Ears two times a day  hydroxychloroquine 200 milliGRAM(s) Oral two times a day  lactated ringers. 1000 milliLiter(s) (100 mL/Hr) IV Continuous <Continuous>  lidocaine   4% Patch 1 Patch Transdermal daily  melatonin 3 milliGRAM(s) Oral at bedtime  sodium chloride 1 Gram(s) Oral three times a day  vancomycin  IVPB 1000 milliGRAM(s) IV Intermittent every 12 hours    MEDICATIONS  (PRN):  albuterol/ipratropium for Nebulization 3 milliLiter(s) Nebulizer every 6 hours PRN Shortness of Breath and/or Wheezing  benzocaine/menthol Lozenge 1 Lozenge Oral four times a day PRN Sore Throat  guaiFENesin Oral Liquid (Sugar-Free) 200 milliGRAM(s) Oral every 6 hours PRN Cough  lidocaine 2% Viscous 5 milliLiter(s) Swish and Spit three times a day PRN Mouth Care      Allergies    No Known Allergies    Intolerances        Review of Systems:   General: +fevers   HEENT: No blurry vision  CVS: +chest pain   Resp: No SOB/wheezing  GI: No N/V/C/D/abdominal pain  MSK: no joint pain or swelling   Skin: No new rashes  Neuro: No headaches      Vital Signs Last 24 Hrs  T(C): 37.2 (26 Dec 2023 08:00), Max: 38.3 (25 Dec 2023 16:00)  T(F): 99 (26 Dec 2023 08:00), Max: 101 (25 Dec 2023 16:00)  HR: 99 (26 Dec 2023 11:00) (80 - 101)  BP: 131/86 (26 Dec 2023 11:00) (116/72 - 135/85)  BP(mean): 95 (26 Dec 2023 11:00) (79 - 101)  RR: 18 (26 Dec 2023 11:00) (15 - 26)  SpO2: 100% (26 Dec 2023 11:00) (91% - 100%)    Parameters below as of 26 Dec 2023 08:00  Patient On (Oxygen Delivery Method): room air        Physical Exam:  General: NAD, EEG leads in place, lethargic   HEENT: EOMI, +ulcers on palate   CVS: tachycardic, no murmurs   Resp: Decreased breath sounds bilaterally in lower lung fields   GI: non-distended   MSK: no synovitis   Neuro: Awake, moving all extremities   Skin: ?discoid rash on b/l ears, dark/purple flat lesions on bilateral hands and b/l great toes. Not tender or itchy     LABS:                        9.2    4.66  )-----------( 269      ( 26 Dec 2023 00:50 )             27.4     12-    123<L>  |  93<L>  |  19  ----------------------------<  108<H>  4.6   |  21<L>  |  1.04    Ca    8.5      26 Dec 2023 00:50  Phos  2.3     12  Mg     1.90     12    TPro  7.4  /  Alb  2.8<L>  /  TBili  0.7  /  DBili  x   /  AST  236<H>  /  ALT  139<H>  /  AlkPhos  79  12    PT/INR - ( 26 Dec 2023 00:50 )   PT: 14.4 sec;   INR: 1.28 ratio         PTT - ( 26 Dec 2023 00:50 )  PTT:28.2 sec  Urinalysis Basic - ( 26 Dec 2023 00:50 )    Color: x / Appearance: x / SG: x / pH: x  Gluc: 108 mg/dL / Ketone: x  / Bili: x / Urobili: x   Blood: x / Protein: x / Nitrite: x   Leuk Esterase: x / RBC: x / WBC x   Sq Epi: x / Non Sq Epi: x / Bacteria: x    GOLDEN Antibody Screening Test (23 @ 05:38)   SM (Pizano) Ab FBIA: >8.0 AI  Anti-Ribonuclear Protein: >8.0: Fluorescent Bead Immunoassy Sjogren's Syndrome Antibodies (23 @ 05:38)   Anti SS-A Antibody: >8.0 AI  Anti SS-B Antibody: 0.2: Fluorescent Bead Immunoassay CSF IgG Index (23 @ 17:30)   Quantitative Ig mg/dL  Albumin: 2120 mg/dL  IgG CSF: 12.8 mg/dL  CSF ALBU: 27.6 mg/dL  IgG/Albumin Ratio, Serum: 0.88 Ratio  IgG/Albumin Ratio, CSF: 0.46 Ratio  IgG Index: 0.5  IgG Synthesis: 4.3 mg/day    C3 Complement, Serum: 83 mg/dL (23 @ 05:38)  C4 Complement, Serum: 13 mg/dL (23 @ 05:38)  C3 Complement, Serum: 79 mg/dL *L* [81 - 157] (23 @ 07:00)  C4 Complement, Serum: 12 mg/dL *L* [13 - 39] (23 @ 07:00)  Anti Nuclear Factor Titer: 1:1280 *!* [Antinuclear AB (ABDIAS), IFA Method] (23 @ 07:00)  Double Stranded DNA Antibody: 15 IU/mL [Method: EIA            Reference Ranges            Interpretation            <= 29    IU/mL     Negative            30 - 75  IU/mL     Borderline            > 75      IU/mL     Positive] (23 @ 07:00  Creatine Kinase, Serum: 1203 U/L (23 @ 00:50)     Procalcitonin, Serum: 8.09    Urinalysis (23 @ 12:55)   Glucose Qualitative, Urine: Negative mg/dL  pH Urine: 6.0  Blood, Urine: Moderate  Color: Yellow  Urine Appearance: Clear  Bilirubin: Negative  Ketone - Urine: Negative mg/dL  Specific Gravity: 1.026  Protein, Urine: 300 mg/dL  Urobilinogen: 0.2 mg/dL  Nitrite: Negative  Leukocyte Esterase Concentration: NegativeTotal Protein, Random Urine: 243Creatinine, Random Urine (23 @ 12:55)   Creatinine, Random Urine: 99    RADIOLOGY & ADDITIONAL STUDIES:    < from: CT Chest w/ IV Cont (23 @ 13:03) >  IMPRESSION:    Moderate pleural effusions, loculated on the left, new since 2023.    New nonspecific the very small peripheral ground-glass in the right upper   lobe.    < end of copied text >    < from: CT Angio Chest PE Protocol w/ IV Cont (23 @ 10:27) >  IMPRESSION:    Acute right upper lobe and left lower lobe segmental/subsegmental   pulmonary emboli. No CT evidence of right heart strain.    New bilateral lower lobe consolidations with areas of central clearing.   Pneumonia and pulmonary infarcts are in the differential. New bilateral   pleural effusions, small on the left and trace on the right. Follow to   resolution with repeat chest CT in one month, or sooner as clinically   warranted.    Bilateral axillary and supraclavicular adenopathy of unclear etiology.   Consider broad differential including infectious, inflammatory, and   neoplastic etiologies.    < end of copied text >     TAYLA MARIE  [id number]    INTERVAL HPI/OVERNIGHT EVENTS: No acute events. Evaluated in MICU. Mother at bedside, providing most history. Reports occasional chest pain. Denies joint pain or new rashes. No additional seizures.     MEDICATIONS  (STANDING):  cefepime   IVPB 2000 milliGRAM(s) IV Intermittent every 8 hours  ciprofloxacin  0.3% Ophthalmic Solution for Otic Use 2 Drop(s) Both Ears two times a day  hydroxychloroquine 200 milliGRAM(s) Oral two times a day  lactated ringers. 1000 milliLiter(s) (100 mL/Hr) IV Continuous <Continuous>  lidocaine   4% Patch 1 Patch Transdermal daily  melatonin 3 milliGRAM(s) Oral at bedtime  sodium chloride 1 Gram(s) Oral three times a day  vancomycin  IVPB 1000 milliGRAM(s) IV Intermittent every 12 hours    MEDICATIONS  (PRN):  albuterol/ipratropium for Nebulization 3 milliLiter(s) Nebulizer every 6 hours PRN Shortness of Breath and/or Wheezing  benzocaine/menthol Lozenge 1 Lozenge Oral four times a day PRN Sore Throat  guaiFENesin Oral Liquid (Sugar-Free) 200 milliGRAM(s) Oral every 6 hours PRN Cough  lidocaine 2% Viscous 5 milliLiter(s) Swish and Spit three times a day PRN Mouth Care      Allergies    No Known Allergies    Intolerances        Review of Systems:   General: +fevers   HEENT: No blurry vision  CVS: +chest pain   Resp: No SOB/wheezing  GI: No N/V/C/D/abdominal pain  MSK: no joint pain or swelling   Skin: No new rashes  Neuro: No headaches      Vital Signs Last 24 Hrs  T(C): 37.2 (26 Dec 2023 08:00), Max: 38.3 (25 Dec 2023 16:00)  T(F): 99 (26 Dec 2023 08:00), Max: 101 (25 Dec 2023 16:00)  HR: 99 (26 Dec 2023 11:00) (80 - 101)  BP: 131/86 (26 Dec 2023 11:00) (116/72 - 135/85)  BP(mean): 95 (26 Dec 2023 11:00) (79 - 101)  RR: 18 (26 Dec 2023 11:00) (15 - 26)  SpO2: 100% (26 Dec 2023 11:00) (91% - 100%)    Parameters below as of 26 Dec 2023 08:00  Patient On (Oxygen Delivery Method): room air        Physical Exam:  General: NAD, EEG leads in place, lethargic   HEENT: EOMI, +ulcers on palate   CVS: tachycardic, no murmurs   Resp: Decreased breath sounds bilaterally in lower lung fields   GI: non-distended   MSK: no synovitis   Neuro: Awake, moving all extremities   Skin: ?discoid rash on b/l ears, dark/purple flat lesions on bilateral hands and b/l great toes. Not tender or itchy     LABS:                        9.2    4.66  )-----------( 269      ( 26 Dec 2023 00:50 )             27.4     12-    123<L>  |  93<L>  |  19  ----------------------------<  108<H>  4.6   |  21<L>  |  1.04    Ca    8.5      26 Dec 2023 00:50  Phos  2.3     12  Mg     1.90     12    TPro  7.4  /  Alb  2.8<L>  /  TBili  0.7  /  DBili  x   /  AST  236<H>  /  ALT  139<H>  /  AlkPhos  79  12    PT/INR - ( 26 Dec 2023 00:50 )   PT: 14.4 sec;   INR: 1.28 ratio         PTT - ( 26 Dec 2023 00:50 )  PTT:28.2 sec  Urinalysis Basic - ( 26 Dec 2023 00:50 )    Color: x / Appearance: x / SG: x / pH: x  Gluc: 108 mg/dL / Ketone: x  / Bili: x / Urobili: x   Blood: x / Protein: x / Nitrite: x   Leuk Esterase: x / RBC: x / WBC x   Sq Epi: x / Non Sq Epi: x / Bacteria: x    GOLDEN Antibody Screening Test (23 @ 05:38)   SM (Pizano) Ab FBIA: >8.0 AI  Anti-Ribonuclear Protein: >8.0: Fluorescent Bead Immunoassy Sjogren's Syndrome Antibodies (23 @ 05:38)   Anti SS-A Antibody: >8.0 AI  Anti SS-B Antibody: 0.2: Fluorescent Bead Immunoassay CSF IgG Index (23 @ 17:30)   Quantitative Ig mg/dL  Albumin: 2120 mg/dL  IgG CSF: 12.8 mg/dL  CSF ALBU: 27.6 mg/dL  IgG/Albumin Ratio, Serum: 0.88 Ratio  IgG/Albumin Ratio, CSF: 0.46 Ratio  IgG Index: 0.5  IgG Synthesis: 4.3 mg/day    C3 Complement, Serum: 83 mg/dL (23 @ 05:38)  C4 Complement, Serum: 13 mg/dL (23 @ 05:38)  C3 Complement, Serum: 79 mg/dL *L* [81 - 157] (23 @ 07:00)  C4 Complement, Serum: 12 mg/dL *L* [13 - 39] (23 @ 07:00)  Anti Nuclear Factor Titer: 1:1280 *!* [Antinuclear AB (ABDIAS), IFA Method] (23 @ 07:00)  Double Stranded DNA Antibody: 15 IU/mL [Method: EIA            Reference Ranges            Interpretation            <= 29    IU/mL     Negative            30 - 75  IU/mL     Borderline            > 75      IU/mL     Positive] (23 @ 07:00  Creatine Kinase, Serum: 1203 U/L (23 @ 00:50)     Procalcitonin, Serum: 8.09    Urinalysis (23 @ 12:55)   Glucose Qualitative, Urine: Negative mg/dL  pH Urine: 6.0  Blood, Urine: Moderate  Color: Yellow  Urine Appearance: Clear  Bilirubin: Negative  Ketone - Urine: Negative mg/dL  Specific Gravity: 1.026  Protein, Urine: 300 mg/dL  Urobilinogen: 0.2 mg/dL  Nitrite: Negative  Leukocyte Esterase Concentration: NegativeTotal Protein, Random Urine: 243Creatinine, Random Urine (23 @ 12:55)   Creatinine, Random Urine: 99    RADIOLOGY & ADDITIONAL STUDIES:    < from: CT Chest w/ IV Cont (23 @ 13:03) >  IMPRESSION:    Moderate pleural effusions, loculated on the left, new since 2023.    New nonspecific the very small peripheral ground-glass in the right upper   lobe.    < end of copied text >    < from: CT Angio Chest PE Protocol w/ IV Cont (23 @ 10:27) >  IMPRESSION:    Acute right upper lobe and left lower lobe segmental/subsegmental   pulmonary emboli. No CT evidence of right heart strain.    New bilateral lower lobe consolidations with areas of central clearing.   Pneumonia and pulmonary infarcts are in the differential. New bilateral   pleural effusions, small on the left and trace on the right. Follow to   resolution with repeat chest CT in one month, or sooner as clinically   warranted.    Bilateral axillary and supraclavicular adenopathy of unclear etiology.   Consider broad differential including infectious, inflammatory, and   neoplastic etiologies.    < end of copied text >

## 2023-12-26 NOTE — PROGRESS NOTE ADULT - NS ATTEST RISK PROBLEM GEN_ALL_CORE FT
Patient has bilateral PE, which carries a risk for organ failure, hypoxia, and other life-threatening complications.

## 2023-12-26 NOTE — DIETITIAN INITIAL EVALUATION ADULT - SIGNS/SYMPTOMS
Pt. reported with "mouth sores" & "swollen tongue" Significant >7% weight loss x 1mth, <50% PO energy intake x at least 7d,

## 2023-12-26 NOTE — PROGRESS NOTE ADULT - ASSESSMENT
28 years old male with h/o Lupus ( diagnosed in 09/2023, on Plaquenil present to [facility] on 12/12/23  with complain of chest pain and SOB admitted for fevers, PE, pleural effusions, course c/b high fever and tonic-clonic seizure, transferred to MICU for post-ictal and infectious monitoring.     Neuro  Seizure  Patient is lethargic i/s/o likely post-ictal / post- ativan state.  Protecting airway. VBG wnl, CT head unremarkable--no hemorrhage.   - vEEG after CT  - neuro following  - eventual MRI    Cardiovascular  Pulmonary Embolism  - on hep gtt  - no hypoxia  - no RV strain on TTE    Respiratory  Saturating well on RA, PE treatment as above    GI/Nutrition  ?Diarrhea  - reportedly + E coli at [employer] previously  - no further witnessed diarrhea, will monitor    /Renal  Hyponatremia  ?ADH from pain. ? from urinary retention.   Questionable retention on POCUS  Omer inserted  - c/w omer, eventual TOV  - urine lytes  - serum osm  - renal consult     ID  Fevers as high as 104.5, unclear source ?pnuemonia   Was on vanc and zosyn  - monitor for diarrhea, if has diarrhea can resend GI PCR and stool cx  - broadened to Vanc, cefepime   - repeat BCx  - sent UA and UCx  - repeat RVP  - LP done 12/25, so far unremarkable, will f/u studies and cultures    Endocrine  ?Steroid induced hyperglycemia to 150s  - monitor FS glucose     Hematologic/DVT ppx  SLE  - unclear if in active flair though ,   - rheum following, f/u recs  - heme following, f/u recs  - DCed steroids 12/25  - hypercoag workup    DVT PPx  - on Hep gtt for PEs    Ethics  FULL CODE 28 years old male with h/o Lupus ( diagnosed in 09/2023, on Plaquenil present to [place] ED on 12/12/23  with complain of chest pain and SOB admitted for fevers, PE, pleural effusions, course c/b high fever and tonic-clonic seizure, transferred to MICU for post-ictal and infectious monitoring.     Neuro  Seizure  Patient is lethargic i/s/o likely post-ictal / post- ativan state.  Protecting airway. VBG wnl, CT head unremarkable--no hemorrhage.   - vEEG after CT  - neuro following  - eventual MRI    Cardiovascular  Pulmonary Embolism  - on hep gtt  - no hypoxia  - no RV strain on TTE    Respiratory  Saturating well on RA, PE treatment as above    GI/Nutrition  ?Diarrhea  - reportedly + E coli at [place] previously  - no further witnessed diarrhea, will monitor    /Renal  Hyponatremia  ?ADH from pain. ? from urinary retention.   Questionable retention on POCUS  Omer inserted  - c/w omer, eventual TOV  - urine lytes  - serum osm  - renal consult     ID  Fevers as high as 104.5, unclear source ?pnuemonia   Was on vanc and zosyn  - monitor for diarrhea, if has diarrhea can resend GI PCR and stool cx  - broadened to Vanc, cefepime   - repeat BCx  - sent UA and UCx  - repeat RVP  - LP done 12/25, so far unremarkable, will f/u studies and cultures    Endocrine  ?Steroid induced hyperglycemia to 150s  - monitor FS glucose     Hematologic/DVT ppx  SLE  - unclear if in active flair though ,   - rheum following, f/u recs  - heme following, f/u recs  - DCed steroids 12/25  - hypercoag workup    DVT PPx  - on Hep gtt for PEs    Ethics  FULL CODE

## 2023-12-26 NOTE — PROGRESS NOTE ADULT - NUTRITIONAL ASSESSMENT
This patient has been assessed with a concern for Malnutrition and has been determined to have a diagnosis/diagnoses of Severe protein-calorie malnutrition.    This patient is being managed with:   Diet Regular-  1000mL Fluid Restriction ([id number])  Entered: Dec 22 2023 11:02PM   This patient has been assessed with a concern for Malnutrition and has been determined to have a diagnosis/diagnoses of Severe protein-calorie malnutrition.    This patient is being managed with:   Diet Regular-  1000mL Fluid Restriction ([id number])  Entered: Dec 22 2023 11:02PM

## 2023-12-26 NOTE — DIETITIAN INITIAL EVALUATION ADULT - RD TO REMAIN AVAILABLE
Mariola Echols RDN, CDN       pager [phone] or MS Teams/yes Mariola Echols RDN, CDN       pager [phone] or MS Teams/yes

## 2023-12-26 NOTE — PROGRESS NOTE ADULT - SUBJECTIVE AND OBJECTIVE BOX
MICU PROGRESS NOTE    INTERVAL HPI/OVERNIGHT EVENTS:  - S/p LP yest  - S/p thora last evening  - CT H unremarkable, EEG now in progress  - steroids stopped yesterday  - now on vanc and cefepime    SUBJECTIVE:     OBJECTIVE:    VITAL SIGNS:  ICU Vital Signs Last 24 Hrs  T(C): 36.4 (26 Dec 2023 04:00), Max: 39.4 (25 Dec 2023 09:18)  T(F): 97.5 (26 Dec 2023 04:00), Max: 103 (25 Dec 2023 09:18)  HR: 91 (26 Dec 2023 05:00) (80 - 135)  BP: 133/89 (26 Dec 2023 05:00) (114/62 - 144/80)  BP(mean): 99 (26 Dec 2023 05:00) (73 - 99)  ABP: --  ABP(mean): --  RR: 20 (26 Dec 2023 05:00) (15 - 25)  SpO2: 100% (26 Dec 2023 05:00) (91% - 100%)    O2 Parameters below as of 26 Dec 2023 05:00  Patient On (Oxygen Delivery Method): room air            VENTS:      I&O:    12-24 @ 07:01  -  12-25 @ 07:00  --------------------------------------------------------  IN: 1119 mL / OUT: 1180 mL / NET: -61 mL    12-25 @ 07:01  -  12-26 @ 06:17  --------------------------------------------------------  IN: 498 mL / OUT: 1250 mL / NET: -752 mL        PHYSICAL EXAM:  Appearance: No acute distress. lethargic s/p seizure / ativan.   HEENT:  PERRLA   Lymphatic: No lymphadenopathy   Cardiovascular: Normal S1 S2, no JVD  Respiratory: normal effort , clear  Gastrointestinal:  Soft, Non-tender  Skin: No rashes,  warm to touch  Psychiatry:  Lethargic.   Musculuskeletal: No edema or joint swelling appreciated.                                MEDICATIONS:  MEDICATIONS  (STANDING):  cefepime   IVPB 2000 milliGRAM(s) IV Intermittent every 8 hours  ciprofloxacin  0.3% Ophthalmic Solution for Otic Use 2 Drop(s) Both Ears two times a day  hydroxychloroquine 200 milliGRAM(s) Oral two times a day  lidocaine   4% Patch 1 Patch Transdermal daily  melatonin 3 milliGRAM(s) Oral at bedtime  sodium chloride 1 Gram(s) Oral three times a day  vancomycin  IVPB 1000 milliGRAM(s) IV Intermittent every 12 hours    MEDICATIONS  (PRN):  albuterol/ipratropium for Nebulization 3 milliLiter(s) Nebulizer every 6 hours PRN Shortness of Breath and/or Wheezing  guaiFENesin Oral Liquid (Sugar-Free) 200 milliGRAM(s) Oral every 6 hours PRN Cough  lidocaine 2% Viscous 5 milliLiter(s) Swish and Spit three times a day PRN Mouth Care      ALLERGIES:  Allergies    No Known Allergies    Intolerances        LABS:                        9.2    4.66  )-----------( 269      ( 26 Dec 2023 00:50 )             27.4     12-26    123<L>  |  93<L>  |  19  ----------------------------<  108<H>  4.6   |  21<L>  |  1.04    Ca    8.5      26 Dec 2023 00:50  Phos  2.3     12-25  Mg     1.90     12-25    TPro  7.4  /  Alb  2.8<L>  /  TBili  0.7  /  DBili  x   /  AST  236<H>  /  ALT  139<H>  /  AlkPhos  79  12-26    PT/INR - ( 26 Dec 2023 00:50 )   PT: 14.4 sec;   INR: 1.28 ratio         PTT - ( 26 Dec 2023 00:50 )  PTT:28.2 sec  Urinalysis Basic - ( 26 Dec 2023 00:50 )    Color: x / Appearance: x / SG: x / pH: x  Gluc: 108 mg/dL / Ketone: x  / Bili: x / Urobili: x   Blood: x / Protein: x / Nitrite: x   Leuk Esterase: x / RBC: x / WBC x   Sq Epi: x / Non Sq Epi: x / Bacteria: x    Culture - CSF with Gram Stain (collected 12-25-23 @ 17:30)  Source: .CSF CSF  Gram Stain (12-26-23 @ 00:52):    No polymorphonuclear leukocytes seen    No organisms seen    by cytocentrifuge      CAPILLARY BLOOD GLUCOSE      POCT Blood Glucose.: 162 mg/dL (25 Dec 2023 10:43)      RADIOLOGY & ADDITIONAL TESTS: Reviewed.

## 2023-12-26 NOTE — PROGRESS NOTE ADULT - ASSESSMENT
This is a 27 y/o M new diagnosis of SLE (9/23, started on hydroxychloroquine) initially admitted to [facility] on 12/12 w/ CP and SOB, found to have RUL and LLL segmental/subsegmental PE, started on heparin, c/f superimposed PNA, on Zosyn. Pt with b/l effusions, L > R with loculations, transferred to [facility] for thoracic evaluation. Pt with persistent fevers despite Zosyn from 12/14/23.  ID now consulted for persistent fevers     Work up:  UA no pyuria  RVP negative  Strep Pneumo Ag, legionella, mycoplasma IgM negative  MRSA PCR negative  EPEC+ 12/15 - diarrhea has since resolved    Imaging:   CTA chest 12/12: Acute right upper lobe and left lower lobe segmental/subsegmental pulmonary emboli. No CT evidence of right heart strain. New bilateral lower lobe consolidations with areas of central clearing. Pneumonia and pulmonary infarcts are in the differential. New bilateral pleural effusions, small on the left and trace on the right. Bilateral axillary and supraclavicular adenopathy of unclear etiology.  TTE 12/12: grossly wnl  CXR 12/20: Large left pleural effusion and compressive atelectasis. Trace right effusion and linear atelectasis in the right midlung  CT chest 12/23: Moderate pleural effusions, loculated on the left, new since 12/12/2023. New nonspecific the very small peripheral groundglass in the right upper lobe  CT neck 12/23: Small level 1 and level 2 and level 5 lymph nodes without significant enlargement. No drainable collections    Micro:  Blood cultures (12/12, 12/14, 12/21, 12/23, 12/25): NGTD  Stool Cx (12/15): no growth, GI PCR +EPEC  Sputum (12/20): normal [place]    Abx:  Zosyn (12/14-25)  Vanco (12/23, 25-)  Cefepime 12/25-  Azithro (12/14-12/16, 12/20)    #Seizure   #Fever  #Pleural effusions  #Possible superimposed pneumonia  #Pulmonary embolism  #Lymphadenopathy  #Known SLE    Overall 27 y/o M w/ SLE on Hydroxychloroquine admitted to [facility] for b/l PE w/ superimposed PNA, transferred to [facility] on 12/22 for thoracic eval of b/l L > R effusions, L loculated. Pt was Zosyn since 12/14, started on Vancomycin here. Despite board therapy with Zosyn, pt continues to have fevers to 102. No leukocytosis.   Fevers if 2/2 PNA should have responded w/ 10 day course of Zosyn, MRSA swab was negative, Vancomycin likely not necessary.   May be 2/2 SLE flare rather than infectious process, however would continue with Zosyn for now pending R thoracentesis and studies.     S/p L thoracentesis on 12/24, nucleated cell count ~400 when corrected for RBCs, does not appear to be infected. Pt was initiated on steroids on 12/23, however did not defervesce. Pt with continued fevers to 104, had seizure yesterday, now transferred to the MICU. Pt was changed to Vancomycin/Cefepime. LP in the MICU w/o significant findings of infection. Only 6 nucleated cells, PCR negative. R sided thora done in MICU, minimal nucleated cells after correction.   Procalcitonin was rechecked, elevated to 8. No leukocytosis, despite recent steroid course. Per rheumatology, if fevers were 2/2 SLE, would have improved w/ steroid course, holding steroids at this time.     Overall, no clear source of fevers at this time, pt continues to have fevers on Zosyn with elevated procal. Now on Vancomycin/Cefepime, with some improvement in fevers, however to noted have elevated temperatures. Will need to follow up Bcx and other Cx already sent.     Plan:   - C/w Vancomycin, Cefepime at this time. However no clear source of infection or pathogen isolated   - F/u BCx, pleural effusion Cx, and CSF Cx (unlikely to grow)   - Would obtain MRI per neurology recs   - appreciate rheumatology recs     Thank you for this consult. Inpatient ID team will follow.    Logan Ku M.D.  Attending Physician  Division of Infectious Diseases  Department of Medicine    Please contact through MS Teams message.  Office: [phone] (after 5 PM or weekend). This is a 29 y/o M new diagnosis of SLE (9/23, started on hydroxychloroquine) initially admitted to [facility] on 12/12 w/ CP and SOB, found to have RUL and LLL segmental/subsegmental PE, started on heparin, c/f superimposed PNA, on Zosyn. Pt with b/l effusions, L > R with loculations, transferred to [facility] for thoracic evaluation. Pt with persistent fevers despite Zosyn from 12/14/23.  ID now consulted for persistent fevers     Work up:  UA no pyuria  RVP negative  Strep Pneumo Ag, legionella, mycoplasma IgM negative  MRSA PCR negative  EPEC+ 12/15 - diarrhea has since resolved    Imaging:   CTA chest 12/12: Acute right upper lobe and left lower lobe segmental/subsegmental pulmonary emboli. No CT evidence of right heart strain. New bilateral lower lobe consolidations with areas of central clearing. Pneumonia and pulmonary infarcts are in the differential. New bilateral pleural effusions, small on the left and trace on the right. Bilateral axillary and supraclavicular adenopathy of unclear etiology.  TTE 12/12: grossly wnl  CXR 12/20: Large left pleural effusion and compressive atelectasis. Trace right effusion and linear atelectasis in the right midlung  CT chest 12/23: Moderate pleural effusions, loculated on the left, new since 12/12/2023. New nonspecific the very small peripheral groundglass in the right upper lobe  CT neck 12/23: Small level 1 and level 2 and level 5 lymph nodes without significant enlargement. No drainable collections    Micro:  Blood cultures (12/12, 12/14, 12/21, 12/23, 12/25): NGTD  Stool Cx (12/15): no growth, GI PCR +EPEC  Sputum (12/20): normal [place]    Abx:  Zosyn (12/14-25)  Vanco (12/23, 25-)  Cefepime 12/25-  Azithro (12/14-12/16, 12/20)    #Seizure   #Fever  #Pleural effusions  #Possible superimposed pneumonia  #Pulmonary embolism  #Lymphadenopathy  #Known SLE    Overall 29 y/o M w/ SLE on Hydroxychloroquine admitted to [facility] for b/l PE w/ superimposed PNA, transferred to [facility] on 12/22 for thoracic eval of b/l L > R effusions, L loculated. Pt was Zosyn since 12/14, started on Vancomycin here. Despite board therapy with Zosyn, pt continues to have fevers to 102. No leukocytosis.   Fevers if 2/2 PNA should have responded w/ 10 day course of Zosyn, MRSA swab was negative, Vancomycin likely not necessary.   May be 2/2 SLE flare rather than infectious process, however would continue with Zosyn for now pending R thoracentesis and studies.     S/p L thoracentesis on 12/24, nucleated cell count ~400 when corrected for RBCs, does not appear to be infected. Pt was initiated on steroids on 12/23, however did not defervesce. Pt with continued fevers to 104, had seizure yesterday, now transferred to the MICU. Pt was changed to Vancomycin/Cefepime. LP in the MICU w/o significant findings of infection. Only 6 nucleated cells, PCR negative. R sided thora done in MICU, minimal nucleated cells after correction.   Procalcitonin was rechecked, elevated to 8. No leukocytosis, despite recent steroid course. Per rheumatology, if fevers were 2/2 SLE, would have improved w/ steroid course, holding steroids at this time.     Overall, no clear source of fevers at this time, pt continues to have fevers on Zosyn with elevated procal. Now on Vancomycin/Cefepime, with some improvement in fevers, however to noted have elevated temperatures. Will need to follow up Bcx and other Cx already sent.     Plan:   - C/w Vancomycin, Cefepime at this time. However no clear source of infection or pathogen isolated   - F/u BCx, pleural effusion Cx, and CSF Cx (unlikely to grow)   - Would obtain MRI per neurology recs   - appreciate rheumatology recs     Thank you for this consult. Inpatient ID team will follow.    Logan Ku M.D.  Attending Physician  Division of Infectious Diseases  Department of Medicine    Please contact through MS Teams message.  Office: [phone] (after 5 PM or weekend).

## 2023-12-26 NOTE — DIETITIAN INITIAL EVALUATION ADULT - REASON FOR ADMISSION
29 yo M w recent Dx Lupus Erythematosus (Sept 2023).  Transferred to [facility] with b/l pleural effusions s/p thoracentesis, as well as pulmonary infarcts & pericardial effusion.  Pt. with L chest tube in place.      27 yo M w recent Dx Lupus Erythematosus (Sept 2023).  Transferred to [facility] with b/l pleural effusions s/p thoracentesis, as well as pulmonary infarcts & pericardial effusion.  Pt. with L chest tube in place.

## 2023-12-26 NOTE — PROGRESS NOTE ADULT - SUBJECTIVE AND OBJECTIVE BOX
*************************************  NEUROLOGY PROGRESS NOTE  **************************************    TAYLA MARIE  Male  MRN-[id number]    Subjective: Patient seen and examined at bedside with Neurology team and attending.    Interval History:    - S/p LP and repeat thoracentesis yesterday evening. LP without evidence of CNS infection, CSF PCR panel negative   - Connected to EEG, prelim report with no seizures. Awaiting final report   - Patient is A&Ox3, no further seizure like activity in the interval   - Na 123 this morning    VITAL SIGNS:  Vital Signs Last 24 Hrs  T(C): 37.2 (26 Dec 2023 08:00), Max: 39.3 (25 Dec 2023 11:40)  T(F): 99 (26 Dec 2023 08:00), Max: 102.7 (25 Dec 2023 11:40)  HR: 97 (26 Dec 2023 09:00) (80 - 106)  BP: 131/93 (26 Dec 2023 09:00) (114/62 - 140/74)  BP(mean): 101 (26 Dec 2023 09:00) (73 - 101)  RR: 23 (26 Dec 2023 09:00) (15 - 26)  SpO2: 100% (26 Dec 2023 09:00) (91% - 100%)    Parameters below as of 26 Dec 2023 08:00  Patient On (Oxygen Delivery Method): room air    Physical Examination:   General - NAD  Cardiovascular - Peripheral pulses palpable, no edema  Eyes -  Fundoscopy not performed due to safety precautions in the setting of the COVID-19 pandemic  No nuchal rigidity     Neurologic Exam:  Mental status - Awake, Alert, Oriented to person, place- hospital, and time Dec 2023. Patient preferring to nod in repsonse to questions however per MICU team was conversational earlier in the morning. Follows simple and complex commands.    Cranial nerves - PERRLA, VFF, EOMI, face sensation (V1-V3) intact b/l, facial strength intact without asymmetry b/l, hearing intact b/l, palate with symmetric elevation, trapezius  5/5 strength b/l, tongue midline on protrusion with full lateral movement    Motor - Normal bulk and tone throughout. No pronator drift.  Strength testing            Deltoid      Biceps      Triceps     Wrist Extension    Wrist Flexion     Interossei         R            5                 5               5                     5               5                    5                 5  L             5                 5               5                     5               5                   5                 5              Hip Flexion    Hip Extension    Knee Flexion    Knee Extension    Dorsiflexion    Plantar Flexion  R              5                           5                       5                   5                   5                          5  L              5                           5                        5                   5                   5                          5    Sensation - Light touch intact throughout    DTR's -             Biceps      Triceps     Brachioradialis      Patellar    Ankle    Toes/plantar response  R             2+             2+                  2+               4+            4+                 Down  L              2+             2+                 2+                4+           4+                 Down    B/L nonsustained ankle clonus    Coordination - Finger to Nose intact b/l. No tremors appreciated    Gait and station - Not assessed ue to mental status      LABS:                          9.2    4.66  )-----------( 269      ( 26 Dec 2023 00:50 )             27.4     12-26    123<L>  |  93<L>  |  19  ----------------------------<  108<H>  4.6   |  21<L>  |  1.04    Ca    8.5      26 Dec 2023 00:50  Phos  2.3     12-25  Mg     1.90     12-25    TPro  7.4  /  Alb  2.8<L>  /  TBili  0.7  /  DBili  x   /  AST  236<H>  /  ALT  139<H>  /  AlkPhos  79  12-26    PT/INR - ( 26 Dec 2023 00:50 )   PT: 14.4 sec;   INR: 1.28 ratio         PTT - ( 26 Dec 2023 00:50 )  PTT:28.2 sec      RADIOLOGY & ADDITIONAL STUDIES:      Radiology:    CT Head No Cont (12.25.23 @ 13:53)   Findings:  The lateral ventricles have a normal configuration.  There is no evidence of acute hemorrhage, mass or mass-effect in the   posterior fossa or in the supratentorial region.  Evaluation of the osseous structures with the appropriate window appears   unremarkable.  The visualized paranasal sinuses mastoid and middle ear regions appear   clear.    Impression:  Unremarkable noncontrast head CT.  If symptoms continue MRI can be done for further evaluation if there are   no contraindications.    EEG Prelim 12/25  EEG preliminary read (not final) on the initial recording hour(s) = x 1.5  No seizures recorded.  Final report to follow tomorrow morning after completion of study.       *************************************  NEUROLOGY PROGRESS NOTE  **************************************    TAYLA MARIE  Male  MRN-[id number]    Subjective: Patient seen and examined at bedside with Neurology team and attending.    Interval History:    - S/p LP and repeat thoracentesis yesterday evening. LP without evidence of CNS infection, CSF PCR panel negative   - Connected to EEG, prelim report with no seizures. Awaiting final report   - Patient is A&Ox3, no further seizure like activity in the interval   - Na 123 this morning    VITAL SIGNS:  Vital Signs Last 24 Hrs  T(C): 37.2 (26 Dec 2023 08:00), Max: 39.3 (25 Dec 2023 11:40)  T(F): 99 (26 Dec 2023 08:00), Max: 102.7 (25 Dec 2023 11:40)  HR: 97 (26 Dec 2023 09:00) (80 - 106)  BP: 131/93 (26 Dec 2023 09:00) (114/62 - 140/74)  BP(mean): 101 (26 Dec 2023 09:00) (73 - 101)  RR: 23 (26 Dec 2023 09:00) (15 - 26)  SpO2: 100% (26 Dec 2023 09:00) (91% - 100%)    Parameters below as of 26 Dec 2023 08:00  Patient On (Oxygen Delivery Method): room air    Physical Examination:   General - NAD  Cardiovascular - Peripheral pulses palpable, no edema  Eyes -  Fundoscopy not performed due to safety precautions in the setting of the COVID-19 pandemic  No nuchal rigidity     Neurologic Exam:  Mental status - Awake, Alert, Oriented to person, place- hospital, and time Dec 2023. Patient preferring to nod in repsonse to questions however per MICU team was conversational earlier in the morning. Follows simple and complex commands.    Cranial nerves - PERRLA, VFF, EOMI, face sensation (V1-V3) intact b/l, facial strength intact without asymmetry b/l, hearing intact b/l, palate with symmetric elevation, trapezius  5/5 strength b/l, tongue midline on protrusion with full lateral movement    Motor - Normal bulk and tone throughout. No pronator drift.  Strength testing            Deltoid      Biceps      Triceps     Wrist Extension    Wrist Flexion     Interossei         R            5                 5               5                     5               5                    5                 5  L             5                 5               5                     5               5                   5                 5              Hip Flexion    Hip Extension    Knee Flexion    Knee Extension    Dorsiflexion    Plantar Flexion  R              5                           5                       5                   5                   5                          5  L              5                           5                        5                   5                   5                          5    Sensation - Light touch intact throughout    DTR's -             Biceps      Triceps     Brachioradialis      Patellar    Ankle    Toes/plantar response  R             2+             2+                  2+               4+            4+                 Down  L              2+             2+                 2+                4+           4+                 Down    B/L nonsustained ankle clonus    Coordination - Finger to Nose intact b/l. No tremors appreciated    Gait and station - Not assessed ue to mental status      LABS:                          9.2    4.66  )-----------( 269      ( 26 Dec 2023 00:50 )             27.4     12-26    123<L>  |  93<L>  |  19  ----------------------------<  108<H>  4.6   |  21<L>  |  1.04    Ca    8.5      26 Dec 2023 00:50  Phos  2.3     12-25  Mg     1.90     12-25    TPro  7.4  /  Alb  2.8<L>  /  TBili  0.7  /  DBili  x   /  AST  236<H>  /  ALT  139<H>  /  AlkPhos  79  12-26    PT/INR - ( 26 Dec 2023 00:50 )   PT: 14.4 sec;   INR: 1.28 ratio         PTT - ( 26 Dec 2023 00:50 )  PTT:28.2 sec      RADIOLOGY & ADDITIONAL STUDIES:      Radiology:    CT Head No Cont (12.25.23 @ 13:53)   Findings:  The lateral ventricles have a normal configuration.  There is no evidence of acute hemorrhage, mass or mass-effect in the   posterior fossa or in the supratentorial region.  Evaluation of the osseous structures with the appropriate window appears   unremarkable.  The visualized paranasal sinuses mastoid and middle ear regions appear   clear.    Impression:  Unremarkable noncontrast head CT.  If symptoms continue MRI can be done for further evaluation if there are   no contraindications.    EEG Prelim 12/25  EEG preliminary read (not final) on the initial recording hour(s) = x 1.5  No seizures recorded.  Final report to follow tomorrow morning after completion of study.

## 2023-12-26 NOTE — PROGRESS NOTE ADULT - NUTRITIONAL ASSESSMENT
29 yo male with a recent diagnosis of Lupus who was transferred fro [facility] for [facility] for a thoracic surgery evaluation for a bilateral pulmonary embolism as well as bilateral lower lobe consolidations with areas of central clearing, concerning for PNA vs pulmonary infarct. Hospital course has been prolonged and complicated acute hypoxic respiratory failure requiring intubation with development of pleural and cardiac effusions s/p r diagnostic thoracentesis (on 12/22, showing exudative fluid, as well as fevers (being treated with steroids and broad spectrum antibiotics. Hematology consulted for hypercoagulable workup given concern for extensive PE and coagulopathy.    #Pulmonary Embolism  #Hypercoagulable Workup  APLS labs showed a negative beta-2 glycoprotein, negative lupus anticoagulant, and a positive screening total anticardiolipin antibody, with low titer IgG and IgM subtypes. This is not consistent with APLS as patients need to have positive titers 12 weeks apart with IgG/IgM >40. Suspect that these clots were likely provoked in the setting of critical illness and chronic inflammatory state from Lupus (not APLS syndrome).  - Agree with Heparin gtt for now. Once no plan for further procedures, would transition patient to Eliquis 5 mg BID.    Case d/w dr. dias.    Johanna Rivers M.D.  Hematology and Medical Oncology Fellow  Pager: [phone]  For weekends and evenings (5 pm - 8 am), please page Heme/Onc fellow on call. 27 yo male with a recent diagnosis of Lupus who was transferred fro [facility] for [facility] for a thoracic surgery evaluation for a bilateral pulmonary embolism as well as bilateral lower lobe consolidations with areas of central clearing, concerning for PNA vs pulmonary infarct. Hospital course has been prolonged and complicated acute hypoxic respiratory failure requiring intubation with development of pleural and cardiac effusions s/p r diagnostic thoracentesis (on 12/22, showing exudative fluid, as well as fevers (being treated with steroids and broad spectrum antibiotics. Hematology consulted for hypercoagulable workup given concern for extensive PE and coagulopathy.    #Pulmonary Embolism  #Hypercoagulable Workup  APLS labs showed a negative beta-2 glycoprotein, negative lupus anticoagulant, and a positive screening total anticardiolipin antibody, with low titer IgG and IgM subtypes. This is not consistent with APLS as patients need to have positive titers 12 weeks apart with IgG/IgM >40. Suspect that these clots were likely provoked in the setting of critical illness and chronic inflammatory state from Lupus (not APLS syndrome).  - Agree with Heparin gtt for now. Once no plan for further procedures, would transition patient to Eliquis 5 mg BID.    Case d/w dr. dias.    Johanna Rivers M.D.  Hematology and Medical Oncology Fellow  Pager: [phone]  For weekends and evenings (5 pm - 8 am), please page Heme/Onc fellow on call.

## 2023-12-26 NOTE — CONSULT NOTE ADULT - ASSESSMENT
PE  a/c as per ICU   no evidence of right heart strain   no significant evidence of myocardial injury   normal LV function     Pericardial effusion   inflammatory in setting of SLE   trace effusion   no sign of tamponade    Fevers  as per ID / Rheumatology

## 2023-12-26 NOTE — EEG REPORT - NS EEG TEXT BOX
TAYLA MARIE [id number]     Study Date: 		12-25-23 1440 - 0800 12-26-23  Duration in hours:  x 15 hr 37 min    --------------------------------------------------------------------------------------------------  History:  CC/ HPI Patient is a 28y old  Male who presents with a chief complaint of Dyspnea and CP (25 Dec 2023 16:21)    MEDICATIONS  (STANDING):  cefepime   IVPB 2000 milliGRAM(s) IV Intermittent every 8 hours  ciprofloxacin  0.3% Ophthalmic Solution for Otic Use 2 Drop(s) Both Ears two times a day  heparin  Infusion. 1600 Unit(s)/Hr (16 mL/Hr) IV Continuous <Continuous>  hydroxychloroquine 200 milliGRAM(s) Oral two times a day  lidocaine   4% Patch 1 Patch Transdermal daily  melatonin 3 milliGRAM(s) Oral at bedtime  sodium chloride 1 Gram(s) Oral three times a day  vancomycin  IVPB 1000 milliGRAM(s) IV Intermittent every 12 hours    --------------------------------------------------------------------------------------------------  Study Interpretation:    [Abbreviation Key:  PDR=alpha rhythm/posterior dominant rhythm. A-P=anterior posterior.  Amplitude: ‘very low’:<20; ‘low’:20-49; ‘medium’:; ‘high’:>150uV.  Persistence for periodic/rhythmic patterns (% of epoch) ‘rare’:<1%; ‘occasional’:1-10%; ‘frequent’:10-50%; ‘abundant’:50-90%; ‘continuous’:>90%.  Persistence for sporadic discharges: ‘rare’:<1/hr; ‘occasional’:1/min-1/hr; ‘frequent’:>1/min; ‘abundant’:>1/10 sec.  RPP=rhythmic and periodic patterns; GRDA=generalized rhythmic delta activity; FIRDA=frontal intermittent GRDA; LRDA=lateralized rhythmic delta activity; TIRDA=temporal intermittent rhythmic delta activity;  LPD=PLED=lateralized periodic discharges; GPD=generalized periodic discharges; BIPDs =bilateral independent periodic discharges; Mf=multifocal; SIRPDs=stimulus induced rhythmic, periodic, or ictal appearing discharges; BIRDs=brief potentially ictal rhythmic discharges >4 Hz, lasting .5-10s; PFA (paroxysmal bursts >13 Hz or =8 Hz <10s).  Modifiers: +F=with fast component; +S=with spike component; +R=with rhythmic component.  S-B=burst suppression pattern.  Max=maximal. N1-drowsy; N2-stage II sleep; N3-slow wave sleep. SSS/BETS=small sharp spikes/benign epileptiform transients of sleep. HV=hyperventilation; PS=photic stimulation]    FINDINGS:      Background:  Continuity: continuous  Symmetry: symmetric  PDR: 8 Hz activity, with amplitude to 40 uV, that attenuated to eye opening.    Reactivity: present  Voltage: normal (between 20-150uV)  Anterior Posterior Gradient: present  Other background findings: none  Breach: absent    Background Slowing:  Generalized slowing: intermittent irregular delta and theta activity.  Focal slowing: none was present.    State Changes:   -Drowsiness noted with increased slowing, attenuation of fast activity, vertex transients.  -Present with N2 sleep transients with symmetric spindles and K-complexes.    Sporadic Epileptiform Discharges:    None    Rhythmic and Periodic Patterns (RPPs):  None     Electrographic and Electroclinical seizures:  None    Other Clinical Events:  None    Activation Procedures:   -Hyperventilation was not performed.    -Photic stimulation was performed and did not elicit any abnormalities.       Artifacts:  Intermittent myogenic and movement artifacts were noted.    ECG:  The heart rate on single channel ECG was predominantly between 70-90 BPM.    EEG Classification / Summary:  Abnormal EEG study  Mild generalized background slowing    -----------------------------------------------------------------------------------------------------    Clinical Impression:  Mild diffuse/multi-focal cerebral dysfunction, not specific as to etiology.  There were no epileptiform abnormalities recorded.    This is a preliminary report pending attending review and attestation.    Karena Gage MD  Fellow, [facility]      -------------------------------------------------------------------------------------------------------  [facility] EEG Reading Room Ph#: [phone]  Epilepsy Answering Service after 5PM and before 8:30AM: Ph#: [phone]   TAYLA MARIE [id number]     Study Date: 		12-25-23 1440 - 0800 12-26-23  Duration in hours:  x 15 hr 37 min    --------------------------------------------------------------------------------------------------  History:  CC/ HPI Patient is a 28y old  Male who presents with a chief complaint of Dyspnea and CP (25 Dec 2023 16:21)    MEDICATIONS  (STANDING):  cefepime   IVPB 2000 milliGRAM(s) IV Intermittent every 8 hours  ciprofloxacin  0.3% Ophthalmic Solution for Otic Use 2 Drop(s) Both Ears two times a day  heparin  Infusion. 1600 Unit(s)/Hr (16 mL/Hr) IV Continuous <Continuous>  hydroxychloroquine 200 milliGRAM(s) Oral two times a day  lidocaine   4% Patch 1 Patch Transdermal daily  melatonin 3 milliGRAM(s) Oral at bedtime  sodium chloride 1 Gram(s) Oral three times a day  vancomycin  IVPB 1000 milliGRAM(s) IV Intermittent every 12 hours    --------------------------------------------------------------------------------------------------  Study Interpretation:    [Abbreviation Key:  PDR=alpha rhythm/posterior dominant rhythm. A-P=anterior posterior.  Amplitude: ‘very low’:<20; ‘low’:20-49; ‘medium’:; ‘high’:>150uV.  Persistence for periodic/rhythmic patterns (% of epoch) ‘rare’:<1%; ‘occasional’:1-10%; ‘frequent’:10-50%; ‘abundant’:50-90%; ‘continuous’:>90%.  Persistence for sporadic discharges: ‘rare’:<1/hr; ‘occasional’:1/min-1/hr; ‘frequent’:>1/min; ‘abundant’:>1/10 sec.  RPP=rhythmic and periodic patterns; GRDA=generalized rhythmic delta activity; FIRDA=frontal intermittent GRDA; LRDA=lateralized rhythmic delta activity; TIRDA=temporal intermittent rhythmic delta activity;  LPD=PLED=lateralized periodic discharges; GPD=generalized periodic discharges; BIPDs =bilateral independent periodic discharges; Mf=multifocal; SIRPDs=stimulus induced rhythmic, periodic, or ictal appearing discharges; BIRDs=brief potentially ictal rhythmic discharges >4 Hz, lasting .5-10s; PFA (paroxysmal bursts >13 Hz or =8 Hz <10s).  Modifiers: +F=with fast component; +S=with spike component; +R=with rhythmic component.  S-B=burst suppression pattern.  Max=maximal. N1-drowsy; N2-stage II sleep; N3-slow wave sleep. SSS/BETS=small sharp spikes/benign epileptiform transients of sleep. HV=hyperventilation; PS=photic stimulation]    FINDINGS:      Background:  Continuity: continuous  Symmetry: symmetric  PDR: 8 Hz activity, with amplitude to 40 uV, that attenuated to eye opening.    Reactivity: present  Voltage: normal (between 20-150uV)  Anterior Posterior Gradient: present  Other background findings: none  Breach: absent    Background Slowing:  Generalized slowing: intermittent irregular delta and theta activity.  Focal slowing: none was present.    State Changes:   -Drowsiness noted with increased slowing, attenuation of fast activity, vertex transients.  -Present with N2 sleep transients with symmetric spindles and K-complexes.    Sporadic Epileptiform Discharges:    None    Rhythmic and Periodic Patterns (RPPs):  None     Electrographic and Electroclinical seizures:  None    Other Clinical Events:  None    Activation Procedures:   -Hyperventilation was not performed.    -Photic stimulation was performed and did not elicit any abnormalities.       Artifacts:  Intermittent myogenic and movement artifacts were noted.    ECG:  The heart rate on single channel ECG was predominantly between 70-90 BPM.    EEG Classification / Summary:  Abnormal EEG study  Mild generalized background slowing    -----------------------------------------------------------------------------------------------------    Clinical Impression:  Mild diffuse/multi-focal cerebral dysfunction, not specific as to etiology.  There were no epileptiform abnormalities recorded.    This is a preliminary report pending attending review and attestation.    Karena Gage MD  Fellow, [facility]      -------------------------------------------------------------------------------------------------------  [facility] EEG Reading Room Ph#: [phone]  Epilepsy Answering Service after 5PM and before 8:30AM: Ph#: [phone]   TAYLA MARIE [id number]     Study Date: 		12-25-23 1440 - 0800 12-26-23  Duration in hours:  x 15 hr 37 min    --------------------------------------------------------------------------------------------------  History:  CC/ HPI Patient is a 28y old  Male who presents with a chief complaint of Dyspnea and CP (25 Dec 2023 16:21)    MEDICATIONS  (STANDING):  cefepime   IVPB 2000 milliGRAM(s) IV Intermittent every 8 hours  ciprofloxacin  0.3% Ophthalmic Solution for Otic Use 2 Drop(s) Both Ears two times a day  heparin  Infusion. 1600 Unit(s)/Hr (16 mL/Hr) IV Continuous <Continuous>  hydroxychloroquine 200 milliGRAM(s) Oral two times a day  lidocaine   4% Patch 1 Patch Transdermal daily  melatonin 3 milliGRAM(s) Oral at bedtime  sodium chloride 1 Gram(s) Oral three times a day  vancomycin  IVPB 1000 milliGRAM(s) IV Intermittent every 12 hours    --------------------------------------------------------------------------------------------------  Study Interpretation:    [Abbreviation Key:  PDR=alpha rhythm/posterior dominant rhythm. A-P=anterior posterior.  Amplitude: ‘very low’:<20; ‘low’:20-49; ‘medium’:; ‘high’:>150uV.  Persistence for periodic/rhythmic patterns (% of epoch) ‘rare’:<1%; ‘occasional’:1-10%; ‘frequent’:10-50%; ‘abundant’:50-90%; ‘continuous’:>90%.  Persistence for sporadic discharges: ‘rare’:<1/hr; ‘occasional’:1/min-1/hr; ‘frequent’:>1/min; ‘abundant’:>1/10 sec.  RPP=rhythmic and periodic patterns; GRDA=generalized rhythmic delta activity; FIRDA=frontal intermittent GRDA; LRDA=lateralized rhythmic delta activity; TIRDA=temporal intermittent rhythmic delta activity;  LPD=PLED=lateralized periodic discharges; GPD=generalized periodic discharges; BIPDs =bilateral independent periodic discharges; Mf=multifocal; SIRPDs=stimulus induced rhythmic, periodic, or ictal appearing discharges; BIRDs=brief potentially ictal rhythmic discharges >4 Hz, lasting .5-10s; PFA (paroxysmal bursts >13 Hz or =8 Hz <10s).  Modifiers: +F=with fast component; +S=with spike component; +R=with rhythmic component.  S-B=burst suppression pattern.  Max=maximal. N1-drowsy; N2-stage II sleep; N3-slow wave sleep. SSS/BETS=small sharp spikes/benign epileptiform transients of sleep. HV=hyperventilation; PS=photic stimulation]    FINDINGS:      Background:  Continuity: continuous  Symmetry: symmetric  PDR: 8 Hz activity, with amplitude to 40 uV, that attenuated to eye opening.    Reactivity: present  Voltage: normal (between 20-150uV)  Anterior Posterior Gradient: present  Other background findings: none  Breach: absent    Background Slowing:  Generalized slowing: intermittent irregular delta and theta activity.  Focal slowing: none was present.    State Changes:   -Drowsiness noted with increased slowing, attenuation of fast activity, vertex transients.  -Present with N2 sleep transients with symmetric spindles and K-complexes.    Sporadic Epileptiform Discharges:    None    Rhythmic and Periodic Patterns (RPPs):  None     Electrographic and Electroclinical seizures:  None    Other Clinical Events:  None    Activation Procedures:   -Hyperventilation was not performed.    -Photic stimulation was performed and did not elicit any abnormalities.       Artifacts:  Intermittent myogenic and movement artifacts were noted.    ECG:  The heart rate on single channel ECG was predominantly between 70-90 BPM.    EEG Classification / Summary:  Abnormal EEG study  Mild generalized background slowing    -----------------------------------------------------------------------------------------------------    Clinical Impression:  Mild diffuse/multi-focal cerebral dysfunction, not specific as to etiology.  There were no epileptiform abnormalities recorded.      Karena Gage MD  Fellow, [facility]    Timothy Dow MD  EEG/Epilepsy Attending       -------------------------------------------------------------------------------------------------------  [facility] EEG Reading Room Ph#: [phone]  Epilepsy Answering Service after 5PM and before 8:30AM: Ph#: [phone]   TAYLA MARIE [id number]     Study Date: 		12-25-23 1440 - 0800 12-26-23  Duration in hours:  x 15 hr 37 min    --------------------------------------------------------------------------------------------------  History:  CC/ HPI Patient is a 28y old  Male who presents with a chief complaint of Dyspnea and CP (25 Dec 2023 16:21)    MEDICATIONS  (STANDING):  cefepime   IVPB 2000 milliGRAM(s) IV Intermittent every 8 hours  ciprofloxacin  0.3% Ophthalmic Solution for Otic Use 2 Drop(s) Both Ears two times a day  heparin  Infusion. 1600 Unit(s)/Hr (16 mL/Hr) IV Continuous <Continuous>  hydroxychloroquine 200 milliGRAM(s) Oral two times a day  lidocaine   4% Patch 1 Patch Transdermal daily  melatonin 3 milliGRAM(s) Oral at bedtime  sodium chloride 1 Gram(s) Oral three times a day  vancomycin  IVPB 1000 milliGRAM(s) IV Intermittent every 12 hours    --------------------------------------------------------------------------------------------------  Study Interpretation:    [Abbreviation Key:  PDR=alpha rhythm/posterior dominant rhythm. A-P=anterior posterior.  Amplitude: ‘very low’:<20; ‘low’:20-49; ‘medium’:; ‘high’:>150uV.  Persistence for periodic/rhythmic patterns (% of epoch) ‘rare’:<1%; ‘occasional’:1-10%; ‘frequent’:10-50%; ‘abundant’:50-90%; ‘continuous’:>90%.  Persistence for sporadic discharges: ‘rare’:<1/hr; ‘occasional’:1/min-1/hr; ‘frequent’:>1/min; ‘abundant’:>1/10 sec.  RPP=rhythmic and periodic patterns; GRDA=generalized rhythmic delta activity; FIRDA=frontal intermittent GRDA; LRDA=lateralized rhythmic delta activity; TIRDA=temporal intermittent rhythmic delta activity;  LPD=PLED=lateralized periodic discharges; GPD=generalized periodic discharges; BIPDs =bilateral independent periodic discharges; Mf=multifocal; SIRPDs=stimulus induced rhythmic, periodic, or ictal appearing discharges; BIRDs=brief potentially ictal rhythmic discharges >4 Hz, lasting .5-10s; PFA (paroxysmal bursts >13 Hz or =8 Hz <10s).  Modifiers: +F=with fast component; +S=with spike component; +R=with rhythmic component.  S-B=burst suppression pattern.  Max=maximal. N1-drowsy; N2-stage II sleep; N3-slow wave sleep. SSS/BETS=small sharp spikes/benign epileptiform transients of sleep. HV=hyperventilation; PS=photic stimulation]    FINDINGS:      Background:  Continuity: continuous  Symmetry: symmetric  PDR: 8 Hz activity, with amplitude to 40 uV, that attenuated to eye opening.    Reactivity: present  Voltage: normal (between 20-150uV)  Anterior Posterior Gradient: present  Other background findings: none  Breach: absent    Background Slowing:  Generalized slowing: intermittent irregular delta and theta activity.  Focal slowing: none was present.    State Changes:   -Drowsiness noted with increased slowing, attenuation of fast activity, vertex transients.  -Present with N2 sleep transients with symmetric spindles and K-complexes.    Sporadic Epileptiform Discharges:    None    Rhythmic and Periodic Patterns (RPPs):  None     Electrographic and Electroclinical seizures:  None    Other Clinical Events:  None    Activation Procedures:   -Hyperventilation was not performed.    -Photic stimulation was performed and did not elicit any abnormalities.       Artifacts:  Intermittent myogenic and movement artifacts were noted.    ECG:  The heart rate on single channel ECG was predominantly between 70-90 BPM.    EEG Classification / Summary:  Abnormal EEG study  Mild generalized background slowing    -----------------------------------------------------------------------------------------------------    Clinical Impression:  Mild diffuse/multi-focal cerebral dysfunction, not specific as to etiology.  There were no epileptiform abnormalities recorded.      Karena Gage MD  Fellow, [facility]    Timothy Dow MD  EEG/Epilepsy Attending       -------------------------------------------------------------------------------------------------------  [facility] EEG Reading Room Ph#: [phone]  Epilepsy Answering Service after 5PM and before 8:30AM: Ph#: [phone]   TAYLA MARIE [id number]     Study Date: 		12-25-23 4219 - 4727 12-26-23  Duration in hours:  x 18 hr 53 min    --------------------------------------------------------------------------------------------------  History:  CC/ HPI Patient is a 28y old  Male who presents with a chief complaint of Dyspnea and CP (25 Dec 2023 16:21)    MEDICATIONS  (STANDING):  cefepime   IVPB 2000 milliGRAM(s) IV Intermittent every 8 hours  ciprofloxacin  0.3% Ophthalmic Solution for Otic Use 2 Drop(s) Both Ears two times a day  heparin  Infusion. 1600 Unit(s)/Hr (16 mL/Hr) IV Continuous <Continuous>  hydroxychloroquine 200 milliGRAM(s) Oral two times a day  lidocaine   4% Patch 1 Patch Transdermal daily  melatonin 3 milliGRAM(s) Oral at bedtime  sodium chloride 1 Gram(s) Oral three times a day  vancomycin  IVPB 1000 milliGRAM(s) IV Intermittent every 12 hours    --------------------------------------------------------------------------------------------------  Study Interpretation:    [Abbreviation Key:  PDR=alpha rhythm/posterior dominant rhythm. A-P=anterior posterior.  Amplitude: ‘very low’:<20; ‘low’:20-49; ‘medium’:; ‘high’:>150uV.  Persistence for periodic/rhythmic patterns (% of epoch) ‘rare’:<1%; ‘occasional’:1-10%; ‘frequent’:10-50%; ‘abundant’:50-90%; ‘continuous’:>90%.  Persistence for sporadic discharges: ‘rare’:<1/hr; ‘occasional’:1/min-1/hr; ‘frequent’:>1/min; ‘abundant’:>1/10 sec.  RPP=rhythmic and periodic patterns; GRDA=generalized rhythmic delta activity; FIRDA=frontal intermittent GRDA; LRDA=lateralized rhythmic delta activity; TIRDA=temporal intermittent rhythmic delta activity;  LPD=PLED=lateralized periodic discharges; GPD=generalized periodic discharges; BIPDs =bilateral independent periodic discharges; Mf=multifocal; SIRPDs=stimulus induced rhythmic, periodic, or ictal appearing discharges; BIRDs=brief potentially ictal rhythmic discharges >4 Hz, lasting .5-10s; PFA (paroxysmal bursts >13 Hz or =8 Hz <10s).  Modifiers: +F=with fast component; +S=with spike component; +R=with rhythmic component.  S-B=burst suppression pattern.  Max=maximal. N1-drowsy; N2-stage II sleep; N3-slow wave sleep. SSS/BETS=small sharp spikes/benign epileptiform transients of sleep. HV=hyperventilation; PS=photic stimulation]    FINDINGS:      Background:  Continuity: continuous  Symmetry: symmetric  PDR: 8 Hz activity, with amplitude to 40 uV, that attenuated to eye opening.    Reactivity: present  Voltage: normal (between 20-150uV)  Anterior Posterior Gradient: present  Other background findings: none  Breach: absent    Background Slowing:  Generalized slowing: intermittent irregular delta and theta activity.  Focal slowing: none was present.    State Changes:   -Drowsiness noted with increased slowing, attenuation of fast activity, vertex transients.  -Present with N2 sleep transients with symmetric spindles and K-complexes.    Sporadic Epileptiform Discharges:    None    Rhythmic and Periodic Patterns (RPPs):  None     Electrographic and Electroclinical seizures:  None    Other Clinical Events:  None    Activation Procedures:   -Hyperventilation was not performed.    -Photic stimulation was performed and did not elicit any abnormalities.       Artifacts:  Intermittent myogenic and movement artifacts were noted.    ECG:  The heart rate on single channel ECG was predominantly between 70-90 BPM.    EEG Classification / Summary:  Abnormal EEG study  Mild generalized background slowing    -----------------------------------------------------------------------------------------------------    Clinical Impression:  Mild diffuse/multi-focal cerebral dysfunction, not specific as to etiology.  There were no epileptiform abnormalities recorded.      Karena Gage MD  Fellow, [facility]    Timothy Dow MD  EEG/Epilepsy Attending       -------------------------------------------------------------------------------------------------------  [facility] EEG Reading Room Ph#: [phone]  Epilepsy Answering Service after 5PM and before 8:30AM: Ph#: [phone]   TAYLA MARIE [id number]     Study Date: 		12-25-23 5155 - 9255 12-26-23  Duration in hours:  x 18 hr 53 min    --------------------------------------------------------------------------------------------------  History:  CC/ HPI Patient is a 28y old  Male who presents with a chief complaint of Dyspnea and CP (25 Dec 2023 16:21)    MEDICATIONS  (STANDING):  cefepime   IVPB 2000 milliGRAM(s) IV Intermittent every 8 hours  ciprofloxacin  0.3% Ophthalmic Solution for Otic Use 2 Drop(s) Both Ears two times a day  heparin  Infusion. 1600 Unit(s)/Hr (16 mL/Hr) IV Continuous <Continuous>  hydroxychloroquine 200 milliGRAM(s) Oral two times a day  lidocaine   4% Patch 1 Patch Transdermal daily  melatonin 3 milliGRAM(s) Oral at bedtime  sodium chloride 1 Gram(s) Oral three times a day  vancomycin  IVPB 1000 milliGRAM(s) IV Intermittent every 12 hours    --------------------------------------------------------------------------------------------------  Study Interpretation:    [Abbreviation Key:  PDR=alpha rhythm/posterior dominant rhythm. A-P=anterior posterior.  Amplitude: ‘very low’:<20; ‘low’:20-49; ‘medium’:; ‘high’:>150uV.  Persistence for periodic/rhythmic patterns (% of epoch) ‘rare’:<1%; ‘occasional’:1-10%; ‘frequent’:10-50%; ‘abundant’:50-90%; ‘continuous’:>90%.  Persistence for sporadic discharges: ‘rare’:<1/hr; ‘occasional’:1/min-1/hr; ‘frequent’:>1/min; ‘abundant’:>1/10 sec.  RPP=rhythmic and periodic patterns; GRDA=generalized rhythmic delta activity; FIRDA=frontal intermittent GRDA; LRDA=lateralized rhythmic delta activity; TIRDA=temporal intermittent rhythmic delta activity;  LPD=PLED=lateralized periodic discharges; GPD=generalized periodic discharges; BIPDs =bilateral independent periodic discharges; Mf=multifocal; SIRPDs=stimulus induced rhythmic, periodic, or ictal appearing discharges; BIRDs=brief potentially ictal rhythmic discharges >4 Hz, lasting .5-10s; PFA (paroxysmal bursts >13 Hz or =8 Hz <10s).  Modifiers: +F=with fast component; +S=with spike component; +R=with rhythmic component.  S-B=burst suppression pattern.  Max=maximal. N1-drowsy; N2-stage II sleep; N3-slow wave sleep. SSS/BETS=small sharp spikes/benign epileptiform transients of sleep. HV=hyperventilation; PS=photic stimulation]    FINDINGS:      Background:  Continuity: continuous  Symmetry: symmetric  PDR: 8 Hz activity, with amplitude to 40 uV, that attenuated to eye opening.    Reactivity: present  Voltage: normal (between 20-150uV)  Anterior Posterior Gradient: present  Other background findings: none  Breach: absent    Background Slowing:  Generalized slowing: intermittent irregular delta and theta activity.  Focal slowing: none was present.    State Changes:   -Drowsiness noted with increased slowing, attenuation of fast activity, vertex transients.  -Present with N2 sleep transients with symmetric spindles and K-complexes.    Sporadic Epileptiform Discharges:    None    Rhythmic and Periodic Patterns (RPPs):  None     Electrographic and Electroclinical seizures:  None    Other Clinical Events:  None    Activation Procedures:   -Hyperventilation was not performed.    -Photic stimulation was performed and did not elicit any abnormalities.       Artifacts:  Intermittent myogenic and movement artifacts were noted.    ECG:  The heart rate on single channel ECG was predominantly between 70-90 BPM.    EEG Classification / Summary:  Abnormal EEG study  Mild generalized background slowing    -----------------------------------------------------------------------------------------------------    Clinical Impression:  Mild diffuse/multi-focal cerebral dysfunction, not specific as to etiology.  There were no epileptiform abnormalities recorded.      Karena Gage MD  Fellow, [facility]    Timothy Dow MD  EEG/Epilepsy Attending       -------------------------------------------------------------------------------------------------------  [facility] EEG Reading Room Ph#: [phone]  Epilepsy Answering Service after 5PM and before 8:30AM: Ph#: [phone]

## 2023-12-26 NOTE — DIETITIAN INITIAL EVALUATION ADULT - OTHER INFO
Spoke w RNs.  Met with Pt. earlier this morning to provide more tolerable foods considering oral discomfort (i.e. yogurt, oatmeal, pudding).  Upon return to complete full nutrition assessment, Pt. sleeping.  Mother present @ bedside.  +Weight loss over past "4-6 weeks".      Per chart, Pt. also noted with poor PO intake (<50%) on prior admission prior to transfer to [facility].  Documentation also indicates Pt.'s stated usual body weight was ~155lbs.     Spoke with nursing & physicians re: concern for oral/mouth integrity & to consider further workup, as well as consideration for PO mouthwash which may help decrease discomfort/pain so as to help promote PO intake. Spoke w RNs.  Met with Pt. earlier this morning to provide more tolerable foods considering oral discomfort (i.e. yogurt, oatmeal, pudding).  Upon return to complete full nutrition assessment, Pt. sleeping.  Mother present @ bedside.  +Weight loss over past "4-6 weeks".      Per chart, Pt. also noted with poor PO intake (<50%) on prior admission prior to transfer to [facility].  Documentation also indicates Pt.'s stated usual body weight was ~155lbs.     Spoke with nursing & physicians re: concern for oral/mouth integrity & to consider further workup, as well as consideration for PO mouthwash which may help decrease discomfort/pain so as to help promote PO intake.

## 2023-12-26 NOTE — PROGRESS NOTE ADULT - ATTENDING COMMENTS
28M PMH SLE (dx 09/2023, on Plaquenil) who [facility], found to have acute RUL and LLL segmental/subsegmental PE's as well as  bilateral lower lobe consolidations with areas of central clearing, concerning for PNA vs pulmonary infarct. He was started on A/C with course c/b worsening hypoxemic respiratory failure with development of pleural effusions. He was transferred to [facility] on 12/22 for thoracic surgery evaluation s/p diagnostic left thoracentesis on 12/22, which was exudative with negative cultures. He was started on methylprednisolone 40 mg IV daily on 12/23 for likely lupus flare, but then on 12/25 he developed acute chest pain, dyspnea, fever to 104.5, and GTC seizure, transferred to MICU for further care. Labs with significant hyponatremia and elevated CPK/AST/ALT due to mild acute rhabdo, now improved. Found today on bedside ultrasound to have a very loculated left pleural effusion with thick septations and dense echogenic material within, worrisome for empyema vs. contained hemothorax.    - Awake and alert, no further seizures, keep off AED's at this time. Eventually needs MRI. Neuro follow-up. No seizures on EEG  - HD stable, no RV strain on echo. Continue heparin gtt for PE's  - Doing well on room air with normal SpO2. Left chest tube placed to suction -20 cm H2O. Start intrapleural tPA/dornase laura and monitor chest tube output  - Diet as tolerates  - Hyponatremia slowly improving, suspect SIADH from pain vs. urinary retention. Continue salt tabs  - Continue empiric vancomycin and cefepime for fevers and sepsis, possibly due to empyema vs. pneumonia. Follow-up ID  - Appreciate rheum eval, hold steroids for now. Check ANCA, MPO, PR3, quantiferon, syphilis screen, myomarker panel, immunoglobulins, IgG subsets, hemolysis labs (LDH, haptoglobin, retics, direct Maikel), 25-OH vit D, 1,25-diOH vit D, ACE, iron with TIBC, ferritin, RF, and CCP  - Will consider Dermatology consult for biopsy of rash on palms   - Will need CT chest/abdomen/pelvis after drainage of left pleural effusion  - May need IR biopsy of axillary LN  - Appreciate heme follow-up, APLS workup negative. Follow-up Factor V Leiden, PT gene mutation, JAK2  - Prognosis guarded, full code, discussed with mother at bedside  CC time spent: 35 min 28M PMH SLE (dx 09/2023, on Plaquenil) who [facility], found to have acute RUL and LLL segmental/subsegmental PE's as well as  bilateral lower lobe consolidations with areas of central clearing, concerning for PNA vs pulmonary infarct. He was started on A/C with course c/b worsening hypoxemic respiratory failure with development of pleural effusions. He was transferred to [facility] on 12/22 for thoracic surgery evaluation s/p diagnostic left thoracentesis on 12/22, which was exudative with negative cultures. He was started on methylprednisolone 40 mg IV daily on 12/23 for likely lupus flare, but then on 12/25 he developed acute chest pain, dyspnea, fever to 104.5, and GTC seizure, transferred to MICU for further care. Labs with significant hyponatremia and elevated CPK/AST/ALT due to mild acute rhabdo, now improved. Found today on bedside ultrasound to have a very loculated left pleural effusion with thick septations and dense echogenic material within, worrisome for empyema vs. contained hemothorax.    - Awake and alert, no further seizures, keep off AED's at this time. Eventually needs MRI. Neuro follow-up. No seizures on EEG  - HD stable, no RV strain on echo. Continue heparin gtt for PE's  - Doing well on room air with normal SpO2. Left chest tube placed to suction -20 cm H2O. Start intrapleural tPA/dornase laura and monitor chest tube output  - Diet as tolerates  - Hyponatremia slowly improving, suspect SIADH from pain vs. urinary retention. Continue salt tabs  - Continue empiric vancomycin and cefepime for fevers and sepsis, possibly due to empyema vs. pneumonia. Follow-up ID  - Appreciate rheum eval, hold steroids for now. Check ANCA, MPO, PR3, quantiferon, syphilis screen, myomarker panel, immunoglobulins, IgG subsets, hemolysis labs (LDH, haptoglobin, retics, direct Maikel), 25-OH vit D, 1,25-diOH vit D, ACE, iron with TIBC, ferritin, RF, and CCP  - Will consider Dermatology consult for biopsy of rash on palms   - Will need CT chest/abdomen/pelvis after drainage of left pleural effusion  - May need IR biopsy of axillary LN  - Appreciate heme follow-up, APLS workup negative. Follow-up Factor V Leiden, PT gene mutation, JAK2  - Prognosis guarded, full code, discussed with mother at bedside  CC time spent: 35 min

## 2023-12-26 NOTE — DIETITIAN INITIAL EVALUATION ADULT - ADD RECOMMEND
--- Advise further workup of oral cavity.    --- Consider alternate means of nutrition due to prolonged inadequate PO intake  --- Suggest B-Complex supplement

## 2023-12-26 NOTE — PROGRESS NOTE ADULT - ATTENDING COMMENTS
seen and examined on rounds    28m with SLE  admitted with pleural effusions   s/p chest tubes  seizure in setting of high fever and hyponatremia to 121  EEG clear of dcs  LP done, traumatic with 6 tnc and 23k rbc, pr 50, viral pcr neg    ct head unrevealing    on exam he is alert and oriented x3  cn normal  Motor full power without drift  not walked for safety     impression is possible seizure in setting of low na and fever  hold seizure meds  ok to dc eeg  MRI brain pending   consider correcting hyponatremia with HTS, as likely contributory to seizure  d/w icu team at bedside

## 2023-12-26 NOTE — DIETITIAN INITIAL EVALUATION ADULT - PERTINENT MEDS FT
MEDICATIONS  (STANDING):  cefepime   IVPB 2000 milliGRAM(s) IV Intermittent every 8 hours  ciprofloxacin  0.3% Ophthalmic Solution for Otic Use 2 Drop(s) Both Ears two times a day  dexMEDEtomidine Infusion 1 MICROgram(s)/kG/Hr (17.3 mL/Hr) IV Continuous <Continuous>  hydroxychloroquine 200 milliGRAM(s) Oral two times a day  lactated ringers. 1000 milliLiter(s) (100 mL/Hr) IV Continuous <Continuous>  lidocaine   4% Patch 1 Patch Transdermal daily  melatonin 3 milliGRAM(s) Oral at bedtime  sodium chloride 1 Gram(s) Oral three times a day  vancomycin  IVPB 1000 milliGRAM(s) IV Intermittent every 12 hours    MEDICATIONS  (PRN):  albuterol/ipratropium for Nebulization 3 milliLiter(s) Nebulizer every 6 hours PRN Shortness of Breath and/or Wheezing  benzocaine/menthol Lozenge 1 Lozenge Oral four times a day PRN Sore Throat  guaiFENesin Oral Liquid (Sugar-Free) 200 milliGRAM(s) Oral every 6 hours PRN Cough  lidocaine 2% Viscous 5 milliLiter(s) Swish and Spit three times a day PRN Mouth Care

## 2023-12-26 NOTE — PROGRESS NOTE ADULT - ASSESSMENT
28 years old male with h/o Lupus ( diagnosed in 09/2023, on Plaquenil present to [place] ED on 12/12/23  with complain of chest pain and SOB, found to have bilateral acute PE and bilateral pleural effusions. Transferred to [facility] for CT surgery evaluation. Also with fevers, has been on broad spectrum abx with no improvement. Was dx with SLE in Sept 2023 due to rash, oral ulcers, CP and dyspnea. Per pt's mother, underwent skin biopsy that confirmed lesions consistent with lupus. Has been on Plaquenil has monotherapy. Rheumatology consulted given SLE history.     # Fevers  - While initially fever was thought to be possibly related to SLE, it did not resolve after 3 days of steroids (solumedrol 40 mg IV started 12/23) and patient had worsening of clinical status with new neurological symptoms, would r/o infection and hold steroids until work up is completed  - Fevers less likely related to SLE   - S/p LP 12/25 with preliminary studies negative for infection   - CT Chest with moderate pleural effusions, loculated on L (new since 12/12) and new nonspecific very small peripheral GGO in RUL. S/p thoracentesis x2, exudative. ?Loculated effusion contributing to fevers, planned for chest tube 12/26     # SLE with presence of clinical activity (patchy alopecia, discoid lesions and erythematous rash, oral ulcers, serositis, fever) and serological activity- hypocomplementemia  - At [place]: ABDIAS 1:1280, dsDNA 15, C3 79, C4 12.   - F/u additional serological work- up ( Sjogren's ab, GOLDEN ab, ribosomal ab)    # Generalized tonic-clonic seizure on 12/25  - ?secondary to high fever vs. hyponatremia, less likely neuropsychiatric SLE   - Neurology following  - S/p LP as above   - EEG negative for seizures, planned for MRI     # Elevated CK  - Elevated to 1000's this admission. Could be from fevers, recent seizure  - However will keep potential overlap with myositis on differential. Has normal strength exam. Will check Janine-1 ab  - Will monitor CK for now     # Bilateral Acute PE with pulmonary infarcts  - r/o APS - serological evaluation for APS- pending  - Currently on heparin drip   - Hematology following-working up for APS and causes of elevated PT- INR. At present have high suspicion for APS, will discharge with warfarin    Recommendations  - F/u GOLDEN, Sjogren's, dsDNA, and APS labs, Janine-1 ab, ribosomal ab  - F/u LP C3, C4, and neuronal ab studies   - Continue to hold steroids pending full infectious work up  - Trend CPK levels     INCOMPLETE PLEASE WAIT     Alexa Melendez MD  Rheumatology Fellow   Available on TEAMS 28 years old male with h/o Lupus ( diagnosed in 09/2023, on Plaquenil present to [place] ED on 12/12/23  with complain of chest pain and SOB, found to have bilateral acute PE and bilateral pleural effusions. Transferred to [facility] for CT surgery evaluation. Also with fevers, has been on broad spectrum abx with no improvement. Was dx with SLE in Sept 2023 due to rash, oral ulcers, CP and dyspnea. Per pt's mother, underwent skin biopsy that confirmed lesions consistent with lupus. Has been on Plaquenil has monotherapy. Rheumatology consulted given SLE history.     # Fevers  - While initially fever was thought to be possibly related to SLE, it did not resolve after 3 days of steroids (solumedrol 40 mg IV started 12/23) and patient had worsening of clinical status with new neurological symptoms, would r/o infection and hold steroids until work up is completed  - Fevers less likely related to SLE   - S/p LP 12/25 with preliminary studies negative for infection   - CT Chest with moderate pleural effusions, loculated on L (new since 12/12) and new nonspecific very small peripheral GGO in RUL. S/p thoracentesis x2, exudative. ?Loculated effusion contributing to fevers, planned for chest tube 12/26     # SLE with presence of clinical activity (patchy alopecia, discoid lesions and erythematous rash, oral ulcers, serositis, fever) and serological activity- hypocomplementemia  - At [place]: ABDIAS 1:1280, dsDNA 15, C3 79, C4 12.   - F/u additional serological work- up ( Sjogren's ab, GOLDEN ab, ribosomal ab)    # Generalized tonic-clonic seizure on 12/25  - ?secondary to high fever vs. hyponatremia, less likely neuropsychiatric SLE   - Neurology following  - S/p LP as above   - EEG negative for seizures, planned for MRI     # Elevated CK  - Elevated to 1000's this admission. Could be from fevers, recent seizure  - However will keep potential overlap with myositis on differential. Has normal strength exam. Will check Janine-1 ab  - Will monitor CK for now     # Bilateral Acute PE with pulmonary infarcts  - r/o APS - serological evaluation for APS- pending  - Currently on heparin drip   - Hematology following-working up for APS and causes of elevated PT- INR. At present have high suspicion for APS, will discharge with warfarin    Recommendations  - F/u GOLDEN, Sjogren's, dsDNA, and APS labs, Janine-1 ab, ribosomal ab  - F/u LP C3, C4, and neuronal ab studies   - Continue to hold steroids pending full infectious work up  - Trend CPK levels     INCOMPLETE PLEASE WAIT     Alexa Melendez MD  Rheumatology Fellow   Available on TEAMS 28 years old male with h/o Lupus ( diagnosed in 09/2023, on Plaquenil present to [place] ED on 12/12/23  with complain of chest pain and SOB, found to have bilateral acute PE and bilateral pleural effusions. Transferred to [facility] for CT surgery evaluation. Also with fevers, has been on broad spectrum abx with no improvement. Was dx with SLE in Sept 2023 due to rash, oral ulcers, CP and dyspnea. Per pt's mother, underwent skin biopsy that confirmed lesions consistent with lupus. Has been on Plaquenil has monotherapy. Rheumatology consulted given SLE history.     # Fevers  - While initially fever was thought to be possibly related to SLE, it did not resolve after 3 days of steroids (solumedrol 40 mg IV started 12/23) and patient had worsening of clinical status with new neurological symptoms, would r/o infection and hold steroids until work up is completed  - Fevers less likely related to SLE, higher suspicion for infection, procalcitonin significantly elevated at 8   - S/p LP 12/25 with preliminary studies negative for infection   - CT Chest with moderate pleural effusions, loculated on L (new since 12/12) and new nonspecific very small peripheral GGO in RUL. S/p thoracentesis x2, exudative. ?Loculated effusion contributing to fevers, planned for chest tube 12/26     # SLE with presence of clinical activity (patchy alopecia, discoid lesions and erythematous rash, oral ulcers, serositis, fever) and serological activity- hypocomplementemia  - At [place]: ABDIAS 1:1280, dsDNA 15, C3 79, C4 12.   - F/u additional serological work- up ( Sjogren's ab, GOLDEN ab, ribosomal ab)    # Generalized tonic-clonic seizure on 12/25  - ?secondary to high fever vs. hyponatremia, less likely neuropsychiatric SLE   - Neurology following  - S/p LP as above   - EEG negative for seizures, planned for MRI     # Elevated CK  - Elevated to 1000's this admission. Could be from fevers, recent seizure  - However will keep potential overlap with myositis on differential. Has normal strength exam. Will check Janine-1 ab  - Will monitor CK for now     # Bilateral Acute PE with pulmonary infarcts  - r/o APS - serological evaluation for APS- pending  - Currently on heparin drip   - Hematology following-working up for APS and causes of elevated PT- INR. At present have high suspicion for APS, will discharge with warfarin    Recommendations  - F/u GOLDEN, Sjogren's, dsDNA, and APS labs, Janine-1 ab, ribosomal ab  - F/u LP C3, C4, and neuronal ab studies   - Continue to hold steroids pending full infectious work up  - Trend CPK levels     INCOMPLETE PLEASE WAIT     Alexa Melendez MD  Rheumatology Fellow   Available on TEAMS 28 years old male with h/o Lupus ( diagnosed in 09/2023, on Plaquenil present to [place] ED on 12/12/23  with complain of chest pain and SOB, found to have bilateral acute PE and bilateral pleural effusions. Transferred to [facility] for CT surgery evaluation. Also with fevers, has been on broad spectrum abx with no improvement. Was dx with SLE in Sept 2023 due to rash, oral ulcers, CP and dyspnea. Per pt's mother, underwent skin biopsy that confirmed lesions consistent with lupus. Has been on Plaquenil has monotherapy. Rheumatology consulted given SLE history.     # Fevers  - While initially fever was thought to be possibly related to SLE, it did not resolve after 3 days of steroids (solumedrol 40 mg IV started 12/23) and patient had worsening of clinical status with new neurological symptoms, would r/o infection and hold steroids until work up is completed  - Fevers less likely related to SLE, higher suspicion for infection, procalcitonin significantly elevated at 8   - S/p LP 12/25 with preliminary studies negative for infection   - CT Chest with moderate pleural effusions, loculated on L (new since 12/12) and new nonspecific very small peripheral GGO in RUL. S/p thoracentesis x2, exudative. ?Loculated effusion contributing to fevers, planned for chest tube 12/26     # SLE with presence of clinical activity (patchy alopecia, discoid lesions and erythematous rash, oral ulcers, serositis, fever) and serological activity- hypocomplementemia  - At [place]: ABDIAS 1:1280, dsDNA 15, C3 79, C4 12.   - F/u additional serological work- up ( Sjogren's ab, GOLDEN ab, ribosomal ab)    # Generalized tonic-clonic seizure on 12/25  - ?secondary to high fever vs. hyponatremia, less likely neuropsychiatric SLE   - Neurology following  - S/p LP as above   - EEG negative for seizures, planned for MRI     # Elevated CK  - Elevated to 1000's this admission. Could be from fevers, recent seizure  - However will keep potential overlap with myositis on differential. Has normal strength exam. Will check Janine-1 ab  - Will monitor CK for now     # Bilateral Acute PE with pulmonary infarcts  - r/o APS - serological evaluation for APS- pending  - Currently on heparin drip   - Hematology following-working up for APS and causes of elevated PT- INR. At present have high suspicion for APS, will discharge with warfarin    Recommendations  - F/u GOLDEN, Sjogren's, dsDNA, and APS labs, Janine-1 ab, ribosomal ab  - F/u LP C3, C4, and neuronal ab studies   - Continue to hold steroids pending full infectious work up  - Trend CPK levels     INCOMPLETE PLEASE WAIT     Alexa Melendez MD  Rheumatology Fellow   Available on TEAMS 28 years old male with h/o Lupus ( diagnosed in 09/2023, on Plaquenil present to [place] ED on 12/12/23  with complain of chest pain and SOB, found to have bilateral acute PE and bilateral pleural effusions. Transferred to [facility] for CT surgery evaluation. Also with fevers, has been on broad spectrum abx with no improvement. Was dx with SLE in Sept 2023 due to rash, oral ulcers, CP and dyspnea. Per pt's mother, underwent skin biopsy that confirmed lesions consistent with lupus. Has been on Plaquenil has monotherapy. Rheumatology consulted given SLE history.     # Fevers  - While initially fever was thought to be possibly related to SLE, it did not resolve after 3 days of steroids (solumedrol 40 mg IV started 12/23) and patient had worsening of clinical status with new neurological symptoms, would r/o infection and hold steroids until work up is completed  - Fevers less likely related to SLE, higher suspicion for infection, procalcitonin significantly elevated at 8   - S/p LP 12/25 with preliminary studies negative for infection   - CT Chest with moderate pleural effusions, loculated on L (new since 12/12) and new nonspecific very small peripheral GGO in RUL. S/p thoracentesis x2, exudative. ?Loculated effusion contributing to fevers, planned for chest tube 12/26   - Broadened to vancomycin and cefepime     # SLE with presence of clinical activity (patchy alopecia, discoid lesions and erythematous rash, oral ulcers, serositis, fever) and serological activity- hypocomplementemia  - At [place]: ABDIAS 1:1280, dsDNA 15, C3 79, C4 12.   - F/u additional serological work- up ( Sjogren's ab, GOLDEN ab, ribosomal ab)    # Generalized tonic-clonic seizure on 12/25  - ?secondary to high fever vs. hyponatremia, less likely neuropsychiatric SLE   - Neurology following  - S/p LP as above   - EEG negative for seizures, planned for MRI     # Elevated CK  - Elevated to 1000's this admission. Could be from fevers, recent seizure  - However will keep potential overlap with myositis on differential. Has normal strength exam. Will check Janine-1 ab  - Will monitor CK for now     # Bilateral Acute PE with pulmonary infarcts  - r/o APS - serological evaluation for APS- pending  - Currently on heparin drip   - Hematology following-working up for APS and causes of elevated PT- INR. At present have high suspicion for APS, will discharge with warfarin    Recommendations  - F/u GOLDEN, Sjogren's, dsDNA, and APS labs, Janine-1 ab, ribosomal ab  - F/u LP C3, C4, and neuronal ab studies   - Continue to hold steroids pending full infectious work up  - Trend CPK levels     INCOMPLETE PLEASE WAIT     Alexa Melendez MD  Rheumatology Fellow   Available on TEAMS 28 years old male with h/o Lupus ( diagnosed in 09/2023, on Plaquenil present to [place] ED on 12/12/23  with complain of chest pain and SOB, found to have bilateral acute PE and bilateral pleural effusions. Transferred to [facility] for CT surgery evaluation. Also with fevers, has been on broad spectrum abx with no improvement. Was dx with SLE in Sept 2023 due to rash, oral ulcers, CP and dyspnea. Per pt's mother, underwent skin biopsy that confirmed lesions consistent with lupus. Has been on Plaquenil has monotherapy. Rheumatology consulted given SLE history.     # Fevers  - While initially fever was thought to be possibly related to SLE, it did not resolve after 3 days of steroids (solumedrol 40 mg IV started 12/23) and patient had worsening of clinical status with new neurological symptoms, would r/o infection and hold steroids until work up is completed  - Fevers less likely related to SLE, higher suspicion for infection, procalcitonin significantly elevated at 8   - S/p LP 12/25 with preliminary studies negative for infection   - CT Chest with moderate pleural effusions, loculated on L (new since 12/12) and new nonspecific very small peripheral GGO in RUL. S/p thoracentesis x2, exudative. ?Loculated effusion contributing to fevers, planned for chest tube 12/26   - Broadened to vancomycin and cefepime     # SLE with presence of clinical activity (patchy alopecia, discoid lesions and erythematous rash, oral ulcers, serositis, fever) and serological activity- hypocomplementemia  - At [place]: ABDIAS 1:1280, dsDNA 15, C3 79, C4 12.   - F/u additional serological work- up ( Sjogren's ab, GOLDEN ab, ribosomal ab)    # Generalized tonic-clonic seizure on 12/25  - ?secondary to high fever vs. hyponatremia, less likely neuropsychiatric SLE   - Neurology following  - S/p LP as above   - EEG negative for seizures, planned for MRI     # Elevated CK  - Elevated to 1000's this admission. Could be from fevers, recent seizure  - However will keep potential overlap with myositis on differential. Has normal strength exam. Will check Janine-1 ab  - Will monitor CK for now     # Bilateral Acute PE with pulmonary infarcts  - r/o APS - serological evaluation for APS- pending  - Currently on heparin drip   - Hematology following-working up for APS and causes of elevated PT- INR. At present have high suspicion for APS, will discharge with warfarin    Recommendations  - F/u GOLDEN, Sjogren's, dsDNA, and APS labs, Janine-1 ab, ribosomal ab  - F/u LP C3, C4, and neuronal ab studies   - Continue to hold steroids pending full infectious work up  - Trend CPK levels     INCOMPLETE PLEASE WAIT     Alexa Melendez MD  Rheumatology Fellow   Available on TEAMS 28 years old male with h/o Lupus ( diagnosed in 09/2023, on Plaquenil present to [place] ED on 12/12/23  with complain of chest pain and SOB, found to have bilateral acute PE and bilateral pleural effusions. Transferred to [facility] for CT surgery evaluation. Also with fevers, has been on broad spectrum abx with no improvement. Was dx with SLE in Sept 2023 due to rash, oral ulcers, CP and dyspnea. Per pt's mother, underwent skin biopsy that confirmed lesions consistent with lupus. Has been on Plaquenil has monotherapy. Rheumatology consulted given SLE history.     # Fevers of unclear etiology, differential includes autoimmune, infection, and malignancy   - While initially fever was thought to be possibly related to SLE, it did not resolve after 3 days of steroids (solumedrol 40 mg IV started 12/23) and patient had worsening of clinical status with new neurological symptoms, would r/o infection and hold steroids until work up is completed  - Fevers less likely related to SLE, higher suspicion for infection, procalcitonin significantly elevated at 8   - S/p LP 12/25 with preliminary studies negative for infection   - CSF with elevated IgG/albumin ratio and rash on palms and soles, ?syphilis as a mimicker   - CT Chest with moderate pleural effusions, loculated on L (new since 12/12) and new nonspecific very small peripheral GGO in RUL. S/p thoracentesis x2, exudative. ?Loculated effusion contributing to fevers, planned for chest tube 12/26   - Broadened to vancomycin and cefepime     # SLE with presence of clinical activity (patchy alopecia, discoid lesions and erythematous rash, oral ulcers, serositis, fever) and serologic activity- hypocomplementemia  - At [place]: ABDIAS 1:1280, dsDNA 15, C3 79, C4 12.   - Labs here with ABDIAS 1:280 speckled, dsDNA 28, Sm >8, RNP >8, SSA >8   - F/u additional serologies as below, concern for overlap disease with myositis     # Generalized tonic-clonic seizure on 12/25  - ?secondary to high fever vs. hyponatremia, less likely neuropsychiatric SLE   - Neurology following  - S/p LP as above   - EEG negative for seizures, planned for MRI     # Elevated CK  - Elevated to 1000's this admission. Could be from fevers, recent seizure  - Another consideration is overlap with myositis     # Bilateral Acute PE with pulmonary infarcts  - r/o APS - serological evaluation for APS- pending (although recent workup negative)   - Currently on heparin drip   - Hematology following-working up for APS and causes of elevated PT- INR  - U/A showed proteinuria with spot urine protein/Cr 2.5, ?nephrotic syndrome as a cause   - Another consideration is malignancy     Recommendations:   - F/u APS labs, Janine-1 ab, ribosomal ab, f/u LP C3, C4, and neuronal ab studies  - F/u additional infectious workup, including pleural fungal culture and cytology   - Would recommend additional workup: please send ANCA with MPO/PR3 reflex, U/A, urine protein/Cr, quantiferon, syphilis screen, PS, PS/PT, myomarker panel, immunoglobulins, IgG subsets, hemolysis labs (LDH, haptoglobin, retics, direct Maikel), vitamin D 25, vitamin D 1,25, ACE, iron with TIBC, ferritin, RF, and CCP  - Trend CPK levels   - Consider Dermatology consult for biopsy of rash on palms   - Recommend pan scan CT chest/abdomen/pelvis   - Recommend IR consult for potential biopsy of axillary LN   - Recommend evaluation of peripheral smear by Hematology   - Continue to hold steroids pending full infectious work up  - Will attempt to obtain collateral regarding diagnosis from [facility] and PCP Dr. Hendrickson at [place]   - We will continue to closely follow    Case discussed with Infectious Disease  Case discussed with MICU team  Case discussed with mother at bedside  Case discussed with Dr. Baker-Cesar Melendez MD  Rheumatology Fellow   Available on TEAMS 28 years old male with h/o Lupus ( diagnosed in 09/2023, on Plaquenil present to [place] ED on 12/12/23  with complain of chest pain and SOB, found to have bilateral acute PE and bilateral pleural effusions. Transferred to [facility] for CT surgery evaluation. Also with fevers, has been on broad spectrum abx with no improvement. Was dx with SLE in Sept 2023 due to rash, oral ulcers, CP and dyspnea. Per pt's mother, underwent skin biopsy that confirmed lesions consistent with lupus. Has been on Plaquenil has monotherapy. Rheumatology consulted given SLE history.     # Fevers of unclear etiology, differential includes autoimmune, infection, and malignancy   - While initially fever was thought to be possibly related to SLE, it did not resolve after 3 days of steroids (solumedrol 40 mg IV started 12/23) and patient had worsening of clinical status with new neurological symptoms, would r/o infection and hold steroids until work up is completed  - Fevers less likely related to SLE, higher suspicion for infection, procalcitonin significantly elevated at 8   - S/p LP 12/25 with preliminary studies negative for infection   - CSF with elevated IgG/albumin ratio and rash on palms and soles, ?syphilis as a mimicker   - CT Chest with moderate pleural effusions, loculated on L (new since 12/12) and new nonspecific very small peripheral GGO in RUL. S/p thoracentesis x2, exudative. ?Loculated effusion contributing to fevers, planned for chest tube 12/26   - Broadened to vancomycin and cefepime     # SLE with presence of clinical activity (patchy alopecia, discoid lesions and erythematous rash, oral ulcers, serositis, fever) and serologic activity- hypocomplementemia  - At [place]: ABDIAS 1:1280, dsDNA 15, C3 79, C4 12.   - Labs here with ABDIAS 1:280 speckled, dsDNA 28, Sm >8, RNP >8, SSA >8   - F/u additional serologies as below, concern for overlap disease with myositis     # Generalized tonic-clonic seizure on 12/25  - ?secondary to high fever vs. hyponatremia, less likely neuropsychiatric SLE   - Neurology following  - S/p LP as above   - EEG negative for seizures, planned for MRI     # Elevated CK  - Elevated to 1000's this admission. Could be from fevers, recent seizure  - Another consideration is overlap with myositis     # Bilateral Acute PE with pulmonary infarcts  - r/o APS - serological evaluation for APS- pending (although recent workup negative)   - Currently on heparin drip   - Hematology following-working up for APS and causes of elevated PT- INR  - U/A showed proteinuria with spot urine protein/Cr 2.5, ?nephrotic syndrome as a cause   - Another consideration is malignancy     Recommendations:   - F/u APS labs, Janine-1 ab, ribosomal ab, f/u LP C3, C4, and neuronal ab studies  - F/u additional infectious workup, including pleural fungal culture and cytology   - Would recommend additional workup: please send ANCA with MPO/PR3 reflex, U/A, urine protein/Cr, quantiferon, syphilis screen, PS, PS/PT, myomarker panel, immunoglobulins, IgG subsets, hemolysis labs (LDH, haptoglobin, retics, direct Maikel), vitamin D 25, vitamin D 1,25, ACE, iron with TIBC, ferritin, RF, and CCP  - Trend CPK levels   - Consider Dermatology consult for biopsy of rash on palms   - Recommend pan scan CT chest/abdomen/pelvis   - Recommend IR consult for potential biopsy of axillary LN   - Recommend evaluation of peripheral smear by Hematology   - Continue to hold steroids pending full infectious work up  - Will attempt to obtain collateral regarding diagnosis from [facility] and PCP Dr. Hendrickson at [place]   - We will continue to closely follow    Case discussed with Infectious Disease  Case discussed with MICU team  Case discussed with mother at bedside  Case discussed with Dr. Baker-Cesar Melendez MD  Rheumatology Fellow   Available on TEAMS 28 years old male with h/o Lupus ( diagnosed in 09/2023, on Plaquenil present to [place] ED on 12/12/23  with complain of chest pain and SOB, found to have bilateral acute PE and bilateral pleural effusions. Transferred to [facility] for CT surgery evaluation. Also with fevers, has been on broad spectrum abx with no improvement. Was dx with SLE in Sept 2023 due to rash, oral ulcers, CP and dyspnea. Per pt's mother, underwent skin biopsy that confirmed lesions consistent with lupus. Has been on Plaquenil has monotherapy. Rheumatology consulted given SLE history.     # Fevers of unclear etiology, differential includes autoimmune, infection, and malignancy   - While initially fever was thought to be possibly related to SLE, it did not resolve after 3 days of steroids (solumedrol 40 mg IV started 12/23) and patient had worsening of clinical status with new neurological symptoms, would r/o infection and hold steroids until work up is completed  - Fevers less likely related to SLE, higher suspicion for infection, procalcitonin significantly elevated at 8   - S/p LP 12/25 with preliminary studies negative for infection   - CSF with elevated IgG/albumin ratio and rash on palms and soles, ?syphilis as a mimicker   - CT Chest with moderate pleural effusions, loculated on L (new since 12/12) and new nonspecific very small peripheral GGO in RUL. S/p thoracentesis x2, exudative. ?Loculated effusion contributing to fevers, planned for chest tube 12/26   - Pleural effusions are hemorrhagic which can be seen with infection (such as TB, bacterial, viral), malignancy, connective tissue disease (such as SLE), cardiovascular conditions (given PEs), and bleeding disorders   - Broadened to vancomycin and cefepime     # SLE with presence of clinical activity (patchy alopecia, discoid lesions and erythematous rash, oral ulcers, serositis, fever) and serologic activity- hypocomplementemia  - At [place]: ABDIAS 1:1280, dsDNA 15, C3 79, C4 12.   - Labs here with ABDIAS 1:280 speckled, dsDNA 28, Sm >8, RNP >8, SSA >8   - F/u additional serologies as below, concern for overlap disease with myositis   - U/A also with 300 protein and moderate blood, with spot urine protein/Cr 2.5, ?kidney involvement     # Elevated CK  - Elevated to 1000's this admission. Could be from fevers, recent seizure  - Another consideration is overlap with myositis     # Bilateral Acute PE with pulmonary infarcts  - r/o APS - serological evaluation for APS- pending (although recent workup negative)   - Currently on heparin drip   - Hematology following-working up for APS and causes of elevated PT- INR  - U/A showed proteinuria with spot urine protein/Cr 2.5, ?nephrotic syndrome as a cause   - Another consideration is malignancy     # Generalized tonic-clonic seizure on 12/25  - ?secondary to high fever vs. hyponatremia, less likely neuropsychiatric SLE   - Neurology following  - S/p LP as above   - EEG negative for seizures, planned for MRI     Recommendations:   - F/u APS labs, Janine-1 ab, ribosomal ab, f/u LP C3, C4, and neuronal ab studies  - F/u additional infectious workup, including pleural fungal culture and cytology   - Would recommend additional workup: please send ANCA with MPO/PR3 reflex, U/A, urine protein/Cr, quantiferon, syphilis screen, PS, PS/PT, myomarker panel, immunoglobulins, IgG subsets, hemolysis labs (LDH, haptoglobin, retics, direct Maikel), vitamin D 25, vitamin D 1,25, ACE, iron with TIBC, ferritin, RF, and CCP  - Trend CPK levels   - Consider Dermatology consult for biopsy of rash on palms   - Recommend pan scan CT chest/abdomen/pelvis   - Recommend IR consult for potential biopsy of axillary LN   - Recommend evaluation of peripheral smear by Hematology   - Continue to hold steroids pending full infectious work up  - Will attempt to obtain collateral regarding diagnosis from [facility] and PCP Dr. Hendrickson at [place]   - We will continue to closely follow    Case discussed with Infectious Disease  Case discussed with MICU team  Case discussed with mother at bedside  Case discussed with Dr. Baker-Cesar Melendez MD  Rheumatology Fellow   Available on TEAMS 28 years old male with h/o Lupus ( diagnosed in 09/2023, on Plaquenil present to [place] ED on 12/12/23  with complain of chest pain and SOB, found to have bilateral acute PE and bilateral pleural effusions. [facility]. Also with fevers, has been on broad spectrum abx with no improvement. Was dx with SLE in Sept 2023 due to rash, oral ulcers, CP and dyspnea. Per pt's mother, underwent skin biopsy that confirmed lesions consistent with lupus. Has been on Plaquenil has monotherapy. Rheumatology consulted given SLE history.     # Fevers of unclear etiology, differential includes autoimmune, infection, and malignancy   - While initially fever was thought to be possibly related to SLE, it did not resolve after 3 days of steroids (solumedrol 40 mg IV started 12/23) and patient had worsening of clinical status with new neurological symptoms, would r/o infection and hold steroids until work up is completed  - Fevers less likely related to SLE, higher suspicion for infection, procalcitonin significantly elevated at 8   - S/p LP 12/25 with preliminary studies negative for infection   - CSF with elevated IgG/albumin ratio and rash on palms and soles, ?syphilis as a mimicker   - CT Chest with moderate pleural effusions, loculated on L (new since 12/12) and new nonspecific very small peripheral GGO in RUL. S/p thoracentesis x2, exudative. ?Loculated effusion contributing to fevers, planned for chest tube 12/26   - Pleural effusions are hemorrhagic which can be seen with infection (such as TB, bacterial, viral), malignancy, connective tissue disease (such as SLE), cardiovascular conditions (given PEs), and bleeding disorders   - Broadened to vancomycin and cefepime     # SLE with presence of clinical activity (patchy alopecia, discoid lesions and erythematous rash, oral ulcers, serositis, fever) and serologic activity- hypocomplementemia  - At [place]: ABDIAS 1:1280, dsDNA 15, C3 79, C4 12.   - Labs here with ABDIAS 1:280 speckled, dsDNA 28, Sm >8, RNP >8, SSA >8   - F/u additional serologies as below, concern for overlap disease with myositis   - U/A also with 300 protein and moderate blood, with spot urine protein/Cr 2.5, ?kidney involvement     # Elevated CK  - Elevated to 1000's this admission. Could be from fevers, recent seizure  - Another consideration is overlap with myositis     # Bilateral Acute PE with pulmonary infarcts  - r/o APS - serological evaluation for APS- pending (although recent workup negative)   - Currently on heparin drip   - Hematology following-working up for APS and causes of elevated PT- INR  - U/A showed proteinuria with spot urine protein/Cr 2.5, ?nephrotic syndrome as a cause   - Another consideration is malignancy     # Generalized tonic-clonic seizure on 12/25  - ?secondary to high fever vs. hyponatremia, less likely neuropsychiatric SLE   - Neurology following  - S/p LP as above   - EEG negative for seizures, planned for MRI     Recommendations:   - F/u APS labs, Janine-1 ab, ribosomal ab, f/u LP C3, C4, and neuronal ab studies  - F/u additional infectious workup, including pleural fungal culture and cytology   - Would recommend additional workup: please send ANCA with MPO/PR3 reflex, U/A, urine protein/Cr, quantiferon, syphilis screen, PS, PS/PT, myomarker panel, immunoglobulins, IgG subsets, hemolysis labs (LDH, haptoglobin, retics, direct Maikel), vitamin D 25, vitamin D 1,25, ACE, iron with TIBC, ferritin, RF, and CCP  - Trend CPK levels   - Consider Dermatology consult for biopsy of rash on palms   - Recommend pan scan CT chest/abdomen/pelvis   - Recommend IR consult for potential biopsy of axillary LN   - Recommend evaluation of peripheral smear by Hematology   - Continue to hold steroids pending full infectious work up  - Will attempt to obtain collateral regarding diagnosis from [facility] Dermatology and PCP Dr. Hendrickson at [place]   - We will continue to closely follow    Case discussed with Infectious Disease  Case discussed with MICU team  Case discussed with mother at bedside  Case discussed with Dr. Baekr-Cesar Melendez MD  Rheumatology Fellow   Available on TEAMS 28 years old male with h/o Lupus ( diagnosed in 09/2023, on Plaquenil present to [place] ED on 12/12/23  with complain of chest pain and SOB, found to have bilateral acute PE and bilateral pleural effusions. Transferred to [facility] for CT surgery evaluation. Also with fevers, has been on broad spectrum abx with no improvement. Was dx with SLE in Sept 2023 due to rash, oral ulcers, CP and dyspnea. Per pt's mother, underwent skin biopsy that confirmed lesions consistent with lupus. Has been on Plaquenil has monotherapy. Rheumatology consulted given SLE history.     # Fevers of unclear etiology, differential includes autoimmune, infection, and malignancy   - Also with b/l axillary and supraclavicular LAD   - While initially fever was thought to be possibly related to SLE, it did not resolve after 3 days of steroids (solumedrol 40 mg IV started 12/23) and patient had worsening of clinical status with new neurological symptoms, would r/o infection and hold steroids until work up is completed  - Fevers less likely related to SLE, higher suspicion for infection, procalcitonin significantly elevated at 8   - S/p LP 12/25 with preliminary studies negative for infection   - CSF with elevated IgG/albumin ratio and rash on palms and soles, ?syphilis as a mimicker   - CT Chest with moderate pleural effusions, loculated on L (new since 12/12) and new nonspecific very small peripheral GGO in RUL. S/p thoracentesis x2, exudative. ?Loculated effusion contributing to fevers, planned for chest tube 12/26   - Pleural effusions are hemorrhagic which can be seen with infection (such as TB, bacterial, viral), malignancy, connective tissue disease (such as SLE), cardiovascular conditions (given PEs), and bleeding disorders   - Broadened to vancomycin and cefepime     # SLE with presence of clinical activity (patchy alopecia, discoid lesions and erythematous rash, oral ulcers, serositis, fever) and serologic activity- hypocomplementemia  - At [place]: ABDIAS 1:1280, dsDNA 15, C3 79, C4 12.   - Labs here with ABDIAS 1:280 speckled, dsDNA 28, Sm >8, RNP >8, SSA >8   - F/u additional serologies as below, concern for overlap disease with myositis   - U/A also with 300 protein and moderate blood, with spot urine protein/Cr 2.5, ?kidney involvement     # Elevated CK  - Elevated to 1000's this admission. Could be from fevers, recent seizure  - Another consideration is overlap with myositis     # Bilateral Acute PE with pulmonary infarcts  - r/o APS - serological evaluation for APS- pending (although recent workup negative)   - Currently on heparin drip   - Hematology following-working up for APS and causes of elevated PT- INR  - U/A showed proteinuria with spot urine protein/Cr 2.5, ?nephrotic syndrome as a cause   - Another consideration is malignancy     # Generalized tonic-clonic seizure on 12/25  - ?secondary to high fever vs. hyponatremia, less likely neuropsychiatric SLE   - Neurology following  - S/p LP as above   - EEG negative for seizures, planned for MRI     Recommendations:   - F/u APS labs, Janine-1 ab, ribosomal ab, f/u LP C3, C4, and neuronal ab studies  - F/u additional infectious workup, including pleural fungal culture and cytology   - Would recommend additional workup: please send ANCA with MPO/PR3 reflex, U/A, urine protein/Cr, quantiferon, syphilis screen, PS, PS/PT, myomarker panel, immunoglobulins, IgG subsets, hemolysis labs (LDH, haptoglobin, retics, direct Maikel), vitamin D 25, vitamin D 1,25, ACE, iron with TIBC, ferritin, RF, and CCP  - Trend CPK levels   - Consider Dermatology consult for biopsy of rash on palms   - Recommend pan scan CT chest/abdomen/pelvis   - Recommend IR consult for potential biopsy of axillary LN   - Recommend evaluation of peripheral smear by Hematology   - Continue to hold steroids pending full infectious work up  - Will attempt to obtain collateral regarding diagnosis from [facility] and PCP Dr. Hendrickson at [place]   - We will continue to closely follow    Case discussed with Infectious Disease  Case discussed with MICU team  Case discussed with mother at bedside  Case discussed with Dr. Baker-Cesar Melendez MD  Rheumatology Fellow   Available on TEAMS 28 years old male with h/o Lupus ( diagnosed in 09/2023, on Plaquenil present to [place] ED on 12/12/23  with complain of chest pain and SOB, found to have bilateral acute PE and bilateral pleural effusions. Transferred to [facility] for CT surgery evaluation. Also with fevers, has been on broad spectrum abx with no improvement. Was dx with SLE in Sept 2023 due to rash, oral ulcers, CP and dyspnea. Per pt's mother, underwent skin biopsy that confirmed lesions consistent with lupus. Has been on Plaquenil has monotherapy. Rheumatology consulted given SLE history.     # Fevers of unclear etiology, differential includes autoimmune, infection, and malignancy   - Also with b/l axillary and supraclavicular LAD   - While initially fever was thought to be possibly related to SLE, it did not resolve after 3 days of steroids (solumedrol 40 mg IV started 12/23) and patient had worsening of clinical status with new neurological symptoms, would r/o infection and hold steroids until work up is completed  - Fevers less likely related to SLE, higher suspicion for infection, procalcitonin significantly elevated at 8   - S/p LP 12/25 with preliminary studies negative for infection   - CSF with elevated IgG/albumin ratio and rash on palms and soles, ?syphilis as a mimicker   - CT Chest with moderate pleural effusions, loculated on L (new since 12/12) and new nonspecific very small peripheral GGO in RUL. S/p thoracentesis x2, exudative. ?Loculated effusion contributing to fevers, planned for chest tube 12/26   - Pleural effusions are hemorrhagic which can be seen with infection (such as TB, bacterial, viral), malignancy, connective tissue disease (such as SLE), cardiovascular conditions (given PEs), and bleeding disorders   - Broadened to vancomycin and cefepime     # SLE with presence of clinical activity (patchy alopecia, discoid lesions and erythematous rash, oral ulcers, serositis, fever) and serologic activity- hypocomplementemia  - At [place]: ABDIAS 1:1280, dsDNA 15, C3 79, C4 12.   - Labs here with ABDIAS 1:280 speckled, dsDNA 28, Sm >8, RNP >8, SSA >8   - F/u additional serologies as below, concern for overlap disease with myositis   - U/A also with 300 protein and moderate blood, with spot urine protein/Cr 2.5, ?kidney involvement     # Elevated CK  - Elevated to 1000's this admission. Could be from fevers, recent seizure  - Another consideration is overlap with myositis     # Bilateral Acute PE with pulmonary infarcts  - r/o APS - serological evaluation for APS- pending (although recent workup negative)   - Currently on heparin drip   - Hematology following-working up for APS and causes of elevated PT- INR  - U/A showed proteinuria with spot urine protein/Cr 2.5, ?nephrotic syndrome as a cause   - Another consideration is malignancy     # Generalized tonic-clonic seizure on 12/25  - ?secondary to high fever vs. hyponatremia, less likely neuropsychiatric SLE   - Neurology following  - S/p LP as above   - EEG negative for seizures, planned for MRI     Recommendations:   - F/u APS labs, Janine-1 ab, ribosomal ab, f/u LP C3, C4, and neuronal ab studies  - F/u additional infectious workup, including pleural fungal culture and cytology   - Would recommend additional workup: please send ANCA with MPO/PR3 reflex, U/A, urine protein/Cr, quantiferon, syphilis screen, PS, PS/PT, myomarker panel, immunoglobulins, IgG subsets, hemolysis labs (LDH, haptoglobin, retics, direct Maikel), vitamin D 25, vitamin D 1,25, ACE, iron with TIBC, ferritin, RF, and CCP  - Trend CPK levels   - Consider Dermatology consult for biopsy of rash on palms   - Recommend pan scan CT chest/abdomen/pelvis   - Recommend IR consult for potential biopsy of axillary LN   - Recommend evaluation of peripheral smear by Hematology   - Continue to hold steroids pending full infectious work up  - Will attempt to obtain collateral regarding diagnosis from [facility] D[facility]at[facility] and PCP Dr. Hendrickson at [place]   - We will continue to closely follow    Case discussed with Infectious Disease  Case discussed with MICU team  Case discussed with mother at bedside  Case discussed with Dr. Baker-Cesar Melendez MD  Rheumatology Fellow   Available on TEAMS

## 2023-12-26 NOTE — PROGRESS NOTE ADULT - SUBJECTIVE AND OBJECTIVE BOX
pt seen and examined by Dr Sosa  100% on RA w EEG in progress    Vital Signs Last 24 Hrs  T(C): 37.2 (26 Dec 2023 08:00), Max: 39.3 (25 Dec 2023 11:40)  T(F): 99 (26 Dec 2023 08:00), Max: 102.7 (25 Dec 2023 11:40)  HR: 97 (26 Dec 2023 09:00) (80 - 106)  BP: 131/93 (26 Dec 2023 09:00) (114/62 - 140/74)  BP(mean): 101 (26 Dec 2023 09:00) (73 - 101)  RR: 23 (26 Dec 2023 09:00) (15 - 26)  SpO2: 100% (26 Dec 2023 09:00) (91% - 100%)    Parameters below as of 26 Dec 2023 08:00  Patient On (Oxygen Delivery Method): room air          PHYSICAL EXAM:  Appearance: No acute distress. lethargic s/p seizure / ativan.   HEENT:  PERRLA   Lymphatic: No lymphadenopathy   Cardiovascular: Normal S1 S2, no JVD  Respiratory: normal effort , clear  Gastrointestinal:  Soft, Non-tender  Skin: No rashes,  warm to touch  Psychiatry:  Lethargic.   Musculoskeletal: No edema or joint swelling appreciated.                         Social: Pt is a student  Not   No smoking. NO ETOH            ASSESSMENT  28y Male with recent diagnosis of Lupus ( 9/2023) admitted 12/12 with B/L Pulmonary embolism and L effusion s/p L thora 12/23 and R thora by MICU 12/25.  Pt in MICU now undergoing EEG.       PLAN  awaiting cultures  no thoracic issues   continue care per MICU team  please reconsult w concerns    Alicia OLSON [id number] pt seen and examined by Dr Sosa  100% on RA w EEG in progress    Vital Signs Last 24 Hrs  T(C): 37.2 (26 Dec 2023 08:00), Max: 39.3 (25 Dec 2023 11:40)  T(F): 99 (26 Dec 2023 08:00), Max: 102.7 (25 Dec 2023 11:40)  HR: 97 (26 Dec 2023 09:00) (80 - 106)  BP: 131/93 (26 Dec 2023 09:00) (114/62 - 140/74)  BP(mean): 101 (26 Dec 2023 09:00) (73 - 101)  RR: 23 (26 Dec 2023 09:00) (15 - 26)  SpO2: 100% (26 Dec 2023 09:00) (91% - 100%)    Parameters below as of 26 Dec 2023 08:00  Patient On (Oxygen Delivery Method): room air          PHYSICAL EXAM:  Appearance: No acute distress. lethargic s/p seizure / ativan.   HEENT:  PERRLA   Lymphatic: No lymphadenopathy   Cardiovascular: Normal S1 S2, no JVD  Respiratory: normal effort , clear  Gastrointestinal:  Soft, Non-tender  Skin: No rashes,  warm to touch  Psychiatry:  Lethargic.   Musculoskeletal: No edema or joint swelling appreciated.                         Social: Pt is a student  Not   No smoking. NO ETOH            ASSESSMENT  28y Male with recent diagnosis of Lupus ( 9/2023) admitted 12/12 with B/L Pulmonary embolism and L effusion s/p L thora 12/23 and R thora by MICU 12/25.  Pt in MICU now undergoing EEG.       PLAN  awaiting cultures  no thoracic issues   continue care per MICU team  please reconsult w concerns    Alicia OLSON [id number]

## 2023-12-26 NOTE — DIETITIAN INITIAL EVALUATION ADULT - PERTINENT LABORATORY DATA
12-26    125<L>  |  94<L>  |  16  ----------------------------<  115<H>  4.5   |  22  |  0.90    Ca    8.3<L>      26 Dec 2023 12:10  Phos  2.3     12-25  Mg     1.90     12-25      A1C with Estimated Average Glucose Result: 5.5 % (12-25-23 @ 03:15)

## 2023-12-26 NOTE — DIETITIAN INITIAL EVALUATION ADULT - NS FNS DIET ORDER
Diet, Regular:   1000mL Fluid Restriction ([id number]) (12-22-23 @ 23:03) [Active]   Diet, Regular:   1000mL Fluid Restriction ([id number]) (12-22-23 @ 23:03) [Active]

## 2023-12-26 NOTE — CHART NOTE - NSCHARTNOTEFT_GEN_A_CORE
Verbal Consent obtained.   Patient verification done via wrist pant.     Pt S/P Lt chest pigtail cathter for loculated pleural effusion.  MIST protocol initiated. Alteplase, Dornase and sterile saline was injected to left pigtail intrathoracic catheter using sterile technique via three way stopcock valve. Catheter left clamped. Patient tolerated the procedure well without any issue.    Returned after an hour and unclamped chest tube catheter. No issues.

## 2023-12-26 NOTE — PROCEDURE NOTE - NSINDICATIONS_GEN_A_CORE
pleural effusion
pleural effusion
CSF sampling/mental status change/suspected CNS infection
pleural effusion

## 2023-12-26 NOTE — CONSULT NOTE ADULT - SUBJECTIVE AND OBJECTIVE BOX
CHIEF COMPLAINT:Patient is a 28y old  Male who presents with a chief complaint of Dyspnea and CP (25 Dec 2023 16:21)      HISTORY OF PRESENT ILLNESS:    28 years old male with h/o Lupus ( diagnosed in 09/2023, on Plaquenil present to [facility] ED on 12/12/23  with complain of chest pain and SOB , Fevers found to have  PE, pleural effusions, course c/b high fever and tonic-clonic seizure, transferred to MICU for post-ictal and infectious monitoring. ROS limited due to post ictal state     PAST MEDICAL & SURGICAL HISTORY:  LE (lupus erythematosus)      Pulmonary embolism      No significant past surgical history              MEDICATIONS:    cefepime   IVPB 2000 milliGRAM(s) IV Intermittent every 8 hours  hydroxychloroquine 200 milliGRAM(s) Oral two times a day  vancomycin  IVPB 1000 milliGRAM(s) IV Intermittent every 12 hours    albuterol/ipratropium for Nebulization 3 milliLiter(s) Nebulizer every 6 hours PRN  guaiFENesin Oral Liquid (Sugar-Free) 200 milliGRAM(s) Oral every 6 hours PRN    melatonin 3 milliGRAM(s) Oral at bedtime        ciprofloxacin  0.3% Ophthalmic Solution for Otic Use 2 Drop(s) Both Ears two times a day  lidocaine   4% Patch 1 Patch Transdermal daily  lidocaine 2% Viscous 5 milliLiter(s) Swish and Spit three times a day PRN  sodium chloride 1 Gram(s) Oral three times a day      FAMILY HISTORY:  No pertinent family history in first degree relatives        Non-contributory    SOCIAL HISTORY:    No tobacco, drugs or etoh    Allergies    No Known Allergies    Intolerances    	    REVIEW OF SYSTEMS:  as above  The rest of the 14 points ROS reviewed and except above they are unremarkable.        PHYSICAL EXAM:  T(C): 36.4 (12-26-23 @ 04:00), Max: 39.4 (12-25-23 @ 09:18)  HR: 94 (12-26-23 @ 06:00) (80 - 135)  BP: 134/92 (12-26-23 @ 06:00) (114/62 - 144/80)  RR: 26 (12-26-23 @ 06:00) (15 - 26)  SpO2: 99% (12-26-23 @ 06:00) (91% - 100%)  Wt(kg): --  I&O's Summary    24 Dec 2023 07:01  -  25 Dec 2023 07:00  --------------------------------------------------------  IN: 1119 mL / OUT: 1180 mL / NET: -61 mL    25 Dec 2023 07:01  -  26 Dec 2023 06:52  --------------------------------------------------------  IN: 848 mL / OUT: 1405 mL / NET: -557 mL        JVP: Normal  Neck: supple  Lung: clear   CV: S1 S2 , Murmur:  Abd: soft  Ext: No edema  neuro: Awake   Psych: flat affect  Skin: normal      LABS/DATA:    TELEMETRY: 	  sinus   ECG:  	   	  CARDIAC MARKERS:        < from: TTE W or WO Ultrasound Enhancing Agent (12.25.23 @ 14:15) >     CONCLUSIONS:      1. The left ventricular cavity is normal. Left ventricular wall thickness is mildly increased. Left ventricular systolic function is normal with a calculated ejection fraction of 73 % by the Nolasco's biplane method of disks. There are no regional wall motion abnormalities seen. There is normal left ventricular diastolic function. Mild left ventricular hypertrophy. There is increased LV mass and concentric hypertrophy.   2. Normal right ventricular cavity size and systolic function.   3. Structurally normal mitral valve with normal leaflet excursion. There is trace mitral regurgitation.   4. Right pleural effusion noted.    ________________________________________________________________________________________  FINDINGS:    < end of copied text >                  26 <<== 12-25-23 @ 10:10                              9.2    4.66  )-----------( 269      ( 26 Dec 2023 00:50 )             27.4     12-26    123<L>  |  93<L>  |  19  ----------------------------<  108<H>  4.6   |  21<L>  |  1.04    Ca    8.5      26 Dec 2023 00:50  Phos  2.3     12-25  Mg     1.90     12-25    TPro  7.4  /  Alb  2.8<L>  /  TBili  0.7  /  DBili  x   /  AST  236<H>  /  ALT  139<H>  /  AlkPhos  79  12-26    proBNP:   Lipid Profile:   HgA1c:   TSH:            CHIEF COMPLAINT:Patient is a 28y old  Male who presents with a chief complaint of Dyspnea and CP (25 Dec 2023 16:21)      HISTORY OF PRESENT ILLNESS:    28 years old male with h/o Lupus ( diagnosed in 09/2023, on Plaquenil present to [facility] ED on 12/12/23  with complain of chest pain and SOB , Fevers found to have  PE, pleural effusions, course c/b high fever and tonic-clonic seizure, transferred to MICU for post-ictal and infectious monitoring. ROS limited due to post ictal state     PAST MEDICAL & SURGICAL HISTORY:  LE (lupus erythematosus)      Pulmonary embolism      No significant past surgical history              MEDICATIONS:    cefepime   IVPB 2000 milliGRAM(s) IV Intermittent every 8 hours  hydroxychloroquine 200 milliGRAM(s) Oral two times a day  vancomycin  IVPB 1000 milliGRAM(s) IV Intermittent every 12 hours    albuterol/ipratropium for Nebulization 3 milliLiter(s) Nebulizer every 6 hours PRN  guaiFENesin Oral Liquid (Sugar-Free) 200 milliGRAM(s) Oral every 6 hours PRN    melatonin 3 milliGRAM(s) Oral at bedtime        ciprofloxacin  0.3% Ophthalmic Solution for Otic Use 2 Drop(s) Both Ears two times a day  lidocaine   4% Patch 1 Patch Transdermal daily  lidocaine 2% Viscous 5 milliLiter(s) Swish and Spit three times a day PRN  sodium chloride 1 Gram(s) Oral three times a day      FAMILY HISTORY:  No pertinent family history in first degree relatives        Non-contributory    SOCIAL HISTORY:    No tobacco, drugs or etoh    Allergies    No Known Allergies    Intolerances    	    REVIEW OF SYSTEMS:  as above  The rest of the 14 points ROS reviewed and except above they are unremarkable.        PHYSICAL EXAM:  T(C): 36.4 (12-26-23 @ 04:00), Max: 39.4 (12-25-23 @ 09:18)  HR: 94 (12-26-23 @ 06:00) (80 - 135)  BP: 134/92 (12-26-23 @ 06:00) (114/62 - 144/80)  RR: 26 (12-26-23 @ 06:00) (15 - 26)  SpO2: 99% (12-26-23 @ 06:00) (91% - 100%)  Wt(kg): --  I&O's Summary    24 Dec 2023 07:01  -  25 Dec 2023 07:00  --------------------------------------------------------  IN: 1119 mL / OUT: 1180 mL / NET: -61 mL    25 Dec 2023 07:01  -  26 Dec 2023 06:52  --------------------------------------------------------  IN: 848 mL / OUT: 1405 mL / NET: -557 mL        JVP: Normal  Neck: supple  Lung: clear   CV: S1 S2 , Murmur:  Abd: soft  Ext: No edema  neuro: Awake   Psych: flat affect  Skin: normal      LABS/DATA:    TELEMETRY: 	  sinus   ECG:  	   	  CARDIAC MARKERS:        < from: TTE W or WO Ultrasound Enhancing Agent (12.25.23 @ 14:15) >     CONCLUSIONS:      1. The left ventricular cavity is normal. Left ventricular wall thickness is mildly increased. Left ventricular systolic function is normal with a calculated ejection fraction of 73 % by the Nolasco's biplane method of disks. There are no regional wall motion abnormalities seen. There is normal left ventricular diastolic function. Mild left ventricular hypertrophy. There is increased LV mass and concentric hypertrophy.   2. Normal right ventricular cavity size and systolic function.   3. Structurally normal mitral valve with normal leaflet excursion. There is trace mitral regurgitation.   4. Right pleural effusion noted.    ________________________________________________________________________________________  FINDINGS:    < end of copied text >                  26 <<== 12-25-23 @ 10:10                              9.2    4.66  )-----------( 269      ( 26 Dec 2023 00:50 )             27.4     12-26    123<L>  |  93<L>  |  19  ----------------------------<  108<H>  4.6   |  21<L>  |  1.04    Ca    8.5      26 Dec 2023 00:50  Phos  2.3     12-25  Mg     1.90     12-25    TPro  7.4  /  Alb  2.8<L>  /  TBili  0.7  /  DBili  x   /  AST  236<H>  /  ALT  139<H>  /  AlkPhos  79  12-26    proBNP:   Lipid Profile:   HgA1c:   TSH:

## 2023-12-27 DIAGNOSIS — E87.1 HYPO-OSMOLALITY AND HYPONATREMIA: ICD-10-CM

## 2023-12-27 DIAGNOSIS — R80.9 PROTEINURIA, UNSPECIFIED: ICD-10-CM

## 2023-12-27 LAB
ALBUMIN SERPL ELPH-MCNC: 2.6 G/DL — LOW (ref 3.3–5)
ALBUMIN SERPL ELPH-MCNC: 2.6 G/DL — LOW (ref 3.3–5)
ALBUMIN SERPL ELPH-MCNC: 2.7 G/DL — LOW (ref 3.3–5)
ALBUMIN SERPL ELPH-MCNC: 2.7 G/DL — LOW (ref 3.3–5)
ALP SERPL-CCNC: 72 U/L — SIGNIFICANT CHANGE UP (ref 40–120)
ALP SERPL-CCNC: 72 U/L — SIGNIFICANT CHANGE UP (ref 40–120)
ALP SERPL-CCNC: 75 U/L — SIGNIFICANT CHANGE UP (ref 40–120)
ALP SERPL-CCNC: 75 U/L — SIGNIFICANT CHANGE UP (ref 40–120)
ALT FLD-CCNC: 105 U/L — HIGH (ref 4–41)
ALT FLD-CCNC: 105 U/L — HIGH (ref 4–41)
ALT FLD-CCNC: 113 U/L — HIGH (ref 4–41)
ALT FLD-CCNC: 113 U/L — HIGH (ref 4–41)
ANION GAP SERPL CALC-SCNC: 10 MMOL/L — SIGNIFICANT CHANGE UP (ref 7–14)
APTT BLD: 53.7 SEC — HIGH (ref 24.5–35.6)
APTT BLD: 53.7 SEC — HIGH (ref 24.5–35.6)
APTT BLD: 70.6 SEC — HIGH (ref 24.5–35.6)
APTT BLD: 70.6 SEC — HIGH (ref 24.5–35.6)
APTT BLD: 81.6 SEC — HIGH (ref 24.5–35.6)
APTT BLD: 81.6 SEC — HIGH (ref 24.5–35.6)
AST SERPL-CCNC: 140 U/L — HIGH (ref 4–40)
AST SERPL-CCNC: 140 U/L — HIGH (ref 4–40)
AST SERPL-CCNC: 152 U/L — HIGH (ref 4–40)
AST SERPL-CCNC: 152 U/L — HIGH (ref 4–40)
BASE EXCESS BLDV CALC-SCNC: -2.6 MMOL/L — LOW (ref -2–3)
BASE EXCESS BLDV CALC-SCNC: -2.6 MMOL/L — LOW (ref -2–3)
BASOPHILS # BLD AUTO: 0.08 K/UL — SIGNIFICANT CHANGE UP (ref 0–0.2)
BASOPHILS NFR BLD AUTO: 0.8 % — SIGNIFICANT CHANGE UP (ref 0–2)
BASOPHILS NFR BLD AUTO: 0.8 % — SIGNIFICANT CHANGE UP (ref 0–2)
BASOPHILS NFR BLD AUTO: 1 % — SIGNIFICANT CHANGE UP (ref 0–2)
BASOPHILS NFR BLD AUTO: 1 % — SIGNIFICANT CHANGE UP (ref 0–2)
BILIRUB SERPL-MCNC: 0.5 MG/DL — SIGNIFICANT CHANGE UP (ref 0.2–1.2)
BLOOD GAS VENOUS COMPREHENSIVE RESULT: SIGNIFICANT CHANGE UP
BLOOD GAS VENOUS COMPREHENSIVE RESULT: SIGNIFICANT CHANGE UP
BUN SERPL-MCNC: 10 MG/DL — SIGNIFICANT CHANGE UP (ref 7–23)
BUN SERPL-MCNC: 10 MG/DL — SIGNIFICANT CHANGE UP (ref 7–23)
BUN SERPL-MCNC: 16 MG/DL — SIGNIFICANT CHANGE UP (ref 7–23)
BUN SERPL-MCNC: 16 MG/DL — SIGNIFICANT CHANGE UP (ref 7–23)
CALCIUM SERPL-MCNC: 8.1 MG/DL — LOW (ref 8.4–10.5)
CALCIUM SERPL-MCNC: 8.1 MG/DL — LOW (ref 8.4–10.5)
CALCIUM SERPL-MCNC: 8.4 MG/DL — SIGNIFICANT CHANGE UP (ref 8.4–10.5)
CALCIUM SERPL-MCNC: 8.4 MG/DL — SIGNIFICANT CHANGE UP (ref 8.4–10.5)
CHLORIDE BLDV-SCNC: 97 MMOL/L — SIGNIFICANT CHANGE UP (ref 96–108)
CHLORIDE BLDV-SCNC: 97 MMOL/L — SIGNIFICANT CHANGE UP (ref 96–108)
CHLORIDE SERPL-SCNC: 94 MMOL/L — LOW (ref 98–107)
CHLORIDE SERPL-SCNC: 94 MMOL/L — LOW (ref 98–107)
CHLORIDE SERPL-SCNC: 96 MMOL/L — LOW (ref 98–107)
CHLORIDE SERPL-SCNC: 96 MMOL/L — LOW (ref 98–107)
CK SERPL-CCNC: 714 U/L — HIGH (ref 30–200)
CK SERPL-CCNC: 714 U/L — HIGH (ref 30–200)
CO2 BLDV-SCNC: 23.8 MMOL/L — SIGNIFICANT CHANGE UP (ref 22–26)
CO2 BLDV-SCNC: 23.8 MMOL/L — SIGNIFICANT CHANGE UP (ref 22–26)
CO2 SERPL-SCNC: 21 MMOL/L — LOW (ref 22–31)
CO2 SERPL-SCNC: 21 MMOL/L — LOW (ref 22–31)
CO2 SERPL-SCNC: 22 MMOL/L — SIGNIFICANT CHANGE UP (ref 22–31)
CO2 SERPL-SCNC: 22 MMOL/L — SIGNIFICANT CHANGE UP (ref 22–31)
CREAT ?TM UR-MCNC: 82 MG/DL — SIGNIFICANT CHANGE UP
CREAT ?TM UR-MCNC: 82 MG/DL — SIGNIFICANT CHANGE UP
CREAT SERPL-MCNC: 0.65 MG/DL — SIGNIFICANT CHANGE UP (ref 0.5–1.3)
CREAT SERPL-MCNC: 0.65 MG/DL — SIGNIFICANT CHANGE UP (ref 0.5–1.3)
CREAT SERPL-MCNC: 0.83 MG/DL — SIGNIFICANT CHANGE UP (ref 0.5–1.3)
CREAT SERPL-MCNC: 0.83 MG/DL — SIGNIFICANT CHANGE UP (ref 0.5–1.3)
CULTURE RESULTS: SIGNIFICANT CHANGE UP
EGFR: 122 ML/MIN/1.73M2 — SIGNIFICANT CHANGE UP
EGFR: 122 ML/MIN/1.73M2 — SIGNIFICANT CHANGE UP
EGFR: 131 ML/MIN/1.73M2 — SIGNIFICANT CHANGE UP
EGFR: 131 ML/MIN/1.73M2 — SIGNIFICANT CHANGE UP
EOSINOPHIL # BLD AUTO: 0.03 K/UL — SIGNIFICANT CHANGE UP (ref 0–0.5)
EOSINOPHIL # BLD AUTO: 0.03 K/UL — SIGNIFICANT CHANGE UP (ref 0–0.5)
EOSINOPHIL # BLD AUTO: 0.04 K/UL — SIGNIFICANT CHANGE UP (ref 0–0.5)
EOSINOPHIL # BLD AUTO: 0.04 K/UL — SIGNIFICANT CHANGE UP (ref 0–0.5)
EOSINOPHIL NFR BLD AUTO: 0.3 % — SIGNIFICANT CHANGE UP (ref 0–6)
EOSINOPHIL NFR BLD AUTO: 0.3 % — SIGNIFICANT CHANGE UP (ref 0–6)
EOSINOPHIL NFR BLD AUTO: 0.5 % — SIGNIFICANT CHANGE UP (ref 0–6)
EOSINOPHIL NFR BLD AUTO: 0.5 % — SIGNIFICANT CHANGE UP (ref 0–6)
FERRITIN SERPL-MCNC: 9278 NG/ML — HIGH (ref 30–400)
FERRITIN SERPL-MCNC: 9278 NG/ML — HIGH (ref 30–400)
GAS PNL BLDV: 123 MMOL/L — LOW (ref 136–145)
GAS PNL BLDV: 123 MMOL/L — LOW (ref 136–145)
GLUCOSE BLDV-MCNC: 165 MG/DL — HIGH (ref 70–99)
GLUCOSE BLDV-MCNC: 165 MG/DL — HIGH (ref 70–99)
GLUCOSE SERPL-MCNC: 109 MG/DL — HIGH (ref 70–99)
GLUCOSE SERPL-MCNC: 109 MG/DL — HIGH (ref 70–99)
GLUCOSE SERPL-MCNC: 176 MG/DL — HIGH (ref 70–99)
GLUCOSE SERPL-MCNC: 176 MG/DL — HIGH (ref 70–99)
GRAM STN FLD: SIGNIFICANT CHANGE UP
GRAM STN FLD: SIGNIFICANT CHANGE UP
HCO3 BLDV-SCNC: 23 MMOL/L — SIGNIFICANT CHANGE UP (ref 22–29)
HCO3 BLDV-SCNC: 23 MMOL/L — SIGNIFICANT CHANGE UP (ref 22–29)
HCT VFR BLD CALC: 26.6 % — LOW (ref 39–50)
HCT VFR BLD CALC: 26.6 % — LOW (ref 39–50)
HCT VFR BLD CALC: 27.1 % — LOW (ref 39–50)
HCT VFR BLD CALC: 27.1 % — LOW (ref 39–50)
HCT VFR BLDA CALC: 28 % — LOW (ref 39–51)
HCT VFR BLDA CALC: 28 % — LOW (ref 39–51)
HGB BLD CALC-MCNC: 9.4 G/DL — LOW (ref 12.6–17.4)
HGB BLD CALC-MCNC: 9.4 G/DL — LOW (ref 12.6–17.4)
HGB BLD-MCNC: 9.1 G/DL — LOW (ref 13–17)
HGB BLD-MCNC: 9.1 G/DL — LOW (ref 13–17)
HGB BLD-MCNC: 9.2 G/DL — LOW (ref 13–17)
HGB BLD-MCNC: 9.2 G/DL — LOW (ref 13–17)
IANC: 3.21 K/UL — SIGNIFICANT CHANGE UP (ref 1.8–7.4)
IANC: 3.21 K/UL — SIGNIFICANT CHANGE UP (ref 1.8–7.4)
IANC: 4.27 K/UL — SIGNIFICANT CHANGE UP (ref 1.8–7.4)
IANC: 4.27 K/UL — SIGNIFICANT CHANGE UP (ref 1.8–7.4)
IGA FLD-MCNC: 237 MG/DL — SIGNIFICANT CHANGE UP (ref 84–499)
IGA FLD-MCNC: 237 MG/DL — SIGNIFICANT CHANGE UP (ref 84–499)
IGG FLD-MCNC: 1824 MG/DL — HIGH (ref 610–1660)
IGG FLD-MCNC: 1824 MG/DL — HIGH (ref 610–1660)
IGG4 SER-MCNC: 20.2 MG/DL — SIGNIFICANT CHANGE UP (ref 1–123)
IGG4 SER-MCNC: 20.2 MG/DL — SIGNIFICANT CHANGE UP (ref 1–123)
IGM SERPL-MCNC: 69 MG/DL — SIGNIFICANT CHANGE UP (ref 35–242)
IGM SERPL-MCNC: 69 MG/DL — SIGNIFICANT CHANGE UP (ref 35–242)
IMM GRANULOCYTES NFR BLD AUTO: 0.6 % — SIGNIFICANT CHANGE UP (ref 0–0.9)
IMM GRANULOCYTES NFR BLD AUTO: 0.6 % — SIGNIFICANT CHANGE UP (ref 0–0.9)
IMM GRANULOCYTES NFR BLD AUTO: 0.7 % — SIGNIFICANT CHANGE UP (ref 0–0.9)
IMM GRANULOCYTES NFR BLD AUTO: 0.7 % — SIGNIFICANT CHANGE UP (ref 0–0.9)
INR BLD: 1.32 RATIO — HIGH (ref 0.85–1.18)
INR BLD: 1.32 RATIO — HIGH (ref 0.85–1.18)
IRON SATN MFR SERPL: 18 % — SIGNIFICANT CHANGE UP (ref 14–50)
IRON SATN MFR SERPL: 18 % — SIGNIFICANT CHANGE UP (ref 14–50)
IRON SATN MFR SERPL: 36 UG/DL — LOW (ref 45–165)
IRON SATN MFR SERPL: 36 UG/DL — LOW (ref 45–165)
KAPPA LC SER QL IFE: 4.98 MG/DL — HIGH (ref 0.33–1.94)
KAPPA LC SER QL IFE: 4.98 MG/DL — HIGH (ref 0.33–1.94)
KAPPA/LAMBDA FREE LIGHT CHAIN RATIO, SERUM: 1.02 RATIO — SIGNIFICANT CHANGE UP (ref 0.26–1.65)
KAPPA/LAMBDA FREE LIGHT CHAIN RATIO, SERUM: 1.02 RATIO — SIGNIFICANT CHANGE UP (ref 0.26–1.65)
LACTATE BLDV-MCNC: 1 MMOL/L — SIGNIFICANT CHANGE UP (ref 0.5–2)
LACTATE BLDV-MCNC: 1 MMOL/L — SIGNIFICANT CHANGE UP (ref 0.5–2)
LAMBDA LC SER QL IFE: 4.89 MG/DL — HIGH (ref 0.57–2.63)
LAMBDA LC SER QL IFE: 4.89 MG/DL — HIGH (ref 0.57–2.63)
LDH SERPL L TO P-CCNC: 573 U/L — HIGH (ref 135–225)
LDH SERPL L TO P-CCNC: 573 U/L — HIGH (ref 135–225)
LYMPHOCYTES # BLD AUTO: 4.38 K/UL — HIGH (ref 1–3.3)
LYMPHOCYTES # BLD AUTO: 4.38 K/UL — HIGH (ref 1–3.3)
LYMPHOCYTES # BLD AUTO: 4.5 K/UL — HIGH (ref 1–3.3)
LYMPHOCYTES # BLD AUTO: 4.5 K/UL — HIGH (ref 1–3.3)
LYMPHOCYTES # BLD AUTO: 46 % — HIGH (ref 13–44)
LYMPHOCYTES # BLD AUTO: 46 % — HIGH (ref 13–44)
LYMPHOCYTES # BLD AUTO: 52 % — HIGH (ref 13–44)
LYMPHOCYTES # BLD AUTO: 52 % — HIGH (ref 13–44)
MAGNESIUM SERPL-MCNC: 1.8 MG/DL — SIGNIFICANT CHANGE UP (ref 1.6–2.6)
MAGNESIUM SERPL-MCNC: 1.8 MG/DL — SIGNIFICANT CHANGE UP (ref 1.6–2.6)
MCHC RBC-ENTMCNC: 30.6 PG — SIGNIFICANT CHANGE UP (ref 27–34)
MCHC RBC-ENTMCNC: 30.6 PG — SIGNIFICANT CHANGE UP (ref 27–34)
MCHC RBC-ENTMCNC: 31.3 PG — SIGNIFICANT CHANGE UP (ref 27–34)
MCHC RBC-ENTMCNC: 31.3 PG — SIGNIFICANT CHANGE UP (ref 27–34)
MCHC RBC-ENTMCNC: 33.6 GM/DL — SIGNIFICANT CHANGE UP (ref 32–36)
MCHC RBC-ENTMCNC: 33.6 GM/DL — SIGNIFICANT CHANGE UP (ref 32–36)
MCHC RBC-ENTMCNC: 34.6 GM/DL — SIGNIFICANT CHANGE UP (ref 32–36)
MCHC RBC-ENTMCNC: 34.6 GM/DL — SIGNIFICANT CHANGE UP (ref 32–36)
MCV RBC AUTO: 90.5 FL — SIGNIFICANT CHANGE UP (ref 80–100)
MCV RBC AUTO: 90.5 FL — SIGNIFICANT CHANGE UP (ref 80–100)
MCV RBC AUTO: 91.2 FL — SIGNIFICANT CHANGE UP (ref 80–100)
MCV RBC AUTO: 91.2 FL — SIGNIFICANT CHANGE UP (ref 80–100)
MONOCYTES # BLD AUTO: 0.66 K/UL — SIGNIFICANT CHANGE UP (ref 0–0.9)
MONOCYTES # BLD AUTO: 0.66 K/UL — SIGNIFICANT CHANGE UP (ref 0–0.9)
MONOCYTES # BLD AUTO: 0.84 K/UL — SIGNIFICANT CHANGE UP (ref 0–0.9)
MONOCYTES # BLD AUTO: 0.84 K/UL — SIGNIFICANT CHANGE UP (ref 0–0.9)
MONOCYTES NFR BLD AUTO: 7.8 % — SIGNIFICANT CHANGE UP (ref 2–14)
MONOCYTES NFR BLD AUTO: 7.8 % — SIGNIFICANT CHANGE UP (ref 2–14)
MONOCYTES NFR BLD AUTO: 8.6 % — SIGNIFICANT CHANGE UP (ref 2–14)
MONOCYTES NFR BLD AUTO: 8.6 % — SIGNIFICANT CHANGE UP (ref 2–14)
MPO AB + PR3 PNL SER: SIGNIFICANT CHANGE UP
MPO AB + PR3 PNL SER: SIGNIFICANT CHANGE UP
NEUTROPHILS # BLD AUTO: 3.21 K/UL — SIGNIFICANT CHANGE UP (ref 1.8–7.4)
NEUTROPHILS # BLD AUTO: 3.21 K/UL — SIGNIFICANT CHANGE UP (ref 1.8–7.4)
NEUTROPHILS # BLD AUTO: 4.27 K/UL — SIGNIFICANT CHANGE UP (ref 1.8–7.4)
NEUTROPHILS # BLD AUTO: 4.27 K/UL — SIGNIFICANT CHANGE UP (ref 1.8–7.4)
NEUTROPHILS NFR BLD AUTO: 38.1 % — LOW (ref 43–77)
NEUTROPHILS NFR BLD AUTO: 38.1 % — LOW (ref 43–77)
NEUTROPHILS NFR BLD AUTO: 43.6 % — SIGNIFICANT CHANGE UP (ref 43–77)
NEUTROPHILS NFR BLD AUTO: 43.6 % — SIGNIFICANT CHANGE UP (ref 43–77)
NRBC # BLD: 0 /100 WBCS — SIGNIFICANT CHANGE UP (ref 0–0)
NRBC # FLD: 0 K/UL — SIGNIFICANT CHANGE UP (ref 0–0)
NRBC # FLD: 0 K/UL — SIGNIFICANT CHANGE UP (ref 0–0)
NRBC # FLD: 0.02 K/UL — HIGH (ref 0–0)
NRBC # FLD: 0.02 K/UL — HIGH (ref 0–0)
PCO2 BLDV: 40 MMHG — LOW (ref 42–55)
PCO2 BLDV: 40 MMHG — LOW (ref 42–55)
PH BLDV: 7.36 — SIGNIFICANT CHANGE UP (ref 7.32–7.43)
PH BLDV: 7.36 — SIGNIFICANT CHANGE UP (ref 7.32–7.43)
PH FLD: 7.9 — SIGNIFICANT CHANGE UP
PH FLD: 7.9 — SIGNIFICANT CHANGE UP
PLATELET # BLD AUTO: 242 K/UL — SIGNIFICANT CHANGE UP (ref 150–400)
PLATELET # BLD AUTO: 242 K/UL — SIGNIFICANT CHANGE UP (ref 150–400)
PLATELET # BLD AUTO: 264 K/UL — SIGNIFICANT CHANGE UP (ref 150–400)
PLATELET # BLD AUTO: 264 K/UL — SIGNIFICANT CHANGE UP (ref 150–400)
PO2 BLDV: 65 MMHG — HIGH (ref 25–45)
PO2 BLDV: 65 MMHG — HIGH (ref 25–45)
POTASSIUM BLDV-SCNC: 4.3 MMOL/L — SIGNIFICANT CHANGE UP (ref 3.5–5.1)
POTASSIUM BLDV-SCNC: 4.3 MMOL/L — SIGNIFICANT CHANGE UP (ref 3.5–5.1)
POTASSIUM SERPL-MCNC: 4.2 MMOL/L — SIGNIFICANT CHANGE UP (ref 3.5–5.3)
POTASSIUM SERPL-SCNC: 4.2 MMOL/L — SIGNIFICANT CHANGE UP (ref 3.5–5.3)
PROT ?TM UR-MCNC: 90 MG/DL — SIGNIFICANT CHANGE UP
PROT ?TM UR-MCNC: 90 MG/DL — SIGNIFICANT CHANGE UP
PROT SERPL-MCNC: 6.6 G/DL — SIGNIFICANT CHANGE UP (ref 6–8.3)
PROT SERPL-MCNC: 6.6 G/DL — SIGNIFICANT CHANGE UP (ref 6–8.3)
PROT SERPL-MCNC: 6.9 G/DL — SIGNIFICANT CHANGE UP (ref 6–8.3)
PROT SERPL-MCNC: 6.9 G/DL — SIGNIFICANT CHANGE UP (ref 6–8.3)
PROT/CREAT UR-RTO: 1.1 RATIO — HIGH (ref 0–0.2)
PROT/CREAT UR-RTO: 1.1 RATIO — HIGH (ref 0–0.2)
PROTHROM AB SERPL-ACNC: 14.7 SEC — HIGH (ref 9.5–13)
PROTHROM AB SERPL-ACNC: 14.7 SEC — HIGH (ref 9.5–13)
RBC # BLD: 2.94 M/UL — LOW (ref 4.2–5.8)
RBC # BLD: 2.94 M/UL — LOW (ref 4.2–5.8)
RBC # BLD: 2.97 M/UL — LOW (ref 4.2–5.8)
RBC # BLD: 2.97 M/UL — LOW (ref 4.2–5.8)
RBC # FLD: 14.3 % — SIGNIFICANT CHANGE UP (ref 10.3–14.5)
RBC # FLD: 14.3 % — SIGNIFICANT CHANGE UP (ref 10.3–14.5)
RBC # FLD: 14.5 % — SIGNIFICANT CHANGE UP (ref 10.3–14.5)
RBC # FLD: 14.5 % — SIGNIFICANT CHANGE UP (ref 10.3–14.5)
RHEUMATOID FACT SERPL-ACNC: <10 IU/ML — SIGNIFICANT CHANGE UP (ref 0–13)
RHEUMATOID FACT SERPL-ACNC: <10 IU/ML — SIGNIFICANT CHANGE UP (ref 0–13)
SAO2 % BLDV: 93.2 % — HIGH (ref 67–88)
SAO2 % BLDV: 93.2 % — HIGH (ref 67–88)
SODIUM SERPL-SCNC: 126 MMOL/L — LOW (ref 135–145)
SODIUM SERPL-SCNC: 126 MMOL/L — LOW (ref 135–145)
SODIUM SERPL-SCNC: 127 MMOL/L — LOW (ref 135–145)
SODIUM SERPL-SCNC: 127 MMOL/L — LOW (ref 135–145)
SPECIMEN SOURCE: SIGNIFICANT CHANGE UP
T PALLIDUM AB TITR SER: NEGATIVE — SIGNIFICANT CHANGE UP
T PALLIDUM AB TITR SER: NEGATIVE — SIGNIFICANT CHANGE UP
TIBC SERPL-MCNC: 195 UG/DL — LOW (ref 220–430)
TIBC SERPL-MCNC: 195 UG/DL — LOW (ref 220–430)
UIBC SERPL-MCNC: 159 UG/DL — SIGNIFICANT CHANGE UP (ref 110–370)
UIBC SERPL-MCNC: 159 UG/DL — SIGNIFICANT CHANGE UP (ref 110–370)
VANCOMYCIN TROUGH SERPL-MCNC: 5.3 UG/ML — LOW (ref 10–20)
VANCOMYCIN TROUGH SERPL-MCNC: 5.3 UG/ML — LOW (ref 10–20)
VIT D25+D1,25 OH+D1,25 PNL SERPL-MCNC: 97.6 PG/ML — HIGH (ref 19.9–79.3)
VIT D25+D1,25 OH+D1,25 PNL SERPL-MCNC: 97.6 PG/ML — HIGH (ref 19.9–79.3)
WBC # BLD: 8.42 K/UL — SIGNIFICANT CHANGE UP (ref 3.8–10.5)
WBC # BLD: 8.42 K/UL — SIGNIFICANT CHANGE UP (ref 3.8–10.5)
WBC # BLD: 9.79 K/UL — SIGNIFICANT CHANGE UP (ref 3.8–10.5)
WBC # BLD: 9.79 K/UL — SIGNIFICANT CHANGE UP (ref 3.8–10.5)
WBC # FLD AUTO: 8.42 K/UL — SIGNIFICANT CHANGE UP (ref 3.8–10.5)
WBC # FLD AUTO: 8.42 K/UL — SIGNIFICANT CHANGE UP (ref 3.8–10.5)
WBC # FLD AUTO: 9.79 K/UL — SIGNIFICANT CHANGE UP (ref 3.8–10.5)
WBC # FLD AUTO: 9.79 K/UL — SIGNIFICANT CHANGE UP (ref 3.8–10.5)

## 2023-12-27 PROCEDURE — 99232 SBSQ HOSP IP/OBS MODERATE 35: CPT

## 2023-12-27 PROCEDURE — 99253 IP/OBS CNSLTJ NEW/EST LOW 45: CPT | Mod: GC

## 2023-12-27 PROCEDURE — 71045 X-RAY EXAM CHEST 1 VIEW: CPT | Mod: 26,76

## 2023-12-27 PROCEDURE — 99223 1ST HOSP IP/OBS HIGH 75: CPT | Mod: 25

## 2023-12-27 PROCEDURE — 31575 DIAGNOSTIC LARYNGOSCOPY: CPT

## 2023-12-27 PROCEDURE — 99233 SBSQ HOSP IP/OBS HIGH 50: CPT | Mod: 57

## 2023-12-27 PROCEDURE — 99223 1ST HOSP IP/OBS HIGH 75: CPT

## 2023-12-27 PROCEDURE — 76604 US EXAM CHEST: CPT | Mod: 26,GC

## 2023-12-27 PROCEDURE — 99233 SBSQ HOSP IP/OBS HIGH 50: CPT | Mod: GC

## 2023-12-27 PROCEDURE — 99291 CRITICAL CARE FIRST HOUR: CPT | Mod: GC

## 2023-12-27 RX ORDER — VANCOMYCIN HCL 1 G
VIAL (EA) INTRAVENOUS
Refills: 0 | Status: DISCONTINUED | OUTPATIENT
Start: 2023-12-27 | End: 2023-12-27

## 2023-12-27 RX ORDER — LIDOCAINE 4 G/100G
1 CREAM TOPICAL EVERY 24 HOURS
Refills: 0 | Status: DISCONTINUED | OUTPATIENT
Start: 2023-12-27 | End: 2024-01-29

## 2023-12-27 RX ORDER — HEPARIN SODIUM 5000 [USP'U]/ML
1300 INJECTION INTRAVENOUS; SUBCUTANEOUS
Qty: 25000 | Refills: 0 | Status: DISCONTINUED | OUTPATIENT
Start: 2023-12-27 | End: 2023-12-27

## 2023-12-27 RX ORDER — DIPHENHYDRAMINE HYDROCHLORIDE AND LIDOCAINE HYDROCHLORIDE AND ALUMINUM HYDROXIDE AND MAGNESIUM HYDRO
15 KIT EVERY 4 HOURS
Refills: 0 | Status: DISCONTINUED | OUTPATIENT
Start: 2023-12-27 | End: 2024-01-29

## 2023-12-27 RX ORDER — DIPHENHYDRAMINE HYDROCHLORIDE AND LIDOCAINE HYDROCHLORIDE AND ALUMINUM HYDROXIDE AND MAGNESIUM HYDRO
15 KIT EVERY 4 HOURS
Refills: 0 | Status: DISCONTINUED | OUTPATIENT
Start: 2023-12-27 | End: 2023-12-27

## 2023-12-27 RX ORDER — VANCOMYCIN HCL 1 G
1500 VIAL (EA) INTRAVENOUS ONCE
Refills: 0 | Status: COMPLETED | OUTPATIENT
Start: 2023-12-27 | End: 2023-12-27

## 2023-12-27 RX ORDER — VANCOMYCIN HCL 1 G
1500 VIAL (EA) INTRAVENOUS EVERY 12 HOURS
Refills: 0 | Status: DISCONTINUED | OUTPATIENT
Start: 2023-12-27 | End: 2023-12-27

## 2023-12-27 RX ORDER — SODIUM CHLORIDE 9 MG/ML
75 INJECTION INTRAMUSCULAR; INTRAVENOUS; SUBCUTANEOUS ONCE
Refills: 0 | Status: COMPLETED | OUTPATIENT
Start: 2023-12-27 | End: 2023-12-27

## 2023-12-27 RX ORDER — OXYCODONE HYDROCHLORIDE 5 MG/1
2.5 TABLET ORAL ONCE
Refills: 0 | Status: DISCONTINUED | OUTPATIENT
Start: 2023-12-27 | End: 2023-12-27

## 2023-12-27 RX ORDER — OXYCODONE HYDROCHLORIDE 5 MG/1
5 TABLET ORAL EVERY 6 HOURS
Refills: 0 | Status: DISCONTINUED | OUTPATIENT
Start: 2023-12-27 | End: 2024-01-03

## 2023-12-27 RX ORDER — LIDOCAINE 4 G/100G
1 CREAM TOPICAL ONCE
Refills: 0 | Status: COMPLETED | OUTPATIENT
Start: 2023-12-27 | End: 2023-12-27

## 2023-12-27 RX ORDER — SODIUM CHLORIDE 9 MG/ML
1000 INJECTION, SOLUTION INTRAVENOUS
Refills: 0 | Status: DISCONTINUED | OUTPATIENT
Start: 2023-12-27 | End: 2023-12-27

## 2023-12-27 RX ADMIN — HEPARIN SODIUM 2500 UNIT(S): 5000 INJECTION INTRAVENOUS; SUBCUTANEOUS at 01:49

## 2023-12-27 RX ADMIN — DEXMEDETOMIDINE HYDROCHLORIDE IN 0.9% SODIUM CHLORIDE 3.46 MICROGRAM(S)/KG/HR: 4 INJECTION INTRAVENOUS at 19:32

## 2023-12-27 RX ADMIN — LIDOCAINE 1 PATCH: 4 CREAM TOPICAL at 19:26

## 2023-12-27 RX ADMIN — HEPARIN SODIUM 1400 UNIT(S)/HR: 5000 INJECTION INTRAVENOUS; SUBCUTANEOUS at 01:45

## 2023-12-27 RX ADMIN — Medication 2 DROP(S): at 17:52

## 2023-12-27 RX ADMIN — SODIUM CHLORIDE 1 GRAM(S): 9 INJECTION INTRAMUSCULAR; INTRAVENOUS; SUBCUTANEOUS at 13:14

## 2023-12-27 RX ADMIN — Medication 200 MILLIGRAM(S): at 17:52

## 2023-12-27 RX ADMIN — SODIUM CHLORIDE 1 GRAM(S): 9 INJECTION INTRAMUSCULAR; INTRAVENOUS; SUBCUTANEOUS at 05:36

## 2023-12-27 RX ADMIN — LIDOCAINE 1 PATCH: 4 CREAM TOPICAL at 00:44

## 2023-12-27 RX ADMIN — OXYCODONE HYDROCHLORIDE 5 MILLIGRAM(S): 5 TABLET ORAL at 20:21

## 2023-12-27 RX ADMIN — SODIUM CHLORIDE 1 GRAM(S): 9 INJECTION INTRAMUSCULAR; INTRAVENOUS; SUBCUTANEOUS at 22:18

## 2023-12-27 RX ADMIN — Medication 300 MILLIGRAM(S): at 06:36

## 2023-12-27 RX ADMIN — LIDOCAINE 1 PATCH: 4 CREAM TOPICAL at 12:15

## 2023-12-27 RX ADMIN — SODIUM CHLORIDE 12.5 MILLILITER(S): 9 INJECTION INTRAMUSCULAR; INTRAVENOUS; SUBCUTANEOUS at 19:46

## 2023-12-27 RX ADMIN — CEFEPIME 100 MILLIGRAM(S): 1 INJECTION, POWDER, FOR SOLUTION INTRAMUSCULAR; INTRAVENOUS at 05:35

## 2023-12-27 RX ADMIN — CHLORHEXIDINE GLUCONATE 1 APPLICATION(S): 213 SOLUTION TOPICAL at 11:12

## 2023-12-27 RX ADMIN — HEPARIN SODIUM 1400 UNIT(S)/HR: 5000 INJECTION INTRAVENOUS; SUBCUTANEOUS at 08:36

## 2023-12-27 RX ADMIN — LIDOCAINE 1 PATCH: 4 CREAM TOPICAL at 06:11

## 2023-12-27 RX ADMIN — LIDOCAINE 1 PATCH: 4 CREAM TOPICAL at 14:43

## 2023-12-27 RX ADMIN — HEPARIN SODIUM 1400 UNIT(S)/HR: 5000 INJECTION INTRAVENOUS; SUBCUTANEOUS at 19:32

## 2023-12-27 RX ADMIN — DEXMEDETOMIDINE HYDROCHLORIDE IN 0.9% SODIUM CHLORIDE 3.46 MICROGRAM(S)/KG/HR: 4 INJECTION INTRAVENOUS at 18:49

## 2023-12-27 RX ADMIN — CEFEPIME 100 MILLIGRAM(S): 1 INJECTION, POWDER, FOR SOLUTION INTRAMUSCULAR; INTRAVENOUS at 22:18

## 2023-12-27 RX ADMIN — DEXMEDETOMIDINE HYDROCHLORIDE IN 0.9% SODIUM CHLORIDE 3.46 MICROGRAM(S)/KG/HR: 4 INJECTION INTRAVENOUS at 01:50

## 2023-12-27 RX ADMIN — Medication 3 MILLIGRAM(S): at 21:01

## 2023-12-27 RX ADMIN — Medication 650 MILLIGRAM(S): at 14:42

## 2023-12-27 RX ADMIN — OXYCODONE HYDROCHLORIDE 2.5 MILLIGRAM(S): 5 TABLET ORAL at 06:38

## 2023-12-27 RX ADMIN — Medication 600 MILLIGRAM(S): at 11:12

## 2023-12-27 RX ADMIN — HEPARIN SODIUM 1400 UNIT(S)/HR: 5000 INJECTION INTRAVENOUS; SUBCUTANEOUS at 15:08

## 2023-12-27 RX ADMIN — Medication 650 MILLIGRAM(S): at 22:20

## 2023-12-27 RX ADMIN — CEFEPIME 100 MILLIGRAM(S): 1 INJECTION, POWDER, FOR SOLUTION INTRAMUSCULAR; INTRAVENOUS at 14:42

## 2023-12-27 RX ADMIN — OXYCODONE HYDROCHLORIDE 2.5 MILLIGRAM(S): 5 TABLET ORAL at 06:08

## 2023-12-27 RX ADMIN — OXYCODONE HYDROCHLORIDE 5 MILLIGRAM(S): 5 TABLET ORAL at 20:30

## 2023-12-27 RX ADMIN — Medication 200 MILLIGRAM(S): at 05:35

## 2023-12-27 RX ADMIN — BENZOCAINE AND MENTHOL 1 LOZENGE: 5; 1 LIQUID ORAL at 23:44

## 2023-12-27 RX ADMIN — Medication 2 DROP(S): at 05:35

## 2023-12-27 RX ADMIN — SODIUM CHLORIDE 100 MILLILITER(S): 9 INJECTION, SOLUTION INTRAVENOUS at 18:49

## 2023-12-27 NOTE — PROGRESS NOTE ADULT - ASSESSMENT
28 years old male with h/o Lupus ( diagnosed in 09/2023, on Plaquenil present to [place] ED on 12/12/23  with complain of chest pain and SOB admitted for fevers, PE, pleural effusions, course c/b high fever and tonic-clonic seizure, transferred to MICU for post-ictal and infectious monitoring.     Neuro  Seizure  Patient is lethargic i/s/o likely post-ictal / post- ativan state.  Protecting airway. VBG wnl, CT head unremarkable--no hemorrhage.   - vEEG, some multifocal mild dysfunction  -- will f/u neuro   - neuro following  - eventual MRI    Cardiovascular  Pulmonary Embolism  - on hep gtt  - no hypoxia  - no RV strain on TTE    Respiratory  B/l pleural effusions, complex, L > R  S/p L sided chest tube, poor lung re-expansion. S/p myst  - CT surgery consulted, will consider VATS procedure  - holding off on alteplase/myst for now  - willl hold heparin gtt if procedure scheduled    GI/Nutrition  No active issues  Regular diet    /Renal  Hyponatremia  ?ADH from pain. ? from urinary retention.   Questionable retention on POCUS  Omer inserted  - c/w omer, eventual TOV  - urine Na high consistent with high ADH  - serum osm  - renal consult    ID  Fevers as high as 104.5, unclear source ?pnuemonia   Was on vanc and zosyn  RVP neg  Another potential source is pleural effusions, ?empyemas  - f/u pleural fluid studies   - monitor for diarrhea, if has diarrhea can resend GI PCR and stool cx  - broadened to Vanc, cefepime   - repeat BCx  - sent UA and UCx  - LP done 12/25, so far unremarkable, will f/u studies and cultures    Endocrine  No active issues.     Hematologic/DVT ppx  SLE  - unclear if in active flair though ,   - rheum following, f/u recs  -- recommending derm consult for hand lesions  -- extensive rheum panel sent  - heme following, f/u recs  - DCed steroids 12/25  - hypercoag workup pending    DVT PPx  - on Hep gtt for PEs    Ethics  FULL CODE 28 years old male with h/o Lupus ( diagnosed in 09/2023, on Plaquenil present to [facility] ED on 12/12/23  with complain of chest pain and SOB admitted for fevers, PE, pleural effusions, course c/b high fever and tonic-clonic seizure, transferred to MICU for post-ictal and infectious monitoring.     Neuro  Seizure  Patient is lethargic i/s/o likely post-ictal / post- ativan state.  Protecting airway. VBG wnl, CT head unremarkable--no hemorrhage.   - vEEG, some multifocal mild dysfunction  -- will f/u neuro   - neuro following  - eventual MRI    Cardiovascular  Pulmonary Embolism  - on hep gtt  - no hypoxia  - no RV strain on TTE    Respiratory  B/l pleural effusions, complex, L > R  S/p L sided chest tube, poor lung re-expansion. S/p myst  - CT surgery consulted, will consider VATS procedure  - holding off on alteplase/myst for now  - willl hold heparin gtt if procedure scheduled    GI/Nutrition  No active issues  Regular diet    /Renal  Hyponatremia  ?ADH from pain. ? from urinary retention.   Questionable retention on POCUS  Omer inserted  - c/w omer, eventual TOV  - urine Na high consistent with high ADH  - serum osm  - renal consult    ID  Fevers as high as 104.5, unclear source ?pnuemonia   Was on vanc and zosyn  RVP neg  Another potential source is pleural effusions, ?empyemas  - f/u pleural fluid studies   - monitor for diarrhea, if has diarrhea can resend GI PCR and stool cx  - broadened to Vanc, cefepime   - repeat BCx  - sent UA and UCx  - LP done 12/25, so far unremarkable, will f/u studies and cultures    Endocrine  No active issues.     Hematologic/DVT ppx  SLE  - unclear if in active flair though ,   - rheum following, f/u recs  -- recommending derm consult for hand lesions  -- extensive rheum panel sent  - heme following, f/u recs  - DCed steroids 12/25  - hypercoag workup pending    DVT PPx  - on Hep gtt for PEs    Ethics  FULL CODE

## 2023-12-27 NOTE — BH CONSULTATION LIAISON ASSESSMENT NOTE - HPI (INCLUDE ILLNESS QUALITY, SEVERITY, DURATION, TIMING, CONTEXT, MODIFYING FACTORS, ASSOCIATED SIGNS AND SYMPTOMS)
28 years old male with h/o Lupus ( diagnosed in 09/2023, on Plaquenil present to [place] ED on 12/12/23  with complain of chest pain and SOB admitted for fevers, PE, pleural effusions, course c/b high fever and tonic-clonic seizure, transferred to MICU for post-ictal and infectious monitoring. Psych consulted for depression.     Chart reviewed. No mention of psychiatric concerns/history. Discussed with bedside RN who shared pt had indicated a hx of depression/anxiety, had been in the  and [facility] but [facility], and now is requesting to speak to someone.     On exam, he is awake, though visibly uncomfortable, with difficulty uttering speech, answering only briefly due to oral secretions and pain. Was able to demonstrate intact orientation, and confirmed that he feels sad/depressed, but denied SI, HI, AVH, paranoia, safety concerns, or hopelessness. Does want to get better. Unfortunately intake was rather limited for reasons stated above, and will be continued in serial visits.  28 years old male with h/o Lupus ( diagnosed in 09/2023, on Plaquenil present to [place] ED on 12/12/23  with complain of chest pain and SOB admitted for fevers, PE, pleural effusions, course c/b high fever and tonic-clonic seizure, transferred to MICU for post-ictal and infectious monitoring. Psych consulted for depression.     Chart reviewed. No mention of psychiatric concerns/history. Discussed with bedside RN who shared pt had indicated a hx of depression/anxiety, had been in the  and was [facility] but [facility] was [facility], and now is requesting to speak to someone.     On exam, he is awake, though visibly uncomfortable, with difficulty uttering speech, answering only briefly due to oral secretions and pain. Was able to demonstrate intact orientation, and confirmed that he feels sad/depressed, but denied SI, HI, AVH, paranoia, safety concerns, or hopelessness. Does want to get better. Unfortunately intake was rather limited for reasons stated above, and will be continued in serial visits.

## 2023-12-27 NOTE — CONSULT NOTE ADULT - PROBLEM SELECTOR RECOMMENDATION 9
Na decreased to 127 today, with urine studies c/w SIADH (?in setting of pain)  - continue salt tabs as ordered  - continue to fluid restrict  - daily BMPs  - can consider hypertonic saline if does not continue to correct Na decreased to 127 today (though improved since Monday, when it was 121), with urine studies c/w SIADH (?in setting of pain)  - continue salt tabs as ordered  - continue to fluid restrict  - daily BMPs

## 2023-12-27 NOTE — CHART NOTE - NSCHARTNOTEFT_GEN_A_CORE
: Sabra Trejo    INDICATION:    PROCEDURE:  [ ] LIMITED ECHO  [x] LIMITED CHEST  [ ] LIMITED RETROPERITONEAL  [ ] LIMITED ABDOMINAL  [ ] LIMITED DVT  [ ] NEEDLE GUIDANCE VASCULAR  [ ] NEEDLE GUIDANCE THORACENTESIS  [ ] NEEDLE GUIDANCE PARACENTESIS  [ ] NEEDLE GUIDANCE PERICARDIOCENTESIS  [ ] OTHER    FINDINGS:  improved size of L side effusion, continues to have complexity     INTERPRETATION:  continue drainage via chest tube     Images uploaded on [employer] Path : Sabra Trejo    INDICATION:    PROCEDURE:  [ ] LIMITED ECHO  [x] LIMITED CHEST  [ ] LIMITED RETROPERITONEAL  [ ] LIMITED ABDOMINAL  [ ] LIMITED DVT  [ ] NEEDLE GUIDANCE VASCULAR  [ ] NEEDLE GUIDANCE THORACENTESIS  [ ] NEEDLE GUIDANCE PARACENTESIS  [ ] NEEDLE GUIDANCE PERICARDIOCENTESIS  [ ] OTHER    FINDINGS:  improved size of L side effusion, continues to have complexity     INTERPRETATION:  continue drainage via chest tube     Images uploaded on [employer]

## 2023-12-27 NOTE — PROGRESS NOTE ADULT - SUBJECTIVE AND OBJECTIVE BOX
Subjective: Patient seen and examined. No new events except as noted.     SUBJECTIVE/ROS:  nad      MEDICATIONS:  MEDICATIONS  (STANDING):  alteplase  Injectable for Pleural Effusion 10 milliGRAM(s) IntraPleural. every 12 hours  cefepime   IVPB 2000 milliGRAM(s) IV Intermittent every 8 hours  chlorhexidine 2% Cloths 1 Application(s) Topical daily  ciprofloxacin  0.3% Ophthalmic Solution for Otic Use 2 Drop(s) Both Ears two times a day  dexMEDEtomidine Infusion 0.2 MICROgram(s)/kG/Hr (3.46 mL/Hr) IV Continuous <Continuous>  dornase laura Solution for Pleural Effusion 5 milliGRAM(s) IntraPleural. every 12 hours  guaiFENesin  milliGRAM(s) Oral once  heparin  Infusion. 1300 Unit(s)/Hr (13 mL/Hr) IV Continuous <Continuous>  hydroxychloroquine 200 milliGRAM(s) Oral two times a day  lactated ringers. 1000 milliLiter(s) (1000 mL/Hr) IV Continuous <Continuous>  lactated ringers. 1000 milliLiter(s) (100 mL/Hr) IV Continuous <Continuous>  melatonin 3 milliGRAM(s) Oral <User Schedule>  sodium chloride 1 Gram(s) Oral three times a day  sodium chloride 0.9% Solution for Pleural Effusion 30 milliLiter(s) IntraPleural. every 12 hours  vancomycin  IVPB 1500 milliGRAM(s) IV Intermittent every 12 hours  vancomycin  IVPB          PHYSICAL EXAM:  T(C): 37.5 (12-27-23 @ 04:00), Max: 37.9 (12-26-23 @ 16:00)  HR: 98 (12-27-23 @ 06:00) (82 - 103)  BP: 140/88 (12-27-23 @ 06:00) (110/68 - 140/88)  RR: 19 (12-27-23 @ 06:00) (13 - 25)  SpO2: 97% (12-27-23 @ 06:00) (97% - 100%)  Wt(kg): --  I&O's Summary    26 Dec 2023 07:01  -  27 Dec 2023 07:00  --------------------------------------------------------  IN: 3643.3 mL / OUT: 3190 mL / NET: 453.3 mL            JVP: Normal  Neck: supple  Lung: clear   CV: S1 S2 , Murmur:  Abd: soft  Ext: No edema  neuro: Awake / alert  Psych: flat affect  Skin: normal``    LABS/DATA:    CARDIAC MARKERS:  CARDIAC MARKERS ( 26 Dec 2023 12:10 )  x     / x     / 1094 U/L / x     / x      CARDIAC MARKERS ( 26 Dec 2023 00:50 )  x     / x     / 1203 U/L / x     / x      CARDIAC MARKERS ( 25 Dec 2023 10:10 )  x     / x     / 1188 U/L / x     / 1.3 ng/mL  CARDIAC MARKERS ( 25 Dec 2023 03:15 )  x     / x     / 1031 U/L / x     / x                                    9.2    8.42  )-----------( 242      ( 27 Dec 2023 00:31 )             26.6     12-27    127<L>  |  96<L>  |  16  ----------------------------<  109<H>  4.2   |  21<L>  |  0.83    Ca    8.4      27 Dec 2023 00:31  Phos  2.3     12-25  Mg     1.90     12-25    TPro  6.9  /  Alb  2.7<L>  /  TBili  0.5  /  DBili  x   /  AST  152<H>  /  ALT  113<H>  /  AlkPhos  72  12-27    proBNP:   Lipid Profile:   HgA1c:   TSH:     TELE:  EKG:

## 2023-12-27 NOTE — BH CONSULTATION LIAISON ASSESSMENT NOTE - NSICDXPASTMEDICALHX_GEN_ALL_CORE_FT
[Chaperone Present] : A chaperone was present in the examining room during all aspects of the physical examination [Appropriately responsive] : appropriately responsive [Regular Rate Rhythm] : regular rate rhythm [Clear to Auscultation B/L] : clear to auscultation bilaterally [Oriented x3] : oriented x3 PAST MEDICAL HISTORY:  LE (lupus erythematosus)     Pulmonary embolism      [Examination Of The Breasts] : a normal appearance [No Discharge] : no discharge [No Masses] : no breast masses were palpable [Labia Majora] : normal [Labia Minora] : normal [Normal] : normal [Uterine Adnexae] : normal [FreeTextEntry1] : Svetlana [FreeTextEntry8] : no tenderness

## 2023-12-27 NOTE — CONSULT NOTE ADULT - ATTENDING COMMENTS
hyponatremia improving slowly. switch heparin gtt from DW5 to NS if possible  continue with NS and salt tABS  proteinuria and hematuria. can consider renal biopsy to help with establishing diagnosis of lupus

## 2023-12-27 NOTE — CONSULT NOTE ADULT - SUBJECTIVE AND OBJECTIVE BOX
HPI:  28 years old male with h/o Lupus ( diagnosed in 09/2023, on Plaquenil present to [place] [facility] on 12/12/23  with complain of chest pain and SOB. Patient reported left sided pleuritic chest pain which started 3 days ago. Pain is progressively worsened, associated with SOB and cough. Patient was seen in OSH ED and was prescribed antibiotics. Patient reported significantly worsening of left sided pleuritic chest pain today. No fever or chills. Patient reported loss of appetite and had a few episode of diarrhea for last 2 days. CTA chest with acute right upper lobe and left lower lobe segmental/subsegmental pulmonary emboli. No CT evidence of right heart strain. New bilateral lower lobe consolidations with areas of central clearing. Pneumonia and pulmonary infarcts are in the differential. New bilateral pleural effusions, small on the left and trace on the right. Bilateral axillary and supraclavicular adenopathy of unclear etiology. Patient was started on heparin ggt transitioned to eliquis on 12/19,  patient with worsening SOB/ hypoxia requiring nasal cannula oxygen supplementation. 12/20  Repeat Xray shows increased large left pleural effusion and compressive atelectasis trace right effusion and linear atelectasis. Thoracic team consulted for possible pigtail insertion Bedside US: Large left effusion with septations. Right simple effusion , + pericardial effusion- lower extremity dopplers negative for DVT,   - GI PCR + e coli  - mRSA PCR + Staph aureus   . Patient transferred to [facility] for further management.     (22 Dec 2023 22:52)    Dermatology consulted for asymptomatic rashes on hands.    PAST MEDICAL & SURGICAL HISTORY:  LE (lupus erythematosus)      Pulmonary embolism      No significant past surgical history          REVIEW OF SYSTEMS      General: no fevers/chills, no lethary	    Skin/Breast: see HPI  	  Ophthalmologic: no eye pain or change in vision  	  ENMT: no dysphagia or change in hearing    Respiratory and Thorax: no SOB or cough  	  Cardiovascular: no palpitations or chest pain    Gastrointestinal: no abdomenal pain or blood in stool     Genitourinary: no dysuria or frequency    Musculoskeletal: no joint pains or weakness	    Neurological:no weakness, numbness , or tingling    MEDICATIONS  (STANDING):  cefepime   IVPB 2000 milliGRAM(s) IV Intermittent every 8 hours  chlorhexidine 2% Cloths 1 Application(s) Topical daily  ciprofloxacin  0.3% Ophthalmic Solution for Otic Use 2 Drop(s) Both Ears two times a day  dexMEDEtomidine Infusion 0.2 MICROgram(s)/kG/Hr IV Continuous <Continuous>  heparin  Infusion. 1300 Unit(s)/Hr IV Continuous <Continuous>  hydroxychloroquine 200 milliGRAM(s) Oral two times a day  lactated ringers. 1000 milliLiter(s) IV Continuous <Continuous>  lactated ringers. 1000 milliLiter(s) IV Continuous <Continuous>  melatonin 3 milliGRAM(s) Oral <User Schedule>  sodium chloride 1 Gram(s) Oral three times a day    ALLERGIES: No Known Allergies      Social History:  Student   Denies smoking or drug use  Occasional alcohol use    FAMILY HISTORY:  No pertinent family history in first degree relatives          VITAL SIGNS LAST 24 HOURS:  T(F): 99.6 (12-27 @ 08:00), Max: 100.2 (12-26 @ 16:00)  HR: 112 (12-27 @ 12:00) (82 - 112)  BP: 148/96 (12-27 @ 12:00) (110/68 - 148/96)  RR: 23 (12-27 @ 12:00) (13 - 29)    ___________________________________  Physical Exam:     The patient was alert and oriented X 3, and in no  apparent distress.  OP showed no ulcerations  There was no visible lymphadenopathy.  Conjunctiva were non injected  There was no clubbing or edema of extremities.  The scalp, hair, face, eyebrows, lips, OP, neck, chest, back,   extremities X 4, nails were examined.  There was no hyperhidrosis or bromhidrosis.    Of note on skin exam:   ____________________________________    LABS:                        9.1    9.79  )-----------( 264      ( 27 Dec 2023 07:36 )             27.1     12-27    127<L>  |  96<L>  |  16  ----------------------------<  109<H>  4.2   |  21<L>  |  0.83    Ca    8.4      27 Dec 2023 00:31    TPro  6.9  /  Alb  2.7<L>  /  TBili  0.5  /  DBili  x   /  AST  152<H>  /  ALT  113<H>  /  AlkPhos  72  12-27    PT/INR - ( 27 Dec 2023 00:31 )   PT: 14.7 sec;   INR: 1.32 ratio         PTT - ( 27 Dec 2023 07:36 )  PTT:81.6 sec  Urinalysis Basic - ( 27 Dec 2023 00:31 )    Color: x / Appearance: x / SG: x / pH: x  Gluc: 109 mg/dL / Ketone: x  / Bili: x / Urobili: x   Blood: x / Protein: x / Nitrite: x   Leuk Esterase: x / RBC: x / WBC x   Sq Epi: x / Non Sq Epi: x / Bacteria: x     HPI:  28 years old male with h/o Lupus ( diagnosed in 09/2023, on Plaquenil present to [place] ED on 12/12/23  with complain of chest pain and SOB. Patient reported left sided pleuritic chest pain which started 3 days ago. Pain is progressively worsened, associated with SOB and cough. Patient was seen in OSH ED and was prescribed antibiotics. Patient reported significantly worsening of left sided pleuritic chest pain today. No fever or chills. Patient reported loss of appetite and had a few episode of diarrhea for last 2 days. CTA chest with acute right upper lobe and left lower lobe segmental/subsegmental pulmonary emboli. No CT evidence of right heart strain. New bilateral lower lobe consolidations with areas of central clearing. Pneumonia and pulmonary infarcts are in the differential. New bilateral pleural effusions, small on the left and trace on the right. Bilateral axillary and supraclavicular adenopathy of unclear etiology. Patient was started on heparin ggt transitioned to eliquis on 12/19,  patient with worsening SOB/ hypoxia requiring nasal cannula oxygen supplementation. 12/20  Repeat Xray shows increased large left pleural effusion and compressive atelectasis trace right effusion and linear atelectasis. Thoracic team consulted for possible pigtail insertion Bedside US: Large left effusion with septations. Right simple effusion , + pericardial effusion- lower extremity dopplers negative for DVT,   - GI PCR + e coli  - mRSA PCR + Staph aureus   . Patient transferred to [facility] for further management.     (22 Dec 2023 22:52)    Dermatology consulted for asymptomatic rashes on hands.    PAST MEDICAL & SURGICAL HISTORY:  LE (lupus erythematosus)      Pulmonary embolism      No significant past surgical history          REVIEW OF SYSTEMS      General: no fevers/chills, no lethary	    Skin/Breast: see HPI  	  Ophthalmologic: no eye pain or change in vision  	  ENMT: no dysphagia or change in hearing    Respiratory and Thorax: no SOB or cough  	  Cardiovascular: no palpitations or chest pain    Gastrointestinal: no abdomenal pain or blood in stool     Genitourinary: no dysuria or frequency    Musculoskeletal: no joint pains or weakness	    Neurological:no weakness, numbness , or tingling    MEDICATIONS  (STANDING):  cefepime   IVPB 2000 milliGRAM(s) IV Intermittent every 8 hours  chlorhexidine 2% Cloths 1 Application(s) Topical daily  ciprofloxacin  0.3% Ophthalmic Solution for Otic Use 2 Drop(s) Both Ears two times a day  dexMEDEtomidine Infusion 0.2 MICROgram(s)/kG/Hr IV Continuous <Continuous>  heparin  Infusion. 1300 Unit(s)/Hr IV Continuous <Continuous>  hydroxychloroquine 200 milliGRAM(s) Oral two times a day  lactated ringers. 1000 milliLiter(s) IV Continuous <Continuous>  lactated ringers. 1000 milliLiter(s) IV Continuous <Continuous>  melatonin 3 milliGRAM(s) Oral <User Schedule>  sodium chloride 1 Gram(s) Oral three times a day    ALLERGIES: No Known Allergies      Social History:  Student   Denies smoking or drug use  Occasional alcohol use    FAMILY HISTORY:  No pertinent family history in first degree relatives          VITAL SIGNS LAST 24 HOURS:  T(F): 99.6 (12-27 @ 08:00), Max: 100.2 (12-26 @ 16:00)  HR: 112 (12-27 @ 12:00) (82 - 112)  BP: 148/96 (12-27 @ 12:00) (110/68 - 148/96)  RR: 23 (12-27 @ 12:00) (13 - 29)    ___________________________________  Physical Exam:     The patient was alert and oriented X 3, and in no  apparent distress.  OP showed no ulcerations  There was no visible lymphadenopathy.  Conjunctiva were non injected  There was no clubbing or edema of extremities.  The scalp, hair, face, eyebrows, lips, OP, neck, chest, back,   extremities X 4, nails were examined.  There was no hyperhidrosis or bromhidrosis.    Of note on skin exam:   ____________________________________    LABS:                        9.1    9.79  )-----------( 264      ( 27 Dec 2023 07:36 )             27.1     12-27    127<L>  |  96<L>  |  16  ----------------------------<  109<H>  4.2   |  21<L>  |  0.83    Ca    8.4      27 Dec 2023 00:31    TPro  6.9  /  Alb  2.7<L>  /  TBili  0.5  /  DBili  x   /  AST  152<H>  /  ALT  113<H>  /  AlkPhos  72  12-27    PT/INR - ( 27 Dec 2023 00:31 )   PT: 14.7 sec;   INR: 1.32 ratio         PTT - ( 27 Dec 2023 07:36 )  PTT:81.6 sec  Urinalysis Basic - ( 27 Dec 2023 00:31 )    Color: x / Appearance: x / SG: x / pH: x  Gluc: 109 mg/dL / Ketone: x  / Bili: x / Urobili: x   Blood: x / Protein: x / Nitrite: x   Leuk Esterase: x / RBC: x / WBC x   Sq Epi: x / Non Sq Epi: x / Bacteria: x     HPI:  28 years old male with h/o Lupus ( diagnosed in 09/2023, on Plaquenil present to [place] ED on 12/12/23  with complain of chest pain and SOB. Patient reported left sided pleuritic chest pain which started 3 days ago. Pain is progressively worsened, associated with SOB and cough. Patient was seen in OSH ED and was prescribed antibiotics. Patient reported significantly worsening of left sided pleuritic chest pain today. No fever or chills. Patient reported loss of appetite and had a few episode of diarrhea for last 2 days. CTA chest with acute right upper lobe and left lower lobe segmental/subsegmental pulmonary emboli. No CT evidence of right heart strain. New bilateral lower lobe consolidations with areas of central clearing. Pneumonia and pulmonary infarcts are in the differential. New bilateral pleural effusions, small on the left and trace on the right. Bilateral axillary and supraclavicular adenopathy of unclear etiology. Patient was started on heparin ggt transitioned to eliquis on 12/19,  patient with worsening SOB/ hypoxia requiring nasal cannula oxygen supplementation. 12/20  Repeat Xray shows increased large left pleural effusion and compressive atelectasis trace right effusion and linear atelectasis. Thoracic team consulted for possible pigtail insertion Bedside US: Large left effusion with septations. Right simple effusion , + pericardial effusion- lower extremity dopplers negative for DVT,   - GI PCR + e coli  - mRSA PCR + Staph aureus   . Patient transferred to [facility] for further management.     (22 Dec 2023 22:52)    Dermatology consulted for asymptomatic rashes on hands.    PAST MEDICAL & SURGICAL HISTORY:  LE (lupus erythematosus)      Pulmonary embolism      No significant past surgical history          REVIEW OF SYSTEMS      General: no fevers/chills, no lethary	    Skin/Breast: see HPI  	  Ophthalmologic: no eye pain or change in vision  	  ENMT: no dysphagia or change in hearing    Respiratory and Thorax: no SOB or cough  	  Cardiovascular: no palpitations or chest pain    Gastrointestinal: no abdomenal pain or blood in stool     Genitourinary: no dysuria or frequency    Musculoskeletal: no joint pains or weakness	    Neurological:no weakness, numbness , or tingling    MEDICATIONS  (STANDING):  cefepime   IVPB 2000 milliGRAM(s) IV Intermittent every 8 hours  chlorhexidine 2% Cloths 1 Application(s) Topical daily  ciprofloxacin  0.3% Ophthalmic Solution for Otic Use 2 Drop(s) Both Ears two times a day  dexMEDEtomidine Infusion 0.2 MICROgram(s)/kG/Hr IV Continuous <Continuous>  heparin  Infusion. 1300 Unit(s)/Hr IV Continuous <Continuous>  hydroxychloroquine 200 milliGRAM(s) Oral two times a day  lactated ringers. 1000 milliLiter(s) IV Continuous <Continuous>  lactated ringers. 1000 milliLiter(s) IV Continuous <Continuous>  melatonin 3 milliGRAM(s) Oral <User Schedule>  sodium chloride 1 Gram(s) Oral three times a day    ALLERGIES: No Known Allergies      Social History:  Student   Denies smoking or drug use  Occasional alcohol use    FAMILY HISTORY:  No pertinent family history in first degree relatives          VITAL SIGNS LAST 24 HOURS:  T(F): 99.6 (12-27 @ 08:00), Max: 100.2 (12-26 @ 16:00)  HR: 112 (12-27 @ 12:00) (82 - 112)  BP: 148/96 (12-27 @ 12:00) (110/68 - 148/96)  RR: 23 (12-27 @ 12:00) (13 - 29)    ___________________________________  Physical Exam:     The patient was alert and oriented X 3, and in distress.  OP showed no ulcerations  There was no visible lymphadenopathy.  Conjunctiva were non injected  There was no clubbing or edema of extremities.  The scalp, hair, face, eyebrows, lips, OP, neck, chest, back,   extremities X 4, nails were examined.  There was no hyperhidrosis or bromhidrosis.    Of note on skin exam:   patchy hair loss on scalp   small ulcer on right dorsal tongue   hyperpigmented plaques on bilateral conchal bowls  hyperpigmented macules coalescing into patches on the upper chest   hyperpigmented macules and hyperkeratotic plaques on palmar digits as well as subungual hyperkeratosis on several fingernails   hyperpigmented patches on plantar toes   remainder of trunk and extremities clear   ____________________________________    LABS:                        9.1    9.79  )-----------( 264      ( 27 Dec 2023 07:36 )             27.1     12-27    127<L>  |  96<L>  |  16  ----------------------------<  109<H>  4.2   |  21<L>  |  0.83    Ca    8.4      27 Dec 2023 00:31    TPro  6.9  /  Alb  2.7<L>  /  TBili  0.5  /  DBili  x   /  AST  152<H>  /  ALT  113<H>  /  AlkPhos  72  12-27    PT/INR - ( 27 Dec 2023 00:31 )   PT: 14.7 sec;   INR: 1.32 ratio         PTT - ( 27 Dec 2023 07:36 )  PTT:81.6 sec  Urinalysis Basic - ( 27 Dec 2023 00:31 )    Color: x / Appearance: x / SG: x / pH: x  Gluc: 109 mg/dL / Ketone: x  / Bili: x / Urobili: x   Blood: x / Protein: x / Nitrite: x   Leuk Esterase: x / RBC: x / WBC x   Sq Epi: x / Non Sq Epi: x / Bacteria: x     HPI:  28 years old male with h/o Lupus ( diagnosed in 09/2023, on Plaquenil present to [place] ED on 12/12/23  with complain of chest pain and SOB. Patient reported left sided pleuritic chest pain which started 3 days ago. Pain is progressively worsened, associated with SOB and cough. Patient was seen in OSH ED and was prescribed antibiotics. Patient reported significantly worsening of left sided pleuritic chest pain today. No fever or chills. Patient reported loss of appetite and had a few episode of diarrhea for last 2 days. CTA chest with acute right upper lobe and left lower lobe segmental/subsegmental pulmonary emboli. No CT evidence of right heart strain. New bilateral lower lobe consolidations with areas of central clearing. Pneumonia and pulmonary infarcts are in the differential. New bilateral pleural effusions, small on the left and trace on the right. Bilateral axillary and supraclavicular adenopathy of unclear etiology. Patient was started on heparin ggt transitioned to eliquis on 12/19,  patient with worsening SOB/ hypoxia requiring nasal cannula oxygen supplementation. 12/20  Repeat Xray shows increased large left pleural effusion and compressive atelectasis trace right effusion and linear atelectasis. Thoracic team consulted for possible pigtail insertion Bedside US: Large left effusion with septations. Right simple effusion , + pericardial effusion- lower extremity dopplers negative for DVT,   - GI PCR + e coli  - mRSA PCR + Staph aureus   . Patient transferred to [facility] for further management.     (22 Dec 2023 22:52)    Dermatology consulted for asymptomatic rashes on hands.    PAST MEDICAL & SURGICAL HISTORY:  LE (lupus erythematosus)      Pulmonary embolism      No significant past surgical history          REVIEW OF SYSTEMS      General: no fevers/chills, no lethary	    Skin/Breast: see HPI  	  Ophthalmologic: no eye pain or change in vision  	  ENMT: no dysphagia or change in hearing    Respiratory and Thorax: no SOB or cough  	  Cardiovascular: no palpitations or chest pain    Gastrointestinal: no abdomenal pain or blood in stool     Genitourinary: no dysuria or frequency    Musculoskeletal: no joint pains or weakness	    Neurological:no weakness, numbness , or tingling    MEDICATIONS  (STANDING):  cefepime   IVPB 2000 milliGRAM(s) IV Intermittent every 8 hours  chlorhexidine 2% Cloths 1 Application(s) Topical daily  ciprofloxacin  0.3% Ophthalmic Solution for Otic Use 2 Drop(s) Both Ears two times a day  dexMEDEtomidine Infusion 0.2 MICROgram(s)/kG/Hr IV Continuous <Continuous>  heparin  Infusion. 1300 Unit(s)/Hr IV Continuous <Continuous>  hydroxychloroquine 200 milliGRAM(s) Oral two times a day  lactated ringers. 1000 milliLiter(s) IV Continuous <Continuous>  lactated ringers. 1000 milliLiter(s) IV Continuous <Continuous>  melatonin 3 milliGRAM(s) Oral <User Schedule>  sodium chloride 1 Gram(s) Oral three times a day    ALLERGIES: No Known Allergies      Social History:  Student   Denies smoking or drug use  Occasional alcohol use    FAMILY HISTORY:  No pertinent family history in first degree relatives          VITAL SIGNS LAST 24 HOURS:  T(F): 99.6 (12-27 @ 08:00), Max: 100.2 (12-26 @ 16:00)  HR: 112 (12-27 @ 12:00) (82 - 112)  BP: 148/96 (12-27 @ 12:00) (110/68 - 148/96)  RR: 23 (12-27 @ 12:00) (13 - 29)    ___________________________________  Physical Exam:     The patient was alert and oriented X 3, and in distress.  OP showed no ulcerations  There was no visible lymphadenopathy.  Conjunctiva were non injected  There was no clubbing or edema of extremities.  The scalp, hair, face, eyebrows, lips, OP, neck, chest, back,   extremities X 4, nails were examined.  There was no hyperhidrosis or bromhidrosis.    Of note on skin exam:   patchy hair loss on scalp   small ulcer on right dorsal tongue   hyperpigmented plaques on bilateral conchal bowls  hyperpigmented macules coalescing into patches on the upper chest   hyperpigmented macules and hyperkeratotic plaques on palmar digits as well as subungual hyperkeratosis on several fingernails   hyperpigmented patches on plantar toes   remainder of trunk and extremities clear   ____________________________________    LABS:                        9.1    9.79  )-----------( 264      ( 27 Dec 2023 07:36 )             27.1     12-27    127<L>  |  96<L>  |  16  ----------------------------<  109<H>  4.2   |  21<L>  |  0.83    Ca    8.4      27 Dec 2023 00:31    TPro  6.9  /  Alb  2.7<L>  /  TBili  0.5  /  DBili  x   /  AST  152<H>  /  ALT  113<H>  /  AlkPhos  72  12-27    PT/INR - ( 27 Dec 2023 00:31 )   PT: 14.7 sec;   INR: 1.32 ratio         PTT - ( 27 Dec 2023 07:36 )  PTT:81.6 sec  Urinalysis Basic - ( 27 Dec 2023 00:31 )    Color: x / Appearance: x / SG: x / pH: x  Gluc: 109 mg/dL / Ketone: x  / Bili: x / Urobili: x   Blood: x / Protein: x / Nitrite: x   Leuk Esterase: x / RBC: x / WBC x   Sq Epi: x / Non Sq Epi: x / Bacteria: x

## 2023-12-27 NOTE — PROGRESS NOTE ADULT - ASSESSMENT
This is a 27 y/o M new diagnosis of SLE (9/23, started on hydroxychloroquine) initially admitted to [facility] on 12/12 w/ CP and SOB, found to have RUL and LLL segmental/subsegmental PE, started on heparin, c/f superimposed PNA, on Zosyn. Pt with b/l effusions, L > R with loculations, transferred to [facility] for thoracic evaluation. Pt with persistent fevers despite Zosyn from 12/14/23.  ID now consulted for persistent fevers     Work up:  UA no pyuria  RVP negative  Strep Pneumo Ag, legionella, mycoplasma IgM negative  MRSA PCR negative  EPEC+ 12/15 - diarrhea has since resolved    Imaging:   CTA chest 12/12: Acute right upper lobe and left lower lobe segmental/subsegmental pulmonary emboli. No CT evidence of right heart strain. New bilateral lower lobe consolidations with areas of central clearing. Pneumonia and pulmonary infarcts are in the differential. New bilateral pleural effusions, small on the left and trace on the right. Bilateral axillary and supraclavicular adenopathy of unclear etiology.  TTE 12/12: grossly wnl  CXR 12/20: Large left pleural effusion and compressive atelectasis. Trace right effusion and linear atelectasis in the right midlung  CT chest 12/23: Moderate pleural effusions, loculated on the left, new since 12/12/2023. New nonspecific the very small peripheral groundglass in the right upper lobe  CT neck 12/23: Small level 1 and level 2 and level 5 lymph nodes without significant enlargement. No drainable collections    Micro:  Blood cultures (12/12, 12/14, 12/21, 12/23, 12/25): NGTD  Stool Cx (12/15): no growth, GI PCR +EPEC  Sputum (12/20): normal [place]    Abx:  Zosyn (12/14-25)  Vanco (12/23, 25-)  Cefepime 12/25-  Azithro (12/14-12/16, 12/20)    #Seizure   #Fever  #Pleural effusions  #Possible superimposed pneumonia  #Pulmonary embolism  #Lymphadenopathy  #Known SLE    Overall 27 y/o M w/ SLE on Hydroxychloroquine admitted to [facility] for b/l PE w/ superimposed PNA, transferred to [facility] on 12/22 for thoracic eval of b/l L > R effusions, L loculated. Pt was Zosyn since 12/14, started on Vancomycin here. Despite board therapy with Zosyn, pt continues to have fevers to 102. No leukocytosis.   Fevers if 2/2 PNA should have responded w/ 10 day course of Zosyn, MRSA swab was negative, Vancomycin likely not necessary.   May be 2/2 SLE flare rather than infectious process, however would continue with Zosyn for now pending R thoracentesis and studies.     S/p L thoracentesis on 12/24, nucleated cell count ~400 when corrected for RBCs, does not appear to be infected. Pt was initiated on steroids on 12/23, however did not defervesce. Pt with continued fevers to 104, had seizure yesterday, now transferred to the MICU. Pt was changed to Vancomycin/Cefepime. LP in the MICU w/o significant findings of infection. Only 6 nucleated cells, PCR negative. R sided thora done in MICU, minimal nucleated cells after correction.   Procalcitonin was rechecked, elevated to 8. No leukocytosis, despite recent steroid course. Per rheumatology, if fevers were 2/2 SLE, would have improved w/ steroid course, holding steroids at this time.     Overall, no clear source of fevers at this time, pt continues to have fevers on Zosyn with elevated procal. Now on Vancomycin/Cefepime, with improvement in fever curve. Will need to follow up Bcx and other Cx already sent. Syphilis negative     Plan:   - C/w Vancomycin, Cefepime at this time. However no clear source of infection or pathogen isolated   - F/u BCx, pleural effusion Cx, and CSF Cx (unlikely to grow)   - Would obtain MRI per neurology recs   - Would repeat CT neck/Chest/A/P, would biopsy LN if possible  - appreciate rheumatology recs     Thank you for this consult. Inpatient ID team will follow.    Logan Ku M.D.  Attending Physician  Division of Infectious Diseases  Department of Medicine    Please contact through MS Teams message.  Office: [phone] (after 5 PM or weekend). This is a 27 y/o M new diagnosis of SLE (9/23, started on hydroxychloroquine) initially admitted to [facility] on 12/12 w/ CP and SOB, found to have RUL and LLL segmental/subsegmental PE, started on heparin, c/f superimposed PNA, on Zosyn. Pt with b/l effusions, L > R with loculations, transferred to [facility] for thoracic evaluation. Pt with persistent fevers despite Zosyn from 12/14/23.  ID now consulted for persistent fevers     Work up:  UA no pyuria  RVP negative  Strep Pneumo Ag, legionella, mycoplasma IgM negative  MRSA PCR negative  EPEC+ 12/15 - diarrhea has since resolved    Imaging:   CTA chest 12/12: Acute right upper lobe and left lower lobe segmental/subsegmental pulmonary emboli. No CT evidence of right heart strain. New bilateral lower lobe consolidations with areas of central clearing. Pneumonia and pulmonary infarcts are in the differential. New bilateral pleural effusions, small on the left and trace on the right. Bilateral axillary and supraclavicular adenopathy of unclear etiology.  TTE 12/12: grossly wnl  CXR 12/20: Large left pleural effusion and compressive atelectasis. Trace right effusion and linear atelectasis in the right midlung  CT chest 12/23: Moderate pleural effusions, loculated on the left, new since 12/12/2023. New nonspecific the very small peripheral groundglass in the right upper lobe  CT neck 12/23: Small level 1 and level 2 and level 5 lymph nodes without significant enlargement. No drainable collections    Micro:  Blood cultures (12/12, 12/14, 12/21, 12/23, 12/25): NGTD  Stool Cx (12/15): no growth, GI PCR +EPEC  Sputum (12/20): normal [place]    Abx:  Zosyn (12/14-25)  Vanco (12/23, 25-)  Cefepime 12/25-  Azithro (12/14-12/16, 12/20)    #Seizure   #Fever  #Pleural effusions  #Possible superimposed pneumonia  #Pulmonary embolism  #Lymphadenopathy  #Known SLE    Overall 27 y/o M w/ SLE on Hydroxychloroquine admitted to [facility] for b/l PE w/ superimposed PNA, transferred to [facility] on 12/22 for thoracic eval of b/l L > R effusions, L loculated. Pt was Zosyn since 12/14, started on Vancomycin here. Despite board therapy with Zosyn, pt continues to have fevers to 102. No leukocytosis.   Fevers if 2/2 PNA should have responded w/ 10 day course of Zosyn, MRSA swab was negative, Vancomycin likely not necessary.   May be 2/2 SLE flare rather than infectious process, however would continue with Zosyn for now pending R thoracentesis and studies.     S/p L thoracentesis on 12/24, nucleated cell count ~400 when corrected for RBCs, does not appear to be infected. Pt was initiated on steroids on 12/23, however did not defervesce. Pt with continued fevers to 104, had seizure yesterday, now transferred to the MICU. Pt was changed to Vancomycin/Cefepime. LP in the MICU w/o significant findings of infection. Only 6 nucleated cells, PCR negative. R sided thora done in MICU, minimal nucleated cells after correction.   Procalcitonin was rechecked, elevated to 8. No leukocytosis, despite recent steroid course. Per rheumatology, if fevers were 2/2 SLE, would have improved w/ steroid course, holding steroids at this time.     Overall, no clear source of fevers at this time, pt continues to have fevers on Zosyn with elevated procal. Now on Vancomycin/Cefepime, with improvement in fever curve. Will need to follow up Bcx and other Cx already sent. Syphilis negative     Plan:   - C/w Vancomycin, Cefepime at this time. However no clear source of infection or pathogen isolated   - F/u BCx, pleural effusion Cx, and CSF Cx (unlikely to grow)   - Would obtain MRI per neurology recs   - Would repeat CT neck/Chest/A/P, would biopsy LN if possible  - appreciate rheumatology recs     Thank you for this consult. Inpatient ID team will follow.    Logan Ku M.D.  Attending Physician  Division of Infectious Diseases  Department of Medicine    Please contact through MS Teams message.  Office: [phone] (after 5 PM or weekend).

## 2023-12-27 NOTE — BH CONSULTATION LIAISON ASSESSMENT NOTE - NSBHCHARTREVIEWVS_PSY_A_CORE FT
Vital Signs Last 24 Hrs  T(C): 38.5 (27 Dec 2023 14:00), Max: 38.5 (27 Dec 2023 14:00)  T(F): 101.3 (27 Dec 2023 14:00), Max: 101.3 (27 Dec 2023 14:00)  HR: 108 (27 Dec 2023 14:00) (82 - 112)  BP: 148/89 (27 Dec 2023 14:00) (110/68 - 148/96)  BP(mean): 104 (27 Dec 2023 14:00) (76 - 108)  RR: 31 (27 Dec 2023 14:00) (13 - 31)  SpO2: 96% (27 Dec 2023 14:00) (93% - 100%)    Parameters below as of 27 Dec 2023 14:00  Patient On (Oxygen Delivery Method): room air

## 2023-12-27 NOTE — PROGRESS NOTE ADULT - NUTRITIONAL ASSESSMENT
This patient has been assessed with a concern for Malnutrition and has been determined to have a diagnosis/diagnoses of Severe protein-calorie malnutrition.    This patient is being managed with:   Diet Regular-  1000mL Fluid Restriction ([id number])  Entered: Dec 22 2023 11:02PM   This patient has been assessed with a concern for Malnutrition and has been determined to have a diagnosis/diagnoses of Severe protein-calorie malnutrition.    This patient is being managed with:   Diet Regular-  1000mL Fluid Restriction ([id number])  Entered: Dec 22 2023 11:02PM

## 2023-12-27 NOTE — CONSULT NOTE ADULT - ASSESSMENT
28 years old male with h/o Lupus (diagnosed in 09/2023 by outside skin biopsy, on Plaquenil admitted for bilateral acute PE with pulmonary infarcts. Now with fevers of unknown etiology, originally thought to be related to SLE, but persistent despite 3 days of solumedrol 40 mg IV started 12/23. Concern for infection as well, on vancomycin and cefepime. Course c/b generalized tonic-clonic seizure on 12/25, suspect from high fever vs. hyponatremia, less likely neuropsychiatric SLE . Concern for SLE vs. overlap disease with myositis. On heparin drip for pulmonary infarcts, being worked up for APS.  - Labs here with ABDIAS 1:280 speckled, dsDNA 28, Sm >8, RNP >8, SSA >8   - CK elevated to [id number]  - pending APS labs, Janine-1 ab, ribosomal ab, f/u LP C3, C4, and neuronal ab studies, ANCA with MPO/PR3 reflex, U/A, urine protein/Cr, quantiferon, syphilis screen, PS, PS/PT, myomarker panel, immunoglobulins, IgG subsets, hemolysis labs (LDH, haptoglobin, retics, direct Maikel), vitamin D 25, vitamin D 1,25, ACE, iron with TIBC, ferritin, RF, and CCP  - pending infectious workup, including pleural fungal culture and cytology      28 years old male with h/o Lupus (diagnosed in 09/2023 by outside skin biopsy, on Plaquenil admitted for bilateral acute PE with pulmonary infarcts. Now with fevers of unknown etiology, originally thought to be related to SLE, but persistent despite 3 days of solumedrol 40 mg IV started 12/23. Concern for infection as well, on vancomycin and cefepime. Course c/b generalized tonic-clonic seizure on 12/25, suspect from high fever vs. hyponatremia, less likely neuropsychiatric SLE . Concern for SLE vs. overlap disease with myositis. On heparin drip for pulmonary infarcts, being worked up for APS.  - Labs here with ABDIAS 1:280 speckled, dsDNA 28, Sm >8, RNP >8, SSA >8   - CK elevated to [id number]  - pending APS labs, Janine-1 ab, ribosomal ab, f/u LP C3, C4, and neuronal ab studies, ANCA with MPO/PR3 reflex, U/A, urine protein/Cr, quantiferon, syphilis screen, PS, PS/PT, myomarker panel, immunoglobulins, IgG subsets, hemolysis labs (LDH, haptoglobin, retics, direct Maikel), vitamin D 25, vitamin D 1,25, ACE, iron with TIBC, ferritin, RF, and CCP  - pending infectious workup, including pleural fungal culture and cytology      28 years old male with h/o Lupus (diagnosed in 09/2023 by outside skin biopsy, on Plaquenil admitted for bilateral acute PE with pulmonary infarcts. Now with fevers of unknown etiology, originally thought to be related to SLE, but persistent despite 3 days of solumedrol 40 mg IV started 12/23. Concern for infection as well, on vancomycin and cefepime. Course c/b generalized tonic-clonic seizure on 12/25, suspect from high fever vs. hyponatremia, less likely neuropsychiatric SLE . Concern for SLE vs. overlap disease with myositis. On heparin drip for pulmonary infarcts, being worked up for APS.  - Labs here with ABDIAS 1:280 speckled, dsDNA 28, Sm >8, RNP >8, SSA >8   - CK elevated to [id number]  - pending APS labs, Janine-1 ab, ribosomal ab, f/u LP C3, C4, and neuronal ab studies, ANCA with MPO/PR3 reflex, U/A, urine protein/Cr, quantiferon, syphilis screen, PS, PS/PT, myomarker panel, immunoglobulins, IgG subsets, hemolysis labs (LDH, haptoglobin, retics, direct Maikel), vitamin D 25, vitamin D 1,25, ACE, iron with TIBC, ferritin, RF, and CCP  - pending infectious workup, including pleural fungal culture and cytology     antiophophlipids  28 years old male with h/o Lupus (diagnosed in 09/2023 by outside skin biopsy, on Plaquenil admitted for bilateral acute PE with pulmonary infarcts. Now with fevers of unknown etiology, originally thought to be related to SLE, but persistent despite 3 days of solumedrol 40 mg IV started 12/23. Concern for infection as well, on vancomycin and cefepime. Course c/b generalized tonic-clonic seizure on 12/25, suspect from high fever vs. hyponatremia, less likely neuropsychiatric SLE . Concern for SLE vs. overlap disease with myositis. On heparin drip for pulmonary infarcts, being worked up for APS.  - Labs here with ABDIAS 1:280 speckled, dsDNA 28, Sm >8, RNP >8, SSA >8   - CK elevated to [id number]  - pending APS labs, Janine-1 ab, ribosomal ab, f/u LP C3, C4, and neuronal ab studies, ANCA with MPO/PR3 reflex, U/A, urine protein/Cr, quantiferon, syphilis screen, PS, PS/PT, myomarker panel, immunoglobulins, IgG subsets, hemolysis labs (LDH, haptoglobin, retics, direct Maikel), vitamin D 25, vitamin D 1,25, ACE, iron with TIBC, ferritin, RF, and CCP  - pending infectious workup, including pleural fungal culture and cytology     antiophophlipids  28 years old male with h/o Lupus (diagnosed in 09/2023 by outside skin biopsy, on Plaquenil admitted for bilateral acute PE with pulmonary infarcts. Now with fevers of unknown etiology, originally thought to be related to SLE, but persistent despite 3 days of solumedrol 40 mg IV started 12/23. Concern for infection as well, on vancomycin and cefepime. Course c/b generalized tonic-clonic seizure on 12/25, suspect from high fever vs. hyponatremia, less likely neuropsychiatric SLE . Concern for SLE vs. overlap disease with myositis. On heparin drip for pulmonary infarcts, being worked up for APS.  - Labs here with ABDIAS 1:280 speckled, dsDNA 28, Sm >8, RNP >8, SSA >8, decreased c3  - CK elevated to 48034g  - pending APS labs, Janine-1 ab, ribosomal ab, f/u LP C3, C4, and neuronal ab studies, ANCA with MPO/PR3 reflex, U/A, urine protein/Cr, quantiferon, syphilis screen, PS, PS/PT, myomarker panel, immunoglobulins, IgG subsets, hemolysis labs (LDH, haptoglobin, retics, direct Maikel), vitamin D 25, vitamin D 1,25, ACE, iron with TIBC, ferritin, RF, and CCP  - pending infectious workup, including pleural fungal culture and cytology     antiophophlipids     .c 28 years old male with h/o Lupus (diagnosed in 09/2023 by outside skin biopsy, on Plaquenil admitted for bilateral acute PE with pulmonary infarcts. Now with fevers of unknown etiology, originally thought to be related to SLE, but persistent despite 3 days of solumedrol 40 mg IV started 12/23. Concern for infection as well, on vancomycin and cefepime. Course c/b generalized tonic-clonic seizure on 12/25, suspect from high fever vs. hyponatremia, less likely neuropsychiatric SLE . Concern for SLE vs. overlap disease with myositis. On heparin drip for pulmonary infarcts, being worked up for APS.  - Labs here with ABDIAS 1:280 speckled, dsDNA 28, Sm >8, RNP >8, SSA >8, decreased c3  - CK elevated to 02543y  - pending APS labs, Janine-1 ab, ribosomal ab, f/u LP C3, C4, and neuronal ab studies, ANCA with MPO/PR3 reflex, U/A, urine protein/Cr, quantiferon, syphilis screen, PS, PS/PT, myomarker panel, immunoglobulins, IgG subsets, hemolysis labs (LDH, haptoglobin, retics, direct Maikel), vitamin D 25, vitamin D 1,25, ACE, iron with TIBC, ferritin, RF, and CCP  - pending infectious workup, including pleural fungal culture and cytology     antiophophlipids     .c

## 2023-12-27 NOTE — BH CONSULTATION LIAISON ASSESSMENT NOTE - NSSUICPROTFACT_PSY_ALL_CORE
Responsibility to children, family, or others/Fear of death or the actual act of killing self/Cultural, spiritual and/or moral attitudes against suicide/Religion beliefs Responsibility to children, family, or others/Fear of death or the actual act of killing self/Cultural, spiritual and/or moral attitudes against suicide/Taoist beliefs

## 2023-12-27 NOTE — PROGRESS NOTE ADULT - SUBJECTIVE AND OBJECTIVE BOX
MICU PROGRESS NOTE    INTERVAL HPI/OVERNIGHT EVENTS:  - s/p myst overnight  - Stopped alteplase at request of CT surgery this AM  - on hep gtt, will pause if procedure scheduled  - psych consulted, will see pt today     SUBJECTIVE:     OBJECTIVE:    VITAL SIGNS:  ICU Vital Signs Last 24 Hrs  T(C): 37.5 (27 Dec 2023 04:00), Max: 37.9 (26 Dec 2023 16:00)  T(F): 99.5 (27 Dec 2023 04:00), Max: 100.2 (26 Dec 2023 16:00)  HR: 98 (27 Dec 2023 06:00) (82 - 103)  BP: 140/88 (27 Dec 2023 06:00) (110/68 - 140/88)  BP(mean): 100 (27 Dec 2023 06:00) (76 - 101)  ABP: --  ABP(mean): --  RR: 19 (27 Dec 2023 06:00) (13 - 25)  SpO2: 97% (27 Dec 2023 06:00) (97% - 100%)    O2 Parameters below as of 27 Dec 2023 06:00  Patient On (Oxygen Delivery Method): room air    VENTS:      I&O:    12-26 @ 07:01  -  12-27 @ 07:00  --------------------------------------------------------  IN: 3643.3 mL / OUT: 3190 mL / NET: 453.3 mL        PHYSICAL EXAM:  Appearance: No acute distress. lethargic s/p seizure / ativan.   HEENT:  PERRLA   Lymphatic: No lymphadenopathy   Cardiovascular: Normal S1 S2, no JVD  Respiratory: normal effort , clear  Gastrointestinal:  Soft, Non-tender  Skin: No rashes,  warm to touch  Psychiatry:  Lethargic.   Musculuskeletal: No edema or joint swelling appreciated.                                              MEDICATIONS:  MEDICATIONS  (STANDING):  cefepime   IVPB 2000 milliGRAM(s) IV Intermittent every 8 hours  chlorhexidine 2% Cloths 1 Application(s) Topical daily  ciprofloxacin  0.3% Ophthalmic Solution for Otic Use 2 Drop(s) Both Ears two times a day  dexMEDEtomidine Infusion 0.2 MICROgram(s)/kG/Hr (3.46 mL/Hr) IV Continuous <Continuous>  guaiFENesin  milliGRAM(s) Oral once  heparin  Infusion. 1300 Unit(s)/Hr (13 mL/Hr) IV Continuous <Continuous>  hydroxychloroquine 200 milliGRAM(s) Oral two times a day  lactated ringers. 1000 milliLiter(s) (1000 mL/Hr) IV Continuous <Continuous>  lactated ringers. 1000 milliLiter(s) (100 mL/Hr) IV Continuous <Continuous>  melatonin 3 milliGRAM(s) Oral <User Schedule>  sodium chloride 1 Gram(s) Oral three times a day  vancomycin  IVPB 1500 milliGRAM(s) IV Intermittent every 12 hours  vancomycin  IVPB        MEDICATIONS  (PRN):  acetaminophen     Tablet .. 650 milliGRAM(s) Oral every 6 hours PRN Temp greater or equal to 38C (100.4F), Mild Pain (1 - 3)  albuterol/ipratropium for Nebulization 3 milliLiter(s) Nebulizer every 6 hours PRN Shortness of Breath and/or Wheezing  benzocaine/menthol Lozenge 1 Lozenge Oral four times a day PRN Sore Throat  guaiFENesin Oral Liquid (Sugar-Free) 200 milliGRAM(s) Oral every 6 hours PRN Cough  heparin   Injectable 2500 Unit(s) IV Push every 6 hours PRN For aPTT between 40 - 57  heparin   Injectable 5500 Unit(s) IV Push every 6 hours PRN For aPTT less than 40  lidocaine 2% Viscous 5 milliLiter(s) Swish and Spit three times a day PRN Mouth Care      ALLERGIES:  Allergies    No Known Allergies    Intolerances        LABS:                        9.2    8.42  )-----------( 242      ( 27 Dec 2023 00:31 )             26.6     12-27    127<L>  |  96<L>  |  16  ----------------------------<  109<H>  4.2   |  21<L>  |  0.83    Ca    8.4      27 Dec 2023 00:31  Phos  2.3     12-25  Mg     1.90     12-25    TPro  6.9  /  Alb  2.7<L>  /  TBili  0.5  /  DBili  x   /  AST  152<H>  /  ALT  113<H>  /  AlkPhos  72  12-27    PT/INR - ( 27 Dec 2023 00:31 )   PT: 14.7 sec;   INR: 1.32 ratio         PTT - ( 27 Dec 2023 00:31 )  PTT:53.7 sec  Urinalysis Basic - ( 27 Dec 2023 00:31 )    Color: x / Appearance: x / SG: x / pH: x  Gluc: 109 mg/dL / Ketone: x  / Bili: x / Urobili: x   Blood: x / Protein: x / Nitrite: x   Leuk Esterase: x / RBC: x / WBC x   Sq Epi: x / Non Sq Epi: x / Bacteria: x        Culture - Fungal, Body Fluid (collected 12-26-23 @ 18:30)  Source: Pleural Fl Pleural Fluid  Preliminary Report (12-27-23 @ 07:02):    Testing in progress    Culture - Body Fluid with Gram Stain (collected 12-26-23 @ 18:30)  Source: Pleural Fl Pleural Fluid  Gram Stain (12-27-23 @ 02:43):    polymorphonuclear leukocytes seen    No organisms seen    by cytocentrifuge      CAPILLARY BLOOD GLUCOSE      POCT Blood Glucose.: 162 mg/dL (25 Dec 2023 10:43)      RADIOLOGY & ADDITIONAL TESTS: Reviewed.

## 2023-12-27 NOTE — CONSULT NOTE ADULT - ATTENDING COMMENTS
- Start PPI  - SLP eval  - remainder of workup per MICU    Agree with above assessment and plan  Thank you for your referral  Irene Keller MD

## 2023-12-27 NOTE — CHART NOTE - NSCHARTNOTEFT_GEN_A_CORE
Dermatology Chart Note    History:  HPI:  28 years old male with h/o Lupus ( diagnosed in 09/2023, on Plaquenil present to [place] ED on 12/12/23  with complain of chest pain and SOB. Patient reported left sided pleuritic chest pain which started 3 days ago. Pain is progressively worsened, associated with SOB and cough. Patient was seen in [facility] ED and was prescribed antibiotics. Patient reported significantly worsening of left sided pleuritic chest pain today. No fever or chills. Patient reported loss of appetite and had a few episode of diarrhea for last 2 days. CTA chest with acute right upper lobe and left lower lobe segmental/subsegmental pulmonary emboli. No CT evidence of right heart strain. New bilateral lower lobe consolidations with areas of central clearing. Pneumonia and pulmonary infarcts are in the differential. New bilateral pleural effusions, small on the left and trace on the right. Bilateral axillary and supraclavicular adenopathy of unclear etiology. Patient was started on heparin ggt transitioned to eliquis on 12/19,  patient with worsening SOB/ hypoxia requiring nasal cannula oxygen supplementation. 12/20  Repeat Xray shows increased large left pleural effusion and compressive atelectasis trace right effusion and linear atelectasis. Thoracic team consulted for possible pigtail insertion Bedside US: Large left effusion with septations. Right simple effusion , + pericardial effusion- lower extremity dopplers negative for DVT,   - GI PCR + e coli  - mRSA PCR + Staph aureus   . Patient transferred to [facility] for further management.     (22 Dec 2023 22:52)    Dermatology consulted for asymptomatic rashes on hands. No oral mucosal pain. No feeling of tightness in the forearms, face, or chest.     Physical Exam: patchy hair loss on scalp   small ulcer on right dorsal tongue   hyperpigmented plaques on bilateral conchal bowls  hyperpigmented macules coalescing into patches on the upper chest   hyperpigmented macules and hyperkeratotic plaques on palmar digits as well as subungual hyperkeratosis on several fingernails   hyperpigmented patches on plantar toes   remainder of trunk and extremities clear  +prayer sign      28 years old male with h/o Lupus (diagnosed in 09/2023 by outside skin biopsy, on Plaquenil admitted for bilateral acute PE with pulmonary infarcts. Now with fevers of unknown etiology, originally thought to be related to SLE, but persistent despite 3 days of solumedrol 40 mg IV started 12/23. Concern for infection as well, on vancomycin and cefepime. Course c/b generalized tonic-clonic seizure on 12/25, suspect from high fever vs. hyponatremia, less likely neuropsychiatric SLE . Concern for SLE vs. overlap disease with myositis. On heparin drip for pulmonary infarcts, being worked up for APS.  - Labs here with ABDIAS 1:280 speckled, dsDNA 28, Sm >8, RNP >8, SSA >8, decreased c3, RPR wnl, Janine-1 ab WNL  - follow-up myomarker panel  - order anti-scl-70 antibodies  - limited utility in skin biopsy at this time, as it is unlikely to discern between SLE and dermatomyositis.     Dermatology will continue to follow     The patient's chart was reviewed in addition to photos of the rash taken by the primary team with the permission of the patient.  Patient was seen at bedside and discussed remotely with the dermatology attending Dr. Murillo  Recommendations were communicated with the primary team.  Please page [phone] for further related questions.    Zohreh Higuera MD  Resident Physician, PGY3  [facility] Dermatology  Pager: [phone]  Office: [phone] Dermatology Chart Note    History:  HPI:  28 years old male with h/o Lupus ( diagnosed in 09/2023, on Plaquenil present to [place] ED on 12/12/23  with complain of chest pain and SOB. Patient reported left sided pleuritic chest pain which started 3 days ago. Pain is progressively worsened, associated with SOB and cough. Patient was seen in [facility] ED and was prescribed antibiotics. Patient reported significantly worsening of left sided pleuritic chest pain today. No fever or chills. Patient reported loss of appetite and had a few episode of diarrhea for last 2 days. CTA chest with acute right upper lobe and left lower lobe segmental/subsegmental pulmonary emboli. No CT evidence of right heart strain. New bilateral lower lobe consolidations with areas of central clearing. Pneumonia and pulmonary infarcts are in the differential. New bilateral pleural effusions, small on the left and trace on the right. Bilateral axillary and supraclavicular adenopathy of unclear etiology. Patient was started on heparin ggt transitioned to eliquis on 12/19,  patient with worsening SOB/ hypoxia requiring nasal cannula oxygen supplementation. 12/20  Repeat Xray shows increased large left pleural effusion and compressive atelectasis trace right effusion and linear atelectasis. Thoracic team consulted for possible pigtail insertion Bedside US: Large left effusion with septations. Right simple effusion , + pericardial effusion- lower extremity dopplers negative for DVT,   - GI PCR + e coli  - mRSA PCR + Staph aureus   . Patient transferred to [facility] for further management.     (22 Dec 2023 22:52)    Dermatology consulted for asymptomatic rashes on hands. No oral mucosal pain. No feeling of tightness in the forearms, face, or chest.     Physical Exam: patchy hair loss on scalp   small ulcer on right dorsal tongue   hyperpigmented plaques on bilateral conchal bowls  hyperpigmented macules coalescing into patches on the upper chest   hyperpigmented macules and hyperkeratotic plaques on palmar digits as well as subungual hyperkeratosis on several fingernails   hyperpigmented patches on plantar toes   remainder of trunk and extremities clear  +prayer sign      28 years old male with h/o Lupus (diagnosed in 09/2023 by outside skin biopsy, on Plaquenil admitted for bilateral acute PE with pulmonary infarcts. Now with fevers of unknown etiology, originally thought to be related to SLE, but persistent despite 3 days of solumedrol 40 mg IV started 12/23. Concern for infection as well, on vancomycin and cefepime. Course c/b generalized tonic-clonic seizure on 12/25, suspect from high fever vs. hyponatremia, less likely neuropsychiatric SLE . Concern for SLE vs. overlap disease with myositis. On heparin drip for pulmonary infarcts, being worked up for APS.  - Labs here with ABDIAS 1:280 speckled, dsDNA 28, Sm >8, RNP >8, SSA >8, decreased c3, RPR wnl, Janine-1 ab WNL  - follow-up myomarker panel  - order anti-scl-70 antibodies  - limited utility in skin biopsy at this time, as it is unlikely to discern between SLE and dermatomyositis.     Dermatology will continue to follow     The patient's chart was reviewed in addition to photos of the rash taken by the primary team with the permission of the patient.  Patient was seen at bedside and discussed remotely with the dermatology attending Dr. Murillo  Recommendations were communicated with the primary team.  Please page [phone] for further related questions.    Zohreh Higuera MD  Resident Physician, PGY3  [facility] Dermatology  Pager: [phone]  Office: [phone] Dermatology Chart Note    History:  HPI:  28 years old male with h/o Lupus ( diagnosed in 09/2023, on Plaquenil present to [place] ED on 12/12/23  with complain of chest pain and SOB. Patient reported left sided pleuritic chest pain which started 3 days ago. Pain is progressively worsened, associated with SOB and cough. Patient was seen in [facility] ED and was prescribed antibiotics. Patient reported significantly worsening of left sided pleuritic chest pain today. No fever or chills. Patient reported loss of appetite and had a few episode of diarrhea for last 2 days. CTA chest with acute right upper lobe and left lower lobe segmental/subsegmental pulmonary emboli. No CT evidence of right heart strain. New bilateral lower lobe consolidations with areas of central clearing. Pneumonia and pulmonary infarcts are in the differential. New bilateral pleural effusions, small on the left and trace on the right. Bilateral axillary and supraclavicular adenopathy of unclear etiology. Patient was started on heparin ggt transitioned to eliquis on 12/19,  patient with worsening SOB/ hypoxia requiring nasal cannula oxygen supplementation. 12/20  Repeat Xray shows increased large left pleural effusion and compressive atelectasis trace right effusion and linear atelectasis. Thoracic team consulted for possible pigtail insertion Bedside US: Large left effusion with septations. Right simple effusion , + pericardial effusion- lower extremity dopplers negative for DVT,   - GI PCR + e coli  - mRSA PCR + Staph aureus   . Patient transferred to [facility] for further management.     (22 Dec 2023 22:52)    Dermatology consulted for asymptomatic rashes on hands. No oral mucosal pain. No feeling of tightness in the forearms, face, or chest.     Physical Exam: patchy hair loss on scalp   small ulcer on right dorsal tongue   hyperpigmented plaques on bilateral conchal bowls  hyperpigmented macules coalescing into patches on the upper chest   hyperpigmented macules and hyperkeratotic plaques on palmar digits as well as subungual hyperkeratosis on several fingernails   hyperpigmented patches on plantar toes   remainder of trunk and extremities clear  +prayer sign      28 years old male with h/o Lupus (diagnosed in 09/2023 by outside skin biopsy, on Plaquenil admitted for bilateral acute PE with pulmonary infarcts. Now with fevers of unknown etiology, originally thought to be related to SLE, but persistent despite 3 days of solumedrol 40 mg IV started 12/23. Concern for infection as well, on cefepime. Course c/b generalized tonic-clonic seizure on 12/25, suspect from high fever vs. hyponatremia, less likely neuropsychiatric SLE . Concern for SLE vs. overlap disease with myositis. On heparin drip for pulmonary infarcts, being worked up for APS.  - Labs here with ABDIAS 1:280 speckled, dsDNA 28, Sm >8, RNP >8, SSA >8, decreased c3, RPR wnl, Janine-1 ab WNL  - follow-up myomarker panel  - order anti-scl-70 antibodies  - limited utility in skin biopsy at this time, as it is unlikely to discern between SLE and dermatomyositis.     Dermatology will continue to follow     The patient's chart was reviewed in addition to photos of the rash taken by the primary team with the permission of the patient.  Patient was seen at bedside and discussed remotely with the dermatology attending Dr. Murillo  Recommendations were communicated with the primary team.  Please page [phone] for further related questions.    Zohreh Higuera MD  Resident Physician, PGY3  [facility] Dermatology  Pager: [phone]  Office: [phone] Dermatology Chart Note    History:  HPI:  28 years old male with h/o Lupus ( diagnosed in 09/2023, on Plaquenil present to [place] ED on 12/12/23  with complain of chest pain and SOB. Patient reported left sided pleuritic chest pain which started 3 days ago. Pain is progressively worsened, associated with SOB and cough. Patient was seen in [facility] ED and was prescribed antibiotics. Patient reported significantly worsening of left sided pleuritic chest pain today. No fever or chills. Patient reported loss of appetite and had a few episode of diarrhea for last 2 days. CTA chest with acute right upper lobe and left lower lobe segmental/subsegmental pulmonary emboli. No CT evidence of right heart strain. New bilateral lower lobe consolidations with areas of central clearing. Pneumonia and pulmonary infarcts are in the differential. New bilateral pleural effusions, small on the left and trace on the right. Bilateral axillary and supraclavicular adenopathy of unclear etiology. Patient was started on heparin ggt transitioned to eliquis on 12/19,  patient with worsening SOB/ hypoxia requiring nasal cannula oxygen supplementation. 12/20  Repeat Xray shows increased large left pleural effusion and compressive atelectasis trace right effusion and linear atelectasis. Thoracic team consulted for possible pigtail insertion Bedside US: Large left effusion with septations. Right simple effusion , + pericardial effusion- lower extremity dopplers negative for DVT,   - GI PCR + e coli  - mRSA PCR + Staph aureus   . Patient transferred to [facility] for further management.     (22 Dec 2023 22:52)    Dermatology consulted for asymptomatic rashes on hands. No oral mucosal pain. No feeling of tightness in the forearms, face, or chest.     Physical Exam: patchy hair loss on scalp   small ulcer on right dorsal tongue   hyperpigmented plaques on bilateral conchal bowls  hyperpigmented macules coalescing into patches on the upper chest   hyperpigmented macules and hyperkeratotic plaques on palmar digits as well as subungual hyperkeratosis on several fingernails   hyperpigmented patches on plantar toes   remainder of trunk and extremities clear  +prayer sign      28 years old male with h/o Lupus (diagnosed in 09/2023 by outside skin biopsy, on Plaquenil admitted for bilateral acute PE with pulmonary infarcts. Now with fevers of unknown etiology, originally thought to be related to SLE, but persistent despite 3 days of solumedrol 40 mg IV started 12/23. Concern for infection as well, on cefepime. Course c/b generalized tonic-clonic seizure on 12/25, suspect from high fever vs. hyponatremia, less likely neuropsychiatric SLE . Concern for SLE vs. overlap disease with myositis. On heparin drip for pulmonary infarcts, being worked up for APS.  - Labs here with ABDIAS 1:280 speckled, dsDNA 28, Sm >8, RNP >8, SSA >8, decreased c3, RPR wnl, Janine-1 ab WNL  - follow-up myomarker panel  - order anti-scl-70 antibodies  - limited utility in skin biopsy at this time, as it is unlikely to discern between SLE and dermatomyositis.     Dermatology will continue to follow     The patient's chart was reviewed in addition to photos of the rash taken by the primary team with the permission of the patient.  Patient was seen at bedside and discussed remotely with the dermatology attending Dr. Murillo  Recommendations were communicated with the primary team.  Please page [phone] for further related questions.    Zohreh Higuera MD  Resident Physician, PGY3  [facility] Dermatology  Pager: [phone]  Office: [phone] Dermatology Chart Note    History:  HPI:  28 years old male with h/o Lupus ( diagnosed in 09/2023, on Plaquenil present to [place] ED on 12/12/23  with complain of chest pain and SOB. Patient reported left sided pleuritic chest pain which started 3 days ago. Pain is progressively worsened, associated with SOB and cough. Patient was seen in [facility] ED and was prescribed antibiotics. Patient reported significantly worsening of left sided pleuritic chest pain today. No fever or chills. Patient reported loss of appetite and had a few episode of diarrhea for last 2 days. CTA chest with acute right upper lobe and left lower lobe segmental/subsegmental pulmonary emboli. No CT evidence of right heart strain. New bilateral lower lobe consolidations with areas of central clearing. Pneumonia and pulmonary infarcts are in the differential. New bilateral pleural effusions, small on the left and trace on the right. Bilateral axillary and supraclavicular adenopathy of unclear etiology. Patient was started on heparin ggt transitioned to eliquis on 12/19,  patient with worsening SOB/ hypoxia requiring nasal cannula oxygen supplementation. 12/20  Repeat Xray shows increased large left pleural effusion and compressive atelectasis trace right effusion and linear atelectasis. Thoracic team consulted for possible pigtail insertion Bedside US: Large left effusion with septations. Right simple effusion , + pericardial effusion- lower extremity dopplers negative for DVT,   - GI PCR + e coli  - mRSA PCR + Staph aureus   . Patient transferred to [facility] for further management.     (22 Dec 2023 22:52)    Dermatology consulted for asymptomatic rashes on hands. No oral mucosal pain. No feeling of tightness in the forearms, face, or chest.     Physical Exam: patchy hair loss on scalp   small ulcer on right dorsal tongue   hyperpigmented plaques on bilateral conchal bowls  hyperpigmented macules coalescing into patches on the upper chest   hyperpigmented macules and hyperkeratotic plaques on palmar digits as well as subungual hyperkeratosis on several fingernails   hyperpigmented patches on plantar toes   remainder of trunk and extremities clear  +prayer sign      28 years old male with h/o Lupus (diagnosed in 09/2023 by outside skin biopsy, on Plaquenil admitted for bilateral acute PE with pulmonary infarcts. Now with fevers of unknown etiology, originally thought to be related to SLE, but persistent despite 3 days of solumedrol 40 mg IV started 12/23. Concern for infection as well, on cefepime. Course c/b generalized tonic-clonic seizure on 12/25, suspect from high fever vs. hyponatremia, less likely neuropsychiatric SLE . Concern for SLE vs. overlap disease with myositis. On heparin drip for pulmonary infarcts, being worked up for APS.  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 other   - Labs here with ABDIAS 1:280 speckled, dsDNA 28, Sm >8, RNP >8, SSA >8, decreased c3, RPR wnl, Janine-1 ab WNL  - follow-up myomarker panel  - order anti-scl-70 antibodies  - limited utility in skin biopsy at this time, as it is unlikely to discern between SLE and dermatomyositis.     Dermatology will continue to follow     The patient's chart was reviewed in addition to photos of the rash taken by the primary team with the permission of the patient.  Patient was seen at bedside and discussed remotely with the dermatology attending Dr. Murillo  Recommendations were communicated with the primary team.  Please page [phone] for further related questions.    Zohreh Higuera MD  Resident Physician, PGY3  [facility] Dermatology  Pager: [phone]  Office: [phone] Dermatology Chart Note    History:  HPI:  28 years old male with h/o Lupus ( diagnosed in 09/2023, on Plaquenil present to [place] ED on 12/12/23  with complain of chest pain and SOB. Patient reported left sided pleuritic chest pain which started 3 days ago. Pain is progressively worsened, associated with SOB and cough. Patient was seen in [facility] ED and was prescribed antibiotics. Patient reported significantly worsening of left sided pleuritic chest pain today. No fever or chills. Patient reported loss of appetite and had a few episode of diarrhea for last 2 days. CTA chest with acute right upper lobe and left lower lobe segmental/subsegmental pulmonary emboli. No CT evidence of right heart strain. New bilateral lower lobe consolidations with areas of central clearing. Pneumonia and pulmonary infarcts are in the differential. New bilateral pleural effusions, small on the left and trace on the right. Bilateral axillary and supraclavicular adenopathy of unclear etiology. Patient was started on heparin ggt transitioned to eliquis on 12/19,  patient with worsening SOB/ hypoxia requiring nasal cannula oxygen supplementation. 12/20  Repeat Xray shows increased large left pleural effusion and compressive atelectasis trace right effusion and linear atelectasis. Thoracic team consulted for possible pigtail insertion Bedside US: Large left effusion with septations. Right simple effusion , + pericardial effusion- lower extremity dopplers negative for DVT,   - GI PCR + e coli  - mRSA PCR + Staph aureus   . Patient transferred to [facility] for further management.     (22 Dec 2023 22:52)    Dermatology consulted for asymptomatic rashes on hands. No oral mucosal pain. No feeling of tightness in the forearms, face, or chest.     Physical Exam: patchy hair loss on scalp   small ulcer on right dorsal tongue   hyperpigmented plaques on bilateral conchal bowls  hyperpigmented macules coalescing into patches on the upper chest   hyperpigmented macules and hyperkeratotic plaques on palmar digits as well as subungual hyperkeratosis on several fingernails   hyperpigmented patches on plantar toes   remainder of trunk and extremities clear  +prayer sign      28 years old male with h/o Lupus (diagnosed in 09/2023 by outside skin biopsy, on Plaquenil admitted for bilateral acute PE with pulmonary infarcts. Now with fevers of unknown etiology, originally thought to be related to SLE, but persistent despite 3 days of solumedrol 40 mg IV started 12/23. Concern for infection as well, on cefepime. Course c/b generalized tonic-clonic seizure on 12/25, suspect from high fever vs. hyponatremia, less likely neuropsychiatric SLE . Concern for SLE vs. overlap disease with myositis. On heparin drip for pulmonary infarcts, being worked up for APS.  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 other   - Labs here with ABDIAS 1:280 speckled, dsDNA 28, Sm >8, RNP >8, SSA >8, decreased c3, RPR wnl, Janine-1 ab WNL  - follow-up myomarker panel  - order anti-scl-70 antibodies  - limited utility in skin biopsy at this time, as it is unlikely to discern between SLE and dermatomyositis.     Dermatology will continue to follow     The patient's chart was reviewed in addition to photos of the rash taken by the primary team with the permission of the patient.  Patient was seen at bedside and discussed remotely with the dermatology attending Dr. Murillo  Recommendations were communicated with the primary team.  Please page [phone] for further related questions.    Zohreh Higuera MD  Resident Physician, PGY3  [facility] Dermatology  Pager: [phone]  Office: [phone]

## 2023-12-27 NOTE — BH CONSULTATION LIAISON ASSESSMENT NOTE - MSE UNSTRUCTURED FT
On exam, he is awake, though visibly uncomfortable, with difficulty uttering speech, answering only briefly due to oral secretions and pain. Was able to demonstrate intact orientation, and confirmed that he feels sad/depressed, but denied SI, HI, AVH, paranoia, safety concerns, or hopelessness. Does want to get better. Unfortunately intake was rather limited for reasons stated above, and will be continued in serial visits.

## 2023-12-27 NOTE — BH CONSULTATION LIAISON ASSESSMENT NOTE - SUMMARY
28 years old male with h/o Lupus ( diagnosed in 09/2023, on Plaquenil present to [facility] [facility] on 12/12/23  with complain of chest pain and SOB admitted for fevers, PE, pleural effusions, course c/b high fever and tonic-clonic seizure, transferred to MICU for post-ictal and infectious monitoring. Psych consulted for depression. Pt had indicated a hx of depression/anxiety, had been in the  and was [facility] but [facility], and now is requesting to speak to someone. However he is rather medically active and compromised. Will conduct serial assessments/visits.     Plan:   - Continue medical stabilization as you are  - No role for 1:1 at this time  - No role for initiating psychopharm measures at this time, will continue to assess.   - Dispo: no role for inpt psych; no psych barriers to discharge when medically cleared  - Will continue to follow. 28 years old male with h/o Lupus ( diagnosed in 09/2023, on Plaquenil present to [place] ED on 12/12/23  with complain of chest pain and SOB admitted for fevers, PE, pleural effusions, course c/b high fever and tonic-clonic seizure, transferred to MICU for post-ictal and infectious monitoring. Psych consulted for depression. Pt had indicated a hx of depression/anxiety, had been in the  and was [facility] but [facility] was [facility] wait, and now is requesting to speak to someone. However he is rather medically active and compromised. Will conduct serial assessments/visits.     Plan:   - Continue medical stabilization as you are  - No role for 1:1 at this time  - No role for initiating psychopharm measures at this time, will continue to assess.   - Dispo: no role for inpt psych; no psych barriers to discharge when medically cleared  - Will continue to follow.

## 2023-12-27 NOTE — CONSULT NOTE ADULT - ASSESSMENT
28M with SLE who presented to [facility] with PEs, developed pleural effusions and transferred to [facility] in that context. Here, developed seizures in the setting of high grade fever and hyponatremia and transferred to MICU. On interview today, he offers no acute complaints. 28M with SLE who presented to [facility] with PEs, developed pleural effusions and transferred to [facility] in that context. Here, developed seizures in the setting of high grade fever and hyponatremia and transferred to MICU. On interview today, he offers no acute complaints.

## 2023-12-27 NOTE — PROGRESS NOTE ADULT - ASSESSMENT
28 years old male with h/o Lupus (diagnosed in 09/2023 due to rash, oral ulcers, chest pain, and dyspnea, on Plaquenil) who presented to [place] ED on 12/12/23 with complaints of chest pain and SOB, found to have bilateral acute PE and bilateral pleural effusions. Transferred to [facility] for CT surgery evaluation. Also with fevers now improving. Rheumatology consulted given SLE history.     # Fevers of unclear etiology, differential includes autoimmune, infection, and malignancy   # B/l axillary and supraclavicular LAD   # SLE with patchy alopecia, discoid lesions, and ulcers   # Rash   # Elevated CPK, ?overlap with myositis   # Bilateral Acute PE with pulmonary infarcts   # B/l hemorrhagic pleural effusions, s/p L chest tube 12/26   # Proteinuria   # One episode of generalized tonic-clonic seizure on 12/25   # Secretions     - Initially fever was thought to be possibly related to SLE but did not resolve after 3 days of steroids along with worsened clinical status with new neurological symptoms, thus steroids were stopped on 12/25. Fevers less likely related to SLE   - Procalcitonin elevated at 8 but broad infectious workup overall unremarkable thus far, including multiple pleural fluid analyses and LP studies   - Fever curve much improved on broad antibiotics (vancomycin and cefepime) while off steroids   - Workup thus far with ABDIAS 1:280 speckled, dsDNA 28, Sm >8, RNP >8, SSA >8, C3 83, C4 13, U/A 300 protein and moderate blood with repeat U/A 100 protein and small blood, urine protein/Cr 1.2 and 1.1, negative APS labs, negative Janine-1 ab, negative ribosomal P, negative syphilis screen, no evidence of hemolysis, ferritin 9.2k with low iron and TIBC, IgG4 WNL, elevated IgG, transaminitis, low vitamin D 25 21, elevated vitamin D 1,25 97     Recommendations:   - F/u strep throat culture, pleural cultures (including fungal) and cytology, ANCA with MPO/PR3, quantiferon, PS, PS/PT, myomarker panel, ACE, RF, CCP, scleroderma antibody, centromere antibody, and RNA polymerase III   - Trend CPK levels   - Appreciate Dermatology evaluation for rash   - F/u pan scan CT chest/abdomen/pelvis   - IR consult for potential biopsy of axillary LN   - Consider kidney biopsy to assess for SLE involvement when stabilized   - Recommend evaluation of peripheral smear by Hematology   - Continue to hold steroids   - Attempted to obtain collateral regarding SLE diagnosis from PCP Dr. Hendrickson at [facility] (awaiting call back)   - We will continue to follow closely     Case discussed with MICU team  Case discussed with mother at bedside  Case discussed with Dr. Baker-Cesar Melendez MD  Rheumatology Fellow   Available on TEAMS 28 years old male with h/o Lupus (diagnosed in 09/2023 due to rash, oral ulcers, chest pain, and dyspnea, on Plaquenil) who presented to [place] ED on 12/12/23 with complaints of chest pain and SOB, found to have bilateral acute PE and bilateral pleural effusions. Transferred to [facility] for CT surgery evaluation. Also with fevers now improving. Rheumatology consulted given SLE history.     # Fevers of unclear etiology, differential includes autoimmune, infection, and malignancy   # B/l axillary and supraclavicular LAD   # SLE with patchy alopecia, discoid lesions, and ulcers   # Rash   # Elevated CPK, ?overlap with myositis   # Bilateral Acute PE with pulmonary infarcts   # B/l hemorrhagic pleural effusions, s/p L chest tube 12/26   # Proteinuria   # One episode of generalized tonic-clonic seizure on 12/25   # Secretions     - Initially fever was thought to be possibly related to SLE but did not resolve after 3 days of steroids along with worsened clinical status with new neurological symptoms, thus steroids were stopped on 12/25. Fevers less likely related to SLE   - Procalcitonin elevated at 8 but broad infectious workup overall unremarkable thus far, including multiple pleural fluid analyses and LP studies   - Fever curve much improved on broad antibiotics (vancomycin and cefepime) while off steroids   - Workup thus far with ABDIAS 1:280 speckled, dsDNA 28, Sm >8, RNP >8, SSA >8, C3 83, C4 13, U/A 300 protein and moderate blood with repeat U/A 100 protein and small blood, urine protein/Cr 1.2 and 1.1, negative APS labs, negative Janine-1 ab, negative ribosomal P, negative syphilis screen, no evidence of hemolysis, ferritin 9.2k with low iron and TIBC, IgG4 WNL, elevated IgG, transaminitis, low vitamin D 25 21, elevated vitamin D 1,25 97     Recommendations:   - F/u strep throat culture, pleural cultures (including fungal) and cytology, ANCA with MPO/PR3, quantiferon, PS, PS/PT, myomarker panel, ACE, RF, CCP, scleroderma antibody, centromere antibody, and RNA polymerase III   - Trend CPK levels   - Appreciate Dermatology evaluation for rash   - F/u pan scan CT chest/abdomen/pelvis   - IR consult for potential biopsy of axillary LN   - Consider kidney biopsy to assess for SLE involvement when stabilized   - Recommend evaluation of peripheral smear by Hematology   - Continue to hold steroids   - Attempted to obtain collateral regarding SLE diagnosis from PCP Dr. Hendrickson at [facility] (awaiting call back)   - We will continue to follow closely     Case discussed with MICU team  Case discussed with mother at bedside  Case discussed with Dr. Baker-Cesar Melendez MD  Rheumatology Fellow   Available on TEAMS 28 years old male with h/o Lupus (diagnosed in 09/2023 due to rash, oral ulcers, chest pain, and dyspnea, on Plaquenil) who presented to [place] ED on 12/12/23 with complaints of chest pain and SOB, found to have bilateral acute PE and bilateral pleural effusions. Transferred to [facility] for CT surgery evaluation. Also with fevers now improving. Rheumatology consulted given SLE history.     # Fevers of unclear etiology, differential includes autoimmune, infection, and malignancy   # B/l axillary and supraclavicular LAD   # SLE with patchy alopecia, discoid lesions, and ulcers   # Rash   # Elevated CPK, ?overlap with myositis   # Bilateral Acute PE with pulmonary infarcts   # B/l hemorrhagic pleural effusions, s/p L chest tube 12/26   # Proteinuria   # One episode of generalized tonic-clonic seizure on 12/25   # Secretions     - Initially fever was thought to be possibly related to SLE but did not resolve after 3 days of steroids along with worsened clinical status with new neurological symptoms, thus steroids were stopped on 12/25. Fevers less likely related to SLE   - Procalcitonin elevated at 8 but broad infectious workup overall unremarkable thus far, including multiple pleural fluid analyses and LP studies   - Fever curve much improved on broad antibiotics (vancomycin and cefepime) while off steroids   - Workup thus far with ABDIAS 1:280 speckled, dsDNA 28, Sm >8, RNP >8, SSA >8, C3 83, C4 13, U/A 300 protein and moderate blood with repeat U/A 100 protein and small blood, urine protein/Cr 1.2 and 1.1, negative APS labs, negative Janine-1 ab, negative ribosomal P, negative syphilis screen, no evidence of hemolysis, ferritin 9.2k with low iron and TIBC, IgG4 WNL, elevated IgG, transaminitis, low vitamin D 25 21, elevated vitamin D 1,25 97   - Pt with clinical manifestations and specific SLE serologies though unlikely that all current manifestations are related to SLE. Concern for other rheumatologic disease (including but not limited to sarcoidosis, overlap with myositis, and scleroderma). Underlying infectious or malignant process also needs to be ruled out     Recommendations:   - F/u strep throat culture, pleural cultures (including fungal) and cytology, ANCA with MPO/PR3, quantiferon, PS, PS/PT, myomarker panel, ACE, RF, CCP, scleroderma antibody, centromere antibody, and RNA polymerase III   - Trend CPK levels   - Appreciate Dermatology evaluation for rash   - F/u pan scan CT chest/abdomen/pelvis   - IR consult for potential biopsy of axillary LN   - Given proteinuria, consider kidney biopsy to assess for potential SLE involvement when stabilized   - Recommend evaluation of peripheral smear by Hematology   - Continue to hold steroids   - Attempted to obtain collateral regarding SLE diagnosis from PCP Dr[facility] Hendrickson at [facility] (awaiting call back)   - We will continue to follow closely     Case discussed with MICU team  Case discussed with mother at bedside  Case discussed with Dr. Baker-Cesar Melendez MD  Rheumatology Fellow   Available on TEAMS 28 years old male with h/o Lupus (diagnosed in 09/2023 due to rash, oral ulcers, chest pain, and dyspnea, on Plaquenil) who presented to [place] ED on 12/12/23 with complaints of chest pain and SOB, found to have bilateral acute PE and bilateral pleural effusions. Transferred to [facility] for CT surgery evaluation. Also with fevers now improving. Rheumatology consulted given SLE history.     # Fevers of unclear etiology, differential includes autoimmune, infection, and malignancy   # B/l axillary and supraclavicular LAD   # SLE with patchy alopecia, discoid lesions, and ulcers   # Rash   # Elevated CPK, ?overlap with myositis   # Bilateral Acute PE with pulmonary infarcts   # B/l hemorrhagic pleural effusions, s/p L chest tube 12/26   # Proteinuria   # One episode of generalized tonic-clonic seizure on 12/25   # Secretions     - Initially fever was thought to be possibly related to SLE but did not resolve after 3 days of steroids along with worsened clinical status with new neurological symptoms, thus steroids were stopped on 12/25. Fevers less likely related to SLE   - Procalcitonin elevated at 8 but broad infectious workup overall unremarkable thus far, including multiple pleural fluid analyses and LP studies   - Fever curve much improved on broad antibiotics (vancomycin and cefepime) while off steroids   - Workup thus far with ABDIAS 1:280 speckled, dsDNA 28, Sm >8, RNP >8, SSA >8, C3 83, C4 13, U/A 300 protein and moderate blood with repeat U/A 100 protein and small blood, urine protein/Cr 1.2 and 1.1, negative APS labs, negative Janine-1 ab, negative ribosomal P, negative syphilis screen, no evidence of hemolysis, ferritin 9.2k with low iron and TIBC, IgG4 WNL, elevated IgG, transaminitis, low vitamin D 25 21, elevated vitamin D 1,25 97   - Pt with clinical manifestations and specific SLE serologies though unlikely that all current manifestations are related to SLE. Concern for other rheumatologic disease (including but not limited to sarcoidosis, overlap with myositis, and scleroderma). Underlying infectious or malignant process also needs to be ruled out     Recommendations:   - F/u strep throat culture, pleural cultures (including fungal) and cytology, ANCA with MPO/PR3, quantiferon, PS, PS/PT, myomarker panel, ACE, RF, CCP, scleroderma antibody, centromere antibody, and RNA polymerase III   - Trend CPK levels   - Appreciate Dermatology evaluation for rash   - F/u pan scan CT chest/abdomen/pelvis   - IR consult for potential biopsy of axillary LN   - Given proteinuria, consider kidney biopsy to assess for potential SLE involvement when stabilized   - Recommend evaluation of peripheral smear by Hematology   - Continue to hold steroids   - Attempted to obtain collateral regarding SLE diagnosis from PCP Dr. Hendrickson at [facility] (awaiting call back)   - We will continue to follow closely     Case discussed with MICU team  Case discussed with mother at bedside  Case discussed with Dr. Baker-Cesar Melendez MD  Rheumatology Fellow   Available on TEAMS 28 years old male with h/o Lupus (diagnosed in 09/2023 due to rash, oral ulcers, chest pain, and dyspnea, on Plaquenil) who presented to [place] ED on 12/12/23 with complaints of chest pain and SOB, found to have bilateral acute PE and bilateral pleural effusions. Transferred to [facility] for CT surgery evaluation. Also with fevers now improving. Rheumatology consulted given SLE history.     # Fevers of unclear etiology, differential includes autoimmune, infection, and malignancy   # B/l axillary and supraclavicular LAD   # SLE with patchy alopecia, discoid lesions, and ulcers   # Rash   # Elevated CPK, ?overlap with myositis   # Bilateral Acute PE with pulmonary infarcts   # B/l hemorrhagic pleural effusions, s/p L chest tube 12/26   # Proteinuria   # One episode of generalized tonic-clonic seizure on 12/25   # Secretions     - Initially fever was thought to be possibly related to SLE but did not resolve after 3 days of steroids along with worsened clinical status with new neurological symptoms, thus steroids were stopped on 12/25. Fevers less likely related to SLE   - Procalcitonin elevated at 8 but broad infectious workup overall unremarkable thus far, including multiple pleural fluid analyses and LP studies   - Fever curve much improved on broad antibiotics (vancomycin and cefepime) while off steroids   - Workup thus far with ABDIAS 1:280 speckled, dsDNA 28, Sm >8, RNP >8, SSA >8, C3 83, C4 13, U/A 300 protein and moderate blood with repeat U/A 100 protein and small blood, urine protein/Cr 1.2 and 1.1, negative APS labs, negative Janine-1 ab, negative ribosomal P, negative syphilis screen, no evidence of hemolysis, ferritin 9.2k with low iron and TIBC, IgG4 WNL, elevated IgG, transaminitis, low vitamin D 25 21, elevated vitamin D 1,25 97, and negative RF   - Pt with clinical manifestations and specific SLE serologies though unlikely that all current manifestations are related to SLE. Concern for other rheumatologic disease (including but not limited to sarcoidosis, overlap with myositis, and scleroderma). Underlying infectious or malignant process also needs to be ruled out     Recommendations:   - F/u strep throat culture, pleural cultures (including fungal) and cytology, ANCA with MPO/PR3, quantiferon, PS, PS/PT, myomarker panel, ACE, CCP, scleroderma antibody, centromere antibody, and RNA polymerase III   - Trend CPK levels   - Appreciate Dermatology evaluation for rash   - F/u pan scan CT chest/abdomen/pelvis   - IR consult for potential biopsy of axillary LN   - Given proteinuria, consider kidney biopsy to assess for potential SLE involvement when stabilized   - Recommend evaluation of peripheral smear by Hematology   - Continue to hold steroids   - Attempted to obtain collateral regarding SLE diagnosis from PCP Dr. Hendrickson at [facility] (awaiting call back)   - We will continue to follow closely     Case discussed with MICU team  Case discussed with mother at bedside  Case discussed with Dr. Baker-Cesar Melendez MD  Rheumatology Fellow   Available on TEAMS 28 years old male with h/o Lupus (diagnosed in 09/2023 due to rash, oral ulcers, chest pain, and dyspnea, on Plaquenil) who presented to [place] ED on 12/12/23 with complaints of chest pain and SOB, found to have bilateral acute PE and bilateral pleural effusions. Transferred to [facility] for CT surgery evaluation. Also with fevers now improving. Rheumatology consulted given SLE history.     # Fevers of unclear etiology, differential includes autoimmune, infection, and malignancy   # B/l axillary and supraclavicular LAD   # SLE with patchy alopecia, discoid lesions, and ulcers   # Rash   # Elevated CPK, ?overlap with myositis   # Bilateral Acute PE with pulmonary infarcts   # B/l hemorrhagic pleural effusions, s/p L chest tube 12/26   # Proteinuria   # One episode of generalized tonic-clonic seizure on 12/25   # Secretions     - Initially fever was thought to be possibly related to SLE but did not resolve after 3 days of steroids along with worsened clinical status with new neurological symptoms, thus steroids were stopped on 12/25. Fevers less likely related to SLE   - Procalcitonin elevated at 8 but broad infectious workup overall unremarkable thus far, including multiple pleural fluid analyses and LP studies   - Fever curve much improved on broad antibiotics (vancomycin and cefepime) while off steroids   - Workup thus far with ABDIAS 1:280 speckled, dsDNA 28, Sm >8, RNP >8, SSA >8, C3 83, C4 13, U/A 300 protein and moderate blood with repeat U/A 100 protein and small blood, urine protein/Cr 1.2 and 1.1, negative APS labs, negative Janine-1 ab, negative ribosomal P, negative syphilis screen, no evidence of hemolysis, ferritin 9.2k with low iron and TIBC, IgG4 WNL, elevated IgG, transaminitis, low vitamin D 25 21, elevated vitamin D 1,25 97, and negative RF   - Pt with clinical manifestations and specific SLE serologies though unlikely that all current manifestations are related to SLE. Concern for other rheumatologic disease (including but not limited to sarcoidosis, overlap with myositis, and scleroderma). Underlying infectious or malignant process also needs to be ruled out     Recommendations:   - F/u strep throat culture, pleural cultures (including fungal) and cytology, ANCA with MPO/PR3, quantiferon, PS, PS/PT, myomarker panel, ACE, CCP, scleroderma antibody, centromere antibody, and RNA polymerase III   - Trend CPK levels   - Appreciate Dermatology evaluation for rash   - F/u pan scan CT chest/abdomen/pelvis   - IR consult for potential biopsy of axillary LN   - Given proteinuria, consider kidney biopsy to assess for potential SLE involvement when stabilized   - Recommend evaluation of peripheral smear by Hematology   - Continue to hold steroids   - Attempted to obtain collateral regarding SLE diagnosis from PCP Dr. Hendrickson at [facility] (awaiting call back)   - We will continue to follow closely     Case discussed with MICU team  Case discussed with mother at bedside  Case discussed with Dr. Baker-Cesar Melendez MD  Rheumatology Fellow   Available on TEAMS 28 years old male with h/o Lupus (diagnosed in 09/2023 due to rash, oral ulcers, chest pain, and dyspnea, on Plaquenil) who presented to [place] ED on 12/12/23 with complaints of chest pain and SOB, found to have bilateral acute PE and bilateral pleural effusions. Transferred to [facility] for CT surgery evaluation. Also with fevers now improving. Rheumatology consulted given SLE history.     # Fevers of unclear etiology, differential includes autoimmune, infection, and malignancy   # B/l axillary and supraclavicular LAD   # SLE with patchy alopecia, discoid lesions, and ulcers   # Rash   # Elevated CPK, ?overlap with myositis   # Bilateral Acute PE with pulmonary infarcts   # B/l hemorrhagic pleural effusions, s/p L chest tube 12/26   # Proteinuria   # One episode of generalized tonic-clonic seizure on 12/25   # Secretions     - Initially fever was thought to be possibly related to SLE but did not resolve after 3 days of steroids along with worsened clinical status with new neurological symptoms, thus steroids were stopped on 12/25. Fevers less likely related to SLE   - Procalcitonin elevated at 8 but broad infectious workup overall unremarkable thus far, including multiple pleural fluid analyses and LP studies   - Fever curve much improved on broad antibiotics (vancomycin and cefepime) while off steroids   - Workup thus far with ABDIAS 1:280 speckled, dsDNA 28, Sm >8, RNP >8, SSA >8, C3 83, C4 13, U/A 300 protein and moderate blood with repeat U/A 100 protein and small blood, urine protein/Cr 1.2 and 1.1, negative APS labs, negative Janine-1 ab, negative ribosomal P, negative syphilis screen, no evidence of hemolysis, ferritin 9.2k with low iron and TIBC, IgG4 WNL, elevated IgG, transaminitis, low vitamin D 25 21, elevated vitamin D 1,25 97, and negative RF   - Pt with clinical manifestations and specific SLE serologies though unlikely that all current manifestations are related to SLE. Concern for other rheumatologic disease (including but not limited to sarcoidosis, overlap with myositis, and scleroderma). Underlying infectious or malignant process also needs to be ruled out     Recommendations:   - F/u strep throat culture, pleural cultures (including fungal) and cytology, GBM antibody, ANCA with MPO/PR3, quantiferon, PS, PS/PT, myomarker panel, ACE, CCP, scleroderma antibody, centromere antibody, and RNA polymerase III   - Trend CPK levels   - Appreciate Dermatology evaluation for rash   - F/u pan scan CT chest/abdomen/pelvis   - IR consult for potential biopsy of axillary LN   - Given proteinuria, consider kidney biopsy to assess for potential SLE involvement when stabilized   - Recommend evaluation of peripheral smear by Hematology   - Continue to hold steroids   - Attempted to obtain collateral regarding SLE diagnosis from PCP Dr. Hendrickson at [facility] (awaiting call back)   - We will continue to follow closely     Case discussed with MICU team  Case discussed with mother at bedside  Case discussed with Dr. Baker-Cesar Melendez MD  Rheumatology Fellow   Available on TEAMS 28 years old male with h/o Lupus (diagnosed in 09/2023 due to rash, oral ulcers, chest pain, and dyspnea, on Plaquenil) who presented to [place] ED on 12/12/23 with complaints of chest pain and SOB, found to have bilateral acute PE and bilateral pleural effusions. Transferred to [facility] for CT surgery evaluation. Also with fevers now improving. Rheumatology consulted given SLE history.     # Fevers of unclear etiology, differential includes autoimmune, infection, and malignancy   # B/l axillary and supraclavicular LAD   # SLE with patchy alopecia, discoid lesions, and ulcers   # Rash   # Elevated CPK, ?overlap with myositis   # Bilateral Acute PE with pulmonary infarcts   # B/l hemorrhagic pleural effusions, s/p L chest tube 12/26   # Proteinuria   # One episode of generalized tonic-clonic seizure on 12/25   # Secretions     - Initially fever was thought to be possibly related to SLE but did not resolve after 3 days of steroids along with worsened clinical status with new neurological symptoms, thus steroids were stopped on 12/25. Fevers less likely related to SLE   - Procalcitonin elevated at 8 but broad infectious workup overall unremarkable thus far, including multiple pleural fluid analyses and LP studies   - Fever curve much improved on broad antibiotics (vancomycin and cefepime) while off steroids   - Workup thus far with ABDIAS 1:280 speckled, dsDNA 28, Sm >8, RNP >8, SSA >8, C3 83, C4 13, U/A 300 protein and moderate blood with repeat U/A 100 protein and small blood, urine protein/Cr 1.2 and 1.1, negative APS labs, negative Janine-1 ab, negative ribosomal P, negative syphilis screen, no evidence of hemolysis, ferritin 9.2k with low iron and TIBC, IgG4 WNL, elevated IgG, transaminitis, low vitamin D 25 21, elevated vitamin D 1,25 97, and negative RF   - Pt with clinical manifestations and specific SLE serologies though unlikely that all current manifestations are related to SLE. Concern for other rheumatologic disease (including but not limited to sarcoidosis, overlap with myositis, and scleroderma). Underlying infectious or malignant process also needs to be ruled out     Recommendations:   - F/u strep throat culture, pleural cultures (including fungal) and cytology, GBM antibody, ANCA with MPO/PR3, quantiferon, PS, PS/PT, myomarker panel, ACE, CCP, scleroderma antibody, centromere antibody, and RNA polymerase III   - Trend CPK levels   - Appreciate Dermatology evaluation for rash   - F/u pan scan CT chest/abdomen/pelvis   - IR consult for potential biopsy of axillary LN   - Given proteinuria, consider kidney biopsy to assess for potential SLE involvement when stabilized   - Recommend evaluation of peripheral smear by Hematology   - Continue to hold steroids   - Attempted to obtain collateral regarding SLE diagnosis from PCP Dr. Hendrickson at [facility] (awaiting call back)   - We will continue to follow closely     Case discussed with MICU team  Case discussed with mother at bedside  Case discussed with Dr. Baker-Cesar Melendez MD  Rheumatology Fellow   Available on TEAMS 28 years old male with h/o Lupus (diagnosed in 09/2023 due to rash, oral ulcers, chest pain, and dyspnea, on Plaquenil) who presented to [place] ED on 12/12/23 with complaints of chest pain and SOB, found to have bilateral acute PE and bilateral pleural effusions. Transferred to [facility] for CT surgery evaluation. Also with fevers now improving. Rheumatology consulted given SLE history.     # Fevers of unclear etiology, differential includes autoimmune, infection, and malignancy   # B/l axillary and supraclavicular LAD   # SLE with patchy alopecia, discoid lesions, and ulcers   # Elevated CPK   # Bilateral Acute PE with pulmonary infarcts   # B/l hemorrhagic pleural effusions, s/p L chest tube 12/26   # Proteinuria   # One episode of generalized tonic-clonic seizure on 12/25   # Secretions     - Initially fever was thought to be possibly related to SLE but did not resolve after 3 days of steroids along with worsened clinical status with new neurological symptoms, thus steroids were stopped on 12/25. Fevers less likely related to SLE   - Procalcitonin elevated at 8 but broad infectious workup overall unremarkable thus far, including multiple pleural fluid analyses and LP studies   - Fever curve much improved on broad antibiotics (vancomycin and cefepime) while off steroids   - Workup thus far with ABDIAS 1:280 speckled, dsDNA 28, Sm >8, RNP >8, SSA >8, C3 83, C4 13, U/A 300 protein and moderate blood with repeat U/A 100 protein and small blood, urine protein/Cr 1.2 and 1.1, negative APS labs, negative Janine-1 ab, negative ribosomal P, negative syphilis screen, no evidence of hemolysis, ferritin 9.2k with low iron and TIBC, IgG4 WNL, elevated IgG, transaminitis, low vitamin D 25 21, elevated vitamin D 1,25 97, and negative RF   - Pt with clinical manifestations and specific SLE serologies though unlikely that all current manifestations are related to SLE. Concern for other rheumatologic disease (including but not limited to sarcoidosis, overlap with myositis, and scleroderma). Underlying infectious or malignant process also needs to be ruled out     Recommendations:   - F/u strep throat culture, pleural cultures (including fungal) and cytology, GBM antibody, ANCA with MPO/PR3, quantiferon, PS, PS/PT, myomarker panel, ACE, CCP, scleroderma antibody, centromere antibody, and RNA polymerase III   - Trend CPK levels   - Appreciate Dermatology evaluation for rash   - F/u pan scan CT chest/abdomen/pelvis   - IR consult for potential biopsy of axillary LN   - Given proteinuria, consider kidney biopsy to assess for potential SLE involvement when stabilized   - Recommend evaluation of peripheral smear by Hematology   - Continue to hold steroids   - Attempted to obtain collateral regarding SLE diagnosis from PCP Dr. Hendrickson at [facility] (awaiting call back)   - We will continue to follow closely     Case discussed with MICU team  Case discussed with mother at bedside  Case discussed with Dr. Baker-Cesar Melendez MD  Rheumatology Fellow   Available on TEAMS 28 years old male with h/o Lupus (diagnosed in 09/2023 due to rash, oral ulcers, chest pain, and dyspnea, on Plaquenil) who presented to [place] ED on 12/12/23 with complaints of chest pain and SOB, found to have bilateral acute PE and bilateral pleural effusions. Transferred to [facility] for CT surgery evaluation. Also with fevers now improving. Rheumatology consulted given SLE history.     # Fevers of unclear etiology, differential includes autoimmune, infection, and malignancy   # B/l axillary and supraclavicular LAD   # SLE with patchy alopecia, discoid lesions, and ulcers   # Elevated CPK   # Bilateral Acute PE with pulmonary infarcts   # B/l hemorrhagic pleural effusions, s/p L chest tube 12/26   # Proteinuria   # One episode of generalized tonic-clonic seizure on 12/25   # Secretions     - Initially fever was thought to be possibly related to SLE but did not resolve after 3 days of steroids along with worsened clinical status with new neurological symptoms, thus steroids were stopped on 12/25. Fevers less likely related to SLE   - Procalcitonin elevated at 8 but broad infectious workup overall unremarkable thus far, including multiple pleural fluid analyses and LP studies   - Fever curve much improved on broad antibiotics (vancomycin and cefepime) while off steroids   - Workup thus far with ABDIAS 1:280 speckled, dsDNA 28, Sm >8, RNP >8, SSA >8, C3 83, C4 13, U/A 300 protein and moderate blood with repeat U/A 100 protein and small blood, urine protein/Cr 1.2 and 1.1, negative APS labs, negative Janine-1 ab, negative ribosomal P, negative syphilis screen, no evidence of hemolysis, ferritin 9.2k with low iron and TIBC, IgG4 WNL, elevated IgG, transaminitis, low vitamin D 25 21, elevated vitamin D 1,25 97, and negative RF   - Pt with clinical manifestations and specific SLE serologies though unlikely that all current manifestations are related to SLE. Concern for other rheumatologic disease (including but not limited to sarcoidosis, overlap with myositis, and scleroderma). Underlying infectious or malignant process also needs to be ruled out     Recommendations:   - F/u strep throat culture, pleural cultures (including fungal) and cytology, GBM antibody, ANCA with MPO/PR3, quantiferon, PS, PS/PT, myomarker panel, ACE, CCP, scleroderma antibody, centromere antibody, and RNA polymerase III   - Trend CPK levels   - Appreciate Dermatology evaluation for rash   - F/u pan scan CT chest/abdomen/pelvis   - IR consult for potential biopsy of axillary LN   - Given proteinuria, consider kidney biopsy to assess for potential SLE involvement when stabilized   - Recommend evaluation of peripheral smear by Hematology   - Continue to hold steroids   - Attempted to obtain collateral regarding SLE diagnosis from PCP Dr. Hendrickson at [facility] (awaiting call back)   - We will continue to follow closely     Case discussed with MICU team  Case discussed with mother at bedside  Case discussed with Dr. Baker-Cesar Melendez MD  Rheumatology Fellow   Available on TEAMS 28 years old male with h/o Lupus (diagnosed in 09/2023 due to rash, oral ulcers, chest pain, and dyspnea, on Plaquenil) who presented to [place] ED on 12/12/23 with complaints of chest pain and SOB, found to have bilateral acute PE and bilateral pleural effusions. Transferred to [facility] for CT surgery evaluation. Also with fevers now improving. Rheumatology consulted given SLE history.     # Fevers of unclear etiology, differential includes autoimmune, infection, and malignancy   # B/l axillary and supraclavicular LAD   # SLE with patchy alopecia, discoid lesions, and ulcers   # Elevated CPK   # Bilateral Acute PE with pulmonary infarcts   # B/l hemorrhagic pleural effusions, s/p L chest tube 12/26   # Proteinuria   # One episode of generalized tonic-clonic seizure on 12/25   # Secretions     - Initially fever was thought to be possibly related to SLE but did not resolve after 3 days of steroids along with worsened clinical status with new neurological symptoms, thus steroids were stopped on 12/25. Fevers less likely related to SLE   - Procalcitonin elevated at 8 but broad infectious workup overall unremarkable thus far, including multiple pleural fluid analyses and LP studies   - Fever curve much improved on broad antibiotics (vancomycin and cefepime) while off steroids   - Workup thus far with ABDIAS 1:280 speckled, dsDNA 28, Sm >8, RNP >8, SSA >8, C3 83, C4 13, U/A 300 protein and moderate blood with repeat U/A 100 protein and small blood, urine protein/Cr 1.2 and 1.1, negative APS labs, negative Janine-1 ab, negative ribosomal P, negative syphilis screen, no evidence of hemolysis, ferritin 9.2k with low iron and TIBC, IgG4 WNL, elevated IgG, transaminitis, low vitamin D 25 21, elevated vitamin D 1,25 97, and negative RF   - Pt with clinical manifestations and specific SLE serologies though unlikely that current presentation is solely attributable to SLE. Concern for other rheumatologic disease (including but not limited to sarcoidosis, overlap with myositis, and scleroderma). Underlying infectious or malignant process also needs to be ruled out     Recommendations:   - F/u strep throat culture, pleural cultures (including fungal) and cytology, GBM antibody, ANCA with MPO/PR3, quantiferon, PS, PS/PT, myomarker panel, ACE, CCP, scleroderma antibody, centromere antibody, and RNA polymerase III   - Trend CPK levels   - Appreciate Dermatology evaluation for rash   - F/u pan scan CT chest/abdomen/pelvis   - IR consult for potential biopsy of axillary LN   - Given proteinuria, consider kidney biopsy to assess for potential SLE involvement when stabilized   - Recommend evaluation of peripheral smear by Hematology   - Continue to hold steroids   - Attempted to obtain collateral regarding SLE diagnosis from PCP Dr. Hendrickson at [facility] (awaiting call back)   - We will continue to follow closely     Case discussed with MICU team  Case discussed with mother at bedside  Case discussed with Dr. Baker-Cesar Melendez MD  Rheumatology Fellow   Available on TEAMS 28 years old male with h/o Lupus (diagnosed in 09/2023 due to rash, oral ulcers, chest pain, and dyspnea, on Plaquenil) who presented to [place] ED on 12/12/23 with complaints of chest pain and SOB, found to have bilateral acute PE and bilateral pleural effusions. Transferred to [facility] for CT surgery evaluation. Also with fevers now improving. Rheumatology consulted given SLE history.     # Fevers of unclear etiology, differential includes autoimmune, infection, and malignancy   # B/l axillary and supraclavicular LAD   # SLE with patchy alopecia, discoid lesions, and ulcers   # Elevated CPK   # Bilateral Acute PE with pulmonary infarcts   # B/l hemorrhagic pleural effusions, s/p L chest tube 12/26   # Proteinuria   # One episode of generalized tonic-clonic seizure on 12/25   # Secretions     - Initially fever was thought to be possibly related to SLE but did not resolve after 3 days of steroids along with worsened clinical status with new neurological symptoms, thus steroids were stopped on 12/25. Fevers less likely related to SLE   - Procalcitonin elevated at 8 but broad infectious workup overall unremarkable thus far, including multiple pleural fluid analyses and LP studies   - Fever curve much improved on broad antibiotics (vancomycin and cefepime) while off steroids   - Workup thus far with ABDIAS 1:280 speckled, dsDNA 28, Sm >8, RNP >8, SSA >8, C3 83, C4 13, U/A 300 protein and moderate blood with repeat U/A 100 protein and small blood, urine protein/Cr 1.2 and 1.1, negative APS labs, negative Janine-1 ab, negative ribosomal P, negative syphilis screen, no evidence of hemolysis, ferritin 9.2k with low iron and TIBC, IgG4 WNL, elevated IgG, transaminitis, low vitamin D 25 21, elevated vitamin D 1,25 97, and negative RF   - Pt with clinical manifestations and specific SLE serologies though unlikely that current presentation is solely attributable to SLE. Concern for other rheumatologic disease (including but not limited to sarcoidosis, overlap with myositis, and scleroderma). Underlying infectious or malignant process also needs to be ruled out     Recommendations:   - F/u strep throat culture, pleural cultures (including fungal) and cytology, GBM antibody, ANCA with MPO/PR3, quantiferon, PS, PS/PT, myomarker panel, ACE, CCP, scleroderma antibody, centromere antibody, and RNA polymerase III   - Trend CPK levels   - Appreciate Dermatology evaluation for rash   - F/u pan scan CT chest/abdomen/pelvis   - IR consult for potential biopsy of axillary LN   - Given proteinuria, consider kidney biopsy to assess for potential SLE involvement when stabilized   - Recommend evaluation of peripheral smear by Hematology   - Continue to hold steroids   - Attempted to obtain collateral regarding SLE diagnosis from PCP Dr. Hendrickson at [facility] (awaiting call back)   - We will continue to follow closely     Case discussed with MICU team  Case discussed with mother at bedside  Case discussed with Dr. Baker-Cesar Melendez MD  Rheumatology Fellow   Available on TEAMS 28 years old male with h/o Lupus (diagnosed in 09/2023 due to rash, oral ulcers, chest pain, and dyspnea, on Plaquenil) who presented to [place] ED on 12/12/23 with complaints of chest pain and SOB, found to have bilateral acute PE and bilateral pleural effusions. Transferred to [facility] for CT surgery evaluation. Also with fevers now improving. Rheumatology consulted given SLE history.     # Fevers of unclear etiology, differential includes autoimmune, infection, and malignancy   # B/l axillary and supraclavicular LAD   # SLE with patchy alopecia, discoid lesions, and ulcers   # Elevated CPK   # Bilateral Acute PE with pulmonary infarcts   # B/l hemorrhagic pleural effusions, s/p L chest tube 12/26   # Proteinuria   # One episode of generalized tonic-clonic seizure on 12/25   # Normocytic anemia  # Transaminitis   # Secretions     - Initially fever was thought to be possibly related to SLE but did not resolve after 3 days of steroids along with worsened clinical status with new neurological symptoms, thus steroids were stopped on 12/25. Fevers less likely related to SLE   - Procalcitonin elevated at 8 but broad infectious workup overall unremarkable thus far, including multiple pleural fluid analyses and LP studies   - Fever curve much improved on broad antibiotics (vancomycin and cefepime) while off steroids   - Workup thus far with ABDIAS 1:280 speckled, dsDNA 28, Sm >8, RNP >8, SSA >8, C3 83, C4 13, U/A 300 protein and moderate blood with repeat U/A 100 protein and small blood, urine protein/Cr 1.2 and 1.1, negative APS labs, negative Janine-1 ab, negative ribosomal P, negative syphilis screen, normocytic anemia, no evidence of hemolysis, ferritin 9.2k with low iron and TIBC, IgG4 WNL, elevated IgG, transaminitis, low vitamin D 25 21, elevated vitamin D 1,25 97, and negative RF   - Pt with clinical manifestations and specific SLE serologies though unlikely that current presentation is solely attributable to SLE. Concern for other rheumatologic disease (including but not limited to sarcoidosis, overlap with myositis, and scleroderma). Underlying infectious or malignant process also needs to be ruled out     Recommendations:   - F/u strep throat culture, pleural cultures (including fungal) and cytology, GBM antibody, ANCA with MPO/PR3, quantiferon, PS, PS/PT, myomarker panel, ACE, CCP, scleroderma antibody, centromere antibody, and RNA polymerase III   - Trend CPK levels   - Appreciate Dermatology evaluation for rash   - F/u pan scan CT chest/abdomen/pelvis   - IR consult for potential biopsy of axillary LN   - Given proteinuria, consider kidney biopsy to assess for potential SLE involvement when stabilized   - Recommend evaluation of peripheral smear by Hematology   - Continue to hold steroids   - Attempted to obtain collateral regarding SLE diagnosis from PCP Dr. Hendrickson at [facility] (awaiting call back)   - We will continue to follow closely     Case discussed with MICU team  Case discussed with mother at bedside  Case discussed with Dr. Baker-Cesar Melendez MD  Rheumatology Fellow   Available on TEAMS 28 years old male with h/o Lupus (diagnosed in 09/2023 due to rash, oral ulcers, chest pain, and dyspnea, on Plaquenil) who presented to [place] ED on 12/12/23 with complaints of chest pain and SOB, found to have bilateral acute PE and bilateral pleural effusions. Transferred to [facility] for CT surgery evaluation. Also with fevers now improving. Rheumatology consulted given SLE history.     # Fevers of unclear etiology, differential includes autoimmune, infection, and malignancy   # B/l axillary and supraclavicular LAD   # SLE with patchy alopecia, discoid lesions, and ulcers   # Elevated CPK   # Bilateral Acute PE with pulmonary infarcts   # B/l hemorrhagic pleural effusions, s/p L chest tube 12/26   # Proteinuria   # One episode of generalized tonic-clonic seizure on 12/25   # Normocytic anemia  # Transaminitis   # Secretions     - Initially fever was thought to be possibly related to SLE but did not resolve after 3 days of steroids along with worsened clinical status with new neurological symptoms, thus steroids were stopped on 12/25. Fevers less likely related to SLE   - Procalcitonin elevated at 8 but broad infectious workup overall unremarkable thus far, including multiple pleural fluid analyses and LP studies   - Fever curve much improved on broad antibiotics (vancomycin and cefepime) while off steroids   - Workup thus far with ABDIAS 1:280 speckled, dsDNA 28, Sm >8, RNP >8, SSA >8, C3 83, C4 13, U/A 300 protein and moderate blood with repeat U/A 100 protein and small blood, urine protein/Cr 1.2 and 1.1, negative APS labs, negative Janine-1 ab, negative ribosomal P, negative syphilis screen, normocytic anemia, no evidence of hemolysis, ferritin 9.2k with low iron and TIBC, IgG4 WNL, elevated IgG, transaminitis, low vitamin D 25 21, elevated vitamin D 1,25 97, and negative RF   - Pt with clinical manifestations and specific SLE serologies though unlikely that current presentation is solely attributable to SLE. Concern for other rheumatologic disease (including but not limited to sarcoidosis, overlap with myositis, and scleroderma). Underlying infectious or malignant process also needs to be ruled out     Recommendations:   - F/u strep throat culture, pleural cultures (including fungal) and cytology, GBM antibody, ANCA with MPO/PR3, quantiferon, PS, PS/PT, myomarker panel, ACE, CCP, scleroderma antibody, centromere antibody, and RNA polymerase III   - Trend CPK levels   - Appreciate Dermatology evaluation for rash   - F/u pan scan CT chest/abdomen/pelvis   - IR consult for potential biopsy of axillary LN   - Given proteinuria, consider kidney biopsy to assess for potential SLE involvement when stabilized   - Recommend evaluation of peripheral smear by Hematology   - Continue to hold steroids   - Attempted to obtain collateral regarding SLE diagnosis from PCP Dr. Hendrickson at [facility] (awaiting call back)   - We will continue to follow closely     Case discussed with MICU team  Case discussed with mother at bedside  Case discussed with Dr. Baker-Cesar Melendez MD  Rheumatology Fellow   Available on TEAMS 28 years old male with h/o Lupus (diagnosed in 09/2023 due to rash, oral ulcers, chest pain, and dyspnea, on Plaquenil) who presented to [place] ED on 12/12/23 with complaints of chest pain and SOB, found to have bilateral acute PE and bilateral pleural effusions. Transferred to [facility] for CT surgery evaluation. Also with fevers now improving. Rheumatology consulted given SLE history.     # Fevers of unclear etiology, differential includes autoimmune, infection, and malignancy   # B/l axillary and supraclavicular LAD   # SLE with patchy alopecia, discoid lesions, and ulcers   # Elevated CPK   # Bilateral Acute PE with pulmonary infarcts   # B/l hemorrhagic pleural effusions, s/p L chest tube 12/26   # Proteinuria   # One episode of generalized tonic-clonic seizure on 12/25   # Normocytic anemia  # Transaminitis   # Secretions     - Initially fever was thought to be possibly related to SLE but did not resolve after 3 days of steroids along with worsened clinical status with new neurological symptoms, thus steroids were stopped on 12/25. Fevers less likely related to SLE   - Procalcitonin elevated at 8 but broad infectious workup overall unremarkable thus far, including multiple pleural fluid analyses and LP studies   - Fever curve much improved on broad antibiotics (vancomycin and cefepime) while off steroids   - Workup thus far with ABDIAS 1:280 speckled, dsDNA 28, Sm >8, RNP >8, SSA >8, C3 83, C4 13, U/A 300 protein and moderate blood with repeat U/A 100 protein and small blood, urine protein/Cr 1.2 and 1.1, negative APS labs, negative Janine-1 ab, negative ribosomal P, negative syphilis screen, normocytic anemia, no evidence of hemolysis, ferritin 9.2k with low iron and TIBC, IgG4 WNL, elevated IgG, transaminitis, low vitamin D 25 21, elevated vitamin D 1,25 97, and negative RF   - Pt with clinical manifestations and specific SLE serologies though unlikely that current presentation is solely attributable to SLE. Concern for other rheumatologic disease (including but not limited to sarcoidosis, overlap with myositis, and scleroderma). Superimposed infectious or malignant process also needs to be ruled out     Recommendations:   - F/u strep throat culture, pleural cultures (including fungal) and cytology, GBM antibody, ANCA with MPO/PR3, quantiferon, PS, PS/PT, myomarker panel, ACE, CCP, scleroderma antibody, centromere antibody, and RNA polymerase III   - Trend CPK levels   - Appreciate Dermatology evaluation for rash   - F/u pan scan CT chest/abdomen/pelvis   - IR consult for potential biopsy of axillary LN   - Given proteinuria, consider kidney biopsy to assess for potential SLE involvement when stabilized   - Recommend evaluation of peripheral smear by Hematology   - Continue to hold steroids   - Attempted to obtain collateral regarding SLE diagnosis from PCP Dr. Hendrickson at [facility] (awaiting call back)   - We will continue to follow closely     Case discussed with MICU team  Case discussed with mother at bedside  Case discussed with Dr. Baker-Cesar Melendez MD  Rheumatology Fellow   Available on TEAMS 28 years old male with h/o Lupus (diagnosed in 09/2023 due to rash, oral ulcers, chest pain, and dyspnea, on Plaquenil) who presented to [place] ED on 12/12/23 with complaints of chest pain and SOB, found to have bilateral acute PE and bilateral pleural effusions. Transferred to [facility] for CT surgery evaluation. Also with fevers now improving. Rheumatology consulted given SLE history.     # Fevers of unclear etiology, differential includes autoimmune, infection, and malignancy   # B/l axillary and supraclavicular LAD   # SLE with patchy alopecia, discoid lesions, and ulcers   # Elevated CPK   # Bilateral Acute PE with pulmonary infarcts   # B/l hemorrhagic pleural effusions, s/p L chest tube 12/26   # Proteinuria   # One episode of generalized tonic-clonic seizure on 12/25   # Normocytic anemia  # Transaminitis   # Secretions     - Initially fever was thought to be possibly related to SLE but did not resolve after 3 days of steroids along with worsened clinical status with new neurological symptoms, thus steroids were stopped on 12/25. Fevers less likely related to SLE   - Procalcitonin elevated at 8 but broad infectious workup overall unremarkable thus far, including multiple pleural fluid analyses and LP studies   - Fever curve much improved on broad antibiotics (vancomycin and cefepime) while off steroids   - Workup thus far with ABDIAS 1:280 speckled, dsDNA 28, Sm >8, RNP >8, SSA >8, C3 83, C4 13, U/A 300 protein and moderate blood with repeat U/A 100 protein and small blood, urine protein/Cr 1.2 and 1.1, negative APS labs, negative Janine-1 ab, negative ribosomal P, negative syphilis screen, normocytic anemia, no evidence of hemolysis, ferritin 9.2k with low iron and TIBC, IgG4 WNL, elevated IgG, transaminitis, low vitamin D 25 21, elevated vitamin D 1,25 97, and negative RF   - Pt with clinical manifestations and specific SLE serologies though unlikely that current presentation is solely attributable to SLE. Concern for other rheumatologic disease (including but not limited to sarcoidosis, overlap with myositis, and scleroderma). Superimposed infectious or malignant process also needs to be ruled out     Recommendations:   - F/u strep throat culture, pleural cultures (including fungal) and cytology, GBM antibody, ANCA with MPO/PR3, quantiferon, PS, PS/PT, myomarker panel, ACE, CCP, scleroderma antibody, centromere antibody, and RNA polymerase III   - Trend CPK levels   - Appreciate Dermatology evaluation for rash   - F/u pan scan CT chest/abdomen/pelvis   - IR consult for potential biopsy of axillary LN   - Given proteinuria, consider kidney biopsy to assess for potential SLE involvement when stabilized   - Recommend evaluation of peripheral smear by Hematology   - Continue to hold steroids   - Attempted to obtain collateral regarding SLE diagnosis from PCP Dr. Hendrickson at [facility] (awaiting call back)   - We will continue to follow closely     Case discussed with MICU team  Case discussed with mother at bedside  Case discussed with Dr. Baker-Cesar Melendez MD  Rheumatology Fellow   Available on TEAMS

## 2023-12-27 NOTE — BH CONSULTATION LIAISON ASSESSMENT NOTE - CURRENT MEDICATION
MEDICATIONS  (STANDING):  cefepime   IVPB 2000 milliGRAM(s) IV Intermittent every 8 hours  chlorhexidine 2% Cloths 1 Application(s) Topical daily  ciprofloxacin  0.3% Ophthalmic Solution for Otic Use 2 Drop(s) Both Ears two times a day  dexMEDEtomidine Infusion 0.2 MICROgram(s)/kG/Hr (3.46 mL/Hr) IV Continuous <Continuous>  heparin  Infusion. 1300 Unit(s)/Hr (13 mL/Hr) IV Continuous <Continuous>  hydroxychloroquine 200 milliGRAM(s) Oral two times a day  lactated ringers. 1000 milliLiter(s) (1000 mL/Hr) IV Continuous <Continuous>  lactated ringers. 1000 milliLiter(s) (100 mL/Hr) IV Continuous <Continuous>  lidocaine   4% Patch 1 Patch Transdermal every 24 hours  melatonin 3 milliGRAM(s) Oral <User Schedule>  sodium chloride 1 Gram(s) Oral three times a day    MEDICATIONS  (PRN):  acetaminophen     Tablet .. 650 milliGRAM(s) Oral every 6 hours PRN Temp greater or equal to 38C (100.4F), Mild Pain (1 - 3)  albuterol/ipratropium for Nebulization 3 milliLiter(s) Nebulizer every 6 hours PRN Shortness of Breath and/or Wheezing  benzocaine/menthol Lozenge 1 Lozenge Oral four times a day PRN Sore Throat  guaiFENesin Oral Liquid (Sugar-Free) 200 milliGRAM(s) Oral every 6 hours PRN Cough  heparin   Injectable 5500 Unit(s) IV Push every 6 hours PRN For aPTT less than 40  heparin   Injectable 2500 Unit(s) IV Push every 6 hours PRN For aPTT between 40 - 57  lidocaine 2% Viscous 5 milliLiter(s) Swish and Spit three times a day PRN Mouth Care  MIRACLE MOUTHWASH 15 milliLiter(s) Swish and Spit every 4 hours PRN oral pain

## 2023-12-27 NOTE — CHART NOTE - NSCHARTNOTEFT_GEN_A_CORE
Briefly, pt is a 28M with recently diagnosed SLE (9/2023), who initially presented to [facility] with chest pain and found to have bilateral PE as well pericardial and pleural effusion. Hematology consulted for hypercoagulable workup and management of PE. His CT-A chest (12/12/23): acute right upper lobe and left lower lobe segmental/subsegmental pulmonary emboli. Lower extremity Dopplers negative. Mother reports that he has been very active prior to this hospitalization. APLS work up is negative. Suspect that his PE may have been in the setting of inflammation given his elevated CRP/ESR and pericardial/pleural effusions thought to be inflammatory in etiology.  Once no further plans for procedures, can transition to Eliquis. Patient should follow up with his PMD 1 week after discharge and 3 months after discharge with a Hematologist. A referral to [facility] has already been made. Case d/w Dr. Burch.    Johanna Rivers M.D.  Hematology and Medical Oncology Fellow  Pager: [phone]  For weekends and evenings (5 pm - 8 am), please page Heme/Onc fellow on call. Briefly, pt is a 28M with recently diagnosed SLE (9/2023), who initially presented to [facility] with chest pain and found to have bilateral PE as well pericardial and pleural effusion. Hematology consulted for hypercoagulable workup and management of PE. His CT-A chest (12/12/23): acute right upper lobe and left lower lobe segmental/subsegmental pulmonary emboli. Lower extremity Dopplers negative. Mother reports that he has been very active prior to this hospitalization. APLS work up is negative. Suspect that his PE may have been in the setting of inflammation given his elevated CRP/ESR and pericardial/pleural effusions thought to be inflammatory in etiology.  Once no further plans for procedures, can transition to Eliquis. Patient should follow up with his PMD 1 week after discharge and 3 months after discharge with a Hematologist. A referral to [facility] has already been made. Case d/w Dr. Burch.    Johanna Rivers M.D.  Hematology and Medical Oncology Fellow  Pager: [phone]  For weekends and evenings (5 pm - 8 am), please page Heme/Onc fellow on call.

## 2023-12-27 NOTE — PROGRESS NOTE ADULT - SUBJECTIVE AND OBJECTIVE BOX
TAYLA MARIE  [id number]    INTERVAL HPI/OVERNIGHT EVENTS: Pt s/p L chest tube placement on 12/26. Pt with new hoarseness and increased secretions requiring frequent suctioning. No fevers today. Otherwise no new rashes or joint complaints.       MEDICATIONS  (STANDING):  cefepime   IVPB 2000 milliGRAM(s) IV Intermittent every 8 hours  chlorhexidine 2% Cloths 1 Application(s) Topical daily  ciprofloxacin  0.3% Ophthalmic Solution for Otic Use 2 Drop(s) Both Ears two times a day  dexMEDEtomidine Infusion 0.2 MICROgram(s)/kG/Hr (3.46 mL/Hr) IV Continuous <Continuous>  heparin  Infusion. 1300 Unit(s)/Hr (13 mL/Hr) IV Continuous <Continuous>  hydroxychloroquine 200 milliGRAM(s) Oral two times a day  lactated ringers. 1000 milliLiter(s) (100 mL/Hr) IV Continuous <Continuous>  lactated ringers. 1000 milliLiter(s) (1000 mL/Hr) IV Continuous <Continuous>  lidocaine   4% Patch 1 Patch Transdermal every 24 hours  melatonin 3 milliGRAM(s) Oral <User Schedule>  sodium chloride 1 Gram(s) Oral three times a day    MEDICATIONS  (PRN):  acetaminophen     Tablet .. 650 milliGRAM(s) Oral every 6 hours PRN Temp greater or equal to 38C (100.4F), Mild Pain (1 - 3)  albuterol/ipratropium for Nebulization 3 milliLiter(s) Nebulizer every 6 hours PRN Shortness of Breath and/or Wheezing  benzocaine/menthol Lozenge 1 Lozenge Oral four times a day PRN Sore Throat  guaiFENesin Oral Liquid (Sugar-Free) 200 milliGRAM(s) Oral every 6 hours PRN Cough  heparin   Injectable 2500 Unit(s) IV Push every 6 hours PRN For aPTT between 40 - 57  heparin   Injectable 5500 Unit(s) IV Push every 6 hours PRN For aPTT less than 40  lidocaine 2% Viscous 5 milliLiter(s) Swish and Spit three times a day PRN Mouth Care  MIRACLE MOUTHWASH 15 milliLiter(s) Swish and Spit every 4 hours PRN oral pain      Allergies    No Known Allergies    Intolerances        Review of Systems:   as above       Vital Signs Last 24 Hrs  T(C): 37.8 (27 Dec 2023 16:00), Max: 38.5 (27 Dec 2023 14:00)  T(F): 100 (27 Dec 2023 16:00), Max: 101.3 (27 Dec 2023 14:00)  HR: 105 (27 Dec 2023 16:00) (82 - 112)  BP: 155/102 (27 Dec 2023 16:00) (110/70 - 157/84)  BP(mean): 117 (27 Dec 2023 16:00) (78 - 117)  RR: 27 (27 Dec 2023 16:00) (13 - 32)  SpO2: 97% (27 Dec 2023 16:00) (93% - 100%)    Parameters below as of 27 Dec 2023 16:00  Patient On (Oxygen Delivery Method): room air        Physical Exam:  General: NAD, alert    HEENT: +oral secretions requiring suctioning   Neuro: Awake, alert, moving all extremities       LABS:                        9.1    9.79  )-----------( 264      ( 27 Dec 2023 07:36 )             27.1     12-27    127<L>  |  96<L>  |  16  ----------------------------<  109<H>  4.2   |  21<L>  |  0.83    Ca    8.4      27 Dec 2023 00:31    TPro  6.9  /  Alb  2.7<L>  /  TBili  0.5  /  DBili  x   /  AST  152<H>  /  ALT  113<H>  /  AlkPhos  72  12-27    PT/INR - ( 27 Dec 2023 00:31 )   PT: 14.7 sec;   INR: 1.32 ratio         PTT - ( 27 Dec 2023 14:20 )  PTT:70.6 sec  Urinalysis Basic - ( 27 Dec 2023 00:31 )    Color: x / Appearance: x / SG: x / pH: x  Gluc: 109 mg/dL / Ketone: x  / Bili: x / Urobili: x   Blood: x / Protein: x / Nitrite: x   Leuk Esterase: x / RBC: x / WBC x   Sq Epi: x / Non Sq Epi: x / Bacteria: x    Ferritin: 9278 ng/mL (12.27.23 @ 12:18)   Vitamin D, 1, 25-Dihydroxy: 97.6 pg/mL (12.26.23 @ 18:25) Vitamin D, 25-Hydroxy: 21.0: Deficiency  MARY-1 Antibody: <0.2Ribosomal P Protein Antibody (12.26.23 @ 00:50)   Ribosomal P Protein Antibody: 0.2Immunoglobulin Subclass IgG4, Serum (12.26.23 @ 18:25)   IgG 4: 20.2 mg/dL    RADIOLOGY & ADDITIONAL TESTS:   TAYLA MARIE  [id number]    INTERVAL HPI/OVERNIGHT EVENTS: Pt s/p L chest tube placement on 12/26. Pt with new hoarseness and increased secretions requiring frequent suctioning. No fevers today. Otherwise no new rashes or joint complaints.       MEDICATIONS  (STANDING):  cefepime   IVPB 2000 milliGRAM(s) IV Intermittent every 8 hours  chlorhexidine 2% Cloths 1 Application(s) Topical daily  ciprofloxacin  0.3% Ophthalmic Solution for Otic Use 2 Drop(s) Both Ears two times a day  dexMEDEtomidine Infusion 0.2 MICROgram(s)/kG/Hr (3.46 mL/Hr) IV Continuous <Continuous>  heparin  Infusion. 1300 Unit(s)/Hr (13 mL/Hr) IV Continuous <Continuous>  hydroxychloroquine 200 milliGRAM(s) Oral two times a day  lactated ringers. 1000 milliLiter(s) (100 mL/Hr) IV Continuous <Continuous>  lactated ringers. 1000 milliLiter(s) (1000 mL/Hr) IV Continuous <Continuous>  lidocaine   4% Patch 1 Patch Transdermal every 24 hours  melatonin 3 milliGRAM(s) Oral <User Schedule>  sodium chloride 1 Gram(s) Oral three times a day    MEDICATIONS  (PRN):  acetaminophen     Tablet .. 650 milliGRAM(s) Oral every 6 hours PRN Temp greater or equal to 38C (100.4F), Mild Pain (1 - 3)  albuterol/ipratropium for Nebulization 3 milliLiter(s) Nebulizer every 6 hours PRN Shortness of Breath and/or Wheezing  benzocaine/menthol Lozenge 1 Lozenge Oral four times a day PRN Sore Throat  guaiFENesin Oral Liquid (Sugar-Free) 200 milliGRAM(s) Oral every 6 hours PRN Cough  heparin   Injectable 2500 Unit(s) IV Push every 6 hours PRN For aPTT between 40 - 57  heparin   Injectable 5500 Unit(s) IV Push every 6 hours PRN For aPTT less than 40  lidocaine 2% Viscous 5 milliLiter(s) Swish and Spit three times a day PRN Mouth Care  MIRACLE MOUTHWASH 15 milliLiter(s) Swish and Spit every 4 hours PRN oral pain      Allergies    No Known Allergies    Intolerances        Review of Systems:   as above       Vital Signs Last 24 Hrs  T(C): 37.8 (27 Dec 2023 16:00), Max: 38.5 (27 Dec 2023 14:00)  T(F): 100 (27 Dec 2023 16:00), Max: 101.3 (27 Dec 2023 14:00)  HR: 105 (27 Dec 2023 16:00) (82 - 112)  BP: 155/102 (27 Dec 2023 16:00) (110/70 - 157/84)  BP(mean): 117 (27 Dec 2023 16:00) (78 - 117)  RR: 27 (27 Dec 2023 16:00) (13 - 32)  SpO2: 97% (27 Dec 2023 16:00) (93% - 100%)    Parameters below as of 27 Dec 2023 16:00  Patient On (Oxygen Delivery Method): room air        Physical Exam:  General: NAD, alert    HEENT: +oral secretions requiring suctioning   Neuro: Awake, alert, moving all extremities       LABS:                        9.1    9.79  )-----------( 264      ( 27 Dec 2023 07:36 )             27.1     12-27    127<L>  |  96<L>  |  16  ----------------------------<  109<H>  4.2   |  21<L>  |  0.83    Ca    8.4      27 Dec 2023 00:31    TPro  6.9  /  Alb  2.7<L>  /  TBili  0.5  /  DBili  x   /  AST  152<H>  /  ALT  113<H>  /  AlkPhos  72  12-27    PT/INR - ( 27 Dec 2023 00:31 )   PT: 14.7 sec;   INR: 1.32 ratio         PTT - ( 27 Dec 2023 14:20 )  PTT:70.6 sec  Urinalysis Basic - ( 27 Dec 2023 00:31 )    Color: x / Appearance: x / SG: x / pH: x  Gluc: 109 mg/dL / Ketone: x  / Bili: x / Urobili: x   Blood: x / Protein: x / Nitrite: x   Leuk Esterase: x / RBC: x / WBC x   Sq Epi: x / Non Sq Epi: x / Bacteria: x    Ferritin: 9278 ng/mL (12.27.23 @ 12:18)   Vitamin D, 1, 25-Dihydroxy: 97.6 pg/mL (12.26.23 @ 18:25) Vitamin D, 25-Hydroxy: 21.0: Deficiency  MARY-1 Antibody: <0.2Ribosomal P Protein Antibody (12.26.23 @ 00:50)   Ribosomal P Protein Antibody: 0.2Immunoglobulin Subclass IgG4, Serum (12.26.23 @ 18:25)   IgG 4: 20.2 mg/dL    RADIOLOGY & ADDITIONAL TESTS:   TAYLA MARIE  [id number]    INTERVAL HPI/OVERNIGHT EVENTS: Pt s/p L chest tube placement on 12/26. Pt with new hoarseness and increased secretions requiring frequent suctioning. No fevers today. Otherwise no new rashes or joint complaints.       MEDICATIONS  (STANDING):  cefepime   IVPB 2000 milliGRAM(s) IV Intermittent every 8 hours  chlorhexidine 2% Cloths 1 Application(s) Topical daily  ciprofloxacin  0.3% Ophthalmic Solution for Otic Use 2 Drop(s) Both Ears two times a day  dexMEDEtomidine Infusion 0.2 MICROgram(s)/kG/Hr (3.46 mL/Hr) IV Continuous <Continuous>  heparin  Infusion. 1300 Unit(s)/Hr (13 mL/Hr) IV Continuous <Continuous>  hydroxychloroquine 200 milliGRAM(s) Oral two times a day  lactated ringers. 1000 milliLiter(s) (100 mL/Hr) IV Continuous <Continuous>  lactated ringers. 1000 milliLiter(s) (1000 mL/Hr) IV Continuous <Continuous>  lidocaine   4% Patch 1 Patch Transdermal every 24 hours  melatonin 3 milliGRAM(s) Oral <User Schedule>  sodium chloride 1 Gram(s) Oral three times a day    MEDICATIONS  (PRN):  acetaminophen     Tablet .. 650 milliGRAM(s) Oral every 6 hours PRN Temp greater or equal to 38C (100.4F), Mild Pain (1 - 3)  albuterol/ipratropium for Nebulization 3 milliLiter(s) Nebulizer every 6 hours PRN Shortness of Breath and/or Wheezing  benzocaine/menthol Lozenge 1 Lozenge Oral four times a day PRN Sore Throat  guaiFENesin Oral Liquid (Sugar-Free) 200 milliGRAM(s) Oral every 6 hours PRN Cough  heparin   Injectable 2500 Unit(s) IV Push every 6 hours PRN For aPTT between 40 - 57  heparin   Injectable 5500 Unit(s) IV Push every 6 hours PRN For aPTT less than 40  lidocaine 2% Viscous 5 milliLiter(s) Swish and Spit three times a day PRN Mouth Care  MIRACLE MOUTHWASH 15 milliLiter(s) Swish and Spit every 4 hours PRN oral pain      Allergies    No Known Allergies    Intolerances        Review of Systems:   as above       Vital Signs Last 24 Hrs  T(C): 37.8 (27 Dec 2023 16:00), Max: 38.5 (27 Dec 2023 14:00)  T(F): 100 (27 Dec 2023 16:00), Max: 101.3 (27 Dec 2023 14:00)  HR: 105 (27 Dec 2023 16:00) (82 - 112)  BP: 155/102 (27 Dec 2023 16:00) (110/70 - 157/84)  BP(mean): 117 (27 Dec 2023 16:00) (78 - 117)  RR: 27 (27 Dec 2023 16:00) (13 - 32)  SpO2: 97% (27 Dec 2023 16:00) (93% - 100%)    Parameters below as of 27 Dec 2023 16:00  Patient On (Oxygen Delivery Method): room air        Physical Exam:  General: NAD, alert    HEENT: +oral secretions requiring suctioning   Neuro: Awake, alert, moving all extremities       LABS:                        9.1    9.79  )-----------( 264      ( 27 Dec 2023 07:36 )             27.1     12-27    127<L>  |  96<L>  |  16  ----------------------------<  109<H>  4.2   |  21<L>  |  0.83    Ca    8.4      27 Dec 2023 00:31    TPro  6.9  /  Alb  2.7<L>  /  TBili  0.5  /  DBili  x   /  AST  152<H>  /  ALT  113<H>  /  AlkPhos  72  12-27    PT/INR - ( 27 Dec 2023 00:31 )   PT: 14.7 sec;   INR: 1.32 ratio         PTT - ( 27 Dec 2023 14:20 )  PTT:70.6 sec  Urinalysis Basic - ( 27 Dec 2023 00:31 )    Color: x / Appearance: x / SG: x / pH: x  Gluc: 109 mg/dL / Ketone: x  / Bili: x / Urobili: x   Blood: x / Protein: x / Nitrite: x   Leuk Esterase: x / RBC: x / WBC x   Sq Epi: x / Non Sq Epi: x / Bacteria: x    Ferritin: 9278 ng/mL (12.27.23 @ 12:18)   Vitamin D, 1, 25-Dihydroxy: 97.6 pg/mL (12.26.23 @ 18:25) Vitamin D, 25-Hydroxy: 21.0: Deficiency  MARY-1 Antibody: <0.2Ribosomal P Protein Antibody (12.26.23 @ 00:50)   Ribosomal P Protein Antibody: 0.2Immunoglobulin Subclass IgG4, Serum (12.26.23 @ 18:25)   IgG 4: 20.2 mg/dL   TAYLA MARIE  [id number]    INTERVAL HPI/OVERNIGHT EVENTS: Pt s/p L chest tube placement on 12/26. Pt with new hoarseness and increased secretions requiring frequent suctioning. No fevers today. Otherwise no new rashes or joint complaints.       MEDICATIONS  (STANDING):  cefepime   IVPB 2000 milliGRAM(s) IV Intermittent every 8 hours  chlorhexidine 2% Cloths 1 Application(s) Topical daily  ciprofloxacin  0.3% Ophthalmic Solution for Otic Use 2 Drop(s) Both Ears two times a day  dexMEDEtomidine Infusion 0.2 MICROgram(s)/kG/Hr (3.46 mL/Hr) IV Continuous <Continuous>  heparin  Infusion. 1300 Unit(s)/Hr (13 mL/Hr) IV Continuous <Continuous>  hydroxychloroquine 200 milliGRAM(s) Oral two times a day  lactated ringers. 1000 milliLiter(s) (100 mL/Hr) IV Continuous <Continuous>  lactated ringers. 1000 milliLiter(s) (1000 mL/Hr) IV Continuous <Continuous>  lidocaine   4% Patch 1 Patch Transdermal every 24 hours  melatonin 3 milliGRAM(s) Oral <User Schedule>  sodium chloride 1 Gram(s) Oral three times a day    MEDICATIONS  (PRN):  acetaminophen     Tablet .. 650 milliGRAM(s) Oral every 6 hours PRN Temp greater or equal to 38C (100.4F), Mild Pain (1 - 3)  albuterol/ipratropium for Nebulization 3 milliLiter(s) Nebulizer every 6 hours PRN Shortness of Breath and/or Wheezing  benzocaine/menthol Lozenge 1 Lozenge Oral four times a day PRN Sore Throat  guaiFENesin Oral Liquid (Sugar-Free) 200 milliGRAM(s) Oral every 6 hours PRN Cough  heparin   Injectable 2500 Unit(s) IV Push every 6 hours PRN For aPTT between 40 - 57  heparin   Injectable 5500 Unit(s) IV Push every 6 hours PRN For aPTT less than 40  lidocaine 2% Viscous 5 milliLiter(s) Swish and Spit three times a day PRN Mouth Care  MIRACLE MOUTHWASH 15 milliLiter(s) Swish and Spit every 4 hours PRN oral pain      Allergies    No Known Allergies    Intolerances        Review of Systems:   as above       Vital Signs Last 24 Hrs  T(C): 37.8 (27 Dec 2023 16:00), Max: 38.5 (27 Dec 2023 14:00)  T(F): 100 (27 Dec 2023 16:00), Max: 101.3 (27 Dec 2023 14:00)  HR: 105 (27 Dec 2023 16:00) (82 - 112)  BP: 155/102 (27 Dec 2023 16:00) (110/70 - 157/84)  BP(mean): 117 (27 Dec 2023 16:00) (78 - 117)  RR: 27 (27 Dec 2023 16:00) (13 - 32)  SpO2: 97% (27 Dec 2023 16:00) (93% - 100%)    Parameters below as of 27 Dec 2023 16:00  Patient On (Oxygen Delivery Method): room air        Physical Exam:  General: NAD, alert    HEENT: +oral secretions requiring suctioning   Neuro: Awake, alert, moving all extremities       LABS:                        9.1    9.79  )-----------( 264      ( 27 Dec 2023 07:36 )             27.1     12-27    127<L>  |  96<L>  |  16  ----------------------------<  109<H>  4.2   |  21<L>  |  0.83    Ca    8.4      27 Dec 2023 00:31    TPro  6.9  /  Alb  2.7<L>  /  TBili  0.5  /  DBili  x   /  AST  152<H>  /  ALT  113<H>  /  AlkPhos  72  12-27    PT/INR - ( 27 Dec 2023 00:31 )   PT: 14.7 sec;   INR: 1.32 ratio         PTT - ( 27 Dec 2023 14:20 )  PTT:70.6 sec  Urinalysis Basic - ( 27 Dec 2023 00:31 )    Color: x / Appearance: x / SG: x / pH: x  Gluc: 109 mg/dL / Ketone: x  / Bili: x / Urobili: x   Blood: x / Protein: x / Nitrite: x   Leuk Esterase: x / RBC: x / WBC x   Sq Epi: x / Non Sq Epi: x / Bacteria: x    Ferritin: 9278 ng/mL (12.27.23 @ 12:18)   Vitamin D, 1, 25-Dihydroxy: 97.6 pg/mL (12.26.23 @ 18:25) Vitamin D, 25-Hydroxy: 21.0: Deficiency  MARY-1 Antibody: <0.2Ribosomal P Protein Antibody (12.26.23 @ 00:50)   Ribosomal P Protein Antibody: 0.2Immunoglobulin Subclass IgG4, Serum (12.26.23 @ 18:25)   IgG 4: 20.2 mg/dL

## 2023-12-27 NOTE — PROGRESS NOTE ADULT - ATTENDING COMMENTS
28M PMH SLE (dx 09/2023, on Plaquenil) who initially p/w L pleuritic CP and [facility], found to have acute RUL and LLL segmental/subsegmental PE's as well as bilateral lower lobe consolidations with areas of central clearing, concerning for PNA vs pulmonary infarct. He was started on A/C with course c/b worsening hypoxemic respiratory failure with development of pleural effusions. He was transferred to [facility] on 12/22 for thoracic surgery evaluation s/p diagnostic left thoracentesis on 12/22, which was exudative with negative cultures. He was started on methylprednisolone 40 mg IV daily on 12/23 for likely lupus flare, but then on 12/25 he developed acute chest pain, dyspnea, fever to 104.5, and GTC seizure, transferred to MICU for further care. Labs with significant hyponatremia and elevated CPK/AST/ALT due to mild acute rhabdo, now improved. Found today on bedside ultrasound to have a very loculated left pleural effusion with thick septations and dense echogenic material within, worrisome for empyema vs. contained hemothorax.    - Awake and alert, no further seizures, EEG negative keep off AED's at this time. Eventually needs MRI. Neuro follow-up  - HD stable, no RV strain on echo. Continue heparin gtt for PE's  - Doing well on room air with normal SpO2. Left chest tube in place, taken off suction due to patient discomfort. Continue chest tube to water seal. S/p tPA/dornase laura with resolution of ex vacuo PTX. Left-sided pleural effusion is significantly improved on bedside ultrasound with less echogenicity and septations. After discussion with thoracic surgery, will hold off on further tPA/dornase given mild blood-tinged sputum and high risk of pleural hemorrhage while on full a/c. Follow-up thoracic surgery regarding need for VATS/decortication. Pending CT chest/abdomen/pelvis  - Diet as tolerates  - Hyponatremia slowly improving, suspect SIADH from pain vs. urinary retention. Continue salt tabs  - Continue empiric vancomycin and cefepime for fevers and sepsis, possibly due to empyema vs. pneumonia. Cultures so far negative. Follow-up ID  - Appreciate rheum eval, hold steroids for now. Rheum workup so far revealing for high ABDIAS (1:1280), Pizano>8, RNP>8, SS-A>8, and high urine protein/creatinine ratio. Also with elevated 1,25-diOH vit D, unclear if there is concomitant granulomatous inflammation (e.g., sarcoid vs. granulomatous infection). Remaining workup negative, including APLS labs, Janine-1, ribosomal P protein, RF, IgG4, immunoglobulins. Pending ANCA, MPO, PR3, quantiferon, myomarker panel. Ferritin markedly elevated to 9000s, ?HLH. Check ACE, CCP, scleroderma, centromere, RNA polymerase III. Follow-up rheumatology recs. Pending CT chest/abd/pelvis. Derm eval pending  - IR evaluation for biopsy of axillary lymphadenopathy  - Appreciate heme follow-up, APLS workup negative. Follow-up Factor V Leiden, PT gene mutation, JAK2  - Prognosis guarded, full code, discussed with patient and mother at bedside  CC time spent: 35 min 28M PMH SLE (dx 09/2023, on Plaquenil) who [facility], found to have acute RUL and LLL segmental/subsegmental PE's as well as bilateral lower lobe consolidations with areas of central clearing, concerning for PNA vs pulmonary infarct. He was started on A/C with course c/b worsening hypoxemic respiratory failure with development of pleural effusions. He was transferred to [facility] on 12/22 for thoracic surgery evaluation s/p diagnostic left thoracentesis on 12/22, which was exudative with negative cultures. He was started on methylprednisolone 40 mg IV daily on 12/23 for likely lupus flare, but then on 12/25 he developed acute chest pain, dyspnea, fever to 104.5, and GTC seizure, transferred to MICU for further care. Labs with significant hyponatremia and elevated CPK/AST/ALT due to mild acute rhabdo, now improved. Found today on bedside ultrasound to have a very loculated left pleural effusion with thick septations and dense echogenic material within, worrisome for empyema vs. contained hemothorax.    - Awake and alert, no further seizures, EEG negative keep off AED's at this time. Eventually needs MRI. Neuro follow-up  - HD stable, no RV strain on echo. Continue heparin gtt for PE's  - Doing well on room air with normal SpO2. Left chest tube in place, taken off suction due to patient discomfort. Continue chest tube to water seal. S/p tPA/dornase laura with resolution of ex vacuo PTX. Left-sided pleural effusion is significantly improved on bedside ultrasound with less echogenicity and septations. After discussion with thoracic surgery, will hold off on further tPA/dornase given mild blood-tinged sputum and high risk of pleural hemorrhage while on full a/c. Follow-up thoracic surgery regarding need for VATS/decortication. Pending CT chest/abdomen/pelvis  - Diet as tolerates  - Hyponatremia slowly improving, suspect SIADH from pain vs. urinary retention. Continue salt tabs  - Continue empiric vancomycin and cefepime for fevers and sepsis, possibly due to empyema vs. pneumonia. Cultures so far negative. Follow-up ID  - Appreciate rheum eval, hold steroids for now. Rheum workup so far revealing for high ABDIAS (1:1280), Pizano>8, RNP>8, SS-A>8, and high urine protein/creatinine ratio. Also with elevated 1,25-diOH vit D, unclear if there is concomitant granulomatous inflammation (e.g., sarcoid vs. granulomatous infection). Remaining workup negative, including APLS labs, Janine-1, ribosomal P protein, RF, IgG4, immunoglobulins. Pending ANCA, MPO, PR3, quantiferon, myomarker panel. Ferritin markedly elevated to 9000s, ?HLH. Check ACE, CCP, scleroderma, centromere, RNA polymerase III. Follow-up rheumatology recs. Pending CT chest/abd/pelvis. Derm eval pending  - IR evaluation for biopsy of axillary lymphadenopathy  - Appreciate heme follow-up, APLS workup negative. Follow-up Factor V Leiden, PT gene mutation, JAK2  - Prognosis guarded, full code, discussed with patient and mother at bedside  CC time spent: 35 min

## 2023-12-27 NOTE — PROGRESS NOTE ADULT - SUBJECTIVE AND OBJECTIVE BOX
Subjective & Objective:  Pt was seen and examined by Dr. Sosa. Pt currently lying on bed, on room air, not in respiratory distress.     Vital Signs:  Vital Signs Last 24 Hrs  T(C): 37.6 (12-27-23 @ 08:00), Max: 37.9 (12-26-23 @ 16:00)  T(F): 99.6 (12-27-23 @ 08:00), Max: 100.2 (12-26-23 @ 16:00)  HR: 108 (12-27-23 @ 14:00) (82 - 112)  BP: 148/89 (12-27-23 @ 14:00) (110/68 - 148/96)  RR: 31 (12-27-23 @ 14:00) (13 - 31)  SpO2: 96% (12-27-23 @ 14:00) (93% - 100%) on (O2)    Appearance: No acute distress, lethargic  HEENT:  PERRLA   Lymphatic: No lymphadenopathy   Cardiovascular: Normal S1 S2, no JVD  Respiratory: normal effort , clear  Gastrointestinal:  Soft, Non-tender  Skin: No rashes,  warm to touch  Psychiatry:  Lethargic.   Musculoskeletal: No edema or joint swelling appreciated.    Tubes: Left chest tube on WS, output 40 cc    Social: Pt is a student  Not   No smoking. NO ETOH               Relevant labs, radiology and Medications reviewed                        9.1    9.79  )-----------( 264      ( 27 Dec 2023 07:36 )             27.1     12-27    127<L>  |  96<L>  |  16  ----------------------------<  109<H>  4.2   |  21<L>  |  0.83    Ca    8.4      27 Dec 2023 00:31    TPro  6.9  /  Alb  2.7<L>  /  TBili  0.5  /  DBili  x   /  AST  152<H>  /  ALT  113<H>  /  AlkPhos  72  12-27    PT/INR - ( 27 Dec 2023 00:31 )   PT: 14.7 sec;   INR: 1.32 ratio         PTT - ( 27 Dec 2023 07:36 )  PTT:81.6 sec  MEDICATIONS  (STANDING):  cefepime   IVPB 2000 milliGRAM(s) IV Intermittent every 8 hours  chlorhexidine 2% Cloths 1 Application(s) Topical daily  ciprofloxacin  0.3% Ophthalmic Solution for Otic Use 2 Drop(s) Both Ears two times a day  dexMEDEtomidine Infusion 0.2 MICROgram(s)/kG/Hr (3.46 mL/Hr) IV Continuous <Continuous>  heparin  Infusion. 1300 Unit(s)/Hr (13 mL/Hr) IV Continuous <Continuous>  hydroxychloroquine 200 milliGRAM(s) Oral two times a day  lactated ringers. 1000 milliLiter(s) (100 mL/Hr) IV Continuous <Continuous>  lactated ringers. 1000 milliLiter(s) (1000 mL/Hr) IV Continuous <Continuous>  lidocaine   4% Patch 1 Patch Transdermal every 24 hours  melatonin 3 milliGRAM(s) Oral <User Schedule>  sodium chloride 1 Gram(s) Oral three times a day    MEDICATIONS  (PRN):  acetaminophen     Tablet .. 650 milliGRAM(s) Oral every 6 hours PRN Temp greater or equal to 38C (100.4F), Mild Pain (1 - 3)  albuterol/ipratropium for Nebulization 3 milliLiter(s) Nebulizer every 6 hours PRN Shortness of Breath and/or Wheezing  benzocaine/menthol Lozenge 1 Lozenge Oral four times a day PRN Sore Throat  guaiFENesin Oral Liquid (Sugar-Free) 200 milliGRAM(s) Oral every 6 hours PRN Cough  heparin   Injectable 5500 Unit(s) IV Push every 6 hours PRN For aPTT less than 40  heparin   Injectable 2500 Unit(s) IV Push every 6 hours PRN For aPTT between 40 - 57  lidocaine 2% Viscous 5 milliLiter(s) Swish and Spit three times a day PRN Mouth Care  MIRACLE MOUTHWASH 15 milliLiter(s) Swish and Spit every 4 hours PRN oral pain    Pertinent Physical Exam  I&O's Summary    26 Dec 2023 07:01  -  27 Dec 2023 07:00  --------------------------------------------------------  IN: 3643.3 mL / OUT: 3300 mL / NET: 343.3 mL    27 Dec 2023 07:01  -  27 Dec 2023 14:21  --------------------------------------------------------  IN: 570 mL / OUT: 515 mL / NET: 55 mL    Assessment  28 years old male with h/o Lupus ( diagnosed in 09/2023, on Plaquenil present to [place] ED on 12/12/23  with complain of chest pain and SOB admitted for fevers, PE, pleural effusions, course c/b high fever and tonic-clonic seizure, transferred to MICU for post-ictal and infectious monitoring. B/l pleural effusions S/p L sided chest tube & MIST protocol on 12/26/23.      PLAN  -continue care per MICU team  -Following culture results  -Will follow with daily CXR and left chest tube monitoring  -Recommended to stop alteplase/MIST protocol for now   -Plan above d/w Dr. Sosa  -please call with concerns    Edinson OLSON [id number] Subjective & Objective:  Pt was seen and examined by Dr. Sosa. Pt currently lying on bed, on room air, not in respiratory distress.     Vital Signs:  Vital Signs Last 24 Hrs  T(C): 37.6 (12-27-23 @ 08:00), Max: 37.9 (12-26-23 @ 16:00)  T(F): 99.6 (12-27-23 @ 08:00), Max: 100.2 (12-26-23 @ 16:00)  HR: 108 (12-27-23 @ 14:00) (82 - 112)  BP: 148/89 (12-27-23 @ 14:00) (110/68 - 148/96)  RR: 31 (12-27-23 @ 14:00) (13 - 31)  SpO2: 96% (12-27-23 @ 14:00) (93% - 100%) on (O2)    Appearance: No acute distress, lethargic  HEENT:  PERRLA   Lymphatic: No lymphadenopathy   Cardiovascular: Normal S1 S2, no JVD  Respiratory: normal effort , clear  Gastrointestinal:  Soft, Non-tender  Skin: No rashes,  warm to touch  Psychiatry:  Lethargic.   Musculoskeletal: No edema or joint swelling appreciated.    Tubes: Left chest tube on WS, output 40 cc    Social: Pt is a student  Not   No smoking. NO ETOH               Relevant labs, radiology and Medications reviewed                        9.1    9.79  )-----------( 264      ( 27 Dec 2023 07:36 )             27.1     12-27    127<L>  |  96<L>  |  16  ----------------------------<  109<H>  4.2   |  21<L>  |  0.83    Ca    8.4      27 Dec 2023 00:31    TPro  6.9  /  Alb  2.7<L>  /  TBili  0.5  /  DBili  x   /  AST  152<H>  /  ALT  113<H>  /  AlkPhos  72  12-27    PT/INR - ( 27 Dec 2023 00:31 )   PT: 14.7 sec;   INR: 1.32 ratio         PTT - ( 27 Dec 2023 07:36 )  PTT:81.6 sec  MEDICATIONS  (STANDING):  cefepime   IVPB 2000 milliGRAM(s) IV Intermittent every 8 hours  chlorhexidine 2% Cloths 1 Application(s) Topical daily  ciprofloxacin  0.3% Ophthalmic Solution for Otic Use 2 Drop(s) Both Ears two times a day  dexMEDEtomidine Infusion 0.2 MICROgram(s)/kG/Hr (3.46 mL/Hr) IV Continuous <Continuous>  heparin  Infusion. 1300 Unit(s)/Hr (13 mL/Hr) IV Continuous <Continuous>  hydroxychloroquine 200 milliGRAM(s) Oral two times a day  lactated ringers. 1000 milliLiter(s) (100 mL/Hr) IV Continuous <Continuous>  lactated ringers. 1000 milliLiter(s) (1000 mL/Hr) IV Continuous <Continuous>  lidocaine   4% Patch 1 Patch Transdermal every 24 hours  melatonin 3 milliGRAM(s) Oral <User Schedule>  sodium chloride 1 Gram(s) Oral three times a day    MEDICATIONS  (PRN):  acetaminophen     Tablet .. 650 milliGRAM(s) Oral every 6 hours PRN Temp greater or equal to 38C (100.4F), Mild Pain (1 - 3)  albuterol/ipratropium for Nebulization 3 milliLiter(s) Nebulizer every 6 hours PRN Shortness of Breath and/or Wheezing  benzocaine/menthol Lozenge 1 Lozenge Oral four times a day PRN Sore Throat  guaiFENesin Oral Liquid (Sugar-Free) 200 milliGRAM(s) Oral every 6 hours PRN Cough  heparin   Injectable 5500 Unit(s) IV Push every 6 hours PRN For aPTT less than 40  heparin   Injectable 2500 Unit(s) IV Push every 6 hours PRN For aPTT between 40 - 57  lidocaine 2% Viscous 5 milliLiter(s) Swish and Spit three times a day PRN Mouth Care  MIRACLE MOUTHWASH 15 milliLiter(s) Swish and Spit every 4 hours PRN oral pain    Pertinent Physical Exam  I&O's Summary    26 Dec 2023 07:01  -  27 Dec 2023 07:00  --------------------------------------------------------  IN: 3643.3 mL / OUT: 3300 mL / NET: 343.3 mL    27 Dec 2023 07:01  -  27 Dec 2023 14:21  --------------------------------------------------------  IN: 570 mL / OUT: 515 mL / NET: 55 mL    Assessment  28 years old male with h/o Lupus ( diagnosed in 09/2023, on Plaquenil present to [place] ED on 12/12/23  with complain of chest pain and SOB admitted for fevers, PE, pleural effusions, course c/b high fever and tonic-clonic seizure, transferred to MICU for post-ictal and infectious monitoring. B/l pleural effusions S/p L sided chest tube & MIST protocol on 12/26/23.      PLAN  -continue care per MICU team  -Following culture results  -Will follow with daily CXR and left chest tube monitoring  -Recommended to stop alteplase/MIST protocol for now   -Plan above d/w Dr. Sosa  -please call with concerns    Edinson OLSON [id number]

## 2023-12-27 NOTE — CONSULT NOTE ADULT - SUBJECTIVE AND OBJECTIVE BOX
HPI:  Patient is a 28y Male with h/o Lupus ( diagnosed in 09/2023, on Plaquenil), presented to [place] ED 12/12 w/ chest pain and shortness of breath, found to have acute PE and bilateral effusions, transferred to [facility] for CT surgery evaluation. Now s/p multiple thoracentesis and pigtail catheter insertion 12/26. Course c/b superimposed pneumonia ID also following due to persistent fevers even while on zosyn, since changed to cefepime/vanc with some improvement. 12/25 rapid was called due to worsening shortness of breath and patient had witnessed convulsions, CT head without etiology, VEEG with abnormal background slowing. He has continued to have worsening shortness of breath, increased secretions, and now today reports new hoarseness. He denies difficulty swallowing. He reports increased secretions but denies difficulty swallowing secretions, choking. He reports some left sided neck pain, no difficulty turning neck. Reports some intraoral pain at site of intraoral ulcers. ENT consulted due to new hoarseness today.         Physical Exam  T(C): 37.6 (12-27-23 @ 08:00), Max: 37.9 (12-26-23 @ 16:00)  HR: 112 (12-27-23 @ 12:00) (82 - 112)  BP: 148/96 (12-27-23 @ 12:00) (110/68 - 148/96)  RR: 23 (12-27-23 @ 12:00) (13 - 29)  SpO2: 93% (12-27-23 @ 12:00) (93% - 100%)    General: NAD, A+Ox3  Resp: No respiratory distress, stridor, or stertor, no accessory muscle use, no nasal flaring, on room air  Voice quality: mildly hoarse, wet  Face:  Symmetric without masses or lesions  OU: EOMI  Right: Pinna wnl  Left: Pinna wnl  Nose: nasal cavity with moderate crusting anteriorly bilaterally, no bleeding  OC/OP: tongue normal, floor of mouth soft, wnl, ulceration on hard palate, plaques on tongue, no masses or lesions, OP clear, significant oral secretions requiring frequent suctioning  Neck: soft/flat, mild level 2 left lymphadenopathy, neck range of motion fully intact, no pain with ROM in any direction    LARYNGOSCOPY EXAM:     Verbal consent was obtained from patient prior to procedure.    Indication: new hoarseness    Flexible laryngoscopy was performed and revealed the following:    Nasopharynx had no mass or exudate.    Base of tongue was symmetric and not enlarged.    Vallecula was clear    Moderate increase in secretions in bilateral pyriform sinuses, post cricoid space, and base of tongue. No gross aspiration visualized.    Epiglottis, both aryepiglottic folds and both false vocal folds were normal    Arytenoids both without edema and erythema     True vocal folds were fully mobile and without lesions.     Post cricoid area with mild edema    Interarytenoid edema was absent    The patient tolerated the procedure well.     HPI:  Patient is a 28y Male with h/o Lupus ( diagnosed in 09/2023, on Plaquenil), presented to [place] ED 12/12 w/ chest pain and shortness of breath, found to have acute PE and bilateral effusions, transferred to [facility] for CT surgery evaluation. Now s/p multiple thoracentesis and pigtail catheter insertion 12/26. Course c/b superimposed pneumonia ID also following due to persistent fevers even while on zosyn, since changed to cefepime/vanc with some improvement. 12/25 rapid was called due to worsening shortness of breath and patient had witnessed convulsions, CT head without etiology, VEEG with abnormal background slowing. He has continued to have worsening shortness of breath, increased secretions, and now today reports new hoarseness. He denies difficulty swallowing. He reports increased secretions but denies difficulty swallowing secretions, choking. He reports some left sided neck pain, no difficulty turning neck. Reports some intraoral pain at site of intraoral ulcers. ENT consulted due to new hoarseness today.         Physical Exam  T(C): 37.6 (12-27-23 @ 08:00), Max: 37.9 (12-26-23 @ 16:00)  HR: 112 (12-27-23 @ 12:00) (82 - 112)  BP: 148/96 (12-27-23 @ 12:00) (110/68 - 148/96)  RR: 23 (12-27-23 @ 12:00) (13 - 29)  SpO2: 93% (12-27-23 @ 12:00) (93% - 100%)    General: NAD, A+Ox3  Resp: No respiratory distress, stridor, or stertor, no accessory muscle use, no nasal flaring, on room air  Voice quality: mildly hoarse, wet  Face:  Symmetric without masses or lesions  OU: EOMI  Right: Pinna wnl  Left: Pinna wnl  Nose: nasal cavity with moderate crusting anteriorly bilaterally, no bleeding  OC/OP: tongue normal, floor of mouth soft, wnl, ulceration on hard palate, plaques on tongue, no masses or lesions, OP clear, significant oral secretions requiring frequent suctioning  Neck: soft/flat, mild level 2 left lymphadenopathy, neck range of motion fully intact, no pain with ROM in any direction    LARYNGOSCOPY EXAM:     Verbal consent was obtained from patient prior to procedure.    Indication: new hoarseness    Flexible laryngoscopy was performed and revealed the following:    Nasopharynx had no mass or exudate.    Base of tongue was symmetric and not enlarged.    Vallecula was clear    Moderate increase in secretions in bilateral pyriform sinuses, post cricoid space, and base of tongue. No gross aspiration visualized.    Epiglottis, both aryepiglottic folds and both false vocal folds were normal    Arytenoids both without edema and erythema     True vocal folds were fully mobile and without lesions.     Post cricoid area with mild edema    Interarytenoid edema was absent    The patient tolerated the procedure well.

## 2023-12-27 NOTE — CONSULT NOTE ADULT - ASSESSMENT
A/P: 28y Male with h/o Lupus ( diagnosed in 09/2023, on Plaquenil), admitted for chest pain and shortness of breath, found to have acute PE and bilateral pleural effusions, now s/p multiple thoracentesis and pigtail catheter insertion. Course also c/b superimposed pneumonia. Initially symptoms attributed to SLE, now team also considering superimposed infection. Course also c/b witnessed seizure during admission. ENT consulted today due to new hoarseness this morning. On exam, significant intraoral secretions. CT scan w/ BL cervical lymphadenopathy. Laryngoscopy demonstrating patent airway with bilateral mobile vocal cords.       --------------------------------------------------------------  Thank you for the consult,    Angeli Amezquita MD  Resident  Department of Otolaryngology - Head and Neck Surgery  Peds Page #[phone]  Adult Page #[phone]  ---------------------------------------------------------------   A/P: 28y Male with h/o Lupus ( diagnosed in 09/2023, on Plaquenil), admitted for chest pain and shortness of breath, found to have acute PE and bilateral pleural effusions, now s/p multiple thoracentesis and pigtail catheter insertion. Course also c/b superimposed pneumonia. Initially symptoms attributed to SLE, now team also considering superimposed infection. Course also c/b witnessed seizure during admission. ENT consulted today due to new hoarseness this morning. On exam, significant intraoral secretions. CT scan w/ BL cervical lymphadenopathy. Laryngoscopy demonstrating patent airway with bilateral mobile vocal cords.       --------------------------------------------------------------  Thank you for the consult,    Angeli Amezquita MD  Resident  Department of Otolaryngology - Head and Neck Surgery  Peds Page #[phone]  Adult Page #[phone]  ---------------------------------------------------------------   A/P: 28y Male with h/o Lupus ( diagnosed in 09/2023, on Plaquenil), admitted for chest pain and shortness of breath, found to have acute PE and bilateral pleural effusions, now s/p multiple thoracentesis and pigtail catheter insertion. Course also c/b superimposed pneumonia. Initially symptoms attributed to SLE, now team also considering superimposed infection. Course also c/b witnessed seizure during admission. ENT consulted today due to new hoarseness this morning. On exam, significant intraoral secretions. CT scan w/ BL cervical lymphadenopathy. Laryngoscopy demonstrating patent airway with bilateral mobile vocal cords. No acute ENT intervention at this time.    - Continue magic mouth wash, lidocaine swish and spit, Robitussin prn, and suctioning as needed  - Start PPI  - SLP eval  - remainder of workup per MICU    --------------------------------------------------------------  Thank you for the consult,    Angeli Amezquita MD  Resident  Department of Otolaryngology - Head and Neck Surgery  Peds Page #[phone]  Adult Page #[phone]  ---------------------------------------------------------------   A/P: 28y Male with h/o Lupus ( diagnosed in 09/2023, on Plaquenil), admitted for chest pain and shortness of breath, found to have acute PE and bilateral pleural effusions, now s/p multiple thoracentesis and pigtail catheter insertion. Course also c/b superimposed pneumonia. Initially symptoms attributed to SLE, now team also considering superimposed infection. Course also c/b witnessed seizure during admission. ENT consulted today due to new hoarseness this morning. On exam, significant intraoral secretions. CT scan w/ BL cervical lymphadenopathy. Laryngoscopy demonstrating patent airway with bilateral mobile vocal cords. No acute ENT intervention at this time.    - Continue magic mouth wash, lidocaine swish and spit, Robitussin prn, and suctioning as needed  - Start PPI  - SLP eval  - remainder of workup per MICU    --------------------------------------------------------------  Thank you for the consult,    Angeli Amezquita MD  Resident  Department of Otolaryngology - Head and Neck Surgery  Peds Page #[phone]  Adult Page #[phone]  ---------------------------------------------------------------

## 2023-12-27 NOTE — PROGRESS NOTE ADULT - NUTRITIONAL ASSESSMENT
This patient has been assessed with a concern for Malnutrition and has been determined to have a diagnosis/diagnoses of Severe protein-calorie malnutrition.    This patient is being managed with:   Diet Regular-  1000mL Fluid Restriction ([id number])  Entered: Dec 22 2023 11:02PM   This patient has been assessed with a concern for Malnutrition and has been determined to have a diagnosis/diagnoses of Severe protein-calorie malnutrition.    This patient is being managed with:   Diet Regular-  1000mL Fluid Restriction ([id number])  Entered: Dec 22 2023 11:02PM   Detail Level: Zone Size Of Lesion In Cm (Optional): 0

## 2023-12-27 NOTE — CONSULT NOTE ADULT - SUBJECTIVE AND OBJECTIVE BOX
NEPHROLOGY CONSULTATION NOTE    Patient is a 28y Male with SLE who presented to [facility] with PEs, developed pleural effusions and transferred to [facility] in that context. Here, developed seizures in the setting of high grade fever and hyponatremia and transferred to MICU. On interview today, he offers no acute complaints.    PAST MEDICAL & SURGICAL HISTORY:  LE (lupus erythematosus)      Pulmonary embolism      No significant past surgical history        Allergies:  No Known Allergies    Home Medications Reviewed  Hospital Medications:   MEDICATIONS  (STANDING):  cefepime   IVPB 2000 milliGRAM(s) IV Intermittent every 8 hours  chlorhexidine 2% Cloths 1 Application(s) Topical daily  ciprofloxacin  0.3% Ophthalmic Solution for Otic Use 2 Drop(s) Both Ears two times a day  dexMEDEtomidine Infusion 0.2 MICROgram(s)/kG/Hr (3.46 mL/Hr) IV Continuous <Continuous>  heparin  Infusion. 1300 Unit(s)/Hr (13 mL/Hr) IV Continuous <Continuous>  hydroxychloroquine 200 milliGRAM(s) Oral two times a day  lactated ringers. 1000 milliLiter(s) (1000 mL/Hr) IV Continuous <Continuous>  lactated ringers. 1000 milliLiter(s) (100 mL/Hr) IV Continuous <Continuous>  melatonin 3 milliGRAM(s) Oral <User Schedule>  sodium chloride 1 Gram(s) Oral three times a day  vancomycin  IVPB      vancomycin  IVPB 1500 milliGRAM(s) IV Intermittent every 12 hours    SOCIAL HISTORY:  Denies ETOH,Smoking,   FAMILY HISTORY:  No pertinent family history in first degree relatives        REVIEW OF SYSTEMS:  CONSTITUTIONAL: No weakness, fevers or chills  EYES/ENT: No visual changes;  No vertigo or throat pain   NECK: No pain or stiffness  RESPIRATORY: No cough, wheezing, hemoptysis; No shortness of breath  CARDIOVASCULAR: No chest pain or palpitations.  GASTROINTESTINAL: No abdominal or epigastric pain. No nausea, vomiting, or hematemesis; No diarrhea or constipation. No melena or hematochezia.  GENITOURINARY: No dysuria, frequency, foamy urine, urinary urgency, incontinence or hematuria  NEUROLOGICAL: No numbness or weakness  SKIN: No itching, burning, rashes, or lesions   VASCULAR: No bilateral lower extremity edema.   All other review of systems is negative unless indicated above.    VITALS:  Vital Signs Last 24 Hrs  T(C): 37.6 (27 Dec 2023 08:00), Max: 37.9 (26 Dec 2023 16:00)  T(F): 99.6 (27 Dec 2023 08:00), Max: 100.2 (26 Dec 2023 16:00)  HR: 112 (27 Dec 2023 12:00) (82 - 112)  BP: 148/96 (27 Dec 2023 12:00) (110/68 - 148/96)  BP(mean): 108 (27 Dec 2023 12:00) (76 - 108)  RR: 23 (27 Dec 2023 12:00) (13 - 29)  SpO2: 93% (27 Dec 2023 12:00) (93% - 100%)    Parameters below as of 27 Dec 2023 12:00  Patient On (Oxygen Delivery Method): room air        12-26 @ 07:01  -  12-27 @ 07:00  --------------------------------------------------------  IN: 3643.3 mL / OUT: 3300 mL / NET: 343.3 mL    12-27 @ 07:01  -  12-27 @ 12:43  --------------------------------------------------------  IN: 570 mL / OUT: 515 mL / NET: 55 mL        PHYSICAL EXAM:  Constitutional: disheveled, ill appearing  HEENT: anicteric sclera, MMM, +oral secretions  Neck: No JVD  Respiratory: CTAB, no wheezes, rales or rhonchi; +chest tube to water seal  Cardiovascular: S1, S2, RRR  Gastrointestinal: BS+, soft, NT/ND  Extremities: No cyanosis or clubbing. No peripheral edema  Neurological: A/O x 3, no focal deficits  Psychiatric: flat affect  : No CVA tenderness. No omer.   Skin: No rashes    LABS:  12-27    127<L>  |  96<L>  |  16  ----------------------------<  109<H>  4.2   |  21<L>  |  0.83    Ca    8.4      27 Dec 2023 00:31    TPro  6.9  /  Alb  2.7<L>  /  TBili  0.5  /  DBili      /  AST  152<H>  /  ALT  113<H>  /  AlkPhos  72  12-27    Creatinine Trend: 0.83 <--, 0.90 <--, 1.04 <--, 1.23 <--, 1.18 <--, 1.00 <--, 1.02 <--, 1.00 <--, 1.13 <--, 0.99 <--, 0.96 <--, 1.10 <--, 1.28 <--, 1.30 <--, 1.08 <--, 1.14 <--, 1.24 <--, 1.27 <--, 1.33 <--                        9.1    9.79  )-----------( 264      ( 27 Dec 2023 07:36 )             27.1     Urine Studies:  Urinalysis Basic - ( 27 Dec 2023 00:31 )    Color:  / Appearance:  / SG:  / pH:   Gluc: 109 mg/dL / Ketone:   / Bili:  / Urobili:    Blood:  / Protein:  / Nitrite:    Leuk Esterase:  / RBC:  / WBC    Sq Epi:  / Non Sq Epi:  / Bacteria:       Creatinine, Random Urine: 97 mg/dL (12-26 @ 19:40)  Protein/Creatinine Ratio Calculation: 1.2 Ratio (12-26 @ 19:40)  Sodium, Random Urine: 86 mmol/L (12-25 @ 12:55)  Creatinine, Random Urine: 99 mg/dL (12-25 @ 12:55)     NEPHROLOGY CONSULTATION NOTE    Patient is a 28y Male with SLE who presented to [facility] with PEs, developed pleural effusions and transferred to [facility] in that context. Here, developed seizures in the setting of high grade fever and hyponatremia and transferred to MICU. On interview today, he offers no acute complaints.    PAST MEDICAL & SURGICAL HISTORY:  LE (lupus erythematosus)      Pulmonary embolism      No significant past surgical history        Allergies:  No Known Allergies    Home Medications Reviewed  Hospital Medications:   MEDICATIONS  (STANDING):  cefepime   IVPB 2000 milliGRAM(s) IV Intermittent every 8 hours  chlorhexidine 2% Cloths 1 Application(s) Topical daily  ciprofloxacin  0.3% Ophthalmic Solution for Otic Use 2 Drop(s) Both Ears two times a day  dexMEDEtomidine Infusion 0.2 MICROgram(s)/kG/Hr (3.46 mL/Hr) IV Continuous <Continuous>  heparin  Infusion. 1300 Unit(s)/Hr (13 mL/Hr) IV Continuous <Continuous>  hydroxychloroquine 200 milliGRAM(s) Oral two times a day  lactated ringers. 1000 milliLiter(s) (1000 mL/Hr) IV Continuous <Continuous>  lactated ringers. 1000 milliLiter(s) (100 mL/Hr) IV Continuous <Continuous>  melatonin 3 milliGRAM(s) Oral <User Schedule>  sodium chloride 1 Gram(s) Oral three times a day  vancomycin  IVPB      vancomycin  IVPB 1500 milliGRAM(s) IV Intermittent every 12 hours    SOCIAL HISTORY:  Denies ETOH,Smoking,   FAMILY HISTORY:  No pertinent family history in first degree relatives        REVIEW OF SYSTEMS:  CONSTITUTIONAL: No weakness, fevers or chills  EYES/ENT: No visual changes;  No vertigo or throat pain   NECK: No pain or stiffness  RESPIRATORY: No cough, wheezing, hemoptysis; No shortness of breath  CARDIOVASCULAR: No chest pain or palpitations.  GASTROINTESTINAL: No abdominal or epigastric pain. No nausea, vomiting, or hematemesis; No diarrhea or constipation. No melena or hematochezia.  GENITOURINARY: No dysuria, frequency, foamy urine, urinary urgency, incontinence or hematuria  NEUROLOGICAL: No numbness or weakness  SKIN: No itching, burning, rashes, or lesions   VASCULAR: No bilateral lower extremity edema.   All other review of systems is negative unless indicated above.    VITALS:  Vital Signs Last 24 Hrs  T(C): 37.6 (27 Dec 2023 08:00), Max: 37.9 (26 Dec 2023 16:00)  T(F): 99.6 (27 Dec 2023 08:00), Max: 100.2 (26 Dec 2023 16:00)  HR: 112 (27 Dec 2023 12:00) (82 - 112)  BP: 148/96 (27 Dec 2023 12:00) (110/68 - 148/96)  BP(mean): 108 (27 Dec 2023 12:00) (76 - 108)  RR: 23 (27 Dec 2023 12:00) (13 - 29)  SpO2: 93% (27 Dec 2023 12:00) (93% - 100%)    Parameters below as of 27 Dec 2023 12:00  Patient On (Oxygen Delivery Method): room air        12-26 @ 07:01  -  12-27 @ 07:00  --------------------------------------------------------  IN: 3643.3 mL / OUT: 3300 mL / NET: 343.3 mL    12-27 @ 07:01  -  12-27 @ 12:43  --------------------------------------------------------  IN: 570 mL / OUT: 515 mL / NET: 55 mL        PHYSICAL EXAM:  Constitutional: disheveled, ill appearing  HEENT: anicteric sclera, MMM, +oral secretions  Neck: No JVD  Respiratory: CTAB, no wheezes, rales or rhonchi; +chest tube to water seal  Cardiovascular: S1, S2, RRR  Gastrointestinal: BS+, soft, NT/ND  Extremities: No cyanosis or clubbing. No peripheral edema  Neurological: A/O x 3, no focal deficits  Psychiatric: flat affect  : No CVA tenderness. No omer.   Skin: No rashes    LABS:  12-27    127<L>  |  96<L>  |  16  ----------------------------<  109<H>  4.2   |  21<L>  |  0.83    Ca    8.4      27 Dec 2023 00:31    TPro  6.9  /  Alb  2.7<L>  /  TBili  0.5  /  DBili      /  AST  152<H>  /  ALT  113<H>  /  AlkPhos  72  12-27    Creatinine Trend: 0.83 <--, 0.90 <--, 1.04 <--, 1.23 <--, 1.18 <--, 1.00 <--, 1.02 <--, 1.00 <--, 1.13 <--, 0.99 <--, 0.96 <--, 1.10 <--, 1.28 <--, 1.30 <--, 1.08 <--, 1.14 <--, 1.24 <--, 1.27 <--, 1.33 <--                        9.1    9.79  )-----------( 264      ( 27 Dec 2023 07:36 )             27.1     Urine Studies:  Urinalysis Basic - ( 27 Dec 2023 00:31 )    Color:  / Appearance:  / SG:  / pH:   Gluc: 109 mg/dL / Ketone:   / Bili:  / Urobili:    Blood:  / Protein:  / Nitrite:    Leuk Esterase:  / RBC:  / WBC    Sq Epi:  / Non Sq Epi:  / Bacteria:       Creatinine, Random Urine: 97 mg/dL (12-26 @ 19:40)  Protein/Creatinine Ratio Calculation: 1.2 Ratio (12-26 @ 19:40)  Sodium, Random Urine: 86 mmol/L (12-25 @ 12:55)  Creatinine, Random Urine: 99 mg/dL (12-25 @ 12:55)

## 2023-12-27 NOTE — PROGRESS NOTE ADULT - ASSESSMENT
PE  a/c as per ICU   no evidence of right heart strain   no significant evidence of myocardial injury   normal LV function     Pericardial effusion   inflammatory in setting of SLE   trace effusion   no sign of tamponade  repeat echo in few weeks for surveillance     Fevers  as per ID / Rheumatology

## 2023-12-27 NOTE — PROGRESS NOTE ADULT - SUBJECTIVE AND OBJECTIVE BOX
Infectious Diseases Follow Up:    Patient is a 28y old  Male who presents with a chief complaint of 27 yo M w recent Dx Lupus Erythematosus (Sept 2023).  Transferred to [facility] with b/l pleural effusions s/p thoracentesis, as well as pulmonary infarcts & pericardial effusion.  Pt. with L chest tube in place.      (26 Dec 2023 15:23)      Interval History/ROS:  Pt states he is feeling better, but unable to verbalize how he has. Excess oral secretions,   Unable to obtain rest of ROS.    Allergies  No Known Allergies        ANTIMICROBIALS:  cefepime   IVPB 2000 every 8 hours  hydroxychloroquine 200 two times a day  vancomycin  IVPB    vancomycin  IVPB 1500 every 12 hours      Current Abx:     Previous Abx     OTHER MEDS:  MEDICATIONS  (STANDING):  acetaminophen     Tablet .. 650 every 6 hours PRN  albuterol/ipratropium for Nebulization 3 every 6 hours PRN  dexMEDEtomidine Infusion 0.2 <Continuous>  guaiFENesin Oral Liquid (Sugar-Free) 200 every 6 hours PRN  heparin   Injectable 2500 every 6 hours PRN  heparin   Injectable 5500 every 6 hours PRN  heparin  Infusion. 1300 <Continuous>  melatonin 3 <User Schedule>      Vital Signs Last 24 Hrs  T(C): 37.6 (27 Dec 2023 08:00), Max: 37.9 (26 Dec 2023 16:00)  T(F): 99.6 (27 Dec 2023 08:00), Max: 100.2 (26 Dec 2023 16:00)  HR: 112 (27 Dec 2023 12:00) (82 - 112)  BP: 148/96 (27 Dec 2023 12:00) (110/68 - 148/96)  BP(mean): 108 (27 Dec 2023 12:00) (76 - 108)  RR: 23 (27 Dec 2023 12:00) (13 - 29)  SpO2: 93% (27 Dec 2023 12:00) (93% - 100%)    Parameters below as of 27 Dec 2023 12:00  Patient On (Oxygen Delivery Method): room air        PHYSICAL EXAM:  GENERAL: NAD  HEAD:  Atraumatic, Normocephalic  EYES: EOMI, PERRLA, conjunctiva and sclera clear  NECK: Supple, No JVD  MOUTH: Oral secretions   CHEST/LUNG: On NC, not in acute respiratory distress. +chest tube  HEART: +tachy  ABDOMEN: Soft, Nontender, Nondistended; Bowel sounds present  EXTREMITIES:  lesions on palms   PSYCH: AAOx3                            9.1    9.79  )-----------( 264      ( 27 Dec 2023 07:36 )             27.1       12-27    127<L>  |  96<L>  |  16  ----------------------------<  109<H>  4.2   |  21<L>  |  0.83    Ca    8.4      27 Dec 2023 00:31    TPro  6.9  /  Alb  2.7<L>  /  TBili  0.5  /  DBili  x   /  AST  152<H>  /  ALT  113<H>  /  AlkPhos  72  12-27      Urinalysis Basic - ( 27 Dec 2023 00:31 )    Color: x / Appearance: x / SG: x / pH: x  Gluc: 109 mg/dL / Ketone: x  / Bili: x / Urobili: x   Blood: x / Protein: x / Nitrite: x   Leuk Esterase: x / RBC: x / WBC x   Sq Epi: x / Non Sq Epi: x / Bacteria: x        MICROBIOLOGY:  Vancomycin Level, Trough: 5.3 ug/mL (12-27-23 @ 04:40)  v  Pleural Fl Pleural Fluid  12-26-23   Testing in progress  --    polymorphonuclear leukocytes seen  No organisms seen  by cytocentrifuge      Pleural Fl Pleural Fluid  12-25-23   No growth  --    No polymorphonuclear cells seen seen per low power field  No organisms seen seen per oil power field      Pleural Fl Pleural Fluid  12-25-23   Testing in progress  --  --      .CSF CSF  12-25-23   No growth to date.  --    No polymorphonuclear leukocytes seen  No organisms seen  by cytocentrifuge      .Blood Blood  12-25-23   No growth at 24 hours  --  --      .Blood Blood-Venous  12-25-23   No growth at 48 Hours  --  --      Pleural Fl Pleural Fluid  12-24-23   No growth  --    polymorphonuclear leukocytes seen  No organisms seen  by cytocentrifuge      Clean Catch Clean Catch (Midstream)  12-23-23   No growth  --    Few polymorphonuclear leukocytes per low power field  Moderate Squamous epithelial cells per low power field  Rare Gram Negative Rods per oil power field      .Blood Blood-Peripheral  12-23-23   No growth at 72 Hours  --  --      .Blood Blood-Peripheral  12-21-23   No growth at 5 days  --  --      .Blood Blood-Peripheral  12-21-23   No growth at 5 days  --  --      .Sputum Sputum  12-20-23   Normal Respiratory Inge present  --    Few Squamous epithelial cells per low power field  Few polymorphonuclear leukocytes per low power field  Few Gram Positive Cocci in Pairs and Chains per oil power field      .Stool Feces  12-15-23   No enteric pathogens isolated.  (Stool culture examined for Salmonella,  Shigella, Campylobacter, Aeromonas, Plesiomonas,  Vibrio, E.coli O157 and Yersinia)  --  --      .Blood Blood-Peripheral  12-14-23   No growth at 5 days  --  --      .Blood Blood-Peripheral  12-14-23   No growth at 5 days  --  --      .Blood Blood-Peripheral  12-12-23   No growth at 5 days  --  --      .Blood Blood-Peripheral  12-12-23   No growth at 5 days  --  --          Rapid RVP Result: NotDetec (12-26 @ 15:12)  Rapid RVP Result: NotDetec (12-23 @ 14:34)  Rapid RVP Result: NotDetec (12-21 @ 18:50)        RADIOLOGY:       Infectious Diseases Follow Up:    Patient is a 28y old  Male who presents with a chief complaint of 29 yo M w recent Dx Lupus Erythematosus (Sept 2023).  Transferred to [facility] with b/l pleural effusions s/p thoracentesis, as well as pulmonary infarcts & pericardial effusion.  Pt. with L chest tube in place.      (26 Dec 2023 15:23)      Interval History/ROS:  Pt states he is feeling better, but unable to verbalize how he has. Excess oral secretions,   Unable to obtain rest of ROS.    Allergies  No Known Allergies        ANTIMICROBIALS:  cefepime   IVPB 2000 every 8 hours  hydroxychloroquine 200 two times a day  vancomycin  IVPB    vancomycin  IVPB 1500 every 12 hours      Current Abx:     Previous Abx     OTHER MEDS:  MEDICATIONS  (STANDING):  acetaminophen     Tablet .. 650 every 6 hours PRN  albuterol/ipratropium for Nebulization 3 every 6 hours PRN  dexMEDEtomidine Infusion 0.2 <Continuous>  guaiFENesin Oral Liquid (Sugar-Free) 200 every 6 hours PRN  heparin   Injectable 2500 every 6 hours PRN  heparin   Injectable 5500 every 6 hours PRN  heparin  Infusion. 1300 <Continuous>  melatonin 3 <User Schedule>      Vital Signs Last 24 Hrs  T(C): 37.6 (27 Dec 2023 08:00), Max: 37.9 (26 Dec 2023 16:00)  T(F): 99.6 (27 Dec 2023 08:00), Max: 100.2 (26 Dec 2023 16:00)  HR: 112 (27 Dec 2023 12:00) (82 - 112)  BP: 148/96 (27 Dec 2023 12:00) (110/68 - 148/96)  BP(mean): 108 (27 Dec 2023 12:00) (76 - 108)  RR: 23 (27 Dec 2023 12:00) (13 - 29)  SpO2: 93% (27 Dec 2023 12:00) (93% - 100%)    Parameters below as of 27 Dec 2023 12:00  Patient On (Oxygen Delivery Method): room air        PHYSICAL EXAM:  GENERAL: NAD  HEAD:  Atraumatic, Normocephalic  EYES: EOMI, PERRLA, conjunctiva and sclera clear  NECK: Supple, No JVD  MOUTH: Oral secretions   CHEST/LUNG: On NC, not in acute respiratory distress. +chest tube  HEART: +tachy  ABDOMEN: Soft, Nontender, Nondistended; Bowel sounds present  EXTREMITIES:  lesions on palms   PSYCH: AAOx3                            9.1    9.79  )-----------( 264      ( 27 Dec 2023 07:36 )             27.1       12-27    127<L>  |  96<L>  |  16  ----------------------------<  109<H>  4.2   |  21<L>  |  0.83    Ca    8.4      27 Dec 2023 00:31    TPro  6.9  /  Alb  2.7<L>  /  TBili  0.5  /  DBili  x   /  AST  152<H>  /  ALT  113<H>  /  AlkPhos  72  12-27      Urinalysis Basic - ( 27 Dec 2023 00:31 )    Color: x / Appearance: x / SG: x / pH: x  Gluc: 109 mg/dL / Ketone: x  / Bili: x / Urobili: x   Blood: x / Protein: x / Nitrite: x   Leuk Esterase: x / RBC: x / WBC x   Sq Epi: x / Non Sq Epi: x / Bacteria: x        MICROBIOLOGY:  Vancomycin Level, Trough: 5.3 ug/mL (12-27-23 @ 04:40)  v  Pleural Fl Pleural Fluid  12-26-23   Testing in progress  --    polymorphonuclear leukocytes seen  No organisms seen  by cytocentrifuge      Pleural Fl Pleural Fluid  12-25-23   No growth  --    No polymorphonuclear cells seen seen per low power field  No organisms seen seen per oil power field      Pleural Fl Pleural Fluid  12-25-23   Testing in progress  --  --      .CSF CSF  12-25-23   No growth to date.  --    No polymorphonuclear leukocytes seen  No organisms seen  by cytocentrifuge      .Blood Blood  12-25-23   No growth at 24 hours  --  --      .Blood Blood-Venous  12-25-23   No growth at 48 Hours  --  --      Pleural Fl Pleural Fluid  12-24-23   No growth  --    polymorphonuclear leukocytes seen  No organisms seen  by cytocentrifuge      Clean Catch Clean Catch (Midstream)  12-23-23   No growth  --    Few polymorphonuclear leukocytes per low power field  Moderate Squamous epithelial cells per low power field  Rare Gram Negative Rods per oil power field      .Blood Blood-Peripheral  12-23-23   No growth at 72 Hours  --  --      .Blood Blood-Peripheral  12-21-23   No growth at 5 days  --  --      .Blood Blood-Peripheral  12-21-23   No growth at 5 days  --  --      .Sputum Sputum  12-20-23   Normal Respiratory Inge present  --    Few Squamous epithelial cells per low power field  Few polymorphonuclear leukocytes per low power field  Few Gram Positive Cocci in Pairs and Chains per oil power field      .Stool Feces  12-15-23   No enteric pathogens isolated.  (Stool culture examined for Salmonella,  Shigella, Campylobacter, Aeromonas, Plesiomonas,  Vibrio, E.coli O157 and Yersinia)  --  --      .Blood Blood-Peripheral  12-14-23   No growth at 5 days  --  --      .Blood Blood-Peripheral  12-14-23   No growth at 5 days  --  --      .Blood Blood-Peripheral  12-12-23   No growth at 5 days  --  --      .Blood Blood-Peripheral  12-12-23   No growth at 5 days  --  --          Rapid RVP Result: NotDetec (12-26 @ 15:12)  Rapid RVP Result: NotDetec (12-23 @ 14:34)  Rapid RVP Result: NotDetec (12-21 @ 18:50)        RADIOLOGY:

## 2023-12-28 ENCOUNTER — RESULT REVIEW (OUTPATIENT)
Age: 28
End: 2023-12-28

## 2023-12-28 DIAGNOSIS — I10 ESSENTIAL (PRIMARY) HYPERTENSION: ICD-10-CM

## 2023-12-28 PROBLEM — Z00.00 ENCOUNTER FOR PREVENTIVE HEALTH EXAMINATION: Status: ACTIVE | Noted: 2023-12-28

## 2023-12-28 PROBLEM — I26.99 OTHER PULMONARY EMBOLISM WITHOUT ACUTE COR PULMONALE: Chronic | Status: ACTIVE | Noted: 2023-12-22

## 2023-12-28 PROBLEM — L93.0 DISCOID LUPUS ERYTHEMATOSUS: Chronic | Status: ACTIVE | Noted: 2023-12-22

## 2023-12-28 LAB
ACE SERPL-CCNC: 125 U/L — HIGH (ref 14–82)
ACE SERPL-CCNC: 125 U/L — HIGH (ref 14–82)
ADENOSINE DEAMINASE PLR-CCNC: 22 U/L — SIGNIFICANT CHANGE UP (ref 0–30)
ADENOSINE DEAMINASE PLR-CCNC: 22 U/L — SIGNIFICANT CHANGE UP (ref 0–30)
ALBUMIN SERPL ELPH-MCNC: 2.5 G/DL — LOW (ref 3.3–5)
ALBUMIN SERPL ELPH-MCNC: 2.5 G/DL — LOW (ref 3.3–5)
ALBUMIN SERPL ELPH-MCNC: 2.7 G/DL — LOW (ref 3.3–5)
ALBUMIN SERPL ELPH-MCNC: 2.7 G/DL — LOW (ref 3.3–5)
ALDOLASE SERPL-CCNC: 7.3 U/L — SIGNIFICANT CHANGE UP (ref 3.3–10.3)
ALDOLASE SERPL-CCNC: 7.3 U/L — SIGNIFICANT CHANGE UP (ref 3.3–10.3)
ALP SERPL-CCNC: 72 U/L — SIGNIFICANT CHANGE UP (ref 40–120)
ALP SERPL-CCNC: 72 U/L — SIGNIFICANT CHANGE UP (ref 40–120)
ALP SERPL-CCNC: 78 U/L — SIGNIFICANT CHANGE UP (ref 40–120)
ALP SERPL-CCNC: 78 U/L — SIGNIFICANT CHANGE UP (ref 40–120)
ALT FLD-CCNC: 105 U/L — HIGH (ref 4–41)
ALT FLD-CCNC: 105 U/L — HIGH (ref 4–41)
ALT FLD-CCNC: 99 U/L — HIGH (ref 4–41)
ALT FLD-CCNC: 99 U/L — HIGH (ref 4–41)
ANION GAP SERPL CALC-SCNC: 10 MMOL/L — SIGNIFICANT CHANGE UP (ref 7–14)
ANION GAP SERPL CALC-SCNC: 10 MMOL/L — SIGNIFICANT CHANGE UP (ref 7–14)
ANION GAP SERPL CALC-SCNC: 9 MMOL/L — SIGNIFICANT CHANGE UP (ref 7–14)
ANION GAP SERPL CALC-SCNC: 9 MMOL/L — SIGNIFICANT CHANGE UP (ref 7–14)
APTT BLD: 65.9 SEC — HIGH (ref 24.5–35.6)
APTT BLD: 65.9 SEC — HIGH (ref 24.5–35.6)
AST SERPL-CCNC: 116 U/L — HIGH (ref 4–40)
AST SERPL-CCNC: 116 U/L — HIGH (ref 4–40)
AST SERPL-CCNC: 125 U/L — HIGH (ref 4–40)
AST SERPL-CCNC: 125 U/L — HIGH (ref 4–40)
AUTO DIFF PNL BLD: ABNORMAL
AUTO DIFF PNL BLD: ABNORMAL
BASE EXCESS BLDV CALC-SCNC: 0.5 MMOL/L — SIGNIFICANT CHANGE UP (ref -2–3)
BASE EXCESS BLDV CALC-SCNC: 0.5 MMOL/L — SIGNIFICANT CHANGE UP (ref -2–3)
BASOPHILS # BLD AUTO: 0.09 K/UL — SIGNIFICANT CHANGE UP (ref 0–0.2)
BASOPHILS # BLD AUTO: 0.09 K/UL — SIGNIFICANT CHANGE UP (ref 0–0.2)
BASOPHILS NFR BLD AUTO: 0.7 % — SIGNIFICANT CHANGE UP (ref 0–2)
BASOPHILS NFR BLD AUTO: 0.7 % — SIGNIFICANT CHANGE UP (ref 0–2)
BILIRUB SERPL-MCNC: 0.5 MG/DL — SIGNIFICANT CHANGE UP (ref 0.2–1.2)
BILIRUB SERPL-MCNC: 0.5 MG/DL — SIGNIFICANT CHANGE UP (ref 0.2–1.2)
BILIRUB SERPL-MCNC: 0.6 MG/DL — SIGNIFICANT CHANGE UP (ref 0.2–1.2)
BILIRUB SERPL-MCNC: 0.6 MG/DL — SIGNIFICANT CHANGE UP (ref 0.2–1.2)
BLD GP AB SCN SERPL QL: NEGATIVE — SIGNIFICANT CHANGE UP
BLD GP AB SCN SERPL QL: NEGATIVE — SIGNIFICANT CHANGE UP
BLOOD GAS VENOUS COMPREHENSIVE RESULT: SIGNIFICANT CHANGE UP
BLOOD GAS VENOUS COMPREHENSIVE RESULT: SIGNIFICANT CHANGE UP
BUN SERPL-MCNC: 10 MG/DL — SIGNIFICANT CHANGE UP (ref 7–23)
BUN SERPL-MCNC: 10 MG/DL — SIGNIFICANT CHANGE UP (ref 7–23)
BUN SERPL-MCNC: 9 MG/DL — SIGNIFICANT CHANGE UP (ref 7–23)
BUN SERPL-MCNC: 9 MG/DL — SIGNIFICANT CHANGE UP (ref 7–23)
C-ANCA SER-ACNC: NEGATIVE — SIGNIFICANT CHANGE UP
C-ANCA SER-ACNC: NEGATIVE — SIGNIFICANT CHANGE UP
CALCIUM SERPL-MCNC: 8.2 MG/DL — LOW (ref 8.4–10.5)
CCP IGG SERPL-ACNC: <8 UNITS — SIGNIFICANT CHANGE UP
CCP IGG SERPL-ACNC: <8 UNITS — SIGNIFICANT CHANGE UP
CHLORIDE BLDV-SCNC: 96 MMOL/L — SIGNIFICANT CHANGE UP (ref 96–108)
CHLORIDE BLDV-SCNC: 96 MMOL/L — SIGNIFICANT CHANGE UP (ref 96–108)
CHLORIDE SERPL-SCNC: 92 MMOL/L — LOW (ref 98–107)
CHLORIDE SERPL-SCNC: 92 MMOL/L — LOW (ref 98–107)
CHLORIDE SERPL-SCNC: 96 MMOL/L — LOW (ref 98–107)
CHLORIDE SERPL-SCNC: 96 MMOL/L — LOW (ref 98–107)
CO2 BLDV-SCNC: 25.6 MMOL/L — SIGNIFICANT CHANGE UP (ref 22–26)
CO2 BLDV-SCNC: 25.6 MMOL/L — SIGNIFICANT CHANGE UP (ref 22–26)
CO2 SERPL-SCNC: 23 MMOL/L — SIGNIFICANT CHANGE UP (ref 22–31)
CREAT SERPL-MCNC: 0.58 MG/DL — SIGNIFICANT CHANGE UP (ref 0.5–1.3)
CREAT SERPL-MCNC: 0.58 MG/DL — SIGNIFICANT CHANGE UP (ref 0.5–1.3)
CREAT SERPL-MCNC: 0.73 MG/DL — SIGNIFICANT CHANGE UP (ref 0.5–1.3)
CREAT SERPL-MCNC: 0.73 MG/DL — SIGNIFICANT CHANGE UP (ref 0.5–1.3)
CULTURE RESULTS: SIGNIFICANT CHANGE UP
EGFR: 126 ML/MIN/1.73M2 — SIGNIFICANT CHANGE UP
EGFR: 126 ML/MIN/1.73M2 — SIGNIFICANT CHANGE UP
EGFR: 135 ML/MIN/1.73M2 — SIGNIFICANT CHANGE UP
EGFR: 135 ML/MIN/1.73M2 — SIGNIFICANT CHANGE UP
EOSINOPHIL # BLD AUTO: 0.05 K/UL — SIGNIFICANT CHANGE UP (ref 0–0.5)
EOSINOPHIL # BLD AUTO: 0.05 K/UL — SIGNIFICANT CHANGE UP (ref 0–0.5)
EOSINOPHIL NFR BLD AUTO: 0.4 % — SIGNIFICANT CHANGE UP (ref 0–6)
EOSINOPHIL NFR BLD AUTO: 0.4 % — SIGNIFICANT CHANGE UP (ref 0–6)
GAS PNL BLDV: 124 MMOL/L — LOW (ref 136–145)
GAS PNL BLDV: 124 MMOL/L — LOW (ref 136–145)
GAS PNL BLDV: SIGNIFICANT CHANGE UP
GAS PNL BLDV: SIGNIFICANT CHANGE UP
GLUCOSE BLDV-MCNC: 129 MG/DL — HIGH (ref 70–99)
GLUCOSE BLDV-MCNC: 129 MG/DL — HIGH (ref 70–99)
GLUCOSE SERPL-MCNC: 133 MG/DL — HIGH (ref 70–99)
GLUCOSE SERPL-MCNC: 133 MG/DL — HIGH (ref 70–99)
GLUCOSE SERPL-MCNC: 169 MG/DL — HIGH (ref 70–99)
GLUCOSE SERPL-MCNC: 169 MG/DL — HIGH (ref 70–99)
GRAM STN FLD: SIGNIFICANT CHANGE UP
GRAM STN FLD: SIGNIFICANT CHANGE UP
HCO3 BLDV-SCNC: 24 MMOL/L — SIGNIFICANT CHANGE UP (ref 22–29)
HCO3 BLDV-SCNC: 24 MMOL/L — SIGNIFICANT CHANGE UP (ref 22–29)
HCT VFR BLD CALC: 25 % — LOW (ref 39–50)
HCT VFR BLD CALC: 25 % — LOW (ref 39–50)
HCT VFR BLDA CALC: 28 % — LOW (ref 39–51)
HCT VFR BLDA CALC: 28 % — LOW (ref 39–51)
HGB BLD CALC-MCNC: 9.2 G/DL — LOW (ref 12.6–17.4)
HGB BLD CALC-MCNC: 9.2 G/DL — LOW (ref 12.6–17.4)
HGB BLD-MCNC: 8.6 G/DL — LOW (ref 13–17)
HGB BLD-MCNC: 8.6 G/DL — LOW (ref 13–17)
IANC: 5.7 K/UL — SIGNIFICANT CHANGE UP (ref 1.8–7.4)
IANC: 5.7 K/UL — SIGNIFICANT CHANGE UP (ref 1.8–7.4)
IMM GRANULOCYTES NFR BLD AUTO: 0.7 % — SIGNIFICANT CHANGE UP (ref 0–0.9)
IMM GRANULOCYTES NFR BLD AUTO: 0.7 % — SIGNIFICANT CHANGE UP (ref 0–0.9)
INR BLD: 1.46 RATIO — HIGH (ref 0.85–1.18)
INR BLD: 1.46 RATIO — HIGH (ref 0.85–1.18)
LACTATE BLDV-MCNC: 1 MMOL/L — SIGNIFICANT CHANGE UP (ref 0.5–2)
LACTATE BLDV-MCNC: 1 MMOL/L — SIGNIFICANT CHANGE UP (ref 0.5–2)
LDH SERPL L TO P-CCNC: 437 U/L — HIGH (ref 135–225)
LDH SERPL L TO P-CCNC: 437 U/L — HIGH (ref 135–225)
LYMPHOCYTES # BLD AUTO: 46.2 % — HIGH (ref 13–44)
LYMPHOCYTES # BLD AUTO: 46.2 % — HIGH (ref 13–44)
LYMPHOCYTES # BLD AUTO: 6.2 K/UL — HIGH (ref 1–3.3)
LYMPHOCYTES # BLD AUTO: 6.2 K/UL — HIGH (ref 1–3.3)
MAGNESIUM SERPL-MCNC: 1.8 MG/DL — SIGNIFICANT CHANGE UP (ref 1.6–2.6)
MAGNESIUM SERPL-MCNC: 1.8 MG/DL — SIGNIFICANT CHANGE UP (ref 1.6–2.6)
MAGNESIUM SERPL-MCNC: 2.2 MG/DL — SIGNIFICANT CHANGE UP (ref 1.6–2.6)
MAGNESIUM SERPL-MCNC: 2.2 MG/DL — SIGNIFICANT CHANGE UP (ref 1.6–2.6)
MCHC RBC-ENTMCNC: 30.2 PG — SIGNIFICANT CHANGE UP (ref 27–34)
MCHC RBC-ENTMCNC: 30.2 PG — SIGNIFICANT CHANGE UP (ref 27–34)
MCHC RBC-ENTMCNC: 34.4 GM/DL — SIGNIFICANT CHANGE UP (ref 32–36)
MCHC RBC-ENTMCNC: 34.4 GM/DL — SIGNIFICANT CHANGE UP (ref 32–36)
MCV RBC AUTO: 87.7 FL — SIGNIFICANT CHANGE UP (ref 80–100)
MCV RBC AUTO: 87.7 FL — SIGNIFICANT CHANGE UP (ref 80–100)
MONOCYTES # BLD AUTO: 1.27 K/UL — HIGH (ref 0–0.9)
MONOCYTES # BLD AUTO: 1.27 K/UL — HIGH (ref 0–0.9)
MONOCYTES NFR BLD AUTO: 9.5 % — SIGNIFICANT CHANGE UP (ref 2–14)
MONOCYTES NFR BLD AUTO: 9.5 % — SIGNIFICANT CHANGE UP (ref 2–14)
MRSA PCR RESULT.: SIGNIFICANT CHANGE UP
NEUTROPHILS # BLD AUTO: 5.7 K/UL — SIGNIFICANT CHANGE UP (ref 1.8–7.4)
NEUTROPHILS # BLD AUTO: 5.7 K/UL — SIGNIFICANT CHANGE UP (ref 1.8–7.4)
NEUTROPHILS NFR BLD AUTO: 42.5 % — LOW (ref 43–77)
NEUTROPHILS NFR BLD AUTO: 42.5 % — LOW (ref 43–77)
NRBC # BLD: 0 /100 WBCS — SIGNIFICANT CHANGE UP (ref 0–0)
NRBC # BLD: 0 /100 WBCS — SIGNIFICANT CHANGE UP (ref 0–0)
NRBC # FLD: 0 K/UL — SIGNIFICANT CHANGE UP (ref 0–0)
NRBC # FLD: 0 K/UL — SIGNIFICANT CHANGE UP (ref 0–0)
P-ANCA SER-ACNC: NEGATIVE — SIGNIFICANT CHANGE UP
P-ANCA SER-ACNC: NEGATIVE — SIGNIFICANT CHANGE UP
PCO2 BLDV: 36 MMHG — LOW (ref 42–55)
PCO2 BLDV: 36 MMHG — LOW (ref 42–55)
PH BLDV: 7.44 — HIGH (ref 7.32–7.43)
PH BLDV: 7.44 — HIGH (ref 7.32–7.43)
PHOSPHATE SERPL-MCNC: 1.9 MG/DL — LOW (ref 2.5–4.5)
PHOSPHATE SERPL-MCNC: 1.9 MG/DL — LOW (ref 2.5–4.5)
PHOSPHATE SERPL-MCNC: 2.4 MG/DL — LOW (ref 2.5–4.5)
PHOSPHATE SERPL-MCNC: 2.4 MG/DL — LOW (ref 2.5–4.5)
PLATELET # BLD AUTO: 232 K/UL — SIGNIFICANT CHANGE UP (ref 150–400)
PLATELET # BLD AUTO: 232 K/UL — SIGNIFICANT CHANGE UP (ref 150–400)
PO2 BLDV: 161 MMHG — HIGH (ref 25–45)
PO2 BLDV: 161 MMHG — HIGH (ref 25–45)
POTASSIUM BLDV-SCNC: 4.2 MMOL/L — SIGNIFICANT CHANGE UP (ref 3.5–5.1)
POTASSIUM BLDV-SCNC: 4.2 MMOL/L — SIGNIFICANT CHANGE UP (ref 3.5–5.1)
POTASSIUM SERPL-MCNC: 3.9 MMOL/L — SIGNIFICANT CHANGE UP (ref 3.5–5.3)
POTASSIUM SERPL-MCNC: 3.9 MMOL/L — SIGNIFICANT CHANGE UP (ref 3.5–5.3)
POTASSIUM SERPL-MCNC: 4.2 MMOL/L — SIGNIFICANT CHANGE UP (ref 3.5–5.3)
POTASSIUM SERPL-MCNC: 4.2 MMOL/L — SIGNIFICANT CHANGE UP (ref 3.5–5.3)
POTASSIUM SERPL-SCNC: 3.9 MMOL/L — SIGNIFICANT CHANGE UP (ref 3.5–5.3)
POTASSIUM SERPL-SCNC: 3.9 MMOL/L — SIGNIFICANT CHANGE UP (ref 3.5–5.3)
POTASSIUM SERPL-SCNC: 4.2 MMOL/L — SIGNIFICANT CHANGE UP (ref 3.5–5.3)
POTASSIUM SERPL-SCNC: 4.2 MMOL/L — SIGNIFICANT CHANGE UP (ref 3.5–5.3)
PROT SERPL-MCNC: 6.5 G/DL — SIGNIFICANT CHANGE UP (ref 6–8.3)
PROT SERPL-MCNC: 6.5 G/DL — SIGNIFICANT CHANGE UP (ref 6–8.3)
PROT SERPL-MCNC: 6.6 G/DL — SIGNIFICANT CHANGE UP (ref 6–8.3)
PROT SERPL-MCNC: 6.6 G/DL — SIGNIFICANT CHANGE UP (ref 6–8.3)
PROTHROM AB SERPL-ACNC: 16.3 SEC — HIGH (ref 9.5–13)
PROTHROM AB SERPL-ACNC: 16.3 SEC — HIGH (ref 9.5–13)
RBC # BLD: 2.85 M/UL — LOW (ref 4.2–5.8)
RBC # BLD: 2.85 M/UL — LOW (ref 4.2–5.8)
RBC # FLD: 13.8 % — SIGNIFICANT CHANGE UP (ref 10.3–14.5)
RBC # FLD: 13.8 % — SIGNIFICANT CHANGE UP (ref 10.3–14.5)
RF+CCP IGG SER-IMP: NEGATIVE — SIGNIFICANT CHANGE UP
RF+CCP IGG SER-IMP: NEGATIVE — SIGNIFICANT CHANGE UP
RH IG SCN BLD-IMP: POSITIVE — SIGNIFICANT CHANGE UP
RH IG SCN BLD-IMP: POSITIVE — SIGNIFICANT CHANGE UP
S AUREUS DNA NOSE QL NAA+PROBE: DETECTED
SAO2 % BLDV: 99.7 % — HIGH (ref 67–88)
SAO2 % BLDV: 99.7 % — HIGH (ref 67–88)
SODIUM SERPL-SCNC: 125 MMOL/L — LOW (ref 135–145)
SODIUM SERPL-SCNC: 125 MMOL/L — LOW (ref 135–145)
SODIUM SERPL-SCNC: 128 MMOL/L — LOW (ref 135–145)
SODIUM SERPL-SCNC: 128 MMOL/L — LOW (ref 135–145)
SPECIMEN SOURCE: SIGNIFICANT CHANGE UP
VDRL CSF-TITR: SIGNIFICANT CHANGE UP
VDRL CSF-TITR: SIGNIFICANT CHANGE UP
WBC # BLD: 13.41 K/UL — HIGH (ref 3.8–10.5)
WBC # BLD: 13.41 K/UL — HIGH (ref 3.8–10.5)
WBC # FLD AUTO: 13.41 K/UL — HIGH (ref 3.8–10.5)
WBC # FLD AUTO: 13.41 K/UL — HIGH (ref 3.8–10.5)
WNV IGG CSF IA-ACNC: POSITIVE
WNV IGG CSF IA-ACNC: POSITIVE
WNV IGM CSF IA-ACNC: NEGATIVE — SIGNIFICANT CHANGE UP
WNV IGM CSF IA-ACNC: NEGATIVE — SIGNIFICANT CHANGE UP

## 2023-12-28 PROCEDURE — 71260 CT THORAX DX C+: CPT | Mod: 26

## 2023-12-28 PROCEDURE — 88112 CYTOPATH CELL ENHANCE TECH: CPT | Mod: 26

## 2023-12-28 PROCEDURE — 70491 CT SOFT TISSUE NECK W/DYE: CPT | Mod: 26

## 2023-12-28 PROCEDURE — 99234 HOSP IP/OBS SM DT SF/LOW 45: CPT

## 2023-12-28 PROCEDURE — 99233 SBSQ HOSP IP/OBS HIGH 50: CPT | Mod: 57

## 2023-12-28 PROCEDURE — 99232 SBSQ HOSP IP/OBS MODERATE 35: CPT

## 2023-12-28 PROCEDURE — 88305 TISSUE EXAM BY PATHOLOGIST: CPT | Mod: 26

## 2023-12-28 PROCEDURE — 76604 US EXAM CHEST: CPT | Mod: 26,GC

## 2023-12-28 PROCEDURE — 99291 CRITICAL CARE FIRST HOUR: CPT | Mod: GC

## 2023-12-28 PROCEDURE — 71045 X-RAY EXAM CHEST 1 VIEW: CPT | Mod: 26

## 2023-12-28 PROCEDURE — 74177 CT ABD & PELVIS W/CONTRAST: CPT | Mod: 26

## 2023-12-28 PROCEDURE — 99233 SBSQ HOSP IP/OBS HIGH 50: CPT | Mod: GC

## 2023-12-28 PROCEDURE — 99232 SBSQ HOSP IP/OBS MODERATE 35: CPT | Mod: GC

## 2023-12-28 PROCEDURE — 99233 SBSQ HOSP IP/OBS HIGH 50: CPT

## 2023-12-28 RX ORDER — SODIUM,POTASSIUM PHOSPHATES 278-250MG
2 POWDER IN PACKET (EA) ORAL ONCE
Refills: 0 | Status: COMPLETED | OUTPATIENT
Start: 2023-12-28 | End: 2023-12-28

## 2023-12-28 RX ORDER — SODIUM CHLORIDE 5 G/100ML
500 INJECTION, SOLUTION INTRAVENOUS
Refills: 0 | Status: DISCONTINUED | OUTPATIENT
Start: 2023-12-28 | End: 2024-01-19

## 2023-12-28 RX ORDER — IBUPROFEN 200 MG
600 TABLET ORAL ONCE
Refills: 0 | Status: COMPLETED | OUTPATIENT
Start: 2023-12-28 | End: 2023-12-28

## 2023-12-28 RX ORDER — MAGNESIUM SULFATE 500 MG/ML
2 VIAL (ML) INJECTION ONCE
Refills: 0 | Status: COMPLETED | OUTPATIENT
Start: 2023-12-28 | End: 2023-12-28

## 2023-12-28 RX ADMIN — Medication 650 MILLIGRAM(S): at 17:36

## 2023-12-28 RX ADMIN — SODIUM CHLORIDE 30 MILLILITER(S): 5 INJECTION, SOLUTION INTRAVENOUS at 11:24

## 2023-12-28 RX ADMIN — Medication 200 MILLIGRAM(S): at 17:36

## 2023-12-28 RX ADMIN — SODIUM CHLORIDE 1 GRAM(S): 9 INJECTION INTRAMUSCULAR; INTRAVENOUS; SUBCUTANEOUS at 23:07

## 2023-12-28 RX ADMIN — Medication 650 MILLIGRAM(S): at 18:36

## 2023-12-28 RX ADMIN — Medication 2 PACKET(S): at 12:22

## 2023-12-28 RX ADMIN — Medication 2 DROP(S): at 17:35

## 2023-12-28 RX ADMIN — LIDOCAINE 1 PATCH: 4 CREAM TOPICAL at 19:53

## 2023-12-28 RX ADMIN — CEFEPIME 100 MILLIGRAM(S): 1 INJECTION, POWDER, FOR SOLUTION INTRAMUSCULAR; INTRAVENOUS at 23:07

## 2023-12-28 RX ADMIN — Medication 3 MILLIGRAM(S): at 20:30

## 2023-12-28 RX ADMIN — Medication 650 MILLIGRAM(S): at 00:00

## 2023-12-28 RX ADMIN — CHLORHEXIDINE GLUCONATE 1 APPLICATION(S): 213 SOLUTION TOPICAL at 11:25

## 2023-12-28 RX ADMIN — LIDOCAINE 1 PATCH: 4 CREAM TOPICAL at 01:49

## 2023-12-28 RX ADMIN — Medication 60 MILLIGRAM(S): at 17:35

## 2023-12-28 RX ADMIN — HEPARIN SODIUM 1400 UNIT(S)/HR: 5000 INJECTION INTRAVENOUS; SUBCUTANEOUS at 23:05

## 2023-12-28 RX ADMIN — Medication 2 DROP(S): at 06:40

## 2023-12-28 RX ADMIN — CEFEPIME 100 MILLIGRAM(S): 1 INJECTION, POWDER, FOR SOLUTION INTRAMUSCULAR; INTRAVENOUS at 06:00

## 2023-12-28 RX ADMIN — Medication 600 MILLIGRAM(S): at 20:45

## 2023-12-28 RX ADMIN — SODIUM CHLORIDE 1 GRAM(S): 9 INJECTION INTRAMUSCULAR; INTRAVENOUS; SUBCUTANEOUS at 06:00

## 2023-12-28 RX ADMIN — Medication 200 MILLIGRAM(S): at 06:00

## 2023-12-28 RX ADMIN — Medication 650 MILLIGRAM(S): at 10:32

## 2023-12-28 RX ADMIN — OXYCODONE HYDROCHLORIDE 5 MILLIGRAM(S): 5 TABLET ORAL at 04:19

## 2023-12-28 RX ADMIN — OXYCODONE HYDROCHLORIDE 5 MILLIGRAM(S): 5 TABLET ORAL at 04:45

## 2023-12-28 RX ADMIN — Medication 600 MILLIGRAM(S): at 20:18

## 2023-12-28 RX ADMIN — Medication 25 GRAM(S): at 12:22

## 2023-12-28 NOTE — PROGRESS NOTE ADULT - ASSESSMENT
28 years old male with h/o Lupus (diagnosed in 09/2023 due to rash, oral ulcers, chest pain, and dyspnea, on Plaquenil) who presented to [place] ED on 12/12/23 with complaints of chest pain and SOB, found to have bilateral acute PE and bilateral pleural effusions. Transferred to [facility] for CT surgery evaluation. Also with fevers now improving. Rheumatology consulted given SLE history.     # Persistent fevers likely related to autoimmune disease    # B/l axillary and supraclavicular LAD   # SLE with patchy alopecia, discoid lesions, and ulcers   # Elevated CPK   # Bilateral Acute PE with pulmonary infarcts   # B/l hemorrhagic pleural effusions, s/p L chest tube 12/26   # Proteinuria   # One episode of generalized tonic-clonic seizure on 12/25   # Normocytic anemia  # Transaminitis      - Initially fever was thought to be possibly related to SLE but did not resolve after 3 days of steroids along with worsened clinical status with new neurological symptoms, thus steroids were stopped on 12/25   - Procalcitonin elevated at 8 but broad infectious workup overall unremarkable thus far, including multiple pleural fluid analyses and LP studies   - Persistent fevers while on broad spectrum antibiotics   - Workup thus far with ABDIAS 1:280 speckled, dsDNA 28, Sm >8, RNP >8, SSA >8, C3 83, C4 13, U/A 300 protein and moderate blood with repeat U/A 100 protein and small blood, urine protein/Cr 1.2 and 1.1, negative APS labs, negative Janine-1 ab, negative ribosomal P, negative syphilis screen, normocytic anemia, no evidence of hemolysis, ferritin 9.2k with low iron and TIBC, IgG4 WNL, elevated IgG, transaminitis, low vitamin D 25 21, elevated vitamin D 1,25 97, ACE elevated 125, and negative RF   - Repeat CT imaging without obvious infectious source   - Pt with clinical manifestations and specific SLE serologies though unlikely that current presentation is solely attributable to SLE. Concern for other rheumatologic disease (such as sarcoidosis, there have been reports of coexisting sarcoidosis and SLE). Underlying malignancy also needs to be ruled out     Recommendations:   - F/u strep throat culture, pleural cultures (including fungal) and cytology, GBM antibody, ANCA with MPO/PR3, quantiferon, PS, PS/PT, myomarker panel, CCP, scleroderma antibody, centromere antibody, and RNA polymerase III   - Appreciate Dermatology evaluation of rash   - IR consult for biopsy of axillary LN and kidney biopsy   - Given negative infectious workup and persistent fevers with evidence of active clinical and serologic SLE activity, will restart 1 mg/kg solumedrol 60 mg IV daily   - [facility] biopsy outpatient was c/w interface dermatitis   - We will continue to follow closely     Case discussed with MICU team  Case discussed with mother at bedside  Case discussed with Dr. Baker-Cesar Melendez MD  Rheumatology Fellow   Available on TEAMS 28 years old male with h/o Lupus (diagnosed in 09/2023 due to rash, oral ulcers, chest pain, and dyspnea, on Plaquenil) who presented to [place] ED on 12/12/23 with complaints of chest pain and SOB, found to have bilateral acute PE and bilateral pleural effusions. Transferred to [facility] for CT surgery evaluation. Also with fevers now improving. Rheumatology consulted given SLE history.     # Persistent fevers likely related to autoimmune disease    # B/l axillary and supraclavicular LAD   # SLE with patchy alopecia, discoid lesions, and ulcers   # Elevated CPK   # Bilateral Acute PE with pulmonary infarcts   # B/l hemorrhagic pleural effusions, s/p L chest tube 12/26   # Proteinuria   # One episode of generalized tonic-clonic seizure on 12/25   # Normocytic anemia  # Transaminitis      - Initially fever was thought to be possibly related to SLE but did not resolve after 3 days of steroids along with worsened clinical status with new neurological symptoms, thus steroids were stopped on 12/25   - Procalcitonin elevated at 8 but broad infectious workup overall unremarkable thus far, including multiple pleural fluid analyses and LP studies   - Persistent fevers while on broad spectrum antibiotics   - Workup thus far with ABDIAS 1:280 speckled, dsDNA 28, Sm >8, RNP >8, SSA >8, C3 83, C4 13, U/A 300 protein and moderate blood with repeat U/A 100 protein and small blood, urine protein/Cr 1.2 and 1.1, negative APS labs, negative Janine-1 ab, negative ribosomal P, negative syphilis screen, normocytic anemia, no evidence of hemolysis, ferritin 9.2k with low iron and TIBC, IgG4 WNL, elevated IgG, transaminitis, low vitamin D 25 21, elevated vitamin D 1,25 97, ACE elevated 125, and negative RF   - Repeat CT imaging without obvious infectious source   - Pt with clinical manifestations and specific SLE serologies though unlikely that current presentation is solely attributable to SLE. Concern for other rheumatologic disease (such as sarcoidosis, there have been reports of coexisting sarcoidosis and SLE). Underlying malignancy also needs to be ruled out     Recommendations:   - F/u strep throat culture, pleural cultures (including fungal) and cytology, GBM antibody, ANCA with MPO/PR3, quantiferon, PS, PS/PT, myomarker panel, CCP, scleroderma antibody, centromere antibody, and RNA polymerase III   - Appreciate Dermatology evaluation of rash   - IR consult for biopsy of axillary LN and kidney biopsy   - Given negative infectious workup and persistent fevers with evidence of active clinical and serologic SLE activity, will restart 1 mg/kg solumedrol 60 mg IV daily   - Spoke to PCP Dr. 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 Baker-Cesar Melendez MD  Rheumatology Fellow   Available on TEAMS 28 years old male with h/o Lupus (diagnosed in 09/2023 due to rash, oral ulcers, chest pain, and dyspnea, on Plaquenil) who presented to [place] ED on 12/12/23 with complaints of chest pain and SOB, found to have bilateral acute PE and bilateral pleural effusions. Transferred to [facility] for CT surgery evaluation. Also with fevers now improving. Rheumatology consulted given SLE history.     # Persistent fevers likely related to autoimmune disease    # B/l axillary and supraclavicular LAD   # SLE with patchy alopecia, discoid lesions, and ulcers   # Elevated CPK   # Bilateral Acute PE with pulmonary infarcts   # B/l hemorrhagic pleural effusions, s/p L chest tube 12/26   # Proteinuria   # One episode of generalized tonic-clonic seizure on 12/25   # Normocytic anemia  # Transaminitis      - Initially fever was thought to be possibly related to SLE but did not resolve after 3 days of steroids along with worsened clinical status with new neurological symptoms, thus steroids were stopped on 12/25   - Procalcitonin elevated at 8 but broad infectious workup overall unremarkable thus far, including multiple pleural fluid analyses and LP studies   - Persistent fevers while on broad spectrum antibiotics   - Workup thus far with ABDIAS 1:280 speckled, dsDNA 28, Sm >8, RNP >8, SSA >8, C3 83, C4 13, U/A 300 protein and moderate blood with repeat U/A 100 protein and small blood, urine protein/Cr 1.2 and 1.1, negative APS labs, negative Janine-1 ab, negative ribosomal P, negative syphilis screen, normocytic anemia, no evidence of hemolysis, ferritin 9.2k with low iron and TIBC, IgG4 WNL, elevated IgG, transaminitis, low vitamin D 25 21, elevated vitamin D 1,25 97, ACE elevated 125, and negative RF   - Repeat CT imaging without obvious infectious source   - Pt with clinical manifestations and specific SLE serologies though unlikely that current presentation is solely attributable to SLE. Concern for other rheumatologic disease (such as sarcoidosis, there have been reports of coexisting sarcoidosis and SLE). Underlying malignancy also needs to be ruled out     Recommendations:   - F/u strep throat culture, pleural cultures (including fungal) and cytology, GBM antibody, ANCA with MPO/PR3, quantiferon, PS, PS/PT, myomarker panel, CCP, scleroderma antibody, centromere antibody, and RNA polymerase III   - Appreciate Dermatology evaluation of rash   - IR consulted for biopsy of axillary LN and kidney biopsy   - Given negative infectious workup and persistent fevers with evidence of active clinical and serologic SLE activity, will restart 1 mg/kg solumedrol 60 mg IV daily   - Spoke to PCP Dr. 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 Baker-Cesar Melendez MD  Rheumatology Fellow   Available on TEAMS 28 years old male with h/o Lupus (diagnosed in 09/2023 due to rash, oral ulcers, chest pain, and dyspnea, on Plaquenil) who presented to [place] ED on 12/12/23 with complaints of chest pain and SOB, found to have bilateral acute PE and bilateral pleural effusions. Transferred to [facility] for CT surgery evaluation. Also with fevers now improving. Rheumatology consulted given SLE history.     # Persistent fevers likely related to autoimmune disease    # B/l axillary and supraclavicular LAD   # SLE with patchy alopecia, discoid lesions, and ulcers   # Elevated CPK   # Bilateral Acute PE with pulmonary infarcts   # B/l hemorrhagic pleural effusions, s/p L chest tube 12/26   # Proteinuria   # One episode of generalized tonic-clonic seizure on 12/25   # Normocytic anemia  # Transaminitis      - Initially fever was thought to be possibly related to SLE but did not resolve after 3 days of steroids along with worsened clinical status with new neurological symptoms, thus steroids were stopped on 12/25   - Procalcitonin elevated at 8 but broad infectious workup overall unremarkable thus far, including multiple pleural fluid analyses and LP studies   - Persistent fevers while on broad spectrum antibiotics   - Workup thus far with ABDIAS 1:280 speckled, dsDNA 28, Sm >8, RNP >8, SSA >8, C3 83, C4 13, U/A 300 protein and moderate blood with repeat U/A 100 protein and small blood, urine protein/Cr 1.2 and 1.1, negative APS labs, negative Janine-1 ab, negative ribosomal P, negative syphilis screen, normocytic anemia, no evidence of hemolysis, ferritin 9.2k with low iron and TIBC, IgG4 WNL, elevated IgG, transaminitis, low vitamin D 25 21, elevated vitamin D 1,25 97, ACE elevated 125, and negative RF   - Repeat CT imaging without obvious infectious source   - Pt with clinical manifestations and specific SLE serologies though unlikely that current presentation is solely attributable to SLE. Concern for other rheumatologic disease (such as sarcoidosis, there have been reports of coexisting sarcoidosis and SLE). Underlying malignancy also needs to be ruled out     Recommendations:   - F/u strep throat culture, pleural cultures (including fungal) and cytology, GBM antibody, ANCA with MPO/PR3, quantiferon, PS, PS/PT, myomarker panel, CCP, scleroderma antibody, centromere antibody, and RNA polymerase III   - Appreciate Dermatology evaluation of rash   - IR consulted for biopsy of axillary LN and kidney biopsy   - Given negative infectious workup and persistent fevers with evidence of active clinical and serologic SLE activity, will restart 1 mg/kg solumedrol 60 mg IV daily   - Spoke to PCP Dr. 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 Baker-Cesar Melendez MD  Rheumatology Fellow   Available on TEAMS

## 2023-12-28 NOTE — CHART NOTE - NSCHARTNOTEFT_GEN_A_CORE
28 years old male with h/o Lupus ( diagnosed in 09/2023, on Plaquenil present to [place] ED on 12/12/23  with complain of chest pain and SOB admitted for fevers, PE, pleural effusions, course c/b high fever and tonic-clonic seizure, transferred to MICU for post-ictal and infectious monitoring. Currently being worked up for new autoimmune etiologies vs malignancy.    IR consulted for axillary lymph node biopsy. Per rheumatology, patient will require repeat CT CAP and CT neck which was obtained on 12/28/2023. IR will review images in morning rounds on 12/29 to see whether lesions are amenable for biopsy. IR will follow-up with primary team.    Jose Hwang MD PGY-3  Interventional Radiology  IR Pager: [phone]  Available on Teams 28 years old male with h/o Lupus ( diagnosed in 09/2023, on Plaquenil present to [place] ED on 12/12/23  with complain of chest pain and SOB admitted for fevers, PE, pleural effusions, course c/b high fever and tonic-clonic seizure, transferred to MICU for post-ictal and infectious monitoring. Currently being worked up for new autoimmune etiologies vs malignancy.    IR consulted for axillary lymph node biopsy. Per rheumatology, patient will require repeat CT CAP and CT neck which was obtained on 12/28/2023. IR will review images in morning rounds on 12/29 to see whether lesions are amenable for biopsy. IR will follow-up with primary team.    Jose Hwang MD PGY-3  Interventional Radiology  IR Pager: [phone]  Available on Teams

## 2023-12-28 NOTE — SWALLOW BEDSIDE ASSESSMENT ADULT - COMMENTS
Progress Note- MICU 12/28: "28 years old male with h/o Lupus ( diagnosed in 09/2023, on Plaquenil present to [place] ED on 12/12/23  with complain of chest pain and SOB admitted for fevers, PE, pleural effusions, course c/b high fever and tonic-clonic seizure, transferred to MICU for post-ictal and infectious monitoring."     ENT Note 12/27: "...ENT consulted today due to new hoarseness this morning. On exam, significant intraoral secretions. CT scan w/ BL cervical lymphadenopathy. Laryngoscopy demonstrating patent airway with bilateral mobile vocal cords. No acute ENT intervention at this time.  - Continue magic mouth wash, lidocaine swish and spit, Robitussin prn, and suctioning as needed  - Start PPI  - SLP eval  - remainder of workup per MICU"    CXR 12/27: "FINDINGS: Left-sided pigtail catheter in place unchanged. Small bilateral effusions again seen likely with underlying atelectasis on the right. Upper lung zones are clear. No definite pneumothorax."    Patient seen awake/alert during clinical swallow evaluation this PM in MICU with patient's mom present at bedside. Patient states experiencing chest discomfort which the patient feels is resulting in reduced PO intake. Per RN, tolerating PO meds well and managing secretions today without overt signs of difficulty. Progress Note- MICU 12/28: "28 years old male with h/o Lupus ( diagnosed in 09/2023, on Plaquenil present to [place] ED on 12/12/23  with complain of chest pain and SOB admitted for fevers, PE, pleural effusions, course c/b high fever and tonic-clonic seizure, transferred to MICU for post-ictal and infectious monitoring."     ENT Note 12/27: "...ENT consulted today due to new hoarseness this morning. On exam, significant intraoral secretions. CT scan w/ BL cervical lymphadenopathy. Laryngoscopy demonstrating patent airway with bilateral mobile vocal cords. No acute ENT intervention at this time.  - Continue magic mouth wash, lidocaine swish and spit, Robitussin prn, and suctioning as needed  - Start PPI  - SLP eval  - remainder of workup per MICU"    CXR 12/27: "FINDINGS: Left-sided pigtail catheter in place unchanged. Small bilateral effusions again seen likely with underlying atelectasis on the right. Upper lung zones are clear. No definite pneumothorax."    Patient seen awake/alert during clinical swallow evaluation this PM in MICU with patient's mom present at bedside. Patient states experiencing chest discomfort which the patient feels is resulting in reduced PO intake. Per RN, tolerating PO meds well and managing secretions today without overt signs of difficulty. Patient declined solid PO trials, stating preference for puree only at this time.

## 2023-12-28 NOTE — CHART NOTE - NSCHARTNOTEFT_GEN_A_CORE
MICU Transfer Note  ---------------------------    Transfer from: MICU  Transfer to:  (x) Medicine    (  ) Telemetry    (  ) RCU    (  ) Palliative    (  ) Stroke Unit    (  ) _______________  Accepting Physician:      MICU COURSE  27 yo Male with recent diagnosis of Lupus (9/2023) admitted 12/12 with B/L Pulmonary embolism. Now with increased dyspnea, pleuritic chest pain,  hypoxia, and fever. CXR shows increased left pleural effusion. Bedside US shows moderate left septated effusion anterior, lateral and posterior. Right: small effusion simple appearing. Cardiac and pericardial effusion noted (although recent echo was negative for pericardial effusion) Lower extremity dopplers negative for DVT. GI PCR + e coli. mRSA PCR + Staph aureus. Hematology consulted for hypercoagulable workup and correction of INR myke-procedurally.   12/23 - admitted to MICU, plan for L PTC at bedside however INR too high. Pt seen by Rheum, Heme/Onc, Medicine, ID. Cont to be febrile to 103. Cultures sent. Pt to cont on Zosyn only per ID> Started on Steroids. Mult bld tests ordered for continued w/u. Given Vitamin K x 1 to reduce INR.   12/24- Pt clinically feels better today. On RA. Hep gtt held. INR to 1.5., Left sided Diagnositc Thoracentesis done. FLuid sent for culture, Lytes criteria, cell count. Pt cont to febrile to 104.1. Per ID, only do cultures Q48hr. Can resume Hep gtt after post Thora CXR is cleared.   No plans for Thoracic surgical intervention for now. Plan transfer to Medicine today, Dr. Salamanca.   12/26: Psych consult, hold heparin gtt. chest tube in afternoon  12/27: stop TPA, ENT scope, IR consult for axillary node biopsy, CTAP, Mag and phos labs, d/c vanco      OBJECTIVE --  Vital Signs Last 24 Hrs  T(C): 36.9 (28 Dec 2023 00:00), Max: 38.5 (27 Dec 2023 14:00)  T(F): 98.4 (28 Dec 2023 00:00), Max: 101.3 (27 Dec 2023 14:00)  HR: 88 (28 Dec 2023 01:00) (88 - 112)  BP: 129/86 (28 Dec 2023 01:00) (110/70 - 157/84)  BP(mean): 94 (28 Dec 2023 01:00) (78 - 117)  RR: 28 (28 Dec 2023 01:00) (16 - 35)  SpO2: 98% (28 Dec 2023 01:00) (93% - 98%)    Parameters below as of 28 Dec 2023 01:00  Patient On (Oxygen Delivery Method): room air        I&O's Summary    26 Dec 2023 07:01  -  27 Dec 2023 07:00  --------------------------------------------------------  IN: 3643.3 mL / OUT: 3300 mL / NET: 343.3 mL    27 Dec 2023 07:01  -  28 Dec 2023 03:12  --------------------------------------------------------  IN: 2017.8 mL / OUT: 2275 mL / NET: -257.2 mL        MEDICATIONS  (STANDING):  cefepime   IVPB 2000 milliGRAM(s) IV Intermittent every 8 hours  chlorhexidine 2% Cloths 1 Application(s) Topical daily  ciprofloxacin  0.3% Ophthalmic Solution for Otic Use 2 Drop(s) Both Ears two times a day  dexMEDEtomidine Infusion 0.2 MICROgram(s)/kG/Hr (3.46 mL/Hr) IV Continuous <Continuous>  heparin  Infusion. 1300 Unit(s)/Hr (13 mL/Hr) IV Continuous <Continuous>  hydroxychloroquine 200 milliGRAM(s) Oral two times a day  lidocaine   4% Patch 1 Patch Transdermal every 24 hours  melatonin 3 milliGRAM(s) Oral <User Schedule>  sodium chloride 1 Gram(s) Oral three times a day    MEDICATIONS  (PRN):  acetaminophen     Tablet .. 650 milliGRAM(s) Oral every 6 hours PRN Temp greater or equal to 38C (100.4F), Mild Pain (1 - 3)  albuterol/ipratropium for Nebulization 3 milliLiter(s) Nebulizer every 6 hours PRN Shortness of Breath and/or Wheezing  benzocaine/menthol Lozenge 1 Lozenge Oral four times a day PRN Sore Throat  FIRST- Mouthwash  BLM 15 milliLiter(s) Swish and Spit every 4 hours PRN Mouth Care  guaiFENesin Oral Liquid (Sugar-Free) 200 milliGRAM(s) Oral every 6 hours PRN Cough  heparin   Injectable 5500 Unit(s) IV Push every 6 hours PRN For aPTT less than 40  heparin   Injectable 2500 Unit(s) IV Push every 6 hours PRN For aPTT between 40 - 57  lidocaine 2% Viscous 5 milliLiter(s) Swish and Spit three times a day PRN Mouth Care  oxyCODONE    IR 5 milliGRAM(s) Oral every 6 hours PRN Severe Pain (7 - 10)        LABS                                            8.6                   Neurophils% (auto):   42.5   (12-28 @ 00:45):    13.41)-----------(232          Lymphocytes% (auto):  46.2                                          25.0                   Eosinphils% (auto):   0.4      Manual%: Neutrophils x    ; Lymphocytes x    ; Eosinophils x    ; Bands%: x    ; Blasts x                                    125    |  92     |  10                  Calcium: 8.2   / iCa: x      (12-28 @ 00:45)    ----------------------------<  133       Magnesium: 1.80                             4.2     |  23     |  0.73             Phosphorous: 1.9      TPro  6.5    /  Alb  2.5    /  TBili  0.5    /  DBili  x      /  AST  125    /  ALT  99     /  AlkPhos  72     28 Dec 2023 00:45    ( 12-28 @ 00:45 )   PT: 16.3 sec;   INR: 1.46 ratio  aPTT: 65.9 sec          ASSESSMENT & PLAN:     28 years old male with h/o Lupus ( diagnosed in 09/2023, on Plaquenil present to [facility] ED on 12/12/23  with complain of chest pain and SOB admitted for fevers, PE, pleural effusions, course c/b high fever and tonic-clonic seizure, transferred to MICU for post-ictal and infectious monitoring.     Neuro  Seizure  Patient is lethargic i/s/o likely post-ictal / post- ativan state.  Protecting airway. VBG wnl, CT head unremarkable--no hemorrhage.   - vEEG, some multifocal mild dysfunction  -- will f/u neuro   - neuro following  - eventual MRI    Cardiovascular  Pulmonary Embolism  - on hep gtt  - no hypoxia  - no RV strain on TTE    Respiratory  B/l pleural effusions, complex, L > R  S/p L sided chest tube, poor lung re-expansion. S/p myst  - CT surgery consulted, will consider VATS procedure  - holding off on alteplase/myst for now  - willl hold heparin gtt if procedure scheduled    GI/Nutrition  No active issues  Regular diet    /Renal  Hyponatremia  ?ADH from pain. ? from urinary retention.   Questionable retention on POCUS  Omer inserted  - c/w omer, eventual TOV  - urine Na high consistent with high ADH  - serum osm  - renal consult    ID  Fevers as high as 104.5, unclear source ?pnuemonia   Was on vanc and zosyn  RVP neg  Another potential source is pleural effusions, ?empyemas  - f/u pleural fluid studies   - monitor for diarrhea, if has diarrhea can resend GI PCR and stool cx  - broadened to Vanc, cefepime   - repeat BCx  - sent UA and UCx  - LP done 12/25, so far unremarkable, will f/u studies and cultures    Endocrine  No active issues.     Hematologic/DVT ppx  SLE  - unclear if in active flair though ,   - rheum following, f/u recs  -- recommending derm consult for hand lesions  -- extensive rheum panel sent  - heme following, f/u recs  - DCed steroids 12/25  - hypercoag workup pending    DVT PPx  - on Hep gtt for PEs    Ethics  FULL CODE      For Follow-Up:  [ ] Derm recs:  - follow-up myomarker panel  - order anti-scl-70 antibodies  [ ] IR consulted for axillary lymph node biopsy MICU Transfer Note  ---------------------------    Transfer from: MICU  Transfer to:  (x) Medicine    (  ) Telemetry    (  ) RCU    (  ) Palliative    (  ) Stroke Unit    (  ) _______________  Accepting Physician:      MICU COURSE  29 yo Male with recent diagnosis of Lupus (9/2023) admitted 12/12 with B/L Pulmonary embolism. Now with increased dyspnea, pleuritic chest pain,  hypoxia, and fever. CXR shows increased left pleural effusion. Bedside US shows moderate left septated effusion anterior, lateral and posterior. Right: small effusion simple appearing. Cardiac and pericardial effusion noted (although recent echo was negative for pericardial effusion) Lower extremity dopplers negative for DVT. GI PCR + e coli. mRSA PCR + Staph aureus. Hematology consulted for hypercoagulable workup and correction of INR myke-procedurally.   12/23 - admitted to MICU, plan for L PTC at bedside however INR too high. Pt seen by Rheum, Heme/Onc, Medicine, ID. Cont to be febrile to 103. Cultures sent. Pt to cont on Zosyn only per ID> Started on Steroids. Mult bld tests ordered for continued w/u. Given Vitamin K x 1 to reduce INR.   12/24- Pt clinically feels better today. On RA. Hep gtt held. INR to 1.5., Left sided Diagnositc Thoracentesis done. FLuid sent for culture, Lytes criteria, cell count. Pt cont to febrile to 104.1. Per ID, only do cultures Q48hr. Can resume Hep gtt after post Thora CXR is cleared.   No plans for Thoracic surgical intervention for now. Plan transfer to Medicine today, Dr. Salamanca.   12/26: Psych consult, hold heparin gtt. chest tube in afternoon  12/27: stop TPA, ENT scope, IR consult for axillary node biopsy, CTAP, Mag and phos labs, d/c vanco      OBJECTIVE --  Vital Signs Last 24 Hrs  T(C): 36.9 (28 Dec 2023 00:00), Max: 38.5 (27 Dec 2023 14:00)  T(F): 98.4 (28 Dec 2023 00:00), Max: 101.3 (27 Dec 2023 14:00)  HR: 88 (28 Dec 2023 01:00) (88 - 112)  BP: 129/86 (28 Dec 2023 01:00) (110/70 - 157/84)  BP(mean): 94 (28 Dec 2023 01:00) (78 - 117)  RR: 28 (28 Dec 2023 01:00) (16 - 35)  SpO2: 98% (28 Dec 2023 01:00) (93% - 98%)    Parameters below as of 28 Dec 2023 01:00  Patient On (Oxygen Delivery Method): room air        I&O's Summary    26 Dec 2023 07:01  -  27 Dec 2023 07:00  --------------------------------------------------------  IN: 3643.3 mL / OUT: 3300 mL / NET: 343.3 mL    27 Dec 2023 07:01  -  28 Dec 2023 03:12  --------------------------------------------------------  IN: 2017.8 mL / OUT: 2275 mL / NET: -257.2 mL        MEDICATIONS  (STANDING):  cefepime   IVPB 2000 milliGRAM(s) IV Intermittent every 8 hours  chlorhexidine 2% Cloths 1 Application(s) Topical daily  ciprofloxacin  0.3% Ophthalmic Solution for Otic Use 2 Drop(s) Both Ears two times a day  dexMEDEtomidine Infusion 0.2 MICROgram(s)/kG/Hr (3.46 mL/Hr) IV Continuous <Continuous>  heparin  Infusion. 1300 Unit(s)/Hr (13 mL/Hr) IV Continuous <Continuous>  hydroxychloroquine 200 milliGRAM(s) Oral two times a day  lidocaine   4% Patch 1 Patch Transdermal every 24 hours  melatonin 3 milliGRAM(s) Oral <User Schedule>  sodium chloride 1 Gram(s) Oral three times a day    MEDICATIONS  (PRN):  acetaminophen     Tablet .. 650 milliGRAM(s) Oral every 6 hours PRN Temp greater or equal to 38C (100.4F), Mild Pain (1 - 3)  albuterol/ipratropium for Nebulization 3 milliLiter(s) Nebulizer every 6 hours PRN Shortness of Breath and/or Wheezing  benzocaine/menthol Lozenge 1 Lozenge Oral four times a day PRN Sore Throat  FIRST- Mouthwash  BLM 15 milliLiter(s) Swish and Spit every 4 hours PRN Mouth Care  guaiFENesin Oral Liquid (Sugar-Free) 200 milliGRAM(s) Oral every 6 hours PRN Cough  heparin   Injectable 5500 Unit(s) IV Push every 6 hours PRN For aPTT less than 40  heparin   Injectable 2500 Unit(s) IV Push every 6 hours PRN For aPTT between 40 - 57  lidocaine 2% Viscous 5 milliLiter(s) Swish and Spit three times a day PRN Mouth Care  oxyCODONE    IR 5 milliGRAM(s) Oral every 6 hours PRN Severe Pain (7 - 10)        LABS                                            8.6                   Neurophils% (auto):   42.5   (12-28 @ 00:45):    13.41)-----------(232          Lymphocytes% (auto):  46.2                                          25.0                   Eosinphils% (auto):   0.4      Manual%: Neutrophils x    ; Lymphocytes x    ; Eosinophils x    ; Bands%: x    ; Blasts x                                    125    |  92     |  10                  Calcium: 8.2   / iCa: x      (12-28 @ 00:45)    ----------------------------<  133       Magnesium: 1.80                             4.2     |  23     |  0.73             Phosphorous: 1.9      TPro  6.5    /  Alb  2.5    /  TBili  0.5    /  DBili  x      /  AST  125    /  ALT  99     /  AlkPhos  72     28 Dec 2023 00:45    ( 12-28 @ 00:45 )   PT: 16.3 sec;   INR: 1.46 ratio  aPTT: 65.9 sec          ASSESSMENT & PLAN:     28 years old male with h/o Lupus ( diagnosed in 09/2023, on Plaquenil present to [place] ED on 12/12/23  with complain of chest pain and SOB admitted for fevers, PE, pleural effusions, course c/b high fever and tonic-clonic seizure, transferred to MICU for post-ictal and infectious monitoring.     Neuro  Seizure  Patient is lethargic i/s/o likely post-ictal / post- ativan state.  Protecting airway. VBG wnl, CT head unremarkable--no hemorrhage.   - vEEG, some multifocal mild dysfunction  -- will f/u neuro   - neuro following  - eventual MRI    Cardiovascular  Pulmonary Embolism  - on hep gtt  - no hypoxia  - no RV strain on TTE    Respiratory  B/l pleural effusions, complex, L > R  S/p L sided chest tube, poor lung re-expansion. S/p myst  - CT surgery consulted, will consider VATS procedure  - holding off on alteplase/myst for now  - willl hold heparin gtt if procedure scheduled    GI/Nutrition  No active issues  Regular diet    /Renal  Hyponatremia  ?ADH from pain. ? from urinary retention.   Questionable retention on POCUS  Omer inserted  - c/w omer, eventual TOV  - urine Na high consistent with high ADH  - serum osm  - renal consult    ID  Fevers as high as 104.5, unclear source ?pnuemonia   Was on vanc and zosyn  RVP neg  Another potential source is pleural effusions, ?empyemas  - f/u pleural fluid studies   - monitor for diarrhea, if has diarrhea can resend GI PCR and stool cx  - broadened to Vanc, cefepime   - repeat BCx  - sent UA and UCx  - LP done 12/25, so far unremarkable, will f/u studies and cultures    Endocrine  No active issues.     Hematologic/DVT ppx  SLE  - unclear if in active flair though ,   - rheum following, f/u recs  -- recommending derm consult for hand lesions  -- extensive rheum panel sent  - heme following, f/u recs  - DCed steroids 12/25  - hypercoag workup pending    DVT PPx  - on Hep gtt for PEs    Ethics  FULL CODE      For Follow-Up:  [ ] Derm recs:  - follow-up myomarker panel  - order anti-scl-70 antibodies  [ ] IR consulted for axillary lymph node biopsy MICU Transfer Note  ---------------------------    Transfer from: MICU  Transfer to:  (x) Medicine    (  ) Telemetry    (  ) RCU    (  ) Palliative    (  ) Stroke Unit    (  ) _______________  Accepting Physician: Jose Ramon Salamanca      MICU COURSE  27 yo Male with recent diagnosis of Lupus (9/2023) admitted 12/12 with B/L Pulmonary embolism. Now with increased dyspnea, pleuritic chest pain,  hypoxia, and fever. CXR shows increased left pleural effusion. Bedside US shows moderate left septated effusion anterior, lateral and posterior. Right: small effusion simple appearing. Cardiac and pericardial effusion noted (although recent echo was negative for pericardial effusion) Lower extremity dopplers negative for DVT. GI PCR + e coli. mRSA PCR + Staph aureus. Hematology consulted for hypercoagulable workup and correction of INR myke-procedurally.   12/23 - admitted to MICU, plan for L PTC at bedside however INR too high. Pt seen by Rheum, Heme/Onc, Medicine, ID. Cont to be febrile to 103. Cultures sent. Pt to cont on Zosyn only per ID> Started on Steroids. Mult bld tests ordered for continued w/u. Given Vitamin K x 1 to reduce INR.   12/24- Pt clinically feels better today. On RA. Hep gtt held. INR to 1.5., Left sided Diagnositc Thoracentesis done. FLuid sent for culture, Lytes criteria, cell count. Pt cont to febrile to 104.1. Per ID, only do cultures Q48hr. Can resume Hep gtt after post Thora CXR is cleared.   No plans for Thoracic surgical intervention for now. Plan transfer to Medicine today, Dr. Salamanca.   12/26: Psych consult, hold heparin gtt. chest tube in afternoon  12/27: stop TPA, ENT scope, IR consult for axillary node biopsy, CTAP, Mag and phos labs, d/c vanco      OBJECTIVE --  Vital Signs Last 24 Hrs  T(C): 36.9 (28 Dec 2023 00:00), Max: 38.5 (27 Dec 2023 14:00)  T(F): 98.4 (28 Dec 2023 00:00), Max: 101.3 (27 Dec 2023 14:00)  HR: 88 (28 Dec 2023 01:00) (88 - 112)  BP: 129/86 (28 Dec 2023 01:00) (110/70 - 157/84)  BP(mean): 94 (28 Dec 2023 01:00) (78 - 117)  RR: 28 (28 Dec 2023 01:00) (16 - 35)  SpO2: 98% (28 Dec 2023 01:00) (93% - 98%)    Parameters below as of 28 Dec 2023 01:00  Patient On (Oxygen Delivery Method): room air        I&O's Summary    26 Dec 2023 07:01  -  27 Dec 2023 07:00  --------------------------------------------------------  IN: 3643.3 mL / OUT: 3300 mL / NET: 343.3 mL    27 Dec 2023 07:01  -  28 Dec 2023 03:12  --------------------------------------------------------  IN: 2017.8 mL / OUT: 2275 mL / NET: -257.2 mL        MEDICATIONS  (STANDING):  cefepime   IVPB 2000 milliGRAM(s) IV Intermittent every 8 hours  chlorhexidine 2% Cloths 1 Application(s) Topical daily  ciprofloxacin  0.3% Ophthalmic Solution for Otic Use 2 Drop(s) Both Ears two times a day  dexMEDEtomidine Infusion 0.2 MICROgram(s)/kG/Hr (3.46 mL/Hr) IV Continuous <Continuous>  heparin  Infusion. 1300 Unit(s)/Hr (13 mL/Hr) IV Continuous <Continuous>  hydroxychloroquine 200 milliGRAM(s) Oral two times a day  lidocaine   4% Patch 1 Patch Transdermal every 24 hours  melatonin 3 milliGRAM(s) Oral <User Schedule>  sodium chloride 1 Gram(s) Oral three times a day    MEDICATIONS  (PRN):  acetaminophen     Tablet .. 650 milliGRAM(s) Oral every 6 hours PRN Temp greater or equal to 38C (100.4F), Mild Pain (1 - 3)  albuterol/ipratropium for Nebulization 3 milliLiter(s) Nebulizer every 6 hours PRN Shortness of Breath and/or Wheezing  benzocaine/menthol Lozenge 1 Lozenge Oral four times a day PRN Sore Throat  FIRST- Mouthwash  BLM 15 milliLiter(s) Swish and Spit every 4 hours PRN Mouth Care  guaiFENesin Oral Liquid (Sugar-Free) 200 milliGRAM(s) Oral every 6 hours PRN Cough  heparin   Injectable 5500 Unit(s) IV Push every 6 hours PRN For aPTT less than 40  heparin   Injectable 2500 Unit(s) IV Push every 6 hours PRN For aPTT between 40 - 57  lidocaine 2% Viscous 5 milliLiter(s) Swish and Spit three times a day PRN Mouth Care  oxyCODONE    IR 5 milliGRAM(s) Oral every 6 hours PRN Severe Pain (7 - 10)        LABS                                            8.6                   Neurophils% (auto):   42.5   (12-28 @ 00:45):    13.41)-----------(232          Lymphocytes% (auto):  46.2                                          25.0                   Eosinphils% (auto):   0.4      Manual%: Neutrophils x    ; Lymphocytes x    ; Eosinophils x    ; Bands%: x    ; Blasts x                                    125    |  92     |  10                  Calcium: 8.2   / iCa: x      (12-28 @ 00:45)    ----------------------------<  133       Magnesium: 1.80                             4.2     |  23     |  0.73             Phosphorous: 1.9      TPro  6.5    /  Alb  2.5    /  TBili  0.5    /  DBili  x      /  AST  125    /  ALT  99     /  AlkPhos  72     28 Dec 2023 00:45    ( 12-28 @ 00:45 )   PT: 16.3 sec;   INR: 1.46 ratio  aPTT: 65.9 sec          ASSESSMENT & PLAN:     28 years old male with h/o Lupus ( diagnosed in 09/2023, on Plaquenil present to [place] ED on 12/12/23  with complain of chest pain and SOB admitted for fevers, PE, pleural effusions, course c/b high fever and tonic-clonic seizure, transferred to MICU for post-ictal and infectious monitoring.     Neuro  Seizure  Patient is lethargic i/s/o likely post-ictal / post- ativan state.  Protecting airway. VBG wnl, CT head unremarkable--no hemorrhage.   - vEEG, some multifocal mild dysfunction  -- will f/u neuro   - neuro following  - eventual MRI    Cardiovascular  Pulmonary Embolism  - on hep gtt  - no hypoxia  - no RV strain on TTE    Respiratory  B/l pleural effusions, complex, L > R  S/p L sided chest tube, poor lung re-expansion. S/p myst  - CT surgery consulted, will consider VATS procedure  - holding off on alteplase/myst for now  - willl hold heparin gtt if procedure scheduled    GI/Nutrition  No active issues  Regular diet    /Renal  Hyponatremia  ?ADH from pain. ? from urinary retention.   Questionable retention on POCUS  Omer inserted  - c/w omer, eventual TOV  - urine Na high consistent with high ADH  - serum osm  - renal consult    ID  Fevers as high as 104.5, unclear source ?pnuemonia   Was on vanc and zosyn  RVP neg  Another potential source is pleural effusions, ?empyemas  - f/u pleural fluid studies   - monitor for diarrhea, if has diarrhea can resend GI PCR and stool cx  - broadened to Vanc, cefepime   - repeat BCx  - sent UA and UCx  - LP done 12/25, so far unremarkable, will f/u studies and cultures    Endocrine  No active issues.     Hematologic/DVT ppx  SLE  - unclear if in active flair though ,   - rheum following, f/u recs  -- recommending derm consult for hand lesions  -- extensive rheum panel sent  - heme following, f/u recs  - DCed steroids 12/25  - hypercoag workup pending    DVT PPx  - on Hep gtt for PEs    Ethics  FULL CODE      For Follow-Up:  [ ] Derm recs:  - follow-up myomarker panel  - order anti-scl-70 antibodies  [ ] IR consulted for axillary lymph node biopsy MICU Transfer Note  ---------------------------    Transfer from: MICU  Transfer to:  (x) Medicine    (  ) Telemetry    (  ) RCU    (  ) Palliative    (  ) Stroke Unit    (  ) _______________  Accepting Physician: Jose Ramon Salamanca      MICU COURSE  27 yo Male with recent diagnosis of Lupus (9/2023) admitted 12/12 with B/L Pulmonary embolism. Now with increased dyspnea, pleuritic chest pain,  hypoxia, and fever. CXR shows increased left pleural effusion. Bedside US shows moderate left septated effusion anterior, lateral and posterior. Right: small effusion simple appearing. Cardiac and pericardial effusion noted (although recent echo was negative for pericardial effusion) Lower extremity dopplers negative for DVT. GI PCR + e coli. mRSA PCR + Staph aureus. Hematology consulted for hypercoagulable workup and correction of INR myke-procedurally.   12/23 - admitted to MICU, plan for L PTC at bedside however INR too high. Pt seen by Rheum, Heme/Onc, Medicine, ID. Cont to be febrile to 103. Cultures sent. Pt to cont on Zosyn only per ID> Started on Steroids. Mult bld tests ordered for continued w/u. Given Vitamin K x 1 to reduce INR.   12/24- Pt clinically feels better today. On RA. Hep gtt held. INR to 1.5., Left sided Diagnositc Thoracentesis done. FLuid sent for culture, Lytes criteria, cell count. Pt cont to febrile to 104.1. Per ID, only do cultures Q48hr. Can resume Hep gtt after post Thora CXR is cleared.   No plans for Thoracic surgical intervention for now. Plan transfer to Medicine today, Dr. Salamanca.   12/26: Psych consult, hold heparin gtt. chest tube in afternoon  12/27: stop TPA, ENT scope, IR consult for axillary node biopsy, CTAP, Mag and phos labs, d/c vanco      OBJECTIVE --  Vital Signs Last 24 Hrs  T(C): 36.9 (28 Dec 2023 00:00), Max: 38.5 (27 Dec 2023 14:00)  T(F): 98.4 (28 Dec 2023 00:00), Max: 101.3 (27 Dec 2023 14:00)  HR: 88 (28 Dec 2023 01:00) (88 - 112)  BP: 129/86 (28 Dec 2023 01:00) (110/70 - 157/84)  BP(mean): 94 (28 Dec 2023 01:00) (78 - 117)  RR: 28 (28 Dec 2023 01:00) (16 - 35)  SpO2: 98% (28 Dec 2023 01:00) (93% - 98%)    Parameters below as of 28 Dec 2023 01:00  Patient On (Oxygen Delivery Method): room air        I&O's Summary    26 Dec 2023 07:01  -  27 Dec 2023 07:00  --------------------------------------------------------  IN: 3643.3 mL / OUT: 3300 mL / NET: 343.3 mL    27 Dec 2023 07:01  -  28 Dec 2023 03:12  --------------------------------------------------------  IN: 2017.8 mL / OUT: 2275 mL / NET: -257.2 mL        MEDICATIONS  (STANDING):  cefepime   IVPB 2000 milliGRAM(s) IV Intermittent every 8 hours  chlorhexidine 2% Cloths 1 Application(s) Topical daily  ciprofloxacin  0.3% Ophthalmic Solution for Otic Use 2 Drop(s) Both Ears two times a day  dexMEDEtomidine Infusion 0.2 MICROgram(s)/kG/Hr (3.46 mL/Hr) IV Continuous <Continuous>  heparin  Infusion. 1300 Unit(s)/Hr (13 mL/Hr) IV Continuous <Continuous>  hydroxychloroquine 200 milliGRAM(s) Oral two times a day  lidocaine   4% Patch 1 Patch Transdermal every 24 hours  melatonin 3 milliGRAM(s) Oral <User Schedule>  sodium chloride 1 Gram(s) Oral three times a day    MEDICATIONS  (PRN):  acetaminophen     Tablet .. 650 milliGRAM(s) Oral every 6 hours PRN Temp greater or equal to 38C (100.4F), Mild Pain (1 - 3)  albuterol/ipratropium for Nebulization 3 milliLiter(s) Nebulizer every 6 hours PRN Shortness of Breath and/or Wheezing  benzocaine/menthol Lozenge 1 Lozenge Oral four times a day PRN Sore Throat  FIRST- Mouthwash  BLM 15 milliLiter(s) Swish and Spit every 4 hours PRN Mouth Care  guaiFENesin Oral Liquid (Sugar-Free) 200 milliGRAM(s) Oral every 6 hours PRN Cough  heparin   Injectable 5500 Unit(s) IV Push every 6 hours PRN For aPTT less than 40  heparin   Injectable 2500 Unit(s) IV Push every 6 hours PRN For aPTT between 40 - 57  lidocaine 2% Viscous 5 milliLiter(s) Swish and Spit three times a day PRN Mouth Care  oxyCODONE    IR 5 milliGRAM(s) Oral every 6 hours PRN Severe Pain (7 - 10)        LABS                                            8.6                   Neurophils% (auto):   42.5   (12-28 @ 00:45):    13.41)-----------(232          Lymphocytes% (auto):  46.2                                          25.0                   Eosinphils% (auto):   0.4      Manual%: Neutrophils x    ; Lymphocytes x    ; Eosinophils x    ; Bands%: x    ; Blasts x                                    125    |  92     |  10                  Calcium: 8.2   / iCa: x      (12-28 @ 00:45)    ----------------------------<  133       Magnesium: 1.80                             4.2     |  23     |  0.73             Phosphorous: 1.9      TPro  6.5    /  Alb  2.5    /  TBili  0.5    /  DBili  x      /  AST  125    /  ALT  99     /  AlkPhos  72     28 Dec 2023 00:45    ( 12-28 @ 00:45 )   PT: 16.3 sec;   INR: 1.46 ratio  aPTT: 65.9 sec          ASSESSMENT & PLAN:     28 years old male with h/o Lupus ( diagnosed in 09/2023, on Plaquenil present to [place] ED on 12/12/23  with complain of chest pain and SOB admitted for fevers, PE, pleural effusions, course c/b high fever and tonic-clonic seizure, transferred to MICU for post-ictal and infectious monitoring.     Neuro  Seizure  Patient is lethargic i/s/o likely post-ictal / post- ativan state.  Protecting airway. VBG wnl, CT head unremarkable--no hemorrhage.   - vEEG, some multifocal mild dysfunction  -- will f/u neuro   - neuro following  - eventual MRI    Cardiovascular  Pulmonary Embolism  - on hep gtt  - no hypoxia  - no RV strain on TTE    Respiratory  B/l pleural effusions, complex, L > R  S/p L sided chest tube, poor lung re-expansion. S/p myst  - CT surgery consulted, will consider VATS procedure  - holding off on alteplase/myst for now  - willl hold heparin gtt if procedure scheduled    GI/Nutrition  No active issues  Regular diet    /Renal  Hyponatremia  ?ADH from pain. ? from urinary retention.   Questionable retention on POCUS  Omer inserted  - c/w omer, eventual TOV  - urine Na high consistent with high ADH  - serum osm  - renal consult    ID  Fevers as high as 104.5, unclear source ?pnuemonia   Was on vanc and zosyn  RVP neg  Another potential source is pleural effusions, ?empyemas  - f/u pleural fluid studies   - monitor for diarrhea, if has diarrhea can resend GI PCR and stool cx  - broadened to Vanc, cefepime   - repeat BCx  - sent UA and UCx  - LP done 12/25, so far unremarkable, will f/u studies and cultures    Endocrine  No active issues.     Hematologic/DVT ppx  SLE  - unclear if in active flair though ,   - rheum following, f/u recs  -- recommending derm consult for hand lesions  -- extensive rheum panel sent  - heme following, f/u recs  - DCed steroids 12/25  - hypercoag workup pending    DVT PPx  - on Hep gtt for PEs    Ethics  FULL CODE      For Follow-Up:  [ ] Derm recs:  - follow-up myomarker panel  - order anti-scl-70 antibodies  [ ] IR consulted for axillary lymph node biopsy MICU Transfer Note  ---------------------------    Transfer from: MICU  Transfer to:  (x) Medicine    (  ) Telemetry    (  ) RCU    (  ) Palliative    (  ) Stroke Unit    (  ) _______________  Accepting Physician: Jose Ramon Salamanca      MICU COURSE  29 yo Male with recent diagnosis of Lupus (9/2023) admitted 12/12 with B/L Pulmonary embolism. Now with increased dyspnea, pleuritic chest pain,  hypoxia, and fever. CXR shows increased left pleural effusion. Bedside US shows moderate left septated effusion anterior, lateral and posterior. Right: small effusion simple appearing. Cardiac and pericardial effusion noted (although recent echo was negative for pericardial effusion) Lower extremity dopplers negative for DVT. GI PCR + e coli. mRSA PCR + Staph aureus. Hematology consulted for hypercoagulable workup and correction of INR myke-procedurally.   12/23 - admitted to MICU, plan for L PTC at bedside however INR too high. Pt seen by Rheum, Heme/Onc, Medicine, ID. Cont to be febrile to 103. Cultures sent. Started on zosyn. Started on Steroids. Mult bld tests ordered for continued w/u. Given Vitamin K x 1 to reduce INR.   12/24- Pt clinically feels better today. On RA. Hep gtt held. INR to 1.5., Left sided Diagnositc Thoracentesis done. FLuid sent for culture, Lytes criteria, cell count. Pt cont to febrile to 104.1. Per ID, only do cultures Q48hr. Can resume Hep gtt after post Thora CXR is cleared.   No plans for Thoracic surgical intervention for now. Plan transfer to Medicine today, Dr. Salamanca.   12/26: Psych consult, chest tube in afternoon, TPA  12/27: stop TPA, ENT scope, IR consult for axillary node biopsy, CTAP, d/c vanco      OBJECTIVE --  Vital Signs Last 24 Hrs  T(C): 36.9 (28 Dec 2023 00:00), Max: 38.5 (27 Dec 2023 14:00)  T(F): 98.4 (28 Dec 2023 00:00), Max: 101.3 (27 Dec 2023 14:00)  HR: 88 (28 Dec 2023 01:00) (88 - 112)  BP: 129/86 (28 Dec 2023 01:00) (110/70 - 157/84)  BP(mean): 94 (28 Dec 2023 01:00) (78 - 117)  RR: 28 (28 Dec 2023 01:00) (16 - 35)  SpO2: 98% (28 Dec 2023 01:00) (93% - 98%)    Parameters below as of 28 Dec 2023 01:00  Patient On (Oxygen Delivery Method): room air        I&O's Summary    26 Dec 2023 07:01  -  27 Dec 2023 07:00  --------------------------------------------------------  IN: 3643.3 mL / OUT: 3300 mL / NET: 343.3 mL    27 Dec 2023 07:01  -  28 Dec 2023 03:12  --------------------------------------------------------  IN: 2017.8 mL / OUT: 2275 mL / NET: -257.2 mL        MEDICATIONS  (STANDING):  cefepime   IVPB 2000 milliGRAM(s) IV Intermittent every 8 hours  chlorhexidine 2% Cloths 1 Application(s) Topical daily  ciprofloxacin  0.3% Ophthalmic Solution for Otic Use 2 Drop(s) Both Ears two times a day  dexMEDEtomidine Infusion 0.2 MICROgram(s)/kG/Hr (3.46 mL/Hr) IV Continuous <Continuous>  heparin  Infusion. 1300 Unit(s)/Hr (13 mL/Hr) IV Continuous <Continuous>  hydroxychloroquine 200 milliGRAM(s) Oral two times a day  lidocaine   4% Patch 1 Patch Transdermal every 24 hours  melatonin 3 milliGRAM(s) Oral <User Schedule>  sodium chloride 1 Gram(s) Oral three times a day    MEDICATIONS  (PRN):  acetaminophen     Tablet .. 650 milliGRAM(s) Oral every 6 hours PRN Temp greater or equal to 38C (100.4F), Mild Pain (1 - 3)  albuterol/ipratropium for Nebulization 3 milliLiter(s) Nebulizer every 6 hours PRN Shortness of Breath and/or Wheezing  benzocaine/menthol Lozenge 1 Lozenge Oral four times a day PRN Sore Throat  FIRST- Mouthwash  BLM 15 milliLiter(s) Swish and Spit every 4 hours PRN Mouth Care  guaiFENesin Oral Liquid (Sugar-Free) 200 milliGRAM(s) Oral every 6 hours PRN Cough  heparin   Injectable 5500 Unit(s) IV Push every 6 hours PRN For aPTT less than 40  heparin   Injectable 2500 Unit(s) IV Push every 6 hours PRN For aPTT between 40 - 57  lidocaine 2% Viscous 5 milliLiter(s) Swish and Spit three times a day PRN Mouth Care  oxyCODONE    IR 5 milliGRAM(s) Oral every 6 hours PRN Severe Pain (7 - 10)        LABS                                            8.6                   Neurophils% (auto):   42.5   (12-28 @ 00:45):    13.41)-----------(232          Lymphocytes% (auto):  46.2                                          25.0                   Eosinphils% (auto):   0.4      Manual%: Neutrophils x    ; Lymphocytes x    ; Eosinophils x    ; Bands%: x    ; Blasts x                                    125    |  92     |  10                  Calcium: 8.2   / iCa: x      (12-28 @ 00:45)    ----------------------------<  133       Magnesium: 1.80                             4.2     |  23     |  0.73             Phosphorous: 1.9      TPro  6.5    /  Alb  2.5    /  TBili  0.5    /  DBili  x      /  AST  125    /  ALT  99     /  AlkPhos  72     28 Dec 2023 00:45    ( 12-28 @ 00:45 )   PT: 16.3 sec;   INR: 1.46 ratio  aPTT: 65.9 sec          ASSESSMENT & PLAN:     28 years old male with h/o Lupus ( diagnosed in 09/2023, on Plaquenil present to [place] ED on 12/12/23  with complain of chest pain and SOB admitted for fevers, PE, pleural effusions, course c/b high fever and tonic-clonic seizure, transferred to MICU for post-ictal and infectious monitoring.     Neuro  Seizure  Patient is lethargic i/s/o likely post-ictal / post- ativan state.  Protecting airway. VBG wnl, CT head unremarkable--no hemorrhage.   - vEEG, some multifocal mild dysfunction  -- will f/u neuro   - neuro following  - eventual MRI    Cardiovascular  Pulmonary Embolism  - on hep gtt  - no hypoxia  - no RV strain on TTE    Respiratory  B/l pleural effusions, complex, L > R  S/p L sided chest tube, poor lung re-expansion. S/p myst  - CT surgery consulted, will consider VATS procedure  - holding off on alteplase/myst for now  - willl hold heparin gtt if procedure scheduled    GI/Nutrition  No active issues  Regular diet    /Renal  Hyponatremia  ?ADH from pain. ? from urinary retention.   Questionable retention on POCUS  Omer inserted  - c/w omer, eventual TOV  - urine Na high consistent with high ADH  - serum osm  - renal consult    ID  Fevers as high as 104.5, unclear source ?pnuemonia   Was on vanc and zosyn  RVP neg  Another potential source is pleural effusions, ?empyemas  - f/u pleural fluid studies   - monitor for diarrhea, if has diarrhea can resend GI PCR and stool cx  - broadened to cefepime   - repeat BCx  - sent UA and UCx  - LP done 12/25, so far unremarkable, will f/u studies and cultures    Endocrine  No active issues.     Hematologic/DVT ppx  SLE  - unclear if in active flair though ,   - rheum following, f/u recs  -- recommending derm consult for hand lesions  -- extensive rheum panel sent  - heme following, f/u recs  - DCed steroids 12/25  - hypercoag workup pending    DVT PPx  - on Hep gtt for PEs    Ethics  FULL CODE      For Follow-Up:  [ ] Derm recs:  - follow-up myomarker panel  - order anti-scl-70 antibodies  [ ] IR consulted for axillary lymph node biopsy  [ ] c/w cefepime   [ ] f/u rheum recs, derm recs, CT surgery recs MICU Transfer Note  ---------------------------    Transfer from: MICU  Transfer to:  (x) Medicine    (  ) Telemetry    (  ) RCU    (  ) Palliative    (  ) Stroke Unit    (  ) _______________  Accepting Physician: Jose Ramon Salamanca      MICU COURSE  27 yo Male with recent diagnosis of Lupus (9/2023) admitted 12/12 with B/L Pulmonary embolism. Now with increased dyspnea, pleuritic chest pain,  hypoxia, and fever. CXR shows increased left pleural effusion. Bedside US shows moderate left septated effusion anterior, lateral and posterior. Right: small effusion simple appearing. Cardiac and pericardial effusion noted (although recent echo was negative for pericardial effusion) Lower extremity dopplers negative for DVT. GI PCR + e coli. mRSA PCR + Staph aureus. Hematology consulted for hypercoagulable workup and correction of INR myke-procedurally.   12/23 - admitted to MICU, plan for L PTC at bedside however INR too high. Pt seen by Rheum, Heme/Onc, Medicine, ID. Cont to be febrile to 103. Cultures sent. Started on zosyn. Started on Steroids. Mult bld tests ordered for continued w/u. Given Vitamin K x 1 to reduce INR.   12/24- Pt clinically feels better today. On RA. Hep gtt held. INR to 1.5., Left sided Diagnositc Thoracentesis done. FLuid sent for culture, Lytes criteria, cell count. Pt cont to febrile to 104.1. Per ID, only do cultures Q48hr. Can resume Hep gtt after post Thora CXR is cleared.   No plans for Thoracic surgical intervention for now. Plan transfer to Medicine today, Dr. Salamanca.   12/26: Psych consult, chest tube in afternoon, TPA  12/27: stop TPA, ENT scope, IR consult for axillary node biopsy, CTAP, d/c vanco      OBJECTIVE --  Vital Signs Last 24 Hrs  T(C): 36.9 (28 Dec 2023 00:00), Max: 38.5 (27 Dec 2023 14:00)  T(F): 98.4 (28 Dec 2023 00:00), Max: 101.3 (27 Dec 2023 14:00)  HR: 88 (28 Dec 2023 01:00) (88 - 112)  BP: 129/86 (28 Dec 2023 01:00) (110/70 - 157/84)  BP(mean): 94 (28 Dec 2023 01:00) (78 - 117)  RR: 28 (28 Dec 2023 01:00) (16 - 35)  SpO2: 98% (28 Dec 2023 01:00) (93% - 98%)    Parameters below as of 28 Dec 2023 01:00  Patient On (Oxygen Delivery Method): room air        I&O's Summary    26 Dec 2023 07:01  -  27 Dec 2023 07:00  --------------------------------------------------------  IN: 3643.3 mL / OUT: 3300 mL / NET: 343.3 mL    27 Dec 2023 07:01  -  28 Dec 2023 03:12  --------------------------------------------------------  IN: 2017.8 mL / OUT: 2275 mL / NET: -257.2 mL        MEDICATIONS  (STANDING):  cefepime   IVPB 2000 milliGRAM(s) IV Intermittent every 8 hours  chlorhexidine 2% Cloths 1 Application(s) Topical daily  ciprofloxacin  0.3% Ophthalmic Solution for Otic Use 2 Drop(s) Both Ears two times a day  dexMEDEtomidine Infusion 0.2 MICROgram(s)/kG/Hr (3.46 mL/Hr) IV Continuous <Continuous>  heparin  Infusion. 1300 Unit(s)/Hr (13 mL/Hr) IV Continuous <Continuous>  hydroxychloroquine 200 milliGRAM(s) Oral two times a day  lidocaine   4% Patch 1 Patch Transdermal every 24 hours  melatonin 3 milliGRAM(s) Oral <User Schedule>  sodium chloride 1 Gram(s) Oral three times a day    MEDICATIONS  (PRN):  acetaminophen     Tablet .. 650 milliGRAM(s) Oral every 6 hours PRN Temp greater or equal to 38C (100.4F), Mild Pain (1 - 3)  albuterol/ipratropium for Nebulization 3 milliLiter(s) Nebulizer every 6 hours PRN Shortness of Breath and/or Wheezing  benzocaine/menthol Lozenge 1 Lozenge Oral four times a day PRN Sore Throat  FIRST- Mouthwash  BLM 15 milliLiter(s) Swish and Spit every 4 hours PRN Mouth Care  guaiFENesin Oral Liquid (Sugar-Free) 200 milliGRAM(s) Oral every 6 hours PRN Cough  heparin   Injectable 5500 Unit(s) IV Push every 6 hours PRN For aPTT less than 40  heparin   Injectable 2500 Unit(s) IV Push every 6 hours PRN For aPTT between 40 - 57  lidocaine 2% Viscous 5 milliLiter(s) Swish and Spit three times a day PRN Mouth Care  oxyCODONE    IR 5 milliGRAM(s) Oral every 6 hours PRN Severe Pain (7 - 10)        LABS                                            8.6                   Neurophils% (auto):   42.5   (12-28 @ 00:45):    13.41)-----------(232          Lymphocytes% (auto):  46.2                                          25.0                   Eosinphils% (auto):   0.4      Manual%: Neutrophils x    ; Lymphocytes x    ; Eosinophils x    ; Bands%: x    ; Blasts x                                    125    |  92     |  10                  Calcium: 8.2   / iCa: x      (12-28 @ 00:45)    ----------------------------<  133       Magnesium: 1.80                             4.2     |  23     |  0.73             Phosphorous: 1.9      TPro  6.5    /  Alb  2.5    /  TBili  0.5    /  DBili  x      /  AST  125    /  ALT  99     /  AlkPhos  72     28 Dec 2023 00:45    ( 12-28 @ 00:45 )   PT: 16.3 sec;   INR: 1.46 ratio  aPTT: 65.9 sec          ASSESSMENT & PLAN:     28 years old male with h/o Lupus ( diagnosed in 09/2023, on Plaquenil present to [place] ED on 12/12/23  with complain of chest pain and SOB admitted for fevers, PE, pleural effusions, course c/b high fever and tonic-clonic seizure, transferred to MICU for post-ictal and infectious monitoring.     Neuro  Seizure  Patient is lethargic i/s/o likely post-ictal / post- ativan state.  Protecting airway. VBG wnl, CT head unremarkable--no hemorrhage.   - vEEG, some multifocal mild dysfunction  -- will f/u neuro   - neuro following  - eventual MRI    Cardiovascular  Pulmonary Embolism  - on hep gtt  - no hypoxia  - no RV strain on TTE    Respiratory  B/l pleural effusions, complex, L > R  S/p L sided chest tube, poor lung re-expansion. S/p myst  - CT surgery consulted, will consider VATS procedure  - holding off on alteplase/myst for now  - willl hold heparin gtt if procedure scheduled    GI/Nutrition  No active issues  Regular diet    /Renal  Hyponatremia  ?ADH from pain. ? from urinary retention.   Questionable retention on POCUS  Omer inserted  - c/w omer, eventual TOV  - urine Na high consistent with high ADH  - serum osm  - renal consult    ID  Fevers as high as 104.5, unclear source ?pnuemonia   Was on vanc and zosyn  RVP neg  Another potential source is pleural effusions, ?empyemas  - f/u pleural fluid studies   - monitor for diarrhea, if has diarrhea can resend GI PCR and stool cx  - broadened to cefepime   - repeat BCx  - sent UA and UCx  - LP done 12/25, so far unremarkable, will f/u studies and cultures    Endocrine  No active issues.     Hematologic/DVT ppx  SLE  - unclear if in active flair though ,   - rheum following, f/u recs  -- recommending derm consult for hand lesions  -- extensive rheum panel sent  - heme following, f/u recs  - DCed steroids 12/25  - hypercoag workup pending    DVT PPx  - on Hep gtt for PEs    Ethics  FULL CODE      For Follow-Up:  [ ] Derm recs:  - follow-up myomarker panel  - order anti-scl-70 antibodies  [ ] IR consulted for axillary lymph node biopsy  [ ] c/w cefepime   [ ] f/u rheum recs, derm recs, CT surgery recs MICU Transfer Note  ---------------------------    Transfer from: MICU  Transfer to:  (x) Medicine    (  ) Telemetry    (  ) RCU    (  ) Palliative    (  ) Stroke Unit    (  ) _______________  Accepting Physician: Jose Ramon Salamanca  Signout given to ACP Rody Shine      MICU COURSE  29 yo Male with recent diagnosis of Lupus (9/2023) admitted 12/12 with B/L Pulmonary embolism. Now with increased dyspnea, pleuritic chest pain,  hypoxia, and fever. CXR shows increased left pleural effusion. Bedside US shows moderate left septated effusion anterior, lateral and posterior. Right: small effusion simple appearing. Cardiac and pericardial effusion noted (although recent echo was negative for pericardial effusion) Lower extremity dopplers negative for DVT. GI PCR + e coli. mRSA PCR + Staph aureus. Hematology consulted for hypercoagulable workup and correction of INR myke-procedurally.   12/23 - admitted to MICU, plan for L PTC at bedside however INR too high. Pt seen by Rheum, Heme/Onc, Medicine, ID. Cont to be febrile to 103. Cultures sent. Started on zosyn. Started on Steroids. Mult bld tests ordered for continued w/u. Given Vitamin K x 1 to reduce INR.   12/24- Pt clinically feels better today. On RA. Hep gtt held. INR to 1.5., Left sided Diagnositc Thoracentesis done. FLuid sent for culture, Lytes criteria, cell count. Pt cont to febrile to 104.1. Per ID, only do cultures Q48hr. Can resume Hep gtt after post Thora CXR is cleared.   No plans for Thoracic surgical intervention for now. Plan transfer to Medicine today, Dr. Salamanca.   12/26: Psych consult, chest tube in afternoon, TPA  12/27: stop TPA, ENT scope, IR consult for axillary node biopsy, CTAP, d/c vanco      OBJECTIVE --  Vital Signs Last 24 Hrs  T(C): 36.9 (28 Dec 2023 00:00), Max: 38.5 (27 Dec 2023 14:00)  T(F): 98.4 (28 Dec 2023 00:00), Max: 101.3 (27 Dec 2023 14:00)  HR: 88 (28 Dec 2023 01:00) (88 - 112)  BP: 129/86 (28 Dec 2023 01:00) (110/70 - 157/84)  BP(mean): 94 (28 Dec 2023 01:00) (78 - 117)  RR: 28 (28 Dec 2023 01:00) (16 - 35)  SpO2: 98% (28 Dec 2023 01:00) (93% - 98%)    Parameters below as of 28 Dec 2023 01:00  Patient On (Oxygen Delivery Method): room air        I&O's Summary    26 Dec 2023 07:01  -  27 Dec 2023 07:00  --------------------------------------------------------  IN: 3643.3 mL / OUT: 3300 mL / NET: 343.3 mL    27 Dec 2023 07:01  -  28 Dec 2023 03:12  --------------------------------------------------------  IN: 2017.8 mL / OUT: 2275 mL / NET: -257.2 mL        MEDICATIONS  (STANDING):  cefepime   IVPB 2000 milliGRAM(s) IV Intermittent every 8 hours  chlorhexidine 2% Cloths 1 Application(s) Topical daily  ciprofloxacin  0.3% Ophthalmic Solution for Otic Use 2 Drop(s) Both Ears two times a day  dexMEDEtomidine Infusion 0.2 MICROgram(s)/kG/Hr (3.46 mL/Hr) IV Continuous <Continuous>  heparin  Infusion. 1300 Unit(s)/Hr (13 mL/Hr) IV Continuous <Continuous>  hydroxychloroquine 200 milliGRAM(s) Oral two times a day  lidocaine   4% Patch 1 Patch Transdermal every 24 hours  melatonin 3 milliGRAM(s) Oral <User Schedule>  sodium chloride 1 Gram(s) Oral three times a day    MEDICATIONS  (PRN):  acetaminophen     Tablet .. 650 milliGRAM(s) Oral every 6 hours PRN Temp greater or equal to 38C (100.4F), Mild Pain (1 - 3)  albuterol/ipratropium for Nebulization 3 milliLiter(s) Nebulizer every 6 hours PRN Shortness of Breath and/or Wheezing  benzocaine/menthol Lozenge 1 Lozenge Oral four times a day PRN Sore Throat  FIRST- Mouthwash  BLM 15 milliLiter(s) Swish and Spit every 4 hours PRN Mouth Care  guaiFENesin Oral Liquid (Sugar-Free) 200 milliGRAM(s) Oral every 6 hours PRN Cough  heparin   Injectable 5500 Unit(s) IV Push every 6 hours PRN For aPTT less than 40  heparin   Injectable 2500 Unit(s) IV Push every 6 hours PRN For aPTT between 40 - 57  lidocaine 2% Viscous 5 milliLiter(s) Swish and Spit three times a day PRN Mouth Care  oxyCODONE    IR 5 milliGRAM(s) Oral every 6 hours PRN Severe Pain (7 - 10)        LABS                                            8.6                   Neurophils% (auto):   42.5   (12-28 @ 00:45):    13.41)-----------(232          Lymphocytes% (auto):  46.2                                          25.0                   Eosinphils% (auto):   0.4      Manual%: Neutrophils x    ; Lymphocytes x    ; Eosinophils x    ; Bands%: x    ; Blasts x                                    125    |  92     |  10                  Calcium: 8.2   / iCa: x      (12-28 @ 00:45)    ----------------------------<  133       Magnesium: 1.80                             4.2     |  23     |  0.73             Phosphorous: 1.9      TPro  6.5    /  Alb  2.5    /  TBili  0.5    /  DBili  x      /  AST  125    /  ALT  99     /  AlkPhos  72     28 Dec 2023 00:45    ( 12-28 @ 00:45 )   PT: 16.3 sec;   INR: 1.46 ratio  aPTT: 65.9 sec          ASSESSMENT & PLAN:     28 years old male with h/o Lupus ( diagnosed in 09/2023, on Plaquenil present to [place] ED on 12/12/23  with complain of chest pain and SOB admitted for fevers, PE, pleural effusions, course c/b high fever and tonic-clonic seizure, transferred to MICU for post-ictal and infectious monitoring.     Neuro  Seizure  Patient is lethargic i/s/o likely post-ictal / post- ativan state.  Protecting airway. VBG wnl, CT head unremarkable--no hemorrhage.   - vEEG, some multifocal mild dysfunction  -- will f/u neuro   - neuro following  - eventual MRI    Cardiovascular  Pulmonary Embolism  - on hep gtt  - no hypoxia  - no RV strain on TTE    Respiratory  B/l pleural effusions, complex, L > R  S/p L sided chest tube, poor lung re-expansion. S/p myst  - CT surgery consulted, will consider VATS procedure  - holding off on alteplase/myst for now  - willl hold heparin gtt if procedure scheduled    GI/Nutrition  No active issues  Regular diet    /Renal  Hyponatremia  ?ADH from pain. ? from urinary retention.   Questionable retention on POCUS  Omer inserted  - c/w omer, eventual TOV  - urine Na high consistent with high ADH  - serum osm  - renal consult    ID  Fevers as high as 104.5, unclear source ?pnuemonia   Was on vanc and zosyn  RVP neg  Another potential source is pleural effusions, ?empyemas  - f/u pleural fluid studies   - monitor for diarrhea, if has diarrhea can resend GI PCR and stool cx  - broadened to cefepime   - repeat BCx  - sent UA and UCx  - LP done 12/25, so far unremarkable, will f/u studies and cultures    Endocrine  No active issues.     Hematologic/DVT ppx  SLE  - unclear if in active flair though ,   - rheum following, f/u recs  -- recommending derm consult for hand lesions  -- extensive rheum panel sent  - heme following, f/u recs  - DCed steroids 12/25  - hypercoag workup pending    DVT PPx  - on Hep gtt for PEs    Ethics  FULL CODE      For Follow-Up:  [ ] Derm recs:  - follow-up myomarker panel  - order anti-scl-70 antibodies  [ ] IR consulted for axillary lymph node biopsy  [ ] c/w cefepime   [ ] f/u rheum recs, derm recs, CT surgery recs MICU Transfer Note  ---------------------------    Transfer from: MICU  Transfer to:  (x) Medicine    (  ) Telemetry    (  ) RCU    (  ) Palliative    (  ) Stroke Unit    (  ) _______________  Accepting Physician: Jose Ramon Salamanca  Signout given to ACP Rody Shine      MICU COURSE  29 yo Male with recent diagnosis of Lupus (9/2023) admitted 12/12 with B/L Pulmonary embolism. Now with increased dyspnea, pleuritic chest pain,  hypoxia, and fever. CXR shows increased left pleural effusion. Bedside US shows moderate left septated effusion anterior, lateral and posterior. Right: small effusion simple appearing. Cardiac and pericardial effusion noted (although recent echo was negative for pericardial effusion) Lower extremity dopplers negative for DVT. GI PCR + e coli. mRSA PCR + Staph aureus. Hematology consulted for hypercoagulable workup and correction of INR myke-procedurally.   12/23 - admitted to MICU, plan for L PTC at bedside however INR too high. Pt seen by Rheum, Heme/Onc, Medicine, ID. Cont to be febrile to 103. Cultures sent. Started on zosyn. Started on Steroids. Mult bld tests ordered for continued w/u. Given Vitamin K x 1 to reduce INR.   12/24- Pt clinically feels better today. On RA. Hep gtt held. INR to 1.5., Left sided Diagnositc Thoracentesis done. FLuid sent for culture, Lytes criteria, cell count. Pt cont to febrile to 104.1. Per ID, only do cultures Q48hr. Can resume Hep gtt after post Thora CXR is cleared.   No plans for Thoracic surgical intervention for now. Plan transfer to Medicine today, Dr. Salamanca.   12/26: Psych consult, chest tube in afternoon, TPA  12/27: stop TPA, ENT scope, IR consult for axillary node biopsy, CTAP, d/c vanco      OBJECTIVE --  Vital Signs Last 24 Hrs  T(C): 36.9 (28 Dec 2023 00:00), Max: 38.5 (27 Dec 2023 14:00)  T(F): 98.4 (28 Dec 2023 00:00), Max: 101.3 (27 Dec 2023 14:00)  HR: 88 (28 Dec 2023 01:00) (88 - 112)  BP: 129/86 (28 Dec 2023 01:00) (110/70 - 157/84)  BP(mean): 94 (28 Dec 2023 01:00) (78 - 117)  RR: 28 (28 Dec 2023 01:00) (16 - 35)  SpO2: 98% (28 Dec 2023 01:00) (93% - 98%)    Parameters below as of 28 Dec 2023 01:00  Patient On (Oxygen Delivery Method): room air        I&O's Summary    26 Dec 2023 07:01  -  27 Dec 2023 07:00  --------------------------------------------------------  IN: 3643.3 mL / OUT: 3300 mL / NET: 343.3 mL    27 Dec 2023 07:01  -  28 Dec 2023 03:12  --------------------------------------------------------  IN: 2017.8 mL / OUT: 2275 mL / NET: -257.2 mL        MEDICATIONS  (STANDING):  cefepime   IVPB 2000 milliGRAM(s) IV Intermittent every 8 hours  chlorhexidine 2% Cloths 1 Application(s) Topical daily  ciprofloxacin  0.3% Ophthalmic Solution for Otic Use 2 Drop(s) Both Ears two times a day  dexMEDEtomidine Infusion 0.2 MICROgram(s)/kG/Hr (3.46 mL/Hr) IV Continuous <Continuous>  heparin  Infusion. 1300 Unit(s)/Hr (13 mL/Hr) IV Continuous <Continuous>  hydroxychloroquine 200 milliGRAM(s) Oral two times a day  lidocaine   4% Patch 1 Patch Transdermal every 24 hours  melatonin 3 milliGRAM(s) Oral <User Schedule>  sodium chloride 1 Gram(s) Oral three times a day    MEDICATIONS  (PRN):  acetaminophen     Tablet .. 650 milliGRAM(s) Oral every 6 hours PRN Temp greater or equal to 38C (100.4F), Mild Pain (1 - 3)  albuterol/ipratropium for Nebulization 3 milliLiter(s) Nebulizer every 6 hours PRN Shortness of Breath and/or Wheezing  benzocaine/menthol Lozenge 1 Lozenge Oral four times a day PRN Sore Throat  FIRST- Mouthwash  BLM 15 milliLiter(s) Swish and Spit every 4 hours PRN Mouth Care  guaiFENesin Oral Liquid (Sugar-Free) 200 milliGRAM(s) Oral every 6 hours PRN Cough  heparin   Injectable 5500 Unit(s) IV Push every 6 hours PRN For aPTT less than 40  heparin   Injectable 2500 Unit(s) IV Push every 6 hours PRN For aPTT between 40 - 57  lidocaine 2% Viscous 5 milliLiter(s) Swish and Spit three times a day PRN Mouth Care  oxyCODONE    IR 5 milliGRAM(s) Oral every 6 hours PRN Severe Pain (7 - 10)        LABS                                            8.6                   Neurophils% (auto):   42.5   (12-28 @ 00:45):    13.41)-----------(232          Lymphocytes% (auto):  46.2                                          25.0                   Eosinphils% (auto):   0.4      Manual%: Neutrophils x    ; Lymphocytes x    ; Eosinophils x    ; Bands%: x    ; Blasts x                                    125    |  92     |  10                  Calcium: 8.2   / iCa: x      (12-28 @ 00:45)    ----------------------------<  133       Magnesium: 1.80                             4.2     |  23     |  0.73             Phosphorous: 1.9      TPro  6.5    /  Alb  2.5    /  TBili  0.5    /  DBili  x      /  AST  125    /  ALT  99     /  AlkPhos  72     28 Dec 2023 00:45    ( 12-28 @ 00:45 )   PT: 16.3 sec;   INR: 1.46 ratio  aPTT: 65.9 sec          ASSESSMENT & PLAN:     28 years old male with h/o Lupus ( diagnosed in 09/2023, on Plaquenil present to [place] ED on 12/12/23  with complain of chest pain and SOB admitted for fevers, PE, pleural effusions, course c/b high fever and tonic-clonic seizure, transferred to MICU for post-ictal and infectious monitoring.     Neuro  Seizure  Patient is lethargic i/s/o likely post-ictal / post- ativan state.  Protecting airway. VBG wnl, CT head unremarkable--no hemorrhage.   - vEEG, some multifocal mild dysfunction  -- will f/u neuro   - neuro following  - eventual MRI    Cardiovascular  Pulmonary Embolism  - on hep gtt  - no hypoxia  - no RV strain on TTE    Respiratory  B/l pleural effusions, complex, L > R  S/p L sided chest tube, poor lung re-expansion. S/p myst  - CT surgery consulted, will consider VATS procedure  - holding off on alteplase/myst for now  - willl hold heparin gtt if procedure scheduled    GI/Nutrition  No active issues  Regular diet    /Renal  Hyponatremia  ?ADH from pain. ? from urinary retention.   Questionable retention on POCUS  Omer inserted  - c/w omer, eventual TOV  - urine Na high consistent with high ADH  - serum osm  - renal consult    ID  Fevers as high as 104.5, unclear source ?pnuemonia   Was on vanc and zosyn  RVP neg  Another potential source is pleural effusions, ?empyemas  - f/u pleural fluid studies   - monitor for diarrhea, if has diarrhea can resend GI PCR and stool cx  - broadened to cefepime   - repeat BCx  - sent UA and UCx  - LP done 12/25, so far unremarkable, will f/u studies and cultures    Endocrine  No active issues.     Hematologic/DVT ppx  SLE  - unclear if in active flair though ,   - rheum following, f/u recs  -- recommending derm consult for hand lesions  -- extensive rheum panel sent  - heme following, f/u recs  - DCed steroids 12/25  - hypercoag workup pending    DVT PPx  - on Hep gtt for PEs    Ethics  FULL CODE      For Follow-Up:  [ ] Derm recs:  - follow-up myomarker panel  - order anti-scl-70 antibodies  [ ] IR consulted for axillary lymph node biopsy  [ ] c/w cefepime   [ ] f/u rheum recs, derm recs, CT surgery recs MICU Transfer Note  ---------------------------    Transfer from: MICU  Transfer to:  () Medicine    (x) Telemetry    (  ) RCU    (  ) Palliative    (  ) Stroke Unit    (  ) _______________  Accepting Physician: Jose Ramon Salamanca  Signout given to ACP Rody Shine      MICU COURSE  27 yo Male with recent diagnosis of Lupus (9/2023) admitted 12/12 with B/L Pulmonary embolism. Now with increased dyspnea, pleuritic chest pain,  hypoxia, and fever. CXR shows increased left pleural effusion. Bedside US shows moderate left septated effusion anterior, lateral and posterior. Right: small effusion simple appearing. Cardiac and pericardial effusion noted (although recent echo was negative for pericardial effusion) Lower extremity dopplers negative for DVT. GI PCR + e coli. mRSA PCR + Staph aureus. Hematology consulted for hypercoagulable workup and correction of INR myke-procedurally.   12/23 - admitted to MICU, plan for L PTC at bedside however INR too high. Pt seen by Rheum, Heme/Onc, Medicine, ID. Cont to be febrile to 103. Cultures sent. Started on zosyn. Started on Steroids. Mult bld tests ordered for continued w/u. Given Vitamin K x 1 to reduce INR.   12/24- Pt clinically feels better today. On RA. Hep gtt held. INR to 1.5., Left sided Diagnositc Thoracentesis done. FLuid sent for culture, Lytes criteria, cell count. Pt cont to febrile to 104.1. Per ID, only do cultures Q48hr. Can resume Hep gtt after post Thora CXR is cleared.   No plans for Thoracic surgical intervention for now. Plan transfer to Medicine today, Dr. Salamanca.   12/26: Psych consult, chest tube in afternoon, TPA  12/27: stop TPA, ENT scope, IR consult for axillary node biopsy, CTAP, d/c vanco  12/28: started solumedrol 60, pt received CT CAP      OBJECTIVE --  Vital Signs Last 24 Hrs  T(C): 36.9 (28 Dec 2023 00:00), Max: 38.5 (27 Dec 2023 14:00)  T(F): 98.4 (28 Dec 2023 00:00), Max: 101.3 (27 Dec 2023 14:00)  HR: 88 (28 Dec 2023 01:00) (88 - 112)  BP: 129/86 (28 Dec 2023 01:00) (110/70 - 157/84)  BP(mean): 94 (28 Dec 2023 01:00) (78 - 117)  RR: 28 (28 Dec 2023 01:00) (16 - 35)  SpO2: 98% (28 Dec 2023 01:00) (93% - 98%)    Parameters below as of 28 Dec 2023 01:00  Patient On (Oxygen Delivery Method): room air        I&O's Summary    26 Dec 2023 07:01  -  27 Dec 2023 07:00  --------------------------------------------------------  IN: 3643.3 mL / OUT: 3300 mL / NET: 343.3 mL    27 Dec 2023 07:01  -  28 Dec 2023 03:12  --------------------------------------------------------  IN: 2017.8 mL / OUT: 2275 mL / NET: -257.2 mL        MEDICATIONS  (STANDING):  cefepime   IVPB 2000 milliGRAM(s) IV Intermittent every 8 hours  chlorhexidine 2% Cloths 1 Application(s) Topical daily  ciprofloxacin  0.3% Ophthalmic Solution for Otic Use 2 Drop(s) Both Ears two times a day  dexMEDEtomidine Infusion 0.2 MICROgram(s)/kG/Hr (3.46 mL/Hr) IV Continuous <Continuous>  heparin  Infusion. 1300 Unit(s)/Hr (13 mL/Hr) IV Continuous <Continuous>  hydroxychloroquine 200 milliGRAM(s) Oral two times a day  lidocaine   4% Patch 1 Patch Transdermal every 24 hours  melatonin 3 milliGRAM(s) Oral <User Schedule>  sodium chloride 1 Gram(s) Oral three times a day    MEDICATIONS  (PRN):  acetaminophen     Tablet .. 650 milliGRAM(s) Oral every 6 hours PRN Temp greater or equal to 38C (100.4F), Mild Pain (1 - 3)  albuterol/ipratropium for Nebulization 3 milliLiter(s) Nebulizer every 6 hours PRN Shortness of Breath and/or Wheezing  benzocaine/menthol Lozenge 1 Lozenge Oral four times a day PRN Sore Throat  FIRST- Mouthwash  BLM 15 milliLiter(s) Swish and Spit every 4 hours PRN Mouth Care  guaiFENesin Oral Liquid (Sugar-Free) 200 milliGRAM(s) Oral every 6 hours PRN Cough  heparin   Injectable 5500 Unit(s) IV Push every 6 hours PRN For aPTT less than 40  heparin   Injectable 2500 Unit(s) IV Push every 6 hours PRN For aPTT between 40 - 57  lidocaine 2% Viscous 5 milliLiter(s) Swish and Spit three times a day PRN Mouth Care  oxyCODONE    IR 5 milliGRAM(s) Oral every 6 hours PRN Severe Pain (7 - 10)        LABS                                            8.6                   Neurophils% (auto):   42.5   (12-28 @ 00:45):    13.41)-----------(232          Lymphocytes% (auto):  46.2                                          25.0                   Eosinphils% (auto):   0.4      Manual%: Neutrophils x    ; Lymphocytes x    ; Eosinophils x    ; Bands%: x    ; Blasts x                                    125    |  92     |  10                  Calcium: 8.2   / iCa: x      (12-28 @ 00:45)    ----------------------------<  133       Magnesium: 1.80                             4.2     |  23     |  0.73             Phosphorous: 1.9      TPro  6.5    /  Alb  2.5    /  TBili  0.5    /  DBili  x      /  AST  125    /  ALT  99     /  AlkPhos  72     28 Dec 2023 00:45    ( 12-28 @ 00:45 )   PT: 16.3 sec;   INR: 1.46 ratio  aPTT: 65.9 sec          ASSESSMENT & PLAN:     28 years old male with h/o Lupus ( diagnosed in 09/2023, on Plaquenil present to [place] ED on 12/12/23  with complain of chest pain and SOB admitted for fevers, PE, pleural effusions, course c/b high fever and tonic-clonic seizure, transferred to MICU for post-ictal and infectious monitoring.     Neuro  Seizure  Patient is lethargic i/s/o likely post-ictal / post- ativan state.  Protecting airway. VBG wnl, CT head unremarkable--no hemorrhage.   - vEEG, some multifocal mild dysfunction, per neuro consistent with metabolic encephalopathy  - neuro following  - eventual MRI    Cardiovascular  Pulmonary Embolism  - on hep gtt  - no hypoxia  - no RV strain on TTE    Respiratory  B/l pleural effusions, complex, L > R  S/p L sided chest tube, poor lung re-expansion. S/p myst  - CT surgery consulted, will consider VATS procedure  - holding off on alteplase/myst for now per CT surg  - willl hold heparin gtt if procedure scheduled    GI/Nutrition  No active issues  Regular diet, but patient requested pureed.     /Renal  Hyponatremia  ?ADH from pain. ? from urinary retention.   Questionable retention on POCUS  Omer inserted  - c/w omer, eventual TOV  - urine Na high consistent with high ADH  - serum osm  - renal following    Proteinuria  - Pr/Cr 1.2, not quite nephrotic range  ? SLE involvement in kidney  - nephrology following  - potential renal biopsy     ID  Fevers as high as 104.5, unclear source ?pnuemonia   Was on vanc and zosyn  RVP neg  Another potential source is pleural effusions, ?empyemas  - f/u pleural fluid studies   - monitor for diarrhea, if has diarrhea can resend GI PCR and stool cx  - broadened to Vanc, cefepime   - f/u repeat BCx  - LP done 12/25, so far unremarkable, will f/u studies and cultures    Endocrine  No active issues.     Hematologic/DVT ppx  SLE  - unclear if in active flair though ,   - rheum following, f/u recs  - derm consulted, deferring biopsy  -- extensive rheum panel sent  - heme following, f/u recs  - DCed steroids 12/25  - hypercoag workup pending    DVT PPx  - on Hep gtt for PEs    Ethics  FULL CODE        For Follow-Up:  [ ] Derm recs:  - follow-up myomarker panel  - order anti-scl-70 antibodies  [ ] IR consulted for axillary lymph node biopsy  [ ] c/w cefepime   [ ] c/w solumedrol  [ ] f/u rheum recs, derm recs, CT surgery recs  [ ] f/u CT CAP MICU Transfer Note  ---------------------------    Transfer from: MICU  Transfer to:  () Medicine    (x) Telemetry    (  ) RCU    (  ) Palliative    (  ) Stroke Unit    (  ) _______________  Accepting Physician: Jose Ramon Salamanca  Signout given to ACP Rody Shine      MICU COURSE  29 yo Male with recent diagnosis of Lupus (9/2023) admitted 12/12 with B/L Pulmonary embolism. Now with increased dyspnea, pleuritic chest pain,  hypoxia, and fever. CXR shows increased left pleural effusion. Bedside US shows moderate left septated effusion anterior, lateral and posterior. Right: small effusion simple appearing. Cardiac and pericardial effusion noted (although recent echo was negative for pericardial effusion) Lower extremity dopplers negative for DVT. GI PCR + e coli. mRSA PCR + Staph aureus. Hematology consulted for hypercoagulable workup and correction of INR myke-procedurally.   12/23 - admitted to MICU, plan for L PTC at bedside however INR too high. Pt seen by Rheum, Heme/Onc, Medicine, ID. Cont to be febrile to 103. Cultures sent. Started on zosyn. Started on Steroids. Mult bld tests ordered for continued w/u. Given Vitamin K x 1 to reduce INR.   12/24- Pt clinically feels better today. On RA. Hep gtt held. INR to 1.5., Left sided Diagnositc Thoracentesis done. FLuid sent for culture, Lytes criteria, cell count. Pt cont to febrile to 104.1. Per ID, only do cultures Q48hr. Can resume Hep gtt after post Thora CXR is cleared.   No plans for Thoracic surgical intervention for now. Plan transfer to Medicine today, Dr. aSlamanca.   12/26: Psych consult, chest tube in afternoon, TPA  12/27: stop TPA, ENT scope, IR consult for axillary node biopsy, CTAP, d/c vanco  12/28: started solumedrol 60, pt received CT CAP      OBJECTIVE --  Vital Signs Last 24 Hrs  T(C): 36.9 (28 Dec 2023 00:00), Max: 38.5 (27 Dec 2023 14:00)  T(F): 98.4 (28 Dec 2023 00:00), Max: 101.3 (27 Dec 2023 14:00)  HR: 88 (28 Dec 2023 01:00) (88 - 112)  BP: 129/86 (28 Dec 2023 01:00) (110/70 - 157/84)  BP(mean): 94 (28 Dec 2023 01:00) (78 - 117)  RR: 28 (28 Dec 2023 01:00) (16 - 35)  SpO2: 98% (28 Dec 2023 01:00) (93% - 98%)    Parameters below as of 28 Dec 2023 01:00  Patient On (Oxygen Delivery Method): room air        I&O's Summary    26 Dec 2023 07:01  -  27 Dec 2023 07:00  --------------------------------------------------------  IN: 3643.3 mL / OUT: 3300 mL / NET: 343.3 mL    27 Dec 2023 07:01  -  28 Dec 2023 03:12  --------------------------------------------------------  IN: 2017.8 mL / OUT: 2275 mL / NET: -257.2 mL        MEDICATIONS  (STANDING):  cefepime   IVPB 2000 milliGRAM(s) IV Intermittent every 8 hours  chlorhexidine 2% Cloths 1 Application(s) Topical daily  ciprofloxacin  0.3% Ophthalmic Solution for Otic Use 2 Drop(s) Both Ears two times a day  dexMEDEtomidine Infusion 0.2 MICROgram(s)/kG/Hr (3.46 mL/Hr) IV Continuous <Continuous>  heparin  Infusion. 1300 Unit(s)/Hr (13 mL/Hr) IV Continuous <Continuous>  hydroxychloroquine 200 milliGRAM(s) Oral two times a day  lidocaine   4% Patch 1 Patch Transdermal every 24 hours  melatonin 3 milliGRAM(s) Oral <User Schedule>  sodium chloride 1 Gram(s) Oral three times a day    MEDICATIONS  (PRN):  acetaminophen     Tablet .. 650 milliGRAM(s) Oral every 6 hours PRN Temp greater or equal to 38C (100.4F), Mild Pain (1 - 3)  albuterol/ipratropium for Nebulization 3 milliLiter(s) Nebulizer every 6 hours PRN Shortness of Breath and/or Wheezing  benzocaine/menthol Lozenge 1 Lozenge Oral four times a day PRN Sore Throat  FIRST- Mouthwash  BLM 15 milliLiter(s) Swish and Spit every 4 hours PRN Mouth Care  guaiFENesin Oral Liquid (Sugar-Free) 200 milliGRAM(s) Oral every 6 hours PRN Cough  heparin   Injectable 5500 Unit(s) IV Push every 6 hours PRN For aPTT less than 40  heparin   Injectable 2500 Unit(s) IV Push every 6 hours PRN For aPTT between 40 - 57  lidocaine 2% Viscous 5 milliLiter(s) Swish and Spit three times a day PRN Mouth Care  oxyCODONE    IR 5 milliGRAM(s) Oral every 6 hours PRN Severe Pain (7 - 10)        LABS                                            8.6                   Neurophils% (auto):   42.5   (12-28 @ 00:45):    13.41)-----------(232          Lymphocytes% (auto):  46.2                                          25.0                   Eosinphils% (auto):   0.4      Manual%: Neutrophils x    ; Lymphocytes x    ; Eosinophils x    ; Bands%: x    ; Blasts x                                    125    |  92     |  10                  Calcium: 8.2   / iCa: x      (12-28 @ 00:45)    ----------------------------<  133       Magnesium: 1.80                             4.2     |  23     |  0.73             Phosphorous: 1.9      TPro  6.5    /  Alb  2.5    /  TBili  0.5    /  DBili  x      /  AST  125    /  ALT  99     /  AlkPhos  72     28 Dec 2023 00:45    ( 12-28 @ 00:45 )   PT: 16.3 sec;   INR: 1.46 ratio  aPTT: 65.9 sec          ASSESSMENT & PLAN:     28 years old male with h/o Lupus ( diagnosed in 09/2023, on Plaquenil present to [place] ED on 12/12/23  with complain of chest pain and SOB admitted for fevers, PE, pleural effusions, course c/b high fever and tonic-clonic seizure, transferred to MICU for post-ictal and infectious monitoring.     Neuro  Seizure  Patient is lethargic i/s/o likely post-ictal / post- ativan state.  Protecting airway. VBG wnl, CT head unremarkable--no hemorrhage.   - vEEG, some multifocal mild dysfunction, per neuro consistent with metabolic encephalopathy  - neuro following  - eventual MRI    Cardiovascular  Pulmonary Embolism  - on hep gtt  - no hypoxia  - no RV strain on TTE    Respiratory  B/l pleural effusions, complex, L > R  S/p L sided chest tube, poor lung re-expansion. S/p myst  - CT surgery consulted, will consider VATS procedure  - holding off on alteplase/myst for now per CT surg  - willl hold heparin gtt if procedure scheduled    GI/Nutrition  No active issues  Regular diet, but patient requested pureed.     /Renal  Hyponatremia  ?ADH from pain. ? from urinary retention.   Questionable retention on POCUS  Omer inserted  - c/w omer, eventual TOV  - urine Na high consistent with high ADH  - serum osm  - renal following    Proteinuria  - Pr/Cr 1.2, not quite nephrotic range  ? SLE involvement in kidney  - nephrology following  - potential renal biopsy     ID  Fevers as high as 104.5, unclear source ?pnuemonia   Was on vanc and zosyn  RVP neg  Another potential source is pleural effusions, ?empyemas  - f/u pleural fluid studies   - monitor for diarrhea, if has diarrhea can resend GI PCR and stool cx  - broadened to Vanc, cefepime   - f/u repeat BCx  - LP done 12/25, so far unremarkable, will f/u studies and cultures    Endocrine  No active issues.     Hematologic/DVT ppx  SLE  - unclear if in active flair though ,   - rheum following, f/u recs  - derm consulted, deferring biopsy  -- extensive rheum panel sent  - heme following, f/u recs  - DCed steroids 12/25  - hypercoag workup pending    DVT PPx  - on Hep gtt for PEs    Ethics  FULL CODE        For Follow-Up:  [ ] Derm recs:  - follow-up myomarker panel  - order anti-scl-70 antibodies  [ ] IR consulted for axillary lymph node biopsy  [ ] c/w cefepime   [ ] c/w solumedrol  [ ] f/u rheum recs, derm recs, CT surgery recs  [ ] f/u CT CAP MICU Transfer Note  ---------------------------    Transfer from: MICU  Transfer to:  () Medicine    (x) Telemetry    (  ) RCU    (  ) Palliative    (  ) Stroke Unit    (  ) _______________  Accepting Physician: Jose Ramon Salamanca  Signout given to ACP Rody Shine      MICU COURSE  27 yo Male with recent diagnosis of Lupus (9/2023) admitted 12/12 with B/L Pulmonary embolism. Now with increased dyspnea, pleuritic chest pain,  hypoxia, and fever. CXR shows increased left pleural effusion. Bedside US shows moderate left septated effusion anterior, lateral and posterior. Right: small effusion simple appearing. Cardiac and pericardial effusion noted (although recent echo was negative for pericardial effusion) Lower extremity dopplers negative for DVT. GI PCR + e coli. mRSA PCR + Staph aureus. Hematology consulted for hypercoagulable workup and correction of INR myke-procedurally. Pt seen by rheum, heme/onc, ID. Febrile in ICU to 103 i/s/o negative infectious workup. Started on zosyn. Started on Steroids. Mult bld tests ordered for continued w/u. Given Vitamin K x 1 to reduce INR. By day 3, pt was on RA. Left sided Diagnositc Thoracentesis done. Fluid sent for culture, Lytes criteria, cell count. CT consulted, no plans for Thoracic surgical intervention for now. chest tube now in place. IR consult for axillary node biopsy, CTAP. started solumedrol 60.    Plan transfer to Medicine today, Dr. Salamanca.  OBJECTIVE --  Vital Signs Last 24 Hrs  T(C): 36.9 (28 Dec 2023 00:00), Max: 38.5 (27 Dec 2023 14:00)  T(F): 98.4 (28 Dec 2023 00:00), Max: 101.3 (27 Dec 2023 14:00)  HR: 88 (28 Dec 2023 01:00) (88 - 112)  BP: 129/86 (28 Dec 2023 01:00) (110/70 - 157/84)  BP(mean): 94 (28 Dec 2023 01:00) (78 - 117)  RR: 28 (28 Dec 2023 01:00) (16 - 35)  SpO2: 98% (28 Dec 2023 01:00) (93% - 98%)    Parameters below as of 28 Dec 2023 01:00  Patient On (Oxygen Delivery Method): room air        I&O's Summary    26 Dec 2023 07:01  -  27 Dec 2023 07:00  --------------------------------------------------------  IN: 3643.3 mL / OUT: 3300 mL / NET: 343.3 mL    27 Dec 2023 07:01  -  28 Dec 2023 03:12  --------------------------------------------------------  IN: 2017.8 mL / OUT: 2275 mL / NET: -257.2 mL        MEDICATIONS  (STANDING):  cefepime   IVPB 2000 milliGRAM(s) IV Intermittent every 8 hours  chlorhexidine 2% Cloths 1 Application(s) Topical daily  ciprofloxacin  0.3% Ophthalmic Solution for Otic Use 2 Drop(s) Both Ears two times a day  dexMEDEtomidine Infusion 0.2 MICROgram(s)/kG/Hr (3.46 mL/Hr) IV Continuous <Continuous>  heparin  Infusion. 1300 Unit(s)/Hr (13 mL/Hr) IV Continuous <Continuous>  hydroxychloroquine 200 milliGRAM(s) Oral two times a day  lidocaine   4% Patch 1 Patch Transdermal every 24 hours  melatonin 3 milliGRAM(s) Oral <User Schedule>  sodium chloride 1 Gram(s) Oral three times a day    MEDICATIONS  (PRN):  acetaminophen     Tablet .. 650 milliGRAM(s) Oral every 6 hours PRN Temp greater or equal to 38C (100.4F), Mild Pain (1 - 3)  albuterol/ipratropium for Nebulization 3 milliLiter(s) Nebulizer every 6 hours PRN Shortness of Breath and/or Wheezing  benzocaine/menthol Lozenge 1 Lozenge Oral four times a day PRN Sore Throat  FIRST- Mouthwash  BLM 15 milliLiter(s) Swish and Spit every 4 hours PRN Mouth Care  guaiFENesin Oral Liquid (Sugar-Free) 200 milliGRAM(s) Oral every 6 hours PRN Cough  heparin   Injectable 5500 Unit(s) IV Push every 6 hours PRN For aPTT less than 40  heparin   Injectable 2500 Unit(s) IV Push every 6 hours PRN For aPTT between 40 - 57  lidocaine 2% Viscous 5 milliLiter(s) Swish and Spit three times a day PRN Mouth Care  oxyCODONE    IR 5 milliGRAM(s) Oral every 6 hours PRN Severe Pain (7 - 10)        LABS                                            8.6                   Neurophils% (auto):   42.5   (12-28 @ 00:45):    13.41)-----------(232          Lymphocytes% (auto):  46.2                                          25.0                   Eosinphils% (auto):   0.4      Manual%: Neutrophils x    ; Lymphocytes x    ; Eosinophils x    ; Bands%: x    ; Blasts x                                    125    |  92     |  10                  Calcium: 8.2   / iCa: x      (12-28 @ 00:45)    ----------------------------<  133       Magnesium: 1.80                             4.2     |  23     |  0.73             Phosphorous: 1.9      TPro  6.5    /  Alb  2.5    /  TBili  0.5    /  DBili  x      /  AST  125    /  ALT  99     /  AlkPhos  72     28 Dec 2023 00:45    ( 12-28 @ 00:45 )   PT: 16.3 sec;   INR: 1.46 ratio  aPTT: 65.9 sec          ASSESSMENT & PLAN:     28 years old male with h/o Lupus ( diagnosed in 09/2023, on Plaquenil present to [facility] ED on 12/12/23  with complain of chest pain and SOB admitted for fevers, PE, pleural effusions, course c/b high fever and tonic-clonic seizure, transferred to MICU for post-ictal and infectious monitoring.     Neuro  Seizure  Patient is lethargic i/s/o likely post-ictal / post- ativan state.  Protecting airway. VBG wnl, CT head unremarkable--no hemorrhage.   - vEEG, some multifocal mild dysfunction, per neuro consistent with metabolic encephalopathy  - neuro following  - eventual MRI    Cardiovascular  Pulmonary Embolism  - on hep gtt  - no hypoxia  - no RV strain on TTE    Respiratory  B/l pleural effusions, complex, L > R  S/p L sided chest tube, poor lung re-expansion. S/p myst  - CT surgery consulted, will consider VATS procedure  - holding off on alteplase/myst for now per CT surg  - willl hold heparin gtt if procedure scheduled    GI/Nutrition  No active issues  Regular diet, but patient requested pureed.     /Renal  Hyponatremia  ?ADH from pain. ? from urinary retention.   Questionable retention on POCUS  Omer inserted  - c/w omer, eventual TOV  - urine Na high consistent with high ADH  - serum osm  - renal following    Proteinuria  - Pr/Cr 1.2, not quite nephrotic range  ? SLE involvement in kidney  - nephrology following  - potential renal biopsy     ID  Fevers as high as 104.5, unclear source ?pnuemonia   Was on vanc and zosyn  RVP neg  Another potential source is pleural effusions, ?empyemas  - f/u pleural fluid studies   - monitor for diarrhea, if has diarrhea can resend GI PCR and stool cx  - broadened to Vanc, cefepime   - f/u repeat BCx  - LP done 12/25, so far unremarkable, will f/u studies and cultures    Endocrine  No active issues.     Hematologic/DVT ppx  SLE  - unclear if in active flair though ,   - rheum following, f/u recs  - derm consulted, deferring biopsy  -- extensive rheum panel sent  - heme following, f/u recs  - DCed steroids 12/25  - hypercoag workup pending    DVT PPx  - on Hep gtt for PEs    Ethics  FULL CODE        For Follow-Up:  [ ] Derm recs:  - follow-up myomarker panel  - order anti-scl-70 antibodies  [ ] IR consulted for axillary lymph node biopsy  [ ] c/w cefepime   [ ] c/w solumedrol  [ ] f/u rheum recs, derm recs, CT surgery recs  [ ] f/u CT CAP MICU Transfer Note  ---------------------------    Transfer from: MICU  Transfer to:  () Medicine    (x) Telemetry    (  ) RCU    (  ) Palliative    (  ) Stroke Unit    (  ) _______________  Accepting Physician: Jose Ramon Salamanca  Signout given to ACP Rody Shine      MICU COURSE  29 yo Male with recent diagnosis of Lupus (9/2023) admitted 12/12 with B/L Pulmonary embolism. Now with increased dyspnea, pleuritic chest pain,  hypoxia, and fever. CXR shows increased left pleural effusion. Bedside US shows moderate left septated effusion anterior, lateral and posterior. Right: small effusion simple appearing. Cardiac and pericardial effusion noted (although recent echo was negative for pericardial effusion) Lower extremity dopplers negative for DVT. GI PCR + e coli. mRSA PCR + Staph aureus. Hematology consulted for hypercoagulable workup and correction of INR myke-procedurally. Pt seen by rheum, heme/onc, ID. Febrile in ICU to 103 i/s/o negative infectious workup. Started on zosyn. Started on Steroids. Mult bld tests ordered for continued w/u. Given Vitamin K x 1 to reduce INR. By day 3, pt was on RA. Left sided Diagnositc Thoracentesis done. Fluid sent for culture, Lytes criteria, cell count. CT consulted, no plans for Thoracic surgical intervention for now. chest tube now in place. IR consult for axillary node biopsy, CTAP. started solumedrol 60.    Plan transfer to Medicine today, Dr. Slaamanca.  OBJECTIVE --  Vital Signs Last 24 Hrs  T(C): 36.9 (28 Dec 2023 00:00), Max: 38.5 (27 Dec 2023 14:00)  T(F): 98.4 (28 Dec 2023 00:00), Max: 101.3 (27 Dec 2023 14:00)  HR: 88 (28 Dec 2023 01:00) (88 - 112)  BP: 129/86 (28 Dec 2023 01:00) (110/70 - 157/84)  BP(mean): 94 (28 Dec 2023 01:00) (78 - 117)  RR: 28 (28 Dec 2023 01:00) (16 - 35)  SpO2: 98% (28 Dec 2023 01:00) (93% - 98%)    Parameters below as of 28 Dec 2023 01:00  Patient On (Oxygen Delivery Method): room air        I&O's Summary    26 Dec 2023 07:01  -  27 Dec 2023 07:00  --------------------------------------------------------  IN: 3643.3 mL / OUT: 3300 mL / NET: 343.3 mL    27 Dec 2023 07:01  -  28 Dec 2023 03:12  --------------------------------------------------------  IN: 2017.8 mL / OUT: 2275 mL / NET: -257.2 mL        MEDICATIONS  (STANDING):  cefepime   IVPB 2000 milliGRAM(s) IV Intermittent every 8 hours  chlorhexidine 2% Cloths 1 Application(s) Topical daily  ciprofloxacin  0.3% Ophthalmic Solution for Otic Use 2 Drop(s) Both Ears two times a day  dexMEDEtomidine Infusion 0.2 MICROgram(s)/kG/Hr (3.46 mL/Hr) IV Continuous <Continuous>  heparin  Infusion. 1300 Unit(s)/Hr (13 mL/Hr) IV Continuous <Continuous>  hydroxychloroquine 200 milliGRAM(s) Oral two times a day  lidocaine   4% Patch 1 Patch Transdermal every 24 hours  melatonin 3 milliGRAM(s) Oral <User Schedule>  sodium chloride 1 Gram(s) Oral three times a day    MEDICATIONS  (PRN):  acetaminophen     Tablet .. 650 milliGRAM(s) Oral every 6 hours PRN Temp greater or equal to 38C (100.4F), Mild Pain (1 - 3)  albuterol/ipratropium for Nebulization 3 milliLiter(s) Nebulizer every 6 hours PRN Shortness of Breath and/or Wheezing  benzocaine/menthol Lozenge 1 Lozenge Oral four times a day PRN Sore Throat  FIRST- Mouthwash  BLM 15 milliLiter(s) Swish and Spit every 4 hours PRN Mouth Care  guaiFENesin Oral Liquid (Sugar-Free) 200 milliGRAM(s) Oral every 6 hours PRN Cough  heparin   Injectable 5500 Unit(s) IV Push every 6 hours PRN For aPTT less than 40  heparin   Injectable 2500 Unit(s) IV Push every 6 hours PRN For aPTT between 40 - 57  lidocaine 2% Viscous 5 milliLiter(s) Swish and Spit three times a day PRN Mouth Care  oxyCODONE    IR 5 milliGRAM(s) Oral every 6 hours PRN Severe Pain (7 - 10)        LABS                                            8.6                   Neurophils% (auto):   42.5   (12-28 @ 00:45):    13.41)-----------(232          Lymphocytes% (auto):  46.2                                          25.0                   Eosinphils% (auto):   0.4      Manual%: Neutrophils x    ; Lymphocytes x    ; Eosinophils x    ; Bands%: x    ; Blasts x                                    125    |  92     |  10                  Calcium: 8.2   / iCa: x      (12-28 @ 00:45)    ----------------------------<  133       Magnesium: 1.80                             4.2     |  23     |  0.73             Phosphorous: 1.9      TPro  6.5    /  Alb  2.5    /  TBili  0.5    /  DBili  x      /  AST  125    /  ALT  99     /  AlkPhos  72     28 Dec 2023 00:45    ( 12-28 @ 00:45 )   PT: 16.3 sec;   INR: 1.46 ratio  aPTT: 65.9 sec          ASSESSMENT & PLAN:     28 years old male with h/o Lupus ( diagnosed in 09/2023, on Plaquenil present to [facility] ED on 12/12/23  with complain of chest pain and SOB admitted for fevers, PE, pleural effusions, course c/b high fever and tonic-clonic seizure, transferred to MICU for post-ictal and infectious monitoring.     Neuro  Seizure  Patient is lethargic i/s/o likely post-ictal / post- ativan state.  Protecting airway. VBG wnl, CT head unremarkable--no hemorrhage.   - vEEG, some multifocal mild dysfunction, per neuro consistent with metabolic encephalopathy  - neuro following  - eventual MRI    Cardiovascular  Pulmonary Embolism  - on hep gtt  - no hypoxia  - no RV strain on TTE    Respiratory  B/l pleural effusions, complex, L > R  S/p L sided chest tube, poor lung re-expansion. S/p myst  - CT surgery consulted, will consider VATS procedure  - holding off on alteplase/myst for now per CT surg  - willl hold heparin gtt if procedure scheduled    GI/Nutrition  No active issues  Regular diet, but patient requested pureed.     /Renal  Hyponatremia  ?ADH from pain. ? from urinary retention.   Questionable retention on POCUS  Omer inserted  - c/w omer, eventual TOV  - urine Na high consistent with high ADH  - serum osm  - renal following    Proteinuria  - Pr/Cr 1.2, not quite nephrotic range  ? SLE involvement in kidney  - nephrology following  - potential renal biopsy     ID  Fevers as high as 104.5, unclear source ?pnuemonia   Was on vanc and zosyn  RVP neg  Another potential source is pleural effusions, ?empyemas  - f/u pleural fluid studies   - monitor for diarrhea, if has diarrhea can resend GI PCR and stool cx  - broadened to Vanc, cefepime   - f/u repeat BCx  - LP done 12/25, so far unremarkable, will f/u studies and cultures    Endocrine  No active issues.     Hematologic/DVT ppx  SLE  - unclear if in active flair though ,   - rheum following, f/u recs  - derm consulted, deferring biopsy  -- extensive rheum panel sent  - heme following, f/u recs  - DCed steroids 12/25  - hypercoag workup pending    DVT PPx  - on Hep gtt for PEs    Ethics  FULL CODE        For Follow-Up:  [ ] Derm recs:  - follow-up myomarker panel  - order anti-scl-70 antibodies  [ ] IR consulted for axillary lymph node biopsy  [ ] c/w cefepime   [ ] c/w solumedrol  [ ] f/u rheum recs, derm recs, CT surgery recs  [ ] f/u CT CAP MICU Transfer Note  ---------------------------    Transfer from: MICU  Transfer to:  (x) Medicine    () Telemetry    (  ) RCU    (  ) Palliative    (  ) Stroke Unit    (  ) _______________  Accepting Physician: Jose Ramon Salamanca  Signout given to ACP Rody Shine      MICU COURSE  29 yo Male with recent diagnosis of Lupus (9/2023) admitted 12/12 with B/L Pulmonary embolism. Now with increased dyspnea, pleuritic chest pain,  hypoxia, and fever. CXR shows increased left pleural effusion. Bedside US shows moderate left septated effusion anterior, lateral and posterior. Right: small effusion simple appearing. Cardiac and pericardial effusion noted (although recent echo was negative for pericardial effusion) Lower extremity dopplers negative for DVT. GI PCR + e coli. mRSA PCR + Staph aureus. Hematology consulted for hypercoagulable workup and correction of INR myke-procedurally. Pt seen by rheum, heme/onc, ID. Febrile in ICU to 103 i/s/o negative infectious workup. Started on zosyn. Started on Steroids. Mult bld tests ordered for continued w/u. Given Vitamin K x 1 to reduce INR. By day 3, pt was on RA. Left sided Diagnositc Thoracentesis done. Fluid sent for culture, Lytes criteria, cell count. CT consulted, no plans for Thoracic surgical intervention for now. chest tube now in place. IR consult for axillary node biopsy, CTAP. started solumedrol 60.    Plan transfer to Medicine today, Dr. Salamanca.  OBJECTIVE --  Vital Signs Last 24 Hrs  T(C): 36.9 (28 Dec 2023 00:00), Max: 38.5 (27 Dec 2023 14:00)  T(F): 98.4 (28 Dec 2023 00:00), Max: 101.3 (27 Dec 2023 14:00)  HR: 88 (28 Dec 2023 01:00) (88 - 112)  BP: 129/86 (28 Dec 2023 01:00) (110/70 - 157/84)  BP(mean): 94 (28 Dec 2023 01:00) (78 - 117)  RR: 28 (28 Dec 2023 01:00) (16 - 35)  SpO2: 98% (28 Dec 2023 01:00) (93% - 98%)    Parameters below as of 28 Dec 2023 01:00  Patient On (Oxygen Delivery Method): room air        I&O's Summary    26 Dec 2023 07:01  -  27 Dec 2023 07:00  --------------------------------------------------------  IN: 3643.3 mL / OUT: 3300 mL / NET: 343.3 mL    27 Dec 2023 07:01  -  28 Dec 2023 03:12  --------------------------------------------------------  IN: 2017.8 mL / OUT: 2275 mL / NET: -257.2 mL        MEDICATIONS  (STANDING):  cefepime   IVPB 2000 milliGRAM(s) IV Intermittent every 8 hours  chlorhexidine 2% Cloths 1 Application(s) Topical daily  ciprofloxacin  0.3% Ophthalmic Solution for Otic Use 2 Drop(s) Both Ears two times a day  dexMEDEtomidine Infusion 0.2 MICROgram(s)/kG/Hr (3.46 mL/Hr) IV Continuous <Continuous>  heparin  Infusion. 1300 Unit(s)/Hr (13 mL/Hr) IV Continuous <Continuous>  hydroxychloroquine 200 milliGRAM(s) Oral two times a day  lidocaine   4% Patch 1 Patch Transdermal every 24 hours  melatonin 3 milliGRAM(s) Oral <User Schedule>  sodium chloride 1 Gram(s) Oral three times a day    MEDICATIONS  (PRN):  acetaminophen     Tablet .. 650 milliGRAM(s) Oral every 6 hours PRN Temp greater or equal to 38C (100.4F), Mild Pain (1 - 3)  albuterol/ipratropium for Nebulization 3 milliLiter(s) Nebulizer every 6 hours PRN Shortness of Breath and/or Wheezing  benzocaine/menthol Lozenge 1 Lozenge Oral four times a day PRN Sore Throat  FIRST- Mouthwash  BLM 15 milliLiter(s) Swish and Spit every 4 hours PRN Mouth Care  guaiFENesin Oral Liquid (Sugar-Free) 200 milliGRAM(s) Oral every 6 hours PRN Cough  heparin   Injectable 5500 Unit(s) IV Push every 6 hours PRN For aPTT less than 40  heparin   Injectable 2500 Unit(s) IV Push every 6 hours PRN For aPTT between 40 - 57  lidocaine 2% Viscous 5 milliLiter(s) Swish and Spit three times a day PRN Mouth Care  oxyCODONE    IR 5 milliGRAM(s) Oral every 6 hours PRN Severe Pain (7 - 10)        LABS                                            8.6                   Neurophils% (auto):   42.5   (12-28 @ 00:45):    13.41)-----------(232          Lymphocytes% (auto):  46.2                                          25.0                   Eosinphils% (auto):   0.4      Manual%: Neutrophils x    ; Lymphocytes x    ; Eosinophils x    ; Bands%: x    ; Blasts x                                    125    |  92     |  10                  Calcium: 8.2   / iCa: x      (12-28 @ 00:45)    ----------------------------<  133       Magnesium: 1.80                             4.2     |  23     |  0.73             Phosphorous: 1.9      TPro  6.5    /  Alb  2.5    /  TBili  0.5    /  DBili  x      /  AST  125    /  ALT  99     /  AlkPhos  72     28 Dec 2023 00:45    ( 12-28 @ 00:45 )   PT: 16.3 sec;   INR: 1.46 ratio  aPTT: 65.9 sec          ASSESSMENT & PLAN:     28 years old male with h/o Lupus ( diagnosed in 09/2023, on Plaquenil present to [facility] ED on 12/12/23  with complain of chest pain and SOB admitted for fevers, PE, pleural effusions, course c/b high fever and tonic-clonic seizure, transferred to MICU for post-ictal and infectious monitoring.     Neuro  Seizure  Patient is lethargic i/s/o likely post-ictal / post- ativan state.  Protecting airway. VBG wnl, CT head unremarkable--no hemorrhage.   - vEEG, some multifocal mild dysfunction, per neuro consistent with metabolic encephalopathy  - neuro following  - eventual MRI    Cardiovascular  Pulmonary Embolism  - on hep gtt  - no hypoxia  - no RV strain on TTE    Respiratory  B/l pleural effusions, complex, L > R  S/p L sided chest tube, poor lung re-expansion. S/p myst  - CT surgery consulted, will consider VATS procedure  - holding off on alteplase/myst for now per CT surg  - willl hold heparin gtt if procedure scheduled    GI/Nutrition  No active issues  Regular diet, but patient requested pureed.     /Renal  Hyponatremia  ?ADH from pain. ? from urinary retention.   Questionable retention on POCUS  Omer inserted  - c/w omer, eventual TOV  - urine Na high consistent with high ADH  - serum osm  - renal following    Proteinuria  - Pr/Cr 1.2, not quite nephrotic range  ? SLE involvement in kidney  - nephrology following  - potential renal biopsy     ID  Fevers as high as 104.5, unclear source ?pnuemonia   Was on vanc and zosyn  RVP neg  Another potential source is pleural effusions, ?empyemas  - f/u pleural fluid studies   - monitor for diarrhea, if has diarrhea can resend GI PCR and stool cx  - broadened to Vanc, cefepime   - f/u repeat BCx  - LP done 12/25, so far unremarkable, will f/u studies and cultures    Endocrine  No active issues.     Hematologic/DVT ppx  SLE  - unclear if in active flair though ,   - rheum following, f/u recs  - derm consulted, deferring biopsy  -- extensive rheum panel sent  - heme following, f/u recs  - DCed steroids 12/25  - hypercoag workup pending    DVT PPx  - on Hep gtt for PEs    Ethics  FULL CODE        For Follow-Up:  [ ] Derm recs:  - follow-up myomarker panel  - order anti-scl-70 antibodies  [ ] IR consulted for axillary lymph node biopsy  [ ] c/w cefepime   [ ] c/w solumedrol  [ ] f/u rheum recs, derm recs, CT surgery recs  [ ] f/u CT CAP MICU Transfer Note  ---------------------------    Transfer from: MICU  Transfer to:  (x) Medicine    () Telemetry    (  ) RCU    (  ) Palliative    (  ) Stroke Unit    (  ) _______________  Accepting Physician: Jose Ramon Salamanca  Signout given to ACP Rody Shine      MICU COURSE  29 yo Male with recent diagnosis of Lupus (9/2023) admitted 12/12 with B/L Pulmonary embolism. Now with increased dyspnea, pleuritic chest pain,  hypoxia, and fever. CXR shows increased left pleural effusion. Bedside US shows moderate left septated effusion anterior, lateral and posterior. Right: small effusion simple appearing. Cardiac and pericardial effusion noted (although recent echo was negative for pericardial effusion) Lower extremity dopplers negative for DVT. GI PCR + e coli. mRSA PCR + Staph aureus. Hematology consulted for hypercoagulable workup and correction of INR myke-procedurally. Pt seen by rheum, heme/onc, ID. Febrile in ICU to 103 i/s/o negative infectious workup. Started on zosyn. Started on Steroids. Mult bld tests ordered for continued w/u. Given Vitamin K x 1 to reduce INR. By day 3, pt was on RA. Left sided Diagnositc Thoracentesis done. Fluid sent for culture, Lytes criteria, cell count. CT consulted, no plans for Thoracic surgical intervention for now. chest tube now in place. IR consult for axillary node biopsy, CTAP. started solumedrol 60.    Plan transfer to Medicine today, Dr. Salamanca.  OBJECTIVE --  Vital Signs Last 24 Hrs  T(C): 36.9 (28 Dec 2023 00:00), Max: 38.5 (27 Dec 2023 14:00)  T(F): 98.4 (28 Dec 2023 00:00), Max: 101.3 (27 Dec 2023 14:00)  HR: 88 (28 Dec 2023 01:00) (88 - 112)  BP: 129/86 (28 Dec 2023 01:00) (110/70 - 157/84)  BP(mean): 94 (28 Dec 2023 01:00) (78 - 117)  RR: 28 (28 Dec 2023 01:00) (16 - 35)  SpO2: 98% (28 Dec 2023 01:00) (93% - 98%)    Parameters below as of 28 Dec 2023 01:00  Patient On (Oxygen Delivery Method): room air        I&O's Summary    26 Dec 2023 07:01  -  27 Dec 2023 07:00  --------------------------------------------------------  IN: 3643.3 mL / OUT: 3300 mL / NET: 343.3 mL    27 Dec 2023 07:01  -  28 Dec 2023 03:12  --------------------------------------------------------  IN: 2017.8 mL / OUT: 2275 mL / NET: -257.2 mL        MEDICATIONS  (STANDING):  cefepime   IVPB 2000 milliGRAM(s) IV Intermittent every 8 hours  chlorhexidine 2% Cloths 1 Application(s) Topical daily  ciprofloxacin  0.3% Ophthalmic Solution for Otic Use 2 Drop(s) Both Ears two times a day  dexMEDEtomidine Infusion 0.2 MICROgram(s)/kG/Hr (3.46 mL/Hr) IV Continuous <Continuous>  heparin  Infusion. 1300 Unit(s)/Hr (13 mL/Hr) IV Continuous <Continuous>  hydroxychloroquine 200 milliGRAM(s) Oral two times a day  lidocaine   4% Patch 1 Patch Transdermal every 24 hours  melatonin 3 milliGRAM(s) Oral <User Schedule>  sodium chloride 1 Gram(s) Oral three times a day    MEDICATIONS  (PRN):  acetaminophen     Tablet .. 650 milliGRAM(s) Oral every 6 hours PRN Temp greater or equal to 38C (100.4F), Mild Pain (1 - 3)  albuterol/ipratropium for Nebulization 3 milliLiter(s) Nebulizer every 6 hours PRN Shortness of Breath and/or Wheezing  benzocaine/menthol Lozenge 1 Lozenge Oral four times a day PRN Sore Throat  FIRST- Mouthwash  BLM 15 milliLiter(s) Swish and Spit every 4 hours PRN Mouth Care  guaiFENesin Oral Liquid (Sugar-Free) 200 milliGRAM(s) Oral every 6 hours PRN Cough  heparin   Injectable 5500 Unit(s) IV Push every 6 hours PRN For aPTT less than 40  heparin   Injectable 2500 Unit(s) IV Push every 6 hours PRN For aPTT between 40 - 57  lidocaine 2% Viscous 5 milliLiter(s) Swish and Spit three times a day PRN Mouth Care  oxyCODONE    IR 5 milliGRAM(s) Oral every 6 hours PRN Severe Pain (7 - 10)        LABS                                            8.6                   Neurophils% (auto):   42.5   (12-28 @ 00:45):    13.41)-----------(232          Lymphocytes% (auto):  46.2                                          25.0                   Eosinphils% (auto):   0.4      Manual%: Neutrophils x    ; Lymphocytes x    ; Eosinophils x    ; Bands%: x    ; Blasts x                                    125    |  92     |  10                  Calcium: 8.2   / iCa: x      (12-28 @ 00:45)    ----------------------------<  133       Magnesium: 1.80                             4.2     |  23     |  0.73             Phosphorous: 1.9      TPro  6.5    /  Alb  2.5    /  TBili  0.5    /  DBili  x      /  AST  125    /  ALT  99     /  AlkPhos  72     28 Dec 2023 00:45    ( 12-28 @ 00:45 )   PT: 16.3 sec;   INR: 1.46 ratio  aPTT: 65.9 sec          ASSESSMENT & PLAN:     28 years old male with h/o Lupus ( diagnosed in 09/2023, on Plaquenil present to [facility] ED on 12/12/23  with complain of chest pain and SOB admitted for fevers, PE, pleural effusions, course c/b high fever and tonic-clonic seizure, transferred to MICU for post-ictal and infectious monitoring.     Neuro  Seizure  Patient is lethargic i/s/o likely post-ictal / post- ativan state.  Protecting airway. VBG wnl, CT head unremarkable--no hemorrhage.   - vEEG, some multifocal mild dysfunction, per neuro consistent with metabolic encephalopathy  - neuro following  - eventual MRI    Cardiovascular  Pulmonary Embolism  - on hep gtt  - no hypoxia  - no RV strain on TTE    Respiratory  B/l pleural effusions, complex, L > R  S/p L sided chest tube, poor lung re-expansion. S/p myst  - CT surgery consulted, will consider VATS procedure  - holding off on alteplase/myst for now per CT surg  - willl hold heparin gtt if procedure scheduled    GI/Nutrition  No active issues  Regular diet, but patient requested pureed.     /Renal  Hyponatremia  ?ADH from pain. ? from urinary retention.   Questionable retention on POCUS  Omer inserted  - c/w omer, eventual TOV  - urine Na high consistent with high ADH  - serum osm  - renal following    Proteinuria  - Pr/Cr 1.2, not quite nephrotic range  ? SLE involvement in kidney  - nephrology following  - potential renal biopsy     ID  Fevers as high as 104.5, unclear source ?pnuemonia   Was on vanc and zosyn  RVP neg  Another potential source is pleural effusions, ?empyemas  - f/u pleural fluid studies   - monitor for diarrhea, if has diarrhea can resend GI PCR and stool cx  - broadened to Vanc, cefepime   - f/u repeat BCx  - LP done 12/25, so far unremarkable, will f/u studies and cultures    Endocrine  No active issues.     Hematologic/DVT ppx  SLE  - unclear if in active flair though ,   - rheum following, f/u recs  - derm consulted, deferring biopsy  -- extensive rheum panel sent  - heme following, f/u recs  - DCed steroids 12/25  - hypercoag workup pending    DVT PPx  - on Hep gtt for PEs    Ethics  FULL CODE        For Follow-Up:  [ ] Derm recs:  - follow-up myomarker panel  - order anti-[id number] antibodies  [ ] IR consulted for axillary lymph node biopsy  [ ] c/w cefepime   [ ] c/w solumedrol  [ ] f/u rheum recs, derm recs, CT surgery recs  [ ] f/u CT CAP

## 2023-12-28 NOTE — PROGRESS NOTE ADULT - SUBJECTIVE AND OBJECTIVE BOX
Subjective: Patient seen and examined. No new events except as noted.     SUBJECTIVE/ROS:  MEDICATIONS:  MEDICATIONS  (STANDING):  cefepime   IVPB 2000 milliGRAM(s) IV Intermittent every 8 hours  chlorhexidine 2% Cloths 1 Application(s) Topical daily  ciprofloxacin  0.3% Ophthalmic Solution for Otic Use 2 Drop(s) Both Ears two times a day  heparin  Infusion. 1300 Unit(s)/Hr (13 mL/Hr) IV Continuous <Continuous>  hydroxychloroquine 200 milliGRAM(s) Oral two times a day  lidocaine   4% Patch 1 Patch Transdermal every 24 hours  melatonin 3 milliGRAM(s) Oral <User Schedule>  sodium chloride 1 Gram(s) Oral three times a day      PHYSICAL EXAM:  T(C): 37.4 (12-28-23 @ 04:00), Max: 38.5 (12-27-23 @ 14:00)  HR: 87 (12-28-23 @ 06:00) (81 - 112)  BP: 135/79 (12-28-23 @ 06:00) (111/71 - 157/84)  RR: 18 (12-28-23 @ 06:00) (15 - 35)  SpO2: 97% (12-28-23 @ 06:00) (93% - 98%)  Wt(kg): --  I&O's Summary    27 Dec 2023 07:01  -  28 Dec 2023 07:00  --------------------------------------------------------  IN: 2160.4 mL / OUT: 2825 mL / NET: -664.6 mL            JVP: Normal  Neck: supple  Lung: clear   CV: S1 S2 , Murmur:  Abd: soft  Ext: No edema  neuro: Awake / alert  Psych: flat affect  Skin: normal``    LABS/DATA:    CARDIAC MARKERS:  CARDIAC MARKERS ( 27 Dec 2023 12:18 )  x     / x     / 714 U/L / x     / x      CARDIAC MARKERS ( 26 Dec 2023 12:10 )  x     / x     / 1094 U/L / x     / x      CARDIAC MARKERS ( 26 Dec 2023 00:50 )  x     / x     / 1203 U/L / x     / x      CARDIAC MARKERS ( 25 Dec 2023 10:10 )  x     / x     / 1188 U/L / x     / 1.3 ng/mL                                8.6    13.41 )-----------( 232      ( 28 Dec 2023 00:45 )             25.0     12-28    125<L>  |  92<L>  |  10  ----------------------------<  133<H>  4.2   |  23  |  0.73    Ca    8.2<L>      28 Dec 2023 00:45  Phos  1.9     12-28  Mg     1.80     12-28    TPro  6.5  /  Alb  2.5<L>  /  TBili  0.5  /  DBili  x   /  AST  125<H>  /  ALT  99<H>  /  AlkPhos  72  12-28    proBNP:   Lipid Profile:   HgA1c:   TSH:     TELE:  EKG:

## 2023-12-28 NOTE — PROGRESS NOTE ADULT - NUTRITIONAL ASSESSMENT
Group Therapy Note    Date:10/4/17  Start Time: 1430  End Time:  1500    Number of Participants:6    Type of Group: Psychoeducation    Patient's Goal: To participate in mood management group. Notes: Patient declined to attend psychoeducation group at 1430 despite encouragement by staff.      Discipline Responsible: /Counselor    MARII Oneal This patient has been assessed with a concern for Malnutrition and has been determined to have a diagnosis/diagnoses of Severe protein-calorie malnutrition.    This patient is being managed with:   Diet Regular-  Pureed (PUREED)  1000mL Fluid Restriction ([id number])  Entered: Dec 27 2023  5:23PM   This patient has been assessed with a concern for Malnutrition and has been determined to have a diagnosis/diagnoses of Severe protein-calorie malnutrition.    This patient is being managed with:   Diet Regular-  Pureed (PUREED)  1000mL Fluid Restriction ([id number])  Entered: Dec 27 2023  5:23PM

## 2023-12-28 NOTE — PROGRESS NOTE ADULT - SUBJECTIVE AND OBJECTIVE BOX
TAYLA MARIE  [id number]    INTERVAL HPI/OVERNIGHT EVENTS: No acute events. Being downgraded to the floors today. Still with persistent fevers. Has improved secretions. No new rashes or joint complaints.     MEDICATIONS  (STANDING):  cefepime   IVPB 2000 milliGRAM(s) IV Intermittent every 8 hours  chlorhexidine 2% Cloths 1 Application(s) Topical daily  ciprofloxacin  0.3% Ophthalmic Solution for Otic Use 2 Drop(s) Both Ears two times a day  heparin  Infusion. 1300 Unit(s)/Hr (13 mL/Hr) IV Continuous <Continuous>  hydroxychloroquine 200 milliGRAM(s) Oral two times a day  lidocaine   4% Patch 1 Patch Transdermal every 24 hours  melatonin 3 milliGRAM(s) Oral <User Schedule>  methylPREDNISolone sodium succinate Injectable 60 milliGRAM(s) IV Push daily  sodium chloride 1 Gram(s) Oral three times a day  sodium chloride 3%. 500 milliLiter(s) (30 mL/Hr) IV Continuous <Continuous>    MEDICATIONS  (PRN):  acetaminophen     Tablet .. 650 milliGRAM(s) Oral every 6 hours PRN Temp greater or equal to 38C (100.4F), Mild Pain (1 - 3)  albuterol/ipratropium for Nebulization 3 milliLiter(s) Nebulizer every 6 hours PRN Shortness of Breath and/or Wheezing  benzocaine/menthol Lozenge 1 Lozenge Oral four times a day PRN Sore Throat  FIRST- Mouthwash  BLM 15 milliLiter(s) Swish and Spit every 4 hours PRN Mouth Care  guaiFENesin Oral Liquid (Sugar-Free) 200 milliGRAM(s) Oral every 6 hours PRN Cough  heparin   Injectable 2500 Unit(s) IV Push every 6 hours PRN For aPTT between 40 - 57  heparin   Injectable 5500 Unit(s) IV Push every 6 hours PRN For aPTT less than 40  lidocaine 2% Viscous 5 milliLiter(s) Swish and Spit three times a day PRN Mouth Care  oxyCODONE    IR 5 milliGRAM(s) Oral every 6 hours PRN Severe Pain (7 - 10)      Allergies    No Known Allergies    Intolerances        Review of Systems:   as above     Vital Signs Last 24 Hrs  T(C): 36.5 (28 Dec 2023 22:45), Max: 38.6 (28 Dec 2023 10:00)  T(F): 97.7 (28 Dec 2023 22:45), Max: 101.4 (28 Dec 2023 10:00)  HR: 74 (28 Dec 2023 22:45) (69 - 112)  BP: 134/85 (28 Dec 2023 22:45) (111/71 - 152/82)  BP(mean): 94 (28 Dec 2023 22:00) (79 - 104)  RR: 18 (28 Dec 2023 22:45) (15 - 28)  SpO2: 99% (28 Dec 2023 22:45) (95% - 99%)    Parameters below as of 28 Dec 2023 22:45  Patient On (Oxygen Delivery Method): room air        Physical Exam:  General: NAD, alert   HEENT: EOMI, +ulcer on R side of tongue, patchy hair loss on scalp   CVS: tachycardic, no murmurs   Resp: Decreased breath sounds bilaterally in lower lung fields   GI: non-distended   MSK: no synovitis   Neuro: Awake, moving all extremities   Skin: hyperpigmented macules and patches on palmar digits and plantar toes         LABS:                        8.6    13.41 )-----------( 232      ( 28 Dec 2023 00:45 )             25.0     12-28    128<L>  |  96<L>  |  9   ----------------------------<  169<H>  3.9   |  23  |  0.58    Ca    8.2<L>      28 Dec 2023 17:45  Phos  2.4     12-28  Mg     2.20     12-28    TPro  6.6  /  Alb  2.7<L>  /  TBili  0.6  /  DBili  x   /  AST  116<H>  /  ALT  105<H>  /  AlkPhos  78  12-28    PT/INR - ( 28 Dec 2023 00:45 )   PT: 16.3 sec;   INR: 1.46 ratio         PTT - ( 28 Dec 2023 00:45 )  PTT:65.9 sec  Urinalysis Basic - ( 28 Dec 2023 17:45 )    Color: x / Appearance: x / SG: x / pH: x  Gluc: 169 mg/dL / Ketone: x  / Bili: x / Urobili: x   Blood: x / Protein: x / Nitrite: x   Leuk Esterase: x / RBC: x / WBC x   Sq Epi: x / Non Sq Epi: x / Bacteria: x          RADIOLOGY & ADDITIONAL TESTS:  < from: CT Chest w/ IV Cont (12.28.23 @ 14:47) >  IMPRESSION:  1.  Interval placement of the left chest tube with interval decrease of   the left pleural effusion. There are few pockets of left pneumothorax.   Stable moderate right pleural effusion.  2.  Mild interval decrease in sizes of axillary lymphadenopathy. Interval   decrease in size of pelvic lymph nodes.  3.  Few small intravesical air pockets. Mild mural thickening of the   bladder. Correlate with recent instrumentation or urinalysis.    < end of copied text >    < from: CT Neck Soft Tissue w/ IV Cont (12.28.23 @ 14:48) >  IMPRESSION: No cervical mass. Limited evaluation of the vocal cords   secondary to adduction.    Enlarged bilateral supraclavicular lymph nodes and prominent sized   bilateral submental and posterior triangle lymph nodes which are overall   similar in appearance to the prior study.    Partially visualized right pleural effusion.    --- End of Report ---    < end of copied text >     TAYLA MARIE  [id number]    INTERVAL HPI/OVERNIGHT EVENTS: No acute events. Being downgraded to the floors today. Still with persistent fevers. Has improved secretions. No new rashes or joint complaints.     MEDICATIONS  (STANDING):  cefepime   IVPB 2000 milliGRAM(s) IV Intermittent every 8 hours  chlorhexidine 2% Cloths 1 Application(s) Topical daily  ciprofloxacin  0.3% Ophthalmic Solution for Otic Use 2 Drop(s) Both Ears two times a day  heparin  Infusion. 1300 Unit(s)/Hr (13 mL/Hr) IV Continuous <Continuous>  hydroxychloroquine 200 milliGRAM(s) Oral two times a day  lidocaine   4% Patch 1 Patch Transdermal every 24 hours  melatonin 3 milliGRAM(s) Oral <User Schedule>  methylPREDNISolone sodium succinate Injectable 60 milliGRAM(s) IV Push daily  sodium chloride 1 Gram(s) Oral three times a day  sodium chloride 3%. 500 milliLiter(s) (30 mL/Hr) IV Continuous <Continuous>    MEDICATIONS  (PRN):  acetaminophen     Tablet .. 650 milliGRAM(s) Oral every 6 hours PRN Temp greater or equal to 38C (100.4F), Mild Pain (1 - 3)  albuterol/ipratropium for Nebulization 3 milliLiter(s) Nebulizer every 6 hours PRN Shortness of Breath and/or Wheezing  benzocaine/menthol Lozenge 1 Lozenge Oral four times a day PRN Sore Throat  FIRST- Mouthwash  BLM 15 milliLiter(s) Swish and Spit every 4 hours PRN Mouth Care  guaiFENesin Oral Liquid (Sugar-Free) 200 milliGRAM(s) Oral every 6 hours PRN Cough  heparin   Injectable 2500 Unit(s) IV Push every 6 hours PRN For aPTT between 40 - 57  heparin   Injectable 5500 Unit(s) IV Push every 6 hours PRN For aPTT less than 40  lidocaine 2% Viscous 5 milliLiter(s) Swish and Spit three times a day PRN Mouth Care  oxyCODONE    IR 5 milliGRAM(s) Oral every 6 hours PRN Severe Pain (7 - 10)      Allergies    No Known Allergies    Intolerances        Review of Systems:   as above     Vital Signs Last 24 Hrs  T(C): 36.5 (28 Dec 2023 22:45), Max: 38.6 (28 Dec 2023 10:00)  T(F): 97.7 (28 Dec 2023 22:45), Max: 101.4 (28 Dec 2023 10:00)  HR: 74 (28 Dec 2023 22:45) (69 - 112)  BP: 134/85 (28 Dec 2023 22:45) (111/71 - 152/82)  BP(mean): 94 (28 Dec 2023 22:00) (79 - 104)  RR: 18 (28 Dec 2023 22:45) (15 - 28)  SpO2: 99% (28 Dec 2023 22:45) (95% - 99%)    Parameters below as of 28 Dec 2023 22:45  Patient On (Oxygen Delivery Method): room air        Physical Exam:  General: NAD, alert   HEENT: EOMI, +ulcer on R side of tongue, patchy hair loss on scalp   CVS: tachycardic, no murmurs   Resp: Decreased breath sounds bilaterally in lower lung fields   GI: non-distended   MSK: no synovitis   Neuro: Awake, moving all extremities   Skin: hyperpigmented macules and patches on palmar digits and plantar toes         LABS:                        8.6    13.41 )-----------( 232      ( 28 Dec 2023 00:45 )             25.0     12-28    128<L>  |  96<L>  |  9   ----------------------------<  169<H>  3.9   |  23  |  0.58    Ca    8.2<L>      28 Dec 2023 17:45  Phos  2.4     12-28  Mg     2.20     12-28    TPro  6.6  /  Alb  2.7<L>  /  TBili  0.6  /  DBili  x   /  AST  116<H>  /  ALT  105<H>  /  AlkPhos  78  12-28    PT/INR - ( 28 Dec 2023 00:45 )   PT: 16.3 sec;   INR: 1.46 ratio         PTT - ( 28 Dec 2023 00:45 )  PTT:65.9 sec  Urinalysis Basic - ( 28 Dec 2023 17:45 )    Color: x / Appearance: x / SG: x / pH: x  Gluc: 169 mg/dL / Ketone: x  / Bili: x / Urobili: x   Blood: x / Protein: x / Nitrite: x   Leuk Esterase: x / RBC: x / WBC x   Sq Epi: x / Non Sq Epi: x / Bacteria: x          RADIOLOGY & ADDITIONAL TESTS:  < from: CT Chest w/ IV Cont (12.28.23 @ 14:47) >  IMPRESSION:  1.  Interval placement of the left chest tube with interval decrease of   the left pleural effusion. There are few pockets of left pneumothorax.   Stable moderate right pleural effusion.  2.  Mild interval decrease in sizes of axillary lymphadenopathy. Interval   decrease in size of pelvic lymph nodes.  3.  Few small intravesical air pockets. Mild mural thickening of the   bladder. Correlate with recent instrumentation or urinalysis.    < end of copied text >    < from: CT Neck Soft Tissue w/ IV Cont (12.28.23 @ 14:48) >  IMPRESSION: No cervical mass. Limited evaluation of the vocal cords   secondary to adduction.    Enlarged bilateral supraclavicular lymph nodes and prominent sized   bilateral submental and posterior triangle lymph nodes which are overall   similar in appearance to the prior study.    Partially visualized right pleural effusion.    --- End of Report ---    < end of copied text >

## 2023-12-28 NOTE — CONSULT NOTE ADULT - ASSESSMENT
28 years old male with h/o Lupus (diagnosed in 09/2023 by outside skin biopsy, on Plaquenil admitted for bilateral acute PE with pulmonary infarcts. Now with fevers of unknown etiology, originally thought to be related to SLE, but persistent despite 3 days of solumedrol 40 mg IV started 12/23-12/25. Concern for infection as well, on cefepime. Course c/b generalized tonic-clonic seizure on 12/25, suspect from high fever vs. hyponatremia, less likely neuropsychiatric SLE . Concern for SLE vs. overlap disease with myositis per rheumatology. On heparin drip for pulmonary infarcts, being worked up for APS; negative.  - Workup thus far with ABDIAS 1:280 speckled, dsDNA 28, Sm >8, RNP >8, SSA >8, C3 83, C4 13, U/A 300 protein and moderate blood with repeat U/A 100 protein and small blood, urine protein/Cr 1.2 and 1.1, negative APS labs, negative Janine-1 ab, negative syphilis screen, normocytic anemia, no evidence of hemolysis, ferritin 9.2k with low iron and TIBC, IgG4 WNL, elevated IgG, transaminitis, low vitamin D 25 21, elevated vitamin D 1,25 97, and negative RF       Per clinical exam (reticulate erythema of the distal fingertips and scaly, atrophic plaques on the distal fingertips and conchal bowls), chart review, and review of photos showing atrophic, scaly ovoid plaques with surrounding erythema on the scalp, cutaneous skin findings are concerning for an etiology related to systemic lupus erythematosus. 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 No evidence of scleroderma, dermatomyositis, or other overlapping connective tissue disease at this time.   Patient is currently on Plaquenil  - would benefit from treatment with high dose corticosteroids or even IVIG, given severity of systemic symptoms and no other contraindications   - consider adding on additional agents for lupus with dermatology as outpatient   - We will help coordinate an expedited follow-up appointment with Dr. Murillo in our clinic located at [address] [place] [address] [address], [place] ([phone]).     Patient was seen at bedside and staffed in person with the dermatology attending Dr. Murillo  Recommendations were communicated with the primary team.  Please page [phone] for further related questions.    Zohreh Higuera MD  Resident Physician, PGY3  [facility]ermat[facility]  Pager: [phone]  Office: [phone].  28 years old male with h/o Lupus (diagnosed in 09/2023 by outside skin biopsy, on Plaquenil admitted for bilateral acute PE with pulmonary infarcts. Now with fevers of unknown etiology, originally thought to be related to SLE, but persistent despite 3 days of solumedrol 40 mg IV started 12/23-12/25. Concern for infection as well, on cefepime. Course c/b generalized tonic-clonic seizure on 12/25, suspect from high fever vs. hyponatremia, less likely neuropsychiatric SLE . Concern for SLE vs. overlap disease with myositis per rheumatology. On heparin drip for pulmonary infarcts, being worked up for APS; negative.  - Workup thus far with ABDIAS 1:280 speckled, dsDNA 28, Sm >8, RNP >8, SSA >8, C3 83, C4 13, U/A 300 protein and moderate blood with repeat U/A 100 protein and small blood, urine protein/Cr 1.2 and 1.1, negative APS labs, negative Janine-1 ab, negative syphilis screen, normocytic anemia, no evidence of hemolysis, ferritin 9.2k with low iron and TIBC, IgG4 WNL, elevated IgG, transaminitis, low vitamin D 25 21, elevated vitamin D 1,25 97, and negative RF       Per clinical exam (reticulate erythema of the distal fingertips and scaly, atrophic plaques on the distal fingertips and conchal bowls), chart review, and review of photos showing atrophic, scaly ovoid plaques with surrounding erythema on the scalp, cutaneous skin findings are concerning for an etiology related to systemic lupus erythematosus. 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 No evidence of scleroderma, dermatomyositis, or other overlapping connective tissue disease at this time.   Patient is currently on Plaquenil  - would benefit from treatment with high dose corticosteroids or even IVIG, given severity of systemic symptoms and no other contraindications   - consider adding on additional agents for lupus with dermatology as outpatient   - We will help coordinate an expedited follow-up appointment with Dr. Murillo in our clinic located at [address] [place] [address] [address], [place] ([phone]).     Patient was seen at bedside and staffed in person with the dermatology attending Dr. Murillo  Recommendations were communicated with the primary team.  Please page [phone] for further related questions.    Zohreh Higuera MD  Resident Physician, PGY3  [facility] Dermat[facility]  Pager: [phone]  Office: [phone].  28 years old male with h/o Lupus (diagnosed in 09/2023 by outside skin biopsy, on Plaquenil admitted for bilateral acute PE with pulmonary infarcts. Now with fevers of unknown etiology, originally thought to be related to SLE, but persistent despite 3 days of solumedrol 40 mg IV started 12/23-12/25. Concern for infection as well, on cefepime. Course c/b generalized tonic-clonic seizure on 12/25, suspect from high fever vs. hyponatremia, less likely neuropsychiatric SLE . Concern for SLE vs. overlap disease with myositis per rheumatology. On heparin drip for pulmonary infarcts, being worked up for APS; negative.  - Workup thus far with ABDIAS 1:280 speckled, dsDNA 28, Sm >8, RNP >8, SSA >8, C3 83, C4 13, U/A 300 protein and moderate blood with repeat U/A 100 protein and small blood, urine protein/Cr 1.2 and 1.1, negative APS labs, negative Janine-1 ab, negative syphilis screen, normocytic anemia, no evidence of hemolysis, ferritin 9.2k with low iron and TIBC, IgG4 WNL, elevated IgG, transaminitis, low vitamin D 25 21, elevated vitamin D 1,25 97, and negative RF       Per clinical exam (reticulate erythema of the distal fingertips and scaly, atrophic plaques on the distal fingertips and conchal bowls), chart review, and review of photos showing atrophic, scaly ovoid plaques with surrounding erythema on the scalp, cutaneous skin findings are concerning for an etiology related to underlying systemic lupus erythematosus. 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 No evidence of scleroderma, dermatomyositis, or other overlapping connective tissue disease at this time.   Patient is currently on Plaquenil  - would benefit from treatment with high dose corticosteroids or even IVIG, given severity of systemic symptoms and no other contraindications   - consider adding on additional agents for lupus erythematosus with dermatology as outpatient   - We will help coordinate an expedited follow-up appointment with Dr. Murillo in our clinic located at [address] [place] [address] [address], [place], [place] ([phone]).     Patient was seen at bedside and staffed in person with the dermatology attending Dr. Murillo  Recommendations were communicated with the primary team.  Please page [phone] for further related questions.    Zohreh Higuera MD  Resident Physician, PGY3  [facility] Dermat[facility]  Pager: [phone]  Office: [phone].  28 years old male with h/o Lupus (diagnosed in 09/2023 by outside skin biopsy, on Plaquenil admitted for bilateral acute PE with pulmonary infarcts. Now with fevers of unknown etiology, originally thought to be related to SLE, but persistent despite 3 days of solumedrol 40 mg IV started 12/23-12/25. Concern for infection as well, on cefepime. Course c/b generalized tonic-clonic seizure on 12/25, suspect from high fever vs. hyponatremia, less likely neuropsychiatric SLE . Concern for SLE vs. overlap disease with myositis per rheumatology. On heparin drip for pulmonary infarcts, being worked up for APS; negative.  - Workup thus far with ABDIAS 1:280 speckled, dsDNA 28, Sm >8, RNP >8, SSA >8, C3 83, C4 13, U/A 300 protein and moderate blood with repeat U/A 100 protein and small blood, urine protein/Cr 1.2 and 1.1, negative APS labs, negative Janine-1 ab, negative syphilis screen, normocytic anemia, no evidence of hemolysis, ferritin 9.2k with low iron and TIBC, IgG4 WNL, elevated IgG, transaminitis, low vitamin D 25 21, elevated vitamin D 1,25 97, and negative RF       Per clinical exam (reticulate erythema of the distal fingertips and scaly, atrophic plaques on the distal fingertips and conchal bowls), chart review, and review of photos showing atrophic, scaly ovoid plaques with surrounding erythema on the scalp, cutaneous skin findings are concerning for an etiology related to underlying systemic lupus erythematosus. 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 No evidence of scleroderma, dermatomyositis, or other overlapping connective tissue disease at this time.   Patient is currently on Plaquenil  - would benefit from treatment with high dose corticosteroids or even IVIG, given severity of systemic symptoms and no other contraindications   - consider adding on additional agents for lupus erythematosus with dermatology as outpatient   - We will help coordinate an expedited follow-up appointment with Dr. Murillo in our clinic located at [address] [place] [address] [address], [place], [place] ([phone]).     Patient was seen at bedside and staffed in person with the dermatology attending Dr. Murillo  Recommendations were communicated with the primary team.  Please page [phone] for further related questions.    Zohreh Higuera MD  Resident Physician, PGY3  [facility] Dermatology  Pager: [phone]  Office: [phone].  28 years old male with likely SLE  bilateral acute PE with pulmonary infarcts  Fevers   Seizure     Workup thus far with ABDIAS 1:280 speckled, dsDNA 28, Sm >8, RNP >8, SSA >8, C3 83, C4 13, U/A 300 protein and moderate blood with repeat U/A 100 protein and small blood, urine protein/Cr 1.2 and 1.1, negative APS labs, negative Janine-1 ab, negative syphilis screen, normocytic anemia, no evidence of hemolysis, ferritin 9.2k with low iron and TIBC, IgG4 WNL, elevated IgG, transaminitis, low vitamin D 25 21, elevated vitamin D 1,25 97, and negative RF       Cutaneous findings all c/w SLE  -has Discoid lupus of scalp and hands  -has chilblains of fingers  -has oral ulceration    No evidence of overlap disease    Cont to r/o antiphospholipid Ab syndrome or other coagulopathy  Pt needs treatment for likely multi-system involvement with SLE. Fevers despite 3d steroids in this setting does not imply lack of response.    - We will help coordinate an expedited follow-up appointment with Dr. Murillo in our clinic located at [address] [place] [address] [address], [place] ([phone]).     Patient was seen at bedside and staffed in person with the dermatology attending Dr. Murillo  Recommendations were communicated with the primary team.  Please page [phone] for further related questions.    Zohreh Higuera MD  Resident Physician, PGY3  [facility] Dermatology  Pager: [phone]  Office: [phone].  28 years old male with likely SLE  bilateral acute PE with pulmonary infarcts  Fevers   Seizure     Workup thus far with ABDIAS 1:280 speckled, dsDNA 28, Sm >8, RNP >8, SSA >8, C3 83, C4 13, U/A 300 protein and moderate blood with repeat U/A 100 protein and small blood, urine protein/Cr 1.2 and 1.1, negative APS labs, negative Janine-1 ab, negative syphilis screen, normocytic anemia, no evidence of hemolysis, ferritin 9.2k with low iron and TIBC, IgG4 WNL, elevated IgG, transaminitis, low vitamin D 25 21, elevated vitamin D 1,25 97, and negative RF       Cutaneous findings all c/w SLE  -has Discoid lupus of scalp and hands  -has chilblains of fingers  -has oral ulceration    No evidence of overlap disease    Cont to r/o antiphospholipid Ab syndrome or other coagulopathy  Pt needs treatment for likely multi-system involvement with SLE. Fevers despite 3d steroids in this setting does not imply lack of response.    - We will help coordinate an expedited follow-up appointment with Dr. Murillo in our clinic located at [address] [place] [address] [address], [place] ([phone]).     Patient was seen at bedside and staffed in person with the dermatology attending Dr. Murillo  Recommendations were communicated with the primary team.  Please page [phone] for further related questions.    Zohreh Higuera MD  Resident Physician, PGY3  [facility] Dermatology  Pager: [phone]  Office: [phone].

## 2023-12-28 NOTE — CHART NOTE - NSCHARTNOTEFT_GEN_A_CORE
Attempted to see the patient, he has not been in his room (apparently was taken to CT).  His mother was present in the room: she reported that he has been depressed for a long time, went couple of time to a therapist, but then stopped due to frequently hospitalizations due to Lupus. She has no safety concerns and has never heart him saying about wanting to end his life. Denied witnessing any psychotic symptoms. Reported that he stopped cleaning his house and c/o depression frequently.

## 2023-12-28 NOTE — PROGRESS NOTE ADULT - SUBJECTIVE AND OBJECTIVE BOX
[facility] Division of Kidney Diseases & Hypertension  FOLLOW UP NOTE  [phone]--------------------------------------------------------------------------------  Chief Complaint:Pleural effusion, not elsewhere classified        24 hour events/subjective: Subjectively feels better today, not offering any new complaints. A bit more interactive and alert.        PAST HISTORY  --------------------------------------------------------------------------------  No significant changes to PMH, PSH, FHx, SHx, unless otherwise noted    ALLERGIES & MEDICATIONS  --------------------------------------------------------------------------------  Allergies    No Known Allergies    Intolerances      Standing Inpatient Medications  cefepime   IVPB 2000 milliGRAM(s) IV Intermittent every 8 hours  chlorhexidine 2% Cloths 1 Application(s) Topical daily  ciprofloxacin  0.3% Ophthalmic Solution for Otic Use 2 Drop(s) Both Ears two times a day  heparin  Infusion. 1300 Unit(s)/Hr IV Continuous <Continuous>  hydroxychloroquine 200 milliGRAM(s) Oral two times a day  lidocaine   4% Patch 1 Patch Transdermal every 24 hours  melatonin 3 milliGRAM(s) Oral <User Schedule>  sodium chloride 1 Gram(s) Oral three times a day  sodium chloride 3%. 500 milliLiter(s) IV Continuous <Continuous>    PRN Inpatient Medications  acetaminophen     Tablet .. 650 milliGRAM(s) Oral every 6 hours PRN  albuterol/ipratropium for Nebulization 3 milliLiter(s) Nebulizer every 6 hours PRN  benzocaine/menthol Lozenge 1 Lozenge Oral four times a day PRN  FIRST- Mouthwash  BLM 15 milliLiter(s) Swish and Spit every 4 hours PRN  guaiFENesin Oral Liquid (Sugar-Free) 200 milliGRAM(s) Oral every 6 hours PRN  heparin   Injectable 5500 Unit(s) IV Push every 6 hours PRN  heparin   Injectable 2500 Unit(s) IV Push every 6 hours PRN  lidocaine 2% Viscous 5 milliLiter(s) Swish and Spit three times a day PRN  oxyCODONE    IR 5 milliGRAM(s) Oral every 6 hours PRN      REVIEW OF SYSTEMS  --------------------------------------------------------------------------------  Gen: No  fevers/chills  Skin: No rashes  Head/Eyes/Ears/Mouth: No headache; Normal hearing; Normal vision w/o blurriness  Respiratory: No dyspnea, cough, wheezing, hemoptysis  CV: No chest pain, PND, orthopnea  GI: No abdominal pain, diarrhea, constipation, nausea, vomiting  : No increased frequency, dysuria, hematuria, nocturia  MSK: No joint pain/swelling; no back pain; no edema  Neuro: No dizziness/lightheadedness, weakness, seizures, numbness, tingling      All other systems were reviewed and are negative, except as noted.    VITALS/PHYSICAL EXAM  --------------------------------------------------------------------------------  T(C): 38.6 (12-28-23 @ 10:00), Max: 38.6 (12-28-23 @ 10:00)  HR: 97 (12-28-23 @ 12:00) (81 - 112)  BP: 151/82 (12-28-23 @ 12:00) (111/71 - 157/84)  RR: 24 (12-28-23 @ 12:00) (15 - 35)  SpO2: 96% (12-28-23 @ 12:00) (95% - 98%)  Wt(kg): --        12-27-23 @ 07:01  -  12-28-23 @ 07:00  --------------------------------------------------------  IN: 2160.4 mL / OUT: 2825 mL / NET: -664.6 mL    12-28-23 @ 07:01  -  12-28-23 @ 13:32  --------------------------------------------------------  IN: 156 mL / OUT: 20 mL / NET: 136 mL      Physical Exam:  	Gen: NAD, mildly ill appearing  	HEENT: PERRL, supple neck  	Pulm: CTA B/L  	CV: RRR, S1S2;  	Back: No spinal or CVA tenderness  	Abd: +BS, soft, nontender/nondistended  	: No suprapubic tenderness                      Extremities: no bilateral LE edema noted.                       Neuro: No focal deficits  	Skin: Warm, without rashes    LABS/STUDIES  --------------------------------------------------------------------------------              8.6    13.41 >-----------<  232      [12-28-23 @ 00:45]              25.0     125  |  92  |  10  ----------------------------<  133      [12-28-23 @ 00:45]  4.2   |  23  |  0.73        Ca     8.2     [12-28-23 @ 00:45]      Mg     1.80     [12-28-23 @ 00:45]      Phos  1.9     [12-28-23 @ 00:45]    TPro  6.5  /  Alb  2.5  /  TBili  0.5  /  DBili  x   /  AST  125  /  ALT  99  /  AlkPhos  72  [12-28-23 @ 00:45]    PT/INR: PT 16.3 , INR 1.46       [12-28-23 @ 00:45]  PTT: 65.9       [12-28-23 @ 00:45]          [12-27-23 @ 12:18]        [12-28-23 @ 00:45]  Serum Osmolality 273      [12-26-23 @ 13:59]    Creatinine Trend:  SCr 0.73 [12-28 @ 00:45]  SCr 0.65 [12-27 @ 18:00]  SCr 0.83 [12-27 @ 00:31]  SCr 0.90 [12-26 @ 12:10]  SCr 1.04 [12-26 @ 00:50]    Urinalysis - [12-28-23 @ 00:45]      Color  / Appearance  / SG  / pH       Gluc 133 / Ketone   / Bili  / Urobili        Blood  / Protein  / Leuk Est  / Nitrite       RBC  / WBC  / Hyaline  / Gran  / Sq Epi  / Non Sq Epi  / Bacteria     Urine Creatinine 82      [12-27-23 @ 14:20]  Urine Protein 90      [12-27-23 @ 14:20]  Urine Sodium 86      [12-25-23 @ 12:55]  Urine Urea Nitrogen 800.3      [12-25-23 @ 12:55]    Iron 36, TIBC 195, %sat 18      [12-27-23 @ 12:18]  Ferritin 9278      [12-27-23 @ 12:18]  Vitamin D (25OH) 21.0      [12-26-23 @ 18:25]    HBsAb >1000.0      [12-26-23 @ 12:10]  HBsAg Nonreact      [12-26-23 @ 12:10]  HCV 0.12, Nonreact      [12-26-23 @ 12:10]  HIV Nonreact      [12-24-23 @ 05:38]    dsDNA 28      [12-24-23 @ 05:38]  C3 Complement 83      [12-24-23 @ 05:38]  C4 Complement 13      [12-24-23 @ 05:38]  Rheumatoid Factor <10      [12-27-23 @ 12:18]  Syphilis Screen (Treponema Pallidum Ab) Negative      [12-26-23 @ 18:25]  Free Light Chains: kappa 4.98, lambda 4.89, ratio = 1.02      [12-26 @ 18:25]   [facility] Division of Kidney Diseases & Hypertension  FOLLOW UP NOTE  [phone]--------------------------------------------------------------------------------  Chief Complaint:Pleural effusion, not elsewhere classified        24 hour events/subjective: Subjectively feels better today, not offering any new complaints. A bit more interactive and alert.        PAST HISTORY  --------------------------------------------------------------------------------  No significant changes to PMH, PSH, FHx, SHx, unless otherwise noted    ALLERGIES & MEDICATIONS  --------------------------------------------------------------------------------  Allergies    No Known Allergies    Intolerances      Standing Inpatient Medications  cefepime   IVPB 2000 milliGRAM(s) IV Intermittent every 8 hours  chlorhexidine 2% Cloths 1 Application(s) Topical daily  ciprofloxacin  0.3% Ophthalmic Solution for Otic Use 2 Drop(s) Both Ears two times a day  heparin  Infusion. 1300 Unit(s)/Hr IV Continuous <Continuous>  hydroxychloroquine 200 milliGRAM(s) Oral two times a day  lidocaine   4% Patch 1 Patch Transdermal every 24 hours  melatonin 3 milliGRAM(s) Oral <User Schedule>  sodium chloride 1 Gram(s) Oral three times a day  sodium chloride 3%. 500 milliLiter(s) IV Continuous <Continuous>    PRN Inpatient Medications  acetaminophen     Tablet .. 650 milliGRAM(s) Oral every 6 hours PRN  albuterol/ipratropium for Nebulization 3 milliLiter(s) Nebulizer every 6 hours PRN  benzocaine/menthol Lozenge 1 Lozenge Oral four times a day PRN  FIRST- Mouthwash  BLM 15 milliLiter(s) Swish and Spit every 4 hours PRN  guaiFENesin Oral Liquid (Sugar-Free) 200 milliGRAM(s) Oral every 6 hours PRN  heparin   Injectable 5500 Unit(s) IV Push every 6 hours PRN  heparin   Injectable 2500 Unit(s) IV Push every 6 hours PRN  lidocaine 2% Viscous 5 milliLiter(s) Swish and Spit three times a day PRN  oxyCODONE    IR 5 milliGRAM(s) Oral every 6 hours PRN      REVIEW OF SYSTEMS  --------------------------------------------------------------------------------  Gen: No  fevers/chills  Skin: No rashes  Head/Eyes/Ears/Mouth: No headache; Normal hearing; Normal vision w/o blurriness  Respiratory: No dyspnea, cough, wheezing, hemoptysis  CV: No chest pain, PND, orthopnea  GI: No abdominal pain, diarrhea, constipation, nausea, vomiting  : No increased frequency, dysuria, hematuria, nocturia  MSK: No joint pain/swelling; no back pain; no edema  Neuro: No dizziness/lightheadedness, weakness, seizures, numbness, tingling      All other systems were reviewed and are negative, except as noted.    VITALS/PHYSICAL EXAM  --------------------------------------------------------------------------------  T(C): 38.6 (12-28-23 @ 10:00), Max: 38.6 (12-28-23 @ 10:00)  HR: 97 (12-28-23 @ 12:00) (81 - 112)  BP: 151/82 (12-28-23 @ 12:00) (111/71 - 157/84)  RR: 24 (12-28-23 @ 12:00) (15 - 35)  SpO2: 96% (12-28-23 @ 12:00) (95% - 98%)  Wt(kg): --        12-27-23 @ 07:01  -  12-28-23 @ 07:00  --------------------------------------------------------  IN: 2160.4 mL / OUT: 2825 mL / NET: -664.6 mL    12-28-23 @ 07:01  -  12-28-23 @ 13:32  --------------------------------------------------------  IN: 156 mL / OUT: 20 mL / NET: 136 mL      Physical Exam:  	Gen: NAD, mildly ill appearing  	HEENT: PERRL, supple neck  	Pulm: CTA B/L  	CV: RRR, S1S2;  	Back: No spinal or CVA tenderness  	Abd: +BS, soft, nontender/nondistended  	: No suprapubic tenderness                      Extremities: no bilateral LE edema noted.                       Neuro: No focal deficits  	Skin: Warm, without rashes    LABS/STUDIES  --------------------------------------------------------------------------------              8.6    13.41 >-----------<  232      [12-28-23 @ 00:45]              25.0     125  |  92  |  10  ----------------------------<  133      [12-28-23 @ 00:45]  4.2   |  23  |  0.73        Ca     8.2     [12-28-23 @ 00:45]      Mg     1.80     [12-28-23 @ 00:45]      Phos  1.9     [12-28-23 @ 00:45]    TPro  6.5  /  Alb  2.5  /  TBili  0.5  /  DBili  x   /  AST  125  /  ALT  99  /  AlkPhos  72  [12-28-23 @ 00:45]    PT/INR: PT 16.3 , INR 1.46       [12-28-23 @ 00:45]  PTT: 65.9       [12-28-23 @ 00:45]          [12-27-23 @ 12:18]        [12-28-23 @ 00:45]  Serum Osmolality 273      [12-26-23 @ 13:59]    Creatinine Trend:  SCr 0.73 [12-28 @ 00:45]  SCr 0.65 [12-27 @ 18:00]  SCr 0.83 [12-27 @ 00:31]  SCr 0.90 [12-26 @ 12:10]  SCr 1.04 [12-26 @ 00:50]    Urinalysis - [12-28-23 @ 00:45]      Color  / Appearance  / SG  / pH       Gluc 133 / Ketone   / Bili  / Urobili        Blood  / Protein  / Leuk Est  / Nitrite       RBC  / WBC  / Hyaline  / Gran  / Sq Epi  / Non Sq Epi  / Bacteria     Urine Creatinine 82      [12-27-23 @ 14:20]  Urine Protein 90      [12-27-23 @ 14:20]  Urine Sodium 86      [12-25-23 @ 12:55]  Urine Urea Nitrogen 800.3      [12-25-23 @ 12:55]    Iron 36, TIBC 195, %sat 18      [12-27-23 @ 12:18]  Ferritin 9278      [12-27-23 @ 12:18]  Vitamin D (25OH) 21.0      [12-26-23 @ 18:25]    HBsAb >1000.0      [12-26-23 @ 12:10]  HBsAg Nonreact      [12-26-23 @ 12:10]  HCV 0.12, Nonreact      [12-26-23 @ 12:10]  HIV Nonreact      [12-24-23 @ 05:38]    dsDNA 28      [12-24-23 @ 05:38]  C3 Complement 83      [12-24-23 @ 05:38]  C4 Complement 13      [12-24-23 @ 05:38]  Rheumatoid Factor <10      [12-27-23 @ 12:18]  Syphilis Screen (Treponema Pallidum Ab) Negative      [12-26-23 @ 18:25]  Free Light Chains: kappa 4.98, lambda 4.89, ratio = 1.02      [12-26 @ 18:25]

## 2023-12-28 NOTE — PROGRESS NOTE ADULT - NS ATTEND AMEND GEN_ALL_CORE FT
patient received tpa in left pigtail with episode(s) of hemoptysis overnight - given the full dose anticoagulation would defer MIST protocol as risk of bleeding > benefit and if needs surgical decortication - would proceed once medically optimized .   at this time - repeat cxr demonstrates improved effusion on left (900cc drained after tpa) - but holding further.
patient with some improvement on left after one dose tpa. holding further mist   will f/u output by AM
patient with multiple ongoing medical issues, awaiting culture from diagnostic tap.

## 2023-12-28 NOTE — PROGRESS NOTE ADULT - PROBLEM SELECTOR PLAN 1
Na decreased to 125 today (though improved since Monday, when it was 121), with urine studies c/w SIADH  - continue salt tabs as ordered  - continue to fluid restrict  - give hypertonic saline today, and repeat a BMP later this evening to ensure appropriate correction Na decreased to 125 today (though improved since Monday, when it was 121), with urine studies c/w SIADH  - continue to fluid restrict  - give hypertonic saline today, and repeat a BMP later this evening to ensure appropriate correction

## 2023-12-28 NOTE — CHART NOTE - NSCHARTNOTEFT_GEN_A_CORE
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     IR initially consulted for removal of mediport for source control. Discussed with surgery team for ID re-eval regarding whether the port can be salvaged. Per surgery, would prefer to wait until Blood culture finalizes prior to final decision for port removal. ID/Surgery to defer port removal today on 12/28 and will follow-up with IR when cultures have finalized to determine the need for removal.    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     IR initially consulted for removal of mediport for source control. Discussed with surgery team for ID re-eval regarding whether the port can be salvaged. Per surgery, would prefer to wait until Blood culture finalizes prior to final decision for port removal. ID/Surgery to defer port removal today on 12/28 and will follow-up with IR when cultures have finalized to determine the need for removal.    Jose Hwang MD PGY-3  Interventional Radiology  IR Pager: [phone]  Available on Teams

## 2023-12-28 NOTE — PROGRESS NOTE ADULT - ASSESSMENT
28 years old male with h/o Lupus ( diagnosed in 09/2023, on Plaquenil present to [place] ED on 12/12/23  with complain of chest pain and SOB admitted for fevers, PE, pleural effusions, course c/b high fever and tonic-clonic seizure, transferred to MICU for post-ictal and infectious monitoring.     Neuro  Seizure  Patient is lethargic i/s/o likely post-ictal / post- ativan state.  Protecting airway. VBG wnl, CT head unremarkable--no hemorrhage.   - vEEG, some multifocal mild dysfunction, per neuro consistent with metabolic encephalopathy  - neuro following  - eventual MRI    Cardiovascular  Pulmonary Embolism  - on hep gtt  - no hypoxia  - no RV strain on TTE    Respiratory  B/l pleural effusions, complex, L > R  S/p L sided chest tube, poor lung re-expansion. S/p myst  - CT surgery consulted, will consider VATS procedure  - holding off on alteplase/myst for now per CT surg  - willl hold heparin gtt if procedure scheduled    GI/Nutrition  No active issues  Regular diet, but patient requested pureed.     /Renal  Hyponatremia  ?ADH from pain. ? from urinary retention.   Questionable retention on POCUS  Omer inserted  - c/w omer, eventual TOV  - urine Na high consistent with high ADH  - serum osm  - renal following    Proteinuria  - Pr/Cr 1.2, not quite nephrotic range  ? SLE involvement in kidney  - nephrology following  - potential renal biopsy     ID  Fevers as high as 104.5, unclear source ?pnuemonia   Was on vanc and zosyn  RVP neg  Another potential source is pleural effusions, ?empyemas  - f/u pleural fluid studies   - monitor for diarrhea, if has diarrhea can resend GI PCR and stool cx  - broadened to Vanc, cefepime   - f/u repeat BCx  - LP done 12/25, so far unremarkable, will f/u studies and cultures    Endocrine  No active issues.     Hematologic/DVT ppx  SLE  - unclear if in active flair though ,   - rheum following, f/u recs  - derm consulted, deferring biopsy  -- extensive rheum panel sent  - heme following, f/u recs  - DCed steroids 12/25  - hypercoag workup pending    DVT PPx  - on Hep gtt for PEs    Ethics  FULL CODE 28 years old male with h/o Lupus ( diagnosed in 09/2023, on Plaquenil present to [place] ED on 12/12/23  with complain of chest pain and SOB admitted for fevers, PE, pleural effusions, course c/b high fever and tonic-clonic seizure, transferred to MICU for post-ictal and infectious monitoring.     Neuro  Seizure  Patient is lethargic i/s/o likely post-ictal / post- ativan state.  Protecting airway. VBG wnl, CT head unremarkable--no hemorrhage.   - vEEG, some multifocal mild dysfunction, per neuro consistent with metabolic encephalopathy  - neuro following  - eventual MRI    Cardiovascular  Pulmonary Embolism  - on hep gtt  - no hypoxia  - no RV strain on TTE    Respiratory  B/l pleural effusions, complex, L > R  S/p L sided chest tube, poor lung re-expansion. S/p myst  - CT surgery consulted, will consider VATS procedure  - holding off on alteplase/myst for now per CT surg  - willl hold heparin gtt if procedure scheduled    GI/Nutrition  No active issues  Regular diet, but patient requested pureed.     /Renal  Hyponatremia  ?ADH from pain. ? from urinary retention.   Questionable retention on POCUS  Omer inserted  - c/w omre, eventual TOV  - urine Na high consistent with high ADH  - serum osm  - renal following    Proteinuria  - Pr/Cr 1.2, not quite nephrotic range  ? SLE involvement in kidney  - nephrology following  - potential renal biopsy     ID  Fevers as high as 104.5, unclear source ?pnuemonia   Was on vanc and zosyn  RVP neg  Another potential source is pleural effusions, ?empyemas  - f/u pleural fluid studies   - monitor for diarrhea, if has diarrhea can resend GI PCR and stool cx  - broadened to Vanc, cefepime   - f/u repeat BCx  - LP done 12/25, so far unremarkable, will f/u studies and cultures    Endocrine  No active issues.     Hematologic/DVT ppx  SLE  - unclear if in active flair though ,   - rheum following, f/u recs  - derm consulted, deferring biopsy  -- extensive rheum panel sent  - heme following, f/u recs  - DCed steroids 12/25  - hypercoag workup pending    DVT PPx  - on Hep gtt for PEs    Ethics  FULL CODE

## 2023-12-28 NOTE — PROGRESS NOTE ADULT - ATTENDING COMMENTS
hyponatremia worse. give 3% 30cc/hr for 4 hrs then check Na stat.  switch heparin gtt from DW5 to NS if possible  proteinuria and hematuria. can consider renal biopsy to help with establishing diagnosis of lupus

## 2023-12-28 NOTE — PROGRESS NOTE ADULT - NUTRITIONAL ASSESSMENT
This patient has been assessed with a concern for Malnutrition and has been determined to have a diagnosis/diagnoses of Severe protein-calorie malnutrition.    This patient is being managed with:   Diet Regular-  Pureed (PUREED)  1000mL Fluid Restriction ([id number])  Supplement Feeding Modality:  Oral  Ensure Plus High Protein Cans or Servings Per Day:  1       Frequency:  Three Times a day  Entered: Dec 28 2023 11:48AM   This patient has been assessed with a concern for Malnutrition and has been determined to have a diagnosis/diagnoses of Severe protein-calorie malnutrition.    This patient is being managed with:   Diet Regular-  Pureed (PUREED)  1000mL Fluid Restriction ([id number])  Supplement Feeding Modality:  Oral  Ensure Plus High Protein Cans or Servings Per Day:  1       Frequency:  Three Times a day  Entered: Dec 28 2023 11:48AM

## 2023-12-28 NOTE — CHART NOTE - NSCHARTNOTEFT_GEN_A_CORE
MAR Accept Note  Transfer to: Medicine   Accepting Attending Physician:  Jose Ramon Salamanca  Assigned Room:  [address] [place] [address]     Patient seen and examined.   Labs and data reviewed.   No findings precluding transfer of service.       HPI/ICU COURSE:   Please refer to MICU transfer note for full details. Briefly, this is a 29 yo Male with recent diagnosis of Lupus (9/2023) admitted 12/12 with B/L Pulmonary embolism. Now with increased dyspnea, pleuritic chest pain,  hypoxia, and fever. CXR shows increased left pleural effusion. Bedside US shows moderate left septated effusion anterior, lateral and posterior. Right: small effusion simple appearing. Cardiac and pericardial effusion noted (although recent echo was negative for pericardial effusion) Lower extremity dopplers negative for DVT. GI PCR + e coli. mRSA PCR + Staph aureus. Hematology consulted for hypercoagulable workup and correction of INR myke-procedurally. Admitted to MICU, for L PTC at bedside however INR too high. Pt seen by Rheum, Heme/Onc, Medicine, ID. Cont to be febrile to 103. Cultures sent. Started on zosyn. Started on Steroids. Mult bld tests ordered for continued w/u. Given Vitamin K x 1 to reduce INR. Pt clinically feels better today. On RA. Hep gtt held. INR to 1.5., Left sided Diagnositc Thoracentesis done. FLuid sent for culture, Lytes criteria, cell count. Pt cont to febrile to 104.1. Per ID, only do cultures Q48hr. Can resume Hep gtt after post Thora CXR is cleared.   No plans for Thoracic surgical intervention for now. Psych consult, chest tube in afternoon, TPA. Stopped TPA, ENT scope, IR consult for axillary node biopsy, CTAP, d/c vanco        FOR FOLLOW-UP:  [ ] Derm recs:  [ ] follow-up myomarker panel  [ ] order anti-scl-70 antibodies  [ ] IR consulted for axillary lymph node biopsy  [ ] c/w cefepime   [ ] f/u rheum recs, derm recs, CT surgery recs.    Sonam Archer MD  Internal Medicine PGY-3 MAR Accept Note  Transfer to: Medicine   Accepting Attending Physician:  Jose Ramon Salamanca  Assigned Room:  [address] [place] [address]     Patient seen and examined.   Labs and data reviewed.   No findings precluding transfer of service.       HPI/ICU COURSE:   Please refer to MICU transfer note for full details. Briefly, this is a 29 yo Male with recent diagnosis of Lupus (9/2023) admitted 12/12 with B/L Pulmonary embolism. Now with increased dyspnea, pleuritic chest pain,  hypoxia, and fever. CXR shows increased left pleural effusion. Bedside US shows moderate left septated effusion anterior, lateral and posterior. Right: small effusion simple appearing. Cardiac and pericardial effusion noted (although recent echo was negative for pericardial effusion) Lower extremity dopplers negative for DVT. GI PCR + e coli. mRSA PCR + Staph aureus. Hematology consulted for hypercoagulable workup and correction of INR myke-procedurally. Admitted to MICU, for L PTC at bedside however INR too high. Pt seen by Rheum, Heme/Onc, Medicine, ID. Cont to be febrile to 103. Cultures sent. Started on zosyn. Started on Steroids. Mult bld tests ordered for continued w/u. Given Vitamin K x 1 to reduce INR. Pt clinically feels better today. On RA. Hep gtt held. INR to 1.5., Left sided Diagnositc Thoracentesis done. FLuid sent for culture, Lytes criteria, cell count. Pt cont to febrile to 104.1. Per ID, only do cultures Q48hr. Can resume Hep gtt after post Thora CXR is cleared.   No plans for Thoracic surgical intervention for now. Psych consult, chest tube in afternoon, TPA. Stopped TPA, ENT scope, IR consult for axillary node biopsy, CTAP, d/c vanco        FOR FOLLOW-UP:  [ ] Derm recs:  [ ] follow-up myomarker panel  [ ] order anti-scl-70 antibodies  [ ] IR consulted for axillary lymph node biopsy  [ ] c/w cefepime   [ ] f/u rheum recs, derm recs, CT surgery recs.    Sonam Archer MD  Internal Medicine PGY-3 MAR Accept Note  Transfer to: Telemetry   Accepting Attending Physician:  Jose Ramon Salamanca  Assigned Room:  [address] [place] [address]     Patient seen and examined.   Labs and data reviewed.   No findings precluding transfer of service.       HPI/ICU COURSE:   Please refer to MICU transfer note for full details. Briefly, this is a 29 yo Male with recent diagnosis of Lupus (9/2023) admitted 12/12 with B/L Pulmonary embolism. Now with increased dyspnea, pleuritic chest pain,  hypoxia, and fever. CXR shows increased left pleural effusion. Bedside US shows moderate left septated effusion anterior, lateral and posterior. Right: small effusion simple appearing. Cardiac and pericardial effusion noted (although recent echo was negative for pericardial effusion) Lower extremity dopplers negative for DVT. GI PCR + e coli. mRSA PCR + Staph aureus. Hematology consulted for hypercoagulable workup and correction of INR myke-procedurally. Admitted to MICU, for L PTC at bedside however INR too high. Pt seen by Rheum, Heme/Onc, Medicine, ID. Cont to be febrile to 103. Cultures sent. Started on zosyn. Started on Steroids. Mult bld tests ordered for continued w/u. Given Vitamin K x 1 to reduce INR. Pt clinically feels better today. On RA. Hep gtt held. INR to 1.5., Left sided Diagnositc Thoracentesis done. FLuid sent for culture, Lytes criteria, cell count. Pt cont to febrile to 104.1. Per ID, only do cultures Q48hr. Can resume Hep gtt after post Thora CXR is cleared.   No plans for Thoracic surgical intervention for now. Psych consult, chest tube in afternoon, TPA. Stopped TPA, ENT scope, IR consult for axillary node biopsy, CTAP, d/c vanco. Patient started solumedrol 60, pt received CT CAP        FOR FOLLOW-UP:  [ ] Derm recs:  [ ] follow-up myomarker panel  [ ]  order anti-scl-70 antibodies  [ ] IR consulted for axillary lymph node biopsy  [ ] c/w cefepime   [ ] c/w solumedrol  [ ] f/u rheum recs, derm recs, CT surgery recs  [ ] f/u CT CAP.    Sonam Archer MD  Internal Medicine PGY-3 MAR Accept Note  Transfer to: Telemetry   Accepting Attending Physician:  Jose Ramon Salamanca  Assigned Room:  [address] [place] [address]     Patient seen and examined.   Labs and data reviewed.   No findings precluding transfer of service.       HPI/ICU COURSE:   Please refer to MICU transfer note for full details. Briefly, this is a 27 yo Male with recent diagnosis of Lupus (9/2023) admitted 12/12 with B/L Pulmonary embolism. Now with increased dyspnea, pleuritic chest pain,  hypoxia, and fever. CXR shows increased left pleural effusion. Bedside US shows moderate left septated effusion anterior, lateral and posterior. Right: small effusion simple appearing. Cardiac and pericardial effusion noted (although recent echo was negative for pericardial effusion) Lower extremity dopplers negative for DVT. GI PCR + e coli. mRSA PCR + Staph aureus. Hematology consulted for hypercoagulable workup and correction of INR myke-procedurally. Admitted to MICU, for L PTC at bedside however INR too high. Pt seen by Rheum, Heme/Onc, Medicine, ID. Cont to be febrile to 103. Cultures sent. Started on zosyn. Started on Steroids. Mult bld tests ordered for continued w/u. Given Vitamin K x 1 to reduce INR. Pt clinically feels better today. On RA. Hep gtt held. INR to 1.5., Left sided Diagnositc Thoracentesis done. FLuid sent for culture, Lytes criteria, cell count. Pt cont to febrile to 104.1. Per ID, only do cultures Q48hr. Can resume Hep gtt after post Thora CXR is cleared.   No plans for Thoracic surgical intervention for now. Psych consult, chest tube in afternoon, TPA. Stopped TPA, ENT scope, IR consult for axillary node biopsy, CTAP, d/c vanco. Patient started solumedrol 60, pt received CT CAP        FOR FOLLOW-UP:  [ ] Derm recs:  [ ] follow-up myomarker panel  [ ]  order anti-scl-70 antibodies  [ ] IR consulted for axillary lymph node biopsy  [ ] c/w cefepime   [ ] c/w solumedrol  [ ] f/u rheum recs, derm recs, CT surgery recs  [ ] f/u CT CAP.    Snoam Archer MD  Internal Medicine PGY-3

## 2023-12-28 NOTE — CHART NOTE - NSCHARTNOTEFT_GEN_A_CORE
: Sabra Trejo    INDICATION: pleural effusion    PROCEDURE:  [ ] LIMITED ECHO  [ x] LIMITED CHEST  [ ] LIMITED RETROPERITONEAL  [ ] LIMITED ABDOMINAL  [ ] LIMITED DVT  [ ] NEEDLE GUIDANCE VASCULAR  [ ] NEEDLE GUIDANCE THORACENTESIS  [ ] NEEDLE GUIDANCE PARACENTESIS  [ ] NEEDLE GUIDANCE PERICARDIOCENTESIS  [ ] OTHER    FINDINGS:  continues to have small complex pleural effusion with echodense material. pigtail catheter visualized in pleural space    INTERPRETATION:  continue chest tube drainage     Images uploaded on [employer]

## 2023-12-28 NOTE — PROGRESS NOTE ADULT - ATTENDING COMMENTS
pt seen and examined by me personally   agree with above events and multiple other MD notes reviewed    will follow

## 2023-12-28 NOTE — PROGRESS NOTE ADULT - ATTENDING COMMENTS
28M PMH SLE (dx 09/2023, on Plaquenil) who [facility] and [facility], found to have acute RUL and LLL segmental/subsegmental PE's as well as bilateral lower lobe consolidations with areas of central clearing, concerning for PNA vs pulmonary infarct. He was started on A/C with course c/b worsening hypoxemic respiratory failure with development of pleural effusions. He was transferred to [facility] on 12/22 for thoracic surgery evaluation s/p diagnostic left thoracentesis on 12/22, which was exudative with negative cultures. He was started on methylprednisolone 40 mg IV daily on 12/23 for likely lupus flare, but then on 12/25 he developed acute chest pain, dyspnea, fever to 104.5, and GTC seizure, transferred to MICU for further care. Labs with significant hyponatremia and elevated CPK/AST/ALT due to mild acute rhabdo, now improved. Found on bedside ultrasound to have a very loculated left pleural effusion with thick septations and dense echogenic material within, worrisome for empyema vs. contained hemothorax. S/p chest tube placement on 12/26.     Repeat CT neck/chest/A/P on 12/28 with enlarged bilateral SC lymph nodes and prominent sized bilateral submental and posterior triangle lymph nodes, mild interval decrease in size of axillary and pelvic lymphadenopathy, and decreased size of left pleural effusion, stable small-moderate right pleural effusion.    - Awake and alert, no further seizures, EEG negative keep off AED's at this time. Eventually needs MRI. Neuro follow-up  - HD stable, no RV strain on echo. Continue heparin gtt for PE's  - Doing well on room air with normal SpO2. Left chest tube in place, placed on suction today but with development of pleuritic chest pain, so placed back on water seal. S/p 1 dose of tPA/dornase laura with resolution of ex vacuo PTX. Left-sided pleural effusion is significantly improved on CT chest and on bedside ultrasound with less echogenicity and septations. After discussion with thoracic surgery, will hold off on further tPA/dornase given mild blood-tinged sputum and high risk of pleural hemorrhage while on full a/c. Follow-up thoracic surgery regarding need for VATS/decortication  - Diet as tolerates  - Hyponatremia to 125 today s/p 3% sodium chloride 30 mL x4 hours with improvement. Suspect SIADH from pain vs. urinary retention. Continue salt tabs  - Discontinue vancomycin given negative nasal MRSA. Continue cefepime empirically. Cultures so far negative. F/up ID  - Appreciate rheum and derm eval. Cutaneous findings are all consistent with lupus. Rheum workup so far revealing for high ABDIAS (1:1280), Pizano>8, RNP>8, SS-A>8, and high urine protein/creatinine ratio. Also with elevated 1,25-diOH vit D and ACE, unclear if there is concomitant granulomatous inflammation (e.g., sarcoid vs. granulomatous infection). Remaining workup negative, including APLS labs, Janine-1, ribosomal P protein, RF, CCP, ANCA, IgG4, immunoglobulins. Pending quantiferon, myomarker panel. Ferritin markedly elevated to 9000s, doubt HLH given no hepatosplenomegaly. Check scleroderma, centromere, RNA polymerase III. Recurrent fevers likely inflammatory. Will start prednisone 1 mg/kg daily (can start with methylprednisolone 60 mg IV daily). Follow-up rheumatology recs  - IR evaluation for biopsy of axillary lymphadenopathy  - Appreciate heme follow-up, APLS workup negative. Follow-up Factor V Leiden, PT gene mutation, JAK2  - Prognosis guarded, full code, discussed with patient and mother at bedside  CC Time spent: 35 min 28M PMH SLE (dx 09/2023, on Plaquenil) who [facility], found to have acute RUL and LLL segmental/subsegmental PE's as well as bilateral lower lobe consolidations with areas of central clearing, concerning for PNA vs pulmonary infarct. He was started on A/C with course c/b worsening hypoxemic respiratory failure with development of pleural effusions. He was transferred to [facility] on 12/22 for thoracic surgery evaluation s/p diagnostic left thoracentesis on 12/22, which was exudative with negative cultures. He was started on methylprednisolone 40 mg IV daily on 12/23 for likely lupus flare, but then on 12/25 he developed acute chest pain, dyspnea, fever to 104.5, and GTC seizure, transferred to MICU for further care. Labs with significant hyponatremia and elevated CPK/AST/ALT due to mild acute rhabdo, now improved. Found on bedside ultrasound to have a very loculated left pleural effusion with thick septations and dense echogenic material within, worrisome for empyema vs. contained hemothorax. S/p chest tube placement on 12/26.     Repeat CT neck/chest/A/P on 12/28 with enlarged bilateral SC lymph nodes and prominent sized bilateral submental and posterior triangle lymph nodes, mild interval decrease in size of axillary and pelvic lymphadenopathy, and decreased size of left pleural effusion, stable small-moderate right pleural effusion.    - Awake and alert, no further seizures, EEG negative keep off AED's at this time. Eventually needs MRI. Neuro follow-up  - HD stable, no RV strain on echo. Continue heparin gtt for PE's  - Doing well on room air with normal SpO2. Left chest tube in place, placed on suction today but with development of pleuritic chest pain, so placed back on water seal. S/p 1 dose of tPA/dornase laura with resolution of ex vacuo PTX. Left-sided pleural effusion is significantly improved on CT chest and on bedside ultrasound with less echogenicity and septations. After discussion with thoracic surgery, will hold off on further tPA/dornase given mild blood-tinged sputum and high risk of pleural hemorrhage while on full a/c. Follow-up thoracic surgery regarding need for VATS/decortication  - Diet as tolerates  - Hyponatremia to 125 today s/p 3% sodium chloride 30 mL x4 hours with improvement. Suspect SIADH from pain vs. urinary retention. Continue salt tabs  - Discontinue vancomycin given negative nasal MRSA. Continue cefepime empirically. Cultures so far negative. F/up ID  - Appreciate rheum and derm eval. Cutaneous findings are all consistent with lupus. Rheum workup so far revealing for high ABDIAS (1:1280), Pizano>8, RNP>8, SS-A>8, and high urine protein/creatinine ratio. Also with elevated 1,25-diOH vit D and ACE, unclear if there is concomitant granulomatous inflammation (e.g., sarcoid vs. granulomatous infection). Remaining workup negative, including APLS labs, Janine-1, ribosomal P protein, RF, CCP, ANCA, IgG4, immunoglobulins. Pending quantiferon, myomarker panel. Ferritin markedly elevated to 9000s, doubt HLH given no hepatosplenomegaly. Check scleroderma, centromere, RNA polymerase III. Recurrent fevers likely inflammatory. Will start prednisone 1 mg/kg daily (can start with methylprednisolone 60 mg IV daily). Follow-up rheumatology recs  - IR evaluation for biopsy of axillary lymphadenopathy  - Appreciate heme follow-up, APLS workup negative. Follow-up Factor V Leiden, PT gene mutation, JAK2  - Prognosis guarded, full code, discussed with patient and mother at bedside  CC Time spent: 35 min

## 2023-12-28 NOTE — PROGRESS NOTE ADULT - SUBJECTIVE AND OBJECTIVE BOX
Infectious Diseases Follow Up:    Patient is a 28y old  Male who presents with a chief complaint of 27 yo M w recent Dx Lupus Erythematosus (Sept 2023).  Transferred to [facility] with b/l pleural effusions s/p thoracentesis, as well as pulmonary infarcts & pericardial effusion.  Pt. with L chest tube in place.      (26 Dec 2023 15:23)      Interval History/ROS:  Pt with delirium ON per mom, pain at chest tube site. Noted to be febrile yesterday, leukocytosis today    Allergies  No Known Allergies        ANTIMICROBIALS:  cefepime   IVPB 2000 every 8 hours  hydroxychloroquine 200 two times a day      Current Abx:     Previous Abx     OTHER MEDS:  MEDICATIONS  (STANDING):  acetaminophen     Tablet .. 650 every 6 hours PRN  albuterol/ipratropium for Nebulization 3 every 6 hours PRN  guaiFENesin Oral Liquid (Sugar-Free) 200 every 6 hours PRN  heparin   Injectable 5500 every 6 hours PRN  heparin   Injectable 2500 every 6 hours PRN  heparin  Infusion. 1300 <Continuous>  melatonin 3 <User Schedule>  oxyCODONE    IR 5 every 6 hours PRN      Vital Signs Last 24 Hrs  T(C): 38.6 (28 Dec 2023 10:00), Max: 38.6 (28 Dec 2023 10:00)  T(F): 101.4 (28 Dec 2023 10:00), Max: 101.4 (28 Dec 2023 10:00)  HR: 100 (28 Dec 2023 10:00) (81 - 112)  BP: 131/95 (28 Dec 2023 10:00) (111/71 - 157/84)  BP(mean): 104 (28 Dec 2023 10:00) (79 - 117)  RR: 18 (28 Dec 2023 10:00) (15 - 35)  SpO2: 96% (28 Dec 2023 10:00) (93% - 98%)    Parameters below as of 28 Dec 2023 10:00  Patient On (Oxygen Delivery Method): room air        PHYSICAL EXAM:  GENERAL: NAD, well-developed  HEAD:  Atraumatic, Normocephalic  EYES: EOMI, PERRLA, conjunctiva and sclera clear  NECK: Supple, No JVD  CHEST/LUNG: R chest tube  HEART: Regular rate and rhythm; No murmurs, rubs, or gallops  ABDOMEN: Soft, Nontender, Nondistended; Bowel sounds present  EXTREMITIES: lesions b/l palms   PSYCH: AAO                          8.6    13.41 )-----------( 232      ( 28 Dec 2023 00:45 )             25.0       12-28    125<L>  |  92<L>  |  10  ----------------------------<  133<H>  4.2   |  23  |  0.73    Ca    8.2<L>      28 Dec 2023 00:45  Phos  1.9     12-28  Mg     1.80     12-28    TPro  6.5  /  Alb  2.5<L>  /  TBili  0.5  /  DBili  x   /  AST  125<H>  /  ALT  99<H>  /  AlkPhos  72  12-28      Urinalysis Basic - ( 28 Dec 2023 00:45 )    Color: x / Appearance: x / SG: x / pH: x  Gluc: 133 mg/dL / Ketone: x  / Bili: x / Urobili: x   Blood: x / Protein: x / Nitrite: x   Leuk Esterase: x / RBC: x / WBC x   Sq Epi: x / Non Sq Epi: x / Bacteria: x        MICROBIOLOGY:  v  .Sputum Sputum  12-27-23 --  --    Few polymorphonuclear leukocytes per low power field  Few Squamous epithelial cells per low power field  No organisms seen per oil power field      Pleural Fl Pleural Fluid  12-26-23   No growth to date.  --    polymorphonuclear leukocytes seen  No organisms seen  by cytocentrifuge      Pleural Fl Pleural Fluid  12-25-23   No growth  --    No polymorphonuclear cells seen seen per low power field  No organisms seen seen per oil power field      Pleural Fl Pleural Fluid  12-25-23   Culture is being performed. Fungal cultures are held for 4 weeks.  --  --      .CSF CSF  12-25-23   Culture is being performed.  --    No polymorphonuclear leukocytes seen  No organisms seen  by cytocentrifuge      .Blood Blood  12-25-23   No growth at 48 Hours  --  --      .Blood Blood-Venous  12-25-23   No growth at 72 Hours  --  --      Pleural Fl Pleural Fluid  12-24-23   No growth  --    polymorphonuclear leukocytes seen  No organisms seen  by cytocentrifuge      Clean Catch Clean Catch (Midstream)  12-23-23   No growth  --    Few polymorphonuclear leukocytes per low power field  Moderate Squamous epithelial cells per low power field  Rare Gram Negative Rods per oil power field      .Blood Blood-Peripheral  12-23-23   No growth at 4 days  --  --      .Blood Blood-Peripheral  12-21-23   No growth at 5 days  --  --      .Blood Blood-Peripheral  12-21-23   No growth at 5 days  --  --      .Sputum Sputum  12-20-23   Normal Respiratory Inge present  --    Few Squamous epithelial cells per low power field  Few polymorphonuclear leukocytes per low power field  Few Gram Positive Cocci in Pairs and Chains per oil power field      .Stool Feces  12-15-23   No enteric pathogens isolated.  (Stool culture examined for Salmonella,  Shigella, Campylobacter, Aeromonas, Plesiomonas,  Vibrio, E.coli O157 and Yersinia)  --  --      .Blood Blood-Peripheral  12-14-23   No growth at 5 days  --  --      .Blood Blood-Peripheral  12-14-23   No growth at 5 days  --  --      .Blood Blood-Peripheral  12-12-23   No growth at 5 days  --  --      .Blood Blood-Peripheral  12-12-23   No growth at 5 days  --  --          Rapid RVP Result: NotDetec (12-26 @ 15:12)  Rapid RVP Result: NotDetec (12-23 @ 14:34)  Rapid RVP Result: NotDetec (12-21 @ 18:50)        RADIOLOGY:       Infectious Diseases Follow Up:    Patient is a 28y old  Male who presents with a chief complaint of 27 yo M w recent Dx Lupus Erythematosus (Sept 2023).  Transferred to [facility] with b/l pleural effusions s/p thoracentesis, as well as pulmonary infarcts & pericardial effusion.  Pt. with L chest tube in place.      (26 Dec 2023 15:23)      Interval History/ROS:  Pt with delirium ON per mom, pain at chest tube site. Noted to be febrile yesterday, leukocytosis today    Allergies  No Known Allergies        ANTIMICROBIALS:  cefepime   IVPB 2000 every 8 hours  hydroxychloroquine 200 two times a day      Current Abx:     Previous Abx     OTHER MEDS:  MEDICATIONS  (STANDING):  acetaminophen     Tablet .. 650 every 6 hours PRN  albuterol/ipratropium for Nebulization 3 every 6 hours PRN  guaiFENesin Oral Liquid (Sugar-Free) 200 every 6 hours PRN  heparin   Injectable 5500 every 6 hours PRN  heparin   Injectable 2500 every 6 hours PRN  heparin  Infusion. 1300 <Continuous>  melatonin 3 <User Schedule>  oxyCODONE    IR 5 every 6 hours PRN      Vital Signs Last 24 Hrs  T(C): 38.6 (28 Dec 2023 10:00), Max: 38.6 (28 Dec 2023 10:00)  T(F): 101.4 (28 Dec 2023 10:00), Max: 101.4 (28 Dec 2023 10:00)  HR: 100 (28 Dec 2023 10:00) (81 - 112)  BP: 131/95 (28 Dec 2023 10:00) (111/71 - 157/84)  BP(mean): 104 (28 Dec 2023 10:00) (79 - 117)  RR: 18 (28 Dec 2023 10:00) (15 - 35)  SpO2: 96% (28 Dec 2023 10:00) (93% - 98%)    Parameters below as of 28 Dec 2023 10:00  Patient On (Oxygen Delivery Method): room air        PHYSICAL EXAM:  GENERAL: NAD, well-developed  HEAD:  Atraumatic, Normocephalic  EYES: EOMI, PERRLA, conjunctiva and sclera clear  NECK: Supple, No JVD  CHEST/LUNG: R chest tube  HEART: Regular rate and rhythm; No murmurs, rubs, or gallops  ABDOMEN: Soft, Nontender, Nondistended; Bowel sounds present  EXTREMITIES: lesions b/l palms   PSYCH: AAO                          8.6    13.41 )-----------( 232      ( 28 Dec 2023 00:45 )             25.0       12-28    125<L>  |  92<L>  |  10  ----------------------------<  133<H>  4.2   |  23  |  0.73    Ca    8.2<L>      28 Dec 2023 00:45  Phos  1.9     12-28  Mg     1.80     12-28    TPro  6.5  /  Alb  2.5<L>  /  TBili  0.5  /  DBili  x   /  AST  125<H>  /  ALT  99<H>  /  AlkPhos  72  12-28      Urinalysis Basic - ( 28 Dec 2023 00:45 )    Color: x / Appearance: x / SG: x / pH: x  Gluc: 133 mg/dL / Ketone: x  / Bili: x / Urobili: x   Blood: x / Protein: x / Nitrite: x   Leuk Esterase: x / RBC: x / WBC x   Sq Epi: x / Non Sq Epi: x / Bacteria: x        MICROBIOLOGY:  v  .Sputum Sputum  12-27-23 --  --    Few polymorphonuclear leukocytes per low power field  Few Squamous epithelial cells per low power field  No organisms seen per oil power field      Pleural Fl Pleural Fluid  12-26-23   No growth to date.  --    polymorphonuclear leukocytes seen  No organisms seen  by cytocentrifuge      Pleural Fl Pleural Fluid  12-25-23   No growth  --    No polymorphonuclear cells seen seen per low power field  No organisms seen seen per oil power field      Pleural Fl Pleural Fluid  12-25-23   Culture is being performed. Fungal cultures are held for 4 weeks.  --  --      .CSF CSF  12-25-23   Culture is being performed.  --    No polymorphonuclear leukocytes seen  No organisms seen  by cytocentrifuge      .Blood Blood  12-25-23   No growth at 48 Hours  --  --      .Blood Blood-Venous  12-25-23   No growth at 72 Hours  --  --      Pleural Fl Pleural Fluid  12-24-23   No growth  --    polymorphonuclear leukocytes seen  No organisms seen  by cytocentrifuge      Clean Catch Clean Catch (Midstream)  12-23-23   No growth  --    Few polymorphonuclear leukocytes per low power field  Moderate Squamous epithelial cells per low power field  Rare Gram Negative Rods per oil power field      .Blood Blood-Peripheral  12-23-23   No growth at 4 days  --  --      .Blood Blood-Peripheral  12-21-23   No growth at 5 days  --  --      .Blood Blood-Peripheral  12-21-23   No growth at 5 days  --  --      .Sputum Sputum  12-20-23   Normal Respiratory Inge present  --    Few Squamous epithelial cells per low power field  Few polymorphonuclear leukocytes per low power field  Few Gram Positive Cocci in Pairs and Chains per oil power field      .Stool Feces  12-15-23   No enteric pathogens isolated.  (Stool culture examined for Salmonella,  Shigella, Campylobacter, Aeromonas, Plesiomonas,  Vibrio, E.coli O157 and Yersinia)  --  --      .Blood Blood-Peripheral  12-14-23   No growth at 5 days  --  --      .Blood Blood-Peripheral  12-14-23   No growth at 5 days  --  --      .Blood Blood-Peripheral  12-12-23   No growth at 5 days  --  --      .Blood Blood-Peripheral  12-12-23   No growth at 5 days  --  --          Rapid RVP Result: NotDetec (12-26 @ 15:12)  Rapid RVP Result: NotDetec (12-23 @ 14:34)  Rapid RVP Result: NotDetec (12-21 @ 18:50)        RADIOLOGY:

## 2023-12-28 NOTE — PROGRESS NOTE ADULT - SUBJECTIVE AND OBJECTIVE BOX
Subjective:  Pt was seen and examined by Dr. Sosa. Pt currently lying on bed, sleeping, on room air, not in respiratory distress. Family at bedside.       Vital Signs:  Vital Signs Last 24 Hrs  T(C): 36.7 (12-28-23 @ 12:00), Max: 38.6 (12-28-23 @ 10:00)  T(F): 98 (12-28-23 @ 12:00), Max: 101.4 (12-28-23 @ 10:00)  HR: 97 (12-28-23 @ 12:00) (81 - 112)  BP: 151/82 (12-28-23 @ 12:00) (111/71 - 157/84)  RR: 24 (12-28-23 @ 12:00) (15 - 35)  SpO2: 96% (12-28-23 @ 12:00) (95% - 98%) on (O2)    PE:   General:  NAD  Respiratory: CTA B/L, normal effort  CV: RRR, S1S2, no murmurs, rubs or gallops  Abdominal: Soft, Non-tender  Extremities: No edema, + peripheral pulses  Tubes: Left chest tube to WS, output 20 cc    Relevant labs, radiology and Medications reviewed                        8.6    13.41 )-----------( 232      ( 28 Dec 2023 00:45 )             25.0     12-28    125<L>  |  92<L>  |  10  ----------------------------<  133<H>  4.2   |  23  |  0.73    Ca    8.2<L>      28 Dec 2023 00:45  Phos  1.9     12-28  Mg     1.80     12-28    TPro  6.5  /  Alb  2.5<L>  /  TBili  0.5  /  DBili  x   /  AST  125<H>  /  ALT  99<H>  /  AlkPhos  72  12-28    PT/INR - ( 28 Dec 2023 00:45 )   PT: 16.3 sec;   INR: 1.46 ratio         PTT - ( 28 Dec 2023 00:45 )  PTT:65.9 sec  MEDICATIONS  (STANDING):  cefepime   IVPB 2000 milliGRAM(s) IV Intermittent every 8 hours  chlorhexidine 2% Cloths 1 Application(s) Topical daily  ciprofloxacin  0.3% Ophthalmic Solution for Otic Use 2 Drop(s) Both Ears two times a day  heparin  Infusion. 1300 Unit(s)/Hr (13 mL/Hr) IV Continuous <Continuous>  hydroxychloroquine 200 milliGRAM(s) Oral two times a day  lidocaine   4% Patch 1 Patch Transdermal every 24 hours  melatonin 3 milliGRAM(s) Oral <User Schedule>  sodium chloride 1 Gram(s) Oral three times a day  sodium chloride 3%. 500 milliLiter(s) (30 mL/Hr) IV Continuous <Continuous>    MEDICATIONS  (PRN):  acetaminophen     Tablet .. 650 milliGRAM(s) Oral every 6 hours PRN Temp greater or equal to 38C (100.4F), Mild Pain (1 - 3)  albuterol/ipratropium for Nebulization 3 milliLiter(s) Nebulizer every 6 hours PRN Shortness of Breath and/or Wheezing  benzocaine/menthol Lozenge 1 Lozenge Oral four times a day PRN Sore Throat  FIRST- Mouthwash  BLM 15 milliLiter(s) Swish and Spit every 4 hours PRN Mouth Care  guaiFENesin Oral Liquid (Sugar-Free) 200 milliGRAM(s) Oral every 6 hours PRN Cough  heparin   Injectable 5500 Unit(s) IV Push every 6 hours PRN For aPTT less than 40  heparin   Injectable 2500 Unit(s) IV Push every 6 hours PRN For aPTT between 40 - 57  lidocaine 2% Viscous 5 milliLiter(s) Swish and Spit three times a day PRN Mouth Care  oxyCODONE    IR 5 milliGRAM(s) Oral every 6 hours PRN Severe Pain (7 - 10)    Pertinent Physical Exam  I&O's Summary    27 Dec 2023 07:01  -  28 Dec 2023 07:00  --------------------------------------------------------  IN: 2160.4 mL / OUT: 2825 mL / NET: -664.6 mL    28 Dec 2023 07:01  -  28 Dec 2023 14:15  --------------------------------------------------------  IN: 156 mL / OUT: 20 mL / NET: 136 mL    Social: Pt is a student  Not   No smoking. NO ETOH    Assessment  28 years old male with h/o Lupus ( diagnosed in 09/2023, on Plaquenil present to [place] ED on 12/12/23  with complain of chest pain and SOB admitted for fevers, PE, pleural effusions, course c/b high fever and tonic-clonic seizure, transferred to MICU for post-ictal and infectious monitoring. B/l pleural effusions S/p L sided chest tube & MIST protocol on 12/26/23.    PLAN  -continue care per MICU team  -Will follow with daily CXR and left chest tube monitoring  -Will continue to follow  -Plan above d/w Dr. Sosa  -please call with concerns #[phone]   Subjective:  Pt was seen and examined by Dr. Sosa. Pt currently lying on bed, sleeping, on room air, not in respiratory distress. Family at bedside.       Vital Signs:  Vital Signs Last 24 Hrs  T(C): 36.7 (12-28-23 @ 12:00), Max: 38.6 (12-28-23 @ 10:00)  T(F): 98 (12-28-23 @ 12:00), Max: 101.4 (12-28-23 @ 10:00)  HR: 97 (12-28-23 @ 12:00) (81 - 112)  BP: 151/82 (12-28-23 @ 12:00) (111/71 - 157/84)  RR: 24 (12-28-23 @ 12:00) (15 - 35)  SpO2: 96% (12-28-23 @ 12:00) (95% - 98%) on (O2)    PE:   General:  NAD  Respiratory: CTA B/L, normal effort  CV: RRR, S1S2, no murmurs, rubs or gallops  Abdominal: Soft, Non-tender  Extremities: No edema, + peripheral pulses  Tubes: Left chest tube to WS, output 20 cc    Relevant labs, radiology and Medications reviewed                        8.6    13.41 )-----------( 232      ( 28 Dec 2023 00:45 )             25.0     12-28    125<L>  |  92<L>  |  10  ----------------------------<  133<H>  4.2   |  23  |  0.73    Ca    8.2<L>      28 Dec 2023 00:45  Phos  1.9     12-28  Mg     1.80     12-28    TPro  6.5  /  Alb  2.5<L>  /  TBili  0.5  /  DBili  x   /  AST  125<H>  /  ALT  99<H>  /  AlkPhos  72  12-28    PT/INR - ( 28 Dec 2023 00:45 )   PT: 16.3 sec;   INR: 1.46 ratio         PTT - ( 28 Dec 2023 00:45 )  PTT:65.9 sec  MEDICATIONS  (STANDING):  cefepime   IVPB 2000 milliGRAM(s) IV Intermittent every 8 hours  chlorhexidine 2% Cloths 1 Application(s) Topical daily  ciprofloxacin  0.3% Ophthalmic Solution for Otic Use 2 Drop(s) Both Ears two times a day  heparin  Infusion. 1300 Unit(s)/Hr (13 mL/Hr) IV Continuous <Continuous>  hydroxychloroquine 200 milliGRAM(s) Oral two times a day  lidocaine   4% Patch 1 Patch Transdermal every 24 hours  melatonin 3 milliGRAM(s) Oral <User Schedule>  sodium chloride 1 Gram(s) Oral three times a day  sodium chloride 3%. 500 milliLiter(s) (30 mL/Hr) IV Continuous <Continuous>    MEDICATIONS  (PRN):  acetaminophen     Tablet .. 650 milliGRAM(s) Oral every 6 hours PRN Temp greater or equal to 38C (100.4F), Mild Pain (1 - 3)  albuterol/ipratropium for Nebulization 3 milliLiter(s) Nebulizer every 6 hours PRN Shortness of Breath and/or Wheezing  benzocaine/menthol Lozenge 1 Lozenge Oral four times a day PRN Sore Throat  FIRST- Mouthwash  BLM 15 milliLiter(s) Swish and Spit every 4 hours PRN Mouth Care  guaiFENesin Oral Liquid (Sugar-Free) 200 milliGRAM(s) Oral every 6 hours PRN Cough  heparin   Injectable 5500 Unit(s) IV Push every 6 hours PRN For aPTT less than 40  heparin   Injectable 2500 Unit(s) IV Push every 6 hours PRN For aPTT between 40 - 57  lidocaine 2% Viscous 5 milliLiter(s) Swish and Spit three times a day PRN Mouth Care  oxyCODONE    IR 5 milliGRAM(s) Oral every 6 hours PRN Severe Pain (7 - 10)    Pertinent Physical Exam  I&O's Summary    27 Dec 2023 07:01  -  28 Dec 2023 07:00  --------------------------------------------------------  IN: 2160.4 mL / OUT: 2825 mL / NET: -664.6 mL    28 Dec 2023 07:01  -  28 Dec 2023 14:15  --------------------------------------------------------  IN: 156 mL / OUT: 20 mL / NET: 136 mL    Social: Pt is a student  Not   No smoking. NO ETOH    Assessment  28 years old male with h/o Lupus ( diagnosed in 09/2023, on Plaquenil present to [place] ED on 12/12/23  with complain of chest pain and SOB admitted for fevers, PE, pleural effusions, course c/b high fever and tonic-clonic seizure, transferred to MICU for post-ictal and infectious monitoring. B/l pleural effusions S/p L sided chest tube & MIST protocol on 12/26/23.    PLAN  -continue care per MICU team  -Will follow with daily CXR and left chest tube monitoring  -Will continue to follow  -Plan above d/w Dr. Sosa  -please call with concerns #[phone]

## 2023-12-28 NOTE — PROGRESS NOTE ADULT - ASSESSMENT
This is a 27 y/o M new diagnosis of SLE (9/23, started on hydroxychloroquine) initially admitted to [facility] on 12/12 w/ CP and SOB, found to have RUL and LLL segmental/subsegmental PE, started on heparin, c/f superimposed PNA, on Zosyn. Pt with b/l effusions, L > R with loculations, transferred to [facility] for thoracic evaluation. Pt with persistent fevers despite Zosyn from 12/14/23.  ID now consulted for persistent fevers     Work up:  UA no pyuria  RVP negative  Strep Pneumo Ag, legionella, mycoplasma IgM negative  MRSA PCR negative  EPEC+ 12/15 - diarrhea has since resolved    Imaging:   CTA chest 12/12: Acute right upper lobe and left lower lobe segmental/subsegmental pulmonary emboli. No CT evidence of right heart strain. New bilateral lower lobe consolidations with areas of central clearing. Pneumonia and pulmonary infarcts are in the differential. New bilateral pleural effusions, small on the left and trace on the right. Bilateral axillary and supraclavicular adenopathy of unclear etiology.  TTE 12/12: grossly wnl  CXR 12/20: Large left pleural effusion and compressive atelectasis. Trace right effusion and linear atelectasis in the right midlung  CT chest 12/23: Moderate pleural effusions, loculated on the left, new since 12/12/2023. New nonspecific the very small peripheral groundglass in the right upper lobe  CT neck 12/23: Small level 1 and level 2 and level 5 lymph nodes without significant enlargement. No drainable collections    Micro:  Blood cultures (12/12, 12/14, 12/21, 12/23, 12/25): NGTD  Stool Cx (12/15): no growth, GI PCR +EPEC  Sputum (12/20): normal [place]    Abx:  Zosyn (12/14-25)  Vanco (12/23, 25-)  Cefepime 12/25-  Azithro (12/14-12/16, 12/20)    #Seizure   #Fever  #Pleural effusions  #Possible superimposed pneumonia  #Pulmonary embolism  #Lymphadenopathy  #Known SLE    Overall 27 y/o M w/ SLE on Hydroxychloroquine admitted to [facility] for b/l PE w/ superimposed PNA, transferred to [facility] on 12/22 for thoracic eval of b/l L > R effusions, L loculated. Pt was Zosyn since 12/14, started on Vancomycin here. Despite board therapy with Zosyn, pt continues to have fevers to 102. No leukocytosis.   Fevers if 2/2 PNA should have responded w/ 10 day course of Zosyn, MRSA swab was negative, Vancomycin likely not necessary.   May be 2/2 SLE flare rather than infectious process, however would continue with Zosyn for now pending R thoracentesis and studies.     S/p L thoracentesis on 12/24, nucleated cell count ~400 when corrected for RBCs, does not appear to be infected. Pt was initiated on steroids on 12/23, however did not defervesce. Pt with continued fevers to 104, had seizure yesterday, now transferred to the MICU. Pt was changed to Vancomycin/Cefepime. LP in the MICU w/o significant findings of infection. Only 6 nucleated cells, PCR negative. R sided thora done in MICU, minimal nucleated cells after correction.   Procalcitonin was rechecked, elevated to 8. No leukocytosis, despite recent steroid course. Per rheumatology, if fevers were 2/2 SLE, would have improved w/ steroid course, holding steroids at this time.     Overall, no clear source of fevers at this time, pt continues to have fevers on Zosyn with elevated procal. Now on Vancomycin/Cefepime, with improvement in fever curve. Will need to follow up Bcx and other Cx already sent. Syphilis negative     Plan:   - C/w Vancomycin, Cefepime at this time. However no clear source of infection or pathogen isolated   - F/u BCx, pleural effusion Cx, and CSF Cx (unlikely to grow)   - Would obtain MRI per neurology recs   - Would repeat CT neck/Chest/A/P, would biopsy LN as there is no clear diagnosis at this time  - appreciate rheumatology recs     Thank you for this consult. Inpatient ID team will follow.    Logan Ku M.D.  Attending Physician  Division of Infectious Diseases  Department of Medicine    Please contact through MS Teams message.  Office: [phone] (after 5 PM or weekend). This is a 27 y/o M new diagnosis of SLE (9/23, started on hydroxychloroquine) initially admitted to [facility] on 12/12 w/ CP and SOB, found to have RUL and LLL segmental/subsegmental PE, started on heparin, c/f superimposed PNA, on Zosyn. Pt with b/l effusions, L > R with loculations, transferred to [facility] for thoracic evaluation. Pt with persistent fevers despite Zosyn from 12/14/23.  ID now consulted for persistent fevers     Work up:  UA no pyuria  RVP negative  Strep Pneumo Ag, legionella, mycoplasma IgM negative  MRSA PCR negative  EPEC+ 12/15 - diarrhea has since resolved    Imaging:   CTA chest 12/12: Acute right upper lobe and left lower lobe segmental/subsegmental pulmonary emboli. No CT evidence of right heart strain. New bilateral lower lobe consolidations with areas of central clearing. Pneumonia and pulmonary infarcts are in the differential. New bilateral pleural effusions, small on the left and trace on the right. Bilateral axillary and supraclavicular adenopathy of unclear etiology.  TTE 12/12: grossly wnl  CXR 12/20: Large left pleural effusion and compressive atelectasis. Trace right effusion and linear atelectasis in the right midlung  CT chest 12/23: Moderate pleural effusions, loculated on the left, new since 12/12/2023. New nonspecific the very small peripheral groundglass in the right upper lobe  CT neck 12/23: Small level 1 and level 2 and level 5 lymph nodes without significant enlargement. No drainable collections    Micro:  Blood cultures (12/12, 12/14, 12/21, 12/23, 12/25): NGTD  Stool Cx (12/15): no growth, GI PCR +EPEC  Sputum (12/20): normal [place]ia    Abx:  Zosyn (12/14-25)  Vanco (12/23, 25-)  Cefepime 12/25-  Azithro (12/14-12/16, 12/20)    #Seizure   #Fever  #Pleural effusions  #Possible superimposed pneumonia  #Pulmonary embolism  #Lymphadenopathy  #Known SLE    Overall 27 y/o M w/ SLE on Hydroxychloroquine admitted to [facility] for b/l PE w/ superimposed PNA, transferred to [facility] on 12/22 for thoracic eval of b/l L > R effusions, L loculated. Pt was Zosyn since 12/14, started on Vancomycin here. Despite board therapy with Zosyn, pt continues to have fevers to 102. No leukocytosis.   Fevers if 2/2 PNA should have responded w/ 10 day course of Zosyn, MRSA swab was negative, Vancomycin likely not necessary.   May be 2/2 SLE flare rather than infectious process, however would continue with Zosyn for now pending R thoracentesis and studies.     S/p L thoracentesis on 12/24, nucleated cell count ~400 when corrected for RBCs, does not appear to be infected. Pt was initiated on steroids on 12/23, however did not defervesce. Pt with continued fevers to 104, had seizure yesterday, now transferred to the MICU. Pt was changed to Vancomycin/Cefepime. LP in the MICU w/o significant findings of infection. Only 6 nucleated cells, PCR negative. R sided thora done in MICU, minimal nucleated cells after correction.   Procalcitonin was rechecked, elevated to 8. No leukocytosis, despite recent steroid course. Per rheumatology, if fevers were 2/2 SLE, would have improved w/ steroid course, holding steroids at this time.     Overall, no clear source of fevers at this time, pt continues to have fevers on Zosyn with elevated procal. Now on Vancomycin/Cefepime, with improvement in fever curve. Will need to follow up Bcx and other Cx already sent. Syphilis negative     Plan:   - C/w Vancomycin, Cefepime at this time. However no clear source of infection or pathogen isolated   - F/u BCx, pleural effusion Cx, and CSF Cx (unlikely to grow)   - Would obtain MRI per neurology recs   - Would repeat CT neck/Chest/A/P, would biopsy LN as there is no clear diagnosis at this time  - appreciate rheumatology recs     Thank you for this consult. Inpatient ID team will follow.    Logan Ku M.D.  Attending Physician  Division of Infectious Diseases  Department of Medicine    Please contact through MS Teams message.  Office: [phone] (after 5 PM or weekend).

## 2023-12-28 NOTE — PROGRESS NOTE ADULT - ASSESSMENT
28M with SLE who presented to [facility] with PEs, developed pleural effusions and transferred to [facility] in that context. Here, developed seizures in the setting of high grade fever and hyponatremia. Nephrology consulted for further management of hyponatremia. 28M with SLE who presented to [facility] with PEs, developed pleural effusions and transferred to [facility] in that context. Here, developed seizures in the setting of high grade fever and hyponatremia. Nephrology consulted for further management of hyponatremia.

## 2023-12-28 NOTE — CONSULT NOTE ADULT - SUBJECTIVE AND OBJECTIVE BOX
HPI:  28 years old male with h/o Lupus ( diagnosed in 09/2023, on Plaquenil present to [facility] on 12/12/23  with complain of chest pain and SOB. Patient reported left sided pleuritic chest pain which started 3 days ago. Pain is progressively worsened, associated with SOB and cough. Patient was seen in OSH ED and was prescribed antibiotics. Patient reported significantly worsening of left sided pleuritic chest pain today. No fever or chills. Patient reported loss of appetite and had a few episode of diarrhea for last 2 days. CTA chest with acute right upper lobe and left lower lobe segmental/subsegmental pulmonary emboli. No CT evidence of right heart strain. New bilateral lower lobe consolidations with areas of central clearing. Pneumonia and pulmonary infarcts are in the differential. New bilateral pleural effusions, small on the left and trace on the right. Bilateral axillary and supraclavicular adenopathy of unclear etiology. Patient was started on heparin ggt transitioned to eliquis on 12/19,  patient with worsening SOB/ hypoxia requiring nasal cannula oxygen supplementation. 12/20  Repeat Xray shows increased large left pleural effusion and compressive atelectasis trace right effusion and linear atelectasis. Thoracic team consulted for possible pigtail insertion Bedside US: Large left effusion with septations. Right simple effusion , + pericardial effusion- lower extremity dopplers negative for DVT,   - GI PCR + e coli  - mRSA PCR + Staph aureus   . Patient transferred to [facility] for further management.     (22 Dec 2023 22:52)    Dermatology consulted for asymptomatic rashes on hands. No oral mucosal pain. No feeling of tightness in the forearms, face, or chest. Denying oral mucosal pain, although did endorse pain earlier in hospital course.     PAST MEDICAL & SURGICAL HISTORY:  LE (lupus erythematosus)      Pulmonary embolism      No significant past surgical history          REVIEW OF SYSTEMS      General: no fevers/chills, no lethary	    Skin/Breast: see HPI  	  Ophthalmologic: no eye pain or change in vision  	  ENMT: no dysphagia or change in hearing    Respiratory and Thorax: no SOB or cough  	  Cardiovascular: no palpitations or chest pain    Gastrointestinal: no abdomenal pain or blood in stool     Genitourinary: no dysuria or frequency    Musculoskeletal: no joint pains or weakness	    Neurological:no weakness, numbness , or tingling    MEDICATIONS  (STANDING):  cefepime   IVPB 2000 milliGRAM(s) IV Intermittent every 8 hours  chlorhexidine 2% Cloths 1 Application(s) Topical daily  ciprofloxacin  0.3% Ophthalmic Solution for Otic Use 2 Drop(s) Both Ears two times a day  heparin  Infusion. 1300 Unit(s)/Hr IV Continuous <Continuous>  hydroxychloroquine 200 milliGRAM(s) Oral two times a day  lidocaine   4% Patch 1 Patch Transdermal every 24 hours  melatonin 3 milliGRAM(s) Oral <User Schedule>  sodium chloride 1 Gram(s) Oral three times a day  sodium chloride 3%. 500 milliLiter(s) IV Continuous <Continuous>    ALLERGIES: No Known Allergies      Social History:  Student   Denies smoking or drug use  Occasional alcohol use    FAMILY HISTORY:  No pertinent family history in first degree relatives          VITAL SIGNS LAST 24 HOURS:  T(F): 101.4 (12-28 @ 10:00), Max: 101.4 (12-28 @ 10:00)  HR: 97 (12-28 @ 12:00) (81 - 112)  BP: 151/82 (12-28 @ 12:00) (111/71 - 157/84)  RR: 24 (12-28 @ 12:00) (15 - 35)    ___________________________________  Physical Exam:     The patient was alert and oriented X 3  OP showed no ulcerations  There was no visible lymphadenopathy.  Conjunctiva were non injected  There was no clubbing or edema of extremities.  The scalp, hair, face, eyebrows, lips, OP, neck, chest, back,   extremities X 4, nails were examined.  There was no hyperhidrosis or bromhidrosis.    Of note on skin exam:   patchy hair loss on scalp   small ulcer on right dorsal tongue   hyperpigmented brown plaques on bilateral conchal bowls  hyperpigmented macules coalescing into patches on the upper chest   hyperpigmented macules and hyperkeratotic plaques on palmar digits as well as subungual hyperkeratosis on several fingernails   reticulate erythematous patches on distal fingertips as well as some palmar erythema   hyperpigmented patches on plantar toes   remainder of trunk and extremities clear  ____________________________________    LABS:                        8.6    13.41 )-----------( 232      ( 28 Dec 2023 00:45 )             25.0     12-28    125<L>  |  92<L>  |  10  ----------------------------<  133<H>  4.2   |  23  |  0.73    Ca    8.2<L>      28 Dec 2023 00:45  Phos  1.9     12-28  Mg     1.80     12-28    TPro  6.5  /  Alb  2.5<L>  /  TBili  0.5  /  DBili  x   /  AST  125<H>  /  ALT  99<H>  /  AlkPhos  72  12-28    PT/INR - ( 28 Dec 2023 00:45 )   PT: 16.3 sec;   INR: 1.46 ratio         PTT - ( 28 Dec 2023 00:45 )  PTT:65.9 sec  Urinalysis Basic - ( 28 Dec 2023 00:45 )    Color: x / Appearance: x / SG: x / pH: x  Gluc: 133 mg/dL / Ketone: x  / Bili: x / Urobili: x   Blood: x / Protein: x / Nitrite: x   Leuk Esterase: x / RBC: x / WBC x   Sq Epi: x / Non Sq Epi: x / Bacteria: x     HPI:  28 years old male with h/o Lupus ( diagnosed in 09/2023, on Plaquenil present to [place] ED on 12/12/23  with complain of chest pain and SOB. Patient reported left sided pleuritic chest pain which started 3 days ago. Pain is progressively worsened, associated with SOB and cough. Patient was seen in OSH ED and was prescribed antibiotics. Patient reported significantly worsening of left sided pleuritic chest pain today. No fever or chills. Patient reported loss of appetite and had a few episode of diarrhea for last 2 days. CTA chest with acute right upper lobe and left lower lobe segmental/subsegmental pulmonary emboli. No CT evidence of right heart strain. New bilateral lower lobe consolidations with areas of central clearing. Pneumonia and pulmonary infarcts are in the differential. New bilateral pleural effusions, small on the left and trace on the right. Bilateral axillary and supraclavicular adenopathy of unclear etiology. Patient was started on heparin ggt transitioned to eliquis on 12/19,  patient with worsening SOB/ hypoxia requiring nasal cannula oxygen supplementation. 12/20  Repeat Xray shows increased large left pleural effusion and compressive atelectasis trace right effusion and linear atelectasis. Thoracic team consulted for possible pigtail insertion Bedside US: Large left effusion with septations. Right simple effusion , + pericardial effusion- lower extremity dopplers negative for DVT,   - GI PCR + e coli  - mRSA PCR + Staph aureus   . Patient transferred to [facility] for further management.     (22 Dec 2023 22:52)    Dermatology consulted for asymptomatic rashes on hands. No oral mucosal pain. No feeling of tightness in the forearms, face, or chest. Denying oral mucosal pain, although did endorse pain earlier in hospital course.     PAST MEDICAL & SURGICAL HISTORY:  LE (lupus erythematosus)      Pulmonary embolism      No significant past surgical history          REVIEW OF SYSTEMS      General: no fevers/chills, no lethary	    Skin/Breast: see HPI  	  Ophthalmologic: no eye pain or change in vision  	  ENMT: no dysphagia or change in hearing    Respiratory and Thorax: no SOB or cough  	  Cardiovascular: no palpitations or chest pain    Gastrointestinal: no abdomenal pain or blood in stool     Genitourinary: no dysuria or frequency    Musculoskeletal: no joint pains or weakness	    Neurological:no weakness, numbness , or tingling    MEDICATIONS  (STANDING):  cefepime   IVPB 2000 milliGRAM(s) IV Intermittent every 8 hours  chlorhexidine 2% Cloths 1 Application(s) Topical daily  ciprofloxacin  0.3% Ophthalmic Solution for Otic Use 2 Drop(s) Both Ears two times a day  heparin  Infusion. 1300 Unit(s)/Hr IV Continuous <Continuous>  hydroxychloroquine 200 milliGRAM(s) Oral two times a day  lidocaine   4% Patch 1 Patch Transdermal every 24 hours  melatonin 3 milliGRAM(s) Oral <User Schedule>  sodium chloride 1 Gram(s) Oral three times a day  sodium chloride 3%. 500 milliLiter(s) IV Continuous <Continuous>    ALLERGIES: No Known Allergies      Social History:  Student   Denies smoking or drug use  Occasional alcohol use    FAMILY HISTORY:  No pertinent family history in first degree relatives          VITAL SIGNS LAST 24 HOURS:  T(F): 101.4 (12-28 @ 10:00), Max: 101.4 (12-28 @ 10:00)  HR: 97 (12-28 @ 12:00) (81 - 112)  BP: 151/82 (12-28 @ 12:00) (111/71 - 157/84)  RR: 24 (12-28 @ 12:00) (15 - 35)    ___________________________________  Physical Exam:     The patient was alert and oriented X 3  OP showed no ulcerations  There was no visible lymphadenopathy.  Conjunctiva were non injected  There was no clubbing or edema of extremities.  The scalp, hair, face, eyebrows, lips, OP, neck, chest, back,   extremities X 4, nails were examined.  There was no hyperhidrosis or bromhidrosis.    Of note on skin exam:   patchy hair loss on scalp   small ulcer on right dorsal tongue   hyperpigmented brown plaques on bilateral conchal bowls  hyperpigmented macules coalescing into patches on the upper chest   hyperpigmented macules and hyperkeratotic plaques on palmar digits as well as subungual hyperkeratosis on several fingernails   reticulate erythematous patches on distal fingertips as well as some palmar erythema   hyperpigmented patches on plantar toes   remainder of trunk and extremities clear  ____________________________________    LABS:                        8.6    13.41 )-----------( 232      ( 28 Dec 2023 00:45 )             25.0     12-28    125<L>  |  92<L>  |  10  ----------------------------<  133<H>  4.2   |  23  |  0.73    Ca    8.2<L>      28 Dec 2023 00:45  Phos  1.9     12-28  Mg     1.80     12-28    TPro  6.5  /  Alb  2.5<L>  /  TBili  0.5  /  DBili  x   /  AST  125<H>  /  ALT  99<H>  /  AlkPhos  72  12-28    PT/INR - ( 28 Dec 2023 00:45 )   PT: 16.3 sec;   INR: 1.46 ratio         PTT - ( 28 Dec 2023 00:45 )  PTT:65.9 sec  Urinalysis Basic - ( 28 Dec 2023 00:45 )    Color: x / Appearance: x / SG: x / pH: x  Gluc: 133 mg/dL / Ketone: x  / Bili: x / Urobili: x   Blood: x / Protein: x / Nitrite: x   Leuk Esterase: x / RBC: x / WBC x   Sq Epi: x / Non Sq Epi: x / Bacteria: x

## 2023-12-28 NOTE — PROGRESS NOTE ADULT - SUBJECTIVE AND OBJECTIVE BOX
MICU PROGRESS NOTE    INTERVAL HPI/OVERNIGHT EVENTS:  - pending transfer to floors  - CT surgery following  - ENT scope cleared  - potential IR biopsy, pending IR full evaluation    SUBJECTIVE:     OBJECTIVE:    VITAL SIGNS:  ICU Vital Signs Last 24 Hrs  T(C): 37.4 (28 Dec 2023 04:00), Max: 38.5 (27 Dec 2023 14:00)  T(F): 99.4 (28 Dec 2023 04:00), Max: 101.3 (27 Dec 2023 14:00)  HR: 87 (28 Dec 2023 06:00) (81 - 112)  BP: 135/79 (28 Dec 2023 06:00) (111/71 - 157/84)  BP(mean): 92 (28 Dec 2023 06:00) (79 - 117)  ABP: --  ABP(mean): --  RR: 18 (28 Dec 2023 06:00) (15 - 35)  SpO2: 97% (28 Dec 2023 06:00) (93% - 98%)    O2 Parameters below as of 28 Dec 2023 06:00  Patient On (Oxygen Delivery Method): room air    VENTS:      I&O:    12-27 @ 07:01  -  12-28 @ 07:00  --------------------------------------------------------  IN: 2160.4 mL / OUT: 2825 mL / NET: -664.6 mL    PHYSICAL EXAM:  Appearance: No acute distress. lethargic s/p seizure / ativan.   HEENT:  PERRLA , deep voice, some whiteish foamy oral secretions   Lymphatic: No lymphadenopathy   Cardiovascular: Normal S1 S2, no JVD  Respiratory: normal effort , clear  Gastrointestinal:  Soft, Non-tender  Skin: No rashes,  warm to touch  Psychiatry:  Lethargic.   Musculuskeletal: No edema or joint swelling appreciated.                                                   MEDICATIONS:  MEDICATIONS  (STANDING):  cefepime   IVPB 2000 milliGRAM(s) IV Intermittent every 8 hours  chlorhexidine 2% Cloths 1 Application(s) Topical daily  ciprofloxacin  0.3% Ophthalmic Solution for Otic Use 2 Drop(s) Both Ears two times a day  heparin  Infusion. 1300 Unit(s)/Hr (13 mL/Hr) IV Continuous <Continuous>  hydroxychloroquine 200 milliGRAM(s) Oral two times a day  lidocaine   4% Patch 1 Patch Transdermal every 24 hours  melatonin 3 milliGRAM(s) Oral <User Schedule>  sodium chloride 1 Gram(s) Oral three times a day    MEDICATIONS  (PRN):  acetaminophen     Tablet .. 650 milliGRAM(s) Oral every 6 hours PRN Temp greater or equal to 38C (100.4F), Mild Pain (1 - 3)  albuterol/ipratropium for Nebulization 3 milliLiter(s) Nebulizer every 6 hours PRN Shortness of Breath and/or Wheezing  benzocaine/menthol Lozenge 1 Lozenge Oral four times a day PRN Sore Throat  FIRST- Mouthwash  BLM 15 milliLiter(s) Swish and Spit every 4 hours PRN Mouth Care  guaiFENesin Oral Liquid (Sugar-Free) 200 milliGRAM(s) Oral every 6 hours PRN Cough  heparin   Injectable 5500 Unit(s) IV Push every 6 hours PRN For aPTT less than 40  heparin   Injectable 2500 Unit(s) IV Push every 6 hours PRN For aPTT between 40 - 57  lidocaine 2% Viscous 5 milliLiter(s) Swish and Spit three times a day PRN Mouth Care  oxyCODONE    IR 5 milliGRAM(s) Oral every 6 hours PRN Severe Pain (7 - 10)      ALLERGIES:  Allergies    No Known Allergies    Intolerances        LABS:                        8.6    13.41 )-----------( 232      ( 28 Dec 2023 00:45 )             25.0     12-28    125<L>  |  92<L>  |  10  ----------------------------<  133<H>  4.2   |  23  |  0.73    Ca    8.2<L>      28 Dec 2023 00:45  Phos  1.9     12-28  Mg     1.80     12-28    TPro  6.5  /  Alb  2.5<L>  /  TBili  0.5  /  DBili  x   /  AST  125<H>  /  ALT  99<H>  /  AlkPhos  72  12-28    PT/INR - ( 28 Dec 2023 00:45 )   PT: 16.3 sec;   INR: 1.46 ratio         PTT - ( 28 Dec 2023 00:45 )  PTT:65.9 sec  Urinalysis Basic - ( 28 Dec 2023 00:45 )    Color: x / Appearance: x / SG: x / pH: x  Gluc: 133 mg/dL / Ketone: x  / Bili: x / Urobili: x   Blood: x / Protein: x / Nitrite: x   Leuk Esterase: x / RBC: x / WBC x   Sq Epi: x / Non Sq Epi: x / Bacteria: x      CAPILLARY BLOOD GLUCOSE      RADIOLOGY & ADDITIONAL TESTS: Reviewed.

## 2023-12-29 ENCOUNTER — NON-APPOINTMENT (OUTPATIENT)
Age: 28
End: 2023-12-29

## 2023-12-29 DIAGNOSIS — L93.0 DISCOID LUPUS ERYTHEMATOSUS: ICD-10-CM

## 2023-12-29 DIAGNOSIS — R50.9 FEVER, UNSPECIFIED: ICD-10-CM

## 2023-12-29 DIAGNOSIS — R59.0 LOCALIZED ENLARGED LYMPH NODES: ICD-10-CM

## 2023-12-29 LAB
ALBUMIN SERPL ELPH-MCNC: 2.8 G/DL — LOW (ref 3.3–5)
ALBUMIN SERPL ELPH-MCNC: 2.8 G/DL — LOW (ref 3.3–5)
ALP SERPL-CCNC: 80 U/L — SIGNIFICANT CHANGE UP (ref 40–120)
ALP SERPL-CCNC: 80 U/L — SIGNIFICANT CHANGE UP (ref 40–120)
ALT FLD-CCNC: 97 U/L — HIGH (ref 4–41)
ALT FLD-CCNC: 97 U/L — HIGH (ref 4–41)
ANION GAP SERPL CALC-SCNC: 8 MMOL/L — SIGNIFICANT CHANGE UP (ref 7–14)
ANION GAP SERPL CALC-SCNC: 8 MMOL/L — SIGNIFICANT CHANGE UP (ref 7–14)
APTT BLD: 50.1 SEC — HIGH (ref 24.5–35.6)
APTT BLD: 50.1 SEC — HIGH (ref 24.5–35.6)
APTT BLD: 56.5 SEC — HIGH (ref 24.5–35.6)
APTT BLD: 56.5 SEC — HIGH (ref 24.5–35.6)
AST SERPL-CCNC: 82 U/L — HIGH (ref 4–40)
AST SERPL-CCNC: 82 U/L — HIGH (ref 4–40)
BILIRUB SERPL-MCNC: 0.6 MG/DL — SIGNIFICANT CHANGE UP (ref 0.2–1.2)
BILIRUB SERPL-MCNC: 0.6 MG/DL — SIGNIFICANT CHANGE UP (ref 0.2–1.2)
BUN SERPL-MCNC: 11 MG/DL — SIGNIFICANT CHANGE UP (ref 7–23)
BUN SERPL-MCNC: 11 MG/DL — SIGNIFICANT CHANGE UP (ref 7–23)
CALCIUM SERPL-MCNC: 8.6 MG/DL — SIGNIFICANT CHANGE UP (ref 8.4–10.5)
CALCIUM SERPL-MCNC: 8.6 MG/DL — SIGNIFICANT CHANGE UP (ref 8.4–10.5)
CHLORIDE SERPL-SCNC: 98 MMOL/L — SIGNIFICANT CHANGE UP (ref 98–107)
CHLORIDE SERPL-SCNC: 98 MMOL/L — SIGNIFICANT CHANGE UP (ref 98–107)
CO2 SERPL-SCNC: 25 MMOL/L — SIGNIFICANT CHANGE UP (ref 22–31)
CO2 SERPL-SCNC: 25 MMOL/L — SIGNIFICANT CHANGE UP (ref 22–31)
CREAT SERPL-MCNC: 0.56 MG/DL — SIGNIFICANT CHANGE UP (ref 0.5–1.3)
CREAT SERPL-MCNC: 0.56 MG/DL — SIGNIFICANT CHANGE UP (ref 0.5–1.3)
CULTURE RESULTS: NO GROWTH — SIGNIFICANT CHANGE UP
CULTURE RESULTS: NO GROWTH — SIGNIFICANT CHANGE UP
CULTURE RESULTS: SIGNIFICANT CHANGE UP
CULTURE RESULTS: SIGNIFICANT CHANGE UP
EBV EA AB SER IA-ACNC: 9.5 U/ML — HIGH
EBV EA AB SER IA-ACNC: 9.5 U/ML — HIGH
EBV EA AB TITR SER IF: POSITIVE
EBV EA AB TITR SER IF: POSITIVE
EBV EA IGG SER-ACNC: ABNORMAL
EBV EA IGG SER-ACNC: ABNORMAL
EBV NA IGG SER IA-ACNC: 348 U/ML — HIGH
EBV NA IGG SER IA-ACNC: 348 U/ML — HIGH
EBV PATRN SPEC IB-IMP: SIGNIFICANT CHANGE UP
EBV PATRN SPEC IB-IMP: SIGNIFICANT CHANGE UP
EBV VCA IGG AVIDITY SER QL IA: POSITIVE
EBV VCA IGG AVIDITY SER QL IA: POSITIVE
EBV VCA IGM SER IA-ACNC: 12.7 U/ML — SIGNIFICANT CHANGE UP
EBV VCA IGM SER IA-ACNC: 12.7 U/ML — SIGNIFICANT CHANGE UP
EBV VCA IGM SER IA-ACNC: >750 U/ML — HIGH
EBV VCA IGM SER IA-ACNC: >750 U/ML — HIGH
EBV VCA IGM TITR FLD: NEGATIVE — SIGNIFICANT CHANGE UP
EBV VCA IGM TITR FLD: NEGATIVE — SIGNIFICANT CHANGE UP
EGFR: 137 ML/MIN/1.73M2 — SIGNIFICANT CHANGE UP
EGFR: 137 ML/MIN/1.73M2 — SIGNIFICANT CHANGE UP
GAMMA INTERFERON BACKGROUND BLD IA-ACNC: 0.03 IU/ML — SIGNIFICANT CHANGE UP
GAMMA INTERFERON BACKGROUND BLD IA-ACNC: 0.03 IU/ML — SIGNIFICANT CHANGE UP
GBM IGG SER-ACNC: <0.2 — SIGNIFICANT CHANGE UP (ref 0–0.9)
GBM IGG SER-ACNC: <0.2 — SIGNIFICANT CHANGE UP (ref 0–0.9)
GLUCOSE SERPL-MCNC: 191 MG/DL — HIGH (ref 70–99)
GLUCOSE SERPL-MCNC: 191 MG/DL — HIGH (ref 70–99)
HCT VFR BLD CALC: 26.4 % — LOW (ref 39–50)
HGB BLD-MCNC: 9 G/DL — LOW (ref 13–17)
HGB BLD-MCNC: 9 G/DL — LOW (ref 13–17)
HGB BLD-MCNC: 9.2 G/DL — LOW (ref 13–17)
HGB BLD-MCNC: 9.2 G/DL — LOW (ref 13–17)
LYME IGG LINE BLOT INTERP.: NEGATIVE — SIGNIFICANT CHANGE UP
LYME IGG LINE BLOT INTERP.: NEGATIVE — SIGNIFICANT CHANGE UP
LYME IGM LINE BLOT INTERP.: NEGATIVE — SIGNIFICANT CHANGE UP
LYME IGM LINE BLOT INTERP.: NEGATIVE — SIGNIFICANT CHANGE UP
M TB IFN-G BLD-IMP: ABNORMAL
M TB IFN-G BLD-IMP: ABNORMAL
M TB IFN-G CD4+ BCKGRND COR BLD-ACNC: 0 IU/ML — SIGNIFICANT CHANGE UP
M TB IFN-G CD4+ BCKGRND COR BLD-ACNC: 0 IU/ML — SIGNIFICANT CHANGE UP
M TB IFN-G CD4+CD8+ BCKGRND COR BLD-ACNC: 0.01 IU/ML — SIGNIFICANT CHANGE UP
M TB IFN-G CD4+CD8+ BCKGRND COR BLD-ACNC: 0.01 IU/ML — SIGNIFICANT CHANGE UP
MAGNESIUM SERPL-MCNC: 2.4 MG/DL — SIGNIFICANT CHANGE UP (ref 1.6–2.6)
MAGNESIUM SERPL-MCNC: 2.4 MG/DL — SIGNIFICANT CHANGE UP (ref 1.6–2.6)
MCHC RBC-ENTMCNC: 30.3 PG — SIGNIFICANT CHANGE UP (ref 27–34)
MCHC RBC-ENTMCNC: 30.3 PG — SIGNIFICANT CHANGE UP (ref 27–34)
MCHC RBC-ENTMCNC: 31 PG — SIGNIFICANT CHANGE UP (ref 27–34)
MCHC RBC-ENTMCNC: 31 PG — SIGNIFICANT CHANGE UP (ref 27–34)
MCHC RBC-ENTMCNC: 34.1 GM/DL — SIGNIFICANT CHANGE UP (ref 32–36)
MCHC RBC-ENTMCNC: 34.1 GM/DL — SIGNIFICANT CHANGE UP (ref 32–36)
MCHC RBC-ENTMCNC: 34.8 GM/DL — SIGNIFICANT CHANGE UP (ref 32–36)
MCHC RBC-ENTMCNC: 34.8 GM/DL — SIGNIFICANT CHANGE UP (ref 32–36)
MCV RBC AUTO: 88.9 FL — SIGNIFICANT CHANGE UP (ref 80–100)
MYELOPEROXIDASE AB SER-ACNC: <5 UNITS — SIGNIFICANT CHANGE UP
MYELOPEROXIDASE AB SER-ACNC: <5 UNITS — SIGNIFICANT CHANGE UP
MYELOPEROXIDASE CELLS FLD QL: NEGATIVE — SIGNIFICANT CHANGE UP
MYELOPEROXIDASE CELLS FLD QL: NEGATIVE — SIGNIFICANT CHANGE UP
NRBC # BLD: 0 /100 WBCS — SIGNIFICANT CHANGE UP (ref 0–0)
NRBC # FLD: 0 K/UL — SIGNIFICANT CHANGE UP (ref 0–0)
P18 AB. IGG: SIGNIFICANT CHANGE UP
P18 AB. IGG: SIGNIFICANT CHANGE UP
P23 AB. IGG: SIGNIFICANT CHANGE UP
P23 AB. IGG: SIGNIFICANT CHANGE UP
P23 AB. IGM: SIGNIFICANT CHANGE UP
P23 AB. IGM: SIGNIFICANT CHANGE UP
P28 AB. IGG: SIGNIFICANT CHANGE UP
P28 AB. IGG: SIGNIFICANT CHANGE UP
P30 AB. IGG: SIGNIFICANT CHANGE UP
P30 AB. IGG: SIGNIFICANT CHANGE UP
P39 AB. IGG: SIGNIFICANT CHANGE UP
P39 AB. IGG: SIGNIFICANT CHANGE UP
P39 AB. IGM: SIGNIFICANT CHANGE UP
P39 AB. IGM: SIGNIFICANT CHANGE UP
P41 AB. IGG: SIGNIFICANT CHANGE UP
P41 AB. IGG: SIGNIFICANT CHANGE UP
P41 AB. IGM: SIGNIFICANT CHANGE UP
P41 AB. IGM: SIGNIFICANT CHANGE UP
P45 AB. IGG: SIGNIFICANT CHANGE UP
P45 AB. IGG: SIGNIFICANT CHANGE UP
P58 AB. IGG: SIGNIFICANT CHANGE UP
P58 AB. IGG: SIGNIFICANT CHANGE UP
P66 AB. IGG: SIGNIFICANT CHANGE UP
P66 AB. IGG: SIGNIFICANT CHANGE UP
P93 AB. IGG: SIGNIFICANT CHANGE UP
P93 AB. IGG: SIGNIFICANT CHANGE UP
PHOSPHATE SERPL-MCNC: 2.5 MG/DL — SIGNIFICANT CHANGE UP (ref 2.5–4.5)
PHOSPHATE SERPL-MCNC: 2.5 MG/DL — SIGNIFICANT CHANGE UP (ref 2.5–4.5)
PLATELET # BLD AUTO: 301 K/UL — SIGNIFICANT CHANGE UP (ref 150–400)
PLATELET # BLD AUTO: 301 K/UL — SIGNIFICANT CHANGE UP (ref 150–400)
PLATELET # BLD AUTO: 302 K/UL — SIGNIFICANT CHANGE UP (ref 150–400)
PLATELET # BLD AUTO: 302 K/UL — SIGNIFICANT CHANGE UP (ref 150–400)
POTASSIUM SERPL-MCNC: 4.1 MMOL/L — SIGNIFICANT CHANGE UP (ref 3.5–5.3)
POTASSIUM SERPL-MCNC: 4.1 MMOL/L — SIGNIFICANT CHANGE UP (ref 3.5–5.3)
POTASSIUM SERPL-SCNC: 4.1 MMOL/L — SIGNIFICANT CHANGE UP (ref 3.5–5.3)
POTASSIUM SERPL-SCNC: 4.1 MMOL/L — SIGNIFICANT CHANGE UP (ref 3.5–5.3)
PROT SERPL-MCNC: 6.9 G/DL — SIGNIFICANT CHANGE UP (ref 6–8.3)
PROT SERPL-MCNC: 6.9 G/DL — SIGNIFICANT CHANGE UP (ref 6–8.3)
PROTEINASE3 AB FLD-ACNC: 29.8 UNITS — HIGH
PROTEINASE3 AB FLD-ACNC: 29.8 UNITS — HIGH
PROTEINASE3 AB SER-ACNC: ABNORMAL
PROTEINASE3 AB SER-ACNC: ABNORMAL
QUANT TB PLUS MITOGEN MINUS NIL: 0.02 IU/ML — SIGNIFICANT CHANGE UP
QUANT TB PLUS MITOGEN MINUS NIL: 0.02 IU/ML — SIGNIFICANT CHANGE UP
RBC # BLD: 2.97 M/UL — LOW (ref 4.2–5.8)
RBC # FLD: 14.2 % — SIGNIFICANT CHANGE UP (ref 10.3–14.5)
RBC # FLD: 14.2 % — SIGNIFICANT CHANGE UP (ref 10.3–14.5)
RBC # FLD: 14.3 % — SIGNIFICANT CHANGE UP (ref 10.3–14.5)
RBC # FLD: 14.3 % — SIGNIFICANT CHANGE UP (ref 10.3–14.5)
SODIUM SERPL-SCNC: 131 MMOL/L — LOW (ref 135–145)
SODIUM SERPL-SCNC: 131 MMOL/L — LOW (ref 135–145)
SPECIMEN SOURCE: SIGNIFICANT CHANGE UP
WBC # BLD: 17.5 K/UL — HIGH (ref 3.8–10.5)
WBC # BLD: 17.5 K/UL — HIGH (ref 3.8–10.5)
WBC # BLD: 17.66 K/UL — HIGH (ref 3.8–10.5)
WBC # BLD: 17.66 K/UL — HIGH (ref 3.8–10.5)
WBC # FLD AUTO: 17.5 K/UL — HIGH (ref 3.8–10.5)
WBC # FLD AUTO: 17.5 K/UL — HIGH (ref 3.8–10.5)
WBC # FLD AUTO: 17.66 K/UL — HIGH (ref 3.8–10.5)
WBC # FLD AUTO: 17.66 K/UL — HIGH (ref 3.8–10.5)

## 2023-12-29 PROCEDURE — 99233 SBSQ HOSP IP/OBS HIGH 50: CPT

## 2023-12-29 PROCEDURE — 99232 SBSQ HOSP IP/OBS MODERATE 35: CPT | Mod: 57

## 2023-12-29 PROCEDURE — 99233 SBSQ HOSP IP/OBS HIGH 50: CPT | Mod: GC

## 2023-12-29 PROCEDURE — 71045 X-RAY EXAM CHEST 1 VIEW: CPT | Mod: 26

## 2023-12-29 PROCEDURE — 99232 SBSQ HOSP IP/OBS MODERATE 35: CPT | Mod: GC

## 2023-12-29 RX ORDER — PANTOPRAZOLE SODIUM 20 MG/1
40 TABLET, DELAYED RELEASE ORAL
Refills: 0 | Status: DISCONTINUED | OUTPATIENT
Start: 2023-12-29 | End: 2024-01-29

## 2023-12-29 RX ADMIN — LIDOCAINE 1 PATCH: 4 CREAM TOPICAL at 14:04

## 2023-12-29 RX ADMIN — HEPARIN SODIUM 1600 UNIT(S)/HR: 5000 INJECTION INTRAVENOUS; SUBCUTANEOUS at 19:33

## 2023-12-29 RX ADMIN — SODIUM CHLORIDE 1 GRAM(S): 9 INJECTION INTRAMUSCULAR; INTRAVENOUS; SUBCUTANEOUS at 14:05

## 2023-12-29 RX ADMIN — SODIUM CHLORIDE 1 GRAM(S): 9 INJECTION INTRAMUSCULAR; INTRAVENOUS; SUBCUTANEOUS at 21:55

## 2023-12-29 RX ADMIN — OXYCODONE HYDROCHLORIDE 5 MILLIGRAM(S): 5 TABLET ORAL at 21:20

## 2023-12-29 RX ADMIN — CHLORHEXIDINE GLUCONATE 1 APPLICATION(S): 213 SOLUTION TOPICAL at 14:03

## 2023-12-29 RX ADMIN — Medication 200 MILLIGRAM(S): at 05:19

## 2023-12-29 RX ADMIN — HEPARIN SODIUM 1600 UNIT(S)/HR: 5000 INJECTION INTRAVENOUS; SUBCUTANEOUS at 19:31

## 2023-12-29 RX ADMIN — Medication 200 MILLIGRAM(S): at 18:05

## 2023-12-29 RX ADMIN — HEPARIN SODIUM 1500 UNIT(S)/HR: 5000 INJECTION INTRAVENOUS; SUBCUTANEOUS at 11:11

## 2023-12-29 RX ADMIN — Medication 2 DROP(S): at 05:23

## 2023-12-29 RX ADMIN — Medication 2 DROP(S): at 18:06

## 2023-12-29 RX ADMIN — LIDOCAINE 1 PATCH: 4 CREAM TOPICAL at 19:25

## 2023-12-29 RX ADMIN — OXYCODONE HYDROCHLORIDE 5 MILLIGRAM(S): 5 TABLET ORAL at 20:20

## 2023-12-29 RX ADMIN — Medication 3 MILLIGRAM(S): at 21:59

## 2023-12-29 RX ADMIN — CEFEPIME 100 MILLIGRAM(S): 1 INJECTION, POWDER, FOR SOLUTION INTRAMUSCULAR; INTRAVENOUS at 21:53

## 2023-12-29 RX ADMIN — SODIUM CHLORIDE 1 GRAM(S): 9 INJECTION INTRAMUSCULAR; INTRAVENOUS; SUBCUTANEOUS at 05:20

## 2023-12-29 RX ADMIN — LIDOCAINE 1 PATCH: 4 CREAM TOPICAL at 01:36

## 2023-12-29 RX ADMIN — OXYCODONE HYDROCHLORIDE 5 MILLIGRAM(S): 5 TABLET ORAL at 10:21

## 2023-12-29 RX ADMIN — Medication 60 MILLIGRAM(S): at 05:23

## 2023-12-29 RX ADMIN — HEPARIN SODIUM 1400 UNIT(S)/HR: 5000 INJECTION INTRAVENOUS; SUBCUTANEOUS at 04:33

## 2023-12-29 RX ADMIN — CEFEPIME 100 MILLIGRAM(S): 1 INJECTION, POWDER, FOR SOLUTION INTRAMUSCULAR; INTRAVENOUS at 05:24

## 2023-12-29 RX ADMIN — CEFEPIME 100 MILLIGRAM(S): 1 INJECTION, POWDER, FOR SOLUTION INTRAMUSCULAR; INTRAVENOUS at 14:00

## 2023-12-29 RX ADMIN — HEPARIN SODIUM 2500 UNIT(S): 5000 INJECTION INTRAVENOUS; SUBCUTANEOUS at 11:14

## 2023-12-29 RX ADMIN — HEPARIN SODIUM 2500 UNIT(S): 5000 INJECTION INTRAVENOUS; SUBCUTANEOUS at 20:22

## 2023-12-29 NOTE — PROGRESS NOTE ADULT - SUBJECTIVE AND OBJECTIVE BOX
Name of Patient : TAYLA MARIE  MRN: [id number]  Date of visit: 12-29-23     Subjective: Patient seen and examined. No new events except as noted.   has chest pain at the site of chest tube     REVIEW OF SYSTEMS:    CONSTITUTIONAL: + weakness, fever  EYES/ENT: No visual changes;  No vertigo or throat pain   NECK: No pain or stiffness  RESPIRATORY: No cough, wheezing, hemoptysis; No shortness of breath  CARDIOVASCULAR: No chest pain or palpitations  GASTROINTESTINAL: chest pain at the ct site   GENITOURINARY: No dysuria, frequency or hematuria  NEUROLOGICAL: No numbness or weakness  SKIN: No itching, burning, rashes, or lesions   All other review of systems is negative unless indicated above.    MEDICATIONS:  MEDICATIONS  (STANDING):  cefepime   IVPB 2000 milliGRAM(s) IV Intermittent every 8 hours  chlorhexidine 2% Cloths 1 Application(s) Topical daily  ciprofloxacin  0.3% Ophthalmic Solution for Otic Use 2 Drop(s) Both Ears two times a day  heparin  Infusion. 1300 Unit(s)/Hr (13 mL/Hr) IV Continuous <Continuous>  hydroxychloroquine 200 milliGRAM(s) Oral two times a day  lidocaine   4% Patch 1 Patch Transdermal every 24 hours  melatonin 3 milliGRAM(s) Oral <User Schedule>  methylPREDNISolone sodium succinate Injectable 60 milliGRAM(s) IV Push daily  pantoprazole    Tablet 40 milliGRAM(s) Oral before breakfast  sodium chloride 1 Gram(s) Oral three times a day  sodium chloride 3%. 500 milliLiter(s) (30 mL/Hr) IV Continuous <Continuous>      PHYSICAL EXAM:  T(C): 36.4 (12-29-23 @ 05:15), Max: 36.6 (12-28-23 @ 20:00)  HR: 71 (12-29-23 @ 05:15) (71 - 87)  BP: 126/81 (12-29-23 @ 05:15) (126/81 - 136/85)  RR: 18 (12-29-23 @ 05:15) (18 - 24)  SpO2: 97% (12-29-23 @ 05:15) (97% - 99%)  Wt(kg): --  I&O's Summary    28 Dec 2023 07:01  -  29 Dec 2023 07:00  --------------------------------------------------------  IN: 1030 mL / OUT: 1240 mL / NET: -210 mL          Appearance: Normal	  HEENT:  PERRLA   Lymphatic: No lymphadenopathy   Cardiovascular: Normal S1 S2, no JVD  Respiratory: normal effort , chets tube  Gastrointestinal:  Soft, Non-tender  Skin: No rashes,  warm to touch  Psychiatry:  Mood & affect appropriate  Musculuskeletal: No edema    recent labs, Imaging and EKGs personally reviewed     12-28-23 @ 07:01  -  12-29-23 @ 07:00  --------------------------------------------------------  IN: 1030 mL / OUT: 1240 mL / NET: -210 mL                          9.0    17.50 )-----------( 301      ( 29 Dec 2023 07:11 )             26.4               12-29    131<L>  |  98  |  11  ----------------------------<  191<H>  4.1   |  25  |  0.56    Ca    8.6      29 Dec 2023 10:54  Phos  2.5     12-29  Mg     2.40     12-29    TPro  6.9  /  Alb  2.8<L>  /  TBili  0.6  /  DBili  x   /  AST  82<H>  /  ALT  97<H>  /  AlkPhos  80  12-29    PT/INR - ( 28 Dec 2023 00:45 )   PT: 16.3 sec;   INR: 1.46 ratio         PTT - ( 29 Dec 2023 07:11 )  PTT:56.5 sec                   Urinalysis Basic - ( 29 Dec 2023 10:54 )    Color: x / Appearance: x / SG: x / pH: x  Gluc: 191 mg/dL / Ketone: x  / Bili: x / Urobili: x   Blood: x / Protein: x / Nitrite: x   Leuk Esterase: x / RBC: x / WBC x   Sq Epi: x / Non Sq Epi: x / Bacteria: x               Name of Patient : TAYLA MARIE  MRN: [id number]  Date of visit: 12-29-23     Subjective: Patient seen and examined. No new events except as noted.   has chest pain at the site of chest tube     REVIEW OF SYSTEMS:    CONSTITUTIONAL: + weakness, fever  EYES/ENT: No visual changes;  No vertigo or throat pain   NECK: No pain or stiffness  RESPIRATORY: No cough, wheezing, hemoptysis; No shortness of breath  CARDIOVASCULAR: No chest pain or palpitations  GASTROINTESTINAL: chest pain at the ct site   GENITOURINARY: No dysuria, frequency or hematuria  NEUROLOGICAL: No numbness or weakness  SKIN: No itching, burning, rashes, or lesions   All other review of systems is negative unless indicated above.    MEDICATIONS:  MEDICATIONS  (STANDING):  cefepime   IVPB 2000 milliGRAM(s) IV Intermittent every 8 hours  chlorhexidine 2% Cloths 1 Application(s) Topical daily  ciprofloxacin  0.3% Ophthalmic Solution for Otic Use 2 Drop(s) Both Ears two times a day  heparin  Infusion. 1300 Unit(s)/Hr (13 mL/Hr) IV Continuous <Continuous>  hydroxychloroquine 200 milliGRAM(s) Oral two times a day  lidocaine   4% Patch 1 Patch Transdermal every 24 hours  melatonin 3 milliGRAM(s) Oral <User Schedule>  methylPREDNISolone sodium succinate Injectable 60 milliGRAM(s) IV Push daily  pantoprazole    Tablet 40 milliGRAM(s) Oral before breakfast  sodium chloride 1 Gram(s) Oral three times a day  sodium chloride 3%. 500 milliLiter(s) (30 mL/Hr) IV Continuous <Continuous>      PHYSICAL EXAM:  T(C): 36.4 (12-29-23 @ 05:15), Max: 36.6 (12-28-23 @ 20:00)  HR: 71 (12-29-23 @ 05:15) (71 - 87)  BP: 126/81 (12-29-23 @ 05:15) (126/81 - 136/85)  RR: 18 (12-29-23 @ 05:15) (18 - 24)  SpO2: 97% (12-29-23 @ 05:15) (97% - 99%)  Wt(kg): --  I&O's Summary    28 Dec 2023 07:01  -  29 Dec 2023 07:00  --------------------------------------------------------  IN: 1030 mL / OUT: 1240 mL / NET: -210 mL          Appearance: Normal	  HEENT:  PERRLA   Lymphatic: No lymphadenopathy   Cardiovascular: Normal S1 S2, no JVD  Respiratory: normal effort , chets tube  Gastrointestinal:  Soft, Non-tender  Skin: No rashes,  warm to touch  Psychiatry:  Mood & affect appropriate  Musculuskeletal: No edema    recent labs, Imaging and EKGs personally reviewed     12-28-23 @ 07:01  -  12-29-23 @ 07:00  --------------------------------------------------------  IN: 1030 mL / OUT: 1240 mL / NET: -210 mL                          9.0    17.50 )-----------( 301      ( 29 Dec 2023 07:11 )             26.4               12-29    131<L>  |  98  |  11  ----------------------------<  191<H>  4.1   |  25  |  0.56    Ca    8.6      29 Dec 2023 10:54  Phos  2.5     12-29  Mg     2.40     12-29    TPro  6.9  /  Alb  2.8<L>  /  TBili  0.6  /  DBili  x   /  AST  82<H>  /  ALT  97<H>  /  AlkPhos  80  12-29    PT/INR - ( 28 Dec 2023 00:45 )   PT: 16.3 sec;   INR: 1.46 ratio         PTT - ( 29 Dec 2023 07:11 )  PTT:56.5 sec                   Urinalysis Basic - ( 29 Dec 2023 10:54 )    Color: x / Appearance: x / SG: x / pH: x  Gluc: 191 mg/dL / Ketone: x  / Bili: x / Urobili: x   Blood: x / Protein: x / Nitrite: x   Leuk Esterase: x / RBC: x / WBC x   Sq Epi: x / Non Sq Epi: x / Bacteria: x

## 2023-12-29 NOTE — CHART NOTE - NSCHARTNOTEFT_GEN_A_CORE
Pt seen with DR. Sosa. Pt resting in bed.  Very sleepy, but arousable.   No obvious distress or SOB.   Last fever yesterday morning 10am= 101.4    Vital Signs Last 24 Hrs  T(C): 36.4 (29 Dec 2023 05:15), Max: 36.6 (28 Dec 2023 20:00)  T(F): 97.5 (29 Dec 2023 05:15), Max: 97.8 (28 Dec 2023 20:00)  HR: 71 (29 Dec 2023 05:15) (69 - 87)  BP: 126/81 (29 Dec 2023 05:15) (126/81 - 150/82)  BP(mean): 94 (28 Dec 2023 22:00) (94 - 99)  RR: 18 (29 Dec 2023 05:15) (18 - 24)  SpO2: 97% (29 Dec 2023 05:15) (97% - 99%)    Parameters below as of 29 Dec 2023 05:15  Patient On (Oxygen Delivery Method): room air    A+O x 3  Left PTC site c/d/i,  Serous output noted, non purulent.   Output 90cc/24hrs on suction, no air leak  Pleural fluid cult NGTD  CT scan of chest done 12/28- Improvement of left effusion, stable rt effusion    A/P: 27yo with Lupus, PE and bilat pleural effusions. S/p left PTC placement by [facility] team    -CT scan reviewed by DR. Sosa. Improvement in left effusion  -Pleural cultures negative to date  -No plans for surgical intervention at this time.   -Cont medical management of ongoing issues  -PTC management by Pulmonary  -Will follow as needed. Pt seen with DR. Sosa. Pt resting in bed.  Very sleepy, but arousable.   No obvious distress or SOB.   Last fever yesterday morning 10am= 101.4    Vital Signs Last 24 Hrs  T(C): 36.4 (29 Dec 2023 05:15), Max: 36.6 (28 Dec 2023 20:00)  T(F): 97.5 (29 Dec 2023 05:15), Max: 97.8 (28 Dec 2023 20:00)  HR: 71 (29 Dec 2023 05:15) (69 - 87)  BP: 126/81 (29 Dec 2023 05:15) (126/81 - 150/82)  BP(mean): 94 (28 Dec 2023 22:00) (94 - 99)  RR: 18 (29 Dec 2023 05:15) (18 - 24)  SpO2: 97% (29 Dec 2023 05:15) (97% - 99%)    Parameters below as of 29 Dec 2023 05:15  Patient On (Oxygen Delivery Method): room air    A+O x 3  Left PTC site c/d/i,  Serous output noted, non purulent.   Output 90cc/24hrs on suction, no air leak  Pleural fluid cult NGTD  CT scan of chest done 12/28- Improvement of left effusion, stable rt effusion    A/P: 29yo with Lupus, PE and bilat pleural effusions. S/p left PTC placement by [facility] team    -CT scan reviewed by DR. Sosa. Improvement in left effusion  -Pleural cultures negative to date  -No plans for surgical intervention at this time.   -Cont medical management of ongoing issues  -PTC management by Pulmonary  -Will follow as needed. Pt seen with DR. Sosa. Pt resting in bed.  Very sleepy, but arousable.   No obvious distress or SOB.   Last fever yesterday morning 10am= 101.4    Vital Signs Last 24 Hrs  T(C): 36.4 (29 Dec 2023 05:15), Max: 36.6 (28 Dec 2023 20:00)  T(F): 97.5 (29 Dec 2023 05:15), Max: 97.8 (28 Dec 2023 20:00)  HR: 71 (29 Dec 2023 05:15) (69 - 87)  BP: 126/81 (29 Dec 2023 05:15) (126/81 - 150/82)  BP(mean): 94 (28 Dec 2023 22:00) (94 - 99)  RR: 18 (29 Dec 2023 05:15) (18 - 24)  SpO2: 97% (29 Dec 2023 05:15) (97% - 99%)    Parameters below as of 29 Dec 2023 05:15  Patient On (Oxygen Delivery Method): room air    A+O x 3  Left PTC site c/d/i,  Serous output noted, non purulent.   Output 90cc/24hrs on suction, no air leak  Pleural fluid cult NGTD  CT scan of chest done 12/28- Improvement of left effusion, stable rt effusion    A/P: 27yo with Lupus, PE and bilat pleural effusions. S/p left PTC placement by [facility] team    -CT scan reviewed by DR. Sosa. Improvement in left effusion  -Pleural cultures negative to date  -No plans for surgical intervention at this time. Effusions related to lupus.   -Cont medical management of ongoing issues  -PTC management by Pulmonary  -Will follow as needed. Pt seen with DR. Sosa. Pt resting in bed.  Very sleepy, but arousable.   No obvious distress or SOB.   Last fever yesterday morning 10am= 101.4    Vital Signs Last 24 Hrs  T(C): 36.4 (29 Dec 2023 05:15), Max: 36.6 (28 Dec 2023 20:00)  T(F): 97.5 (29 Dec 2023 05:15), Max: 97.8 (28 Dec 2023 20:00)  HR: 71 (29 Dec 2023 05:15) (69 - 87)  BP: 126/81 (29 Dec 2023 05:15) (126/81 - 150/82)  BP(mean): 94 (28 Dec 2023 22:00) (94 - 99)  RR: 18 (29 Dec 2023 05:15) (18 - 24)  SpO2: 97% (29 Dec 2023 05:15) (97% - 99%)    Parameters below as of 29 Dec 2023 05:15  Patient On (Oxygen Delivery Method): room air    A+O x 3  Left PTC site c/d/i,  Serous output noted, non purulent.   Output 90cc/24hrs on suction, no air leak  Pleural fluid cult NGTD  CT scan of chest done 12/28- Improvement of left effusion, stable rt effusion    A/P: 27yo with Lupus, PE and bilat pleural effusions. S/p left PTC placement by [facility] team    -CT scan reviewed by DR. Sosa. Improvement in left effusion  -Pleural cultures negative to date  -No plans for surgical intervention at this time. Effusions related to lupus.   -Cont medical management of ongoing issues  -PTC management by Pulmonary  -Will follow as needed.      improved ct scan - effusions on left- and moderate right effusion -   drain not significant output - consider flushing pigtail.   given multiple medical issues/ and improved effusion - deferring surgical decort for time being. Pt seen with DR. Sosa. Pt resting in bed.  Very sleepy, but arousable.   No obvious distress or SOB.   Last fever yesterday morning 10am= 101.4    Vital Signs Last 24 Hrs  T(C): 36.4 (29 Dec 2023 05:15), Max: 36.6 (28 Dec 2023 20:00)  T(F): 97.5 (29 Dec 2023 05:15), Max: 97.8 (28 Dec 2023 20:00)  HR: 71 (29 Dec 2023 05:15) (69 - 87)  BP: 126/81 (29 Dec 2023 05:15) (126/81 - 150/82)  BP(mean): 94 (28 Dec 2023 22:00) (94 - 99)  RR: 18 (29 Dec 2023 05:15) (18 - 24)  SpO2: 97% (29 Dec 2023 05:15) (97% - 99%)    Parameters below as of 29 Dec 2023 05:15  Patient On (Oxygen Delivery Method): room air    A+O x 3  Left PTC site c/d/i,  Serous output noted, non purulent.   Output 90cc/24hrs on suction, no air leak  Pleural fluid cult NGTD  CT scan of chest done 12/28- Improvement of left effusion, stable rt effusion    A/P: 29yo with Lupus, PE and bilat pleural effusions. S/p left PTC placement by [facility] team    -CT scan reviewed by DR. Sosa. Improvement in left effusion  -Pleural cultures negative to date  -No plans for surgical intervention at this time. Effusions related to lupus.   -Cont medical management of ongoing issues  -PTC management by Pulmonary  -Will follow as needed.      improved ct scan - effusions on left- and moderate right effusion -   drain not significant output - consider flushing pigtail.   given multiple medical issues/ and improved effusion - deferring surgical decort for time being.

## 2023-12-29 NOTE — PROGRESS NOTE ADULT - SUBJECTIVE AND OBJECTIVE BOX
TAYLA MARIE  [id number]    INTERVAL HPI/OVERNIGHT EVENTS: No acute events. Fevers resolved after restarting steroids. Reports feeling fine. No new rashes or joint complaints.     MEDICATIONS  (STANDING):  cefepime   IVPB 2000 milliGRAM(s) IV Intermittent every 8 hours  chlorhexidine 2% Cloths 1 Application(s) Topical daily  ciprofloxacin  0.3% Ophthalmic Solution for Otic Use 2 Drop(s) Both Ears two times a day  heparin  Infusion. 1300 Unit(s)/Hr (13 mL/Hr) IV Continuous <Continuous>  hydroxychloroquine 200 milliGRAM(s) Oral two times a day  lidocaine   4% Patch 1 Patch Transdermal every 24 hours  melatonin 3 milliGRAM(s) Oral <User Schedule>  methylPREDNISolone sodium succinate Injectable 60 milliGRAM(s) IV Push daily  pantoprazole    Tablet 40 milliGRAM(s) Oral before breakfast  sodium chloride 1 Gram(s) Oral three times a day  sodium chloride 3%. 500 milliLiter(s) (30 mL/Hr) IV Continuous <Continuous>    MEDICATIONS  (PRN):  acetaminophen     Tablet .. 650 milliGRAM(s) Oral every 6 hours PRN Temp greater or equal to 38C (100.4F), Mild Pain (1 - 3)  albuterol/ipratropium for Nebulization 3 milliLiter(s) Nebulizer every 6 hours PRN Shortness of Breath and/or Wheezing  benzocaine/menthol Lozenge 1 Lozenge Oral four times a day PRN Sore Throat  FIRST- Mouthwash  BLM 15 milliLiter(s) Swish and Spit every 4 hours PRN Mouth Care  guaiFENesin Oral Liquid (Sugar-Free) 200 milliGRAM(s) Oral every 6 hours PRN Cough  heparin   Injectable 5500 Unit(s) IV Push every 6 hours PRN For aPTT less than 40  heparin   Injectable 2500 Unit(s) IV Push every 6 hours PRN For aPTT between 40 - 57  lidocaine 2% Viscous 5 milliLiter(s) Swish and Spit three times a day PRN Mouth Care  oxyCODONE    IR 5 milliGRAM(s) Oral every 6 hours PRN Severe Pain (7 - 10)      Allergies    No Known Allergies    Intolerances        Review of Systems:   as above       Vital Signs Last 24 Hrs  T(C): 36.7 (29 Dec 2023 14:00), Max: 36.7 (29 Dec 2023 14:00)  T(F): 98 (29 Dec 2023 14:00), Max: 98 (29 Dec 2023 14:00)  HR: 78 (29 Dec 2023 14:00) (71 - 87)  BP: 135/78 (29 Dec 2023 14:00) (126/81 - 136/85)  BP(mean): 94 (28 Dec 2023 22:00) (94 - 94)  RR: 18 (29 Dec 2023 14:00) (18 - 24)  SpO2: 98% (29 Dec 2023 14:00) (97% - 99%)    Parameters below as of 29 Dec 2023 14:00  Patient On (Oxygen Delivery Method): room air        Physical Exam:  General: NAD, alert   HEENT: EOMI, +ulcer on R side of tongue, patchy hair loss on scalp   CVS: tachycardic, no murmurs   Resp: Decreased breath sounds bilaterally in lower lung fields, chest tube in place   GI: non-distended   MSK: no synovitis   Neuro: Awake, moving all extremities   Skin: hyperpigmented macules and patches on palmar digits and plantar toes     LABS:                        9.0    17.50 )-----------( 301      ( 29 Dec 2023 07:11 )             26.4     12-29    131<L>  |  98  |  11  ----------------------------<  191<H>  4.1   |  25  |  0.56    Ca    8.6      29 Dec 2023 10:54  Phos  2.5     12-29  Mg     2.40     12-29    TPro  6.9  /  Alb  2.8<L>  /  TBili  0.6  /  DBili  x   /  AST  82<H>  /  ALT  97<H>  /  AlkPhos  80  12-29    PT/INR - ( 28 Dec 2023 00:45 )   PT: 16.3 sec;   INR: 1.46 ratio         PTT - ( 29 Dec 2023 18:25 )  PTT:50.1 sec  Urinalysis Basic - ( 29 Dec 2023 10:54 )    Color: x / Appearance: x / SG: x / pH: x  Gluc: 191 mg/dL / Ketone: x  / Bili: x / Urobili: x   Blood: x / Protein: x / Nitrite: x   Leuk Esterase: x / RBC: x / WBC x   Sq Epi: x / Non Sq Epi: x / Bacteria: x       TAYLA MARIE  [id number]    INTERVAL HPI/OVERNIGHT EVENTS: No acute events. Fevers resolved after restarting steroids. Reports feeling fine. No new rashes or joint complaints.     MEDICATIONS  (STANDING):  cefepime   IVPB 2000 milliGRAM(s) IV Intermittent every 8 hours  chlorhexidine 2% Cloths 1 Application(s) Topical daily  ciprofloxacin  0.3% Ophthalmic Solution for Otic Use 2 Drop(s) Both Ears two times a day  heparin  Infusion. 1300 Unit(s)/Hr (13 mL/Hr) IV Continuous <Continuous>  hydroxychloroquine 200 milliGRAM(s) Oral two times a day  lidocaine   4% Patch 1 Patch Transdermal every 24 hours  melatonin 3 milliGRAM(s) Oral <User Schedule>  methylPREDNISolone sodium succinate Injectable 60 milliGRAM(s) IV Push daily  pantoprazole    Tablet 40 milliGRAM(s) Oral before breakfast  sodium chloride 1 Gram(s) Oral three times a day  sodium chloride 3%. 500 milliLiter(s) (30 mL/Hr) IV Continuous <Continuous>    MEDICATIONS  (PRN):  acetaminophen     Tablet .. 650 milliGRAM(s) Oral every 6 hours PRN Temp greater or equal to 38C (100.4F), Mild Pain (1 - 3)  albuterol/ipratropium for Nebulization 3 milliLiter(s) Nebulizer every 6 hours PRN Shortness of Breath and/or Wheezing  benzocaine/menthol Lozenge 1 Lozenge Oral four times a day PRN Sore Throat  FIRST- Mouthwash  BLM 15 milliLiter(s) Swish and Spit every 4 hours PRN Mouth Care  guaiFENesin Oral Liquid (Sugar-Free) 200 milliGRAM(s) Oral every 6 hours PRN Cough  heparin   Injectable 5500 Unit(s) IV Push every 6 hours PRN For aPTT less than 40  heparin   Injectable 2500 Unit(s) IV Push every 6 hours PRN For aPTT between 40 - 57  lidocaine 2% Viscous 5 milliLiter(s) Swish and Spit three times a day PRN Mouth Care  oxyCODONE    IR 5 milliGRAM(s) Oral every 6 hours PRN Severe Pain (7 - 10)      Allergies    No Known Allergies    Intolerances        Review of Systems:   as above       Vital Signs Last 24 Hrs  T(C): 36.7 (29 Dec 2023 14:00), Max: 36.7 (29 Dec 2023 14:00)  T(F): 98 (29 Dec 2023 14:00), Max: 98 (29 Dec 2023 14:00)  HR: 78 (29 Dec 2023 14:00) (71 - 87)  BP: 135/78 (29 Dec 2023 14:00) (126/81 - 136/85)  BP(mean): 94 (28 Dec 2023 22:00) (94 - 94)  RR: 18 (29 Dec 2023 14:00) (18 - 24)  SpO2: 98% (29 Dec 2023 14:00) (97% - 99%)    Parameters below as of 29 Dec 2023 14:00  Patient On (Oxygen Delivery Method): room air        Physical Exam:  General: NAD, alert   HEENT: EOMI, +ulcer on R side of tongue, patchy hair loss on scalp   CVS: tachycardic, no murmurs   Resp: Decreased breath sounds bilaterally in lower lung fields, chest tube in place   GI: non-distended   MSK: no synovitis   Neuro: Awake, moving all extremities   Skin: hyperpigmented macules and patches on palmar digits and plantar toes     LABS:                        9.0    17.50 )-----------( 301      ( 29 Dec 2023 07:11 )             26.4     12-29    131<L>  |  98  |  11  ----------------------------<  191<H>  4.1   |  25  |  0.56    Ca    8.6      29 Dec 2023 10:54  Phos  2.5     12-29  Mg     2.40     12-29    TPro  6.9  /  Alb  2.8<L>  /  TBili  0.6  /  DBili  x   /  AST  82<H>  /  ALT  97<H>  /  AlkPhos  80  12-29    PT/INR - ( 28 Dec 2023 00:45 )   PT: 16.3 sec;   INR: 1.46 ratio         PTT - ( 29 Dec 2023 18:25 )  PTT:50.1 sec  Urinalysis Basic - ( 29 Dec 2023 10:54 )    Color: x / Appearance: x / SG: x / pH: x  Gluc: 191 mg/dL / Ketone: x  / Bili: x / Urobili: x   Blood: x / Protein: x / Nitrite: x   Leuk Esterase: x / RBC: x / WBC x   Sq Epi: x / Non Sq Epi: x / Bacteria: x

## 2023-12-29 NOTE — PROGRESS NOTE ADULT - SUBJECTIVE AND OBJECTIVE BOX
CHIEF COMPLAINT:Patient is a 28y old  Male who presents with a chief complaint of fever (29 Dec 2023 12:48)      Interval Events:  chest tube draining ~100cc overnight  pt reports feeling improved with less chest pain following re-initiation of steroids    REVIEW OF SYSTEMS:  [x] All other systems negative except per HPI   [ ] Unable to assess ROS because ________    OBJECTIVE:  ICU Vital Signs Last 24 Hrs  T(C): 36.7 (29 Dec 2023 14:00), Max: 36.7 (29 Dec 2023 14:00)  T(F): 98 (29 Dec 2023 14:00), Max: 98 (29 Dec 2023 14:00)  HR: 78 (29 Dec 2023 14:00) (71 - 78)  BP: 135/78 (29 Dec 2023 14:00) (126/81 - 136/85)  BP(mean): 94 (28 Dec 2023 22:00) (94 - 94)  ABP: --  ABP(mean): --  RR: 18 (29 Dec 2023 14:00) (18 - 24)  SpO2: 98% (29 Dec 2023 14:00) (97% - 99%)    O2 Parameters below as of 29 Dec 2023 14:00  Patient On (Oxygen Delivery Method): room air              12-28 @ 07:01  -  12-29 @ 07:00  --------------------------------------------------------  IN: 1030 mL / OUT: 1240 mL / NET: -210 mL        PHYSICAL EXAM:  Gen: uncomfortable appearing  HEENT: MMM, PERRL, EOMI  Neck: No JVP elevation  CV: RRR, no m/r/g  Lung: decreased bs b/l. CT in place on Lt, no air leak  Abd: Soft, NT, ND  Ext: WWP, no CCE  Skin: No rash  Neuro: A&Ox3, no focal deficits    HOSPITAL MEDICATIONS:  MEDICATIONS  (STANDING):  cefepime   IVPB 2000 milliGRAM(s) IV Intermittent every 8 hours  chlorhexidine 2% Cloths 1 Application(s) Topical daily  ciprofloxacin  0.3% Ophthalmic Solution for Otic Use 2 Drop(s) Both Ears two times a day  heparin  Infusion. 1300 Unit(s)/Hr (13 mL/Hr) IV Continuous <Continuous>  hydroxychloroquine 200 milliGRAM(s) Oral two times a day  lidocaine   4% Patch 1 Patch Transdermal every 24 hours  melatonin 3 milliGRAM(s) Oral <User Schedule>  pantoprazole    Tablet 40 milliGRAM(s) Oral before breakfast  sodium chloride 1 Gram(s) Oral three times a day  sodium chloride 3%. 500 milliLiter(s) (30 mL/Hr) IV Continuous <Continuous>    MEDICATIONS  (PRN):  acetaminophen     Tablet .. 650 milliGRAM(s) Oral every 6 hours PRN Temp greater or equal to 38C (100.4F), Mild Pain (1 - 3)  albuterol/ipratropium for Nebulization 3 milliLiter(s) Nebulizer every 6 hours PRN Shortness of Breath and/or Wheezing  benzocaine/menthol Lozenge 1 Lozenge Oral four times a day PRN Sore Throat  FIRST- Mouthwash  BLM 15 milliLiter(s) Swish and Spit every 4 hours PRN Mouth Care  guaiFENesin Oral Liquid (Sugar-Free) 200 milliGRAM(s) Oral every 6 hours PRN Cough  heparin   Injectable 5500 Unit(s) IV Push every 6 hours PRN For aPTT less than 40  heparin   Injectable 2500 Unit(s) IV Push every 6 hours PRN For aPTT between 40 - 57  lidocaine 2% Viscous 5 milliLiter(s) Swish and Spit three times a day PRN Mouth Care  oxyCODONE    IR 5 milliGRAM(s) Oral every 6 hours PRN Severe Pain (7 - 10)      LABS:    The Labs were reviewed by me   The Radiology was reviewed by me    EKG tracing reviewed by me    12-29    131<L>  |  98  |  11  ----------------------------<  191<H>  4.1   |  25  |  0.56  12-28    128<L>  |  96<L>  |  9   ----------------------------<  169<H>  3.9   |  23  |  0.58  12-28    125<L>  |  92<L>  |  10  ----------------------------<  133<H>  4.2   |  23  |  0.73    Ca    8.6      29 Dec 2023 10:54  Ca    8.2<L>      28 Dec 2023 17:45  Ca    8.2<L>      28 Dec 2023 00:45  Phos  2.5     12-29  Mg     2.40     12-29    TPro  6.9  /  Alb  2.8<L>  /  TBili  0.6  /  DBili  x   /  AST  82<H>  /  ALT  97<H>  /  AlkPhos  80  12-29  TPro  6.6  /  Alb  2.7<L>  /  TBili  0.6  /  DBili  x   /  AST  116<H>  /  ALT  105<H>  /  AlkPhos  78  12-28  TPro  6.5  /  Alb  2.5<L>  /  TBili  0.5  /  DBili  x   /  AST  125<H>  /  ALT  99<H>  /  AlkPhos  72  12-28    Magnesium: 2.40 mg/dL (12-29-23 @ 10:54)  Magnesium: 2.20 mg/dL (12-28-23 @ 17:45)  Magnesium: 1.80 mg/dL (12-28-23 @ 00:45)  Magnesium: 1.80 mg/dL (12-27-23 @ 18:00)    Phosphorus: 2.5 mg/dL (12-29-23 @ 10:54)  Phosphorus: 2.4 mg/dL (12-28-23 @ 17:45)  Phosphorus: 1.9 mg/dL (12-28-23 @ 00:45)      PT/INR - ( 28 Dec 2023 00:45 )   PT: 16.3 sec;   INR: 1.46 ratio         PTT - ( 29 Dec 2023 18:25 )  PTT:50.1 sec              Urinalysis Basic - ( 29 Dec 2023 10:54 )    Color: x / Appearance: x / SG: x / pH: x  Gluc: 191 mg/dL / Ketone: x  / Bili: x / Urobili: x   Blood: x / Protein: x / Nitrite: x   Leuk Esterase: x / RBC: x / WBC x   Sq Epi: x / Non Sq Epi: x / Bacteria: x                              9.0    17.50 )-----------( 301      ( 29 Dec 2023 07:11 )             26.4                         8.6    13.41 )-----------( 232      ( 28 Dec 2023 00:45 )             25.0                         9.1    9.79  )-----------( 264      ( 27 Dec 2023 07:36 )             27.1     CAPILLARY BLOOD GLUCOSE        Blood Gas Source Venous: Venous (12-28-23 @ 00:45)      MICROBIOLOGY:     RADIOLOGY:  [ ] Reviewed and interpreted by me    Point of Care Ultrasound Findings:    PFT:    EKG:

## 2023-12-29 NOTE — PHYSICAL THERAPY INITIAL EVALUATION ADULT - ADDITIONAL COMMENTS
Pt mother at bedside to assist with history. Pt lives in an apartment with 13 steps to enter. Prior to admission, pt was ambulating independently and was active.   Post PT evaluation, pt left semi-supine, alarm on, call bell and remote within reach, all precautions maintained, NAD. RN aware.

## 2023-12-29 NOTE — PROGRESS NOTE ADULT - ASSESSMENT
28M with SLE who presented to [facility] with PEs, developed pleural effusions and transferred to [facility] in that context. Here, developed seizures in the setting of high grade fever and hyponatremia. Nephrology consulted for further management of hyponatremia. and LN  28M with SLE who presented to [facility] with PEs, developed pleural effusions and transferred to [facility] in that context. Here, developed seizures in the setting of high grade fever and hyponatremia. Nephrology consulted for further management of hyponatremia. and LN

## 2023-12-29 NOTE — PHYSICAL THERAPY INITIAL EVALUATION ADULT - RANGE OF MOTION EXAMINATION, REHAB EVAL
No deformity or limitation of movement bilateral upper extremity ROM was WFL (within functional limits)/bilateral lower extremity ROM was WFL (within functional limits)

## 2023-12-29 NOTE — PROGRESS NOTE ADULT - SUBJECTIVE AND OBJECTIVE BOX
[facility]  Division of Infectious Diseases  [phone]    Name: TAYLA MARIE  Age: 28y  Gender: Male  MRN: [id number]    Interval History--  Notes reviewed.     Past Medical History--  No pertinent past medical history    LE (lupus erythematosus)    Pulmonary embolism    No significant past surgical history        For details regarding the patient's social history, family history, and other miscellaneous elements, please refer the initial infectious diseases consultation and/or the admitting history and physical examination for this admission.    Allergies    No Known Allergies    Intolerances        Medications--  Antibiotics:  cefepime   IVPB 2000 milliGRAM(s) IV Intermittent every 8 hours  hydroxychloroquine 200 milliGRAM(s) Oral two times a day    Immunologic:    Other:  acetaminophen     Tablet .. PRN  albuterol/ipratropium for Nebulization PRN  benzocaine/menthol Lozenge PRN  chlorhexidine 2% Cloths  ciprofloxacin  0.3% Ophthalmic Solution for Otic Use  FIRST- Mouthwash  BLM PRN  guaiFENesin Oral Liquid (Sugar-Free) PRN  heparin   Injectable PRN  heparin   Injectable PRN  heparin  Infusion.  lidocaine   4% Patch  lidocaine 2% Viscous PRN  melatonin  methylPREDNISolone sodium succinate Injectable  oxyCODONE    IR PRN  pantoprazole    Tablet  sodium chloride  sodium chloride 3%.      Review of Systems--  A 10-point review of systems was obtained.     Pertinent positives and negatives--  Constitutional: No fevers. No Chills. No Rigors.   Cardiovascular: No chest pain. No palpitations.  Respiratory: No shortness of breath. No cough.  Gastrointestinal: No nausea or vomiting. No diarrhea or constipation.   Psychiatric: Pleasant. Appropriate affect.    Review of systems otherwise negative except as previously noted.    Physical Examination--  Vital Signs: T(F): 97.5 (12-29-23 @ 05:15), Max: 101.4 (12-28-23 @ 10:00)  HR: 71 (12-29-23 @ 05:15)  BP: 126/81 (12-29-23 @ 05:15)  RR: 18 (12-29-23 @ 05:15)  SpO2: 97% (12-29-23 @ 05:15)  Wt(kg): --  General: Nontoxic-appearing Male in no acute distress.  HEENT: AT/NC. PERRL. EOMI. Anicteric. Conjunctiva pink and moist. Oropharynx clear. Dentition fair.  Neck: Not rigid. No sense of mass.  Nodes: None palpable.  Lungs: Clear bilaterally without rales, wheezing or rhonchi  Heart: Regular rate and rhythm. No Murmur. No rub. No gallop. No palpable thrill.  Abdomen: Bowel sounds present and normoactive. Soft. Nondistended. Nontender. No sense of mass. No organomegaly.  Back: No spinal tenderness. No costovertebral angle tenderness.   Extremities: No cyanosis or clubbing. No edema.   Skin: Warm. Dry. Good turgor. No rash. No vasculitic stigmata.  Psychiatric: Appropriate affect and mood for situation.         Laboratory Studies--  CBC                        9.0    17.50 )-----------( 301      ( 29 Dec 2023 07:11 )             26.4       Chemistries  12-28    128<L>  |  96<L>  |  9   ----------------------------<  169<H>  3.9   |  23  |  0.58    Ca    8.2<L>      28 Dec 2023 17:45  Phos  2.4     12-28  Mg     2.20     12-28    TPro  6.6  /  Alb  2.7<L>  /  TBili  0.6  /  DBili  x   /  AST  116<H>  /  ALT  105<H>  /  AlkPhos  78  12-28      Culture Data    Culture - Blood (collected 27 Dec 2023 17:56)  Source: .Blood Blood-Peripheral  Preliminary Report (28 Dec 2023 21:01):    No growth at 24 hours    Culture - Blood (collected 27 Dec 2023 17:56)  Source: .Blood Blood-Peripheral  Preliminary Report (28 Dec 2023 22:02):    No growth at 24 hours    Culture - Group A Streptococcus (collected 27 Dec 2023 15:35)  Source: .Throat Throat  Final Report (28 Dec 2023 22:25):    No Streptococcus pyogenes (Group A) isolated    Culture - Sputum (collected 27 Dec 2023 13:30)  Source: .Sputum Sputum  Gram Stain (28 Dec 2023 07:53):    Few polymorphonuclear leukocytes per low power field    Few Squamous epithelial cells per low power field    No organisms seen per oil power field  Preliminary Report (28 Dec 2023 17:12):    Normal Respiratory Inge present    Culture - Fungal, Body Fluid (collected 26 Dec 2023 18:30)  Source: Pleural Fl Pleural Fluid  Preliminary Report (27 Dec 2023 07:02):    Testing in progress    Culture - Body Fluid with Gram Stain (collected 26 Dec 2023 18:30)  Source: Pleural Fl Pleural Fluid  Gram Stain (27 Dec 2023 02:43):    polymorphonuclear leukocytes seen    No organisms seen    by cytocentrifuge  Preliminary Report (27 Dec 2023 21:24):    No growth to date.    Culture - Body Fluid with Gram Stain (collected 25 Dec 2023 22:00)  Source: Pleural Fl Pleural Fluid  Gram Stain (26 Dec 2023 14:45):    No polymorphonuclear cells seen seen per low power field    No organisms seen seen per oil power field  Preliminary Report (27 Dec 2023 10:43):    No growth    Culture - Fungal, Body Fluid (collected 25 Dec 2023 21:54)  Source: Pleural Fl Pleural Fluid  Preliminary Report (27 Dec 2023 15:03):    Culture is being performed. Fungal cultures are held for 4 weeks.    Culture - Fungal, CSF (collected 25 Dec 2023 17:30)  Source: .CSF CSF  Preliminary Report (27 Dec 2023 15:03):    Culture is being performed. Fungal cultures are held for 4 weeks.    Culture - CSF with Gram Stain (collected 25 Dec 2023 17:30)  Source: .CSF CSF  Gram Stain (26 Dec 2023 00:52):    No polymorphonuclear leukocytes seen    No organisms seen    by cytocentrifuge  Preliminary Report (26 Dec 2023 16:37):    No growth to date.    Culture - Acid Fast - CSF (collected 25 Dec 2023 17:30)  Source: .CSF CSF  Preliminary Report (27 Dec 2023 15:08):    Culture is being performed.    Culture - Blood (collected 25 Dec 2023 12:55)  Source: .Blood Blood  Preliminary Report (28 Dec 2023 17:01):    No growth at 72 Hours    Culture - Blood (collected 25 Dec 2023 03:18)  Source: .Blood Blood-Venous  Preliminary Report (28 Dec 2023 11:01):    No growth at 72 Hours    Culture - Body Fluid with Gram Stain (collected 24 Dec 2023 17:18)  Source: Pleural Fl Pleural Fluid  Gram Stain (24 Dec 2023 23:39):    polymorphonuclear leukocytes seen    No organisms seen    by cytocentrifuge  Preliminary Report (25 Dec 2023 18:07):    No growth    Culture - Sputum (collected 23 Dec 2023 14:20)  Source: .Sputum Sputum  Gram Stain (23 Dec 2023 21:09):    Few polymorphonuclear leukocytes per low power field    Moderate Squamous epithelial cells per low power field    Rare Gram Negative Rods per oil power field  Final Report (25 Dec 2023 08:17):    Normal Respiratory Inge present    Culture - Urine (collected 23 Dec 2023 14:20)  Source: Clean Catch Clean Catch (Midstream)  Final Report (24 Dec 2023 19:40):    No growth    Culture - Blood (collected 23 Dec 2023 11:36)  Source: .Blood Blood-Peripheral  Final Report (28 Dec 2023 15:01):    No growth at 5 days             [facility]  Division of Infectious Diseases  [phone]    Name: TAYLA MARIE  Age: 28y  Gender: Male  MRN: [id number]    Interval History--  Notes reviewed.     Past Medical History--  No pertinent past medical history    LE (lupus erythematosus)    Pulmonary embolism    No significant past surgical history        For details regarding the patient's social history, family history, and other miscellaneous elements, please refer the initial infectious diseases consultation and/or the admitting history and physical examination for this admission.    Allergies    No Known Allergies    Intolerances        Medications--  Antibiotics:  cefepime   IVPB 2000 milliGRAM(s) IV Intermittent every 8 hours  hydroxychloroquine 200 milliGRAM(s) Oral two times a day    Immunologic:    Other:  acetaminophen     Tablet .. PRN  albuterol/ipratropium for Nebulization PRN  benzocaine/menthol Lozenge PRN  chlorhexidine 2% Cloths  ciprofloxacin  0.3% Ophthalmic Solution for Otic Use  FIRST- Mouthwash  BLM PRN  guaiFENesin Oral Liquid (Sugar-Free) PRN  heparin   Injectable PRN  heparin   Injectable PRN  heparin  Infusion.  lidocaine   4% Patch  lidocaine 2% Viscous PRN  melatonin  methylPREDNISolone sodium succinate Injectable  oxyCODONE    IR PRN  pantoprazole    Tablet  sodium chloride  sodium chloride 3%.      Review of Systems--  A 10-point review of systems was obtained.     Pertinent positives and negatives--  Constitutional: No fevers. No Chills. No Rigors.   Cardiovascular: No chest pain. No palpitations.  Respiratory: No shortness of breath. No cough.  Gastrointestinal: No nausea or vomiting. No diarrhea or constipation.   Psychiatric: Pleasant. Appropriate affect.    Review of systems otherwise negative except as previously noted.    Physical Examination--  Vital Signs: T(F): 97.5 (12-29-23 @ 05:15), Max: 101.4 (12-28-23 @ 10:00)  HR: 71 (12-29-23 @ 05:15)  BP: 126/81 (12-29-23 @ 05:15)  RR: 18 (12-29-23 @ 05:15)  SpO2: 97% (12-29-23 @ 05:15)  Wt(kg): --  General: Nontoxic-appearing Male in no acute distress.  HEENT: AT/NC. PERRL. EOMI. Anicteric. Conjunctiva pink and moist. Oropharynx clear. Dentition fair.  Neck: Not rigid. No sense of mass.  Nodes: None palpable.  Lungs: Clear bilaterally without rales, wheezing or rhonchi  Heart: Regular rate and rhythm. No Murmur. No rub. No gallop. No palpable thrill.  Abdomen: Bowel sounds present and normoactive. Soft. Nondistended. Nontender. No sense of mass. No organomegaly.  Back: No spinal tenderness. No costovertebral angle tenderness.   Extremities: No cyanosis or clubbing. No edema.   Skin: Warm. Dry. Good turgor. No rash. No vasculitic stigmata.  Psychiatric: Appropriate affect and mood for situation.         Laboratory Studies--  CBC                        9.0    17.50 )-----------( 301      ( 29 Dec 2023 07:11 )             26.4       Chemistries  12-28    128<L>  |  96<L>  |  9   ----------------------------<  169<H>  3.9   |  23  |  0.58    Ca    8.2<L>      28 Dec 2023 17:45  Phos  2.4     12-28  Mg     2.20     12-28    TPro  6.6  /  Alb  2.7<L>  /  TBili  0.6  /  DBili  x   /  AST  116<H>  /  ALT  105<H>  /  AlkPhos  78  12-28      Culture Data    Culture - Blood (collected 27 Dec 2023 17:56)  Source: .Blood Blood-Peripheral  Preliminary Report (28 Dec 2023 21:01):    No growth at 24 hours    Culture - Blood (collected 27 Dec 2023 17:56)  Source: .Blood Blood-Peripheral  Preliminary Report (28 Dec 2023 22:02):    No growth at 24 hours    Culture - Group A Streptococcus (collected 27 Dec 2023 15:35)  Source: .Throat Throat  Final Report (28 Dec 2023 22:25):    No Streptococcus pyogenes (Group A) isolated    Culture - Sputum (collected 27 Dec 2023 13:30)  Source: .Sputum Sputum  Gram Stain (28 Dec 2023 07:53):    Few polymorphonuclear leukocytes per low power field    Few Squamous epithelial cells per low power field    No organisms seen per oil power field  Preliminary Report (28 Dec 2023 17:12):    Normal Respiratory Inge present    Culture - Fungal, Body Fluid (collected 26 Dec 2023 18:30)  Source: Pleural Fl Pleural Fluid  Preliminary Report (27 Dec 2023 07:02):    Testing in progress    Culture - Body Fluid with Gram Stain (collected 26 Dec 2023 18:30)  Source: Pleural Fl Pleural Fluid  Gram Stain (27 Dec 2023 02:43):    polymorphonuclear leukocytes seen    No organisms seen    by cytocentrifuge  Preliminary Report (27 Dec 2023 21:24):    No growth to date.    Culture - Body Fluid with Gram Stain (collected 25 Dec 2023 22:00)  Source: Pleural Fl Pleural Fluid  Gram Stain (26 Dec 2023 14:45):    No polymorphonuclear cells seen seen per low power field    No organisms seen seen per oil power field  Preliminary Report (27 Dec 2023 10:43):    No growth    Culture - Fungal, Body Fluid (collected 25 Dec 2023 21:54)  Source: Pleural Fl Pleural Fluid  Preliminary Report (27 Dec 2023 15:03):    Culture is being performed. Fungal cultures are held for 4 weeks.    Culture - Fungal, CSF (collected 25 Dec 2023 17:30)  Source: .CSF CSF  Preliminary Report (27 Dec 2023 15:03):    Culture is being performed. Fungal cultures are held for 4 weeks.    Culture - CSF with Gram Stain (collected 25 Dec 2023 17:30)  Source: .CSF CSF  Gram Stain (26 Dec 2023 00:52):    No polymorphonuclear leukocytes seen    No organisms seen    by cytocentrifuge  Preliminary Report (26 Dec 2023 16:37):    No growth to date.    Culture - Acid Fast - CSF (collected 25 Dec 2023 17:30)  Source: .CSF CSF  Preliminary Report (27 Dec 2023 15:08):    Culture is being performed.    Culture - Blood (collected 25 Dec 2023 12:55)  Source: .Blood Blood  Preliminary Report (28 Dec 2023 17:01):    No growth at 72 Hours    Culture - Blood (collected 25 Dec 2023 03:18)  Source: .Blood Blood-Venous  Preliminary Report (28 Dec 2023 11:01):    No growth at 72 Hours    Culture - Body Fluid with Gram Stain (collected 24 Dec 2023 17:18)  Source: Pleural Fl Pleural Fluid  Gram Stain (24 Dec 2023 23:39):    polymorphonuclear leukocytes seen    No organisms seen    by cytocentrifuge  Preliminary Report (25 Dec 2023 18:07):    No growth    Culture - Sputum (collected 23 Dec 2023 14:20)  Source: .Sputum Sputum  Gram Stain (23 Dec 2023 21:09):    Few polymorphonuclear leukocytes per low power field    Moderate Squamous epithelial cells per low power field    Rare Gram Negative Rods per oil power field  Final Report (25 Dec 2023 08:17):    Normal Respiratory Inge present    Culture - Urine (collected 23 Dec 2023 14:20)  Source: Clean Catch Clean Catch (Midstream)  Final Report (24 Dec 2023 19:40):    No growth    Culture - Blood (collected 23 Dec 2023 11:36)  Source: .Blood Blood-Peripheral  Final Report (28 Dec 2023 15:01):    No growth at 5 days             [facility]  Division of Infectious Diseases  [phone]    Name: TAYLA MARIE  Age: 28y  Gender: Male  MRN: [id number]    Interval History--  Notes reviewed. Seen earlier this am. Mother at bedside. No new complaints. Pain control ok, some discomfort from CT. No SOB. No N/V/D.       Past Medical History--  No pertinent past medical history    LE (lupus erythematosus)    Pulmonary embolism    No significant past surgical history        For details regarding the patient's social history, family history, and other miscellaneous elements, please refer the initial infectious diseases consultation and/or the admitting history and physical examination for this admission.    Allergies    No Known Allergies    Intolerances        Medications--  Antibiotics:  cefepime   IVPB 2000 milliGRAM(s) IV Intermittent every 8 hours  hydroxychloroquine 200 milliGRAM(s) Oral two times a day    Immunologic:    Other:  acetaminophen     Tablet .. PRN  albuterol/ipratropium for Nebulization PRN  benzocaine/menthol Lozenge PRN  chlorhexidine 2% Cloths  ciprofloxacin  0.3% Ophthalmic Solution for Otic Use  FIRST- Mouthwash  BLM PRN  guaiFENesin Oral Liquid (Sugar-Free) PRN  heparin   Injectable PRN  heparin   Injectable PRN  heparin  Infusion.  lidocaine   4% Patch  lidocaine 2% Viscous PRN  melatonin  methylPREDNISolone sodium succinate Injectable  oxyCODONE    IR PRN  pantoprazole    Tablet  sodium chloride  sodium chloride 3%.      Review of Systems--  A 10-point review of systems was obtained.   Review of systems otherwise negative except as previously noted.    Physical Examination--  Vital Signs: T(F): 97.5 (12-29-23 @ 05:15), Max: 101.4 (12-28-23 @ 10:00)  HR: 71 (12-29-23 @ 05:15)  BP: 126/81 (12-29-23 @ 05:15)  RR: 18 (12-29-23 @ 05:15)  SpO2: 97% (12-29-23 @ 05:15)  Wt(kg): --  General: Nontoxic-appearing Male in no acute distress.  HEENT: AT/NC. Anicteric. Conjunctiva pink and moist. Oropharynx clear.  Neck: Not rigid. No sense of mass.  Nodes: None palpable.  Lungs: Diminished BS B no RWR. L CT.   Heart: Regular rate and rhythm.   Abdomen: Bowel sounds present and normoactive. Soft. Nondistended. Nontender.  Extremities: No cyanosis or clubbing. No edema.   Skin: Warm. Dry. Good turgor. No rash. No vasculitic stigmata.  Psychiatric: Appropriate affect and mood for situation.         Laboratory Studies--  CBC                        9.0    17.50 )-----------( 301      ( 29 Dec 2023 07:11 )             26.4       Chemistries  12-28    128<L>  |  96<L>  |  9   ----------------------------<  169<H>  3.9   |  23  |  0.58    Ca    8.2<L>      28 Dec 2023 17:45  Phos  2.4     12-28  Mg     2.20     12-28    AST  116 U/L (12-28-23 @ 17:45)  125 U/L (12-28-23 @ 00:45)  140 U/L (12-27-23 @ 18:00)  152 U/L (12-27-23 @ 00:31)  202 U/L (12-26-23 @ 12:10)  236 U/L (12-26-23 @ 00:50)  217 U/L (12-25-23 @ 10:10)  152 U/L (12-25-23 @ 03:15)  136 U/L (12-24-23 @ 05:38)  100 U/L (12-23-23 @ 05:50)    ALT  105 U/L (12-28-23 @ 17:45)  99 U/L (12-28-23 @ 00:45)  105 U/L (12-27-23 @ 18:00)  113 U/L (12-27-23 @ 00:31)  129 U/L (12-26-23 @ 12:10)  139 U/L (12-26-23 @ 00:50)  112 U/L (12-25-23 @ 10:10)  98 U/L (12-25-23 @ 03:15)  90 U/L (12-24-23 @ 05:38)  88 U/L (12-23-23 @ 05:50)        Culture Data    Culture - Blood (collected 27 Dec 2023 17:56)  Source: .Blood Blood-Peripheral  Preliminary Report (28 Dec 2023 21:01):    No growth at 24 hours    Culture - Blood (collected 27 Dec 2023 17:56)  Source: .Blood Blood-Peripheral  Preliminary Report (28 Dec 2023 22:02):    No growth at 24 hours    Culture - Group A Streptococcus (collected 27 Dec 2023 15:35)  Source: .Throat Throat  Final Report (28 Dec 2023 22:25):    No Streptococcus pyogenes (Group A) isolated    Culture - Sputum (collected 27 Dec 2023 13:30)  Source: .Sputum Sputum  Gram Stain (28 Dec 2023 07:53):    Few polymorphonuclear leukocytes per low power field    Few Squamous epithelial cells per low power field    No organisms seen per oil power field  Preliminary Report (28 Dec 2023 17:12):    Normal Respiratory Inge present    Culture - Fungal, Body Fluid (collected 26 Dec 2023 18:30)  Source: Pleural Fl Pleural Fluid  Preliminary Report (27 Dec 2023 07:02):    Testing in progress    Culture - Body Fluid with Gram Stain (collected 26 Dec 2023 18:30)  Source: Pleural Fl Pleural Fluid  Gram Stain (27 Dec 2023 02:43):    polymorphonuclear leukocytes seen    No organisms seen    by cytocentrifuge  Preliminary Report (27 Dec 2023 21:24):    No growth to date.    Culture - Body Fluid with Gram Stain (collected 25 Dec 2023 22:00)  Source: Pleural Fl Pleural Fluid  Gram Stain (26 Dec 2023 14:45):    No polymorphonuclear cells seen seen per low power field    No organisms seen seen per oil power field  Preliminary Report (27 Dec 2023 10:43):    No growth    Culture - Fungal, Body Fluid (collected 25 Dec 2023 21:54)  Source: Pleural Fl Pleural Fluid  Preliminary Report (27 Dec 2023 15:03):    Culture is being performed. Fungal cultures are held for 4 weeks.    Culture - Fungal, CSF (collected 25 Dec 2023 17:30)  Source: .CSF CSF  Preliminary Report (27 Dec 2023 15:03):    Culture is being performed. Fungal cultures are held for 4 weeks.    Culture - CSF with Gram Stain (collected 25 Dec 2023 17:30)  Source: .CSF CSF  Gram Stain (26 Dec 2023 00:52):    No polymorphonuclear leukocytes seen    No organisms seen    by cytocentrifuge  Preliminary Report (26 Dec 2023 16:37):    No growth to date.    Culture - Acid Fast - CSF (collected 25 Dec 2023 17:30)  Source: .CSF CSF  Preliminary Report (27 Dec 2023 15:08):    Culture is being performed.    Culture - Blood (collected 25 Dec 2023 12:55)  Source: .Blood Blood  Preliminary Report (28 Dec 2023 17:01):    No growth at 72 Hours    Culture - Blood (collected 25 Dec 2023 03:18)  Source: .Blood Blood-Venous  Preliminary Report (28 Dec 2023 11:01):    No growth at 72 Hours    Culture - Body Fluid with Gram Stain (collected 24 Dec 2023 17:18)  Source: Pleural Fl Pleural Fluid  Gram Stain (24 Dec 2023 23:39):    polymorphonuclear leukocytes seen    No organisms seen    by cytocentrifuge  Preliminary Report (25 Dec 2023 18:07):    No growth    Culture - Sputum (collected 23 Dec 2023 14:20)  Source: .Sputum Sputum  Gram Stain (23 Dec 2023 21:09):    Few polymorphonuclear leukocytes per low power field    Moderate Squamous epithelial cells per low power field    Rare Gram Negative Rods per oil power field  Final Report (25 Dec 2023 08:17):    Normal Respiratory Inge present    Culture - Urine (collected 23 Dec 2023 14:20)  Source: Clean Catch Clean Catch (Midstream)  Final Report (24 Dec 2023 19:40):    No growth    Culture - Blood (collected 23 Dec 2023 11:36)  Source: .Blood Blood-Peripheral  Final Report (28 Dec 2023 15:01):    No growth at 5 days    < from: CT Neck Soft Tissue w/ IV Cont (12.28.23 @ 14:48) >  IMPRESSION: No cervical mass. Limited evaluation of the vocal cords   secondary to adduction.  Enlarged bilateral supraclavicular lymph nodes and prominent sized   bilateral submental and posterior triangle lymph nodes which are overall   similar in appearance to the prior study.  Partially visualized right pleural effusion.  < end of copied text >    < from: CT Abdomen and Pelvis w/ IV Cont (12.28.23 @ 14:48) >  CHEST:  LUNGS AND LARGE AIRWAYS: Patent central airways. No pulmonary nodules.  PLEURA: Interval placement of the left chest tube terminating in the   lower lateral left pleural space. Interval decrease in size of the left   pleural effusion with residual small amount. Few air pockets in the left   pleural space. Stable moderate right pleural effusion.  VESSELS: Within normal limits.  HEART: Heartsize is normal. No pericardial effusion.  MEDIASTINUM AND MARY ELLEN: No lymphadenopathy.  CHEST WALL AND LOWER NECK: Mild interval decrease in sizes of bilateral   prominent axillary lymphadenopathy.    ABDOMEN AND PELVIS:  LIVER: Within normal limits.  BILE DUCTS: Normal caliber.  GALLBLADDER: Within normal limits.  SPLEEN: Within normal limits.  PANCREAS: Within normal limits.  ADRENALS: Within normal limits.  KIDNEYS/URETERS: Within normal limits.    BLADDER: Few small intravesical air pockets. Mild mural thickening of the   urinary bladder.  REPRODUCTIVE ORGANS: Prostate within normal limits.    BOWEL: No bowel obstruction. Appendix is not visualized. No evidence of   inflammation in the pericecal region.  PERITONEUM: No ascites.  VESSELS: Within normal limits.  RETROPERITONEUM/LYMPH NODES: No lymphadenopathy. Interval decrease in   sizes of the pelvic lymph nodes.  ABDOMINAL WALL: Within normal limits.  BONES: Grade I anterolisthesis of L5 on S1 with bilateral pars defect.    IMPRESSION:  1.  Interval placement of the left chest tube with interval decrease of   the left pleural effusion. There are few pockets of left pneumothorax.   Stable moderate right pleural effusion.  2.  Mild interval decrease in sizes of axillary lymphadenopathy. Interval   decrease in size of pelvic lymph nodes.  3.  Few small intravesical air pockets. Mild mural thickening of the   bladder. Correlate with recent instrumentation or urinalysis.  < end of copied text >       [facility]  Division of Infectious Diseases  [phone]    Name: TAYLA MARIE  Age: 28y  Gender: Male  MRN: [id number]    Interval History--  Notes reviewed. Seen earlier this am. Mother at bedside. No new complaints. Pain control ok, some discomfort from CT. No SOB. No N/V/D.       Past Medical History--  No pertinent past medical history    LE (lupus erythematosus)    Pulmonary embolism    No significant past surgical history        For details regarding the patient's social history, family history, and other miscellaneous elements, please refer the initial infectious diseases consultation and/or the admitting history and physical examination for this admission.    Allergies    No Known Allergies    Intolerances        Medications--  Antibiotics:  cefepime   IVPB 2000 milliGRAM(s) IV Intermittent every 8 hours  hydroxychloroquine 200 milliGRAM(s) Oral two times a day    Immunologic:    Other:  acetaminophen     Tablet .. PRN  albuterol/ipratropium for Nebulization PRN  benzocaine/menthol Lozenge PRN  chlorhexidine 2% Cloths  ciprofloxacin  0.3% Ophthalmic Solution for Otic Use  FIRST- Mouthwash  BLM PRN  guaiFENesin Oral Liquid (Sugar-Free) PRN  heparin   Injectable PRN  heparin   Injectable PRN  heparin  Infusion.  lidocaine   4% Patch  lidocaine 2% Viscous PRN  melatonin  methylPREDNISolone sodium succinate Injectable  oxyCODONE    IR PRN  pantoprazole    Tablet  sodium chloride  sodium chloride 3%.      Review of Systems--  A 10-point review of systems was obtained.   Review of systems otherwise negative except as previously noted.    Physical Examination--  Vital Signs: T(F): 97.5 (12-29-23 @ 05:15), Max: 101.4 (12-28-23 @ 10:00)  HR: 71 (12-29-23 @ 05:15)  BP: 126/81 (12-29-23 @ 05:15)  RR: 18 (12-29-23 @ 05:15)  SpO2: 97% (12-29-23 @ 05:15)  Wt(kg): --  General: Nontoxic-appearing Male in no acute distress.  HEENT: AT/NC. Anicteric. Conjunctiva pink and moist. Oropharynx clear.  Neck: Not rigid. No sense of mass.  Nodes: None palpable.  Lungs: Diminished BS B no RWR. L CT.   Heart: Regular rate and rhythm.   Abdomen: Bowel sounds present and normoactive. Soft. Nondistended. Nontender.  Extremities: No cyanosis or clubbing. No edema.   Skin: Warm. Dry. Good turgor. No rash. No vasculitic stigmata.  Psychiatric: Appropriate affect and mood for situation.         Laboratory Studies--  CBC                        9.0    17.50 )-----------( 301      ( 29 Dec 2023 07:11 )             26.4       Chemistries  12-28    128<L>  |  96<L>  |  9   ----------------------------<  169<H>  3.9   |  23  |  0.58    Ca    8.2<L>      28 Dec 2023 17:45  Phos  2.4     12-28  Mg     2.20     12-28    AST  116 U/L (12-28-23 @ 17:45)  125 U/L (12-28-23 @ 00:45)  140 U/L (12-27-23 @ 18:00)  152 U/L (12-27-23 @ 00:31)  202 U/L (12-26-23 @ 12:10)  236 U/L (12-26-23 @ 00:50)  217 U/L (12-25-23 @ 10:10)  152 U/L (12-25-23 @ 03:15)  136 U/L (12-24-23 @ 05:38)  100 U/L (12-23-23 @ 05:50)    ALT  105 U/L (12-28-23 @ 17:45)  99 U/L (12-28-23 @ 00:45)  105 U/L (12-27-23 @ 18:00)  113 U/L (12-27-23 @ 00:31)  129 U/L (12-26-23 @ 12:10)  139 U/L (12-26-23 @ 00:50)  112 U/L (12-25-23 @ 10:10)  98 U/L (12-25-23 @ 03:15)  90 U/L (12-24-23 @ 05:38)  88 U/L (12-23-23 @ 05:50)        Culture Data    Culture - Blood (collected 27 Dec 2023 17:56)  Source: .Blood Blood-Peripheral  Preliminary Report (28 Dec 2023 21:01):    No growth at 24 hours    Culture - Blood (collected 27 Dec 2023 17:56)  Source: .Blood Blood-Peripheral  Preliminary Report (28 Dec 2023 22:02):    No growth at 24 hours    Culture - Group A Streptococcus (collected 27 Dec 2023 15:35)  Source: .Throat Throat  Final Report (28 Dec 2023 22:25):    No Streptococcus pyogenes (Group A) isolated    Culture - Sputum (collected 27 Dec 2023 13:30)  Source: .Sputum Sputum  Gram Stain (28 Dec 2023 07:53):    Few polymorphonuclear leukocytes per low power field    Few Squamous epithelial cells per low power field    No organisms seen per oil power field  Preliminary Report (28 Dec 2023 17:12):    Normal Respiratory Inge present    Culture - Fungal, Body Fluid (collected 26 Dec 2023 18:30)  Source: Pleural Fl Pleural Fluid  Preliminary Report (27 Dec 2023 07:02):    Testing in progress    Culture - Body Fluid with Gram Stain (collected 26 Dec 2023 18:30)  Source: Pleural Fl Pleural Fluid  Gram Stain (27 Dec 2023 02:43):    polymorphonuclear leukocytes seen    No organisms seen    by cytocentrifuge  Preliminary Report (27 Dec 2023 21:24):    No growth to date.    Culture - Body Fluid with Gram Stain (collected 25 Dec 2023 22:00)  Source: Pleural Fl Pleural Fluid  Gram Stain (26 Dec 2023 14:45):    No polymorphonuclear cells seen seen per low power field    No organisms seen seen per oil power field  Preliminary Report (27 Dec 2023 10:43):    No growth    Culture - Fungal, Body Fluid (collected 25 Dec 2023 21:54)  Source: Pleural Fl Pleural Fluid  Preliminary Report (27 Dec 2023 15:03):    Culture is being performed. Fungal cultures are held for 4 weeks.    Culture - Fungal, CSF (collected 25 Dec 2023 17:30)  Source: .CSF CSF  Preliminary Report (27 Dec 2023 15:03):    Culture is being performed. Fungal cultures are held for 4 weeks.    Culture - CSF with Gram Stain (collected 25 Dec 2023 17:30)  Source: .CSF CSF  Gram Stain (26 Dec 2023 00:52):    No polymorphonuclear leukocytes seen    No organisms seen    by cytocentrifuge  Preliminary Report (26 Dec 2023 16:37):    No growth to date.    Culture - Acid Fast - CSF (collected 25 Dec 2023 17:30)  Source: .CSF CSF  Preliminary Report (27 Dec 2023 15:08):    Culture is being performed.    Culture - Blood (collected 25 Dec 2023 12:55)  Source: .Blood Blood  Preliminary Report (28 Dec 2023 17:01):    No growth at 72 Hours    Culture - Blood (collected 25 Dec 2023 03:18)  Source: .Blood Blood-Venous  Preliminary Report (28 Dec 2023 11:01):    No growth at 72 Hours    Culture - Body Fluid with Gram Stain (collected 24 Dec 2023 17:18)  Source: Pleural Fl Pleural Fluid  Gram Stain (24 Dec 2023 23:39):    polymorphonuclear leukocytes seen    No organisms seen    by cytocentrifuge  Preliminary Report (25 Dec 2023 18:07):    No growth    Culture - Sputum (collected 23 Dec 2023 14:20)  Source: .Sputum Sputum  Gram Stain (23 Dec 2023 21:09):    Few polymorphonuclear leukocytes per low power field    Moderate Squamous epithelial cells per low power field    Rare Gram Negative Rods per oil power field  Final Report (25 Dec 2023 08:17):    Normal Respiratory Inge present    Culture - Urine (collected 23 Dec 2023 14:20)  Source: Clean Catch Clean Catch (Midstream)  Final Report (24 Dec 2023 19:40):    No growth    Culture - Blood (collected 23 Dec 2023 11:36)  Source: .Blood Blood-Peripheral  Final Report (28 Dec 2023 15:01):    No growth at 5 days    < from: CT Neck Soft Tissue w/ IV Cont (12.28.23 @ 14:48) >  IMPRESSION: No cervical mass. Limited evaluation of the vocal cords   secondary to adduction.  Enlarged bilateral supraclavicular lymph nodes and prominent sized   bilateral submental and posterior triangle lymph nodes which are overall   similar in appearance to the prior study.  Partially visualized right pleural effusion.  < end of copied text >    < from: CT Abdomen and Pelvis w/ IV Cont (12.28.23 @ 14:48) >  CHEST:  LUNGS AND LARGE AIRWAYS: Patent central airways. No pulmonary nodules.  PLEURA: Interval placement of the left chest tube terminating in the   lower lateral left pleural space. Interval decrease in size of the left   pleural effusion with residual small amount. Few air pockets in the left   pleural space. Stable moderate right pleural effusion.  VESSELS: Within normal limits.  HEART: Heartsize is normal. No pericardial effusion.  MEDIASTINUM AND MARY ELLEN: No lymphadenopathy.  CHEST WALL AND LOWER NECK: Mild interval decrease in sizes of bilateral   prominent axillary lymphadenopathy.    ABDOMEN AND PELVIS:  LIVER: Within normal limits.  BILE DUCTS: Normal caliber.  GALLBLADDER: Within normal limits.  SPLEEN: Within normal limits.  PANCREAS: Within normal limits.  ADRENALS: Within normal limits.  KIDNEYS/URETERS: Within normal limits.    BLADDER: Few small intravesical air pockets. Mild mural thickening of the   urinary bladder.  REPRODUCTIVE ORGANS: Prostate within normal limits.    BOWEL: No bowel obstruction. Appendix is not visualized. No evidence of   inflammation in the pericecal region.  PERITONEUM: No ascites.  VESSELS: Within normal limits.  RETROPERITONEUM/LYMPH NODES: No lymphadenopathy. Interval decrease in   sizes of the pelvic lymph nodes.  ABDOMINAL WALL: Within normal limits.  BONES: Grade I anterolisthesis of L5 on S1 with bilateral pars defect.    IMPRESSION:  1.  Interval placement of the left chest tube with interval decrease of   the left pleural effusion. There are few pockets of left pneumothorax.   Stable moderate right pleural effusion.  2.  Mild interval decrease in sizes of axillary lymphadenopathy. Interval   decrease in size of pelvic lymph nodes.  3.  Few small intravesical air pockets. Mild mural thickening of the   bladder. Correlate with recent instrumentation or urinalysis.  < end of copied text >

## 2023-12-29 NOTE — PROGRESS NOTE ADULT - ASSESSMENT
28 years old male with h/o Lupus ( diagnosed in 09/2023, on Plaquenil present to [place] ED on 12/12/23  with complain of chest pain and SOB admitted for fevers, PE, pleural effusions, course c/b high fever and tonic-clonic seizure     #B/l pleural effusions  S/p Lt sided chest tube. S/p mist  exudative effusion, bloody, glucose is not low, ADA low, NGTD on cultures  - CT surgery consulted, will consider VATS procedure  - holding off on alteplase/mist for now - higher risk of bleed while on AC  - maintain CT to water seal 28 years old male with h/o Lupus ( diagnosed in 09/2023, on Plaquenil present to [place] ED on 12/12/23  with complain of chest pain and SOB admitted for fevers, PE, pleural effusions, course c/b high fever and tonic-clonic seizure     #B/l pleural effusions  S/p Lt sided chest tube. S/p mist  exudative effusion, bloody, glucose is not low, ADA low, NGTD on cultures  - CT surgery consulted, will consider VATS procedure  - holding off on alteplase/mist for now - higher risk of bleed while on AC  - maintain CT to water seal

## 2023-12-29 NOTE — BH CONSULTATION LIAISON PROGRESS NOTE - NSBHASSESSMENTFT_PSY_ALL_CORE
28 years old male with h/o Lupus ( diagnosed in 09/2023, on Plaquenil present to [place] ED on 12/12/23  with complain of chest pain and SOB admitted for fevers, PE, pleural effusions, course c/b high fever and tonic-clonic seizure, transferred to MICU for post-ictal and infectious monitoring. Psych consulted for depression. Pt had indicated a hx of depression/anxiety, had been in the  and [facility], and now is requesting to speak to someone. However he is rather medically active and compromised.   Reported being depressed for many years, no current safety concerns. C/o poor sleep due to pain    Plan:   - Continue medical stabilization as you are  - Address pain ( pain management)  - PRN for sleep: melatonin 3 mg qhs.  - No role for 1:1 at this time  - No role for initiating psychopharm measures at this time, will continue to assess.   - Dispo: no role for inpt psych; no psych barriers to discharge when medically cleared. Outpatient follow up with psychiatrist.  - Will continue to follow.   28 years old male with h/o Lupus ( diagnosed in 09/2023, on Plaquenil present to [place] ED on 12/12/23  with complain of chest pain and SOB admitted for fevers, PE, pleural effusions, course c/b high fever and tonic-clonic seizure, transferred to MICU for post-ictal and infectious monitoring. Psych consulted for depression. Pt had indicated a hx of depression/anxiety, had been in the  and was [facility] but [facility], and now is requesting to speak to someone. However he is rather medically active and compromised.   Reported being depressed for many years, no current safety concerns. C/o poor sleep due to pain    Plan:   - Continue medical stabilization as you are  - Address pain ( pain management)  - PRN for sleep: melatonin 3 mg qhs.  - No role for 1:1 at this time  - No role for initiating psychopharm measures at this time, will continue to assess.   - Dispo: no role for inpt psych; no psych barriers to discharge when medically cleared. Outpatient follow up with psychiatrist.  - Will continue to follow.   28 years old male with h/o Lupus ( diagnosed in 09/2023, on Plaquenil present to [place] ED on 12/12/23  with complain of chest pain and SOB admitted for fevers, PE, pleural effusions, course c/b high fever and tonic-clonic seizure, transferred to MICU for post-ictal and infectious monitoring. Psych consulted for depression. Pt had indicated a hx of depression/anxiety, had been in the  and was [facility] but [facility], and now is requesting to speak to someone. However he is rather medically active and compromised.   Reported being depressed for many years, no current safety concerns. C/o poor sleep due to pain    Plan:   - Continue medical stabilization as you are  - Address pain ( pain management)  - PRN for sleep: melatonin 3 mg qhs.  - No role for 1:1 at this time  - No role for initiating psychopharm measures at this time, will continue to assess.   - Dispo: no role for inpt psych; no psych barriers to discharge when medically cleared. Outpatient follow up with psychiatrist.  - Will f/u on 1/2, call [phone] before then if needed.   28 years old male with h/o Lupus ( diagnosed in 09/2023, on Plaquenil present to [place] ED on 12/12/23  with complain of chest pain and SOB admitted for fevers, PE, pleural effusions, course c/b high fever and tonic-clonic seizure, transferred to MICU for post-ictal and infectious monitoring. Psych consulted for depression. Pt had indicated a hx of depression/anxiety, had been in the  and was [facility] but [facility], and now is requesting to speak to someone. However he is rather medically active and compromised.   Reported being depressed for many years, no current safety concerns. C/o poor sleep due to pain    Plan:   - Continue medical stabilization as you are  - Address pain ( pain management)  - PRN for sleep: melatonin 3 mg qhs.  - No role for 1:1 at this time  - No role for initiating psychopharm measures at this time, will continue to assess.   - Dispo: no role for inpt psych; no psych barriers to discharge when medically cleared. Outpatient follow up with psychiatrist.  - Will f/u on 1/2, call [phone] before then if needed.

## 2023-12-29 NOTE — PROGRESS NOTE ADULT - ASSESSMENT
28 years old male with h/o Lupus (diagnosed in 09/2023 due to rash, oral ulcers, chest pain, and dyspnea, on Plaquenil) who presented to [facility] on 12/12/23 with complaints of chest pain and SOB, found to have bilateral acute PE and bilateral pleural effusions. Transferred to [facility] for CT surgery evaluation. Also with fevers now improving. Rheumatology consulted given SLE history.     # Persistent fevers likely related to autoimmune disease    # B/l axillary and supraclavicular LAD   # SLE with patchy alopecia, rash, and ulcers   # Elevated CPK   # Bilateral Acute PE with pulmonary infarcts   # B/l hemorrhagic pleural effusions, s/p L chest tube 12/26   # Proteinuria   # One episode of generalized tonic-clonic seizure on 12/25   # Normocytic anemia  # Transaminitis      - Initially fever was thought to be possibly related to SLE but did not resolve after 3 days of steroids along with worsened clinical status with new neurological symptoms, thus steroids were stopped on 12/25   - Procalcitonin elevated at 8 but broad infectious workup overall unremarkable thus far, including multiple pleural fluid analyses and LP studies   - Workup thus far with ABDIAS 1:280 speckled, dsDNA 28, Sm >8, RNP >8, SSA >8, C3 83, C4 13, U/A 300 protein and moderate blood with repeat U/A 100 protein and small blood, urine protein/Cr 1.2 and 1.1, negative APS labs, negative Janine-1 ab, negative ribosomal P, negative syphilis screen, normocytic anemia, no evidence of hemolysis, ferritin 9.2k with low iron and TIBC, IgG4 WNL, elevated IgG, transaminitis, low vitamin D 25 21, elevated vitamin D 1,25 97, ACE elevated 125, negative RF and CCP, negative ANCA, aldolase WNL, EBV serologies c/w recent infection vs. reactivation   - Repeat CT imaging without obvious infectious source, mild decrease in axillary LAD and increase in supraclavicular LAD   - Pt with clinical manifestations and specific SLE serologies though unlikely that current presentation is solely attributable to SLE. Concern for other rheumatologic disease (such as sarcoidosis, there have been reports of coexisting sarcoidosis and SLE). Underlying malignancy also needs to be ruled out   - Fevers resolved after restarting steroids on 12/28     Recommendations:   - F/u pleural cultures (including fungal) and cytology, GBM antibody, MPO/PR3, quantiferon, PS, PS/PT, myomarker panel, scleroderma antibody, centromere antibody, and RNA polymerase III   - Appreciate Dermatology evaluation of rash   - IR consulted for biopsy of lymph node but deferring. Reached out to IR and discussed with primary team the importance of doing lymph node biopsy to r/o other diseases (such as sarcoidosis and malignancy), and renal biopsy as well to r/o SLE nephritis   - S/p solumedrol 60 mg IV 12/28 and 12/29, will transition to prednisone 60 mg daily starting on 12/30  - C/w GI PPX, please start PCP PPX   - Spoke to PCP Dr. 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 Baker-Cesar Melendez MD  Rheumatology Fellow   Available on TEAMS     28 years old male with h/o Lupus (diagnosed in 09/2023 due to rash, oral ulcers, chest pain, and dyspnea, on Plaquenil) who presented to [place] ED on 12/12/23 with complaints of chest pain and SOB, found to have bilateral acute PE and bilateral pleural effusions. Transferred to [facility] for CT surgery evaluation. Also with fevers now improving. Rheumatology consulted given SLE history.     # Persistent fevers likely related to autoimmune disease    # B/l axillary and supraclavicular LAD   # SLE with patchy alopecia, rash, and ulcers   # Elevated CPK   # Bilateral Acute PE with pulmonary infarcts   # B/l hemorrhagic pleural effusions, s/p L chest tube 12/26   # Proteinuria   # One episode of generalized tonic-clonic seizure on 12/25   # Normocytic anemia  # Transaminitis      - Initially fever was thought to be possibly related to SLE but did not resolve after 3 days of steroids along with worsened clinical status with new neurological symptoms, thus steroids were stopped on 12/25   - Procalcitonin elevated at 8 but broad infectious workup overall unremarkable thus far, including multiple pleural fluid analyses and LP studies   - Workup thus far with ABDIAS 1:280 speckled, dsDNA 28, Sm >8, RNP >8, SSA >8, C3 83, C4 13, U/A 300 protein and moderate blood with repeat U/A 100 protein and small blood, urine protein/Cr 1.2 and 1.1, negative APS labs, negative Janine-1 ab, negative ribosomal P, negative syphilis screen, normocytic anemia, no evidence of hemolysis, ferritin 9.2k with low iron and TIBC, IgG4 WNL, elevated IgG, transaminitis, low vitamin D 25 21, elevated vitamin D 1,25 97, ACE elevated 125, negative RF and CCP, negative ANCA, aldolase WNL, EBV serologies c/w recent infection vs. reactivation   - Repeat CT imaging without obvious infectious source, mild decrease in axillary LAD and increase in supraclavicular LAD   - Pt with clinical manifestations and specific SLE serologies though unlikely that current presentation is solely attributable to SLE. Concern for other rheumatologic disease (such as sarcoidosis, there have been reports of coexisting sarcoidosis and SLE). Underlying malignancy also needs to be ruled out   - Fevers resolved after restarting steroids on 12/28     Recommendations:   - F/u pleural cultures (including fungal) and cytology, GBM antibody, MPO/PR3, quantiferon, PS, PS/PT, myomarker panel, scleroderma antibody, centromere antibody, and RNA polymerase III   - Appreciate Dermatology evaluation of rash   - IR consulted for biopsy of lymph node but deferring. Reached out to IR and discussed with primary team the importance of doing lymph node biopsy to r/o other diseases (such as sarcoidosis and malignancy), and renal biopsy as well to r/o SLE nephritis   - S/p solumedrol 60 mg IV 12/28 and 12/29, will transition to prednisone 60 mg daily starting on 12/30  - C/w GI PPX, please start PCP PPX   - Spoke to PCP Dr. 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 Baker-Cesar Melendez MD  Rheumatology Fellow   Available on TEAMS     28 years old male with h/o Lupus (diagnosed in 09/2023 due to rash, oral ulcers, chest pain, and dyspnea, on Plaquenil) who presented to [place] ED on 12/12/23 with complaints of chest pain and SOB, found to have bilateral acute PE and bilateral pleural effusions. Transferred to [facility] for CT surgery evaluation. Also with fevers now improving. Rheumatology consulted given SLE history.     # Persistent fevers likely related to autoimmune disease    # B/l axillary and supraclavicular LAD   # SLE with patchy alopecia, rash, and ulcers   # Elevated CPK   # Bilateral Acute PE with pulmonary infarcts   # B/l hemorrhagic pleural effusions, s/p L chest tube 12/26   # Proteinuria   # One episode of generalized tonic-clonic seizure on 12/25   # Normocytic anemia  # Transaminitis      - Initially fever was thought to be possibly related to SLE but did not resolve after 3 days of steroids along with worsened clinical status with new neurological symptoms, thus steroids were stopped on 12/25   - Procalcitonin elevated at 8 but broad infectious workup overall unremarkable thus far, including multiple pleural fluid analyses and LP studies   - Workup thus far with ABDIAS 1:280 speckled, dsDNA 28, Sm >8, RNP >8, SSA >8, C3 83, C4 13, U/A 300 protein and moderate blood with repeat U/A 100 protein and small blood, urine protein/Cr 1.2 and 1.1, negative APS labs, negative Janine-1 ab, negative ribosomal P, negative syphilis screen, normocytic anemia, no evidence of hemolysis, ferritin 9.2k with low iron and TIBC, IgG4 WNL, elevated IgG, transaminitis, low vitamin D 25 21, elevated vitamin D 1,25 97, ACE elevated 125, negative RF and CCP, negative ANCA, aldolase WNL, EBV serologies c/w recent infection vs. reactivation   - Repeat CT imaging without obvious infectious source, mild decrease in axillary LAD and increase in supraclavicular LAD   - Pt with clinical manifestations and specific SLE serologies though unclear if current presentation is solely attributable to SLE. Concern for other rheumatologic disease (such as sarcoidosis, there have been reports of coexisting sarcoidosis and SLE). Underlying malignancy also needs to be ruled out   - Fevers resolved after restarting steroids on 12/28     Recommendations:   - F/u pleural cultures (including fungal) and cytology, GBM antibody, MPO/PR3, quantiferon, PS, PS/PT, myomarker panel, scleroderma antibody, centromere antibody, and RNA polymerase III   - Appreciate Dermatology evaluation of rash   - IR consulted for biopsy of lymph node but deferring. Reached out to IR and discussed with primary team the importance of doing lymph node biopsy to r/o other diseases (such as sarcoidosis and malignancy), and renal biopsy as well to r/o SLE nephritis   - S/p solumedrol 60 mg IV 12/28 and 12/29, will transition to prednisone 60 mg daily starting on 12/30  - C/w GI PPX, please start PCP PPX   - Spoke to PCP Dr. 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 Baker-Cesar Melendez MD  Rheumatology Fellow   Available on TEAMS     28 years old male with h/o Lupus (diagnosed in 09/2023 due to rash, oral ulcers, chest pain, and dyspnea, on Plaquenil) who presented to [place] ED on 12/12/23 with complaints of chest pain and SOB, found to have bilateral acute PE and bilateral pleural effusions. Transferred to [facility] for CT surgery evaluation. Also with fevers now improving. Rheumatology consulted given SLE history.     # Persistent fevers likely related to autoimmune disease    # B/l axillary and supraclavicular LAD   # SLE with patchy alopecia, rash, and ulcers   # Elevated CPK   # Bilateral Acute PE with pulmonary infarcts   # B/l hemorrhagic pleural effusions, s/p L chest tube 12/26   # Proteinuria   # One episode of generalized tonic-clonic seizure on 12/25   # Normocytic anemia  # Transaminitis      - Initially fever was thought to be possibly related to SLE but did not resolve after 3 days of steroids along with worsened clinical status with new neurological symptoms, thus steroids were stopped on 12/25   - Procalcitonin elevated at 8 but broad infectious workup overall unremarkable thus far, including multiple pleural fluid analyses and LP studies   - Workup thus far with ABDIAS 1:280 speckled, dsDNA 28, Sm >8, RNP >8, SSA >8, C3 83, C4 13, U/A 300 protein and moderate blood with repeat U/A 100 protein and small blood, urine protein/Cr 1.2 and 1.1, negative APS labs, negative Janine-1 ab, negative ribosomal P, negative syphilis screen, normocytic anemia, no evidence of hemolysis, ferritin 9.2k with low iron and TIBC, IgG4 WNL, elevated IgG, transaminitis, low vitamin D 25 21, elevated vitamin D 1,25 97, ACE elevated 125, negative RF and CCP, negative ANCA, aldolase WNL, EBV serologies c/w recent infection vs. reactivation   - Repeat CT imaging without obvious infectious source, mild decrease in axillary LAD and increase in supraclavicular LAD   - Pt with clinical manifestations and specific SLE serologies though unclear if current presentation is solely attributable to SLE. Concern for other rheumatologic disease (such as sarcoidosis, there have been reports of coexisting sarcoidosis and SLE). Underlying malignancy also needs to be ruled out   - Fevers resolved after restarting steroids on 12/28     Recommendations:   - F/u pleural cultures (including fungal) and cytology, GBM antibody, MPO/PR3, quantiferon, PS, PS/PT, myomarker panel, scleroderma antibody, centromere antibody, and RNA polymerase III   - Appreciate Dermatology evaluation of rash   - IR consulted for biopsy of lymph node but deferring. Reached out to IR and discussed with primary team the importance of doing lymph node biopsy to r/o other diseases (such as sarcoidosis and malignancy), and renal biopsy as well to r/o SLE nephritis   - S/p solumedrol 60 mg IV 12/28 and 12/29, will transition to prednisone 60 mg daily starting on 12/30  - C/w GI PPX, please start PCP PPX   - Spoke to PCP Dr. 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 Baker-Cesar Melendez MD  Rheumatology Fellow   Available on TEAMS

## 2023-12-29 NOTE — PROGRESS NOTE ADULT - ASSESSMENT
This is a 29 y/o M new diagnosis of SLE (9/23, started on hydroxychloroquine) initially admitted to [facility] on 12/12 w/ CP and SOB, found to have RUL and LLL segmental/subsegmental PE, started on heparin, c/f superimposed PNA, on Zosyn. Pt with b/l effusions, L > R with loculations, transferred to [facility] for thoracic evaluation. Pt with persistent fevers despite Zosyn from 12/14/23.  ID now consulted for persistent fevers   Work up:  UA no pyuria  RVP negative  Strep Pneumo Ag, legionella, mycoplasma IgM negative  MRSA PCR negative  EPEC+ 12/15 - diarrhea has since resolved  Imaging:   CTA chest 12/12: Acute right upper lobe and left lower lobe segmental/subsegmental pulmonary emboli. No CT evidence of right heart strain. New bilateral lower lobe consolidations with areas of central clearing. Pneumonia and pulmonary infarcts are in the differential. New bilateral pleural effusions, small on the left and trace on the right. Bilateral axillary and supraclavicular adenopathy of unclear etiology.  TTE 12/12: grossly wnl  CXR 12/20: Large left pleural effusion and compressive atelectasis. Trace right effusion and linear atelectasis in the right midlung  CT chest 12/23: Moderate pleural effusions, loculated on the left, new since 12/12/2023. New nonspecific the very small peripheral groundglass in the right upper lobe  CT neck 12/23: Small level 1 and level 2 and level 5 lymph nodes without significant enlargement. No drainable collections    #Seizure   #Fever  #Pleural effusions  #Possible superimposed pneumonia  #Pulmonary embolism  #Lymphadenopathy  #Known SLE    Overall 29 y/o M w/ SLE on Hydroxychloroquine admitted to [facility] for b/l PE w/ superimposed PNA, transferred to [facility] on 12/22 for thoracic eval of b/l L > R effusions, L loculated. Pt was Zosyn since 12/14, started on Vancomycin here. Despite board therapy with Zosyn, pt continues to have fevers to 102. No leukocytosis.   Fevers if 2/2 PNA should have responded w/ 10 day course of Zosyn, MRSA swab was negative, Vancomycin likely not necessary.   May be 2/2 SLE flare rather than infectious process, however would continue with Zosyn for now pending R thoracentesis and studies.     S/p L thoracentesis on 12/24, nucleated cell count ~400 when corrected for RBCs, does not appear to be infected. Pt was initiated on steroids on 12/23, however did not defervesce. Pt with continued fevers to 104, had seizure yesterday, now transferred to the MICU. Pt was changed to Vancomycin/Cefepime. LP in the MICU w/o significant findings of infection. Only 6 nucleated cells, PCR negative. R sided thora done in MICU, minimal nucleated cells after correction.   Procalcitonin was rechecked, elevated to 8. No leukocytosis, despite recent steroid course. Per rheumatology, if fevers were 2/2 SLE, would have improved w/ steroid course, holding steroids at this time.     12/29: Single fever over the past 24h, cx data remains unrevealing. CT's reviewed. LAD persists, slightly improved in some areas, but unclear if steroids would have done this irrespective of etiololgy of LAD. Role of antibiotics remains unclear. Exhasutive workup has been thus far unrevealing. Patient will remain at risk for superimposed infection for the near term at minimum give underlying disease, hospitalization, invasive procedures, steroids, etc. Unfortunately procalcitonin is not as specific as one might hope. Off vancomycin. WBC elevated about the same as yesterday, upward trend perhaps though coincident with steroids.     Suggestion--  Continue cefepime, though it's role remains unclear  F/U cx data  Would favor LN bx as workup for infectious processes thus far unrevealing to exclude malignancy. TB and fungal processes should be excluded as well (low suspicion).   D/W patient's mother at bedside in detail. All questions answered to the best of my ability.     If any ID input is needed over the weekend, please call [phone]. Thanks.    Patrick Esparza MD  Attending Physician  [facility]  Division of Infectious Diseases  [phone]    This is a 27 y/o M new diagnosis of SLE (9/23, started on hydroxychloroquine) initially admitted to [facility] on 12/12 w/ CP and SOB, found to have RUL and LLL segmental/subsegmental PE, started on heparin, c/f superimposed PNA, on Zosyn. Pt with b/l effusions, L > R with loculations, transferred to [facility] for thoracic evaluation. Pt with persistent fevers despite Zosyn from 12/14/23.  ID now consulted for persistent fevers   Work up:  UA no pyuria  RVP negative  Strep Pneumo Ag, legionella, mycoplasma IgM negative  MRSA PCR negative  EPEC+ 12/15 - diarrhea has since resolved  Imaging:   CTA chest 12/12: Acute right upper lobe and left lower lobe segmental/subsegmental pulmonary emboli. No CT evidence of right heart strain. New bilateral lower lobe consolidations with areas of central clearing. Pneumonia and pulmonary infarcts are in the differential. New bilateral pleural effusions, small on the left and trace on the right. Bilateral axillary and supraclavicular adenopathy of unclear etiology.  TTE 12/12: grossly wnl  CXR 12/20: Large left pleural effusion and compressive atelectasis. Trace right effusion and linear atelectasis in the right midlung  CT chest 12/23: Moderate pleural effusions, loculated on the left, new since 12/12/2023. New nonspecific the very small peripheral groundglass in the right upper lobe  CT neck 12/23: Small level 1 and level 2 and level 5 lymph nodes without significant enlargement. No drainable collections    #Seizure   #Fever  #Pleural effusions  #Possible superimposed pneumonia  #Pulmonary embolism  #Lymphadenopathy  #Known SLE    Overall 27 y/o M w/ SLE on Hydroxychloroquine admitted to [facility] for b/l PE w/ superimposed PNA, transferred to [facility] on 12/22 for thoracic eval of b/l L > R effusions, L loculated. Pt was Zosyn since 12/14, started on Vancomycin here. Despite board therapy with Zosyn, pt continues to have fevers to 102. No leukocytosis.   Fevers if 2/2 PNA should have responded w/ 10 day course of Zosyn, MRSA swab was negative, Vancomycin likely not necessary.   May be 2/2 SLE flare rather than infectious process, however would continue with Zosyn for now pending R thoracentesis and studies.     S/p L thoracentesis on 12/24, nucleated cell count ~400 when corrected for RBCs, does not appear to be infected. Pt was initiated on steroids on 12/23, however did not defervesce. Pt with continued fevers to 104, had seizure yesterday, now transferred to the MICU. Pt was changed to Vancomycin/Cefepime. LP in the MICU w/o significant findings of infection. Only 6 nucleated cells, PCR negative. R sided thora done in MICU, minimal nucleated cells after correction.   Procalcitonin was rechecked, elevated to 8. No leukocytosis, despite recent steroid course. Per rheumatology, if fevers were 2/2 SLE, would have improved w/ steroid course, holding steroids at this time.     12/29: Single fever over the past 24h, cx data remains unrevealing. CT's reviewed. LAD persists, slightly improved in some areas, but unclear if steroids would have done this irrespective of etiololgy of LAD. Role of antibiotics remains unclear. Exhasutive workup has been thus far unrevealing. Patient will remain at risk for superimposed infection for the near term at minimum give underlying disease, hospitalization, invasive procedures, steroids, etc. Unfortunately procalcitonin is not as specific as one might hope. Off vancomycin. WBC elevated about the same as yesterday, upward trend perhaps though coincident with steroids.     Suggestion--  Continue cefepime, though it's role remains unclear  F/U cx data  Would favor LN bx as workup for infectious processes thus far unrevealing to exclude malignancy. TB and fungal processes should be excluded as well (low suspicion).   D/W patient's mother at bedside in detail. All questions answered to the best of my ability.     If any ID input is needed over the weekend, please call [phone]. Thanks.    Patrick Esparza MD  Attending Physician  [facility]  Division of Infectious Diseases  [phone]

## 2023-12-29 NOTE — PROGRESS NOTE ADULT - SUBJECTIVE AND OBJECTIVE BOX
[facility] DIVISION OF KIDNEY DISEASES AND HYPERTENSION -- FOLLOW UP NOTE  HARPER Fellow pager # [phone]  Nephrology office # [phone]  Available on [employer] teams--> Jose Lynn  -----------------------------------------------------------------------------  Chief Complaint:/subjective:  seen and examine this am. Mom at bedside         24 hour events:            ALLERGIES & MEDICATIONS  --------------------------------------------------------------------------------  Allergies    No Known Allergies    Intolerances      Standing Inpatient Medications  cefepime   IVPB 2000 milliGRAM(s) IV Intermittent every 8 hours  chlorhexidine 2% Cloths 1 Application(s) Topical daily  ciprofloxacin  0.3% Ophthalmic Solution for Otic Use 2 Drop(s) Both Ears two times a day  heparin  Infusion. 1300 Unit(s)/Hr IV Continuous <Continuous>  hydroxychloroquine 200 milliGRAM(s) Oral two times a day  lidocaine   4% Patch 1 Patch Transdermal every 24 hours  melatonin 3 milliGRAM(s) Oral <User Schedule>  methylPREDNISolone sodium succinate Injectable 60 milliGRAM(s) IV Push daily  pantoprazole    Tablet 40 milliGRAM(s) Oral before breakfast  sodium chloride 1 Gram(s) Oral three times a day  sodium chloride 3%. 500 milliLiter(s) IV Continuous <Continuous>    PRN Inpatient Medications  acetaminophen     Tablet .. 650 milliGRAM(s) Oral every 6 hours PRN  albuterol/ipratropium for Nebulization 3 milliLiter(s) Nebulizer every 6 hours PRN  benzocaine/menthol Lozenge 1 Lozenge Oral four times a day PRN  FIRST- Mouthwash  BLM 15 milliLiter(s) Swish and Spit every 4 hours PRN  guaiFENesin Oral Liquid (Sugar-Free) 200 milliGRAM(s) Oral every 6 hours PRN  heparin   Injectable 5500 Unit(s) IV Push every 6 hours PRN  heparin   Injectable 2500 Unit(s) IV Push every 6 hours PRN  lidocaine 2% Viscous 5 milliLiter(s) Swish and Spit three times a day PRN  oxyCODONE    IR 5 milliGRAM(s) Oral every 6 hours PRN        VITALS/PHYSICAL EXAM  --------------------------------------------------------------------------------  T(C): 36.4 (12-29-23 @ 05:15), Max: 36.6 (12-28-23 @ 20:00)  HR: 71 (12-29-23 @ 05:15) (69 - 87)  BP: 126/81 (12-29-23 @ 05:15) (126/81 - 152/82)  RR: 18 (12-29-23 @ 05:15) (18 - 26)  SpO2: 97% (12-29-23 @ 05:15) (97% - 99%)  Wt(kg): --        12-28-23 @ 07:01  -  12-29-23 @ 07:00  --------------------------------------------------------  IN: 1030 mL / OUT: 1240 mL / NET: -210 mL      Physical Exam:  	Gen NAD. lethargic   	HEENT: no JVD  	Pulm: CTABL  	CV: S1S2,  	Abd: Soft,   	Ext:   - edema B/L LE   	Neuro: Awake and alert  	Skin: Warm and dry               LABS/STUDIES  --------------------------------------------------------------------------------              9.0    17.50 >-----------<  301      [12-29-23 @ 07:11]              26.4     Hemoglobin: 9.0 g/dL (12-29-23 @ 07:11)  Hemoglobin: 9.2 g/dL (12-29-23 @ 07:11)    Platelet Count - Automated: 301 K/uL (12-29-23 @ 07:11)  Platelet Count - Automated: 302 K/uL (12-29-23 @ 07:11)    131  |  98  |  11  ----------------------------<  191      [12-29-23 @ 10:54]  4.1   |  25  |  0.56        Ca     8.6     [12-29-23 @ 10:54]      Mg     2.40     [12-29-23 @ 10:54]      Phos  2.5     [12-29-23 @ 10:54]    TPro  6.9  /  Alb  2.8  /  TBili  0.6  /  DBili  x   /  AST  82  /  ALT  97  /  AlkPhos  80  [12-29-23 @ 10:54]    PT/INR: PT 16.3 , INR 1.46       [12-28-23 @ 00:45]  PTT: 56.5       [12-29-23 @ 07:11]          [12-28-23 @ 00:45]    Creatinine: 0.56 mg/dL (12-29-23 @ 10:54)  Creatinine: 0.58 mg/dL (12-28-23 @ 17:45)  Creatinine: 0.73 mg/dL (12-28-23 @ 00:45)  Creatinine: 0.65 mg/dL (12-27-23 @ 18:00)  Creatinine: 0.83 mg/dL (12-27-23 @ 00:31)  Creatinine: 0.90 mg/dL (12-26-23 @ 12:10)  Creatinine: 1.04 mg/dL (12-26-23 @ 00:50)  Creatinine: 1.23 mg/dL (12-25-23 @ 10:10)  Creatinine: 1.18 mg/dL (12-25-23 @ 03:15)  Creatinine: 1.00 mg/dL (12-24-23 @ 05:38)  Creatinine: 1.02 mg/dL (12-23-23 @ 05:50)  Creatinine: 1.00 mg/dL (12-23-23 @ 00:01)  Creatinine: 1.13 mg/dL (12-22-23 @ 06:50)  Creatinine: 0.99 mg/dL (12-21-23 @ 19:00)  Creatinine: 0.96 mg/dL (12-20-23 @ 06:53)    SODIUM TREND:  Sodium 131 [12-29 @ 10:54]  Sodium 128 [12-28 @ 17:45]  Sodium 125 [12-28 @ 00:45]  Sodium 126 [12-27 @ 18:00]  Sodium 127 [12-27 @ 00:31]  Sodium 125 [12-26 @ 12:10]  Sodium 123 [12-26 @ 00:50]  Sodium 122 [12-25 @ 10:10]  Sodium 121 [12-25 @ 03:15]  Sodium 129 [12-24 @ 05:38]        SCr 0.56 [12-29 @ 10:54]  SCr 0.58 [12-28 @ 17:45]  SCr 0.73 [12-28 @ 00:45]  SCr 0.65 [12-27 @ 18:00]  SCr 0.83 [12-27 @ 00:31]    Urinalysis - [12-29-23 @ 10:54]      Color  / Appearance  / SG  / pH       Gluc 191 / Ketone   / Bili  / Urobili        Blood  / Protein  / Leuk Est  / Nitrite       RBC  / WBC  / Hyaline  / Gran  / Sq Epi  / Non Sq Epi  / Bacteria     Urine Creatinine 82      [12-27-23 @ 14:20]  Urine Protein 90      [12-27-23 @ 14:20]  Urine Sodium 86      [12-25-23 @ 12:55]  Urine Urea Nitrogen 800.3      [12-25-23 @ 12:55]    Iron 36, TIBC 195, %sat 18      [12-27-23 @ 12:18]  Ferritin 9278      [12-27-23 @ 12:18]  Vitamin D (25OH) 21.0      [12-26-23 @ 18:25]  TSH 1.970      [12-12-23 @ 13:20]    HBsAb >1000.0      [12-26-23 @ 12:10]  HBsAg Nonreact      [12-26-23 @ 12:10]  HCV 0.12, Nonreact      [12-26-23 @ 12:10]  HIV Nonreact      [12-24-23 @ 05:38]    ABDIAS: titer 1:1280, pattern Speckled      [12-14-23 @ 07:00]  dsDNA 28      [12-24-23 @ 05:38]  C3 Complement 83      [12-24-23 @ 05:38]  C4 Complement 13      [12-24-23 @ 05:38]  Rheumatoid Factor <10      [12-27-23 @ 12:18]  ANCA: cANCA Negative, pANCA Negative, atypical ANCA Indeterminate Method interference due to ABDIAS fluorescence      [12-26-23 @ 18:25]  Syphilis Screen (Treponema Pallidum Ab) Negative      [12-26-23 @ 18:25]  Free Light Chains: kappa 4.98, lambda 4.89, ratio = 1.02      [12-26 @ 18:25]     [facility] DIVISION OF KIDNEY DISEASES AND HYPERTENSION -- FOLLOW UP NOTE  HARPER Fellow pager # [phone]  Nephrology office # [phone]  Available on [employer] teams--> Jose Lynn  -----------------------------------------------------------------------------  Chief Complaint:/subjective:  seen and examine this am. Mom at bedside         24 hour events:            ALLERGIES & MEDICATIONS  --------------------------------------------------------------------------------  Allergies    No Known Allergies    Intolerances      Standing Inpatient Medications  cefepime   IVPB 2000 milliGRAM(s) IV Intermittent every 8 hours  chlorhexidine 2% Cloths 1 Application(s) Topical daily  ciprofloxacin  0.3% Ophthalmic Solution for Otic Use 2 Drop(s) Both Ears two times a day  heparin  Infusion. 1300 Unit(s)/Hr IV Continuous <Continuous>  hydroxychloroquine 200 milliGRAM(s) Oral two times a day  lidocaine   4% Patch 1 Patch Transdermal every 24 hours  melatonin 3 milliGRAM(s) Oral <User Schedule>  methylPREDNISolone sodium succinate Injectable 60 milliGRAM(s) IV Push daily  pantoprazole    Tablet 40 milliGRAM(s) Oral before breakfast  sodium chloride 1 Gram(s) Oral three times a day  sodium chloride 3%. 500 milliLiter(s) IV Continuous <Continuous>    PRN Inpatient Medications  acetaminophen     Tablet .. 650 milliGRAM(s) Oral every 6 hours PRN  albuterol/ipratropium for Nebulization 3 milliLiter(s) Nebulizer every 6 hours PRN  benzocaine/menthol Lozenge 1 Lozenge Oral four times a day PRN  FIRST- Mouthwash  BLM 15 milliLiter(s) Swish and Spit every 4 hours PRN  guaiFENesin Oral Liquid (Sugar-Free) 200 milliGRAM(s) Oral every 6 hours PRN  heparin   Injectable 5500 Unit(s) IV Push every 6 hours PRN  heparin   Injectable 2500 Unit(s) IV Push every 6 hours PRN  lidocaine 2% Viscous 5 milliLiter(s) Swish and Spit three times a day PRN  oxyCODONE    IR 5 milliGRAM(s) Oral every 6 hours PRN        VITALS/PHYSICAL EXAM  --------------------------------------------------------------------------------  T(C): 36.4 (12-29-23 @ 05:15), Max: 36.6 (12-28-23 @ 20:00)  HR: 71 (12-29-23 @ 05:15) (69 - 87)  BP: 126/81 (12-29-23 @ 05:15) (126/81 - 152/82)  RR: 18 (12-29-23 @ 05:15) (18 - 26)  SpO2: 97% (12-29-23 @ 05:15) (97% - 99%)  Wt(kg): --        12-28-23 @ 07:01  -  12-29-23 @ 07:00  --------------------------------------------------------  IN: 1030 mL / OUT: 1240 mL / NET: -210 mL      Physical Exam:  	Gen NAD. lethargic   	HEENT: no JVD  	Pulm: CTABL  	CV: S1S2,  	Abd: Soft,   	Ext:   - edema B/L LE   	Neuro: Awake and alert  	Skin: Warm and dry               LABS/STUDIES  --------------------------------------------------------------------------------              9.0    17.50 >-----------<  301      [12-29-23 @ 07:11]              26.4     Hemoglobin: 9.0 g/dL (12-29-23 @ 07:11)  Hemoglobin: 9.2 g/dL (12-29-23 @ 07:11)    Platelet Count - Automated: 301 K/uL (12-29-23 @ 07:11)  Platelet Count - Automated: 302 K/uL (12-29-23 @ 07:11)    131  |  98  |  11  ----------------------------<  191      [12-29-23 @ 10:54]  4.1   |  25  |  0.56        Ca     8.6     [12-29-23 @ 10:54]      Mg     2.40     [12-29-23 @ 10:54]      Phos  2.5     [12-29-23 @ 10:54]    TPro  6.9  /  Alb  2.8  /  TBili  0.6  /  DBili  x   /  AST  82  /  ALT  97  /  AlkPhos  80  [12-29-23 @ 10:54]    PT/INR: PT 16.3 , INR 1.46       [12-28-23 @ 00:45]  PTT: 56.5       [12-29-23 @ 07:11]          [12-28-23 @ 00:45]    Creatinine: 0.56 mg/dL (12-29-23 @ 10:54)  Creatinine: 0.58 mg/dL (12-28-23 @ 17:45)  Creatinine: 0.73 mg/dL (12-28-23 @ 00:45)  Creatinine: 0.65 mg/dL (12-27-23 @ 18:00)  Creatinine: 0.83 mg/dL (12-27-23 @ 00:31)  Creatinine: 0.90 mg/dL (12-26-23 @ 12:10)  Creatinine: 1.04 mg/dL (12-26-23 @ 00:50)  Creatinine: 1.23 mg/dL (12-25-23 @ 10:10)  Creatinine: 1.18 mg/dL (12-25-23 @ 03:15)  Creatinine: 1.00 mg/dL (12-24-23 @ 05:38)  Creatinine: 1.02 mg/dL (12-23-23 @ 05:50)  Creatinine: 1.00 mg/dL (12-23-23 @ 00:01)  Creatinine: 1.13 mg/dL (12-22-23 @ 06:50)  Creatinine: 0.99 mg/dL (12-21-23 @ 19:00)  Creatinine: 0.96 mg/dL (12-20-23 @ 06:53)    SODIUM TREND:  Sodium 131 [12-29 @ 10:54]  Sodium 128 [12-28 @ 17:45]  Sodium 125 [12-28 @ 00:45]  Sodium 126 [12-27 @ 18:00]  Sodium 127 [12-27 @ 00:31]  Sodium 125 [12-26 @ 12:10]  Sodium 123 [12-26 @ 00:50]  Sodium 122 [12-25 @ 10:10]  Sodium 121 [12-25 @ 03:15]  Sodium 129 [12-24 @ 05:38]        SCr 0.56 [12-29 @ 10:54]  SCr 0.58 [12-28 @ 17:45]  SCr 0.73 [12-28 @ 00:45]  SCr 0.65 [12-27 @ 18:00]  SCr 0.83 [12-27 @ 00:31]    Urinalysis - [12-29-23 @ 10:54]      Color  / Appearance  / SG  / pH       Gluc 191 / Ketone   / Bili  / Urobili        Blood  / Protein  / Leuk Est  / Nitrite       RBC  / WBC  / Hyaline  / Gran  / Sq Epi  / Non Sq Epi  / Bacteria     Urine Creatinine 82      [12-27-23 @ 14:20]  Urine Protein 90      [12-27-23 @ 14:20]  Urine Sodium 86      [12-25-23 @ 12:55]  Urine Urea Nitrogen 800.3      [12-25-23 @ 12:55]    Iron 36, TIBC 195, %sat 18      [12-27-23 @ 12:18]  Ferritin 9278      [12-27-23 @ 12:18]  Vitamin D (25OH) 21.0      [12-26-23 @ 18:25]  TSH 1.970      [12-12-23 @ 13:20]    HBsAb >1000.0      [12-26-23 @ 12:10]  HBsAg Nonreact      [12-26-23 @ 12:10]  HCV 0.12, Nonreact      [12-26-23 @ 12:10]  HIV Nonreact      [12-24-23 @ 05:38]    ABDIAS: titer 1:1280, pattern Speckled      [12-14-23 @ 07:00]  dsDNA 28      [12-24-23 @ 05:38]  C3 Complement 83      [12-24-23 @ 05:38]  C4 Complement 13      [12-24-23 @ 05:38]  Rheumatoid Factor <10      [12-27-23 @ 12:18]  ANCA: cANCA Negative, pANCA Negative, atypical ANCA Indeterminate Method interference due to ABDIAS fluorescence      [12-26-23 @ 18:25]  Syphilis Screen (Treponema Pallidum Ab) Negative      [12-26-23 @ 18:25]  Free Light Chains: kappa 4.98, lambda 4.89, ratio = 1.02      [12-26 @ 18:25]

## 2023-12-29 NOTE — PROGRESS NOTE ADULT - ASSESSMENT
28 years old male with h/o Lupus ( diagnosed in 09/2023, on Plaquenil present to [place] ED on 12/12/23  with complain of chest pain and SOB. Patient reported left sided pleuritic chest pain which started 3 days ago. Pain is progressively worsened, associated with SOB and cough. Patient was seen in OSH ED and was prescribed antibiotics. Patient reported significantly worsening of left sided pleuritic chest pain today. No fever or chills. Patient reported loss of appetite and had a few episode of diarrhea for last 2 days. CTA chest with acute right upper lobe and left lower lobe segmental/subsegmental pulmonary emboli. No CT evidence of right heart strain. New bilateral lower lobe consolidations with areas of central clearing. Pneumonia and pulmonary infarcts are in the differential. New bilateral pleural effusions, small on the left and trace on the right. Bilateral axillary and supraclavicular adenopathy of unclear etiology. Patient was started on heparin ggt transitioned to eliquis on 12/19,  patient with worsening SOB/ hypoxia requiring nasal cannula oxygen supplementation. 12/20  Repeat Xray shows increased moderate left pleural effusion and compressive atelectasis trace right effusion and linear atelectasis. Thoracic team consulted for possible pigtail insertion Bedside US: Large left effusion with septations. Right simple effusion , + pericardial effusion- lower extremity dopplers negative for DVT.    # Pulm effusion/ Fever   S/P thoracentesis , followupp results   heparin for AC  TS care appreciated   O2 supplement   monitor output   Zosyn for now , ABx per ID   LP   Plan for LN biopsy       # Fever  SIRS   Abx   Rheum adn ID follow up       # Hyponatremia/ Seizure   RRT   Neuro follow up       #PE   on heparin , resume after thoracentesis   DVT negative  Heme onc eval   likley lupus related     #Hxof lupus  on hydroxychloroquine   Heme eval   Rheum eval   steroids per rheum       DVT andgIPPX    Discussed in detail with patient and mother at the bedside  28 years old male with h/o Lupus ( diagnosed in 09/2023, on Plaquenil present to [place] ED on 12/12/23  with complain of chest pain and SOB. Patient reported left sided pleuritic chest pain which started 3 days ago. Pain is progressively worsened, associated with SOB and cough. Patient was seen in OSH ED and was prescribed antibiotics. Patient reported significantly worsening of left sided pleuritic chest pain today. No fever or chills. Patient reported loss of appetite and had a few episode of diarrhea for last 2 days. CTA chest with acute right upper lobe and left lower lobe segmental/subsegmental pulmonary emboli. No CT evidence of right heart strain. New bilateral lower lobe consolidations with areas of central clearing. Pneumonia and pulmonary infarcts are in the differential. New bilateral pleural effusions, small on the left and trace on the right. Bilateral axillary and supraclavicular adenopathy of unclear etiology. Patient was started on heparin ggt transitioned to eliquis on 12/19,  patient with worsening SOB/ hypoxia requiring nasal cannula oxygen supplementation. 12/20  Repeat Xray shows increased moderate left pleural effusion and compressive atelectasis trace right effusion and linear atelectasis. Thoracic team consulted for possible pigtail insertion Bedside US: Large left effusion with septations. Right simple effusion , + pericardial effusion- lower extremity dopplers negative for DVT.    # Pulm effusion/ Fever   S/P thoracentesis , followupp results   heparin for AC  TS care appreciated   O2 supplement   monitor output   Zosyn for now , ABx per ID   LP   Plan for LN biopsy       # Fever  SIRS   Abx   Rheum adn ID follow up       # Hyponatremia/ Seizure   RRT   Neuro follow up       #PE   on heparin , resume after thoracentesis   DVT negative  Heme onc eval   likley lupus related     #Hxof lupus  on hydroxychloroquine   Heme eval   Rheum eval   steroids per rheum       DVT andgIPPX    Discussed in detail with patient and mother at the bedside

## 2023-12-29 NOTE — PROGRESS NOTE ADULT - ATTENDING COMMENTS
pt seen and examined by me personally   agree w/ above   he is aware of results and plan for continued steroids as above    please call w/ questions or acute status updates via teams

## 2023-12-29 NOTE — PHYSICAL THERAPY INITIAL EVALUATION ADULT - LEVEL OF INDEPENDENCE: GAIT, REHAB EVAL
During ambulation, pt occasionally keeping eyes closed, pt states he feels weak and unsteady. Pt made to sit in chair for ~3 minutes. BP: 148/90. MARIXA delgadillo aware. Pt wheeled back to room towards bed and transferred from chair to bed. MARIXA Teresa made aware of above./contact guard/stand-by assist

## 2023-12-29 NOTE — BH CONSULTATION LIAISON PROGRESS NOTE - NSBHCHARTREVIEWLAB_PSY_A_CORE FT
9.0    17.50 )-----------( 301      ( 29 Dec 2023 07:11 )             26.4     12-29    131<L>  |  98  |  11  ----------------------------<  191<H>  4.1   |  25  |  0.56    Ca    8.6      29 Dec 2023 10:54  Phos  2.5     12-29  Mg     2.40     12-29    TPro  6.9  /  Alb  2.8<L>  /  TBili  0.6  /  DBili  x   /  AST  82<H>  /  ALT  97<H>  /  AlkPhos  80  12-29

## 2023-12-29 NOTE — PROGRESS NOTE ADULT - ASSESSMENT
PE  on a/c  no evidence of right heart strain   no significant evidence of myocardial injury   normal LV function     Pericardial effusion   inflammatory in setting of SLE   trace effusion   no sign of tamponade  repeat echo in few weeks for surveillance

## 2023-12-29 NOTE — CONSULT NOTE ADULT - SUBJECTIVE AND OBJECTIVE BOX
Interventional Radiology    Evaluate for Procedure: Axillary LN Bx     HPI: 28 years old male with h/o Lupus ( diagnosed in 09/2023, on Plaquenil present to [place] ED on 12/12/23  with complain of chest pain and SOB admitted for fevers, PE, pleural effusions, course c/b high fever and tonic-clonic seizure, transferred to MICU for post-ictal and infectious monitoring. Currently being worked up for new autoimmune etiologies vs malignancy.    IR consulted for axillary lymph node biopsy.    Allergies: No Known Allergies    Medications (Abx/Cardiac/Anticoagulation/Blood Products)    cefepime   IVPB: 100 mL/Hr IV Intermittent (12-29 @ 05:24)  heparin  Infusion.: 1500 Unit(s)/Hr IV Continuous (12-29 @ 04:34)  hydroxychloroquine: 200 milliGRAM(s) Oral (12-29 @ 05:19)    Data:    T(C): 36.4  HR: 71  BP: 126/81  RR: 18  SpO2: 97%    -WBC 17.50 / HgB 9.0 / Hct 26.4 / Plt 301  -Na 131 / Cl 98 / BUN 11 / Glucose 191  -K 4.1 / CO2 25 / Cr 0.56  -ALT 97 / Alk Phos 80 / T.Bili 0.6  -INR -- / PTT 56.5      Radiology:     CT CAP Reviewed     Assessment/Plan:     28 years old male with h/o Lupus ( diagnosed in 09/2023, on Plaquenil present to [place] ED on 12/12/23  with complain of chest pain and SOB admitted for fevers, PE, pleural effusions, course c/b high fever and tonic-clonic seizure, transferred to MICU for post-ictal and infectious monitoring. Currently being worked up for new autoimmune etiologies vs malignancy. IR consulted for axillary lymph node biopsy.      -- No intervention recommended at this time given decrease in LN size per recent CT, likely reactive in nature. Recommend short term interval follow up with ultrasound of axillary nodes for surveillance. Please reconsult IR should there remain concern for malignancy.       --  Jose Ramon Abrams, DO  PGY-2 Vascular and Interventional Radiology Resident   Available on Microsoft Teams    - Non-emergent consults: Place IR consult order in [place]  - Emergent issues (pager): [facility] [phone]; [facility] [phone]; [phone]  - Scheduling questions: [facility] [phone]; [facility] [phone]  - Clinic/outpatient booking: [facility] [phone]; [facility] [phone] Interventional Radiology    Evaluate for Procedure: Axillary LN Bx     HPI: 28 years old male with h/o Lupus ( diagnosed in 09/2023, on Plaquenil present to [place] ED on 12/12/23  with complain of chest pain and SOB admitted for fevers, PE, pleural effusions, course c/b high fever and tonic-clonic seizure, transferred to MICU for post-ictal and infectious monitoring. Currently being worked up for new autoimmune etiologies vs malignancy.    IR consulted for axillary lymph node biopsy.    Allergies: No Known Allergies    Medications (Abx/Cardiac/Anticoagulation/Blood Products)    cefepime   IVPB: 100 mL/Hr IV Intermittent (12-29 @ 05:24)  heparin  Infusion.: 1500 Unit(s)/Hr IV Continuous (12-29 @ 04:34)  hydroxychloroquine: 200 milliGRAM(s) Oral (12-29 @ 05:19)    Data:    T(C): 36.4  HR: 71  BP: 126/81  RR: 18  SpO2: 97%    -WBC 17.50 / HgB 9.0 / Hct 26.4 / Plt 301  -Na 131 / Cl 98 / BUN 11 / Glucose 191  -K 4.1 / CO2 25 / Cr 0.56  -ALT 97 / Alk Phos 80 / T.Bili 0.6  -INR -- / PTT 56.5      Radiology:     CT CAP Reviewed     Assessment/Plan:     28 years old male with h/o Lupus ( diagnosed in 09/2023, on Plaquenil present to [place] ED on 12/12/23  with complain of chest pain and SOB admitted for fevers, PE, pleural effusions, course c/b high fever and tonic-clonic seizure, transferred to MICU for post-ictal and infectious monitoring. Currently being worked up for new autoimmune etiologies vs malignancy. IR consulted for axillary lymph node biopsy.      -- No intervention recommended at this time given decrease in LN size per recent CT, likely reactive in nature. Recommend short term interval follow up with ultrasound of axillary nodes for surveillance. Please reconsult IR should there remain concern for malignancy.       --  Jose Raomn Abrams, DO  PGY-2 Vascular and Interventional Radiology Resident   Available on Microsoft Teams    - Non-emergent consults: Place IR consult order in [place]  - Emergent issues (pager): [facility] [phone]; [facility] [phone]; [phone]  - Scheduling questions: [facility] [phone]; [facility] [phone]  - Clinic/outpatient booking: [facility] [phone]; [facility] [phone] Interventional Radiology    Evaluate for Procedure: Axillary LN Bx     HPI: 28 years old male with h/o Lupus ( diagnosed in 09/2023, on Plaquenil present to [place] ED on 12/12/23  with complain of chest pain and SOB admitted for fevers, PE, pleural effusions, course c/b high fever and tonic-clonic seizure, transferred to MICU for post-ictal and infectious monitoring. Currently being worked up for new autoimmune etiologies vs malignancy.    IR consulted for axillary lymph node biopsy.    Allergies: No Known Allergies    Medications (Abx/Cardiac/Anticoagulation/Blood Products)    cefepime   IVPB: 100 mL/Hr IV Intermittent (12-29 @ 05:24)  heparin  Infusion.: 1500 Unit(s)/Hr IV Continuous (12-29 @ 04:34)  hydroxychloroquine: 200 milliGRAM(s) Oral (12-29 @ 05:19)    Data:    T(C): 36.4  HR: 71  BP: 126/81  RR: 18  SpO2: 97%    -WBC 17.50 / HgB 9.0 / Hct 26.4 / Plt 301  -Na 131 / Cl 98 / BUN 11 / Glucose 191  -K 4.1 / CO2 25 / Cr 0.56  -ALT 97 / Alk Phos 80 / T.Bili 0.6  -INR -- / PTT 56.5      Radiology:     CT CAP Reviewed     Assessment/Plan:     28 years old male with h/o Lupus ( diagnosed in 09/2023, on Plaquenil present to [place] ED on 12/12/23  with complain of chest pain and SOB admitted for fevers, PE, pleural effusions, course c/b high fever and tonic-clonic seizure, transferred to MICU for post-ictal and infectious monitoring. Currently being worked up for new autoimmune etiologies vs malignancy. IR consulted for axillary lymph node biopsy.      -- No intervention recommended at this time given decrease in LN size per recent CT, likely reactive in nature. Recommend short term interval follow up with ultrasound of axillary nodes for surveillance. Please reconsult IR should there remain persistent lymphadenopathy.      --  Jose Ramon Abrams, DO  PGY-2 Vascular and Interventional Radiology Resident   Available on Microsoft Teams    - Non-emergent consults: Place IR consult order in [place]  - Emergent issues (pager): [facility] [phone]; [facility] [phone]; [phone]  - Scheduling questions: [facility] [phone]; [facility] [phone]  - Clinic/outpatient booking: [facility] [phone]; [facility] [phone] Interventional Radiology    Evaluate for Procedure: Axillary LN Bx     HPI: 28 years old male with h/o Lupus ( diagnosed in 09/2023, on Plaquenil present to [place] ED on 12/12/23  with complain of chest pain and SOB admitted for fevers, PE, pleural effusions, course c/b high fever and tonic-clonic seizure, transferred to MICU for post-ictal and infectious monitoring. Currently being worked up for new autoimmune etiologies vs malignancy.    IR consulted for axillary lymph node biopsy.    Allergies: No Known Allergies    Medications (Abx/Cardiac/Anticoagulation/Blood Products)    cefepime   IVPB: 100 mL/Hr IV Intermittent (12-29 @ 05:24)  heparin  Infusion.: 1500 Unit(s)/Hr IV Continuous (12-29 @ 04:34)  hydroxychloroquine: 200 milliGRAM(s) Oral (12-29 @ 05:19)    Data:    T(C): 36.4  HR: 71  BP: 126/81  RR: 18  SpO2: 97%    -WBC 17.50 / HgB 9.0 / Hct 26.4 / Plt 301  -Na 131 / Cl 98 / BUN 11 / Glucose 191  -K 4.1 / CO2 25 / Cr 0.56  -ALT 97 / Alk Phos 80 / T.Bili 0.6  -INR -- / PTT 56.5      Radiology:     CT CAP Reviewed     Assessment/Plan:     28 years old male with h/o Lupus ( diagnosed in 09/2023, on Plaquenil present to [place] ED on 12/12/23  with complain of chest pain and SOB admitted for fevers, PE, pleural effusions, course c/b high fever and tonic-clonic seizure, transferred to MICU for post-ictal and infectious monitoring. Currently being worked up for new autoimmune etiologies vs malignancy. IR consulted for axillary lymph node biopsy.      -- No intervention recommended at this time given decrease in LN size per recent CT, likely reactive in nature. Recommend short term interval follow up with ultrasound of axillary nodes for surveillance. Please reconsult IR should there remain persistent lymphadenopathy.      --  Jose Ramon Abrams, DO  PGY-2 Vascular and Interventional Radiology Resident   Available on [employer] Teams    - Non-emergent consults: Place IR consult order in [place]  - Emergent issues (pager): [facility] [phone]; [facility] [phone]; [phone]  - Scheduling questions: [facility] [phone]; [facility] [phone]  - Clinic/outpatient booking: [facility] [phone]; [facility] [phone]

## 2023-12-29 NOTE — PROGRESS NOTE ADULT - ATTENDING COMMENTS
28 years old male with h/o Lupus ( diagnosed in 09/2023, on Plaquenil p/w PE, pleural effusions (now s/p chest tube), poss lupus flare and tonic-clonic seizure   Pt now comfortable on room air, no fever today, and hemodynamically stable.  Denies cough, sputum     PE may be secondary to APLS and is on a/c. The effusions do not seem infectious in nature and may be lupus/ serositis related.   Had significant output from chest tube in past 24h, will keep tube in for now    - cont a/c for PE poss APLS related  - no evidence of RV strain on echo  - cont monitor chest tube output- will hold MIST in setting of a/c and increased hemorrhage risk  - not currently planned for VATS given effusion improvement, appreciate Thoracic reccs 28 years old male with h/o Lupus ( diagnosed in 09/2023, on Plaquenil p/w PE, pleural effusions (now s/p chest tube), poss lupus flare and tonic-clonic seizure   Pt now comfortable on room air, no fever today, and hemodynamically stable.  Denies cough, sputum     PE may be secondary to APLS and is on a/c. The effusions do not seem infectious in nature and may be lupus/ serositis related.   Had significant output from chest tube in past 24h, will keep tube in for now    - cont a/c for PE poss APLS related  - no evidence of RV strain on echo  - cont monitor chest tube output- will hold MIST in setting of a/c and increased hemorrhage risk  - not currently planned for VATS given effusion improvement, appreciate Thoracic reccs  - f/u fluid cytology, cx ngtd

## 2023-12-29 NOTE — PHYSICAL THERAPY INITIAL EVALUATION ADULT - PERTINENT HX OF CURRENT PROBLEM, REHAB EVAL
27 yo Male with recent diagnosis of Lupus (9/2023) admitted 12/12 with B/L Pulmonary embolism. Now with increased dyspnea, pleuritic chest pain,  hypoxia, and fever. CXR shows increased left pleural effusion. Bedside US shows moderate left septated effusion anterior, lateral and posterior. Right: small effusion simple appearing. Cardiac and pericardial effusion noted (although recent echo was negative for pericardial effusion) Lower extremity dopplers negative for DVT. GI PCR + e coli. mRSA PCR + Staph aureus. Hematology consulted for hypercoagulable workup and correction of INR myke-procedurally.

## 2023-12-29 NOTE — PROGRESS NOTE ADULT - NUTRITIONAL ASSESSMENT
This patient has been assessed with a concern for Malnutrition and has been determined to have a diagnosis/diagnoses of Severe protein-calorie malnutrition.    This patient is being managed with:   Diet Regular-  Pureed (PUREED)  1000mL Fluid Restriction ([id number])  Entered: Dec 27 2023  5:23PM   This patient has been assessed with a concern for Malnutrition and has been determined to have a diagnosis/diagnoses of Severe protein-calorie malnutrition.    This patient is being managed with:   Diet Regular-  Pureed (PUREED)  1000mL Fluid Restriction ([id number])  Entered: Dec 27 2023  5:23PM

## 2023-12-29 NOTE — PHYSICAL THERAPY INITIAL EVALUATION ADULT - IMPAIRMENTS FOUND, PT EVAL
Patient is requesting a refill for ibuprofen 600mg for 90 days supply, [facility]  aerobic capacity/endurance/gait, locomotion, and balance/muscle strength

## 2023-12-29 NOTE — BH CONSULTATION LIAISON PROGRESS NOTE - NSBHFUPINTERVALHXFT_PSY_A_CORE
Chart, labs, imagine reviewed. Case discussed with the primary team. Patient seen at bedside.   Patient was found in bed, cringing from pain. He reported that he felt depressed, sad for the past 4-5 years after he came from the . 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 Reported having lack of energy and poor sleep, but also reported that it was hard to sleep due to pain. He could not keep up with his routines, stopped cleaning at home, it was hard to force himself to take shower. Patient adamantly denied suicidal or homicidal ideations, intent or a plan. Denied auditory or visual hallucinations. Denied feeling paranoid. Interested in treatement with medications. Reported that melatonin used to help with sleep in the past. Reported that he has a "therapist" at [place], whom he sees once a month. Chart, labs, imagine reviewed. Case discussed with the primary team. Patient seen at bedside.   Patient was found in bed, cringing from pain. He reported that he felt depressed, sad for the past 4-5 years after he came from the . 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 Reported having lack of energy and poor sleep, but also reported that it was hard to sleep due to pain. He could not keep up with his routines, stopped cleaning at home, it was hard to force himself to take shower. Patient adamantly denied suicidal or homicidal ideations, intent or a plan. Denied auditory or visual hallucinations. Denied feeling paranoid. Interested in treatement with medications. Reported that melatonin used to help with sleep in the past. Reported that he has a "therapist" at [place], whom he sees once a month.

## 2023-12-29 NOTE — PHYSICAL THERAPY INITIAL EVALUATION ADULT - GENERAL OBSERVATIONS, REHAB EVAL
Pt received semi-supine in bed, with all lines intact, NAD, all precautions maintained. Pt mother at bedside.

## 2023-12-29 NOTE — PROGRESS NOTE ADULT - SUBJECTIVE AND OBJECTIVE BOX
Subjective: Patient seen and examined. No new events except as noted.     SUBJECTIVE/ROS:  nad      MEDICATIONS:  MEDICATIONS  (STANDING):  cefepime   IVPB 2000 milliGRAM(s) IV Intermittent every 8 hours  chlorhexidine 2% Cloths 1 Application(s) Topical daily  ciprofloxacin  0.3% Ophthalmic Solution for Otic Use 2 Drop(s) Both Ears two times a day  heparin  Infusion. 1300 Unit(s)/Hr (13 mL/Hr) IV Continuous <Continuous>  hydroxychloroquine 200 milliGRAM(s) Oral two times a day  lidocaine   4% Patch 1 Patch Transdermal every 24 hours  melatonin 3 milliGRAM(s) Oral <User Schedule>  methylPREDNISolone sodium succinate Injectable 60 milliGRAM(s) IV Push daily  sodium chloride 1 Gram(s) Oral three times a day  sodium chloride 3%. 500 milliLiter(s) (30 mL/Hr) IV Continuous <Continuous>      PHYSICAL EXAM:  T(C): 36.4 (12-29-23 @ 05:15), Max: 38.6 (12-28-23 @ 10:00)  HR: 71 (12-29-23 @ 05:15) (69 - 112)  BP: 126/81 (12-29-23 @ 05:15) (119/87 - 152/82)  RR: 18 (12-29-23 @ 05:15) (15 - 27)  SpO2: 97% (12-29-23 @ 05:15) (95% - 99%)  Wt(kg): --  I&O's Summary    27 Dec 2023 07:01  -  28 Dec 2023 07:00  --------------------------------------------------------  IN: 2160.4 mL / OUT: 2825 mL / NET: -664.6 mL    28 Dec 2023 07:01  -  29 Dec 2023 06:19  --------------------------------------------------------  IN: 1030 mL / OUT: 1240 mL / NET: -210 mL            JVP: Normal  Neck: supple  Lung: clear   CV: S1 S2 , Murmur:  Abd: soft  Ext: No edema  neuro: Awake / alert  Psych: flat affect  Skin: normal``    LABS/DATA:    CARDIAC MARKERS:  CARDIAC MARKERS ( 27 Dec 2023 12:18 )  x     / x     / 714 U/L / x     / x      CARDIAC MARKERS ( 26 Dec 2023 12:10 )  x     / x     / 1094 U/L / x     / x                                    8.6    13.41 )-----------( 232      ( 28 Dec 2023 00:45 )             25.0     12-28    128<L>  |  96<L>  |  9   ----------------------------<  169<H>  3.9   |  23  |  0.58    Ca    8.2<L>      28 Dec 2023 17:45  Phos  2.4     12-28  Mg     2.20     12-28    TPro  6.6  /  Alb  2.7<L>  /  TBili  0.6  /  DBili  x   /  AST  116<H>  /  ALT  105<H>  /  AlkPhos  78  12-28    proBNP:   Lipid Profile:   HgA1c:   TSH:     TELE:  EKG:

## 2023-12-29 NOTE — PROGRESS NOTE ADULT - NUTRITIONAL ASSESSMENT
This patient has been assessed with a concern for Malnutrition and has been determined to have a diagnosis/diagnoses of Severe protein-calorie malnutrition.    This patient is being managed with:   Diet Regular-  Pureed (PUREED)  1000mL Fluid Restriction ([id number])  Supplement Feeding Modality:  Oral  Ensure Plus High Protein Cans or Servings Per Day:  1       Frequency:  Three Times a day  Entered: Dec 28 2023 11:48AM   This patient has been assessed with a concern for Malnutrition and has been determined to have a diagnosis/diagnoses of Severe protein-calorie malnutrition.    This patient is being managed with:   Diet Regular-  Pureed (PUREED)  1000mL Fluid Restriction ([id number])  Supplement Feeding Modality:  Oral  Ensure Plus High Protein Cans or Servings Per Day:  1       Frequency:  Three Times a day  Entered: Dec 28 2023 11:48AM

## 2023-12-30 LAB
ALBUMIN SERPL ELPH-MCNC: 2.6 G/DL — LOW (ref 3.3–5)
ALBUMIN SERPL ELPH-MCNC: 2.6 G/DL — LOW (ref 3.3–5)
ALP SERPL-CCNC: 77 U/L — SIGNIFICANT CHANGE UP (ref 40–120)
ALP SERPL-CCNC: 77 U/L — SIGNIFICANT CHANGE UP (ref 40–120)
ALT FLD-CCNC: 87 U/L — HIGH (ref 4–41)
ALT FLD-CCNC: 87 U/L — HIGH (ref 4–41)
ANION GAP SERPL CALC-SCNC: 9 MMOL/L — SIGNIFICANT CHANGE UP (ref 7–14)
ANION GAP SERPL CALC-SCNC: 9 MMOL/L — SIGNIFICANT CHANGE UP (ref 7–14)
ANISOCYTOSIS BLD QL: SLIGHT — SIGNIFICANT CHANGE UP
ANISOCYTOSIS BLD QL: SLIGHT — SIGNIFICANT CHANGE UP
APTT BLD: 59 SEC — HIGH (ref 24.5–35.6)
APTT BLD: 59 SEC — HIGH (ref 24.5–35.6)
APTT BLD: 72.6 SEC — HIGH (ref 24.5–35.6)
APTT BLD: 72.6 SEC — HIGH (ref 24.5–35.6)
AST SERPL-CCNC: 72 U/L — HIGH (ref 4–40)
AST SERPL-CCNC: 72 U/L — HIGH (ref 4–40)
BASOPHILS # BLD AUTO: 0 K/UL — SIGNIFICANT CHANGE UP (ref 0–0.2)
BASOPHILS # BLD AUTO: 0 K/UL — SIGNIFICANT CHANGE UP (ref 0–0.2)
BASOPHILS NFR BLD AUTO: 0 % — SIGNIFICANT CHANGE UP (ref 0–2)
BASOPHILS NFR BLD AUTO: 0 % — SIGNIFICANT CHANGE UP (ref 0–2)
BILIRUB SERPL-MCNC: 0.4 MG/DL — SIGNIFICANT CHANGE UP (ref 0.2–1.2)
BILIRUB SERPL-MCNC: 0.4 MG/DL — SIGNIFICANT CHANGE UP (ref 0.2–1.2)
BUN SERPL-MCNC: 12 MG/DL — SIGNIFICANT CHANGE UP (ref 7–23)
BUN SERPL-MCNC: 12 MG/DL — SIGNIFICANT CHANGE UP (ref 7–23)
CALCIUM SERPL-MCNC: 8.5 MG/DL — SIGNIFICANT CHANGE UP (ref 8.4–10.5)
CALCIUM SERPL-MCNC: 8.5 MG/DL — SIGNIFICANT CHANGE UP (ref 8.4–10.5)
CENTROMERE AB SER-ACNC: <0.2 AI — SIGNIFICANT CHANGE UP
CENTROMERE AB SER-ACNC: <0.2 AI — SIGNIFICANT CHANGE UP
CHLORIDE SERPL-SCNC: 99 MMOL/L — SIGNIFICANT CHANGE UP (ref 98–107)
CHLORIDE SERPL-SCNC: 99 MMOL/L — SIGNIFICANT CHANGE UP (ref 98–107)
CO2 SERPL-SCNC: 24 MMOL/L — SIGNIFICANT CHANGE UP (ref 22–31)
CO2 SERPL-SCNC: 24 MMOL/L — SIGNIFICANT CHANGE UP (ref 22–31)
CREAT SERPL-MCNC: 0.63 MG/DL — SIGNIFICANT CHANGE UP (ref 0.5–1.3)
CREAT SERPL-MCNC: 0.63 MG/DL — SIGNIFICANT CHANGE UP (ref 0.5–1.3)
CULTURE RESULTS: NO GROWTH — SIGNIFICANT CHANGE UP
CULTURE RESULTS: NO GROWTH — SIGNIFICANT CHANGE UP
CULTURE RESULTS: SIGNIFICANT CHANGE UP
EBV DNA SERPL NAA+PROBE-ACNC: ABNORMAL IU/ML
EBV DNA SERPL NAA+PROBE-ACNC: ABNORMAL IU/ML
EBVPCR LOG: ABNORMAL LOG10IU/ML
EBVPCR LOG: ABNORMAL LOG10IU/ML
EGFR: 132 ML/MIN/1.73M2 — SIGNIFICANT CHANGE UP
EGFR: 132 ML/MIN/1.73M2 — SIGNIFICANT CHANGE UP
ENA SCL70 AB SER-ACNC: 0.2 AI — SIGNIFICANT CHANGE UP
ENA SCL70 AB SER-ACNC: 0.2 AI — SIGNIFICANT CHANGE UP
EOSINOPHIL # BLD AUTO: 0 K/UL — SIGNIFICANT CHANGE UP (ref 0–0.5)
EOSINOPHIL # BLD AUTO: 0 K/UL — SIGNIFICANT CHANGE UP (ref 0–0.5)
EOSINOPHIL NFR BLD AUTO: 0 % — SIGNIFICANT CHANGE UP (ref 0–6)
EOSINOPHIL NFR BLD AUTO: 0 % — SIGNIFICANT CHANGE UP (ref 0–6)
GLUCOSE SERPL-MCNC: 155 MG/DL — HIGH (ref 70–99)
GLUCOSE SERPL-MCNC: 155 MG/DL — HIGH (ref 70–99)
HCT VFR BLD CALC: 24.9 % — LOW (ref 39–50)
HCT VFR BLD CALC: 24.9 % — LOW (ref 39–50)
HGB BLD-MCNC: 8.3 G/DL — LOW (ref 13–17)
HGB BLD-MCNC: 8.3 G/DL — LOW (ref 13–17)
IANC: 11.71 K/UL — HIGH (ref 1.8–7.4)
IANC: 11.71 K/UL — HIGH (ref 1.8–7.4)
LYMPHOCYTES # BLD AUTO: 22.8 % — SIGNIFICANT CHANGE UP (ref 13–44)
LYMPHOCYTES # BLD AUTO: 22.8 % — SIGNIFICANT CHANGE UP (ref 13–44)
LYMPHOCYTES # BLD AUTO: 4.54 K/UL — HIGH (ref 1–3.3)
LYMPHOCYTES # BLD AUTO: 4.54 K/UL — HIGH (ref 1–3.3)
MACROCYTES BLD QL: SLIGHT — SIGNIFICANT CHANGE UP
MACROCYTES BLD QL: SLIGHT — SIGNIFICANT CHANGE UP
MAGNESIUM SERPL-MCNC: 2.1 MG/DL — SIGNIFICANT CHANGE UP (ref 1.6–2.6)
MAGNESIUM SERPL-MCNC: 2.1 MG/DL — SIGNIFICANT CHANGE UP (ref 1.6–2.6)
MCHC RBC-ENTMCNC: 30.1 PG — SIGNIFICANT CHANGE UP (ref 27–34)
MCHC RBC-ENTMCNC: 30.1 PG — SIGNIFICANT CHANGE UP (ref 27–34)
MCHC RBC-ENTMCNC: 33.3 GM/DL — SIGNIFICANT CHANGE UP (ref 32–36)
MCHC RBC-ENTMCNC: 33.3 GM/DL — SIGNIFICANT CHANGE UP (ref 32–36)
MCV RBC AUTO: 90.2 FL — SIGNIFICANT CHANGE UP (ref 80–100)
MCV RBC AUTO: 90.2 FL — SIGNIFICANT CHANGE UP (ref 80–100)
METAMYELOCYTES # FLD: 0.9 % — SIGNIFICANT CHANGE UP (ref 0–1)
METAMYELOCYTES # FLD: 0.9 % — SIGNIFICANT CHANGE UP (ref 0–1)
MONOCYTES # BLD AUTO: 0.88 K/UL — SIGNIFICANT CHANGE UP (ref 0–0.9)
MONOCYTES # BLD AUTO: 0.88 K/UL — SIGNIFICANT CHANGE UP (ref 0–0.9)
MONOCYTES NFR BLD AUTO: 4.4 % — SIGNIFICANT CHANGE UP (ref 2–14)
MONOCYTES NFR BLD AUTO: 4.4 % — SIGNIFICANT CHANGE UP (ref 2–14)
MYELOCYTES NFR BLD: 1.8 % — HIGH (ref 0–0)
MYELOCYTES NFR BLD: 1.8 % — HIGH (ref 0–0)
NEUTROPHILS # BLD AUTO: 13.43 K/UL — HIGH (ref 1.8–7.4)
NEUTROPHILS # BLD AUTO: 13.43 K/UL — HIGH (ref 1.8–7.4)
NEUTROPHILS NFR BLD AUTO: 64 % — SIGNIFICANT CHANGE UP (ref 43–77)
NEUTROPHILS NFR BLD AUTO: 64 % — SIGNIFICANT CHANGE UP (ref 43–77)
NEUTS BAND # BLD: 3.5 % — SIGNIFICANT CHANGE UP (ref 0–6)
NEUTS BAND # BLD: 3.5 % — SIGNIFICANT CHANGE UP (ref 0–6)
PLAT MORPH BLD: NORMAL — SIGNIFICANT CHANGE UP
PLAT MORPH BLD: NORMAL — SIGNIFICANT CHANGE UP
PLATELET # BLD AUTO: 354 K/UL — SIGNIFICANT CHANGE UP (ref 150–400)
PLATELET # BLD AUTO: 354 K/UL — SIGNIFICANT CHANGE UP (ref 150–400)
PLATELET COUNT - ESTIMATE: NORMAL — SIGNIFICANT CHANGE UP
PLATELET COUNT - ESTIMATE: NORMAL — SIGNIFICANT CHANGE UP
POIKILOCYTOSIS BLD QL AUTO: SLIGHT — SIGNIFICANT CHANGE UP
POIKILOCYTOSIS BLD QL AUTO: SLIGHT — SIGNIFICANT CHANGE UP
POLYCHROMASIA BLD QL SMEAR: SLIGHT — SIGNIFICANT CHANGE UP
POLYCHROMASIA BLD QL SMEAR: SLIGHT — SIGNIFICANT CHANGE UP
POTASSIUM SERPL-MCNC: 4.2 MMOL/L — SIGNIFICANT CHANGE UP (ref 3.5–5.3)
POTASSIUM SERPL-MCNC: 4.2 MMOL/L — SIGNIFICANT CHANGE UP (ref 3.5–5.3)
POTASSIUM SERPL-SCNC: 4.2 MMOL/L — SIGNIFICANT CHANGE UP (ref 3.5–5.3)
POTASSIUM SERPL-SCNC: 4.2 MMOL/L — SIGNIFICANT CHANGE UP (ref 3.5–5.3)
PROT SERPL-MCNC: 6.4 G/DL — SIGNIFICANT CHANGE UP (ref 6–8.3)
PROT SERPL-MCNC: 6.4 G/DL — SIGNIFICANT CHANGE UP (ref 6–8.3)
PS IGA SER-ACNC: <1 — SIGNIFICANT CHANGE UP (ref 0–19)
PS IGA SER-ACNC: <1 — SIGNIFICANT CHANGE UP (ref 0–19)
PS IGG SER-ACNC: 9 UNITS — SIGNIFICANT CHANGE UP (ref 0–30)
PS IGG SER-ACNC: 9 UNITS — SIGNIFICANT CHANGE UP (ref 0–30)
PS IGM SER-ACNC: <10 UNITS — SIGNIFICANT CHANGE UP (ref 0–30)
PS IGM SER-ACNC: <10 UNITS — SIGNIFICANT CHANGE UP (ref 0–30)
RBC # BLD: 2.76 M/UL — LOW (ref 4.2–5.8)
RBC # BLD: 2.76 M/UL — LOW (ref 4.2–5.8)
RBC # FLD: 14.7 % — HIGH (ref 10.3–14.5)
RBC # FLD: 14.7 % — HIGH (ref 10.3–14.5)
RBC BLD AUTO: NORMAL — SIGNIFICANT CHANGE UP
RBC BLD AUTO: NORMAL — SIGNIFICANT CHANGE UP
SODIUM SERPL-SCNC: 132 MMOL/L — LOW (ref 135–145)
SODIUM SERPL-SCNC: 132 MMOL/L — LOW (ref 135–145)
SPECIMEN SOURCE: SIGNIFICANT CHANGE UP
VARIANT LYMPHS # BLD: 2.6 % — SIGNIFICANT CHANGE UP (ref 0–6)
VARIANT LYMPHS # BLD: 2.6 % — SIGNIFICANT CHANGE UP (ref 0–6)
WBC # BLD: 19.9 K/UL — HIGH (ref 3.8–10.5)
WBC # BLD: 19.9 K/UL — HIGH (ref 3.8–10.5)
WBC # FLD AUTO: 19.9 K/UL — HIGH (ref 3.8–10.5)
WBC # FLD AUTO: 19.9 K/UL — HIGH (ref 3.8–10.5)

## 2023-12-30 PROCEDURE — 99233 SBSQ HOSP IP/OBS HIGH 50: CPT | Mod: GC

## 2023-12-30 PROCEDURE — 93010 ELECTROCARDIOGRAM REPORT: CPT

## 2023-12-30 RX ORDER — OXYCODONE HYDROCHLORIDE 5 MG/1
2.5 TABLET ORAL ONCE
Refills: 0 | Status: DISCONTINUED | OUTPATIENT
Start: 2023-12-30 | End: 2023-12-30

## 2023-12-30 RX ORDER — LIDOCAINE 4 G/100G
1 CREAM TOPICAL ONCE
Refills: 0 | Status: COMPLETED | OUTPATIENT
Start: 2023-12-30 | End: 2023-12-30

## 2023-12-30 RX ORDER — ONDANSETRON 8 MG/1
4 TABLET, FILM COATED ORAL EVERY 8 HOURS
Refills: 0 | Status: DISCONTINUED | OUTPATIENT
Start: 2023-12-30 | End: 2024-01-29

## 2023-12-30 RX ORDER — ONDANSETRON 8 MG/1
4 TABLET, FILM COATED ORAL EVERY 8 HOURS
Refills: 0 | Status: DISCONTINUED | OUTPATIENT
Start: 2023-12-30 | End: 2023-12-30

## 2023-12-30 RX ORDER — LIDOCAINE 4 G/100G
2 CREAM TOPICAL EVERY 24 HOURS
Refills: 0 | Status: DISCONTINUED | OUTPATIENT
Start: 2023-12-30 | End: 2024-01-29

## 2023-12-30 RX ADMIN — Medication 2 DROP(S): at 06:05

## 2023-12-30 RX ADMIN — LIDOCAINE 2 PATCH: 4 CREAM TOPICAL at 12:54

## 2023-12-30 RX ADMIN — SODIUM CHLORIDE 1 GRAM(S): 9 INJECTION INTRAMUSCULAR; INTRAVENOUS; SUBCUTANEOUS at 12:58

## 2023-12-30 RX ADMIN — CHLORHEXIDINE GLUCONATE 1 APPLICATION(S): 213 SOLUTION TOPICAL at 12:57

## 2023-12-30 RX ADMIN — OXYCODONE HYDROCHLORIDE 5 MILLIGRAM(S): 5 TABLET ORAL at 18:30

## 2023-12-30 RX ADMIN — HEPARIN SODIUM 1600 UNIT(S)/HR: 5000 INJECTION INTRAVENOUS; SUBCUTANEOUS at 08:02

## 2023-12-30 RX ADMIN — Medication 200 MILLIGRAM(S): at 06:05

## 2023-12-30 RX ADMIN — Medication 650 MILLIGRAM(S): at 20:02

## 2023-12-30 RX ADMIN — SODIUM CHLORIDE 1 GRAM(S): 9 INJECTION INTRAMUSCULAR; INTRAVENOUS; SUBCUTANEOUS at 06:05

## 2023-12-30 RX ADMIN — OXYCODONE HYDROCHLORIDE 5 MILLIGRAM(S): 5 TABLET ORAL at 02:54

## 2023-12-30 RX ADMIN — HEPARIN SODIUM 1600 UNIT(S)/HR: 5000 INJECTION INTRAVENOUS; SUBCUTANEOUS at 02:46

## 2023-12-30 RX ADMIN — LIDOCAINE 1 PATCH: 4 CREAM TOPICAL at 12:54

## 2023-12-30 RX ADMIN — HEPARIN SODIUM 1600 UNIT(S)/HR: 5000 INJECTION INTRAVENOUS; SUBCUTANEOUS at 00:56

## 2023-12-30 RX ADMIN — HEPARIN SODIUM 1600 UNIT(S)/HR: 5000 INJECTION INTRAVENOUS; SUBCUTANEOUS at 11:39

## 2023-12-30 RX ADMIN — OXYCODONE HYDROCHLORIDE 2.5 MILLIGRAM(S): 5 TABLET ORAL at 20:59

## 2023-12-30 RX ADMIN — SODIUM CHLORIDE 1 GRAM(S): 9 INJECTION INTRAMUSCULAR; INTRAVENOUS; SUBCUTANEOUS at 22:54

## 2023-12-30 RX ADMIN — LIDOCAINE 1 PATCH: 4 CREAM TOPICAL at 19:36

## 2023-12-30 RX ADMIN — PANTOPRAZOLE SODIUM 40 MILLIGRAM(S): 20 TABLET, DELAYED RELEASE ORAL at 06:05

## 2023-12-30 RX ADMIN — LIDOCAINE 2 PATCH: 4 CREAM TOPICAL at 19:37

## 2023-12-30 RX ADMIN — LIDOCAINE 1 PATCH: 4 CREAM TOPICAL at 17:33

## 2023-12-30 RX ADMIN — Medication 200 MILLIGRAM(S): at 17:26

## 2023-12-30 RX ADMIN — Medication 60 MILLIGRAM(S): at 06:05

## 2023-12-30 RX ADMIN — Medication 3 MILLIGRAM(S): at 22:54

## 2023-12-30 RX ADMIN — Medication 2 DROP(S): at 17:25

## 2023-12-30 RX ADMIN — OXYCODONE HYDROCHLORIDE 5 MILLIGRAM(S): 5 TABLET ORAL at 23:52

## 2023-12-30 RX ADMIN — OXYCODONE HYDROCHLORIDE 5 MILLIGRAM(S): 5 TABLET ORAL at 03:54

## 2023-12-30 RX ADMIN — Medication 650 MILLIGRAM(S): at 08:39

## 2023-12-30 RX ADMIN — OXYCODONE HYDROCHLORIDE 5 MILLIGRAM(S): 5 TABLET ORAL at 09:03

## 2023-12-30 RX ADMIN — CEFEPIME 100 MILLIGRAM(S): 1 INJECTION, POWDER, FOR SOLUTION INTRAMUSCULAR; INTRAVENOUS at 12:51

## 2023-12-30 RX ADMIN — Medication 650 MILLIGRAM(S): at 07:38

## 2023-12-30 RX ADMIN — OXYCODONE HYDROCHLORIDE 2.5 MILLIGRAM(S): 5 TABLET ORAL at 19:59

## 2023-12-30 RX ADMIN — CEFEPIME 100 MILLIGRAM(S): 1 INJECTION, POWDER, FOR SOLUTION INTRAMUSCULAR; INTRAVENOUS at 06:00

## 2023-12-30 RX ADMIN — HEPARIN SODIUM 1600 UNIT(S)/HR: 5000 INJECTION INTRAVENOUS; SUBCUTANEOUS at 19:03

## 2023-12-30 RX ADMIN — OXYCODONE HYDROCHLORIDE 5 MILLIGRAM(S): 5 TABLET ORAL at 17:25

## 2023-12-30 RX ADMIN — HEPARIN SODIUM 1600 UNIT(S)/HR: 5000 INJECTION INTRAVENOUS; SUBCUTANEOUS at 19:16

## 2023-12-30 RX ADMIN — ONDANSETRON 4 MILLIGRAM(S): 8 TABLET, FILM COATED ORAL at 11:34

## 2023-12-30 RX ADMIN — CEFEPIME 100 MILLIGRAM(S): 1 INJECTION, POWDER, FOR SOLUTION INTRAMUSCULAR; INTRAVENOUS at 22:55

## 2023-12-30 RX ADMIN — Medication 650 MILLIGRAM(S): at 19:02

## 2023-12-30 RX ADMIN — OXYCODONE HYDROCHLORIDE 5 MILLIGRAM(S): 5 TABLET ORAL at 10:03

## 2023-12-30 RX ADMIN — LIDOCAINE 1 PATCH: 4 CREAM TOPICAL at 02:19

## 2023-12-30 NOTE — PROGRESS NOTE ADULT - SUBJECTIVE AND OBJECTIVE BOX
TAYLA MARIE  [id number]    INTERVAL HPI/OVERNIGHT EVENTS:        PMHx/PSHx/FamHx/SocHx reviewed and no significant changes    REVIEW OF SYSTEMS   - reviewed with patient and  negative other than as above or previously documented.     MEDICATIONS  (STANDING):  cefepime   IVPB 2000 milliGRAM(s) IV Intermittent every 8 hours  chlorhexidine 2% Cloths 1 Application(s) Topical daily  ciprofloxacin  0.3% Ophthalmic Solution for Otic Use 2 Drop(s) Both Ears two times a day  heparin  Infusion. 1300 Unit(s)/Hr (13 mL/Hr) IV Continuous <Continuous>  hydroxychloroquine 200 milliGRAM(s) Oral two times a day  lidocaine   4% Patch 1 Patch Transdermal every 24 hours  lidocaine   4% Patch 2 Patch Transdermal every 24 hours  melatonin 3 milliGRAM(s) Oral <User Schedule>  pantoprazole    Tablet 40 milliGRAM(s) Oral before breakfast  predniSONE   Tablet 60 milliGRAM(s) Oral daily  sodium chloride 1 Gram(s) Oral three times a day  sodium chloride 3%. 500 milliLiter(s) (30 mL/Hr) IV Continuous <Continuous>    MEDICATIONS  (PRN):  acetaminophen     Tablet .. 650 milliGRAM(s) Oral every 6 hours PRN Temp greater or equal to 38C (100.4F), Mild Pain (1 - 3)  albuterol/ipratropium for Nebulization 3 milliLiter(s) Nebulizer every 6 hours PRN Shortness of Breath and/or Wheezing  benzocaine/menthol Lozenge 1 Lozenge Oral four times a day PRN Sore Throat  FIRST- Mouthwash  BLM 15 milliLiter(s) Swish and Spit every 4 hours PRN Mouth Care  guaiFENesin Oral Liquid (Sugar-Free) 200 milliGRAM(s) Oral every 6 hours PRN Cough  heparin   Injectable 5500 Unit(s) IV Push every 6 hours PRN For aPTT less than 40  heparin   Injectable 2500 Unit(s) IV Push every 6 hours PRN For aPTT between 40 - 57  lidocaine 2% Viscous 5 milliLiter(s) Swish and Spit three times a day PRN Mouth Care  ondansetron Injectable 4 milliGRAM(s) IV Push every 8 hours PRN Nausea and/or Vomiting  oxyCODONE    IR 5 milliGRAM(s) Oral every 6 hours PRN Severe Pain (7 - 10)      Allergies    No Known Allergies    Intolerances          Vital Signs Last 24 Hrs  T(C): 36.9 (30 Dec 2023 05:33), Max: 36.9 (29 Dec 2023 21:45)  T(F): 98.4 (30 Dec 2023 05:33), Max: 98.5 (29 Dec 2023 21:45)  HR: 82 (30 Dec 2023 05:33) (78 - 99)  BP: 133/87 (30 Dec 2023 05:33) (130/85 - 135/78)  BP(mean): --  RR: 18 (30 Dec 2023 05:33) (17 - 18)  SpO2: 100% (30 Dec 2023 05:33) (97% - 100%)    Parameters below as of 30 Dec 2023 05:33  Patient On (Oxygen Delivery Method): room air        Physical Exam:  General: NAD  HEENT: EOMI, MMM  Cardio: +S1/S2, RRR  Resp: CTA b/l  GI: +BS, soft, NT/ND  MSK:  Neuro: AAOx3  Psych: wnl    LABS:                        8.3    19.90 )-----------( 354      ( 30 Dec 2023 01:50 )             24.9     12-30    132<L>  |  99  |  12  ----------------------------<  155<H>  4.2   |  24  |  0.63    Ca    8.5      30 Dec 2023 01:50  Phos  2.5     12-29  Mg     2.10     12-30    TPro  6.4  /  Alb  2.6<L>  /  TBili  0.4  /  DBili  x   /  AST  72<H>  /  ALT  87<H>  /  AlkPhos  77  12-30    PTT - ( 30 Dec 2023 10:05 )  PTT:72.6 sec  Urinalysis Basic - ( 30 Dec 2023 01:50 )    Color: x / Appearance: x / SG: x / pH: x  Gluc: 155 mg/dL / Ketone: x  / Bili: x / Urobili: x   Blood: x / Protein: x / Nitrite: x   Leuk Esterase: x / RBC: x / WBC x   Sq Epi: x / Non Sq Epi: x / Bacteria: x          RADIOLOGY & ADDITIONAL TESTS:       TAYLA MARIE  [id number]    INTERVAL HPI/OVERNIGHT EVENTS:        PMHx/PSHx/FamHx/SocHx reviewed and no significant changes    REVIEW OF SYSTEMS   - reviewed with patient and  negative other than as above or previously documented.     MEDICATIONS  (STANDING):  cefepime   IVPB 2000 milliGRAM(s) IV Intermittent every 8 hours  chlorhexidine 2% Cloths 1 Application(s) Topical daily  ciprofloxacin  0.3% Ophthalmic Solution for Otic Use 2 Drop(s) Both Ears two times a day  heparin  Infusion. 1300 Unit(s)/Hr (13 mL/Hr) IV Continuous <Continuous>  hydroxychloroquine 200 milliGRAM(s) Oral two times a day  lidocaine   4% Patch 1 Patch Transdermal every 24 hours  lidocaine   4% Patch 2 Patch Transdermal every 24 hours  melatonin 3 milliGRAM(s) Oral <User Schedule>  pantoprazole    Tablet 40 milliGRAM(s) Oral before breakfast  predniSONE   Tablet 60 milliGRAM(s) Oral daily  sodium chloride 1 Gram(s) Oral three times a day  sodium chloride 3%. 500 milliLiter(s) (30 mL/Hr) IV Continuous <Continuous>    MEDICATIONS  (PRN):  acetaminophen     Tablet .. 650 milliGRAM(s) Oral every 6 hours PRN Temp greater or equal to 38C (100.4F), Mild Pain (1 - 3)  albuterol/ipratropium for Nebulization 3 milliLiter(s) Nebulizer every 6 hours PRN Shortness of Breath and/or Wheezing  benzocaine/menthol Lozenge 1 Lozenge Oral four times a day PRN Sore Throat  FIRST- Mouthwash  BLM 15 milliLiter(s) Swish and Spit every 4 hours PRN Mouth Care  guaiFENesin Oral Liquid (Sugar-Free) 200 milliGRAM(s) Oral every 6 hours PRN Cough  heparin   Injectable 5500 Unit(s) IV Push every 6 hours PRN For aPTT less than 40  heparin   Injectable 2500 Unit(s) IV Push every 6 hours PRN For aPTT between 40 - 57  lidocaine 2% Viscous 5 milliLiter(s) Swish and Spit three times a day PRN Mouth Care  ondansetron Injectable 4 milliGRAM(s) IV Push every 8 hours PRN Nausea and/or Vomiting  oxyCODONE    IR 5 milliGRAM(s) Oral every 6 hours PRN Severe Pain (7 - 10)      Allergies    No Known Allergies    Intolerances          Vital Signs Last 24 Hrs  T(C): 36.9 (30 Dec 2023 05:33), Max: 36.9 (29 Dec 2023 21:45)  T(F): 98.4 (30 Dec 2023 05:33), Max: 98.5 (29 Dec 2023 21:45)  HR: 82 (30 Dec 2023 05:33) (78 - 99)  BP: 133/87 (30 Dec 2023 05:33) (130/85 - 135/78)  BP(mean): --  RR: 18 (30 Dec 2023 05:33) (17 - 18)  SpO2: 100% (30 Dec 2023 05:33) (97% - 100%)    Parameters below as of 30 Dec 2023 05:33  Patient On (Oxygen Delivery Method): room air        Physical Exam:  General: NAD  HEENT: EOMI, MMM  Cardio: +S1/S2, RRR  Resp: CTA b/l  GI: +BS, soft, NT/ND  MSK:  Neuro: AAOx3  Psych: wnl    LABS:                        8.3    19.90 )-----------( 354      ( 30 Dec 2023 01:50 )             24.9     12-30    132<L>  |  99  |  12  ----------------------------<  155<H>  4.2   |  24  |  0.63    Ca    8.5      30 Dec 2023 01:50  Phos  2.5     12-29  Mg     2.10     12-30    TPro  6.4  /  Alb  2.6<L>  /  TBili  0.4  /  DBili  x   /  AST  72<H>  /  ALT  87<H>  /  AlkPhos  77  12-30    PTT - ( 30 Dec 2023 10:05 )  PTT:72.6 sec  Urinalysis Basic - ( 30 Dec 2023 01:50 )    Color: x / Appearance: x / SG: x / pH: x  Gluc: 155 mg/dL / Ketone: x  / Bili: x / Urobili: x   Blood: x / Protein: x / Nitrite: x   Leuk Esterase: x / RBC: x / WBC x   Sq Epi: x / Non Sq Epi: x / Bacteria: x          RADIOLOGY & ADDITIONAL TESTS:       TAYLA MARIE  [id number]    INTERVAL HPI/OVERNIGHT EVENTS:  Patient w/o fever since 12/28. He remains with chest tube and still draining fluid.       PMHx/PSHx/FamHx/SocHx reviewed and no significant changes    REVIEW OF SYSTEMS   - reviewed with patient and  negative other than as above or previously documented.     MEDICATIONS  (STANDING):  cefepime   IVPB 2000 milliGRAM(s) IV Intermittent every 8 hours  chlorhexidine 2% Cloths 1 Application(s) Topical daily  ciprofloxacin  0.3% Ophthalmic Solution for Otic Use 2 Drop(s) Both Ears two times a day  heparin  Infusion. 1300 Unit(s)/Hr (13 mL/Hr) IV Continuous <Continuous>  hydroxychloroquine 200 milliGRAM(s) Oral two times a day  lidocaine   4% Patch 1 Patch Transdermal every 24 hours  lidocaine   4% Patch 2 Patch Transdermal every 24 hours  melatonin 3 milliGRAM(s) Oral <User Schedule>  pantoprazole    Tablet 40 milliGRAM(s) Oral before breakfast  predniSONE   Tablet 60 milliGRAM(s) Oral daily  sodium chloride 1 Gram(s) Oral three times a day  sodium chloride 3%. 500 milliLiter(s) (30 mL/Hr) IV Continuous <Continuous>    MEDICATIONS  (PRN):  acetaminophen     Tablet .. 650 milliGRAM(s) Oral every 6 hours PRN Temp greater or equal to 38C (100.4F), Mild Pain (1 - 3)  albuterol/ipratropium for Nebulization 3 milliLiter(s) Nebulizer every 6 hours PRN Shortness of Breath and/or Wheezing  benzocaine/menthol Lozenge 1 Lozenge Oral four times a day PRN Sore Throat  FIRST- Mouthwash  BLM 15 milliLiter(s) Swish and Spit every 4 hours PRN Mouth Care  guaiFENesin Oral Liquid (Sugar-Free) 200 milliGRAM(s) Oral every 6 hours PRN Cough  heparin   Injectable 5500 Unit(s) IV Push every 6 hours PRN For aPTT less than 40  heparin   Injectable 2500 Unit(s) IV Push every 6 hours PRN For aPTT between 40 - 57  lidocaine 2% Viscous 5 milliLiter(s) Swish and Spit three times a day PRN Mouth Care  ondansetron Injectable 4 milliGRAM(s) IV Push every 8 hours PRN Nausea and/or Vomiting  oxyCODONE    IR 5 milliGRAM(s) Oral every 6 hours PRN Severe Pain (7 - 10)      Allergies    No Known Allergies    Intolerances          Vital Signs Last 24 Hrs  T(C): 36.9 (30 Dec 2023 05:33), Max: 36.9 (29 Dec 2023 21:45)  T(F): 98.4 (30 Dec 2023 05:33), Max: 98.5 (29 Dec 2023 21:45)  HR: 82 (30 Dec 2023 05:33) (78 - 99)  BP: 133/87 (30 Dec 2023 05:33) (130/85 - 135/78)  BP(mean): --  RR: 18 (30 Dec 2023 05:33) (17 - 18)  SpO2: 100% (30 Dec 2023 05:33) (97% - 100%)    Parameters below as of 30 Dec 2023 05:33  Patient On (Oxygen Delivery Method): room air        Physical Exam:  General: NAD  HEENT: MMM, PERRL, EOMI  Neck: No JVP elevation  CV: RRR, no m/r/g  Lung: decreased bs b/l. CT in place on Lt, no air leak  Abd: Soft, NT, ND  Ext: WWP, no CCE  Skin: rash behind ear bilateral  Neuro: A&Ox3, no focal deficits      LABS:                        8.3    19.90 )-----------( 354      ( 30 Dec 2023 01:50 )             24.9     12-30    132<L>  |  99  |  12  ----------------------------<  155<H>  4.2   |  24  |  0.63    Ca    8.5      30 Dec 2023 01:50  Phos  2.5     12-29  Mg     2.10     12-30    TPro  6.4  /  Alb  2.6<L>  /  TBili  0.4  /  DBili  x   /  AST  72<H>  /  ALT  87<H>  /  AlkPhos  77  12-30    PTT - ( 30 Dec 2023 10:05 )  PTT:72.6 sec  Urinalysis Basic - ( 30 Dec 2023 01:50 )    Color: x / Appearance: x / SG: x / pH: x  Gluc: 155 mg/dL / Ketone: x  / Bili: x / Urobili: x   Blood: x / Protein: x / Nitrite: x   Leuk Esterase: x / RBC: x / WBC x   Sq Epi: x / Non Sq Epi: x / Bacteria: x          RADIOLOGY & ADDITIONAL TESTS:       TAYLA MARIE  [id number]    INTERVAL HPI/OVERNIGHT EVENTS:  Patient w/o fever since 12/28. He remains with chest tube and still draining fluid.       PMHx/PSHx/FamHx/SocHx reviewed and no significant changes    REVIEW OF SYSTEMS   - reviewed with patient and  negative other than as above or previously documented.     MEDICATIONS  (STANDING):  cefepime   IVPB 2000 milliGRAM(s) IV Intermittent every 8 hours  chlorhexidine 2% Cloths 1 Application(s) Topical daily  ciprofloxacin  0.3% Ophthalmic Solution for Otic Use 2 Drop(s) Both Ears two times a day  heparin  Infusion. 1300 Unit(s)/Hr (13 mL/Hr) IV Continuous <Continuous>  hydroxychloroquine 200 milliGRAM(s) Oral two times a day  lidocaine   4% Patch 1 Patch Transdermal every 24 hours  lidocaine   4% Patch 2 Patch Transdermal every 24 hours  melatonin 3 milliGRAM(s) Oral <User Schedule>  pantoprazole    Tablet 40 milliGRAM(s) Oral before breakfast  predniSONE   Tablet 60 milliGRAM(s) Oral daily  sodium chloride 1 Gram(s) Oral three times a day  sodium chloride 3%. 500 milliLiter(s) (30 mL/Hr) IV Continuous <Continuous>    MEDICATIONS  (PRN):  acetaminophen     Tablet .. 650 milliGRAM(s) Oral every 6 hours PRN Temp greater or equal to 38C (100.4F), Mild Pain (1 - 3)  albuterol/ipratropium for Nebulization 3 milliLiter(s) Nebulizer every 6 hours PRN Shortness of Breath and/or Wheezing  benzocaine/menthol Lozenge 1 Lozenge Oral four times a day PRN Sore Throat  FIRST- Mouthwash  BLM 15 milliLiter(s) Swish and Spit every 4 hours PRN Mouth Care  guaiFENesin Oral Liquid (Sugar-Free) 200 milliGRAM(s) Oral every 6 hours PRN Cough  heparin   Injectable 5500 Unit(s) IV Push every 6 hours PRN For aPTT less than 40  heparin   Injectable 2500 Unit(s) IV Push every 6 hours PRN For aPTT between 40 - 57  lidocaine 2% Viscous 5 milliLiter(s) Swish and Spit three times a day PRN Mouth Care  ondansetron Injectable 4 milliGRAM(s) IV Push every 8 hours PRN Nausea and/or Vomiting  oxyCODONE    IR 5 milliGRAM(s) Oral every 6 hours PRN Severe Pain (7 - 10)      Allergies    No Known Allergies    Intolerances          Vital Signs Last 24 Hrs  T(C): 36.9 (30 Dec 2023 05:33), Max: 36.9 (29 Dec 2023 21:45)  T(F): 98.4 (30 Dec 2023 05:33), Max: 98.5 (29 Dec 2023 21:45)  HR: 82 (30 Dec 2023 05:33) (78 - 99)  BP: 133/87 (30 Dec 2023 05:33) (130/85 - 135/78)  BP(mean): --  RR: 18 (30 Dec 2023 05:33) (17 - 18)  SpO2: 100% (30 Dec 2023 05:33) (97% - 100%)    Parameters below as of 30 Dec 2023 05:33  Patient On (Oxygen Delivery Method): room air        Physical Exam:  General: NAD  HEENT: MMM, PERRL, EOMI  Neck: No JVP elevation  CV: RRR, no m/r/g  Lung: decreased bs b/l. CT in place on Lt, no air leak  Abd: Soft, NT, ND  Ext: WWP, no CCE  Skin: rash behind ear bilateral  Neuro: A&Ox3, no focal deficits      LABS:                        8.3    19.90 )-----------( 354      ( 30 Dec 2023 01:50 )             24.9     12-30    132<L>  |  99  |  12  ----------------------------<  155<H>  4.2   |  24  |  0.63    Ca    8.5      30 Dec 2023 01:50  Phos  2.5     12-29  Mg     2.10     12-30    TPro  6.4  /  Alb  2.6<L>  /  TBili  0.4  /  DBili  x   /  AST  72<H>  /  ALT  87<H>  /  AlkPhos  77  12-30    PTT - ( 30 Dec 2023 10:05 )  PTT:72.6 sec  Urinalysis Basic - ( 30 Dec 2023 01:50 )    Color: x / Appearance: x / SG: x / pH: x  Gluc: 155 mg/dL / Ketone: x  / Bili: x / Urobili: x   Blood: x / Protein: x / Nitrite: x   Leuk Esterase: x / RBC: x / WBC x   Sq Epi: x / Non Sq Epi: x / Bacteria: x          RADIOLOGY & ADDITIONAL TESTS:

## 2023-12-30 NOTE — PROGRESS NOTE ADULT - SUBJECTIVE AND OBJECTIVE BOX
Name of Patient : TAYLA MARIE  MRN: [id number]  Date of visit: 12-30-23       Subjective: Patient seen and examined. No new events except as noted.   doing okay     REVIEW OF SYSTEMS:    CONSTITUTIONAL: No weakness, fevers or chills  EYES/ENT: No visual changes;  No vertigo or throat pain   NECK: No pain or stiffness  RESPIRATORY: No cough, wheezing, hemoptysis; No shortness of breath  CARDIOVASCULAR: No chest pain or palpitations  GASTROINTESTINAL: No abdominal or epigastric pain. No nausea, vomiting, or hematemesis; No diarrhea or constipation. No melena or hematochezia.  GENITOURINARY: No dysuria, frequency or hematuria  NEUROLOGICAL: No numbness or weakness  SKIN: No itching, burning, rashes, or lesions   All other review of systems is negative unless indicated above.    MEDICATIONS:  MEDICATIONS  (STANDING):  cefepime   IVPB 2000 milliGRAM(s) IV Intermittent every 8 hours  chlorhexidine 2% Cloths 1 Application(s) Topical daily  ciprofloxacin  0.3% Ophthalmic Solution for Otic Use 2 Drop(s) Both Ears two times a day  heparin  Infusion. 1300 Unit(s)/Hr (13 mL/Hr) IV Continuous <Continuous>  hydroxychloroquine 200 milliGRAM(s) Oral two times a day  lidocaine   4% Patch 1 Patch Transdermal every 24 hours  lidocaine   4% Patch 2 Patch Transdermal every 24 hours  melatonin 3 milliGRAM(s) Oral <User Schedule>  pantoprazole    Tablet 40 milliGRAM(s) Oral before breakfast  predniSONE   Tablet 60 milliGRAM(s) Oral daily  sodium chloride 1 Gram(s) Oral three times a day  sodium chloride 3%. 500 milliLiter(s) (30 mL/Hr) IV Continuous <Continuous>      PHYSICAL EXAM:  T(C): 36.4 (12-30-23 @ 16:00), Max: 36.9 (12-29-23 @ 21:45)  HR: 82 (12-30-23 @ 16:00) (82 - 99)  BP: 127/87 (12-30-23 @ 16:00) (127/87 - 133/87)  RR: 18 (12-30-23 @ 16:00) (17 - 18)  SpO2: 100% (12-30-23 @ 16:00) (97% - 100%)  Wt(kg): --  I&O's Summary    29 Dec 2023 07:01  -  30 Dec 2023 07:00  --------------------------------------------------------  IN: 0 mL / OUT: 40 mL / NET: -40 mL    30 Dec 2023 07:01  -  30 Dec 2023 21:16  --------------------------------------------------------  IN: 0 mL / OUT: 0 mL / NET: 0 mL          Appearance: Normal	  HEENT:  PERRLA   Lymphatic: No lymphadenopathy   Cardiovascular: Normal S1 S2, no JVD  Respiratory: normal effort , chest tube  Gastrointestinal:  Soft, Non-tender  Skin: No rashes,  warm to touch  Psychiatry:  Mood & affect appropriate  Musculuskeletal: No edema    recent labs, Imaging and EKGs personally reviewed     12-29-23 @ 07:01  -  12-30-23 @ 07:00  --------------------------------------------------------  IN: 0 mL / OUT: 40 mL / NET: -40 mL    12-30-23 @ 07:01  -  12-30-23 @ 21:16  --------------------------------------------------------  IN: 0 mL / OUT: 0 mL / NET: 0 mL                          8.3    19.90 )-----------( 354      ( 30 Dec 2023 01:50 )             24.9               12-30    132<L>  |  99  |  12  ----------------------------<  155<H>  4.2   |  24  |  0.63    Ca    8.5      30 Dec 2023 01:50  Phos  2.5     12-29  Mg     2.10     12-30    TPro  6.4  /  Alb  2.6<L>  /  TBili  0.4  /  DBili  x   /  AST  72<H>  /  ALT  87<H>  /  AlkPhos  77  12-30    PTT - ( 30 Dec 2023 10:05 )  PTT:72.6 sec                   Urinalysis Basic - ( 30 Dec 2023 01:50 )    Color: x / Appearance: x / SG: x / pH: x  Gluc: 155 mg/dL / Ketone: x  / Bili: x / Urobili: x   Blood: x / Protein: x / Nitrite: x   Leuk Esterase: x / RBC: x / WBC x   Sq Epi: x / Non Sq Epi: x / Bacteria: x               Name of Patient : TAYLA MARIE  MRN: [id number]  Date of visit: 12-30-23       Subjective: Patient seen and examined. No new events except as noted.   doing okay     REVIEW OF SYSTEMS:    CONSTITUTIONAL: No weakness, fevers or chills  EYES/ENT: No visual changes;  No vertigo or throat pain   NECK: No pain or stiffness  RESPIRATORY: No cough, wheezing, hemoptysis; No shortness of breath  CARDIOVASCULAR: No chest pain or palpitations  GASTROINTESTINAL: No abdominal or epigastric pain. No nausea, vomiting, or hematemesis; No diarrhea or constipation. No melena or hematochezia.  GENITOURINARY: No dysuria, frequency or hematuria  NEUROLOGICAL: No numbness or weakness  SKIN: No itching, burning, rashes, or lesions   All other review of systems is negative unless indicated above.    MEDICATIONS:  MEDICATIONS  (STANDING):  cefepime   IVPB 2000 milliGRAM(s) IV Intermittent every 8 hours  chlorhexidine 2% Cloths 1 Application(s) Topical daily  ciprofloxacin  0.3% Ophthalmic Solution for Otic Use 2 Drop(s) Both Ears two times a day  heparin  Infusion. 1300 Unit(s)/Hr (13 mL/Hr) IV Continuous <Continuous>  hydroxychloroquine 200 milliGRAM(s) Oral two times a day  lidocaine   4% Patch 1 Patch Transdermal every 24 hours  lidocaine   4% Patch 2 Patch Transdermal every 24 hours  melatonin 3 milliGRAM(s) Oral <User Schedule>  pantoprazole    Tablet 40 milliGRAM(s) Oral before breakfast  predniSONE   Tablet 60 milliGRAM(s) Oral daily  sodium chloride 1 Gram(s) Oral three times a day  sodium chloride 3%. 500 milliLiter(s) (30 mL/Hr) IV Continuous <Continuous>      PHYSICAL EXAM:  T(C): 36.4 (12-30-23 @ 16:00), Max: 36.9 (12-29-23 @ 21:45)  HR: 82 (12-30-23 @ 16:00) (82 - 99)  BP: 127/87 (12-30-23 @ 16:00) (127/87 - 133/87)  RR: 18 (12-30-23 @ 16:00) (17 - 18)  SpO2: 100% (12-30-23 @ 16:00) (97% - 100%)  Wt(kg): --  I&O's Summary    29 Dec 2023 07:01  -  30 Dec 2023 07:00  --------------------------------------------------------  IN: 0 mL / OUT: 40 mL / NET: -40 mL    30 Dec 2023 07:01  -  30 Dec 2023 21:16  --------------------------------------------------------  IN: 0 mL / OUT: 0 mL / NET: 0 mL          Appearance: Normal	  HEENT:  PERRLA   Lymphatic: No lymphadenopathy   Cardiovascular: Normal S1 S2, no JVD  Respiratory: normal effort , chest tube  Gastrointestinal:  Soft, Non-tender  Skin: No rashes,  warm to touch  Psychiatry:  Mood & affect appropriate  Musculuskeletal: No edema    recent labs, Imaging and EKGs personally reviewed     12-29-23 @ 07:01  -  12-30-23 @ 07:00  --------------------------------------------------------  IN: 0 mL / OUT: 40 mL / NET: -40 mL    12-30-23 @ 07:01  -  12-30-23 @ 21:16  --------------------------------------------------------  IN: 0 mL / OUT: 0 mL / NET: 0 mL                          8.3    19.90 )-----------( 354      ( 30 Dec 2023 01:50 )             24.9               12-30    132<L>  |  99  |  12  ----------------------------<  155<H>  4.2   |  24  |  0.63    Ca    8.5      30 Dec 2023 01:50  Phos  2.5     12-29  Mg     2.10     12-30    TPro  6.4  /  Alb  2.6<L>  /  TBili  0.4  /  DBili  x   /  AST  72<H>  /  ALT  87<H>  /  AlkPhos  77  12-30    PTT - ( 30 Dec 2023 10:05 )  PTT:72.6 sec                   Urinalysis Basic - ( 30 Dec 2023 01:50 )    Color: x / Appearance: x / SG: x / pH: x  Gluc: 155 mg/dL / Ketone: x  / Bili: x / Urobili: x   Blood: x / Protein: x / Nitrite: x   Leuk Esterase: x / RBC: x / WBC x   Sq Epi: x / Non Sq Epi: x / Bacteria: x

## 2023-12-30 NOTE — PROGRESS NOTE ADULT - NUTRITIONAL ASSESSMENT
This patient has been assessed with a concern for Malnutrition and has been determined to have a diagnosis/diagnoses of Severe protein-calorie malnutrition.    This patient is being managed with:   Diet Regular-  Pureed (PUREED)  1000mL Fluid Restriction ([id number])  Supplement Feeding Modality:  Oral  Ensure Plus High Protein Cans or Servings Per Day:  1       Frequency:  Three Times a day  Entered: Dec 28 2023 11:48AM   This patient has been assessed with a concern for Malnutrition and has been determined to have a diagnosis/diagnoses of Severe protein-calorie malnutrition.    This patient is being managed with:   Diet Regular-  Pureed (PUREED)  1000mL Fluid Restriction ([id number])  Supplement Feeding Modality:  Oral  Ensure Plus High Protein Cans or Servings Per Day:  1       Frequency:  Three Times a day  Entered: Dec 28 2023 11:48AM

## 2023-12-30 NOTE — PROGRESS NOTE ADULT - ASSESSMENT
28 years old male with h/o Lupus (diagnosed in 09/2023 due to rash, oral ulcers, chest pain, and dyspnea, on Plaquenil) who presented to [place] ED on 12/12/23 with complaints of chest pain and SOB, found to have bilateral acute PE and bilateral pleural effusions. Transferred to [facility] for CT surgery evaluation. Also with fevers now improving. Rheumatology consulted given SLE history.     # Persistent fevers likely related to autoimmune disease    # B/l axillary and supraclavicular LAD   # SLE with patchy alopecia, rash, and ulcers   # Elevated CPK   # Bilateral Acute PE with pulmonary infarcts   # B/l hemorrhagic pleural effusions, s/p L chest tube 12/26   # Proteinuria   # One episode of generalized tonic-clonic seizure on 12/25   # Normocytic anemia  # Transaminitis      - Initially fever was thought to be possibly related to SLE but did not resolve after 3 days of steroids along with worsened clinical status with new neurological symptoms, thus steroids were stopped on 12/25   - Procalcitonin elevated at 8 but broad infectious workup overall unremarkable thus far, including multiple pleural fluid analyses and LP studies   - Workup thus far with ABDIAS 1:280 speckled, dsDNA 28, Sm >8, RNP >8, SSA >8, C3 83, C4 13, U/A 300 protein and moderate blood with repeat U/A 100 protein and small blood, urine protein/Cr 1.2 and 1.1, negative APS labs, negative Janine-1 ab, negative ribosomal P, negative syphilis screen, normocytic anemia, no evidence of hemolysis, ferritin 9.2k with low iron and TIBC, IgG4 WNL, elevated IgG, transaminitis, low vitamin D 25 21, elevated vitamin D 1,25 97, ACE elevated 125, negative RF and CCP, negative ANCA, aldolase WNL, EBV serologies c/w recent infection vs. reactivation   - Repeat CT imaging without obvious infectious source, mild decrease in axillary LAD and increase in supraclavicular LAD   - Pt with clinical manifestations and specific SLE serologies though unclear if current presentation is solely attributable to SLE. Concern for other rheumatologic disease (such as sarcoidosis, there have been reports of coexisting sarcoidosis and SLE). Underlying malignancy also needs to be ruled out   - Fevers resolved after restarting steroids on 12/28     Recommendations:   - F/u pleural cultures (including fungal) and cytology, GBM antibody, MPO/PR3, quantiferon, PS, PS/PT, myomarker panel, scleroderma antibody, centromere antibody, and RNA polymerase III   - Appreciate Dermatology evaluation of rash   - IR consulted for biopsy of lymph node but deferring. Reached out to IR and discussed with primary team the importance of doing lymph node biopsy to r/o other diseases (such as sarcoidosis and malignancy), and renal biopsy as well to r/o SLE nephritis   - S/p solumedrol 60 mg IV 12/28 and 12/29, will transition to prednisone 60 mg daily starting on 12/30  - C/w GI PPX, please start PCP PPX   - Spoke to PCP Dr. 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 Transferred to [facility] for CT surgery evaluation. Also with fevers now improving. Rheumatology consulted given SLE history.     # Persistent fevers likely related to autoimmune disease    # B/l axillary and supraclavicular LAD   # SLE with patchy alopecia, rash, and ulcers   # Elevated CPK   # Bilateral Acute PE with pulmonary infarcts   # B/l hemorrhagic pleural effusions, s/p L chest tube 12/26   # Proteinuria   # One episode of generalized tonic-clonic seizure on 12/25   # Normocytic anemia  # Transaminitis      - Initially fever was thought to be possibly related to SLE but did not resolve after 3 days of steroids along with worsened clinical status with new neurological symptoms, thus steroids were stopped on 12/25   - Procalcitonin elevated at 8 but broad infectious workup overall unremarkable thus far, including multiple pleural fluid analyses and LP studies   - Workup thus far with ABDIAS 1:280 speckled, dsDNA 28, Sm >8, RNP >8, SSA >8, C3 83, C4 13, U/A 300 protein and moderate blood with repeat U/A 100 protein and small blood, urine protein/Cr 1.2 and 1.1, negative APS labs, negative Janine-1 ab, negative ribosomal P, negative syphilis screen, normocytic anemia, no evidence of hemolysis, ferritin 9.2k with low iron and TIBC, IgG4 WNL, elevated IgG, transaminitis, low vitamin D 25 21, elevated vitamin D 1,25 97, ACE elevated 125, negative RF and CCP, negative ANCA, aldolase WNL, EBV serologies c/w recent infection vs. reactivation   - Repeat CT imaging without obvious infectious source, mild decrease in axillary LAD and increase in supraclavicular LAD   - Pt with clinical manifestations and specific SLE serologies though unclear if current presentation is solely attributable to SLE. Concern for other rheumatologic disease (such as sarcoidosis, there have been reports of coexisting sarcoidosis and SLE). Underlying malignancy also needs to be ruled out   - Fevers resolved after restarting steroids on 12/28     Recommendations:   - F/u pleural cultures (including fungal) and cytology, GBM antibody, MPO/PR3, quantiferon, PS, PS/PT, myomarker panel, scleroderma antibody, centromere antibody, and RNA polymerase III   - Appreciate Dermatology evaluation of rash   - IR consulted for biopsy of lymph node but deferring. Reached out to IR and discussed with primary team the importance of doing lymph node biopsy to r/o other diseases (such as sarcoidosis and malignancy), and renal biopsy as well to r/o SLE nephritis   - S/p solumedrol 60 mg IV 12/28 and 12/29, will transition to prednisone 60 mg daily starting on 12/30  - C/w GI PPX, please start PCP PPX   - Spoke to PCP Dr. 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 Transferred to [facility] for CT surgery evaluation. Also with fevers now improving. Rheumatology consulted given SLE history.     Serologies:   Positive: ABDIAS 1:280 speckled, Sm >8, RNP >8, SSA >8, hypocomplementemia, Pr/Cr 1.2 and 1.1, low vitamin D 25 21, elevated vitamin D 1,25 97, ACE elevated 125, negative RF and CCP, negative ANCA, aldolase WNL,  Negative: dsDNA 28,  C4 13, APS labs, negative Janine-1 ab, negative ribosomal P, negative syphilis screen,    #Fever  -Pleural effusion exudate w/negative culture and PCR  -Repeat CT imaging without obvious infectious source, mild decrease in axillary LAD, submentonian and submaxillary and increase supraclavicular LAD. No mediastinal lymphoadenopathy   -Pt with clinical manifestations and specific SLE serologies though unclear if current presentation is solely attributable to SLE. Concern for other rheumatologic disease (such as sarcoidosis, Kikuchi syndrome, Castleman disease, malignancy). Underlying malignancy also needs to be ruled out   - Fevers resolved after restarting steroids on 40 mg methylprednisolone 12/23-12/25, restarted 60 mg of methylprednisolone 12/28-12/29 and now on prednisone 60 mg PO    #SLE  +ve serology and hypocomplementemia improving after steroid trial   creatinine is stable but proteinuria +ve urine P/cr 1.1       #+ve Hepatitis core antibody     # Elevated CPK   Now down trending     # Bilateral Acute PE with pulmonary infarcts     Recommendations:   -pending pleural fungal and cytology, GBM antibody, MPO/PR3, quantiferon, PS, PS/PT, myomarker panel, scleroderma antibody, centromere antibody, and RNA polymerase III   -Dermatology is following for rash    -Previous discussed with primary team and IR for lymph node bx and renal biopsy as well to r/o SLE nephritis-highly recommended since this will also help us for management   -Keep on prednisone 60 mg daily   -Check G6PD for possible HCQ as outpatient   -C/w GI PPX, please start PCP PPX   -Recommend ID consult for hepatitis Core +ve and pending hepatitis B PCR    Dr. Siobhan Merritt MD  PGY-5 Rheumatology Fellow  Pager: [phone]   Available in teams        28 years old male with h/o Lupus (diagnosed in 09/2023 due to rash, oral ulcers, chest pain, and dyspnea, on Plaquenil) who presented to [place] ED on 12/12/23 with complaints of chest pain and SOB, found to have bilateral acute PE and bilateral pleural effusions. Transferred to [facility] for CT surgery evaluation. Also with fevers now improving. Rheumatology consulted given SLE history.     Serologies:   Positive: ABDIAS 1:280 speckled, Sm >8, RNP >8, SSA >8, hypocomplementemia, Pr/Cr 1.2 and 1.1, low vitamin D 25 21, elevated vitamin D 1,25 97, ACE elevated 125, negative RF and CCP, negative ANCA, aldolase WNL,  Negative: dsDNA 28,  C4 13, APS labs, negative Janine-1 ab, negative ribosomal P, negative syphilis screen,    #Fever  -Pleural effusion exudate w/negative culture and PCR  -Repeat CT imaging without obvious infectious source, mild decrease in axillary LAD, submentonian and submaxillary and increase supraclavicular LAD. No mediastinal lymphoadenopathy   -Pt with clinical manifestations and specific SLE serologies though unclear if current presentation is solely attributable to SLE. Concern for other rheumatologic disease (such as sarcoidosis, Kikuchi syndrome, Castleman disease, malignancy). Underlying malignancy also needs to be ruled out   - Fevers resolved after restarting steroids on 40 mg methylprednisolone 12/23-12/25, restarted 60 mg of methylprednisolone 12/28-12/29 and now on prednisone 60 mg PO    #SLE  +ve serology and hypocomplementemia improving after steroid trial   creatinine is stable but proteinuria +ve urine P/cr 1.1       #+ve Hepatitis core antibody     # Elevated CPK   Now down trending     # Bilateral Acute PE with pulmonary infarcts     Recommendations:   -pending pleural fungal and cytology, GBM antibody, MPO/PR3, quantiferon, PS, PS/PT, myomarker panel, scleroderma antibody, centromere antibody, and RNA polymerase III   -Dermatology is following for rash    -Previous discussed with primary team and IR for lymph node bx and renal biopsy as well to r/o SLE nephritis-highly recommended since this will also help us for management   -Keep on prednisone 60 mg daily   -Check G6PD for possible HCQ as outpatient   -C/w GI PPX, please start PCP PPX   -Recommend ID consult for hepatitis Core +ve and pending hepatitis B PCR    Dr. Siobhan Merritt MD  PGY-5 Rheumatology Fellow  Pager: [phone]   Available in teams        28 years old male with h/o Lupus (diagnosed in 09/2023 due to rash, oral ulcers, chest pain, and dyspnea, on Plaquenil) who presented to [place] ED on 12/12/23 with complaints of chest pain and SOB, found to have bilateral acute PE and bilateral pleural effusions. Transferred to [facility] for CT surgery evaluation. Also with fevers now improving. Rheumatology consulted given SLE history.     Serologies:   Positive: ABDIAS 1:280 speckled, Sm >8, RNP >8, SSA >8, hypocomplementemia, Pr/Cr 1.2 and 1.1, low vitamin D 25 21, elevated vitamin D 1,25 97, ACE elevated 125, negative RF and CCP, negative ANCA, aldolase WNL,  Negative: dsDNA 28,  C4 13, APS labs, negative Janine-1 ab, negative ribosomal P, negative syphilis screen,    #Fever  -Pleural effusion exudate w/negative culture and PCR  -Repeat CT imaging without obvious infectious source, mild decrease in axillary LAD, submentonian and submaxillary and increase supraclavicular LAD. No mediastinal lymphoadenopathy   -Pt with clinical manifestations and specific SLE serologies though unclear if current presentation is solely attributable to SLE. Concern for other rheumatologic disease (such as sarcoidosis, Kikuchi syndrome, Castleman disease, malignancy). Underlying malignancy also needs to be ruled out   - Fevers resolved after restarting steroids on 40 mg methylprednisolone 12/23-12/25, restarted 60 mg of methylprednisolone 12/28-12/29 and now on prednisone 60 mg PO    #SLE  +ve serology and hypocomplementemia improving after steroid trial   creatinine is stable but proteinuria +ve urine P/cr 1.1         # Elevated CPK   Now down trending     # Bilateral Acute PE with pulmonary infarcts     Recommendations:   -pending pleural fungal and cytology, GBM antibody, MPO/PR3, quantiferon, PS, PS/PT, myomarker panel, scleroderma antibody, centromere antibody, and RNA polymerase III   -Dermatology is following for rash    -Previous discussed with primary team and IR for lymph node bx and renal biopsy as well to r/o SLE nephritis-highly recommended since this will also help us for management   -Keep on prednisone 60 mg daily   -Check G6PD for possible HCQ as outpatient   -C/w GI PPX, please start PCP PPX       Dr. Siobhan Merritt MD  PGY-5 Rheumatology Fellow  Pager: [phone]   Available in teams        28 years old male with h/o Lupus (diagnosed in 09/2023 due to rash, oral ulcers, chest pain, and dyspnea, on Plaquenil) who presented to [place] ED on 12/12/23 with complaints of chest pain and SOB, found to have bilateral acute PE and bilateral pleural effusions. Transferred to [facility] for CT surgery evaluation. Also with fevers now improving. Rheumatology consulted given SLE history.     Serologies:   Positive: ABDIAS 1:280 speckled, Sm >8, RNP >8, SSA >8, hypocomplementemia, Pr/Cr 1.2 and 1.1, low vitamin D 25 21, elevated vitamin D 1,25 97, ACE elevated 125, negative RF and CCP, negative ANCA, aldolase WNL,  Negative: dsDNA 28,  C4 13, APS labs, negative Janine-1 ab, negative ribosomal P, negative syphilis screen,    #Fever  -Pleural effusion exudate w/negative culture and PCR  -Repeat CT imaging without obvious infectious source, mild decrease in axillary LAD, submentonian and submaxillary and increase supraclavicular LAD. No mediastinal lymphoadenopathy   -Pt with clinical manifestations and specific SLE serologies though unclear if current presentation is solely attributable to SLE. Concern for other rheumatologic disease (such as sarcoidosis, Kikuchi syndrome, Castleman disease, malignancy). Underlying malignancy also needs to be ruled out   - Fevers resolved after restarting steroids on 40 mg methylprednisolone 12/23-12/25, restarted 60 mg of methylprednisolone 12/28-12/29 and now on prednisone 60 mg PO    #SLE  +ve serology and hypocomplementemia improving after steroid trial   creatinine is stable but proteinuria +ve urine P/cr 1.1         # Elevated CPK   Now down trending     # Bilateral Acute PE with pulmonary infarcts     Recommendations:   -pending pleural fungal and cytology, GBM antibody, MPO/PR3, quantiferon, PS, PS/PT, myomarker panel, scleroderma antibody, centromere antibody, and RNA polymerase III   -Dermatology is following for rash    -Previous discussed with primary team and IR for lymph node bx and renal biopsy as well to r/o SLE nephritis-highly recommended since this will also help us for management   -Keep on prednisone 60 mg daily   -Check G6PD for possible HCQ as outpatient   -C/w GI PPX, please start PCP PPX       Dr. Siobhan Merritt MD  PGY-5 Rheumatology Fellow  Pager: [phone]   Available in teams        28 years old male with h/o Lupus (diagnosed in 09/2023 due to rash, oral ulcers, chest pain, and dyspnea, on Plaquenil) who presented to [place] ED on 12/12/23 with complaints of chest pain and SOB, found to have bilateral acute PE and bilateral pleural effusions. Transferred to [facility] for CT surgery evaluation. Also with fevers now improving. Rheumatology consulted given SLE history.     Serologies:   Positive: ABDIAS 1:280 speckled, Sm >8, RNP >8, SSA >8, hypocomplementemia, Pr/Cr 1.2 and 1.1, low vitamin D 25 21, elevated vitamin D 1,25 97, ACE elevated 125, negative RF and CCP, negative ANCA, aldolase WNL,  Negative: dsDNA 28,  C4 13, APS labs, negative Janine-1 ab, negative ribosomal P, negative syphilis screen,    #Fever  -Pleural effusion exudate w/negative culture and PCR  -Repeat CT imaging without obvious infectious source, mild decrease in axillary LAD, submentonian and submaxillary and increase supraclavicular LAD. No mediastinal lymphoadenopathy   -Pt with clinical manifestations and specific SLE serologies though unclear if current presentation is solely attributable to SLE. Concern for other rheumatologic disease (such as sarcoidosis, Kikuchi syndrome, Castleman disease, malignancy). Underlying malignancy also needs to be ruled out   - Fevers resolved after restarting steroids on 40 mg methylprednisolone 12/23-12/25, restarted 60 mg of methylprednisolone 12/28-12/29 and now on prednisone 60 mg PO    #SLE  +ve serology and hypocomplementemia improving after steroid trial   creatinine is stable but proteinuria +ve urine P/cr 1.1         # Elevated CPK   Now down trending     # Bilateral Acute PE with pulmonary infarcts     Recommendations:   -pending pleural fungal and cytology, GBM antibody, MPO/PR3, quantiferon, PS, PS/PT, myomarker panel, scleroderma antibody, centromere antibody, and RNA polymerase III   -Dermatology is following for rash    -Previous discussed with primary team and IR for lymph node bx and renal biopsy as well to r/o SLE nephritis-highly recommended since this will also help us for management (after discussion with patient's mother, patient might pursue renal bx outpat)   -Keep on prednisone 60 mg daily   -Check G6PD for possible HCQ as outpatient   -C/w GI PPX, please start PCP PPX       Dr. Siobhan Merritt MD  PGY-5 Rheumatology Fellow  Pager: [phone]   Available in teams        28 years old male with h/o Lupus (diagnosed in 09/2023 due to rash, oral ulcers, chest pain, and dyspnea, on Plaquenil) who presented to [place] ED on 12/12/23 with complaints of chest pain and SOB, found to have bilateral acute PE and bilateral pleural effusions. Transferred to [facility] for CT surgery evaluation. Also with fevers now improving. Rheumatology consulted given SLE history.     Serologies:   Positive: ABDIAS 1:280 speckled, Sm >8, RNP >8, SSA >8, hypocomplementemia, Pr/Cr 1.2 and 1.1, low vitamin D 25 21, elevated vitamin D 1,25 97, ACE elevated 125, negative RF and CCP, negative ANCA, aldolase WNL,  Negative: dsDNA 28,  C4 13, APS labs, negative Janine-1 ab, negative ribosomal P, negative syphilis screen,    #Fever  -Pleural effusion exudate w/negative culture and PCR  -Repeat CT imaging without obvious infectious source, mild decrease in axillary LAD, submentonian and submaxillary and increase supraclavicular LAD. No mediastinal lymphoadenopathy   -Pt with clinical manifestations and specific SLE serologies though unclear if current presentation is solely attributable to SLE. Concern for other rheumatologic disease (such as sarcoidosis, Kikuchi syndrome, Castleman disease, malignancy). Underlying malignancy also needs to be ruled out   - Fevers resolved after restarting steroids on 40 mg methylprednisolone 12/23-12/25, restarted 60 mg of methylprednisolone 12/28-12/29 and now on prednisone 60 mg PO    #SLE  +ve serology and hypocomplementemia improving after steroid trial   creatinine is stable but proteinuria +ve urine P/cr 1.1         # Elevated CPK   Now down trending     # Bilateral Acute PE with pulmonary infarcts     Recommendations:   -pending pleural fungal and cytology, GBM antibody, MPO/PR3, quantiferon, PS, PS/PT, myomarker panel, scleroderma antibody, centromere antibody, and RNA polymerase III   -Dermatology is following for rash    -Previous discussed with primary team and IR for lymph node bx and renal biopsy as well to r/o SLE nephritis-highly recommended since this will also help us for management (after discussion with patient's mother, patient might pursue renal bx outpat)   -Keep on prednisone 60 mg daily   -Check G6PD for possible HCQ as outpatient   -C/w GI PPX, please start PCP PPX       Dr. Siobhan Merritt MD  PGY-5 Rheumatology Fellow  Pager: [phone]   Available in teams

## 2023-12-30 NOTE — PROGRESS NOTE ADULT - ASSESSMENT
28 years old male with h/o Lupus ( diagnosed in 09/2023, on Plaquenil present to [place] ED on 12/12/23  with complain of chest pain and SOB. Patient reported left sided pleuritic chest pain which started 3 days ago. Pain is progressively worsened, associated with SOB and cough. Patient was seen in OSH ED and was prescribed antibiotics. Patient reported significantly worsening of left sided pleuritic chest pain today. No fever or chills. Patient reported loss of appetite and had a few episode of diarrhea for last 2 days. CTA chest with acute right upper lobe and left lower lobe segmental/subsegmental pulmonary emboli. No CT evidence of right heart strain. New bilateral lower lobe consolidations with areas of central clearing. Pneumonia and pulmonary infarcts are in the differential. New bilateral pleural effusions, small on the left and trace on the right. Bilateral axillary and supraclavicular adenopathy of unclear etiology. Patient was started on heparin ggt transitioned to eliquis on 12/19,  patient with worsening SOB/ hypoxia requiring nasal cannula oxygen supplementation. 12/20  Repeat Xray shows increased moderate left pleural effusion and compressive atelectasis trace right effusion and linear atelectasis. Thoracic team consulted for possible pigtail insertion Bedside US: Large left effusion with septations. Right simple effusion , + pericardial effusion- lower extremity dopplers negative for DVT.    # Pulm effusion/ Fever   S/P thoracentesis , followupp results   heparin for AC  TS care appreciated   O2 supplement   monitor output   Zosyn for now , ABx per ID   LP   Plan for LN biopsy       # Fever  SIRS   Abx   Rheum adn ID follow up       # Hyponatremia/ Seizure   RRT   Neuro follow up       #PE   on heparin , resume after thoracentesis   DVT negative  Heme onc eval   likley lupus related     #Hxof lupus  on hydroxychloroquine   Heme eval   Rheum eval   steroids per rheum       DVT andgIPPX    Discussed in detail with patient and mother at the bedside  28 years old male with h/o Lupus ( diagnosed in 09/2023, on Plaquenil present to [place] ED on 12/12/23  with complain of chest pain and SOB. Patient reported left sided pleuritic chest pain which started 3 days ago. Pain is progressively worsened, associated with SOB and cough. Patient was seen in OSH ED and was prescribed antibiotics. Patient reported significantly worsening of left sided pleuritic chest pain today. No fever or chills. Patient reported loss of appetite and had a few episode of diarrhea for last 2 days. CTA chest with acute right upper lobe and left lower lobe segmental/subsegmental pulmonary emboli. No CT evidence of right heart strain. New bilateral lower lobe consolidations with areas of central clearing. Pneumonia and pulmonary infarcts are in the differential. New bilateral pleural effusions, small on the left and trace on the right. Bilateral axillary and supraclavicular adenopathy of unclear etiology. Patient was started on heparin ggt transitioned to eliquis on 12/19,  patient with worsening SOB/ hypoxia requiring nasal cannula oxygen supplementation. 12/20  Repeat Xray shows increased moderate left pleural effusion and compressive atelectasis trace right effusion and linear atelectasis. Thoracic team consulted for possible pigtail insertion Bedside US: Large left effusion with septations. Right simple effusion , + pericardial effusion- lower extremity dopplers negative for DVT.    # Pulm effusion/ Fever   S/P thoracentesis , followupp results   heparin for AC  TS care appreciated   O2 supplement   monitor output   Zosyn for now , ABx per ID   LP   Plan for LN biopsy       # Fever  SIRS   Abx   Rheum adn ID follow up       # Hyponatremia/ Seizure   RRT   Neuro follow up       #PE   on heparin , resume after thoracentesis   DVT negative  Heme onc eval   likley lupus related     #Hxof lupus  on hydroxychloroquine   Heme eval   Rheum eval   steroids per rheum       DVT andgIPPX    Discussed in detail with patient and mother at the bedside

## 2023-12-31 LAB
ALBUMIN SERPL ELPH-MCNC: 2.9 G/DL — LOW (ref 3.3–5)
ALBUMIN SERPL ELPH-MCNC: 2.9 G/DL — LOW (ref 3.3–5)
ALP SERPL-CCNC: 107 U/L — SIGNIFICANT CHANGE UP (ref 40–120)
ALP SERPL-CCNC: 107 U/L — SIGNIFICANT CHANGE UP (ref 40–120)
ALT FLD-CCNC: 107 U/L — HIGH (ref 4–41)
ALT FLD-CCNC: 107 U/L — HIGH (ref 4–41)
ANION GAP SERPL CALC-SCNC: 10 MMOL/L — SIGNIFICANT CHANGE UP (ref 7–14)
ANION GAP SERPL CALC-SCNC: 10 MMOL/L — SIGNIFICANT CHANGE UP (ref 7–14)
ANION GAP SERPL CALC-SCNC: 8 MMOL/L — SIGNIFICANT CHANGE UP (ref 7–14)
ANION GAP SERPL CALC-SCNC: 8 MMOL/L — SIGNIFICANT CHANGE UP (ref 7–14)
APTT BLD: 86.7 SEC — HIGH (ref 24.5–35.6)
APTT BLD: 86.7 SEC — HIGH (ref 24.5–35.6)
AST SERPL-CCNC: 84 U/L — HIGH (ref 4–40)
AST SERPL-CCNC: 84 U/L — HIGH (ref 4–40)
BASOPHILS # BLD AUTO: 0.09 K/UL — SIGNIFICANT CHANGE UP (ref 0–0.2)
BASOPHILS # BLD AUTO: 0.09 K/UL — SIGNIFICANT CHANGE UP (ref 0–0.2)
BASOPHILS NFR BLD AUTO: 0.6 % — SIGNIFICANT CHANGE UP (ref 0–2)
BASOPHILS NFR BLD AUTO: 0.6 % — SIGNIFICANT CHANGE UP (ref 0–2)
BILIRUB SERPL-MCNC: 0.6 MG/DL — SIGNIFICANT CHANGE UP (ref 0.2–1.2)
BILIRUB SERPL-MCNC: 0.6 MG/DL — SIGNIFICANT CHANGE UP (ref 0.2–1.2)
BUN SERPL-MCNC: 11 MG/DL — SIGNIFICANT CHANGE UP (ref 7–23)
BUN SERPL-MCNC: 11 MG/DL — SIGNIFICANT CHANGE UP (ref 7–23)
BUN SERPL-MCNC: 12 MG/DL — SIGNIFICANT CHANGE UP (ref 7–23)
BUN SERPL-MCNC: 12 MG/DL — SIGNIFICANT CHANGE UP (ref 7–23)
CALCIUM SERPL-MCNC: 8.9 MG/DL — SIGNIFICANT CHANGE UP (ref 8.4–10.5)
CALCIUM SERPL-MCNC: 8.9 MG/DL — SIGNIFICANT CHANGE UP (ref 8.4–10.5)
CALCIUM SERPL-MCNC: 9.2 MG/DL — SIGNIFICANT CHANGE UP (ref 8.4–10.5)
CALCIUM SERPL-MCNC: 9.2 MG/DL — SIGNIFICANT CHANGE UP (ref 8.4–10.5)
CHLORIDE SERPL-SCNC: 100 MMOL/L — SIGNIFICANT CHANGE UP (ref 98–107)
CHLORIDE SERPL-SCNC: 100 MMOL/L — SIGNIFICANT CHANGE UP (ref 98–107)
CHLORIDE SERPL-SCNC: 101 MMOL/L — SIGNIFICANT CHANGE UP (ref 98–107)
CHLORIDE SERPL-SCNC: 101 MMOL/L — SIGNIFICANT CHANGE UP (ref 98–107)
CK MB BLD-MCNC: 1.7 % — SIGNIFICANT CHANGE UP (ref 0–2.5)
CK MB BLD-MCNC: 1.7 % — SIGNIFICANT CHANGE UP (ref 0–2.5)
CK MB CFR SERPL CALC: 1.8 NG/ML — SIGNIFICANT CHANGE UP
CK MB CFR SERPL CALC: 1.8 NG/ML — SIGNIFICANT CHANGE UP
CK SERPL-CCNC: 107 U/L — SIGNIFICANT CHANGE UP (ref 30–200)
CK SERPL-CCNC: 107 U/L — SIGNIFICANT CHANGE UP (ref 30–200)
CO2 SERPL-SCNC: 25 MMOL/L — SIGNIFICANT CHANGE UP (ref 22–31)
CREAT SERPL-MCNC: 0.64 MG/DL — SIGNIFICANT CHANGE UP (ref 0.5–1.3)
CULTURE RESULTS: SIGNIFICANT CHANGE UP
EGFR: 131 ML/MIN/1.73M2 — SIGNIFICANT CHANGE UP
EOSINOPHIL # BLD AUTO: 0.05 K/UL — SIGNIFICANT CHANGE UP (ref 0–0.5)
EOSINOPHIL # BLD AUTO: 0.05 K/UL — SIGNIFICANT CHANGE UP (ref 0–0.5)
EOSINOPHIL NFR BLD AUTO: 0.3 % — SIGNIFICANT CHANGE UP (ref 0–6)
EOSINOPHIL NFR BLD AUTO: 0.3 % — SIGNIFICANT CHANGE UP (ref 0–6)
GLUCOSE SERPL-MCNC: 122 MG/DL — HIGH (ref 70–99)
GLUCOSE SERPL-MCNC: 122 MG/DL — HIGH (ref 70–99)
GLUCOSE SERPL-MCNC: 126 MG/DL — HIGH (ref 70–99)
GLUCOSE SERPL-MCNC: 126 MG/DL — HIGH (ref 70–99)
HCT VFR BLD CALC: 23.8 % — LOW (ref 39–50)
HCT VFR BLD CALC: 23.8 % — LOW (ref 39–50)
HCT VFR BLD CALC: 24.9 % — LOW (ref 39–50)
HCT VFR BLD CALC: 24.9 % — LOW (ref 39–50)
HGB BLD-MCNC: 8 G/DL — LOW (ref 13–17)
HGB BLD-MCNC: 8 G/DL — LOW (ref 13–17)
HGB BLD-MCNC: 8.5 G/DL — LOW (ref 13–17)
HGB BLD-MCNC: 8.5 G/DL — LOW (ref 13–17)
IANC: 8.49 K/UL — HIGH (ref 1.8–7.4)
IANC: 8.49 K/UL — HIGH (ref 1.8–7.4)
IMM GRANULOCYTES NFR BLD AUTO: 10.2 % — HIGH (ref 0–0.9)
IMM GRANULOCYTES NFR BLD AUTO: 10.2 % — HIGH (ref 0–0.9)
INR BLD: 1.43 RATIO — HIGH (ref 0.85–1.18)
INR BLD: 1.43 RATIO — HIGH (ref 0.85–1.18)
LYMPHOCYTES # BLD AUTO: 24.6 % — SIGNIFICANT CHANGE UP (ref 13–44)
LYMPHOCYTES # BLD AUTO: 24.6 % — SIGNIFICANT CHANGE UP (ref 13–44)
LYMPHOCYTES # BLD AUTO: 3.88 K/UL — HIGH (ref 1–3.3)
LYMPHOCYTES # BLD AUTO: 3.88 K/UL — HIGH (ref 1–3.3)
MAGNESIUM SERPL-MCNC: 1.9 MG/DL — SIGNIFICANT CHANGE UP (ref 1.6–2.6)
MAGNESIUM SERPL-MCNC: 1.9 MG/DL — SIGNIFICANT CHANGE UP (ref 1.6–2.6)
MCHC RBC-ENTMCNC: 30.8 PG — SIGNIFICANT CHANGE UP (ref 27–34)
MCHC RBC-ENTMCNC: 30.8 PG — SIGNIFICANT CHANGE UP (ref 27–34)
MCHC RBC-ENTMCNC: 31.1 PG — SIGNIFICANT CHANGE UP (ref 27–34)
MCHC RBC-ENTMCNC: 31.1 PG — SIGNIFICANT CHANGE UP (ref 27–34)
MCHC RBC-ENTMCNC: 33.6 GM/DL — SIGNIFICANT CHANGE UP (ref 32–36)
MCHC RBC-ENTMCNC: 33.6 GM/DL — SIGNIFICANT CHANGE UP (ref 32–36)
MCHC RBC-ENTMCNC: 34.1 GM/DL — SIGNIFICANT CHANGE UP (ref 32–36)
MCHC RBC-ENTMCNC: 34.1 GM/DL — SIGNIFICANT CHANGE UP (ref 32–36)
MCV RBC AUTO: 91.2 FL — SIGNIFICANT CHANGE UP (ref 80–100)
MCV RBC AUTO: 91.2 FL — SIGNIFICANT CHANGE UP (ref 80–100)
MCV RBC AUTO: 91.5 FL — SIGNIFICANT CHANGE UP (ref 80–100)
MCV RBC AUTO: 91.5 FL — SIGNIFICANT CHANGE UP (ref 80–100)
MONOCYTES # BLD AUTO: 1.68 K/UL — HIGH (ref 0–0.9)
MONOCYTES # BLD AUTO: 1.68 K/UL — HIGH (ref 0–0.9)
MONOCYTES NFR BLD AUTO: 10.6 % — SIGNIFICANT CHANGE UP (ref 2–14)
MONOCYTES NFR BLD AUTO: 10.6 % — SIGNIFICANT CHANGE UP (ref 2–14)
NEUTROPHILS # BLD AUTO: 8.49 K/UL — HIGH (ref 1.8–7.4)
NEUTROPHILS # BLD AUTO: 8.49 K/UL — HIGH (ref 1.8–7.4)
NEUTROPHILS NFR BLD AUTO: 53.7 % — SIGNIFICANT CHANGE UP (ref 43–77)
NEUTROPHILS NFR BLD AUTO: 53.7 % — SIGNIFICANT CHANGE UP (ref 43–77)
NRBC # BLD: 0 /100 WBCS — SIGNIFICANT CHANGE UP (ref 0–0)
NRBC # FLD: 0 K/UL — SIGNIFICANT CHANGE UP (ref 0–0)
NRBC # FLD: 0 K/UL — SIGNIFICANT CHANGE UP (ref 0–0)
NRBC # FLD: 0.02 K/UL — HIGH (ref 0–0)
NRBC # FLD: 0.02 K/UL — HIGH (ref 0–0)
PHOSPHATE SERPL-MCNC: 2.4 MG/DL — LOW (ref 2.5–4.5)
PHOSPHATE SERPL-MCNC: 2.4 MG/DL — LOW (ref 2.5–4.5)
PLATELET # BLD AUTO: 338 K/UL — SIGNIFICANT CHANGE UP (ref 150–400)
PLATELET # BLD AUTO: 338 K/UL — SIGNIFICANT CHANGE UP (ref 150–400)
PLATELET # BLD AUTO: 351 K/UL — SIGNIFICANT CHANGE UP (ref 150–400)
PLATELET # BLD AUTO: 351 K/UL — SIGNIFICANT CHANGE UP (ref 150–400)
POTASSIUM SERPL-MCNC: 3.8 MMOL/L — SIGNIFICANT CHANGE UP (ref 3.5–5.3)
POTASSIUM SERPL-MCNC: 3.8 MMOL/L — SIGNIFICANT CHANGE UP (ref 3.5–5.3)
POTASSIUM SERPL-MCNC: 4.1 MMOL/L — SIGNIFICANT CHANGE UP (ref 3.5–5.3)
POTASSIUM SERPL-MCNC: 4.1 MMOL/L — SIGNIFICANT CHANGE UP (ref 3.5–5.3)
POTASSIUM SERPL-SCNC: 3.8 MMOL/L — SIGNIFICANT CHANGE UP (ref 3.5–5.3)
POTASSIUM SERPL-SCNC: 3.8 MMOL/L — SIGNIFICANT CHANGE UP (ref 3.5–5.3)
POTASSIUM SERPL-SCNC: 4.1 MMOL/L — SIGNIFICANT CHANGE UP (ref 3.5–5.3)
POTASSIUM SERPL-SCNC: 4.1 MMOL/L — SIGNIFICANT CHANGE UP (ref 3.5–5.3)
PROT SERPL-MCNC: 6.9 G/DL — SIGNIFICANT CHANGE UP (ref 6–8.3)
PROT SERPL-MCNC: 6.9 G/DL — SIGNIFICANT CHANGE UP (ref 6–8.3)
PROTHROM AB SERPL-ACNC: 16 SEC — HIGH (ref 9.5–13)
PROTHROM AB SERPL-ACNC: 16 SEC — HIGH (ref 9.5–13)
RBC # BLD: 2.6 M/UL — LOW (ref 4.2–5.8)
RBC # BLD: 2.6 M/UL — LOW (ref 4.2–5.8)
RBC # BLD: 2.73 M/UL — LOW (ref 4.2–5.8)
RBC # BLD: 2.73 M/UL — LOW (ref 4.2–5.8)
RBC # FLD: 15 % — HIGH (ref 10.3–14.5)
RBC # FLD: 15 % — HIGH (ref 10.3–14.5)
RBC # FLD: 15.1 % — HIGH (ref 10.3–14.5)
RBC # FLD: 15.1 % — HIGH (ref 10.3–14.5)
RNAP III AB SER-ACNC: 14 UNITS — SIGNIFICANT CHANGE UP (ref 0–19)
RNAP III AB SER-ACNC: 14 UNITS — SIGNIFICANT CHANGE UP (ref 0–19)
SODIUM SERPL-SCNC: 134 MMOL/L — LOW (ref 135–145)
SODIUM SERPL-SCNC: 134 MMOL/L — LOW (ref 135–145)
SODIUM SERPL-SCNC: 135 MMOL/L — SIGNIFICANT CHANGE UP (ref 135–145)
SODIUM SERPL-SCNC: 135 MMOL/L — SIGNIFICANT CHANGE UP (ref 135–145)
SPECIMEN SOURCE: SIGNIFICANT CHANGE UP
TROPONIN T, HIGH SENSITIVITY RESULT: 15 NG/L — SIGNIFICANT CHANGE UP
TROPONIN T, HIGH SENSITIVITY RESULT: 15 NG/L — SIGNIFICANT CHANGE UP
WBC # BLD: 15.8 K/UL — HIGH (ref 3.8–10.5)
WBC # BLD: 15.8 K/UL — HIGH (ref 3.8–10.5)
WBC # BLD: 16.66 K/UL — HIGH (ref 3.8–10.5)
WBC # BLD: 16.66 K/UL — HIGH (ref 3.8–10.5)
WBC # FLD AUTO: 15.8 K/UL — HIGH (ref 3.8–10.5)
WBC # FLD AUTO: 15.8 K/UL — HIGH (ref 3.8–10.5)
WBC # FLD AUTO: 16.66 K/UL — HIGH (ref 3.8–10.5)
WBC # FLD AUTO: 16.66 K/UL — HIGH (ref 3.8–10.5)

## 2023-12-31 PROCEDURE — 76604 US EXAM CHEST: CPT | Mod: 26,GC

## 2023-12-31 PROCEDURE — 99233 SBSQ HOSP IP/OBS HIGH 50: CPT | Mod: GC,25

## 2023-12-31 RX ORDER — SENNA PLUS 8.6 MG/1
2 TABLET ORAL AT BEDTIME
Refills: 0 | Status: DISCONTINUED | OUTPATIENT
Start: 2023-12-31 | End: 2024-01-08

## 2023-12-31 RX ORDER — POLYETHYLENE GLYCOL 3350 17 G/17G
17 POWDER, FOR SOLUTION ORAL
Refills: 0 | Status: DISCONTINUED | OUTPATIENT
Start: 2023-12-31 | End: 2024-01-08

## 2023-12-31 RX ADMIN — CEFEPIME 100 MILLIGRAM(S): 1 INJECTION, POWDER, FOR SOLUTION INTRAMUSCULAR; INTRAVENOUS at 22:47

## 2023-12-31 RX ADMIN — PANTOPRAZOLE SODIUM 40 MILLIGRAM(S): 20 TABLET, DELAYED RELEASE ORAL at 06:21

## 2023-12-31 RX ADMIN — SENNA PLUS 2 TABLET(S): 8.6 TABLET ORAL at 22:48

## 2023-12-31 RX ADMIN — LIDOCAINE 1 PATCH: 4 CREAM TOPICAL at 14:55

## 2023-12-31 RX ADMIN — LIDOCAINE 2 PATCH: 4 CREAM TOPICAL at 14:55

## 2023-12-31 RX ADMIN — LIDOCAINE 2 PATCH: 4 CREAM TOPICAL at 00:15

## 2023-12-31 RX ADMIN — LIDOCAINE 1 PATCH: 4 CREAM TOPICAL at 05:25

## 2023-12-31 RX ADMIN — Medication 200 MILLIGRAM(S): at 06:21

## 2023-12-31 RX ADMIN — Medication 60 MILLIGRAM(S): at 06:23

## 2023-12-31 RX ADMIN — LIDOCAINE 1 PATCH: 4 CREAM TOPICAL at 22:13

## 2023-12-31 RX ADMIN — OXYCODONE HYDROCHLORIDE 5 MILLIGRAM(S): 5 TABLET ORAL at 17:00

## 2023-12-31 RX ADMIN — LIDOCAINE 2 PATCH: 4 CREAM TOPICAL at 22:14

## 2023-12-31 RX ADMIN — SODIUM CHLORIDE 1 GRAM(S): 9 INJECTION INTRAMUSCULAR; INTRAVENOUS; SUBCUTANEOUS at 06:21

## 2023-12-31 RX ADMIN — CEFEPIME 100 MILLIGRAM(S): 1 INJECTION, POWDER, FOR SOLUTION INTRAMUSCULAR; INTRAVENOUS at 14:54

## 2023-12-31 RX ADMIN — Medication 200 MILLIGRAM(S): at 18:08

## 2023-12-31 RX ADMIN — HEPARIN SODIUM 1600 UNIT(S)/HR: 5000 INJECTION INTRAVENOUS; SUBCUTANEOUS at 09:19

## 2023-12-31 RX ADMIN — POLYETHYLENE GLYCOL 3350 17 GRAM(S): 17 POWDER, FOR SOLUTION ORAL at 22:47

## 2023-12-31 RX ADMIN — Medication 2 DROP(S): at 06:22

## 2023-12-31 RX ADMIN — SODIUM CHLORIDE 1 GRAM(S): 9 INJECTION INTRAMUSCULAR; INTRAVENOUS; SUBCUTANEOUS at 22:47

## 2023-12-31 RX ADMIN — SODIUM CHLORIDE 1 GRAM(S): 9 INJECTION INTRAMUSCULAR; INTRAVENOUS; SUBCUTANEOUS at 15:00

## 2023-12-31 RX ADMIN — HEPARIN SODIUM 1600 UNIT(S)/HR: 5000 INJECTION INTRAVENOUS; SUBCUTANEOUS at 19:58

## 2023-12-31 RX ADMIN — OXYCODONE HYDROCHLORIDE 5 MILLIGRAM(S): 5 TABLET ORAL at 15:00

## 2023-12-31 RX ADMIN — OXYCODONE HYDROCHLORIDE 5 MILLIGRAM(S): 5 TABLET ORAL at 07:19

## 2023-12-31 RX ADMIN — CEFEPIME 100 MILLIGRAM(S): 1 INJECTION, POWDER, FOR SOLUTION INTRAMUSCULAR; INTRAVENOUS at 06:22

## 2023-12-31 RX ADMIN — OXYCODONE HYDROCHLORIDE 5 MILLIGRAM(S): 5 TABLET ORAL at 06:19

## 2023-12-31 RX ADMIN — Medication 2 DROP(S): at 18:10

## 2023-12-31 RX ADMIN — LIDOCAINE 1 PATCH: 4 CREAM TOPICAL at 00:15

## 2023-12-31 RX ADMIN — HEPARIN SODIUM 1600 UNIT(S)/HR: 5000 INJECTION INTRAVENOUS; SUBCUTANEOUS at 09:24

## 2023-12-31 RX ADMIN — OXYCODONE HYDROCHLORIDE 5 MILLIGRAM(S): 5 TABLET ORAL at 00:52

## 2023-12-31 RX ADMIN — Medication 3 MILLIGRAM(S): at 22:48

## 2023-12-31 NOTE — PROGRESS NOTE ADULT - SUBJECTIVE AND OBJECTIVE BOX
Name of Patient : TAYLA MARIE  MRN: [id number]  Date of visit: 12-31-23       Subjective: Patient seen and examined. No new events except as noted.     REVIEW OF SYSTEMS:    CONSTITUTIONAL: No weakness, fevers or chills  EYES/ENT: No visual changes;  No vertigo or throat pain   NECK: No pain or stiffness  RESPIRATORY: No cough, wheezing, hemoptysis; No shortness of breath  CARDIOVASCULAR: No chest pain or palpitations  GASTROINTESTINAL: No abdominal or epigastric pain. No nausea, vomiting, or hematemesis; No diarrhea or constipation. No melena or hematochezia.  GENITOURINARY: No dysuria, frequency or hematuria  NEUROLOGICAL: No numbness or weakness  SKIN: No itching, burning, rashes, or lesions   All other review of systems is negative unless indicated above.    MEDICATIONS:  MEDICATIONS  (STANDING):  cefepime   IVPB 2000 milliGRAM(s) IV Intermittent every 8 hours  chlorhexidine 2% Cloths 1 Application(s) Topical daily  ciprofloxacin  0.3% Ophthalmic Solution for Otic Use 2 Drop(s) Both Ears two times a day  heparin  Infusion. 1300 Unit(s)/Hr (13 mL/Hr) IV Continuous <Continuous>  hydroxychloroquine 200 milliGRAM(s) Oral two times a day  lidocaine   4% Patch 1 Patch Transdermal every 24 hours  lidocaine   4% Patch 2 Patch Transdermal every 24 hours  melatonin 3 milliGRAM(s) Oral <User Schedule>  pantoprazole    Tablet 40 milliGRAM(s) Oral before breakfast  polyethylene glycol 3350 17 Gram(s) Oral two times a day  predniSONE   Tablet 60 milliGRAM(s) Oral daily  senna 2 Tablet(s) Oral at bedtime  sodium chloride 1 Gram(s) Oral three times a day  sodium chloride 3%. 500 milliLiter(s) (30 mL/Hr) IV Continuous <Continuous>      PHYSICAL EXAM:  T(C): 36.7 (12-31-23 @ 14:19), Max: 36.8 (12-31-23 @ 06:40)  HR: 69 (12-31-23 @ 14:19) (66 - 160)  BP: 138/85 (12-31-23 @ 14:19) (119/83 - 142/86)  RR: 18 (12-31-23 @ 14:19) (18 - 18)  SpO2: 98% (12-31-23 @ 14:19) (98% - 100%)  Wt(kg): --  I&O's Summary    30 Dec 2023 07:01  -  31 Dec 2023 07:00  --------------------------------------------------------  IN: 0 mL / OUT: 0 mL / NET: 0 mL          Appearance: Normal	  HEENT:  PERRLA   Lymphatic: No lymphadenopathy   Cardiovascular: Normal S1 S2, no JVD  Respiratory: normal effort , chest tube   Gastrointestinal:  Soft, Non-tender  Skin: No rashes,  warm to touch  Psychiatry:  Mood & affect appropriate  Musculuskeletal: No edema    recent labs, Imaging and EKGs personally reviewed     12-30-23 @ 07:01  -  12-31-23 @ 07:00  --------------------------------------------------------  IN: 0 mL / OUT: 0 mL / NET: 0 mL                          8.5    15.80 )-----------( 351      ( 31 Dec 2023 07:30 )             24.9               12-31    135  |  100  |  11  ----------------------------<  122<H>  3.8   |  25  |  0.64    Ca    9.2      31 Dec 2023 07:30  Phos  2.4     12-31  Mg     1.90     12-31    TPro  6.9  /  Alb  2.9<L>  /  TBili  0.6  /  DBili  x   /  AST  84<H>  /  ALT  107<H>  /  AlkPhos  107  12-31    PT/INR - ( 31 Dec 2023 07:30 )   PT: 16.0 sec;   INR: 1.43 ratio         PTT - ( 31 Dec 2023 07:30 )  PTT:86.7 sec       CARDIAC MARKERS ( 30 Dec 2023 23:20 )  x     / x     / 107 U/L / x     / 1.8 ng/mL              Urinalysis Basic - ( 31 Dec 2023 07:30 )    Color: x / Appearance: x / SG: x / pH: x  Gluc: 122 mg/dL / Ketone: x  / Bili: x / Urobili: x   Blood: x / Protein: x / Nitrite: x   Leuk Esterase: x / RBC: x / WBC x   Sq Epi: x / Non Sq Epi: x / Bacteria: x               Name of Patient : TAYLA MARIE  MRN: [id number]  Date of visit: 12-31-23       Subjective: Patient seen and examined. No new events except as noted.     REVIEW OF SYSTEMS:    CONSTITUTIONAL: No weakness, fevers or chills  EYES/ENT: No visual changes;  No vertigo or throat pain   NECK: No pain or stiffness  RESPIRATORY: No cough, wheezing, hemoptysis; No shortness of breath  CARDIOVASCULAR: No chest pain or palpitations  GASTROINTESTINAL: No abdominal or epigastric pain. No nausea, vomiting, or hematemesis; No diarrhea or constipation. No melena or hematochezia.  GENITOURINARY: No dysuria, frequency or hematuria  NEUROLOGICAL: No numbness or weakness  SKIN: No itching, burning, rashes, or lesions   All other review of systems is negative unless indicated above.    MEDICATIONS:  MEDICATIONS  (STANDING):  cefepime   IVPB 2000 milliGRAM(s) IV Intermittent every 8 hours  chlorhexidine 2% Cloths 1 Application(s) Topical daily  ciprofloxacin  0.3% Ophthalmic Solution for Otic Use 2 Drop(s) Both Ears two times a day  heparin  Infusion. 1300 Unit(s)/Hr (13 mL/Hr) IV Continuous <Continuous>  hydroxychloroquine 200 milliGRAM(s) Oral two times a day  lidocaine   4% Patch 1 Patch Transdermal every 24 hours  lidocaine   4% Patch 2 Patch Transdermal every 24 hours  melatonin 3 milliGRAM(s) Oral <User Schedule>  pantoprazole    Tablet 40 milliGRAM(s) Oral before breakfast  polyethylene glycol 3350 17 Gram(s) Oral two times a day  predniSONE   Tablet 60 milliGRAM(s) Oral daily  senna 2 Tablet(s) Oral at bedtime  sodium chloride 1 Gram(s) Oral three times a day  sodium chloride 3%. 500 milliLiter(s) (30 mL/Hr) IV Continuous <Continuous>      PHYSICAL EXAM:  T(C): 36.7 (12-31-23 @ 14:19), Max: 36.8 (12-31-23 @ 06:40)  HR: 69 (12-31-23 @ 14:19) (66 - 160)  BP: 138/85 (12-31-23 @ 14:19) (119/83 - 142/86)  RR: 18 (12-31-23 @ 14:19) (18 - 18)  SpO2: 98% (12-31-23 @ 14:19) (98% - 100%)  Wt(kg): --  I&O's Summary    30 Dec 2023 07:01  -  31 Dec 2023 07:00  --------------------------------------------------------  IN: 0 mL / OUT: 0 mL / NET: 0 mL          Appearance: Normal	  HEENT:  PERRLA   Lymphatic: No lymphadenopathy   Cardiovascular: Normal S1 S2, no JVD  Respiratory: normal effort , chest tube   Gastrointestinal:  Soft, Non-tender  Skin: No rashes,  warm to touch  Psychiatry:  Mood & affect appropriate  Musculuskeletal: No edema    recent labs, Imaging and EKGs personally reviewed     12-30-23 @ 07:01  -  12-31-23 @ 07:00  --------------------------------------------------------  IN: 0 mL / OUT: 0 mL / NET: 0 mL                          8.5    15.80 )-----------( 351      ( 31 Dec 2023 07:30 )             24.9               12-31    135  |  100  |  11  ----------------------------<  122<H>  3.8   |  25  |  0.64    Ca    9.2      31 Dec 2023 07:30  Phos  2.4     12-31  Mg     1.90     12-31    TPro  6.9  /  Alb  2.9<L>  /  TBili  0.6  /  DBili  x   /  AST  84<H>  /  ALT  107<H>  /  AlkPhos  107  12-31    PT/INR - ( 31 Dec 2023 07:30 )   PT: 16.0 sec;   INR: 1.43 ratio         PTT - ( 31 Dec 2023 07:30 )  PTT:86.7 sec       CARDIAC MARKERS ( 30 Dec 2023 23:20 )  x     / x     / 107 U/L / x     / 1.8 ng/mL              Urinalysis Basic - ( 31 Dec 2023 07:30 )    Color: x / Appearance: x / SG: x / pH: x  Gluc: 122 mg/dL / Ketone: x  / Bili: x / Urobili: x   Blood: x / Protein: x / Nitrite: x   Leuk Esterase: x / RBC: x / WBC x   Sq Epi: x / Non Sq Epi: x / Bacteria: x

## 2023-12-31 NOTE — PROGRESS NOTE ADULT - ASSESSMENT
28 years old male with h/o Lupus ( diagnosed in 09/2023, on Plaquenil present to [place] ED on 12/12/23  with complain of chest pain and SOB admitted for fevers, PE, pleural effusions, course c/b high fever and tonic-clonic seizure     #B/l pleural effusions  S/p Lt sided chest tube. S/p mist  exudative effusion, bloody, glucose is not low, ADA low, NGTD on cultures  - 50 cc output today, 40cc out yesterday  - holding off on alteplase/mist for now - higher risk of bleed while on AC  - CT clamped  - POCUS with trace effusion 28 years old male with h/o Lupus ( diagnosed in 09/2023, on Plaquenil present to [place] ED on 12/12/23  with complain of chest pain and SOB admitted for fevers, PE, pleural effusions, course c/b high fever and tonic-clonic seizure     #B/l pleural effusions  S/p Lt sided chest tube. S/p mist  exudative effusion, bloody, glucose is not low, ADA low, NGTD on cultures  - 50 cc output today, 40cc out yesterday  - holding off on alteplase/mist for now - higher risk of bleed while on AC  - CT clamped  - POCUS with trace effusion

## 2023-12-31 NOTE — PROGRESS NOTE ADULT - NUTRITIONAL ASSESSMENT
This patient has been assessed with a concern for Malnutrition and has been determined to have a diagnosis/diagnoses of Severe protein-calorie malnutrition.    This patient is being managed with:   Diet Regular-  Pureed (PUREED)  1000mL Fluid Restriction ([id number])  Supplement Feeding Modality:  Oral  Ensure Plus High Protein Cans or Servings Per Day:  1       Frequency:  Three Times a day  Entered: Dec 28 2023 11:48AM   This patient has been assessed with a concern for Malnutrition and has been determined to have a diagnosis/diagnoses of Severe protein-calorie malnutrition.    This patient is being managed with:   Diet Regular-  Pureed (PUREED)  1000mL Fluid Restriction ([id number])  Supplement Feeding Modality:  Oral  Ensure Plus High Protein Cans or Servings Per Day:  1       Frequency:  Three Times a day  Entered: Dec 28 2023 11:48AM

## 2023-12-31 NOTE — CHART NOTE - NSCHARTNOTEFT_GEN_A_CORE
Notified by RN pt tachycardic on pulse ox to 160s. Pt endorsing left sided chest pain at site of chest tube, chest pain is the same as he has been having. Pt sitting in bed, discomfort 2/2 pain. Lungs CTAB. Heart RRR. PRN oxy 5mg given early.     Vital Signs Last 24 Hrs  T(C): 36.6 (30 Dec 2023 23:44), Max: 36.9 (30 Dec 2023 05:33)  T(F): 97.9 (30 Dec 2023 23:44), Max: 98.4 (30 Dec 2023 05:33)  HR: 66 (30 Dec 2023 23:44) (66 - 160)  BP: 119/83 (30 Dec 2023 23:44) (119/83 - 133/87)  BP(mean): --  RR: 18 (30 Dec 2023 23:44) (18 - 18)  SpO2: 99% (30 Dec 2023 23:44) (99% - 100%)    Parameters below as of 30 Dec 2023 16:00  Patient On (Oxygen Delivery Method): room air      tachycardia likely 2/2 to pain vs inaccurate pulse ox reading.   - VSS. HR 66bpm.   - continue tylenol and oxy for pain control  - EKG ___  - f/u Mark, CBC, BMP Notified by RN pt tachycardic on pulse ox to 160s. Pt endorsing left sided chest pain at site of chest tube, chest pain is the same as he has been having. Pt sitting in bed, discomfort 2/2 pain. Lungs CTAB. Heart RRR. PRN oxy 5mg given early.     Vital Signs Last 24 Hrs  T(C): 36.6 (30 Dec 2023 23:44), Max: 36.9 (30 Dec 2023 05:33)  T(F): 97.9 (30 Dec 2023 23:44), Max: 98.4 (30 Dec 2023 05:33)  HR: 66 (30 Dec 2023 23:44) (66 - 160)  BP: 119/83 (30 Dec 2023 23:44) (119/83 - 133/87)  BP(mean): --  RR: 18 (30 Dec 2023 23:44) (18 - 18)  SpO2: 99% (30 Dec 2023 23:44) (99% - 100%)    Parameters below as of 30 Dec 2023 16:00  Patient On (Oxygen Delivery Method): room air      tachycardia likely 2/2 to pain vs inaccurate pulse ox reading.   - VSS. HR 66bpm.   - continue tylenol and oxy for pain control  - EKG: NSR no ischemic changes   - f/u Mark, CBC, BMP

## 2023-12-31 NOTE — PROGRESS NOTE ADULT - ASSESSMENT
28 years old male with h/o Lupus ( diagnosed in 09/2023, on Plaquenil present to [place] ED on 12/12/23  with complain of chest pain and SOB. Patient reported left sided pleuritic chest pain which started 3 days ago. Pain is progressively worsened, associated with SOB and cough. Patient was seen in OSH ED and was prescribed antibiotics. Patient reported significantly worsening of left sided pleuritic chest pain today. No fever or chills. Patient reported loss of appetite and had a few episode of diarrhea for last 2 days. CTA chest with acute right upper lobe and left lower lobe segmental/subsegmental pulmonary emboli. No CT evidence of right heart strain. New bilateral lower lobe consolidations with areas of central clearing. Pneumonia and pulmonary infarcts are in the differential. New bilateral pleural effusions, small on the left and trace on the right. Bilateral axillary and supraclavicular adenopathy of unclear etiology. Patient was started on heparin ggt transitioned to eliquis on 12/19,  patient with worsening SOB/ hypoxia requiring nasal cannula oxygen supplementation. 12/20  Repeat Xray shows increased moderate left pleural effusion and compressive atelectasis trace right effusion and linear atelectasis. Thoracic team consulted for possible pigtail insertion Bedside US: Large left effusion with septations. Right simple effusion , + pericardial effusion- lower extremity dopplers negative for DVT.    # Pulm effusion/ Fever   S/P thoracentesis , followupp results   heparin for AC  TS care appreciated   O2 supplement   monitor output   Zosyn for now , ABx per ID   LP   Plan for LN biopsy       # Fever  SIRS   Abx   Rheum adn ID follow up       # Hyponatremia/ Seizure   RRT   Neuro follow up       #PE   on heparin , resume after thoracentesis   DVT negative  Heme onc eval   likley lupus related     #Hxof lupus  on hydroxychloroquine   Heme eval   Rheum eval   steroids per rheum       DVT andgIPPX    Discussed in detail with patient and mother at the bedside  28 years old male with h/o Lupus ( diagnosed in 09/2023, on Plaquenil present to [place] ED on 12/12/23  with complain of chest pain and SOB. Patient reported left sided pleuritic chest pain which started 3 days ago. Pain is progressively worsened, associated with SOB and cough. Patient was seen in OSH ED and was prescribed antibiotics. Patient reported significantly worsening of left sided pleuritic chest pain today. No fever or chills. Patient reported loss of appetite and had a few episode of diarrhea for last 2 days. CTA chest with acute right upper lobe and left lower lobe segmental/subsegmental pulmonary emboli. No CT evidence of right heart strain. New bilateral lower lobe consolidations with areas of central clearing. Pneumonia and pulmonary infarcts are in the differential. New bilateral pleural effusions, small on the left and trace on the right. Bilateral axillary and supraclavicular adenopathy of unclear etiology. Patient was started on heparin ggt transitioned to eliquis on 12/19,  patient with worsening SOB/ hypoxia requiring nasal cannula oxygen supplementation. 12/20  Repeat Xray shows increased moderate left pleural effusion and compressive atelectasis trace right effusion and linear atelectasis. Thoracic team consulted for possible pigtail insertion Bedside US: Large left effusion with septations. Right simple effusion , + pericardial effusion- lower extremity dopplers negative for DVT.    # Pulm effusion/ Fever   S/P thoracentesis , followupp results   heparin for AC  TS care appreciated   O2 supplement   monitor output   Zosyn for now , ABx per ID   LP   Plan for LN biopsy       # Fever  SIRS   Abx   Rheum adn ID follow up       # Hyponatremia/ Seizure   RRT   Neuro follow up       #PE   on heparin , resume after thoracentesis   DVT negative  Heme onc eval   likley lupus related     #Hxof lupus  on hydroxychloroquine   Heme eval   Rheum eval   steroids per rheum       DVT andgIPPX    Discussed in detail with patient and mother at the bedside

## 2023-12-31 NOTE — PROGRESS NOTE ADULT - ATTENDING COMMENTS
28 years old male with h/o Lupus ( diagnosed in 09/2023, on Plaquenil p/w PE, pleural effusions s/p chest tube in the setting of possible lupus flare and serositis.     He has minimal output from his drain. Will plan to clamp and reassess space. If stable, can likely dc tube.   Continue A/C for PE  Rest per fellows note  Will need to follow up pleural fluid cytology

## 2023-12-31 NOTE — CHART NOTE - NSCHARTNOTEFT_GEN_A_CORE
SNa now WNL. Continue on Salt tabs and fluid restriction.     Signing off. Please reconsult as needed.  If you have any questions, please feel free to contact me.  Julito Lane MD  Nephrology Fellow  [id number] / [employer] Teams (Preferred)  (After 4pm or on weekends, please call the on-call Fellow) SNa now WNL. Continue on Salt tabs and fluid restriction.     Signing off. Please reconsult as needed.  If you have any questions, please feel free to contact me.  Julito Lane MD  Nephrology Fellow  [id number] / [employer] Teams (Preferred)  (After 4pm or on weekends, please call the on-call Fellow)

## 2023-12-31 NOTE — PROGRESS NOTE ADULT - NUTRITIONAL ASSESSMENT
This patient has been assessed with a concern for Malnutrition and has been determined to have a diagnosis/diagnoses of Severe protein-calorie malnutrition.    This patient is being managed with:   Diet Regular-  Pureed (PUREED)  1000mL Fluid Restriction ([id number])  Entered: Dec 27 2023  5:23PM   This patient has been assessed with a concern for Malnutrition and has been determined to have a diagnosis/diagnoses of Severe protein-calorie malnutrition.    This patient is being managed with:   Diet Regular-  Pureed (PUREED)  1000mL Fluid Restriction ([id number])  Entered: Dec 27 2023  5:23PM

## 2023-12-31 NOTE — PROGRESS NOTE ADULT - SUBJECTIVE AND OBJECTIVE BOX
CHIEF COMPLAINT:Patient is a 28y old  Male who presents with a chief complaint of fever (30 Dec 2023 11:43)      Interval Events:  pain has significantly improved  chest tube with 50cc output today    REVIEW OF SYSTEMS:  [x] All other systems negative except per HPI   [ ] Unable to assess ROS because ________    OBJECTIVE:  ICU Vital Signs Last 24 Hrs  T(C): 36.7 (31 Dec 2023 14:19), Max: 36.8 (31 Dec 2023 06:40)  T(F): 98.1 (31 Dec 2023 14:19), Max: 98.2 (31 Dec 2023 06:40)  HR: 69 (31 Dec 2023 14:19) (66 - 160)  BP: 138/85 (31 Dec 2023 14:19) (119/83 - 142/86)  BP(mean): --  ABP: --  ABP(mean): --  RR: 18 (31 Dec 2023 14:19) (18 - 18)  SpO2: 98% (31 Dec 2023 14:19) (98% - 100%)    O2 Parameters below as of 31 Dec 2023 14:19  Patient On (Oxygen Delivery Method): room air              12-30 @ 07:01  -  12-31 @ 07:00  --------------------------------------------------------  IN: 0 mL / OUT: 0 mL / NET: 0 mL        PHYSICAL EXAM:  Gen: NAD  HEENT: MMM, PERRL, EOMI  Neck: No JVP elevation  CV: RRR, no m/r/g  Lung: CT in place on Lt, no air leak  Abd: Soft, NT, ND  Ext: WWP, no CCE  Skin: No rash  Neuro: A&Ox3, no focal deficits    HOSPITAL MEDICATIONS:  MEDICATIONS  (STANDING):  cefepime   IVPB 2000 milliGRAM(s) IV Intermittent every 8 hours  chlorhexidine 2% Cloths 1 Application(s) Topical daily  ciprofloxacin  0.3% Ophthalmic Solution for Otic Use 2 Drop(s) Both Ears two times a day  heparin  Infusion. 1300 Unit(s)/Hr (13 mL/Hr) IV Continuous <Continuous>  hydroxychloroquine 200 milliGRAM(s) Oral two times a day  lidocaine   4% Patch 1 Patch Transdermal every 24 hours  lidocaine   4% Patch 2 Patch Transdermal every 24 hours  melatonin 3 milliGRAM(s) Oral <User Schedule>  pantoprazole    Tablet 40 milliGRAM(s) Oral before breakfast  polyethylene glycol 3350 17 Gram(s) Oral two times a day  predniSONE   Tablet 60 milliGRAM(s) Oral daily  senna 2 Tablet(s) Oral at bedtime  sodium chloride 1 Gram(s) Oral three times a day  sodium chloride 3%. 500 milliLiter(s) (30 mL/Hr) IV Continuous <Continuous>    MEDICATIONS  (PRN):  acetaminophen     Tablet .. 650 milliGRAM(s) Oral every 6 hours PRN Temp greater or equal to 38C (100.4F), Mild Pain (1 - 3)  albuterol/ipratropium for Nebulization 3 milliLiter(s) Nebulizer every 6 hours PRN Shortness of Breath and/or Wheezing  benzocaine/menthol Lozenge 1 Lozenge Oral four times a day PRN Sore Throat  FIRST- Mouthwash  BLM 15 milliLiter(s) Swish and Spit every 4 hours PRN Mouth Care  guaiFENesin Oral Liquid (Sugar-Free) 200 milliGRAM(s) Oral every 6 hours PRN Cough  heparin   Injectable 2500 Unit(s) IV Push every 6 hours PRN For aPTT between 40 - 57  heparin   Injectable 5500 Unit(s) IV Push every 6 hours PRN For aPTT less than 40  lidocaine 2% Viscous 5 milliLiter(s) Swish and Spit three times a day PRN Mouth Care  ondansetron Injectable 4 milliGRAM(s) IV Push every 8 hours PRN Nausea and/or Vomiting  oxyCODONE    IR 5 milliGRAM(s) Oral every 6 hours PRN Severe Pain (7 - 10)      LABS:    The Labs were reviewed by me   The Radiology was reviewed by me    EKG tracing reviewed by me    12-31    135  |  100  |  11  ----------------------------<  122<H>  3.8   |  25  |  0.64  12-30    134<L>  |  101  |  12  ----------------------------<  126<H>  4.1   |  25  |  0.64  12-30    132<L>  |  99  |  12  ----------------------------<  155<H>  4.2   |  24  |  0.63    Ca    9.2      31 Dec 2023 07:30  Ca    8.9      30 Dec 2023 23:20  Ca    8.5      30 Dec 2023 01:50  Phos  2.4     12-31  Mg     1.90     12-31    TPro  6.9  /  Alb  2.9<L>  /  TBili  0.6  /  DBili  x   /  AST  84<H>  /  ALT  107<H>  /  AlkPhos  107  12-31  TPro  6.4  /  Alb  2.6<L>  /  TBili  0.4  /  DBili  x   /  AST  72<H>  /  ALT  87<H>  /  AlkPhos  77  12-30  TPro  6.9  /  Alb  2.8<L>  /  TBili  0.6  /  DBili  x   /  AST  82<H>  /  ALT  97<H>  /  AlkPhos  80  12-29    Magnesium: 1.90 mg/dL (12-31-23 @ 07:30)  Magnesium: 2.10 mg/dL (12-30-23 @ 01:50)  Magnesium: 2.40 mg/dL (12-29-23 @ 10:54)    Phosphorus: 2.4 mg/dL (12-31-23 @ 07:30)  Phosphorus: 2.5 mg/dL (12-29-23 @ 10:54)      PT/INR - ( 31 Dec 2023 07:30 )   PT: 16.0 sec;   INR: 1.43 ratio         PTT - ( 31 Dec 2023 07:30 )  PTT:86.7 sec              Urinalysis Basic - ( 31 Dec 2023 07:30 )    Color: x / Appearance: x / SG: x / pH: x  Gluc: 122 mg/dL / Ketone: x  / Bili: x / Urobili: x   Blood: x / Protein: x / Nitrite: x   Leuk Esterase: x / RBC: x / WBC x   Sq Epi: x / Non Sq Epi: x / Bacteria: x                              8.5    15.80 )-----------( 351      ( 31 Dec 2023 07:30 )             24.9                         8.0    16.66 )-----------( 338      ( 30 Dec 2023 23:20 )             23.8                         8.3    19.90 )-----------( 354      ( 30 Dec 2023 01:50 )             24.9     CAPILLARY BLOOD GLUCOSE            MICROBIOLOGY:     RADIOLOGY:  [ ] Reviewed and interpreted by me    Point of Care Ultrasound Findings:    PFT:    EKG: CDU

## 2024-01-01 LAB
ALBUMIN SERPL ELPH-MCNC: 2.9 G/DL — LOW (ref 3.3–5)
ALBUMIN SERPL ELPH-MCNC: 2.9 G/DL — LOW (ref 3.3–5)
ALP SERPL-CCNC: 106 U/L — SIGNIFICANT CHANGE UP (ref 40–120)
ALP SERPL-CCNC: 106 U/L — SIGNIFICANT CHANGE UP (ref 40–120)
ALT FLD-CCNC: 100 U/L — HIGH (ref 4–41)
ALT FLD-CCNC: 100 U/L — HIGH (ref 4–41)
ANION GAP SERPL CALC-SCNC: 8 MMOL/L — SIGNIFICANT CHANGE UP (ref 7–14)
ANION GAP SERPL CALC-SCNC: 8 MMOL/L — SIGNIFICANT CHANGE UP (ref 7–14)
APPEARANCE UR: CLEAR — SIGNIFICANT CHANGE UP
APPEARANCE UR: CLEAR — SIGNIFICANT CHANGE UP
APTT BLD: 101.9 SEC — HIGH (ref 24.5–35.6)
APTT BLD: 101.9 SEC — HIGH (ref 24.5–35.6)
APTT BLD: 104.2 SEC — HIGH (ref 24.5–35.6)
APTT BLD: 104.2 SEC — HIGH (ref 24.5–35.6)
APTT BLD: 122.1 SEC — CRITICAL HIGH (ref 24.5–35.6)
APTT BLD: 122.1 SEC — CRITICAL HIGH (ref 24.5–35.6)
AST SERPL-CCNC: 64 U/L — HIGH (ref 4–40)
AST SERPL-CCNC: 64 U/L — HIGH (ref 4–40)
BACTERIA # UR AUTO: NEGATIVE /HPF — SIGNIFICANT CHANGE UP
BACTERIA # UR AUTO: NEGATIVE /HPF — SIGNIFICANT CHANGE UP
BASOPHILS # BLD AUTO: 0.08 K/UL — SIGNIFICANT CHANGE UP (ref 0–0.2)
BASOPHILS # BLD AUTO: 0.08 K/UL — SIGNIFICANT CHANGE UP (ref 0–0.2)
BASOPHILS NFR BLD AUTO: 0.6 % — SIGNIFICANT CHANGE UP (ref 0–2)
BASOPHILS NFR BLD AUTO: 0.6 % — SIGNIFICANT CHANGE UP (ref 0–2)
BILIRUB SERPL-MCNC: 0.4 MG/DL — SIGNIFICANT CHANGE UP (ref 0.2–1.2)
BILIRUB SERPL-MCNC: 0.4 MG/DL — SIGNIFICANT CHANGE UP (ref 0.2–1.2)
BILIRUB UR-MCNC: NEGATIVE — SIGNIFICANT CHANGE UP
BILIRUB UR-MCNC: NEGATIVE — SIGNIFICANT CHANGE UP
BUN SERPL-MCNC: 12 MG/DL — SIGNIFICANT CHANGE UP (ref 7–23)
BUN SERPL-MCNC: 12 MG/DL — SIGNIFICANT CHANGE UP (ref 7–23)
CALCIUM SERPL-MCNC: 9.3 MG/DL — SIGNIFICANT CHANGE UP (ref 8.4–10.5)
CALCIUM SERPL-MCNC: 9.3 MG/DL — SIGNIFICANT CHANGE UP (ref 8.4–10.5)
CAST: 0 /LPF — SIGNIFICANT CHANGE UP (ref 0–4)
CAST: 0 /LPF — SIGNIFICANT CHANGE UP (ref 0–4)
CHLORIDE SERPL-SCNC: 99 MMOL/L — SIGNIFICANT CHANGE UP (ref 98–107)
CHLORIDE SERPL-SCNC: 99 MMOL/L — SIGNIFICANT CHANGE UP (ref 98–107)
CO2 SERPL-SCNC: 26 MMOL/L — SIGNIFICANT CHANGE UP (ref 22–31)
CO2 SERPL-SCNC: 26 MMOL/L — SIGNIFICANT CHANGE UP (ref 22–31)
COLOR SPEC: YELLOW — SIGNIFICANT CHANGE UP
COLOR SPEC: YELLOW — SIGNIFICANT CHANGE UP
CREAT SERPL-MCNC: 0.67 MG/DL — SIGNIFICANT CHANGE UP (ref 0.5–1.3)
CREAT SERPL-MCNC: 0.67 MG/DL — SIGNIFICANT CHANGE UP (ref 0.5–1.3)
CULTURE RESULTS: SIGNIFICANT CHANGE UP
DIFF PNL FLD: ABNORMAL
DIFF PNL FLD: ABNORMAL
EGFR: 130 ML/MIN/1.73M2 — SIGNIFICANT CHANGE UP
EGFR: 130 ML/MIN/1.73M2 — SIGNIFICANT CHANGE UP
EOSINOPHIL # BLD AUTO: 0.07 K/UL — SIGNIFICANT CHANGE UP (ref 0–0.5)
EOSINOPHIL # BLD AUTO: 0.07 K/UL — SIGNIFICANT CHANGE UP (ref 0–0.5)
EOSINOPHIL NFR BLD AUTO: 0.5 % — SIGNIFICANT CHANGE UP (ref 0–6)
EOSINOPHIL NFR BLD AUTO: 0.5 % — SIGNIFICANT CHANGE UP (ref 0–6)
GLUCOSE SERPL-MCNC: 116 MG/DL — HIGH (ref 70–99)
GLUCOSE SERPL-MCNC: 116 MG/DL — HIGH (ref 70–99)
GLUCOSE UR QL: NEGATIVE MG/DL — SIGNIFICANT CHANGE UP
GLUCOSE UR QL: NEGATIVE MG/DL — SIGNIFICANT CHANGE UP
HCT VFR BLD CALC: 26.9 % — LOW (ref 39–50)
HCT VFR BLD CALC: 26.9 % — LOW (ref 39–50)
HGB BLD-MCNC: 8.9 G/DL — LOW (ref 13–17)
HGB BLD-MCNC: 8.9 G/DL — LOW (ref 13–17)
IANC: 8.1 K/UL — HIGH (ref 1.8–7.4)
IANC: 8.1 K/UL — HIGH (ref 1.8–7.4)
IMM GRANULOCYTES NFR BLD AUTO: 13.4 % — HIGH (ref 0–0.9)
IMM GRANULOCYTES NFR BLD AUTO: 13.4 % — HIGH (ref 0–0.9)
INR BLD: 1.43 RATIO — HIGH (ref 0.85–1.18)
INR BLD: 1.43 RATIO — HIGH (ref 0.85–1.18)
KETONES UR-MCNC: NEGATIVE MG/DL — SIGNIFICANT CHANGE UP
KETONES UR-MCNC: NEGATIVE MG/DL — SIGNIFICANT CHANGE UP
LEUKOCYTE ESTERASE UR-ACNC: NEGATIVE — SIGNIFICANT CHANGE UP
LEUKOCYTE ESTERASE UR-ACNC: NEGATIVE — SIGNIFICANT CHANGE UP
LYMPHOCYTES # BLD AUTO: 17.4 % — SIGNIFICANT CHANGE UP (ref 13–44)
LYMPHOCYTES # BLD AUTO: 17.4 % — SIGNIFICANT CHANGE UP (ref 13–44)
LYMPHOCYTES # BLD AUTO: 2.5 K/UL — SIGNIFICANT CHANGE UP (ref 1–3.3)
LYMPHOCYTES # BLD AUTO: 2.5 K/UL — SIGNIFICANT CHANGE UP (ref 1–3.3)
MAGNESIUM SERPL-MCNC: 2 MG/DL — SIGNIFICANT CHANGE UP (ref 1.6–2.6)
MAGNESIUM SERPL-MCNC: 2 MG/DL — SIGNIFICANT CHANGE UP (ref 1.6–2.6)
MCHC RBC-ENTMCNC: 30.5 PG — SIGNIFICANT CHANGE UP (ref 27–34)
MCHC RBC-ENTMCNC: 30.5 PG — SIGNIFICANT CHANGE UP (ref 27–34)
MCHC RBC-ENTMCNC: 33.1 GM/DL — SIGNIFICANT CHANGE UP (ref 32–36)
MCHC RBC-ENTMCNC: 33.1 GM/DL — SIGNIFICANT CHANGE UP (ref 32–36)
MCV RBC AUTO: 92.1 FL — SIGNIFICANT CHANGE UP (ref 80–100)
MCV RBC AUTO: 92.1 FL — SIGNIFICANT CHANGE UP (ref 80–100)
MONOCYTES # BLD AUTO: 1.72 K/UL — HIGH (ref 0–0.9)
MONOCYTES # BLD AUTO: 1.72 K/UL — HIGH (ref 0–0.9)
MONOCYTES NFR BLD AUTO: 11.9 % — SIGNIFICANT CHANGE UP (ref 2–14)
MONOCYTES NFR BLD AUTO: 11.9 % — SIGNIFICANT CHANGE UP (ref 2–14)
NEUTROPHILS # BLD AUTO: 8.1 K/UL — HIGH (ref 1.8–7.4)
NEUTROPHILS # BLD AUTO: 8.1 K/UL — HIGH (ref 1.8–7.4)
NEUTROPHILS NFR BLD AUTO: 56.2 % — SIGNIFICANT CHANGE UP (ref 43–77)
NEUTROPHILS NFR BLD AUTO: 56.2 % — SIGNIFICANT CHANGE UP (ref 43–77)
NITRITE UR-MCNC: NEGATIVE — SIGNIFICANT CHANGE UP
NITRITE UR-MCNC: NEGATIVE — SIGNIFICANT CHANGE UP
NRBC # BLD: 0 /100 WBCS — SIGNIFICANT CHANGE UP (ref 0–0)
NRBC # BLD: 0 /100 WBCS — SIGNIFICANT CHANGE UP (ref 0–0)
NRBC # FLD: 0.08 K/UL — HIGH (ref 0–0)
NRBC # FLD: 0.08 K/UL — HIGH (ref 0–0)
PH UR: 7 — SIGNIFICANT CHANGE UP (ref 5–8)
PH UR: 7 — SIGNIFICANT CHANGE UP (ref 5–8)
PHOSPHATE SERPL-MCNC: 3.1 MG/DL — SIGNIFICANT CHANGE UP (ref 2.5–4.5)
PHOSPHATE SERPL-MCNC: 3.1 MG/DL — SIGNIFICANT CHANGE UP (ref 2.5–4.5)
PLATELET # BLD AUTO: 313 K/UL — SIGNIFICANT CHANGE UP (ref 150–400)
PLATELET # BLD AUTO: 313 K/UL — SIGNIFICANT CHANGE UP (ref 150–400)
POTASSIUM SERPL-MCNC: 4.1 MMOL/L — SIGNIFICANT CHANGE UP (ref 3.5–5.3)
POTASSIUM SERPL-MCNC: 4.1 MMOL/L — SIGNIFICANT CHANGE UP (ref 3.5–5.3)
POTASSIUM SERPL-SCNC: 4.1 MMOL/L — SIGNIFICANT CHANGE UP (ref 3.5–5.3)
POTASSIUM SERPL-SCNC: 4.1 MMOL/L — SIGNIFICANT CHANGE UP (ref 3.5–5.3)
PROT SERPL-MCNC: 6.8 G/DL — SIGNIFICANT CHANGE UP (ref 6–8.3)
PROT SERPL-MCNC: 6.8 G/DL — SIGNIFICANT CHANGE UP (ref 6–8.3)
PROT UR-MCNC: 30 MG/DL
PROT UR-MCNC: 30 MG/DL
PROTHROM AB SERPL-ACNC: 15.8 SEC — HIGH (ref 9.5–13)
PROTHROM AB SERPL-ACNC: 15.8 SEC — HIGH (ref 9.5–13)
RBC # BLD: 2.92 M/UL — LOW (ref 4.2–5.8)
RBC # BLD: 2.92 M/UL — LOW (ref 4.2–5.8)
RBC # FLD: 14.8 % — HIGH (ref 10.3–14.5)
RBC # FLD: 14.8 % — HIGH (ref 10.3–14.5)
RBC CASTS # UR COMP ASSIST: >50 /HPF — SIGNIFICANT CHANGE UP (ref 0–4)
RBC CASTS # UR COMP ASSIST: >50 /HPF — SIGNIFICANT CHANGE UP (ref 0–4)
SODIUM SERPL-SCNC: 133 MMOL/L — LOW (ref 135–145)
SODIUM SERPL-SCNC: 133 MMOL/L — LOW (ref 135–145)
SP GR SPEC: 1.02 — SIGNIFICANT CHANGE UP (ref 1–1.03)
SP GR SPEC: 1.02 — SIGNIFICANT CHANGE UP (ref 1–1.03)
SPECIMEN SOURCE: SIGNIFICANT CHANGE UP
SQUAMOUS # UR AUTO: 0 /HPF — SIGNIFICANT CHANGE UP (ref 0–5)
SQUAMOUS # UR AUTO: 0 /HPF — SIGNIFICANT CHANGE UP (ref 0–5)
UROBILINOGEN FLD QL: 0.2 MG/DL — SIGNIFICANT CHANGE UP (ref 0.2–1)
UROBILINOGEN FLD QL: 0.2 MG/DL — SIGNIFICANT CHANGE UP (ref 0.2–1)
WBC # BLD: 14.4 K/UL — HIGH (ref 3.8–10.5)
WBC # BLD: 14.4 K/UL — HIGH (ref 3.8–10.5)
WBC # FLD AUTO: 14.4 K/UL — HIGH (ref 3.8–10.5)
WBC # FLD AUTO: 14.4 K/UL — HIGH (ref 3.8–10.5)
WBC UR QL: 1 /HPF — SIGNIFICANT CHANGE UP (ref 0–5)
WBC UR QL: 1 /HPF — SIGNIFICANT CHANGE UP (ref 0–5)

## 2024-01-01 PROCEDURE — 76604 US EXAM CHEST: CPT | Mod: 26,GC

## 2024-01-01 PROCEDURE — 99233 SBSQ HOSP IP/OBS HIGH 50: CPT | Mod: GC,25

## 2024-01-01 RX ADMIN — LIDOCAINE 2 PATCH: 4 CREAM TOPICAL at 04:09

## 2024-01-01 RX ADMIN — OXYCODONE HYDROCHLORIDE 5 MILLIGRAM(S): 5 TABLET ORAL at 01:00

## 2024-01-01 RX ADMIN — HEPARIN SODIUM 1400 UNIT(S)/HR: 5000 INJECTION INTRAVENOUS; SUBCUTANEOUS at 19:34

## 2024-01-01 RX ADMIN — SODIUM CHLORIDE 1 GRAM(S): 9 INJECTION INTRAMUSCULAR; INTRAVENOUS; SUBCUTANEOUS at 12:43

## 2024-01-01 RX ADMIN — Medication 3 MILLIGRAM(S): at 21:52

## 2024-01-01 RX ADMIN — OXYCODONE HYDROCHLORIDE 5 MILLIGRAM(S): 5 TABLET ORAL at 01:30

## 2024-01-01 RX ADMIN — POLYETHYLENE GLYCOL 3350 17 GRAM(S): 17 POWDER, FOR SOLUTION ORAL at 12:43

## 2024-01-01 RX ADMIN — HEPARIN SODIUM 1500 UNIT(S)/HR: 5000 INJECTION INTRAVENOUS; SUBCUTANEOUS at 07:41

## 2024-01-01 RX ADMIN — SODIUM CHLORIDE 1 GRAM(S): 9 INJECTION INTRAMUSCULAR; INTRAVENOUS; SUBCUTANEOUS at 05:25

## 2024-01-01 RX ADMIN — CEFEPIME 100 MILLIGRAM(S): 1 INJECTION, POWDER, FOR SOLUTION INTRAMUSCULAR; INTRAVENOUS at 21:49

## 2024-01-01 RX ADMIN — HEPARIN SODIUM 1500 UNIT(S)/HR: 5000 INJECTION INTRAVENOUS; SUBCUTANEOUS at 05:55

## 2024-01-01 RX ADMIN — CEFEPIME 100 MILLIGRAM(S): 1 INJECTION, POWDER, FOR SOLUTION INTRAMUSCULAR; INTRAVENOUS at 05:23

## 2024-01-01 RX ADMIN — Medication 200 MILLIGRAM(S): at 05:25

## 2024-01-01 RX ADMIN — PANTOPRAZOLE SODIUM 40 MILLIGRAM(S): 20 TABLET, DELAYED RELEASE ORAL at 05:25

## 2024-01-01 RX ADMIN — Medication 200 MILLIGRAM(S): at 17:22

## 2024-01-01 RX ADMIN — Medication 2 DROP(S): at 05:26

## 2024-01-01 RX ADMIN — Medication 2 DROP(S): at 17:22

## 2024-01-01 RX ADMIN — SENNA PLUS 2 TABLET(S): 8.6 TABLET ORAL at 21:51

## 2024-01-01 RX ADMIN — HEPARIN SODIUM 1300 UNIT(S)/HR: 5000 INJECTION INTRAVENOUS; SUBCUTANEOUS at 20:46

## 2024-01-01 RX ADMIN — POLYETHYLENE GLYCOL 3350 17 GRAM(S): 17 POWDER, FOR SOLUTION ORAL at 21:52

## 2024-01-01 RX ADMIN — CEFEPIME 100 MILLIGRAM(S): 1 INJECTION, POWDER, FOR SOLUTION INTRAMUSCULAR; INTRAVENOUS at 12:47

## 2024-01-01 RX ADMIN — SODIUM CHLORIDE 1 GRAM(S): 9 INJECTION INTRAMUSCULAR; INTRAVENOUS; SUBCUTANEOUS at 21:52

## 2024-01-01 RX ADMIN — Medication 60 MILLIGRAM(S): at 05:25

## 2024-01-01 RX ADMIN — LIDOCAINE 1 PATCH: 4 CREAM TOPICAL at 04:09

## 2024-01-01 RX ADMIN — HEPARIN SODIUM 1400 UNIT(S)/HR: 5000 INJECTION INTRAVENOUS; SUBCUTANEOUS at 13:34

## 2024-01-01 RX ADMIN — HEPARIN SODIUM 1500 UNIT(S)/HR: 5000 INJECTION INTRAVENOUS; SUBCUTANEOUS at 07:43

## 2024-01-01 NOTE — CONSULT NOTE ADULT - ASSESSMENT
Interventional Radiology    Evaluate for Procedure:     HPI: 27 yo M with h/o Lupus ( diagnosed in 09/2023, on Plaquenil present to [place] ED on 12/12/23  with complain of chest pain and SOB admitted for fevers, PE, pleural effusions, course c/b high fever and tonic-clonic seizure. IR c/s for renal bx in setting of concern for lupus nephritis.     Allergies: No Known Allergies    Medications (Abx/Cardiac/Anticoagulation/Blood Products)    cefepime   IVPB: 100 mL/Hr IV Intermittent (01-01 @ 12:47)  heparin  Infusion.: 1400 Unit(s)/Hr IV Continuous (01-01 @ 13:35)  hydroxychloroquine: 200 milliGRAM(s) Oral (01-01 @ 17:22)    Data:    T(C): 36.9  HR: 82  BP: 132/85  RR: 18  SpO2: 100%    -WBC 14.40 / HgB 8.9 / Hct 26.9 / Plt 313  -Na 133 / Cl 99 / BUN 12 / Glucose 116  -K 4.1 / CO2 26 / Cr 0.67  - / Alk Phos 106 / T.Bili 0.4  -INR -- / .9      Radiology:     Assessment/Plan:   27 yo M with h/o Lupus ( diagnosed in 09/2023, on Plaquenil present to [place] ED on 12/12/23  with complain of chest pain and SOB admitted for fevers, PE, pleural effusions, course c/b high fever and tonic-clonic seizure. IR c/s for renal bx in setting of concern for lupus nephritis.     -- Given high risk of bleeding after renal biopsy, that would require cessation of anticoagulation, recommend resolution of current acute issues prior to proceeding with renal biopsy. If absolutely necessary after risk versus benefit discussion favoring proceeding with biopsy more urgently, can re-consult and can re-evaluate at that time. Please reach out with any questions or concerns.    Lawson Johns M.D.  PGY4/R3, Interventional Radiology Resident    -Available on [employer] TEAMS for all non-urgent questions  -Emergent issues: [facility]-p.[phone]; [facility]-p.[phone] ([phone])  -Non-emergent consults: Please place a [place] order "Consult-Interventional Radiology" with an appropriate callback number  -Scheduling questions: [facility]: [phone]; [facility]: [phone]  -Clinic/Outpatient booking: [facility]: [phone]; [facility]: [phone]   Interventional Radiology    Evaluate for Procedure:     HPI: 27 yo M with h/o Lupus ( diagnosed in 09/2023, on Plaquenil present to [place] ED on 12/12/23  with complain of chest pain and SOB admitted for fevers, PE, pleural effusions, course c/b high fever and tonic-clonic seizure. IR c/s for renal bx in setting of concern for lupus nephritis.     Allergies: No Known Allergies    Medications (Abx/Cardiac/Anticoagulation/Blood Products)    cefepime   IVPB: 100 mL/Hr IV Intermittent (01-01 @ 12:47)  heparin  Infusion.: 1400 Unit(s)/Hr IV Continuous (01-01 @ 13:35)  hydroxychloroquine: 200 milliGRAM(s) Oral (01-01 @ 17:22)    Data:    T(C): 36.9  HR: 82  BP: 132/85  RR: 18  SpO2: 100%    -WBC 14.40 / HgB 8.9 / Hct 26.9 / Plt 313  -Na 133 / Cl 99 / BUN 12 / Glucose 116  -K 4.1 / CO2 26 / Cr 0.67  - / Alk Phos 106 / T.Bili 0.4  -INR -- / .9      Radiology:     Assessment/Plan:   27 yo M with h/o Lupus ( diagnosed in 09/2023, on Plaquenil present to [place] ED on 12/12/23  with complain of chest pain and SOB admitted for fevers, PE, pleural effusions, course c/b high fever and tonic-clonic seizure. IR c/s for renal bx in setting of concern for lupus nephritis.     -- Given high risk of bleeding after renal biopsy, that would require cessation of anticoagulation, recommend resolution of current acute issues prior to proceeding with renal biopsy. If absolutely necessary after risk versus benefit discussion favoring proceeding with biopsy more urgently, can re-consult and can re-evaluate at that time. Please reach out with any questions or concerns.    Lawson Johns M.D.  PGY4/R3, Interventional Radiology Resident    -Available on [employer] TEAMS for all non-urgent questions  -Emergent issues: [facility]-p.[phone]; [facility]-p.[phone] ([phone])  -Non-emergent consults: Please place a [place] order "Consult-Interventional Radiology" with an appropriate callback number  -Scheduling questions: [facility]: [phone]; [facility]: [phone]  -Clinic/Outpatient booking: [facility]: [phone]; [facility]: [phone]   Interventional Radiology    Evaluate for Procedure:     HPI: 27 yo M with h/o Lupus ( diagnosed in 09/2023, on Plaquenil present to [place] ED on 12/12/23  with complain of chest pain and SOB admitted for fevers, PE, pleural effusions, course c/b high fever and tonic-clonic seizure. IR c/s for renal bx in setting of concern for lupus nephritis.     Allergies: No Known Allergies    Medications (Abx/Cardiac/Anticoagulation/Blood Products)    cefepime   IVPB: 100 mL/Hr IV Intermittent (01-01 @ 12:47)  heparin  Infusion.: 1400 Unit(s)/Hr IV Continuous (01-01 @ 13:35)  hydroxychloroquine: 200 milliGRAM(s) Oral (01-01 @ 17:22)    Data:    T(C): 36.9  HR: 82  BP: 132/85  RR: 18  SpO2: 100%    -WBC 14.40 / HgB 8.9 / Hct 26.9 / Plt 313  -Na 133 / Cl 99 / BUN 12 / Glucose 116  -K 4.1 / CO2 26 / Cr 0.67  - / Alk Phos 106 / T.Bili 0.4  -INR -- / .9      Radiology:     Assessment/Plan:   27 yo M with h/o Lupus ( diagnosed in 09/2023, on Plaquenil present to [place] ED on 12/12/23  with complain of chest pain and SOB admitted for fevers, PE, pleural effusions, course c/b high fever and tonic-clonic seizure. IR c/s for renal bx in setting of concern for lupus nephritis.     -- Given high risk of bleeding after renal biopsy, that would require holding anticoagulation both pre and post procedure, recommend resolution of current acute issues prior to proceeding with renal biopsy. If absolutely necessary after risk versus benefit discussion favoring proceeding with biopsy more urgently, can re-consult and can re-evaluate at that time. Please reach out with any questions or concerns.    Lawson Johns M.D.  PGY4/R3, Interventional Radiology Resident    -Available on [employer] TEAMS for all non-urgent questions  -Emergent issues: [facility]-p.[phone]; [facility]-p.[phone] ([phone])  -Non-emergent consults: Please place a [place] order "Consult-Interventional Radiology" with an appropriate callback number  -Scheduling questions: [facility]: [phone]; [facility]: [phone]  -Clinic/Outpatient booking: [facility]: [phone]; [facility]: [phone]   Interventional Radiology    Evaluate for Procedure:     HPI: 29 yo M with h/o Lupus ( diagnosed in 09/2023, on Plaquenil present to [place] ED on 12/12/23  with complain of chest pain and SOB admitted for fevers, PE, pleural effusions, course c/b high fever and tonic-clonic seizure. IR c/s for renal bx in setting of concern for lupus nephritis.     Allergies: No Known Allergies    Medications (Abx/Cardiac/Anticoagulation/Blood Products)    cefepime   IVPB: 100 mL/Hr IV Intermittent (01-01 @ 12:47)  heparin  Infusion.: 1400 Unit(s)/Hr IV Continuous (01-01 @ 13:35)  hydroxychloroquine: 200 milliGRAM(s) Oral (01-01 @ 17:22)    Data:    T(C): 36.9  HR: 82  BP: 132/85  RR: 18  SpO2: 100%    -WBC 14.40 / HgB 8.9 / Hct 26.9 / Plt 313  -Na 133 / Cl 99 / BUN 12 / Glucose 116  -K 4.1 / CO2 26 / Cr 0.67  - / Alk Phos 106 / T.Bili 0.4  -INR -- / .9      Radiology:     Assessment/Plan:   29 yo M with h/o Lupus ( diagnosed in 09/2023, on Plaquenil present to [place] ED on 12/12/23  with complain of chest pain and SOB admitted for fevers, PE, pleural effusions, course c/b high fever and tonic-clonic seizure. IR c/s for renal bx in setting of concern for lupus nephritis.     -- Given high risk of bleeding after renal biopsy, that would require holding anticoagulation both pre and post procedure, recommend resolution of current acute issues prior to proceeding with renal biopsy. If absolutely necessary after risk versus benefit discussion favoring proceeding with biopsy more urgently, can re-consult and can re-evaluate at that time. Please reach out with any questions or concerns.    Lawson Johns M.D.  PGY4/R3, Interventional Radiology Resident    -Available on [employer] TEAMS for all non-urgent questions  -Emergent issues: [facility]-p.[phone]; [facility]-p.[phone] ([phone])  -Non-emergent consults: Please place a [place] order "Consult-Interventional Radiology" with an appropriate callback number  -Scheduling questions: [facility]: [phone]; [facility]: [phone]  -Clinic/Outpatient booking: [facility]: [phone]; [facility]: [phone]

## 2024-01-01 NOTE — PROGRESS NOTE ADULT - ASSESSMENT
28 years old male with h/o Lupus ( diagnosed in 09/2023, on Plaquenil present to [place] ED on 12/12/23  with complain of chest pain and SOB. Patient reported left sided pleuritic chest pain which started 3 days ago. Pain is progressively worsened, associated with SOB and cough. Patient was seen in OSH ED and was prescribed antibiotics. Patient reported significantly worsening of left sided pleuritic chest pain today. No fever or chills. Patient reported loss of appetite and had a few episode of diarrhea for last 2 days. CTA chest with acute right upper lobe and left lower lobe segmental/subsegmental pulmonary emboli. No CT evidence of right heart strain. New bilateral lower lobe consolidations with areas of central clearing. Pneumonia and pulmonary infarcts are in the differential. New bilateral pleural effusions, small on the left and trace on the right. Bilateral axillary and supraclavicular adenopathy of unclear etiology. Patient was started on heparin ggt transitioned to eliquis on 12/19,  patient with worsening SOB/ hypoxia requiring nasal cannula oxygen supplementation. 12/20  Repeat Xray shows increased moderate left pleural effusion and compressive atelectasis trace right effusion and linear atelectasis. Thoracic team consulted for possible pigtail insertion Bedside US: Large left effusion with septations. Right simple effusion , + pericardial effusion- lower extremity dopplers negative for DVT.    # Pulm effusion/ Fever   S/P thoracentesis , followupp results   heparin for AC  TS care appreciated   O2 supplement   monitor output   Zosyn for now , ABx per ID   LP   Plan for LN biopsy by IR   Hematuria noted   Urology eval called       # Fever  SIRS   Abx   Rheum adn ID follow up       # Hyponatremia/ Seizure   RRT   Neuro follow up       #PE   on heparin , resume after thoracentesis   DVT negative  Heme onc eval   likley lupus related     #Hxof lupus  on hydroxychloroquine   Heme eval   Rheum eval   steroids per rheum       DVT andgIPPX    Discussed in detail with patient and mother at the bedside  28 years old male with h/o Lupus ( diagnosed in 09/2023, on Plaquenil present to [place] ED on 12/12/23  with complain of chest pain and SOB. Patient reported left sided pleuritic chest pain which started 3 days ago. Pain is progressively worsened, associated with SOB and cough. Patient was seen in OSH ED and was prescribed antibiotics. Patient reported significantly worsening of left sided pleuritic chest pain today. No fever or chills. Patient reported loss of appetite and had a few episode of diarrhea for last 2 days. CTA chest with acute right upper lobe and left lower lobe segmental/subsegmental pulmonary emboli. No CT evidence of right heart strain. New bilateral lower lobe consolidations with areas of central clearing. Pneumonia and pulmonary infarcts are in the differential. New bilateral pleural effusions, small on the left and trace on the right. Bilateral axillary and supraclavicular adenopathy of unclear etiology. Patient was started on heparin ggt transitioned to eliquis on 12/19,  patient with worsening SOB/ hypoxia requiring nasal cannula oxygen supplementation. 12/20  Repeat Xray shows increased moderate left pleural effusion and compressive atelectasis trace right effusion and linear atelectasis. Thoracic team consulted for possible pigtail insertion Bedside US: Large left effusion with septations. Right simple effusion , + pericardial effusion- lower extremity dopplers negative for DVT.    # Pulm effusion/ Fever   S/P thoracentesis , followupp results   heparin for AC  TS care appreciated   O2 supplement   monitor output   Zosyn for now , ABx per ID   LP   Plan for LN biopsy by IR   Hematuria noted   Urology eval called       # Fever  SIRS   Abx   Rheum adn ID follow up       # Hyponatremia/ Seizure   RRT   Neuro follow up       #PE   on heparin , resume after thoracentesis   DVT negative  Heme onc eval   likley lupus related     #Hxof lupus  on hydroxychloroquine   Heme eval   Rheum eval   steroids per rheum       DVT andgIPPX    Discussed in detail with patient and mother at the bedside

## 2024-01-01 NOTE — PROGRESS NOTE ADULT - NUTRITIONAL ASSESSMENT
This patient has been assessed with a concern for Malnutrition and has been determined to have a diagnosis/diagnoses of Severe protein-calorie malnutrition.    This patient is being managed with:   Diet Regular-  Pureed (PUREED)  1000mL Fluid Restriction ([id number])  Entered: Dec 27 2023  5:23PM   This patient has been assessed with a concern for Malnutrition and has been determined to have a diagnosis/diagnoses of Severe protein-calorie malnutrition.    This patient is being managed with:   Diet Regular-  Pureed (PUREED)  1000mL Fluid Restriction ([id number])  Entered: Dec 27 2023  5:23PM

## 2024-01-01 NOTE — PROGRESS NOTE ADULT - ATTENDING COMMENTS
No 28 years old male with h/o Lupus ( diagnosed in 09/2023, on Plaquenil p/w PE, pleural effusions s/p chest tube in the setting of possible lupus flare and serositis.     Tolerated clamp trial. No residual fluid on US. Drain removed.  Continue A/C for PE  Rest per fellows note  Will need to follow up pleural fluid cytology

## 2024-01-01 NOTE — PROGRESS NOTE ADULT - SUBJECTIVE AND OBJECTIVE BOX
CHIEF COMPLAINT:Patient is a 28y old  Male who presents with a chief complaint of fever (30 Dec 2023 11:43)      Interval Events:  reports feeling well today. Pain remains controlled.    REVIEW OF SYSTEMS:  [x] All other systems negative except per HPI   [ ] Unable to assess ROS because ________    OBJECTIVE:  ICU Vital Signs Last 24 Hrs  T(C): 37.2 (2024 20:44), Max: 37.2 (2024 20:44)  T(F): 98.9 (2024 20:44), Max: 98.9 (2024 20:44)  HR: 61 (2024 20:44) (60 - 82)  BP: 116/82 (2024 20:44) (116/82 - 133/85)  BP(mean): --  ABP: --  ABP(mean): --  RR: 17 (2024 20:44) (17 - 18)  SpO2: 100% (2024 20:44) (98% - 100%)    O2 Parameters below as of 2024 20:44  Patient On (Oxygen Delivery Method): room air              12-31 @ 07:01  -  -01 @ 07:00  --------------------------------------------------------  IN: 0 mL / OUT: 0 mL / NET: 0 mL        PHYSICAL EXAM:  HEENT: MMM, PERRL, EOMI  Neck: No JVP elevation  CV: RRR, no m/r/g  Lung: CT in place on Lt, clamped  Abd: Soft, NT, ND  Ext: WWP, no CCE  Skin: No rash  Neuro: A&Ox3, no focal deficits      HOSPITAL MEDICATIONS:  MEDICATIONS  (STANDING):  cefepime   IVPB 2000 milliGRAM(s) IV Intermittent every 8 hours  chlorhexidine 2% Cloths 1 Application(s) Topical daily  ciprofloxacin  0.3% Ophthalmic Solution for Otic Use 2 Drop(s) Both Ears two times a day  heparin  Infusion. 1300 Unit(s)/Hr (13 mL/Hr) IV Continuous <Continuous>  hydroxychloroquine 200 milliGRAM(s) Oral two times a day  lidocaine   4% Patch 1 Patch Transdermal every 24 hours  lidocaine   4% Patch 2 Patch Transdermal every 24 hours  melatonin 3 milliGRAM(s) Oral <User Schedule>  pantoprazole    Tablet 40 milliGRAM(s) Oral before breakfast  polyethylene glycol 3350 17 Gram(s) Oral two times a day  predniSONE   Tablet 60 milliGRAM(s) Oral daily  senna 2 Tablet(s) Oral at bedtime  sodium chloride 1 Gram(s) Oral three times a day  sodium chloride 3%. 500 milliLiter(s) (30 mL/Hr) IV Continuous <Continuous>    MEDICATIONS  (PRN):  acetaminophen     Tablet .. 650 milliGRAM(s) Oral every 6 hours PRN Temp greater or equal to 38C (100.4F), Mild Pain (1 - 3)  albuterol/ipratropium for Nebulization 3 milliLiter(s) Nebulizer every 6 hours PRN Shortness of Breath and/or Wheezing  benzocaine/menthol Lozenge 1 Lozenge Oral four times a day PRN Sore Throat  FIRST- Mouthwash  BLM 15 milliLiter(s) Swish and Spit every 4 hours PRN Mouth Care  guaiFENesin Oral Liquid (Sugar-Free) 200 milliGRAM(s) Oral every 6 hours PRN Cough  heparin   Injectable 2500 Unit(s) IV Push every 6 hours PRN For aPTT between 40 - 57  heparin   Injectable 5500 Unit(s) IV Push every 6 hours PRN For aPTT less than 40  lidocaine 2% Viscous 5 milliLiter(s) Swish and Spit three times a day PRN Mouth Care  ondansetron Injectable 4 milliGRAM(s) IV Push every 8 hours PRN Nausea and/or Vomiting  oxyCODONE    IR 5 milliGRAM(s) Oral every 6 hours PRN Severe Pain (7 - 10)      LABS:    The Labs were reviewed by me   The Radiology was reviewed by me    EKG tracing reviewed by me        133<L>  |  99  |  12  ----------------------------<  116<H>  4.1   |  26  |  0.67      135  |  100  |  11  ----------------------------<  122<H>  3.8   |  25  |  0.64      134<L>  |  101  |  12  ----------------------------<  126<H>  4.1   |  25  |  0.64    Ca    9.3      2024 05:04  Ca    9.2      31 Dec 2023 07:30  Ca    8.9      30 Dec 2023 23:20  Phos  3.1       Mg     2.00         TPro  6.8  /  Alb  2.9<L>  /  TBili  0.4  /  DBili  x   /  AST  64<H>  /  ALT  100<H>  /  AlkPhos  106    TPro  6.9  /  Alb  2.9<L>  /  TBili  0.6  /  DBili  x   /  AST  84<H>  /  ALT  107<H>  /  AlkPhos  107    TPro  6.4  /  Alb  2.6<L>  /  TBili  0.4  /  DBili  x   /  AST  72<H>  /  ALT  87<H>  /  AlkPhos  77      Magnesium: 2.00 mg/dL (24 @ 05:04)  Magnesium: 1.90 mg/dL (23 @ 07:30)  Magnesium: 2.10 mg/dL (23 @ 01:50)    Phosphorus: 3.1 mg/dL (24 @ 05:04)  Phosphorus: 2.4 mg/dL (23 @ 07:30)      PT/INR - ( 2024 05:04 )   PT: 15.8 sec;   INR: 1.43 ratio         PTT - ( 2024 19:19 )  PTT:101.9 sec              Urinalysis Basic - ( 2024 09:10 )    Color: Yellow / Appearance: Clear / S.017 / pH: x  Gluc: x / Ketone: Negative mg/dL  / Bili: Negative / Urobili: 0.2 mg/dL   Blood: x / Protein: 30 mg/dL / Nitrite: Negative   Leuk Esterase: Negative / RBC: >50 /HPF / WBC 1 /HPF   Sq Epi: x / Non Sq Epi: 0 /HPF / Bacteria: Negative /HPF                              8.9    14.40 )-----------( 313      ( 2024 05:04 )             26.9                         8.5    15.80 )-----------( 351      ( 31 Dec 2023 07:30 )             24.9                         8.0    16.66 )-----------( 338      ( 30 Dec 2023 23:20 )             23.8     CAPILLARY BLOOD GLUCOSE            MICROBIOLOGY:     RADIOLOGY:  [ ] Reviewed and interpreted by me    Point of Care Ultrasound Findings:    PFT:    EKG:

## 2024-01-01 NOTE — CHART NOTE - NSCHARTNOTEFT_GEN_A_CORE
Notified by RN that patient w/ mild/mod hematuria noted while changing condom cath. Patient in NAD, hemodynamically stable. Patient admitted w/ PE on hep gtt. Will cw hep gtt for now, will pause in setting of anemia, worsening hematuria, or hemodynamic instability. CBC and ptt sent this am, will fu. UA ordered. Will continue to monitor closely.

## 2024-01-01 NOTE — PROGRESS NOTE ADULT - SUBJECTIVE AND OBJECTIVE BOX
Subjective: Patient seen and examined. No new events except as noted.     SUBJECTIVE/ROS:  nad      MEDICATIONS:  MEDICATIONS  (STANDING):  cefepime   IVPB 2000 milliGRAM(s) IV Intermittent every 8 hours  chlorhexidine 2% Cloths 1 Application(s) Topical daily  ciprofloxacin  0.3% Ophthalmic Solution for Otic Use 2 Drop(s) Both Ears two times a day  heparin  Infusion. 1300 Unit(s)/Hr (13 mL/Hr) IV Continuous <Continuous>  hydroxychloroquine 200 milliGRAM(s) Oral two times a day  lidocaine   4% Patch 2 Patch Transdermal every 24 hours  lidocaine   4% Patch 1 Patch Transdermal every 24 hours  melatonin 3 milliGRAM(s) Oral <User Schedule>  pantoprazole    Tablet 40 milliGRAM(s) Oral before breakfast  polyethylene glycol 3350 17 Gram(s) Oral two times a day  predniSONE   Tablet 60 milliGRAM(s) Oral daily  senna 2 Tablet(s) Oral at bedtime  sodium chloride 1 Gram(s) Oral three times a day  sodium chloride 3%. 500 milliLiter(s) (30 mL/Hr) IV Continuous <Continuous>      PHYSICAL EXAM:  T(C): 36.4 (01-01-24 @ 05:07), Max: 36.7 (12-31-23 @ 22:25)  HR: 60 (01-01-24 @ 05:07) (60 - 70)  BP: 133/85 (01-01-24 @ 05:07) (123/79 - 133/85)  RR: 18 (01-01-24 @ 05:07) (18 - 18)  SpO2: 98% (01-01-24 @ 05:07) (98% - 99%)  Wt(kg): --  I&O's Summary          JVP: Normal  Neck: supple  Lung: clear   CV: S1 S2 , Murmur:  Abd: soft  Ext: No edema  neuro: Awake / alert  Psych: flat affect  Skin: normal``    LABS/DATA:    CARDIAC MARKERS:  CARDIAC MARKERS ( 30 Dec 2023 23:20 )  x     / x     / 107 U/L / x     / 1.8 ng/mL                                8.9    14.40 )-----------( 313      ( 01 Jan 2024 05:04 )             26.9     01-01    133<L>  |  99  |  12  ----------------------------<  116<H>  4.1   |  26  |  0.67    Ca    9.3      01 Jan 2024 05:04  Phos  3.1     01-01  Mg     2.00     01-01    TPro  6.8  /  Alb  2.9<L>  /  TBili  0.4  /  DBili  x   /  AST  64<H>  /  ALT  100<H>  /  AlkPhos  106  01-01    proBNP:   Lipid Profile:   HgA1c:   TSH:     TELE:  EKG:

## 2024-01-01 NOTE — CHART NOTE - NSCHARTNOTEFT_GEN_A_CORE
Patient seen/examined at bedside. Patient with left chest tube, clamped at stopcock at connection site on bedside assessment.  Patient without any respiratory distress.    Call placed to Pulm to discuss plan.  Per Pulm, plan to leave chest tube clamped at this time, no need for CXR, patient to be assessed by Pulm for bedside US.      Niru Jimenez NP-BC  Department of Medicine  In House Pager #[phone] Patient seen/examined at bedside. Patient with left chest tube, clamped at stopcock at connection site on bedside assessment.  Patient without any respiratory distress.    Call placed to Pulm to discuss plan.  Per Pulm, plan to leave chest tube clamped at this time, no need for CXR, patient to be assessed by Pulm for bedside US.      Niru Jimenez NP-BC  Department of Medicine  In House Pager #[phone]

## 2024-01-01 NOTE — PROGRESS NOTE ADULT - NUTRITIONAL ASSESSMENT
This patient has been assessed with a concern for Malnutrition and has been determined to have a diagnosis/diagnoses of Severe protein-calorie malnutrition.    This patient is being managed with:   Diet Regular-  Pureed (PUREED)  1000mL Fluid Restriction ([id number])  Supplement Feeding Modality:  Oral  Ensure Plus High Protein Cans or Servings Per Day:  1       Frequency:  Three Times a day  Entered: Dec 28 2023 11:48AM   This patient has been assessed with a concern for Malnutrition and has been determined to have a diagnosis/diagnoses of Severe protein-calorie malnutrition.    This patient is being managed with:   Diet Regular-  Pureed (PUREED)  1000mL Fluid Restriction ([id number])  Supplement Feeding Modality:  Oral  Ensure Plus High Protein Cans or Servings Per Day:  1       Frequency:  Three Times a day  Entered: Dec 28 2023 11:48AM

## 2024-01-01 NOTE — PROGRESS NOTE ADULT - ASSESSMENT
28 years old male with h/o Lupus ( diagnosed in 09/2023, on Plaquenil present to [facility] on 12/12/23  with complain of chest pain and SOB admitted for fevers, PE, pleural effusions, course c/b high fever and tonic-clonic seizure     #B/l pleural effusions  S/p Lt sided chest tube. S/p mist  exudative effusion, bloody, glucose is not low, ADA low, NGTD on cultures  - CT clamped 12/31  - POCUS with trace effusion after 24 hours clamped  - CT removed in evening on 1/1  - repeat CXR tomorrow AM 28 years old male with h/o Lupus ( diagnosed in 09/2023, on Plaquenil present to [facility] ED on 12/12/23  with complain of chest pain and SOB admitted for fevers, PE, pleural effusions, course c/b high fever and tonic-clonic seizure     #B/l pleural effusions  S/p Lt sided chest tube. S/p mist  exudative effusion, bloody, glucose is not low, ADA low, NGTD on cultures  - CT clamped 12/31  - POCUS with trace effusion after 24 hours clamped  - CT removed in evening on 1/1  - repeat CXR tomorrow AM

## 2024-01-01 NOTE — PROGRESS NOTE ADULT - SUBJECTIVE AND OBJECTIVE BOX
Name of Patient : TAYLA MARIE  MRN: [id number]  Date of visit: 24       Subjective: Patient seen and examined. No new events except as noted.   Hematuria       REVIEW OF SYSTEMS:    CONSTITUTIONAL: No weakness, fevers or chills  EYES/ENT: No visual changes;  No vertigo or throat pain   NECK: No pain or stiffness  RESPIRATORY: No cough, wheezing, hemoptysis; No shortness of breath  CARDIOVASCULAR: No chest pain or palpitations  GASTROINTESTINAL: No abdominal or epigastric pain. No nausea, vomiting, or hematemesis; No diarrhea or constipation. No melena or hematochezia.  GENITOURINARY: No dysuria, frequency or hematuria  NEUROLOGICAL: + hematuria   SKIN: No itching, burning, rashes, or lesions   All other review of systems is negative unless indicated above.    MEDICATIONS:  MEDICATIONS  (STANDING):  cefepime   IVPB 2000 milliGRAM(s) IV Intermittent every 8 hours  chlorhexidine 2% Cloths 1 Application(s) Topical daily  ciprofloxacin  0.3% Ophthalmic Solution for Otic Use 2 Drop(s) Both Ears two times a day  heparin  Infusion. 1300 Unit(s)/Hr (13 mL/Hr) IV Continuous <Continuous>  hydroxychloroquine 200 milliGRAM(s) Oral two times a day  lidocaine   4% Patch 1 Patch Transdermal every 24 hours  lidocaine   4% Patch 2 Patch Transdermal every 24 hours  melatonin 3 milliGRAM(s) Oral <User Schedule>  pantoprazole    Tablet 40 milliGRAM(s) Oral before breakfast  polyethylene glycol 3350 17 Gram(s) Oral two times a day  predniSONE   Tablet 60 milliGRAM(s) Oral daily  senna 2 Tablet(s) Oral at bedtime  sodium chloride 1 Gram(s) Oral three times a day  sodium chloride 3%. 500 milliLiter(s) (30 mL/Hr) IV Continuous <Continuous>      PHYSICAL EXAM:  T(C): 37.2 (24 @ 20:44), Max: 37.2 (24 @ 20:44)  HR: 61 (24 @ 20:44) (60 - 82)  BP: 116/82 (24 @ 20:44) (116/82 - 133/85)  RR: 17 (24 @ 20:44) (17 - 18)  SpO2: 100% (24 @ 20:44) (98% - 100%)  Wt(kg): --  I&O's Summary    31 Dec 2023 07:01  -  2024 07:00  --------------------------------------------------------  IN: 0 mL / OUT: 0 mL / NET: 0 mL          Appearance: Normal	  HEENT:  PERRLA   Lymphatic: No lymphadenopathy   Cardiovascular: Normal S1 S2, no JVD  Respiratory: normal effort , Chest tube  Gastrointestinal:  Soft, Non-tender  Skin: No rashes,  warm to touch  Psychiatry:  Mood & affect appropriate  Musculuskeletal: No edema    recent labs, Imaging and EKGs personally reviewed       23 @ 07:01  -  24 @ 07:00  --------------------------------------------------------  IN: 0 mL / OUT: 0 mL / NET: 0 mL                          8.9    14.40 )-----------( 313      ( 2024 05:04 )             26.9               -    133<L>  |  99  |  12  ----------------------------<  116<H>  4.1   |  26  |  0.67    Ca    9.3      2024 05:04  Phos  3.1       Mg     2.00     -    TPro  6.8  /  Alb  2.9<L>  /  TBili  0.4  /  DBili  x   /  AST  64<H>  /  ALT  100<H>  /  AlkPhos  106      PT/INR - ( 2024 05:04 )   PT: 15.8 sec;   INR: 1.43 ratio         PTT - ( 2024 19:19 )  PTT:101.9 sec       CARDIAC MARKERS ( 30 Dec 2023 23:20 )  x     / x     / 107 U/L / x     / 1.8 ng/mL              Urinalysis Basic - ( 2024 09:10 )    Color: Yellow / Appearance: Clear / S.017 / pH: x  Gluc: x / Ketone: Negative mg/dL  / Bili: Negative / Urobili: 0.2 mg/dL   Blood: x / Protein: 30 mg/dL / Nitrite: Negative   Leuk Esterase: Negative / RBC: >50 /HPF / WBC 1 /HPF   Sq Epi: x / Non Sq Epi: 0 /HPF / Bacteria: Negative /HPF               Name of Patient : TAYLA MARIE  MRN: [id number]  Date of visit: 24       Subjective: Patient seen and examined. No new events except as noted.   Hematuria       REVIEW OF SYSTEMS:    CONSTITUTIONAL: No weakness, fevers or chills  EYES/ENT: No visual changes;  No vertigo or throat pain   NECK: No pain or stiffness  RESPIRATORY: No cough, wheezing, hemoptysis; No shortness of breath  CARDIOVASCULAR: No chest pain or palpitations  GASTROINTESTINAL: No abdominal or epigastric pain. No nausea, vomiting, or hematemesis; No diarrhea or constipation. No melena or hematochezia.  GENITOURINARY: No dysuria, frequency or hematuria  NEUROLOGICAL: + hematuria   SKIN: No itching, burning, rashes, or lesions   All other review of systems is negative unless indicated above.    MEDICATIONS:  MEDICATIONS  (STANDING):  cefepime   IVPB 2000 milliGRAM(s) IV Intermittent every 8 hours  chlorhexidine 2% Cloths 1 Application(s) Topical daily  ciprofloxacin  0.3% Ophthalmic Solution for Otic Use 2 Drop(s) Both Ears two times a day  heparin  Infusion. 1300 Unit(s)/Hr (13 mL/Hr) IV Continuous <Continuous>  hydroxychloroquine 200 milliGRAM(s) Oral two times a day  lidocaine   4% Patch 1 Patch Transdermal every 24 hours  lidocaine   4% Patch 2 Patch Transdermal every 24 hours  melatonin 3 milliGRAM(s) Oral <User Schedule>  pantoprazole    Tablet 40 milliGRAM(s) Oral before breakfast  polyethylene glycol 3350 17 Gram(s) Oral two times a day  predniSONE   Tablet 60 milliGRAM(s) Oral daily  senna 2 Tablet(s) Oral at bedtime  sodium chloride 1 Gram(s) Oral three times a day  sodium chloride 3%. 500 milliLiter(s) (30 mL/Hr) IV Continuous <Continuous>      PHYSICAL EXAM:  T(C): 37.2 (24 @ 20:44), Max: 37.2 (24 @ 20:44)  HR: 61 (24 @ 20:44) (60 - 82)  BP: 116/82 (24 @ 20:44) (116/82 - 133/85)  RR: 17 (24 @ 20:44) (17 - 18)  SpO2: 100% (24 @ 20:44) (98% - 100%)  Wt(kg): --  I&O's Summary    31 Dec 2023 07:01  -  2024 07:00  --------------------------------------------------------  IN: 0 mL / OUT: 0 mL / NET: 0 mL          Appearance: Normal	  HEENT:  PERRLA   Lymphatic: No lymphadenopathy   Cardiovascular: Normal S1 S2, no JVD  Respiratory: normal effort , Chest tube  Gastrointestinal:  Soft, Non-tender  Skin: No rashes,  warm to touch  Psychiatry:  Mood & affect appropriate  Musculuskeletal: No edema    recent labs, Imaging and EKGs personally reviewed       23 @ 07:01  -  24 @ 07:00  --------------------------------------------------------  IN: 0 mL / OUT: 0 mL / NET: 0 mL                          8.9    14.40 )-----------( 313      ( 2024 05:04 )             26.9               -    133<L>  |  99  |  12  ----------------------------<  116<H>  4.1   |  26  |  0.67    Ca    9.3      2024 05:04  Phos  3.1       Mg     2.00     -    TPro  6.8  /  Alb  2.9<L>  /  TBili  0.4  /  DBili  x   /  AST  64<H>  /  ALT  100<H>  /  AlkPhos  106      PT/INR - ( 2024 05:04 )   PT: 15.8 sec;   INR: 1.43 ratio         PTT - ( 2024 19:19 )  PTT:101.9 sec       CARDIAC MARKERS ( 30 Dec 2023 23:20 )  x     / x     / 107 U/L / x     / 1.8 ng/mL              Urinalysis Basic - ( 2024 09:10 )    Color: Yellow / Appearance: Clear / S.017 / pH: x  Gluc: x / Ketone: Negative mg/dL  / Bili: Negative / Urobili: 0.2 mg/dL   Blood: x / Protein: 30 mg/dL / Nitrite: Negative   Leuk Esterase: Negative / RBC: >50 /HPF / WBC 1 /HPF   Sq Epi: x / Non Sq Epi: 0 /HPF / Bacteria: Negative /HPF

## 2024-01-02 LAB
4/8 RATIO: 1.81 RATIO — SIGNIFICANT CHANGE UP (ref 0.9–3.6)
4/8 RATIO: 1.81 RATIO — SIGNIFICANT CHANGE UP (ref 0.9–3.6)
ABS CD8: 822 CELLS/UL — HIGH (ref 142–740)
ABS CD8: 822 CELLS/UL — HIGH (ref 142–740)
ALBUMIN SERPL ELPH-MCNC: 2.9 G/DL — LOW (ref 3.3–5)
ALBUMIN SERPL ELPH-MCNC: 2.9 G/DL — LOW (ref 3.3–5)
ALP SERPL-CCNC: 102 U/L — SIGNIFICANT CHANGE UP (ref 40–120)
ALP SERPL-CCNC: 102 U/L — SIGNIFICANT CHANGE UP (ref 40–120)
ALT FLD-CCNC: 89 U/L — HIGH (ref 4–41)
ALT FLD-CCNC: 89 U/L — HIGH (ref 4–41)
AMPA-R AB CBA, CSF: NEGATIVE — SIGNIFICANT CHANGE UP
AMPA-R AB CBA, CSF: NEGATIVE — SIGNIFICANT CHANGE UP
AMPA-RECEPTOR ANTIBODY BY CBA, SERUM: NEGATIVE — SIGNIFICANT CHANGE UP
AMPHIPHYSIN AB TITR CSF: NEGATIVE — SIGNIFICANT CHANGE UP
AMPHIPHYSIN AB TITR CSF: NEGATIVE — SIGNIFICANT CHANGE UP
AMPHIPHYSIN AB TITR SER: NEGATIVE — SIGNIFICANT CHANGE UP
ANION GAP SERPL CALC-SCNC: 10 MMOL/L — SIGNIFICANT CHANGE UP (ref 7–14)
ANION GAP SERPL CALC-SCNC: 10 MMOL/L — SIGNIFICANT CHANGE UP (ref 7–14)
APTT BLD: 105.7 SEC — HIGH (ref 24.5–35.6)
APTT BLD: 105.7 SEC — HIGH (ref 24.5–35.6)
APTT BLD: 106.2 SEC — HIGH (ref 24.5–35.6)
APTT BLD: 106.2 SEC — HIGH (ref 24.5–35.6)
APTT BLD: 117.1 SEC — HIGH (ref 24.5–35.6)
APTT BLD: 117.1 SEC — HIGH (ref 24.5–35.6)
APTT BLD: 93.7 SEC — HIGH (ref 24.5–35.6)
APTT BLD: 93.7 SEC — HIGH (ref 24.5–35.6)
AST SERPL-CCNC: 50 U/L — HIGH (ref 4–40)
AST SERPL-CCNC: 50 U/L — HIGH (ref 4–40)
BASOPHILS # BLD AUTO: 0.07 K/UL — SIGNIFICANT CHANGE UP (ref 0–0.2)
BASOPHILS # BLD AUTO: 0.07 K/UL — SIGNIFICANT CHANGE UP (ref 0–0.2)
BASOPHILS NFR BLD AUTO: 0.6 % — SIGNIFICANT CHANGE UP (ref 0–2)
BASOPHILS NFR BLD AUTO: 0.6 % — SIGNIFICANT CHANGE UP (ref 0–2)
BILIRUB SERPL-MCNC: 0.5 MG/DL — SIGNIFICANT CHANGE UP (ref 0.2–1.2)
BILIRUB SERPL-MCNC: 0.5 MG/DL — SIGNIFICANT CHANGE UP (ref 0.2–1.2)
BUN SERPL-MCNC: 13 MG/DL — SIGNIFICANT CHANGE UP (ref 7–23)
BUN SERPL-MCNC: 13 MG/DL — SIGNIFICANT CHANGE UP (ref 7–23)
CALCIUM SERPL-MCNC: 9.3 MG/DL — SIGNIFICANT CHANGE UP (ref 8.4–10.5)
CALCIUM SERPL-MCNC: 9.3 MG/DL — SIGNIFICANT CHANGE UP (ref 8.4–10.5)
CASPR2-IGG CBA, CSF: NEGATIVE — SIGNIFICANT CHANGE UP
CASPR2-IGG CBA, CSF: NEGATIVE — SIGNIFICANT CHANGE UP
CASPR2-IGG CBA, SERUM: NEGATIVE — SIGNIFICANT CHANGE UP
CD16+CD56+ CELLS NFR BLD: 3 % — LOW (ref 5–23)
CD16+CD56+ CELLS NFR BLD: 3 % — LOW (ref 5–23)
CD16+CD56+ CELLS NFR SPEC: 86 CELLS/UL — SIGNIFICANT CHANGE UP (ref 71–410)
CD16+CD56+ CELLS NFR SPEC: 86 CELLS/UL — SIGNIFICANT CHANGE UP (ref 71–410)
CD19 BLASTS SPEC-ACNC: 169 CELLS/UL — SIGNIFICANT CHANGE UP (ref 84–469)
CD19 BLASTS SPEC-ACNC: 169 CELLS/UL — SIGNIFICANT CHANGE UP (ref 84–469)
CD19 BLASTS SPEC-ACNC: 7 % — SIGNIFICANT CHANGE UP (ref 6–24)
CD19 BLASTS SPEC-ACNC: 7 % — SIGNIFICANT CHANGE UP (ref 6–24)
CD3 BLASTS SPEC-ACNC: 2271 CELLS/UL — HIGH (ref 672–1870)
CD3 BLASTS SPEC-ACNC: 2271 CELLS/UL — HIGH (ref 672–1870)
CD3 BLASTS SPEC-ACNC: 85 % — HIGH (ref 59–83)
CD3 BLASTS SPEC-ACNC: 85 % — HIGH (ref 59–83)
CD4 %: 54 % — SIGNIFICANT CHANGE UP (ref 30–62)
CD4 %: 54 % — SIGNIFICANT CHANGE UP (ref 30–62)
CD8 %: 30 % — SIGNIFICANT CHANGE UP (ref 12–36)
CD8 %: 30 % — SIGNIFICANT CHANGE UP (ref 12–36)
CHLORIDE SERPL-SCNC: 98 MMOL/L — SIGNIFICANT CHANGE UP (ref 98–107)
CHLORIDE SERPL-SCNC: 98 MMOL/L — SIGNIFICANT CHANGE UP (ref 98–107)
CO2 SERPL-SCNC: 24 MMOL/L — SIGNIFICANT CHANGE UP (ref 22–31)
CO2 SERPL-SCNC: 24 MMOL/L — SIGNIFICANT CHANGE UP (ref 22–31)
CREAT SERPL-MCNC: 0.64 MG/DL — SIGNIFICANT CHANGE UP (ref 0.5–1.3)
CREAT SERPL-MCNC: 0.64 MG/DL — SIGNIFICANT CHANGE UP (ref 0.5–1.3)
CRMP-5-IGG WESTERN BLOT: NEGATIVE — SIGNIFICANT CHANGE UP
CV2 IGG TITR CSF: NEGATIVE — SIGNIFICANT CHANGE UP
CV2 IGG TITR CSF: NEGATIVE — SIGNIFICANT CHANGE UP
DPPX ANTIBODY IFA, CSF: NEGATIVE — SIGNIFICANT CHANGE UP
DPPX ANTIBODY IFA, CSF: NEGATIVE — SIGNIFICANT CHANGE UP
DPPX IGG SERPL QL IF: NEGATIVE — SIGNIFICANT CHANGE UP
EGFR: 131 ML/MIN/1.73M2 — SIGNIFICANT CHANGE UP
EGFR: 131 ML/MIN/1.73M2 — SIGNIFICANT CHANGE UP
EOSINOPHIL # BLD AUTO: 0.1 K/UL — SIGNIFICANT CHANGE UP (ref 0–0.5)
EOSINOPHIL # BLD AUTO: 0.1 K/UL — SIGNIFICANT CHANGE UP (ref 0–0.5)
EOSINOPHIL NFR BLD AUTO: 0.8 % — SIGNIFICANT CHANGE UP (ref 0–6)
EOSINOPHIL NFR BLD AUTO: 0.8 % — SIGNIFICANT CHANGE UP (ref 0–6)
GABA-B-R AB CBA, CSF: NEGATIVE — SIGNIFICANT CHANGE UP
GABA-B-R AB CBA, CSF: NEGATIVE — SIGNIFICANT CHANGE UP
GABA-B-RECEPTOR ANTIBODY BY CBA, SERUM: NEGATIVE — SIGNIFICANT CHANGE UP
GAD65 AB CSF-SCNC: 0 NMOL/L — SIGNIFICANT CHANGE UP
GAD65 AB CSF-SCNC: 0 NMOL/L — SIGNIFICANT CHANGE UP
GAD65 AB SER-MCNC: 0 NMOL/L — SIGNIFICANT CHANGE UP
GFAP ALPHA IGG SER QL IF: NEGATIVE — SIGNIFICANT CHANGE UP
GFAP IFA, CSF: NEGATIVE — SIGNIFICANT CHANGE UP
GFAP IFA, CSF: NEGATIVE — SIGNIFICANT CHANGE UP
GLIAL NUC TYPE 1 AB TITR CSF: NEGATIVE — SIGNIFICANT CHANGE UP
GLIAL NUC TYPE 1 AB TITR CSF: NEGATIVE — SIGNIFICANT CHANGE UP
GLIAL NUC TYPE 1 AB TITR SER: NEGATIVE — SIGNIFICANT CHANGE UP
GLUCOSE SERPL-MCNC: 118 MG/DL — HIGH (ref 70–99)
GLUCOSE SERPL-MCNC: 118 MG/DL — HIGH (ref 70–99)
HCT VFR BLD CALC: 27.3 % — LOW (ref 39–50)
HCT VFR BLD CALC: 27.3 % — LOW (ref 39–50)
HGB BLD-MCNC: 9 G/DL — LOW (ref 13–17)
HGB BLD-MCNC: 9 G/DL — LOW (ref 13–17)
HU1 AB TITR CSF IF: NEGATIVE — SIGNIFICANT CHANGE UP
HU1 AB TITR CSF IF: NEGATIVE — SIGNIFICANT CHANGE UP
HU1 AB TITR SER: NEGATIVE — SIGNIFICANT CHANGE UP
HU2 AB TITR CSF IF: NEGATIVE — SIGNIFICANT CHANGE UP
HU2 AB TITR CSF IF: NEGATIVE — SIGNIFICANT CHANGE UP
HU2 AB TITR SER IF: NEGATIVE — SIGNIFICANT CHANGE UP
HU3 AB TITR CSF: NEGATIVE — SIGNIFICANT CHANGE UP
HU3 AB TITR CSF: NEGATIVE — SIGNIFICANT CHANGE UP
HU3 AB TITR SER: NEGATIVE — SIGNIFICANT CHANGE UP
IANC: 6.7 K/UL — SIGNIFICANT CHANGE UP (ref 1.8–7.4)
IANC: 6.7 K/UL — SIGNIFICANT CHANGE UP (ref 1.8–7.4)
IFA NOTES: SIGNIFICANT CHANGE UP
IGLON5 IFA, CSF: NEGATIVE — SIGNIFICANT CHANGE UP
IGLON5 IFA, CSF: NEGATIVE — SIGNIFICANT CHANGE UP
IMM GRANULOCYTES NFR BLD AUTO: 9.5 % — HIGH (ref 0–0.9)
IMM GRANULOCYTES NFR BLD AUTO: 9.5 % — HIGH (ref 0–0.9)
IMMUNOLOGIST REVIEW: SIGNIFICANT CHANGE UP
INR BLD: 1.69 RATIO — HIGH (ref 0.85–1.18)
INR BLD: 1.69 RATIO — HIGH (ref 0.85–1.18)
LGI1-IGG CBA, CSF: NEGATIVE — SIGNIFICANT CHANGE UP
LGI1-IGG CBA, CSF: NEGATIVE — SIGNIFICANT CHANGE UP
LGI1-IGG CBA, SERUM: NEGATIVE — SIGNIFICANT CHANGE UP
LKM AB SER-ACNC: <20.1 UNITS — SIGNIFICANT CHANGE UP (ref 0–20)
LKM AB SER-ACNC: <20.1 UNITS — SIGNIFICANT CHANGE UP (ref 0–20)
LYMPHOCYTES # BLD AUTO: 2.4 K/UL — SIGNIFICANT CHANGE UP (ref 1–3.3)
LYMPHOCYTES # BLD AUTO: 2.4 K/UL — SIGNIFICANT CHANGE UP (ref 1–3.3)
LYMPHOCYTES # BLD AUTO: 20.3 % — SIGNIFICANT CHANGE UP (ref 13–44)
LYMPHOCYTES # BLD AUTO: 20.3 % — SIGNIFICANT CHANGE UP (ref 13–44)
MAGNESIUM SERPL-MCNC: 2 MG/DL — SIGNIFICANT CHANGE UP (ref 1.6–2.6)
MAGNESIUM SERPL-MCNC: 2 MG/DL — SIGNIFICANT CHANGE UP (ref 1.6–2.6)
MCHC RBC-ENTMCNC: 30.5 PG — SIGNIFICANT CHANGE UP (ref 27–34)
MCHC RBC-ENTMCNC: 30.5 PG — SIGNIFICANT CHANGE UP (ref 27–34)
MCHC RBC-ENTMCNC: 33 GM/DL — SIGNIFICANT CHANGE UP (ref 32–36)
MCHC RBC-ENTMCNC: 33 GM/DL — SIGNIFICANT CHANGE UP (ref 32–36)
MCV RBC AUTO: 92.5 FL — SIGNIFICANT CHANGE UP (ref 80–100)
MCV RBC AUTO: 92.5 FL — SIGNIFICANT CHANGE UP (ref 80–100)
MGLUR1 AB IFA, CSF: NEGATIVE — SIGNIFICANT CHANGE UP
MGLUR1 AB IFA, CSF: NEGATIVE — SIGNIFICANT CHANGE UP
MGLUR1 IGG SER QL IF: NEGATIVE — SIGNIFICANT CHANGE UP
MITOCHONDRIA AB SER-ACNC: SIGNIFICANT CHANGE UP
MITOCHONDRIA AB SER-ACNC: SIGNIFICANT CHANGE UP
MONOCYTES # BLD AUTO: 1.46 K/UL — HIGH (ref 0–0.9)
MONOCYTES # BLD AUTO: 1.46 K/UL — HIGH (ref 0–0.9)
MONOCYTES NFR BLD AUTO: 12.3 % — SIGNIFICANT CHANGE UP (ref 2–14)
MONOCYTES NFR BLD AUTO: 12.3 % — SIGNIFICANT CHANGE UP (ref 2–14)
NEUTROPHILS # BLD AUTO: 6.7 K/UL — SIGNIFICANT CHANGE UP (ref 1.8–7.4)
NEUTROPHILS # BLD AUTO: 6.7 K/UL — SIGNIFICANT CHANGE UP (ref 1.8–7.4)
NEUTROPHILS NFR BLD AUTO: 56.5 % — SIGNIFICANT CHANGE UP (ref 43–77)
NEUTROPHILS NFR BLD AUTO: 56.5 % — SIGNIFICANT CHANGE UP (ref 43–77)
NMDAR1 IGG SER QL CBA IFA: NEGATIVE — SIGNIFICANT CHANGE UP
NON-GYNECOLOGICAL CYTOLOGY STUDY: SIGNIFICANT CHANGE UP
NON-GYNECOLOGICAL CYTOLOGY STUDY: SIGNIFICANT CHANGE UP
NRBC # BLD: 0 /100 WBCS — SIGNIFICANT CHANGE UP (ref 0–0)
NRBC # BLD: 0 /100 WBCS — SIGNIFICANT CHANGE UP (ref 0–0)
NRBC # FLD: 0.03 K/UL — HIGH (ref 0–0)
NRBC # FLD: 0.03 K/UL — HIGH (ref 0–0)
P/Q TYPE ANTIBODY: 0 NMOL/L — SIGNIFICANT CHANGE UP
P/Q TYPE ANTIBODY: 0 NMOL/L — SIGNIFICANT CHANGE UP
PARANEOPLASTIC AB SER-IMP: SIGNIFICANT CHANGE UP
PARANEOPLASTIC AB SER-IMP: SIGNIFICANT CHANGE UP
PCA-1 AB TITR SER: NEGATIVE — SIGNIFICANT CHANGE UP
PCA-2 AB TITR SER: NEGATIVE — SIGNIFICANT CHANGE UP
PCA-TR AB TITR CSF: NEGATIVE — SIGNIFICANT CHANGE UP
PCA-TR AB TITR CSF: NEGATIVE — SIGNIFICANT CHANGE UP
PCA-TR AB TITR SER: NEGATIVE — SIGNIFICANT CHANGE UP
PHOSPHATE SERPL-MCNC: 3.5 MG/DL — SIGNIFICANT CHANGE UP (ref 2.5–4.5)
PHOSPHATE SERPL-MCNC: 3.5 MG/DL — SIGNIFICANT CHANGE UP (ref 2.5–4.5)
PLATELET # BLD AUTO: 289 K/UL — SIGNIFICANT CHANGE UP (ref 150–400)
PLATELET # BLD AUTO: 289 K/UL — SIGNIFICANT CHANGE UP (ref 150–400)
POTASSIUM SERPL-MCNC: 4 MMOL/L — SIGNIFICANT CHANGE UP (ref 3.5–5.3)
POTASSIUM SERPL-MCNC: 4 MMOL/L — SIGNIFICANT CHANGE UP (ref 3.5–5.3)
POTASSIUM SERPL-SCNC: 4 MMOL/L — SIGNIFICANT CHANGE UP (ref 3.5–5.3)
POTASSIUM SERPL-SCNC: 4 MMOL/L — SIGNIFICANT CHANGE UP (ref 3.5–5.3)
PROT SERPL-MCNC: 6.5 G/DL — SIGNIFICANT CHANGE UP (ref 6–8.3)
PROT SERPL-MCNC: 6.5 G/DL — SIGNIFICANT CHANGE UP (ref 6–8.3)
PROTHROM AB SERPL-ACNC: 18.8 SEC — HIGH (ref 9.5–13)
PROTHROM AB SERPL-ACNC: 18.8 SEC — HIGH (ref 9.5–13)
PS-PROTHROM CMPLX IGG SERPL IA-ACNC: <10 UNITS — SIGNIFICANT CHANGE UP
PS-PROTHROM CMPLX IGG SERPL IA-ACNC: <10 UNITS — SIGNIFICANT CHANGE UP
PS-PROTHROM CMPLX IGM SERPL IA-ACNC: <10 UNITS — SIGNIFICANT CHANGE UP
PS-PROTHROM CMPLX IGM SERPL IA-ACNC: <10 UNITS — SIGNIFICANT CHANGE UP
RBC # BLD: 2.95 M/UL — LOW (ref 4.2–5.8)
RBC # BLD: 2.95 M/UL — LOW (ref 4.2–5.8)
RBC # FLD: 14.9 % — HIGH (ref 10.3–14.5)
RBC # FLD: 14.9 % — HIGH (ref 10.3–14.5)
SMOOTH MUSCLE AB SER-ACNC: SIGNIFICANT CHANGE UP
SMOOTH MUSCLE AB SER-ACNC: SIGNIFICANT CHANGE UP
SODIUM SERPL-SCNC: 132 MMOL/L — LOW (ref 135–145)
SODIUM SERPL-SCNC: 132 MMOL/L — LOW (ref 135–145)
T-CELL CD4 SUBSET PNL BLD: 1483 CELLS/UL — HIGH (ref 489–1457)
T-CELL CD4 SUBSET PNL BLD: 1483 CELLS/UL — HIGH (ref 489–1457)
VGKC AB SER-SCNC: 0.01 NMOL/L — SIGNIFICANT CHANGE UP
VGKC AB SER-SCNC: 0.01 NMOL/L — SIGNIFICANT CHANGE UP
WBC # BLD: 11.85 K/UL — HIGH (ref 3.8–10.5)
WBC # BLD: 11.85 K/UL — HIGH (ref 3.8–10.5)
WBC # FLD AUTO: 11.85 K/UL — HIGH (ref 3.8–10.5)
WBC # FLD AUTO: 11.85 K/UL — HIGH (ref 3.8–10.5)

## 2024-01-02 PROCEDURE — 93010 ELECTROCARDIOGRAM REPORT: CPT

## 2024-01-02 PROCEDURE — 99232 SBSQ HOSP IP/OBS MODERATE 35: CPT | Mod: GC

## 2024-01-02 PROCEDURE — 99232 SBSQ HOSP IP/OBS MODERATE 35: CPT

## 2024-01-02 PROCEDURE — 70450 CT HEAD/BRAIN W/O DYE: CPT | Mod: 26

## 2024-01-02 PROCEDURE — 71045 X-RAY EXAM CHEST 1 VIEW: CPT | Mod: 26

## 2024-01-02 RX ORDER — MULTIVIT-MIN/FERROUS GLUCONATE 9 MG/15 ML
15 LIQUID (ML) ORAL DAILY
Refills: 0 | Status: DISCONTINUED | OUTPATIENT
Start: 2024-01-02 | End: 2024-01-29

## 2024-01-02 RX ORDER — FAMOTIDINE 10 MG/ML
20 INJECTION INTRAVENOUS ONCE
Refills: 0 | Status: DISCONTINUED | OUTPATIENT
Start: 2024-01-02 | End: 2024-01-02

## 2024-01-02 RX ADMIN — SODIUM CHLORIDE 1 GRAM(S): 9 INJECTION INTRAMUSCULAR; INTRAVENOUS; SUBCUTANEOUS at 21:23

## 2024-01-02 RX ADMIN — HEPARIN SODIUM 1200 UNIT(S)/HR: 5000 INJECTION INTRAVENOUS; SUBCUTANEOUS at 08:02

## 2024-01-02 RX ADMIN — SODIUM CHLORIDE 1 GRAM(S): 9 INJECTION INTRAMUSCULAR; INTRAVENOUS; SUBCUTANEOUS at 05:50

## 2024-01-02 RX ADMIN — HEPARIN SODIUM 1100 UNIT(S)/HR: 5000 INJECTION INTRAVENOUS; SUBCUTANEOUS at 19:32

## 2024-01-02 RX ADMIN — Medication 15 MILLILITER(S): at 17:41

## 2024-01-02 RX ADMIN — CHLORHEXIDINE GLUCONATE 1 APPLICATION(S): 213 SOLUTION TOPICAL at 13:54

## 2024-01-02 RX ADMIN — Medication 3 MILLIGRAM(S): at 21:23

## 2024-01-02 RX ADMIN — Medication 2 DROP(S): at 05:50

## 2024-01-02 RX ADMIN — HEPARIN SODIUM 1200 UNIT(S)/HR: 5000 INJECTION INTRAVENOUS; SUBCUTANEOUS at 04:11

## 2024-01-02 RX ADMIN — PANTOPRAZOLE SODIUM 40 MILLIGRAM(S): 20 TABLET, DELAYED RELEASE ORAL at 05:49

## 2024-01-02 RX ADMIN — LIDOCAINE 2 PATCH: 4 CREAM TOPICAL at 13:55

## 2024-01-02 RX ADMIN — POLYETHYLENE GLYCOL 3350 17 GRAM(S): 17 POWDER, FOR SOLUTION ORAL at 17:40

## 2024-01-02 RX ADMIN — SODIUM CHLORIDE 1 GRAM(S): 9 INJECTION INTRAMUSCULAR; INTRAVENOUS; SUBCUTANEOUS at 14:01

## 2024-01-02 RX ADMIN — LIDOCAINE 1 PATCH: 4 CREAM TOPICAL at 13:55

## 2024-01-02 RX ADMIN — LIDOCAINE 2 PATCH: 4 CREAM TOPICAL at 19:30

## 2024-01-02 RX ADMIN — Medication 60 MILLIGRAM(S): at 05:50

## 2024-01-02 RX ADMIN — HEPARIN SODIUM 1100 UNIT(S)/HR: 5000 INJECTION INTRAVENOUS; SUBCUTANEOUS at 19:01

## 2024-01-02 RX ADMIN — OXYCODONE HYDROCHLORIDE 5 MILLIGRAM(S): 5 TABLET ORAL at 13:54

## 2024-01-02 RX ADMIN — HEPARIN SODIUM 1200 UNIT(S)/HR: 5000 INJECTION INTRAVENOUS; SUBCUTANEOUS at 11:55

## 2024-01-02 RX ADMIN — Medication 2 DROP(S): at 17:40

## 2024-01-02 RX ADMIN — Medication 200 MILLIGRAM(S): at 17:41

## 2024-01-02 RX ADMIN — POLYETHYLENE GLYCOL 3350 17 GRAM(S): 17 POWDER, FOR SOLUTION ORAL at 05:48

## 2024-01-02 RX ADMIN — Medication 200 MILLIGRAM(S): at 05:49

## 2024-01-02 RX ADMIN — LIDOCAINE 1 PATCH: 4 CREAM TOPICAL at 19:30

## 2024-01-02 RX ADMIN — OXYCODONE HYDROCHLORIDE 5 MILLIGRAM(S): 5 TABLET ORAL at 14:54

## 2024-01-02 RX ADMIN — HEPARIN SODIUM 1200 UNIT(S)/HR: 5000 INJECTION INTRAVENOUS; SUBCUTANEOUS at 04:13

## 2024-01-02 RX ADMIN — CEFEPIME 100 MILLIGRAM(S): 1 INJECTION, POWDER, FOR SOLUTION INTRAMUSCULAR; INTRAVENOUS at 05:51

## 2024-01-02 NOTE — PROGRESS NOTE ADULT - SUBJECTIVE AND OBJECTIVE BOX
Name of Patient : TAYLA MARIE  MRN: [id number]  Date of visit: 01-02-24       Subjective: Patient seen and examined. No new events except as noted.   Doing okay   mother at the bedside  chest tube removed last night by TS team     REVIEW OF SYSTEMS:    CONSTITUTIONAL: No weakness, fevers or chills  EYES/ENT: No visual changes;  No vertigo or throat pain   NECK: No pain or stiffness  RESPIRATORY: No cough, wheezing, hemoptysis; No shortness of breath  CARDIOVASCULAR: No chest pain or palpitations  GASTROINTESTINAL: No abdominal or epigastric pain. No nausea, vomiting, or hematemesis; No diarrhea or constipation. No melena or hematochezia.  GENITOURINARY: No dysuria, frequency or hematuria  NEUROLOGICAL: No numbness or weakness  SKIN: No itching, burning, rashes, or lesions   All other review of systems is negative unless indicated above.    MEDICATIONS:  MEDICATIONS  (STANDING):  chlorhexidine 2% Cloths 1 Application(s) Topical daily  ciprofloxacin  0.3% Ophthalmic Solution for Otic Use 2 Drop(s) Both Ears two times a day  heparin  Infusion. 1300 Unit(s)/Hr (13 mL/Hr) IV Continuous <Continuous>  hydroxychloroquine 200 milliGRAM(s) Oral two times a day  lidocaine   4% Patch 1 Patch Transdermal every 24 hours  lidocaine   4% Patch 2 Patch Transdermal every 24 hours  melatonin 3 milliGRAM(s) Oral <User Schedule>  multivitamin/minerals/iron Oral Solution (CENTRUM) 15 milliLiter(s) Oral daily  pantoprazole    Tablet 40 milliGRAM(s) Oral before breakfast  polyethylene glycol 3350 17 Gram(s) Oral two times a day  predniSONE   Tablet 60 milliGRAM(s) Oral daily  senna 2 Tablet(s) Oral at bedtime  sodium chloride 1 Gram(s) Oral three times a day  sodium chloride 3%. 500 milliLiter(s) (30 mL/Hr) IV Continuous <Continuous>  trimethoprim  160 mG/sulfamethoxazole 800 mG 1 Tablet(s) Oral <User Schedule>      PHYSICAL EXAM:  T(C): 36.7 (01-02-24 @ 13:29), Max: 37.2 (01-01-24 @ 20:44)  HR: 79 (01-02-24 @ 13:29) (60 - 79)  BP: 126/82 (01-02-24 @ 13:29) (115/82 - 126/82)  RR: 18 (01-02-24 @ 13:29) (17 - 18)  SpO2: 100% (01-02-24 @ 13:29) (100% - 100%)  Wt(kg): --  I&O's Summary        Appearance: Normal	  HEENT:  PERRLA   Lymphatic: No lymphadenopathy   Cardiovascular: Normal S1 S2, no JVD  Respiratory: normal effort , clear  Gastrointestinal:  Soft, Non-tender  Skin: No rashes,  warm to touch  Psychiatry:  Mood & affect appropriate  Musculuskeletal: No edema    recent labs, Imaging and EKGs personally reviewed                           9.0    11.85 )-----------( 289      ( 02 Jan 2024 02:45 )             27.3               01-02    132<L>  |  98  |  13  ----------------------------<  118<H>  4.0   |  24  |  0.64    Ca    9.3      02 Jan 2024 02:45  Phos  3.5     01-02  Mg     2.00     01-02    TPro  6.5  /  Alb  2.9<L>  /  TBili  0.5  /  DBili  x   /  AST  50<H>  /  ALT  89<H>  /  AlkPhos  102  01-02    PT/INR - ( 02 Jan 2024 02:45 )   PT: 18.8 sec;   INR: 1.69 ratio         PTT - ( 02 Jan 2024 10:20 )  PTT:93.7 sec                   Urinalysis Basic - ( 02 Jan 2024 02:45 )    Color: x / Appearance: x / SG: x / pH: x  Gluc: 118 mg/dL / Ketone: x  / Bili: x / Urobili: x   Blood: x / Protein: x / Nitrite: x   Leuk Esterase: x / RBC: x / WBC x   Sq Epi: x / Non Sq Epi: x / Bacteria: x                     Name of Patient : TAYLA MARIE  MRN: [id number]  Date of visit: 01-02-24       Subjective: Patient seen and examined. No new events except as noted.   Doing okay   mother at the bedside  chest tube removed last night by TS team     REVIEW OF SYSTEMS:    CONSTITUTIONAL: No weakness, fevers or chills  EYES/ENT: No visual changes;  No vertigo or throat pain   NECK: No pain or stiffness  RESPIRATORY: No cough, wheezing, hemoptysis; No shortness of breath  CARDIOVASCULAR: No chest pain or palpitations  GASTROINTESTINAL: No abdominal or epigastric pain. No nausea, vomiting, or hematemesis; No diarrhea or constipation. No melena or hematochezia.  GENITOURINARY: No dysuria, frequency or hematuria  NEUROLOGICAL: No numbness or weakness  SKIN: No itching, burning, rashes, or lesions   All other review of systems is negative unless indicated above.    MEDICATIONS:  MEDICATIONS  (STANDING):  chlorhexidine 2% Cloths 1 Application(s) Topical daily  ciprofloxacin  0.3% Ophthalmic Solution for Otic Use 2 Drop(s) Both Ears two times a day  heparin  Infusion. 1300 Unit(s)/Hr (13 mL/Hr) IV Continuous <Continuous>  hydroxychloroquine 200 milliGRAM(s) Oral two times a day  lidocaine   4% Patch 1 Patch Transdermal every 24 hours  lidocaine   4% Patch 2 Patch Transdermal every 24 hours  melatonin 3 milliGRAM(s) Oral <User Schedule>  multivitamin/minerals/iron Oral Solution (CENTRUM) 15 milliLiter(s) Oral daily  pantoprazole    Tablet 40 milliGRAM(s) Oral before breakfast  polyethylene glycol 3350 17 Gram(s) Oral two times a day  predniSONE   Tablet 60 milliGRAM(s) Oral daily  senna 2 Tablet(s) Oral at bedtime  sodium chloride 1 Gram(s) Oral three times a day  sodium chloride 3%. 500 milliLiter(s) (30 mL/Hr) IV Continuous <Continuous>  trimethoprim  160 mG/sulfamethoxazole 800 mG 1 Tablet(s) Oral <User Schedule>      PHYSICAL EXAM:  T(C): 36.7 (01-02-24 @ 13:29), Max: 37.2 (01-01-24 @ 20:44)  HR: 79 (01-02-24 @ 13:29) (60 - 79)  BP: 126/82 (01-02-24 @ 13:29) (115/82 - 126/82)  RR: 18 (01-02-24 @ 13:29) (17 - 18)  SpO2: 100% (01-02-24 @ 13:29) (100% - 100%)  Wt(kg): --  I&O's Summary        Appearance: Normal	  HEENT:  PERRLA   Lymphatic: No lymphadenopathy   Cardiovascular: Normal S1 S2, no JVD  Respiratory: normal effort , clear  Gastrointestinal:  Soft, Non-tender  Skin: No rashes,  warm to touch  Psychiatry:  Mood & affect appropriate  Musculuskeletal: No edema    recent labs, Imaging and EKGs personally reviewed                           9.0    11.85 )-----------( 289      ( 02 Jan 2024 02:45 )             27.3               01-02    132<L>  |  98  |  13  ----------------------------<  118<H>  4.0   |  24  |  0.64    Ca    9.3      02 Jan 2024 02:45  Phos  3.5     01-02  Mg     2.00     01-02    TPro  6.5  /  Alb  2.9<L>  /  TBili  0.5  /  DBili  x   /  AST  50<H>  /  ALT  89<H>  /  AlkPhos  102  01-02    PT/INR - ( 02 Jan 2024 02:45 )   PT: 18.8 sec;   INR: 1.69 ratio         PTT - ( 02 Jan 2024 10:20 )  PTT:93.7 sec                   Urinalysis Basic - ( 02 Jan 2024 02:45 )    Color: x / Appearance: x / SG: x / pH: x  Gluc: 118 mg/dL / Ketone: x  / Bili: x / Urobili: x   Blood: x / Protein: x / Nitrite: x   Leuk Esterase: x / RBC: x / WBC x   Sq Epi: x / Non Sq Epi: x / Bacteria: x

## 2024-01-02 NOTE — PROGRESS NOTE ADULT - NUTRITIONAL ASSESSMENT
This patient has been assessed with a concern for Malnutrition and has been determined to have a diagnosis/diagnoses of Severe protein-calorie malnutrition.    This patient is being managed with:   Diet Regular-  Pureed (PUREED)  1000mL Fluid Restriction ([id number])  Supplement Feeding Modality:  Oral  Ensure Plus High Protein Cans or Servings Per Day:  1       Frequency:  Three Times a day  Entered: Dec 28 2023 11:48AM   This patient has been assessed with a concern for Malnutrition and has been determined to have a diagnosis/diagnoses of Severe protein-calorie malnutrition.    This patient is being managed with:   Diet Regular-  Pureed (PUREED)  1000mL Fluid Restriction ([id number])  Supplement Feeding Modality:  Oral  Ensure Plus High Protein Cans or Servings Per Day:  1       Frequency:  Three Times a day  Entered: Dec 28 2023 11:48AM

## 2024-01-02 NOTE — PROGRESS NOTE ADULT - ASSESSMENT
This is a 29 y/o M new diagnosis of SLE (9/23, started on hydroxychloroquine) initially admitted to [facility] on 12/12 w/ CP and SOB, found to have RUL and LLL segmental/subsegmental PE, started on heparin, c/f superimposed PNA, on Zosyn. Pt with b/l effusions, L > R with loculations, transferred to [facility] for thoracic evaluation. Pt with persistent fevers despite Zosyn from 12/14/23.  ID now consulted for persistent fevers   Work up:  UA no pyuria  RVP negative  Strep Pneumo Ag, legionella, mycoplasma IgM negative  MRSA PCR negative  EPEC+ 12/15 - diarrhea has since resolved  Imaging:   CTA chest 12/12: Acute right upper lobe and left lower lobe segmental/subsegmental pulmonary emboli. No CT evidence of right heart strain. New bilateral lower lobe consolidations with areas of central clearing. Pneumonia and pulmonary infarcts are in the differential. New bilateral pleural effusions, small on the left and trace on the right. Bilateral axillary and supraclavicular adenopathy of unclear etiology.  TTE 12/12: grossly wnl  CXR 12/20: Large left pleural effusion and compressive atelectasis. Trace right effusion and linear atelectasis in the right midlung  CT chest 12/23: Moderate pleural effusions, loculated on the left, new since 12/12/2023. New nonspecific the very small peripheral groundglass in the right upper lobe  CT neck 12/23: Small level 1 and level 2 and level 5 lymph nodes without significant enlargement. No drainable collections    #Seizure   #Fever  #Pleural effusions  #Possible superimposed pneumonia  #Pulmonary embolism  #Lymphadenopathy  #Known SLE    Overall 29 y/o M w/ SLE on Hydroxychloroquine admitted to [facility] for b/l PE w/ superimposed PNA, transferred to [facility] on 12/22 for thoracic eval of b/l L > R effusions, L loculated. Pt was Zosyn since 12/14, started on Vancomycin here. Despite board therapy with Zosyn, pt continues to have fevers to 102. No leukocytosis.   Fevers if 2/2 PNA should have responded w/ 10 day course of Zosyn, MRSA swab was negative, Vancomycin likely not necessary.   May be 2/2 SLE flare rather than infectious process, however would continue with Zosyn for now pending R thoracentesis and studies.     S/p L thoracentesis on 12/24, nucleated cell count ~400 when corrected for RBCs, does not appear to be infected. Pt was initiated on steroids on 12/23, however did not defervesce. Pt with continued fevers to 104, had seizure yesterday, now transferred to the MICU. Pt was changed to Vancomycin/Cefepime. LP in the MICU w/o significant findings of infection. Only 6 nucleated cells, PCR negative. R sided thora done in MICU, minimal nucleated cells after correction.   Procalcitonin was rechecked, elevated to 8. No leukocytosis, despite recent steroid course. Per rheumatology, if fevers were 2/2 SLE, would have improved w/ steroid course, holding steroids at this time.     12/29: Single fever over the past 24h, cx data remains unrevealing. CT's reviewed. LAD persists, slightly improved in some areas, but unclear if steroids would have done this irrespective of etiololgy of LAD. Role of antibiotics remains unclear. Exhasutive workup has been thus far unrevealing. Patient will remain at risk for superimposed infection for the near term at minimum give underlying disease, hospitalization, invasive procedures, steroids, etc. Unfortunately procalcitonin is not as specific as one might hope. Off vancomycin. WBC elevated about the same as yesterday, upward trend perhaps though coincident with steroids.   1/2/24: Defervesced, improved with steroids. No concern of uncontrolled infection on exam. Reviewed with Dr. Salamanca, antibiotics stopped,      Suggestion--  Defer additional abx  LN Bx per primary team  D/W patient's mother at bedside in detail. All questions answered to the best of my ability.     I will sign off at this time. Thank you for the courtesy of this referral. Recall PRN.     Patrick Esparza MD  Attending Physician  [facility]  Division of Infectious Diseases  [phone]    This is a 29 y/o M new diagnosis of SLE (9/23, started on hydroxychloroquine) initially admitted to [facility] on 12/12 w/ CP and SOB, found to have RUL and LLL segmental/subsegmental PE, started on heparin, c/f superimposed PNA, on Zosyn. Pt with b/l effusions, L > R with loculations, transferred to [facility] for thoracic evaluation. Pt with persistent fevers despite Zosyn from 12/14/23.  ID now consulted for persistent fevers   Work up:  UA no pyuria  RVP negative  Strep Pneumo Ag, legionella, mycoplasma IgM negative  MRSA PCR negative  EPEC+ 12/15 - diarrhea has since resolved  Imaging:   CTA chest 12/12: Acute right upper lobe and left lower lobe segmental/subsegmental pulmonary emboli. No CT evidence of right heart strain. New bilateral lower lobe consolidations with areas of central clearing. Pneumonia and pulmonary infarcts are in the differential. New bilateral pleural effusions, small on the left and trace on the right. Bilateral axillary and supraclavicular adenopathy of unclear etiology.  TTE 12/12: grossly wnl  CXR 12/20: Large left pleural effusion and compressive atelectasis. Trace right effusion and linear atelectasis in the right midlung  CT chest 12/23: Moderate pleural effusions, loculated on the left, new since 12/12/2023. New nonspecific the very small peripheral groundglass in the right upper lobe  CT neck 12/23: Small level 1 and level 2 and level 5 lymph nodes without significant enlargement. No drainable collections    #Seizure   #Fever  #Pleural effusions  #Possible superimposed pneumonia  #Pulmonary embolism  #Lymphadenopathy  #Known SLE    Overall 29 y/o M w/ SLE on Hydroxychloroquine admitted to [facility] for b/l PE w/ superimposed PNA, transferred to [facility] on 12/22 for thoracic eval of b/l L > R effusions, L loculated. Pt was Zosyn since 12/14, started on Vancomycin here. Despite board therapy with Zosyn, pt continues to have fevers to 102. No leukocytosis.   Fevers if 2/2 PNA should have responded w/ 10 day course of Zosyn, MRSA swab was negative, Vancomycin likely not necessary.   May be 2/2 SLE flare rather than infectious process, however would continue with Zosyn for now pending R thoracentesis and studies.     S/p L thoracentesis on 12/24, nucleated cell count ~400 when corrected for RBCs, does not appear to be infected. Pt was initiated on steroids on 12/23, however did not defervesce. Pt with continued fevers to 104, had seizure yesterday, now transferred to the MICU. Pt was changed to Vancomycin/Cefepime. LP in the MICU w/o significant findings of infection. Only 6 nucleated cells, PCR negative. R sided thora done in MICU, minimal nucleated cells after correction.   Procalcitonin was rechecked, elevated to 8. No leukocytosis, despite recent steroid course. Per rheumatology, if fevers were 2/2 SLE, would have improved w/ steroid course, holding steroids at this time.     12/29: Single fever over the past 24h, cx data remains unrevealing. CT's reviewed. LAD persists, slightly improved in some areas, but unclear if steroids would have done this irrespective of etiololgy of LAD. Role of antibiotics remains unclear. Exhasutive workup has been thus far unrevealing. Patient will remain at risk for superimposed infection for the near term at minimum give underlying disease, hospitalization, invasive procedures, steroids, etc. Unfortunately procalcitonin is not as specific as one might hope. Off vancomycin. WBC elevated about the same as yesterday, upward trend perhaps though coincident with steroids.   1/2/24: Defervesced, improved with steroids. No concern of uncontrolled infection on exam. Reviewed with Dr. Salamanca, antibiotics stopped,      Suggestion--  Defer additional abx  LN Bx per primary team  D/W patient's mother at bedside in detail. All questions answered to the best of my ability.     I will sign off at this time. Thank you for the courtesy of this referral. Recall PRN.     Patrick Esparza MD  Attending Physician  [facility]  Division of Infectious Diseases  [phone]

## 2024-01-02 NOTE — BH CONSULTATION LIAISON PROGRESS NOTE - NSBHCHARTREVIEWLAB_PSY_A_CORE FT
9.0    11.85 )-----------( 289      ( 02 Jan 2024 02:45 )             27.3     01-02    132<L>  |  98  |  13  ----------------------------<  118<H>  4.0   |  24  |  0.64    Ca    9.3      02 Jan 2024 02:45  Phos  3.5     01-02  Mg     2.00     01-02    TPro  6.5  /  Alb  2.9<L>  /  TBili  0.5  /  DBili  x   /  AST  50<H>  /  ALT  89<H>  /  AlkPhos  102  01-02

## 2024-01-02 NOTE — PROGRESS NOTE ADULT - ASSESSMENT
28 years old male with h/o Lupus ( diagnosed in 09/2023, on Plaquenil present to [facility] on 12/12/23  with complain of chest pain and SOB. Patient reported left sided pleuritic chest pain which started 3 days ago. Pain is progressively worsened, associated with SOB and cough. Patient was seen in OSH ED and was prescribed antibiotics. Patient reported significantly worsening of left sided pleuritic chest pain today. No fever or chills. Patient reported loss of appetite and had a few episode of diarrhea for last 2 days. CTA chest with acute right upper lobe and left lower lobe segmental/subsegmental pulmonary emboli. No CT evidence of right heart strain. New bilateral lower lobe consolidations with areas of central clearing. Pneumonia and pulmonary infarcts are in the differential. New bilateral pleural effusions, small on the left and trace on the right. Bilateral axillary and supraclavicular adenopathy of unclear etiology. Patient was started on heparin ggt transitioned to eliquis on 12/19,  patient with worsening SOB/ hypoxia requiring nasal cannula oxygen supplementation. 12/20  Repeat Xray shows increased moderate left pleural effusion and compressive atelectasis trace right effusion and linear atelectasis. Thoracic team consulted for possible pigtail insertion Bedside US: Large left effusion with septations. Right simple effusion , + pericardial effusion- lower extremity dopplers negative for DVT.    # Pulm effusion/ Fever   S/P thoracentesis , followupp results   heparin for AC  TS care appreciated   O2 supplement   monitor output   Zosyn for now , ABx per ID   LP done  Plan for LN biopsy by IR , discussed with rheum and patinet;s mother, defer to rheum  Hematuria noted   Urology eval   COmpleted course of ABx         # Fever  SIRS   Abx , comoleted per iD   Rheum adn ID follow up   Renal biopsy for protonuria, defer to rhem, discussed with IR, high risk       # Hyponatremia/ Seizure   RRT   Neuro follow up       #PE   on heparin , resume after thoracentesis   DVT negative  Heme onc eval   likley lupus related     #Hxof lupus  on hydroxychloroquine   Heme eval   Rheum eval   steroids per rheum       DVT andgIPPX    Discussed in detail with patient and mother at the bedside  28 years old male with h/o Lupus ( diagnosed in 09/2023, on Plaquenil present to [facility] ED on 12/12/23  with complain of chest pain and SOB. Patient reported left sided pleuritic chest pain which started 3 days ago. Pain is progressively worsened, associated with SOB and cough. Patient was seen in OSH ED and was prescribed antibiotics. Patient reported significantly worsening of left sided pleuritic chest pain today. No fever or chills. Patient reported loss of appetite and had a few episode of diarrhea for last 2 days. CTA chest with acute right upper lobe and left lower lobe segmental/subsegmental pulmonary emboli. No CT evidence of right heart strain. New bilateral lower lobe consolidations with areas of central clearing. Pneumonia and pulmonary infarcts are in the differential. New bilateral pleural effusions, small on the left and trace on the right. Bilateral axillary and supraclavicular adenopathy of unclear etiology. Patient was started on heparin ggt transitioned to eliquis on 12/19,  patient with worsening SOB/ hypoxia requiring nasal cannula oxygen supplementation. 12/20  Repeat Xray shows increased moderate left pleural effusion and compressive atelectasis trace right effusion and linear atelectasis. Thoracic team consulted for possible pigtail insertion Bedside US: Large left effusion with septations. Right simple effusion , + pericardial effusion- lower extremity dopplers negative for DVT.    # Pulm effusion/ Fever   S/P thoracentesis , followupp results   heparin for AC  TS care appreciated   O2 supplement   monitor output   Zosyn for now , ABx per ID   LP done  Plan for LN biopsy by IR , discussed with rheum and patinet;s mother, defer to rheum  Hematuria noted   Urology eval   COmpleted course of ABx         # Fever  SIRS   Abx , comoleted per iD   Rheum adn ID follow up   Renal biopsy for protonuria, defer to rhem, discussed with IR, high risk       # Hyponatremia/ Seizure   RRT   Neuro follow up       #PE   on heparin , resume after thoracentesis   DVT negative  Heme onc eval   likley lupus related     #Hxof lupus  on hydroxychloroquine   Heme eval   Rheum eval   steroids per rheum       DVT andgIPPX    Discussed in detail with patient and mother at the bedside

## 2024-01-02 NOTE — PROGRESS NOTE ADULT - ASSESSMENT
28 years old male with h/o Lupus (diagnosed in 09/2023 due to rash, oral ulcers, chest pain, and dyspnea, on Plaquenil) who presented to [place] ED on 12/12/23 with complaints of chest pain and SOB, found to have bilateral acute PE and bilateral pleural effusions. Transferred to [facility] for CT surgery evaluation. Also with fevers now improving. Rheumatology consulted given SLE history.     Serologies:   Positive: ABDIAS 1:280 speckled, Sm >8, RNP >8, SSA >8, hypocomplementemia, Pr/Cr 1.2 and 1.1, low vitamin D 25 21, elevated vitamin D 1,25 97, ACE elevated 125, PR3 29.8   Negative: dsDNA 28,  C4 13, APS labs, negative Janine-1 ab, negative ribosomal P, negative syphilis screen, aldolase WNL,negative RF and CCP, negative ANCA, RNA polymerase III, centromere, scleroderma     #Fever (resolved)   -Pleural effusion exudate w/negative culture and PCR  -Repeat CT imaging without obvious infectious source, mild decrease in axillary LAD, submentonian and submaxillary and increase supraclavicular LAD. No mediastinal lymphoadenopathy   -Pt with clinical manifestations and specific SLE serologies though unclear if current presentation is solely attributable to SLE. Concern for other rheumatologic disease (such as sarcoidosis, Kikuchi syndrome, Castleman disease, malignancy). Underlying malignancy also needs to be ruled out   - Fevers resolved after restarting steroids on 40 mg methylprednisolone 12/23-12/25, restarted 60 mg of methylprednisolone 12/28-12/29 and now on prednisone 60 mg PO    #SLE   +ve serology and hypocomplementemia improving after steroid trial   creatinine is stable but proteinuria +ve urine P/cr 1.1       # Elevated CPK   Now down trending     # Bilateral Acute PE with pulmonary infarcts   -Labs does not meet criteria for APS  -Hematology recommending Eliquis on discharge     Recommendations:   -Had long discussion with pt's mother at bedside. Explained that though there is no urgent need for renal or LN biopsy, with the positive PR3 and elevated ACE/Vitamin D 1,25, there is concern for overlap with potential vasculitis and sarcoid. Thus, having biopsy confirming if this is all lupus vs overlap syndrome vs malignancy would ultimately be helpful for guiding non-steroidal agents. IR consulted deferring biopsy due to risk of bleeding while on AC, however pt will be on AC for at least 6 months. Per pt's mother, she agrees that she would rather have inpt biopsy done if possible, however since pt is having significant pain due to CP/Back pain, requesting that his pain be better controlled before pursuing biopsy  -Will repeat UA/Urine protein creatinine ratio. UPC could have been elevated in setting of fever. Will also reorder ferritin, LDH and PR3     -pending pleural fungal and cytology, GBM antibody, quantiferon, PS, PS/PT, myomarker panel   -Keep on prednisone 60 mg daily   -Will check G6PD for possible HCQ as outpatient   -C/w GI PPX, please start PCP PPX       Discussed with Dr. Octavio Landaverde MD   PGY-4  Reachable on TEAMS  Pager [phone]  Rheumatology Fellow       28 years old male with h/o Lupus (diagnosed in 09/2023 due to rash, oral ulcers, chest pain, and dyspnea, on Plaquenil) who presented to [place] ED on 12/12/23 with complaints of chest pain and SOB, found to have bilateral acute PE and bilateral pleural effusions. Transferred to [facility] for CT surgery evaluation. Also with fevers now improving. Rheumatology consulted given SLE history.     Serologies:   Positive: ABDIAS 1:280 speckled, Sm >8, RNP >8, SSA >8, hypocomplementemia, Pr/Cr 1.2 and 1.1, low vitamin D 25 21, elevated vitamin D 1,25 97, ACE elevated 125, PR3 29.8   Negative: dsDNA 28,  C4 13, APS labs, negative Janine-1 ab, negative ribosomal P, negative syphilis screen, aldolase WNL,negative RF and CCP, negative ANCA, RNA polymerase III, centromere, scleroderma     #Fever (resolved)   -Pleural effusion exudate w/negative culture and PCR  -Repeat CT imaging without obvious infectious source, mild decrease in axillary LAD, submentonian and submaxillary and increase supraclavicular LAD. No mediastinal lymphoadenopathy   -Pt with clinical manifestations and specific SLE serologies though unclear if current presentation is solely attributable to SLE. Concern for other rheumatologic disease (such as sarcoidosis, Kikuchi syndrome, Castleman disease, malignancy). Underlying malignancy also needs to be ruled out   - Fevers resolved after restarting steroids on 40 mg methylprednisolone 12/23-12/25, restarted 60 mg of methylprednisolone 12/28-12/29 and now on prednisone 60 mg PO    #SLE   +ve serology and hypocomplementemia improving after steroid trial   creatinine is stable but proteinuria +ve urine P/cr 1.1       # Elevated CPK   Now down trending     # Bilateral Acute PE with pulmonary infarcts   -Labs does not meet criteria for APS  -Hematology recommending Eliquis on discharge     Recommendations:   -Had long discussion with pt's mother at bedside. Explained that though there is no urgent need for renal or LN biopsy, with the positive PR3 and elevated ACE/Vitamin D 1,25, there is concern for overlap with potential vasculitis and sarcoid. Thus, having biopsy confirming if this is all lupus vs overlap syndrome vs malignancy would ultimately be helpful for guiding non-steroidal agents. IR consulted deferring biopsy due to risk of bleeding while on AC, however pt will be on AC for at least 6 months. Per pt's mother, she agrees that she would rather have inpt biopsy done if possible, however since pt is having significant pain due to CP/Back pain, requesting that his pain be better controlled before pursuing biopsy  -Will repeat UA/Urine protein creatinine ratio. UPC could have been elevated in setting of fever. Will also reorder ferritin, LDH and PR3     -pending pleural fungal and cytology, GBM antibody, quantiferon, PS, PS/PT, myomarker panel   -Keep on prednisone 60 mg daily   -Will check G6PD for possible HCQ as outpatient   -C/w GI PPX, please start PCP PPX       Discussed with Dr. Octavio Landaverde MD   PGY-4  Reachable on TEAMS  Pager [phone]  Rheumatology Fellow       28 years old male with h/o Lupus (diagnosed in 09/2023 due to rash, oral ulcers, chest pain, and dyspnea, on Plaquenil) who presented to [place] ED on 12/12/23 with complaints of chest pain and SOB, found to have bilateral acute PE and bilateral pleural effusions. Transferred to [facility] for CT surgery evaluation. Also with fevers now improving. Rheumatology consulted given SLE history.     Serologies:   Positive: ABDIAS 1:280 speckled, Sm >8, RNP >8, SSA >8, hypocomplementemia, Pr/Cr 1.2 and 1.1, low vitamin D 25 21, elevated vitamin D 1,25 97, ACE elevated 125, PR3 29.8   Negative: dsDNA 28,  C4 13, APS labs, negative Janine-1 ab, negative ribosomal P, negative syphilis screen, aldolase WNL,negative RF and CCP, negative ANCA, RNA polymerase III, centromere, scleroderma     #Fever (resolved)   -Pleural effusion exudate w/negative culture and PCR  -Repeat CT imaging without obvious infectious source, mild decrease in axillary LAD, submentonian and submaxillary and increase supraclavicular LAD. No mediastinal lymphoadenopathy   -Pt with clinical manifestations and specific SLE serologies though unclear if current presentation is solely attributable to SLE. Concern for other rheumatologic disease (such as sarcoidosis, Kikuchi syndrome, Castleman disease, malignancy). Underlying malignancy also needs to be ruled out   - Fevers resolved after restarting steroids on 40 mg methylprednisolone 12/23-12/25, restarted 60 mg of methylprednisolone 12/28-12/29 and now on prednisone 60 mg PO    #SLE   +ve serology and hypocomplementemia improving after steroid trial   creatinine is stable but proteinuria +ve urine P/cr 1.1       # Elevated CPK   Now down trending     # Bilateral Acute PE with pulmonary infarcts   -Labs does not meet criteria for APS  -Hematology recommending Eliquis on discharge     Recommendations:   -Had long discussion with pt's mother at bedside. Explained that though there is no urgent need for renal or LN biopsy, with the positive PR3 and elevated ACE/Vitamin D 1,25, there is concern for overlap with potential vasculitis and sarcoid. Thus, having biopsy confirming if this is all lupus vs overlap syndrome vs malignancy would ultimately be helpful for guiding non-steroidal agents. IR consulted deferring biopsy due to risk of bleeding while on AC, however pt will be on AC for at least 6 months. Per pt's mother, she agrees that she would rather have inpt biopsy done if possible, however since pt is having significant pain due to CP/Back pain, requesting that his pain be better controlled before pursuing biopsy  -Will repeat UA/Urine protein creatinine ratio. UPC could have been elevated in setting of fever. Will also reorder ferritin, LDH and PR3     - Pt with positive EBV PCR, will reach out to ID to comment on significance   -pending pleural fungal and cytology, GBM antibody, quantiferon, PS, PS/PT, myomarker panel   -Keep on prednisone 60 mg daily   -Will check G6PD for possible HCQ as outpatient   -C/w GI PPX, please start PCP PPX       Discussed with Dr. Octavio Landaverde MD   PGY-4  Reachable on TEAMS  Pager [phone]  Rheumatology Fellow       28 years old male with h/o Lupus (diagnosed in 09/2023 due to rash, oral ulcers, chest pain, and dyspnea, on Plaquenil) who presented to [place] ED on 12/12/23 with complaints of chest pain and SOB, found to have bilateral acute PE and bilateral pleural effusions. Transferred to [facility] for CT surgery evaluation. Also with fevers now improving. Rheumatology consulted given SLE history.     Serologies:   Positive: ABDIAS 1:280 speckled, Sm >8, RNP >8, SSA >8, hypocomplementemia, Pr/Cr 1.2 and 1.1, low vitamin D 25 21, elevated vitamin D 1,25 97, ACE elevated 125, PR3 29.8   Negative: dsDNA 28,  C4 13, APS labs, negative Janine-1 ab, negative ribosomal P, negative syphilis screen, aldolase WNL,negative RF and CCP, negative ANCA, RNA polymerase III, centromere, scleroderma     #Fever (resolved)   -Pleural effusion exudate w/negative culture and PCR  -Repeat CT imaging without obvious infectious source, mild decrease in axillary LAD, submentonian and submaxillary and increase supraclavicular LAD. No mediastinal lymphoadenopathy   -Pt with clinical manifestations and specific SLE serologies though unclear if current presentation is solely attributable to SLE. Concern for other rheumatologic disease (such as sarcoidosis, Kikuchi syndrome, Castleman disease, malignancy). Underlying malignancy also needs to be ruled out   - Fevers resolved after restarting steroids on 40 mg methylprednisolone 12/23-12/25, restarted 60 mg of methylprednisolone 12/28-12/29 and now on prednisone 60 mg PO    #SLE   +ve serology and hypocomplementemia improving after steroid trial   creatinine is stable but proteinuria +ve urine P/cr 1.1       # Elevated CPK   Now down trending     # Bilateral Acute PE with pulmonary infarcts   -Labs does not meet criteria for APS  -Hematology recommending Eliquis on discharge     Recommendations:   -Had long discussion with pt's mother at bedside. Explained that though there is no urgent need for renal or LN biopsy, with the positive PR3 and elevated ACE/Vitamin D 1,25, there is concern for overlap with potential vasculitis and sarcoid. Thus, having biopsy confirming if this is all lupus vs overlap syndrome vs malignancy would ultimately be helpful for guiding non-steroidal agents. IR consulted deferring biopsy due to risk of bleeding while on AC, however pt will be on AC for at least 6 months. Per pt's mother, she agrees that she would rather have inpt biopsy done if possible, however since pt is having significant pain due to CP/Back pain, requesting that his pain be better controlled before pursuing biopsy  -Will repeat UA/Urine protein creatinine ratio. UPC could have been elevated in setting of fever. Will also reorder ferritin, LDH and PR3     - Pt with positive EBV PCR, will reach out to ID to comment on significance   -pending pleural fungal and cytology, GBM antibody, quantiferon, PS, PS/PT, myomarker panel   -Keep on prednisone 60 mg daily   -Will check G6PD for possible HCQ as outpatient   -C/w GI PPX, please start PCP PPX       Discussed with Dr. Octavio Landaverde MD   PGY-4  Reachable on TEAMS  Pager [phone]  Rheumatology Fellow       28 years old male with h/o Lupus (diagnosed in 09/2023 due to rash, oral ulcers, chest pain, and dyspnea, on Plaquenil) who presented to [place] ED on 12/12/23 with complaints of chest pain and SOB, found to have bilateral acute PE and bilateral pleural effusions. Transferred to [facility] for CT surgery evaluation. Also with fevers now resolved. Rheumatology consulted given SLE history.     Serologies:   Positive: ABDIAS 1:280 speckled, Sm >8, RNP >8, SSA >8, hypocomplementemia, Pr/Cr 1.2 and 1.1, low vitamin D 25 21, elevated vitamin D 1,25 97, ACE elevated 125, PR3 29.8   Negative: dsDNA 28,  C4 13, APS labs, negative Janine-1 ab, negative ribosomal P, negative syphilis screen, aldolase WNL,negative RF and CCP, negative ANCA, RNA polymerase III, centromere, scleroderma     #Fever (resolved)   -Pleural effusion exudate w/negative culture and PCR  -Repeat CT imaging without obvious infectious source, mild decrease in axillary LAD, submentonian and submaxillary and increase supraclavicular LAD. No mediastinal lymphoadenopathy   -Pt with clinical manifestations and specific SLE serologies though unclear if current presentation is solely attributable to SLE. Concern for other rheumatologic disease (such as sarcoidosis, Kikuchi syndrome, Castleman disease), Underlying malignancy also needs to be ruled out   - Fevers resolved after restarting steroids on 40 mg methylprednisolone 12/23-12/25, restarted 60 mg of methylprednisolone 12/28-12/29 and now on prednisone 60 mg PO    #SLE   +ve serology and hypocomplementemia improving after steroid trial   creatinine is stable but proteinuria +ve urine P/cr 1.1       # Elevated CPK   resolved     # Bilateral Acute PE with pulmonary infarcts   -Labs does not meet criteria for APS  PS-PT negative  -Hematology recommending Eliquis on discharge     Recommendations:   -Had long discussion with pt's mother at bedside. Explained that though there is no urgent need for renal or LN biopsy, with the positive PR3 and elevated ACE/Vitamin D 1,25, there is concern for overlap with potential vasculitis and sarcoid. Thus, having biopsy confirming if this is all lupus vs overlap syndrome vs malignancy would ultimately be helpful for guiding non-steroidal agents. IR consulted deferring biopsy due to risk of bleeding while on AC, however pt will be on AC for at least 6 months. Per pt's mother, she agrees that she would rather have inpt biopsy done if possible, however since pt is having significant pain due to CP/Back pain, requesting that his pain be better controlled before pursuing biopsy  -Will repeat UA/Urine protein creatinine ratio. UPC could have been elevated in setting of fever. Will also reorder ferritin, LDH and PR3     - Pt with positive EBV PCR, will reach out to ID to comment on significance   -pending pleural fungal and cytology  -myomarker panel pending   -Keep on prednisone 60 mg daily for now  -continue with PCP  -pending G6PD to resume HCQ         Discussed with Dr. Octavio Landaverde MD   PGY-4  Reachable on TEAMS  Pager [phone]  Rheumatology Fellow       28 years old male with h/o Lupus (diagnosed in 09/2023 due to rash, oral ulcers, chest pain, and dyspnea, on Plaquenil) who presented to [place] ED on 12/12/23 with complaints of chest pain and SOB, found to have bilateral acute PE and bilateral pleural effusions. Transferred to [facility] for CT surgery evaluation. Also with fevers now resolved. Rheumatology consulted given SLE history.     Serologies:   Positive: ABDIAS 1:280 speckled, Sm >8, RNP >8, SSA >8, hypocomplementemia, Pr/Cr 1.2 and 1.1, low vitamin D 25 21, elevated vitamin D 1,25 97, ACE elevated 125, PR3 29.8   Negative: dsDNA 28,  C4 13, APS labs, negative Janine-1 ab, negative ribosomal P, negative syphilis screen, aldolase WNL,negative RF and CCP, negative ANCA, RNA polymerase III, centromere, scleroderma     #Fever (resolved)   -Pleural effusion exudate w/negative culture and PCR  -Repeat CT imaging without obvious infectious source, mild decrease in axillary LAD, submentonian and submaxillary and increase supraclavicular LAD. No mediastinal lymphoadenopathy   -Pt with clinical manifestations and specific SLE serologies though unclear if current presentation is solely attributable to SLE. Concern for other rheumatologic disease (such as sarcoidosis, Kikuchi syndrome, Castleman disease), Underlying malignancy also needs to be ruled out   - Fevers resolved after restarting steroids on 40 mg methylprednisolone 12/23-12/25, restarted 60 mg of methylprednisolone 12/28-12/29 and now on prednisone 60 mg PO    #SLE   +ve serology and hypocomplementemia improving after steroid trial   creatinine is stable but proteinuria +ve urine P/cr 1.1       # Elevated CPK   resolved     # Bilateral Acute PE with pulmonary infarcts   -Labs does not meet criteria for APS  PS-PT negative  -Hematology recommending Eliquis on discharge     Recommendations:   -Had long discussion with pt's mother at bedside. Explained that though there is no urgent need for renal or LN biopsy, with the positive PR3 and elevated ACE/Vitamin D 1,25, there is concern for overlap with potential vasculitis and sarcoid. Thus, having biopsy confirming if this is all lupus vs overlap syndrome vs malignancy would ultimately be helpful for guiding non-steroidal agents. IR consulted deferring biopsy due to risk of bleeding while on AC, however pt will be on AC for at least 6 months. Per pt's mother, she agrees that she would rather have inpt biopsy done if possible, however since pt is having significant pain due to CP/Back pain, requesting that his pain be better controlled before pursuing biopsy  -Will repeat UA/Urine protein creatinine ratio. UPC could have been elevated in setting of fever. Will also reorder ferritin, LDH and PR3     - Pt with positive EBV PCR, will reach out to ID to comment on significance   -pending pleural fungal and cytology  -myomarker panel pending   -Keep on prednisone 60 mg daily for now  -continue with PCP  -pending G6PD to resume HCQ         Discussed with Dr. Octavio Landaverde MD   PGY-4  Reachable on TEAMS  Pager [phone]  Rheumatology Fellow

## 2024-01-02 NOTE — PROGRESS NOTE ADULT - ATTENDING COMMENTS
Amendments to fellow's assessment and plan as above.  Patient and mother aware of our recommendations and in agreement.  Discussed with primary team  will follow

## 2024-01-02 NOTE — BH CONSULTATION LIAISON PROGRESS NOTE - NSBHASSESSMENTFT_PSY_ALL_CORE
28 years old male with h/o Lupus ( diagnosed in 09/2023, on Plaquenil present to [place] ED on 12/12/23  with complain of chest pain and SOB admitted for fevers, PE, pleural effusions, course c/b high fever and tonic-clonic seizure, transferred to MICU for post-ictal and infectious monitoring. Psych consulted for depression. Pt had indicated a hx of depression/anxiety, had been in the  and [facility], and now is requesting to speak to someone. However he is rather medically active and compromised.   Reported being depressed for many years, no current safety concerns. C/o poor sleep due to pain    Plan:   - Continue medical stabilization as you are  - Address pain ( pain management)  - PRN for sleep: melatonin 3 mg qhs.  -Consult Holistic nurse to address his mood ( pt is amendable)  - Repeat EKG ( calculate QTC manually)  - No role for 1:1 at this time  - No role for initiating psychopharm measures at this time, will continue to assess.   - Dispo: no role for inpt psych; no psych barriers to discharge when medically cleared. Outpatient follow up with psychiatrist.  - Will f/u on 1/2, call [phone] before then if needed.   28 years old male with h/o Lupus ( diagnosed in 09/2023, on Plaquenil present to [place] ED on 12/12/23  with complain of chest pain and SOB admitted for fevers, PE, pleural effusions, course c/b high fever and tonic-clonic seizure, transferred to MICU for post-ictal and infectious monitoring. Psych consulted for depression. Pt had indicated a hx of depression/anxiety, had been in the  and [facility] but [facility], and now is requesting to speak to someone. However he is rather medically active and compromised.   Reported being depressed for many years, no current safety concerns. C/o poor sleep due to pain    Plan:   - Continue medical stabilization as you are  - Address pain ( pain management)  - PRN for sleep: melatonin 3 mg qhs.  -Consult Holistic nurse to address his mood ( pt is amendable)  - Repeat EKG ( calculate QTC manually)  - No role for 1:1 at this time  - No role for initiating psychopharm measures at this time, will continue to assess.   - Dispo: no role for inpt psych; no psych barriers to discharge when medically cleared. Outpatient follow up with psychiatrist.  - Will f/u on 1/2, call [phone] before then if needed.

## 2024-01-02 NOTE — PROGRESS NOTE ADULT - SUBJECTIVE AND OBJECTIVE BOX
[facility]  Division of Infectious Diseases  [phone]    Name: TAYLA MARIE  Age: 28y  Gender: Male  MRN: [id number]    Interval History--  Notes reviewed.     Past Medical History--  No pertinent past medical history    Pulmonary embolism    LE (lupus erythematosus)    No significant past surgical history        For details regarding the patient's social history, family history, and other miscellaneous elements, please refer the initial infectious diseases consultation and/or the admitting history and physical examination for this admission.    Allergies    No Known Allergies    Intolerances        Medications--  Antibiotics:  hydroxychloroquine 200 milliGRAM(s) Oral two times a day    Immunologic:    Other:  acetaminophen     Tablet .. PRN  albuterol/ipratropium for Nebulization PRN  benzocaine/menthol Lozenge PRN  chlorhexidine 2% Cloths  ciprofloxacin  0.3% Ophthalmic Solution for Otic Use  FIRST- Mouthwash  BLM PRN  guaiFENesin Oral Liquid (Sugar-Free) PRN  heparin   Injectable PRN  heparin   Injectable PRN  heparin  Infusion.  lidocaine   4% Patch  lidocaine   4% Patch  lidocaine 2% Viscous PRN  melatonin  multivitamin  Chewable  ondansetron Injectable PRN  oxyCODONE    IR PRN  pantoprazole    Tablet  polyethylene glycol 3350  predniSONE   Tablet  senna  sodium chloride  sodium chloride 3%.      Review of Systems--  A 10-point review of systems was obtained.     Pertinent positives and negatives--  Constitutional: No fevers. No Chills. No Rigors.   Cardiovascular: No chest pain. No palpitations.  Respiratory: No shortness of breath. No cough.  Gastrointestinal: No nausea or vomiting. No diarrhea or constipation.   Psychiatric: Pleasant. Appropriate affect.    Review of systems otherwise negative except as previously noted.    Physical Examination--  Vital Signs: T(F): 97.7 (01-02-24 @ 05:29), Max: 98.9 (01-01-24 @ 20:44)  HR: 60 (01-02-24 @ 05:29)  BP: 115/82 (01-02-24 @ 05:29)  RR: 18 (01-02-24 @ 05:29)  SpO2: 100% (01-02-24 @ 05:29)  Wt(kg): --  General: Nontoxic-appearing Male in no acute distress.  HEENT: AT/NC. PERRL. EOMI. Anicteric. Conjunctiva pink and moist. Oropharynx clear. Dentition fair.  Neck: Not rigid. No sense of mass.  Nodes: None palpable.  Lungs: Clear bilaterally without rales, wheezing or rhonchi  Heart: Regular rate and rhythm. No Murmur. No rub. No gallop. No palpable thrill.  Abdomen: Bowel sounds present and normoactive. Soft. Nondistended. Nontender. No sense of mass. No organomegaly.  Back: No spinal tenderness. No costovertebral angle tenderness.   Extremities: No cyanosis or clubbing. No edema.   Skin: Warm. Dry. Good turgor. No rash. No vasculitic stigmata.  Psychiatric: Appropriate affect and mood for situation.         Laboratory Studies--  CBC                        9.0    11.85 )-----------( 289      ( 02 Jan 2024 02:45 )             27.3       Chemistries  01-02    132<L>  |  98  |  13  ----------------------------<  118<H>  4.0   |  24  |  0.64    Ca    9.3      02 Jan 2024 02:45  Phos  3.5     01-02  Mg     2.00     01-02    TPro  6.5  /  Alb  2.9<L>  /  TBili  0.5  /  DBili  x   /  AST  50<H>  /  ALT  89<H>  /  AlkPhos  102  01-02      Culture Data    Culture - Blood (collected 27 Dec 2023 17:56)  Source: .Blood Blood-Peripheral  Final Report (01 Jan 2024 21:01):    No growth at 5 days    Culture - Blood (collected 27 Dec 2023 17:56)  Source: .Blood Blood-Peripheral  Final Report (01 Jan 2024 22:01):    No growth at 5 days    Culture - Group A Streptococcus (collected 27 Dec 2023 15:35)  Source: .Throat Throat  Final Report (28 Dec 2023 22:25):    No Streptococcus pyogenes (Group A) isolated    Culture - Sputum (collected 27 Dec 2023 13:30)  Source: .Sputum Sputum  Gram Stain (28 Dec 2023 07:53):    Few polymorphonuclear leukocytes per low power field    Few Squamous epithelial cells per low power field    No organisms seen per oil power field  Final Report (29 Dec 2023 10:35):    Normal Respiratory Inge present    Culture - Fungal, Body Fluid (collected 26 Dec 2023 18:30)  Source: Pleural Fl Pleural Fluid  Preliminary Report (30 Dec 2023 15:03):    Culture is being performed. Fungal cultures are held for 4 weeks.    Culture - Body Fluid with Gram Stain (collected 26 Dec 2023 18:30)  Source: Pleural Fl Pleural Fluid  Gram Stain (27 Dec 2023 02:43):    polymorphonuclear leukocytes seen    No organisms seen    by cytocentrifuge  Final Report (31 Dec 2023 14:00):    No growth at 5 days             [facility]  Division of Infectious Diseases  [phone]    Name: TAYLA MARIE  Age: 28y  Gender: Male  MRN: [id number]    Interval History--  Notes reviewed.     Past Medical History--  No pertinent past medical history    Pulmonary embolism    LE (lupus erythematosus)    No significant past surgical history        For details regarding the patient's social history, family history, and other miscellaneous elements, please refer the initial infectious diseases consultation and/or the admitting history and physical examination for this admission.    Allergies    No Known Allergies    Intolerances        Medications--  Antibiotics:  hydroxychloroquine 200 milliGRAM(s) Oral two times a day    Immunologic:    Other:  acetaminophen     Tablet .. PRN  albuterol/ipratropium for Nebulization PRN  benzocaine/menthol Lozenge PRN  chlorhexidine 2% Cloths  ciprofloxacin  0.3% Ophthalmic Solution for Otic Use  FIRST- Mouthwash  BLM PRN  guaiFENesin Oral Liquid (Sugar-Free) PRN  heparin   Injectable PRN  heparin   Injectable PRN  heparin  Infusion.  lidocaine   4% Patch  lidocaine   4% Patch  lidocaine 2% Viscous PRN  melatonin  multivitamin  Chewable  ondansetron Injectable PRN  oxyCODONE    IR PRN  pantoprazole    Tablet  polyethylene glycol 3350  predniSONE   Tablet  senna  sodium chloride  sodium chloride 3%.      Review of Systems--  A 10-point review of systems was obtained.     Pertinent positives and negatives--  Constitutional: No fevers. No Chills. No Rigors.   Cardiovascular: No chest pain. No palpitations.  Respiratory: No shortness of breath. No cough.  Gastrointestinal: No nausea or vomiting. No diarrhea or constipation.   Psychiatric: Pleasant. Appropriate affect.    Review of systems otherwise negative except as previously noted.    Physical Examination--  Vital Signs: T(F): 97.7 (01-02-24 @ 05:29), Max: 98.9 (01-01-24 @ 20:44)  HR: 60 (01-02-24 @ 05:29)  BP: 115/82 (01-02-24 @ 05:29)  RR: 18 (01-02-24 @ 05:29)  SpO2: 100% (01-02-24 @ 05:29)  Wt(kg): --  General: Nontoxic-appearing Male in no acute distress.  HEENT: AT/NC. PERRL. EOMI. Anicteric. Conjunctiva pink and moist. Oropharynx clear. Dentition fair.  Neck: Not rigid. No sense of mass.  Nodes: None palpable.  Lungs: Clear bilaterally without rales, wheezing or rhonchi  Heart: Regular rate and rhythm. No Murmur. No rub. No gallop. No palpable thrill.  Abdomen: Bowel sounds present and normoactive. Soft. Nondistended. Nontender. No sense of mass. No organomegaly.  Back: No spinal tenderness. No costovertebral angle tenderness.   Extremities: No cyanosis or clubbing. No edema.   Skin: Warm. Dry. Good turgor. No rash. No vasculitic stigmata.  Psychiatric: Appropriate affect and mood for situation.         Laboratory Studies--  CBC                        9.0    11.85 )-----------( 289      ( 02 Jan 2024 02:45 )             27.3       Chemistries  01-02    132<L>  |  98  |  13  ----------------------------<  118<H>  4.0   |  24  |  0.64    Ca    9.3      02 Jan 2024 02:45  Phos  3.5     01-02  Mg     2.00     01-02    TPro  6.5  /  Alb  2.9<L>  /  TBili  0.5  /  DBili  x   /  AST  50<H>  /  ALT  89<H>  /  AlkPhos  102  01-02      Culture Data    Culture - Blood (collected 27 Dec 2023 17:56)  Source: .Blood Blood-Peripheral  Final Report (01 Jan 2024 21:01):    No growth at 5 days    Culture - Blood (collected 27 Dec 2023 17:56)  Source: .Blood Blood-Peripheral  Final Report (01 Jan 2024 22:01):    No growth at 5 days    Culture - Group A Streptococcus (collected 27 Dec 2023 15:35)  Source: .Throat Throat  Final Report (28 Dec 2023 22:25):    No Streptococcus pyogenes (Group A) isolated    Culture - Sputum (collected 27 Dec 2023 13:30)  Source: .Sputum Sputum  Gram Stain (28 Dec 2023 07:53):    Few polymorphonuclear leukocytes per low power field    Few Squamous epithelial cells per low power field    No organisms seen per oil power field  Final Report (29 Dec 2023 10:35):    Normal Respiratory Inge present    Culture - Fungal, Body Fluid (collected 26 Dec 2023 18:30)  Source: Pleural Fl Pleural Fluid  Preliminary Report (30 Dec 2023 15:03):    Culture is being performed. Fungal cultures are held for 4 weeks.    Culture - Body Fluid with Gram Stain (collected 26 Dec 2023 18:30)  Source: Pleural Fl Pleural Fluid  Gram Stain (27 Dec 2023 02:43):    polymorphonuclear leukocytes seen    No organisms seen    by cytocentrifuge  Final Report (31 Dec 2023 14:00):    No growth at 5 days             [facility]  Division of Infectious Diseases  [phone]    Name: TAYLA MARIE  Age: 28y  Gender: Male  MRN: [id number]    Interval History--  Notes reviewed. Seen earlier today.  Feeling much better Chest tube out. No fevers, chills, or rigors. Pain not an issue. Mother at bedside with numerous questions, which I answered to the best of my ability but deferred to respective services for definitive answers (e.g. are they doing a kidney biopsy and why). Also inquiring about going back to a non-pureed diet.       Past Medical History--  No pertinent past medical history    Pulmonary embolism    LE (lupus erythematosus)    No significant past surgical history        For details regarding the patient's social history, family history, and other miscellaneous elements, please refer the initial infectious diseases consultation and/or the admitting history and physical examination for this admission.    Allergies    No Known Allergies    Intolerances        Medications--  Antibiotics:  hydroxychloroquine 200 milliGRAM(s) Oral two times a day    Immunologic:    Other:  acetaminophen     Tablet .. PRN  albuterol/ipratropium for Nebulization PRN  benzocaine/menthol Lozenge PRN  chlorhexidine 2% Cloths  ciprofloxacin  0.3% Ophthalmic Solution for Otic Use  FIRST- Mouthwash  BLM PRN  guaiFENesin Oral Liquid (Sugar-Free) PRN  heparin   Injectable PRN  heparin   Injectable PRN  heparin  Infusion.  lidocaine   4% Patch  lidocaine   4% Patch  lidocaine 2% Viscous PRN  melatonin  multivitamin  Chewable  ondansetron Injectable PRN  oxyCODONE    IR PRN  pantoprazole    Tablet  polyethylene glycol 3350  predniSONE   Tablet  senna  sodium chloride  sodium chloride 3%.      Review of Systems--  A 10-point review of systems was obtained.   Review of systems otherwise negative except as previously noted.    Physical Examination--  Vital Signs: T(F): 97.7 (01-02-24 @ 05:29), Max: 98.9 (01-01-24 @ 20:44)  HR: 60 (01-02-24 @ 05:29)  BP: 115/82 (01-02-24 @ 05:29)  RR: 18 (01-02-24 @ 05:29)  SpO2: 100% (01-02-24 @ 05:29)  Wt(kg): --  General: Nontoxic-appearing Male in no acute distress.  HEENT: AT/NC. Anicteric. Conjunctiva pink and moist. Oropharynx clear.  Neck: Not rigid. No sense of mass.  Nodes: None palpable.  Lungs: Diminished BS B no RWR.   Heart: Regular rate and rhythm.   Abdomen: Bowel sounds present and normoactive. Soft. Nondistended. Nontender.  Extremities: No cyanosis or clubbing. No edema.   Skin: Warm. Dry. Good turgor. No rash. No vasculitic stigmata.  Psychiatric: Appropriate affect and mood for situation.         Laboratory Studies--  CBC                        9.0    11.85 )-----------( 289      ( 02 Jan 2024 02:45 )             27.3       Chemistries  01-02    132<L>  |  98  |  13  ----------------------------<  118<H>  4.0   |  24  |  0.64    Ca    9.3      02 Jan 2024 02:45  Phos  3.5     01-02  Mg     2.00     01-02    TPro  6.5  /  Alb  2.9<L>  /  TBili  0.5  /  DBili  x   /  AST  50<H>  /  ALT  89<H>  /  AlkPhos  102  01-02      Culture Data    Culture - Blood (collected 27 Dec 2023 17:56)  Source: .Blood Blood-Peripheral  Final Report (01 Jan 2024 21:01):    No growth at 5 days    Culture - Blood (collected 27 Dec 2023 17:56)  Source: .Blood Blood-Peripheral  Final Report (01 Jan 2024 22:01):    No growth at 5 days    Culture - Group A Streptococcus (collected 27 Dec 2023 15:35)  Source: .Throat Throat  Final Report (28 Dec 2023 22:25):    No Streptococcus pyogenes (Group A) isolated    Culture - Sputum (collected 27 Dec 2023 13:30)  Source: .Sputum Sputum  Gram Stain (28 Dec 2023 07:53):    Few polymorphonuclear leukocytes per low power field    Few Squamous epithelial cells per low power field    No organisms seen per oil power field  Final Report (29 Dec 2023 10:35):    Normal Respiratory Inge present    Culture - Fungal, Body Fluid (collected 26 Dec 2023 18:30)  Source: Pleural Fl Pleural Fluid  Preliminary Report (30 Dec 2023 15:03):    Culture is being performed. Fungal cultures are held for 4 weeks.    Culture - Body Fluid with Gram Stain (collected 26 Dec 2023 18:30)  Source: Pleural Fl Pleural Fluid  Gram Stain (27 Dec 2023 02:43):    polymorphonuclear leukocytes seen    No organisms seen    by cytocentrifuge  Final Report (31 Dec 2023 14:00):    No growth at 5 days             [facility]  Division of Infectious Diseases  [phone]    Name: TAYLA MARIE  Age: 28y  Gender: Male  MRN: [id number]    Interval History--  Notes reviewed. Seen earlier today.  Feeling much better Chest tube out. No fevers, chills, or rigors. Pain not an issue. Mother at bedside with numerous questions, which I answered to the best of my ability but deferred to respective services for definitive answers (e.g. are they doing a kidney biopsy and why). Also inquiring about going back to a non-pureed diet.       Past Medical History--  No pertinent past medical history    Pulmonary embolism    LE (lupus erythematosus)    No significant past surgical history        For details regarding the patient's social history, family history, and other miscellaneous elements, please refer the initial infectious diseases consultation and/or the admitting history and physical examination for this admission.    Allergies    No Known Allergies    Intolerances        Medications--  Antibiotics:  hydroxychloroquine 200 milliGRAM(s) Oral two times a day    Immunologic:    Other:  acetaminophen     Tablet .. PRN  albuterol/ipratropium for Nebulization PRN  benzocaine/menthol Lozenge PRN  chlorhexidine 2% Cloths  ciprofloxacin  0.3% Ophthalmic Solution for Otic Use  FIRST- Mouthwash  BLM PRN  guaiFENesin Oral Liquid (Sugar-Free) PRN  heparin   Injectable PRN  heparin   Injectable PRN  heparin  Infusion.  lidocaine   4% Patch  lidocaine   4% Patch  lidocaine 2% Viscous PRN  melatonin  multivitamin  Chewable  ondansetron Injectable PRN  oxyCODONE    IR PRN  pantoprazole    Tablet  polyethylene glycol 3350  predniSONE   Tablet  senna  sodium chloride  sodium chloride 3%.      Review of Systems--  A 10-point review of systems was obtained.   Review of systems otherwise negative except as previously noted.    Physical Examination--  Vital Signs: T(F): 97.7 (01-02-24 @ 05:29), Max: 98.9 (01-01-24 @ 20:44)  HR: 60 (01-02-24 @ 05:29)  BP: 115/82 (01-02-24 @ 05:29)  RR: 18 (01-02-24 @ 05:29)  SpO2: 100% (01-02-24 @ 05:29)  Wt(kg): --  General: Nontoxic-appearing Male in no acute distress.  HEENT: AT/NC. Anicteric. Conjunctiva pink and moist. Oropharynx clear.  Neck: Not rigid. No sense of mass.  Nodes: None palpable.  Lungs: Diminished BS B no RWR.   Heart: Regular rate and rhythm.   Abdomen: Bowel sounds present and normoactive. Soft. Nondistended. Nontender.  Extremities: No cyanosis or clubbing. No edema.   Skin: Warm. Dry. Good turgor. No rash. No vasculitic stigmata.  Psychiatric: Appropriate affect and mood for situation.         Laboratory Studies--  CBC                        9.0    11.85 )-----------( 289      ( 02 Jan 2024 02:45 )             27.3       Chemistries  01-02    132<L>  |  98  |  13  ----------------------------<  118<H>  4.0   |  24  |  0.64    Ca    9.3      02 Jan 2024 02:45  Phos  3.5     01-02  Mg     2.00     01-02    TPro  6.5  /  Alb  2.9<L>  /  TBili  0.5  /  DBili  x   /  AST  50<H>  /  ALT  89<H>  /  AlkPhos  102  01-02      Culture Data    Culture - Blood (collected 27 Dec 2023 17:56)  Source: .Blood Blood-Peripheral  Final Report (01 Jan 2024 21:01):    No growth at 5 days    Culture - Blood (collected 27 Dec 2023 17:56)  Source: .Blood Blood-Peripheral  Final Report (01 Jan 2024 22:01):    No growth at 5 days    Culture - Group A Streptococcus (collected 27 Dec 2023 15:35)  Source: .Throat Throat  Final Report (28 Dec 2023 22:25):    No Streptococcus pyogenes (Group A) isolated    Culture - Sputum (collected 27 Dec 2023 13:30)  Source: .Sputum Sputum  Gram Stain (28 Dec 2023 07:53):    Few polymorphonuclear leukocytes per low power field    Few Squamous epithelial cells per low power field    No organisms seen per oil power field  Final Report (29 Dec 2023 10:35):    Normal Respiratory Inge present    Culture - Fungal, Body Fluid (collected 26 Dec 2023 18:30)  Source: Pleural Fl Pleural Fluid  Preliminary Report (30 Dec 2023 15:03):    Culture is being performed. Fungal cultures are held for 4 weeks.    Culture - Body Fluid with Gram Stain (collected 26 Dec 2023 18:30)  Source: Pleural Fl Pleural Fluid  Gram Stain (27 Dec 2023 02:43):    polymorphonuclear leukocytes seen    No organisms seen    by cytocentrifuge  Final Report (31 Dec 2023 14:00):    No growth at 5 days

## 2024-01-02 NOTE — PROGRESS NOTE ADULT - SUBJECTIVE AND OBJECTIVE BOX
Subjective: Patient seen and examined. No new events except as noted.     SUBJECTIVE/ROS:  nad      MEDICATIONS:  MEDICATIONS  (STANDING):  cefepime   IVPB 2000 milliGRAM(s) IV Intermittent every 8 hours  chlorhexidine 2% Cloths 1 Application(s) Topical daily  ciprofloxacin  0.3% Ophthalmic Solution for Otic Use 2 Drop(s) Both Ears two times a day  heparin  Infusion. 1300 Unit(s)/Hr (13 mL/Hr) IV Continuous <Continuous>  hydroxychloroquine 200 milliGRAM(s) Oral two times a day  lidocaine   4% Patch 1 Patch Transdermal every 24 hours  lidocaine   4% Patch 2 Patch Transdermal every 24 hours  melatonin 3 milliGRAM(s) Oral <User Schedule>  pantoprazole    Tablet 40 milliGRAM(s) Oral before breakfast  polyethylene glycol 3350 17 Gram(s) Oral two times a day  predniSONE   Tablet 60 milliGRAM(s) Oral daily  senna 2 Tablet(s) Oral at bedtime  sodium chloride 1 Gram(s) Oral three times a day  sodium chloride 3%. 500 milliLiter(s) (30 mL/Hr) IV Continuous <Continuous>      PHYSICAL EXAM:  T(C): 36.5 (01-02-24 @ 05:29), Max: 37.2 (01-01-24 @ 20:44)  HR: 60 (01-02-24 @ 05:29) (60 - 82)  BP: 115/82 (01-02-24 @ 05:29) (115/82 - 132/85)  RR: 18 (01-02-24 @ 05:29) (17 - 18)  SpO2: 100% (01-02-24 @ 05:29) (100% - 100%)  Wt(kg): --  I&O's Summary          JVP: Normal  Neck: supple  Lung: clear   CV: S1 S2 , Murmur:  Abd: soft  Ext: No edema  neuro: Awake / alert  Psych: flat affect  Skin: normal``    LABS/DATA:    CARDIAC MARKERS:  CARDIAC MARKERS ( 30 Dec 2023 23:20 )  x     / x     / 107 U/L / x     / 1.8 ng/mL                                9.0    11.85 )-----------( 289      ( 02 Jan 2024 02:45 )             27.3     01-02    132<L>  |  98  |  13  ----------------------------<  118<H>  4.0   |  24  |  0.64    Ca    9.3      02 Jan 2024 02:45  Phos  3.5     01-02  Mg     2.00     01-02    TPro  6.5  /  Alb  2.9<L>  /  TBili  0.5  /  DBili  x   /  AST  50<H>  /  ALT  89<H>  /  AlkPhos  102  01-02    proBNP:   Lipid Profile:   HgA1c:   TSH:     TELE:  EKG:

## 2024-01-02 NOTE — PROGRESS NOTE ADULT - SUBJECTIVE AND OBJECTIVE BOX
CHIEF COMPLAINT:    Interval Events:    REVIEW OF SYSTEMS:  Constitutional: [ ] negative [ ] fevers [ ] chills [ ] weight loss [ ] weight gain  HEENT: [ ] negative [ ] dry eyes [ ] eye irritation [ ] postnasal drip [ ] nasal congestion  CV: [ ] negative  [ ] chest pain [ ] orthopnea [ ] palpitations [ ] murmur  Resp: [ ] negative [ ] cough [ ] shortness of breath [ ] dyspnea [ ] wheezing [ ] sputum [ ] hemoptysis  GI: [ ] negative [ ] nausea [ ] vomiting [ ] diarrhea [ ] constipation [ ] abd pain [ ] dysphagia   : [ ] negative [ ] dysuria [ ] nocturia [ ] hematuria [ ] increased urinary frequency  Musculoskeletal: [ ] negative [ ] back pain [ ] myalgias [ ] arthralgias [ ] fracture  Skin: [ ] negative [ ] rash [ ] itch  Neurological: [ ] negative [ ] headache [ ] dizziness [ ] syncope [ ] weakness [ ] numbness  Psychiatric: [ ] negative [ ] anxiety [ ] depression  Endocrine: [ ] negative [ ] diabetes [ ] thyroid problem  Hematologic/Lymphatic: [ ] negative [ ] anemia [ ] bleeding problem  Allergic/Immunologic: [ ] negative [ ] itchy eyes [ ] nasal discharge [ ] hives [ ] angioedema  [ ] All other systems negative  [ ] Unable to assess ROS because ________    OBJECTIVE:  ICU Vital Signs Last 24 Hrs  T(C): 36.5 (02 Jan 2024 05:29), Max: 37.2 (01 Jan 2024 20:44)  T(F): 97.7 (02 Jan 2024 05:29), Max: 98.9 (01 Jan 2024 20:44)  HR: 60 (02 Jan 2024 05:29) (60 - 82)  BP: 115/82 (02 Jan 2024 05:29) (115/82 - 132/85)  BP(mean): --  ABP: --  ABP(mean): --  RR: 18 (02 Jan 2024 05:29) (17 - 18)  SpO2: 100% (02 Jan 2024 05:29) (100% - 100%)    O2 Parameters below as of 02 Jan 2024 05:29  Patient On (Oxygen Delivery Method): room air              CAPILLARY BLOOD GLUCOSE          PHYSICAL EXAM:  General:   HEENT:   Lymph Nodes:  Neck:   Respiratory:   Cardiovascular:   Abdomen:   Extremities:   Skin:   Neurological:  Psychiatry:    HOSPITAL MEDICATIONS:  heparin  Infusion. 1300 Unit(s)/Hr IV Continuous <Continuous>    cefepime   IVPB 2000 milliGRAM(s) IV Intermittent every 8 hours  hydroxychloroquine 200 milliGRAM(s) Oral two times a day      predniSONE   Tablet 60 milliGRAM(s) Oral daily    albuterol/ipratropium for Nebulization 3 milliLiter(s) Nebulizer every 6 hours PRN  guaiFENesin Oral Liquid (Sugar-Free) 200 milliGRAM(s) Oral every 6 hours PRN    acetaminophen     Tablet .. 650 milliGRAM(s) Oral every 6 hours PRN  melatonin 3 milliGRAM(s) Oral <User Schedule>  ondansetron Injectable 4 milliGRAM(s) IV Push every 8 hours PRN  oxyCODONE    IR 5 milliGRAM(s) Oral every 6 hours PRN    pantoprazole    Tablet 40 milliGRAM(s) Oral before breakfast  polyethylene glycol 3350 17 Gram(s) Oral two times a day  senna 2 Tablet(s) Oral at bedtime        sodium chloride 1 Gram(s) Oral three times a day  sodium chloride 3%. 500 milliLiter(s) IV Continuous <Continuous>      benzocaine/menthol Lozenge 1 Lozenge Oral four times a day PRN  chlorhexidine 2% Cloths 1 Application(s) Topical daily  ciprofloxacin  0.3% Ophthalmic Solution for Otic Use 2 Drop(s) Both Ears two times a day  FIRST- Mouthwash  BLM 15 milliLiter(s) Swish and Spit every 4 hours PRN  lidocaine   4% Patch 2 Patch Transdermal every 24 hours  lidocaine   4% Patch 1 Patch Transdermal every 24 hours  lidocaine 2% Viscous 5 milliLiter(s) Swish and Spit three times a day PRN        LABS:                        9.0    11.85 )-----------( 289      ( 02 Jan 2024 02:45 )             27.3     Hgb Trend: 9.0<--, 8.9<--, 8.5<--, 8.0<--, 8.3<--  01-02    132<L>  |  98  |  13  ----------------------------<  118<H>  4.0   |  24  |  0.64    Ca    9.3      02 Jan 2024 02:45  Phos  3.5     01-02  Mg     2.00     01-02    TPro  6.5  /  Alb  2.9<L>  /  TBili  0.5  /  DBili  x   /  AST  50<H>  /  ALT  89<H>  /  AlkPhos  102  01-02    Creatinine Trend: 0.64<--, 0.67<--, 0.64<--, 0.64<--, 0.63<--, 0.56<--  PT/INR - ( 02 Jan 2024 02:45 )   PT: 18.8 sec;   INR: 1.69 ratio         PTT - ( 02 Jan 2024 02:45 )  PTT:105.7 sec  Urinalysis Basic - ( 02 Jan 2024 02:45 )    Color: x / Appearance: x / SG: x / pH: x  Gluc: 118 mg/dL / Ketone: x  / Bili: x / Urobili: x   Blood: x / Protein: x / Nitrite: x   Leuk Esterase: x / RBC: x / WBC x   Sq Epi: x / Non Sq Epi: x / Bacteria: x            MICROBIOLOGY:     RADIOLOGY:  [ ] Reviewed and interpreted by me    PULMONARY FUNCTION TESTS:    EKG: Patient CXR post chest tube removal without pneumothorax or effusion noted.  Pulmonary to sign off at this time. Please reconsult if any questions/concerns.

## 2024-01-02 NOTE — BH CONSULTATION LIAISON PROGRESS NOTE - NSBHFUPINTERVALHXFT_PSY_A_CORE
Chart, labs, imagine reviewed. Case discussed with the primary team. Patient seen at bedside.   Patient was found in bed, playing on his phone.   Pt reported that he had poor sleep even with melatonin ( fell asleep, but it was hard to stay asleep). Complains of pain, depression, anxiety, poor appetite. Continues to report that he feels depressed, anxious, sad. Patient denied suicidal or homicidal ideations, intent or a plan. Reported that he hears his own voice saying " relax", but does not hear any other voices. Feels safe in the hospital. Denied homicidal ideations.

## 2024-01-02 NOTE — PROGRESS NOTE ADULT - SUBJECTIVE AND OBJECTIVE BOX
TAYLA MARIE  [id number]    INTERVAL HPI/OVERNIGHT EVENTS:  Chest tube removed last night. Now endorsing CP and back pain.   Pt's mother is bedside, providing majority of history. Reports pt is more confused, is planned to get a CTH.  Remains afebrile.     Subjective:   Vital Signs Last 24 Hrs  T(C): 36.7 (02 Jan 2024 13:29), Max: 37.2 (01 Jan 2024 20:44)  T(F): 98 (02 Jan 2024 13:29), Max: 98.9 (01 Jan 2024 20:44)  HR: 79 (02 Jan 2024 13:29) (60 - 79)  BP: 126/82 (02 Jan 2024 13:29) (115/82 - 126/82)  BP(mean): --  RR: 18 (02 Jan 2024 13:29) (17 - 18)  SpO2: 100% (02 Jan 2024 13:29) (100% - 100%)    Parameters below as of 02 Jan 2024 13:29  Patient On (Oxygen Delivery Method): room air      Physical Exam:  General: appears uncomfortable   HEENT: EOMI  CV: RRR, no m/r/g  Lung: decreased bs b/l  Abd: non-distended   Ext: no synovitis on exam   Neuro: Awake       LABS:  cret                        9.0    11.85 )-----------( 289      ( 02 Jan 2024 02:45 )             27.3     01-02    132<L>  |  98  |  13  ----------------------------<  118<H>  4.0   |  24  |  0.64    Ca    9.3      02 Jan 2024 02:45  Phos  3.5     01-02  Mg     2.00     01-02    TPro  6.5  /  Alb  2.9<L>  /  TBili  0.5  /  DBili  x   /  AST  50<H>  /  ALT  89<H>  /  AlkPhos  102  01-02    PT/INR - ( 02 Jan 2024 02:45 )   PT: 18.8 sec;   INR: 1.69 ratio         PTT - ( 02 Jan 2024 10:20 )  PTT:93.7 sec      RADIOLOGY & ADDITIONAL TESTS:       TAYLA MARIE  [id number]    INTERVAL HPI/OVERNIGHT EVENTS:  Chest tube removed last night. Now endorsing CP and back pain.   Pt's mother is bedside, providing majority of history. Reports pt is more confused, is planned to get a CTH.  Remains afebrile.     Subjective:   Vital Signs Last 24 Hrs  T(C): 36.7 (02 Jan 2024 13:29), Max: 37.2 (01 Jan 2024 20:44)  T(F): 98 (02 Jan 2024 13:29), Max: 98.9 (01 Jan 2024 20:44)  HR: 79 (02 Jan 2024 13:29) (60 - 79)  BP: 126/82 (02 Jan 2024 13:29) (115/82 - 126/82)  BP(mean): --  RR: 18 (02 Jan 2024 13:29) (17 - 18)  SpO2: 100% (02 Jan 2024 13:29) (100% - 100%)    Parameters below as of 02 Jan 2024 13:29  Patient On (Oxygen Delivery Method): room air      Physical Exam:  General: appears uncomfortable   HEENT: EOMI  CV: RRR, no m/r/g  Lung: decreased bs b/l  Abd: non-distended   Ext: no synovitis on exam   Neuro: Awake       LABS:  cret                        9.0    11.85 )-----------( 289      ( 02 Jan 2024 02:45 )             27.3     01-02    132<L>  |  98  |  13  ----------------------------<  118<H>  4.0   |  24  |  0.64    Ca    9.3      02 Jan 2024 02:45  Phos  3.5     01-02  Mg     2.00     01-02    TPro  6.5  /  Alb  2.9<L>  /  TBili  0.5  /  DBili  x   /  AST  50<H>  /  ALT  89<H>  /  AlkPhos  102  01-02    PT/INR - ( 02 Jan 2024 02:45 )   PT: 18.8 sec;   INR: 1.69 ratio         PTT - ( 02 Jan 2024 10:20 )  PTT:93.7 sec      RADIOLOGY & ADDITIONAL TESTS:       TAYLA MARIE  [id number]    INTERVAL HPI/OVERNIGHT EVENTS:  Chest tube removed last night. Now endorsing CP and back pain.   Pt's mother is bedside, providing majority of history. Reports pt is more confused, is planned to get a CTH.  Remains afebrile.     Subjective:   Vital Signs Last 24 Hrs  T(C): 36.7 (02 Jan 2024 13:29), Max: 37.2 (01 Jan 2024 20:44)  T(F): 98 (02 Jan 2024 13:29), Max: 98.9 (01 Jan 2024 20:44)  HR: 79 (02 Jan 2024 13:29) (60 - 79)  BP: 126/82 (02 Jan 2024 13:29) (115/82 - 126/82)  BP(mean): --  RR: 18 (02 Jan 2024 13:29) (17 - 18)  SpO2: 100% (02 Jan 2024 13:29) (100% - 100%)    Parameters below as of 02 Jan 2024 13:29  Patient On (Oxygen Delivery Method): room air      Physical Exam:  General: appears uncomfortable   HEENT: EOMI  CV: RRR, no m/r/g  Lung: decreased bs b/l  Abd: non-distended   Ext: no synovitis on exam   Neuro: Awake       LABS:                         9.0    11.85 )-----------( 289      ( 02 Jan 2024 02:45 )             27.3     01-02    132<L>  |  98  |  13  ----------------------------<  118<H>  4.0   |  24  |  0.64    Ca    9.3      02 Jan 2024 02:45  Phos  3.5     01-02  Mg     2.00     01-02    TPro  6.5  /  Alb  2.9<L>  /  TBili  0.5  /  DBili  x   /  AST  50<H>  /  ALT  89<H>  /  AlkPhos  102  01-02    PT/INR - ( 02 Jan 2024 02:45 )   PT: 18.8 sec;   INR: 1.69 ratio         PTT - ( 02 Jan 2024 10:20 )  PTT:93.7 sec      RADIOLOGY & ADDITIONAL TESTS:       TAYLA MARIE  [id number]    INTERVAL HPI/OVERNIGHT EVENTS:  Chest tube removed last night. Now endorsing CP and back pain.   Pt's mother is bedside, providing majority of history. Reports pt is more confused, is planned to get a CTH.  Remains afebrile.     Subjective:   Vital Signs Last 24 Hrs  T(C): 36.7 (02 Jan 2024 13:29), Max: 37.2 (01 Jan 2024 20:44)  T(F): 98 (02 Jan 2024 13:29), Max: 98.9 (01 Jan 2024 20:44)  HR: 79 (02 Jan 2024 13:29) (60 - 79)  BP: 126/82 (02 Jan 2024 13:29) (115/82 - 126/82)  BP(mean): --  RR: 18 (02 Jan 2024 13:29) (17 - 18)  SpO2: 100% (02 Jan 2024 13:29) (100% - 100%)    Parameters below as of 02 Jan 2024 13:29  Patient On (Oxygen Delivery Method): room air      Physical Exam:  General: appears uncomfortable   HEENT: EOMI  CV: RRR, no m/r/g  Lung: decreased bs b/l  Abd: non-distended   Ext: no synovitis on exam   Neuro: Awake       LABS:                         9.0    11.85 )-----------( 289      ( 02 Jan 2024 02:45 )             27.3     01-02    132<L>  |  98  |  13  ----------------------------<  118<H>  4.0   |  24  |  0.64    Ca    9.3      02 Jan 2024 02:45  Phos  3.5     01-02  Mg     2.00     01-02    TPro  6.5  /  Alb  2.9<L>  /  TBili  0.5  /  DBili  x   /  AST  50<H>  /  ALT  89<H>  /  AlkPhos  102  01-02    PT/INR - ( 02 Jan 2024 02:45 )   PT: 18.8 sec;   INR: 1.69 ratio         PTT - ( 02 Jan 2024 10:20 )  PTT:93.7 sec      RADIOLOGY & ADDITIONAL TESTS:

## 2024-01-03 LAB
ALBUMIN SERPL ELPH-MCNC: 3.2 G/DL — LOW (ref 3.3–5)
ALBUMIN SERPL ELPH-MCNC: 3.2 G/DL — LOW (ref 3.3–5)
ALP SERPL-CCNC: 101 U/L — SIGNIFICANT CHANGE UP (ref 40–120)
ALP SERPL-CCNC: 101 U/L — SIGNIFICANT CHANGE UP (ref 40–120)
ALT FLD-CCNC: 87 U/L — HIGH (ref 4–41)
ALT FLD-CCNC: 87 U/L — HIGH (ref 4–41)
ANION GAP SERPL CALC-SCNC: 13 MMOL/L — SIGNIFICANT CHANGE UP (ref 7–14)
ANION GAP SERPL CALC-SCNC: 13 MMOL/L — SIGNIFICANT CHANGE UP (ref 7–14)
APPEARANCE UR: CLEAR — SIGNIFICANT CHANGE UP
APPEARANCE UR: CLEAR — SIGNIFICANT CHANGE UP
APTT BLD: 84 SEC — HIGH (ref 24.5–35.6)
APTT BLD: 84 SEC — HIGH (ref 24.5–35.6)
APTT BLD: 94.5 SEC — HIGH (ref 24.5–35.6)
APTT BLD: 94.5 SEC — HIGH (ref 24.5–35.6)
AST SERPL-CCNC: 44 U/L — HIGH (ref 4–40)
AST SERPL-CCNC: 44 U/L — HIGH (ref 4–40)
BACTERIA # UR AUTO: NEGATIVE /HPF — SIGNIFICANT CHANGE UP
BACTERIA # UR AUTO: NEGATIVE /HPF — SIGNIFICANT CHANGE UP
BASOPHILS # BLD AUTO: 0.06 K/UL — SIGNIFICANT CHANGE UP (ref 0–0.2)
BASOPHILS # BLD AUTO: 0.06 K/UL — SIGNIFICANT CHANGE UP (ref 0–0.2)
BASOPHILS NFR BLD AUTO: 0.5 % — SIGNIFICANT CHANGE UP (ref 0–2)
BASOPHILS NFR BLD AUTO: 0.5 % — SIGNIFICANT CHANGE UP (ref 0–2)
BILIRUB SERPL-MCNC: 0.5 MG/DL — SIGNIFICANT CHANGE UP (ref 0.2–1.2)
BILIRUB SERPL-MCNC: 0.5 MG/DL — SIGNIFICANT CHANGE UP (ref 0.2–1.2)
BILIRUB UR-MCNC: NEGATIVE — SIGNIFICANT CHANGE UP
BILIRUB UR-MCNC: NEGATIVE — SIGNIFICANT CHANGE UP
BUN SERPL-MCNC: 12 MG/DL — SIGNIFICANT CHANGE UP (ref 7–23)
BUN SERPL-MCNC: 12 MG/DL — SIGNIFICANT CHANGE UP (ref 7–23)
CALCIUM SERPL-MCNC: 9.5 MG/DL — SIGNIFICANT CHANGE UP (ref 8.4–10.5)
CALCIUM SERPL-MCNC: 9.5 MG/DL — SIGNIFICANT CHANGE UP (ref 8.4–10.5)
CAST: 0 /LPF — SIGNIFICANT CHANGE UP (ref 0–4)
CAST: 0 /LPF — SIGNIFICANT CHANGE UP (ref 0–4)
CHLORIDE SERPL-SCNC: 97 MMOL/L — LOW (ref 98–107)
CHLORIDE SERPL-SCNC: 97 MMOL/L — LOW (ref 98–107)
CO2 SERPL-SCNC: 24 MMOL/L — SIGNIFICANT CHANGE UP (ref 22–31)
CO2 SERPL-SCNC: 24 MMOL/L — SIGNIFICANT CHANGE UP (ref 22–31)
COLOR SPEC: YELLOW — SIGNIFICANT CHANGE UP
COLOR SPEC: YELLOW — SIGNIFICANT CHANGE UP
CREAT ?TM UR-MCNC: 70 MG/DL — SIGNIFICANT CHANGE UP
CREAT ?TM UR-MCNC: 70 MG/DL — SIGNIFICANT CHANGE UP
CREAT SERPL-MCNC: 0.73 MG/DL — SIGNIFICANT CHANGE UP (ref 0.5–1.3)
CREAT SERPL-MCNC: 0.73 MG/DL — SIGNIFICANT CHANGE UP (ref 0.5–1.3)
DIFF PNL FLD: NEGATIVE — SIGNIFICANT CHANGE UP
DIFF PNL FLD: NEGATIVE — SIGNIFICANT CHANGE UP
EGFR: 126 ML/MIN/1.73M2 — SIGNIFICANT CHANGE UP
EGFR: 126 ML/MIN/1.73M2 — SIGNIFICANT CHANGE UP
EOSINOPHIL # BLD AUTO: 0.02 K/UL — SIGNIFICANT CHANGE UP (ref 0–0.5)
EOSINOPHIL # BLD AUTO: 0.02 K/UL — SIGNIFICANT CHANGE UP (ref 0–0.5)
EOSINOPHIL NFR BLD AUTO: 0.2 % — SIGNIFICANT CHANGE UP (ref 0–6)
EOSINOPHIL NFR BLD AUTO: 0.2 % — SIGNIFICANT CHANGE UP (ref 0–6)
FERRITIN SERPL-MCNC: 3725 NG/ML — HIGH (ref 30–400)
FERRITIN SERPL-MCNC: 3725 NG/ML — HIGH (ref 30–400)
GBM IGG SER-ACNC: <0.2 — SIGNIFICANT CHANGE UP (ref 0–0.9)
GBM IGG SER-ACNC: <0.2 — SIGNIFICANT CHANGE UP (ref 0–0.9)
GLUCOSE SERPL-MCNC: 138 MG/DL — HIGH (ref 70–99)
GLUCOSE SERPL-MCNC: 138 MG/DL — HIGH (ref 70–99)
GLUCOSE UR QL: NEGATIVE MG/DL — SIGNIFICANT CHANGE UP
GLUCOSE UR QL: NEGATIVE MG/DL — SIGNIFICANT CHANGE UP
HCT VFR BLD CALC: 28.7 % — LOW (ref 39–50)
HCT VFR BLD CALC: 28.7 % — LOW (ref 39–50)
HGB BLD-MCNC: 9.3 G/DL — LOW (ref 13–17)
HGB BLD-MCNC: 9.3 G/DL — LOW (ref 13–17)
IANC: 6.15 K/UL — SIGNIFICANT CHANGE UP (ref 1.8–7.4)
IANC: 6.15 K/UL — SIGNIFICANT CHANGE UP (ref 1.8–7.4)
IMM GRANULOCYTES NFR BLD AUTO: 4.8 % — HIGH (ref 0–0.9)
IMM GRANULOCYTES NFR BLD AUTO: 4.8 % — HIGH (ref 0–0.9)
INR BLD: 1.53 RATIO — HIGH (ref 0.85–1.18)
INR BLD: 1.53 RATIO — HIGH (ref 0.85–1.18)
KETONES UR-MCNC: NEGATIVE MG/DL — SIGNIFICANT CHANGE UP
KETONES UR-MCNC: NEGATIVE MG/DL — SIGNIFICANT CHANGE UP
LDH SERPL L TO P-CCNC: 302 U/L — HIGH (ref 135–225)
LDH SERPL L TO P-CCNC: 302 U/L — HIGH (ref 135–225)
LEUKOCYTE ESTERASE UR-ACNC: NEGATIVE — SIGNIFICANT CHANGE UP
LEUKOCYTE ESTERASE UR-ACNC: NEGATIVE — SIGNIFICANT CHANGE UP
LYMPHOCYTES # BLD AUTO: 28.5 % — SIGNIFICANT CHANGE UP (ref 13–44)
LYMPHOCYTES # BLD AUTO: 28.5 % — SIGNIFICANT CHANGE UP (ref 13–44)
LYMPHOCYTES # BLD AUTO: 3.11 K/UL — SIGNIFICANT CHANGE UP (ref 1–3.3)
LYMPHOCYTES # BLD AUTO: 3.11 K/UL — SIGNIFICANT CHANGE UP (ref 1–3.3)
MAGNESIUM SERPL-MCNC: 2 MG/DL — SIGNIFICANT CHANGE UP (ref 1.6–2.6)
MAGNESIUM SERPL-MCNC: 2 MG/DL — SIGNIFICANT CHANGE UP (ref 1.6–2.6)
MCHC RBC-ENTMCNC: 30.3 PG — SIGNIFICANT CHANGE UP (ref 27–34)
MCHC RBC-ENTMCNC: 30.3 PG — SIGNIFICANT CHANGE UP (ref 27–34)
MCHC RBC-ENTMCNC: 32.4 GM/DL — SIGNIFICANT CHANGE UP (ref 32–36)
MCHC RBC-ENTMCNC: 32.4 GM/DL — SIGNIFICANT CHANGE UP (ref 32–36)
MCV RBC AUTO: 93.5 FL — SIGNIFICANT CHANGE UP (ref 80–100)
MCV RBC AUTO: 93.5 FL — SIGNIFICANT CHANGE UP (ref 80–100)
MONOCYTES # BLD AUTO: 1.05 K/UL — HIGH (ref 0–0.9)
MONOCYTES # BLD AUTO: 1.05 K/UL — HIGH (ref 0–0.9)
MONOCYTES NFR BLD AUTO: 9.6 % — SIGNIFICANT CHANGE UP (ref 2–14)
MONOCYTES NFR BLD AUTO: 9.6 % — SIGNIFICANT CHANGE UP (ref 2–14)
NEUTROPHILS # BLD AUTO: 6.15 K/UL — SIGNIFICANT CHANGE UP (ref 1.8–7.4)
NEUTROPHILS # BLD AUTO: 6.15 K/UL — SIGNIFICANT CHANGE UP (ref 1.8–7.4)
NEUTROPHILS NFR BLD AUTO: 56.4 % — SIGNIFICANT CHANGE UP (ref 43–77)
NEUTROPHILS NFR BLD AUTO: 56.4 % — SIGNIFICANT CHANGE UP (ref 43–77)
NITRITE UR-MCNC: NEGATIVE — SIGNIFICANT CHANGE UP
NITRITE UR-MCNC: NEGATIVE — SIGNIFICANT CHANGE UP
NRBC # BLD: 0 /100 WBCS — SIGNIFICANT CHANGE UP (ref 0–0)
NRBC # BLD: 0 /100 WBCS — SIGNIFICANT CHANGE UP (ref 0–0)
NRBC # FLD: 0 K/UL — SIGNIFICANT CHANGE UP (ref 0–0)
NRBC # FLD: 0 K/UL — SIGNIFICANT CHANGE UP (ref 0–0)
PH UR: 6.5 — SIGNIFICANT CHANGE UP (ref 5–8)
PH UR: 6.5 — SIGNIFICANT CHANGE UP (ref 5–8)
PHOSPHATE SERPL-MCNC: 3.7 MG/DL — SIGNIFICANT CHANGE UP (ref 2.5–4.5)
PHOSPHATE SERPL-MCNC: 3.7 MG/DL — SIGNIFICANT CHANGE UP (ref 2.5–4.5)
PLATELET # BLD AUTO: 263 K/UL — SIGNIFICANT CHANGE UP (ref 150–400)
PLATELET # BLD AUTO: 263 K/UL — SIGNIFICANT CHANGE UP (ref 150–400)
POTASSIUM SERPL-MCNC: 3.8 MMOL/L — SIGNIFICANT CHANGE UP (ref 3.5–5.3)
POTASSIUM SERPL-MCNC: 3.8 MMOL/L — SIGNIFICANT CHANGE UP (ref 3.5–5.3)
POTASSIUM SERPL-SCNC: 3.8 MMOL/L — SIGNIFICANT CHANGE UP (ref 3.5–5.3)
POTASSIUM SERPL-SCNC: 3.8 MMOL/L — SIGNIFICANT CHANGE UP (ref 3.5–5.3)
PROT ?TM UR-MCNC: 48 MG/DL — SIGNIFICANT CHANGE UP
PROT ?TM UR-MCNC: 48 MG/DL — SIGNIFICANT CHANGE UP
PROT SERPL-MCNC: 7.2 G/DL — SIGNIFICANT CHANGE UP (ref 6–8.3)
PROT SERPL-MCNC: 7.2 G/DL — SIGNIFICANT CHANGE UP (ref 6–8.3)
PROT UR-MCNC: 30 MG/DL
PROT UR-MCNC: 30 MG/DL
PROT/CREAT UR-RTO: 0.7 RATIO — HIGH (ref 0–0.2)
PROT/CREAT UR-RTO: 0.7 RATIO — HIGH (ref 0–0.2)
PROTHROM AB SERPL-ACNC: 17.1 SEC — HIGH (ref 9.5–13)
PROTHROM AB SERPL-ACNC: 17.1 SEC — HIGH (ref 9.5–13)
RBC # BLD: 3.07 M/UL — LOW (ref 4.2–5.8)
RBC # BLD: 3.07 M/UL — LOW (ref 4.2–5.8)
RBC # FLD: 15.6 % — HIGH (ref 10.3–14.5)
RBC # FLD: 15.6 % — HIGH (ref 10.3–14.5)
RBC CASTS # UR COMP ASSIST: 0 /HPF — SIGNIFICANT CHANGE UP (ref 0–4)
RBC CASTS # UR COMP ASSIST: 0 /HPF — SIGNIFICANT CHANGE UP (ref 0–4)
SODIUM SERPL-SCNC: 134 MMOL/L — LOW (ref 135–145)
SODIUM SERPL-SCNC: 134 MMOL/L — LOW (ref 135–145)
SP GR SPEC: 1.01 — SIGNIFICANT CHANGE UP (ref 1–1.03)
SP GR SPEC: 1.01 — SIGNIFICANT CHANGE UP (ref 1–1.03)
SQUAMOUS # UR AUTO: 0 /HPF — SIGNIFICANT CHANGE UP (ref 0–5)
SQUAMOUS # UR AUTO: 0 /HPF — SIGNIFICANT CHANGE UP (ref 0–5)
UROBILINOGEN FLD QL: 0.2 MG/DL — SIGNIFICANT CHANGE UP (ref 0.2–1)
UROBILINOGEN FLD QL: 0.2 MG/DL — SIGNIFICANT CHANGE UP (ref 0.2–1)
WBC # BLD: 10.91 K/UL — HIGH (ref 3.8–10.5)
WBC # BLD: 10.91 K/UL — HIGH (ref 3.8–10.5)
WBC # FLD AUTO: 10.91 K/UL — HIGH (ref 3.8–10.5)
WBC # FLD AUTO: 10.91 K/UL — HIGH (ref 3.8–10.5)
WBC UR QL: 0 /HPF — SIGNIFICANT CHANGE UP (ref 0–5)
WBC UR QL: 0 /HPF — SIGNIFICANT CHANGE UP (ref 0–5)

## 2024-01-03 PROCEDURE — 99232 SBSQ HOSP IP/OBS MODERATE 35: CPT

## 2024-01-03 PROCEDURE — 99222 1ST HOSP IP/OBS MODERATE 55: CPT

## 2024-01-03 RX ORDER — ACETAMINOPHEN 500 MG
650 TABLET ORAL EVERY 8 HOURS
Refills: 0 | Status: DISCONTINUED | OUTPATIENT
Start: 2024-01-03 | End: 2024-01-05

## 2024-01-03 RX ORDER — OXYCODONE HYDROCHLORIDE 5 MG/1
2.5 TABLET ORAL EVERY 6 HOURS
Refills: 0 | Status: DISCONTINUED | OUTPATIENT
Start: 2024-01-03 | End: 2024-01-04

## 2024-01-03 RX ORDER — OXYCODONE HYDROCHLORIDE 5 MG/1
5 TABLET ORAL EVERY 6 HOURS
Refills: 0 | Status: DISCONTINUED | OUTPATIENT
Start: 2024-01-03 | End: 2024-01-04

## 2024-01-03 RX ORDER — GABAPENTIN 400 MG/1
200 CAPSULE ORAL
Refills: 0 | Status: DISCONTINUED | OUTPATIENT
Start: 2024-01-03 | End: 2024-01-04

## 2024-01-03 RX ADMIN — Medication 650 MILLIGRAM(S): at 21:22

## 2024-01-03 RX ADMIN — Medication 650 MILLIGRAM(S): at 13:20

## 2024-01-03 RX ADMIN — LIDOCAINE 1 PATCH: 4 CREAM TOPICAL at 01:55

## 2024-01-03 RX ADMIN — Medication 200 MILLIGRAM(S): at 06:14

## 2024-01-03 RX ADMIN — Medication 3 MILLIGRAM(S): at 21:23

## 2024-01-03 RX ADMIN — POLYETHYLENE GLYCOL 3350 17 GRAM(S): 17 POWDER, FOR SOLUTION ORAL at 18:09

## 2024-01-03 RX ADMIN — SODIUM CHLORIDE 1 GRAM(S): 9 INJECTION INTRAMUSCULAR; INTRAVENOUS; SUBCUTANEOUS at 13:22

## 2024-01-03 RX ADMIN — Medication 15 MILLILITER(S): at 13:20

## 2024-01-03 RX ADMIN — SENNA PLUS 2 TABLET(S): 8.6 TABLET ORAL at 21:22

## 2024-01-03 RX ADMIN — LIDOCAINE 2 PATCH: 4 CREAM TOPICAL at 13:22

## 2024-01-03 RX ADMIN — HEPARIN SODIUM 1100 UNIT(S)/HR: 5000 INJECTION INTRAVENOUS; SUBCUTANEOUS at 23:51

## 2024-01-03 RX ADMIN — CHLORHEXIDINE GLUCONATE 1 APPLICATION(S): 213 SOLUTION TOPICAL at 13:19

## 2024-01-03 RX ADMIN — Medication 2 DROP(S): at 18:08

## 2024-01-03 RX ADMIN — HEPARIN SODIUM 1100 UNIT(S)/HR: 5000 INJECTION INTRAVENOUS; SUBCUTANEOUS at 07:22

## 2024-01-03 RX ADMIN — PANTOPRAZOLE SODIUM 40 MILLIGRAM(S): 20 TABLET, DELAYED RELEASE ORAL at 06:15

## 2024-01-03 RX ADMIN — OXYCODONE HYDROCHLORIDE 5 MILLIGRAM(S): 5 TABLET ORAL at 08:59

## 2024-01-03 RX ADMIN — OXYCODONE HYDROCHLORIDE 5 MILLIGRAM(S): 5 TABLET ORAL at 16:10

## 2024-01-03 RX ADMIN — LIDOCAINE 1 PATCH: 4 CREAM TOPICAL at 13:21

## 2024-01-03 RX ADMIN — SODIUM CHLORIDE 1 GRAM(S): 9 INJECTION INTRAMUSCULAR; INTRAVENOUS; SUBCUTANEOUS at 06:15

## 2024-01-03 RX ADMIN — Medication 60 MILLIGRAM(S): at 06:14

## 2024-01-03 RX ADMIN — HEPARIN SODIUM 1100 UNIT(S)/HR: 5000 INJECTION INTRAVENOUS; SUBCUTANEOUS at 09:35

## 2024-01-03 RX ADMIN — Medication 1 TABLET(S): at 06:13

## 2024-01-03 RX ADMIN — LIDOCAINE 2 PATCH: 4 CREAM TOPICAL at 19:34

## 2024-01-03 RX ADMIN — Medication 2 DROP(S): at 06:20

## 2024-01-03 RX ADMIN — GABAPENTIN 200 MILLIGRAM(S): 400 CAPSULE ORAL at 18:15

## 2024-01-03 RX ADMIN — HEPARIN SODIUM 1100 UNIT(S)/HR: 5000 INJECTION INTRAVENOUS; SUBCUTANEOUS at 02:18

## 2024-01-03 RX ADMIN — HEPARIN SODIUM 1100 UNIT(S)/HR: 5000 INJECTION INTRAVENOUS; SUBCUTANEOUS at 00:51

## 2024-01-03 RX ADMIN — LIDOCAINE 2 PATCH: 4 CREAM TOPICAL at 01:55

## 2024-01-03 RX ADMIN — OXYCODONE HYDROCHLORIDE 5 MILLIGRAM(S): 5 TABLET ORAL at 17:10

## 2024-01-03 RX ADMIN — OXYCODONE HYDROCHLORIDE 5 MILLIGRAM(S): 5 TABLET ORAL at 09:59

## 2024-01-03 RX ADMIN — LIDOCAINE 1 PATCH: 4 CREAM TOPICAL at 19:32

## 2024-01-03 RX ADMIN — Medication 200 MILLIGRAM(S): at 18:09

## 2024-01-03 RX ADMIN — SODIUM CHLORIDE 1 GRAM(S): 9 INJECTION INTRAMUSCULAR; INTRAVENOUS; SUBCUTANEOUS at 21:22

## 2024-01-03 RX ADMIN — HEPARIN SODIUM 1100 UNIT(S)/HR: 5000 INJECTION INTRAVENOUS; SUBCUTANEOUS at 20:13

## 2024-01-03 NOTE — PROGRESS NOTE ADULT - NUTRITIONAL ASSESSMENT
This patient has been assessed with a concern for Malnutrition and has been determined to have a diagnosis/diagnoses of Severe protein-calorie malnutrition.    This patient is being managed with:   Diet Regular-  1000mL Fluid Restriction ([id number])  Supplement Feeding Modality:  Oral  Ensure Plus High Protein Cans or Servings Per Day:  1       Frequency:  Three Times a day  Entered: Carter  3 2024  4:21PM   This patient has been assessed with a concern for Malnutrition and has been determined to have a diagnosis/diagnoses of Severe protein-calorie malnutrition.    This patient is being managed with:   Diet Regular-  1000mL Fluid Restriction ([id number])  Supplement Feeding Modality:  Oral  Ensure Plus High Protein Cans or Servings Per Day:  1       Frequency:  Three Times a day  Entered: Carter  3 2024  4:21PM

## 2024-01-03 NOTE — PROGRESS NOTE ADULT - SUBJECTIVE AND OBJECTIVE BOX
Name of Patient : TAYLA MARIE  MRN: [id number]  Date of visit: 01-03-24       Subjective: Patient seen and examined. No new events except as noted.   doing okay  mother at the bedside     REVIEW OF SYSTEMS:    CONSTITUTIONAL: No weakness, fevers or chills  EYES/ENT: No visual changes;  No vertigo or throat pain   NECK: No pain or stiffness  RESPIRATORY: No cough, wheezing, hemoptysis; No shortness of breath  CARDIOVASCULAR: No chest pain or palpitations  GASTROINTESTINAL: No abdominal or epigastric pain. No nausea, vomiting, or hematemesis; No diarrhea or constipation. No melena or hematochezia.  GENITOURINARY: No dysuria, frequency or hematuria  NEUROLOGICAL: No numbness or weakness  SKIN: No itching, burning, rashes, or lesions   All other review of systems is negative unless indicated above.    MEDICATIONS:  MEDICATIONS  (STANDING):  acetaminophen     Tablet .. 650 milliGRAM(s) Oral every 8 hours  chlorhexidine 2% Cloths 1 Application(s) Topical daily  ciprofloxacin  0.3% Ophthalmic Solution for Otic Use 2 Drop(s) Both Ears two times a day  gabapentin 200 milliGRAM(s) Oral two times a day  heparin  Infusion. 1300 Unit(s)/Hr (13 mL/Hr) IV Continuous <Continuous>  hydroxychloroquine 200 milliGRAM(s) Oral two times a day  lidocaine   4% Patch 1 Patch Transdermal every 24 hours  lidocaine   4% Patch 2 Patch Transdermal every 24 hours  melatonin 3 milliGRAM(s) Oral <User Schedule>  multivitamin/minerals/iron Oral Solution (CENTRUM) 15 milliLiter(s) Oral daily  pantoprazole    Tablet 40 milliGRAM(s) Oral before breakfast  polyethylene glycol 3350 17 Gram(s) Oral two times a day  predniSONE   Tablet 60 milliGRAM(s) Oral daily  senna 2 Tablet(s) Oral at bedtime  sodium chloride 1 Gram(s) Oral three times a day  sodium chloride 3%. 500 milliLiter(s) (30 mL/Hr) IV Continuous <Continuous>  trimethoprim  160 mG/sulfamethoxazole 800 mG 1 Tablet(s) Oral <User Schedule>      PHYSICAL EXAM:  T(C): 36.8 (01-03-24 @ 12:56), Max: 37.1 (01-02-24 @ 21:26)  HR: 80 (01-03-24 @ 12:56) (77 - 80)  BP: 117/68 (01-03-24 @ 12:56) (117/68 - 128/76)  RR: 18 (01-03-24 @ 12:56) (17 - 18)  SpO2: 100% (01-03-24 @ 12:56) (100% - 100%)  Wt(kg): --  I&O's Summary        Appearance: Normal	  HEENT:  PERRLA   Lymphatic: No lymphadenopathy   Cardiovascular: Normal S1 S2, no JVD  Respiratory: normal effort , clear  Gastrointestinal:  Soft, Non-tender  Skin: No rashes,  warm to touch  Psychiatry:  Mood & affect appropriate  Musculuskeletal: No edema    recent labs, Imaging and EKGs personally reviewed                           9.3    10.91 )-----------( 263      ( 03 Jan 2024 01:00 )             28.7               01-03    134<L>  |  97<L>  |  12  ----------------------------<  138<H>  3.8   |  24  |  0.73    Ca    9.5      03 Jan 2024 01:00  Phos  3.7     01-03  Mg     2.00     01-03    TPro  7.2  /  Alb  3.2<L>  /  TBili  0.5  /  DBili  x   /  AST  44<H>  /  ALT  87<H>  /  AlkPhos  101  01-03    PT/INR - ( 03 Jan 2024 01:00 )   PT: 17.1 sec;   INR: 1.53 ratio         PTT - ( 03 Jan 2024 08:07 )  PTT:84.0 sec                   Urinalysis Basic - ( 03 Jan 2024 01:00 )    Color: x / Appearance: x / SG: x / pH: x  Gluc: 138 mg/dL / Ketone: x  / Bili: x / Urobili: x   Blood: x / Protein: x / Nitrite: x   Leuk Esterase: x / RBC: x / WBC x   Sq Epi: x / Non Sq Epi: x / Bacteria: x                     Name of Patient : TAYLA MARIE  MRN: [id number]  Date of visit: 01-03-24       Subjective: Patient seen and examined. No new events except as noted.   doing okay  mother at the bedside     REVIEW OF SYSTEMS:    CONSTITUTIONAL: No weakness, fevers or chills  EYES/ENT: No visual changes;  No vertigo or throat pain   NECK: No pain or stiffness  RESPIRATORY: No cough, wheezing, hemoptysis; No shortness of breath  CARDIOVASCULAR: No chest pain or palpitations  GASTROINTESTINAL: No abdominal or epigastric pain. No nausea, vomiting, or hematemesis; No diarrhea or constipation. No melena or hematochezia.  GENITOURINARY: No dysuria, frequency or hematuria  NEUROLOGICAL: No numbness or weakness  SKIN: No itching, burning, rashes, or lesions   All other review of systems is negative unless indicated above.    MEDICATIONS:  MEDICATIONS  (STANDING):  acetaminophen     Tablet .. 650 milliGRAM(s) Oral every 8 hours  chlorhexidine 2% Cloths 1 Application(s) Topical daily  ciprofloxacin  0.3% Ophthalmic Solution for Otic Use 2 Drop(s) Both Ears two times a day  gabapentin 200 milliGRAM(s) Oral two times a day  heparin  Infusion. 1300 Unit(s)/Hr (13 mL/Hr) IV Continuous <Continuous>  hydroxychloroquine 200 milliGRAM(s) Oral two times a day  lidocaine   4% Patch 1 Patch Transdermal every 24 hours  lidocaine   4% Patch 2 Patch Transdermal every 24 hours  melatonin 3 milliGRAM(s) Oral <User Schedule>  multivitamin/minerals/iron Oral Solution (CENTRUM) 15 milliLiter(s) Oral daily  pantoprazole    Tablet 40 milliGRAM(s) Oral before breakfast  polyethylene glycol 3350 17 Gram(s) Oral two times a day  predniSONE   Tablet 60 milliGRAM(s) Oral daily  senna 2 Tablet(s) Oral at bedtime  sodium chloride 1 Gram(s) Oral three times a day  sodium chloride 3%. 500 milliLiter(s) (30 mL/Hr) IV Continuous <Continuous>  trimethoprim  160 mG/sulfamethoxazole 800 mG 1 Tablet(s) Oral <User Schedule>      PHYSICAL EXAM:  T(C): 36.8 (01-03-24 @ 12:56), Max: 37.1 (01-02-24 @ 21:26)  HR: 80 (01-03-24 @ 12:56) (77 - 80)  BP: 117/68 (01-03-24 @ 12:56) (117/68 - 128/76)  RR: 18 (01-03-24 @ 12:56) (17 - 18)  SpO2: 100% (01-03-24 @ 12:56) (100% - 100%)  Wt(kg): --  I&O's Summary        Appearance: Normal	  HEENT:  PERRLA   Lymphatic: No lymphadenopathy   Cardiovascular: Normal S1 S2, no JVD  Respiratory: normal effort , clear  Gastrointestinal:  Soft, Non-tender  Skin: No rashes,  warm to touch  Psychiatry:  Mood & affect appropriate  Musculuskeletal: No edema    recent labs, Imaging and EKGs personally reviewed                           9.3    10.91 )-----------( 263      ( 03 Jan 2024 01:00 )             28.7               01-03    134<L>  |  97<L>  |  12  ----------------------------<  138<H>  3.8   |  24  |  0.73    Ca    9.5      03 Jan 2024 01:00  Phos  3.7     01-03  Mg     2.00     01-03    TPro  7.2  /  Alb  3.2<L>  /  TBili  0.5  /  DBili  x   /  AST  44<H>  /  ALT  87<H>  /  AlkPhos  101  01-03    PT/INR - ( 03 Jan 2024 01:00 )   PT: 17.1 sec;   INR: 1.53 ratio         PTT - ( 03 Jan 2024 08:07 )  PTT:84.0 sec                   Urinalysis Basic - ( 03 Jan 2024 01:00 )    Color: x / Appearance: x / SG: x / pH: x  Gluc: 138 mg/dL / Ketone: x  / Bili: x / Urobili: x   Blood: x / Protein: x / Nitrite: x   Leuk Esterase: x / RBC: x / WBC x   Sq Epi: x / Non Sq Epi: x / Bacteria: x

## 2024-01-03 NOTE — BH CONSULTATION LIAISON PROGRESS NOTE - NSBHASSESSMENTFT_PSY_ALL_CORE
28 years old male with h/o Lupus ( diagnosed in 09/2023, on Plaquenil present to [facility] on 12/12/23  with complain of chest pain and SOB admitted for fevers, PE, pleural effusions, course c/b high fever and tonic-clonic seizure, transferred to MICU for post-ictal and infectious monitoring. Psych consulted for depression. Pt had indicated a hx of depression/anxiety, had been in the  and [facility], and now is requesting to speak to someone. However he is rather medically active and compromised.   Reported being depressed for many years, no current safety concerns. C/o poor sleep due to pain    Plan:   - Continue medical stabilization as you are  - Address pain ( pain management)  - PRN for sleep: melatonin 3 mg qhs.  -Consult Holistic nurse to address his mood ( pt is amendable)  - Repeat EKG ( calculate QTC manually)  - No role for 1:1 at this time  - No role for initiating psychopharm measures at this time, will continue to assess.   - Dispo: no role for inpt psych; no psych barriers to discharge when medically cleared. Outpatient follow up with psychiatrist.  - Will f/u on 1/2, call [phone] before then if needed.   28 years old male with h/o Lupus ( diagnosed in 09/2023, on Plaquenil present to [facility] ED on 12/12/23  with complain of chest pain and SOB admitted for fevers, PE, pleural effusions, course c/b high fever and tonic-clonic seizure, transferred to MICU for post-ictal and infectious monitoring. Psych consulted for depression. Pt had indicated a hx of depression/anxiety, had been in the  and [facility], and now is requesting to speak to someone. However he is rather medically active and compromised.   Reported being depressed for many years, no current safety concerns. C/o poor sleep due to pain    Plan:   - Continue medical stabilization as you are  - Address pain ( pain management)  - PRN for sleep: melatonin 3 mg qhs.  -Consult Holistic nurse to address his mood ( pt is amendable)  - Repeat EKG ( calculate QTC manually)  - No role for 1:1 at this time  - No role for initiating psychopharm measures at this time, will continue to assess.   - Dispo: no role for inpt psych; no psych barriers to discharge when medically cleared. Outpatient follow up with psychiatrist.  - Will f/u on 1/2, call [phone] before then if needed.   28 years old male with h/o Lupus ( diagnosed in 09/2023, on Plaquenil present to [facility] ED on 12/12/23  with complain of chest pain and SOB admitted for fevers, PE, pleural effusions, course c/b high fever and tonic-clonic seizure, transferred to MICU for post-ictal and infectious monitoring. Psych consulted for depression. Pt had indicated a hx of depression/anxiety, had been in the  and [facility], and now is requesting to speak to someone. However he is rather medically active and compromised.   Reported being depressed for many years, no current safety concerns. C/o poor sleep due to pain    Plan:   - Continue medical stabilization as you are  - Address pain ( pain management)  - PRN for sleep: melatonin 3 mg qhs.  -Consult Holistic nurse to address his mood ( pt is amendable)  - Will discuss dispo further with primary team  - Repeat EKG ( calculate QTC manually)  - No role for 1:1 at this time  - No role for initiating psychopharm measures at this time, will continue to assess.   - Dispo: no role for inpt psych; no psych barriers to discharge when medically cleared. Outpatient follow up with psychiatrist.  - Will f/u on 1/2, call [phone] before then if needed.   28 years old male with h/o Lupus ( diagnosed in 09/2023, on Plaquenil present to [facility] ED on 12/12/23  with complain of chest pain and SOB admitted for fevers, PE, pleural effusions, course c/b high fever and tonic-clonic seizure, transferred to MICU for post-ictal and infectious monitoring. Psych consulted for depression. Pt had indicated a hx of depression/anxiety, had been in the  and [facility], and now is requesting to speak to someone. However he is rather medically active and compromised.   Reported being depressed for many years, no current safety concerns. C/o poor sleep due to pain    Plan:   - Continue medical stabilization as you are  - Address pain ( pain management)  - PRN for sleep: melatonin 3 mg qhs.  -Consult Holistic nurse to address his mood ( pt is amendable)  - Will discuss dispo further with primary team  - Repeat EKG ( calculate QTC manually)  - No role for 1:1 at this time  - No role for initiating psychopharm measures at this time, will continue to assess.   - Dispo: no role for inpt psych; no psych barriers to discharge when medically cleared. Outpatient follow up with psychiatrist.  - Will f/u on 1/2, call [phone] before then if needed.   28 years old male with h/o Lupus ( diagnosed in 09/2023, on Plaquenil present to [facility] ED on 12/12/23  with complain of chest pain and SOB admitted for fevers, PE, pleural effusions, course c/b high fever and tonic-clonic seizure, transferred to MICU for post-ictal and infectious monitoring. Psych consulted for depression. Pt had indicated a hx of depression/anxiety, had been in the  and [facility], and now is requesting to speak to someone. However he is rather medically active and compromised.   Reported being depressed for many years, no current safety concerns. C/o poor sleep due to pain    Plan:   - Continue medical stabilization as you are  - Address pain ( pain management)  - PRN for sleep: melatonin 3 mg qhs.  -Consult Holistic nurse to address his mood ( pt is amendable)  - Repeat EKG ( calculate QTC manually)  - No role for 1:1 at this time  - No role for initiating psychopharm measures at this time, will continue to assess (possibly Remeron for sleep/appetite)   - Dispo: likely inpt rehab given severe deconditioning. Inpt psych would be ideal but likely limited by mobility/rehab needs.   Will continue to follow while here.   28 years old male with h/o Lupus ( diagnosed in 09/2023, on Plaquenil present to [facility] ED on 12/12/23  with complain of chest pain and SOB admitted for fevers, PE, pleural effusions, course c/b high fever and tonic-clonic seizure, transferred to MICU for post-ictal and infectious monitoring. Psych consulted for depression. Pt had indicated a hx of depression/anxiety, had been in the  and [facility], and now is requesting to speak to someone. However he is rather medically active and compromised.   Reported being depressed for many years, no current safety concerns. C/o poor sleep due to pain    Plan:   - Continue medical stabilization as you are  - Address pain ( pain management)  - PRN for sleep: melatonin 3 mg qhs.  -Consult Holistic nurse to address his mood ( pt is amendable)  - Repeat EKG ( calculate QTC manually)  - No role for 1:1 at this time  - No role for initiating psychopharm measures at this time, will continue to assess (possibly Remeron for sleep/appetite)   - Dispo: likely inpt rehab given severe deconditioning. Inpt psych would be ideal but likely limited by mobility/rehab needs.   Will continue to follow while here.

## 2024-01-03 NOTE — PROGRESS NOTE ADULT - SUBJECTIVE AND OBJECTIVE BOX
Subjective: Patient seen and examined. No new events except as noted.     SUBJECTIVE/ROS:  nad  MEDICATIONS:  MEDICATIONS  (STANDING):  chlorhexidine 2% Cloths 1 Application(s) Topical daily  ciprofloxacin  0.3% Ophthalmic Solution for Otic Use 2 Drop(s) Both Ears two times a day  heparin  Infusion. 1300 Unit(s)/Hr (13 mL/Hr) IV Continuous <Continuous>  hydroxychloroquine 200 milliGRAM(s) Oral two times a day  lidocaine   4% Patch 1 Patch Transdermal every 24 hours  lidocaine   4% Patch 2 Patch Transdermal every 24 hours  melatonin 3 milliGRAM(s) Oral <User Schedule>  multivitamin/minerals/iron Oral Solution (CENTRUM) 15 milliLiter(s) Oral daily  pantoprazole    Tablet 40 milliGRAM(s) Oral before breakfast  polyethylene glycol 3350 17 Gram(s) Oral two times a day  predniSONE   Tablet 60 milliGRAM(s) Oral daily  senna 2 Tablet(s) Oral at bedtime  sodium chloride 1 Gram(s) Oral three times a day  sodium chloride 3%. 500 milliLiter(s) (30 mL/Hr) IV Continuous <Continuous>  trimethoprim  160 mG/sulfamethoxazole 800 mG 1 Tablet(s) Oral <User Schedule>      PHYSICAL EXAM:  T(C): 36.8 (01-03-24 @ 06:25), Max: 37.1 (01-02-24 @ 21:26)  HR: 77 (01-03-24 @ 06:25) (77 - 80)  BP: 128/76 (01-03-24 @ 06:25) (117/73 - 128/76)  RR: 18 (01-03-24 @ 06:25) (17 - 18)  SpO2: 100% (01-03-24 @ 06:25) (100% - 100%)  Wt(kg): --  I&O's Summary          JVP: Normal  Neck: supple  Lung: clear   CV: S1 S2 , Murmur:  Abd: soft  Ext: No edema  neuro: Awake / alert  Psych: flat affect  Skin: normal``    LABS/DATA:    CARDIAC MARKERS:                                9.3    10.91 )-----------( 263      ( 03 Jan 2024 01:00 )             28.7     01-03    134<L>  |  97<L>  |  12  ----------------------------<  138<H>  3.8   |  24  |  0.73    Ca    9.5      03 Jan 2024 01:00  Phos  3.7     01-03  Mg     2.00     01-03    TPro  7.2  /  Alb  3.2<L>  /  TBili  0.5  /  DBili  x   /  AST  44<H>  /  ALT  87<H>  /  AlkPhos  101  01-03    proBNP:   Lipid Profile:   HgA1c:   TSH:     TELE:  EKG:

## 2024-01-03 NOTE — SWALLOW BEDSIDE ASSESSMENT ADULT - ASR SWALLOW RECOMMEND DIAG
Objective testing not warranted at this time given no overt signs of impaired airway protection
objective testing not indicated due to no overt signs on baseline diet level and CXR with no concerns of PNA; also discussed with ACP who reported no concerns of aspiration PNA. Will continue to monitor at bedside at this time

## 2024-01-03 NOTE — SWALLOW BEDSIDE ASSESSMENT ADULT - COMMENTS
Consult received and chart reviewed. Patient seen at bedside this PM for re-assessment of swallow function at which time he was alert, oriented x3, and endorsed chronic pain, which RN is aware of and administered medication during assessment. Patient agreeable to assessment.     Patient left bedside with mother, NAD. Assessment completed and full report to follow. Consult received and chart reviewed. Patient seen at bedside this PM for re-assessment of swallow function at which time he was alert, oriented x3, and endorsed chronic pain, which RN is aware of and administered medication during assessment. Patient agreeable to assessment. Patient demonstrates ability to follow low level commands. Vocal quality and speech production WFL.     Per charting, the patient is a " 28 years old male with h/o Lupus ( diagnosed in 09/2023, on Plaquenil present to [facility] ED on 12/12/23  with complain of chest pain and SOB admitted for fevers, PE, pleural effusions, course c/b high fever and tonic-clonic seizure     #B/l pleural effusions  S/p Lt sided chest tube. S/p mist  exudative effusion, bloody, glucose is not low, ADA low, NGTD on cultures  - CT clamped 12/31  - POCUS with trace effusion after 24 hours clamped  - CT removed in evening on 1/1"    WBC elevated.  CXR 1/2/24 revealed "Left-sided chest tube has been removed since the last study. No complicating effusion or pneumothorax.  Hazy right lung base has the appearance of small effusion/atelectasis.  Upper lung fields are clear and the heart is not enlarged."  CT Head 1/2/24 revealed : "No CT evidence for acute intracranial abnormality .    If clinical concern persists, follow-up MRI of the brain can be obtained."    Patient left bedside with mother, NAD. Assessment completed and full report to follow. Consult received and chart reviewed. Patient seen at bedside this PM for re-assessment of swallow function at which time he was alert, oriented x3, and endorsed chronic pain, which RN is aware of and administered medication during assessment. Patient agreeable to assessment. Patient demonstrates ability to follow low level commands. Vocal quality and speech production WFL.     Per charting, the patient is a " 28 years old male with h/o Lupus ( diagnosed in 09/2023, on Plaquenil present to [facility] ED on 12/12/23  with complain of chest pain and SOB admitted for fevers, PE, pleural effusions, course c/b high fever and tonic-clonic seizure     #B/l pleural effusions  S/p Lt sided chest tube. S/p mist  exudative effusion, bloody, glucose is not low, ADA low, NGTD on cultures  - CT clamped 12/31  - POCUS with trace effusion after 24 hours clamped  - CT removed in evening on 1/1"    WBC elevated.  CXR 1/2/24 revealed "Left-sided chest tube has been removed since the last study. No complicating effusion or pneumothorax.  Hazy right lung base has the appearance of small effusion/atelectasis.  Upper lung fields are clear and the heart is not enlarged."  CT Head 1/2/24 revealed : "No CT evidence for acute intracranial abnormality .    If clinical concern persists, follow-up MRI of the brain can be obtained."    Patient left bedside with mother, NAD. Assessment completed and full report to follow. Consult received and chart reviewed. Patient seen at bedside this PM for re-assessment of swallow function at which time he was alert, oriented x3, and endorsed chronic pain, which RN is aware of and administered medication during assessment. Patient agreeable to assessment. Patient demonstrates ability to follow low level commands. Vocal quality and speech production WFL. Per charting, patient is familiar to this dept and was seen for bedside swallow assessment on 12/28/23 at which time a pureed and thin liquid diet level was recommended (please see report for details). Patient reports he would like to be advanced to regular solids at this time.     Per charting, the patient is a " 28 years old male with h/o Lupus ( diagnosed in 09/2023, on Plaquenil present to [facility] ED on 12/12/23  with complain of chest pain and SOB admitted for fevers, PE, pleural effusions, course c/b high fever and tonic-clonic seizure     #B/l pleural effusions  S/p Lt sided chest tube. S/p mist  exudative effusion, bloody, glucose is not low, ADA low, NGTD on cultures  - CT clamped 12/31  - POCUS with trace effusion after 24 hours clamped  - CT removed in evening on 1/1"    WBC elevated.  CXR 1/2/24 revealed "Left-sided chest tube has been removed since the last study. No complicating effusion or pneumothorax.  Hazy right lung base has the appearance of small effusion/atelectasis.  Upper lung fields are clear and the heart is not enlarged."  CT Head 1/2/24 revealed : "No CT evidence for acute intracranial abnormality .    If clinical concern persists, follow-up MRI of the brain can be obtained."    Patient left bedside with mother, NAD. Discussed results with patient, RN, and ACP. Consult received and chart reviewed. Patient seen at bedside this PM for re-assessment of swallow function at which time he was alert, oriented x3, and endorsed chronic pain, which RN is aware of and administered medication during assessment. Patient agreeable to assessment. Patient demonstrates ability to follow low level commands. Vocal quality and speech production WFL. Per charting, patient is familiar to this dept and was seen for bedside swallow assessment on 12/28/23 at which time a pureed and thin liquid diet level was recommended (please see report for details). Patient reports he would like to be advanced to regular solids at this time.     Per charting, the patient is a " 28 years old male with h/o Lupus ( diagnosed in 09/2023, on Plaquenil present to [facility] ED on 12/12/23  with complain of chest pain and SOB admitted for fevers, PE, pleural effusions, course c/b high fever and tonic-clonic seizure     #B/l pleural effusions  S/p Lt sided chest tube. S/p mist  exudative effusion, bloody, glucose is not low, ADA low, NGTD on cultures  - CT clamped 12/31  - POCUS with trace effusion after 24 hours clamped  - CT removed in evening on 1/1"    WBC elevated.  CXR 1/2/24 revealed "Left-sided chest tube has been removed since the last study. No complicating effusion or pneumothorax.  Hazy right lung base has the appearance of small effusion/atelectasis.  Upper lung fields are clear and the heart is not enlarged."  CT Head 1/2/24 revealed : "No CT evidence for acute intracranial abnormality .    If clinical concern persists, follow-up MRI of the brain can be obtained."    Patient left bedside with mother, NAD. Discussed results with patient, RN, and ACP.

## 2024-01-03 NOTE — PROGRESS NOTE ADULT - ASSESSMENT
28 years old male with h/o Lupus ( diagnosed in 09/2023, on Plaquenil present to [facility] ED on 12/12/23  with complain of chest pain and SOB. Patient reported left sided pleuritic chest pain which started 3 days ago. Pain is progressively worsened, associated with SOB and cough. Patient was seen in OSH ED and was prescribed antibiotics. Patient reported significantly worsening of left sided pleuritic chest pain today. No fever or chills. Patient reported loss of appetite and had a few episode of diarrhea for last 2 days. CTA chest with acute right upper lobe and left lower lobe segmental/subsegmental pulmonary emboli. No CT evidence of right heart strain. New bilateral lower lobe consolidations with areas of central clearing. Pneumonia and pulmonary infarcts are in the differential. New bilateral pleural effusions, small on the left and trace on the right. Bilateral axillary and supraclavicular adenopathy of unclear etiology. Patient was started on heparin ggt transitioned to eliquis on 12/19,  patient with worsening SOB/ hypoxia requiring nasal cannula oxygen supplementation. 12/20  Repeat Xray shows increased moderate left pleural effusion and compressive atelectasis trace right effusion and linear atelectasis. Thoracic team consulted for possible pigtail insertion Bedside US: Large left effusion with septations. Right simple effusion , + pericardial effusion- lower extremity dopplers negative for DVT.    # Pulm effusion/ Fever   S/P thoracentesis , followupp results   heparin for AC  TS care appreciated   O2 supplement   monitor output   Zosyn for now , ABx per ID   LP done  Plan for LN biopsy by IR , discussed with rheum and patinet;s mother, defer to rheum  Hematuria noted   Urology eval   COmpleted course of ABx         # Fever  SIRS   Abx , comoleted per iD   Rheum adn ID follow up   Renal biopsy for protonuria, defer to rhem, discussed with IR, high risk       # Hyponatremia/ Seizure   RRT   Neuro follow up       #PE   on heparin , resume after thoracentesis   DVT negative  Heme onc eval   likley lupus related     #Hxof lupus  on hydroxychloroquine   Heme eval   Rheum eval   steroids per rheum       DVT andgIPPX    Discussed in detail with patient and mother at the bedside  28 years old male with h/o Lupus ( diagnosed in 09/2023, on Plaquenil present to [facility] ED on 12/12/23  with complain of chest pain and SOB. Patient reported left sided pleuritic chest pain which started 3 days ago. Pain is progressively worsened, associated with SOB and cough. Patient was seen in OSH ED and was prescribed antibiotics. Patient reported significantly worsening of left sided pleuritic chest pain today. No fever or chills. Patient reported loss of appetite and had a few episode of diarrhea for last 2 days. CTA chest with acute right upper lobe and left lower lobe segmental/subsegmental pulmonary emboli. No CT evidence of right heart strain. New bilateral lower lobe consolidations with areas of central clearing. Pneumonia and pulmonary infarcts are in the differential. New bilateral pleural effusions, small on the left and trace on the right. Bilateral axillary and supraclavicular adenopathy of unclear etiology. Patient was started on heparin ggt transitioned to eliquis on 12/19,  patient with worsening SOB/ hypoxia requiring nasal cannula oxygen supplementation. 12/20  Repeat Xray shows increased moderate left pleural effusion and compressive atelectasis trace right effusion and linear atelectasis. Thoracic team consulted for possible pigtail insertion Bedside US: Large left effusion with septations. Right simple effusion , + pericardial effusion- lower extremity dopplers negative for DVT.    # Pulm effusion/ Fever   S/P thoracentesis , followupp results   heparin for AC  TS care appreciated   O2 supplement   monitor output   Zosyn for now , ABx per ID   LP done  Plan for LN biopsy by IR , discussed with rheum and patinet;s mother, defer to rheum  Hematuria noted   Urology eval   COmpleted course of ABx         # Fever  SIRS   Abx , comoleted per iD   Rheum adn ID follow up   Renal biopsy for protonuria, defer to rhem, discussed with IR, high risk       # Hyponatremia/ Seizure   RRT   Neuro follow up       #PE   on heparin , resume after thoracentesis   DVT negative  Heme onc eval   likley lupus related     #Hxof lupus  on hydroxychloroquine   Heme eval   Rheum eval   steroids per rheum       DVT andgIPPX    Discussed in detail with patient and mother at the bedside

## 2024-01-03 NOTE — CHART NOTE - NSCHARTNOTEFT_GEN_A_CORE
As per discussion with pain management, patient has not been taking Oxy PRN as frequently as order allows. As per verbal pain management recommendations, will start standing tylenol 650mg ATC q8hr (monitor LFTs) and gabapentin 200mg bid in addition to Oxy PRN and lidocaine patch for further optimization of pain control regimen. Given patient's pain is localized to chest and back related to PE and pleural effusion s/p chest tube, continued therapy with blood thinner is mainstay for management and pain medication regimen may be limited.     Yesy Romano PA-C  Department of Medicine   In-house pager #[phone] As per discussion with pain management, patient has not been taking Oxy PRN as frequently as order allows. As per verbal pain management recommendations, will start standing tylenol 650mg ATC q8hr (monitor LFTs) and gabapentin 200mg bid in addition to Oxy PRN and lidocaine patch for further optimization of pain control regimen. Given patient's pain is localized to chest and back related to PE and pleural effusion s/p chest tube, continued therapy with blood thinner is mainstay for management and pain medication regimen may be limited.     Yesy Romano PA-C  Department of Medicine   In-house pager #[phone]

## 2024-01-03 NOTE — PROGRESS NOTE ADULT - ASSESSMENT
PE  on a/c  no evidence of right heart strain   no significant evidence of myocardial injury   normal LV function     Pericardial effusion   inflammatory in setting of SLE   trace effusion   no sign of tamponade  repeat echo in few weeks for surveillance   fu with rheum for ongoing work up

## 2024-01-03 NOTE — CHART NOTE - NSCHARTNOTEFT_GEN_A_CORE
Discussed patient with ACP.  Explained that patient only took Oxycodone once yesterday and once this morning.  Patient needs to take pain medication regularly x 24 hours as ordered by primary attending before saying regimen is not working.  May reconsult pain service if pain still not controlled with current pain regimen taken. Thank you.

## 2024-01-03 NOTE — SWALLOW BEDSIDE ASSESSMENT ADULT - SWALLOW EVAL: DIAGNOSIS
1. Functional oral phase for pureed, regular solids, mildly thick, and thin liquids marked by adequate oral acceptance, slower mastication with solids however overall functional and timely collection and transport. 2. Judged functional pharyngeal phase for all PO consistencies provided marked by suspected timely pharyngeal swallow trigger and hyolaryngeal elevation noted by digital palpation without evidence of airway penetration/aspiration. Of note, patient denied odynophagia with trials of solids during today's assessment.
1- Functional stage for puree and thin liquids marked by adequate oral containment, adequate bolus manipulation, adequate mastication, adequate anterior to posterior transfer, adequate oral clearance noted. 2- Functional pharyngeal stage for puree and thin liquids marked by initiation of pharyngeal swallow trigger and hyolaryngeal excursion upon palpation without evidence of impaired airway protection. Of note, patient declined solid trials stating preference for puree at this time.

## 2024-01-03 NOTE — CONSULT NOTE ADULT - CONSULT REQUESTED BY NAME
Dr. Salamanca
Dr. Sosa
Thoracic surg team
medicine
Dr Salamanca
[facility]
[place]
Primary team
Hany Juárez MD
[facility]
[place]
Primary team

## 2024-01-03 NOTE — SWALLOW BEDSIDE ASSESSMENT ADULT - ADDITIONAL RECOMMENDATIONS
This department will continue to follow the patient for diet tolerance, as schedule permits
This department to follow up as schedule permit to assess for diet advancement. Medical team further advised to reconsult if there is a change in medical status and/or observed change in patient's tolerance of recommended PO diet.

## 2024-01-03 NOTE — CONSULT NOTE ADULT - CONSULT REASON
CV eval
fever
rashes on hands
eval for rehab
Rash
Axillary LN Bx
C/f SLE flare
new hoarseness
Hyponatremia, proteinuria
Hypercoagulable workup and management of INR myke-operatively
Renal parenchymal biopsy
Seizure
medical management

## 2024-01-03 NOTE — CONSULT NOTE ADULT - PROVIDER SPECIALTY LIST ADULT
Dermatology
ENT
Rheumatology
Neurology
Rehab Medicine
Dermatology
Cardiology
Infectious Disease
Internal Medicine
Heme/Onc
Intervent Radiology
Intervent Radiology
Nephrology

## 2024-01-03 NOTE — BH CONSULTATION LIAISON PROGRESS NOTE - NSBHATTESTBILLING_PSY_A_CORE
69214-Wyggercclk OBS or IP - moderate complexity OR 35-49 mins 11532-Klehxkparp OBS or IP - moderate complexity OR 35-49 mins

## 2024-01-03 NOTE — CONSULT NOTE ADULT - CONSULT REQUESTED DATE/TIME
25-Dec-2023 15:35
23-Dec-2023 15:56
26-Dec-2023 06:52
27-Dec-2023 13:18
23-Dec-2023 14:27
28-Dec-2023 13:22
03-Jan-2024 12:24
27-Dec-2023 12:43
23-Dec-2023 15:08
23-Dec-2023 11:52
29-Dec-2023 12:52
01-Jan-2024 20:35
27-Dec-2023 13:42

## 2024-01-03 NOTE — CONSULT NOTE ADULT - SUBJECTIVE AND OBJECTIVE BOX
Patient is a 28y old  Male who presents with a chief complaint of fever (02 Jan 2024 16:20)      HPI:  28 years old male with h/o Lupus ( diagnosed in 09/2023, on Plaquenil present to [place] ED on 12/12/23  with complain of chest pain and SOB. Patient reported left sided pleuritic chest pain which started 3 days ago. Pain is progressively worsened, associated with SOB and cough. Patient was seen in OSH ED and was prescribed antibiotics. Patient reported significantly worsening of left sided pleuritic chest pain today. No fever or chills. Patient reported loss of appetite and had a few episode of diarrhea for last 2 days. CTA chest with acute right upper lobe and left lower lobe segmental/subsegmental pulmonary emboli. No CT evidence of right heart strain. New bilateral lower lobe consolidations with areas of central clearing. Pneumonia and pulmonary infarcts are in the differential. New bilateral pleural effusions, small on the left and trace on the right. Bilateral axillary and supraclavicular adenopathy of unclear etiology. Patient was started on heparin ggt transitioned to eliquis on 12/19,  patient with worsening SOB/ hypoxia requiring nasal cannula oxygen supplementation. 12/20  Repeat Xray shows increased large left pleural effusion and compressive atelectasis trace right effusion and linear atelectasis. Thoracic team consulted for possible pigtail insertion Bedside US: Large left effusion with septations. Right simple effusion , + pericardial effusion- lower extremity dopplers negative for DVT,   - GI PCR + e coli  - mRSA PCR + Staph aureus   . Patient transferred to [facility] for further management.     (22 Dec 2023 22:52)      REVIEW OF SYSTEMS  Constitutional - No fever, No weight loss, No fatigue  HEENT - No eye pain, No visual disturbances, No difficulty hearing, No tinnitus, No vertigo, No neck pain  Respiratory - No cough, No wheezing, No shortness of breath  Cardiovascular - No chest pain, No palpitations  Gastrointestinal - No abdominal pain, No nausea, No vomiting, No diarrhea, No constipation  Genitourinary - No dysuria, No frequency, No hematuria, No incontinence  Neurological - No headaches, No memory loss, + loss of strength, No numbness, No tremors  Skin - No itching, No rashes, No lesions   Endocrine - No temperature intolerance  Musculoskeletal - No joint pain, No joint swelling, +   pain at chest tube site, LP site  Psychiatric - No depression, No anxiety    PAST MEDICAL & SURGICAL HISTORY  No pertinent past medical history    LE (lupus erythematosus)    Pulmonary embolism    No significant past surgical history        SOCIAL HISTORY  Smoking - Denied  EtOH - rare   Drugs - Denied    FUNCTIONAL HISTORY  Lives alone in apartment with 14 stairs to enter. mom lives 5 min away. patient is a student studying crimonology  Independent at baseline    CURRENT FUNCTIONAL STATUS    1/2  Bed Mobility  Bed Mobility Training Rehab Potential: good, to achieve stated therapy goals  Bed Mobility Training Symptoms Noted During/After Treatment: none  Bed Mobility Training Sit-to-Supine: 1 person assist;  verbal cues;  bed rails;  minimum assist (75% patient effort)  Bed Mobility Training Supine-to-Sit: minimum assist (75% patient effort);  1 person assist;  verbal cues;  bed rails  Bed Mobility Training Limitations: decreased ability to use arms for pushing/pulling;  decreased ability to use legs for bridging/pushing    Sit-Stand Transfer Training  Sit-to-Stand Transfer Training Rehab Potential: good, to achieve stated therapy goals  Sit-to-Stand Transfer Training Symptoms Noted During/After Treatment: none  Transfer Training Sit-to-Stand Transfer: minimum assist (75% patient effort);  1 person assist;  verbal cues;  hand held assistance of 1 person  Transfer Training Stand-to-Sit Transfer: hand held assistance of 1 perso;  minimum assist (75% patient effort);  1 person assist;  verbal cues  Sit-to-Stand Transfer Training Transfer Safety Analysis: decreased strength    Gait Training  Gait Training Rehab Potential: good, to achieve stated therapy goals  Gait Training Symptoms Noted During/After Treatment: none  Gait Training: minimum assist (75% patient effort);  1 person assist;  verbal cues;  hand held assistance of 1 perso;  10 feet;  distance limited by per MD's restrictio to at bed side only  Gait Analysis: decreased strength;  impaired balance    Therapeutic Exercise  Therapeutic Exercise Rehab Effort: good  Therapeutic Exercise Symptoms Noted During/After Treatment: none  Therapeutic Exercise Detail: Patient performed active ROM of both LE with 10 repetition.           FAMILY HISTORY   No pertinent family history in first degree relatives        RECENT LABS/IMAGING  CBC Full  -  ( 03 Jan 2024 01:00 )  WBC Count : 10.91 K/uL  RBC Count : 3.07 M/uL  Hemoglobin : 9.3 g/dL  Hematocrit : 28.7 %  Platelet Count - Automated : 263 K/uL  Mean Cell Volume : 93.5 fL  Mean Cell Hemoglobin : 30.3 pg  Mean Cell Hemoglobin Concentration : 32.4 gm/dL  Auto Neutrophil # : 6.15 K/uL  Auto Lymphocyte # : 3.11 K/uL  Auto Monocyte # : 1.05 K/uL  Auto Eosinophil # : 0.02 K/uL  Auto Basophil # : 0.06 K/uL  Auto Neutrophil % : 56.4 %  Auto Lymphocyte % : 28.5 %  Auto Monocyte % : 9.6 %  Auto Eosinophil % : 0.2 %  Auto Basophil % : 0.5 %    01-03    134<L>  |  97<L>  |  12  ----------------------------<  138<H>  3.8   |  24  |  0.73    Ca    9.5      03 Jan 2024 01:00  Phos  3.7     01-03  Mg     2.00     01-03    TPro  7.2  /  Alb  3.2<L>  /  TBili  0.5  /  DBili  x   /  AST  44<H>  /  ALT  87<H>  /  AlkPhos  101  01-03    Urinalysis Basic - ( 03 Jan 2024 01:00 )    Color: x / Appearance: x / SG: x / pH: x  Gluc: 138 mg/dL / Ketone: x  / Bili: x / Urobili: x   Blood: x / Protein: x / Nitrite: x   Leuk Esterase: x / RBC: x / WBC x   Sq Epi: x / Non Sq Epi: x / Bacteria: x        VITALS  T(C): 36.8 (01-03-24 @ 06:25), Max: 37.1 (01-02-24 @ 21:26)  HR: 77 (01-03-24 @ 06:25) (77 - 80)  BP: 128/76 (01-03-24 @ 06:25) (117/73 - 128/76)  RR: 18 (01-03-24 @ 06:25) (17 - 18)  SpO2: 100% (01-03-24 @ 06:25) (100% - 100%)  Wt(kg): --    ALLERGIES  No Known Allergies      MEDICATIONS   acetaminophen     Tablet .. 650 milliGRAM(s) Oral every 8 hours  albuterol/ipratropium for Nebulization 3 milliLiter(s) Nebulizer every 6 hours PRN  benzocaine/menthol Lozenge 1 Lozenge Oral four times a day PRN  chlorhexidine 2% Cloths 1 Application(s) Topical daily  ciprofloxacin  0.3% Ophthalmic Solution for Otic Use 2 Drop(s) Both Ears two times a day  FIRST- Mouthwash  BLM 15 milliLiter(s) Swish and Spit every 4 hours PRN  gabapentin 200 milliGRAM(s) Oral two times a day  guaiFENesin Oral Liquid (Sugar-Free) 200 milliGRAM(s) Oral every 6 hours PRN  heparin   Injectable 5500 Unit(s) IV Push every 6 hours PRN  heparin   Injectable 2500 Unit(s) IV Push every 6 hours PRN  heparin  Infusion. 1300 Unit(s)/Hr IV Continuous <Continuous>  hydroxychloroquine 200 milliGRAM(s) Oral two times a day  lidocaine   4% Patch 1 Patch Transdermal every 24 hours  lidocaine   4% Patch 2 Patch Transdermal every 24 hours  lidocaine 2% Viscous 5 milliLiter(s) Swish and Spit three times a day PRN  melatonin 3 milliGRAM(s) Oral <User Schedule>  multivitamin/minerals/iron Oral Solution (CENTRUM) 15 milliLiter(s) Oral daily  ondansetron Injectable 4 milliGRAM(s) IV Push every 8 hours PRN  oxyCODONE    IR 5 milliGRAM(s) Oral every 6 hours PRN  oxyCODONE    IR 2.5 milliGRAM(s) Oral every 6 hours PRN  pantoprazole    Tablet 40 milliGRAM(s) Oral before breakfast  polyethylene glycol 3350 17 Gram(s) Oral two times a day  predniSONE   Tablet 60 milliGRAM(s) Oral daily  senna 2 Tablet(s) Oral at bedtime  sodium chloride 1 Gram(s) Oral three times a day  sodium chloride 3%. 500 milliLiter(s) IV Continuous <Continuous>  trimethoprim  160 mG/sulfamethoxazole 800 mG 1 Tablet(s) Oral <User Schedule>      ----------------------------------------------------------------------------------------  PHYSICAL EXAM  Constitutional - NAD   HEENT - NCAT, EOMI   Chest - no respiratory distress  Cardiovascular - RRR, S1S2  Abdomen - BS+, Soft, NTND  Extremities - No C/C/E, No calf tenderness   Neurologic Exam -                    Cognitive - Awake, Alert, AAO to self, place, date, year, situation     Communication - Fluent, No dysarthria     Cranial Nerves - CN 2-12 intact     Motor -  generalized weakness 4/5 throughout     Sensory - Intact to LT     Reflexes - DTR Intact       Balance - WNL Static  Psychiatric - Mood stable, Affect WNL  ----------------------------------------------------------------------------------------  ASSESSMENT/PLAN  29 yo m recent SLE diagnosis, PE and pneumonia transferred to [facility] for thoracic evaluation  now s/p chest tube (since removed)  patient now with debility, reports 20 lb weight loss in past few weeks  Pain -oxy ir prn, gabapentin, tylenol, lidocaine patch  please request OT   SLP pending  continue bedside PT  diet- pureed, fluid restricted  DVT PPX - heparin  Rehab -    Recommend ACUTE inpatient rehabilitation for the functional deficits consisting of 3 hours of therapy/day & 24 hour RN/daily PMR physician for comorbid medical management. Will continue to follow for ongoing rehab needs and recommendations.  Patient is a 28y old  Male who presents with a chief complaint of fever (02 Jan 2024 16:20)      HPI:  28 years old male with h/o Lupus ( diagnosed in 09/2023, on Plaquenil present to [place] ED on 12/12/23  with complain of chest pain and SOB. Patient reported left sided pleuritic chest pain which started 3 days ago. Pain is progressively worsened, associated with SOB and cough. Patient was seen in OSH ED and was prescribed antibiotics. Patient reported significantly worsening of left sided pleuritic chest pain today. No fever or chills. Patient reported loss of appetite and had a few episode of diarrhea for last 2 days. CTA chest with acute right upper lobe and left lower lobe segmental/subsegmental pulmonary emboli. No CT evidence of right heart strain. New bilateral lower lobe consolidations with areas of central clearing. Pneumonia and pulmonary infarcts are in the differential. New bilateral pleural effusions, small on the left and trace on the right. Bilateral axillary and supraclavicular adenopathy of unclear etiology. Patient was started on heparin ggt transitioned to eliquis on 12/19,  patient with worsening SOB/ hypoxia requiring nasal cannula oxygen supplementation. 12/20  Repeat Xray shows increased large left pleural effusion and compressive atelectasis trace right effusion and linear atelectasis. Thoracic team consulted for possible pigtail insertion Bedside US: Large left effusion with septations. Right simple effusion , + pericardial effusion- lower extremity dopplers negative for DVT,   - GI PCR + e coli  - mRSA PCR + Staph aureus   . Patient transferred to [facility] for further management.     (22 Dec 2023 22:52)      REVIEW OF SYSTEMS  Constitutional - No fever, No weight loss, No fatigue  HEENT - No eye pain, No visual disturbances, No difficulty hearing, No tinnitus, No vertigo, No neck pain  Respiratory - No cough, No wheezing, No shortness of breath  Cardiovascular - No chest pain, No palpitations  Gastrointestinal - No abdominal pain, No nausea, No vomiting, No diarrhea, No constipation  Genitourinary - No dysuria, No frequency, No hematuria, No incontinence  Neurological - No headaches, No memory loss, + loss of strength, No numbness, No tremors  Skin - No itching, No rashes, No lesions   Endocrine - No temperature intolerance  Musculoskeletal - No joint pain, No joint swelling, +   pain at chest tube site, LP site  Psychiatric - No depression, No anxiety    PAST MEDICAL & SURGICAL HISTORY  No pertinent past medical history    LE (lupus erythematosus)    Pulmonary embolism    No significant past surgical history        SOCIAL HISTORY  Smoking - Denied  EtOH - rare   Drugs - Denied    FUNCTIONAL HISTORY  Lives alone in apartment with 14 stairs to enter. mom lives 5 min away. patient is a student studying crimonology  Independent at baseline    CURRENT FUNCTIONAL STATUS    1/2  Bed Mobility  Bed Mobility Training Rehab Potential: good, to achieve stated therapy goals  Bed Mobility Training Symptoms Noted During/After Treatment: none  Bed Mobility Training Sit-to-Supine: 1 person assist;  verbal cues;  bed rails;  minimum assist (75% patient effort)  Bed Mobility Training Supine-to-Sit: minimum assist (75% patient effort);  1 person assist;  verbal cues;  bed rails  Bed Mobility Training Limitations: decreased ability to use arms for pushing/pulling;  decreased ability to use legs for bridging/pushing    Sit-Stand Transfer Training  Sit-to-Stand Transfer Training Rehab Potential: good, to achieve stated therapy goals  Sit-to-Stand Transfer Training Symptoms Noted During/After Treatment: none  Transfer Training Sit-to-Stand Transfer: minimum assist (75% patient effort);  1 person assist;  verbal cues;  hand held assistance of 1 person  Transfer Training Stand-to-Sit Transfer: hand held assistance of 1 perso;  minimum assist (75% patient effort);  1 person assist;  verbal cues  Sit-to-Stand Transfer Training Transfer Safety Analysis: decreased strength    Gait Training  Gait Training Rehab Potential: good, to achieve stated therapy goals  Gait Training Symptoms Noted During/After Treatment: none  Gait Training: minimum assist (75% patient effort);  1 person assist;  verbal cues;  hand held assistance of 1 perso;  10 feet;  distance limited by per MD's restrictio to at bed side only  Gait Analysis: decreased strength;  impaired balance    Therapeutic Exercise  Therapeutic Exercise Rehab Effort: good  Therapeutic Exercise Symptoms Noted During/After Treatment: none  Therapeutic Exercise Detail: Patient performed active ROM of both LE with 10 repetition.           FAMILY HISTORY   No pertinent family history in first degree relatives        RECENT LABS/IMAGING  CBC Full  -  ( 03 Jan 2024 01:00 )  WBC Count : 10.91 K/uL  RBC Count : 3.07 M/uL  Hemoglobin : 9.3 g/dL  Hematocrit : 28.7 %  Platelet Count - Automated : 263 K/uL  Mean Cell Volume : 93.5 fL  Mean Cell Hemoglobin : 30.3 pg  Mean Cell Hemoglobin Concentration : 32.4 gm/dL  Auto Neutrophil # : 6.15 K/uL  Auto Lymphocyte # : 3.11 K/uL  Auto Monocyte # : 1.05 K/uL  Auto Eosinophil # : 0.02 K/uL  Auto Basophil # : 0.06 K/uL  Auto Neutrophil % : 56.4 %  Auto Lymphocyte % : 28.5 %  Auto Monocyte % : 9.6 %  Auto Eosinophil % : 0.2 %  Auto Basophil % : 0.5 %    01-03    134<L>  |  97<L>  |  12  ----------------------------<  138<H>  3.8   |  24  |  0.73    Ca    9.5      03 Jan 2024 01:00  Phos  3.7     01-03  Mg     2.00     01-03    TPro  7.2  /  Alb  3.2<L>  /  TBili  0.5  /  DBili  x   /  AST  44<H>  /  ALT  87<H>  /  AlkPhos  101  01-03    Urinalysis Basic - ( 03 Jan 2024 01:00 )    Color: x / Appearance: x / SG: x / pH: x  Gluc: 138 mg/dL / Ketone: x  / Bili: x / Urobili: x   Blood: x / Protein: x / Nitrite: x   Leuk Esterase: x / RBC: x / WBC x   Sq Epi: x / Non Sq Epi: x / Bacteria: x        VITALS  T(C): 36.8 (01-03-24 @ 06:25), Max: 37.1 (01-02-24 @ 21:26)  HR: 77 (01-03-24 @ 06:25) (77 - 80)  BP: 128/76 (01-03-24 @ 06:25) (117/73 - 128/76)  RR: 18 (01-03-24 @ 06:25) (17 - 18)  SpO2: 100% (01-03-24 @ 06:25) (100% - 100%)  Wt(kg): --    ALLERGIES  No Known Allergies      MEDICATIONS   acetaminophen     Tablet .. 650 milliGRAM(s) Oral every 8 hours  albuterol/ipratropium for Nebulization 3 milliLiter(s) Nebulizer every 6 hours PRN  benzocaine/menthol Lozenge 1 Lozenge Oral four times a day PRN  chlorhexidine 2% Cloths 1 Application(s) Topical daily  ciprofloxacin  0.3% Ophthalmic Solution for Otic Use 2 Drop(s) Both Ears two times a day  FIRST- Mouthwash  BLM 15 milliLiter(s) Swish and Spit every 4 hours PRN  gabapentin 200 milliGRAM(s) Oral two times a day  guaiFENesin Oral Liquid (Sugar-Free) 200 milliGRAM(s) Oral every 6 hours PRN  heparin   Injectable 5500 Unit(s) IV Push every 6 hours PRN  heparin   Injectable 2500 Unit(s) IV Push every 6 hours PRN  heparin  Infusion. 1300 Unit(s)/Hr IV Continuous <Continuous>  hydroxychloroquine 200 milliGRAM(s) Oral two times a day  lidocaine   4% Patch 1 Patch Transdermal every 24 hours  lidocaine   4% Patch 2 Patch Transdermal every 24 hours  lidocaine 2% Viscous 5 milliLiter(s) Swish and Spit three times a day PRN  melatonin 3 milliGRAM(s) Oral <User Schedule>  multivitamin/minerals/iron Oral Solution (CENTRUM) 15 milliLiter(s) Oral daily  ondansetron Injectable 4 milliGRAM(s) IV Push every 8 hours PRN  oxyCODONE    IR 5 milliGRAM(s) Oral every 6 hours PRN  oxyCODONE    IR 2.5 milliGRAM(s) Oral every 6 hours PRN  pantoprazole    Tablet 40 milliGRAM(s) Oral before breakfast  polyethylene glycol 3350 17 Gram(s) Oral two times a day  predniSONE   Tablet 60 milliGRAM(s) Oral daily  senna 2 Tablet(s) Oral at bedtime  sodium chloride 1 Gram(s) Oral three times a day  sodium chloride 3%. 500 milliLiter(s) IV Continuous <Continuous>  trimethoprim  160 mG/sulfamethoxazole 800 mG 1 Tablet(s) Oral <User Schedule>      ----------------------------------------------------------------------------------------  PHYSICAL EXAM  Constitutional - NAD   HEENT - NCAT, EOMI   Chest - no respiratory distress  Cardiovascular - RRR, S1S2  Abdomen - BS+, Soft, NTND  Extremities - No C/C/E, No calf tenderness   Neurologic Exam -                    Cognitive - Awake, Alert, AAO to self, place, date, year, situation     Communication - Fluent, No dysarthria     Cranial Nerves - CN 2-12 intact     Motor -  generalized weakness 4/5 throughout     Sensory - Intact to LT     Reflexes - DTR Intact       Balance - WNL Static  Psychiatric - Mood stable, Affect WNL  ----------------------------------------------------------------------------------------  ASSESSMENT/PLAN  27 yo m recent SLE diagnosis, PE and pneumonia transferred to [facility] for thoracic evaluation  now s/p chest tube (since removed)  patient now with debility, reports 20 lb weight loss in past few weeks  Pain -oxy ir prn, gabapentin, tylenol, lidocaine patch  please request OT   SLP pending  continue bedside PT  diet- pureed, fluid restricted  DVT PPX - heparin  Rehab -    Recommend ACUTE inpatient rehabilitation for the functional deficits consisting of 3 hours of therapy/day & 24 hour RN/daily PMR physician for comorbid medical management. Will continue to follow for ongoing rehab needs and recommendations.

## 2024-01-03 NOTE — BH CONSULTATION LIAISON PROGRESS NOTE - NSBHFUPINTERVALHXFT_PSY_A_CORE
Chart, labs, imaging reviewed. Case discussed with the primary team. Overnight, patient did not require or receive PRN medication. On exam, patient is calm and cooperative.     Patient seen and examined with mother at bedside. Patient reports improved sleep, but continues to endorse poor appetite. Patient also continues to endorse depressed mood, which he attributes to conditions/treatment while hospitalized. Denies SIIP/HIIP/AVH. No evidence on psychosis elicited on exam. Discussed plan for transfer to Psych when medically cleared, which the patient is amenable to.     ***THIS NOTE IS INCOMPLETE****    . Reported that he hears his own voice saying " relax", but does not hear any other voices. Feels safe in the hospital. Denied homicidal ideations. Chart, labs, imaging reviewed. Case discussed with the primary team. Overnight, patient did not require or receive PRN medication. On exam, patient is calm and cooperative.     Patient seen and examined with mother at bedside. Patient reports improved sleep, but continues to endorse poor appetite. Patient also continues to endorse depressed mood, which he attributes to conditions/treatment while hospitalized. Denies SIIP/HIIP/AVH. No evidence on psychosis elicited on exam. Discussed plan for transfer to Psych when medically cleared, which the patient is amenable to.         ***THIS NOTE IS INCOMPLETE****   Chart, labs, imaging reviewed. Case discussed with the primary team. Overnight, patient did not require or receive PRN medication. On exam, patient is calm and cooperative.     Patient seen and examined with mother at bedside. Patient reports improved sleep, but continues to endorse poor appetite. Patient also continues to endorse depressed mood, which he attributes to conditions/treatment while hospitalized. Denies SIIP/HIIP/AVH. No evidence on psychosis elicited on exam. Discussed plan for transfer to [facility] when medically cleared, which the patient is amenable to. Spoke to patient     Spoke to patient's mother, who was informed by primary team that he will require acute inpatient hospitalization. States she is in agreement with this plan. Attempted to contact primary team, but unable to reach.    Chart, labs, imaging reviewed. Case discussed with the primary team. Overnight, patient did not require or receive PRN medication. On exam, patient is calm and cooperative.     Patient seen and examined with mother at bedside. Patient reports improved sleep, but continues to endorse poor appetite. Patient also continues to endorse depressed mood, which he attributes to conditions/treatment while hospitalized. Denies SIIP/HIIP/AVH. No evidence on psychosis elicited on exam. Discussed plan for transfer to [facility] when medically cleared, which the patient is amenable to. Spoke to patient     Spoke to patient's mother, who was informed by primary team that he will require acute inpatient hospitalization. States she is in agreement with this plan. Attempted to contact primary team, but unable to reach.    Chart, labs, imaging reviewed. Case discussed with the primary team. Overnight, patient did not require or receive PRN medication. On exam, patient is calm and cooperative.     Patient seen and examined with mother at bedside. Patient reports improved sleep, but continues to endorse poor appetite. Patient also continues to endorse depressed mood, which he attributes to conditions/treatment while hospitalized. Denies SIIP/HIIP/AVH. No evidence on psychosis elicited on exam. Discussed plan for transfer to [facility] when medically cleared, which the patient is amenable to. Spoke to patient     Spoke to patient's mother, who was informed by primary team that he will require acute inpatient rehab hospitalization. States she is in agreement with this plan. Attempted to contact primary team, but unable to reach.    Chart, labs, imaging reviewed. Case discussed with the primary team. Overnight, patient did not require or receive PRN medication. On exam, patient is calm and cooperative.     Patient seen and examined with mother at bedside. Patient reports improved sleep, but continues to endorse poor appetite. Patient also continues to endorse depressed mood, which he attributes to conditions/treatment while hospitalized. Denies SIIP/HIIP/AVH. No evidence on psychosis elicited on exam. Discussed plan for transfer to [facility] when medically cleared, which the patient is amenable to. Spoke to patient     Spoke to patient's mother, who was informed by primary team that he will require acute inpatient rehab hospitalization. States she is in agreement with this plan. Attempted to contact primary team, but unable to reach.

## 2024-01-04 LAB
ALBUMIN SERPL ELPH-MCNC: 3 G/DL — LOW (ref 3.3–5)
ALBUMIN SERPL ELPH-MCNC: 3 G/DL — LOW (ref 3.3–5)
ALP SERPL-CCNC: 90 U/L — SIGNIFICANT CHANGE UP (ref 40–120)
ALP SERPL-CCNC: 90 U/L — SIGNIFICANT CHANGE UP (ref 40–120)
ALT FLD-CCNC: 72 U/L — HIGH (ref 4–41)
ALT FLD-CCNC: 72 U/L — HIGH (ref 4–41)
ANION GAP SERPL CALC-SCNC: 12 MMOL/L — SIGNIFICANT CHANGE UP (ref 7–14)
ANION GAP SERPL CALC-SCNC: 12 MMOL/L — SIGNIFICANT CHANGE UP (ref 7–14)
APTT BLD: 103.3 SEC — HIGH (ref 24.5–35.6)
APTT BLD: 103.3 SEC — HIGH (ref 24.5–35.6)
APTT BLD: 109.4 SEC — HIGH (ref 24.5–35.6)
APTT BLD: 109.4 SEC — HIGH (ref 24.5–35.6)
APTT BLD: 82.2 SEC — HIGH (ref 24.5–35.6)
APTT BLD: 82.2 SEC — HIGH (ref 24.5–35.6)
AST SERPL-CCNC: 37 U/L — SIGNIFICANT CHANGE UP (ref 4–40)
AST SERPL-CCNC: 37 U/L — SIGNIFICANT CHANGE UP (ref 4–40)
BASOPHILS # BLD AUTO: 0.04 K/UL — SIGNIFICANT CHANGE UP (ref 0–0.2)
BASOPHILS # BLD AUTO: 0.04 K/UL — SIGNIFICANT CHANGE UP (ref 0–0.2)
BASOPHILS NFR BLD AUTO: 0.4 % — SIGNIFICANT CHANGE UP (ref 0–2)
BASOPHILS NFR BLD AUTO: 0.4 % — SIGNIFICANT CHANGE UP (ref 0–2)
BILIRUB SERPL-MCNC: 0.6 MG/DL — SIGNIFICANT CHANGE UP (ref 0.2–1.2)
BILIRUB SERPL-MCNC: 0.6 MG/DL — SIGNIFICANT CHANGE UP (ref 0.2–1.2)
BUN SERPL-MCNC: 8 MG/DL — SIGNIFICANT CHANGE UP (ref 7–23)
BUN SERPL-MCNC: 8 MG/DL — SIGNIFICANT CHANGE UP (ref 7–23)
CALCIUM SERPL-MCNC: 9.2 MG/DL — SIGNIFICANT CHANGE UP (ref 8.4–10.5)
CALCIUM SERPL-MCNC: 9.2 MG/DL — SIGNIFICANT CHANGE UP (ref 8.4–10.5)
CHLORIDE SERPL-SCNC: 100 MMOL/L — SIGNIFICANT CHANGE UP (ref 98–107)
CHLORIDE SERPL-SCNC: 100 MMOL/L — SIGNIFICANT CHANGE UP (ref 98–107)
CO2 SERPL-SCNC: 23 MMOL/L — SIGNIFICANT CHANGE UP (ref 22–31)
CO2 SERPL-SCNC: 23 MMOL/L — SIGNIFICANT CHANGE UP (ref 22–31)
CREAT SERPL-MCNC: 0.61 MG/DL — SIGNIFICANT CHANGE UP (ref 0.5–1.3)
CREAT SERPL-MCNC: 0.61 MG/DL — SIGNIFICANT CHANGE UP (ref 0.5–1.3)
EGFR: 133 ML/MIN/1.73M2 — SIGNIFICANT CHANGE UP
EGFR: 133 ML/MIN/1.73M2 — SIGNIFICANT CHANGE UP
EOSINOPHIL # BLD AUTO: 0.09 K/UL — SIGNIFICANT CHANGE UP (ref 0–0.5)
EOSINOPHIL # BLD AUTO: 0.09 K/UL — SIGNIFICANT CHANGE UP (ref 0–0.5)
EOSINOPHIL NFR BLD AUTO: 0.9 % — SIGNIFICANT CHANGE UP (ref 0–6)
EOSINOPHIL NFR BLD AUTO: 0.9 % — SIGNIFICANT CHANGE UP (ref 0–6)
GLUCOSE SERPL-MCNC: 100 MG/DL — HIGH (ref 70–99)
GLUCOSE SERPL-MCNC: 100 MG/DL — HIGH (ref 70–99)
HCT VFR BLD CALC: 29.6 % — LOW (ref 39–50)
HCT VFR BLD CALC: 29.6 % — LOW (ref 39–50)
HGB BLD-MCNC: 10 G/DL — LOW (ref 13–17)
HGB BLD-MCNC: 10 G/DL — LOW (ref 13–17)
IANC: 5.21 K/UL — SIGNIFICANT CHANGE UP (ref 1.8–7.4)
IANC: 5.21 K/UL — SIGNIFICANT CHANGE UP (ref 1.8–7.4)
IMM GRANULOCYTES NFR BLD AUTO: 2.1 % — HIGH (ref 0–0.9)
IMM GRANULOCYTES NFR BLD AUTO: 2.1 % — HIGH (ref 0–0.9)
INR BLD: 1.59 RATIO — HIGH (ref 0.85–1.18)
INR BLD: 1.59 RATIO — HIGH (ref 0.85–1.18)
JAK2 P.V617F BLD/T QL: SIGNIFICANT CHANGE UP
JAK2 P.V617F BLD/T QL: SIGNIFICANT CHANGE UP
LYMPHOCYTES # BLD AUTO: 3.44 K/UL — HIGH (ref 1–3.3)
LYMPHOCYTES # BLD AUTO: 3.44 K/UL — HIGH (ref 1–3.3)
LYMPHOCYTES # BLD AUTO: 35.2 % — SIGNIFICANT CHANGE UP (ref 13–44)
LYMPHOCYTES # BLD AUTO: 35.2 % — SIGNIFICANT CHANGE UP (ref 13–44)
MAGNESIUM SERPL-MCNC: 1.8 MG/DL — SIGNIFICANT CHANGE UP (ref 1.6–2.6)
MAGNESIUM SERPL-MCNC: 1.8 MG/DL — SIGNIFICANT CHANGE UP (ref 1.6–2.6)
MCHC RBC-ENTMCNC: 31.1 PG — SIGNIFICANT CHANGE UP (ref 27–34)
MCHC RBC-ENTMCNC: 31.1 PG — SIGNIFICANT CHANGE UP (ref 27–34)
MCHC RBC-ENTMCNC: 33.8 GM/DL — SIGNIFICANT CHANGE UP (ref 32–36)
MCHC RBC-ENTMCNC: 33.8 GM/DL — SIGNIFICANT CHANGE UP (ref 32–36)
MCV RBC AUTO: 91.9 FL — SIGNIFICANT CHANGE UP (ref 80–100)
MCV RBC AUTO: 91.9 FL — SIGNIFICANT CHANGE UP (ref 80–100)
MONOCYTES # BLD AUTO: 0.79 K/UL — SIGNIFICANT CHANGE UP (ref 0–0.9)
MONOCYTES # BLD AUTO: 0.79 K/UL — SIGNIFICANT CHANGE UP (ref 0–0.9)
MONOCYTES NFR BLD AUTO: 8.1 % — SIGNIFICANT CHANGE UP (ref 2–14)
MONOCYTES NFR BLD AUTO: 8.1 % — SIGNIFICANT CHANGE UP (ref 2–14)
NEUTROPHILS # BLD AUTO: 5.21 K/UL — SIGNIFICANT CHANGE UP (ref 1.8–7.4)
NEUTROPHILS # BLD AUTO: 5.21 K/UL — SIGNIFICANT CHANGE UP (ref 1.8–7.4)
NEUTROPHILS NFR BLD AUTO: 53.3 % — SIGNIFICANT CHANGE UP (ref 43–77)
NEUTROPHILS NFR BLD AUTO: 53.3 % — SIGNIFICANT CHANGE UP (ref 43–77)
NRBC # BLD: 0 /100 WBCS — SIGNIFICANT CHANGE UP (ref 0–0)
NRBC # BLD: 0 /100 WBCS — SIGNIFICANT CHANGE UP (ref 0–0)
NRBC # FLD: 0 K/UL — SIGNIFICANT CHANGE UP (ref 0–0)
NRBC # FLD: 0 K/UL — SIGNIFICANT CHANGE UP (ref 0–0)
PHOSPHATE SERPL-MCNC: 3.1 MG/DL — SIGNIFICANT CHANGE UP (ref 2.5–4.5)
PHOSPHATE SERPL-MCNC: 3.1 MG/DL — SIGNIFICANT CHANGE UP (ref 2.5–4.5)
PLATELET # BLD AUTO: 227 K/UL — SIGNIFICANT CHANGE UP (ref 150–400)
PLATELET # BLD AUTO: 227 K/UL — SIGNIFICANT CHANGE UP (ref 150–400)
POTASSIUM SERPL-MCNC: 4.1 MMOL/L — SIGNIFICANT CHANGE UP (ref 3.5–5.3)
POTASSIUM SERPL-MCNC: 4.1 MMOL/L — SIGNIFICANT CHANGE UP (ref 3.5–5.3)
POTASSIUM SERPL-SCNC: 4.1 MMOL/L — SIGNIFICANT CHANGE UP (ref 3.5–5.3)
POTASSIUM SERPL-SCNC: 4.1 MMOL/L — SIGNIFICANT CHANGE UP (ref 3.5–5.3)
PROT SERPL-MCNC: 7 G/DL — SIGNIFICANT CHANGE UP (ref 6–8.3)
PROT SERPL-MCNC: 7 G/DL — SIGNIFICANT CHANGE UP (ref 6–8.3)
PROTHROM AB SERPL-ACNC: 17.7 SEC — HIGH (ref 9.5–13)
PROTHROM AB SERPL-ACNC: 17.7 SEC — HIGH (ref 9.5–13)
RBC # BLD: 3.22 M/UL — LOW (ref 4.2–5.8)
RBC # BLD: 3.22 M/UL — LOW (ref 4.2–5.8)
RBC # FLD: 15.5 % — HIGH (ref 10.3–14.5)
RBC # FLD: 15.5 % — HIGH (ref 10.3–14.5)
SODIUM SERPL-SCNC: 135 MMOL/L — SIGNIFICANT CHANGE UP (ref 135–145)
SODIUM SERPL-SCNC: 135 MMOL/L — SIGNIFICANT CHANGE UP (ref 135–145)
WBC # BLD: 9.78 K/UL — SIGNIFICANT CHANGE UP (ref 3.8–10.5)
WBC # BLD: 9.78 K/UL — SIGNIFICANT CHANGE UP (ref 3.8–10.5)
WBC # FLD AUTO: 9.78 K/UL — SIGNIFICANT CHANGE UP (ref 3.8–10.5)
WBC # FLD AUTO: 9.78 K/UL — SIGNIFICANT CHANGE UP (ref 3.8–10.5)

## 2024-01-04 PROCEDURE — 99232 SBSQ HOSP IP/OBS MODERATE 35: CPT | Mod: GC

## 2024-01-04 RX ORDER — OXYCODONE HYDROCHLORIDE 5 MG/1
5 TABLET ORAL EVERY 6 HOURS
Refills: 0 | Status: DISCONTINUED | OUTPATIENT
Start: 2024-01-04 | End: 2024-01-05

## 2024-01-04 RX ORDER — GABAPENTIN 400 MG/1
200 CAPSULE ORAL THREE TIMES A DAY
Refills: 0 | Status: DISCONTINUED | OUTPATIENT
Start: 2024-01-04 | End: 2024-01-29

## 2024-01-04 RX ORDER — OXYCODONE HYDROCHLORIDE 5 MG/1
2.5 TABLET ORAL EVERY 6 HOURS
Refills: 0 | Status: DISCONTINUED | OUTPATIENT
Start: 2024-01-04 | End: 2024-01-05

## 2024-01-04 RX ORDER — FLUOXETINE HCL 10 MG
20 CAPSULE ORAL DAILY
Refills: 0 | Status: DISCONTINUED | OUTPATIENT
Start: 2024-01-05 | End: 2024-01-29

## 2024-01-04 RX ADMIN — OXYCODONE HYDROCHLORIDE 5 MILLIGRAM(S): 5 TABLET ORAL at 10:50

## 2024-01-04 RX ADMIN — SODIUM CHLORIDE 1 GRAM(S): 9 INJECTION INTRAMUSCULAR; INTRAVENOUS; SUBCUTANEOUS at 22:36

## 2024-01-04 RX ADMIN — Medication 200 MILLIGRAM(S): at 17:48

## 2024-01-04 RX ADMIN — SODIUM CHLORIDE 1 GRAM(S): 9 INJECTION INTRAMUSCULAR; INTRAVENOUS; SUBCUTANEOUS at 13:09

## 2024-01-04 RX ADMIN — Medication 2 DROP(S): at 17:48

## 2024-01-04 RX ADMIN — HEPARIN SODIUM 1000 UNIT(S)/HR: 5000 INJECTION INTRAVENOUS; SUBCUTANEOUS at 07:54

## 2024-01-04 RX ADMIN — Medication 650 MILLIGRAM(S): at 05:47

## 2024-01-04 RX ADMIN — PANTOPRAZOLE SODIUM 40 MILLIGRAM(S): 20 TABLET, DELAYED RELEASE ORAL at 05:47

## 2024-01-04 RX ADMIN — LIDOCAINE 1 PATCH: 4 CREAM TOPICAL at 13:10

## 2024-01-04 RX ADMIN — POLYETHYLENE GLYCOL 3350 17 GRAM(S): 17 POWDER, FOR SOLUTION ORAL at 05:46

## 2024-01-04 RX ADMIN — LIDOCAINE 1 PATCH: 4 CREAM TOPICAL at 19:58

## 2024-01-04 RX ADMIN — CHLORHEXIDINE GLUCONATE 1 APPLICATION(S): 213 SOLUTION TOPICAL at 13:13

## 2024-01-04 RX ADMIN — HEPARIN SODIUM 1000 UNIT(S)/HR: 5000 INJECTION INTRAVENOUS; SUBCUTANEOUS at 07:52

## 2024-01-04 RX ADMIN — GABAPENTIN 200 MILLIGRAM(S): 400 CAPSULE ORAL at 05:47

## 2024-01-04 RX ADMIN — Medication 3 MILLIGRAM(S): at 22:37

## 2024-01-04 RX ADMIN — HEPARIN SODIUM 900 UNIT(S)/HR: 5000 INJECTION INTRAVENOUS; SUBCUTANEOUS at 21:04

## 2024-01-04 RX ADMIN — HEPARIN SODIUM 900 UNIT(S)/HR: 5000 INJECTION INTRAVENOUS; SUBCUTANEOUS at 14:20

## 2024-01-04 RX ADMIN — GABAPENTIN 200 MILLIGRAM(S): 400 CAPSULE ORAL at 22:37

## 2024-01-04 RX ADMIN — OXYCODONE HYDROCHLORIDE 5 MILLIGRAM(S): 5 TABLET ORAL at 10:12

## 2024-01-04 RX ADMIN — Medication 15 MILLILITER(S): at 13:09

## 2024-01-04 RX ADMIN — LIDOCAINE 2 PATCH: 4 CREAM TOPICAL at 19:58

## 2024-01-04 RX ADMIN — Medication 650 MILLIGRAM(S): at 22:38

## 2024-01-04 RX ADMIN — Medication 650 MILLIGRAM(S): at 13:12

## 2024-01-04 RX ADMIN — LIDOCAINE 2 PATCH: 4 CREAM TOPICAL at 13:10

## 2024-01-04 RX ADMIN — Medication 60 MILLIGRAM(S): at 05:47

## 2024-01-04 RX ADMIN — HEPARIN SODIUM 900 UNIT(S)/HR: 5000 INJECTION INTRAVENOUS; SUBCUTANEOUS at 19:26

## 2024-01-04 RX ADMIN — SODIUM CHLORIDE 1 GRAM(S): 9 INJECTION INTRAMUSCULAR; INTRAVENOUS; SUBCUTANEOUS at 05:47

## 2024-01-04 RX ADMIN — Medication 200 MILLIGRAM(S): at 05:48

## 2024-01-04 NOTE — PROGRESS NOTE ADULT - NUTRITIONAL ASSESSMENT
This patient has been assessed with a concern for Malnutrition and has been determined to have a diagnosis/diagnoses of Severe protein-calorie malnutrition.    This patient is being managed with:   Diet Regular-  1000mL Fluid Restriction ([id number])  Supplement Feeding Modality:  Oral  Ensure Plus High Protein Cans or Servings Per Day:  1       Frequency:  Three Times a day  Entered: Carter  3 2024  4:21PM   This patient has been assessed with a concern for Malnutrition and has been determined to have a diagnosis/diagnoses of Severe protein-calorie malnutrition.    This patient is being managed with:   Diet Regular-  1000mL Fluid Restriction ([id number])  Supplement Feeding Modality:  Oral  Ensure Plus High Protein Cans or Servings Per Day:  1       Frequency:  Three Times a day  Entered: Carter  3 2024  4:21PM

## 2024-01-04 NOTE — CHART NOTE - NSCHARTNOTEFT_GEN_A_CORE
Spoke with rheumatology regarding recommendation for renal bx by IR. IR requesting Cardiology and Pulmonology comment on holding AC. Per IR, Heparin gtt would have to be held for 4 hours before and 24 hours after. However, if bleeding occurs as a consequence of kidney biopsy, heparin would have to remain held until bleeding has stopped. Cards and Pulm made aware. Will continue risk benefit discussion and coordination of care. Plan of care discussed with patient and patient's mother at bedside.     Yesy Romano PA-C  Department of Medicine   In-house pager #[phone] Spoke with rheumatology regarding recommendation for renal bx by IR. IR requesting Cardiology and Pulmonology comment on holding AC. Per IR, Heparin gtt would have to be held for 4 hours before and 24 hours after. However, if bleeding occurs as a consequence of kidney biopsy, heparin would have to remain held until bleeding has stopped. Cards and Pulm made aware. Will continue risk benefit discussion and coordination of care. Plan of care discussed with patient and patient's mother at bedside.     Yesy Romano PA-C  Department of Medicine   In-house pager #[phone]

## 2024-01-04 NOTE — PROGRESS NOTE ADULT - ASSESSMENT
28 years old male with h/o Lupus (diagnosed in 09/2023 due to rash, oral ulcers, chest pain, and dyspnea, on Plaquenil) who presented to [place] ED on 12/12/23 with complaints of chest pain and SOB, found to have bilateral acute PE and bilateral pleural effusions. Transferred to [facility] for CT surgery evaluation. Also with fevers now resolved. Rheumatology consulted given SLE history.     Serologies:   Positive: ABDIAS 1:280 speckled, Sm >8, RNP >8, SSA >8, hypocomplementemia, Pr/Cr 1.2 and 1.1, low vitamin D 25 21, elevated vitamin D 1,25 97, ACE elevated 125, PR3 29.8   Negative: dsDNA 28,  C4 13, APS labs, negative Janine-1 ab, negative ribosomal P, negative syphilis screen, aldolase WNL,negative RF and CCP, negative ANCA, RNA polymerase III, centromere, scleroderma     #Fever (resolved)   -Pleural effusion exudate w/negative culture and PCR  -Repeat CT imaging without obvious infectious source, mild decrease in axillary LAD, submentonian and submaxillary and increase supraclavicular LAD. No mediastinal lymphoadenopathy  -EBV DNA PCR positive. Discussed with ID, low concern for EBV infection, would recommend LN bx to rule out associated malignancy   -Pt with clinical manifestations and specific SLE serologies though unclear if current presentation is solely attributable to SLE. Concern for other rheumatologic disease (such as sarcoidosis, Kikuchi syndrome, Castleman disease), Underlying malignancy also needs to be ruled out. Pleural fluid cytology negative.   - Fevers resolved after restarting steroids on 40 mg methylprednisolone 12/23-12/25, restarted 60 mg of methylprednisolone 12/28-12/29 and now on prednisone 60 mg PO (since 12/30)     #SLE   +ve serology and hypocomplementemia improving after steroid trial   creatinine is stable but proteinuria +ve urine P/cr 1.1. Decreased to 0.7.        # Elevated CPK   resolved     # Bilateral Acute PE with pulmonary infarcts   -Labs does not meet criteria for APS  PS-PT negative  -Hematology recommending Eliquis on discharge     Recommendations:   -Discussed with primary service, unclear why pt's mother is stating that LN bx was priority over renal bx. Primary service working on getting clearance from cardiology and pulmonology per IR's request prior to renal biopsy.   -Will decrease prednisone to 50mg (ordered for first dose starting tomorrow 1/5)   -F/u repeat PR3     -myomarker panel pending   -continue with PCP  -pending G6PD to resume HCQ     ***Final recs pending***      Discussed with Dr. Octavio Landaverde MD   PGY-4  Reachable on TEAMS  Pager [phone]  Rheumatology Fellow       28 years old male with h/o Lupus (diagnosed in 09/2023 due to rash, oral ulcers, chest pain, and dyspnea, on Plaquenil) who presented to [place] ED on 12/12/23 with complaints of chest pain and SOB, found to have bilateral acute PE and bilateral pleural effusions. Transferred to [facility] for CT surgery evaluation. Also with fevers now resolved. Rheumatology consulted given SLE history.     Serologies:   Positive: ABDIAS 1:280 speckled, Sm >8, RNP >8, SSA >8, hypocomplementemia, Pr/Cr 1.2 and 1.1, low vitamin D 25 21, elevated vitamin D 1,25 97, ACE elevated 125, PR3 29.8   Negative: dsDNA 28,  C4 13, APS labs, negative Janine-1 ab, negative ribosomal P, negative syphilis screen, aldolase WNL,negative RF and CCP, negative ANCA, RNA polymerase III, centromere, scleroderma     #Fever (resolved)   -Pleural effusion exudate w/negative culture and PCR  -Repeat CT imaging without obvious infectious source, mild decrease in axillary LAD, submentonian and submaxillary and increase supraclavicular LAD. No mediastinal lymphoadenopathy  -EBV DNA PCR positive. Discussed with ID, low concern for EBV infection, would recommend LN bx to rule out associated malignancy   -Pt with clinical manifestations and specific SLE serologies though unclear if current presentation is solely attributable to SLE. Concern for other rheumatologic disease (such as sarcoidosis, Kikuchi syndrome, Castleman disease), Underlying malignancy also needs to be ruled out. Pleural fluid cytology negative.   - Fevers resolved after restarting steroids on 40 mg methylprednisolone 12/23-12/25, restarted 60 mg of methylprednisolone 12/28-12/29 and now on prednisone 60 mg PO (since 12/30)     #SLE   +ve serology and hypocomplementemia improving after steroid trial   creatinine is stable but proteinuria +ve urine P/cr 1.1. Decreased to 0.7.        # Elevated CPK   resolved     # Bilateral Acute PE with pulmonary infarcts   -Labs does not meet criteria for APS  PS-PT negative  -Hematology recommending Eliquis on discharge     Recommendations:   -Discussed with primary service, unclear why pt's mother is stating that LN bx was priority over renal bx. Primary service working on getting clearance from cardiology and pulmonology per IR's request prior to renal biopsy.   -Will decrease prednisone to 50mg (ordered for first dose starting tomorrow 1/5)   -F/u repeat PR3     -myomarker panel pending   -continue with PCP  -pending G6PD to resume HCQ     ***Final recs pending***      Discussed with Dr. Octavio Landaverde MD   PGY-4  Reachable on TEAMS  Pager [phone]  Rheumatology Fellow       28 years old male with h/o Lupus (diagnosed in 09/2023 due to rash, oral ulcers, chest pain, and dyspnea, on Plaquenil) who presented to [place] ED on 12/12/23 with complaints of chest pain and SOB, found to have bilateral acute PE and bilateral pleural effusions. Transferred to [facility] for CT surgery evaluation. Also with fevers now resolved. Rheumatology consulted given SLE history.     Serologies:   Positive: ABDIAS 1:280 speckled, Sm >8, RNP >8, SSA >8, hypocomplementemia, Pr/Cr 1.2 and 1.1, low vitamin D 25 21, elevated vitamin D 1,25 97, ACE elevated 125, PR3 29.8   Negative: dsDNA 28,  C4 13, APS labs, negative Janine-1 ab, negative ribosomal P, negative syphilis screen, aldolase WNL,negative RF and CCP, negative ANCA, RNA polymerase III, centromere, scleroderma     #Fever (resolved)   -Pleural effusion exudate w/negative culture and PCR  -Repeat CT imaging without obvious infectious source, mild decrease in axillary LAD, submentonian and submaxillary and increase supraclavicular LAD. No mediastinal lymphoadenopathy  -EBV DNA PCR positive. Discussed with ID, low concern for EBV infection, would recommend LN bx to rule out associated malignancy   -Pt with clinical manifestations and specific SLE serologies though unclear if current presentation is solely attributable to SLE. Concern for other rheumatologic disease (such as sarcoidosis, Kikuchi syndrome, Castleman disease), Underlying malignancy also needs to be ruled out. Pleural fluid cytology negative.   - Fevers resolved after restarting steroids on 40 mg methylprednisolone 12/23-12/25, restarted 60 mg of methylprednisolone 12/28-12/29 and now on prednisone 60 mg PO (since 12/30)     #SLE   +ve serology and hypocomplementemia improving after steroid trial   creatinine is stable but proteinuria +ve urine P/cr 1.1. Decreased to 0.7.        # Elevated CPK   resolved     # Bilateral Acute PE with pulmonary infarcts   -Labs does not meet criteria for APS  PS-PT negative  -Hematology recommending Eliquis on discharge     Recommendations:   -Discussed with primary service, unclear why pt's mother is stating that LN bx was priority over renal bx. Primary service working on getting clearance from cardiology and pulmonology per IR's request prior to renal biopsy.   -Will decrease prednisone to 50mg (ordered for first dose starting tomorrow 1/5)   -F/u repeat PR3     -myomarker panel pending   -continue with PCP  -pending G6PD to resume HCQ       Discussed with Dr. Octavio Landaverde MD   PGY-4  Reachable on TEAMS  Pager [phone]  Rheumatology Fellow       28 years old male with h/o Lupus (diagnosed in 09/2023 due to rash, oral ulcers, chest pain, and dyspnea, on Plaquenil) who presented to [place] ED on 12/12/23 with complaints of chest pain and SOB, found to have bilateral acute PE and bilateral pleural effusions. Transferred to [facility] for CT surgery evaluation. Also with fevers now resolved. Rheumatology consulted given SLE history.     Serologies:   Positive: ABDIAS 1:280 speckled, Sm >8, RNP >8, SSA >8, hypocomplementemia, Pr/Cr 1.2 and 1.1, low vitamin D 25 21, elevated vitamin D 1,25 97, ACE elevated 125, PR3 29.8   Negative: dsDNA 28,  C4 13, APS labs, negative Janine-1 ab, negative ribosomal P, negative syphilis screen, aldolase WNL,negative RF and CCP, negative ANCA, RNA polymerase III, centromere, scleroderma     #Fever (resolved)   -Pleural effusion exudate w/negative culture and PCR  -Repeat CT imaging without obvious infectious source, mild decrease in axillary LAD, submentonian and submaxillary and increase supraclavicular LAD. No mediastinal lymphoadenopathy  -EBV DNA PCR positive. Discussed with ID, low concern for EBV infection, would recommend LN bx to rule out associated malignancy   -Pt with clinical manifestations and specific SLE serologies though unclear if current presentation is solely attributable to SLE. Concern for other rheumatologic disease (such as sarcoidosis, Kikuchi syndrome, Castleman disease), Underlying malignancy also needs to be ruled out. Pleural fluid cytology negative.   - Fevers resolved after restarting steroids on 40 mg methylprednisolone 12/23-12/25, restarted 60 mg of methylprednisolone 12/28-12/29 and now on prednisone 60 mg PO (since 12/30)     #SLE   +ve serology and hypocomplementemia improving after steroid trial   creatinine is stable but proteinuria +ve urine P/cr 1.1. Decreased to 0.7.        # Elevated CPK   resolved     # Bilateral Acute PE with pulmonary infarcts   -Labs does not meet criteria for APS  PS-PT negative  -Hematology recommending Eliquis on discharge     Recommendations:   -Discussed with primary service, unclear why pt's mother is stating that LN bx was priority over renal bx. Primary service working on getting clearance from cardiology and pulmonology per IR's request prior to renal biopsy.   -Will decrease prednisone to 50mg (ordered for first dose starting tomorrow 1/5)   -F/u repeat PR3     -myomarker panel pending   -continue with PCP  -pending G6PD to resume HCQ       Discussed with Dr. Octavio Landaverde MD   PGY-4  Reachable on TEAMS  Pager [phone]  Rheumatology Fellow       28 years old male with h/o Lupus (diagnosed in 09/2023 due to rash, oral ulcers, chest pain, and dyspnea, on Plaquenil) who presented to [place] ED on 12/12/23 with complaints of chest pain and SOB, found to have bilateral acute PE and bilateral pleural effusions. Transferred to [facility] for CT surgery evaluation. Also with fevers now resolved. Rheumatology consulted given SLE history.     Serologies:   Positive: ABDIAS 1:280 speckled, Sm >8, RNP >8, SSA >8, hypocomplementemia, Pr/Cr 1.2 and 1.1, low vitamin D 25 21, elevated vitamin D 1,25 97, ACE elevated 125, PR3 29.8   Negative: dsDNA 28,  C4 13, APS labs, negative Janine-1 ab, negative ribosomal P, negative syphilis screen, aldolase WNL,negative RF and CCP, negative ANCA, RNA polymerase III, centromere, scleroderma     #Fever (resolved)   -Pleural effusion exudate w/negative culture and PCR  -Repeat CT imaging without obvious infectious source, mild decrease in axillary LAD, submentonian and submaxillary and increase supraclavicular LAD. No mediastinal lymphoadenopathy  -EBV DNA PCR positive. Discussed with ID, low concern for EBV infection, would recommend LN bx to rule out associated malignancy   -Pt with clinical manifestations and specific SLE serologies though unclear if current presentation is solely attributable to SLE. Low concern for other rheumatologic disease (such as sarcoidosis, Kikuchi syndrome, Castleman disease), Underlying malignancy also needs to be ruled out. Pleural fluid cytology negative.   - Fevers resolved after restarting steroids on 40 mg methylprednisolone 12/23-12/25, restarted 60 mg of methylprednisolone 12/28-12/29 and now on prednisone 60 mg PO (since 12/30)     #SLE   +ve serology and hypocomplementemia improving after steroid trial   creatinine is stable but proteinuria +ve urine P/cr 1.1. Decreased to 0.7.        # Elevated CPK   resolved     # Bilateral Acute PE with pulmonary infarcts   -Labs does not meet criteria for APS  PS-PT negative  -Hematology recommending Eliquis on discharge     Recommendations:   -Discussed with primary service, unclear why pt's mother is stating that LN bx was priority over renal bx. Primary service working on getting clearance from cardiology and pulmonology per IR's request prior to renal biopsy.   -Will decrease prednisone to 50mg (ordered for first dose starting tomorrow 1/5)   -F/u repeat PR3     -myomarker panel pending   -continue with PCP ppx   -pending G6PD to resume HCQ       Discussed with Dr. Octavio Landaverde MD   PGY-4  Reachable on TEAMS  Pager [phone]  Rheumatology Fellow       28 years old male with h/o Lupus (diagnosed in 09/2023 due to rash, oral ulcers, chest pain, and dyspnea, on Plaquenil) who presented to [place] ED on 12/12/23 with complaints of chest pain and SOB, found to have bilateral acute PE and bilateral pleural effusions. Transferred to [facility] for CT surgery evaluation. Also with fevers now resolved. Rheumatology consulted given SLE history.     Serologies:   Positive: ABDIAS 1:280 speckled, Sm >8, RNP >8, SSA >8, hypocomplementemia, Pr/Cr 1.2 and 1.1, low vitamin D 25 21, elevated vitamin D 1,25 97, ACE elevated 125, PR3 29.8   Negative: dsDNA 28,  C4 13, APS labs, negative Janine-1 ab, negative ribosomal P, negative syphilis screen, aldolase WNL,negative RF and CCP, negative ANCA, RNA polymerase III, centromere, scleroderma     #Fever (resolved)   -Pleural effusion exudate w/negative culture and PCR  -Repeat CT imaging without obvious infectious source, mild decrease in axillary LAD, submentonian and submaxillary and increase supraclavicular LAD. No mediastinal lymphoadenopathy  -EBV DNA PCR positive. Discussed with ID, low concern for EBV infection, would recommend LN bx to rule out associated malignancy   -Pt with clinical manifestations and specific SLE serologies though unclear if current presentation is solely attributable to SLE. Low concern for other rheumatologic disease (such as sarcoidosis, Kikuchi syndrome, Castleman disease), Underlying malignancy also needs to be ruled out. Pleural fluid cytology negative.   - Fevers resolved after restarting steroids on 40 mg methylprednisolone 12/23-12/25, restarted 60 mg of methylprednisolone 12/28-12/29 and now on prednisone 60 mg PO (since 12/30)     #SLE   +ve serology and hypocomplementemia improving after steroid trial   creatinine is stable but proteinuria +ve urine P/cr 1.1. Decreased to 0.7.        # Elevated CPK   resolved     # Bilateral Acute PE with pulmonary infarcts   -Labs does not meet criteria for APS  PS-PT negative  -Hematology recommending Eliquis on discharge     Recommendations:   -Discussed with primary service, unclear why pt's mother is stating that LN bx was priority over renal bx. Primary service working on getting clearance from cardiology and pulmonology per IR's request prior to renal biopsy.   -Will decrease prednisone to 50mg (ordered for first dose starting tomorrow 1/5)   -F/u repeat PR3     -myomarker panel pending   -continue with PCP ppx   -pending G6PD to resume HCQ       Discussed with Dr. Octavio Landaverde MD   PGY-4  Reachable on TEAMS  Pager [phone]  Rheumatology Fellow

## 2024-01-04 NOTE — PROGRESS NOTE ADULT - SUBJECTIVE AND OBJECTIVE BOX
Name of Patient : TAYLA MARIE  MRN: [id number]  Date of visit: 01-04-24       Subjective: Patient seen and examined. No new events except as noted.     REVIEW OF SYSTEMS:    CONSTITUTIONAL: No weakness, fevers or chills  EYES/ENT: No visual changes;  No vertigo or throat pain   NECK: No pain or stiffness  RESPIRATORY: No cough, wheezing, hemoptysis; No shortness of breath  CARDIOVASCULAR: No chest pain or palpitations  GASTROINTESTINAL: No abdominal or epigastric pain. No nausea, vomiting, or hematemesis; No diarrhea or constipation. No melena or hematochezia.  GENITOURINARY: No dysuria, frequency or hematuria  NEUROLOGICAL: No numbness or weakness  SKIN: No itching, burning, rashes, or lesions   All other review of systems is negative unless indicated above.    MEDICATIONS:  MEDICATIONS  (STANDING):  acetaminophen     Tablet .. 650 milliGRAM(s) Oral every 8 hours  chlorhexidine 2% Cloths 1 Application(s) Topical daily  ciprofloxacin  0.3% Ophthalmic Solution for Otic Use 2 Drop(s) Both Ears two times a day  gabapentin 200 milliGRAM(s) Oral three times a day  heparin  Infusion. 1300 Unit(s)/Hr (13 mL/Hr) IV Continuous <Continuous>  hydroxychloroquine 200 milliGRAM(s) Oral two times a day  lidocaine   4% Patch 1 Patch Transdermal every 24 hours  lidocaine   4% Patch 2 Patch Transdermal every 24 hours  melatonin 3 milliGRAM(s) Oral <User Schedule>  multivitamin/minerals/iron Oral Solution (CENTRUM) 15 milliLiter(s) Oral daily  pantoprazole    Tablet 40 milliGRAM(s) Oral before breakfast  polyethylene glycol 3350 17 Gram(s) Oral two times a day  senna 2 Tablet(s) Oral at bedtime  sodium chloride 1 Gram(s) Oral three times a day  sodium chloride 3%. 500 milliLiter(s) (30 mL/Hr) IV Continuous <Continuous>  trimethoprim  160 mG/sulfamethoxazole 800 mG 1 Tablet(s) Oral <User Schedule>      PHYSICAL EXAM:  T(C): 36.6 (01-04-24 @ 13:31), Max: 37.2 (01-04-24 @ 05:08)  HR: 74 (01-04-24 @ 13:31) (67 - 74)  BP: 113/67 (01-04-24 @ 13:31) (104/71 - 116/66)  RR: 18 (01-04-24 @ 13:31) (18 - 18)  SpO2: 100% (01-04-24 @ 13:31) (100% - 100%)  Wt(kg): --  I&O's Summary        Appearance: Normal	  HEENT:  PERRLA   Lymphatic: No lymphadenopathy   Cardiovascular: Normal S1 S2, no JVD  Respiratory: normal effort , clear  Gastrointestinal:  Soft, Non-tender  Skin: No rashes,  warm to touch  Psychiatry:  Mood & affect appropriate  Musculuskeletal: No edema    recent labs, Imaging and EKGs personally reviewed                           10.0   9.78  )-----------( 227      ( 04 Jan 2024 06:34 )             29.6               01-04    135  |  100  |  8   ----------------------------<  100<H>  4.1   |  23  |  0.61    Ca    9.2      04 Jan 2024 06:34  Phos  3.1     01-04  Mg     1.80     01-04    TPro  7.0  /  Alb  3.0<L>  /  TBili  0.6  /  DBili  x   /  AST  37  /  ALT  72<H>  /  AlkPhos  90  01-04    PT/INR - ( 04 Jan 2024 06:34 )   PT: 17.7 sec;   INR: 1.59 ratio         PTT - ( 04 Jan 2024 13:37 )  PTT:103.3 sec                   Urinalysis Basic - ( 04 Jan 2024 06:34 )    Color: x / Appearance: x / SG: x / pH: x  Gluc: 100 mg/dL / Ketone: x  / Bili: x / Urobili: x   Blood: x / Protein: x / Nitrite: x   Leuk Esterase: x / RBC: x / WBC x   Sq Epi: x / Non Sq Epi: x / Bacteria: x                     Name of Patient : TAYLA MARIE  MRN: [id number]  Date of visit: 01-04-24       Subjective: Patient seen and examined. No new events except as noted.     REVIEW OF SYSTEMS:    CONSTITUTIONAL: No weakness, fevers or chills  EYES/ENT: No visual changes;  No vertigo or throat pain   NECK: No pain or stiffness  RESPIRATORY: No cough, wheezing, hemoptysis; No shortness of breath  CARDIOVASCULAR: No chest pain or palpitations  GASTROINTESTINAL: No abdominal or epigastric pain. No nausea, vomiting, or hematemesis; No diarrhea or constipation. No melena or hematochezia.  GENITOURINARY: No dysuria, frequency or hematuria  NEUROLOGICAL: No numbness or weakness  SKIN: No itching, burning, rashes, or lesions   All other review of systems is negative unless indicated above.    MEDICATIONS:  MEDICATIONS  (STANDING):  acetaminophen     Tablet .. 650 milliGRAM(s) Oral every 8 hours  chlorhexidine 2% Cloths 1 Application(s) Topical daily  ciprofloxacin  0.3% Ophthalmic Solution for Otic Use 2 Drop(s) Both Ears two times a day  gabapentin 200 milliGRAM(s) Oral three times a day  heparin  Infusion. 1300 Unit(s)/Hr (13 mL/Hr) IV Continuous <Continuous>  hydroxychloroquine 200 milliGRAM(s) Oral two times a day  lidocaine   4% Patch 1 Patch Transdermal every 24 hours  lidocaine   4% Patch 2 Patch Transdermal every 24 hours  melatonin 3 milliGRAM(s) Oral <User Schedule>  multivitamin/minerals/iron Oral Solution (CENTRUM) 15 milliLiter(s) Oral daily  pantoprazole    Tablet 40 milliGRAM(s) Oral before breakfast  polyethylene glycol 3350 17 Gram(s) Oral two times a day  senna 2 Tablet(s) Oral at bedtime  sodium chloride 1 Gram(s) Oral three times a day  sodium chloride 3%. 500 milliLiter(s) (30 mL/Hr) IV Continuous <Continuous>  trimethoprim  160 mG/sulfamethoxazole 800 mG 1 Tablet(s) Oral <User Schedule>      PHYSICAL EXAM:  T(C): 36.6 (01-04-24 @ 13:31), Max: 37.2 (01-04-24 @ 05:08)  HR: 74 (01-04-24 @ 13:31) (67 - 74)  BP: 113/67 (01-04-24 @ 13:31) (104/71 - 116/66)  RR: 18 (01-04-24 @ 13:31) (18 - 18)  SpO2: 100% (01-04-24 @ 13:31) (100% - 100%)  Wt(kg): --  I&O's Summary        Appearance: Normal	  HEENT:  PERRLA   Lymphatic: No lymphadenopathy   Cardiovascular: Normal S1 S2, no JVD  Respiratory: normal effort , clear  Gastrointestinal:  Soft, Non-tender  Skin: No rashes,  warm to touch  Psychiatry:  Mood & affect appropriate  Musculuskeletal: No edema    recent labs, Imaging and EKGs personally reviewed                           10.0   9.78  )-----------( 227      ( 04 Jan 2024 06:34 )             29.6               01-04    135  |  100  |  8   ----------------------------<  100<H>  4.1   |  23  |  0.61    Ca    9.2      04 Jan 2024 06:34  Phos  3.1     01-04  Mg     1.80     01-04    TPro  7.0  /  Alb  3.0<L>  /  TBili  0.6  /  DBili  x   /  AST  37  /  ALT  72<H>  /  AlkPhos  90  01-04    PT/INR - ( 04 Jan 2024 06:34 )   PT: 17.7 sec;   INR: 1.59 ratio         PTT - ( 04 Jan 2024 13:37 )  PTT:103.3 sec                   Urinalysis Basic - ( 04 Jan 2024 06:34 )    Color: x / Appearance: x / SG: x / pH: x  Gluc: 100 mg/dL / Ketone: x  / Bili: x / Urobili: x   Blood: x / Protein: x / Nitrite: x   Leuk Esterase: x / RBC: x / WBC x   Sq Epi: x / Non Sq Epi: x / Bacteria: x

## 2024-01-04 NOTE — DIETITIAN INITIAL EVALUATION ADULT - IDEAL BODY WEIGHT (LBS)
Hospitalized or in ED since last visit: No    Medication Changes: No    Upcoming/Recent Procedures: No    Referral Expiration Date Approaching: No    BP Readings from Last 1 Encounters:   12/29/23 124/80        136

## 2024-01-04 NOTE — PROGRESS NOTE ADULT - ATTENDING COMMENTS
Agree with fellow's assessment and plan as above.  Patient and mother at bedside aware of our recommendations and in agreement.  Discussed with primary team  will follow

## 2024-01-04 NOTE — BH CONSULTATION LIAISON PROGRESS NOTE - NSBHASSESSMENTFT_PSY_ALL_CORE
28 years old male with h/o Lupus ( diagnosed in 09/2023, on Plaquenil present to [facility] ED on 12/12/23  with complain of chest pain and SOB admitted for fevers, PE, pleural effusions, course c/b high fever and tonic-clonic seizure, transferred to MICU for post-ictal and infectious monitoring. Psych consulted for depression. Pt had indicated a hx of depression/anxiety, had been in the  and [facility], and now is requesting to speak to someone. However he is rather medically active and compromised.   Reported being depressed for many years, no current safety concerns. C/o poor sleep due to pain  1/04: continues to report that he feels depressed, anxious, with poor sleep and appetite. Hx of trauma, difficulties to care for himself due to psychiatric condition.    Plan:   - Continue medical stabilization as you are  - Address pain ( pain management)  - PRN for sleep: melatonin 3 mg qhs.  -Consult Holistic nurse to address his mood ( pt is amendable)  - Repeat EKG ( calculate QTC manually)  - No role for 1:1 at this time  - START Prozac 20 mg to address depression and anxiety.  - Dispo: likely inpt rehab given severe deconditioning. Inpt psych would be ideal but likely limited by mobility/rehab needs.   Will continue to follow while here.   28 years old male with h/o Lupus ( diagnosed in 09/2023, on Plaquenil present to [facility] ED on 12/12/23  with complain of chest pain and SOB admitted for fevers, PE, pleural effusions, course c/b high fever and tonic-clonic seizure, transferred to MICU for post-ictal and infectious monitoring. Psych consulted for depression. Pt had indicated a hx of depression/anxiety, had been in the  and [facility] but [facility], and now is requesting to speak to someone. However he is rather medically active and compromised.   Reported being depressed for many years, no current safety concerns. C/o poor sleep due to pain  1/04: continues to report that he feels depressed, anxious, with poor sleep and appetite. Hx of trauma, difficulties to care for himself due to psychiatric condition.    Plan:   - Continue medical stabilization as you are  - Address pain ( pain management)  - PRN for sleep: melatonin 3 mg qhs.  -Consult Holistic nurse to address his mood ( pt is amendable)  - Repeat EKG ( calculate QTC manually)  - No role for 1:1 at this time  - START Prozac 20 mg to address depression and anxiety.  - Dispo: likely inpt rehab given severe deconditioning. Inpt psych would be ideal but likely limited by mobility/rehab needs.   Will continue to follow while here.   28 years old male with h/o Lupus ( diagnosed in 09/2023, on Plaquenil present to [facility] ED on 12/12/23  with complain of chest pain and SOB admitted for fevers, PE, pleural effusions, course c/b high fever and tonic-clonic seizure, transferred to MICU for post-ictal and infectious monitoring. Psych consulted for depression. Pt had indicated a hx of depression/anxiety, had been in the  and [facility], and now is requesting to speak to someone. However he is rather medically active and compromised.   Reported being depressed for many years, no current safety concerns. C/o poor sleep due to pain  1/04: continues to report that he feels depressed, anxious, with poor sleep and appetite. Hx of trauma, difficulties to care for himself due to psychiatric condition.    Plan:   - Continue medical stabilization as you are  - Address pain ( pain management)  - PRN for sleep: melatonin 3 mg qhs.  -Consult Holistic nurse to address his mood ( pt is amendable)  - Repeat EKG ( calculate QTC manually)  - No role for 1:1 at this time  - START Prozac 20 mg to address depression and anxiety.  -INCREASE Gabapentin from 200 mg BID to 200 mg TID to address anxiety.  - Dispo: likely inpt rehab given severe deconditioning. Inpt psych would be ideal but likely limited by mobility/rehab needs.   Will continue to follow while here.   28 years old male with h/o Lupus ( diagnosed in 09/2023, on Plaquenil present to [facility] ED on 12/12/23  with complain of chest pain and SOB admitted for fevers, PE, pleural effusions, course c/b high fever and tonic-clonic seizure, transferred to MICU for post-ictal and infectious monitoring. Psych consulted for depression. Pt had indicated a hx of depression/anxiety, had been in the  and [facility], and now is requesting to speak to someone. However he is rather medically active and compromised.   Reported being depressed for many years, no current safety concerns. C/o poor sleep due to pain  1/04: continues to report that he feels depressed, anxious, with poor sleep and appetite. Hx of trauma, difficulties to care for himself due to psychiatric condition.    Plan:   - Continue medical stabilization as you are  - Address pain ( pain management)  - PRN for sleep: melatonin 3 mg qhs.  -Consult Holistic nurse to address his mood ( pt is amendable)  - Repeat EKG ( calculate QTC manually)  - No role for 1:1 at this time  - START Prozac 20 mg to address depression and anxiety.  -INCREASE Gabapentin from 200 mg BID to 200 mg TID to address anxiety.  - Dispo: likely inpt rehab given severe deconditioning. Inpt psych would be ideal but likely limited by mobility/rehab needs.   Will continue to follow while here.

## 2024-01-04 NOTE — OCCUPATIONAL THERAPY INITIAL EVALUATION ADULT - PERTINENT HX OF CURRENT PROBLEM, REHAB EVAL
27 yo Male with recent diagnosis of Lupus (9/2023) admitted 12/12 with B/L Pulmonary embolism. Now with increased dyspnea, pleuritic chest pain,  hypoxia, and fever. CXR shows increased left pleural effusion. Bedside US shows moderate left septated effusion anterior, lateral and posterior. Right: small effusion simple appearing. Cardiac and pericardial effusion noted (although recent echo was negative for pericardial effusion) Lower extremity dopplers negative for DVT. GI PCR + e coli. mRSA PCR + Staph aureus. Hematology consulted for hypercoagulable workup and correction of INR myke-procedurally. 29 yo Male with recent diagnosis of Lupus (9/2023) admitted 12/12 with B/L Pulmonary embolism. Now with increased dyspnea, pleuritic chest pain,  hypoxia, and fever. CXR shows increased left pleural effusion. Bedside US shows moderate left septated effusion anterior, lateral and posterior. Right: small effusion simple appearing. Cardiac and pericardial effusion noted (although recent echo was negative for pericardial effusion) Lower extremity dopplers negative for DVT. GI PCR + e coli. mRSA PCR + Staph aureus. Hematology consulted for hypercoagulable workup and correction of INR myke-procedurally.

## 2024-01-04 NOTE — OCCUPATIONAL THERAPY INITIAL EVALUATION ADULT - ADDITIONAL COMMENTS
As per patient, independent with ADLs prior to admission, ambulated independently with no assistive devices.

## 2024-01-04 NOTE — BH CONSULTATION LIAISON PROGRESS NOTE - NSBHFUPINTERVALHXFT_PSY_A_CORE
Chart, labs, imaging reviewed. Case discussed with the primary team. Overnight, patient did not require or receive PRN medication. On exam, patient is calm and cooperative.     Pt reported feeling "terrible", c/o anxiety, depression, poor sleep and appetite. Patient denied suicidal or homicidal ideations, intent or a plan. Denied any psychotic symptoms at this time. Chart, labs, imaging reviewed. Case discussed with the primary team. Overnight, patient did not require or receive PRN medication. On exam, patient is calm and cooperative.     Pt reported feeling "terrible", c/o anxiety, depression, poor sleep and appetite. Patient denied suicidal or homicidal ideations, intent or a plan. Denied any psychotic symptoms at this time. EKG has been done on 01/02. .

## 2024-01-04 NOTE — PROGRESS NOTE ADULT - ASSESSMENT
28 years old male with h/o Lupus ( diagnosed in 09/2023, on Plaquenil present to [facility] ED on 12/12/23  with complain of chest pain and SOB. Patient reported left sided pleuritic chest pain which started 3 days ago. Pain is progressively worsened, associated with SOB and cough. Patient was seen in OSH ED and was prescribed antibiotics. Patient reported significantly worsening of left sided pleuritic chest pain today. No fever or chills. Patient reported loss of appetite and had a few episode of diarrhea for last 2 days. CTA chest with acute right upper lobe and left lower lobe segmental/subsegmental pulmonary emboli. No CT evidence of right heart strain. New bilateral lower lobe consolidations with areas of central clearing. Pneumonia and pulmonary infarcts are in the differential. New bilateral pleural effusions, small on the left and trace on the right. Bilateral axillary and supraclavicular adenopathy of unclear etiology. Patient was started on heparin ggt transitioned to eliquis on 12/19,  patient with worsening SOB/ hypoxia requiring nasal cannula oxygen supplementation. 12/20  Repeat Xray shows increased moderate left pleural effusion and compressive atelectasis trace right effusion and linear atelectasis. Thoracic team consulted for possible pigtail insertion Bedside US: Large left effusion with septations. Right simple effusion , + pericardial effusion- lower extremity dopplers negative for DVT.    # Pulm effusion/ Fever   S/P thoracentesis , followupp results   heparin for AC  TS care appreciated   O2 supplement   monitor output   Zosyn for now , ABx per ID   LP done  Plan for LN biopsy by IR , discussed with rheum and patinet;s mother, defer to rheum  Hematuria noted   Urology eval   COmpleted course of ABx   Pulma nd card for risk stratification to come off heparin for biopsy         # Fever  SIRS   Abx , comoleted per iD   Rheum adn ID follow up   Renal biopsy for protonuria, defer to rhem, discussed with IR, high risk       # Hyponatremia/ Seizure   RRT   Neuro follow up       #PE   on heparin , resume after thoracentesis   DVT negative  Heme onc eval   likley lupus related     #Hxof lupus  on hydroxychloroquine   Heme eval   Rheum eval   steroids per rheum       DVT andgIPPX    Discussed in detail with patient and mother at the bedside  28 years old male with h/o Lupus ( diagnosed in 09/2023, on Plaquenil present to [facility] ED on 12/12/23  with complain of chest pain and SOB. Patient reported left sided pleuritic chest pain which started 3 days ago. Pain is progressively worsened, associated with SOB and cough. Patient was seen in OSH ED and was prescribed antibiotics. Patient reported significantly worsening of left sided pleuritic chest pain today. No fever or chills. Patient reported loss of appetite and had a few episode of diarrhea for last 2 days. CTA chest with acute right upper lobe and left lower lobe segmental/subsegmental pulmonary emboli. No CT evidence of right heart strain. New bilateral lower lobe consolidations with areas of central clearing. Pneumonia and pulmonary infarcts are in the differential. New bilateral pleural effusions, small on the left and trace on the right. Bilateral axillary and supraclavicular adenopathy of unclear etiology. Patient was started on heparin ggt transitioned to eliquis on 12/19,  patient with worsening SOB/ hypoxia requiring nasal cannula oxygen supplementation. 12/20  Repeat Xray shows increased moderate left pleural effusion and compressive atelectasis trace right effusion and linear atelectasis. Thoracic team consulted for possible pigtail insertion Bedside US: Large left effusion with septations. Right simple effusion , + pericardial effusion- lower extremity dopplers negative for DVT.    # Pulm effusion/ Fever   S/P thoracentesis , followupp results   heparin for AC  TS care appreciated   O2 supplement   monitor output   Zosyn for now , ABx per ID   LP done  Plan for LN biopsy by IR , discussed with rheum and patinet;s mother, defer to rheum  Hematuria noted   Urology eval   COmpleted course of ABx   Pulma nd card for risk stratification to come off heparin for biopsy         # Fever  SIRS   Abx , comoleted per iD   Rheum adn ID follow up   Renal biopsy for protonuria, defer to rhem, discussed with IR, high risk       # Hyponatremia/ Seizure   RRT   Neuro follow up       #PE   on heparin , resume after thoracentesis   DVT negative  Heme onc eval   likley lupus related     #Hxof lupus  on hydroxychloroquine   Heme eval   Rheum eval   steroids per rheum       DVT andgIPPX    Discussed in detail with patient and mother at the bedside

## 2024-01-04 NOTE — PROGRESS NOTE ADULT - SUBJECTIVE AND OBJECTIVE BOX
Subjective: Patient seen and examined. No new events except as noted.     SUBJECTIVE/ROS:  nad      MEDICATIONS:  MEDICATIONS  (STANDING):  acetaminophen     Tablet .. 650 milliGRAM(s) Oral every 8 hours  chlorhexidine 2% Cloths 1 Application(s) Topical daily  ciprofloxacin  0.3% Ophthalmic Solution for Otic Use 2 Drop(s) Both Ears two times a day  gabapentin 200 milliGRAM(s) Oral two times a day  heparin  Infusion. 1300 Unit(s)/Hr (13 mL/Hr) IV Continuous <Continuous>  hydroxychloroquine 200 milliGRAM(s) Oral two times a day  lidocaine   4% Patch 1 Patch Transdermal every 24 hours  lidocaine   4% Patch 2 Patch Transdermal every 24 hours  melatonin 3 milliGRAM(s) Oral <User Schedule>  multivitamin/minerals/iron Oral Solution (CENTRUM) 15 milliLiter(s) Oral daily  pantoprazole    Tablet 40 milliGRAM(s) Oral before breakfast  polyethylene glycol 3350 17 Gram(s) Oral two times a day  predniSONE   Tablet 60 milliGRAM(s) Oral daily  senna 2 Tablet(s) Oral at bedtime  sodium chloride 1 Gram(s) Oral three times a day  sodium chloride 3%. 500 milliLiter(s) (30 mL/Hr) IV Continuous <Continuous>  trimethoprim  160 mG/sulfamethoxazole 800 mG 1 Tablet(s) Oral <User Schedule>      PHYSICAL EXAM:  T(C): 37.2 (01-04-24 @ 05:08), Max: 37.2 (01-04-24 @ 05:08)  HR: 67 (01-04-24 @ 05:08) (67 - 80)  BP: 104/71 (01-04-24 @ 05:08) (104/71 - 117/68)  RR: 18 (01-04-24 @ 05:08) (18 - 18)  SpO2: 100% (01-04-24 @ 05:08) (100% - 100%)  Wt(kg): --  I&O's Summary          JVP: Normal  Neck: supple  Lung: clear   CV: S1 S2 , Murmur:  Abd: soft  Ext: No edema  neuro: Awake / alert  Psych: flat affect  Skin: normal``    LABS/DATA:    CARDIAC MARKERS:                                10.0   9.78  )-----------( 227      ( 04 Jan 2024 06:34 )             29.6     01-04    135  |  100  |  8   ----------------------------<  100<H>  4.1   |  23  |  0.61    Ca    9.2      04 Jan 2024 06:34  Phos  3.1     01-04  Mg     1.80     01-04    TPro  7.0  /  Alb  3.0<L>  /  TBili  0.6  /  DBili  x   /  AST  37  /  ALT  72<H>  /  AlkPhos  90  01-04    proBNP:   Lipid Profile:   HgA1c:   TSH:     TELE:  EKG:

## 2024-01-04 NOTE — OCCUPATIONAL THERAPY INITIAL EVALUATION ADULT - GENERAL OBSERVATIONS, REHAB EVAL
Upon entry, patient semi-supine in bed, family member present at bedside, patient agreeable to OT eval, cleared for OT evaluation as per RN.  Patient left semi-supine in bed, call bell in reach, all lines/tubes intact +IV, bed alarm activated, vitals stable, NAD.

## 2024-01-04 NOTE — PROGRESS NOTE ADULT - SUBJECTIVE AND OBJECTIVE BOX
TAYLA MARIE  [id number]    INTERVAL HPI/OVERNIGHT EVENTS:  Pt reports feeling better. Continues to have pleuritic chest pain that oxy does help with.   Denies further hip pain.   Pt's mother is reporting that she was told LN biopsy was priority over renal biopsy? She was not sure who told her that.     Subjective:   Vital Signs Last 24 Hrs  T(C): 37.2 (04 Jan 2024 05:08), Max: 37.2 (04 Jan 2024 05:08)  T(F): 99 (04 Jan 2024 05:08), Max: 99 (04 Jan 2024 05:08)  HR: 67 (04 Jan 2024 05:08) (67 - 73)  BP: 104/71 (04 Jan 2024 05:08) (104/71 - 116/66)  BP(mean): --  RR: 18 (04 Jan 2024 05:08) (18 - 18)  SpO2: 100% (04 Jan 2024 05:08) (100% - 100%)    Parameters below as of 04 Jan 2024 05:08  Patient On (Oxygen Delivery Method): room air    Physical Exam:  General: NAD  HEENT: EOMI  CV: RRR, no m/r/g  Lung: No increased WOB   Abd: non-distended   Ext: no synovitis on exam   Neuro: Awake       LABS:  cret                        10.0   9.78  )-----------( 227      ( 04 Jan 2024 06:34 )             29.6     01-04    135  |  100  |  8   ----------------------------<  100<H>  4.1   |  23  |  0.61    Ca    9.2      04 Jan 2024 06:34  Phos  3.1     01-04  Mg     1.80     01-04    TPro  7.0  /  Alb  3.0<L>  /  TBili  0.6  /  DBili  x   /  AST  37  /  ALT  72<H>  /  AlkPhos  90  01-04    PT/INR - ( 04 Jan 2024 06:34 )   PT: 17.7 sec;   INR: 1.59 ratio         PTT - ( 04 Jan 2024 06:34 )  PTT:109.4 sec        RADIOLOGY & ADDITIONAL TESTS:       TAYLA MARIE  [id number]    INTERVAL HPI/OVERNIGHT EVENTS:  Pt reports feeling better. Continues to have pleuritic chest pain that oxy does help with.   Denies further hip pain.   Pt's mother is reporting that she was told LN biopsy was priority over renal biopsy? She was not sure who told her that.     Subjective:   Vital Signs Last 24 Hrs  T(C): 37.2 (04 Jan 2024 05:08), Max: 37.2 (04 Jan 2024 05:08)  T(F): 99 (04 Jan 2024 05:08), Max: 99 (04 Jan 2024 05:08)  HR: 67 (04 Jan 2024 05:08) (67 - 73)  BP: 104/71 (04 Jan 2024 05:08) (104/71 - 116/66)  BP(mean): --  RR: 18 (04 Jan 2024 05:08) (18 - 18)  SpO2: 100% (04 Jan 2024 05:08) (100% - 100%)    Parameters below as of 04 Jan 2024 05:08  Patient On (Oxygen Delivery Method): room air    Physical Exam:  General: NAD  HEENT: EOMI  CV: RRR, no m/r/g  Lung: No increased WOB   Abd: non-distended   Ext: no synovitis on exam   Neuro: Awake       LABS:  cret                        10.0   9.78  )-----------( 227      ( 04 Jan 2024 06:34 )             29.6     01-04    135  |  100  |  8   ----------------------------<  100<H>  4.1   |  23  |  0.61    Ca    9.2      04 Jan 2024 06:34  Phos  3.1     01-04  Mg     1.80     01-04    TPro  7.0  /  Alb  3.0<L>  /  TBili  0.6  /  DBili  x   /  AST  37  /  ALT  72<H>  /  AlkPhos  90  01-04    PT/INR - ( 04 Jan 2024 06:34 )   PT: 17.7 sec;   INR: 1.59 ratio         PTT - ( 04 Jan 2024 06:34 )  PTT:109.4 sec        RADIOLOGY & ADDITIONAL TESTS:       TAYLA MARIE  [id number]    INTERVAL HPI/OVERNIGHT EVENTS:  Pt reports feeling better. Continues to have pleuritic chest pain that oxy does help with. Also endorsing back pain    Denies further hip pain.   Pt's mother is reporting that she was told LN biopsy was priority over renal biopsy? She was not sure who told her that.     Subjective:   Vital Signs Last 24 Hrs  T(C): 37.2 (04 Jan 2024 05:08), Max: 37.2 (04 Jan 2024 05:08)  T(F): 99 (04 Jan 2024 05:08), Max: 99 (04 Jan 2024 05:08)  HR: 67 (04 Jan 2024 05:08) (67 - 73)  BP: 104/71 (04 Jan 2024 05:08) (104/71 - 116/66)  BP(mean): --  RR: 18 (04 Jan 2024 05:08) (18 - 18)  SpO2: 100% (04 Jan 2024 05:08) (100% - 100%)    Parameters below as of 04 Jan 2024 05:08  Patient On (Oxygen Delivery Method): room air    Physical Exam:  General: NAD  HEENT: EOMI  CV: RRR, no m/r/g  Lung: No increased WOB   Abd: non-distended   Ext: no synovitis on exam   Neuro: Awake       LABS:  cret                        10.0   9.78  )-----------( 227      ( 04 Jan 2024 06:34 )             29.6     01-04    135  |  100  |  8   ----------------------------<  100<H>  4.1   |  23  |  0.61    Ca    9.2      04 Jan 2024 06:34  Phos  3.1     01-04  Mg     1.80     01-04    TPro  7.0  /  Alb  3.0<L>  /  TBili  0.6  /  DBili  x   /  AST  37  /  ALT  72<H>  /  AlkPhos  90  01-04    PT/INR - ( 04 Jan 2024 06:34 )   PT: 17.7 sec;   INR: 1.59 ratio         PTT - ( 04 Jan 2024 06:34 )  PTT:109.4 sec        RADIOLOGY & ADDITIONAL TESTS:       TAYLA MARIE  [id number]    INTERVAL HPI/OVERNIGHT EVENTS:  Pt reports feeling better. Continues to have pleuritic chest pain that oxy does help with. Also endorsing back pain    Denies further hip pain.   Pt's mother is reporting that she was told LN biopsy was priority over renal biopsy? She was not sure who told her that.     Subjective:   Vital Signs Last 24 Hrs  T(C): 37.2 (04 Jan 2024 05:08), Max: 37.2 (04 Jan 2024 05:08)  T(F): 99 (04 Jan 2024 05:08), Max: 99 (04 Jan 2024 05:08)  HR: 67 (04 Jan 2024 05:08) (67 - 73)  BP: 104/71 (04 Jan 2024 05:08) (104/71 - 116/66)  BP(mean): --  RR: 18 (04 Jan 2024 05:08) (18 - 18)  SpO2: 100% (04 Jan 2024 05:08) (100% - 100%)    Parameters below as of 04 Jan 2024 05:08  Patient On (Oxygen Delivery Method): room air    Physical Exam:  General: NAD  HEENT: EOMI  CV: RRR, no m/r/g  Lung: No increased WOB   Abd: non-distended   Ext: no synovitis on exam   Neuro: Awake       LABS:  cret                        10.0   9.78  )-----------( 227      ( 04 Jan 2024 06:34 )             29.6     01-04    135  |  100  |  8   ----------------------------<  100<H>  4.1   |  23  |  0.61    Ca    9.2      04 Jan 2024 06:34  Phos  3.1     01-04  Mg     1.80     01-04    TPro  7.0  /  Alb  3.0<L>  /  TBili  0.6  /  DBili  x   /  AST  37  /  ALT  72<H>  /  AlkPhos  90  01-04    PT/INR - ( 04 Jan 2024 06:34 )   PT: 17.7 sec;   INR: 1.59 ratio         PTT - ( 04 Jan 2024 06:34 )  PTT:109.4 sec        RADIOLOGY & ADDITIONAL TESTS:

## 2024-01-05 LAB
ALBUMIN SERPL ELPH-MCNC: 2.9 G/DL — LOW (ref 3.3–5)
ALBUMIN SERPL ELPH-MCNC: 2.9 G/DL — LOW (ref 3.3–5)
ALP SERPL-CCNC: 90 U/L — SIGNIFICANT CHANGE UP (ref 40–120)
ALP SERPL-CCNC: 90 U/L — SIGNIFICANT CHANGE UP (ref 40–120)
ALT FLD-CCNC: 78 U/L — HIGH (ref 4–41)
ALT FLD-CCNC: 78 U/L — HIGH (ref 4–41)
ANION GAP SERPL CALC-SCNC: 11 MMOL/L — SIGNIFICANT CHANGE UP (ref 7–14)
ANION GAP SERPL CALC-SCNC: 11 MMOL/L — SIGNIFICANT CHANGE UP (ref 7–14)
APTT BLD: 74.8 SEC — HIGH (ref 24.5–35.6)
APTT BLD: 74.8 SEC — HIGH (ref 24.5–35.6)
AST SERPL-CCNC: 40 U/L — SIGNIFICANT CHANGE UP (ref 4–40)
AST SERPL-CCNC: 40 U/L — SIGNIFICANT CHANGE UP (ref 4–40)
BILIRUB SERPL-MCNC: 0.5 MG/DL — SIGNIFICANT CHANGE UP (ref 0.2–1.2)
BILIRUB SERPL-MCNC: 0.5 MG/DL — SIGNIFICANT CHANGE UP (ref 0.2–1.2)
BUN SERPL-MCNC: 10 MG/DL — SIGNIFICANT CHANGE UP (ref 7–23)
BUN SERPL-MCNC: 10 MG/DL — SIGNIFICANT CHANGE UP (ref 7–23)
CALCIUM SERPL-MCNC: 9.4 MG/DL — SIGNIFICANT CHANGE UP (ref 8.4–10.5)
CALCIUM SERPL-MCNC: 9.4 MG/DL — SIGNIFICANT CHANGE UP (ref 8.4–10.5)
CHLORIDE SERPL-SCNC: 102 MMOL/L — SIGNIFICANT CHANGE UP (ref 98–107)
CHLORIDE SERPL-SCNC: 102 MMOL/L — SIGNIFICANT CHANGE UP (ref 98–107)
CO2 SERPL-SCNC: 23 MMOL/L — SIGNIFICANT CHANGE UP (ref 22–31)
CO2 SERPL-SCNC: 23 MMOL/L — SIGNIFICANT CHANGE UP (ref 22–31)
CREAT SERPL-MCNC: 0.58 MG/DL — SIGNIFICANT CHANGE UP (ref 0.5–1.3)
CREAT SERPL-MCNC: 0.58 MG/DL — SIGNIFICANT CHANGE UP (ref 0.5–1.3)
EGFR: 135 ML/MIN/1.73M2 — SIGNIFICANT CHANGE UP
EGFR: 135 ML/MIN/1.73M2 — SIGNIFICANT CHANGE UP
GLUCOSE SERPL-MCNC: 129 MG/DL — HIGH (ref 70–99)
GLUCOSE SERPL-MCNC: 129 MG/DL — HIGH (ref 70–99)
HCT VFR BLD CALC: 27.2 % — LOW (ref 39–50)
HCT VFR BLD CALC: 27.2 % — LOW (ref 39–50)
HGB BLD-MCNC: 9 G/DL — LOW (ref 13–17)
HGB BLD-MCNC: 9 G/DL — LOW (ref 13–17)
INR BLD: 1.42 RATIO — HIGH (ref 0.85–1.18)
INR BLD: 1.42 RATIO — HIGH (ref 0.85–1.18)
MAGNESIUM SERPL-MCNC: 1.9 MG/DL — SIGNIFICANT CHANGE UP (ref 1.6–2.6)
MAGNESIUM SERPL-MCNC: 1.9 MG/DL — SIGNIFICANT CHANGE UP (ref 1.6–2.6)
MCHC RBC-ENTMCNC: 30.9 PG — SIGNIFICANT CHANGE UP (ref 27–34)
MCHC RBC-ENTMCNC: 30.9 PG — SIGNIFICANT CHANGE UP (ref 27–34)
MCHC RBC-ENTMCNC: 33.1 GM/DL — SIGNIFICANT CHANGE UP (ref 32–36)
MCHC RBC-ENTMCNC: 33.1 GM/DL — SIGNIFICANT CHANGE UP (ref 32–36)
MCV RBC AUTO: 93.5 FL — SIGNIFICANT CHANGE UP (ref 80–100)
MCV RBC AUTO: 93.5 FL — SIGNIFICANT CHANGE UP (ref 80–100)
NRBC # BLD: 0 /100 WBCS — SIGNIFICANT CHANGE UP (ref 0–0)
NRBC # BLD: 0 /100 WBCS — SIGNIFICANT CHANGE UP (ref 0–0)
NRBC # FLD: 0 K/UL — SIGNIFICANT CHANGE UP (ref 0–0)
NRBC # FLD: 0 K/UL — SIGNIFICANT CHANGE UP (ref 0–0)
PHOSPHATE SERPL-MCNC: 3.3 MG/DL — SIGNIFICANT CHANGE UP (ref 2.5–4.5)
PHOSPHATE SERPL-MCNC: 3.3 MG/DL — SIGNIFICANT CHANGE UP (ref 2.5–4.5)
PLATELET # BLD AUTO: 218 K/UL — SIGNIFICANT CHANGE UP (ref 150–400)
PLATELET # BLD AUTO: 218 K/UL — SIGNIFICANT CHANGE UP (ref 150–400)
POTASSIUM SERPL-MCNC: 4.1 MMOL/L — SIGNIFICANT CHANGE UP (ref 3.5–5.3)
POTASSIUM SERPL-MCNC: 4.1 MMOL/L — SIGNIFICANT CHANGE UP (ref 3.5–5.3)
POTASSIUM SERPL-SCNC: 4.1 MMOL/L — SIGNIFICANT CHANGE UP (ref 3.5–5.3)
POTASSIUM SERPL-SCNC: 4.1 MMOL/L — SIGNIFICANT CHANGE UP (ref 3.5–5.3)
PROT SERPL-MCNC: 6.6 G/DL — SIGNIFICANT CHANGE UP (ref 6–8.3)
PROT SERPL-MCNC: 6.6 G/DL — SIGNIFICANT CHANGE UP (ref 6–8.3)
PROTHROM AB SERPL-ACNC: 15.9 SEC — HIGH (ref 9.5–13)
PROTHROM AB SERPL-ACNC: 15.9 SEC — HIGH (ref 9.5–13)
RBC # BLD: 2.91 M/UL — LOW (ref 4.2–5.8)
RBC # BLD: 2.91 M/UL — LOW (ref 4.2–5.8)
RBC # FLD: 15.8 % — HIGH (ref 10.3–14.5)
RBC # FLD: 15.8 % — HIGH (ref 10.3–14.5)
SODIUM SERPL-SCNC: 136 MMOL/L — SIGNIFICANT CHANGE UP (ref 135–145)
SODIUM SERPL-SCNC: 136 MMOL/L — SIGNIFICANT CHANGE UP (ref 135–145)
WBC # BLD: 7.53 K/UL — SIGNIFICANT CHANGE UP (ref 3.8–10.5)
WBC # BLD: 7.53 K/UL — SIGNIFICANT CHANGE UP (ref 3.8–10.5)
WBC # FLD AUTO: 7.53 K/UL — SIGNIFICANT CHANGE UP (ref 3.8–10.5)
WBC # FLD AUTO: 7.53 K/UL — SIGNIFICANT CHANGE UP (ref 3.8–10.5)

## 2024-01-05 PROCEDURE — 99233 SBSQ HOSP IP/OBS HIGH 50: CPT | Mod: GC

## 2024-01-05 PROCEDURE — 99232 SBSQ HOSP IP/OBS MODERATE 35: CPT

## 2024-01-05 PROCEDURE — 99232 SBSQ HOSP IP/OBS MODERATE 35: CPT | Mod: GC

## 2024-01-05 RX ORDER — ACETAMINOPHEN 500 MG
650 TABLET ORAL EVERY 8 HOURS
Refills: 0 | Status: COMPLETED | OUTPATIENT
Start: 2024-01-05 | End: 2024-01-08

## 2024-01-05 RX ORDER — OXYCODONE HYDROCHLORIDE 5 MG/1
2.5 TABLET ORAL EVERY 8 HOURS
Refills: 0 | Status: DISCONTINUED | OUTPATIENT
Start: 2024-01-05 | End: 2024-01-11

## 2024-01-05 RX ADMIN — HEPARIN SODIUM 900 UNIT(S)/HR: 5000 INJECTION INTRAVENOUS; SUBCUTANEOUS at 00:30

## 2024-01-05 RX ADMIN — Medication 650 MILLIGRAM(S): at 22:41

## 2024-01-05 RX ADMIN — OXYCODONE HYDROCHLORIDE 5 MILLIGRAM(S): 5 TABLET ORAL at 13:47

## 2024-01-05 RX ADMIN — SODIUM CHLORIDE 1 GRAM(S): 9 INJECTION INTRAMUSCULAR; INTRAVENOUS; SUBCUTANEOUS at 22:43

## 2024-01-05 RX ADMIN — LIDOCAINE 1 PATCH: 4 CREAM TOPICAL at 19:35

## 2024-01-05 RX ADMIN — HEPARIN SODIUM 900 UNIT(S)/HR: 5000 INJECTION INTRAVENOUS; SUBCUTANEOUS at 07:47

## 2024-01-05 RX ADMIN — HEPARIN SODIUM 900 UNIT(S)/HR: 5000 INJECTION INTRAVENOUS; SUBCUTANEOUS at 20:22

## 2024-01-05 RX ADMIN — Medication 200 MILLIGRAM(S): at 06:22

## 2024-01-05 RX ADMIN — Medication 15 MILLILITER(S): at 12:03

## 2024-01-05 RX ADMIN — PANTOPRAZOLE SODIUM 40 MILLIGRAM(S): 20 TABLET, DELAYED RELEASE ORAL at 06:22

## 2024-01-05 RX ADMIN — Medication 650 MILLIGRAM(S): at 13:47

## 2024-01-05 RX ADMIN — GABAPENTIN 200 MILLIGRAM(S): 400 CAPSULE ORAL at 13:48

## 2024-01-05 RX ADMIN — GABAPENTIN 200 MILLIGRAM(S): 400 CAPSULE ORAL at 22:40

## 2024-01-05 RX ADMIN — Medication 2 DROP(S): at 06:26

## 2024-01-05 RX ADMIN — LIDOCAINE 1 PATCH: 4 CREAM TOPICAL at 13:49

## 2024-01-05 RX ADMIN — Medication 20 MILLIGRAM(S): at 12:02

## 2024-01-05 RX ADMIN — Medication 650 MILLIGRAM(S): at 06:24

## 2024-01-05 RX ADMIN — Medication 1 TABLET(S): at 06:22

## 2024-01-05 RX ADMIN — LIDOCAINE 2 PATCH: 4 CREAM TOPICAL at 01:30

## 2024-01-05 RX ADMIN — CHLORHEXIDINE GLUCONATE 1 APPLICATION(S): 213 SOLUTION TOPICAL at 12:03

## 2024-01-05 RX ADMIN — GABAPENTIN 200 MILLIGRAM(S): 400 CAPSULE ORAL at 06:22

## 2024-01-05 RX ADMIN — SODIUM CHLORIDE 1 GRAM(S): 9 INJECTION INTRAMUSCULAR; INTRAVENOUS; SUBCUTANEOUS at 06:24

## 2024-01-05 RX ADMIN — OXYCODONE HYDROCHLORIDE 5 MILLIGRAM(S): 5 TABLET ORAL at 15:08

## 2024-01-05 RX ADMIN — LIDOCAINE 2 PATCH: 4 CREAM TOPICAL at 12:00

## 2024-01-05 RX ADMIN — Medication 2 DROP(S): at 18:04

## 2024-01-05 RX ADMIN — SODIUM CHLORIDE 1 GRAM(S): 9 INJECTION INTRAMUSCULAR; INTRAVENOUS; SUBCUTANEOUS at 13:48

## 2024-01-05 RX ADMIN — LIDOCAINE 2 PATCH: 4 CREAM TOPICAL at 19:35

## 2024-01-05 RX ADMIN — Medication 650 MILLIGRAM(S): at 14:06

## 2024-01-05 RX ADMIN — LIDOCAINE 1 PATCH: 4 CREAM TOPICAL at 01:30

## 2024-01-05 RX ADMIN — HEPARIN SODIUM 900 UNIT(S)/HR: 5000 INJECTION INTRAVENOUS; SUBCUTANEOUS at 06:19

## 2024-01-05 RX ADMIN — Medication 50 MILLIGRAM(S): at 06:22

## 2024-01-05 RX ADMIN — Medication 200 MILLIGRAM(S): at 18:03

## 2024-01-05 RX ADMIN — Medication 3 MILLIGRAM(S): at 22:43

## 2024-01-05 NOTE — PROGRESS NOTE ADULT - ASSESSMENT
28 years old male with h/o Lupus ( diagnosed in 09/2023, on Plaquenil present to [facility] ED on 12/12/23  with complain of chest pain and SOB admitted for fevers, PE, pleural effusions, course c/b high fever and tonic-clonic seizure     #Perioperative risk stratification  #B/l pleural effusions  S/p Lt sided chest tube. S/p mist and chest tube removal  exudative effusion, bloody, glucose is not low, ADA low, NGTD on cultures   28 years old male with h/o Lupus ( diagnosed in 09/2023, on Plaquenil present to [facility] ED on 12/12/23  with complain of chest pain and SOB admitted for fevers, PE, pleural effusions, course c/b high fever and tonic-clonic seizure     #Perioperative risk stratification  #B/l pleural effusions  S/p Lt sided chest tube. S/p mist and chest tube removal  exudative effusion, bloody, glucose is not low, ADA low, NGTD on cultures   28 years old male with h/o Lupus ( diagnosed in 09/2023, on Plaquenil present to [facility] ED on 12/12/23  with complain of chest pain and SOB admitted for fevers, PE, pleural effusions, course c/b high fever and tonic-clonic seizure     #Perioperative risk stratification  #Low risk PE  Negative bnp and trops.   no LE DVT on duplex.   No RV strain present on TTE.   Now saturating well on RA.   Has been anticoagulated since transfer here on 12/12 with 3 days with subtherapeutic ptt noted.   - No contraindication to holding AC for renal Bx/LN Bx 28 years old male with h/o Lupus ( diagnosed in 09/2023, on Plaquenil present to [facility] ED on 12/12/23  with complain of chest pain and SOB admitted for fevers, PE, pleural effusions, course c/b high fever and tonic-clonic seizure     #Perioperative risk stratification  #Low risk PE  Negative bnp and trops.   no LE DVT on duplex.   No RV strain present on TTE.   Now saturating well on RA.   Has been anticoagulated since transfer here on 12/12 with 3 days with subtherapeutic ptt noted.   - No contraindication to holding AC for renal Bx/LN Bx 28 years old male with h/o Lupus ( diagnosed in 09/2023, on Plaquenil present to [facility] ED on 12/12/23  with complain of chest pain and SOB admitted for fevers, PE, pleural effusions, course c/b high fever and tonic-clonic seizure     #Perioperative risk stratification  #Low risk PE  Negative bnp and trops.   no LE DVT on duplex.   No RV strain present on TTE.   Now saturating well on RA.   Has been anticoagulated since transfer here on 12/12 with 3 days with subtherapeutic ptt noted.   - Low risk for holding AC 4 hr prior to and 24 hours after renal bx  - pulm will sign off, please reconsult prn 28 years old male with h/o Lupus ( diagnosed in 09/2023, on Plaquenil present to [facility] ED on 12/12/23  with complain of chest pain and SOB admitted for fevers, PE, pleural effusions, course c/b high fever and tonic-clonic seizure     #Perioperative risk stratification  #Low risk PE  Negative bnp and trops.   no LE DVT on duplex.   No RV strain present on TTE.   Now saturating well on RA.   Has been anticoagulated since transfer here on 12/12 with 3 days with subtherapeutic ptt noted.   - Low risk for holding AC 4 hr prior to and 24 hours after renal bx  - pulm will sign off, please reconsult prn 28 years old male with h/o Lupus ( diagnosed in 09/2023, on Plaquenil present to [place] ED on 12/12/23  with complain of chest pain and SOB admitted for fevers, PE, pleural effusions, course c/b high fever and tonic-clonic seizure     #Perioperative risk stratification  #Low risk PE  Negative bnp and trops.   no LE DVT on duplex.   No RV strain present on TTE.   Now saturating well on RA.   Has been anticoagulated since transfer here on 12/12 with 3 days with subtherapeutic ptt noted.   - Low risk for holding heparin gtt 4 hr prior to and 24 hours after renal bx  - pulm will sign off, please reconsult prn 28 years old male with h/o Lupus ( diagnosed in 09/2023, on Plaquenil present to [place] ED on 12/12/23  with complain of chest pain and SOB admitted for fevers, PE, pleural effusions, course c/b high fever and tonic-clonic seizure     #Perioperative risk stratification  #Low risk PE  Negative bnp and trops.   no LE DVT on duplex.   No RV strain present on TTE.   Now saturating well on RA.   Has been anticoagulated since transfer here on 12/12 with 3 days with subtherapeutic ptt noted.   - Low risk for holding heparin gtt 4 hr prior to and 24 hours after renal bx  - pulm will sign off, please reconsult prn

## 2024-01-05 NOTE — PROGRESS NOTE ADULT - NUTRITIONAL ASSESSMENT
This patient has been assessed with a concern for Malnutrition and has been determined to have a diagnosis/diagnoses of Severe protein-calorie malnutrition.    This patient is being managed with:   Diet Regular-  1000mL Fluid Restriction ([id number])  Supplement Feeding Modality:  Oral  Ensure Plus High Protein Cans or Servings Per Day:  1       Frequency:  Three Times a day  Entered: Carter  3 2024  4:21PM   This patient has been assessed with a concern for Malnutrition and has been determined to have a diagnosis/diagnoses of Severe protein-calorie malnutrition.    This patient is being managed with:   Diet Regular-  1000mL Fluid Restriction ([id number])  Supplement Feeding Modality:  Oral  Ensure Plus High Protein Cans or Servings Per Day:  1       Frequency:  Three Times a day  Entered: Carter  3 2024  4:21PM

## 2024-01-05 NOTE — PROGRESS NOTE ADULT - SUBJECTIVE AND OBJECTIVE BOX
Name of Patient : TAYLA MARIE  MRN: [id number]  Date of visit: 01-05-24       Subjective: Patient seen and examined. No new events except as noted.   calm  afebrile    REVIEW OF SYSTEMS:    CONSTITUTIONAL: No weakness, fevers or chills  EYES/ENT: No visual changes;  No vertigo or throat pain   NECK: No pain or stiffness  RESPIRATORY: No cough, wheezing, hemoptysis; No shortness of breath  CARDIOVASCULAR: No chest pain or palpitations  GASTROINTESTINAL: No abdominal or epigastric pain. No nausea, vomiting, or hematemesis; No diarrhea or constipation. No melena or hematochezia.  GENITOURINARY: No dysuria, frequency or hematuria  NEUROLOGICAL: No numbness or weakness  SKIN: No itching, burning, rashes, or lesions   All other review of systems is negative unless indicated above.    MEDICATIONS:  MEDICATIONS  (STANDING):  acetaminophen     Tablet .. 650 milliGRAM(s) Oral every 8 hours  chlorhexidine 2% Cloths 1 Application(s) Topical daily  ciprofloxacin  0.3% Ophthalmic Solution for Otic Use 2 Drop(s) Both Ears two times a day  FLUoxetine 20 milliGRAM(s) Oral daily  gabapentin 200 milliGRAM(s) Oral three times a day  heparin  Infusion. 1300 Unit(s)/Hr (13 mL/Hr) IV Continuous <Continuous>  hydroxychloroquine 200 milliGRAM(s) Oral two times a day  lidocaine   4% Patch 1 Patch Transdermal every 24 hours  lidocaine   4% Patch 2 Patch Transdermal every 24 hours  melatonin 3 milliGRAM(s) Oral <User Schedule>  multivitamin/minerals/iron Oral Solution (CENTRUM) 15 milliLiter(s) Oral daily  pantoprazole    Tablet 40 milliGRAM(s) Oral before breakfast  polyethylene glycol 3350 17 Gram(s) Oral two times a day  predniSONE   Tablet 50 milliGRAM(s) Oral daily  senna 2 Tablet(s) Oral at bedtime  sodium chloride 1 Gram(s) Oral three times a day  sodium chloride 3%. 500 milliLiter(s) (30 mL/Hr) IV Continuous <Continuous>  trimethoprim  160 mG/sulfamethoxazole 800 mG 1 Tablet(s) Oral <User Schedule>      PHYSICAL EXAM:  T(C): 36.3 (01-05-24 @ 14:00), Max: 36.8 (01-04-24 @ 22:05)  HR: 80 (01-05-24 @ 14:00) (68 - 81)  BP: 115/78 (01-05-24 @ 14:00) (110/67 - 117/78)  RR: 18 (01-05-24 @ 14:00) (17 - 18)  SpO2: 100% (01-05-24 @ 14:00) (98% - 100%)  Wt(kg): --  I&O's Summary        Appearance: Normal	  HEENT:  PERRLA   Lymphatic: No lymphadenopathy   Cardiovascular: Normal S1 S2, no JVD  Respiratory: normal effort , clear  Gastrointestinal:  Soft, Non-tender  Skin: No rashes,  warm to touch  Psychiatry:  Mood & affect appropriate  Musculuskeletal: No edema    recent labs, Imaging and EKGs personally reviewed                           9.0    7.53  )-----------( 218      ( 05 Jan 2024 02:58 )             27.2               01-05    136  |  102  |  10  ----------------------------<  129<H>  4.1   |  23  |  0.58    Ca    9.4      05 Jan 2024 02:58  Phos  3.3     01-05  Mg     1.90     01-05    TPro  6.6  /  Alb  2.9<L>  /  TBili  0.5  /  DBili  x   /  AST  40  /  ALT  78<H>  /  AlkPhos  90  01-05    PT/INR - ( 05 Jan 2024 02:58 )   PT: 15.9 sec;   INR: 1.42 ratio         PTT - ( 05 Jan 2024 02:58 )  PTT:74.8 sec                   Urinalysis Basic - ( 05 Jan 2024 02:58 )    Color: x / Appearance: x / SG: x / pH: x  Gluc: 129 mg/dL / Ketone: x  / Bili: x / Urobili: x   Blood: x / Protein: x / Nitrite: x   Leuk Esterase: x / RBC: x / WBC x   Sq Epi: x / Non Sq Epi: x / Bacteria: x                     Name of Patient : TAYLA MARIE  MRN: [id number]  Date of visit: 01-05-24       Subjective: Patient seen and examined. No new events except as noted.   calm  afebrile    REVIEW OF SYSTEMS:    CONSTITUTIONAL: No weakness, fevers or chills  EYES/ENT: No visual changes;  No vertigo or throat pain   NECK: No pain or stiffness  RESPIRATORY: No cough, wheezing, hemoptysis; No shortness of breath  CARDIOVASCULAR: No chest pain or palpitations  GASTROINTESTINAL: No abdominal or epigastric pain. No nausea, vomiting, or hematemesis; No diarrhea or constipation. No melena or hematochezia.  GENITOURINARY: No dysuria, frequency or hematuria  NEUROLOGICAL: No numbness or weakness  SKIN: No itching, burning, rashes, or lesions   All other review of systems is negative unless indicated above.    MEDICATIONS:  MEDICATIONS  (STANDING):  acetaminophen     Tablet .. 650 milliGRAM(s) Oral every 8 hours  chlorhexidine 2% Cloths 1 Application(s) Topical daily  ciprofloxacin  0.3% Ophthalmic Solution for Otic Use 2 Drop(s) Both Ears two times a day  FLUoxetine 20 milliGRAM(s) Oral daily  gabapentin 200 milliGRAM(s) Oral three times a day  heparin  Infusion. 1300 Unit(s)/Hr (13 mL/Hr) IV Continuous <Continuous>  hydroxychloroquine 200 milliGRAM(s) Oral two times a day  lidocaine   4% Patch 1 Patch Transdermal every 24 hours  lidocaine   4% Patch 2 Patch Transdermal every 24 hours  melatonin 3 milliGRAM(s) Oral <User Schedule>  multivitamin/minerals/iron Oral Solution (CENTRUM) 15 milliLiter(s) Oral daily  pantoprazole    Tablet 40 milliGRAM(s) Oral before breakfast  polyethylene glycol 3350 17 Gram(s) Oral two times a day  predniSONE   Tablet 50 milliGRAM(s) Oral daily  senna 2 Tablet(s) Oral at bedtime  sodium chloride 1 Gram(s) Oral three times a day  sodium chloride 3%. 500 milliLiter(s) (30 mL/Hr) IV Continuous <Continuous>  trimethoprim  160 mG/sulfamethoxazole 800 mG 1 Tablet(s) Oral <User Schedule>      PHYSICAL EXAM:  T(C): 36.3 (01-05-24 @ 14:00), Max: 36.8 (01-04-24 @ 22:05)  HR: 80 (01-05-24 @ 14:00) (68 - 81)  BP: 115/78 (01-05-24 @ 14:00) (110/67 - 117/78)  RR: 18 (01-05-24 @ 14:00) (17 - 18)  SpO2: 100% (01-05-24 @ 14:00) (98% - 100%)  Wt(kg): --  I&O's Summary        Appearance: Normal	  HEENT:  PERRLA   Lymphatic: No lymphadenopathy   Cardiovascular: Normal S1 S2, no JVD  Respiratory: normal effort , clear  Gastrointestinal:  Soft, Non-tender  Skin: No rashes,  warm to touch  Psychiatry:  Mood & affect appropriate  Musculuskeletal: No edema    recent labs, Imaging and EKGs personally reviewed                           9.0    7.53  )-----------( 218      ( 05 Jan 2024 02:58 )             27.2               01-05    136  |  102  |  10  ----------------------------<  129<H>  4.1   |  23  |  0.58    Ca    9.4      05 Jan 2024 02:58  Phos  3.3     01-05  Mg     1.90     01-05    TPro  6.6  /  Alb  2.9<L>  /  TBili  0.5  /  DBili  x   /  AST  40  /  ALT  78<H>  /  AlkPhos  90  01-05    PT/INR - ( 05 Jan 2024 02:58 )   PT: 15.9 sec;   INR: 1.42 ratio         PTT - ( 05 Jan 2024 02:58 )  PTT:74.8 sec                   Urinalysis Basic - ( 05 Jan 2024 02:58 )    Color: x / Appearance: x / SG: x / pH: x  Gluc: 129 mg/dL / Ketone: x  / Bili: x / Urobili: x   Blood: x / Protein: x / Nitrite: x   Leuk Esterase: x / RBC: x / WBC x   Sq Epi: x / Non Sq Epi: x / Bacteria: x

## 2024-01-05 NOTE — PROGRESS NOTE ADULT - ASSESSMENT
28 years old male with h/o Lupus ( diagnosed in 09/2023, on Plaquenil present to [facility] ED on 12/12/23  with complain of chest pain and SOB. Patient reported left sided pleuritic chest pain which started 3 days ago. Pain is progressively worsened, associated with SOB and cough. Patient was seen in OSH ED and was prescribed antibiotics. Patient reported significantly worsening of left sided pleuritic chest pain today. No fever or chills. Patient reported loss of appetite and had a few episode of diarrhea for last 2 days. CTA chest with acute right upper lobe and left lower lobe segmental/subsegmental pulmonary emboli. No CT evidence of right heart strain. New bilateral lower lobe consolidations with areas of central clearing. Pneumonia and pulmonary infarcts are in the differential. New bilateral pleural effusions, small on the left and trace on the right. Bilateral axillary and supraclavicular adenopathy of unclear etiology. Patient was started on heparin ggt transitioned to eliquis on 12/19,  patient with worsening SOB/ hypoxia requiring nasal cannula oxygen supplementation. 12/20  Repeat Xray shows increased moderate left pleural effusion and compressive atelectasis trace right effusion and linear atelectasis. Thoracic team consulted for possible pigtail insertion Bedside US: Large left effusion with septations. Right simple effusion , + pericardial effusion- lower extremity dopplers negative for DVT.    # Pulm effusion/ Fever   S/P thoracentesis , followupp results   heparin for AC  TS care appreciated   O2 supplement   monitor output   Zosyn for now , ABx per ID   LP done  Plan for LN biopsy by IR , discussed with rheum and patinet;s mother, defer to rheum  Hematuria noted   Urology eval   COmpleted course of ABx   Pulma nd card for risk stratification to come off heparin for biopsy         # Fever  SIRS   Abx , comoleted per iD   Rheum adn ID follow up   Renal biopsy for protonuria, defer to rhem, discussed with IR, high risk       # Hyponatremia/ Seizure   RRT   Neuro follow up       #PE   on heparin , resume after thoracentesis   DVT negative  Heme onc eval   likley lupus related     #Hxof lupus  on hydroxychloroquine   Heme eval   Rheum eval   steroids per rheum       DVT andgIPPX    Discussed in detail with patient and mother at the bedside  28 years old male with h/o Lupus ( diagnosed in 09/2023, on Plaquenil present to [facility] ED on 12/12/23  with complain of chest pain and SOB. Patient reported left sided pleuritic chest pain which started 3 days ago. Pain is progressively worsened, associated with SOB and cough. Patient was seen in OSH ED and was prescribed antibiotics. Patient reported significantly worsening of left sided pleuritic chest pain today. No fever or chills. Patient reported loss of appetite and had a few episode of diarrhea for last 2 days. CTA chest with acute right upper lobe and left lower lobe segmental/subsegmental pulmonary emboli. No CT evidence of right heart strain. New bilateral lower lobe consolidations with areas of central clearing. Pneumonia and pulmonary infarcts are in the differential. New bilateral pleural effusions, small on the left and trace on the right. Bilateral axillary and supraclavicular adenopathy of unclear etiology. Patient was started on heparin ggt transitioned to eliquis on 12/19,  patient with worsening SOB/ hypoxia requiring nasal cannula oxygen supplementation. 12/20  Repeat Xray shows increased moderate left pleural effusion and compressive atelectasis trace right effusion and linear atelectasis. Thoracic team consulted for possible pigtail insertion Bedside US: Large left effusion with septations. Right simple effusion , + pericardial effusion- lower extremity dopplers negative for DVT.    # Pulm effusion/ Fever   S/P thoracentesis , followupp results   heparin for AC  TS care appreciated   O2 supplement   monitor output   Zosyn for now , ABx per ID   LP done  Plan for LN biopsy by IR , discussed with rheum and patinet;s mother, defer to rheum  Hematuria noted   Urology eval   COmpleted course of ABx   Pulma nd card for risk stratification to come off heparin for biopsy         # Fever  SIRS   Abx , comoleted per iD   Rheum adn ID follow up   Renal biopsy for protonuria, defer to rhem, discussed with IR, high risk       # Hyponatremia/ Seizure   RRT   Neuro follow up       #PE   on heparin , resume after thoracentesis   DVT negative  Heme onc eval   likley lupus related     #Hxof lupus  on hydroxychloroquine   Heme eval   Rheum eval   steroids per rheum       DVT andgIPPX    Discussed in detail with patient and mother at the bedside

## 2024-01-05 NOTE — BH CONSULTATION LIAISON PROGRESS NOTE - [id number]_PSY_A_CORE
Chart, labs, imaging reviewed. Case discussed with the primary team. Overnight, patient did not require or receive PRN medication.     Patient seen and examined at bedside. Upon initial assessment, patient minimally engaged in interview and ambivalent about initiating pharmacologic intervention. Upon reassessment, patient is calm, cooperative, and more engaged in the interview. Patient continues endorses ongoing depressive symptoms, but currently describes his mood as "calm" and denies anxiety. Denies SIIP, HIIP, or AVH.  No evidence of psychotic sxs elicited on exam. Provided psychoeducation about initiation of pharmacologic treatment. Patient amenable with starting fluoxetine, as suggested by CL team. Patient expresses that he would also like to incorporate psychotherapy into his treatment, which was encouraged by team.

## 2024-01-05 NOTE — PROGRESS NOTE ADULT - ASSESSMENT
28 years old male with h/o Lupus (diagnosed in 09/2023 due to rash, oral ulcers, chest pain, and dyspnea, on Plaquenil) who presented to [place] ED on 12/12/23 with complaints of chest pain and SOB, found to have bilateral acute PE and bilateral pleural effusions. Transferred to [facility] for CT surgery evaluation. Also with fevers now resolved. Rheumatology consulted given SLE history.     Serologies:   Positive: ABDIAS 1:280 speckled, Sm >8, RNP >8, SSA >8, hypocomplementemia, Pr/Cr 1.2 and 1.1, low vitamin D 25 21, elevated vitamin D 1,25 97, ACE elevated 125, PR3 29.8   Negative: dsDNA 28,  C4 13, APS labs, negative Janine-1 ab, negative ribosomal P, negative syphilis screen, aldolase WNL,negative RF and CCP, negative ANCA, RNA polymerase III, centromere, scleroderma     #Fever (resolved)   -Pleural effusion exudate w/negative culture and PCR  -Repeat CT imaging without obvious infectious source, mild decrease in axillary LAD, submentonian and submaxillary and increase supraclavicular LAD. No mediastinal lymphoadenopathy  -EBV DNA PCR positive. Discussed with ID, low concern for EBV infection, would recommend LN bx to rule out associated malignancy   -Pt with clinical manifestations and specific SLE serologies though unclear if current presentation is solely attributable to SLE. Low concern for other rheumatologic disease (such as sarcoidosis, Kikuchi syndrome, Castleman disease), Underlying malignancy also needs to be ruled out. Pleural fluid cytology negative.   - Fevers resolved after restarting steroids on 40 mg methylprednisolone 12/23-12/25, restarted 60 mg of methylprednisolone 12/28-12/29 and now on prednisone 60 mg PO (since 12/30)     #SLE   +ve serology and hypocomplementemia improving after steroid trial   creatinine is stable but proteinuria +ve urine P/cr 1.1. Decreased to 0.7.        #Elevated CPK   resolved     #Bilateral Acute PE with pulmonary infarcts   -Labs does not meet criteria for APS  PS-PT negative  -Hematology recommending Eliquis on discharge     #Encephalopathy (resolved)   -Reported episode of confusion along with episode of incontinence   -No focal deficits on neuro exam  -Likely multifactorial in the setting of opioids and depression, low suspicion for SLE cerebritis     Recommendations:   -Primary service working on getting clearance from pulmonology per IR's request prior to renal biopsy, Cardiology clearance obtained   -C/w prednisone 50 mg daily (1/5 - )  -F/u repeat PR3     -Myomarker panel pending   -Recommend decreasing opioid usage and continuing to monitor mental status. Low threshold to obtain MRI brain if pt develops mental status changes   -Continue with GI and PCP ppx   -Would recommend   -Plan to repeat urine studies next week   -We will continue to follow  -Pt will eventually need follow up at [facility] on discharge     Discussed with Dr. Octavio Melendez MD   Rheumatology Fellow  Available on TEAMS 28 years old male with h/o Lupus (diagnosed in 09/2023 due to rash, oral ulcers, chest pain, and dyspnea, on Plaquenil) who presented to [place] ED on 12/12/23 with complaints of chest pain and SOB, found to have bilateral acute PE and bilateral pleural effusions. Transferred to [facility] for CT surgery evaluation. Also with fevers now resolved. Rheumatology consulted given SLE history.     Serologies:   Positive: ABDIAS 1:280 speckled, Sm >8, RNP >8, SSA >8, hypocomplementemia, Pr/Cr 1.2 and 1.1, low vitamin D 25 21, elevated vitamin D 1,25 97, ACE elevated 125, PR3 29.8   Negative: dsDNA 28,  C4 13, APS labs, negative Janine-1 ab, negative ribosomal P, negative syphilis screen, aldolase WNL,negative RF and CCP, negative ANCA, RNA polymerase III, centromere, scleroderma     #Fever (resolved)   -Pleural effusion exudate w/negative culture and PCR  -Repeat CT imaging without obvious infectious source, mild decrease in axillary LAD, submentonian and submaxillary and increase supraclavicular LAD. No mediastinal lymphoadenopathy  -EBV DNA PCR positive. Discussed with ID, low concern for EBV infection, would recommend LN bx to rule out associated malignancy   -Pt with clinical manifestations and specific SLE serologies though unclear if current presentation is solely attributable to SLE. Low concern for other rheumatologic disease (such as sarcoidosis, Kikuchi syndrome, Castleman disease), Underlying malignancy also needs to be ruled out. Pleural fluid cytology negative.   - Fevers resolved after restarting steroids on 40 mg methylprednisolone 12/23-12/25, restarted 60 mg of methylprednisolone 12/28-12/29 and now on prednisone 60 mg PO (since 12/30)     #SLE   +ve serology and hypocomplementemia improving after steroid trial   creatinine is stable but proteinuria +ve urine P/cr 1.1. Decreased to 0.7.        #Elevated CPK   resolved     #Bilateral Acute PE with pulmonary infarcts   -Labs does not meet criteria for APS  PS-PT negative  -Hematology recommending Eliquis on discharge     #Encephalopathy (resolved)   -Reported episode of confusion along with episode of incontinence   -No focal deficits on neuro exam  -Likely multifactorial in the setting of opioids and depression, low suspicion for SLE cerebritis     Recommendations:   -Primary service working on getting clearance from pulmonology per IR's request prior to renal biopsy, Cardiology clearance obtained   -C/w prednisone 50 mg daily (1/5 - )  -F/u repeat PR3     -Myomarker panel pending   -Recommend decreasing opioid usage and continuing to monitor mental status. Low threshold to obtain MRI brain if pt develops mental status changes   -Continue with GI and PCP ppx   -Would recommend   -Plan to repeat urine studies next week   -We will continue to follow  -Pt will eventually need follow up at [facility] on discharge     Discussed with Dr. Octavio Melendez MD   Rheumatology Fellow  Available on TEAMS 28 years old male with h/o Lupus (diagnosed in 09/2023 due to rash, oral ulcers, chest pain, and dyspnea, on Plaquenil) who presented to [place] ED on 12/12/23 with complaints of chest pain and SOB, found to have bilateral acute PE and bilateral pleural effusions. Transferred to [facility] for CT surgery evaluation. Also with fevers now resolved. Rheumatology consulted given SLE history.     Serologies:   Positive: ABDIAS 1:280 speckled, Sm >8, RNP >8, SSA >8, hypocomplementemia, Pr/Cr 1.2 and 1.1, low vitamin D 25 21, elevated vitamin D 1,25 97, ACE elevated 125, PR3 29.8   Negative: dsDNA 28,  C4 13, APS labs, negative Janine-1 ab, negative ribosomal P, negative syphilis screen, aldolase WNL,negative RF and CCP, negative ANCA, RNA polymerase III, centromere, scleroderma     #Fever (resolved)   -Pleural effusion exudate w/negative culture and PCR  -Repeat CT imaging without obvious infectious source, mild decrease in axillary LAD, submentonian and submaxillary and increase supraclavicular LAD. No mediastinal lymphoadenopathy  -EBV DNA PCR positive. Discussed with ID, low concern for EBV infection, would recommend LN bx to rule out associated malignancy   -Pt with clinical manifestations and specific SLE serologies though unclear if current presentation is solely attributable to SLE. Low concern for other rheumatologic disease (such as sarcoidosis, Kikuchi syndrome, Castleman disease), Underlying malignancy also needs to be ruled out. Pleural fluid cytology negative.   - Fevers resolved after restarting steroids on 40 mg methylprednisolone 12/23-12/25, restarted 60 mg of methylprednisolone 12/28-12/29 and now on prednisone 60 mg PO (since 12/30)     #SLE   +ve serology and hypocomplementemia improving after steroid trial   creatinine is stable but proteinuria +ve urine P/cr 1.1. Decreased to 0.7.        #Elevated CPK   resolved     #Bilateral Acute PE with pulmonary infarcts   -Labs does not meet criteria for APS  PS-PT negative  -Hematology recommending Eliquis on discharge     #Encephalopathy (resolved)   -Reported episode of confusion along with episode of incontinence   -No focal deficits on neuro exam  -Likely multifactorial in the setting of opioids and depression, low suspicion for SLE cerebritis     Recommendations:   -Preoperative risk assessment done by both pulmonology and cardiology as per IR's request. IR re-consulted for renal and LN biopsies   -C/w prednisone 50 mg daily (1/5 - )  -F/u repeat PR3     -Myomarker panel pending   -Recommend decreasing opioid usage and continuing to monitor mental status. Low threshold to obtain MRI brain if pt develops mental status changes   -Continue with GI and PCP ppx   -Would recommend   -Plan to repeat urine studies next week   -We will continue to follow  -Pt will eventually need follow up at [facility] [place] on discharge     Discussed with Dr. Octavio Melendez MD   Rheumatology Fellow  Available on TEAMS 28 years old male with h/o Lupus (diagnosed in 09/2023 due to rash, oral ulcers, chest pain, and dyspnea, on Plaquenil) who presented to [place] ED on 12/12/23 with complaints of chest pain and SOB, found to have bilateral acute PE and bilateral pleural effusions. Transferred to [facility] for CT surgery evaluation. Also with fevers now resolved. Rheumatology consulted given SLE history.     Serologies:   Positive: ABDIAS 1:280 speckled, Sm >8, RNP >8, SSA >8, hypocomplementemia, Pr/Cr 1.2 and 1.1, low vitamin D 25 21, elevated vitamin D 1,25 97, ACE elevated 125, PR3 29.8   Negative: dsDNA 28,  C4 13, APS labs, negative Janine-1 ab, negative ribosomal P, negative syphilis screen, aldolase WNL,negative RF and CCP, negative ANCA, RNA polymerase III, centromere, scleroderma     #Fever (resolved)   -Pleural effusion exudate w/negative culture and PCR  -Repeat CT imaging without obvious infectious source, mild decrease in axillary LAD, submentonian and submaxillary and increase supraclavicular LAD. No mediastinal lymphoadenopathy  -EBV DNA PCR positive. Discussed with ID, low concern for EBV infection, would recommend LN bx to rule out associated malignancy   -Pt with clinical manifestations and specific SLE serologies though unclear if current presentation is solely attributable to SLE. Low concern for other rheumatologic disease (such as sarcoidosis, Kikuchi syndrome, Castleman disease), Underlying malignancy also needs to be ruled out. Pleural fluid cytology negative.   - Fevers resolved after restarting steroids on 40 mg methylprednisolone 12/23-12/25, restarted 60 mg of methylprednisolone 12/28-12/29 and now on prednisone 60 mg PO (since 12/30)     #SLE   +ve serology and hypocomplementemia improving after steroid trial   creatinine is stable but proteinuria +ve urine P/cr 1.1. Decreased to 0.7.        #Elevated CPK   resolved     #Bilateral Acute PE with pulmonary infarcts   -Labs does not meet criteria for APS  PS-PT negative  -Hematology recommending Eliquis on discharge     #Encephalopathy (resolved)   -Reported episode of confusion along with episode of incontinence   -No focal deficits on neuro exam  -Likely multifactorial in the setting of opioids and depression, low suspicion for SLE cerebritis     Recommendations:   -Preoperative risk assessment done by both pulmonology and cardiology as per IR's request. IR re-consulted for renal and LN biopsies   -C/w prednisone 50 mg daily (1/5 - )  -F/u repeat PR3     -Myomarker panel pending   -Recommend decreasing opioid usage and continuing to monitor mental status. Low threshold to obtain MRI brain if pt develops mental status changes   -Continue with GI and PCP ppx   -Would recommend   -Plan to repeat urine studies next week   -We will continue to follow  -Pt will eventually need follow up at [facility] on discharge     Discussed with Dr. Octavio Melendez MD   Rheumatology Fellow  Available on TEAMS 28 years old male with h/o Lupus (diagnosed in 09/2023 due to rash, oral ulcers, chest pain, and dyspnea, on Plaquenil) who presented to [facility] ED on 12/12/23 with complaints of chest pain and SOB, found to have bilateral acute PE and bilateral pleural effusions. Transferred to [facility] for CT surgery evaluation. Also with fevers now resolved. Rheumatology consulted given SLE history.     Serologies:   Positive: ABDIAS 1:280 speckled, Sm >8, RNP >8, SSA >8, hypocomplementemia, Pr/Cr 1.2 and 1.1, low vitamin D 25 21, elevated vitamin D 1,25 97, ACE elevated 125, PR3 29.8   Negative: dsDNA 28,  C4 13, APS labs, negative Janine-1 ab, negative ribosomal P, negative syphilis screen, aldolase WNL,negative RF and CCP, negative ANCA, RNA polymerase III, centromere, scleroderma     #Fever (resolved)   -Pleural effusion exudate w/negative culture and PCR  -Repeat CT imaging without obvious infectious source, mild decrease in axillary LAD, submentonian and submaxillary and increase supraclavicular LAD. No mediastinal lymphoadenopathy  -EBV DNA PCR positive. Discussed with ID, low concern for EBV infection, would recommend LN bx to rule out associated malignancy   -Pt with clinical manifestations and specific SLE serologies though unclear if current presentation is solely attributable to SLE. Low concern for other rheumatologic disease (such as sarcoidosis, Kikuchi syndrome, Castleman disease), Underlying malignancy also needs to be ruled out. Pleural fluid cytology negative.   - Fevers resolved after restarting steroids on 40 mg methylprednisolone 12/23-12/25, restarted 60 mg of methylprednisolone 12/28-12/29, then prednisone 60 mg PO daily (12/30-1/4), and now on prednisone 50 mg PO (since 1/5)     #SLE   +ve serology and hypocomplementemia improving after steroid trial   creatinine is stable but proteinuria +ve urine P/cr 1.1. Decreased to 0.7.        #Elevated CPK   resolved     #Bilateral Acute PE with pulmonary infarcts   -Labs does not meet criteria for APS  PS-PT negative  -Hematology recommending Eliquis on discharge     #Encephalopathy (resolved)   -Reported episode of confusion along with episode of incontinence   -No focal deficits on neuro exam  -Likely multifactorial in the setting of opioids and depression, low suspicion for SLE cerebritis     Recommendations:   -Preoperative risk assessment done by both pulmonology and cardiology as per IR's request. IR re-consulted for renal and LN biopsies   -C/w prednisone 50 mg daily (1/5 - )  -F/u repeat PR3     -Myomarker panel pending   -Recommend decreasing opioid usage and continuing to monitor mental status. Low threshold to obtain MRI brain if pt develops mental status changes   -Continue with GI and PCP ppx   -Would recommend   -Plan to repeat urine studies next week   -We will continue to follow  -Pt will eventually need follow up at [facility] on discharge     Discussed with Dr. Octavio Melendez MD   Rheumatology Fellow  Available on TEAMS 28 years old male with h/o Lupus (diagnosed in 09/2023 due to rash, oral ulcers, chest pain, and dyspnea, on Plaquenil) who presented to [place] ED on 12/12/23 with complaints of chest pain and SOB, found to have bilateral acute PE and bilateral pleural effusions. Transferred to [facility] for CT surgery evaluation. Also with fevers now resolved. Rheumatology consulted given SLE history.     Serologies:   Positive: ABDIAS 1:280 speckled, Sm >8, RNP >8, SSA >8, hypocomplementemia, Pr/Cr 1.2 and 1.1, low vitamin D 25 21, elevated vitamin D 1,25 97, ACE elevated 125, PR3 29.8   Negative: dsDNA 28,  C4 13, APS labs, negative Janine-1 ab, negative ribosomal P, negative syphilis screen, aldolase WNL,negative RF and CCP, negative ANCA, RNA polymerase III, centromere, scleroderma     #Fever (resolved)   -Pleural effusion exudate w/negative culture and PCR  -Repeat CT imaging without obvious infectious source, mild decrease in axillary LAD, submentonian and submaxillary and increase supraclavicular LAD. No mediastinal lymphoadenopathy  -EBV DNA PCR positive. Discussed with ID, low concern for EBV infection, would recommend LN bx to rule out associated malignancy   -Pt with clinical manifestations and specific SLE serologies though unclear if current presentation is solely attributable to SLE. Low concern for other rheumatologic disease (such as sarcoidosis, Kikuchi syndrome, Castleman disease), Underlying malignancy also needs to be ruled out. Pleural fluid cytology negative.   - Fevers resolved after restarting steroids on 40 mg methylprednisolone 12/23-12/25, restarted 60 mg of methylprednisolone 12/28-12/29, then prednisone 60 mg PO daily (12/30-1/4), and now on prednisone 50 mg PO (since 1/5)     #SLE   +ve serology and hypocomplementemia improving after steroid trial   creatinine is stable but proteinuria +ve urine P/cr 1.1. Decreased to 0.7.        #Elevated CPK   resolved     #Bilateral Acute PE with pulmonary infarcts   -Labs does not meet criteria for APS  PS-PT negative  -Hematology recommending Eliquis on discharge     #Encephalopathy (resolved)   -Reported episode of confusion along with episode of incontinence   -No focal deficits on neuro exam  -Likely multifactorial in the setting of opioids and depression, low suspicion for SLE cerebritis     Recommendations:   -Preoperative risk assessment done by both pulmonology and cardiology as per IR's request. IR re-consulted for renal and LN biopsies   -C/w prednisone 50 mg daily (1/5 - )  -F/u repeat PR3     -Myomarker panel pending   -Recommend decreasing opioid usage and continuing to monitor mental status. Low threshold to obtain MRI brain if pt develops mental status changes   -Continue with GI and PCP ppx   -Would recommend   -Plan to repeat urine studies next week   -We will continue to follow  -Pt will eventually need follow up at [facility] on discharge     Discussed with Dr. Octavio Melendez MD   Rheumatology Fellow  Available on TEAMS 28 years old male with h/o Lupus (diagnosed in 09/2023 due to rash, oral ulcers, chest pain, and dyspnea, on Plaquenil) who presented to [place] ED on 12/12/23 with complaints of chest pain and SOB, found to have bilateral acute PE and bilateral pleural effusions. Transferred to [facility] for CT surgery evaluation. Also with fevers now resolved. Rheumatology consulted given SLE history.     Serologies:   Positive: ABDIAS 1:280 speckled, Sm >8, RNP >8, SSA >8, hypocomplementemia, Pr/Cr 1.2 and 1.1, low vitamin D 25 21, elevated vitamin D 1,25 97, ACE elevated 125, PR3 29.8   Negative: dsDNA 28,  C4 13, APS labs, negative Janine-1 ab, negative ribosomal P, negative syphilis screen, aldolase WNL,negative RF and CCP, negative ANCA, RNA polymerase III, centromere, scleroderma     #Fever (resolved)   -Pleural effusion exudate w/negative culture and PCR  -Repeat CT imaging without obvious infectious source, mild decrease in axillary LAD, submentonian and submaxillary and increase supraclavicular LAD. No mediastinal lymphoadenopathy  -EBV DNA PCR positive. Discussed with ID, low concern for EBV infection, would recommend LN bx to rule out associated malignancy   -Pt with clinical manifestations and specific SLE serologies though unclear if current presentation is solely attributable to SLE. Low concern for other rheumatologic disease (such as sarcoidosis, Kikuchi syndrome, Castleman disease), Underlying malignancy also needs to be ruled out. Pleural fluid cytology negative.   - Fevers resolved after restarting steroids on 40 mg methylprednisolone 12/23-12/25, restarted 60 mg of methylprednisolone 12/28-12/29, then prednisone 60 mg PO daily (12/30-1/4), and now on prednisone 50 mg PO (since 1/5)     #SLE   +ve serology and hypocomplementemia improving after steroid trial   creatinine is stable but proteinuria +ve urine P/cr 1.1. Decreased to 0.7.        #Elevated CPK   resolved     #Bilateral Acute PE with pulmonary infarcts   -Labs does not meet criteria for APS  PS-PT negative  -Hematology recommending Eliquis on discharge     #Encephalopathy (resolved)   -Reported episode of confusion along with episode of incontinence   -No focal deficits on neuro exam  -Likely multifactorial in the setting of opioids and depression, low suspicion for SLE cerebritis     Recommendations:   -Preoperative risk assessment done by both pulmonology and cardiology as per IR's request. IR re-consulted for renal and LN biopsies   -C/w prednisone 50 mg daily (1/5 - )  -F/u repeat PR3     -Myomarker panel pending   -Recommend decreasing opioid usage and continuing to monitor mental status. Low threshold to obtain MRI brain if pt develops mental status changes   -Continue with GI and PCP ppx   -Plan to repeat urine studies next week   -We will continue to follow  -Pt will eventually need follow up at [facility] [place] on discharge     Discussed with Dr. Octavio Melendez MD   Rheumatology Fellow  Available on TEAMS 28 years old male with h/o Lupus (diagnosed in 09/2023 due to rash, oral ulcers, chest pain, and dyspnea, on Plaquenil) who presented to [place] ED on 12/12/23 with complaints of chest pain and SOB, found to have bilateral acute PE and bilateral pleural effusions. Transferred to [facility] for CT surgery evaluation. Also with fevers now resolved. Rheumatology consulted given SLE history.     Serologies:   Positive: ABDIAS 1:280 speckled, Sm >8, RNP >8, SSA >8, hypocomplementemia, Pr/Cr 1.2 and 1.1, low vitamin D 25 21, elevated vitamin D 1,25 97, ACE elevated 125, PR3 29.8   Negative: dsDNA 28,  C4 13, APS labs, negative Janine-1 ab, negative ribosomal P, negative syphilis screen, aldolase WNL,negative RF and CCP, negative ANCA, RNA polymerase III, centromere, scleroderma     #Fever (resolved)   -Pleural effusion exudate w/negative culture and PCR  -Repeat CT imaging without obvious infectious source, mild decrease in axillary LAD, submentonian and submaxillary and increase supraclavicular LAD. No mediastinal lymphoadenopathy  -EBV DNA PCR positive. Discussed with ID, low concern for EBV infection, would recommend LN bx to rule out associated malignancy   -Pt with clinical manifestations and specific SLE serologies though unclear if current presentation is solely attributable to SLE. Low concern for other rheumatologic disease (such as sarcoidosis, Kikuchi syndrome, Castleman disease), Underlying malignancy also needs to be ruled out. Pleural fluid cytology negative.   - Fevers resolved after restarting steroids on 40 mg methylprednisolone 12/23-12/25, restarted 60 mg of methylprednisolone 12/28-12/29, then prednisone 60 mg PO daily (12/30-1/4), and now on prednisone 50 mg PO (since 1/5)     #SLE   +ve serology and hypocomplementemia improving after steroid trial   creatinine is stable but proteinuria +ve urine P/cr 1.1. Decreased to 0.7.        #Elevated CPK   resolved     #Bilateral Acute PE with pulmonary infarcts   -Labs does not meet criteria for APS  PS-PT negative  -Hematology recommending Eliquis on discharge     #Encephalopathy (resolved)   -Reported episode of confusion along with episode of incontinence   -No focal deficits on neuro exam  -Likely multifactorial in the setting of opioids and depression, low suspicion for SLE cerebritis     Recommendations:   -Preoperative risk assessment done by both pulmonology and cardiology as per IR's request. IR re-consulted for renal and LN biopsies   -C/w prednisone 50 mg daily (1/5 - )  -F/u repeat PR3     -Myomarker panel pending   -Recommend decreasing opioid usage and continuing to monitor mental status. Low threshold to obtain MRI brain if pt develops mental status changes   -Continue with GI and PCP ppx   -Plan to repeat urine studies next week   -We will continue to follow  -Pt will eventually need follow up at [facility] [place] on discharge     Discussed with Dr. Octavio Melendez MD   Rheumatology Fellow  Available on TEAMS

## 2024-01-05 NOTE — PROGRESS NOTE ADULT - SUBJECTIVE AND OBJECTIVE BOX
Subjective: Patient seen and examined. No new events except as noted.     SUBJECTIVE/ROS:  No chest pain, dyspnea, palpitation, or dizziness.   feels ok       MEDICATIONS:  MEDICATIONS  (STANDING):  acetaminophen     Tablet .. 650 milliGRAM(s) Oral every 8 hours  chlorhexidine 2% Cloths 1 Application(s) Topical daily  ciprofloxacin  0.3% Ophthalmic Solution for Otic Use 2 Drop(s) Both Ears two times a day  FLUoxetine 20 milliGRAM(s) Oral daily  gabapentin 200 milliGRAM(s) Oral three times a day  heparin  Infusion. 1300 Unit(s)/Hr (13 mL/Hr) IV Continuous <Continuous>  hydroxychloroquine 200 milliGRAM(s) Oral two times a day  lidocaine   4% Patch 1 Patch Transdermal every 24 hours  lidocaine   4% Patch 2 Patch Transdermal every 24 hours  melatonin 3 milliGRAM(s) Oral <User Schedule>  multivitamin/minerals/iron Oral Solution (CENTRUM) 15 milliLiter(s) Oral daily  pantoprazole    Tablet 40 milliGRAM(s) Oral before breakfast  polyethylene glycol 3350 17 Gram(s) Oral two times a day  predniSONE   Tablet 50 milliGRAM(s) Oral daily  senna 2 Tablet(s) Oral at bedtime  sodium chloride 1 Gram(s) Oral three times a day  sodium chloride 3%. 500 milliLiter(s) (30 mL/Hr) IV Continuous <Continuous>  trimethoprim  160 mG/sulfamethoxazole 800 mG 1 Tablet(s) Oral <User Schedule>      PHYSICAL EXAM:  T(C): 36.2 (01-05-24 @ 05:09), Max: 36.8 (01-04-24 @ 22:05)  HR: 68 (01-05-24 @ 05:09) (68 - 81)  BP: 110/67 (01-05-24 @ 05:09) (110/67 - 117/78)  RR: 18 (01-05-24 @ 05:09) (17 - 18)  SpO2: 100% (01-05-24 @ 05:09) (98% - 100%)  Wt(kg): --  I&O's Summary          JVP: Normal  Neck: supple  Lung: clear   CV: S1 S2 , Murmur:  Abd: soft  Ext: No edema  neuro: Awake / alert  Psych: flat affect  Skin: normal``    LABS/DATA:    CARDIAC MARKERS:                                9.0    7.53  )-----------( 218      ( 05 Jan 2024 02:58 )             27.2     01-05    136  |  102  |  10  ----------------------------<  129<H>  4.1   |  23  |  0.58    Ca    9.4      05 Jan 2024 02:58  Phos  3.3     01-05  Mg     1.90     01-05    TPro  6.6  /  Alb  2.9<L>  /  TBili  0.5  /  DBili  x   /  AST  40  /  ALT  78<H>  /  AlkPhos  90  01-05    proBNP:   Lipid Profile:   HgA1c:   TSH:     TELE:  EKG:

## 2024-01-05 NOTE — PROGRESS NOTE ADULT - ASSESSMENT
PE  on a/c  no evidence of right heart strain   no significant evidence of myocardial injury   normal LV function   a/c can be held 1-2 days for LN bx as deemed appropriate   fu with pulmonary     Pericardial effusion   inflammatory in setting of SLE   trace effusion   no sign of tamponade  repeat echo in few weeks for surveillance   fu with rheum for ongoing work up

## 2024-01-05 NOTE — PROGRESS NOTE ADULT - ATTENDING COMMENTS
Agree with fellow's assessment and plan as above.  Patient and mother aware of our recommendations and in agreement.  Discussed with primary team  will follow

## 2024-01-05 NOTE — BH CONSULTATION LIAISON PROGRESS NOTE - NSBHCHARTREVIEWLAB_PSY_A_CORE FT
10.0   9.78  )-----------( 227      ( 04 Jan 2024 06:34 )             29.6     01-04    135  |  100  |  8   ----------------------------<  100<H>  4.1   |  23  |  0.61    Ca    9.2      04 Jan 2024 06:34  Phos  3.1     01-04  Mg     1.80     01-04    TPro  7.0  /  Alb  3.0<L>  /  TBili  0.6  /  DBili  x   /  AST  37  /  ALT  72<H>  /  AlkPhos  90  01-04

## 2024-01-05 NOTE — PROGRESS NOTE ADULT - NUTRITIONAL ASSESSMENT
This patient has been assessed with a concern for Malnutrition and has been determined to have a diagnosis/diagnoses of Severe protein-calorie malnutrition.    This patient is being managed with:   Diet Regular-  Pureed (PUREED)  1000mL Fluid Restriction ([id number])  Entered: Dec 27 2023  5:23PM   This patient has been assessed with a concern for Malnutrition and has been determined to have a diagnosis/diagnoses of Severe protein-calorie malnutrition.    This patient is being managed with:   Diet Regular-  Pureed (PUREED)  1000mL Fluid Restriction ([id number])  Entered: Dec 27 2023  5:23PM

## 2024-01-05 NOTE — PROGRESS NOTE ADULT - SUBJECTIVE AND OBJECTIVE BOX
CHIEF COMPLAINT:Patient is a 28y old  Male who presents with a chief complaint of fever (04 Jan 2024 13:39)      Interval Events:  planning for renal bx; pulm asked for preoperative risk stratification.    REVIEW OF SYSTEMS:  [x] All other systems negative except per HPI   [ ] Unable to assess ROS because ________    OBJECTIVE:  ICU Vital Signs Last 24 Hrs  T(C): 36.2 (05 Jan 2024 05:09), Max: 36.8 (04 Jan 2024 22:05)  T(F): 97.2 (05 Jan 2024 05:09), Max: 98.3 (04 Jan 2024 22:05)  HR: 68 (05 Jan 2024 05:09) (68 - 81)  BP: 110/67 (05 Jan 2024 05:09) (110/67 - 117/78)  BP(mean): --  ABP: --  ABP(mean): --  RR: 18 (05 Jan 2024 05:09) (17 - 18)  SpO2: 100% (05 Jan 2024 05:09) (98% - 100%)    O2 Parameters below as of 05 Jan 2024 05:09  Patient On (Oxygen Delivery Method): room air                PHYSICAL EXAM:  HEENT: MMM, PERRL, EOMI  Neck: No JVP elevation  CV: RRR, no m/r/g  Lung:   Abd: Soft, NT, ND  Ext: WWP, no CCE  Skin: No rash  Neuro: A&Ox3, no focal deficits      HOSPITAL MEDICATIONS:  MEDICATIONS  (STANDING):  acetaminophen     Tablet .. 650 milliGRAM(s) Oral every 8 hours  chlorhexidine 2% Cloths 1 Application(s) Topical daily  ciprofloxacin  0.3% Ophthalmic Solution for Otic Use 2 Drop(s) Both Ears two times a day  FLUoxetine 20 milliGRAM(s) Oral daily  gabapentin 200 milliGRAM(s) Oral three times a day  heparin  Infusion. 1300 Unit(s)/Hr (13 mL/Hr) IV Continuous <Continuous>  hydroxychloroquine 200 milliGRAM(s) Oral two times a day  lidocaine   4% Patch 1 Patch Transdermal every 24 hours  lidocaine   4% Patch 2 Patch Transdermal every 24 hours  melatonin 3 milliGRAM(s) Oral <User Schedule>  multivitamin/minerals/iron Oral Solution (CENTRUM) 15 milliLiter(s) Oral daily  pantoprazole    Tablet 40 milliGRAM(s) Oral before breakfast  polyethylene glycol 3350 17 Gram(s) Oral two times a day  predniSONE   Tablet 50 milliGRAM(s) Oral daily  senna 2 Tablet(s) Oral at bedtime  sodium chloride 1 Gram(s) Oral three times a day  sodium chloride 3%. 500 milliLiter(s) (30 mL/Hr) IV Continuous <Continuous>  trimethoprim  160 mG/sulfamethoxazole 800 mG 1 Tablet(s) Oral <User Schedule>    MEDICATIONS  (PRN):  albuterol/ipratropium for Nebulization 3 milliLiter(s) Nebulizer every 6 hours PRN Shortness of Breath and/or Wheezing  benzocaine/menthol Lozenge 1 Lozenge Oral four times a day PRN Sore Throat  FIRST- Mouthwash  BLM 15 milliLiter(s) Swish and Spit every 4 hours PRN Mouth Care  guaiFENesin Oral Liquid (Sugar-Free) 200 milliGRAM(s) Oral every 6 hours PRN Cough  heparin   Injectable 5500 Unit(s) IV Push every 6 hours PRN For aPTT less than 40  heparin   Injectable 2500 Unit(s) IV Push every 6 hours PRN For aPTT between 40 - 57  lidocaine 2% Viscous 5 milliLiter(s) Swish and Spit three times a day PRN Mouth Care  ondansetron Injectable 4 milliGRAM(s) IV Push every 8 hours PRN Nausea and/or Vomiting  oxyCODONE    IR 2.5 milliGRAM(s) Oral every 6 hours PRN Moderate Pain (4 - 6)  oxyCODONE    IR 5 milliGRAM(s) Oral every 6 hours PRN Severe Pain (7 - 10)      LABS:    The Labs were reviewed by me   The Radiology was reviewed by me    EKG tracing reviewed by me    01-05    136  |  102  |  10  ----------------------------<  129<H>  4.1   |  23  |  0.58  01-04    135  |  100  |  8   ----------------------------<  100<H>  4.1   |  23  |  0.61  01-03    134<L>  |  97<L>  |  12  ----------------------------<  138<H>  3.8   |  24  |  0.73    Ca    9.4      05 Jan 2024 02:58  Ca    9.2      04 Jan 2024 06:34  Ca    9.5      03 Jan 2024 01:00  Phos  3.3     01-05  Mg     1.90     01-05    TPro  6.6  /  Alb  2.9<L>  /  TBili  0.5  /  DBili  x   /  AST  40  /  ALT  78<H>  /  AlkPhos  90  01-05  TPro  7.0  /  Alb  3.0<L>  /  TBili  0.6  /  DBili  x   /  AST  37  /  ALT  72<H>  /  AlkPhos  90  01-04  TPro  7.2  /  Alb  3.2<L>  /  TBili  0.5  /  DBili  x   /  AST  44<H>  /  ALT  87<H>  /  AlkPhos  101  01-03    Magnesium: 1.90 mg/dL (01-05-24 @ 02:58)  Magnesium: 1.80 mg/dL (01-04-24 @ 06:34)  Magnesium: 2.00 mg/dL (01-03-24 @ 01:00)    Phosphorus: 3.3 mg/dL (01-05-24 @ 02:58)  Phosphorus: 3.1 mg/dL (01-04-24 @ 06:34)  Phosphorus: 3.7 mg/dL (01-03-24 @ 01:00)      PT/INR - ( 05 Jan 2024 02:58 )   PT: 15.9 sec;   INR: 1.42 ratio         PTT - ( 05 Jan 2024 02:58 )  PTT:74.8 sec              Urinalysis Basic - ( 05 Jan 2024 02:58 )    Color: x / Appearance: x / SG: x / pH: x  Gluc: 129 mg/dL / Ketone: x  / Bili: x / Urobili: x   Blood: x / Protein: x / Nitrite: x   Leuk Esterase: x / RBC: x / WBC x   Sq Epi: x / Non Sq Epi: x / Bacteria: x                              9.0    7.53  )-----------( 218      ( 05 Jan 2024 02:58 )             27.2                         10.0   9.78  )-----------( 227      ( 04 Jan 2024 06:34 )             29.6                         9.3    10.91 )-----------( 263      ( 03 Jan 2024 01:00 )             28.7     CAPILLARY BLOOD GLUCOSE            MICROBIOLOGY:     RADIOLOGY:  [ ] Reviewed and interpreted by me    Point of Care Ultrasound Findings:    PFT:    EKG: CHIEF COMPLAINT:Patient is a 28y old  Male who presents with a chief complaint of fever (04 Jan 2024 13:39)      Interval Events:  planning for renal bx; pulm asked for preoperative risk stratification.    REVIEW OF SYSTEMS:  [x] All other systems negative except per HPI   [ ] Unable to assess ROS because ________    OBJECTIVE:  ICU Vital Signs Last 24 Hrs  T(C): 36.2 (05 Jan 2024 05:09), Max: 36.8 (04 Jan 2024 22:05)  T(F): 97.2 (05 Jan 2024 05:09), Max: 98.3 (04 Jan 2024 22:05)  HR: 68 (05 Jan 2024 05:09) (68 - 81)  BP: 110/67 (05 Jan 2024 05:09) (110/67 - 117/78)  BP(mean): --  ABP: --  ABP(mean): --  RR: 18 (05 Jan 2024 05:09) (17 - 18)  SpO2: 100% (05 Jan 2024 05:09) (98% - 100%)    O2 Parameters below as of 05 Jan 2024 05:09  Patient On (Oxygen Delivery Method): room air                PHYSICAL EXAM:  HEENT: MMM, PERRL, EOMI  Neck: No JVP elevation  CV: RRR, no m/r/g  Lung: Rales Lt base  Abd: Soft, NT, ND  Ext: WWP, no CCE  Skin: No rash  Neuro: A&Ox3, no focal deficits      HOSPITAL MEDICATIONS:  MEDICATIONS  (STANDING):  acetaminophen     Tablet .. 650 milliGRAM(s) Oral every 8 hours  chlorhexidine 2% Cloths 1 Application(s) Topical daily  ciprofloxacin  0.3% Ophthalmic Solution for Otic Use 2 Drop(s) Both Ears two times a day  FLUoxetine 20 milliGRAM(s) Oral daily  gabapentin 200 milliGRAM(s) Oral three times a day  heparin  Infusion. 1300 Unit(s)/Hr (13 mL/Hr) IV Continuous <Continuous>  hydroxychloroquine 200 milliGRAM(s) Oral two times a day  lidocaine   4% Patch 1 Patch Transdermal every 24 hours  lidocaine   4% Patch 2 Patch Transdermal every 24 hours  melatonin 3 milliGRAM(s) Oral <User Schedule>  multivitamin/minerals/iron Oral Solution (CENTRUM) 15 milliLiter(s) Oral daily  pantoprazole    Tablet 40 milliGRAM(s) Oral before breakfast  polyethylene glycol 3350 17 Gram(s) Oral two times a day  predniSONE   Tablet 50 milliGRAM(s) Oral daily  senna 2 Tablet(s) Oral at bedtime  sodium chloride 1 Gram(s) Oral three times a day  sodium chloride 3%. 500 milliLiter(s) (30 mL/Hr) IV Continuous <Continuous>  trimethoprim  160 mG/sulfamethoxazole 800 mG 1 Tablet(s) Oral <User Schedule>    MEDICATIONS  (PRN):  albuterol/ipratropium for Nebulization 3 milliLiter(s) Nebulizer every 6 hours PRN Shortness of Breath and/or Wheezing  benzocaine/menthol Lozenge 1 Lozenge Oral four times a day PRN Sore Throat  FIRST- Mouthwash  BLM 15 milliLiter(s) Swish and Spit every 4 hours PRN Mouth Care  guaiFENesin Oral Liquid (Sugar-Free) 200 milliGRAM(s) Oral every 6 hours PRN Cough  heparin   Injectable 5500 Unit(s) IV Push every 6 hours PRN For aPTT less than 40  heparin   Injectable 2500 Unit(s) IV Push every 6 hours PRN For aPTT between 40 - 57  lidocaine 2% Viscous 5 milliLiter(s) Swish and Spit three times a day PRN Mouth Care  ondansetron Injectable 4 milliGRAM(s) IV Push every 8 hours PRN Nausea and/or Vomiting  oxyCODONE    IR 2.5 milliGRAM(s) Oral every 6 hours PRN Moderate Pain (4 - 6)  oxyCODONE    IR 5 milliGRAM(s) Oral every 6 hours PRN Severe Pain (7 - 10)      LABS:    The Labs were reviewed by me   The Radiology was reviewed by me    EKG tracing reviewed by me    01-05    136  |  102  |  10  ----------------------------<  129<H>  4.1   |  23  |  0.58  01-04    135  |  100  |  8   ----------------------------<  100<H>  4.1   |  23  |  0.61  01-03    134<L>  |  97<L>  |  12  ----------------------------<  138<H>  3.8   |  24  |  0.73    Ca    9.4      05 Jan 2024 02:58  Ca    9.2      04 Jan 2024 06:34  Ca    9.5      03 Jan 2024 01:00  Phos  3.3     01-05  Mg     1.90     01-05    TPro  6.6  /  Alb  2.9<L>  /  TBili  0.5  /  DBili  x   /  AST  40  /  ALT  78<H>  /  AlkPhos  90  01-05  TPro  7.0  /  Alb  3.0<L>  /  TBili  0.6  /  DBili  x   /  AST  37  /  ALT  72<H>  /  AlkPhos  90  01-04  TPro  7.2  /  Alb  3.2<L>  /  TBili  0.5  /  DBili  x   /  AST  44<H>  /  ALT  87<H>  /  AlkPhos  101  01-03    Magnesium: 1.90 mg/dL (01-05-24 @ 02:58)  Magnesium: 1.80 mg/dL (01-04-24 @ 06:34)  Magnesium: 2.00 mg/dL (01-03-24 @ 01:00)    Phosphorus: 3.3 mg/dL (01-05-24 @ 02:58)  Phosphorus: 3.1 mg/dL (01-04-24 @ 06:34)  Phosphorus: 3.7 mg/dL (01-03-24 @ 01:00)      PT/INR - ( 05 Jan 2024 02:58 )   PT: 15.9 sec;   INR: 1.42 ratio         PTT - ( 05 Jan 2024 02:58 )  PTT:74.8 sec              Urinalysis Basic - ( 05 Jan 2024 02:58 )    Color: x / Appearance: x / SG: x / pH: x  Gluc: 129 mg/dL / Ketone: x  / Bili: x / Urobili: x   Blood: x / Protein: x / Nitrite: x   Leuk Esterase: x / RBC: x / WBC x   Sq Epi: x / Non Sq Epi: x / Bacteria: x                              9.0    7.53  )-----------( 218      ( 05 Jan 2024 02:58 )             27.2                         10.0   9.78  )-----------( 227      ( 04 Jan 2024 06:34 )             29.6                         9.3    10.91 )-----------( 263      ( 03 Jan 2024 01:00 )             28.7     CAPILLARY BLOOD GLUCOSE            MICROBIOLOGY:     RADIOLOGY:  [ ] Reviewed and interpreted by me    Point of Care Ultrasound Findings:    PFT:    EKG:

## 2024-01-05 NOTE — BH CONSULTATION LIAISON PROGRESS NOTE - NSBHASSESSMENTFT_PSY_ALL_CORE
28 years old male with h/o Lupus ( diagnosed in 09/2023, on Plaquenil present to [place] ED on 12/12/23  with complain of chest pain and SOB admitted for fevers, PE, pleural effusions, course c/b high fever and tonic-clonic seizure, transferred to MICU for post-ictal and infectious monitoring. Psych consulted for depression. Pt had indicated a hx of depression/anxiety, had been in the  and [facility], and now is requesting to speak to someone. However he is rather medically active and compromised.   Reported being depressed for many years, no current safety concerns. C/o poor sleep due to pain  1/04: continues to report that he feels depressed, anxious, with poor sleep and appetite. Hx of trauma, difficulties to care for himself due to psychiatric condition.  1/5: Continues to report depressed mood, improving anxiety. Amenable to starting Prozac 20mg. Future-oriented, helping-seeking, no SIIP.    Plan:   - Continue medical stabilization as you are  - Address pain ( pain management)  - PRN for sleep: melatonin 3 mg qhs.  -Consult Holistic nurse to address his mood ( pt is amendable)  - No role for 1:1 at this time  - c/w Prozac 20 mg to address depression and anxiety (pt. scheduled for first dose today- 1/5/24)  - c/ww Gabapentin from 200 mg BID to 200 mg TID to address anxiety.  - Dispo: likely inpt rehab given severe deconditioning. Inpt psych would be ideal but likely limited by mobility/rehab needs.   Will continue to follow while here.   28 years old male with h/o Lupus ( diagnosed in 09/2023, on Plaquenil present to [place] ED on 12/12/23  with complain of chest pain and SOB admitted for fevers, PE, pleural effusions, course c/b high fever and tonic-clonic seizure, transferred to MICU for post-ictal and infectious monitoring. Psych consulted for depression. Pt had indicated a hx of depression/anxiety, had been in the  and [facility] but [facility], and now is requesting to speak to someone. However he is rather medically active and compromised.   Reported being depressed for many years, no current safety concerns. C/o poor sleep due to pain  1/04: continues to report that he feels depressed, anxious, with poor sleep and appetite. Hx of trauma, difficulties to care for himself due to psychiatric condition.  1/5: Continues to report depressed mood, improving anxiety. Amenable to starting Prozac 20mg. Future-oriented, helping-seeking, no SIIP.    Plan:   - Continue medical stabilization as you are  - Address pain ( pain management)  - PRN for sleep: melatonin 3 mg qhs.  -Consult Holistic nurse to address his mood ( pt is amendable)  - No role for 1:1 at this time  - c/w Prozac 20 mg to address depression and anxiety (pt. scheduled for first dose today- 1/5/24)  - c/ww Gabapentin from 200 mg BID to 200 mg TID to address anxiety.  - Dispo: likely inpt rehab given severe deconditioning. Inpt psych would be ideal but likely limited by mobility/rehab needs.   Will continue to follow while here.

## 2024-01-05 NOTE — PROGRESS NOTE ADULT - ATTENDING COMMENTS
Patient is a 27 yo M w/ SLE (dx 09/2023, on Plaquenil) who initially p/w L pleuritc CP and SOB to [facility] 12/12/23, found to have acute RUL and LLL lobe segmental/subsegmental PE with possible pulmonary infarct. Patient was started on AC with course c/b worsening hypoxemic respiratory failure, development of pleural effusions s/p R diagnostic thoracentesis and L pigtail/MIST (now removed), tonic-clonic seizures.    Pulmonary recalled for preoperative risk assessment for renal or LN biopsy    #Preoperative risk stratification - Per IR, Heparin gtt would have to be held for 4 hours before and 24 hours after. Patient has been on therapeutic AC for majority of hospital course since found to have low risk PE on 12/12/23. TTE with no evidence of RV dysfunction and negative cardiac enzymes/BNP. LE duplex negative for DVT. Patient currently on RA with SO2 100%  #Pulmonary Embolism  - Low risk from pulmonary standpoint to holding AC for 4 hours before and 24 hours after IR renal biopsy    Sunny Mcleod MD  Pulmonary & Critical Care Patient is a 29 yo M w/ SLE (dx 09/2023, on Plaquenil) who initially p/w L pleuritc CP and SOB to [facility] 12/12/23, found to have acute RUL and LLL lobe segmental/subsegmental PE with possible pulmonary infarct. Patient was started on AC with course c/b worsening hypoxemic respiratory failure, development of pleural effusions s/p R diagnostic thoracentesis and L pigtail/MIST (now removed), tonic-clonic seizures.    Pulmonary recalled for preoperative risk assessment for renal or LN biopsy    #Preoperative risk stratification - Per IR, Heparin gtt would have to be held for 4 hours before and 24 hours after. Patient has been on therapeutic AC for majority of hospital course since found to have low risk PE on 12/12/23. TTE with no evidence of RV dysfunction and negative cardiac enzymes/BNP. LE duplex negative for DVT. Patient currently on RA with SO2 100%  #Pulmonary Embolism  - Low risk from pulmonary standpoint to holding AC for 4 hours before and 24 hours after IR renal biopsy    Sunny Mcleod MD  Pulmonary & Critical Care

## 2024-01-05 NOTE — BH CONSULTATION LIAISON PROGRESS NOTE - NSBHATTESTBILLING_PSY_A_CORE
[id number] OBS or IP - moderate complexity OR 35-49 mins [id number] OBS or IP - moderate complexity OR 35-49 mins

## 2024-01-05 NOTE — PROGRESS NOTE ADULT - SUBJECTIVE AND OBJECTIVE BOX
TAYLA MARIE  [id number]    INTERVAL HPI/OVERNIGHT EVENTS: Pt reports feeling fine. Currently denies any fevers, new rashes, pleuritic chest pain, SOB, back pain, or hip pain. Pt's mother reported to the primary team that there was an episode of confusion overnight where pt was messaging his friends letting them know that he was leaving the hospital. Pt also had an episode of urinary and fecal incontinence this afternoon in the setting of not being able to reach the urinal. Pt denies feeling confused, states, "I just needs people to be patient with me."     MEDICATIONS  (STANDING):  acetaminophen     Tablet .. 650 milliGRAM(s) Oral every 8 hours  chlorhexidine 2% Cloths 1 Application(s) Topical daily  ciprofloxacin  0.3% Ophthalmic Solution for Otic Use 2 Drop(s) Both Ears two times a day  FLUoxetine 20 milliGRAM(s) Oral daily  gabapentin 200 milliGRAM(s) Oral three times a day  heparin  Infusion. 1300 Unit(s)/Hr (13 mL/Hr) IV Continuous <Continuous>  hydroxychloroquine 200 milliGRAM(s) Oral two times a day  lidocaine   4% Patch 1 Patch Transdermal every 24 hours  lidocaine   4% Patch 2 Patch Transdermal every 24 hours  melatonin 3 milliGRAM(s) Oral <User Schedule>  multivitamin/minerals/iron Oral Solution (CENTRUM) 15 milliLiter(s) Oral daily  pantoprazole    Tablet 40 milliGRAM(s) Oral before breakfast  polyethylene glycol 3350 17 Gram(s) Oral two times a day  predniSONE   Tablet 50 milliGRAM(s) Oral daily  senna 2 Tablet(s) Oral at bedtime  sodium chloride 1 Gram(s) Oral three times a day  sodium chloride 3%. 500 milliLiter(s) (30 mL/Hr) IV Continuous <Continuous>  trimethoprim  160 mG/sulfamethoxazole 800 mG 1 Tablet(s) Oral <User Schedule>    MEDICATIONS  (PRN):  albuterol/ipratropium for Nebulization 3 milliLiter(s) Nebulizer every 6 hours PRN Shortness of Breath and/or Wheezing  benzocaine/menthol Lozenge 1 Lozenge Oral four times a day PRN Sore Throat  FIRST- Mouthwash  BLM 15 milliLiter(s) Swish and Spit every 4 hours PRN Mouth Care  guaiFENesin Oral Liquid (Sugar-Free) 200 milliGRAM(s) Oral every 6 hours PRN Cough  heparin   Injectable 5500 Unit(s) IV Push every 6 hours PRN For aPTT less than 40  heparin   Injectable 2500 Unit(s) IV Push every 6 hours PRN For aPTT between 40 - 57  lidocaine 2% Viscous 5 milliLiter(s) Swish and Spit three times a day PRN Mouth Care  ondansetron Injectable 4 milliGRAM(s) IV Push every 8 hours PRN Nausea and/or Vomiting  oxyCODONE    IR 2.5 milliGRAM(s) Oral every 6 hours PRN Moderate Pain (4 - 6)  oxyCODONE    IR 5 milliGRAM(s) Oral every 6 hours PRN Severe Pain (7 - 10)      Allergies    No Known Allergies    Intolerances        Review of Systems:   as above       Vital Signs Last 24 Hrs  T(C): 36.3 (05 Jan 2024 14:00), Max: 36.8 (04 Jan 2024 22:05)  T(F): 97.4 (05 Jan 2024 14:00), Max: 98.3 (04 Jan 2024 22:05)  HR: 80 (05 Jan 2024 14:00) (68 - 81)  BP: 115/78 (05 Jan 2024 14:00) (110/67 - 117/78)  BP(mean): --  RR: 18 (05 Jan 2024 14:00) (17 - 18)  SpO2: 100% (05 Jan 2024 14:00) (98% - 100%)    Parameters below as of 05 Jan 2024 14:00  Patient On (Oxygen Delivery Method): room air        Physical Exam:  General: NAD, alert   HEENT: EOMI  CV: RRR, no murmurs   Lung: No increased WOB, lungs clear to auscultation   Abd: non-distended   Ext: no synovitis on exam   Neuro: AOx3, CNs III-XII intact, no focal deficits     LABS:                        9.0    7.53  )-----------( 218      ( 05 Jan 2024 02:58 )             27.2     01-05    136  |  102  |  10  ----------------------------<  129<H>  4.1   |  23  |  0.58    Ca    9.4      05 Jan 2024 02:58  Phos  3.3     01-05  Mg     1.90     01-05    TPro  6.6  /  Alb  2.9<L>  /  TBili  0.5  /  DBili  x   /  AST  40  /  ALT  78<H>  /  AlkPhos  90  01-05    PT/INR - ( 05 Jan 2024 02:58 )   PT: 15.9 sec;   INR: 1.42 ratio         PTT - ( 05 Jan 2024 02:58 )  PTT:74.8 sec  Urinalysis Basic - ( 05 Jan 2024 02:58 )    Color: x / Appearance: x / SG: x / pH: x  Gluc: 129 mg/dL / Ketone: x  / Bili: x / Urobili: x   Blood: x / Protein: x / Nitrite: x   Leuk Esterase: x / RBC: x / WBC x   Sq Epi: x / Non Sq Epi: x / Bacteria: x         TAYLA MARIE  [id number]    INTERVAL HPI/OVERNIGHT EVENTS: Pt reports feeling fine. Currently denies any fevers, new rashes, pleuritic chest pain, SOB, back pain, or hip pain. Pt's mother reported to the primary team that there was an episode of confusion overnight where pt was messaging his friends letting them know that he was leaving the hospital. Pt also had an episode of urinary and fecal incontinence this afternoon in the setting of not being able to reach the urinal. Pt denies feeling confused, states, "I just needs people to be patient with me."     MEDICATIONS  (STANDING):  acetaminophen     Tablet .. 650 milliGRAM(s) Oral every 8 hours  chlorhexidine 2% Cloths 1 Application(s) Topical daily  ciprofloxacin  0.3% Ophthalmic Solution for Otic Use 2 Drop(s) Both Ears two times a day  FLUoxetine 20 milliGRAM(s) Oral daily  gabapentin 200 milliGRAM(s) Oral three times a day  heparin  Infusion. 1300 Unit(s)/Hr (13 mL/Hr) IV Continuous <Continuous>  hydroxychloroquine 200 milliGRAM(s) Oral two times a day  lidocaine   4% Patch 1 Patch Transdermal every 24 hours  lidocaine   4% Patch 2 Patch Transdermal every 24 hours  melatonin 3 milliGRAM(s) Oral <User Schedule>  multivitamin/minerals/iron Oral Solution (CENTRUM) 15 milliLiter(s) Oral daily  pantoprazole    Tablet 40 milliGRAM(s) Oral before breakfast  polyethylene glycol 3350 17 Gram(s) Oral two times a day  predniSONE   Tablet 50 milliGRAM(s) Oral daily  senna 2 Tablet(s) Oral at bedtime  sodium chloride 1 Gram(s) Oral three times a day  sodium chloride 3%. 500 milliLiter(s) (30 mL/Hr) IV Continuous <Continuous>  trimethoprim  160 mG/sulfamethoxazole 800 mG 1 Tablet(s) Oral <User Schedule>    MEDICATIONS  (PRN):  albuterol/ipratropium for Nebulization 3 milliLiter(s) Nebulizer every 6 hours PRN Shortness of Breath and/or Wheezing  benzocaine/menthol Lozenge 1 Lozenge Oral four times a day PRN Sore Throat  FIRST- Mouthwash  BLM 15 milliLiter(s) Swish and Spit every 4 hours PRN Mouth Care  guaiFENesin Oral Liquid (Sugar-Free) 200 milliGRAM(s) Oral every 6 hours PRN Cough  heparin   Injectable 5500 Unit(s) IV Push every 6 hours PRN For aPTT less than 40  heparin   Injectable 2500 Unit(s) IV Push every 6 hours PRN For aPTT between 40 - 57  lidocaine 2% Viscous 5 milliLiter(s) Swish and Spit three times a day PRN Mouth Care  ondansetron Injectable 4 milliGRAM(s) IV Push every 8 hours PRN Nausea and/or Vomiting  oxyCODONE    IR 2.5 milliGRAM(s) Oral every 6 hours PRN Moderate Pain (4 - 6)  oxyCODONE    IR 5 milliGRAM(s) Oral every 6 hours PRN Severe Pain (7 - 10)      Allergies    No Known Allergies    Intolerances        Review of Systems:   as above       Vital Signs Last 24 Hrs  T(C): 36.3 (05 Jan 2024 14:00), Max: 36.8 (04 Jan 2024 22:05)  T(F): 97.4 (05 Jan 2024 14:00), Max: 98.3 (04 Jan 2024 22:05)  HR: 80 (05 Jan 2024 14:00) (68 - 81)  BP: 115/78 (05 Jan 2024 14:00) (110/67 - 117/78)  BP(mean): --  RR: 18 (05 Jan 2024 14:00) (17 - 18)  SpO2: 100% (05 Jan 2024 14:00) (98% - 100%)    Parameters below as of 05 Jan 2024 14:00  Patient On (Oxygen Delivery Method): room air        Physical Exam:  General: NAD, alert   HEENT: EOMI  CV: RRR, no murmurs   Lung: No increased WOB, lungs clear to auscultation   Abd: non-distended   Ext: no synovitis on exam   Neuro: AOx3, CNs III-XII intact, no focal deficits     LABS:                        9.0    7.53  )-----------( 218      ( 05 Jan 2024 02:58 )             27.2     01-05    136  |  102  |  10  ----------------------------<  129<H>  4.1   |  23  |  0.58    Ca    9.4      05 Jan 2024 02:58  Phos  3.3     01-05  Mg     1.90     01-05    TPro  6.6  /  Alb  2.9<L>  /  TBili  0.5  /  DBili  x   /  AST  40  /  ALT  78<H>  /  AlkPhos  90  01-05    PT/INR - ( 05 Jan 2024 02:58 )   PT: 15.9 sec;   INR: 1.42 ratio         PTT - ( 05 Jan 2024 02:58 )  PTT:74.8 sec  Urinalysis Basic - ( 05 Jan 2024 02:58 )    Color: x / Appearance: x / SG: x / pH: x  Gluc: 129 mg/dL / Ketone: x  / Bili: x / Urobili: x   Blood: x / Protein: x / Nitrite: x   Leuk Esterase: x / RBC: x / WBC x   Sq Epi: x / Non Sq Epi: x / Bacteria: x         TAYLA MARIE  [id number]    INTERVAL HPI/OVERNIGHT EVENTS: Pt reports feeling fine. Currently denies any fevers, new rashes, pleuritic chest pain, SOB, back pain, or hip pain. Pt's mother reported to the primary team that there was an episode of confusion overnight where pt was messaging his friends letting them know that he was leaving the hospital. Pt also had an episode of urinary and fecal incontinence this afternoon in the setting of not being able to reach the urinal. Pt denies feeling confused, states, "I just needs people to be patient with me."     MEDICATIONS  (STANDING):  acetaminophen     Tablet .. 650 milliGRAM(s) Oral every 8 hours  chlorhexidine 2% Cloths 1 Application(s) Topical daily  ciprofloxacin  0.3% Ophthalmic Solution for Otic Use 2 Drop(s) Both Ears two times a day  FLUoxetine 20 milliGRAM(s) Oral daily  gabapentin 200 milliGRAM(s) Oral three times a day  heparin  Infusion. 1300 Unit(s)/Hr (13 mL/Hr) IV Continuous <Continuous>  hydroxychloroquine 200 milliGRAM(s) Oral two times a day  lidocaine   4% Patch 1 Patch Transdermal every 24 hours  lidocaine   4% Patch 2 Patch Transdermal every 24 hours  melatonin 3 milliGRAM(s) Oral <User Schedule>  multivitamin/minerals/iron Oral Solution (CENTRUM) 15 milliLiter(s) Oral daily  pantoprazole    Tablet 40 milliGRAM(s) Oral before breakfast  polyethylene glycol 3350 17 Gram(s) Oral two times a day  predniSONE   Tablet 50 milliGRAM(s) Oral daily  senna 2 Tablet(s) Oral at bedtime  sodium chloride 1 Gram(s) Oral three times a day  sodium chloride 3%. 500 milliLiter(s) (30 mL/Hr) IV Continuous <Continuous>  trimethoprim  160 mG/sulfamethoxazole 800 mG 1 Tablet(s) Oral <User Schedule>    MEDICATIONS  (PRN):  albuterol/ipratropium for Nebulization 3 milliLiter(s) Nebulizer every 6 hours PRN Shortness of Breath and/or Wheezing  benzocaine/menthol Lozenge 1 Lozenge Oral four times a day PRN Sore Throat  FIRST- Mouthwash  BLM 15 milliLiter(s) Swish and Spit every 4 hours PRN Mouth Care  guaiFENesin Oral Liquid (Sugar-Free) 200 milliGRAM(s) Oral every 6 hours PRN Cough  heparin   Injectable 5500 Unit(s) IV Push every 6 hours PRN For aPTT less than 40  heparin   Injectable 2500 Unit(s) IV Push every 6 hours PRN For aPTT between 40 - 57  lidocaine 2% Viscous 5 milliLiter(s) Swish and Spit three times a day PRN Mouth Care  ondansetron Injectable 4 milliGRAM(s) IV Push every 8 hours PRN Nausea and/or Vomiting  oxyCODONE    IR 2.5 milliGRAM(s) Oral every 6 hours PRN Moderate Pain (4 - 6)  oxyCODONE    IR 5 milliGRAM(s) Oral every 6 hours PRN Severe Pain (7 - 10)      Allergies    No Known Allergies    Intolerances        Review of Systems:   as above       Vital Signs Last 24 Hrs  T(C): 36.3 (05 Jan 2024 14:00), Max: 36.8 (04 Jan 2024 22:05)  T(F): 97.4 (05 Jan 2024 14:00), Max: 98.3 (04 Jan 2024 22:05)  HR: 80 (05 Jan 2024 14:00) (68 - 81)  BP: 115/78 (05 Jan 2024 14:00) (110/67 - 117/78)  BP(mean): --  RR: 18 (05 Jan 2024 14:00) (17 - 18)  SpO2: 100% (05 Jan 2024 14:00) (98% - 100%)    Parameters below as of 05 Jan 2024 14:00  Patient On (Oxygen Delivery Method): room air        Physical Exam:  General: NAD, alert   HEENT: EOMI  CV: RRR, no murmurs   Lung: No increased WOB, lungs clear to auscultation   Abd: non-distended   Ext: no synovitis on exam   Neuro: AOx3, CNs III-XII intact, no focal deficits     LABS:                        9.0    7.53  )-----------( 218      ( 05 Jan 2024 02:58 )             27.2     01-05    136  |  102  |  10  ----------------------------<  129<H>  4.1   |  23  |  0.58    Ca    9.4      05 Jan 2024 02:58  Phos  3.3     01-05  Mg     1.90     01-05    TPro  6.6  /  Alb  2.9<L>  /  TBili  0.5  /  DBili  x   /  AST  40  /  ALT  78<H>  /  AlkPhos  90  01-05    PT/INR - ( 05 Jan 2024 02:58 )   PT: 15.9 sec;   INR: 1.42 ratio         PTT - ( 05 Jan 2024 02:58 )  PTT:74.8 sec  Urinalysis Basic - ( 05 Jan 2024 02:58 )    Color: x / Appearance: x / SG: x / pH: x  Gluc: 129 mg/dL / Ketone: x  / Bili: x / Urobili: x   Blood: x / Protein: x / Nitrite: x   Leuk Esterase: x / RBC: x / WBC x   Sq Epi: x / Non Sq Epi: x / Bacteria: x         TAYLA MARIE  [id number]    INTERVAL HPI/OVERNIGHT EVENTS: Pt reports feeling fine. Currently denies any fevers, new rashes, pleuritic chest pain, SOB, back pain, or hip pain. Pt's mother reported to the primary team that there was an episode of confusion overnight where pt was messaging his friends letting them know that he was leaving the hospital. Pt also had an episode of urinary and fecal incontinence this afternoon in the setting of not being able to reach the urinal. Pt denies feeling confused, states, "I just needs people to be patient with me."     MEDICATIONS  (STANDING):  acetaminophen     Tablet .. 650 milliGRAM(s) Oral every 8 hours  chlorhexidine 2% Cloths 1 Application(s) Topical daily  ciprofloxacin  0.3% Ophthalmic Solution for Otic Use 2 Drop(s) Both Ears two times a day  FLUoxetine 20 milliGRAM(s) Oral daily  gabapentin 200 milliGRAM(s) Oral three times a day  heparin  Infusion. 1300 Unit(s)/Hr (13 mL/Hr) IV Continuous <Continuous>  hydroxychloroquine 200 milliGRAM(s) Oral two times a day  lidocaine   4% Patch 1 Patch Transdermal every 24 hours  lidocaine   4% Patch 2 Patch Transdermal every 24 hours  melatonin 3 milliGRAM(s) Oral <User Schedule>  multivitamin/minerals/iron Oral Solution (CENTRUM) 15 milliLiter(s) Oral daily  pantoprazole    Tablet 40 milliGRAM(s) Oral before breakfast  polyethylene glycol 3350 17 Gram(s) Oral two times a day  predniSONE   Tablet 50 milliGRAM(s) Oral daily  senna 2 Tablet(s) Oral at bedtime  sodium chloride 1 Gram(s) Oral three times a day  sodium chloride 3%. 500 milliLiter(s) (30 mL/Hr) IV Continuous <Continuous>  trimethoprim  160 mG/sulfamethoxazole 800 mG 1 Tablet(s) Oral <User Schedule>    MEDICATIONS  (PRN):  albuterol/ipratropium for Nebulization 3 milliLiter(s) Nebulizer every 6 hours PRN Shortness of Breath and/or Wheezing  benzocaine/menthol Lozenge 1 Lozenge Oral four times a day PRN Sore Throat  FIRST- Mouthwash  BLM 15 milliLiter(s) Swish and Spit every 4 hours PRN Mouth Care  guaiFENesin Oral Liquid (Sugar-Free) 200 milliGRAM(s) Oral every 6 hours PRN Cough  heparin   Injectable 5500 Unit(s) IV Push every 6 hours PRN For aPTT less than 40  heparin   Injectable 2500 Unit(s) IV Push every 6 hours PRN For aPTT between 40 - 57  lidocaine 2% Viscous 5 milliLiter(s) Swish and Spit three times a day PRN Mouth Care  ondansetron Injectable 4 milliGRAM(s) IV Push every 8 hours PRN Nausea and/or Vomiting  oxyCODONE    IR 2.5 milliGRAM(s) Oral every 6 hours PRN Moderate Pain (4 - 6)  oxyCODONE    IR 5 milliGRAM(s) Oral every 6 hours PRN Severe Pain (7 - 10)      Allergies    No Known Allergies    Intolerances        Review of Systems:   as above       Vital Signs Last 24 Hrs  T(C): 36.3 (05 Jan 2024 14:00), Max: 36.8 (04 Jan 2024 22:05)  T(F): 97.4 (05 Jan 2024 14:00), Max: 98.3 (04 Jan 2024 22:05)  HR: 80 (05 Jan 2024 14:00) (68 - 81)  BP: 115/78 (05 Jan 2024 14:00) (110/67 - 117/78)  BP(mean): --  RR: 18 (05 Jan 2024 14:00) (17 - 18)  SpO2: 100% (05 Jan 2024 14:00) (98% - 100%)    Parameters below as of 05 Jan 2024 14:00  Patient On (Oxygen Delivery Method): room air        Physical Exam:  General: NAD, alert   HEENT: EOMI  CV: RRR, no murmurs   Lung: No increased WOB, lungs clear to auscultation   Abd: non-distended   Ext: no synovitis on exam   Neuro: AOx3, CNs III-XII intact, no focal deficits     LABS:                        9.0    7.53  )-----------( 218      ( 05 Jan 2024 02:58 )             27.2     01-05    136  |  102  |  10  ----------------------------<  129<H>  4.1   |  23  |  0.58    Ca    9.4      05 Jan 2024 02:58  Phos  3.3     01-05  Mg     1.90     01-05    TPro  6.6  /  Alb  2.9<L>  /  TBili  0.5  /  DBili  x   /  AST  40  /  ALT  78<H>  /  AlkPhos  90  01-05    PT/INR - ( 05 Jan 2024 02:58 )   PT: 15.9 sec;   INR: 1.42 ratio         PTT - ( 05 Jan 2024 02:58 )  PTT:74.8 sec  Urinalysis Basic - ( 05 Jan 2024 02:58 )    Color: x / Appearance: x / SG: x / pH: x  Gluc: 129 mg/dL / Ketone: x  / Bili: x / Urobili: x   Blood: x / Protein: x / Nitrite: x   Leuk Esterase: x / RBC: x / WBC x   Sq Epi: x / Non Sq Epi: x / Bacteria: x

## 2024-01-06 LAB
ALBUMIN SERPL ELPH-MCNC: 3 G/DL — LOW (ref 3.3–5)
ALBUMIN SERPL ELPH-MCNC: 3 G/DL — LOW (ref 3.3–5)
ALP SERPL-CCNC: 98 U/L — SIGNIFICANT CHANGE UP (ref 40–120)
ALP SERPL-CCNC: 98 U/L — SIGNIFICANT CHANGE UP (ref 40–120)
ALT FLD-CCNC: 92 U/L — HIGH (ref 4–41)
ALT FLD-CCNC: 92 U/L — HIGH (ref 4–41)
ANION GAP SERPL CALC-SCNC: 11 MMOL/L — SIGNIFICANT CHANGE UP (ref 7–14)
ANION GAP SERPL CALC-SCNC: 11 MMOL/L — SIGNIFICANT CHANGE UP (ref 7–14)
APTT BLD: 71.9 SEC — HIGH (ref 24.5–35.6)
APTT BLD: 71.9 SEC — HIGH (ref 24.5–35.6)
AST SERPL-CCNC: 49 U/L — HIGH (ref 4–40)
AST SERPL-CCNC: 49 U/L — HIGH (ref 4–40)
BILIRUB SERPL-MCNC: 0.4 MG/DL — SIGNIFICANT CHANGE UP (ref 0.2–1.2)
BILIRUB SERPL-MCNC: 0.4 MG/DL — SIGNIFICANT CHANGE UP (ref 0.2–1.2)
BUN SERPL-MCNC: 9 MG/DL — SIGNIFICANT CHANGE UP (ref 7–23)
BUN SERPL-MCNC: 9 MG/DL — SIGNIFICANT CHANGE UP (ref 7–23)
CALCIUM SERPL-MCNC: 9.6 MG/DL — SIGNIFICANT CHANGE UP (ref 8.4–10.5)
CALCIUM SERPL-MCNC: 9.6 MG/DL — SIGNIFICANT CHANGE UP (ref 8.4–10.5)
CHLORIDE SERPL-SCNC: 99 MMOL/L — SIGNIFICANT CHANGE UP (ref 98–107)
CHLORIDE SERPL-SCNC: 99 MMOL/L — SIGNIFICANT CHANGE UP (ref 98–107)
CO2 SERPL-SCNC: 25 MMOL/L — SIGNIFICANT CHANGE UP (ref 22–31)
CO2 SERPL-SCNC: 25 MMOL/L — SIGNIFICANT CHANGE UP (ref 22–31)
CREAT SERPL-MCNC: 0.63 MG/DL — SIGNIFICANT CHANGE UP (ref 0.5–1.3)
CREAT SERPL-MCNC: 0.63 MG/DL — SIGNIFICANT CHANGE UP (ref 0.5–1.3)
EGFR: 132 ML/MIN/1.73M2 — SIGNIFICANT CHANGE UP
EGFR: 132 ML/MIN/1.73M2 — SIGNIFICANT CHANGE UP
GLUCOSE SERPL-MCNC: 127 MG/DL — HIGH (ref 70–99)
GLUCOSE SERPL-MCNC: 127 MG/DL — HIGH (ref 70–99)
HCT VFR BLD CALC: 28 % — LOW (ref 39–50)
HCT VFR BLD CALC: 28 % — LOW (ref 39–50)
HGB BLD-MCNC: 9.1 G/DL — LOW (ref 13–17)
HGB BLD-MCNC: 9.1 G/DL — LOW (ref 13–17)
INR BLD: 1.36 RATIO — HIGH (ref 0.85–1.18)
INR BLD: 1.36 RATIO — HIGH (ref 0.85–1.18)
MAGNESIUM SERPL-MCNC: 1.8 MG/DL — SIGNIFICANT CHANGE UP (ref 1.6–2.6)
MAGNESIUM SERPL-MCNC: 1.8 MG/DL — SIGNIFICANT CHANGE UP (ref 1.6–2.6)
MCHC RBC-ENTMCNC: 30.5 PG — SIGNIFICANT CHANGE UP (ref 27–34)
MCHC RBC-ENTMCNC: 30.5 PG — SIGNIFICANT CHANGE UP (ref 27–34)
MCHC RBC-ENTMCNC: 32.5 GM/DL — SIGNIFICANT CHANGE UP (ref 32–36)
MCHC RBC-ENTMCNC: 32.5 GM/DL — SIGNIFICANT CHANGE UP (ref 32–36)
MCV RBC AUTO: 94 FL — SIGNIFICANT CHANGE UP (ref 80–100)
MCV RBC AUTO: 94 FL — SIGNIFICANT CHANGE UP (ref 80–100)
NRBC # BLD: 0 /100 WBCS — SIGNIFICANT CHANGE UP (ref 0–0)
NRBC # BLD: 0 /100 WBCS — SIGNIFICANT CHANGE UP (ref 0–0)
NRBC # FLD: 0 K/UL — SIGNIFICANT CHANGE UP (ref 0–0)
NRBC # FLD: 0 K/UL — SIGNIFICANT CHANGE UP (ref 0–0)
PHOSPHATE SERPL-MCNC: 2.9 MG/DL — SIGNIFICANT CHANGE UP (ref 2.5–4.5)
PHOSPHATE SERPL-MCNC: 2.9 MG/DL — SIGNIFICANT CHANGE UP (ref 2.5–4.5)
PLATELET # BLD AUTO: 206 K/UL — SIGNIFICANT CHANGE UP (ref 150–400)
PLATELET # BLD AUTO: 206 K/UL — SIGNIFICANT CHANGE UP (ref 150–400)
POTASSIUM SERPL-MCNC: 4 MMOL/L — SIGNIFICANT CHANGE UP (ref 3.5–5.3)
POTASSIUM SERPL-MCNC: 4 MMOL/L — SIGNIFICANT CHANGE UP (ref 3.5–5.3)
POTASSIUM SERPL-SCNC: 4 MMOL/L — SIGNIFICANT CHANGE UP (ref 3.5–5.3)
POTASSIUM SERPL-SCNC: 4 MMOL/L — SIGNIFICANT CHANGE UP (ref 3.5–5.3)
PROT SERPL-MCNC: 6.9 G/DL — SIGNIFICANT CHANGE UP (ref 6–8.3)
PROT SERPL-MCNC: 6.9 G/DL — SIGNIFICANT CHANGE UP (ref 6–8.3)
PROTEINASE3 AB FLD-ACNC: 27.7 UNITS — HIGH
PROTEINASE3 AB FLD-ACNC: 27.7 UNITS — HIGH
PROTEINASE3 AB SER-ACNC: ABNORMAL
PROTEINASE3 AB SER-ACNC: ABNORMAL
PROTHROM AB SERPL-ACNC: 15.1 SEC — HIGH (ref 9.5–13)
PROTHROM AB SERPL-ACNC: 15.1 SEC — HIGH (ref 9.5–13)
RBC # BLD: 2.98 M/UL — LOW (ref 4.2–5.8)
RBC # BLD: 2.98 M/UL — LOW (ref 4.2–5.8)
RBC # FLD: 15.9 % — HIGH (ref 10.3–14.5)
RBC # FLD: 15.9 % — HIGH (ref 10.3–14.5)
SODIUM SERPL-SCNC: 135 MMOL/L — SIGNIFICANT CHANGE UP (ref 135–145)
SODIUM SERPL-SCNC: 135 MMOL/L — SIGNIFICANT CHANGE UP (ref 135–145)
WBC # BLD: 6.3 K/UL — SIGNIFICANT CHANGE UP (ref 3.8–10.5)
WBC # BLD: 6.3 K/UL — SIGNIFICANT CHANGE UP (ref 3.8–10.5)
WBC # FLD AUTO: 6.3 K/UL — SIGNIFICANT CHANGE UP (ref 3.8–10.5)
WBC # FLD AUTO: 6.3 K/UL — SIGNIFICANT CHANGE UP (ref 3.8–10.5)

## 2024-01-06 RX ADMIN — HEPARIN SODIUM 900 UNIT(S)/HR: 5000 INJECTION INTRAVENOUS; SUBCUTANEOUS at 12:44

## 2024-01-06 RX ADMIN — BENZOCAINE AND MENTHOL 1 LOZENGE: 5; 1 LIQUID ORAL at 18:53

## 2024-01-06 RX ADMIN — HEPARIN SODIUM 900 UNIT(S)/HR: 5000 INJECTION INTRAVENOUS; SUBCUTANEOUS at 04:32

## 2024-01-06 RX ADMIN — Medication 650 MILLIGRAM(S): at 22:39

## 2024-01-06 RX ADMIN — Medication 200 MILLIGRAM(S): at 05:39

## 2024-01-06 RX ADMIN — OXYCODONE HYDROCHLORIDE 2.5 MILLIGRAM(S): 5 TABLET ORAL at 18:58

## 2024-01-06 RX ADMIN — Medication 2 DROP(S): at 05:42

## 2024-01-06 RX ADMIN — SODIUM CHLORIDE 1 GRAM(S): 9 INJECTION INTRAMUSCULAR; INTRAVENOUS; SUBCUTANEOUS at 22:38

## 2024-01-06 RX ADMIN — SODIUM CHLORIDE 1 GRAM(S): 9 INJECTION INTRAMUSCULAR; INTRAVENOUS; SUBCUTANEOUS at 05:39

## 2024-01-06 RX ADMIN — Medication 50 MILLIGRAM(S): at 05:40

## 2024-01-06 RX ADMIN — SODIUM CHLORIDE 1 GRAM(S): 9 INJECTION INTRAMUSCULAR; INTRAVENOUS; SUBCUTANEOUS at 12:28

## 2024-01-06 RX ADMIN — HEPARIN SODIUM 900 UNIT(S)/HR: 5000 INJECTION INTRAVENOUS; SUBCUTANEOUS at 07:06

## 2024-01-06 RX ADMIN — Medication 650 MILLIGRAM(S): at 05:38

## 2024-01-06 RX ADMIN — GABAPENTIN 200 MILLIGRAM(S): 400 CAPSULE ORAL at 22:38

## 2024-01-06 RX ADMIN — LIDOCAINE 1 PATCH: 4 CREAM TOPICAL at 23:16

## 2024-01-06 RX ADMIN — Medication 15 MILLILITER(S): at 12:27

## 2024-01-06 RX ADMIN — OXYCODONE HYDROCHLORIDE 2.5 MILLIGRAM(S): 5 TABLET ORAL at 18:25

## 2024-01-06 RX ADMIN — Medication 650 MILLIGRAM(S): at 12:31

## 2024-01-06 RX ADMIN — LIDOCAINE 1 PATCH: 4 CREAM TOPICAL at 12:32

## 2024-01-06 RX ADMIN — GABAPENTIN 200 MILLIGRAM(S): 400 CAPSULE ORAL at 12:31

## 2024-01-06 RX ADMIN — Medication 650 MILLIGRAM(S): at 23:27

## 2024-01-06 RX ADMIN — Medication 20 MILLIGRAM(S): at 12:29

## 2024-01-06 RX ADMIN — Medication 3 MILLIGRAM(S): at 22:38

## 2024-01-06 RX ADMIN — GABAPENTIN 200 MILLIGRAM(S): 400 CAPSULE ORAL at 05:38

## 2024-01-06 RX ADMIN — HEPARIN SODIUM 900 UNIT(S)/HR: 5000 INJECTION INTRAVENOUS; SUBCUTANEOUS at 20:27

## 2024-01-06 RX ADMIN — SENNA PLUS 2 TABLET(S): 8.6 TABLET ORAL at 22:39

## 2024-01-06 RX ADMIN — Medication 2 DROP(S): at 17:32

## 2024-01-06 RX ADMIN — CHLORHEXIDINE GLUCONATE 1 APPLICATION(S): 213 SOLUTION TOPICAL at 12:25

## 2024-01-06 RX ADMIN — Medication 200 MILLIGRAM(S): at 17:33

## 2024-01-06 RX ADMIN — LIDOCAINE 2 PATCH: 4 CREAM TOPICAL at 12:31

## 2024-01-06 RX ADMIN — PANTOPRAZOLE SODIUM 40 MILLIGRAM(S): 20 TABLET, DELAYED RELEASE ORAL at 07:07

## 2024-01-06 NOTE — CHART NOTE - NSCHARTNOTEFT_GEN_A_CORE
27 yo M with h/o Lupus ( diagnosed in 09/2023, on Plaquenil present to [place] ED on 12/12/23  with complain of chest pain and SOB admitted for fevers, PE, pleural effusions, course c/b high fever and tonic-clonic seizure.     IR c/s for renal bx in setting of concern for lupus nephritis. On initial consult, concern for holding anticoagulation both pre and post procedure.    Per rheum with the positive PR3 and elevated ACE/Vitamin D 1,25, there is concern for overlap with potential vasculitis and sarcoid.   Thus, having biopsy confirming if this is all lupus vs overlap syndrome vs malignancy would ultimately be helpful for guiding non-steroidal agents.    IR reconsulted for renal biopsy at this time.     Discussed with pulmonology and Low risk for holding heparin gtt 4 hr prior to and at least 24h after renal biopsy if uncomplciated and up to 72h if there are bleeding issues.     plan for  above procedure tentatively Tuesday 1/9. can put order for IR procedure under Dr Perdue.  for [facility]- also write pre-IR checklist chart note.  NPO AMN for sedation  hold hepgtt 1/9 AM  please recheck CBC BMP Coags KODY Hurst MD   Interventional Radiology Chief resident/PGY6  Contact on [employer] Teams for nonemergent issues    - Nonemergent consults:  [facility] order "Consult- Interventional Radiology", no page required  - Emergent issues (pager): [facility] [phone]; [facility] [phone]; [phone]; DO NOT PAGE FOR SCHEDULING QUESTIONS  - Scheduling questions 8am-6pm : [facility] [phone]; [facility] [phone],   - Clinic/outpatient booking: [facility] [phone]; [facility] [phone] 27 yo M with h/o Lupus ( diagnosed in 09/2023, on Plaquenil present to [place] ED on 12/12/23  with complain of chest pain and SOB admitted for fevers, PE, pleural effusions, course c/b high fever and tonic-clonic seizure.     IR c/s for renal bx in setting of concern for lupus nephritis. On initial consult, concern for holding anticoagulation both pre and post procedure.    Per rheum with the positive PR3 and elevated ACE/Vitamin D 1,25, there is concern for overlap with potential vasculitis and sarcoid.   Thus, having biopsy confirming if this is all lupus vs overlap syndrome vs malignancy would ultimately be helpful for guiding non-steroidal agents.    IR reconsulted for renal biopsy at this time.     Discussed with pulmonology and Low risk for holding heparin gtt 4 hr prior to and at least 24h after renal biopsy if uncomplciated and up to 72h if there are bleeding issues.     plan for  above procedure tentatively Tuesday 1/9. can put order for IR procedure under Dr Perdue.  for [facility]- also write pre-IR checklist chart note.  NPO AMN for sedation  hold hepgtt 1/9 AM  please recheck CBC BMP Coags KODY Hurst MD   Interventional Radiology Chief resident/PGY6  Contact on [employer] Teams for nonemergent issues    - Nonemergent consults:  [facility] order "Consult- Interventional Radiology", no page required  - Emergent issues (pager): [facility] [phone]; [facility] [phone]; [phone]; DO NOT PAGE FOR SCHEDULING QUESTIONS  - Scheduling questions 8am-6pm : [facility] [phone]; [facility] [phone],   - Clinic/outpatient booking: [facility] [phone]; [facility] [phone] 29 yo M with h/o Lupus ( diagnosed in 09/2023, on Plaquenil present to [place] ED on 12/12/23  with complain of chest pain and SOB admitted for fevers, PE, pleural effusions, course c/b high fever and tonic-clonic seizure.     IR c/s for renal bx in setting of concern for lupus nephritis. On initial consult, concern for holding anticoagulation both pre and post procedure.    Per rheum with the positive PR3 and elevated ACE/Vitamin D 1,25, there is concern for overlap with potential vasculitis and sarcoid.   Thus, having biopsy confirming if this is all lupus vs overlap syndrome vs malignancy would ultimately be helpful for guiding non-steroidal agents.    IR reconsulted for renal biopsy at this time.     Discussed with pulmonology and Low risk for holding heparin gtt 4 hr prior to and at least 24h after renal biopsy if uncomplciated and up to 72h if there are bleeding issues.     plan for  above procedure tentatively Tuesday 1/9. can put order for IR procedure under Dr Perdue.  for [facility]- also write pre-IR checklist chart note.  NPO AMN for sedation  hold hepgtt 1/9 AM  please recheck CBC BMP Coags KODY Hurst MD   Interventional Radiology Chief resident/PGY6  Contact on [employer] Teams for nonemergent issues    - Nonemergent consults:  [facility] order "Consult- Interventional Radiology", no page required  - Emergent issues (pager): [facility] [phone]; [facility] [phone]; [phone]; DO NOT PAGE FOR SCHEDULING QUESTIONS  - Scheduling questions 8am-6pm : [facility] [phone]; [facility] [phone],   - Clinic/outpatient booking: [facility] [phone]; [facility] [phone]

## 2024-01-06 NOTE — PROGRESS NOTE ADULT - ASSESSMENT
28 years old male with h/o Lupus ( diagnosed in 09/2023, on Plaquenil present to [place] ED on 12/12/23  with complain of chest pain and SOB. Patient reported left sided pleuritic chest pain which started 3 days ago. Pain is progressively worsened, associated with SOB and cough. Patient was seen in OSH ED and was prescribed antibiotics. Patient reported significantly worsening of left sided pleuritic chest pain today. No fever or chills. Patient reported loss of appetite and had a few episode of diarrhea for last 2 days. CTA chest with acute right upper lobe and left lower lobe segmental/subsegmental pulmonary emboli. No CT evidence of right heart strain. New bilateral lower lobe consolidations with areas of central clearing. Pneumonia and pulmonary infarcts are in the differential. New bilateral pleural effusions, small on the left and trace on the right. Bilateral axillary and supraclavicular adenopathy of unclear etiology. Patient was started on heparin ggt transitioned to eliquis on 12/19,  patient with worsening SOB/ hypoxia requiring nasal cannula oxygen supplementation. 12/20  Repeat Xray shows increased moderate left pleural effusion and compressive atelectasis trace right effusion and linear atelectasis. Thoracic team consulted for possible pigtail insertion Bedside US: Large left effusion with septations. Right simple effusion , + pericardial effusion- lower extremity dopplers negative for DVT.    # Pulm effusion/ Fever   S/P thoracentesis , followupp results   heparin for AC  TS care appreciated   O2 supplement   monitor output   Zosyn for now , ABx per ID   LP done  Plan for LN biopsy by IR , discussed with rheum and patinet;s mother, defer to rheum  Hematuria noted   Urology eval   COmpleted course of ABx   Pulma nd card for risk stratification to come off heparin for biopsy         # Fever  SIRS   Abx , comoleted per iD   Rheum adn ID follow up   Renal biopsy for protonuria, defer to rhem, discussed with IR, high risk       # Hyponatremia/ Seizure   RRT   Neuro follow up       #PE   on heparin , resume after thoracentesis   DVT negative  Heme onc eval   likley lupus related     #Hxof lupus  on hydroxychloroquine   Heme eval   Rheum eval   steroids per rheum       DVT andgIPPX    Discussed in detail with patient and mother at the bedside  28 years old male with h/o Lupus ( diagnosed in 09/2023, on Plaquenil present to [place] ED on 12/12/23  with complain of chest pain and SOB. Patient reported left sided pleuritic chest pain which started 3 days ago. Pain is progressively worsened, associated with SOB and cough. Patient was seen in OSH ED and was prescribed antibiotics. Patient reported significantly worsening of left sided pleuritic chest pain today. No fever or chills. Patient reported loss of appetite and had a few episode of diarrhea for last 2 days. CTA chest with acute right upper lobe and left lower lobe segmental/subsegmental pulmonary emboli. No CT evidence of right heart strain. New bilateral lower lobe consolidations with areas of central clearing. Pneumonia and pulmonary infarcts are in the differential. New bilateral pleural effusions, small on the left and trace on the right. Bilateral axillary and supraclavicular adenopathy of unclear etiology. Patient was started on heparin ggt transitioned to eliquis on 12/19,  patient with worsening SOB/ hypoxia requiring nasal cannula oxygen supplementation. 12/20  Repeat Xray shows increased moderate left pleural effusion and compressive atelectasis trace right effusion and linear atelectasis. Thoracic team consulted for possible pigtail insertion Bedside US: Large left effusion with septations. Right simple effusion , + pericardial effusion- lower extremity dopplers negative for DVT.    # Pulm effusion/ Fever   S/P thoracentesis , followupp results   heparin for AC  TS care appreciated   O2 supplement   monitor output   Zosyn for now , ABx per ID   LP done  Plan for LN biopsy by IR , discussed with rheum and patinet;s mother, defer to rheum  Hematuria noted   Urology eval   COmpleted course of ABx   Pulma nd card for risk stratification to come off heparin for biopsy         # Fever  SIRS   Abx , comoleted per iD   Rheum adn ID follow up   Renal biopsy for protonuria, defer to rhem, discussed with IR, high risk       # Hyponatremia/ Seizure   RRT   Neuro follow up       #PE   on heparin , resume after thoracentesis   DVT negative  Heme onc eval   likley lupus related     #Hxof lupus  on hydroxychloroquine   Heme eval   Rheum eval   steroids per rheum       DVT andgIPPX    Discussed in detail with patient and mother at the bedside

## 2024-01-06 NOTE — PROGRESS NOTE ADULT - SUBJECTIVE AND OBJECTIVE BOX
Name of Patient : TAYLA MARIE  MRN: [id number]  Date of visit: 01-06-24       Subjective: Patient seen and examined. No new events except as noted.     REVIEW OF SYSTEMS:    CONSTITUTIONAL: No weakness, fevers or chills  EYES/ENT: No visual changes;  No vertigo or throat pain   NECK: No pain or stiffness  RESPIRATORY: No cough, wheezing, hemoptysis; No shortness of breath  CARDIOVASCULAR: No chest pain or palpitations  GASTROINTESTINAL: No abdominal or epigastric pain. No nausea, vomiting, or hematemesis; No diarrhea or constipation. No melena or hematochezia.  GENITOURINARY: No dysuria, frequency or hematuria  NEUROLOGICAL: No numbness or weakness  SKIN: No itching, burning, rashes, or lesions   All other review of systems is negative unless indicated above.    MEDICATIONS:  MEDICATIONS  (STANDING):  acetaminophen     Tablet .. 650 milliGRAM(s) Oral every 8 hours  chlorhexidine 2% Cloths 1 Application(s) Topical daily  ciprofloxacin  0.3% Ophthalmic Solution for Otic Use 2 Drop(s) Both Ears two times a day  FLUoxetine 20 milliGRAM(s) Oral daily  gabapentin 200 milliGRAM(s) Oral three times a day  heparin  Infusion. 1300 Unit(s)/Hr (13 mL/Hr) IV Continuous <Continuous>  hydroxychloroquine 200 milliGRAM(s) Oral two times a day  lidocaine   4% Patch 1 Patch Transdermal every 24 hours  lidocaine   4% Patch 2 Patch Transdermal every 24 hours  melatonin 3 milliGRAM(s) Oral <User Schedule>  multivitamin/minerals/iron Oral Solution (CENTRUM) 15 milliLiter(s) Oral daily  pantoprazole    Tablet 40 milliGRAM(s) Oral before breakfast  polyethylene glycol 3350 17 Gram(s) Oral two times a day  predniSONE   Tablet 50 milliGRAM(s) Oral daily  senna 2 Tablet(s) Oral at bedtime  sodium chloride 1 Gram(s) Oral three times a day  sodium chloride 3%. 500 milliLiter(s) (30 mL/Hr) IV Continuous <Continuous>  trimethoprim  160 mG/sulfamethoxazole 800 mG 1 Tablet(s) Oral <User Schedule>      PHYSICAL EXAM:  T(C): 36.6 (01-06-24 @ 13:49), Max: 37.1 (01-06-24 @ 05:30)  HR: 82 (01-06-24 @ 13:49) (73 - 82)  BP: 123/73 (01-06-24 @ 13:49) (123/73 - 131/85)  RR: 18 (01-06-24 @ 13:49) (17 - 18)  SpO2: 100% (01-06-24 @ 13:49) (97% - 100%)  Wt(kg): --  I&O's Summary    Appearance: Normal	  HEENT:  PERRLA   Lymphatic: No lymphadenopathy   Cardiovascular: Normal S1 S2, no JVD  Respiratory: normal effort , clear  Gastrointestinal:  Soft, Non-tender  Skin: No rashes,  warm to touch  Psychiatry:  Mood & affect appropriate  Musculuskeletal: No edema    recent labs, Imaging and EKGs personally reviewed                    Name of Patient : TAYLA MARIE  MRN: [id number]  Date of visit: 01-06-24       Subjective: Patient seen and examined. No new events except as noted.     REVIEW OF SYSTEMS:    CONSTITUTIONAL: No weakness, fevers or chills  EYES/ENT: No visual changes;  No vertigo or throat pain   NECK: No pain or stiffness  RESPIRATORY: No cough, wheezing, hemoptysis; No shortness of breath  CARDIOVASCULAR: No chest pain or palpitations  GASTROINTESTINAL: No abdominal or epigastric pain. No nausea, vomiting, or hematemesis; No diarrhea or constipation. No melena or hematochezia.  GENITOURINARY: No dysuria, frequency or hematuria  NEUROLOGICAL: No numbness or weakness  SKIN: No itching, burning, rashes, or lesions   All other review of systems is negative unless indicated above.    MEDICATIONS:  MEDICATIONS  (STANDING):  acetaminophen     Tablet .. 650 milliGRAM(s) Oral every 8 hours  chlorhexidine 2% Cloths 1 Application(s) Topical daily  ciprofloxacin  0.3% Ophthalmic Solution for Otic Use 2 Drop(s) Both Ears two times a day  FLUoxetine 20 milliGRAM(s) Oral daily  gabapentin 200 milliGRAM(s) Oral three times a day  heparin  Infusion. 1300 Unit(s)/Hr (13 mL/Hr) IV Continuous <Continuous>  hydroxychloroquine 200 milliGRAM(s) Oral two times a day  lidocaine   4% Patch 1 Patch Transdermal every 24 hours  lidocaine   4% Patch 2 Patch Transdermal every 24 hours  melatonin 3 milliGRAM(s) Oral <User Schedule>  multivitamin/minerals/iron Oral Solution (CENTRUM) 15 milliLiter(s) Oral daily  pantoprazole    Tablet 40 milliGRAM(s) Oral before breakfast  polyethylene glycol 3350 17 Gram(s) Oral two times a day  predniSONE   Tablet 50 milliGRAM(s) Oral daily  senna 2 Tablet(s) Oral at bedtime  sodium chloride 1 Gram(s) Oral three times a day  sodium chloride 3%. 500 milliLiter(s) (30 mL/Hr) IV Continuous <Continuous>  trimethoprim  160 mG/sulfamethoxazole 800 mG 1 Tablet(s) Oral <User Schedule>      PHYSICAL EXAM:  T(C): 36.6 (01-06-24 @ 13:49), Max: 37.1 (01-06-24 @ 05:30)  HR: 82 (01-06-24 @ 13:49) (73 - 82)  BP: 123/73 (01-06-24 @ 13:49) (123/73 - 131/85)  RR: 18 (01-06-24 @ 13:49) (17 - 18)  SpO2: 100% (01-06-24 @ 13:49) (97% - 100%)  Wt(kg): --  I&O's Summary    Appearance: Normal	  HEENT:  PERRLA   Lymphatic: No lymphadenopathy   Cardiovascular: Normal S1 S2, no JVD  Respiratory: normal effort , clear  Gastrointestinal:  Soft, Non-tender  Skin: No rashes,  warm to touch  Psychiatry:  Mood & affect appropriate  Musculuskeletal: No edema    recent labs, Imaging and EKGs personally reviewed

## 2024-01-06 NOTE — PROGRESS NOTE ADULT - NUTRITIONAL ASSESSMENT
This patient has been assessed with a concern for Malnutrition and has been determined to have a diagnosis/diagnoses of Severe protein-calorie malnutrition.    This patient is being managed with:   Diet Regular-  1000mL Fluid Restriction ([id number])  Supplement Feeding Modality:  Oral  Ensure Plus High Protein Cans or Servings Per Day:  1       Frequency:  Three Times a day  Entered: Carter  3 2024  4:21PM   This patient has been assessed with a concern for Malnutrition and has been determined to have a diagnosis/diagnoses of Severe protein-calorie malnutrition.    This patient is being managed with:   Diet Regular-  1000mL Fluid Restriction ([id number])  Supplement Feeding Modality:  Oral  Ensure Plus High Protein Cans or Servings Per Day:  1       Frequency:  Three Times a day  Entered: Carter  3 2024  4:21PM

## 2024-01-06 NOTE — CHART NOTE - NSCHARTNOTESELECT_GEN_ALL_CORE
[address] [facility].  [address] [facility], [address]  [phone]                                  Reji Peace  [id number]  1963    It was my pleasure seeing you for your procedure. You will also receive a summary report with the findings from this procedure and any further recommendations. If you had polyps removed or biopsies taken during your procedure, you will receive a separate letter from me within the next 2 weeks. If you don't receive this letter or if you have any questions, please call my office [phone]. Please take note of the post procedure instructions listed below. Charlie Wishes,    Dr. Owen Donahue    These instructions give you information on caring for yourself after your procedure. Call your doctor if you have any problems or questions after your procedure. HOME CARE  · Walk if you have belly cramping or gas. Walking will help get rid of the air and reduce the bloated feeling in your belly (abdomen). · Your IV site (where you received drugs) may be tender to touch. Place warm towels on the site; keep your arm up on two pillows if you have any swelling or soreness in the area. · You may shower. ACTIVITY:  · Take frequent rest periods and move at a slower pace for the next 24 hours. .  · You may resume your regular activity tomorrow if you are feeling back to normal.  · Do not drive or ride a bicycle for at least 24 hours (because of the medicine (anesthesia) used during the test). · Do not sign any important legal documents or use or operate any machinery for 24 hours  · Do not take sleeping medicines/nerve drugs for 24 hours unless the doctor tells you. · You can return to work/school tomorrow unless otherwise instructed. NUTRITION:  · Drink plenty of fluids to keep your pee (urine) clear or pale yellow  · Begin with a light meal and progress to your normal diet.  Heavy or fried foods are harder to digest IR and may make you feel sick to your stomach (nauseated). · Once you are feeling back to normal, you may resume your normal diet as instructed by your doctor. · Avoid alcoholic beverages for 24 hours or as instructed. IF YOU HAD BIOPSIES TAKEN OR POLYPS REMOVED DURING THE PROCEDURE:  · For the next 7 days, avoid all non-steroidal antiinflammatory medications such as Ibuprofen, Motrin, Advil, Alleve, Aminata-seltzer, Goody's powder, BC powder. · If you do not have an heart condition that requires you to take a daily aspirin, you should avoid taking aspirin for 7 days. · Eat a soft diet for 24 hours. · Monitor your stools for any blood or dark black, tar-like, stools as this may be a sign of bleeding and if you see any blood, notify your doctor immediately. GET HELP RIGHT AWAY AND SEEK IMMEDIATE MEDICAL CARE IF:  · You have more than a spotting of blood in your stool. · You pass clumps of tissue (blood clots) or fill the toilet with blood. · Your belly is painfully swollen or puffy (abdominal distention). · You throw up (vomit). · You have a fever. · You have redness, pain or swelling at the IV site that last greater than two days. · You have abdominal pain or discomfort that is severe or gets worse throughout the day. Post-procedure diagnosis:  1. Mild Gastritis  2. Mild Esophagitis  3. Diverticulosis  4. Colon Polyps  5. Hemorrhoids      Post-procedure recommendations:  EGD Findings:  Esophagus: mild grade A reflux esophagitis at GE junction, 40 cm from the incisors  Stomach: mild diffuse gastritis, biopsied  Duodenum/jejunum:normal, biopsied    Colon Findings:   Rectum: small internal hemorrhoids  Sigmoid: One 6 mm sessile polyp, removed with cold snare  Descending Colon: mild diverticulosis   Transverse Colon: normal  Ascending Colon: mild diverticulosis  Cecum: One 7 mm sessile polyp, removed with cold snare  Terminal Ileum: normal    Recommendations:   - Await pathology.  You should receive a letter within 2 weeks. - Resume normal medications. Start omeprazole 40 mg once daily.   - Recommend repeat colonoscopy in 5 years. Learning About Coronavirus [phone])  Coronavirus [phone]): Overview  What is coronavirus (COVID-19)? The coronavirus disease (COVID-19) is caused by a virus. It is an illness that was first found in [place], [place], in December 2019. It has since spread worldwide. The virus can cause fever, cough, and trouble breathing. In severe cases, it can cause pneumonia and make it hard to breathe without help. It can cause death. Coronaviruses are a large group of viruses. They cause the common cold. They also cause more serious illnesses like Middle East respiratory syndrome (MERS) and severe acute respiratory syndrome (SARS). COVID-19 is caused by a novel coronavirus. That means it's a new type that has not been seen in people before. This virus spreads person-to-person through droplets from coughing and sneezing. It can also spread when you are close to someone who is infected. And it can spread when you touch something that has the virus on it, such as a doorknob or a tabletop. What can you do to protect yourself from coronavirus (COVID-19)? The best way to protect yourself from getting sick is to:  · Avoid areas where there is an outbreak. · Avoid contact with people who may be infected. · Wash your hands often with soap or alcohol-based hand sanitizers. · Avoid crowds and try to stay at least 6 feet away from other people. · Wash your hands often, especially after you cough or sneeze. Use soap and water, and scrub for at least 20 seconds. If soap and water aren't available, use an alcohol-based hand . · Avoid touching your mouth, nose, and eyes. What can you do to avoid spreading the virus to others? To help avoid spreading the virus to others:  · Cover your mouth with a tissue when you cough or sneeze. Then throw the tissue in the trash.   · Use a disinfectant to clean things that you touch often. · Stay home if you are sick or have been exposed to the virus. Don't go to school, work, or public areas. And don't use public transportation. · If you are sick:  ? Leave your home only if you need to get medical care. But call the doctor's office first so they know you're coming. And wear a face mask, if you have one.  ? If you have a face mask, wear it whenever you're around other people. It can help stop the spread of the virus when you cough or sneeze. ? Clean and disinfect your home every day. Use household  and disinfectant wipes or sprays. Take special care to clean things that you grab with your hands. These include doorknobs, remote controls, phones, and handles on your refrigerator and microwave. And don't forget countertops, tabletops, bathrooms, and computer keyboards. When to call for help  Call 911 anytime you think you may need emergency care. For example, call if:  · You have severe trouble breathing. (You can't talk at all.)  · You have constant chest pain or pressure. · You are severely dizzy or lightheaded. · You are confused or can't think clearly. · Your face and lips have a blue color. · You pass out (lose consciousness) or are very hard to wake up. Call your doctor now if you develop symptoms such as:  · Shortness of breath. · Fever. · Cough. If you need to get care, call ahead to the doctor's office for instructions before you go. Make sure you wear a face mask, if you have one, to prevent exposing other people to the virus. Where can you get the latest information? The following health organizations are tracking and studying this virus. Their websites contain the most up-to-date information. Latanya Centeno also learn what to do if you think you may have been exposed to the virus. · U.S. Centers for Disease Control and Prevention (CDC): The CDC provides updated news about the disease and travel advice.  The website also tells you how to prevent the spread of infection. www.cdc.gov  · World Health Organization [facility]): WHO offers information about the virus outbreaks. WHO also has travel advice. www.who.int  Current as of: April 1, 2020               Content Version: 12.4  © 1925-4040 [employer]. Care instructions adapted under license by your healthcare professional. If you have questions about a medical condition or this instruction, always ask your healthcare professional. [address] any warranty or liability for your use of this information.

## 2024-01-07 LAB
ALBUMIN SERPL ELPH-MCNC: 3.2 G/DL — LOW (ref 3.3–5)
ALBUMIN SERPL ELPH-MCNC: 3.2 G/DL — LOW (ref 3.3–5)
ALP SERPL-CCNC: 113 U/L — SIGNIFICANT CHANGE UP (ref 40–120)
ALP SERPL-CCNC: 113 U/L — SIGNIFICANT CHANGE UP (ref 40–120)
ALT FLD-CCNC: 122 U/L — HIGH (ref 4–41)
ALT FLD-CCNC: 122 U/L — HIGH (ref 4–41)
ANION GAP SERPL CALC-SCNC: 10 MMOL/L — SIGNIFICANT CHANGE UP (ref 7–14)
ANION GAP SERPL CALC-SCNC: 10 MMOL/L — SIGNIFICANT CHANGE UP (ref 7–14)
APTT BLD: 63 SEC — HIGH (ref 24.5–35.6)
APTT BLD: 63 SEC — HIGH (ref 24.5–35.6)
AST SERPL-CCNC: 62 U/L — HIGH (ref 4–40)
AST SERPL-CCNC: 62 U/L — HIGH (ref 4–40)
BILIRUB SERPL-MCNC: 0.4 MG/DL — SIGNIFICANT CHANGE UP (ref 0.2–1.2)
BILIRUB SERPL-MCNC: 0.4 MG/DL — SIGNIFICANT CHANGE UP (ref 0.2–1.2)
BUN SERPL-MCNC: 10 MG/DL — SIGNIFICANT CHANGE UP (ref 7–23)
BUN SERPL-MCNC: 10 MG/DL — SIGNIFICANT CHANGE UP (ref 7–23)
CALCIUM SERPL-MCNC: 9.7 MG/DL — SIGNIFICANT CHANGE UP (ref 8.4–10.5)
CALCIUM SERPL-MCNC: 9.7 MG/DL — SIGNIFICANT CHANGE UP (ref 8.4–10.5)
CHLORIDE SERPL-SCNC: 99 MMOL/L — SIGNIFICANT CHANGE UP (ref 98–107)
CHLORIDE SERPL-SCNC: 99 MMOL/L — SIGNIFICANT CHANGE UP (ref 98–107)
CO2 SERPL-SCNC: 26 MMOL/L — SIGNIFICANT CHANGE UP (ref 22–31)
CO2 SERPL-SCNC: 26 MMOL/L — SIGNIFICANT CHANGE UP (ref 22–31)
CREAT SERPL-MCNC: 0.64 MG/DL — SIGNIFICANT CHANGE UP (ref 0.5–1.3)
CREAT SERPL-MCNC: 0.64 MG/DL — SIGNIFICANT CHANGE UP (ref 0.5–1.3)
EGFR: 131 ML/MIN/1.73M2 — SIGNIFICANT CHANGE UP
EGFR: 131 ML/MIN/1.73M2 — SIGNIFICANT CHANGE UP
G6PD RBC-CCNC: 16.1 U/G HGB — SIGNIFICANT CHANGE UP (ref 7–20.5)
G6PD RBC-CCNC: 16.1 U/G HGB — SIGNIFICANT CHANGE UP (ref 7–20.5)
GLUCOSE SERPL-MCNC: 84 MG/DL — SIGNIFICANT CHANGE UP (ref 70–99)
GLUCOSE SERPL-MCNC: 84 MG/DL — SIGNIFICANT CHANGE UP (ref 70–99)
HCT VFR BLD CALC: 27.1 % — LOW (ref 39–50)
HCT VFR BLD CALC: 27.1 % — LOW (ref 39–50)
HGB BLD-MCNC: 9.1 G/DL — LOW (ref 13–17)
HGB BLD-MCNC: 9.1 G/DL — LOW (ref 13–17)
INR BLD: 1.36 RATIO — HIGH (ref 0.85–1.18)
INR BLD: 1.36 RATIO — HIGH (ref 0.85–1.18)
MAGNESIUM SERPL-MCNC: 1.9 MG/DL — SIGNIFICANT CHANGE UP (ref 1.6–2.6)
MAGNESIUM SERPL-MCNC: 1.9 MG/DL — SIGNIFICANT CHANGE UP (ref 1.6–2.6)
MCHC RBC-ENTMCNC: 31.2 PG — SIGNIFICANT CHANGE UP (ref 27–34)
MCHC RBC-ENTMCNC: 31.2 PG — SIGNIFICANT CHANGE UP (ref 27–34)
MCHC RBC-ENTMCNC: 33.6 GM/DL — SIGNIFICANT CHANGE UP (ref 32–36)
MCHC RBC-ENTMCNC: 33.6 GM/DL — SIGNIFICANT CHANGE UP (ref 32–36)
MCV RBC AUTO: 92.8 FL — SIGNIFICANT CHANGE UP (ref 80–100)
MCV RBC AUTO: 92.8 FL — SIGNIFICANT CHANGE UP (ref 80–100)
NRBC # BLD: 0 /100 WBCS — SIGNIFICANT CHANGE UP (ref 0–0)
NRBC # BLD: 0 /100 WBCS — SIGNIFICANT CHANGE UP (ref 0–0)
NRBC # FLD: 0 K/UL — SIGNIFICANT CHANGE UP (ref 0–0)
NRBC # FLD: 0 K/UL — SIGNIFICANT CHANGE UP (ref 0–0)
PHOSPHATE SERPL-MCNC: 3.7 MG/DL — SIGNIFICANT CHANGE UP (ref 2.5–4.5)
PHOSPHATE SERPL-MCNC: 3.7 MG/DL — SIGNIFICANT CHANGE UP (ref 2.5–4.5)
PLATELET # BLD AUTO: 206 K/UL — SIGNIFICANT CHANGE UP (ref 150–400)
PLATELET # BLD AUTO: 206 K/UL — SIGNIFICANT CHANGE UP (ref 150–400)
POTASSIUM SERPL-MCNC: 4.1 MMOL/L — SIGNIFICANT CHANGE UP (ref 3.5–5.3)
POTASSIUM SERPL-MCNC: 4.1 MMOL/L — SIGNIFICANT CHANGE UP (ref 3.5–5.3)
POTASSIUM SERPL-SCNC: 4.1 MMOL/L — SIGNIFICANT CHANGE UP (ref 3.5–5.3)
POTASSIUM SERPL-SCNC: 4.1 MMOL/L — SIGNIFICANT CHANGE UP (ref 3.5–5.3)
PROT SERPL-MCNC: 6.8 G/DL — SIGNIFICANT CHANGE UP (ref 6–8.3)
PROT SERPL-MCNC: 6.8 G/DL — SIGNIFICANT CHANGE UP (ref 6–8.3)
PROTHROM AB SERPL-ACNC: 15.2 SEC — HIGH (ref 9.5–13)
PROTHROM AB SERPL-ACNC: 15.2 SEC — HIGH (ref 9.5–13)
RBC # BLD: 2.92 M/UL — LOW (ref 4.2–5.8)
RBC # BLD: 2.92 M/UL — LOW (ref 4.2–5.8)
RBC # FLD: 15.8 % — HIGH (ref 10.3–14.5)
RBC # FLD: 15.8 % — HIGH (ref 10.3–14.5)
SODIUM SERPL-SCNC: 135 MMOL/L — SIGNIFICANT CHANGE UP (ref 135–145)
SODIUM SERPL-SCNC: 135 MMOL/L — SIGNIFICANT CHANGE UP (ref 135–145)
WBC # BLD: 6.64 K/UL — SIGNIFICANT CHANGE UP (ref 3.8–10.5)
WBC # BLD: 6.64 K/UL — SIGNIFICANT CHANGE UP (ref 3.8–10.5)
WBC # FLD AUTO: 6.64 K/UL — SIGNIFICANT CHANGE UP (ref 3.8–10.5)
WBC # FLD AUTO: 6.64 K/UL — SIGNIFICANT CHANGE UP (ref 3.8–10.5)

## 2024-01-07 RX ADMIN — Medication 650 MILLIGRAM(S): at 05:11

## 2024-01-07 RX ADMIN — GABAPENTIN 200 MILLIGRAM(S): 400 CAPSULE ORAL at 22:38

## 2024-01-07 RX ADMIN — SODIUM CHLORIDE 1 GRAM(S): 9 INJECTION INTRAMUSCULAR; INTRAVENOUS; SUBCUTANEOUS at 13:48

## 2024-01-07 RX ADMIN — HEPARIN SODIUM 900 UNIT(S)/HR: 5000 INJECTION INTRAVENOUS; SUBCUTANEOUS at 05:15

## 2024-01-07 RX ADMIN — GABAPENTIN 200 MILLIGRAM(S): 400 CAPSULE ORAL at 05:11

## 2024-01-07 RX ADMIN — Medication 650 MILLIGRAM(S): at 06:11

## 2024-01-07 RX ADMIN — Medication 650 MILLIGRAM(S): at 13:50

## 2024-01-07 RX ADMIN — HEPARIN SODIUM 900 UNIT(S)/HR: 5000 INJECTION INTRAVENOUS; SUBCUTANEOUS at 04:05

## 2024-01-07 RX ADMIN — Medication 15 MILLILITER(S): at 13:47

## 2024-01-07 RX ADMIN — SENNA PLUS 2 TABLET(S): 8.6 TABLET ORAL at 22:39

## 2024-01-07 RX ADMIN — HEPARIN SODIUM 900 UNIT(S)/HR: 5000 INJECTION INTRAVENOUS; SUBCUTANEOUS at 19:06

## 2024-01-07 RX ADMIN — Medication 2 DROP(S): at 06:52

## 2024-01-07 RX ADMIN — PANTOPRAZOLE SODIUM 40 MILLIGRAM(S): 20 TABLET, DELAYED RELEASE ORAL at 06:52

## 2024-01-07 RX ADMIN — SODIUM CHLORIDE 1 GRAM(S): 9 INJECTION INTRAMUSCULAR; INTRAVENOUS; SUBCUTANEOUS at 22:37

## 2024-01-07 RX ADMIN — Medication 3 MILLIGRAM(S): at 22:37

## 2024-01-07 RX ADMIN — LIDOCAINE 1 PATCH: 4 CREAM TOPICAL at 00:14

## 2024-01-07 RX ADMIN — Medication 650 MILLIGRAM(S): at 22:38

## 2024-01-07 RX ADMIN — Medication 200 MILLIGRAM(S): at 05:11

## 2024-01-07 RX ADMIN — Medication 50 MILLIGRAM(S): at 05:11

## 2024-01-07 RX ADMIN — Medication 200 MILLIGRAM(S): at 18:36

## 2024-01-07 RX ADMIN — Medication 20 MILLIGRAM(S): at 13:48

## 2024-01-07 RX ADMIN — LIDOCAINE 2 PATCH: 4 CREAM TOPICAL at 00:14

## 2024-01-07 RX ADMIN — HEPARIN SODIUM 900 UNIT(S)/HR: 5000 INJECTION INTRAVENOUS; SUBCUTANEOUS at 08:01

## 2024-01-07 RX ADMIN — SODIUM CHLORIDE 1 GRAM(S): 9 INJECTION INTRAMUSCULAR; INTRAVENOUS; SUBCUTANEOUS at 05:11

## 2024-01-07 RX ADMIN — CHLORHEXIDINE GLUCONATE 1 APPLICATION(S): 213 SOLUTION TOPICAL at 13:47

## 2024-01-07 RX ADMIN — Medication 2 DROP(S): at 18:36

## 2024-01-07 RX ADMIN — GABAPENTIN 200 MILLIGRAM(S): 400 CAPSULE ORAL at 13:48

## 2024-01-07 RX ADMIN — POLYETHYLENE GLYCOL 3350 17 GRAM(S): 17 POWDER, FOR SOLUTION ORAL at 05:10

## 2024-01-07 NOTE — PROGRESS NOTE ADULT - SUBJECTIVE AND OBJECTIVE BOX
Subjective: Patient seen and examined. No new events except as noted.     SUBJECTIVE/ROS:  nad      MEDICATIONS:  MEDICATIONS  (STANDING):  acetaminophen     Tablet .. 650 milliGRAM(s) Oral every 8 hours  chlorhexidine 2% Cloths 1 Application(s) Topical daily  ciprofloxacin  0.3% Ophthalmic Solution for Otic Use 2 Drop(s) Both Ears two times a day  FLUoxetine 20 milliGRAM(s) Oral daily  gabapentin 200 milliGRAM(s) Oral three times a day  heparin  Infusion. 1300 Unit(s)/Hr (13 mL/Hr) IV Continuous <Continuous>  hydroxychloroquine 200 milliGRAM(s) Oral two times a day  lidocaine   4% Patch 2 Patch Transdermal every 24 hours  lidocaine   4% Patch 1 Patch Transdermal every 24 hours  melatonin 3 milliGRAM(s) Oral <User Schedule>  multivitamin/minerals/iron Oral Solution (CENTRUM) 15 milliLiter(s) Oral daily  pantoprazole    Tablet 40 milliGRAM(s) Oral before breakfast  polyethylene glycol 3350 17 Gram(s) Oral two times a day  predniSONE   Tablet 50 milliGRAM(s) Oral daily  senna 2 Tablet(s) Oral at bedtime  sodium chloride 1 Gram(s) Oral three times a day  sodium chloride 3%. 500 milliLiter(s) (30 mL/Hr) IV Continuous <Continuous>  trimethoprim  160 mG/sulfamethoxazole 800 mG 1 Tablet(s) Oral <User Schedule>      PHYSICAL EXAM:  T(C): 36.6 (01-07-24 @ 05:32), Max: 36.6 (01-06-24 @ 13:49)  HR: 72 (01-07-24 @ 05:32) (72 - 82)  BP: 131/88 (01-07-24 @ 05:32) (110/65 - 131/88)  RR: 17 (01-07-24 @ 05:32) (17 - 18)  SpO2: 100% (01-07-24 @ 05:32) (98% - 100%)  Wt(kg): --  I&O's Summary    06 Jan 2024 07:01  -  07 Jan 2024 07:00  --------------------------------------------------------  IN: 356 mL / OUT: 350 mL / NET: 6 mL    07 Jan 2024 07:01  -  07 Jan 2024 08:44  --------------------------------------------------------  IN: 0 mL / OUT: 500 mL / NET: -500 mL            JVP: Normal  Neck: supple  Lung: clear   CV: S1 S2 , Murmur:  Abd: soft  Ext: No edema  neuro: Awake   Psych: flat affect  Skin: normal``    LABS/DATA:    CARDIAC MARKERS:                                9.1    6.64  )-----------( 206      ( 07 Jan 2024 04:38 )             27.1     01-07    135  |  99  |  10  ----------------------------<  84  4.1   |  26  |  0.64    Ca    9.7      07 Jan 2024 04:38  Phos  3.7     01-07  Mg     1.90     01-07    TPro  6.8  /  Alb  3.2<L>  /  TBili  0.4  /  DBili  x   /  AST  62<H>  /  ALT  122<H>  /  AlkPhos  113  01-07    proBNP:   Lipid Profile:   HgA1c:   TSH:     TELE:  EKG:

## 2024-01-07 NOTE — PROGRESS NOTE ADULT - ASSESSMENT
28 years old male with h/o Lupus ( diagnosed in 09/2023, on Plaquenil present to [place] [facility] ED on 12/12/23  with complain of chest pain and SOB. Patient reported left sided pleuritic chest pain which started 3 days ago. Pain is progressively worsened, associated with SOB and cough. Patient was seen in OSH ED and was prescribed antibiotics. Patient reported significantly worsening of left sided pleuritic chest pain today. No fever or chills. Patient reported loss of appetite and had a few episode of diarrhea for last 2 days. CTA chest with acute right upper lobe and left lower lobe segmental/subsegmental pulmonary emboli. No CT evidence of right heart strain. New bilateral lower lobe consolidations with areas of central clearing. Pneumonia and pulmonary infarcts are in the differential. New bilateral pleural effusions, small on the left and trace on the right. Bilateral axillary and supraclavicular adenopathy of unclear etiology. Patient was started on heparin ggt transitioned to eliquis on 12/19,  patient with worsening SOB/ hypoxia requiring nasal cannula oxygen supplementation. 12/20  Repeat Xray shows increased moderate left pleural effusion and compressive atelectasis trace right effusion and linear atelectasis. Thoracic team consulted for possible pigtail insertion Bedside US: Large left effusion with septations. Right simple effusion , + pericardial effusion- lower extremity dopplers negative for DVT.    # Pulm effusion/ Fever   S/P thoracentesis , followupp results   heparin for AC  TS care appreciated   O2 supplement   monitor output   Zosyn for now , ABx per ID   LP done  Plan for LN biopsy by IR , discussed with rheum and patinet;s mother, defer to rheum  Hematuria noted   Urology eval   COmpleted course of ABx   Pulma nd card for risk stratification to come off heparin for biopsy   plan for tuesday         # Fever  SIRS   Abx , comoleted per iD   Rheum adn ID follow up   Renal biopsy for protonuria, defer to rhem, discussed with IR, high risk       # Hyponatremia/ Seizure   RRT   Neuro follow up       #PE   on heparin , resume after thoracentesis   DVT negative  Heme onc eval   likley lupus related     #Hxof lupus  on hydroxychloroquine   Heme eval   Rheum eval   steroids per rheum       DVT andgIPPX    Discussed in detail with patient and mother at the bedside  28 years old male with h/o Lupus ( diagnosed in 09/2023, on Plaquenil present to [place] ED on 12/12/23  with complain of chest pain and SOB. Patient reported left sided pleuritic chest pain which started 3 days ago. Pain is progressively worsened, associated with SOB and cough. Patient was seen in OSH ED and was prescribed antibiotics. Patient reported significantly worsening of left sided pleuritic chest pain today. No fever or chills. Patient reported loss of appetite and had a few episode of diarrhea for last 2 days. CTA chest with acute right upper lobe and left lower lobe segmental/subsegmental pulmonary emboli. No CT evidence of right heart strain. New bilateral lower lobe consolidations with areas of central clearing. Pneumonia and pulmonary infarcts are in the differential. New bilateral pleural effusions, small on the left and trace on the right. Bilateral axillary and supraclavicular adenopathy of unclear etiology. Patient was started on heparin ggt transitioned to eliquis on 12/19,  patient with worsening SOB/ hypoxia requiring nasal cannula oxygen supplementation. 12/20  Repeat Xray shows increased moderate left pleural effusion and compressive atelectasis trace right effusion and linear atelectasis. Thoracic team consulted for possible pigtail insertion Bedside US: Large left effusion with septations. Right simple effusion , + pericardial effusion- lower extremity dopplers negative for DVT.    # Pulm effusion/ Fever   S/P thoracentesis , followupp results   heparin for AC  TS care appreciated   O2 supplement   monitor output   Zosyn for now , ABx per ID   LP done  Plan for LN biopsy by IR , discussed with rheum and patinet;s mother, defer to rheum  Hematuria noted   Urology eval   COmpleted course of ABx   Pulma nd card for risk stratification to come off heparin for biopsy   plan for tuesday         # Fever  SIRS   Abx , comoleted per iD   Rheum adn ID follow up   Renal biopsy for protonuria, defer to rhem, discussed with IR, high risk       # Hyponatremia/ Seizure   RRT   Neuro follow up       #PE   on heparin , resume after thoracentesis   DVT negative  Heme onc eval   likley lupus related     #Hxof lupus  on hydroxychloroquine   Heme eval   Rheum eval   steroids per rheum       DVT andgIPPX    Discussed in detail with patient and mother at the bedside

## 2024-01-07 NOTE — PROGRESS NOTE ADULT - NUTRITIONAL ASSESSMENT
This patient has been assessed with a concern for Malnutrition and has been determined to have a diagnosis/diagnoses of Severe protein-calorie malnutrition.    This patient is being managed with:   Diet Regular-  1000mL Fluid Restriction ([id number])  Supplement Feeding Modality:  Oral  Ensure Plus High Protein Cans or Servings Per Day:  1       Frequency:  Three Times a day  Entered: Carter  3 2024  4:21PM   This patient has been assessed with a concern for Malnutrition and has been determined to have a diagnosis/diagnoses of Severe protein-calorie malnutrition.    This patient is being managed with:   Diet Regular-  1000mL Fluid Restriction ([id number])  Supplement Feeding Modality:  Oral  Ensure Plus High Protein Cans or Servings Per Day:  1       Frequency:  Three Times a day  Entered: Carter  3 2024  4:21PM

## 2024-01-07 NOTE — PROGRESS NOTE ADULT - ASSESSMENT
PE  on a/c  no evidence of right heart strain   no significant evidence of myocardial injury   normal LV function   a/c can be held kidney biopsy as needed by IR and deemed safe/appropriate as per pulm   from CV standpoint pt is hemodynamically stable and no evidence of RIght heart strain     Pericardial effusion   inflammatory in setting of SLE   trace effusion   no sign of tamponade  repeat echo in few weeks for surveillance   fu with rheum for ongoing work up

## 2024-01-07 NOTE — PROGRESS NOTE ADULT - SUBJECTIVE AND OBJECTIVE BOX
Name of Patient : TAYLA MARIE  MRN: [id number]  Date of visit: 01-07-24       Subjective: Patient seen and examined. No new events except as noted.   Doing okay     REVIEW OF SYSTEMS:    CONSTITUTIONAL: No weakness, fevers or chills  EYES/ENT: No visual changes;  No vertigo or throat pain   NECK: No pain or stiffness  RESPIRATORY: No cough, wheezing, hemoptysis; No shortness of breath  CARDIOVASCULAR: No chest pain or palpitations  GASTROINTESTINAL: No abdominal or epigastric pain. No nausea, vomiting, or hematemesis; No diarrhea or constipation. No melena or hematochezia.  GENITOURINARY: No dysuria, frequency or hematuria  NEUROLOGICAL: No numbness or weakness  SKIN: No itching, burning, rashes, or lesions   All other review of systems is negative unless indicated above.    MEDICATIONS:  MEDICATIONS  (STANDING):  acetaminophen     Tablet .. 650 milliGRAM(s) Oral every 8 hours  chlorhexidine 2% Cloths 1 Application(s) Topical daily  ciprofloxacin  0.3% Ophthalmic Solution for Otic Use 2 Drop(s) Both Ears two times a day  FLUoxetine 20 milliGRAM(s) Oral daily  gabapentin 200 milliGRAM(s) Oral three times a day  heparin  Infusion. 1300 Unit(s)/Hr (13 mL/Hr) IV Continuous <Continuous>  hydroxychloroquine 200 milliGRAM(s) Oral two times a day  lidocaine   4% Patch 2 Patch Transdermal every 24 hours  lidocaine   4% Patch 1 Patch Transdermal every 24 hours  melatonin 3 milliGRAM(s) Oral <User Schedule>  multivitamin/minerals/iron Oral Solution (CENTRUM) 15 milliLiter(s) Oral daily  pantoprazole    Tablet 40 milliGRAM(s) Oral before breakfast  polyethylene glycol 3350 17 Gram(s) Oral two times a day  predniSONE   Tablet 50 milliGRAM(s) Oral daily  senna 2 Tablet(s) Oral at bedtime  sodium chloride 1 Gram(s) Oral three times a day  sodium chloride 3%. 500 milliLiter(s) (30 mL/Hr) IV Continuous <Continuous>  trimethoprim  160 mG/sulfamethoxazole 800 mG 1 Tablet(s) Oral <User Schedule>      PHYSICAL EXAM:  T(C): 36.9 (01-07-24 @ 12:28), Max: 36.9 (01-07-24 @ 12:28)  HR: 93 (01-07-24 @ 12:28) (72 - 93)  BP: 125/77 (01-07-24 @ 12:28) (110/65 - 131/88)  RR: 18 (01-07-24 @ 12:28) (17 - 18)  SpO2: 100% (01-07-24 @ 12:28) (98% - 100%)  Wt(kg): --  I&O's Summary    06 Jan 2024 07:01  -  07 Jan 2024 07:00  --------------------------------------------------------  IN: 356 mL / OUT: 350 mL / NET: 6 mL    07 Jan 2024 07:01  -  07 Jan 2024 21:23  --------------------------------------------------------  IN: 0 mL / OUT: 500 mL / NET: -500 mL          Appearance: Normal	  HEENT:  PERRLA   Lymphatic: No lymphadenopathy   Cardiovascular: Normal S1 S2, no JVD  Respiratory: normal effort , clear  Gastrointestinal:  Soft, Non-tender  Skin: No rashes,  warm to touch  Psychiatry:  Mood & affect appropriate  Musculuskeletal: No edema    recent labs, Imaging and EKGs personally reviewed     01-06-24 @ 07:01  -  01-07-24 @ 07:00  --------------------------------------------------------  IN: 356 mL / OUT: 350 mL / NET: 6 mL    01-07-24 @ 07:01  -  01-07-24 @ 21:23  --------------------------------------------------------  IN: 0 mL / OUT: 500 mL / NET: -500 mL                          9.1    6.64  )-----------( 206      ( 07 Jan 2024 04:38 )             27.1               01-07    135  |  99  |  10  ----------------------------<  84  4.1   |  26  |  0.64    Ca    9.7      07 Jan 2024 04:38  Phos  3.7     01-07  Mg     1.90     01-07    TPro  6.8  /  Alb  3.2<L>  /  TBili  0.4  /  DBili  x   /  AST  62<H>  /  ALT  122<H>  /  AlkPhos  113  01-07    PT/INR - ( 07 Jan 2024 04:38 )   PT: 15.2 sec;   INR: 1.36 ratio         PTT - ( 07 Jan 2024 04:38 )  PTT:63.0 sec                   Urinalysis Basic - ( 07 Jan 2024 04:38 )    Color: x / Appearance: x / SG: x / pH: x  Gluc: 84 mg/dL / Ketone: x  / Bili: x / Urobili: x   Blood: x / Protein: x / Nitrite: x   Leuk Esterase: x / RBC: x / WBC x   Sq Epi: x / Non Sq Epi: x / Bacteria: x               Name of Patient : TAYLA MARIE  MRN: [id number]  Date of visit: 01-07-24       Subjective: Patient seen and examined. No new events except as noted.   Doing okay     REVIEW OF SYSTEMS:    CONSTITUTIONAL: No weakness, fevers or chills  EYES/ENT: No visual changes;  No vertigo or throat pain   NECK: No pain or stiffness  RESPIRATORY: No cough, wheezing, hemoptysis; No shortness of breath  CARDIOVASCULAR: No chest pain or palpitations  GASTROINTESTINAL: No abdominal or epigastric pain. No nausea, vomiting, or hematemesis; No diarrhea or constipation. No melena or hematochezia.  GENITOURINARY: No dysuria, frequency or hematuria  NEUROLOGICAL: No numbness or weakness  SKIN: No itching, burning, rashes, or lesions   All other review of systems is negative unless indicated above.    MEDICATIONS:  MEDICATIONS  (STANDING):  acetaminophen     Tablet .. 650 milliGRAM(s) Oral every 8 hours  chlorhexidine 2% Cloths 1 Application(s) Topical daily  ciprofloxacin  0.3% Ophthalmic Solution for Otic Use 2 Drop(s) Both Ears two times a day  FLUoxetine 20 milliGRAM(s) Oral daily  gabapentin 200 milliGRAM(s) Oral three times a day  heparin  Infusion. 1300 Unit(s)/Hr (13 mL/Hr) IV Continuous <Continuous>  hydroxychloroquine 200 milliGRAM(s) Oral two times a day  lidocaine   4% Patch 2 Patch Transdermal every 24 hours  lidocaine   4% Patch 1 Patch Transdermal every 24 hours  melatonin 3 milliGRAM(s) Oral <User Schedule>  multivitamin/minerals/iron Oral Solution (CENTRUM) 15 milliLiter(s) Oral daily  pantoprazole    Tablet 40 milliGRAM(s) Oral before breakfast  polyethylene glycol 3350 17 Gram(s) Oral two times a day  predniSONE   Tablet 50 milliGRAM(s) Oral daily  senna 2 Tablet(s) Oral at bedtime  sodium chloride 1 Gram(s) Oral three times a day  sodium chloride 3%. 500 milliLiter(s) (30 mL/Hr) IV Continuous <Continuous>  trimethoprim  160 mG/sulfamethoxazole 800 mG 1 Tablet(s) Oral <User Schedule>      PHYSICAL EXAM:  T(C): 36.9 (01-07-24 @ 12:28), Max: 36.9 (01-07-24 @ 12:28)  HR: 93 (01-07-24 @ 12:28) (72 - 93)  BP: 125/77 (01-07-24 @ 12:28) (110/65 - 131/88)  RR: 18 (01-07-24 @ 12:28) (17 - 18)  SpO2: 100% (01-07-24 @ 12:28) (98% - 100%)  Wt(kg): --  I&O's Summary    06 Jan 2024 07:01  -  07 Jan 2024 07:00  --------------------------------------------------------  IN: 356 mL / OUT: 350 mL / NET: 6 mL    07 Jan 2024 07:01  -  07 Jan 2024 21:23  --------------------------------------------------------  IN: 0 mL / OUT: 500 mL / NET: -500 mL          Appearance: Normal	  HEENT:  PERRLA   Lymphatic: No lymphadenopathy   Cardiovascular: Normal S1 S2, no JVD  Respiratory: normal effort , clear  Gastrointestinal:  Soft, Non-tender  Skin: No rashes,  warm to touch  Psychiatry:  Mood & affect appropriate  Musculuskeletal: No edema    recent labs, Imaging and EKGs personally reviewed     01-06-24 @ 07:01  -  01-07-24 @ 07:00  --------------------------------------------------------  IN: 356 mL / OUT: 350 mL / NET: 6 mL    01-07-24 @ 07:01  -  01-07-24 @ 21:23  --------------------------------------------------------  IN: 0 mL / OUT: 500 mL / NET: -500 mL                          9.1    6.64  )-----------( 206      ( 07 Jan 2024 04:38 )             27.1               01-07    135  |  99  |  10  ----------------------------<  84  4.1   |  26  |  0.64    Ca    9.7      07 Jan 2024 04:38  Phos  3.7     01-07  Mg     1.90     01-07    TPro  6.8  /  Alb  3.2<L>  /  TBili  0.4  /  DBili  x   /  AST  62<H>  /  ALT  122<H>  /  AlkPhos  113  01-07    PT/INR - ( 07 Jan 2024 04:38 )   PT: 15.2 sec;   INR: 1.36 ratio         PTT - ( 07 Jan 2024 04:38 )  PTT:63.0 sec                   Urinalysis Basic - ( 07 Jan 2024 04:38 )    Color: x / Appearance: x / SG: x / pH: x  Gluc: 84 mg/dL / Ketone: x  / Bili: x / Urobili: x   Blood: x / Protein: x / Nitrite: x   Leuk Esterase: x / RBC: x / WBC x   Sq Epi: x / Non Sq Epi: x / Bacteria: x

## 2024-01-08 LAB
ALBUMIN SERPL ELPH-MCNC: 3.1 G/DL — LOW (ref 3.3–5)
ALBUMIN SERPL ELPH-MCNC: 3.1 G/DL — LOW (ref 3.3–5)
ALP SERPL-CCNC: 116 U/L — SIGNIFICANT CHANGE UP (ref 40–120)
ALP SERPL-CCNC: 116 U/L — SIGNIFICANT CHANGE UP (ref 40–120)
ALT FLD-CCNC: 129 U/L — HIGH (ref 4–41)
ALT FLD-CCNC: 129 U/L — HIGH (ref 4–41)
ANION GAP SERPL CALC-SCNC: 11 MMOL/L — SIGNIFICANT CHANGE UP (ref 7–14)
ANION GAP SERPL CALC-SCNC: 11 MMOL/L — SIGNIFICANT CHANGE UP (ref 7–14)
APTT BLD: 55.8 SEC — HIGH (ref 24.5–35.6)
APTT BLD: 55.8 SEC — HIGH (ref 24.5–35.6)
APTT BLD: 89.7 SEC — HIGH (ref 24.5–35.6)
APTT BLD: 89.7 SEC — HIGH (ref 24.5–35.6)
AST SERPL-CCNC: 60 U/L — HIGH (ref 4–40)
AST SERPL-CCNC: 60 U/L — HIGH (ref 4–40)
BILIRUB SERPL-MCNC: 0.4 MG/DL — SIGNIFICANT CHANGE UP (ref 0.2–1.2)
BILIRUB SERPL-MCNC: 0.4 MG/DL — SIGNIFICANT CHANGE UP (ref 0.2–1.2)
BUN SERPL-MCNC: 10 MG/DL — SIGNIFICANT CHANGE UP (ref 7–23)
BUN SERPL-MCNC: 10 MG/DL — SIGNIFICANT CHANGE UP (ref 7–23)
CALCIUM SERPL-MCNC: 9.6 MG/DL — SIGNIFICANT CHANGE UP (ref 8.4–10.5)
CALCIUM SERPL-MCNC: 9.6 MG/DL — SIGNIFICANT CHANGE UP (ref 8.4–10.5)
CHLORIDE SERPL-SCNC: 99 MMOL/L — SIGNIFICANT CHANGE UP (ref 98–107)
CHLORIDE SERPL-SCNC: 99 MMOL/L — SIGNIFICANT CHANGE UP (ref 98–107)
CO2 SERPL-SCNC: 26 MMOL/L — SIGNIFICANT CHANGE UP (ref 22–31)
CO2 SERPL-SCNC: 26 MMOL/L — SIGNIFICANT CHANGE UP (ref 22–31)
CREAT SERPL-MCNC: 0.62 MG/DL — SIGNIFICANT CHANGE UP (ref 0.5–1.3)
CREAT SERPL-MCNC: 0.62 MG/DL — SIGNIFICANT CHANGE UP (ref 0.5–1.3)
EGFR: 133 ML/MIN/1.73M2 — SIGNIFICANT CHANGE UP
EGFR: 133 ML/MIN/1.73M2 — SIGNIFICANT CHANGE UP
GLUCOSE SERPL-MCNC: 84 MG/DL — SIGNIFICANT CHANGE UP (ref 70–99)
GLUCOSE SERPL-MCNC: 84 MG/DL — SIGNIFICANT CHANGE UP (ref 70–99)
HCT VFR BLD CALC: 29.4 % — LOW (ref 39–50)
HCT VFR BLD CALC: 29.4 % — LOW (ref 39–50)
HGB BLD-MCNC: 9.7 G/DL — LOW (ref 13–17)
HGB BLD-MCNC: 9.7 G/DL — LOW (ref 13–17)
INR BLD: 1.31 RATIO — HIGH (ref 0.85–1.18)
INR BLD: 1.31 RATIO — HIGH (ref 0.85–1.18)
MAGNESIUM SERPL-MCNC: 1.7 MG/DL — SIGNIFICANT CHANGE UP (ref 1.6–2.6)
MAGNESIUM SERPL-MCNC: 1.7 MG/DL — SIGNIFICANT CHANGE UP (ref 1.6–2.6)
MCHC RBC-ENTMCNC: 31 PG — SIGNIFICANT CHANGE UP (ref 27–34)
MCHC RBC-ENTMCNC: 31 PG — SIGNIFICANT CHANGE UP (ref 27–34)
MCHC RBC-ENTMCNC: 33 GM/DL — SIGNIFICANT CHANGE UP (ref 32–36)
MCHC RBC-ENTMCNC: 33 GM/DL — SIGNIFICANT CHANGE UP (ref 32–36)
MCV RBC AUTO: 93.9 FL — SIGNIFICANT CHANGE UP (ref 80–100)
MCV RBC AUTO: 93.9 FL — SIGNIFICANT CHANGE UP (ref 80–100)
NRBC # BLD: 0 /100 WBCS — SIGNIFICANT CHANGE UP (ref 0–0)
NRBC # BLD: 0 /100 WBCS — SIGNIFICANT CHANGE UP (ref 0–0)
NRBC # FLD: 0 K/UL — SIGNIFICANT CHANGE UP (ref 0–0)
NRBC # FLD: 0 K/UL — SIGNIFICANT CHANGE UP (ref 0–0)
PHOSPHATE SERPL-MCNC: 3.9 MG/DL — SIGNIFICANT CHANGE UP (ref 2.5–4.5)
PHOSPHATE SERPL-MCNC: 3.9 MG/DL — SIGNIFICANT CHANGE UP (ref 2.5–4.5)
PLATELET # BLD AUTO: 226 K/UL — SIGNIFICANT CHANGE UP (ref 150–400)
PLATELET # BLD AUTO: 226 K/UL — SIGNIFICANT CHANGE UP (ref 150–400)
POTASSIUM SERPL-MCNC: 3.9 MMOL/L — SIGNIFICANT CHANGE UP (ref 3.5–5.3)
POTASSIUM SERPL-MCNC: 3.9 MMOL/L — SIGNIFICANT CHANGE UP (ref 3.5–5.3)
POTASSIUM SERPL-SCNC: 3.9 MMOL/L — SIGNIFICANT CHANGE UP (ref 3.5–5.3)
POTASSIUM SERPL-SCNC: 3.9 MMOL/L — SIGNIFICANT CHANGE UP (ref 3.5–5.3)
PROT SERPL-MCNC: 6.8 G/DL — SIGNIFICANT CHANGE UP (ref 6–8.3)
PROT SERPL-MCNC: 6.8 G/DL — SIGNIFICANT CHANGE UP (ref 6–8.3)
PROTHROM AB SERPL-ACNC: 14.6 SEC — HIGH (ref 9.5–13)
PROTHROM AB SERPL-ACNC: 14.6 SEC — HIGH (ref 9.5–13)
RBC # BLD: 3.13 M/UL — LOW (ref 4.2–5.8)
RBC # BLD: 3.13 M/UL — LOW (ref 4.2–5.8)
RBC # FLD: 15.9 % — HIGH (ref 10.3–14.5)
RBC # FLD: 15.9 % — HIGH (ref 10.3–14.5)
SODIUM SERPL-SCNC: 136 MMOL/L — SIGNIFICANT CHANGE UP (ref 135–145)
SODIUM SERPL-SCNC: 136 MMOL/L — SIGNIFICANT CHANGE UP (ref 135–145)
WBC # BLD: 7.57 K/UL — SIGNIFICANT CHANGE UP (ref 3.8–10.5)
WBC # BLD: 7.57 K/UL — SIGNIFICANT CHANGE UP (ref 3.8–10.5)
WBC # FLD AUTO: 7.57 K/UL — SIGNIFICANT CHANGE UP (ref 3.8–10.5)
WBC # FLD AUTO: 7.57 K/UL — SIGNIFICANT CHANGE UP (ref 3.8–10.5)

## 2024-01-08 PROCEDURE — 99232 SBSQ HOSP IP/OBS MODERATE 35: CPT

## 2024-01-08 PROCEDURE — 99232 SBSQ HOSP IP/OBS MODERATE 35: CPT | Mod: GC

## 2024-01-08 RX ADMIN — HEPARIN SODIUM 900 UNIT(S)/HR: 5000 INJECTION INTRAVENOUS; SUBCUTANEOUS at 04:28

## 2024-01-08 RX ADMIN — HEPARIN SODIUM 900 UNIT(S)/HR: 5000 INJECTION INTRAVENOUS; SUBCUTANEOUS at 07:47

## 2024-01-08 RX ADMIN — LIDOCAINE 1 PATCH: 4 CREAM TOPICAL at 19:54

## 2024-01-08 RX ADMIN — Medication 2 DROP(S): at 18:25

## 2024-01-08 RX ADMIN — LIDOCAINE 2 PATCH: 4 CREAM TOPICAL at 12:34

## 2024-01-08 RX ADMIN — HEPARIN SODIUM 1000 UNIT(S)/HR: 5000 INJECTION INTRAVENOUS; SUBCUTANEOUS at 11:15

## 2024-01-08 RX ADMIN — OXYCODONE HYDROCHLORIDE 2.5 MILLIGRAM(S): 5 TABLET ORAL at 13:51

## 2024-01-08 RX ADMIN — Medication 3 MILLIGRAM(S): at 21:16

## 2024-01-08 RX ADMIN — Medication 15 MILLILITER(S): at 12:34

## 2024-01-08 RX ADMIN — CHLORHEXIDINE GLUCONATE 1 APPLICATION(S): 213 SOLUTION TOPICAL at 12:36

## 2024-01-08 RX ADMIN — Medication 50 MILLIGRAM(S): at 05:33

## 2024-01-08 RX ADMIN — Medication 20 MILLIGRAM(S): at 12:43

## 2024-01-08 RX ADMIN — HEPARIN SODIUM 1000 UNIT(S)/HR: 5000 INJECTION INTRAVENOUS; SUBCUTANEOUS at 19:35

## 2024-01-08 RX ADMIN — LIDOCAINE 2 PATCH: 4 CREAM TOPICAL at 19:54

## 2024-01-08 RX ADMIN — GABAPENTIN 200 MILLIGRAM(S): 400 CAPSULE ORAL at 05:31

## 2024-01-08 RX ADMIN — Medication 200 MILLIGRAM(S): at 18:25

## 2024-01-08 RX ADMIN — GABAPENTIN 200 MILLIGRAM(S): 400 CAPSULE ORAL at 13:55

## 2024-01-08 RX ADMIN — Medication 1 TABLET(S): at 06:07

## 2024-01-08 RX ADMIN — SODIUM CHLORIDE 1 GRAM(S): 9 INJECTION INTRAMUSCULAR; INTRAVENOUS; SUBCUTANEOUS at 05:32

## 2024-01-08 RX ADMIN — HEPARIN SODIUM 2500 UNIT(S): 5000 INJECTION INTRAVENOUS; SUBCUTANEOUS at 11:24

## 2024-01-08 RX ADMIN — Medication 200 MILLIGRAM(S): at 05:31

## 2024-01-08 RX ADMIN — LIDOCAINE 1 PATCH: 4 CREAM TOPICAL at 15:26

## 2024-01-08 RX ADMIN — GABAPENTIN 200 MILLIGRAM(S): 400 CAPSULE ORAL at 21:16

## 2024-01-08 RX ADMIN — Medication 650 MILLIGRAM(S): at 05:32

## 2024-01-08 RX ADMIN — PANTOPRAZOLE SODIUM 40 MILLIGRAM(S): 20 TABLET, DELAYED RELEASE ORAL at 05:33

## 2024-01-08 RX ADMIN — SODIUM CHLORIDE 1 GRAM(S): 9 INJECTION INTRAMUSCULAR; INTRAVENOUS; SUBCUTANEOUS at 21:16

## 2024-01-08 RX ADMIN — SODIUM CHLORIDE 1 GRAM(S): 9 INJECTION INTRAMUSCULAR; INTRAVENOUS; SUBCUTANEOUS at 13:55

## 2024-01-08 RX ADMIN — Medication 2 DROP(S): at 05:33

## 2024-01-08 NOTE — BH CONSULTATION LIAISON PROGRESS NOTE - NSBHFUPINTERVALHXFT_PSY_A_CORE
Chart, labs, imaging reviewed. Case discussed with the primary team. Overnight, patient did not require or receive PRN medication.     Patient seen and examined at bedside. Pt reported that his pain is 10/10 today and that limited the interview. Continues to report depression, sadness, anxiety. Reported sleeping ok. Patient denied suicidal or homicidal ideations, intent or a plan.

## 2024-01-08 NOTE — BH CONSULTATION LIAISON PROGRESS NOTE - NSBHASSESSMENTFT_PSY_ALL_CORE
28 years old male with h/o Lupus ( diagnosed in 09/2023, on Plaquenil present to [place] ED on 12/12/23  with complain of chest pain and SOB admitted for fevers, PE, pleural effusions, course c/b high fever and tonic-clonic seizure, transferred to MICU for post-ictal and infectious monitoring. Psych consulted for depression. Pt had indicated a hx of depression/anxiety, had been in the  and [facility], and now is requesting to speak to someone. However he is rather medically active and compromised.   Reported being depressed for many years, no current safety concerns. C/o poor sleep due to pain  1/04: continues to report that he feels depressed, anxious, with poor sleep and appetite. Hx of trauma, difficulties to care for himself due to psychiatric condition.  1/5: Continues to report depressed mood, improving anxiety. Amenable to starting Prozac 20mg. Future-oriented, helping-seeking, no SIIP.  1/8: Patient c/o pain, depression, anxiety. Denied side effects from prozac.    Plan:   - Continue medical stabilization as you are  - Address pain ( pain management)  - PRN for sleep: melatonin 3 mg qhs.  -Consult Holistic nurse to address his mood ( pt is amendable)  - No role for 1:1 at this time  - c/w Prozac 20 mg to address depression and anxiety (pt. scheduled for first dose today- 1/5/24)  - c/ww Gabapentin from 200 mg BID to 200 mg TID to address anxiety.  - Dispo: likely inpt rehab given severe deconditioning. Inpt psych would be ideal but likely limited by mobility/rehab needs.   Will continue to follow while here.   28 years old male with h/o Lupus ( diagnosed in 09/2023, on Plaquenil present to [facility] ED on 12/12/23  with complain of chest pain and SOB admitted for fevers, PE, pleural effusions, course c/b high fever and tonic-clonic seizure, transferred to MICU for post-ictal and infectious monitoring. Psych consulted for depression. Pt had indicated a hx of depression/anxiety, had been in the  and [facility] but [facility], and now is requesting to speak to someone. However he is rather medically active and compromised.   Reported being depressed for many years, no current safety concerns. C/o poor sleep due to pain  1/04: continues to report that he feels depressed, anxious, with poor sleep and appetite. Hx of trauma, difficulties to care for himself due to psychiatric condition.  1/5: Continues to report depressed mood, improving anxiety. Amenable to starting Prozac 20mg. Future-oriented, helping-seeking, no SIIP.  1/8: Patient c/o pain, depression, anxiety. Denied side effects from prozac.    Plan:   - Continue medical stabilization as you are  - Address pain ( pain management)  - PRN for sleep: melatonin 3 mg qhs.  -Consult Holistic nurse to address his mood ( pt is amendable)  - No role for 1:1 at this time  - c/w Prozac 20 mg to address depression and anxiety (pt. scheduled for first dose today- 1/5/24)  - c/ww Gabapentin from 200 mg BID to 200 mg TID to address anxiety.  - Dispo: likely inpt rehab given severe deconditioning. Inpt psych would be ideal but likely limited by mobility/rehab needs.   Will continue to follow while here.

## 2024-01-08 NOTE — CHART NOTE - NSCHARTNOTEFT_GEN_A_CORE
Ir preprocedure note  age 28  gender: M   procedure: renal biopsy  diagnosis/indication: r/o lupus nephritis  IR attending: Dr. Iron Sanchez Md: Dr. Salamanca  Medical history: 29 yo M with h/o Lupus ( diagnosed in 09/2023, on Plaquenil present to [place] ED on 12/12/23  with complain of chest pain and SOB admitted for fevers, PE, pleural effusions, course c/b high fever and tonic-clonic seizure. R/O Lupus nephritis   Labs   Complete Blood Count in AM (01.08.24 @ 05:40)    Nucleated RBC: 0 /100 WBCs    WBC Count: 7.57 K/uL    RBC Count: 3.13 M/uL    Hemoglobin: 9.7 g/dL    Hematocrit: 29.4 %    Mean Cell Volume: 93.9 fL    Mean Cell Hemoglobin: 31.0 pg    Mean Cell Hemoglobin Conc: 33.0 gm/dL    Red Cell Distrib Width: 15.9 %    Platelet Count - Automated: 226 K/uL    Nucleated RBC #: 0.00 K/uL    Basic Metabolic Panel - STAT (12.30.23 @ 23:20)    Sodium: 134 mmol/L    Potassium: 4.1 mmol/L    Chloride: 101 mmol/L    Carbon Dioxide: 25 mmol/L    Anion Gap: 8 mmol/L    Blood Urea Nitrogen: 12 mg/dL    Creatinine: 0.64 mg/dL    Glucose: 126 mg/dL    Calcium: 8.9 mg/dL    eGFR: 131: The estimated glomerular filtration rate (eGFR) is calculated using the  2021 CKD-EPI creatinine equation, which does not have a coefficient for  race and is validated in individuals 18 years of age and older (N Engl J  Med 2021; 385:9207-2468). Creatinine-based eGFR may be inaccurate in  various situations including but not limited to extremes of muscle mass,  altered dietary protein intake, or medications that affect renal tubular  creatinine secretion. mL/min/1.73m2    patient and family aware Ir preprocedure note  age 28  gender: M   procedure: renal biopsy  diagnosis/indication: r/o lupus nephritis  IR attending: Dr. Iron Sanchez Md: Dr. Salamanca  Medical history: 27 yo M with h/o Lupus ( diagnosed in 09/2023, on Plaquenil present to [facility] ED on 12/12/23  with complain of chest pain and SOB admitted for fevers, PE, pleural effusions, course c/b high fever and tonic-clonic seizure. R/O Lupus nephritis   Labs   Complete Blood Count in AM (01.08.24 @ 05:40)    Nucleated RBC: 0 /100 WBCs    WBC Count: 7.57 K/uL    RBC Count: 3.13 M/uL    Hemoglobin: 9.7 g/dL    Hematocrit: 29.4 %    Mean Cell Volume: 93.9 fL    Mean Cell Hemoglobin: 31.0 pg    Mean Cell Hemoglobin Conc: 33.0 gm/dL    Red Cell Distrib Width: 15.9 %    Platelet Count - Automated: 226 K/uL    Nucleated RBC #: 0.00 K/uL    Basic Metabolic Panel - STAT (12.30.23 @ 23:20)    Sodium: 134 mmol/L    Potassium: 4.1 mmol/L    Chloride: 101 mmol/L    Carbon Dioxide: 25 mmol/L    Anion Gap: 8 mmol/L    Blood Urea Nitrogen: 12 mg/dL    Creatinine: 0.64 mg/dL    Glucose: 126 mg/dL    Calcium: 8.9 mg/dL    eGFR: 131: The estimated glomerular filtration rate (eGFR) is calculated using the  2021 CKD-EPI creatinine equation, which does not have a coefficient for  race and is validated in individuals 18 years of age and older (N Engl J  Med 2021; 385:1462-2786). Creatinine-based eGFR may be inaccurate in  various situations including but not limited to extremes of muscle mass,  altered dietary protein intake, or medications that affect renal tubular  creatinine secretion. mL/min/1.73m2    patient and family aware

## 2024-01-08 NOTE — PROGRESS NOTE ADULT - ATTENDING COMMENTS
Agree with fellow's assessment and plan as above.  Patient's mother aware of our recommendations and in agreement.  Discussed with primary team  will follow

## 2024-01-08 NOTE — PROGRESS NOTE ADULT - ASSESSMENT
28 years old male with h/o Lupus (diagnosed in 09/2023 due to rash, oral ulcers, chest pain, and dyspnea, on Plaquenil) who presented to [facility] ED on 12/12/23 with complaints of chest pain and SOB, found to have bilateral acute PE and bilateral pleural effusions. Transferred to [facility] for CT surgery evaluation. Also with fevers now resolved. Rheumatology consulted given SLE history.     Serologies:   Positive: ABDIAS 1:280 speckled, Sm >8, RNP >8, SSA >8, hypocomplementemia, Pr/Cr 1.2 and 1.1, low vitamin D 25 21, elevated vitamin D 1,25 97, ACE elevated 125, PR3 29.8. Repeat PR3 still weakly positive at 27.7   Negative: dsDNA 28,  C4 13, APS labs, negative Janine-1 ab, negative ribosomal P, negative syphilis screen, aldolase WNL,negative RF and CCP, negative ANCA, RNA polymerase III, centromere, scleroderma     #Fever (resolved)   -Pleural effusion exudate w/negative culture and PCR  -Repeat CT imaging without obvious infectious source, mild decrease in axillary LAD, submentonian and submaxillary and increase supraclavicular LAD. No mediastinal lymphoadenopathy  -EBV DNA PCR positive. Discussed with ID, low concern for EBV infection, would recommend LN bx to rule out associated malignancy   -Pt with clinical manifestations and specific SLE serologies though unclear if current presentation is solely attributable to SLE. Low concern for other rheumatologic disease (such as sarcoidosis, Kikuchi syndrome, Castleman disease), Underlying malignancy also needs to be ruled out. Pleural fluid cytology negative.   - Fevers resolved after restarting steroids on 40 mg methylprednisolone 12/23-12/25, restarted 60 mg of methylprednisolone 12/28-12/29, then prednisone 60 mg PO daily (12/30-1/4), and now on prednisone 50 mg PO (since 1/5)     #SLE   +ve serology and hypocomplementemia improving after steroid trial   creatinine is stable but proteinuria +ve urine P/cr 1.1. Decreased to 0.7.        #Elevated CPK   resolved     #Bilateral Acute PE with pulmonary infarcts   -Labs does not meet criteria for APS  PS-PT negative  -Hematology recommending Eliquis on discharge     #Encephalopathy (resolved)   -Reported episode of confusion along with episode of incontinence   -No focal deficits on neuro exam  -Likely multifactorial in the setting of opioids and depression, low suspicion for SLE cerebritis     #Elevated Transaminitis  -Likely from polypharmacy    Recommendations:   -Preoperative risk assessment done by both pulmonology and cardiology as per IR's request. IR planning for renal biopsy on tomorrow (1/9). After discussion with primary service, confirmed that no one from primary team told the pt's mother that the lymph node biopsy was priority over renal biopsy. Had long discussion with pt's mother that there is concern for lupus involvement in his kidneys and though his urine studies improved last week, it is likely from the high doses of steroids. Emphasized to her though that if she has any concern about proceeding with the renal biopsy, it is within her right and Rafal's right to decline the procedure. Pt's mother would like to talk to IR about logistics of procedure prior to signing consent form. Discussed this with primary service as well.   -Pt with elevated LFTs, likely due to polypharmacy. Can consider holding Tylenol and Bactrim for now. If not improving, recommend further evaluation   -C/w prednisone 50 mg daily (1/5 - )  -Myomarker panel pending   -Continue with GI ppx   -Pt will eventually need follow up at [facility] on discharge     Discussed with Dr. 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 Transferred to [facility] for CT surgery evaluation. Also with fevers now resolved. Rheumatology consulted given SLE history.     Serologies:   Positive: ABDIAS 1:280 speckled, Sm >8, RNP >8, SSA >8, hypocomplementemia, Pr/Cr 1.2 and 1.1, low vitamin D 25 21, elevated vitamin D 1,25 97, ACE elevated 125, PR3 29.8. Repeat PR3 still weakly positive at 27.7   Negative: dsDNA 28,  C4 13, APS labs, negative Janine-1 ab, negative ribosomal P, negative syphilis screen, aldolase WNL,negative RF and CCP, negative ANCA, RNA polymerase III, centromere, scleroderma     #Fever (resolved)   -Pleural effusion exudate w/negative culture and PCR  -Repeat CT imaging without obvious infectious source, mild decrease in axillary LAD, submentonian and submaxillary and increase supraclavicular LAD. No mediastinal lymphoadenopathy  -EBV DNA PCR positive. Discussed with ID, low concern for EBV infection, would recommend LN bx to rule out associated malignancy   -Pt with clinical manifestations and specific SLE serologies though unclear if current presentation is solely attributable to SLE. Low concern for other rheumatologic disease (such as sarcoidosis, Kikuchi syndrome, Castleman disease), Underlying malignancy also needs to be ruled out. Pleural fluid cytology negative.   - Fevers resolved after restarting steroids on 40 mg methylprednisolone 12/23-12/25, restarted 60 mg of methylprednisolone 12/28-12/29, then prednisone 60 mg PO daily (12/30-1/4), and now on prednisone 50 mg PO (since 1/5)     #SLE   +ve serology and hypocomplementemia improving after steroid trial   creatinine is stable but proteinuria +ve urine P/cr 1.1. Decreased to 0.7.        #Elevated CPK   resolved     #Bilateral Acute PE with pulmonary infarcts   -Labs does not meet criteria for APS  PS-PT negative  -Hematology recommending Eliquis on discharge     #Encephalopathy (resolved)   -Reported episode of confusion along with episode of incontinence   -No focal deficits on neuro exam  -Likely multifactorial in the setting of opioids and depression, low suspicion for SLE cerebritis     #Elevated Transaminitis  -Likely from polypharmacy    Recommendations:   -Preoperative risk assessment done by both pulmonology and cardiology as per IR's request. IR planning for renal biopsy on tomorrow (1/9). After discussion with primary service, confirmed that no one from primary team told the pt's mother that the lymph node biopsy was priority over renal biopsy. Had long discussion with pt's mother that there is concern for lupus involvement in his kidneys and though his urine studies improved last week, it is likely from the high doses of steroids. Emphasized to her though that if she has any concern about proceeding with the renal biopsy, it is within her right and Rafal's right to decline the procedure. Pt's mother would like to talk to IR about logistics of procedure prior to signing consent form. Discussed this with primary service as well.   -Pt with elevated LFTs, likely due to polypharmacy. Can consider holding Tylenol and Bactrim for now. If not improving, recommend further evaluation   -C/w prednisone 50 mg daily (1/5 - )  -Myomarker panel pending   -Continue with GI ppx   -Pt will eventually need follow up at [facility] [place] on discharge     Discussed with Dr. Octavio Landaverde MD   PGY-4  Reachable on TEAMS  Pager [phone]  Rheumatology Fellow 28 years old male with h/o Lupus (diagnosed in 09/2023 due to rash, oral ulcers, chest pain, and dyspnea, on Plaquenil) who presented to [place] ED on 12/12/23 with complaints of chest pain and SOB, found to have bilateral acute PE and bilateral pleural effusions. Transferred to [facility] for CT surgery evaluation. Also with fevers now resolved. Rheumatology consulted given SLE history.     Serologies:   Positive: ABDIAS 1:280 speckled, Sm >8, RNP >8, SSA >8, hypocomplementemia, Pr/Cr 1.2 and 1.1, low vitamin D 25 21, elevated vitamin D 1,25 97, ACE elevated 125, PR3 29.8. Repeat PR3 still weakly positive at 27.7   Negative: dsDNA 28,  C4 13, APS labs, negative Janine-1 ab, negative ribosomal P, negative syphilis screen, aldolase WNL,negative RF and CCP, negative ANCA, RNA polymerase III, centromere, scleroderma     #Fever (resolved)   -Pleural effusion exudate w/negative culture and PCR  -Repeat CT imaging without obvious infectious source, mild decrease in axillary LAD, submentonian and submaxillary and increase supraclavicular LAD. No mediastinal lymphoadenopathy  -EBV DNA PCR positive. Discussed with ID, low concern for EBV infection, would recommend LN bx to rule out associated malignancy   -Pt with clinical manifestations and specific SLE serologies though unclear if current presentation is solely attributable to SLE. Low concern for other rheumatologic disease (such as sarcoidosis, Kikuchi syndrome, Castleman disease), Underlying malignancy also needs to be ruled out. Pleural fluid cytology negative.   - Fevers resolved after restarting steroids on 40 mg methylprednisolone 12/23-12/25, restarted 60 mg of methylprednisolone 12/28-12/29, then prednisone 60 mg PO daily (12/30-1/4), and now on prednisone 50 mg PO (since 1/5)     #SLE   +ve serology and hypocomplementemia improving after steroid trial   creatinine is stable but proteinuria +ve urine P/cr 1.1. Decreased to 0.7.        #Elevated CPK   resolved     #Bilateral Acute PE with pulmonary infarcts   -Labs does not meet criteria for APS  PS-PT negative  -Hematology recommending Eliquis on discharge     #Encephalopathy (resolved)   -Reported episode of confusion along with episode of incontinence   -No focal deficits on neuro exam  -Likely multifactorial in the setting of opioids and depression, low suspicion for CNS SLE   #Elevated Transaminitis  -Likely from polypharmacy    Recommendations:   -Preoperative risk assessment done by both pulmonology and cardiology as per IR's request. IR planning for renal biopsy on tomorrow (1/9). After discussion with primary service, confirmed that no one from primary team told the pt's mother that the lymph node biopsy was priority over renal biopsy. Had long discussion with pt's mother that there is concern for lupus involvement in his kidneys and though his urine studies improved last week, it is likely from the high doses of steroids. Emphasized to her though that if she has any concern about proceeding with the renal biopsy, it is within her right and Rafal's right to decline the procedure. Pt's mother would like to talk to IR about logistics of procedure prior to signing consent form. Discussed this with primary service as well.   -Pt with elevated LFTs, likely due to polypharmacy. Can consider holding Tylenol and Bactrim for now. If not improving, recommend further evaluation   -C/w prednisone 50 mg daily (1/5 - )  -Myomarker panel pending   -Continue with GI ppx   -Pt will eventually need follow up at [facility] [place] on discharge     Discussed with Dr. Octavio Landaverde MD   PGY-4  Reachable on TEAMS  Pager [phone]  Rheumatology Fellow 28 years old male with h/o Lupus (diagnosed in 09/2023 due to rash, oral ulcers, chest pain, and dyspnea, on Plaquenil) who presented to [place] ED on 12/12/23 with complaints of chest pain and SOB, found to have bilateral acute PE and bilateral pleural effusions. Transferred to [facility] for CT surgery evaluation. Also with fevers now resolved. Rheumatology consulted given SLE history.     Serologies:   Positive: ABDIAS 1:280 speckled, Sm >8, RNP >8, SSA >8, hypocomplementemia, Pr/Cr 1.2 and 1.1, low vitamin D 25 21, elevated vitamin D 1,25 97, ACE elevated 125, PR3 29.8. Repeat PR3 still weakly positive at 27.7   Negative: dsDNA 28,  C4 13, APS labs, negative Janine-1 ab, negative ribosomal P, negative syphilis screen, aldolase WNL,negative RF and CCP, negative ANCA, RNA polymerase III, centromere, scleroderma     #Fever (resolved)   -Pleural effusion exudate w/negative culture and PCR  -Repeat CT imaging without obvious infectious source, mild decrease in axillary LAD, submentonian and submaxillary and increase supraclavicular LAD. No mediastinal lymphoadenopathy  -EBV DNA PCR positive. Discussed with ID, low concern for EBV infection, would recommend LN bx to rule out associated malignancy   -Pt with clinical manifestations and specific SLE serologies though unclear if current presentation is solely attributable to SLE. Low concern for other rheumatologic disease (such as sarcoidosis, Kikuchi syndrome, Castleman disease), Underlying malignancy also needs to be ruled out. Pleural fluid cytology negative.   - Fevers resolved after restarting steroids on 40 mg methylprednisolone 12/23-12/25, restarted 60 mg of methylprednisolone 12/28-12/29, then prednisone 60 mg PO daily (12/30-1/4), and now on prednisone 50 mg PO (since 1/5)     #SLE   +ve serology and hypocomplementemia improving after steroid trial   creatinine is stable but proteinuria +ve urine P/cr 1.1. Decreased to 0.7.        #Elevated CPK   resolved     #Bilateral Acute PE with pulmonary infarcts   -Labs does not meet criteria for APS  PS-PT negative  -Hematology recommending Eliquis on discharge     #Encephalopathy (resolved)   -Reported episode of confusion along with episode of incontinence   -No focal deficits on neuro exam  -Likely multifactorial in the setting of opioids and depression, low suspicion for CNS SLE   #Elevated Transaminitis  -Likely from polypharmacy    Recommendations:   -Preoperative risk assessment done by both pulmonology and cardiology as per IR's request. IR planning for renal biopsy on tomorrow (1/9). After discussion with primary service, confirmed that no one from primary team told the pt's mother that the lymph node biopsy was priority over renal biopsy. Had long discussion with pt's mother that there is concern for lupus involvement in his kidneys and though his urine studies improved last week, it is likely from the high doses of steroids. Emphasized to her though that if she has any concern about proceeding with the renal biopsy, it is within her right and Rafal's right to decline the procedure. Pt's mother would like to talk to IR about logistics of procedure prior to signing consent form. Discussed this with primary service as well.   -Pt with elevated LFTs, likely due to polypharmacy. Can consider holding Tylenol and Bactrim for now. If not improving, recommend further evaluation   -C/w prednisone 50 mg daily (1/5 - )  -Myomarker panel pending   -Continue with GI ppx   -Pt will eventually need follow up at [facility] [place] on discharge     Discussed with Dr. Octavio Landaverde MD   PGY-4  Reachable on TEAMS  Pager [phone]  Rheumatology Fellow

## 2024-01-08 NOTE — PROGRESS NOTE ADULT - SUBJECTIVE AND OBJECTIVE BOX
Patient is a 28y old  Male who presents with a chief complaint of fever (04 Jan 2024 13:39)      HPI:  28 years old male with h/o Lupus ( diagnosed in 09/2023, on Plaquenil present to [place] ED on 12/12/23  with complain of chest pain and SOB. Patient reported left sided pleuritic chest pain which started 3 days ago. Pain is progressively worsened, associated with SOB and cough. Patient was seen in OSH ED and was prescribed antibiotics. Patient reported significantly worsening of left sided pleuritic chest pain today. No fever or chills. Patient reported loss of appetite and had a few episode of diarrhea for last 2 days. CTA chest with acute right upper lobe and left lower lobe segmental/subsegmental pulmonary emboli. No CT evidence of right heart strain. New bilateral lower lobe consolidations with areas of central clearing. Pneumonia and pulmonary infarcts are in the differential. New bilateral pleural effusions, small on the left and trace on the right. Bilateral axillary and supraclavicular adenopathy of unclear etiology. Patient was started on heparin ggt transitioned to eliquis on 12/19,  patient with worsening SOB/ hypoxia requiring nasal cannula oxygen supplementation. 12/20  Repeat Xray shows increased large left pleural effusion and compressive atelectasis trace right effusion and linear atelectasis. Thoracic team consulted for possible pigtail insertion Bedside US: Large left effusion with septations. Right simple effusion , + pericardial effusion- lower extremity dopplers negative for DVT,   - GI PCR + e coli  - mRSA PCR + Staph aureus   . Patient transferred to [facility] for further management.     (22 Dec 2023 22:52)    also with depression, psychiatry following.  patient reports pain, tolerated last PT session. Mom at bedside.    REVIEW OF SYSTEMS  Pain  weakness    PAST MEDICAL & SURGICAL HISTORY   LE (lupus erythematosus)    Pulmonary embolism    No significant past surgical history         CURRENT FUNCTIONAL STATUS  1/4  Bed Mobility  Bed Mobility Training Rehab Potential: fair, will monitor progress closely  Bed Mobility Training Symptoms Noted During/After Treatment: none  Bed Mobility Training Rolling/Turning: contact guard;  1 person assist;  nonverbal cues (demo/gestures);  verbal cues;  bed rails  Bed Mobility Training Scooting: contact guard;  verbal cues;  1 person assist;  nonverbal cues (demo/gestures);  bed rails  Bed Mobility Training Bridging: contact guard;  verbal cues;  nonverbal cues (demo/gestures);  1 person assist;  bed rails  Bed Mobility Training Sit-to-Supine: contact guard;  verbal cues;  nonverbal cues (demo/gestures);  1 person assist;  bed rails  Bed Mobility Training Supine-to-Sit: contact guard;  1 person assist;  nonverbal cues (demo/gestures);  verbal cues;  bed rails  Bed Mobility Training Limitations: decreased flexibility;  decreased ROM;  decreased strength    Sit-Stand Transfer Training  Sit-to-Stand Transfer Training Rehab Potential: fair, will monitor progress closely  Sit-to-Stand Transfer Training Symptoms Noted During/After Treatment: none  Transfer Training Sit-to-Stand Transfer: contact guard;  verbal cues;  nonverbal cues (demo/gestures);  1 person assist;  full weight-bearing   rolling walker  Transfer Training Stand-to-Sit Transfer: contact guard;  verbal cues;  nonverbal cues (demo/gestures);  1 person assist;  full weight-bearing   rolling walker  Sit-to-Stand Transfer Training Transfer Safety Analysis: decreased flexibility;  decreased ROM;  decreased strength;  rolling walker    Gait Training  Gait Training Rehab Potential: fair, will monitor progress closely  Gait Training Symptoms Noted During/After Treatment: none  Gait Training: minimum assist (75% patient effort);  nonverbal cues (demo/gestures);  1 person assist;  verbal cues;  full weight-bearing   rolling walker;  50 feet  Gait Analysis: 3-point gait   decreased megan;  decreased hip/knee flexion;  decreased step length;  decreased stride length;  decreased flexibility;  decreased ROM;  decreased strength;  50 feet;  rolling walker    Therapeutic Exercise  Therapeutic Exercise Rehab Effort: good  Therapeutic Exercise Symptoms Noted During/After Treatment: none  Therapeutic Exercise Detail: Patient performed lower extremity theraputic exercises with active range of motion seated at edge of bed.       FAMILY HISTORY   No pertinent family history in first degree relatives        RECENT LABS/IMAGING  CBC Full  -  ( 08 Jan 2024 05:40 )  WBC Count : 7.57 K/uL  RBC Count : 3.13 M/uL  Hemoglobin : 9.7 g/dL  Hematocrit : 29.4 %  Platelet Count - Automated : 226 K/uL  Mean Cell Volume : 93.9 fL  Mean Cell Hemoglobin : 31.0 pg  Mean Cell Hemoglobin Concentration : 33.0 gm/dL  Auto Neutrophil # : x  Auto Lymphocyte # : x  Auto Monocyte # : x  Auto Eosinophil # : x  Auto Basophil # : x  Auto Neutrophil % : x  Auto Lymphocyte % : x  Auto Monocyte % : x  Auto Eosinophil % : x  Auto Basophil % : x    01-08    136  |  99  |  10  ----------------------------<  84  3.9   |  26  |  0.62    Ca    9.6      08 Jan 2024 05:40  Phos  3.9     01-08  Mg     1.70     01-08    TPro  6.8  /  Alb  3.1<L>  /  TBili  0.4  /  DBili  x   /  AST  60<H>  /  ALT  129<H>  /  AlkPhos  116  01-08    Urinalysis Basic - ( 08 Jan 2024 05:40 )    Color: x / Appearance: x / SG: x / pH: x  Gluc: 84 mg/dL / Ketone: x  / Bili: x / Urobili: x   Blood: x / Protein: x / Nitrite: x   Leuk Esterase: x / RBC: x / WBC x   Sq Epi: x / Non Sq Epi: x / Bacteria: x        VITALS  T(C): 36.9 (01-08-24 @ 05:18), Max: 36.9 (01-08-24 @ 05:18)  HR: 70 (01-08-24 @ 05:18) (70 - 81)  BP: 114/75 (01-08-24 @ 05:18) (114/75 - 122/74)  RR: 18 (01-08-24 @ 05:18) (18 - 18)  SpO2: 100% (01-08-24 @ 05:18) (100% - 100%)  Wt(kg): --    ALLERGIES  No Known Allergies      MEDICATIONS   albuterol/ipratropium for Nebulization 3 milliLiter(s) Nebulizer every 6 hours PRN  benzocaine/menthol Lozenge 1 Lozenge Oral four times a day PRN  chlorhexidine 2% Cloths 1 Application(s) Topical daily  ciprofloxacin  0.3% Ophthalmic Solution for Otic Use 2 Drop(s) Both Ears two times a day  FIRST- Mouthwash  BLM 15 milliLiter(s) Swish and Spit every 4 hours PRN  FLUoxetine 20 milliGRAM(s) Oral daily  gabapentin 200 milliGRAM(s) Oral three times a day  guaiFENesin Oral Liquid (Sugar-Free) 200 milliGRAM(s) Oral every 6 hours PRN  heparin   Injectable 5500 Unit(s) IV Push every 6 hours PRN  heparin   Injectable 2500 Unit(s) IV Push every 6 hours PRN  heparin  Infusion. 1300 Unit(s)/Hr IV Continuous <Continuous>  hydroxychloroquine 200 milliGRAM(s) Oral two times a day  lidocaine   4% Patch 1 Patch Transdermal every 24 hours  lidocaine   4% Patch 2 Patch Transdermal every 24 hours  lidocaine 2% Viscous 5 milliLiter(s) Swish and Spit three times a day PRN  melatonin 3 milliGRAM(s) Oral <User Schedule>  multivitamin/minerals/iron Oral Solution (CENTRUM) 15 milliLiter(s) Oral daily  ondansetron Injectable 4 milliGRAM(s) IV Push every 8 hours PRN  oxyCODONE    IR 2.5 milliGRAM(s) Oral every 8 hours PRN  pantoprazole    Tablet 40 milliGRAM(s) Oral before breakfast  polyethylene glycol 3350 17 Gram(s) Oral two times a day  predniSONE   Tablet 50 milliGRAM(s) Oral daily  senna 2 Tablet(s) Oral at bedtime  sodium chloride 1 Gram(s) Oral three times a day  sodium chloride 3%. 500 milliLiter(s) IV Continuous <Continuous>  trimethoprim  160 mG/sulfamethoxazole 800 mG 1 Tablet(s) Oral <User Schedule>      ----------------------------------------------------------------------------------------  PHYSICAL EXAM  Constitutional - NAD   HEENT - NCAT, EOMI   Chest - no respiratory distress  Cardiovascular - RRR, S1S2  Abdomen -  Soft, NTND  Extremities - No C/C/E, No calf tenderness   Neurologic Exam -                    Cognitive - Awake, Alert      Communication - Fluent, No dysarthria     Cranial Nerves - CN 2-12 intact     Motor -  generalized weakness 4/5 throughout     Sensory - Intact to LT      Balance - WNL Static  Psychiatric - Mood stable, Affect WNL  ----------------------------------------------------------------------------------------  ASSESSMENT/PLAN  29 yo m recent SLE diagnosis, PE and pneumonia transferred to [facility] for thoracic evaluation  s/p chest tube (since removed)  patient now with debility, reports 20 lb weight loss in past few weeks  Pain -oxy ir prn, gabapentin, tylenol, lidocaine patch   continue bedside PT and OT  diet- regular, fluid restrict with ensure plus  depression- recs per psychiatry  DVT PPX - heparin  Rehab -    Recommend ACUTE inpatient rehabilitation for the functional deficits consisting of 3 hours of therapy/day & 24 hour RN/daily PMR physician for comorbid medical management. Will continue to follow for ongoing rehab needs and recommendations.      Patient is a 28y old  Male who presents with a chief complaint of fever (04 Jan 2024 13:39)      HPI:  28 years old male with h/o Lupus ( diagnosed in 09/2023, on Plaquenil present to [facility] ED on 12/12/23  with complain of chest pain and SOB. Patient reported left sided pleuritic chest pain which started 3 days ago. Pain is progressively worsened, associated with SOB and cough. Patient was seen in OSH ED and was prescribed antibiotics. Patient reported significantly worsening of left sided pleuritic chest pain today. No fever or chills. Patient reported loss of appetite and had a few episode of diarrhea for last 2 days. CTA chest with acute right upper lobe and left lower lobe segmental/subsegmental pulmonary emboli. No CT evidence of right heart strain. New bilateral lower lobe consolidations with areas of central clearing. Pneumonia and pulmonary infarcts are in the differential. New bilateral pleural effusions, small on the left and trace on the right. Bilateral axillary and supraclavicular adenopathy of unclear etiology. Patient was started on heparin ggt transitioned to eliquis on 12/19,  patient with worsening SOB/ hypoxia requiring nasal cannula oxygen supplementation. 12/20  Repeat Xray shows increased large left pleural effusion and compressive atelectasis trace right effusion and linear atelectasis. Thoracic team consulted for possible pigtail insertion Bedside US: Large left effusion with septations. Right simple effusion , + pericardial effusion- lower extremity dopplers negative for DVT,   - GI PCR + e coli  - mRSA PCR + Staph aureus   . Patient transferred to [facility] for further management.     (22 Dec 2023 22:52)    also with depression, psychiatry following.  patient reports pain, tolerated last PT session. Mom at bedside.    REVIEW OF SYSTEMS  Pain  weakness    PAST MEDICAL & SURGICAL HISTORY   LE (lupus erythematosus)    Pulmonary embolism    No significant past surgical history         CURRENT FUNCTIONAL STATUS  1/4  Bed Mobility  Bed Mobility Training Rehab Potential: fair, will monitor progress closely  Bed Mobility Training Symptoms Noted During/After Treatment: none  Bed Mobility Training Rolling/Turning: contact guard;  1 person assist;  nonverbal cues (demo/gestures);  verbal cues;  bed rails  Bed Mobility Training Scooting: contact guard;  verbal cues;  1 person assist;  nonverbal cues (demo/gestures);  bed rails  Bed Mobility Training Bridging: contact guard;  verbal cues;  nonverbal cues (demo/gestures);  1 person assist;  bed rails  Bed Mobility Training Sit-to-Supine: contact guard;  verbal cues;  nonverbal cues (demo/gestures);  1 person assist;  bed rails  Bed Mobility Training Supine-to-Sit: contact guard;  1 person assist;  nonverbal cues (demo/gestures);  verbal cues;  bed rails  Bed Mobility Training Limitations: decreased flexibility;  decreased ROM;  decreased strength    Sit-Stand Transfer Training  Sit-to-Stand Transfer Training Rehab Potential: fair, will monitor progress closely  Sit-to-Stand Transfer Training Symptoms Noted During/After Treatment: none  Transfer Training Sit-to-Stand Transfer: contact guard;  verbal cues;  nonverbal cues (demo/gestures);  1 person assist;  full weight-bearing   rolling walker  Transfer Training Stand-to-Sit Transfer: contact guard;  verbal cues;  nonverbal cues (demo/gestures);  1 person assist;  full weight-bearing   rolling walker  Sit-to-Stand Transfer Training Transfer Safety Analysis: decreased flexibility;  decreased ROM;  decreased strength;  rolling walker    Gait Training  Gait Training Rehab Potential: fair, will monitor progress closely  Gait Training Symptoms Noted During/After Treatment: none  Gait Training: minimum assist (75% patient effort);  nonverbal cues (demo/gestures);  1 person assist;  verbal cues;  full weight-bearing   rolling walker;  50 feet  Gait Analysis: 3-point gait   decreased megan;  decreased hip/knee flexion;  decreased step length;  decreased stride length;  decreased flexibility;  decreased ROM;  decreased strength;  50 feet;  rolling walker    Therapeutic Exercise  Therapeutic Exercise Rehab Effort: good  Therapeutic Exercise Symptoms Noted During/After Treatment: none  Therapeutic Exercise Detail: Patient performed lower extremity theraputic exercises with active range of motion seated at edge of bed.       FAMILY HISTORY   No pertinent family history in first degree relatives        RECENT LABS/IMAGING  CBC Full  -  ( 08 Jan 2024 05:40 )  WBC Count : 7.57 K/uL  RBC Count : 3.13 M/uL  Hemoglobin : 9.7 g/dL  Hematocrit : 29.4 %  Platelet Count - Automated : 226 K/uL  Mean Cell Volume : 93.9 fL  Mean Cell Hemoglobin : 31.0 pg  Mean Cell Hemoglobin Concentration : 33.0 gm/dL  Auto Neutrophil # : x  Auto Lymphocyte # : x  Auto Monocyte # : x  Auto Eosinophil # : x  Auto Basophil # : x  Auto Neutrophil % : x  Auto Lymphocyte % : x  Auto Monocyte % : x  Auto Eosinophil % : x  Auto Basophil % : x    01-08    136  |  99  |  10  ----------------------------<  84  3.9   |  26  |  0.62    Ca    9.6      08 Jan 2024 05:40  Phos  3.9     01-08  Mg     1.70     01-08    TPro  6.8  /  Alb  3.1<L>  /  TBili  0.4  /  DBili  x   /  AST  60<H>  /  ALT  129<H>  /  AlkPhos  116  01-08    Urinalysis Basic - ( 08 Jan 2024 05:40 )    Color: x / Appearance: x / SG: x / pH: x  Gluc: 84 mg/dL / Ketone: x  / Bili: x / Urobili: x   Blood: x / Protein: x / Nitrite: x   Leuk Esterase: x / RBC: x / WBC x   Sq Epi: x / Non Sq Epi: x / Bacteria: x        VITALS  T(C): 36.9 (01-08-24 @ 05:18), Max: 36.9 (01-08-24 @ 05:18)  HR: 70 (01-08-24 @ 05:18) (70 - 81)  BP: 114/75 (01-08-24 @ 05:18) (114/75 - 122/74)  RR: 18 (01-08-24 @ 05:18) (18 - 18)  SpO2: 100% (01-08-24 @ 05:18) (100% - 100%)  Wt(kg): --    ALLERGIES  No Known Allergies      MEDICATIONS   albuterol/ipratropium for Nebulization 3 milliLiter(s) Nebulizer every 6 hours PRN  benzocaine/menthol Lozenge 1 Lozenge Oral four times a day PRN  chlorhexidine 2% Cloths 1 Application(s) Topical daily  ciprofloxacin  0.3% Ophthalmic Solution for Otic Use 2 Drop(s) Both Ears two times a day  FIRST- Mouthwash  BLM 15 milliLiter(s) Swish and Spit every 4 hours PRN  FLUoxetine 20 milliGRAM(s) Oral daily  gabapentin 200 milliGRAM(s) Oral three times a day  guaiFENesin Oral Liquid (Sugar-Free) 200 milliGRAM(s) Oral every 6 hours PRN  heparin   Injectable 5500 Unit(s) IV Push every 6 hours PRN  heparin   Injectable 2500 Unit(s) IV Push every 6 hours PRN  heparin  Infusion. 1300 Unit(s)/Hr IV Continuous <Continuous>  hydroxychloroquine 200 milliGRAM(s) Oral two times a day  lidocaine   4% Patch 1 Patch Transdermal every 24 hours  lidocaine   4% Patch 2 Patch Transdermal every 24 hours  lidocaine 2% Viscous 5 milliLiter(s) Swish and Spit three times a day PRN  melatonin 3 milliGRAM(s) Oral <User Schedule>  multivitamin/minerals/iron Oral Solution (CENTRUM) 15 milliLiter(s) Oral daily  ondansetron Injectable 4 milliGRAM(s) IV Push every 8 hours PRN  oxyCODONE    IR 2.5 milliGRAM(s) Oral every 8 hours PRN  pantoprazole    Tablet 40 milliGRAM(s) Oral before breakfast  polyethylene glycol 3350 17 Gram(s) Oral two times a day  predniSONE   Tablet 50 milliGRAM(s) Oral daily  senna 2 Tablet(s) Oral at bedtime  sodium chloride 1 Gram(s) Oral three times a day  sodium chloride 3%. 500 milliLiter(s) IV Continuous <Continuous>  trimethoprim  160 mG/sulfamethoxazole 800 mG 1 Tablet(s) Oral <User Schedule>      ----------------------------------------------------------------------------------------  PHYSICAL EXAM  Constitutional - NAD   HEENT - NCAT, EOMI   Chest - no respiratory distress  Cardiovascular - RRR, S1S2  Abdomen -  Soft, NTND  Extremities - No C/C/E, No calf tenderness   Neurologic Exam -                    Cognitive - Awake, Alert      Communication - Fluent, No dysarthria     Cranial Nerves - CN 2-12 intact     Motor -  generalized weakness 4/5 throughout     Sensory - Intact to LT      Balance - WNL Static  Psychiatric - Mood stable, Affect WNL  ----------------------------------------------------------------------------------------  ASSESSMENT/PLAN  29 yo m recent SLE diagnosis, PE and pneumonia transferred to [facility] for thoracic evaluation  s/p chest tube (since removed)  patient now with debility, reports 20 lb weight loss in past few weeks  Pain -oxy ir prn, gabapentin, tylenol, lidocaine patch   continue bedside PT and OT  diet- regular, fluid restrict with ensure plus  depression- recs per psychiatry  DVT PPX - heparin  Rehab -    Recommend ACUTE inpatient rehabilitation for the functional deficits consisting of 3 hours of therapy/day & 24 hour RN/daily PMR physician for comorbid medical management. Will continue to follow for ongoing rehab needs and recommendations.

## 2024-01-08 NOTE — PROGRESS NOTE ADULT - NUTRITIONAL ASSESSMENT
This patient has been assessed with a concern for Malnutrition and has been determined to have a diagnosis/diagnoses of Severe protein-calorie malnutrition.    This patient is being managed with:   Diet NPO after Midnight-     NPO Start Date: 08-Jan-2024   NPO Start Time: 23:59  Entered: Jan 8 2024  7:28AM    Diet Regular-  1000mL Fluid Restriction ([id number])  Supplement Feeding Modality:  Oral  Ensure Plus High Protein Cans or Servings Per Day:  1       Frequency:  Three Times a day  Entered: Carter  3 2024  4:21PM   This patient has been assessed with a concern for Malnutrition and has been determined to have a diagnosis/diagnoses of Severe protein-calorie malnutrition.    This patient is being managed with:   Diet NPO after Midnight-     NPO Start Date: 08-Jan-2024   NPO Start Time: 23:59  Entered: Jan 8 2024  7:28AM    Diet Regular-  1000mL Fluid Restriction ([id number])  Supplement Feeding Modality:  Oral  Ensure Plus High Protein Cans or Servings Per Day:  1       Frequency:  Three Times a day  Entered: Carter  3 2024  4:21PM

## 2024-01-08 NOTE — CHART NOTE - NSCHARTNOTEFT_GEN_A_CORE
Source: Patient A&Ox [4]   other [x] Chart Review     Medical Course: 28 years old male with h/o Lupus ( diagnosed in 09/2023, on Plaquenil present to [place] ED on 12/12/23  with complain of chest pain and SOB. Patient reported left sided pleuritic chest pain which started 3 days ago. Pain is progressively worsened, associated with SOB and cough. Patient was seen in OSH ED and was prescribed antibiotics. Patient reported significantly worsening of left sided pleuritic chest pain today. No fever or chills.     Nutrition Course: Patient seen at bedside, appears lethargic. Pt reported good appetite in hospital, PO intake varies from % in hospital per RN flow sheet. As per chart review, pt reported to MD that his appetite has decreased in hospital. Pt's diet has been supplementing with Ensure Plus HP 3x daily (350cal, 20gm pro each). Noted NPO in placed starting mid night of today. Pt noted pending for renal biopsy procedure. Pt has no food preferences voiced at this time. Pt reported diarrhea in hospital, had 1 x this morning. Noted Pt currently on bowel regimen. Recommend d/c laxative. Denies any other GI distress (nausea/vomiting/constipation.) RD to remain available for further nutritional interventions as indicated.        Diet, NPO after Midnight:      NPO Start Date: 08-Jan-2024,   NPO Start Time: 23:59 (01-08-24 @ 07:28)  Diet, Regular:   1000mL Fluid Restriction ([id number])  Supplement Feeding Modality:  Oral  Ensure Plus High Protein Cans or Servings Per Day:  1       Frequency:  Three Times a day (01-03-24 @ 16:21)      GI: WDL. Last BM 1/8/24    PO intake:  50-75% [x ]   % [ x]     Anthropometrics: Height (cm): 165.1 (12-22), 165.1 (12-12), 165.1 (11-27)  Weight (kg): 69.2 (12-22), 73.2 (12-22), 70.3 (11-27)  BMI (kg/m2): 25.4 (12-22), 26.9 (12-22), 25.8 (12-12), 25.8 (11-27)    Edema: None reported at present.   Pressure Injuries: None reported at present.     __________________ Pertinent Medications__________________   MEDICATIONS  (STANDING):  chlorhexidine 2% Cloths 1 Application(s) Topical daily  ciprofloxacin  0.3% Ophthalmic Solution for Otic Use 2 Drop(s) Both Ears two times a day  FLUoxetine 20 milliGRAM(s) Oral daily  gabapentin 200 milliGRAM(s) Oral three times a day  heparin  Infusion. 1300 Unit(s)/Hr (13 mL/Hr) IV Continuous <Continuous>  hydroxychloroquine 200 milliGRAM(s) Oral two times a day  lidocaine   4% Patch 1 Patch Transdermal every 24 hours  lidocaine   4% Patch 2 Patch Transdermal every 24 hours  melatonin 3 milliGRAM(s) Oral <User Schedule>  multivitamin/minerals/iron Oral Solution (CENTRUM) 15 milliLiter(s) Oral daily  pantoprazole    Tablet 40 milliGRAM(s) Oral before breakfast  polyethylene glycol 3350 17 Gram(s) Oral two times a day  predniSONE   Tablet 50 milliGRAM(s) Oral daily  senna 2 Tablet(s) Oral at bedtime  sodium chloride 1 Gram(s) Oral three times a day  sodium chloride 3%. 500 milliLiter(s) (30 mL/Hr) IV Continuous <Continuous>  trimethoprim  160 mG/sulfamethoxazole 800 mG 1 Tablet(s) Oral <User Schedule>    MEDICATIONS  (PRN):  albuterol/ipratropium for Nebulization 3 milliLiter(s) Nebulizer every 6 hours PRN Shortness of Breath and/or Wheezing  benzocaine/menthol Lozenge 1 Lozenge Oral four times a day PRN Sore Throat  FIRST- Mouthwash  BLM 15 milliLiter(s) Swish and Spit every 4 hours PRN Mouth Care  guaiFENesin Oral Liquid (Sugar-Free) 200 milliGRAM(s) Oral every 6 hours PRN Cough  heparin   Injectable 5500 Unit(s) IV Push every 6 hours PRN For aPTT less than 40  heparin   Injectable 2500 Unit(s) IV Push every 6 hours PRN For aPTT between 40 - 57  lidocaine 2% Viscous 5 milliLiter(s) Swish and Spit three times a day PRN Mouth Care  ondansetron Injectable 4 milliGRAM(s) IV Push every 8 hours PRN Nausea and/or Vomiting  oxyCODONE    IR 2.5 milliGRAM(s) Oral every 8 hours PRN Moderate to severe pain (4-10)      __________________ Pertinent Labs__________________   01-08 Na136 mmol/L Glu 84 mg/dL K+ 3.9 mmol/L Cr  0.62 mg/dL BUN 10 mg/dL 01-08 Phos 3.9 mg/dL 01-08 Alb 3.1 g/dL<L>          Estimated Needs:   [x] no change since previous assessment      Previous Nutrition Diagnosis: Severe protein calorie malnutrition     Nutrition Diagnosis is [x ] ongoing       Recommendations:  1) Recommend continue with current diet, which remains appropriate at this time. Resume PO diet when medically feasible  2) Encourage PO intake and honor food preferences as able.   3) Recommend cont provide Ensure Plus HP 3x daily (350cal, 20gm pro each) to provide optimal nutrition.   4) Recommend d/c bowel regimen due to pt reports of having diarrhea in hospital.   5) Monitor PO intake, Labs, weights, BMs, and skin integrity.   6) RD to remain available for further nutritional interventions as indicated.       Monitoring and Evaluation:      [ x] Tolerance to diet prescription [x ] weights [x ] follow up per protocol  [ ] other: Source: Patient A&Ox [4]   other [x] Chart Review     Medical Course: 28 years old male with h/o Lupus ( diagnosed in 09/2023, on Plaquenil present to [place] ED on 12/12/23  with complain of chest pain and SOB. Patient reported left sided pleuritic chest pain which started 3 days ago. Pain is progressively worsened, associated with SOB and cough. Patient was seen in OSH ED and was prescribed antibiotics. Patient reported significantly worsening of left sided pleuritic chest pain today. No fever or chills.     Nutrition Course: Patient seen at bedside, appears lethargic. Pt reported good appetite in hospital, PO intake varies from % in hospital per RN flow sheet. As per chart review, pt reported to MD that his appetite has decreased in hospital. Pt's diet has been supplementing with Ensure Plus HP 3x daily (350cal, 20gm pro each). Noted NPO in placed starting mid night of today. Pt noted pending for renal biopsy procedure. Pt has no food preferences voiced at this time. Pt reported diarrhea in hospital, had 1 x this morning. Noted Pt currently on bowel regimen. Recommend d/c laxative. Denies any other GI distress (nausea/vomiting/constipation.) RD to remain available for further nutritional interventions as indicated.        Diet, NPO after Midnight:      NPO Start Date: 08-Jan-2024,   NPO Start Time: 23:59 (01-08-24 @ 07:28)  Diet, Regular:   1000mL Fluid Restriction ([id number])  Supplement Feeding Modality:  Oral  Ensure Plus High Protein Cans or Servings Per Day:  1       Frequency:  Three Times a day (01-03-24 @ 16:21)      GI: WDL. Last BM 1/8/24    PO intake:  50-75% [x ]   % [ x]     Anthropometrics: Height (cm): 165.1 (12-22), 165.1 (12-12), 165.1 (11-27)  Weight (kg): 69.2 (12-22), 73.2 (12-22), 70.3 (11-27)  BMI (kg/m2): 25.4 (12-22), 26.9 (12-22), 25.8 (12-12), 25.8 (11-27)    Edema: None reported at present.   Pressure Injuries: None reported at present.     __________________ Pertinent Medications__________________   MEDICATIONS  (STANDING):  chlorhexidine 2% Cloths 1 Application(s) Topical daily  ciprofloxacin  0.3% Ophthalmic Solution for Otic Use 2 Drop(s) Both Ears two times a day  FLUoxetine 20 milliGRAM(s) Oral daily  gabapentin 200 milliGRAM(s) Oral three times a day  heparin  Infusion. 1300 Unit(s)/Hr (13 mL/Hr) IV Continuous <Continuous>  hydroxychloroquine 200 milliGRAM(s) Oral two times a day  lidocaine   4% Patch 1 Patch Transdermal every 24 hours  lidocaine   4% Patch 2 Patch Transdermal every 24 hours  melatonin 3 milliGRAM(s) Oral <User Schedule>  multivitamin/minerals/iron Oral Solution (CENTRUM) 15 milliLiter(s) Oral daily  pantoprazole    Tablet 40 milliGRAM(s) Oral before breakfast  polyethylene glycol 3350 17 Gram(s) Oral two times a day  predniSONE   Tablet 50 milliGRAM(s) Oral daily  senna 2 Tablet(s) Oral at bedtime  sodium chloride 1 Gram(s) Oral three times a day  sodium chloride 3%. 500 milliLiter(s) (30 mL/Hr) IV Continuous <Continuous>  trimethoprim  160 mG/sulfamethoxazole 800 mG 1 Tablet(s) Oral <User Schedule>    MEDICATIONS  (PRN):  albuterol/ipratropium for Nebulization 3 milliLiter(s) Nebulizer every 6 hours PRN Shortness of Breath and/or Wheezing  benzocaine/menthol Lozenge 1 Lozenge Oral four times a day PRN Sore Throat  FIRST- Mouthwash  BLM 15 milliLiter(s) Swish and Spit every 4 hours PRN Mouth Care  guaiFENesin Oral Liquid (Sugar-Free) 200 milliGRAM(s) Oral every 6 hours PRN Cough  heparin   Injectable 5500 Unit(s) IV Push every 6 hours PRN For aPTT less than 40  heparin   Injectable 2500 Unit(s) IV Push every 6 hours PRN For aPTT between 40 - 57  lidocaine 2% Viscous 5 milliLiter(s) Swish and Spit three times a day PRN Mouth Care  ondansetron Injectable 4 milliGRAM(s) IV Push every 8 hours PRN Nausea and/or Vomiting  oxyCODONE    IR 2.5 milliGRAM(s) Oral every 8 hours PRN Moderate to severe pain (4-10)      __________________ Pertinent Labs__________________   01-08 Na136 mmol/L Glu 84 mg/dL K+ 3.9 mmol/L Cr  0.62 mg/dL BUN 10 mg/dL 01-08 Phos 3.9 mg/dL 01-08 Alb 3.1 g/dL<L>          Estimated Needs:   [x] no change since previous assessment      Previous Nutrition Diagnosis: Severe protein calorie malnutrition     Nutrition Diagnosis is [x ] ongoing       Recommendations:  1) Recommend continue with current diet, which remains appropriate at this time. Resume PO diet when medically feasible  2) Encourage PO intake and honor food preferences as able.   3) Recommend cont provide Ensure Plus HP 3x daily (350cal, 20gm pro each) to provide optimal nutrition.   4) Recommend d/c bowel regimen due to pt reports of having diarrhea in hospital.   5) Monitor PO intake, Labs, weights, BMs, and skin integrity.   6) RD to remain available for further nutritional interventions as indicated.       Monitoring and Evaluation:      [ x] Tolerance to diet prescription [x ] weights [x ] follow up per protocol  [ ] other:

## 2024-01-08 NOTE — PROGRESS NOTE ADULT - SUBJECTIVE AND OBJECTIVE BOX
Name of Patient : TAYLA MARIE  MRN: [id number]  Date of visit: 01-08-24 @ 13:14      Subjective: Patient seen and examined. No new events except as noted.   doing okay  mother at the bedside     REVIEW OF SYSTEMS:    CONSTITUTIONAL: No weakness, fevers or chills  EYES/ENT: No visual changes;  No vertigo or throat pain   NECK: No pain or stiffness  RESPIRATORY: No cough, wheezing, hemoptysis; No shortness of breath  CARDIOVASCULAR: No chest pain or palpitations  GASTROINTESTINAL: No abdominal or epigastric pain. No nausea, vomiting, or hematemesis; No diarrhea or constipation. No melena or hematochezia.  GENITOURINARY: No dysuria, frequency or hematuria  NEUROLOGICAL: No numbness or weakness  SKIN: No itching, burning, rashes, or lesions   All other review of systems is negative unless indicated above.    MEDICATIONS:  MEDICATIONS  (STANDING):  chlorhexidine 2% Cloths 1 Application(s) Topical daily  ciprofloxacin  0.3% Ophthalmic Solution for Otic Use 2 Drop(s) Both Ears two times a day  FLUoxetine 20 milliGRAM(s) Oral daily  gabapentin 200 milliGRAM(s) Oral three times a day  heparin  Infusion. 1300 Unit(s)/Hr (13 mL/Hr) IV Continuous <Continuous>  hydroxychloroquine 200 milliGRAM(s) Oral two times a day  lidocaine   4% Patch 1 Patch Transdermal every 24 hours  lidocaine   4% Patch 2 Patch Transdermal every 24 hours  melatonin 3 milliGRAM(s) Oral <User Schedule>  multivitamin/minerals/iron Oral Solution (CENTRUM) 15 milliLiter(s) Oral daily  pantoprazole    Tablet 40 milliGRAM(s) Oral before breakfast  polyethylene glycol 3350 17 Gram(s) Oral two times a day  predniSONE   Tablet 50 milliGRAM(s) Oral daily  senna 2 Tablet(s) Oral at bedtime  sodium chloride 1 Gram(s) Oral three times a day  sodium chloride 3%. 500 milliLiter(s) (30 mL/Hr) IV Continuous <Continuous>  trimethoprim  160 mG/sulfamethoxazole 800 mG 1 Tablet(s) Oral <User Schedule>      PHYSICAL EXAM:  T(C): 36.9 (01-08-24 @ 05:18), Max: 36.9 (01-08-24 @ 05:18)  HR: 70 (01-08-24 @ 05:18) (70 - 81)  BP: 114/75 (01-08-24 @ 05:18) (114/75 - 122/74)  RR: 18 (01-08-24 @ 05:18) (18 - 18)  SpO2: 100% (01-08-24 @ 05:18) (100% - 100%)  Wt(kg): --  I&O's Summary    07 Jan 2024 07:01  -  08 Jan 2024 07:00  --------------------------------------------------------  IN: 0 mL / OUT: 500 mL / NET: -500 mL          Appearance: Normal	  HEENT:  PERRLA   Lymphatic: No lymphadenopathy   Cardiovascular: Normal S1 S2, no JVD  Respiratory: normal effort , clear  Gastrointestinal:  Soft, Non-tender  Skin: No rashes,  warm to touch  Psychiatry:  Mood & affect appropriate  Musculuskeletal: No edema    recent labs, Imaging and EKGs personally reviewed     01-07-24 @ 07:01  -  01-08-24 @ 07:00  --------------------------------------------------------  IN: 0 mL / OUT: 500 mL / NET: -500 mL                          9.7    7.57  )-----------( 226      ( 08 Jan 2024 05:40 )             29.4               01-08    136  |  99  |  10  ----------------------------<  84  3.9   |  26  |  0.62    Ca    9.6      08 Jan 2024 05:40  Phos  3.9     01-08  Mg     1.70     01-08    TPro  6.8  /  Alb  3.1<L>  /  TBili  0.4  /  DBili  x   /  AST  60<H>  /  ALT  129<H>  /  AlkPhos  116  01-08    PT/INR - ( 08 Jan 2024 05:40 )   PT: 14.6 sec;   INR: 1.31 ratio         PTT - ( 08 Jan 2024 05:40 )  PTT:55.8 sec                   Urinalysis Basic - ( 08 Jan 2024 05:40 )    Color: x / Appearance: x / SG: x / pH: x  Gluc: 84 mg/dL / Ketone: x  / Bili: x / Urobili: x   Blood: x / Protein: x / Nitrite: x   Leuk Esterase: x / RBC: x / WBC x   Sq Epi: x / Non Sq Epi: x / Bacteria: x               Name of Patient : TAYLA MARIE  MRN: [id number]  Date of visit: 01-08-24 @ 13:14      Subjective: Patient seen and examined. No new events except as noted.   doing okay  mother at the bedside     REVIEW OF SYSTEMS:    CONSTITUTIONAL: No weakness, fevers or chills  EYES/ENT: No visual changes;  No vertigo or throat pain   NECK: No pain or stiffness  RESPIRATORY: No cough, wheezing, hemoptysis; No shortness of breath  CARDIOVASCULAR: No chest pain or palpitations  GASTROINTESTINAL: No abdominal or epigastric pain. No nausea, vomiting, or hematemesis; No diarrhea or constipation. No melena or hematochezia.  GENITOURINARY: No dysuria, frequency or hematuria  NEUROLOGICAL: No numbness or weakness  SKIN: No itching, burning, rashes, or lesions   All other review of systems is negative unless indicated above.    MEDICATIONS:  MEDICATIONS  (STANDING):  chlorhexidine 2% Cloths 1 Application(s) Topical daily  ciprofloxacin  0.3% Ophthalmic Solution for Otic Use 2 Drop(s) Both Ears two times a day  FLUoxetine 20 milliGRAM(s) Oral daily  gabapentin 200 milliGRAM(s) Oral three times a day  heparin  Infusion. 1300 Unit(s)/Hr (13 mL/Hr) IV Continuous <Continuous>  hydroxychloroquine 200 milliGRAM(s) Oral two times a day  lidocaine   4% Patch 1 Patch Transdermal every 24 hours  lidocaine   4% Patch 2 Patch Transdermal every 24 hours  melatonin 3 milliGRAM(s) Oral <User Schedule>  multivitamin/minerals/iron Oral Solution (CENTRUM) 15 milliLiter(s) Oral daily  pantoprazole    Tablet 40 milliGRAM(s) Oral before breakfast  polyethylene glycol 3350 17 Gram(s) Oral two times a day  predniSONE   Tablet 50 milliGRAM(s) Oral daily  senna 2 Tablet(s) Oral at bedtime  sodium chloride 1 Gram(s) Oral three times a day  sodium chloride 3%. 500 milliLiter(s) (30 mL/Hr) IV Continuous <Continuous>  trimethoprim  160 mG/sulfamethoxazole 800 mG 1 Tablet(s) Oral <User Schedule>      PHYSICAL EXAM:  T(C): 36.9 (01-08-24 @ 05:18), Max: 36.9 (01-08-24 @ 05:18)  HR: 70 (01-08-24 @ 05:18) (70 - 81)  BP: 114/75 (01-08-24 @ 05:18) (114/75 - 122/74)  RR: 18 (01-08-24 @ 05:18) (18 - 18)  SpO2: 100% (01-08-24 @ 05:18) (100% - 100%)  Wt(kg): --  I&O's Summary    07 Jan 2024 07:01  -  08 Jan 2024 07:00  --------------------------------------------------------  IN: 0 mL / OUT: 500 mL / NET: -500 mL          Appearance: Normal	  HEENT:  PERRLA   Lymphatic: No lymphadenopathy   Cardiovascular: Normal S1 S2, no JVD  Respiratory: normal effort , clear  Gastrointestinal:  Soft, Non-tender  Skin: No rashes,  warm to touch  Psychiatry:  Mood & affect appropriate  Musculuskeletal: No edema    recent labs, Imaging and EKGs personally reviewed     01-07-24 @ 07:01  -  01-08-24 @ 07:00  --------------------------------------------------------  IN: 0 mL / OUT: 500 mL / NET: -500 mL                          9.7    7.57  )-----------( 226      ( 08 Jan 2024 05:40 )             29.4               01-08    136  |  99  |  10  ----------------------------<  84  3.9   |  26  |  0.62    Ca    9.6      08 Jan 2024 05:40  Phos  3.9     01-08  Mg     1.70     01-08    TPro  6.8  /  Alb  3.1<L>  /  TBili  0.4  /  DBili  x   /  AST  60<H>  /  ALT  129<H>  /  AlkPhos  116  01-08    PT/INR - ( 08 Jan 2024 05:40 )   PT: 14.6 sec;   INR: 1.31 ratio         PTT - ( 08 Jan 2024 05:40 )  PTT:55.8 sec                   Urinalysis Basic - ( 08 Jan 2024 05:40 )    Color: x / Appearance: x / SG: x / pH: x  Gluc: 84 mg/dL / Ketone: x  / Bili: x / Urobili: x   Blood: x / Protein: x / Nitrite: x   Leuk Esterase: x / RBC: x / WBC x   Sq Epi: x / Non Sq Epi: x / Bacteria: x

## 2024-01-08 NOTE — PROGRESS NOTE ADULT - SUBJECTIVE AND OBJECTIVE BOX
TAYLA FRANK  [id number]    Subjective: Per pt, he continues to feel intermittently drowsy and endorsing back pain. denies pleuritic chest pain. Has been trying to ambulate in the hallway with PT.   Pt's mother again stating she is confused about renal biopsy and that she was told that he needs a lymph node biopsy more urgently than renal biopsy.    Objective:   Vital Signs Last 24 Hrs  T(C): 37 (08 Jan 2024 15:26), Max: 37 (08 Jan 2024 15:26)  T(F): 98.6 (08 Jan 2024 15:26), Max: 98.6 (08 Jan 2024 15:26)  HR: 90 (08 Jan 2024 15:26) (70 - 90)  BP: 115/70 (08 Jan 2024 15:26) (114/75 - 122/74)  BP(mean): --  RR: 18 (08 Jan 2024 15:26) (18 - 18)  SpO2: 100% (08 Jan 2024 15:26) (100% - 100%)    Parameters below as of 08 Jan 2024 05:18  Patient On (Oxygen Delivery Method): room air      Physical Exam:  General: NAD  HEENT: EOMI  CV: well perfused   Lung: No increased WOB,  Abd: non-distended   Ext: no synovitis on exam   Neuro: Awake and alert       LABS:  cret                        9.7    7.57  )-----------( 226      ( 08 Jan 2024 05:40 )             29.4     01-08    136  |  99  |  10  ----------------------------<  84  3.9   |  26  |  0.62    Ca    9.6      08 Jan 2024 05:40  Phos  3.9     01-08  Mg     1.70     01-08    TPro  6.8  /  Alb  3.1<L>  /  TBili  0.4  /  DBili  x   /  AST  60<H>  /  ALT  129<H>  /  AlkPhos  116  01-08    PT/INR - ( 08 Jan 2024 05:40 )   PT: 14.6 sec;   INR: 1.31 ratio         PTT - ( 08 Jan 2024 05:40 )  PTT:55.8 sec         TAYLA FRANK  [id number]    Subjective: Per pt, he continues to feel intermittently drowsy and endorsing back pain. denies pleuritic chest pain. Has been trying to ambulate in the hallway with PT.   Pt's mother again stating she is confused about renal biopsy and that she was told that he needs a lymph node biopsy more urgently than renal biopsy.    Objective:   Vital Signs Last 24 Hrs  T(C): 37 (08 Jan 2024 15:26), Max: 37 (08 Jan 2024 15:26)  T(F): 98.6 (08 Jan 2024 15:26), Max: 98.6 (08 Jan 2024 15:26)  HR: 90 (08 Jan 2024 15:26) (70 - 90)  BP: 115/70 (08 Jan 2024 15:26) (114/75 - 122/74)  BP(mean): --  RR: 18 (08 Jan 2024 15:26) (18 - 18)  SpO2: 100% (08 Jan 2024 15:26) (100% - 100%)    Parameters below as of 08 Jan 2024 05:18  Patient On (Oxygen Delivery Method): room air      Physical Exam:  General: NAD  HEENT: EOMI  CV: well perfused   Lung: No increased WOB,  Abd: non-distended   Ext: no synovitis on exam   Neuro: Awake and alert       LABS:  cret                        9.7    7.57  )-----------( 226      ( 08 Jan 2024 05:40 )             29.4     01-08    136  |  99  |  10  ----------------------------<  84  3.9   |  26  |  0.62    Ca    9.6      08 Jan 2024 05:40  Phos  3.9     01-08  Mg     1.70     01-08    TPro  6.8  /  Alb  3.1<L>  /  TBili  0.4  /  DBili  x   /  AST  60<H>  /  ALT  129<H>  /  AlkPhos  116  01-08    PT/INR - ( 08 Jan 2024 05:40 )   PT: 14.6 sec;   INR: 1.31 ratio         PTT - ( 08 Jan 2024 05:40 )  PTT:55.8 sec

## 2024-01-08 NOTE — PROGRESS NOTE ADULT - ASSESSMENT
28 years old male with h/o Lupus ( diagnosed in 09/2023, on Plaquenil present to [place] ED on 12/12/23  with complain of chest pain and SOB. Patient reported left sided pleuritic chest pain which started 3 days ago. Pain is progressively worsened, associated with SOB and cough. Patient was seen in OSH ED and was prescribed antibiotics. Patient reported significantly worsening of left sided pleuritic chest pain today. No fever or chills. Patient reported loss of appetite and had a few episode of diarrhea for last 2 days. CTA chest with acute right upper lobe and left lower lobe segmental/subsegmental pulmonary emboli. No CT evidence of right heart strain. New bilateral lower lobe consolidations with areas of central clearing. Pneumonia and pulmonary infarcts are in the differential. New bilateral pleural effusions, small on the left and trace on the right. Bilateral axillary and supraclavicular adenopathy of unclear etiology. Patient was started on heparin ggt transitioned to eliquis on 12/19,  patient with worsening SOB/ hypoxia requiring nasal cannula oxygen supplementation. 12/20  Repeat Xray shows increased moderate left pleural effusion and compressive atelectasis trace right effusion and linear atelectasis. Thoracic team consulted for possible pigtail insertion Bedside US: Large left effusion with septations. Right simple effusion , + pericardial effusion- lower extremity dopplers negative for DVT.    # Pulm effusion/ Fever   S/P thoracentesis , follow up results   heparin for AC  TS care appreciated   O2 supplement   monitor output   Zosyn for now , ABx per ID , completed   LP done  Plan for LN biopsy by IR , discussed with rheum and patinet;s mother, defer to rheum  Hematuria noted   Urology eval   COmpleted course of ABx   Pulma nd card for risk stratification to come off heparin for biopsy   follow up with rheum for need of biopsy  repeat urine study         # Fever  SIRS   Abx , comoleted per iD   Rheum adn ID follow up   Renal biopsy for protonuria, defer to rhem, discussed with IR, high risk       # Hyponatremia/ Seizure   RRT   Neuro follow up       #PE   on heparin , resume after thoracentesis   DVT negative  Heme onc eval   likley lupus related     #Hxof lupus  on hydroxychloroquine   Heme eval   Rheum eval   steroids per rheum       DVT andgIPPX    Discussed in detail with patient and mother at the bedside  28 years old male with h/o Lupus ( diagnosed in 09/2023, on Plaquenil present to [place] ED on 12/12/23  with complain of chest pain and SOB. Patient reported left sided pleuritic chest pain which started 3 days ago. Pain is progressively worsened, associated with SOB and cough. Patient was seen in OSH ED and was prescribed antibiotics. Patient reported significantly worsening of left sided pleuritic chest pain today. No fever or chills. Patient reported loss of appetite and had a few episode of diarrhea for last 2 days. CTA chest with acute right upper lobe and left lower lobe segmental/subsegmental pulmonary emboli. No CT evidence of right heart strain. New bilateral lower lobe consolidations with areas of central clearing. Pneumonia and pulmonary infarcts are in the differential. New bilateral pleural effusions, small on the left and trace on the right. Bilateral axillary and supraclavicular adenopathy of unclear etiology. Patient was started on heparin ggt transitioned to eliquis on 12/19,  patient with worsening SOB/ hypoxia requiring nasal cannula oxygen supplementation. 12/20  Repeat Xray shows increased moderate left pleural effusion and compressive atelectasis trace right effusion and linear atelectasis. Thoracic team consulted for possible pigtail insertion Bedside US: Large left effusion with septations. Right simple effusion , + pericardial effusion- lower extremity dopplers negative for DVT.    # Pulm effusion/ Fever   S/P thoracentesis , follow up results   heparin for AC  TS care appreciated   O2 supplement   monitor output   Zosyn for now , ABx per ID , completed   LP done  Plan for LN biopsy by IR , discussed with rheum and patinet;s mother, defer to rheum  Hematuria noted   Urology eval   COmpleted course of ABx   Pulma nd card for risk stratification to come off heparin for biopsy   follow up with rheum for need of biopsy  repeat urine study         # Fever  SIRS   Abx , comoleted per iD   Rheum adn ID follow up   Renal biopsy for protonuria, defer to rhem, discussed with IR, high risk       # Hyponatremia/ Seizure   RRT   Neuro follow up       #PE   on heparin , resume after thoracentesis   DVT negative  Heme onc eval   likley lupus related     #Hxof lupus  on hydroxychloroquine   Heme eval   Rheum eval   steroids per rheum       DVT andgIPPX    Discussed in detail with patient and mother at the bedside

## 2024-01-08 NOTE — PROGRESS NOTE ADULT - SUBJECTIVE AND OBJECTIVE BOX
Subjective: Patient seen and examined. No new events except as noted.     SUBJECTIVE/ROS:  nad      MEDICATIONS:  MEDICATIONS  (STANDING):  chlorhexidine 2% Cloths 1 Application(s) Topical daily  ciprofloxacin  0.3% Ophthalmic Solution for Otic Use 2 Drop(s) Both Ears two times a day  FLUoxetine 20 milliGRAM(s) Oral daily  gabapentin 200 milliGRAM(s) Oral three times a day  heparin  Infusion. 1300 Unit(s)/Hr (13 mL/Hr) IV Continuous <Continuous>  hydroxychloroquine 200 milliGRAM(s) Oral two times a day  lidocaine   4% Patch 1 Patch Transdermal every 24 hours  lidocaine   4% Patch 2 Patch Transdermal every 24 hours  melatonin 3 milliGRAM(s) Oral <User Schedule>  multivitamin/minerals/iron Oral Solution (CENTRUM) 15 milliLiter(s) Oral daily  pantoprazole    Tablet 40 milliGRAM(s) Oral before breakfast  polyethylene glycol 3350 17 Gram(s) Oral two times a day  predniSONE   Tablet 50 milliGRAM(s) Oral daily  senna 2 Tablet(s) Oral at bedtime  sodium chloride 1 Gram(s) Oral three times a day  sodium chloride 3%. 500 milliLiter(s) (30 mL/Hr) IV Continuous <Continuous>  trimethoprim  160 mG/sulfamethoxazole 800 mG 1 Tablet(s) Oral <User Schedule>      PHYSICAL EXAM:  T(C): 36.9 (01-08-24 @ 05:18), Max: 36.9 (01-07-24 @ 12:28)  HR: 70 (01-08-24 @ 05:18) (70 - 93)  BP: 114/75 (01-08-24 @ 05:18) (114/75 - 125/77)  RR: 18 (01-08-24 @ 05:18) (18 - 18)  SpO2: 100% (01-08-24 @ 05:18) (100% - 100%)  Wt(kg): --  I&O's Summary    07 Jan 2024 07:01  -  08 Jan 2024 07:00  --------------------------------------------------------  IN: 0 mL / OUT: 500 mL / NET: -500 mL            JVP: Normal  Neck: supple  Lung: clear   CV: S1 S2 , Murmur:  Abd: soft  Ext: No edema  neuro: Awake / alert  Psych: flat affect  Skin: normal``    LABS/DATA:    CARDIAC MARKERS:                                9.7    7.57  )-----------( 226      ( 08 Jan 2024 05:40 )             29.4     01-08    136  |  99  |  10  ----------------------------<  84  3.9   |  26  |  0.62    Ca    9.6      08 Jan 2024 05:40  Phos  3.9     01-08  Mg     1.70     01-08    TPro  6.8  /  Alb  3.1<L>  /  TBili  0.4  /  DBili  x   /  AST  60<H>  /  ALT  129<H>  /  AlkPhos  116  01-08    proBNP:   Lipid Profile:   HgA1c:   TSH:     TELE:  EKG:

## 2024-01-09 LAB
ANION GAP SERPL CALC-SCNC: 11 MMOL/L — SIGNIFICANT CHANGE UP (ref 7–14)
ANION GAP SERPL CALC-SCNC: 11 MMOL/L — SIGNIFICANT CHANGE UP (ref 7–14)
APTT BLD: 70.4 SEC — HIGH (ref 24.5–35.6)
APTT BLD: 70.4 SEC — HIGH (ref 24.5–35.6)
APTT BLD: 75.9 SEC — HIGH (ref 24.5–35.6)
APTT BLD: 75.9 SEC — HIGH (ref 24.5–35.6)
BLD GP AB SCN SERPL QL: NEGATIVE — SIGNIFICANT CHANGE UP
BLD GP AB SCN SERPL QL: NEGATIVE — SIGNIFICANT CHANGE UP
BUN SERPL-MCNC: 9 MG/DL — SIGNIFICANT CHANGE UP (ref 7–23)
BUN SERPL-MCNC: 9 MG/DL — SIGNIFICANT CHANGE UP (ref 7–23)
CALCIUM SERPL-MCNC: 9.9 MG/DL — SIGNIFICANT CHANGE UP (ref 8.4–10.5)
CALCIUM SERPL-MCNC: 9.9 MG/DL — SIGNIFICANT CHANGE UP (ref 8.4–10.5)
CHLORIDE SERPL-SCNC: 97 MMOL/L — LOW (ref 98–107)
CHLORIDE SERPL-SCNC: 97 MMOL/L — LOW (ref 98–107)
CO2 SERPL-SCNC: 26 MMOL/L — SIGNIFICANT CHANGE UP (ref 22–31)
CO2 SERPL-SCNC: 26 MMOL/L — SIGNIFICANT CHANGE UP (ref 22–31)
CREAT SERPL-MCNC: 0.71 MG/DL — SIGNIFICANT CHANGE UP (ref 0.5–1.3)
CREAT SERPL-MCNC: 0.71 MG/DL — SIGNIFICANT CHANGE UP (ref 0.5–1.3)
EGFR: 127 ML/MIN/1.73M2 — SIGNIFICANT CHANGE UP
EGFR: 127 ML/MIN/1.73M2 — SIGNIFICANT CHANGE UP
GLUCOSE SERPL-MCNC: 101 MG/DL — HIGH (ref 70–99)
GLUCOSE SERPL-MCNC: 101 MG/DL — HIGH (ref 70–99)
HCT VFR BLD CALC: 30.1 % — LOW (ref 39–50)
HCT VFR BLD CALC: 30.1 % — LOW (ref 39–50)
HGB BLD-MCNC: 10 G/DL — LOW (ref 13–17)
HGB BLD-MCNC: 10 G/DL — LOW (ref 13–17)
IC SERPL C1Q BIND-ACNC: 11 UG EQ/ML — HIGH
IC SERPL C1Q BIND-ACNC: 11 UG EQ/ML — HIGH
INR BLD: 1.31 RATIO — HIGH (ref 0.85–1.18)
INR BLD: 1.31 RATIO — HIGH (ref 0.85–1.18)
MAGNESIUM SERPL-MCNC: 1.8 MG/DL — SIGNIFICANT CHANGE UP (ref 1.6–2.6)
MAGNESIUM SERPL-MCNC: 1.8 MG/DL — SIGNIFICANT CHANGE UP (ref 1.6–2.6)
MCHC RBC-ENTMCNC: 30.9 PG — SIGNIFICANT CHANGE UP (ref 27–34)
MCHC RBC-ENTMCNC: 30.9 PG — SIGNIFICANT CHANGE UP (ref 27–34)
MCHC RBC-ENTMCNC: 33.2 GM/DL — SIGNIFICANT CHANGE UP (ref 32–36)
MCHC RBC-ENTMCNC: 33.2 GM/DL — SIGNIFICANT CHANGE UP (ref 32–36)
MCV RBC AUTO: 92.9 FL — SIGNIFICANT CHANGE UP (ref 80–100)
MCV RBC AUTO: 92.9 FL — SIGNIFICANT CHANGE UP (ref 80–100)
NRBC # BLD: 0 /100 WBCS — SIGNIFICANT CHANGE UP (ref 0–0)
NRBC # BLD: 0 /100 WBCS — SIGNIFICANT CHANGE UP (ref 0–0)
NRBC # FLD: 0 K/UL — SIGNIFICANT CHANGE UP (ref 0–0)
NRBC # FLD: 0 K/UL — SIGNIFICANT CHANGE UP (ref 0–0)
PHOSPHATE SERPL-MCNC: 3.7 MG/DL — SIGNIFICANT CHANGE UP (ref 2.5–4.5)
PHOSPHATE SERPL-MCNC: 3.7 MG/DL — SIGNIFICANT CHANGE UP (ref 2.5–4.5)
PLATELET # BLD AUTO: 246 K/UL — SIGNIFICANT CHANGE UP (ref 150–400)
PLATELET # BLD AUTO: 246 K/UL — SIGNIFICANT CHANGE UP (ref 150–400)
POTASSIUM SERPL-MCNC: 4.4 MMOL/L — SIGNIFICANT CHANGE UP (ref 3.5–5.3)
POTASSIUM SERPL-MCNC: 4.4 MMOL/L — SIGNIFICANT CHANGE UP (ref 3.5–5.3)
POTASSIUM SERPL-SCNC: 4.4 MMOL/L — SIGNIFICANT CHANGE UP (ref 3.5–5.3)
POTASSIUM SERPL-SCNC: 4.4 MMOL/L — SIGNIFICANT CHANGE UP (ref 3.5–5.3)
PROTHROM AB SERPL-ACNC: 14.7 SEC — HIGH (ref 9.5–13)
PROTHROM AB SERPL-ACNC: 14.7 SEC — HIGH (ref 9.5–13)
RBC # BLD: 3.24 M/UL — LOW (ref 4.2–5.8)
RBC # BLD: 3.24 M/UL — LOW (ref 4.2–5.8)
RBC # FLD: 16.3 % — HIGH (ref 10.3–14.5)
RBC # FLD: 16.3 % — HIGH (ref 10.3–14.5)
RH IG SCN BLD-IMP: POSITIVE — SIGNIFICANT CHANGE UP
RH IG SCN BLD-IMP: POSITIVE — SIGNIFICANT CHANGE UP
SODIUM SERPL-SCNC: 134 MMOL/L — LOW (ref 135–145)
SODIUM SERPL-SCNC: 134 MMOL/L — LOW (ref 135–145)
WBC # BLD: 8.01 K/UL — SIGNIFICANT CHANGE UP (ref 3.8–10.5)
WBC # BLD: 8.01 K/UL — SIGNIFICANT CHANGE UP (ref 3.8–10.5)
WBC # FLD AUTO: 8.01 K/UL — SIGNIFICANT CHANGE UP (ref 3.8–10.5)
WBC # FLD AUTO: 8.01 K/UL — SIGNIFICANT CHANGE UP (ref 3.8–10.5)

## 2024-01-09 PROCEDURE — 99232 SBSQ HOSP IP/OBS MODERATE 35: CPT

## 2024-01-09 PROCEDURE — 95720 EEG PHY/QHP EA INCR W/VEEG: CPT

## 2024-01-09 PROCEDURE — 99232 SBSQ HOSP IP/OBS MODERATE 35: CPT | Mod: GC

## 2024-01-09 RX ORDER — OXYCODONE HYDROCHLORIDE 5 MG/1
2.5 TABLET ORAL ONCE
Refills: 0 | Status: DISCONTINUED | OUTPATIENT
Start: 2024-01-09 | End: 2024-01-09

## 2024-01-09 RX ADMIN — Medication 15 MILLILITER(S): at 12:40

## 2024-01-09 RX ADMIN — Medication 50 MILLIGRAM(S): at 05:11

## 2024-01-09 RX ADMIN — LIDOCAINE 1 PATCH: 4 CREAM TOPICAL at 19:33

## 2024-01-09 RX ADMIN — HEPARIN SODIUM 1000 UNIT(S)/HR: 5000 INJECTION INTRAVENOUS; SUBCUTANEOUS at 07:36

## 2024-01-09 RX ADMIN — HEPARIN SODIUM 1000 UNIT(S)/HR: 5000 INJECTION INTRAVENOUS; SUBCUTANEOUS at 04:49

## 2024-01-09 RX ADMIN — SODIUM CHLORIDE 1 GRAM(S): 9 INJECTION INTRAMUSCULAR; INTRAVENOUS; SUBCUTANEOUS at 21:07

## 2024-01-09 RX ADMIN — OXYCODONE HYDROCHLORIDE 2.5 MILLIGRAM(S): 5 TABLET ORAL at 13:05

## 2024-01-09 RX ADMIN — Medication 200 MILLIGRAM(S): at 05:11

## 2024-01-09 RX ADMIN — LIDOCAINE 1 PATCH: 4 CREAM TOPICAL at 03:00

## 2024-01-09 RX ADMIN — GABAPENTIN 200 MILLIGRAM(S): 400 CAPSULE ORAL at 05:11

## 2024-01-09 RX ADMIN — Medication 200 MILLIGRAM(S): at 17:29

## 2024-01-09 RX ADMIN — CHLORHEXIDINE GLUCONATE 1 APPLICATION(S): 213 SOLUTION TOPICAL at 12:38

## 2024-01-09 RX ADMIN — LIDOCAINE 2 PATCH: 4 CREAM TOPICAL at 19:33

## 2024-01-09 RX ADMIN — SODIUM CHLORIDE 1 GRAM(S): 9 INJECTION INTRAMUSCULAR; INTRAVENOUS; SUBCUTANEOUS at 12:39

## 2024-01-09 RX ADMIN — Medication 20 MILLIGRAM(S): at 12:40

## 2024-01-09 RX ADMIN — LIDOCAINE 1 PATCH: 4 CREAM TOPICAL at 12:39

## 2024-01-09 RX ADMIN — LIDOCAINE 2 PATCH: 4 CREAM TOPICAL at 00:00

## 2024-01-09 RX ADMIN — OXYCODONE HYDROCHLORIDE 2.5 MILLIGRAM(S): 5 TABLET ORAL at 15:30

## 2024-01-09 RX ADMIN — SODIUM CHLORIDE 1 GRAM(S): 9 INJECTION INTRAMUSCULAR; INTRAVENOUS; SUBCUTANEOUS at 05:11

## 2024-01-09 RX ADMIN — Medication 2 DROP(S): at 17:29

## 2024-01-09 RX ADMIN — OXYCODONE HYDROCHLORIDE 2.5 MILLIGRAM(S): 5 TABLET ORAL at 14:05

## 2024-01-09 RX ADMIN — Medication 3 MILLIGRAM(S): at 21:07

## 2024-01-09 RX ADMIN — OXYCODONE HYDROCHLORIDE 2.5 MILLIGRAM(S): 5 TABLET ORAL at 16:30

## 2024-01-09 RX ADMIN — LIDOCAINE 2 PATCH: 4 CREAM TOPICAL at 12:39

## 2024-01-09 RX ADMIN — GABAPENTIN 200 MILLIGRAM(S): 400 CAPSULE ORAL at 21:07

## 2024-01-09 RX ADMIN — GABAPENTIN 200 MILLIGRAM(S): 400 CAPSULE ORAL at 12:40

## 2024-01-09 RX ADMIN — HEPARIN SODIUM 1000 UNIT(S)/HR: 5000 INJECTION INTRAVENOUS; SUBCUTANEOUS at 02:10

## 2024-01-09 RX ADMIN — Medication 2 DROP(S): at 05:12

## 2024-01-09 RX ADMIN — HEPARIN SODIUM 1000 UNIT(S)/HR: 5000 INJECTION INTRAVENOUS; SUBCUTANEOUS at 19:22

## 2024-01-09 RX ADMIN — PANTOPRAZOLE SODIUM 40 MILLIGRAM(S): 20 TABLET, DELAYED RELEASE ORAL at 05:11

## 2024-01-09 NOTE — PROGRESS NOTE ADULT - ATTENDING COMMENTS
Mr. Harrison is a 29 yo man with a h/o SLE, recent sepsis, PE with episodes atypical for seizure.  Likely medication effect, residual from toxic metabolic septic encephalopathy.  R/O primary neurological cause:  EEG  MRI Brain w/wo after EEG is completed.   D/W patient and mother.   Thank you Mr. Harrison is a 27 yo man with a h/o SLE, recent sepsis, PE with episodes atypical for seizure.  Likely medication effect, residual from toxic metabolic septic encephalopathy.  R/O primary neurological cause:  EEG  MRI Brain w/wo after EEG is completed.   D/W patient and mother.   Thank you

## 2024-01-09 NOTE — CHART NOTE - NSCHARTNOTEFT_GEN_A_CORE
Pt seen during rounds w/ pt mother at bedside.   Pt mothers endorses intermittent generalized shaking for the last 2 days, 3-4x/day, lasting approx 30 seconds. Pt mother has video recording of episodes on phone.  Of note, pt was evaluated by Neurology for seizures in the setting of high fevers and hyponatremia.  VSS.   Pt appears uncomfortablr in bed, AxO3  Discussed w/ Dr. Salamanca, Neurology reconsulted for further evaluation  EEG and seizure pre-cautions ordered.    Frederick Hurst PA-C  pager [phone] Pt seen during rounds w/ pt mother at bedside.   Pt mothers endorses intermittent generalized shaking for the last 2 days, 3-4x/day, lasting approx 30 seconds. Pt mother has video recording of episodes on phone.  Of note, pt was evaluated by Neurology for seizures in the setting of high fevers and hyponatremia.  VSS.   Pt appears uncomfortablr in bed, AxO3  Discussed w/ Dr. Salamanca, Neurology reconsulted for further evaluation  EEG and seizure pre-cautions ordered.    Frederick Hurst PA-C  pager [phone]

## 2024-01-09 NOTE — CHART NOTE - NSCHARTNOTEFT_GEN_A_CORE
EEG preliminary read (not final) on the initial recording hour(s) = x 2.5    No seizures recorded.  Final report to follow tomorrow morning after completion of study.    [facility] EEG Reading Room Ph#: [phone]  Epilepsy Answering Service after 5PM and before 8:30AM: Ph#: [phone] EEG preliminary read (not final) on the initial recording hour(s) = x 2.5    No seizures recorded.  Final report to follow tomorrow morning after completion of study.    [facility] EEG Reading Room Ph#: [phone]  Epilepsy Answering Service after 5PM and before 8:30AM: Ph#: [phone]

## 2024-01-09 NOTE — PROGRESS NOTE ADULT - SUBJECTIVE AND OBJECTIVE BOX
Name of Patient : TAYLA MARIE  MRN: [id number]  Date of visit: 01-09-24       Subjective: Patient seen and examined. No new events except as noted.   Doing okay  had shaking episode     REVIEW OF SYSTEMS:    CONSTITUTIONAL: No weakness, fevers or chills  EYES/ENT: No visual changes;  No vertigo or throat pain   NECK: No pain or stiffness  RESPIRATORY: No cough, wheezing, hemoptysis; No shortness of breath  CARDIOVASCULAR: No chest pain or palpitations  GASTROINTESTINAL: No abdominal or epigastric pain. No nausea, vomiting, or hematemesis; No diarrhea or constipation. No melena or hematochezia.  GENITOURINARY: No dysuria, frequency or hematuria  NEUROLOGICAL: No numbness or weakness  SKIN: No itching, burning, rashes, or lesions   All other review of systems is negative unless indicated above.    MEDICATIONS:  MEDICATIONS  (STANDING):  chlorhexidine 2% Cloths 1 Application(s) Topical daily  ciprofloxacin  0.3% Ophthalmic Solution for Otic Use 2 Drop(s) Both Ears two times a day  FLUoxetine 20 milliGRAM(s) Oral daily  gabapentin 200 milliGRAM(s) Oral three times a day  heparin  Infusion. 1300 Unit(s)/Hr (13 mL/Hr) IV Continuous <Continuous>  hydroxychloroquine 200 milliGRAM(s) Oral two times a day  lidocaine   4% Patch 1 Patch Transdermal every 24 hours  lidocaine   4% Patch 2 Patch Transdermal every 24 hours  melatonin 3 milliGRAM(s) Oral <User Schedule>  multivitamin/minerals/iron Oral Solution (CENTRUM) 15 milliLiter(s) Oral daily  pantoprazole    Tablet 40 milliGRAM(s) Oral before breakfast  predniSONE   Tablet 50 milliGRAM(s) Oral daily  sodium chloride 1 Gram(s) Oral three times a day  sodium chloride 3%. 500 milliLiter(s) (30 mL/Hr) IV Continuous <Continuous>  trimethoprim  160 mG/sulfamethoxazole 800 mG 1 Tablet(s) Oral <User Schedule>      PHYSICAL EXAM:  T(C): 36.5 (01-09-24 @ 12:17), Max: 37 (01-09-24 @ 11:00)  HR: 90 (01-09-24 @ 12:17) (84 - 96)  BP: 122/79 (01-09-24 @ 12:17) (115/81 - 132/76)  RR: 18 (01-09-24 @ 12:17) (18 - 18)  SpO2: 99% (01-09-24 @ 12:17) (99% - 100%)  Wt(kg): --  I&O's Summary        Appearance: Normal	  HEENT:  PERRLA   Lymphatic: No lymphadenopathy   Cardiovascular: Normal S1 S2, no JVD  Respiratory: normal effort , clear  Gastrointestinal:  Soft, Non-tender  Skin: No rashes,  warm to touch  Psychiatry:  Mood & affect appropriate  Musculuskeletal: No edema    recent labs, Imaging and EKGs personally reviewed                           10.0   8.01  )-----------( 246      ( 09 Jan 2024 07:55 )             30.1               01-09    134<L>  |  97<L>  |  9   ----------------------------<  101<H>  4.4   |  26  |  0.71    Ca    9.9      09 Jan 2024 07:55  Phos  3.7     01-09  Mg     1.80     01-09    TPro  6.8  /  Alb  3.1<L>  /  TBili  0.4  /  DBili  x   /  AST  60<H>  /  ALT  129<H>  /  AlkPhos  116  01-08    PT/INR - ( 09 Jan 2024 07:55 )   PT: 14.7 sec;   INR: 1.31 ratio         PTT - ( 09 Jan 2024 07:55 )  PTT:70.4 sec                   Urinalysis Basic - ( 09 Jan 2024 07:55 )    Color: x / Appearance: x / SG: x / pH: x  Gluc: 101 mg/dL / Ketone: x  / Bili: x / Urobili: x   Blood: x / Protein: x / Nitrite: x   Leuk Esterase: x / RBC: x / WBC x   Sq Epi: x / Non Sq Epi: x / Bacteria: x                     Name of Patient : TAYLA MARIE  MRN: [id number]  Date of visit: 01-09-24       Subjective: Patient seen and examined. No new events except as noted.   Doing okay  had shaking episode     REVIEW OF SYSTEMS:    CONSTITUTIONAL: No weakness, fevers or chills  EYES/ENT: No visual changes;  No vertigo or throat pain   NECK: No pain or stiffness  RESPIRATORY: No cough, wheezing, hemoptysis; No shortness of breath  CARDIOVASCULAR: No chest pain or palpitations  GASTROINTESTINAL: No abdominal or epigastric pain. No nausea, vomiting, or hematemesis; No diarrhea or constipation. No melena or hematochezia.  GENITOURINARY: No dysuria, frequency or hematuria  NEUROLOGICAL: No numbness or weakness  SKIN: No itching, burning, rashes, or lesions   All other review of systems is negative unless indicated above.    MEDICATIONS:  MEDICATIONS  (STANDING):  chlorhexidine 2% Cloths 1 Application(s) Topical daily  ciprofloxacin  0.3% Ophthalmic Solution for Otic Use 2 Drop(s) Both Ears two times a day  FLUoxetine 20 milliGRAM(s) Oral daily  gabapentin 200 milliGRAM(s) Oral three times a day  heparin  Infusion. 1300 Unit(s)/Hr (13 mL/Hr) IV Continuous <Continuous>  hydroxychloroquine 200 milliGRAM(s) Oral two times a day  lidocaine   4% Patch 1 Patch Transdermal every 24 hours  lidocaine   4% Patch 2 Patch Transdermal every 24 hours  melatonin 3 milliGRAM(s) Oral <User Schedule>  multivitamin/minerals/iron Oral Solution (CENTRUM) 15 milliLiter(s) Oral daily  pantoprazole    Tablet 40 milliGRAM(s) Oral before breakfast  predniSONE   Tablet 50 milliGRAM(s) Oral daily  sodium chloride 1 Gram(s) Oral three times a day  sodium chloride 3%. 500 milliLiter(s) (30 mL/Hr) IV Continuous <Continuous>  trimethoprim  160 mG/sulfamethoxazole 800 mG 1 Tablet(s) Oral <User Schedule>      PHYSICAL EXAM:  T(C): 36.5 (01-09-24 @ 12:17), Max: 37 (01-09-24 @ 11:00)  HR: 90 (01-09-24 @ 12:17) (84 - 96)  BP: 122/79 (01-09-24 @ 12:17) (115/81 - 132/76)  RR: 18 (01-09-24 @ 12:17) (18 - 18)  SpO2: 99% (01-09-24 @ 12:17) (99% - 100%)  Wt(kg): --  I&O's Summary        Appearance: Normal	  HEENT:  PERRLA   Lymphatic: No lymphadenopathy   Cardiovascular: Normal S1 S2, no JVD  Respiratory: normal effort , clear  Gastrointestinal:  Soft, Non-tender  Skin: No rashes,  warm to touch  Psychiatry:  Mood & affect appropriate  Musculuskeletal: No edema    recent labs, Imaging and EKGs personally reviewed                           10.0   8.01  )-----------( 246      ( 09 Jan 2024 07:55 )             30.1               01-09    134<L>  |  97<L>  |  9   ----------------------------<  101<H>  4.4   |  26  |  0.71    Ca    9.9      09 Jan 2024 07:55  Phos  3.7     01-09  Mg     1.80     01-09    TPro  6.8  /  Alb  3.1<L>  /  TBili  0.4  /  DBili  x   /  AST  60<H>  /  ALT  129<H>  /  AlkPhos  116  01-08    PT/INR - ( 09 Jan 2024 07:55 )   PT: 14.7 sec;   INR: 1.31 ratio         PTT - ( 09 Jan 2024 07:55 )  PTT:70.4 sec                   Urinalysis Basic - ( 09 Jan 2024 07:55 )    Color: x / Appearance: x / SG: x / pH: x  Gluc: 101 mg/dL / Ketone: x  / Bili: x / Urobili: x   Blood: x / Protein: x / Nitrite: x   Leuk Esterase: x / RBC: x / WBC x   Sq Epi: x / Non Sq Epi: x / Bacteria: x

## 2024-01-09 NOTE — PROGRESS NOTE ADULT - SUBJECTIVE AND OBJECTIVE BOX
TAYLA MARIE  [id number]    Subjective:  per pt didn't sleep well last night due to persistent back pain. No longer endorsing pleuritic chest pain     Objective:   Vital Signs Last 24 Hrs  T(C): 36.5 (09 Jan 2024 12:17), Max: 37 (08 Jan 2024 15:26)  T(F): 97.7 (09 Jan 2024 12:17), Max: 98.6 (08 Jan 2024 15:26)  HR: 90 (09 Jan 2024 12:17) (84 - 96)  BP: 122/79 (09 Jan 2024 12:17) (115/70 - 132/76)  BP(mean): --  RR: 18 (09 Jan 2024 12:17) (18 - 18)  SpO2: 99% (09 Jan 2024 12:17) (99% - 100%)    Parameters below as of 09 Jan 2024 11:00  Patient On (Oxygen Delivery Method): room air    Physical Exam:  General: NAD  HEENT: EOMI  CV: well perfused   Lung: No increased WOB,  Abd: non-distended   Ext: no synovitis on exam   Neuro: Awake and alert         LABS:  cret                        10.0   8.01  )-----------( 246      ( 09 Jan 2024 07:55 )             30.1     01-09    134<L>  |  97<L>  |  9   ----------------------------<  101<H>  4.4   |  26  |  0.71    Ca    9.9      09 Jan 2024 07:55  Phos  3.7     01-09  Mg     1.80     01-09    TPro  6.8  /  Alb  3.1<L>  /  TBili  0.4  /  DBili  x   /  AST  60<H>  /  ALT  129<H>  /  AlkPhos  116  01-08    PT/INR - ( 09 Jan 2024 07:55 )   PT: 14.7 sec;   INR: 1.31 ratio         PTT - ( 09 Jan 2024 07:55 )  PTT:70.4 sec       TAYLA MARIE  [id number]    Subjective:  per pt didn't sleep well last night due to persistent back pain. No longer endorsing pleuritic chest pain     Objective:   Vital Signs Last 24 Hrs  T(C): 36.5 (09 Jan 2024 12:17), Max: 37 (08 Jan 2024 15:26)  T(F): 97.7 (09 Jan 2024 12:17), Max: 98.6 (08 Jan 2024 15:26)  HR: 90 (09 Jan 2024 12:17) (84 - 96)  BP: 122/79 (09 Jan 2024 12:17) (115/70 - 132/76)  BP(mean): --  RR: 18 (09 Jan 2024 12:17) (18 - 18)  SpO2: 99% (09 Jan 2024 12:17) (99% - 100%)    Parameters below as of 09 Jan 2024 11:00  Patient On (Oxygen Delivery Method): room air    Physical Exam:  General: NAD  HEENT: EOMI  CV: well perfused   Lung: No increased WOB,  Abd: non-distended   Ext: no synovitis on exam   Neuro: Awake and alert         LABS:  cret                        10.0   8.01  )-----------( 246      ( 09 Jan 2024 07:55 )             30.1     01-09    134<L>  |  97<L>  |  9   ----------------------------<  101<H>  4.4   |  26  |  0.71    Ca    9.9      09 Jan 2024 07:55  Phos  3.7     01-09  Mg     1.80     01-09    TPro  6.8  /  Alb  3.1<L>  /  TBili  0.4  /  DBili  x   /  AST  60<H>  /  ALT  129<H>  /  AlkPhos  116  01-08    PT/INR - ( 09 Jan 2024 07:55 )   PT: 14.7 sec;   INR: 1.31 ratio         PTT - ( 09 Jan 2024 07:55 )  PTT:70.4 sec       TAYLA FRANK  [id number]    Subjective:  per pt didn't sleep well last night due to persistent back pain. No longer endorsing pleuritic chest pain.   Pt's mother reports that she has witnessed several episodes of him shaking his lower extremities, has taken videos. Pt is awake and talking during episodes.     Objective:   Vital Signs Last 24 Hrs  T(C): 36.5 (09 Jan 2024 12:17), Max: 37 (08 Jan 2024 15:26)  T(F): 97.7 (09 Jan 2024 12:17), Max: 98.6 (08 Jan 2024 15:26)  HR: 90 (09 Jan 2024 12:17) (84 - 96)  BP: 122/79 (09 Jan 2024 12:17) (115/70 - 132/76)  BP(mean): --  RR: 18 (09 Jan 2024 12:17) (18 - 18)  SpO2: 99% (09 Jan 2024 12:17) (99% - 100%)    Parameters below as of 09 Jan 2024 11:00  Patient On (Oxygen Delivery Method): room air    Physical Exam:  General: NAD  HEENT: EOMI  CV: well perfused   Lung: No increased WOB,  Abd: non-distended   Ext: no synovitis on exam   Neuro: Awake and alert         LABS:  cret                        10.0   8.01  )-----------( 246      ( 09 Jan 2024 07:55 )             30.1     01-09    134<L>  |  97<L>  |  9   ----------------------------<  101<H>  4.4   |  26  |  0.71    Ca    9.9      09 Jan 2024 07:55  Phos  3.7     01-09  Mg     1.80     01-09    TPro  6.8  /  Alb  3.1<L>  /  TBili  0.4  /  DBili  x   /  AST  60<H>  /  ALT  129<H>  /  AlkPhos  116  01-08    PT/INR - ( 09 Jan 2024 07:55 )   PT: 14.7 sec;   INR: 1.31 ratio         PTT - ( 09 Jan 2024 07:55 )  PTT:70.4 sec       TAYLA FRANK  [id number]    Subjective:  per pt didn't sleep well last night due to persistent back pain. No longer endorsing pleuritic chest pain.   Pt's mother reports that she has witnessed several episodes of him shaking his lower extremities, has taken videos. Pt is awake and talking during episodes.     Objective:   Vital Signs Last 24 Hrs  T(C): 36.5 (09 Jan 2024 12:17), Max: 37 (08 Jan 2024 15:26)  T(F): 97.7 (09 Jan 2024 12:17), Max: 98.6 (08 Jan 2024 15:26)  HR: 90 (09 Jan 2024 12:17) (84 - 96)  BP: 122/79 (09 Jan 2024 12:17) (115/70 - 132/76)  BP(mean): --  RR: 18 (09 Jan 2024 12:17) (18 - 18)  SpO2: 99% (09 Jan 2024 12:17) (99% - 100%)    Parameters below as of 09 Jan 2024 11:00  Patient On (Oxygen Delivery Method): room air    Physical Exam:  General: NAD  HEENT: EOMI  CV: well perfused   Lung: No increased WOB,  Abd: non-distended   Ext: no synovitis on exam   Neuro: Awake and alert         LABS:  cret                        10.0   8.01  )-----------( 246      ( 09 Jan 2024 07:55 )             30.1     01-09    134<L>  |  97<L>  |  9   ----------------------------<  101<H>  4.4   |  26  |  0.71    Ca    9.9      09 Jan 2024 07:55  Phos  3.7     01-09  Mg     1.80     01-09    TPro  6.8  /  Alb  3.1<L>  /  TBili  0.4  /  DBili  x   /  AST  60<H>  /  ALT  129<H>  /  AlkPhos  116  01-08    PT/INR - ( 09 Jan 2024 07:55 )   PT: 14.7 sec;   INR: 1.31 ratio         PTT - ( 09 Jan 2024 07:55 )  PTT:70.4 sec       TAYLA FRANK  [id number]    Subjective:  per pt didn't sleep well last night due to persistent back pain. No longer endorsing pleuritic chest pain.   Pt's mother reports that she has witnessed several episodes of him shaking his lower extremities, has taken videos. Pt is awake and talking during episodes.     Objective:   Vital Signs Last 24 Hrs  T(C): 36.5 (09 Jan 2024 12:17), Max: 37 (08 Jan 2024 15:26)  T(F): 97.7 (09 Jan 2024 12:17), Max: 98.6 (08 Jan 2024 15:26)  HR: 90 (09 Jan 2024 12:17) (84 - 96)  BP: 122/79 (09 Jan 2024 12:17) (115/70 - 132/76)  BP(mean): --  RR: 18 (09 Jan 2024 12:17) (18 - 18)  SpO2: 99% (09 Jan 2024 12:17) (99% - 100%)    Parameters below as of 09 Jan 2024 11:00  Patient On (Oxygen Delivery Method): room air    Physical Exam:  General: NAD  HEENT: EOMI  CV: well perfused   Lung: No increased WOB,  Abd: non-distended   Ext: no synovitis on exam   Neuro: Awake and alert         LABS:                          10.0   8.01  )-----------( 246      ( 09 Jan 2024 07:55 )             30.1     01-09    134<L>  |  97<L>  |  9   ----------------------------<  101<H>  4.4   |  26  |  0.71    Ca    9.9      09 Jan 2024 07:55  Phos  3.7     01-09  Mg     1.80     01-09    TPro  6.8  /  Alb  3.1<L>  /  TBili  0.4  /  DBili  x   /  AST  60<H>  /  ALT  129<H>  /  AlkPhos  116  01-08    PT/INR - ( 09 Jan 2024 07:55 )   PT: 14.7 sec;   INR: 1.31 ratio         PTT - ( 09 Jan 2024 07:55 )  PTT:70.4 sec       TAYLA FRANK  [id number]    Subjective:  per pt didn't sleep well last night due to persistent back pain. No longer endorsing pleuritic chest pain.   Pt's mother reports that she has witnessed several episodes of him shaking his lower extremities, has taken videos. Pt is awake and talking during episodes.     Objective:   Vital Signs Last 24 Hrs  T(C): 36.5 (09 Jan 2024 12:17), Max: 37 (08 Jan 2024 15:26)  T(F): 97.7 (09 Jan 2024 12:17), Max: 98.6 (08 Jan 2024 15:26)  HR: 90 (09 Jan 2024 12:17) (84 - 96)  BP: 122/79 (09 Jan 2024 12:17) (115/70 - 132/76)  BP(mean): --  RR: 18 (09 Jan 2024 12:17) (18 - 18)  SpO2: 99% (09 Jan 2024 12:17) (99% - 100%)    Parameters below as of 09 Jan 2024 11:00  Patient On (Oxygen Delivery Method): room air    Physical Exam:  General: NAD  HEENT: EOMI  CV: well perfused   Lung: No increased WOB,  Abd: non-distended   Ext: no synovitis on exam   Neuro: Awake and alert         LABS:                          10.0   8.01  )-----------( 246      ( 09 Jan 2024 07:55 )             30.1     01-09    134<L>  |  97<L>  |  9   ----------------------------<  101<H>  4.4   |  26  |  0.71    Ca    9.9      09 Jan 2024 07:55  Phos  3.7     01-09  Mg     1.80     01-09    TPro  6.8  /  Alb  3.1<L>  /  TBili  0.4  /  DBili  x   /  AST  60<H>  /  ALT  129<H>  /  AlkPhos  116  01-08    PT/INR - ( 09 Jan 2024 07:55 )   PT: 14.7 sec;   INR: 1.31 ratio         PTT - ( 09 Jan 2024 07:55 )  PTT:70.4 sec

## 2024-01-09 NOTE — PROGRESS NOTE ADULT - SUBJECTIVE AND OBJECTIVE BOX
Subjective: Patient seen and examined. No new events except as noted.     SUBJECTIVE/ROS:  nad  MEDICATIONS:  MEDICATIONS  (STANDING):  chlorhexidine 2% Cloths 1 Application(s) Topical daily  ciprofloxacin  0.3% Ophthalmic Solution for Otic Use 2 Drop(s) Both Ears two times a day  FLUoxetine 20 milliGRAM(s) Oral daily  gabapentin 200 milliGRAM(s) Oral three times a day  heparin  Infusion. 1300 Unit(s)/Hr (13 mL/Hr) IV Continuous <Continuous>  hydroxychloroquine 200 milliGRAM(s) Oral two times a day  lidocaine   4% Patch 1 Patch Transdermal every 24 hours  lidocaine   4% Patch 2 Patch Transdermal every 24 hours  melatonin 3 milliGRAM(s) Oral <User Schedule>  multivitamin/minerals/iron Oral Solution (CENTRUM) 15 milliLiter(s) Oral daily  pantoprazole    Tablet 40 milliGRAM(s) Oral before breakfast  predniSONE   Tablet 50 milliGRAM(s) Oral daily  sodium chloride 1 Gram(s) Oral three times a day  sodium chloride 3%. 500 milliLiter(s) (30 mL/Hr) IV Continuous <Continuous>  trimethoprim  160 mG/sulfamethoxazole 800 mG 1 Tablet(s) Oral <User Schedule>      PHYSICAL EXAM:  T(C): 36.7 (01-09-24 @ 05:31), Max: 37 (01-08-24 @ 15:26)  HR: 84 (01-09-24 @ 05:31) (84 - 96)  BP: 115/81 (01-09-24 @ 05:31) (115/70 - 125/72)  RR: 18 (01-09-24 @ 05:31) (18 - 18)  SpO2: 99% (01-09-24 @ 05:31) (99% - 100%)  Wt(kg): --  I&O's Summary          JVP: Normal  Neck: supple  Lung: clear   CV: S1 S2 , Murmur:  Abd: soft  Ext: No edema  neuro: Awake / alert  Psych: flat affect  Skin: normal``    LABS/DATA:    CARDIAC MARKERS:                                9.7    7.57  )-----------( 226      ( 08 Jan 2024 05:40 )             29.4     01-08    136  |  99  |  10  ----------------------------<  84  3.9   |  26  |  0.62    Ca    9.6      08 Jan 2024 05:40  Phos  3.9     01-08  Mg     1.70     01-08    TPro  6.8  /  Alb  3.1<L>  /  TBili  0.4  /  DBili  x   /  AST  60<H>  /  ALT  129<H>  /  AlkPhos  116  01-08    proBNP:   Lipid Profile:   HgA1c:   TSH:     TELE:  EKG:

## 2024-01-09 NOTE — PROGRESS NOTE ADULT - ASSESSMENT
28 years old male with h/o Lupus ( diagnosed in 09/2023, on Plaquenil present to [place] ED on 12/12/23  with complain of chest pain and SOB. Patient reported left sided pleuritic chest pain which started 3 days ago. Pain is progressively worsened, associated with SOB and cough. Patient was seen in OSH ED and was prescribed antibiotics. Patient reported significantly worsening of left sided pleuritic chest pain today. No fever or chills. Patient reported loss of appetite and had a few episode of diarrhea for last 2 days. CTA chest with acute right upper lobe and left lower lobe segmental/subsegmental pulmonary emboli. No CT evidence of right heart strain. New bilateral lower lobe consolidations with areas of central clearing. Pneumonia and pulmonary infarcts are in the differential. New bilateral pleural effusions, small on the left and trace on the right. Bilateral axillary and supraclavicular adenopathy of unclear etiology. Patient was started on heparin ggt transitioned to eliquis on 12/19,  patient with worsening SOB/ hypoxia requiring nasal cannula oxygen supplementation. 12/20  Repeat Xray shows increased moderate left pleural effusion and compressive atelectasis trace right effusion and linear atelectasis. Thoracic team consulted for possible pigtail insertion Bedside US: Large left effusion with septations. Right simple effusion , + pericardial effusion- lower extremity dopplers negative for DVT.    # Pulm effusion/ Fever   S/P thoracentesis , follow up results   heparin for AC  TS care appreciated   O2 supplement   monitor output   Zosyn for now , ABx per ID , completed   LP done  Plan for LN biopsy by IR , discussed with rheum and patinet;s mother, defer to rheum  Hematuria noted   Urology eval   Completed course of ABx   Pulm and card for risk stratification to come off heparin for biopsy   follow up with rheum for need of biopsy  repeat urine study     # Recurrent seizure   Neuro eval  EEG   fall precautions       # Fever  SIRS   Abx , completed per iD   Rheum adn ID follow up   Renal biopsy for protonuria, defer to rhem, discussed with IR, high risk       # Hyponatremia/ Seizure   RRT   Neuro follow up       #PE   on heparin , resume after thoracentesis   DVT negative  Heme onc eval   likley lupus related     #Hxof lupus  on hydroxychloroquine   Heme eval   Rheum eval   steroids per rheum       DVT andgIPPX    Discussed in detail with patient and mother at the bedside  28 years old male with h/o Lupus ( diagnosed in 09/2023, on Plaquenil present to [place] ED on 12/12/23  with complain of chest pain and SOB. Patient reported left sided pleuritic chest pain which started 3 days ago. Pain is progressively worsened, associated with SOB and cough. Patient was seen in OSH ED and was prescribed antibiotics. Patient reported significantly worsening of left sided pleuritic chest pain today. No fever or chills. Patient reported loss of appetite and had a few episode of diarrhea for last 2 days. CTA chest with acute right upper lobe and left lower lobe segmental/subsegmental pulmonary emboli. No CT evidence of right heart strain. New bilateral lower lobe consolidations with areas of central clearing. Pneumonia and pulmonary infarcts are in the differential. New bilateral pleural effusions, small on the left and trace on the right. Bilateral axillary and supraclavicular adenopathy of unclear etiology. Patient was started on heparin ggt transitioned to eliquis on 12/19,  patient with worsening SOB/ hypoxia requiring nasal cannula oxygen supplementation. 12/20  Repeat Xray shows increased moderate left pleural effusion and compressive atelectasis trace right effusion and linear atelectasis. Thoracic team consulted for possible pigtail insertion Bedside US: Large left effusion with septations. Right simple effusion , + pericardial effusion- lower extremity dopplers negative for DVT.    # Pulm effusion/ Fever   S/P thoracentesis , follow up results   heparin for AC  TS care appreciated   O2 supplement   monitor output   Zosyn for now , ABx per ID , completed   LP done  Plan for LN biopsy by IR , discussed with rheum and patinet;s mother, defer to rheum  Hematuria noted   Urology eval   Completed course of ABx   Pulm and card for risk stratification to come off heparin for biopsy   follow up with rheum for need of biopsy  repeat urine study     # Recurrent seizure   Neuro eval  EEG   fall precautions       # Fever  SIRS   Abx , completed per iD   Rheum adn ID follow up   Renal biopsy for protonuria, defer to rhem, discussed with IR, high risk       # Hyponatremia/ Seizure   RRT   Neuro follow up       #PE   on heparin , resume after thoracentesis   DVT negative  Heme onc eval   likley lupus related     #Hxof lupus  on hydroxychloroquine   Heme eval   Rheum eval   steroids per rheum       DVT andgIPPX    Discussed in detail with patient and mother at the bedside

## 2024-01-09 NOTE — PROGRESS NOTE ADULT - ASSESSMENT
28 years old male with h/o Lupus (diagnosed in 09/2023 due to rash, oral ulcers, chest pain, and dyspnea, on Plaquenil) who presented to [place] ED on 12/12/23 with complaints of chest pain and SOB, found to have bilateral acute PE and bilateral pleural effusions. Transferred to [facility] for CT surgery evaluation. Also with fevers now resolved. Rheumatology consulted given SLE history.     Serologies:   Positive: ABDIAS 1:280 speckled, Sm >8, RNP >8, SSA >8, hypocomplementemia, Pr/Cr 1.2 and 1.1, low vitamin D 25 21, elevated vitamin D 1,25 97, ACE elevated 125, PR3 29.8. Repeat PR3 still weakly positive at 27.7   Negative: dsDNA 28,  C4 13, APS labs, negative Janine-1 ab, negative ribosomal P, negative syphilis screen, aldolase WNL,negative RF and CCP, negative ANCA, RNA polymerase III, centromere, scleroderma     #Fever (resolved)   -Pleural effusion exudate w/negative culture and PCR  -Repeat CT imaging without obvious infectious source, mild decrease in axillary LAD, submentonian and submaxillary and increase supraclavicular LAD. No mediastinal lymphoadenopathy  -EBV DNA PCR positive. Discussed with ID, low concern for EBV infection, would recommend LN bx to rule out associated malignancy   -Pt with clinical manifestations and specific SLE serologies though unclear if current presentation is solely attributable to SLE. Low concern for other rheumatologic disease (such as sarcoidosis, Kikuchi syndrome, Castleman disease), Underlying malignancy also needs to be ruled out. Pleural fluid cytology negative.   - Fevers resolved after restarting steroids on 40 mg methylprednisolone 12/23-12/25, restarted 60 mg of methylprednisolone 12/28-12/29, then prednisone 60 mg PO daily (12/30-1/4), and now on prednisone 50 mg PO (since 1/5)     #SLE   +ve serology and hypocomplementemia improving after steroid trial   creatinine is stable but proteinuria +ve urine P/cr 1.1. Decreased to 0.7.        #Elevated CPK   resolved     #Bilateral Acute PE with pulmonary infarcts   -Labs does not meet criteria for APS  PS-PT negative  -Hematology recommending Eliquis on discharge     #Encephalopathy (resolved)   -Reported episode of confusion along with episode of incontinence   -No focal deficits on neuro exam  -Likely multifactorial in the setting of opioids and depression, low suspicion for CNS SLE     #Elevated Transaminitis  -Likely from polypharmacy. Tylenol held     Recommendations:   -Preoperative risk assessment done by both pulmonology and cardiology as per IR's request. IR planning for renal biopsy on After discussion with primary service, confirmed that no one from primary team told the pt's mother that the lymph node biopsy was priority over renal biopsy. Had long discussion with pt's mother that there is concern for lupus involvement in his kidneys and though his urine studies improved last week, it is likely from the high doses of steroids. Emphasized to her though that if she has any concern about proceeding with the renal biopsy, it is within her right and Rafal's right to decline the procedure. Pt's mother would like to talk to IR about logistics of procedure prior to signing consent form.    **Final recs pending**   -Pt with elevated LFTs, likely due to polypharmacy. Please order daily CMPs to monitor LFT trend   -C/w prednisone 50 mg daily (1/5 - )  -Myomarker panel pending   -Continue with GI ppx   -Pt will eventually need follow up at [facility] [place] on discharge     Discussed with Dr. Octavio Landaverde MD   PGY-4  Reachable on TEAMS  Pager [phone]  Rheumatology Fellow 28 years old male with h/o Lupus (diagnosed in 09/2023 due to rash, oral ulcers, chest pain, and dyspnea, on Plaquenil) who presented to [place] ED on 12/12/23 with complaints of chest pain and SOB, found to have bilateral acute PE and bilateral pleural effusions. Transferred to [facility] for CT surgery evaluation. Also with fevers now resolved. Rheumatology consulted given SLE history.     Serologies:   Positive: ABDIAS 1:280 speckled, Sm >8, RNP >8, SSA >8, hypocomplementemia, Pr/Cr 1.2 and 1.1, low vitamin D 25 21, elevated vitamin D 1,25 97, ACE elevated 125, PR3 29.8. Repeat PR3 still weakly positive at 27.7   Negative: dsDNA 28,  C4 13, APS labs, negative Janine-1 ab, negative ribosomal P, negative syphilis screen, aldolase WNL,negative RF and CCP, negative ANCA, RNA polymerase III, centromere, scleroderma     #Fever (resolved)   -Pleural effusion exudate w/negative culture and PCR  -Repeat CT imaging without obvious infectious source, mild decrease in axillary LAD, submentonian and submaxillary and increase supraclavicular LAD. No mediastinal lymphoadenopathy  -EBV DNA PCR positive. Discussed with ID, low concern for EBV infection, would recommend LN bx to rule out associated malignancy   -Pt with clinical manifestations and specific SLE serologies though unclear if current presentation is solely attributable to SLE. Low concern for other rheumatologic disease (such as sarcoidosis, Kikuchi syndrome, Castleman disease), Underlying malignancy also needs to be ruled out. Pleural fluid cytology negative.   - Fevers resolved after restarting steroids on 40 mg methylprednisolone 12/23-12/25, restarted 60 mg of methylprednisolone 12/28-12/29, then prednisone 60 mg PO daily (12/30-1/4), and now on prednisone 50 mg PO (since 1/5)     #SLE   +ve serology and hypocomplementemia improving after steroid trial   creatinine is stable but proteinuria +ve urine P/cr 1.1. Decreased to 0.7.        #Elevated CPK   resolved     #Bilateral Acute PE with pulmonary infarcts   -Labs does not meet criteria for APS  PS-PT negative  -Hematology recommending Eliquis on discharge     #Encephalopathy (resolved)   -Reported episode of confusion along with episode of incontinence   -No focal deficits on neuro exam  -Likely multifactorial in the setting of opioids and depression, low suspicion for CNS SLE     #Elevated Transaminitis  -Likely from polypharmacy. Tylenol held     Recommendations:   -Preoperative risk assessment done by both pulmonology and cardiology as per IR's request. IR planning for renal biopsy on After discussion with primary service, confirmed that no one from primary team told the pt's mother that the lymph node biopsy was priority over renal biopsy. Had long discussion with pt's mother that there is concern for lupus involvement in his kidneys and though his urine studies improved last week, it is likely from the high doses of steroids. Emphasized to her though that if she has any concern about proceeding with the renal biopsy, it is within her right and Rafal's right to decline the procedure. Pt's mother would like to talk to IR about logistics of procedure prior to signing consent form.    **Final recs pending**   -Pt with elevated LFTs, likely due to polypharmacy. Please order daily CMPs to monitor LFT trend   -C/w prednisone 50 mg daily (1/5 - )  -Myomarker panel pending   -Continue with GI ppx   -Pt will eventually need follow up at [facility] [place] on discharge     Discussed with Dr. Octavio Landaverde MD   PGY-4  Reachable on TEAMS  Pager [phone]  Rheumatology Fellow 28 years old male with h/o Lupus (diagnosed in 09/2023 due to rash, oral ulcers, chest pain, and dyspnea, on Plaquenil) who presented to [place] ED on 12/12/23 with complaints of chest pain and SOB, found to have bilateral acute PE and bilateral pleural effusions. Transferred to [facility] for CT surgery evaluation. Also with fevers now resolved. Rheumatology consulted given SLE history.     Serologies:   Positive: ABDIAS 1:280 speckled, Sm >8, RNP >8, SSA >8, hypocomplementemia, Pr/Cr 1.2 and 1.1, low vitamin D 25 21, elevated vitamin D 1,25 97, ACE elevated 125, PR3 29.8. Repeat PR3 still weakly positive at 27.7   Negative: dsDNA 28,  C4 13, APS labs, negative Janine-1 ab, negative ribosomal P, negative syphilis screen, aldolase WNL,negative RF and CCP, negative ANCA, RNA polymerase III, centromere, scleroderma     #Fever (resolved)   -Pleural effusion exudate w/negative culture and PCR  -Repeat CT imaging without obvious infectious source, mild decrease in axillary LAD, submentonian and submaxillary and increase supraclavicular LAD. No mediastinal lymphoadenopathy  -EBV DNA PCR positive. Discussed with ID, low concern for EBV infection, would recommend LN bx to rule out associated malignancy   -Pt with clinical manifestations and specific SLE serologies though unclear if current presentation is solely attributable to SLE. Low concern for other rheumatologic disease (such as sarcoidosis, Kikuchi syndrome, Castleman disease), Underlying malignancy also needs to be ruled out. Pleural fluid cytology negative.   - Fevers resolved after restarting steroids on 40 mg methylprednisolone 12/23-12/25, restarted 60 mg of methylprednisolone 12/28-12/29, then prednisone 60 mg PO daily (12/30-1/4), and now on prednisone 50 mg PO (since 1/5)     #SLE   +ve serology and hypocomplementemia improving after steroid trial   creatinine is stable but proteinuria +ve urine P/cr 1.1. Decreased to 0.7.        #Elevated CPK   resolved     #Bilateral Acute PE with pulmonary infarcts   -Labs does not meet criteria for APS  PS-PT negative  -Hematology recommending Eliquis on discharge     #Encephalopathy (resolved)   -Reported episode of confusion along with episode of incontinence   -No focal deficits on neuro exam  -Likely multifactorial in the setting of opioids and depression, low suspicion for CNS SLE     #Elevated Transaminitis  -Likely from polypharmacy. Tylenol held     Recommendations:   -IR consulted for renal biopsy. Per discussion with pt's mother and pt, more amenable for inpt renal biopsy, however pt's mother would still like to talk to IR about procedure logistics before consent form is signed.   -Pt with elevated LFTs, likely due to polypharmacy. Please order daily CMPs to monitor LFT trend   -C/w prednisone 50 mg daily (1/5 - )  -Myomarker panel pending   -Continue with GI ppx   -Agree with neurology evaluation for tremors   -Pt will eventually need follow up at [facility] [place] on discharge     Discussed with Dr. Octavio Landaverde MD   PGY-4  Reachable on TEAMS  Pager [phone]  Rheumatology Fellow 28 years old male with h/o Lupus (diagnosed in 09/2023 due to rash, oral ulcers, chest pain, and dyspnea, on Plaquenil) who presented to [place] ED on 12/12/23 with complaints of chest pain and SOB, found to have bilateral acute PE and bilateral pleural effusions. Transferred to [facility] for CT surgery evaluation. Also with fevers now resolved. Rheumatology consulted given SLE history.     Serologies:   Positive: ABDIAS 1:280 speckled, Sm >8, RNP >8, SSA >8, hypocomplementemia, Pr/Cr 1.2 and 1.1, low vitamin D 25 21, elevated vitamin D 1,25 97, ACE elevated 125, PR3 29.8. Repeat PR3 still weakly positive at 27.7   Negative: dsDNA 28,  C4 13, APS labs, negative Janine-1 ab, negative ribosomal P, negative syphilis screen, aldolase WNL,negative RF and CCP, negative ANCA, RNA polymerase III, centromere, scleroderma     #Fever (resolved)   -Pleural effusion exudate w/negative culture and PCR  -Repeat CT imaging without obvious infectious source, mild decrease in axillary LAD, submentonian and submaxillary and increase supraclavicular LAD. No mediastinal lymphoadenopathy  -EBV DNA PCR positive. Discussed with ID, low concern for EBV infection, would recommend LN bx to rule out associated malignancy   -Pt with clinical manifestations and specific SLE serologies though unclear if current presentation is solely attributable to SLE. Low concern for other rheumatologic disease (such as sarcoidosis, Kikuchi syndrome, Castleman disease), Underlying malignancy also needs to be ruled out. Pleural fluid cytology negative.   - Fevers resolved after restarting steroids on 40 mg methylprednisolone 12/23-12/25, restarted 60 mg of methylprednisolone 12/28-12/29, then prednisone 60 mg PO daily (12/30-1/4), and now on prednisone 50 mg PO (since 1/5)     #SLE   +ve serology and hypocomplementemia improving after steroid trial   creatinine is stable but proteinuria +ve urine P/cr 1.1. Decreased to 0.7.        #Elevated CPK   resolved     #Bilateral Acute PE with pulmonary infarcts   -Labs does not meet criteria for APS  PS-PT negative  -Hematology recommending Eliquis on discharge     #Encephalopathy (resolved)   -Reported episode of confusion along with episode of incontinence   -No focal deficits on neuro exam  -Likely multifactorial in the setting of opioids and depression, low suspicion for CNS SLE     #Elevated Transaminitis  -Likely from polypharmacy. Tylenol held     Recommendations:   -IR consulted for renal biopsy. Per discussion with pt's mother and pt, more amenable for inpt renal biopsy, however pt's mother would still like to talk to IR about procedure logistics before consent form is signed.   -Pt with elevated LFTs, likely due to polypharmacy. Please order daily CMPs to monitor LFT trend   -C/w prednisone 50 mg daily (1/5 - )  -Myomarker panel pending   -Continue with GI ppx   -Agree with neurology evaluation for tremors   -Pt will eventually need follow up at [facility] [place] on discharge     Discussed with Dr. Octavio Landaverde MD   PGY-4  Reachable on TEAMS  Pager [phone]  Rheumatology Fellow

## 2024-01-09 NOTE — PROGRESS NOTE ADULT - SUBJECTIVE AND OBJECTIVE BOX
Neurology - Progress Note    -  Spectra: [phone] ([facility]), [phone] ([facility])  -    Subjective: Mother at bedside reports patient has episodes last several days of shaking of lower legs. Nurse at bedside also witnessed events where patient is shaking whole body last several seconds and is alert, talking during episodes and no confusion after episodes. No tongue biting or bowel bladder incontinence was noted.       Review of Systems:    Per HPI    Allergies:  No Known Allergies      PMHx/PSHx/Family Hx: As above, otherwise see below   No pertinent past medical history    LE (lupus erythematosus)    Pulmonary embolism        Social Hx:  No current use of tobacco, alcohol, or illicit drugs      Medications:  MEDICATIONS  (STANDING):  chlorhexidine 2% Cloths 1 Application(s) Topical daily  ciprofloxacin  0.3% Ophthalmic Solution for Otic Use 2 Drop(s) Both Ears two times a day  FLUoxetine 20 milliGRAM(s) Oral daily  gabapentin 200 milliGRAM(s) Oral three times a day  heparin  Infusion. 1300 Unit(s)/Hr (13 mL/Hr) IV Continuous <Continuous>  hydroxychloroquine 200 milliGRAM(s) Oral two times a day  lidocaine   4% Patch 1 Patch Transdermal every 24 hours  lidocaine   4% Patch 2 Patch Transdermal every 24 hours  melatonin 3 milliGRAM(s) Oral <User Schedule>  multivitamin/minerals/iron Oral Solution (CENTRUM) 15 milliLiter(s) Oral daily  pantoprazole    Tablet 40 milliGRAM(s) Oral before breakfast  predniSONE   Tablet 50 milliGRAM(s) Oral daily  sodium chloride 1 Gram(s) Oral three times a day  sodium chloride 3%. 500 milliLiter(s) (30 mL/Hr) IV Continuous <Continuous>  trimethoprim  160 mG/sulfamethoxazole 800 mG 1 Tablet(s) Oral <User Schedule>    MEDICATIONS  (PRN):  albuterol/ipratropium for Nebulization 3 milliLiter(s) Nebulizer every 6 hours PRN Shortness of Breath and/or Wheezing  benzocaine/menthol Lozenge 1 Lozenge Oral four times a day PRN Sore Throat  FIRST- Mouthwash  BLM 15 milliLiter(s) Swish and Spit every 4 hours PRN Mouth Care  guaiFENesin Oral Liquid (Sugar-Free) 200 milliGRAM(s) Oral every 6 hours PRN Cough  heparin   Injectable 2500 Unit(s) IV Push every 6 hours PRN For aPTT between 40 - 57  heparin   Injectable 5500 Unit(s) IV Push every 6 hours PRN For aPTT less than 40  lidocaine 2% Viscous 5 milliLiter(s) Swish and Spit three times a day PRN Mouth Care  ondansetron Injectable 4 milliGRAM(s) IV Push every 8 hours PRN Nausea and/or Vomiting  oxyCODONE    IR 2.5 milliGRAM(s) Oral every 8 hours PRN Moderate to severe pain (4-10)      Vitals:  T(C): 36.5 (01-09-24 @ 12:17), Max: 37 (01-09-24 @ 11:00)  HR: 90 (01-09-24 @ 12:17) (84 - 96)  BP: 122/79 (01-09-24 @ 12:17) (115/81 - 132/76)  RR: 18 (01-09-24 @ 12:17) (18 - 18)  SpO2: 99% (01-09-24 @ 12:17) (99% - 100%)    Physical Examination:  General - NAD    Neurologic Exam:  Mental status - Awake, Alert, Oriented to person, place, and Month and year, but not day. Speech clear, fluent, repetition and naming intact. Follows commands.     Cranial nerves - VFF, EOMI, face sensation (V1-V3) intact b/l, facial strength intact without asymmetry b/l, hearing intact b/l, trapezius  5/5 strength b/l, tongue midline on protrusion with full lateral movement    Motor - Normal bulk and tone throughout.   Strength testing            Deltoid      Biceps      Triceps                 R            5                 5               5                     5                                             L             5                 5               5                     5                                           Hip Flexion    Hip Extension    Knee Flexion    Knee Extension    Dorsiflexion    Plantar Flexion  R              5                           5                       5                           5                            5                          5  L              5                           5                        5                           5                            5                          5    Sensation - Light touch intact throughout    Coordination - Finger to Nose intact b/l. mild intention tremor.     Gait and station - Deffered     Labs:                        10.0   8.01  )-----------( 246      ( 09 Jan 2024 07:55 )             30.1     01-09    134<L>  |  97<L>  |  9   ----------------------------<  101<H>  4.4   |  26  |  0.71    Ca    9.9      09 Jan 2024 07:55  Phos  3.7     01-09  Mg     1.80     01-09    TPro  6.8  /  Alb  3.1<L>  /  TBili  0.4  /  DBili  x   /  AST  60<H>  /  ALT  129<H>  /  AlkPhos  116  01-08    CAPILLARY BLOOD GLUCOSE        LIVER FUNCTIONS - ( 08 Jan 2024 05:40 )  Alb: 3.1 g/dL / Pro: 6.8 g/dL / ALK PHOS: 116 U/L / ALT: 129 U/L / AST: 60 U/L / GGT: x             PT/INR - ( 09 Jan 2024 07:55 )   PT: 14.7 sec;   INR: 1.31 ratio         PTT - ( 09 Jan 2024 07:55 )  PTT:70.4 sec          Radiology:    < from: CT Head No Cont (01.02.24 @ 15:21) >  IMPRESSION:    No CT evidence for acute intracranial abnormality .    If clinical concern persists, follow-up MRI of the brain can be obtained.       Neurology - Progress Note    -  Spectra: [phone] ([facility]), [phone] ([facility])  -    Subjective: Mother at bedside reports patient has episodes last several days of shaking of lower legs. Nurse at bedside also witnessed events where patient is shaking whole body last several seconds and is alert, talking during episodes and no confusion after episodes. No tongue biting or bowel bladder incontinence was noted.       Review of Systems:    Per HPI    Allergies:  No Known Allergies      PMHx/PSHx/Family Hx: As above, otherwise see below   No pertinent past medical history    LE (lupus erythematosus)    Pulmonary embolism        Social Hx:  No current use of tobacco, alcohol, or illicit drugs      Medications:  MEDICATIONS  (STANDING):  chlorhexidine 2% Cloths 1 Application(s) Topical daily  ciprofloxacin  0.3% Ophthalmic Solution for Otic Use 2 Drop(s) Both Ears two times a day  FLUoxetine 20 milliGRAM(s) Oral daily  gabapentin 200 milliGRAM(s) Oral three times a day  heparin  Infusion. 1300 Unit(s)/Hr (13 mL/Hr) IV Continuous <Continuous>  hydroxychloroquine 200 milliGRAM(s) Oral two times a day  lidocaine   4% Patch 1 Patch Transdermal every 24 hours  lidocaine   4% Patch 2 Patch Transdermal every 24 hours  melatonin 3 milliGRAM(s) Oral <User Schedule>  multivitamin/minerals/iron Oral Solution (CENTRUM) 15 milliLiter(s) Oral daily  pantoprazole    Tablet 40 milliGRAM(s) Oral before breakfast  predniSONE   Tablet 50 milliGRAM(s) Oral daily  sodium chloride 1 Gram(s) Oral three times a day  sodium chloride 3%. 500 milliLiter(s) (30 mL/Hr) IV Continuous <Continuous>  trimethoprim  160 mG/sulfamethoxazole 800 mG 1 Tablet(s) Oral <User Schedule>    MEDICATIONS  (PRN):  albuterol/ipratropium for Nebulization 3 milliLiter(s) Nebulizer every 6 hours PRN Shortness of Breath and/or Wheezing  benzocaine/menthol Lozenge 1 Lozenge Oral four times a day PRN Sore Throat  FIRST- Mouthwash  BLM 15 milliLiter(s) Swish and Spit every 4 hours PRN Mouth Care  guaiFENesin Oral Liquid (Sugar-Free) 200 milliGRAM(s) Oral every 6 hours PRN Cough  heparin   Injectable 2500 Unit(s) IV Push every 6 hours PRN For aPTT between 40 - 57  heparin   Injectable 5500 Unit(s) IV Push every 6 hours PRN For aPTT less than 40  lidocaine 2% Viscous 5 milliLiter(s) Swish and Spit three times a day PRN Mouth Care  ondansetron Injectable 4 milliGRAM(s) IV Push every 8 hours PRN Nausea and/or Vomiting  oxyCODONE    IR 2.5 milliGRAM(s) Oral every 8 hours PRN Moderate to severe pain (4-10)      Vitals:  T(C): 36.5 (01-09-24 @ 12:17), Max: 37 (01-09-24 @ 11:00)  HR: 90 (01-09-24 @ 12:17) (84 - 96)  BP: 122/79 (01-09-24 @ 12:17) (115/81 - 132/76)  RR: 18 (01-09-24 @ 12:17) (18 - 18)  SpO2: 99% (01-09-24 @ 12:17) (99% - 100%)    Physical Examination:  General - NAD    Neurologic Exam:  Mental status - Awake, Alert, Oriented to person, place, and Month and year, but not day. Speech clear, fluent, repetition and naming intact. Follows commands.     Cranial nerves - VFF, EOMI, face sensation (V1-V3) intact b/l, facial strength intact without asymmetry b/l, hearing intact b/l, trapezius  5/5 strength b/l, tongue midline on protrusion with full lateral movement    Motor - Normal bulk and tone throughout.   Strength testing            Deltoid      Biceps      Triceps                 R            5                 5               5                     5                                             L             5                 5               5                     5                                           Hip Flexion    Hip Extension    Knee Flexion    Knee Extension    Dorsiflexion    Plantar Flexion  R              5                           5                       5                           5                            5                          5  L              5                           5                        5                           5                            5                          5    Sensation - Light touch intact throughout    Coordination - Finger to Nose intact b/l. mild intention tremor.     Gait and station - Deffered     Labs:                        10.0   8.01  )-----------( 246      ( 09 Jan 2024 07:55 )             30.1     01-09    134<L>  |  97<L>  |  9   ----------------------------<  101<H>  4.4   |  26  |  0.71    Ca    9.9      09 Jan 2024 07:55  Phos  3.7     01-09  Mg     1.80     01-09    TPro  6.8  /  Alb  3.1<L>  /  TBili  0.4  /  DBili  x   /  AST  60<H>  /  ALT  129<H>  /  AlkPhos  116  01-08    CAPILLARY BLOOD GLUCOSE        LIVER FUNCTIONS - ( 08 Jan 2024 05:40 )  Alb: 3.1 g/dL / Pro: 6.8 g/dL / ALK PHOS: 116 U/L / ALT: 129 U/L / AST: 60 U/L / GGT: x             PT/INR - ( 09 Jan 2024 07:55 )   PT: 14.7 sec;   INR: 1.31 ratio         PTT - ( 09 Jan 2024 07:55 )  PTT:70.4 sec          Radiology:    < from: CT Head No Cont (01.02.24 @ 15:21) >  IMPRESSION:    No CT evidence for acute intracranial abnormality .    If clinical concern persists, follow-up MRI of the brain can be obtained.       Neurology - Progress Note    -  Spectra: [phone] ([facility]), [phone] ([facility])  -    Subjective: Mother at bedside reports patient has episodes over the past several days of shaking of B lower legs with preserved consciousness. Nurse at bedside also witnessed events where patient is shaking whole body lasting several seconds and is alert, talking during episodes and no confusion after episodes. No tongue biting or bowel bladder incontinence was noted.       Review of Systems:    Per HPI    Allergies:  No Known Allergies      PMHx/PSHx/Family Hx: As above, otherwise see below   No pertinent past medical history    LE (lupus erythematosus)    Pulmonary embolism        Social Hx:  No current use of tobacco, alcohol, or illicit drugs      Medications:  MEDICATIONS  (STANDING):  chlorhexidine 2% Cloths 1 Application(s) Topical daily  ciprofloxacin  0.3% Ophthalmic Solution for Otic Use 2 Drop(s) Both Ears two times a day  FLUoxetine 20 milliGRAM(s) Oral daily  gabapentin 200 milliGRAM(s) Oral three times a day  heparin  Infusion. 1300 Unit(s)/Hr (13 mL/Hr) IV Continuous <Continuous>  hydroxychloroquine 200 milliGRAM(s) Oral two times a day  lidocaine   4% Patch 1 Patch Transdermal every 24 hours  lidocaine   4% Patch 2 Patch Transdermal every 24 hours  melatonin 3 milliGRAM(s) Oral <User Schedule>  multivitamin/minerals/iron Oral Solution (CENTRUM) 15 milliLiter(s) Oral daily  pantoprazole    Tablet 40 milliGRAM(s) Oral before breakfast  predniSONE   Tablet 50 milliGRAM(s) Oral daily  sodium chloride 1 Gram(s) Oral three times a day  sodium chloride 3%. 500 milliLiter(s) (30 mL/Hr) IV Continuous <Continuous>  trimethoprim  160 mG/sulfamethoxazole 800 mG 1 Tablet(s) Oral <User Schedule>    MEDICATIONS  (PRN):  albuterol/ipratropium for Nebulization 3 milliLiter(s) Nebulizer every 6 hours PRN Shortness of Breath and/or Wheezing  benzocaine/menthol Lozenge 1 Lozenge Oral four times a day PRN Sore Throat  FIRST- Mouthwash  BLM 15 milliLiter(s) Swish and Spit every 4 hours PRN Mouth Care  guaiFENesin Oral Liquid (Sugar-Free) 200 milliGRAM(s) Oral every 6 hours PRN Cough  heparin   Injectable 2500 Unit(s) IV Push every 6 hours PRN For aPTT between 40 - 57  heparin   Injectable 5500 Unit(s) IV Push every 6 hours PRN For aPTT less than 40  lidocaine 2% Viscous 5 milliLiter(s) Swish and Spit three times a day PRN Mouth Care  ondansetron Injectable 4 milliGRAM(s) IV Push every 8 hours PRN Nausea and/or Vomiting  oxyCODONE    IR 2.5 milliGRAM(s) Oral every 8 hours PRN Moderate to severe pain (4-10)      Vitals:  T(C): 36.5 (01-09-24 @ 12:17), Max: 37 (01-09-24 @ 11:00)  HR: 90 (01-09-24 @ 12:17) (84 - 96)  BP: 122/79 (01-09-24 @ 12:17) (115/81 - 132/76)  RR: 18 (01-09-24 @ 12:17) (18 - 18)  SpO2: 99% (01-09-24 @ 12:17) (99% - 100%)    Physical Examination:  General - NAD    Neurologic Exam:  Mental status - Awake, Alert, Oriented to person, place, and Month and year, but not day. Speech clear, fluent, repetition and naming intact. Follows commands.     Cranial nerves - VFF, EOMI, face sensation (V1-V3) intact b/l, facial strength intact without asymmetry b/l, hearing intact b/l, trapezius  5/5 strength b/l, tongue midline on protrusion with full lateral movement    Motor - Normal bulk and tone throughout.   Strength testing            Deltoid      Biceps      Triceps                 R            5                 5               5                     5                                             L             5                 5               5                     5                                           Hip Flexion    Hip Extension    Knee Flexion    Knee Extension    Dorsiflexion    Plantar Flexion  R              5                           5                       5                           5                            5                          5  L              5                           5                        5                           5                            5                          5    Sensation - Light touch intact throughout    Coordination - Finger to Nose intact b/l. mild intention tremor.     Gait and station - Deffered     Labs:                        10.0   8.01  )-----------( 246      ( 09 Jan 2024 07:55 )             30.1     01-09    134<L>  |  97<L>  |  9   ----------------------------<  101<H>  4.4   |  26  |  0.71    Ca    9.9      09 Jan 2024 07:55  Phos  3.7     01-09  Mg     1.80     01-09    TPro  6.8  /  Alb  3.1<L>  /  TBili  0.4  /  DBili  x   /  AST  60<H>  /  ALT  129<H>  /  AlkPhos  116  01-08    CAPILLARY BLOOD GLUCOSE        LIVER FUNCTIONS - ( 08 Jan 2024 05:40 )  Alb: 3.1 g/dL / Pro: 6.8 g/dL / ALK PHOS: 116 U/L / ALT: 129 U/L / AST: 60 U/L / GGT: x             PT/INR - ( 09 Jan 2024 07:55 )   PT: 14.7 sec;   INR: 1.31 ratio         PTT - ( 09 Jan 2024 07:55 )  PTT:70.4 sec          Radiology:    < from: CT Head No Cont (01.02.24 @ 15:21) >  IMPRESSION:    No CT evidence for acute intracranial abnormality .    If clinical concern persists, follow-up MRI of the brain can be obtained.       Neurology - Progress Note    -  Spectra: [phone] ([facility]), [phone] ([facility])  -    Subjective: Mother at bedside reports patient has episodes over the past several days of shaking of B lower legs with preserved consciousness. Nurse at bedside also witnessed events where patient is shaking whole body lasting several seconds and is alert, talking during episodes and no confusion after episodes. No tongue biting or bowel bladder incontinence was noted.       Review of Systems:    Per HPI    Allergies:  No Known Allergies      PMHx/PSHx/Family Hx: As above, otherwise see below   No pertinent past medical history    LE (lupus erythematosus)    Pulmonary embolism        Social Hx:  No current use of tobacco, alcohol, or illicit drugs      Medications:  MEDICATIONS  (STANDING):  chlorhexidine 2% Cloths 1 Application(s) Topical daily  ciprofloxacin  0.3% Ophthalmic Solution for Otic Use 2 Drop(s) Both Ears two times a day  FLUoxetine 20 milliGRAM(s) Oral daily  gabapentin 200 milliGRAM(s) Oral three times a day  heparin  Infusion. 1300 Unit(s)/Hr (13 mL/Hr) IV Continuous <Continuous>  hydroxychloroquine 200 milliGRAM(s) Oral two times a day  lidocaine   4% Patch 1 Patch Transdermal every 24 hours  lidocaine   4% Patch 2 Patch Transdermal every 24 hours  melatonin 3 milliGRAM(s) Oral <User Schedule>  multivitamin/minerals/iron Oral Solution (CENTRUM) 15 milliLiter(s) Oral daily  pantoprazole    Tablet 40 milliGRAM(s) Oral before breakfast  predniSONE   Tablet 50 milliGRAM(s) Oral daily  sodium chloride 1 Gram(s) Oral three times a day  sodium chloride 3%. 500 milliLiter(s) (30 mL/Hr) IV Continuous <Continuous>  trimethoprim  160 mG/sulfamethoxazole 800 mG 1 Tablet(s) Oral <User Schedule>    MEDICATIONS  (PRN):  albuterol/ipratropium for Nebulization 3 milliLiter(s) Nebulizer every 6 hours PRN Shortness of Breath and/or Wheezing  benzocaine/menthol Lozenge 1 Lozenge Oral four times a day PRN Sore Throat  FIRST- Mouthwash  BLM 15 milliLiter(s) Swish and Spit every 4 hours PRN Mouth Care  guaiFENesin Oral Liquid (Sugar-Free) 200 milliGRAM(s) Oral every 6 hours PRN Cough  heparin   Injectable 2500 Unit(s) IV Push every 6 hours PRN For aPTT between 40 - 57  heparin   Injectable 5500 Unit(s) IV Push every 6 hours PRN For aPTT less than 40  lidocaine 2% Viscous 5 milliLiter(s) Swish and Spit three times a day PRN Mouth Care  ondansetron Injectable 4 milliGRAM(s) IV Push every 8 hours PRN Nausea and/or Vomiting  oxyCODONE    IR 2.5 milliGRAM(s) Oral every 8 hours PRN Moderate to severe pain (4-10)      Vitals:  T(C): 36.5 (01-09-24 @ 12:17), Max: 37 (01-09-24 @ 11:00)  HR: 90 (01-09-24 @ 12:17) (84 - 96)  BP: 122/79 (01-09-24 @ 12:17) (115/81 - 132/76)  RR: 18 (01-09-24 @ 12:17) (18 - 18)  SpO2: 99% (01-09-24 @ 12:17) (99% - 100%)    Physical Examination:  General - NAD    Neurologic Exam:  Mental status - Awake, Alert, Oriented to person, place, and Month and year, but not day. Speech clear, fluent, repetition and naming intact. Follows commands.     Cranial nerves - VFF, EOMI, face sensation (V1-V3) intact b/l, facial strength intact without asymmetry b/l, hearing intact b/l, trapezius  5/5 strength b/l, tongue midline on protrusion with full lateral movement    Motor - Normal bulk and tone throughout.   Strength testing            Deltoid      Biceps      Triceps                 R            5                 5               5                     5                                             L             5                 5               5                     5                                           Hip Flexion    Hip Extension    Knee Flexion    Knee Extension    Dorsiflexion    Plantar Flexion  R              5                           5                       5                           5                            5                          5  L              5                           5                        5                           5                            5                          5    Sensation - Light touch intact throughout    Coordination - Finger to Nose intact b/l. mild intention tremor.     Gait and station - Deffered     Labs:                        10.0   8.01  )-----------( 246      ( 09 Jan 2024 07:55 )             30.1     01-09    134<L>  |  97<L>  |  9   ----------------------------<  101<H>  4.4   |  26  |  0.71    Ca    9.9      09 Jan 2024 07:55  Phos  3.7     01-09  Mg     1.80     01-09    TPro  6.8  /  Alb  3.1<L>  /  TBili  0.4  /  DBili  x   /  AST  60<H>  /  ALT  129<H>  /  AlkPhos  116  01-08    CAPILLARY BLOOD GLUCOSE        LIVER FUNCTIONS - ( 08 Jan 2024 05:40 )  Alb: 3.1 g/dL / Pro: 6.8 g/dL / ALK PHOS: 116 U/L / ALT: 129 U/L / AST: 60 U/L / GGT: x             PT/INR - ( 09 Jan 2024 07:55 )   PT: 14.7 sec;   INR: 1.31 ratio         PTT - ( 09 Jan 2024 07:55 )  PTT:70.4 sec          Radiology:    < from: CT Head No Cont (01.02.24 @ 15:21) >  IMPRESSION:    No CT evidence for acute intracranial abnormality .    If clinical concern persists, follow-up MRI of the brain can be obtained.

## 2024-01-09 NOTE — PROGRESS NOTE ADULT - NUTRITIONAL ASSESSMENT
This patient has been assessed with a concern for Malnutrition and has been determined to have a diagnosis/diagnoses of Severe protein-calorie malnutrition.    This patient is being managed with:   Diet NPO after Midnight-     NPO Start Date: 09-Jan-2024   NPO Start Time: 23:59  Except Medications  With Ice Chips/Sips of Water  Entered: Jan 9 2024 10:15AM    Diet Regular-  1000mL Fluid Restriction ([id number])  Supplement Feeding Modality:  Oral  Ensure Plus High Protein Cans or Servings Per Day:  1       Frequency:  Three Times a day  Entered: [place]  3 2024  4:21PM   This patient has been assessed with a concern for Malnutrition and has been determined to have a diagnosis/diagnoses of Severe protein-calorie malnutrition.    This patient is being managed with:   Diet NPO after Midnight-     NPO Start Date: 09-Jan-2024   NPO Start Time: 23:59  Except Medications  With Ice Chips/Sips of Water  Entered: Jan 9 2024 10:15AM    Diet Regular-  1000mL Fluid Restriction ([id number])  Supplement Feeding Modality:  Oral  Ensure Plus High Protein Cans or Servings Per Day:  1       Frequency:  Three Times a day  Entered: Carter  3 2024  4:21PM

## 2024-01-09 NOTE — PROGRESS NOTE ADULT - ASSESSMENT
28y (1995) man with a PMHx significant for SLE on Hydroxychloroquine initially admitted to [facility] VS on 12/12, found to have b/l PE w/ superimposed PNA, transferred to [facility] on 12/22 for thoracic eval of b/l L > R effusions, L loculated. Patient was on heparin drip. Followed by ID and rheum due to perisistent fevers, no clear source identified (was on Vanc, Zosyn). RRT called on 12/25 for SOB and chest pain. During RRT, patient was witnessed to have an episode of generalized convulsion lasting ~45 seconds that resolved spontaneously, associated with urinary incontinence. Recalled by team due to episodes of shaking at times of full body or just lower extremities, per nurse at bedside episodes lasts several seconds, patient awake and conversation during episodes and without post-ictal periods. Exam nonfocal.     Impression: Episodes of varying presentation of  full body or just lower extremities with patient conversational during episodes, no tongue biting, post ictal period or incontinence likely tremor from metabolic etiology vs unlikely seizures     Recommendation:  [] vEEG  [] MRI B w/w/o  [] Seizure/fall precautions    Seen with Dr. De La Cruz.      28y (1995) man with a PMHx significant for SLE on Hydroxychloroquine initially admitted to [facility] VS on 12/12, found to have b/l PE w/ superimposed PNA, transferred to [facility] on 12/22 for thoracic eval of b/l L > R effusions, L loculated. Patient was on heparin drip. Followed by ID and rheum due to perisistent fevers, no clear source identified (was on Vanc, Zosyn). RRT called on 12/25 for SOB and chest pain. During RRT, patient was witnessed to have an episode of generalized convulsion lasting ~45 seconds that resolved spontaneously, associated with urinary incontinence. Recalled by team due to episodes of shaking at times of full body or just lower extremities, per nurse at bedside episodes lasts several seconds, patient awake and conversation during episodes and without post-ictal periods. Exam nonfocal.     Impression: Episodes of varying presentation of  full body or just lower extremities with patient conversational during episodes, no tongue biting, post ictal period or incontinence likely tremor from metabolic etiology vs unlikely seizures     Recommendation:  [] vEEG  [] MRI B w/w/o  [] Seizure/fall precautions    Seen with Dr. De La Cruz.      28y (1995) man with a PMHx significant for SLE on Hydroxychloroquine initially admitted to [facility] VS on 12/12, found to have b/l PE w/ superimposed PNA, transferred to [facility] on 12/22 for thoracic eval of b/l L > R effusions, L loculated. Patient was on heparin drip. Followed by ID and rheum due to perisistent fevers, no clear source identified (was on Vanc, Zosyn). RRT called on 12/25 for SOB and chest pain. During RRT, patient was witnessed to have an episode of generalized convulsion lasting ~45 seconds that resolved spontaneously, associated with urinary incontinence. Recalled by team due to episodes of shaking at times of full body or just lower extremities, per nurse at bedside episodes lasts several seconds, patient awake and conversation during episodes and without post-ictal periods. Exam nonfocal.     Impression: Episodes of varying presentation of  full body or just lower extremities with patient conversational during episodes, no tongue biting, post ictal period or incontinence likely due to metabolic etiology vs unlikely seizures     Recommendation:  [] vEEG  [] MRI B w/w/o  [] Seizure/fall precautions    Seen with Dr. De La Cruz.      28y (1995) man with a PMHx significant for SLE on Hydroxychloroquine initially admitted to [facility] VS on 12/12, found to have b/l PE w/ superimposed PNA, transferred to [facility] on 12/22 for thoracic eval of b/l L > R effusions, L loculated. Patient was on heparin drip. Followed by ID and rheum due to perisistent fevers, no clear source identified (was on Vanc, Zosyn). RRT called on 12/25 for SOB and chest pain. During RRT, patient was witnessed to have an episode of generalized convulsion lasting ~45 seconds that resolved spontaneously, associated with urinary incontinence. Recalled by team due to episodes of shaking at times of full body or just lower extremities, per nurse at bedside episodes lasts several seconds, patient awake and conversation during episodes and without post-ictal periods. Exam nonfocal.     Impression: Episodes of varying presentation of  full body or just lower extremities with patient conversational during episodes, no tongue biting, post ictal period or incontinence likely due to metabolic etiology vs unlikely seizures     Recommendation:  [] vEEG  [] MRI B w/w/o  [] Seizure/fall precautions    Seen with Dr. De La Cruz.

## 2024-01-10 ENCOUNTER — RESULT REVIEW (OUTPATIENT)
Age: 29
End: 2024-01-10

## 2024-01-10 LAB
ALBUMIN SERPL ELPH-MCNC: 3.5 G/DL — SIGNIFICANT CHANGE UP (ref 3.3–5)
ALBUMIN SERPL ELPH-MCNC: 3.5 G/DL — SIGNIFICANT CHANGE UP (ref 3.3–5)
ALP SERPL-CCNC: 109 U/L — SIGNIFICANT CHANGE UP (ref 40–120)
ALP SERPL-CCNC: 109 U/L — SIGNIFICANT CHANGE UP (ref 40–120)
ALT FLD-CCNC: 132 U/L — HIGH (ref 4–41)
ALT FLD-CCNC: 132 U/L — HIGH (ref 4–41)
ANION GAP SERPL CALC-SCNC: 12 MMOL/L — SIGNIFICANT CHANGE UP (ref 7–14)
ANION GAP SERPL CALC-SCNC: 12 MMOL/L — SIGNIFICANT CHANGE UP (ref 7–14)
APTT BLD: 70.7 SEC — HIGH (ref 24.5–35.6)
APTT BLD: 70.7 SEC — HIGH (ref 24.5–35.6)
AST SERPL-CCNC: 44 U/L — HIGH (ref 4–40)
AST SERPL-CCNC: 44 U/L — HIGH (ref 4–40)
BILIRUB DIRECT SERPL-MCNC: <0.2 MG/DL — SIGNIFICANT CHANGE UP (ref 0–0.3)
BILIRUB DIRECT SERPL-MCNC: <0.2 MG/DL — SIGNIFICANT CHANGE UP (ref 0–0.3)
BILIRUB INDIRECT FLD-MCNC: >0.1 MG/DL — SIGNIFICANT CHANGE UP (ref 0–1)
BILIRUB INDIRECT FLD-MCNC: >0.1 MG/DL — SIGNIFICANT CHANGE UP (ref 0–1)
BILIRUB SERPL-MCNC: 0.3 MG/DL — SIGNIFICANT CHANGE UP (ref 0.2–1.2)
BILIRUB SERPL-MCNC: 0.3 MG/DL — SIGNIFICANT CHANGE UP (ref 0.2–1.2)
BUN SERPL-MCNC: 10 MG/DL — SIGNIFICANT CHANGE UP (ref 7–23)
BUN SERPL-MCNC: 10 MG/DL — SIGNIFICANT CHANGE UP (ref 7–23)
CALCIUM SERPL-MCNC: 9.7 MG/DL — SIGNIFICANT CHANGE UP (ref 8.4–10.5)
CALCIUM SERPL-MCNC: 9.7 MG/DL — SIGNIFICANT CHANGE UP (ref 8.4–10.5)
CHLORIDE SERPL-SCNC: 98 MMOL/L — SIGNIFICANT CHANGE UP (ref 98–107)
CHLORIDE SERPL-SCNC: 98 MMOL/L — SIGNIFICANT CHANGE UP (ref 98–107)
CO2 SERPL-SCNC: 24 MMOL/L — SIGNIFICANT CHANGE UP (ref 22–31)
CO2 SERPL-SCNC: 24 MMOL/L — SIGNIFICANT CHANGE UP (ref 22–31)
CREAT SERPL-MCNC: 0.64 MG/DL — SIGNIFICANT CHANGE UP (ref 0.5–1.3)
CREAT SERPL-MCNC: 0.64 MG/DL — SIGNIFICANT CHANGE UP (ref 0.5–1.3)
EGFR: 131 ML/MIN/1.73M2 — SIGNIFICANT CHANGE UP
EGFR: 131 ML/MIN/1.73M2 — SIGNIFICANT CHANGE UP
GLUCOSE SERPL-MCNC: 157 MG/DL — HIGH (ref 70–99)
GLUCOSE SERPL-MCNC: 157 MG/DL — HIGH (ref 70–99)
HCT VFR BLD CALC: 30.1 % — LOW (ref 39–50)
HCT VFR BLD CALC: 30.1 % — LOW (ref 39–50)
HGB BLD-MCNC: 9.9 G/DL — LOW (ref 13–17)
HGB BLD-MCNC: 9.9 G/DL — LOW (ref 13–17)
INR BLD: 1.28 RATIO — HIGH (ref 0.85–1.18)
INR BLD: 1.28 RATIO — HIGH (ref 0.85–1.18)
MAGNESIUM SERPL-MCNC: 1.8 MG/DL — SIGNIFICANT CHANGE UP (ref 1.6–2.6)
MAGNESIUM SERPL-MCNC: 1.8 MG/DL — SIGNIFICANT CHANGE UP (ref 1.6–2.6)
MCHC RBC-ENTMCNC: 30.8 PG — SIGNIFICANT CHANGE UP (ref 27–34)
MCHC RBC-ENTMCNC: 30.8 PG — SIGNIFICANT CHANGE UP (ref 27–34)
MCHC RBC-ENTMCNC: 32.9 GM/DL — SIGNIFICANT CHANGE UP (ref 32–36)
MCHC RBC-ENTMCNC: 32.9 GM/DL — SIGNIFICANT CHANGE UP (ref 32–36)
MCV RBC AUTO: 93.8 FL — SIGNIFICANT CHANGE UP (ref 80–100)
MCV RBC AUTO: 93.8 FL — SIGNIFICANT CHANGE UP (ref 80–100)
NRBC # BLD: 0 /100 WBCS — SIGNIFICANT CHANGE UP (ref 0–0)
NRBC # BLD: 0 /100 WBCS — SIGNIFICANT CHANGE UP (ref 0–0)
NRBC # FLD: 0 K/UL — SIGNIFICANT CHANGE UP (ref 0–0)
NRBC # FLD: 0 K/UL — SIGNIFICANT CHANGE UP (ref 0–0)
PHOSPHATE SERPL-MCNC: 4.4 MG/DL — SIGNIFICANT CHANGE UP (ref 2.5–4.5)
PHOSPHATE SERPL-MCNC: 4.4 MG/DL — SIGNIFICANT CHANGE UP (ref 2.5–4.5)
PLATELET # BLD AUTO: 234 K/UL — SIGNIFICANT CHANGE UP (ref 150–400)
PLATELET # BLD AUTO: 234 K/UL — SIGNIFICANT CHANGE UP (ref 150–400)
POTASSIUM SERPL-MCNC: 3.5 MMOL/L — SIGNIFICANT CHANGE UP (ref 3.5–5.3)
POTASSIUM SERPL-MCNC: 3.5 MMOL/L — SIGNIFICANT CHANGE UP (ref 3.5–5.3)
POTASSIUM SERPL-SCNC: 3.5 MMOL/L — SIGNIFICANT CHANGE UP (ref 3.5–5.3)
POTASSIUM SERPL-SCNC: 3.5 MMOL/L — SIGNIFICANT CHANGE UP (ref 3.5–5.3)
PROT SERPL-MCNC: 7 G/DL — SIGNIFICANT CHANGE UP (ref 6–8.3)
PROT SERPL-MCNC: 7 G/DL — SIGNIFICANT CHANGE UP (ref 6–8.3)
PROTHROM AB SERPL-ACNC: 14.2 SEC — HIGH (ref 9.5–13)
PROTHROM AB SERPL-ACNC: 14.2 SEC — HIGH (ref 9.5–13)
RBC # BLD: 3.21 M/UL — LOW (ref 4.2–5.8)
RBC # BLD: 3.21 M/UL — LOW (ref 4.2–5.8)
RBC # FLD: 16.3 % — HIGH (ref 10.3–14.5)
RBC # FLD: 16.3 % — HIGH (ref 10.3–14.5)
SODIUM SERPL-SCNC: 134 MMOL/L — LOW (ref 135–145)
SODIUM SERPL-SCNC: 134 MMOL/L — LOW (ref 135–145)
WBC # BLD: 8.1 K/UL — SIGNIFICANT CHANGE UP (ref 3.8–10.5)
WBC # BLD: 8.1 K/UL — SIGNIFICANT CHANGE UP (ref 3.8–10.5)
WBC # FLD AUTO: 8.1 K/UL — SIGNIFICANT CHANGE UP (ref 3.8–10.5)
WBC # FLD AUTO: 8.1 K/UL — SIGNIFICANT CHANGE UP (ref 3.8–10.5)

## 2024-01-10 PROCEDURE — 88350 IMFLUOR EA ADDL 1ANTB STN PX: CPT | Mod: 26

## 2024-01-10 PROCEDURE — 88313 SPECIAL STAINS GROUP 2: CPT | Mod: 26

## 2024-01-10 PROCEDURE — 88346 IMFLUOR 1ST 1ANTB STAIN PX: CPT | Mod: 26

## 2024-01-10 PROCEDURE — 88348 ELECTRON MICROSCOPY DX: CPT | Mod: 26

## 2024-01-10 PROCEDURE — 88305 TISSUE EXAM BY PATHOLOGIST: CPT | Mod: 26

## 2024-01-10 PROCEDURE — 50200 RENAL BIOPSY PERQ: CPT | Mod: LT

## 2024-01-10 PROCEDURE — 76942 ECHO GUIDE FOR BIOPSY: CPT | Mod: 26

## 2024-01-10 RX ORDER — HYDROMORPHONE HYDROCHLORIDE 2 MG/ML
0.5 INJECTION INTRAMUSCULAR; INTRAVENOUS; SUBCUTANEOUS
Refills: 0 | Status: DISCONTINUED | OUTPATIENT
Start: 2024-01-10 | End: 2024-01-11

## 2024-01-10 RX ORDER — HEPARIN SODIUM 5000 [USP'U]/ML
2500 INJECTION INTRAVENOUS; SUBCUTANEOUS EVERY 6 HOURS
Refills: 0 | Status: DISCONTINUED | OUTPATIENT
Start: 2024-01-10 | End: 2024-01-12

## 2024-01-10 RX ORDER — HEPARIN SODIUM 5000 [USP'U]/ML
5500 INJECTION INTRAVENOUS; SUBCUTANEOUS EVERY 6 HOURS
Refills: 0 | Status: DISCONTINUED | OUTPATIENT
Start: 2024-01-10 | End: 2024-01-12

## 2024-01-10 RX ORDER — HEPARIN SODIUM 5000 [USP'U]/ML
INJECTION INTRAVENOUS; SUBCUTANEOUS
Qty: 25000 | Refills: 0 | Status: DISCONTINUED | OUTPATIENT
Start: 2024-01-10 | End: 2024-01-10

## 2024-01-10 RX ORDER — HEPARIN SODIUM 5000 [USP'U]/ML
1000 INJECTION INTRAVENOUS; SUBCUTANEOUS
Qty: 25000 | Refills: 0 | Status: DISCONTINUED | OUTPATIENT
Start: 2024-01-10 | End: 2024-01-12

## 2024-01-10 RX ORDER — HEPARIN SODIUM 5000 [USP'U]/ML
2500 INJECTION INTRAVENOUS; SUBCUTANEOUS EVERY 6 HOURS
Refills: 0 | Status: DISCONTINUED | OUTPATIENT
Start: 2024-01-10 | End: 2024-01-10

## 2024-01-10 RX ORDER — HEPARIN SODIUM 5000 [USP'U]/ML
5500 INJECTION INTRAVENOUS; SUBCUTANEOUS EVERY 6 HOURS
Refills: 0 | Status: DISCONTINUED | OUTPATIENT
Start: 2024-01-10 | End: 2024-01-10

## 2024-01-10 RX ADMIN — Medication 1 TABLET(S): at 05:41

## 2024-01-10 RX ADMIN — SODIUM CHLORIDE 1 GRAM(S): 9 INJECTION INTRAMUSCULAR; INTRAVENOUS; SUBCUTANEOUS at 05:40

## 2024-01-10 RX ADMIN — GABAPENTIN 200 MILLIGRAM(S): 400 CAPSULE ORAL at 05:40

## 2024-01-10 RX ADMIN — Medication 50 MILLIGRAM(S): at 05:40

## 2024-01-10 RX ADMIN — LIDOCAINE 1 PATCH: 4 CREAM TOPICAL at 08:01

## 2024-01-10 RX ADMIN — LIDOCAINE 2 PATCH: 4 CREAM TOPICAL at 13:10

## 2024-01-10 RX ADMIN — CHLORHEXIDINE GLUCONATE 1 APPLICATION(S): 213 SOLUTION TOPICAL at 14:18

## 2024-01-10 RX ADMIN — LIDOCAINE 2 PATCH: 4 CREAM TOPICAL at 13:58

## 2024-01-10 RX ADMIN — HEPARIN SODIUM 1000 UNIT(S)/HR: 5000 INJECTION INTRAVENOUS; SUBCUTANEOUS at 18:28

## 2024-01-10 RX ADMIN — LIDOCAINE 1 PATCH: 4 CREAM TOPICAL at 00:34

## 2024-01-10 RX ADMIN — OXYCODONE HYDROCHLORIDE 2.5 MILLIGRAM(S): 5 TABLET ORAL at 15:21

## 2024-01-10 RX ADMIN — PANTOPRAZOLE SODIUM 40 MILLIGRAM(S): 20 TABLET, DELAYED RELEASE ORAL at 05:40

## 2024-01-10 RX ADMIN — SODIUM CHLORIDE 1 GRAM(S): 9 INJECTION INTRAMUSCULAR; INTRAVENOUS; SUBCUTANEOUS at 21:42

## 2024-01-10 RX ADMIN — GABAPENTIN 200 MILLIGRAM(S): 400 CAPSULE ORAL at 21:42

## 2024-01-10 RX ADMIN — GABAPENTIN 200 MILLIGRAM(S): 400 CAPSULE ORAL at 14:19

## 2024-01-10 RX ADMIN — Medication 20 MILLIGRAM(S): at 14:18

## 2024-01-10 RX ADMIN — Medication 2 DROP(S): at 17:25

## 2024-01-10 RX ADMIN — OXYCODONE HYDROCHLORIDE 2.5 MILLIGRAM(S): 5 TABLET ORAL at 16:00

## 2024-01-10 RX ADMIN — Medication 200 MILLIGRAM(S): at 05:40

## 2024-01-10 RX ADMIN — Medication 200 MILLIGRAM(S): at 17:26

## 2024-01-10 RX ADMIN — Medication 2 DROP(S): at 05:39

## 2024-01-10 RX ADMIN — Medication 15 MILLILITER(S): at 14:18

## 2024-01-10 RX ADMIN — OXYCODONE HYDROCHLORIDE 2.5 MILLIGRAM(S): 5 TABLET ORAL at 23:25

## 2024-01-10 RX ADMIN — HEPARIN SODIUM 1000 UNIT(S)/HR: 5000 INJECTION INTRAVENOUS; SUBCUTANEOUS at 19:15

## 2024-01-10 RX ADMIN — LIDOCAINE 2 PATCH: 4 CREAM TOPICAL at 00:34

## 2024-01-10 RX ADMIN — LIDOCAINE 2 PATCH: 4 CREAM TOPICAL at 19:57

## 2024-01-10 RX ADMIN — SODIUM CHLORIDE 1 GRAM(S): 9 INJECTION INTRAMUSCULAR; INTRAVENOUS; SUBCUTANEOUS at 14:18

## 2024-01-10 RX ADMIN — Medication 3 MILLIGRAM(S): at 21:42

## 2024-01-10 NOTE — EEG REPORT - NS EEG TEXT BOX
FRANKTAYLA SWANN    Study Date: 		1-9-2024 1830  - 1- 0800  Duration in hours:  x 13 hr 30 min    --------------------------------------------------------------------------------------------------  History:  CC/ HPI Patient is a 28y old  Male who presents with a chief complaint of shaking.    --------------------------------------------------------------------------------------------------  Study Interpretation:    [Abbreviation Key:  PDR=alpha rhythm/posterior dominant rhythm. A-P=anterior posterior.  Amplitude: ‘very low’:<20; ‘low’:20-49; ‘medium’:; ‘high’:>150uV.  Persistence for periodic/rhythmic patterns (% of epoch) ‘rare’:<1%; ‘occasional’:1-10%; ‘frequent’:10-50%; ‘abundant’:50-90%; ‘continuous’:>90%.  Persistence for sporadic discharges: ‘rare’:<1/hr; ‘occasional’:1/min-1/hr; ‘frequent’:>1/min; ‘abundant’:>1/10 sec.  RPP=rhythmic and periodic patterns; GRDA=generalized rhythmic delta activity; FIRDA=frontal intermittent GRDA; LRDA=lateralized rhythmic delta activity; TIRDA=temporal intermittent rhythmic delta activity;  LPD=PLED=lateralized periodic discharges; GPD=generalized periodic discharges; BIPDs =bilateral independent periodic discharges; Mf=multifocal; SIRPDs=stimulus induced rhythmic, periodic, or ictal appearing discharges; BIRDs=brief potentially ictal rhythmic discharges >4 Hz, lasting .5-10s; PFA (paroxysmal bursts >13 Hz or =8 Hz <10s).  Modifiers: +F=with fast component; +S=with spike component; +R=with rhythmic component.  S-B=burst suppression pattern.  Max=maximal. N1-drowsy; N2-stage II sleep; N3-slow wave sleep. SSS/BETS=small sharp spikes/benign epileptiform transients of sleep. HV=hyperventilation; PS=photic stimulation]    FINDINGS:      Background:  Continuity: continuous  Symmetry: symmetric  PDR: 8 Hz activity, with amplitude to 40 uV, that attenuated to eye opening.    Reactivity: present  Voltage: normal (between 20-150uV)  Anterior Posterior Gradient: present  Other background findings: none  Breach: absent    Background Slowing:  Generalized slowing: intermittent irregular delta and theta activity.  Focal slowing: none was present.    State Changes:   -Drowsiness noted with increased slowing, attenuation of fast activity, vertex transients.  -Present with N2 sleep transients with symmetric spindles and K-complexes.    Sporadic Epileptiform Discharges:    None    Rhythmic and Periodic Patterns (RPPs):  None     Electrographic and Electroclinical seizures:  None    Other Clinical Events:  None    Activation Procedures:   -Hyperventilation was not performed.    -Photic stimulation was performed and did not elicit any abnormalities.       Artifacts:  Intermittent myogenic and movement artifacts were noted.    ECG:  The heart rate on single channel ECG was predominantly between 70-90 BPM.    EEG Classification / Summary:  Abnormal EEG study  Mild generalized background slowing    -----------------------------------------------------------------------------------------------------    Clinical Impression:  Mild diffuse/multi-focal cerebral dysfunction, not specific as to etiology.  There were no epileptiform abnormalities recorded.      Timothy Dow MD  EEG/Epilepsy Attending

## 2024-01-10 NOTE — PROCEDURE NOTE - PLAN
- 4-hour bedrest/observation  - If vitals stable after 4-hours, transfer to floor for continued observation  - care per medicine

## 2024-01-10 NOTE — PROCEDURE NOTE - NSICDXPROCEDURE_GEN_ALL_CORE_FT
PROCEDURES:  Left thoracentesis 24-Dec-2023 14:29:09  Primitivo Lazo  
PROCEDURES:  Biopsy, renal, using US guidance 10-Carter-2024 09:36:59  Rl Ma

## 2024-01-10 NOTE — CHART NOTE - NSCHARTNOTEFT_GEN_A_CORE
Discussed w/ IR- ok to resume Heparin 6 hours after procedure. Will resume Heparin now.      Frederick Hurst PA-C  oager [phone] Discussed w/ IR- ok to resume Heparin 6 hours after procedure. Will resume Heparin now.      Frederick Hurst PA-C  oager [phone]

## 2024-01-10 NOTE — PROGRESS NOTE ADULT - NUTRITIONAL ASSESSMENT
This patient has been assessed with a concern for Malnutrition and has been determined to have a diagnosis/diagnoses of Severe protein-calorie malnutrition.    This patient is being managed with:   Diet Regular-  1000mL Fluid Restriction ([id number])  Supplement Feeding Modality:  Oral  Ensure Plus High Protein Cans or Servings Per Day:  1       Frequency:  Three Times a day  Entered: Carter  3 2024  4:21PM   This patient has been assessed with a concern for Malnutrition and has been determined to have a diagnosis/diagnoses of Severe protein-calorie malnutrition.    This patient is being managed with:   Diet Regular-  1000mL Fluid Restriction ([id number])  Supplement Feeding Modality:  Oral  Ensure Plus High Protein Cans or Servings Per Day:  1       Frequency:  Three Times a day  Entered: Carter  3 2024  4:21PM

## 2024-01-10 NOTE — PROCEDURE NOTE - SEDATION
Provided by Anesthesia Department Detail Level: Detailed Lab: -204 Biopsy Method: Dermablade Bill For Surgical Tray: no Consent: Written consent was obtained and risks were reviewed including but not limited to scarring, infection, bleeding, scabbing, incomplete removal, nerve damage and allergy to anesthesia. Cryotherapy Text: The wound bed was treated with cryotherapy after the biopsy was performed. Lab Facility: [id number] Wound Care: Petrolatum Size Of Lesion In Cm: 0 Anesthesia Type: 1% lidocaine with epinephrine Electrodesiccation Text: The wound bed was treated with electrodesiccation after the biopsy was performed. Type Of Destruction Used: Curettage Biopsy Type: H and E Electrodesiccation And Curettage Text: The wound bed was treated with electrodesiccation and curettage after the biopsy was performed. Billing Type: Third-Party Bill Depth Of Biopsy: dermis Dressing: bandage Anesthesia Volume In Cc (Will Not Render If 0): 0.5 Post-Care Instructions: I reviewed with the patient in detail post-care instructions. Patient is to keep the biopsy site dry overnight, and then apply bacitracin twice daily until healed. Patient may apply hydrogen peroxide soaks to remove any crusting. Silver Nitrate Text: The wound bed was treated with silver nitrate after the biopsy was performed. Was A Bandage Applied: Yes Curettage Text: The wound bed was treated with curettage after the biopsy was performed. Notification Instructions: Patient will be notified of biopsy results. However, patient instructed to call the office if not contacted within 2 weeks. Hemostasis: Drysol

## 2024-01-10 NOTE — PROGRESS NOTE ADULT - SUBJECTIVE AND OBJECTIVE BOX
Name of Patient : TAYLA MARIE  MRN: [id number]  Date of visit: 01-10-24       Subjective: Patient seen and examined. No new events except as noted.   Doing okay   plan for renal biopsy today by IR     REVIEW OF SYSTEMS:    CONSTITUTIONAL: No weakness, fevers or chills  EYES/ENT: No visual changes;  No vertigo or throat pain   NECK: No pain or stiffness  RESPIRATORY: No cough, wheezing, hemoptysis; No shortness of breath  CARDIOVASCULAR: No chest pain or palpitations  GASTROINTESTINAL: No abdominal or epigastric pain. No nausea, vomiting, or hematemesis; No diarrhea or constipation. No melena or hematochezia.  GENITOURINARY: No dysuria, frequency or hematuria  NEUROLOGICAL: No numbness or weakness  SKIN: No itching, burning, rashes, or lesions   All other review of systems is negative unless indicated above.    MEDICATIONS:  MEDICATIONS  (STANDING):  chlorhexidine 2% Cloths 1 Application(s) Topical daily  ciprofloxacin  0.3% Ophthalmic Solution for Otic Use 2 Drop(s) Both Ears two times a day  FLUoxetine 20 milliGRAM(s) Oral daily  gabapentin 200 milliGRAM(s) Oral three times a day  heparin  Infusion. 1000 Unit(s)/Hr (10 mL/Hr) IV Continuous <Continuous>  hydroxychloroquine 200 milliGRAM(s) Oral two times a day  lidocaine   4% Patch 2 Patch Transdermal every 24 hours  lidocaine   4% Patch 1 Patch Transdermal every 24 hours  melatonin 3 milliGRAM(s) Oral <User Schedule>  multivitamin/minerals/iron Oral Solution (CENTRUM) 15 milliLiter(s) Oral daily  pantoprazole    Tablet 40 milliGRAM(s) Oral before breakfast  predniSONE   Tablet 50 milliGRAM(s) Oral daily  sodium chloride 1 Gram(s) Oral three times a day  sodium chloride 3%. 500 milliLiter(s) (30 mL/Hr) IV Continuous <Continuous>  trimethoprim  160 mG/sulfamethoxazole 800 mG 1 Tablet(s) Oral <User Schedule>      PHYSICAL EXAM:  T(C): 36.9 (01-10-24 @ 13:00), Max: 36.9 (01-10-24 @ 05:39)  HR: 75 (01-10-24 @ 13:00) (75 - 93)  BP: 115/76 (01-10-24 @ 13:00) (115/76 - 128/84)  RR: 18 (01-10-24 @ 13:00) (17 - 18)  SpO2: 100% (01-10-24 @ 13:00) (100% - 100%)  Wt(kg): --  I&O's Summary        Appearance: Normal	  HEENT:  PERRLA   Lymphatic: No lymphadenopathy   Cardiovascular: Normal S1 S2, no JVD  Respiratory: normal effort , clear  Gastrointestinal:  Soft, Non-tender  Skin: No rashes,  warm to touch  Psychiatry:  Mood & affect appropriate  Musculuskeletal: No edema    recent labs, Imaging and EKGs personally reviewed                           9.9    8.10  )-----------( 234      ( 10 Carter 2024 02:50 )             30.1               01-10    134<L>  |  98  |  10  ----------------------------<  157<H>  3.5   |  24  |  0.64    Ca    9.7      10 Carter 2024 02:50  Phos  4.4     01-10  Mg     1.80     01-10    TPro  7.0  /  Alb  3.5  /  TBili  0.3  /  DBili  <0.2  /  AST  44<H>  /  ALT  132<H>  /  AlkPhos  109  01-10    PT/INR - ( 10 Carter 2024 02:50 )   PT: 14.2 sec;   INR: 1.28 ratio         PTT - ( 10 Carter 2024 02:50 )  PTT:70.7 sec                   Urinalysis Basic - ( 10 Carter 2024 02:50 )    Color: x / Appearance: x / SG: x / pH: x  Gluc: 157 mg/dL / Ketone: x  / Bili: x / Urobili: x   Blood: x / Protein: x / Nitrite: x   Leuk Esterase: x / RBC: x / WBC x   Sq Epi: x / Non Sq Epi: x / Bacteria: x                     Name of Patient : TAYLA MARIE  MRN: [id number]  Date of visit: 01-10-24       Subjective: Patient seen and examined. No new events except as noted.   Doing okay   plan for renal biopsy today by IR     REVIEW OF SYSTEMS:    CONSTITUTIONAL: No weakness, fevers or chills  EYES/ENT: No visual changes;  No vertigo or throat pain   NECK: No pain or stiffness  RESPIRATORY: No cough, wheezing, hemoptysis; No shortness of breath  CARDIOVASCULAR: No chest pain or palpitations  GASTROINTESTINAL: No abdominal or epigastric pain. No nausea, vomiting, or hematemesis; No diarrhea or constipation. No melena or hematochezia.  GENITOURINARY: No dysuria, frequency or hematuria  NEUROLOGICAL: No numbness or weakness  SKIN: No itching, burning, rashes, or lesions   All other review of systems is negative unless indicated above.    MEDICATIONS:  MEDICATIONS  (STANDING):  chlorhexidine 2% Cloths 1 Application(s) Topical daily  ciprofloxacin  0.3% Ophthalmic Solution for Otic Use 2 Drop(s) Both Ears two times a day  FLUoxetine 20 milliGRAM(s) Oral daily  gabapentin 200 milliGRAM(s) Oral three times a day  heparin  Infusion. 1000 Unit(s)/Hr (10 mL/Hr) IV Continuous <Continuous>  hydroxychloroquine 200 milliGRAM(s) Oral two times a day  lidocaine   4% Patch 2 Patch Transdermal every 24 hours  lidocaine   4% Patch 1 Patch Transdermal every 24 hours  melatonin 3 milliGRAM(s) Oral <User Schedule>  multivitamin/minerals/iron Oral Solution (CENTRUM) 15 milliLiter(s) Oral daily  pantoprazole    Tablet 40 milliGRAM(s) Oral before breakfast  predniSONE   Tablet 50 milliGRAM(s) Oral daily  sodium chloride 1 Gram(s) Oral three times a day  sodium chloride 3%. 500 milliLiter(s) (30 mL/Hr) IV Continuous <Continuous>  trimethoprim  160 mG/sulfamethoxazole 800 mG 1 Tablet(s) Oral <User Schedule>      PHYSICAL EXAM:  T(C): 36.9 (01-10-24 @ 13:00), Max: 36.9 (01-10-24 @ 05:39)  HR: 75 (01-10-24 @ 13:00) (75 - 93)  BP: 115/76 (01-10-24 @ 13:00) (115/76 - 128/84)  RR: 18 (01-10-24 @ 13:00) (17 - 18)  SpO2: 100% (01-10-24 @ 13:00) (100% - 100%)  Wt(kg): --  I&O's Summary        Appearance: Normal	  HEENT:  PERRLA   Lymphatic: No lymphadenopathy   Cardiovascular: Normal S1 S2, no JVD  Respiratory: normal effort , clear  Gastrointestinal:  Soft, Non-tender  Skin: No rashes,  warm to touch  Psychiatry:  Mood & affect appropriate  Musculuskeletal: No edema    recent labs, Imaging and EKGs personally reviewed                           9.9    8.10  )-----------( 234      ( 10 Carter 2024 02:50 )             30.1               01-10    134<L>  |  98  |  10  ----------------------------<  157<H>  3.5   |  24  |  0.64    Ca    9.7      10 Carter 2024 02:50  Phos  4.4     01-10  Mg     1.80     01-10    TPro  7.0  /  Alb  3.5  /  TBili  0.3  /  DBili  <0.2  /  AST  44<H>  /  ALT  132<H>  /  AlkPhos  109  01-10    PT/INR - ( 10 Carter 2024 02:50 )   PT: 14.2 sec;   INR: 1.28 ratio         PTT - ( 10 Carter 2024 02:50 )  PTT:70.7 sec                   Urinalysis Basic - ( 10 Carter 2024 02:50 )    Color: x / Appearance: x / SG: x / pH: x  Gluc: 157 mg/dL / Ketone: x  / Bili: x / Urobili: x   Blood: x / Protein: x / Nitrite: x   Leuk Esterase: x / RBC: x / WBC x   Sq Epi: x / Non Sq Epi: x / Bacteria: x

## 2024-01-10 NOTE — PROGRESS NOTE ADULT - SUBJECTIVE AND OBJECTIVE BOX
Subjective: Patient seen and examined. No new events except as noted.     SUBJECTIVE/ROS:  MEDICATIONS:  MEDICATIONS  (STANDING):  chlorhexidine 2% Cloths 1 Application(s) Topical daily  ciprofloxacin  0.3% Ophthalmic Solution for Otic Use 2 Drop(s) Both Ears two times a day  FLUoxetine 20 milliGRAM(s) Oral daily  gabapentin 200 milliGRAM(s) Oral three times a day  hydroxychloroquine 200 milliGRAM(s) Oral two times a day  lidocaine   4% Patch 1 Patch Transdermal every 24 hours  lidocaine   4% Patch 2 Patch Transdermal every 24 hours  melatonin 3 milliGRAM(s) Oral <User Schedule>  multivitamin/minerals/iron Oral Solution (CENTRUM) 15 milliLiter(s) Oral daily  pantoprazole    Tablet 40 milliGRAM(s) Oral before breakfast  predniSONE   Tablet 50 milliGRAM(s) Oral daily  sodium chloride 1 Gram(s) Oral three times a day  sodium chloride 3%. 500 milliLiter(s) (30 mL/Hr) IV Continuous <Continuous>  trimethoprim  160 mG/sulfamethoxazole 800 mG 1 Tablet(s) Oral <User Schedule>      PHYSICAL EXAM:  T(C): 36.9 (01-10-24 @ 05:39), Max: 37 (01-09-24 @ 11:00)  HR: 78 (01-10-24 @ 05:39) (78 - 93)  BP: 125/78 (01-10-24 @ 05:39) (122/79 - 132/76)  RR: 17 (01-10-24 @ 05:39) (17 - 18)  SpO2: 100% (01-10-24 @ 05:39) (99% - 100%)  Wt(kg): --  I&O's Summary          JVP: Normal  Neck: supple  Lung: clear   CV: S1 S2 , Murmur:  Abd: soft  Ext: No edema  neuro: Awake / alert  Psych: flat affect  Skin: normal``    LABS/DATA:    CARDIAC MARKERS:                                9.9    8.10  )-----------( 234      ( 10 Carter 2024 02:50 )             30.1     01-10    134<L>  |  98  |  10  ----------------------------<  157<H>  3.5   |  24  |  0.64    Ca    9.7      10 Carter 2024 02:50  Phos  4.4     01-10  Mg     1.80     01-10    TPro  7.0  /  Alb  3.5  /  TBili  0.3  /  DBili  <0.2  /  AST  44<H>  /  ALT  132<H>  /  AlkPhos  109  01-10    proBNP:   Lipid Profile:   HgA1c:   TSH:     TELE:  EKG:

## 2024-01-10 NOTE — PROGRESS NOTE ADULT - ASSESSMENT
28 years old male with h/o Lupus ( diagnosed in 09/2023, on Plaquenil present to [place] ED on 12/12/23  with complain of chest pain and SOB. Patient reported left sided pleuritic chest pain which started 3 days ago. Pain is progressively worsened, associated with SOB and cough. Patient was seen in OSH ED and was prescribed antibiotics. Patient reported significantly worsening of left sided pleuritic chest pain today. No fever or chills. Patient reported loss of appetite and had a few episode of diarrhea for last 2 days. CTA chest with acute right upper lobe and left lower lobe segmental/subsegmental pulmonary emboli. No CT evidence of right heart strain. New bilateral lower lobe consolidations with areas of central clearing. Pneumonia and pulmonary infarcts are in the differential. New bilateral pleural effusions, small on the left and trace on the right. Bilateral axillary and supraclavicular adenopathy of unclear etiology. Patient was started on heparin ggt transitioned to eliquis on 12/19,  patient with worsening SOB/ hypoxia requiring nasal cannula oxygen supplementation. 12/20  Repeat Xray shows increased moderate left pleural effusion and compressive atelectasis trace right effusion and linear atelectasis. Thoracic team consulted for possible pigtail insertion Bedside US: Large left effusion with septations. Right simple effusion , + pericardial effusion- lower extremity dopplers negative for DVT.    # Pulm effusion/ Fever   S/P thoracentesis , follow up results   heparin for AC  TS care appreciated   O2 supplement   monitor output   Zosyn for now , ABx per ID , completed   LP done  Plan for LN biopsy by IR , discussed with rheum and patinet;s mother, defer to rheum  Hematuria noted   Urology eval   Completed course of ABx   Pulm and card for risk stratification to come off heparin for biopsy   follow up with rheum for need of biopsy  repeat urine study   renal biopsy by IR today, hold heparin, resume when clear after procedure by IR     # Recurrent seizure   Neuro eval  EEG   fall precautions       # Fever  SIRS   Abx , completed per iD   Rheum adn ID follow up   Renal biopsy for protonuria, defer to rhem, discussed with IR, high risk       # Hyponatremia/ Seizure   RRT   Neuro follow up       #PE   on heparin , resume after thoracentesis   DVT negative  Heme onc eval   likley lupus related     #Hxof lupus  on hydroxychloroquine   Heme eval   Rheum eval   steroids per rheum       DVT andgIPPX   28 years old male with h/o Lupus ( diagnosed in 09/2023, on Plaquenil present to [place] ED on 12/12/23  with complain of chest pain and SOB. Patient reported left sided pleuritic chest pain which started 3 days ago. Pain is progressively worsened, associated with SOB and cough. Patient was seen in OSH ED and was prescribed antibiotics. Patient reported significantly worsening of left sided pleuritic chest pain today. No fever or chills. Patient reported loss of appetite and had a few episode of diarrhea for last 2 days. CTA chest with acute right upper lobe and left lower lobe segmental/subsegmental pulmonary emboli. No CT evidence of right heart strain. New bilateral lower lobe consolidations with areas of central clearing. Pneumonia and pulmonary infarcts are in the differential. New bilateral pleural effusions, small on the left and trace on the right. Bilateral axillary and supraclavicular adenopathy of unclear etiology. Patient was started on heparin ggt transitioned to eliquis on 12/19,  patient with worsening SOB/ hypoxia requiring nasal cannula oxygen supplementation. 12/20  Repeat Xray shows increased moderate left pleural effusion and compressive atelectasis trace right effusion and linear atelectasis. Thoracic team consulted for possible pigtail insertion Bedside US: Large left effusion with septations. Right simple effusion , + pericardial effusion- lower extremity dopplers negative for DVT.    # Pulm effusion/ Fever   S/P thoracentesis , follow up results   heparin for AC  TS care appreciated   O2 supplement   monitor output   Zosyn for now , ABx per ID , completed   LP done  Plan for LN biopsy by IR , discussed with rheum and patinet;s mother, defer to rheum  Hematuria noted   Urology eval   Completed course of ABx   Pulm and card for risk stratification to come off heparin for biopsy   follow up with rheum for need of biopsy  repeat urine study   renal biopsy by IR today, hold heparin, resume when clear after procedure by IR     # Recurrent seizure   Neuro eval  EEG   fall precautions       # Fever  SIRS   Abx , completed per iD   Rheum adn ID follow up   Renal biopsy for protonuria, defer to rhem, discussed with IR, high risk       # Hyponatremia/ Seizure   RRT   Neuro follow up       #PE   on heparin , resume after thoracentesis   DVT negative  Heme onc eval   likley lupus related     #Hxof lupus  on hydroxychloroquine   Heme eval   Rheum eval   steroids per rheum       DVT andgIPPX

## 2024-01-11 LAB
ALBUMIN SERPL ELPH-MCNC: 3.5 G/DL — SIGNIFICANT CHANGE UP (ref 3.3–5)
ALBUMIN SERPL ELPH-MCNC: 3.5 G/DL — SIGNIFICANT CHANGE UP (ref 3.3–5)
ALP SERPL-CCNC: 115 U/L — SIGNIFICANT CHANGE UP (ref 40–120)
ALP SERPL-CCNC: 115 U/L — SIGNIFICANT CHANGE UP (ref 40–120)
ALT FLD-CCNC: 129 U/L — HIGH (ref 4–41)
ALT FLD-CCNC: 129 U/L — HIGH (ref 4–41)
ANION GAP SERPL CALC-SCNC: 15 MMOL/L — HIGH (ref 7–14)
ANION GAP SERPL CALC-SCNC: 15 MMOL/L — HIGH (ref 7–14)
APTT BLD: 54 SEC — HIGH (ref 24.5–35.6)
APTT BLD: 54 SEC — HIGH (ref 24.5–35.6)
APTT BLD: 78.3 SEC — HIGH (ref 24.5–35.6)
APTT BLD: 78.3 SEC — HIGH (ref 24.5–35.6)
APTT BLD: 94.2 SEC — HIGH (ref 24.5–35.6)
APTT BLD: 94.2 SEC — HIGH (ref 24.5–35.6)
AST SERPL-CCNC: 66 U/L — HIGH (ref 4–40)
AST SERPL-CCNC: 66 U/L — HIGH (ref 4–40)
BILIRUB SERPL-MCNC: 0.6 MG/DL — SIGNIFICANT CHANGE UP (ref 0.2–1.2)
BILIRUB SERPL-MCNC: 0.6 MG/DL — SIGNIFICANT CHANGE UP (ref 0.2–1.2)
BUN SERPL-MCNC: 11 MG/DL — SIGNIFICANT CHANGE UP (ref 7–23)
BUN SERPL-MCNC: 11 MG/DL — SIGNIFICANT CHANGE UP (ref 7–23)
CALCIUM SERPL-MCNC: 9.8 MG/DL — SIGNIFICANT CHANGE UP (ref 8.4–10.5)
CALCIUM SERPL-MCNC: 9.8 MG/DL — SIGNIFICANT CHANGE UP (ref 8.4–10.5)
CHLORIDE SERPL-SCNC: 93 MMOL/L — LOW (ref 98–107)
CHLORIDE SERPL-SCNC: 93 MMOL/L — LOW (ref 98–107)
CO2 SERPL-SCNC: 25 MMOL/L — SIGNIFICANT CHANGE UP (ref 22–31)
CO2 SERPL-SCNC: 25 MMOL/L — SIGNIFICANT CHANGE UP (ref 22–31)
CREAT SERPL-MCNC: 0.81 MG/DL — SIGNIFICANT CHANGE UP (ref 0.5–1.3)
CREAT SERPL-MCNC: 0.81 MG/DL — SIGNIFICANT CHANGE UP (ref 0.5–1.3)
EGFR: 122 ML/MIN/1.73M2 — SIGNIFICANT CHANGE UP
EGFR: 122 ML/MIN/1.73M2 — SIGNIFICANT CHANGE UP
GLUCOSE SERPL-MCNC: 88 MG/DL — SIGNIFICANT CHANGE UP (ref 70–99)
GLUCOSE SERPL-MCNC: 88 MG/DL — SIGNIFICANT CHANGE UP (ref 70–99)
HCT VFR BLD CALC: 28.9 % — LOW (ref 39–50)
HCT VFR BLD CALC: 28.9 % — LOW (ref 39–50)
HCT VFR BLD CALC: 31.4 % — LOW (ref 39–50)
HCT VFR BLD CALC: 31.4 % — LOW (ref 39–50)
HGB BLD-MCNC: 10.5 G/DL — LOW (ref 13–17)
HGB BLD-MCNC: 10.5 G/DL — LOW (ref 13–17)
HGB BLD-MCNC: 9.9 G/DL — LOW (ref 13–17)
HGB BLD-MCNC: 9.9 G/DL — LOW (ref 13–17)
MAGNESIUM SERPL-MCNC: 1.8 MG/DL — SIGNIFICANT CHANGE UP (ref 1.6–2.6)
MAGNESIUM SERPL-MCNC: 1.8 MG/DL — SIGNIFICANT CHANGE UP (ref 1.6–2.6)
MCHC RBC-ENTMCNC: 31 PG — SIGNIFICANT CHANGE UP (ref 27–34)
MCHC RBC-ENTMCNC: 31 PG — SIGNIFICANT CHANGE UP (ref 27–34)
MCHC RBC-ENTMCNC: 31.5 PG — SIGNIFICANT CHANGE UP (ref 27–34)
MCHC RBC-ENTMCNC: 31.5 PG — SIGNIFICANT CHANGE UP (ref 27–34)
MCHC RBC-ENTMCNC: 33.4 GM/DL — SIGNIFICANT CHANGE UP (ref 32–36)
MCHC RBC-ENTMCNC: 33.4 GM/DL — SIGNIFICANT CHANGE UP (ref 32–36)
MCHC RBC-ENTMCNC: 34.3 GM/DL — SIGNIFICANT CHANGE UP (ref 32–36)
MCHC RBC-ENTMCNC: 34.3 GM/DL — SIGNIFICANT CHANGE UP (ref 32–36)
MCV RBC AUTO: 92 FL — SIGNIFICANT CHANGE UP (ref 80–100)
MCV RBC AUTO: 92 FL — SIGNIFICANT CHANGE UP (ref 80–100)
MCV RBC AUTO: 92.6 FL — SIGNIFICANT CHANGE UP (ref 80–100)
MCV RBC AUTO: 92.6 FL — SIGNIFICANT CHANGE UP (ref 80–100)
NRBC # BLD: 0 /100 WBCS — SIGNIFICANT CHANGE UP (ref 0–0)
NRBC # FLD: 0 K/UL — SIGNIFICANT CHANGE UP (ref 0–0)
PHOSPHATE SERPL-MCNC: 4.4 MG/DL — SIGNIFICANT CHANGE UP (ref 2.5–4.5)
PHOSPHATE SERPL-MCNC: 4.4 MG/DL — SIGNIFICANT CHANGE UP (ref 2.5–4.5)
PLATELET # BLD AUTO: 202 K/UL — SIGNIFICANT CHANGE UP (ref 150–400)
PLATELET # BLD AUTO: 202 K/UL — SIGNIFICANT CHANGE UP (ref 150–400)
PLATELET # BLD AUTO: 205 K/UL — SIGNIFICANT CHANGE UP (ref 150–400)
PLATELET # BLD AUTO: 205 K/UL — SIGNIFICANT CHANGE UP (ref 150–400)
POTASSIUM SERPL-MCNC: 4.1 MMOL/L — SIGNIFICANT CHANGE UP (ref 3.5–5.3)
POTASSIUM SERPL-MCNC: 4.1 MMOL/L — SIGNIFICANT CHANGE UP (ref 3.5–5.3)
POTASSIUM SERPL-SCNC: 4.1 MMOL/L — SIGNIFICANT CHANGE UP (ref 3.5–5.3)
POTASSIUM SERPL-SCNC: 4.1 MMOL/L — SIGNIFICANT CHANGE UP (ref 3.5–5.3)
PROT SERPL-MCNC: 7.7 G/DL — SIGNIFICANT CHANGE UP (ref 6–8.3)
PROT SERPL-MCNC: 7.7 G/DL — SIGNIFICANT CHANGE UP (ref 6–8.3)
RBC # BLD: 3.14 M/UL — LOW (ref 4.2–5.8)
RBC # BLD: 3.14 M/UL — LOW (ref 4.2–5.8)
RBC # BLD: 3.39 M/UL — LOW (ref 4.2–5.8)
RBC # BLD: 3.39 M/UL — LOW (ref 4.2–5.8)
RBC # FLD: 15.9 % — HIGH (ref 10.3–14.5)
RBC # FLD: 15.9 % — HIGH (ref 10.3–14.5)
RBC # FLD: 16 % — HIGH (ref 10.3–14.5)
RBC # FLD: 16 % — HIGH (ref 10.3–14.5)
SODIUM SERPL-SCNC: 133 MMOL/L — LOW (ref 135–145)
SODIUM SERPL-SCNC: 133 MMOL/L — LOW (ref 135–145)
WBC # BLD: 8.29 K/UL — SIGNIFICANT CHANGE UP (ref 3.8–10.5)
WBC # BLD: 8.29 K/UL — SIGNIFICANT CHANGE UP (ref 3.8–10.5)
WBC # BLD: 8.64 K/UL — SIGNIFICANT CHANGE UP (ref 3.8–10.5)
WBC # BLD: 8.64 K/UL — SIGNIFICANT CHANGE UP (ref 3.8–10.5)
WBC # FLD AUTO: 8.29 K/UL — SIGNIFICANT CHANGE UP (ref 3.8–10.5)
WBC # FLD AUTO: 8.29 K/UL — SIGNIFICANT CHANGE UP (ref 3.8–10.5)
WBC # FLD AUTO: 8.64 K/UL — SIGNIFICANT CHANGE UP (ref 3.8–10.5)
WBC # FLD AUTO: 8.64 K/UL — SIGNIFICANT CHANGE UP (ref 3.8–10.5)

## 2024-01-11 PROCEDURE — 99232 SBSQ HOSP IP/OBS MODERATE 35: CPT

## 2024-01-11 PROCEDURE — 99232 SBSQ HOSP IP/OBS MODERATE 35: CPT | Mod: GC

## 2024-01-11 PROCEDURE — 70551 MRI BRAIN STEM W/O DYE: CPT | Mod: 26

## 2024-01-11 RX ORDER — OXYCODONE HYDROCHLORIDE 5 MG/1
2.5 TABLET ORAL ONCE
Refills: 0 | Status: DISCONTINUED | OUTPATIENT
Start: 2024-01-11 | End: 2024-01-11

## 2024-01-11 RX ORDER — OXYCODONE HYDROCHLORIDE 5 MG/1
2.5 TABLET ORAL EVERY 8 HOURS
Refills: 0 | Status: DISCONTINUED | OUTPATIENT
Start: 2024-01-11 | End: 2024-01-12

## 2024-01-11 RX ORDER — ACETAMINOPHEN 500 MG
1000 TABLET ORAL ONCE
Refills: 0 | Status: COMPLETED | OUTPATIENT
Start: 2024-01-11 | End: 2024-01-11

## 2024-01-11 RX ADMIN — Medication 2 DROP(S): at 17:53

## 2024-01-11 RX ADMIN — Medication 200 MILLIGRAM(S): at 17:53

## 2024-01-11 RX ADMIN — HEPARIN SODIUM 1100 UNIT(S)/HR: 5000 INJECTION INTRAVENOUS; SUBCUTANEOUS at 16:09

## 2024-01-11 RX ADMIN — HEPARIN SODIUM 1100 UNIT(S)/HR: 5000 INJECTION INTRAVENOUS; SUBCUTANEOUS at 17:54

## 2024-01-11 RX ADMIN — HEPARIN SODIUM 2500 UNIT(S): 5000 INJECTION INTRAVENOUS; SUBCUTANEOUS at 01:31

## 2024-01-11 RX ADMIN — HEPARIN SODIUM 1100 UNIT(S)/HR: 5000 INJECTION INTRAVENOUS; SUBCUTANEOUS at 09:09

## 2024-01-11 RX ADMIN — LIDOCAINE 1 PATCH: 4 CREAM TOPICAL at 19:44

## 2024-01-11 RX ADMIN — HEPARIN SODIUM 1100 UNIT(S)/HR: 5000 INJECTION INTRAVENOUS; SUBCUTANEOUS at 01:29

## 2024-01-11 RX ADMIN — Medication 400 MILLIGRAM(S): at 21:46

## 2024-01-11 RX ADMIN — Medication 15 MILLILITER(S): at 13:14

## 2024-01-11 RX ADMIN — Medication 20 MILLIGRAM(S): at 13:16

## 2024-01-11 RX ADMIN — SODIUM CHLORIDE 1 GRAM(S): 9 INJECTION INTRAMUSCULAR; INTRAVENOUS; SUBCUTANEOUS at 13:14

## 2024-01-11 RX ADMIN — GABAPENTIN 200 MILLIGRAM(S): 400 CAPSULE ORAL at 13:15

## 2024-01-11 RX ADMIN — Medication 200 MILLIGRAM(S): at 05:57

## 2024-01-11 RX ADMIN — CHLORHEXIDINE GLUCONATE 1 APPLICATION(S): 213 SOLUTION TOPICAL at 13:15

## 2024-01-11 RX ADMIN — OXYCODONE HYDROCHLORIDE 2.5 MILLIGRAM(S): 5 TABLET ORAL at 10:33

## 2024-01-11 RX ADMIN — OXYCODONE HYDROCHLORIDE 2.5 MILLIGRAM(S): 5 TABLET ORAL at 15:47

## 2024-01-11 RX ADMIN — HEPARIN SODIUM 1100 UNIT(S)/HR: 5000 INJECTION INTRAVENOUS; SUBCUTANEOUS at 19:19

## 2024-01-11 RX ADMIN — OXYCODONE HYDROCHLORIDE 2.5 MILLIGRAM(S): 5 TABLET ORAL at 17:53

## 2024-01-11 RX ADMIN — OXYCODONE HYDROCHLORIDE 2.5 MILLIGRAM(S): 5 TABLET ORAL at 18:55

## 2024-01-11 RX ADMIN — OXYCODONE HYDROCHLORIDE 2.5 MILLIGRAM(S): 5 TABLET ORAL at 16:23

## 2024-01-11 RX ADMIN — SODIUM CHLORIDE 1 GRAM(S): 9 INJECTION INTRAMUSCULAR; INTRAVENOUS; SUBCUTANEOUS at 05:57

## 2024-01-11 RX ADMIN — LIDOCAINE 1 PATCH: 4 CREAM TOPICAL at 13:14

## 2024-01-11 RX ADMIN — Medication 1000 MILLIGRAM(S): at 22:01

## 2024-01-11 RX ADMIN — OXYCODONE HYDROCHLORIDE 2.5 MILLIGRAM(S): 5 TABLET ORAL at 00:25

## 2024-01-11 RX ADMIN — GABAPENTIN 200 MILLIGRAM(S): 400 CAPSULE ORAL at 21:47

## 2024-01-11 RX ADMIN — GABAPENTIN 200 MILLIGRAM(S): 400 CAPSULE ORAL at 05:57

## 2024-01-11 RX ADMIN — LIDOCAINE 2 PATCH: 4 CREAM TOPICAL at 01:50

## 2024-01-11 RX ADMIN — PANTOPRAZOLE SODIUM 40 MILLIGRAM(S): 20 TABLET, DELAYED RELEASE ORAL at 05:57

## 2024-01-11 RX ADMIN — SODIUM CHLORIDE 1 GRAM(S): 9 INJECTION INTRAMUSCULAR; INTRAVENOUS; SUBCUTANEOUS at 21:47

## 2024-01-11 RX ADMIN — OXYCODONE HYDROCHLORIDE 2.5 MILLIGRAM(S): 5 TABLET ORAL at 09:40

## 2024-01-11 RX ADMIN — HEPARIN SODIUM 1100 UNIT(S)/HR: 5000 INJECTION INTRAVENOUS; SUBCUTANEOUS at 07:26

## 2024-01-11 RX ADMIN — Medication 50 MILLIGRAM(S): at 05:57

## 2024-01-11 RX ADMIN — Medication 3 MILLIGRAM(S): at 21:47

## 2024-01-11 NOTE — PROGRESS NOTE ADULT - ASSESSMENT
28 years old male with h/o Lupus (diagnosed in 09/2023 due to rash, oral ulcers, chest pain, and dyspnea, on Plaquenil) who presented to [place] ED on 12/12/23 with complaints of chest pain and SOB, found to have bilateral acute PE and bilateral pleural effusions. Transferred to [facility] for CT surgery evaluation. Also with fevers now resolved. Rheumatology consulted given SLE history.     Serologies:   Positive: ABDIAS 1:280 speckled, Sm >8, RNP >8, SSA >8, hypocomplementemia, Pr/Cr 1.2 and 1.1, low vitamin D 25 21, elevated vitamin D 1,25 97, ACE elevated 125, PR3 29.8. Repeat PR3 still weakly positive at 27.7   Negative: dsDNA 28,  C4 13, APS labs, negative Janine-1 ab, negative ribosomal P, negative syphilis screen, aldolase WNL,negative RF and CCP, negative ANCA, RNA polymerase III, centromere, scleroderma     #Fever (resolved)   -Pleural effusion exudate w/negative culture and PCR  -Repeat CT imaging without obvious infectious source, mild decrease in axillary LAD, submentonian and submaxillary and increase supraclavicular LAD. No mediastinal lymphoadenopathy  -EBV DNA PCR positive. Discussed with ID, low concern for EBV infection, would recommend LN bx to rule out associated malignancy   -Pt with clinical manifestations and specific SLE serologies though unclear if current presentation is solely attributable to SLE. Low concern for other rheumatologic disease (such as sarcoidosis, Kikuchi syndrome, Castleman disease), Underlying malignancy also needs to be ruled out. Pleural fluid cytology negative.   - Fevers resolved after restarting steroids on 40 mg methylprednisolone 12/23-12/25, restarted 60 mg of methylprednisolone 12/28-12/29, then prednisone 60 mg PO daily (12/30-1/4), and now on prednisone 50 mg PO (since 1/5)     #SLE   +ve serology and hypocomplementemia improving after steroid trial   creatinine is stable but proteinuria +ve urine P/cr 1.1. Decreased to 0.7.        #Elevated CPK   resolved     #Bilateral Acute PE with pulmonary infarcts   -Labs does not meet criteria for APS  PS-PT negative  -Hematology recommending Eliquis on discharge     #Encephalopathy (resolved)   -Reported episode of confusion along with episode of incontinence   -No focal deficits on neuro exam  -Likely multifactorial in the setting of opioids and depression, low suspicion for CNS SLE     #Elevated Transaminitis  -Likely from polypharmacy. Tylenol held     Recommendations:   -S/p IR renal bx on 1/10, will reach out to pathology for preliminary read   -Has been on Pred 50mg since 1/5, Will decrease prednisone 50mg --> 40mg daily   -In setting of L flank pain and recent bx, low threshold to investigate for hematoma   -Per pt's mother, will be going to [place] acute rehab, please confirm with case management if pt will be able to have transportation to [facility] for follow up in 2-3 weeks after discharge   -Pt with elevated LFTs, work up per primary   -Myomarker panel pending   -Continue with GI ppx   -Pt will eventually need follow up at [facility] on discharge     Discussed with Dr. Octavio Landaverde MD   PGY-4  Reachable on TEAMS  Pager [phone]  Rheumatology Fellow 28 years old male with h/o Lupus (diagnosed in 09/2023 due to rash, oral ulcers, chest pain, and dyspnea, on Plaquenil) who presented to [place] ED on 12/12/23 with complaints of chest pain and SOB, found to have bilateral acute PE and bilateral pleural effusions. Transferred to [facility] for CT surgery evaluation. Also with fevers now resolved. Rheumatology consulted given SLE history.     Serologies:   Positive: ABDIAS 1:280 speckled, Sm >8, RNP >8, SSA >8, hypocomplementemia, Pr/Cr 1.2 and 1.1, low vitamin D 25 21, elevated vitamin D 1,25 97, ACE elevated 125, PR3 29.8. Repeat PR3 still weakly positive at 27.7   Negative: dsDNA 28,  C4 13, APS labs, negative Janine-1 ab, negative ribosomal P, negative syphilis screen, aldolase WNL,negative RF and CCP, negative ANCA, RNA polymerase III, centromere, scleroderma     #Fever (resolved)   -Pleural effusion exudate w/negative culture and PCR  -Repeat CT imaging without obvious infectious source, mild decrease in axillary LAD, submentonian and submaxillary and increase supraclavicular LAD. No mediastinal lymphoadenopathy  -EBV DNA PCR positive. Discussed with ID, low concern for EBV infection, would recommend LN bx to rule out associated malignancy   -Pt with clinical manifestations and specific SLE serologies though unclear if current presentation is solely attributable to SLE. Low concern for other rheumatologic disease (such as sarcoidosis, Kikuchi syndrome, Castleman disease), Underlying malignancy also needs to be ruled out. Pleural fluid cytology negative.   - Fevers resolved after restarting steroids on 40 mg methylprednisolone 12/23-12/25, restarted 60 mg of methylprednisolone 12/28-12/29, then prednisone 60 mg PO daily (12/30-1/4), and now on prednisone 50 mg PO (since 1/5)     #SLE   +ve serology and hypocomplementemia improving after steroid trial   creatinine is stable but proteinuria +ve urine P/cr 1.1. Decreased to 0.7.        #Elevated CPK   resolved     #Bilateral Acute PE with pulmonary infarcts   -Labs does not meet criteria for APS  PS-PT negative  -Hematology recommending Eliquis on discharge     #Encephalopathy (resolved)   -Reported episode of confusion along with episode of incontinence   -No focal deficits on neuro exam  -Likely multifactorial in the setting of opioids and depression, low suspicion for CNS SLE     #Elevated Transaminitis  -Likely from polypharmacy. Tylenol held     Recommendations:   -S/p IR renal bx on 1/10, will reach out to pathology for preliminary read   -Has been on Pred 50mg since 1/5, Will decrease prednisone 50mg --> 40mg daily   -In setting of L flank pain and recent bx, low threshold to investigate for hematoma   -Per pt's mother, will be going to [place] acute rehab, please confirm with case management if pt will be able to have transportation to [facility] for follow up in 2-3 weeks after discharge   -Pt with elevated LFTs, work up per primary   -Myomarker panel pending   -Continue with GI ppx   -Pt will eventually need follow up at [facility] [place] on discharge     Discussed with Dr. Octavio Landaverde MD   PGY-4  Reachable on TEAMS  Pager [phone]  Rheumatology Fellow 28 year-old male with h/o Lupus (diagnosed in 09/2023 due to rash, oral ulcers, chest pain, and dyspnea, on Plaquenil) who presented to [place] ED on 12/12/23 with complaints of chest pain and SOB, found to have bilateral acute PE and bilateral pleural effusions. Transferred to [facility] for CT surgery evaluation. Also with fevers now resolved. Rheumatology consulted given SLE history.     Serologies:   Positive: ABDIAS 1:280 speckled, Sm >8, RNP >8, SSA >8, hypocomplementemia  Pr/Cr 1.2  low vitamin D 25 21, elevated vitamin D 1,25 97, ACE elevated 125, PR3 29.8. Repeat PR3 still weakly positive at 27.7   Negative: APS labs    #Fever (resolved)   #Pleural effusion exudate w/negative culture and PCR  #Lymphadenopathy   -Repeat CT imaging without obvious infectious source, mild decrease in axillary LAD, submentonian and submaxillary and increase supraclavicular LAD. No mediastinal lymphoadenopathy  -EBV DNA PCR positive. Discussed with ID, low concern for EBV infection, would recommend LN bx to rule out associated malignancy     -Pt with clinical manifestations and specific SLE serologies though unclear if current presentation is solely attributable to SLE. Low concern for other rheumatologic disease (such as sarcoidosis, Kikuchi syndrome, Castleman disease), Underlying malignancy also needs to be ruled out. Pleural fluid cytology negative.   - Fevers resolved after restarting steroids on 40 mg methylprednisolone 12/23-12/25, restarted 60 mg of methylprednisolone 12/28-12/29, then prednisone 60 mg PO daily (12/30-1/4), and now on prednisone 50 mg PO (since 1/5)     #SLE   # Proteinuria   +ve serology and hypocomplementemia improving after steroid trial   creatinine is stable but proteinuria +ve urine P/cr 1.1. Decreased to 0.7.      s.p Renal biopsy 1/10    #Elevated CPK   resolved     #Bilateral Acute PE with pulmonary infarcts   -Labs does not meet criteria for APS  PS-PT negative  -Hematology recommending Eliquis on discharge     #Encephalopathy (resolved)   -Reported episode of confusion along with episode of incontinence   -No focal deficits on neuro exam  -Likely multifactorial in the setting of opioids and depression, low suspicion for CNS SLE     #Elevated Transaminitis  -Likely from polypharmacy. Tylenol held     Recommendations:   -S/p IR renal bx on 1/10, will reach out to pathology for preliminary read   -Has been on Pred 50mg since 1/5, Will decrease prednisone to 40mg daily   -In setting of L flank pain and recent bx, low threshold to investigate for hematoma   -Per pt's mother, will be going to [place] acute rehab, please confirm with case management if pt will be able to have transportation to [facility] for follow up in 2-3 weeks after discharge   -Pt with elevated LFTs, work up per primary , important to address as patient will likely be started on a steroid sparing agent (once renal biopsy is back)   please consider holding bactrim and/or switching to atovaquone, review other hepatotoxic medication he is also on   please consider liver US (regular + doppler)  -repeat C3/C4 and DsDNA  -repeat UA and Prt/Crea   -Myomarker panel pending   -Continue with GI ppx   -transition to oral anticoagulation when clinically appropriate (DOACs ok given negative aPLs)      Discussed with Dr. Octavio Landaverde MD   PGY-4  Reachable on TEAMS  Pager [phone]  Rheumatology Fellow 28 year-old male with h/o Lupus (diagnosed in 09/2023 due to rash, oral ulcers, chest pain, and dyspnea, on Plaquenil) who presented to [place] ED on 12/12/23 with complaints of chest pain and SOB, found to have bilateral acute PE and bilateral pleural effusions. Transferred to [facility] for CT surgery evaluation. Also with fevers now resolved. Rheumatology consulted given SLE history.     Serologies:   Positive: ABDIAS 1:280 speckled, Sm >8, RNP >8, SSA >8, hypocomplementemia  Pr/Cr 1.2  low vitamin D 25 21, elevated vitamin D 1,25 97, ACE elevated 125, PR3 29.8. Repeat PR3 still weakly positive at 27.7   Negative: APS labs    #Fever (resolved)   #Pleural effusion exudate w/negative culture and PCR  #Lymphadenopathy   -Repeat CT imaging without obvious infectious source, mild decrease in axillary LAD, submentonian and submaxillary and increase supraclavicular LAD. No mediastinal lymphoadenopathy  -EBV DNA PCR positive. Discussed with ID, low concern for EBV infection, would recommend LN bx to rule out associated malignancy     -Pt with clinical manifestations and specific SLE serologies though unclear if current presentation is solely attributable to SLE. Low concern for other rheumatologic disease (such as sarcoidosis, Kikuchi syndrome, Castleman disease), Underlying malignancy also needs to be ruled out. Pleural fluid cytology negative.   - Fevers resolved after restarting steroids on 40 mg methylprednisolone 12/23-12/25, restarted 60 mg of methylprednisolone 12/28-12/29, then prednisone 60 mg PO daily (12/30-1/4), and now on prednisone 50 mg PO (since 1/5)     #SLE   # Proteinuria   +ve serology and hypocomplementemia improving after steroid trial   creatinine is stable but proteinuria +ve urine P/cr 1.1. Decreased to 0.7.      s.p Renal biopsy 1/10    #Elevated CPK   resolved     #Bilateral Acute PE with pulmonary infarcts   -Labs does not meet criteria for APS  PS-PT negative  -Hematology recommending Eliquis on discharge     #Encephalopathy (resolved)   -Reported episode of confusion along with episode of incontinence   -No focal deficits on neuro exam  -Likely multifactorial in the setting of opioids and depression, low suspicion for CNS SLE     #Elevated Transaminitis  -Likely from polypharmacy. Tylenol held     Recommendations:   -S/p IR renal bx on 1/10, will reach out to pathology for preliminary read   -Has been on Pred 50mg since 1/5, Will decrease prednisone to 40mg daily   -In setting of L flank pain and recent bx, low threshold to investigate for hematoma   -Per pt's mother, will be going to [place] acute rehab, please confirm with case management if pt will be able to have transportation to [facility] for follow up in 2-3 weeks after discharge   -Pt with elevated LFTs, work up per primary , important to address as patient will likely be started on a steroid sparing agent (once renal biopsy is back)   please consider holding bactrim and/or switching to atovaquone, review other hepatotoxic medication he is also on   please consider liver US (regular + doppler)  -repeat C3/C4 and DsDNA  -repeat UA and Prt/Crea   -Myomarker panel pending   -Continue with GI ppx   -transition to oral anticoagulation when clinically appropriate (DOACs ok given negative aPLs)      Discussed with Dr. Octavio Landaverde MD   PGY-4  Reachable on TEAMS  Pager [phone]  Rheumatology Fellow

## 2024-01-11 NOTE — PROGRESS NOTE ADULT - ASSESSMENT
PE  on a/c  no evidence of right heart strain   no significant evidence of myocardial injury   normal LV function     Pericardial effusion   inflammatory in setting of SLE   trace effusion   no sign of tamponade  repeat echo in few weeks for surveillance   fu with rheum for ongoing work up      PE  a/c   no evidence of right heart strain   no significant evidence of myocardial injury   normal LV function     Pericardial effusion   inflammatory in setting of SLE   trace effusion   no sign of tamponade  repeat echo in few weeks for surveillance   fu with rheum for ongoing work up

## 2024-01-11 NOTE — PROGRESS NOTE ADULT - NUTRITIONAL ASSESSMENT
This patient has been assessed with a concern for Malnutrition and has been determined to have a diagnosis/diagnoses of Severe protein-calorie malnutrition.    This patient is being managed with:   Diet Regular-  1000mL Fluid Restriction ([id number])  Supplement Feeding Modality:  Oral  Ensure Plus High Protein Cans or Servings Per Day:  1       Frequency:  Three Times a day  Entered: Carter  3 2024  4:21PM   This patient has been assessed with a concern for Malnutrition and has been determined to have a diagnosis/diagnoses of Severe protein-calorie malnutrition.    This patient is being managed with:   Diet Regular-  1000mL Fluid Restriction ([id number])  Supplement Feeding Modality:  Oral  Ensure Plus High Protein Cans or Servings Per Day:  1       Frequency:  Three Times a day  Entered: Carter  3 2024  4:21PM

## 2024-01-11 NOTE — PROGRESS NOTE ADULT - SUBJECTIVE AND OBJECTIVE BOX
Subjective: Patient seen and examined. No new events except as noted.     SUBJECTIVE/ROS:  nad      MEDICATIONS:  MEDICATIONS  (STANDING):  chlorhexidine 2% Cloths 1 Application(s) Topical daily  ciprofloxacin  0.3% Ophthalmic Solution for Otic Use 2 Drop(s) Both Ears two times a day  FLUoxetine 20 milliGRAM(s) Oral daily  gabapentin 200 milliGRAM(s) Oral three times a day  heparin  Infusion. 1000 Unit(s)/Hr (10 mL/Hr) IV Continuous <Continuous>  hydroxychloroquine 200 milliGRAM(s) Oral two times a day  lidocaine   4% Patch 2 Patch Transdermal every 24 hours  lidocaine   4% Patch 1 Patch Transdermal every 24 hours  melatonin 3 milliGRAM(s) Oral <User Schedule>  multivitamin/minerals/iron Oral Solution (CENTRUM) 15 milliLiter(s) Oral daily  pantoprazole    Tablet 40 milliGRAM(s) Oral before breakfast  predniSONE   Tablet 50 milliGRAM(s) Oral daily  sodium chloride 1 Gram(s) Oral three times a day  sodium chloride 3%. 500 milliLiter(s) (30 mL/Hr) IV Continuous <Continuous>  trimethoprim  160 mG/sulfamethoxazole 800 mG 1 Tablet(s) Oral <User Schedule>      PHYSICAL EXAM:  T(C): 37 (01-11-24 @ 05:55), Max: 37.2 (01-11-24 @ 00:44)  HR: 72 (01-11-24 @ 05:55) (71 - 76)  BP: 138/85 (01-11-24 @ 05:55) (109/74 - 138/85)  RR: 16 (01-11-24 @ 05:55) (16 - 18)  SpO2: 99% (01-11-24 @ 05:55) (99% - 100%)  Wt(kg): --  I&O's Summary          JVP: Normal  Neck: supple  Lung: clear   CV: S1 S2 , Murmur:  Abd: soft  Ext: No edema  neuro: Awake  Psych: flat affect  Skin: normal``    LABS/DATA:    CARDIAC MARKERS:                                9.9    8.64  )-----------( 202      ( 11 Jan 2024 00:38 )             28.9     01-10    134<L>  |  98  |  10  ----------------------------<  157<H>  3.5   |  24  |  0.64    Ca    9.7      10 Carter 2024 02:50  Phos  4.4     01-10  Mg     1.80     01-10    TPro  7.0  /  Alb  3.5  /  TBili  0.3  /  DBili  <0.2  /  AST  44<H>  /  ALT  132<H>  /  AlkPhos  109  01-10    proBNP:   Lipid Profile:   HgA1c:   TSH:     TELE:  EKG:

## 2024-01-11 NOTE — PROGRESS NOTE ADULT - SUBJECTIVE AND OBJECTIVE BOX
TAYLA MARIE  [id number]    Subjective:  Endorsing back pain and hip pain.   Per pt's mother has not sat in chair, just remains laying in bed.      Objective:   Vital Signs Last 24 Hrs  T(C): 36.9 (11 Jan 2024 16:37), Max: 37.2 (11 Jan 2024 00:44)  T(F): 98.4 (11 Jan 2024 16:37), Max: 98.9 (11 Jan 2024 00:44)  HR: 76 (11 Jan 2024 16:37) (71 - 80)  BP: 131/74 (11 Jan 2024 16:37) (109/74 - 138/85)  BP(mean): --  RR: 17 (11 Jan 2024 16:37) (16 - 18)  SpO2: 100% (11 Jan 2024 16:37) (99% - 100%)    Parameters below as of 11 Jan 2024 16:37  Patient On (Oxygen Delivery Method): room air      Physical Exam:  General: NAD  HEENT: EOMI  CV: well perfused   Lung: No increased WOB,  Abd: non-distended   Back: Endorses pain on palpation of L flank, no masses or bruising appreciated   Ext: no synovitis on exam   Neuro: Awake and alert       LABS:  cret                        10.5   8.29  )-----------( 205      ( 11 Jan 2024 07:45 )             31.4     01-11    133<L>  |  93<L>  |  11  ----------------------------<  88  4.1   |  25  |  0.81    Ca    9.8      11 Jan 2024 07:45  Phos  4.4     01-11  Mg     1.80     01-11    TPro  7.7  /  Alb  3.5  /  TBili  0.6  /  DBili  x   /  AST  66<H>  /  ALT  129<H>  /  AlkPhos  115  01-11    PT/INR - ( 10 Carter 2024 02:50 )   PT: 14.2 sec;   INR: 1.28 ratio         PTT - ( 11 Jan 2024 14:55 )  PTT:78.3 sec           TAYLA MARIE  [id number]    Subjective:  Endorsing back pain and hip pain.   Per pt's mother has not sat in chair, just remains laying in bed.      Objective:   Vital Signs Last 24 Hrs  T(C): 36.9 (11 Jan 2024 16:37), Max: 37.2 (11 Jan 2024 00:44)  T(F): 98.4 (11 Jan 2024 16:37), Max: 98.9 (11 Jan 2024 00:44)  HR: 76 (11 Jan 2024 16:37) (71 - 80)  BP: 131/74 (11 Jan 2024 16:37) (109/74 - 138/85)  BP(mean): --  RR: 17 (11 Jan 2024 16:37) (16 - 18)  SpO2: 100% (11 Jan 2024 16:37) (99% - 100%)    Parameters below as of 11 Jan 2024 16:37  Patient On (Oxygen Delivery Method): room air      Physical Exam:  General: NAD  HEENT: EOMI  CV: well perfused   Lung: No increased WOB,  Abd: non-distended   Back: Endorses pain on palpation of L flank, no masses or bruising appreciated   Ext: no synovitis on exam   Neuro: Awake and alert       LABS:  cret                        10.5   8.29  )-----------( 205      ( 11 Jan 2024 07:45 )             31.4     01-11    133<L>  |  93<L>  |  11  ----------------------------<  88  4.1   |  25  |  0.81    Ca    9.8      11 Jan 2024 07:45  Phos  4.4     01-11  Mg     1.80     01-11    TPro  7.7  /  Alb  3.5  /  TBili  0.6  /  DBili  x   /  AST  66<H>  /  ALT  129<H>  /  AlkPhos  115  01-11    PT/INR - ( 10 Carter 2024 02:50 )   PT: 14.2 sec;   INR: 1.28 ratio         PTT - ( 11 Jan 2024 14:55 )  PTT:78.3 sec

## 2024-01-11 NOTE — PROGRESS NOTE ADULT - ASSESSMENT
28 years old male with h/o Lupus ( diagnosed in 09/2023, on Plaquenil present to [facility] on 12/12/23  with complain of chest pain and SOB. Patient reported left sided pleuritic chest pain which started 3 days ago. Pain is progressively worsened, associated with SOB and cough. Patient was seen in OSH ED and was prescribed antibiotics. Patient reported significantly worsening of left sided pleuritic chest pain today. No fever or chills. Patient reported loss of appetite and had a few episode of diarrhea for last 2 days. CTA chest with acute right upper lobe and left lower lobe segmental/subsegmental pulmonary emboli. No CT evidence of right heart strain. New bilateral lower lobe consolidations with areas of central clearing. Pneumonia and pulmonary infarcts are in the differential. New bilateral pleural effusions, small on the left and trace on the right. Bilateral axillary and supraclavicular adenopathy of unclear etiology. Patient was started on heparin ggt transitioned to eliquis on 12/19,  patient with worsening SOB/ hypoxia requiring nasal cannula oxygen supplementation. 12/20  Repeat Xray shows increased moderate left pleural effusion and compressive atelectasis trace right effusion and linear atelectasis. Thoracic team consulted for possible pigtail insertion Bedside US: Large left effusion with septations. Right simple effusion , + pericardial effusion- lower extremity dopplers negative for DVT.    # Pulm effusion/ Fever   S/P thoracentesis , follow up results   heparin for AC  TS care appreciated   O2 supplement   monitor output   Zosyn for now , ABx per ID , completed   LP done  Plan for LN biopsy by IR , discussed with rheum and patinet;s mother, defer to rheum  Hematuria noted   Urology eval   Completed course of ABx   IR, S/P Renal biopsy , follo wup results   repeat urine study   resume heparin       # Recurrent seizure   Neuro eval  EEG   fall precautions       # Fever  SIRS   Abx , completed per iD   Rheum adn ID follow up   Renal biopsy done, follow up results       # Hyponatremia/ Seizure   RRT   Neuro follow up       #PE   on heparin , resume after thoracentesis   DVT negative  Heme onc eval   likley lupus related     #Hxof lupus  on hydroxychloroquine   Heme eval   Rheum eval   steroids per rheum       DVT andgIPPX   28 years old male with h/o Lupus ( diagnosed in 09/2023, on Plaquenil present to [place] ED on 12/12/23  with complain of chest pain and SOB. Patient reported left sided pleuritic chest pain which started 3 days ago. Pain is progressively worsened, associated with SOB and cough. Patient was seen in OSH ED and was prescribed antibiotics. Patient reported significantly worsening of left sided pleuritic chest pain today. No fever or chills. Patient reported loss of appetite and had a few episode of diarrhea for last 2 days. CTA chest with acute right upper lobe and left lower lobe segmental/subsegmental pulmonary emboli. No CT evidence of right heart strain. New bilateral lower lobe consolidations with areas of central clearing. Pneumonia and pulmonary infarcts are in the differential. New bilateral pleural effusions, small on the left and trace on the right. Bilateral axillary and supraclavicular adenopathy of unclear etiology. Patient was started on heparin ggt transitioned to eliquis on 12/19,  patient with worsening SOB/ hypoxia requiring nasal cannula oxygen supplementation. 12/20  Repeat Xray shows increased moderate left pleural effusion and compressive atelectasis trace right effusion and linear atelectasis. Thoracic team consulted for possible pigtail insertion Bedside US: Large left effusion with septations. Right simple effusion , + pericardial effusion- lower extremity dopplers negative for DVT.    # Pulm effusion/ Fever   S/P thoracentesis , follow up results   heparin for AC  TS care appreciated   O2 supplement   monitor output   Zosyn for now , ABx per ID , completed   LP done  Plan for LN biopsy by IR , discussed with rheum and patinet;s mother, defer to rheum  Hematuria noted   Urology eval   Completed course of ABx   IR, S/P Renal biopsy , follo wup results   repeat urine study   resume heparin       # Recurrent seizure   Neuro eval  EEG   fall precautions       # Fever  SIRS   Abx , completed per iD   Rheum adn ID follow up   Renal biopsy done, follow up results       # Hyponatremia/ Seizure   RRT   Neuro follow up       #PE   on heparin , resume after thoracentesis   DVT negative  Heme onc eval   likley lupus related     #Hxof lupus  on hydroxychloroquine   Heme eval   Rheum eval   steroids per rheum       DVT andgIPPX

## 2024-01-11 NOTE — PROGRESS NOTE ADULT - SUBJECTIVE AND OBJECTIVE BOX
Patient is a 28y old  Male who presents with a chief complaint of fever (04 Jan 2024 13:39)      Interval history: reports he tolerated bedside therapy today, note pending    REVIEW OF SYSTEMS  weakness    PAST MEDICAL & SURGICAL HISTORY     LE (lupus erythematosus)    Pulmonary embolism    No significant past surgical history         CURRENT FUNCTIONAL STATUS  1/8   Sit-Stand Transfer Training  Transfer Training Sit-to-Stand Transfer: contact guard;  1 person assist;  weight-bearing as tolerated  Transfer Training Stand-to-Sit Transfer: contact guard;  1 person assist;  weight-bearing as tolerated  Sit-to-Stand Transfer Training Transfer Safety Analysis: decreased step length;  decreased strength    Gait Training  Gait Training: contact guard;  1 person assist;  weight-bearing as tolerated   50 feet;  IV pole  Gait Analysis: 2-point gait   decreased step length;  decreased strength;  50 feet    Therapeutic Exercise  Therapeutic Exercise Charges: Pt performed ActiveROM Bilateral LE's and standing balance activities.           FAMILY HISTORY   No pertinent family history in first degree relatives        RECENT LABS/IMAGING  CBC Full  -  ( 11 Jan 2024 07:45 )  WBC Count : 8.29 K/uL  RBC Count : 3.39 M/uL  Hemoglobin : 10.5 g/dL  Hematocrit : 31.4 %  Platelet Count - Automated : 205 K/uL  Mean Cell Volume : 92.6 fL  Mean Cell Hemoglobin : 31.0 pg  Mean Cell Hemoglobin Concentration : 33.4 gm/dL  Auto Neutrophil # : x  Auto Lymphocyte # : x  Auto Monocyte # : x  Auto Eosinophil # : x  Auto Basophil # : x  Auto Neutrophil % : x  Auto Lymphocyte % : x  Auto Monocyte % : x  Auto Eosinophil % : x  Auto Basophil % : x    01-11    133<L>  |  93<L>  |  11  ----------------------------<  88  4.1   |  25  |  0.81    Ca    9.8      11 Jan 2024 07:45  Phos  4.4     01-11  Mg     1.80     01-11    TPro  7.7  /  Alb  3.5  /  TBili  0.6  /  DBili  x   /  AST  66<H>  /  ALT  129<H>  /  AlkPhos  115  01-11    Urinalysis Basic - ( 11 Jan 2024 07:45 )    Color: x / Appearance: x / SG: x / pH: x  Gluc: 88 mg/dL / Ketone: x  / Bili: x / Urobili: x   Blood: x / Protein: x / Nitrite: x   Leuk Esterase: x / RBC: x / WBC x   Sq Epi: x / Non Sq Epi: x / Bacteria: x        VITALS  T(C): 37 (01-11-24 @ 05:55), Max: 37.2 (01-11-24 @ 00:44)  HR: 72 (01-11-24 @ 05:55) (71 - 76)  BP: 138/85 (01-11-24 @ 05:55) (109/74 - 138/85)  RR: 16 (01-11-24 @ 05:55) (16 - 18)  SpO2: 99% (01-11-24 @ 05:55) (99% - 100%)  Wt(kg): --    ALLERGIES  No Known Allergies      MEDICATIONS   albuterol/ipratropium for Nebulization 3 milliLiter(s) Nebulizer every 6 hours PRN  benzocaine/menthol Lozenge 1 Lozenge Oral four times a day PRN  chlorhexidine 2% Cloths 1 Application(s) Topical daily  ciprofloxacin  0.3% Ophthalmic Solution for Otic Use 2 Drop(s) Both Ears two times a day  FIRST- Mouthwash  BLM 15 milliLiter(s) Swish and Spit every 4 hours PRN  FLUoxetine 20 milliGRAM(s) Oral daily  gabapentin 200 milliGRAM(s) Oral three times a day  guaiFENesin Oral Liquid (Sugar-Free) 200 milliGRAM(s) Oral every 6 hours PRN  heparin   Injectable 2500 Unit(s) IV Push every 6 hours PRN  heparin   Injectable 5500 Unit(s) IV Push every 6 hours PRN  heparin  Infusion. 1000 Unit(s)/Hr IV Continuous <Continuous>  hydroxychloroquine 200 milliGRAM(s) Oral two times a day  lidocaine   4% Patch 2 Patch Transdermal every 24 hours  lidocaine   4% Patch 1 Patch Transdermal every 24 hours  lidocaine 2% Viscous 5 milliLiter(s) Swish and Spit three times a day PRN  melatonin 3 milliGRAM(s) Oral <User Schedule>  multivitamin/minerals/iron Oral Solution (CENTRUM) 15 milliLiter(s) Oral daily  ondansetron Injectable 4 milliGRAM(s) IV Push every 8 hours PRN  oxyCODONE    IR 2.5 milliGRAM(s) Oral every 8 hours PRN  pantoprazole    Tablet 40 milliGRAM(s) Oral before breakfast  predniSONE   Tablet 50 milliGRAM(s) Oral daily  sodium chloride 1 Gram(s) Oral three times a day  sodium chloride 3%. 500 milliLiter(s) IV Continuous <Continuous>  trimethoprim  160 mG/sulfamethoxazole 800 mG 1 Tablet(s) Oral <User Schedule>      ----------------------------------------------------------------------------------------    PHYSICAL EXAM  Constitutional - NAD, in fetal position, states it is more comfortable  HEENT - NCAT, EOMI   Chest - no respiratory distress  Cardiovascular - RRR, S1S2  Abdomen -  Soft, NTND  Extremities - No C/C/E, No calf tenderness   Neurologic Exam -                    Cognitive - Awake, Alert      Communication - Fluent, No dysarthria     Cranial Nerves - CN 2-12 intact     Motor -  moving all extremities     Sensory - Intact to LT      Balance - WNL Static  Psychiatric - Mood stable, Affect WNL  ----------------------------------------------------------------------------------------  ASSESSMENT/PLAN  29 yo m recent SLE diagnosis, PE and pneumonia transferred to [facility] for thoracic evaluation  s/p chest tube (since removed)  patient now with debility, reports 20 lb weight loss in past few weeks  Pain -oxy ir prn, gabapentin, tylenol, lidocaine patch   continue bedside PT and OT  diet- regular, fluid restrict with ensure plus  depression- recs per psychiatry, on fluoxetine  DVT PPX - heparin  Rehab -    Recommend ACUTE inpatient rehabilitation for the functional deficits consisting of 3 hours of therapy/day & 24 hour RN/daily PMR physician for comorbid medical management. Will continue to follow for ongoing rehab needs and recommendations.   improving with bedside therapy, will monitor for improvement which could impact disposition.      Patient is a 28y old  Male who presents with a chief complaint of fever (04 Jan 2024 13:39)      Interval history: reports he tolerated bedside therapy today, note pending    REVIEW OF SYSTEMS  weakness    PAST MEDICAL & SURGICAL HISTORY     LE (lupus erythematosus)    Pulmonary embolism    No significant past surgical history         CURRENT FUNCTIONAL STATUS  1/8   Sit-Stand Transfer Training  Transfer Training Sit-to-Stand Transfer: contact guard;  1 person assist;  weight-bearing as tolerated  Transfer Training Stand-to-Sit Transfer: contact guard;  1 person assist;  weight-bearing as tolerated  Sit-to-Stand Transfer Training Transfer Safety Analysis: decreased step length;  decreased strength    Gait Training  Gait Training: contact guard;  1 person assist;  weight-bearing as tolerated   50 feet;  IV pole  Gait Analysis: 2-point gait   decreased step length;  decreased strength;  50 feet    Therapeutic Exercise  Therapeutic Exercise Charges: Pt performed ActiveROM Bilateral LE's and standing balance activities.           FAMILY HISTORY   No pertinent family history in first degree relatives        RECENT LABS/IMAGING  CBC Full  -  ( 11 Jan 2024 07:45 )  WBC Count : 8.29 K/uL  RBC Count : 3.39 M/uL  Hemoglobin : 10.5 g/dL  Hematocrit : 31.4 %  Platelet Count - Automated : 205 K/uL  Mean Cell Volume : 92.6 fL  Mean Cell Hemoglobin : 31.0 pg  Mean Cell Hemoglobin Concentration : 33.4 gm/dL  Auto Neutrophil # : x  Auto Lymphocyte # : x  Auto Monocyte # : x  Auto Eosinophil # : x  Auto Basophil # : x  Auto Neutrophil % : x  Auto Lymphocyte % : x  Auto Monocyte % : x  Auto Eosinophil % : x  Auto Basophil % : x    01-11    133<L>  |  93<L>  |  11  ----------------------------<  88  4.1   |  25  |  0.81    Ca    9.8      11 Jan 2024 07:45  Phos  4.4     01-11  Mg     1.80     01-11    TPro  7.7  /  Alb  3.5  /  TBili  0.6  /  DBili  x   /  AST  66<H>  /  ALT  129<H>  /  AlkPhos  115  01-11    Urinalysis Basic - ( 11 Jan 2024 07:45 )    Color: x / Appearance: x / SG: x / pH: x  Gluc: 88 mg/dL / Ketone: x  / Bili: x / Urobili: x   Blood: x / Protein: x / Nitrite: x   Leuk Esterase: x / RBC: x / WBC x   Sq Epi: x / Non Sq Epi: x / Bacteria: x        VITALS  T(C): 37 (01-11-24 @ 05:55), Max: 37.2 (01-11-24 @ 00:44)  HR: 72 (01-11-24 @ 05:55) (71 - 76)  BP: 138/85 (01-11-24 @ 05:55) (109/74 - 138/85)  RR: 16 (01-11-24 @ 05:55) (16 - 18)  SpO2: 99% (01-11-24 @ 05:55) (99% - 100%)  Wt(kg): --    ALLERGIES  No Known Allergies      MEDICATIONS   albuterol/ipratropium for Nebulization 3 milliLiter(s) Nebulizer every 6 hours PRN  benzocaine/menthol Lozenge 1 Lozenge Oral four times a day PRN  chlorhexidine 2% Cloths 1 Application(s) Topical daily  ciprofloxacin  0.3% Ophthalmic Solution for Otic Use 2 Drop(s) Both Ears two times a day  FIRST- Mouthwash  BLM 15 milliLiter(s) Swish and Spit every 4 hours PRN  FLUoxetine 20 milliGRAM(s) Oral daily  gabapentin 200 milliGRAM(s) Oral three times a day  guaiFENesin Oral Liquid (Sugar-Free) 200 milliGRAM(s) Oral every 6 hours PRN  heparin   Injectable 2500 Unit(s) IV Push every 6 hours PRN  heparin   Injectable 5500 Unit(s) IV Push every 6 hours PRN  heparin  Infusion. 1000 Unit(s)/Hr IV Continuous <Continuous>  hydroxychloroquine 200 milliGRAM(s) Oral two times a day  lidocaine   4% Patch 2 Patch Transdermal every 24 hours  lidocaine   4% Patch 1 Patch Transdermal every 24 hours  lidocaine 2% Viscous 5 milliLiter(s) Swish and Spit three times a day PRN  melatonin 3 milliGRAM(s) Oral <User Schedule>  multivitamin/minerals/iron Oral Solution (CENTRUM) 15 milliLiter(s) Oral daily  ondansetron Injectable 4 milliGRAM(s) IV Push every 8 hours PRN  oxyCODONE    IR 2.5 milliGRAM(s) Oral every 8 hours PRN  pantoprazole    Tablet 40 milliGRAM(s) Oral before breakfast  predniSONE   Tablet 50 milliGRAM(s) Oral daily  sodium chloride 1 Gram(s) Oral three times a day  sodium chloride 3%. 500 milliLiter(s) IV Continuous <Continuous>  trimethoprim  160 mG/sulfamethoxazole 800 mG 1 Tablet(s) Oral <User Schedule>      ----------------------------------------------------------------------------------------    PHYSICAL EXAM  Constitutional - NAD, in fetal position, states it is more comfortable  HEENT - NCAT, EOMI   Chest - no respiratory distress  Cardiovascular - RRR, S1S2  Abdomen -  Soft, NTND  Extremities - No C/C/E, No calf tenderness   Neurologic Exam -                    Cognitive - Awake, Alert      Communication - Fluent, No dysarthria     Cranial Nerves - CN 2-12 intact     Motor -  moving all extremities     Sensory - Intact to LT      Balance - WNL Static  Psychiatric - Mood stable, Affect WNL  ----------------------------------------------------------------------------------------  ASSESSMENT/PLAN  27 yo m recent SLE diagnosis, PE and pneumonia transferred to [facility] for thoracic evaluation  s/p chest tube (since removed)  patient now with debility, reports 20 lb weight loss in past few weeks  Pain -oxy ir prn, gabapentin, tylenol, lidocaine patch   continue bedside PT and OT  diet- regular, fluid restrict with ensure plus  depression- recs per psychiatry, on fluoxetine  DVT PPX - heparin  Rehab -    Recommend ACUTE inpatient rehabilitation for the functional deficits consisting of 3 hours of therapy/day & 24 hour RN/daily PMR physician for comorbid medical management. Will continue to follow for ongoing rehab needs and recommendations.   improving with bedside therapy, will monitor for improvement which could impact disposition.

## 2024-01-11 NOTE — PROGRESS NOTE ADULT - SUBJECTIVE AND OBJECTIVE BOX
ANESTHESIA POSTOP CHECK    28y Male POSTOP DAY 1     Vital Signs Last 24 Hrs  T(C): 37 (11 Jan 2024 05:55), Max: 37.2 (11 Jan 2024 00:44)  T(F): 98.6 (11 Jan 2024 05:55), Max: 98.9 (11 Jan 2024 00:44)  HR: 72 (11 Jan 2024 05:55) (71 - 76)  BP: 138/85 (11 Jan 2024 05:55) (109/74 - 138/85)  BP(mean): --  RR: 16 (11 Jan 2024 05:55) (16 - 18)  SpO2: 99% (11 Jan 2024 05:55) (99% - 100%)    Parameters below as of 11 Jan 2024 05:55  Patient On (Oxygen Delivery Method): room air      I&O's Summary      [X ] NO APPARENT ANESTHESIA COMPLICATIONS      Comments:

## 2024-01-11 NOTE — PROGRESS NOTE ADULT - ATTENDING COMMENTS
Amendments to fellow's assessment and plan as above.  Patient and mother at bedside aware of our recommendations and in agreement.  Discussed with primary team  will follow

## 2024-01-11 NOTE — PROGRESS NOTE ADULT - SUBJECTIVE AND OBJECTIVE BOX
Name of Patient : TAYLA MARIE  MRN: [id number]  Date of visit: 01-11-24      Subjective: Patient seen and examined. No new events except as noted.   Doing okay  S/P renal biopsy     REVIEW OF SYSTEMS:    CONSTITUTIONAL: No weakness, fevers or chills  EYES/ENT: No visual changes;  No vertigo or throat pain   NECK: No pain or stiffness  RESPIRATORY: No cough, wheezing, hemoptysis; No shortness of breath  CARDIOVASCULAR: No chest pain or palpitations  GASTROINTESTINAL: No abdominal or epigastric pain. No nausea, vomiting, or hematemesis; No diarrhea or constipation. No melena or hematochezia.  GENITOURINARY: No dysuria, frequency or hematuria  NEUROLOGICAL: No numbness or weakness  SKIN: No itching, burning, rashes, or lesions   All other review of systems is negative unless indicated above.    MEDICATIONS:  MEDICATIONS  (STANDING):  chlorhexidine 2% Cloths 1 Application(s) Topical daily  ciprofloxacin  0.3% Ophthalmic Solution for Otic Use 2 Drop(s) Both Ears two times a day  FLUoxetine 20 milliGRAM(s) Oral daily  gabapentin 200 milliGRAM(s) Oral three times a day  heparin  Infusion. 1000 Unit(s)/Hr (10 mL/Hr) IV Continuous <Continuous>  hydroxychloroquine 200 milliGRAM(s) Oral two times a day  lidocaine   4% Patch 1 Patch Transdermal every 24 hours  lidocaine   4% Patch 2 Patch Transdermal every 24 hours  melatonin 3 milliGRAM(s) Oral <User Schedule>  multivitamin/minerals/iron Oral Solution (CENTRUM) 15 milliLiter(s) Oral daily  pantoprazole    Tablet 40 milliGRAM(s) Oral before breakfast  sodium chloride 1 Gram(s) Oral three times a day  sodium chloride 3%. 500 milliLiter(s) (30 mL/Hr) IV Continuous <Continuous>  trimethoprim  160 mG/sulfamethoxazole 800 mG 1 Tablet(s) Oral <User Schedule>      PHYSICAL EXAM:  T(C): 36.9 (01-11-24 @ 16:37), Max: 37.2 (01-11-24 @ 00:44)  HR: 76 (01-11-24 @ 16:37) (71 - 80)  BP: 131/74 (01-11-24 @ 16:37) (112/81 - 138/85)  RR: 17 (01-11-24 @ 16:37) (16 - 18)  SpO2: 100% (01-11-24 @ 16:37) (99% - 100%)  Wt(kg): --  I&O's Summary      Appearance: Normal	  HEENT:  PERRLA   Lymphatic: No lymphadenopathy   Cardiovascular: Normal S1 S2, no JVD  Respiratory: normal effort , clear  Gastrointestinal:  Soft, Non-tender  Skin: No rashes,  warm to touch  Psychiatry:  Mood & affect appropriate  Musculuskeletal: No edema    recent labs, Imaging and EKGs personally reviewed                           10.5   8.29  )-----------( 205      ( 11 Jan 2024 07:45 )             31.4               01-11    133<L>  |  93<L>  |  11  ----------------------------<  88  4.1   |  25  |  0.81    Ca    9.8      11 Jan 2024 07:45  Phos  4.4     01-11  Mg     1.80     01-11    TPro  7.7  /  Alb  3.5  /  TBili  0.6  /  DBili  x   /  AST  66<H>  /  ALT  129<H>  /  AlkPhos  115  01-11    PT/INR - ( 10 Carter 2024 02:50 )   PT: 14.2 sec;   INR: 1.28 ratio         PTT - ( 11 Jan 2024 14:55 )  PTT:78.3 sec                   Urinalysis Basic - ( 11 Jan 2024 07:45 )    Color: x / Appearance: x / SG: x / pH: x  Gluc: 88 mg/dL / Ketone: x  / Bili: x / Urobili: x   Blood: x / Protein: x / Nitrite: x   Leuk Esterase: x / RBC: x / WBC x   Sq Epi: x / Non Sq Epi: x / Bacteria: x                     Name of Patient : TAYLA MARIE  MRN: [id number]  Date of visit: 01-11-24      Subjective: Patient seen and examined. No new events except as noted.   Doing okay  S/P renal biopsy     REVIEW OF SYSTEMS:    CONSTITUTIONAL: No weakness, fevers or chills  EYES/ENT: No visual changes;  No vertigo or throat pain   NECK: No pain or stiffness  RESPIRATORY: No cough, wheezing, hemoptysis; No shortness of breath  CARDIOVASCULAR: No chest pain or palpitations  GASTROINTESTINAL: No abdominal or epigastric pain. No nausea, vomiting, or hematemesis; No diarrhea or constipation. No melena or hematochezia.  GENITOURINARY: No dysuria, frequency or hematuria  NEUROLOGICAL: No numbness or weakness  SKIN: No itching, burning, rashes, or lesions   All other review of systems is negative unless indicated above.    MEDICATIONS:  MEDICATIONS  (STANDING):  chlorhexidine 2% Cloths 1 Application(s) Topical daily  ciprofloxacin  0.3% Ophthalmic Solution for Otic Use 2 Drop(s) Both Ears two times a day  FLUoxetine 20 milliGRAM(s) Oral daily  gabapentin 200 milliGRAM(s) Oral three times a day  heparin  Infusion. 1000 Unit(s)/Hr (10 mL/Hr) IV Continuous <Continuous>  hydroxychloroquine 200 milliGRAM(s) Oral two times a day  lidocaine   4% Patch 1 Patch Transdermal every 24 hours  lidocaine   4% Patch 2 Patch Transdermal every 24 hours  melatonin 3 milliGRAM(s) Oral <User Schedule>  multivitamin/minerals/iron Oral Solution (CENTRUM) 15 milliLiter(s) Oral daily  pantoprazole    Tablet 40 milliGRAM(s) Oral before breakfast  sodium chloride 1 Gram(s) Oral three times a day  sodium chloride 3%. 500 milliLiter(s) (30 mL/Hr) IV Continuous <Continuous>  trimethoprim  160 mG/sulfamethoxazole 800 mG 1 Tablet(s) Oral <User Schedule>      PHYSICAL EXAM:  T(C): 36.9 (01-11-24 @ 16:37), Max: 37.2 (01-11-24 @ 00:44)  HR: 76 (01-11-24 @ 16:37) (71 - 80)  BP: 131/74 (01-11-24 @ 16:37) (112/81 - 138/85)  RR: 17 (01-11-24 @ 16:37) (16 - 18)  SpO2: 100% (01-11-24 @ 16:37) (99% - 100%)  Wt(kg): --  I&O's Summary      Appearance: Normal	  HEENT:  PERRLA   Lymphatic: No lymphadenopathy   Cardiovascular: Normal S1 S2, no JVD  Respiratory: normal effort , clear  Gastrointestinal:  Soft, Non-tender  Skin: No rashes,  warm to touch  Psychiatry:  Mood & affect appropriate  Musculuskeletal: No edema    recent labs, Imaging and EKGs personally reviewed                           10.5   8.29  )-----------( 205      ( 11 Jan 2024 07:45 )             31.4               01-11    133<L>  |  93<L>  |  11  ----------------------------<  88  4.1   |  25  |  0.81    Ca    9.8      11 Jan 2024 07:45  Phos  4.4     01-11  Mg     1.80     01-11    TPro  7.7  /  Alb  3.5  /  TBili  0.6  /  DBili  x   /  AST  66<H>  /  ALT  129<H>  /  AlkPhos  115  01-11    PT/INR - ( 10 Carter 2024 02:50 )   PT: 14.2 sec;   INR: 1.28 ratio         PTT - ( 11 Jan 2024 14:55 )  PTT:78.3 sec                   Urinalysis Basic - ( 11 Jan 2024 07:45 )    Color: x / Appearance: x / SG: x / pH: x  Gluc: 88 mg/dL / Ketone: x  / Bili: x / Urobili: x   Blood: x / Protein: x / Nitrite: x   Leuk Esterase: x / RBC: x / WBC x   Sq Epi: x / Non Sq Epi: x / Bacteria: x

## 2024-01-12 LAB
ALBUMIN SERPL ELPH-MCNC: 3.5 G/DL — SIGNIFICANT CHANGE UP (ref 3.3–5)
ALBUMIN SERPL ELPH-MCNC: 3.5 G/DL — SIGNIFICANT CHANGE UP (ref 3.3–5)
ALP SERPL-CCNC: 110 U/L — SIGNIFICANT CHANGE UP (ref 40–120)
ALP SERPL-CCNC: 110 U/L — SIGNIFICANT CHANGE UP (ref 40–120)
ALT FLD-CCNC: 125 U/L — HIGH (ref 4–41)
ALT FLD-CCNC: 125 U/L — HIGH (ref 4–41)
ANION GAP SERPL CALC-SCNC: 13 MMOL/L — SIGNIFICANT CHANGE UP (ref 7–14)
ANION GAP SERPL CALC-SCNC: 13 MMOL/L — SIGNIFICANT CHANGE UP (ref 7–14)
APTT BLD: 78.6 SEC — HIGH (ref 24.5–35.6)
APTT BLD: 78.6 SEC — HIGH (ref 24.5–35.6)
AST SERPL-CCNC: 63 U/L — HIGH (ref 4–40)
AST SERPL-CCNC: 63 U/L — HIGH (ref 4–40)
BILIRUB DIRECT SERPL-MCNC: <0.2 MG/DL — SIGNIFICANT CHANGE UP (ref 0–0.3)
BILIRUB DIRECT SERPL-MCNC: <0.2 MG/DL — SIGNIFICANT CHANGE UP (ref 0–0.3)
BILIRUB INDIRECT FLD-MCNC: >0.4 MG/DL — SIGNIFICANT CHANGE UP (ref 0–1)
BILIRUB INDIRECT FLD-MCNC: >0.4 MG/DL — SIGNIFICANT CHANGE UP (ref 0–1)
BILIRUB SERPL-MCNC: 0.6 MG/DL — SIGNIFICANT CHANGE UP (ref 0.2–1.2)
BILIRUB SERPL-MCNC: 0.6 MG/DL — SIGNIFICANT CHANGE UP (ref 0.2–1.2)
BUN SERPL-MCNC: 14 MG/DL — SIGNIFICANT CHANGE UP (ref 7–23)
BUN SERPL-MCNC: 14 MG/DL — SIGNIFICANT CHANGE UP (ref 7–23)
C3 SERPL-MCNC: 67 MG/DL — LOW (ref 90–180)
C3 SERPL-MCNC: 67 MG/DL — LOW (ref 90–180)
C4 SERPL-MCNC: 14 MG/DL — SIGNIFICANT CHANGE UP (ref 10–40)
C4 SERPL-MCNC: 14 MG/DL — SIGNIFICANT CHANGE UP (ref 10–40)
CALCIUM SERPL-MCNC: 9.8 MG/DL — SIGNIFICANT CHANGE UP (ref 8.4–10.5)
CALCIUM SERPL-MCNC: 9.8 MG/DL — SIGNIFICANT CHANGE UP (ref 8.4–10.5)
CHLORIDE SERPL-SCNC: 94 MMOL/L — LOW (ref 98–107)
CHLORIDE SERPL-SCNC: 94 MMOL/L — LOW (ref 98–107)
CO2 SERPL-SCNC: 26 MMOL/L — SIGNIFICANT CHANGE UP (ref 22–31)
CO2 SERPL-SCNC: 26 MMOL/L — SIGNIFICANT CHANGE UP (ref 22–31)
CREAT SERPL-MCNC: 0.78 MG/DL — SIGNIFICANT CHANGE UP (ref 0.5–1.3)
CREAT SERPL-MCNC: 0.78 MG/DL — SIGNIFICANT CHANGE UP (ref 0.5–1.3)
EGFR: 124 ML/MIN/1.73M2 — SIGNIFICANT CHANGE UP
EGFR: 124 ML/MIN/1.73M2 — SIGNIFICANT CHANGE UP
GLUCOSE SERPL-MCNC: 85 MG/DL — SIGNIFICANT CHANGE UP (ref 70–99)
GLUCOSE SERPL-MCNC: 85 MG/DL — SIGNIFICANT CHANGE UP (ref 70–99)
HCT VFR BLD CALC: 32.7 % — LOW (ref 39–50)
HCT VFR BLD CALC: 32.7 % — LOW (ref 39–50)
HGB BLD-MCNC: 10.8 G/DL — LOW (ref 13–17)
HGB BLD-MCNC: 10.8 G/DL — LOW (ref 13–17)
MAGNESIUM SERPL-MCNC: 1.9 MG/DL — SIGNIFICANT CHANGE UP (ref 1.6–2.6)
MAGNESIUM SERPL-MCNC: 1.9 MG/DL — SIGNIFICANT CHANGE UP (ref 1.6–2.6)
MCHC RBC-ENTMCNC: 31.1 PG — SIGNIFICANT CHANGE UP (ref 27–34)
MCHC RBC-ENTMCNC: 31.1 PG — SIGNIFICANT CHANGE UP (ref 27–34)
MCHC RBC-ENTMCNC: 33 GM/DL — SIGNIFICANT CHANGE UP (ref 32–36)
MCHC RBC-ENTMCNC: 33 GM/DL — SIGNIFICANT CHANGE UP (ref 32–36)
MCV RBC AUTO: 94.2 FL — SIGNIFICANT CHANGE UP (ref 80–100)
MCV RBC AUTO: 94.2 FL — SIGNIFICANT CHANGE UP (ref 80–100)
NRBC # BLD: 0 /100 WBCS — SIGNIFICANT CHANGE UP (ref 0–0)
NRBC # BLD: 0 /100 WBCS — SIGNIFICANT CHANGE UP (ref 0–0)
NRBC # FLD: 0 K/UL — SIGNIFICANT CHANGE UP (ref 0–0)
NRBC # FLD: 0 K/UL — SIGNIFICANT CHANGE UP (ref 0–0)
PHOSPHATE SERPL-MCNC: 4.9 MG/DL — HIGH (ref 2.5–4.5)
PHOSPHATE SERPL-MCNC: 4.9 MG/DL — HIGH (ref 2.5–4.5)
PLATELET # BLD AUTO: 226 K/UL — SIGNIFICANT CHANGE UP (ref 150–400)
PLATELET # BLD AUTO: 226 K/UL — SIGNIFICANT CHANGE UP (ref 150–400)
POTASSIUM SERPL-MCNC: 4.2 MMOL/L — SIGNIFICANT CHANGE UP (ref 3.5–5.3)
POTASSIUM SERPL-MCNC: 4.2 MMOL/L — SIGNIFICANT CHANGE UP (ref 3.5–5.3)
POTASSIUM SERPL-SCNC: 4.2 MMOL/L — SIGNIFICANT CHANGE UP (ref 3.5–5.3)
POTASSIUM SERPL-SCNC: 4.2 MMOL/L — SIGNIFICANT CHANGE UP (ref 3.5–5.3)
PROT SERPL-MCNC: 7.6 G/DL — SIGNIFICANT CHANGE UP (ref 6–8.3)
PROT SERPL-MCNC: 7.6 G/DL — SIGNIFICANT CHANGE UP (ref 6–8.3)
RBC # BLD: 3.47 M/UL — LOW (ref 4.2–5.8)
RBC # BLD: 3.47 M/UL — LOW (ref 4.2–5.8)
RBC # FLD: 16 % — HIGH (ref 10.3–14.5)
RBC # FLD: 16 % — HIGH (ref 10.3–14.5)
SODIUM SERPL-SCNC: 133 MMOL/L — LOW (ref 135–145)
SODIUM SERPL-SCNC: 133 MMOL/L — LOW (ref 135–145)
SURGICAL PATHOLOGY STUDY: SIGNIFICANT CHANGE UP
SURGICAL PATHOLOGY STUDY: SIGNIFICANT CHANGE UP
WBC # BLD: 8.47 K/UL — SIGNIFICANT CHANGE UP (ref 3.8–10.5)
WBC # BLD: 8.47 K/UL — SIGNIFICANT CHANGE UP (ref 3.8–10.5)
WBC # FLD AUTO: 8.47 K/UL — SIGNIFICANT CHANGE UP (ref 3.8–10.5)
WBC # FLD AUTO: 8.47 K/UL — SIGNIFICANT CHANGE UP (ref 3.8–10.5)

## 2024-01-12 PROCEDURE — 99232 SBSQ HOSP IP/OBS MODERATE 35: CPT | Mod: GC

## 2024-01-12 PROCEDURE — 99233 SBSQ HOSP IP/OBS HIGH 50: CPT

## 2024-01-12 PROCEDURE — 71260 CT THORAX DX C+: CPT | Mod: 26

## 2024-01-12 PROCEDURE — 74177 CT ABD & PELVIS W/CONTRAST: CPT | Mod: 26

## 2024-01-12 PROCEDURE — 70496 CT ANGIOGRAPHY HEAD: CPT | Mod: 26

## 2024-01-12 PROCEDURE — 70498 CT ANGIOGRAPHY NECK: CPT | Mod: 26

## 2024-01-12 RX ORDER — APIXABAN 2.5 MG/1
5 TABLET, FILM COATED ORAL EVERY 12 HOURS
Refills: 0 | Status: DISCONTINUED | OUTPATIENT
Start: 2024-01-12 | End: 2024-01-29

## 2024-01-12 RX ORDER — OXYCODONE HYDROCHLORIDE 5 MG/1
2.5 TABLET ORAL EVERY 6 HOURS
Refills: 0 | Status: DISCONTINUED | OUTPATIENT
Start: 2024-01-12 | End: 2024-01-19

## 2024-01-12 RX ORDER — OXYCODONE HYDROCHLORIDE 5 MG/1
5 TABLET ORAL EVERY 6 HOURS
Refills: 0 | Status: DISCONTINUED | OUTPATIENT
Start: 2024-01-12 | End: 2024-01-19

## 2024-01-12 RX ORDER — APIXABAN 2.5 MG/1
5 TABLET, FILM COATED ORAL EVERY 12 HOURS
Refills: 0 | Status: DISCONTINUED | OUTPATIENT
Start: 2024-01-12 | End: 2024-01-12

## 2024-01-12 RX ORDER — OXYCODONE HYDROCHLORIDE 5 MG/1
2.5 TABLET ORAL ONCE
Refills: 0 | Status: DISCONTINUED | OUTPATIENT
Start: 2024-01-12 | End: 2024-01-12

## 2024-01-12 RX ADMIN — GABAPENTIN 200 MILLIGRAM(S): 400 CAPSULE ORAL at 06:40

## 2024-01-12 RX ADMIN — OXYCODONE HYDROCHLORIDE 5 MILLIGRAM(S): 5 TABLET ORAL at 18:59

## 2024-01-12 RX ADMIN — GABAPENTIN 200 MILLIGRAM(S): 400 CAPSULE ORAL at 21:24

## 2024-01-12 RX ADMIN — OXYCODONE HYDROCHLORIDE 2.5 MILLIGRAM(S): 5 TABLET ORAL at 08:21

## 2024-01-12 RX ADMIN — GABAPENTIN 200 MILLIGRAM(S): 400 CAPSULE ORAL at 13:03

## 2024-01-12 RX ADMIN — Medication 1 TABLET(S): at 06:40

## 2024-01-12 RX ADMIN — LIDOCAINE 1 PATCH: 4 CREAM TOPICAL at 02:07

## 2024-01-12 RX ADMIN — Medication 15 MILLILITER(S): at 11:36

## 2024-01-12 RX ADMIN — OXYCODONE HYDROCHLORIDE 2.5 MILLIGRAM(S): 5 TABLET ORAL at 07:51

## 2024-01-12 RX ADMIN — APIXABAN 5 MILLIGRAM(S): 2.5 TABLET, FILM COATED ORAL at 23:27

## 2024-01-12 RX ADMIN — LIDOCAINE 2 PATCH: 4 CREAM TOPICAL at 19:35

## 2024-01-12 RX ADMIN — HEPARIN SODIUM 1100 UNIT(S)/HR: 5000 INJECTION INTRAVENOUS; SUBCUTANEOUS at 07:51

## 2024-01-12 RX ADMIN — LIDOCAINE 1 PATCH: 4 CREAM TOPICAL at 16:00

## 2024-01-12 RX ADMIN — Medication 200 MILLIGRAM(S): at 06:40

## 2024-01-12 RX ADMIN — Medication 3 MILLIGRAM(S): at 21:24

## 2024-01-12 RX ADMIN — LIDOCAINE 1 PATCH: 4 CREAM TOPICAL at 19:35

## 2024-01-12 RX ADMIN — Medication 20 MILLIGRAM(S): at 11:35

## 2024-01-12 RX ADMIN — Medication 40 MILLIGRAM(S): at 06:40

## 2024-01-12 RX ADMIN — SODIUM CHLORIDE 1 GRAM(S): 9 INJECTION INTRAMUSCULAR; INTRAVENOUS; SUBCUTANEOUS at 21:24

## 2024-01-12 RX ADMIN — OXYCODONE HYDROCHLORIDE 2.5 MILLIGRAM(S): 5 TABLET ORAL at 14:20

## 2024-01-12 RX ADMIN — OXYCODONE HYDROCHLORIDE 2.5 MILLIGRAM(S): 5 TABLET ORAL at 13:50

## 2024-01-12 RX ADMIN — Medication 2 DROP(S): at 06:41

## 2024-01-12 RX ADMIN — Medication 200 MILLIGRAM(S): at 18:13

## 2024-01-12 RX ADMIN — PANTOPRAZOLE SODIUM 40 MILLIGRAM(S): 20 TABLET, DELAYED RELEASE ORAL at 06:40

## 2024-01-12 RX ADMIN — CHLORHEXIDINE GLUCONATE 1 APPLICATION(S): 213 SOLUTION TOPICAL at 11:36

## 2024-01-12 RX ADMIN — APIXABAN 5 MILLIGRAM(S): 2.5 TABLET, FILM COATED ORAL at 12:08

## 2024-01-12 RX ADMIN — SODIUM CHLORIDE 1 GRAM(S): 9 INJECTION INTRAMUSCULAR; INTRAVENOUS; SUBCUTANEOUS at 06:41

## 2024-01-12 RX ADMIN — LIDOCAINE 2 PATCH: 4 CREAM TOPICAL at 16:00

## 2024-01-12 RX ADMIN — SODIUM CHLORIDE 1 GRAM(S): 9 INJECTION INTRAMUSCULAR; INTRAVENOUS; SUBCUTANEOUS at 13:03

## 2024-01-12 RX ADMIN — Medication 2 DROP(S): at 18:13

## 2024-01-12 NOTE — PROGRESS NOTE ADULT - SUBJECTIVE AND OBJECTIVE BOX
Subjective: Patient seen and examined. No new events except as noted.     SUBJECTIVE/ROS:  nad      MEDICATIONS:  MEDICATIONS  (STANDING):  chlorhexidine 2% Cloths 1 Application(s) Topical daily  ciprofloxacin  0.3% Ophthalmic Solution for Otic Use 2 Drop(s) Both Ears two times a day  FLUoxetine 20 milliGRAM(s) Oral daily  gabapentin 200 milliGRAM(s) Oral three times a day  heparin  Infusion. 1000 Unit(s)/Hr (10 mL/Hr) IV Continuous <Continuous>  hydroxychloroquine 200 milliGRAM(s) Oral two times a day  lidocaine   4% Patch 2 Patch Transdermal every 24 hours  lidocaine   4% Patch 1 Patch Transdermal every 24 hours  melatonin 3 milliGRAM(s) Oral <User Schedule>  multivitamin/minerals/iron Oral Solution (CENTRUM) 15 milliLiter(s) Oral daily  pantoprazole    Tablet 40 milliGRAM(s) Oral before breakfast  predniSONE   Tablet 40 milliGRAM(s) Oral daily  sodium chloride 1 Gram(s) Oral three times a day  sodium chloride 3%. 500 milliLiter(s) (30 mL/Hr) IV Continuous <Continuous>  trimethoprim  160 mG/sulfamethoxazole 800 mG 1 Tablet(s) Oral <User Schedule>      PHYSICAL EXAM:  T(C): 36.5 (01-12-24 @ 06:05), Max: 37.1 (01-11-24 @ 12:47)  HR: 71 (01-12-24 @ 06:05) (71 - 80)  BP: 126/88 (01-12-24 @ 06:05) (112/81 - 131/74)  RR: 17 (01-12-24 @ 06:05) (17 - 18)  SpO2: 100% (01-12-24 @ 06:05) (98% - 100%)  Wt(kg): --  I&O's Summary          JVP: Normal  Neck: supple  Lung: clear   CV: S1 S2 , Murmur:  Abd: soft  Ext: No edema  neuro: Awake / alert  Psych: flat affect  Skin: normal``    LABS/DATA:    CARDIAC MARKERS:                                10.8   8.47  )-----------( 226      ( 12 Jan 2024 05:18 )             32.7     01-11    133<L>  |  93<L>  |  11  ----------------------------<  88  4.1   |  25  |  0.81    Ca    9.8      11 Jan 2024 07:45  Phos  4.4     01-11  Mg     1.80     01-11    TPro  7.7  /  Alb  3.5  /  TBili  0.6  /  DBili  x   /  AST  66<H>  /  ALT  129<H>  /  AlkPhos  115  01-11    proBNP:   Lipid Profile:   HgA1c:   TSH:     TELE:  EKG:

## 2024-01-12 NOTE — PROGRESS NOTE ADULT - NUTRITIONAL ASSESSMENT
This patient has been assessed with a concern for Malnutrition and has been determined to have a diagnosis/diagnoses of Severe protein-calorie malnutrition.    This patient is being managed with:   Diet Regular-  1000mL Fluid Restriction ([id number])  Supplement Feeding Modality:  Oral  Ensure Plus High Protein Cans or Servings Per Day:  1       Frequency:  Three Times a day  Entered: Carter  3 2024  4:21PM   This patient has been assessed with a concern for Malnutrition and has been determined to have a diagnosis/diagnoses of Severe protein-calorie malnutrition.    This patient is being managed with:   Diet Regular-  1000mL Fluid Restriction ([id number])  Supplement Feeding Modality:  Oral  Ensure Plus High Protein Cans or Servings Per Day:  1       Frequency:  Three Times a day  Entered: Carter  3 2024  4:21PM

## 2024-01-12 NOTE — PROGRESS NOTE ADULT - SUBJECTIVE AND OBJECTIVE BOX
TAYLA MARIE  [id number]    Subjective:  No major events overnight. Sat in chair for a few hours today.     Objective:   Vital Signs Last 24 Hrs  T(C): 36.8 (12 Jan 2024 12:23), Max: 37 (11 Jan 2024 21:40)  T(F): 98.2 (12 Jan 2024 12:23), Max: 98.6 (11 Jan 2024 21:40)  HR: 82 (12 Jan 2024 12:23) (71 - 82)  BP: 115/68 (12 Jan 2024 12:23) (112/84 - 126/88)  BP(mean): --  RR: 18 (12 Jan 2024 12:23) (17 - 18)  SpO2: 99% (12 Jan 2024 12:23) (98% - 100%)    Parameters below as of 12 Jan 2024 06:05  Patient On (Oxygen Delivery Method): room air      Physical Exam:  General: NAD  HEENT: EOMI  CV: well perfused   Lung: No increased WOB,  Abd: non-distended   Neuro: Awake and alert       LABS:  cret                        10.8   8.47  )-----------( 226      ( 12 Jan 2024 05:18 )             32.7     01-12    133<L>  |  94<L>  |  14  ----------------------------<  85  4.2   |  26  |  0.78    Ca    9.8      12 Jan 2024 05:18  Phos  4.9     01-12  Mg     1.90     01-12    TPro  7.6  /  Alb  3.5  /  TBili  0.6  /  DBili  <0.2  /  AST  63<H>  /  ALT  125<H>  /  AlkPhos  110  01-12    PTT - ( 12 Jan 2024 05:18 )  PTT:78.6 sec      Renal Biopsy   Light Microscopy:  Sections show renal cortex.   Glomeruli: There are approximately  15glomeruli per level section. Global or segmental glomerulosclerosis is  not seen. The glomeruli show patent capillary loops and urinary spaces  without significant inflammatory cell infiltrate. The mesangial areas are  in normal thickness. The glomerular capillary walls are delicate without  duplication or spike formation.   Tubules and interstitium : There is no  significant interstitial fibrosis, inflammation, or tubular atrophy.  Vessels: arteries are without significant histopathologic abnormalities.    Immunofluorescence Microscopy:  Examination of submitted tissue reveals renal cortex, containing  approximately 8 glomeruli. Glomeruli shows granular staining in mesangial  and capillary walls for IgG (2+), kappa (1+) and lambda (1+) light  chains. There is not significant glomerular staining for IgA, IgM, C3,  C1q, albumin and fibrinogen. There is no significant tubular basement  membrane or interstitial staining.       TAYLA MARIE  [id number]    Subjective:  No major events overnight. Sat in chair for a few hours today.     Objective:   Vital Signs Last 24 Hrs  T(C): 36.8 (12 Jan 2024 12:23), Max: 37 (11 Jan 2024 21:40)  T(F): 98.2 (12 Jan 2024 12:23), Max: 98.6 (11 Jan 2024 21:40)  HR: 82 (12 Jan 2024 12:23) (71 - 82)  BP: 115/68 (12 Jan 2024 12:23) (112/84 - 126/88)  BP(mean): --  RR: 18 (12 Jan 2024 12:23) (17 - 18)  SpO2: 99% (12 Jan 2024 12:23) (98% - 100%)    Parameters below as of 12 Jan 2024 06:05  Patient On (Oxygen Delivery Method): room air      Physical Exam:  General: NAD  HEENT: EOMI  CV: well perfused   Lung: No increased WOB,  Abd: non-distended   Neuro: Awake and alert       LABS:  cret                        10.8   8.47  )-----------( 226      ( 12 Jan 2024 05:18 )             32.7     01-12    133<L>  |  94<L>  |  14  ----------------------------<  85  4.2   |  26  |  0.78    Ca    9.8      12 Jan 2024 05:18  Phos  4.9     01-12  Mg     1.90     01-12    TPro  7.6  /  Alb  3.5  /  TBili  0.6  /  DBili  <0.2  /  AST  63<H>  /  ALT  125<H>  /  AlkPhos  110  01-12    PTT - ( 12 Jan 2024 05:18 )  PTT:78.6 sec      Renal Biopsy   Light Microscopy:  Sections show renal cortex.   Glomeruli: There are approximately  15glomeruli per level section. Global or segmental glomerulosclerosis is  not seen. The glomeruli show patent capillary loops and urinary spaces  without significant inflammatory cell infiltrate. The mesangial areas are  in normal thickness. The glomerular capillary walls are delicate without  duplication or spike formation.   Tubules and interstitium : There is no  significant interstitial fibrosis, inflammation, or tubular atrophy.  Vessels: arteries are without significant histopathologic abnormalities.    Immunofluorescence Microscopy:  Examination of submitted tissue reveals renal cortex, containing  approximately 8 glomeruli. Glomeruli shows granular staining in mesangial  and capillary walls for IgG (2+), kappa (1+) and lambda (1+) light  chains. There is not significant glomerular staining for IgA, IgM, C3,  C1q, albumin and fibrinogen. There is no significant tubular basement  membrane or interstitial staining.       TAYLA FRANK  [id number]    Subjective:  No major events overnight. Sat in chair for a few hours today.   he reports feeling well overall, not complaining of any pain at the time of the evaluation,    ROS:  no chest pain or SOB  no headaches, no rashes  no joint swelling       Objective:   Vital Signs Last 24 Hrs  T(C): 36.8 (12 Jan 2024 12:23), Max: 37 (11 Jan 2024 21:40)  T(F): 98.2 (12 Jan 2024 12:23), Max: 98.6 (11 Jan 2024 21:40)  HR: 82 (12 Jan 2024 12:23) (71 - 82)  BP: 115/68 (12 Jan 2024 12:23) (112/84 - 126/88)  BP(mean): --  RR: 18 (12 Jan 2024 12:23) (17 - 18)  SpO2: 99% (12 Jan 2024 12:23) (98% - 100%)    Parameters below as of 12 Jan 2024 06:05  Patient On (Oxygen Delivery Method): room air      Physical Exam:  General: NAD  HEENT: EOMI  CV: well perfused   Lung: No increased WOB,  Abd: non-distended   Neuro: Awake and alert       LABS:  cret                        10.8   8.47  )-----------( 226      ( 12 Jan 2024 05:18 )             32.7     01-12    133<L>  |  94<L>  |  14  ----------------------------<  85  4.2   |  26  |  0.78    Ca    9.8      12 Jan 2024 05:18  Phos  4.9     01-12  Mg     1.90     01-12    TPro  7.6  /  Alb  3.5  /  TBili  0.6  /  DBili  <0.2  /  AST  63<H>  /  ALT  125<H>  /  AlkPhos  110  01-12    PTT - ( 12 Jan 2024 05:18 )  PTT:78.6 sec      Renal Biopsy   Light Microscopy:  Sections show renal cortex.   Glomeruli: There are approximately  15glomeruli per level section. Global or segmental glomerulosclerosis is  not seen. The glomeruli show patent capillary loops and urinary spaces  without significant inflammatory cell infiltrate. The mesangial areas are  in normal thickness. The glomerular capillary walls are delicate without  duplication or spike formation.   Tubules and interstitium : There is no  significant interstitial fibrosis, inflammation, or tubular atrophy.  Vessels: arteries are without significant histopathologic abnormalities.    Immunofluorescence Microscopy:  Examination of submitted tissue reveals renal cortex, containing  approximately 8 glomeruli. Glomeruli shows granular staining in mesangial  and capillary walls for IgG (2+), kappa (1+) and lambda (1+) light  chains. There is not significant glomerular staining for IgA, IgM, C3,  C1q, albumin and fibrinogen. There is no significant tubular basement  membrane or interstitial staining.       TAYLA FRANK  [id number]    Subjective:  No major events overnight. Sat in chair for a few hours today.   he reports feeling well overall, not complaining of any pain at the time of the evaluation,    ROS:  no chest pain or SOB  no headaches, no rashes  no joint swelling       Objective:   Vital Signs Last 24 Hrs  T(C): 36.8 (12 Jan 2024 12:23), Max: 37 (11 Jan 2024 21:40)  T(F): 98.2 (12 Jan 2024 12:23), Max: 98.6 (11 Jan 2024 21:40)  HR: 82 (12 Jan 2024 12:23) (71 - 82)  BP: 115/68 (12 Jan 2024 12:23) (112/84 - 126/88)  BP(mean): --  RR: 18 (12 Jan 2024 12:23) (17 - 18)  SpO2: 99% (12 Jan 2024 12:23) (98% - 100%)    Parameters below as of 12 Jan 2024 06:05  Patient On (Oxygen Delivery Method): room air      Physical Exam:  General: NAD  HEENT: EOMI  CV: well perfused   Lung: No increased WOB,  Abd: non-distended   Neuro: Awake and alert       LABS:  cret                        10.8   8.47  )-----------( 226      ( 12 Jan 2024 05:18 )             32.7     01-12    133<L>  |  94<L>  |  14  ----------------------------<  85  4.2   |  26  |  0.78    Ca    9.8      12 Jan 2024 05:18  Phos  4.9     01-12  Mg     1.90     01-12    TPro  7.6  /  Alb  3.5  /  TBili  0.6  /  DBili  <0.2  /  AST  63<H>  /  ALT  125<H>  /  AlkPhos  110  01-12    PTT - ( 12 Jan 2024 05:18 )  PTT:78.6 sec      Renal Biopsy   Light Microscopy:  Sections show renal cortex.   Glomeruli: There are approximately  15glomeruli per level section. Global or segmental glomerulosclerosis is  not seen. The glomeruli show patent capillary loops and urinary spaces  without significant inflammatory cell infiltrate. The mesangial areas are  in normal thickness. The glomerular capillary walls are delicate without  duplication or spike formation.   Tubules and interstitium : There is no  significant interstitial fibrosis, inflammation, or tubular atrophy.  Vessels: arteries are without significant histopathologic abnormalities.    Immunofluorescence Microscopy:  Examination of submitted tissue reveals renal cortex, containing  approximately 8 glomeruli. Glomeruli shows granular staining in mesangial  and capillary walls for IgG (2+), kappa (1+) and lambda (1+) light  chains. There is not significant glomerular staining for IgA, IgM, C3,  C1q, albumin and fibrinogen. There is no significant tubular basement  membrane or interstitial staining.

## 2024-01-12 NOTE — PROGRESS NOTE ADULT - ASSESSMENT
28 year-old male with h/o Lupus (diagnosed in 09/2023 due to rash, oral ulcers, chest pain, and dyspnea, on Plaquenil) who presented to [place] ED on 12/12/23 with complaints of chest pain and SOB, found to have bilateral acute PE and bilateral pleural effusions. Transferred to [facility] for CT surgery evaluation. Also with fevers now resolved. Rheumatology consulted given SLE history.     Serologies:   Positive: ABDIAS 1:280 speckled, Sm >8, RNP >8, SSA >8, hypocomplementemia  Pr/Cr 1.2  low vitamin D 25 21, elevated vitamin D 1,25 97, ACE elevated 125, PR3 29.8. Repeat PR3 still weakly positive at 27.7   Negative: APS labs    #Fever (resolved)   #Pleural effusion exudate w/negative culture and PCR  #Lymphadenopathy   -Repeat CT imaging without obvious infectious source, mild decrease in axillary LAD, submentonian and submaxillary and increase supraclavicular LAD. No mediastinal lymphoadenopathy  -EBV DNA PCR positive. Discussed with ID, low concern for EBV infection    -Pt with clinical manifestations and specific SLE serologies though unclear if current presentation is solely attributable to SLE. Low concern for other rheumatologic disease (such as sarcoidosis, Kikuchi syndrome, Castleman disease), Underlying malignancy also needs to be ruled out. Pleural fluid cytology negative.   - Fevers resolved after restarting steroids on 40 mg methylprednisolone 12/23-12/25, restarted 60 mg of methylprednisolone 12/28-12/29, then transitioned to PO prednisone.     #SLE   # Proteinuria   +ve serology and hypocomplementemia improving after steroid trial   creatinine is stable but proteinuria +ve urine P/cr 1.1. Decreased to 0.7.      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     Recommendations:   -C/w Prednisone 40mg daily, no further urgent immunosuppression in setting of Class I LN (overall is benign)   -Work up per neurology for c/f temporal cortical stroke and punctate infarct?   -If pt is getting CTA H &N, please add on CT CAP with contrast to re-evaluate lymphadenopathy   -Per pt's mother, will be going to [place] acute rehab, please confirm with case management if pt will be able to have transportation to [facility] for follow up in 2-3 weeks after discharge   -Pt with elevated LFTs, work up per primary , important to address as patient will likely be started on a steroid sparing agent in the outpt setting    please consider holding bactrim and/or switching to atovaquone, review other hepatotoxic medication he is also on   please consider liver US (regular + doppler)  -Myomarker panel pending   -Continue with GI ppx       Discussed with Dr. Octavio Landaverde MD   PGY-4  Reachable on TEAMS  Pager [phone]  Rheumatology Fellow 28 year-old male with h/o Lupus (diagnosed in 09/2023 due to rash, oral ulcers, chest pain, and dyspnea, on Plaquenil) who presented to [place] ED on 12/12/23 with complaints of chest pain and SOB, found to have bilateral acute PE and bilateral pleural effusions. Transferred to [facility] for CT surgery evaluation. Also with fevers now resolved. Rheumatology consulted given SLE history.     Serologies:   Positive: ABDIAS 1:280 speckled, Sm >8, RNP >8, SSA >8, hypocomplementemia  Pr/Cr 1.2  low vitamin D 25 21, elevated vitamin D 1,25 97, ACE elevated 125, PR3 29.8. Repeat PR3 still weakly positive at 27.7   Negative: APS labs    #Fever (resolved)   #Pleural effusion exudate w/negative culture and PCR  #Lymphadenopathy   -Repeat CT imaging without obvious infectious source, mild decrease in axillary LAD, submentonian and submaxillary and increase supraclavicular LAD. No mediastinal lymphoadenopathy  -EBV DNA PCR positive. Discussed with ID, low concern for EBV infection    -Pt with clinical manifestations and specific SLE serologies though unclear if current presentation is solely attributable to SLE. Low concern for other rheumatologic disease (such as sarcoidosis, Kikuchi syndrome, Castleman disease), Underlying malignancy also needs to be ruled out. Pleural fluid cytology negative.   - Fevers resolved after restarting steroids on 40 mg methylprednisolone 12/23-12/25, restarted 60 mg of methylprednisolone 12/28-12/29, then transitioned to PO prednisone.     #SLE   # Proteinuria   +ve serology and hypocomplementemia improving after steroid trial   creatinine is stable but proteinuria +ve urine P/cr 1.1. Decreased to 0.7.      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     Recommendations:   -C/w Prednisone 40mg daily, no further urgent immunosuppression in setting of Class I LN (overall is benign)   -Work up per neurology for c/f temporal cortical stroke and punctate infarct?   -If pt is getting CTA H &N, please add on CT CAP with contrast to re-evaluate lymphadenopathy   -Per pt's mother, will be going to [place] acute rehab, please confirm with case management if pt will be able to have transportation to [facility] for follow up in 2-3 weeks after discharge   -Pt with elevated LFTs, work up per primary , important to address as patient will likely be started on a steroid sparing agent in the outpt setting    please consider holding bactrim and/or switching to atovaquone, review other hepatotoxic medication he is also on   please consider liver US (regular + doppler)  -Myomarker panel pending   -Continue with GI ppx       Discussed with Dr. Octavio Landaverde MD   PGY-4  Reachable on TEAMS  Pager [phone]  Rheumatology Fellow 28 year-old male with h/o Lupus (diagnosed in 09/2023 due to rash, oral ulcers, chest pain, and dyspnea, on Plaquenil) who presented to [place] ED on 12/12/23 with complaints of chest pain and SOB, found to have bilateral acute PE and bilateral pleural effusions. Transferred to [facility] for CT surgery evaluation. Also with fevers now resolved. Rheumatology consulted given SLE history.     Serologies:   Positive: ABDIAS 1:280 speckled, Sm >8, RNP >8, SSA >8, hypocomplementemia  Pr/Cr 1.2  low vitamin D 25 21, elevated vitamin D 1,25 97, ACE elevated 125, PR3 29.8. Repeat PR3 still weakly positive at 27.7   Negative: APS labs    #Fever (resolved) - Fevers resolved after restarting steroids on 40 mg methylprednisolone 12/23-12/25, restarted 60 mg of methylprednisolone 12/28-12/29, then transitioned to PO prednisone.   #Pleural effusion exudate w/negative culture and PCR. Pleural fluid cytology negative.   #Lymphadenopathy   -Repeat CT imaging without obvious infectious source, mild decrease in axillary LAD, submentonian and submaxillary and increase supraclavicular LAD. No mediastinal lymphoadenopathy  -EBV DNA PCR positive. Discussed with ID, low concern for EBV infection        #SLE   # Proteinuria   +ve serology and hypocomplementemia improving after steroid trial   creatinine is stable but proteinuria +ve urine P/cr 1.1. Decreased to 0.7.      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     Recommendations:   -C/w Prednisone 40mg daily, no further urgent immunosuppression in setting of Class I LN   will continue tapering process weekly based on clinical/ laboratory progression  plan to start MMF once acute issues resolved.   -Work up per neurology for c/f temporal cortical stroke and punctate infarct?   -If pt is getting CTA H &N, please add on CT CAP with contrast to re-evaluate lymphadenopathy   -Per pt's mother, will be going to [place] acute rehab, please confirm with case management if pt will be able to have transportation to [facility] for follow up in 2-3 weeks after discharge   -Pt with elevated LFTs, work up per primary , important to address as patient will likely be started on a steroid sparing agent in the outpt setting    please consider holding bactrim and/or switching to atovaquone, review other hepatotoxic medication he is also on   please consider liver US (regular + doppler)  -Myomarker panel pending   -Continue with GI ppx       Discussed with Dr. Octavio Landaverde MD   PGY-4  Reachable on TEAMS  Pager [phone]  Rheumatology Fellow 28 year-old male with h/o Lupus (diagnosed in 09/2023 due to rash, oral ulcers, chest pain, and dyspnea, on Plaquenil) who presented to [place] ED on 12/12/23 with complaints of chest pain and SOB, found to have bilateral acute PE and bilateral pleural effusions. Transferred to [facility] for CT surgery evaluation. Also with fevers now resolved. Rheumatology consulted given SLE history.     Serologies:   Positive: ABDIAS 1:280 speckled, Sm >8, RNP >8, SSA >8, hypocomplementemia  Pr/Cr 1.2  low vitamin D 25 21, elevated vitamin D 1,25 97, ACE elevated 125, PR3 29.8. Repeat PR3 still weakly positive at 27.7   Negative: APS labs    #Fever (resolved) - Fevers resolved after restarting steroids on 40 mg methylprednisolone 12/23-12/25, restarted 60 mg of methylprednisolone 12/28-12/29, then transitioned to PO prednisone.   #Pleural effusion exudate w/negative culture and PCR. Pleural fluid cytology negative.   #Lymphadenopathy   -Repeat CT imaging without obvious infectious source, mild decrease in axillary LAD, submentonian and submaxillary and increase supraclavicular LAD. No mediastinal lymphoadenopathy  -EBV DNA PCR positive. Discussed with ID, low concern for EBV infection        #SLE   # Proteinuria   +ve serology and hypocomplementemia improving after steroid trial   creatinine is stable but proteinuria +ve urine P/cr 1.1. Decreased to 0.7.      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     Recommendations:   -C/w Prednisone 40mg daily, no further urgent immunosuppression in setting of Class I LN   will continue tapering process weekly based on clinical/ laboratory progression  plan to start MMF once acute issues resolved.   -Work up per neurology for c/f temporal cortical stroke and punctate infarct?   -If pt is getting CTA H &N, please add on CT CAP with contrast to re-evaluate lymphadenopathy   -Per pt's mother, will be going to [place] acute rehab, please confirm with case management if pt will be able to have transportation to [facility] for follow up in 2-3 weeks after discharge   -Pt with elevated LFTs, work up per primary , important to address as patient will likely be started on a steroid sparing agent in the outpt setting    please consider holding bactrim and/or switching to atovaquone, review other hepatotoxic medication he is also on   please consider liver US (regular + doppler)  -Myomarker panel pending   -Continue with GI ppx       Discussed with Dr. Octavio Landaverde MD   PGY-4  Reachable on TEAMS  Pager [phone]  Rheumatology Fellow

## 2024-01-12 NOTE — PROGRESS NOTE ADULT - SUBJECTIVE AND OBJECTIVE BOX
Name of Patient : TAYLA MARIE  MRN: [id number]  Date of visit: 01-12-24 @ 16:17      Subjective: Patient seen and examined. No new events except as noted.       REVIEW OF SYSTEMS:    CONSTITUTIONAL: No weakness, fevers or chills  EYES/ENT: No visual changes;  No vertigo or throat pain   NECK: No pain or stiffness  RESPIRATORY: No cough, wheezing, hemoptysis; No shortness of breath  CARDIOVASCULAR: No chest pain or palpitations  GASTROINTESTINAL: No abdominal or epigastric pain. No nausea, vomiting, or hematemesis; No diarrhea or constipation. No melena or hematochezia.  GENITOURINARY: No dysuria, frequency or hematuria  NEUROLOGICAL: No numbness or weakness  SKIN: No itching, burning, rashes, or lesions   All other review of systems is negative unless indicated above.    MEDICATIONS:  MEDICATIONS  (STANDING):  apixaban 5 milliGRAM(s) Oral every 12 hours  chlorhexidine 2% Cloths 1 Application(s) Topical daily  ciprofloxacin  0.3% Ophthalmic Solution for Otic Use 2 Drop(s) Both Ears two times a day  FLUoxetine 20 milliGRAM(s) Oral daily  gabapentin 200 milliGRAM(s) Oral three times a day  hydroxychloroquine 200 milliGRAM(s) Oral two times a day  lidocaine   4% Patch 1 Patch Transdermal every 24 hours  lidocaine   4% Patch 2 Patch Transdermal every 24 hours  melatonin 3 milliGRAM(s) Oral <User Schedule>  multivitamin/minerals/iron Oral Solution (CENTRUM) 15 milliLiter(s) Oral daily  pantoprazole    Tablet 40 milliGRAM(s) Oral before breakfast  predniSONE   Tablet 40 milliGRAM(s) Oral daily  sodium chloride 1 Gram(s) Oral three times a day  sodium chloride 3%. 500 milliLiter(s) (30 mL/Hr) IV Continuous <Continuous>  trimethoprim  160 mG/sulfamethoxazole 800 mG 1 Tablet(s) Oral <User Schedule>      PHYSICAL EXAM:  T(C): 36.8 (01-12-24 @ 12:23), Max: 37 (01-11-24 @ 21:40)  HR: 82 (01-12-24 @ 12:23) (71 - 82)  BP: 115/68 (01-12-24 @ 12:23) (112/84 - 131/74)  RR: 18 (01-12-24 @ 12:23) (17 - 18)  SpO2: 99% (01-12-24 @ 12:23) (98% - 100%)  Wt(kg): --  I&O's Summary        Appearance: Normal	  HEENT:  PERRLA   Lymphatic: No lymphadenopathy   Cardiovascular: Normal S1 S2, no JVD  Respiratory: normal effort , clear  Gastrointestinal:  Soft, Non-tender  Skin: No rashes,  warm to touch  Psychiatry:  Mood & affect appropriate  Musculuskeletal: No edema    recent labs, Imaging and EKGs personally reviewed                           10.8   8.47  )-----------( 226      ( 12 Jan 2024 05:18 )             32.7               01-12    133<L>  |  94<L>  |  14  ----------------------------<  85  4.2   |  26  |  0.78    Ca    9.8      12 Jan 2024 05:18  Phos  4.9     01-12  Mg     1.90     01-12    TPro  7.6  /  Alb  3.5  /  TBili  0.6  /  DBili  <0.2  /  AST  63<H>  /  ALT  125<H>  /  AlkPhos  110  01-12    PTT - ( 12 Jan 2024 05:18 )  PTT:78.6 sec                   Urinalysis Basic - ( 12 Jan 2024 05:18 )    Color: x / Appearance: x / SG: x / pH: x  Gluc: 85 mg/dL / Ketone: x  / Bili: x / Urobili: x   Blood: x / Protein: x / Nitrite: x   Leuk Esterase: x / RBC: x / WBC x   Sq Epi: x / Non Sq Epi: x / Bacteria: x      < from: MR Head No Cont (01.11.24 @ 21:00) >  IMPRESSION:  Small area of nonspecific diffusion restriction, and susceptibility   artifact in the right temporal lobe white matter, with T2/FLAIR   hyperintensities that extends to the cortical gray matter. Differential   includes cerebritis, acute infarction and/or post ictal changes. Clinical   correlation will determine the need for continued follow-up.    Multiple curvilinear areas of signal void adjacent to the left middle   cerebral artery with abnormal signal within the left insular region. The   possibility of vasculitis or venous thrombosis with associated infarction   is raised. CT or MR angiographyof the brain is advised for further   evaluation.    Findings discussed with Frederick OLSON by Dr. Stone on 1/12/2024 11:06 AM   with read back confirmation.                     Name of Patient : TAYLA MARIE  MRN: [id number]  Date of visit: 01-12-24 @ 16:17      Subjective: Patient seen and examined. No new events except as noted.       REVIEW OF SYSTEMS:    CONSTITUTIONAL: No weakness, fevers or chills  EYES/ENT: No visual changes;  No vertigo or throat pain   NECK: No pain or stiffness  RESPIRATORY: No cough, wheezing, hemoptysis; No shortness of breath  CARDIOVASCULAR: No chest pain or palpitations  GASTROINTESTINAL: No abdominal or epigastric pain. No nausea, vomiting, or hematemesis; No diarrhea or constipation. No melena or hematochezia.  GENITOURINARY: No dysuria, frequency or hematuria  NEUROLOGICAL: No numbness or weakness  SKIN: No itching, burning, rashes, or lesions   All other review of systems is negative unless indicated above.    MEDICATIONS:  MEDICATIONS  (STANDING):  apixaban 5 milliGRAM(s) Oral every 12 hours  chlorhexidine 2% Cloths 1 Application(s) Topical daily  ciprofloxacin  0.3% Ophthalmic Solution for Otic Use 2 Drop(s) Both Ears two times a day  FLUoxetine 20 milliGRAM(s) Oral daily  gabapentin 200 milliGRAM(s) Oral three times a day  hydroxychloroquine 200 milliGRAM(s) Oral two times a day  lidocaine   4% Patch 1 Patch Transdermal every 24 hours  lidocaine   4% Patch 2 Patch Transdermal every 24 hours  melatonin 3 milliGRAM(s) Oral <User Schedule>  multivitamin/minerals/iron Oral Solution (CENTRUM) 15 milliLiter(s) Oral daily  pantoprazole    Tablet 40 milliGRAM(s) Oral before breakfast  predniSONE   Tablet 40 milliGRAM(s) Oral daily  sodium chloride 1 Gram(s) Oral three times a day  sodium chloride 3%. 500 milliLiter(s) (30 mL/Hr) IV Continuous <Continuous>  trimethoprim  160 mG/sulfamethoxazole 800 mG 1 Tablet(s) Oral <User Schedule>      PHYSICAL EXAM:  T(C): 36.8 (01-12-24 @ 12:23), Max: 37 (01-11-24 @ 21:40)  HR: 82 (01-12-24 @ 12:23) (71 - 82)  BP: 115/68 (01-12-24 @ 12:23) (112/84 - 131/74)  RR: 18 (01-12-24 @ 12:23) (17 - 18)  SpO2: 99% (01-12-24 @ 12:23) (98% - 100%)  Wt(kg): --  I&O's Summary        Appearance: Normal	  HEENT:  PERRLA   Lymphatic: No lymphadenopathy   Cardiovascular: Normal S1 S2, no JVD  Respiratory: normal effort , clear  Gastrointestinal:  Soft, Non-tender  Skin: No rashes,  warm to touch  Psychiatry:  Mood & affect appropriate  Musculuskeletal: No edema    recent labs, Imaging and EKGs personally reviewed                           10.8   8.47  )-----------( 226      ( 12 Jan 2024 05:18 )             32.7               01-12    133<L>  |  94<L>  |  14  ----------------------------<  85  4.2   |  26  |  0.78    Ca    9.8      12 Jan 2024 05:18  Phos  4.9     01-12  Mg     1.90     01-12    TPro  7.6  /  Alb  3.5  /  TBili  0.6  /  DBili  <0.2  /  AST  63<H>  /  ALT  125<H>  /  AlkPhos  110  01-12    PTT - ( 12 Jan 2024 05:18 )  PTT:78.6 sec                   Urinalysis Basic - ( 12 Jan 2024 05:18 )    Color: x / Appearance: x / SG: x / pH: x  Gluc: 85 mg/dL / Ketone: x  / Bili: x / Urobili: x   Blood: x / Protein: x / Nitrite: x   Leuk Esterase: x / RBC: x / WBC x   Sq Epi: x / Non Sq Epi: x / Bacteria: x      < from: MR Head No Cont (01.11.24 @ 21:00) >  IMPRESSION:  Small area of nonspecific diffusion restriction, and susceptibility   artifact in the right temporal lobe white matter, with T2/FLAIR   hyperintensities that extends to the cortical gray matter. Differential   includes cerebritis, acute infarction and/or post ictal changes. Clinical   correlation will determine the need for continued follow-up.    Multiple curvilinear areas of signal void adjacent to the left middle   cerebral artery with abnormal signal within the left insular region. The   possibility of vasculitis or venous thrombosis with associated infarction   is raised. CT or MR angiographyof the brain is advised for further   evaluation.    Findings discussed with Frederick OLSON by Dr. Stone on 1/12/2024 11:06 AM   with read back confirmation.

## 2024-01-12 NOTE — BH CONSULTATION LIAISON PROGRESS NOTE - NSBHFUPINTERVALHXFT_PSY_A_CORE
Chart, labs, imaging reviewed. Case discussed with the primary team. Overnight, patient did not require or receive PRN medication.     Patient seen and examined at bedside. Pt reported feeling " fine". Reported that his sleep and anxiety improved. Continues to feel depressed, sad, c/o of pain. Patient denied suicidal or homicidal ideations, intent or a plan. No psychotic symptoms.

## 2024-01-12 NOTE — PROGRESS NOTE ADULT - ASSESSMENT
PE  a/c   no evidence of right heart strain   no significant evidence of myocardial injury   normal LV function     Pericardial effusion   inflammatory in setting of SLE   trace effusion   no sign of tamponade  repeat echo in few weeks for surveillance   fu with rheum for ongoing work up

## 2024-01-12 NOTE — BH CONSULTATION LIAISON PROGRESS NOTE - ATTENDING COMMENTS
Chart reviewed. Pt discussed with trainee. Agree with above assessment/recs. 
Chart reviewed. Discussed case with fellow. Attempted to see pt but busy with various providers. Agree with above assessment/recs. Will f/u on 1/2, call [phone] before then if needed.
Chart reviewed. Pt discussed with fellow. Agree with above assessment/recs. Will continue to follow.
Chart reviewed. Pt discussed with trainee. Agree with above assessment/recs.
Chart reviewed. Discussed with fellow. Agree with above assessment/recs. Will continue to follow.

## 2024-01-12 NOTE — PROGRESS NOTE ADULT - ATTENDING COMMENTS
I saw and examined the patient with Dr. Villarreal.   Patient reporting no neurological symptoms today.   His MRI brain showed a small punctate stroke in the medial L temporal cortex.    He has a prolonged hospitalization with fever, pleural effusion, and PE.    Extensive workup performed including hypercoagulable workup.     On exam:   Awake, alert, oriented, following commands, normal naming.   Pupils 3-2mm, symmetric, full Vf's, normal EOM, no nystagmus, no facial weakness, no dysarthria.   MOTOR: normal bulk and tone, no drift, 5/5 throughout , biceps, hip flexors, knee extensors.   SENSORY: intact symmetrically to light touch, upper and lower extremities  COORDINATION: normal FNF    MRI brain images reviewed: L temporal cortical stroke, punctate focus of diffusion restriction.     AP: 28 year old man with lupus, admitted 12/12 with PE, prolonged period of encephalopathy this admission, with MRI showing a punctate embolic stroke.  No focal neuro deficits, and no significant neuro findings on exam.  MRI showing the punctate stroke.   -continue on eliquis for stroke prevention in addition to treatment for the PE.   -will obtain a CTA H&N  -KARIE  -check A1c and lipid profile  -start on a statin, lipitor 40mg.  Can titrate to goal LDL <70.    -neurology to follow.  Please page or call with any acute neuro changes.

## 2024-01-12 NOTE — PROGRESS NOTE ADULT - ASSESSMENT
28y (1995) man with a PMHx significant for SLE on Hydroxychloroquine initially admitted to [facility] on 12/12, found to have b/l PE w/ superimposed PNA, transferred to [facility] on 12/22 for thoracic eval of b/l L > R effusions, L loculated. Patient was on heparin drip. Followed by ID and rheum due to perisistent fevers, no clear source identified (was on Vanc, Zosyn). RRT called on 12/25 for SOB and chest pain. During RRT, patient was witnessed to have an episode of generalized convulsion lasting ~45 seconds that resolved spontaneously, associated with urinary incontinence. Initially followed by General Neurology team due to concern for seizure, EEG showed mild cerebral dysfunction but no     NIHSS: 0  pre MRS: 0    Impression:   1. Acute R temporal infarct in patient with SLE. No focal neurologic deficits on exam.  Patient is currently anticoagulated for PE. Mechanism of infarct unclear at this time.   2. Episodes of varying presentation of  full body or just lower extremities with patient conversational during episodes, no tongue biting, post ictal period or incontinence likely due to metabolic etiology vs unlikely seizures       Recommendations:     [ ] Obtain CT Angio Head/ Neck  [ ] Recommend KARIE to evaluate for potential thrombus/ vegetations   [x] Continue anticoagulation - transitioned to Eliquis 5mg BID  [x] TTE done 12/25  [x] MRI brain w and w/o, reviewed  [ ] SBP goal normotension  [ ] A1C 5.5, send lipid panel  [x] LP done, results reviewed 12/25  [x] EEG on 12/25 and 1/8- Mild cerebral dysfunction No seizures or epileptiform activity.   [x] Appreciate Rheum evaluation. Will need to repeat hypercoagulable, APLS labs outpatient   [ ] Continue to monitor patient off EEG, no anti-seizure medications recommended at this time  [ ] Seizure and fall precautions  [ ] PT/OT as tolerated       Case seen and discussed with Stroke attending Dr. Upton, please await attending attestation.  28y (1995) man with a PMHx significant for SLE on Hydroxychloroquine initially admitted to [facility] on 12/12, found to have b/l PE w/ superimposed PNA, transferred to [facility] on 12/22 for thoracic eval of b/l L > R effusions, L loculated. Patient was on heparin drip. Followed by ID and rheum due to perisistent fevers, no clear source identified (was on Vanc, Zosyn). RRT called on 12/25 for SOB and chest pain. During RRT, patient was witnessed to have an episode of generalized convulsion lasting ~45 seconds that resolved spontaneously, associated with urinary incontinence. Initially followed by General Neurology team due to concern for seizure, EEG showed mild cerebral dysfunction but no     NIHSS: 0  pre MRS: 0    Impression:   1. Acute R temporal infarct in patient with SLE. No focal neurologic deficits on exam.  Patient is currently anticoagulated for PE. Mechanism of infarct unclear at this time.   2. Episodes of varying presentation of  full body or just lower extremities with patient conversational during episodes, no tongue biting, post ictal period or incontinence likely due to metabolic etiology vs unlikely seizures       Recommendations:     [ ] Obtain CT Angio Head/ Neck  [ ] Recommend KARIE to evaluate for potential thrombus/ vegetations   [x] Continue anticoagulation - transitioned to Eliquis 5mg BID  [x] TTE done 12/25  [x] MRI brain w and w/o, reviewed  [ ] SBP goal normotension  [ ] A1C 5.5, send lipid panel  [x] LP done, results reviewed 12/25  [x] EEG on 12/25 and 1/8- Mild cerebral dysfunction No seizures or epileptiform activity.   [x] Appreciate Rheum evaluation. Will need to repeat hypercoagulable, APLS labs outpatient   [ ] Continue to monitor patient off EEG, no anti-seizure medications recommended at this time  [ ] Seizure and fall precautions  [ ] PT/OT as tolerated       Case seen and discussed with Stroke attending Dr. Upton, please await attending attestation.  28y (1995) man with a PMHx significant for SLE on Hydroxychloroquine initially admitted to [facility] on 12/12, found to have b/l PE w/ superimposed PNA, transferred to [facility] on 12/22 for thoracic eval of b/l L > R effusions, L loculated. Patient was on heparin drip. Followed by ID and rheum due to perisistent fevers, no clear source identified (was on Vanc, Zosyn). RRT called on 12/25 for SOB and chest pain. During RRT, patient was witnessed to have an episode of generalized convulsion lasting ~45 seconds that resolved spontaneously, associated with urinary incontinence. Initially followed by General Neurology team due to concern for seizure, EEG showed mild cerebral dysfunction but no epileptiform activity or seizures. Stroke Neurology consulted for imaging findings.     NIHSS: 0  pre MRS: 0    Impression:   1. Small temporal lesion with diffusion restriction which may be acute ischemic infarct, differential also includes post-ictal changes, lupus related inflammatory changes. No focal neurologic deficits on exam.  Patient is currently anticoagulated for PE. If ischemic, mechanism of infarct unclear at this time. Patient may be hypercoagulable in setting of SLE, however APLS labs were negative. Will need to evaluate for cardiac thrombus or vegetations  2. One episode of generalized convulsion associated with urinary incontinence which may have been seizure, possibly provoked event. Multiple episodes of lower extremity shaking with conversational during episodes, no tongue biting, post ictal period or incontinence likely due to metabolic etiology, less likely focal motor seizures. EEGs showing mild cerebral dysfunction but no epileptiform activity or seizures, being monitored off antiseizure meds    Recommendations:     [ ] Obtain CT Angio Head/ Neck  [ ] Recommend KARIE to evaluate for potential thrombus/ vegetations   [x] Continue anticoagulation - transitioned to Eliquis 5mg BID  [x] TTE done 12/25  [x] MRI brain w and w/o, reviewed  [ ] SBP goal normotension  [ ] A1C 5.5, send lipid panel  [x] LP done 12/25, results reviewed   [x] EEG on 12/25/24 and 1/8/24- Mild cerebral dysfunction No seizures or epileptiform activity.   [x] Appreciate Rheum evaluation. Will need to repeat hypercoagulable, APLS labs outpatient   [ ] Continue to monitor patient off EEG, no anti-seizure medications recommended at this time  [ ] Seizure and fall precautions  [ ] PT/OT as tolerated       Case seen and discussed with Stroke attending Dr. Upton, please await attending attestation.  28y (1995) man with a PMHx significant for SLE on Hydroxychloroquine initially admitted to [facility] on 12/12, found to have b/l PE w/ superimposed PNA, transferred to [facility] on 12/22 for thoracic eval of b/l L > R effusions, L loculated. Patient was on heparin drip. Followed by ID and rheum due to perisistent fevers, no clear source identified (was on Vanc, Zosyn). RRT called on 12/25 for SOB and chest pain. During RRT, patient was witnessed to have an episode of generalized convulsion lasting ~45 seconds that resolved spontaneously, associated with urinary incontinence. Initially followed by General Neurology team due to concern for seizure, EEG showed mild cerebral dysfunction but no epileptiform activity or seizures. Stroke Neurology consulted for imaging findings.     NIHSS: 0  pre MRS: 0    Impression:   1. Small temporal lesion with diffusion restriction which may be acute ischemic infarct, differential also includes post-ictal changes, lupus related inflammatory changes. No focal neurologic deficits on exam.  Patient is currently anticoagulated for PE. If ischemic, mechanism of infarct unclear at this time. Patient may be hypercoagulable in setting of SLE, however APLS labs were negative. Will need to evaluate for cardiac thrombus or vegetations  2. One episode of generalized convulsion associated with urinary incontinence which may have been seizure, possibly provoked event. Multiple episodes of lower extremity shaking with conversational during episodes, no tongue biting, post ictal period or incontinence likely due to metabolic etiology, less likely focal motor seizures. EEGs showing mild cerebral dysfunction but no epileptiform activity or seizures, being monitored off antiseizure meds    Recommendations:     [ ] Obtain CT Angio Head/ Neck  [ ] Recommend KARIE to evaluate for potential thrombus/ vegetations   [x] Continue anticoagulation - transitioned to Eliquis 5mg BID  [x] TTE done 12/25  [x] MRI brain w and w/o, reviewed  [ ] SBP goal normotension  [ ] A1C 5.5, send lipid panel  [x] LP done 12/25, results reviewed   [x] EEG on 12/25/24 and 1/8/24- Mild cerebral dysfunction No seizures or epileptiform activity.   [x] Appreciate Rheum evaluation. Will need to repeat hypercoagulable, APLS labs outpatient   [ ] Continue to monitor patient off EEG, no anti-seizure medications recommended at this time  [ ] Seizure and fall precautions  [ ] PT/OT as tolerated       Case seen and discussed with Stroke attending Dr. Upton, please await attending attestation.

## 2024-01-12 NOTE — BH CONSULTATION LIAISON PROGRESS NOTE - NSBHASSESSMENTFT_PSY_ALL_CORE
28 years old male with h/o Lupus ( diagnosed in 09/2023, on Plaquenil present to [facility] on 12/12/23  with complain of chest pain and SOB admitted for fevers, PE, pleural effusions, course c/b high fever and tonic-clonic seizure, transferred to MICU for post-ictal and infectious monitoring. Psych consulted for depression. Pt had indicated a hx of depression/anxiety, had been in the  and [facility], and now is requesting to speak to someone. However he is rather medically active and compromised.   Reported being depressed for many years, no current safety concerns. C/o poor sleep due to pain  1/04: continues to report that he feels depressed, anxious, with poor sleep and appetite. Hx of trauma, difficulties to care for himself due to psychiatric condition.  1/5: Continues to report depressed mood, improving anxiety. Amenable to starting Prozac 20mg. Future-oriented, helping-seeking, no SIIP.  1/8: Patient c/o pain, depression, anxiety. Denied side effects from prozac.  1/12: P/o is improving, less anxiety, sleeps better, no safety concerns, no psychotic symptoms.    Plan:   - Continue medical stabilization as you are  - Address pain ( pain management)  - PRN for sleep: melatonin 3 mg qhs.  -Consult Holistic nurse to address his mood ( pt is amendable)  - No role for 1:1 at this time  - c/w Prozac 20 mg to address depression and anxiety (pt. scheduled for first dose today- 1/5/24)  - c/ww Gabapentin from 200 mg BID to 200 mg TID to address anxiety.  - Dispo: likely inpt rehab given severe deconditioning. Inpt psych would be ideal but likely limited by mobility/rehab needs.   Will continue to follow while here.   28 years old male with h/o Lupus ( diagnosed in 09/2023, on Plaquenil present to [facility] ED on 12/12/23  with complain of chest pain and SOB admitted for fevers, PE, pleural effusions, course c/b high fever and tonic-clonic seizure, transferred to MICU for post-ictal and infectious monitoring. Psych consulted for depression. Pt had indicated a hx of depression/anxiety, had been in the  and was [facility] but [facility], and now is requesting to speak to someone. However he is rather medically active and compromised.   Reported being depressed for many years, no current safety concerns. C/o poor sleep due to pain  1/04: continues to report that he feels depressed, anxious, with poor sleep and appetite. Hx of trauma, difficulties to care for himself due to psychiatric condition.  1/5: Continues to report depressed mood, improving anxiety. Amenable to starting Prozac 20mg. Future-oriented, helping-seeking, no SIIP.  1/8: Patient c/o pain, depression, anxiety. Denied side effects from prozac.  1/12: P/o is improving, less anxiety, sleeps better, no safety concerns, no psychotic symptoms.    Plan:   - Continue medical stabilization as you are  - Address pain ( pain management)  - PRN for sleep: melatonin 3 mg qhs.  -Consult Holistic nurse to address his mood ( pt is amendable)  - No role for 1:1 at this time  - c/w Prozac 20 mg to address depression and anxiety (pt. scheduled for first dose today- 1/5/24)  - c/ww Gabapentin from 200 mg BID to 200 mg TID to address anxiety.  - Dispo: likely inpt rehab given severe deconditioning. Inpt psych would be ideal but likely limited by mobility/rehab needs.   Will continue to follow while here.   28 years old male with h/o Lupus ( diagnosed in 09/2023, on Plaquenil present to [facility] ED on 12/12/23  with complain of chest pain and SOB admitted for fevers, PE, pleural effusions, course c/b high fever and tonic-clonic seizure, transferred to MICU for post-ictal and infectious monitoring. Psych consulted for depression. Pt had indicated a hx of depression/anxiety, had been in the  and [facility], and now is requesting to speak to someone. However he is rather medically active and compromised.   Reported being depressed for many years, no current safety concerns. C/o poor sleep due to pain  1/04: continues to report that he feels depressed, anxious, with poor sleep and appetite. Hx of trauma, difficulties to care for himself due to psychiatric condition.  1/5: Continues to report depressed mood, improving anxiety. Amenable to starting Prozac 20mg. Future-oriented, helping-seeking, no SIIP.  1/8: Patient c/o pain, depression, anxiety. Denied side effects from prozac.  1/12: P/o is improving, less anxiety, sleeps better, no safety concerns, no psychotic symptoms.    Plan:   - Continue medical stabilization as you are  - Address pain (pain management)  - PRN for sleep: melatonin 3 mg qhs.  -Consult Holistic nurse to address his mood ( pt is amendable)  - No role for 1:1 at this time  - c/w Prozac 20 mg to address depression and anxiety (pt. scheduled for first dose today- 1/5/24)  - c/ww Gabapentin from 200 mg BID to 200 mg TID to address anxiety.  - Dispo: likely inpt rehab given severe deconditioning. Inpt psych would be ideal but likely limited by mobility/rehab needs.   Will continue to follow while here.   28 years old male with h/o Lupus ( diagnosed in 09/2023, on Plaquenil present to [place] ED on 12/12/23  with complain of chest pain and SOB admitted for fevers, PE, pleural effusions, course c/b high fever and tonic-clonic seizure, transferred to MICU for post-ictal and infectious monitoring. Psych consulted for depression. Pt had indicated a hx of depression/anxiety, had been in the  and was [facility] but [facility], and now is requesting to speak to someone. However he is rather medically active and compromised.   Reported being depressed for many years, no current safety concerns. C/o poor sleep due to pain  1/04: continues to report that he feels depressed, anxious, with poor sleep and appetite. Hx of trauma, difficulties to care for himself due to psychiatric condition.  1/5: Continues to report depressed mood, improving anxiety. Amenable to starting Prozac 20mg. Future-oriented, helping-seeking, no SIIP.  1/8: Patient c/o pain, depression, anxiety. Denied side effects from prozac.  1/12: P/o is improving, less anxiety, sleeps better, no safety concerns, no psychotic symptoms.    Plan:   - Continue medical stabilization as you are  - Address pain (pain management)  - PRN for sleep: melatonin 3 mg qhs.  -Consult Holistic nurse to address his mood ( pt is amendable)  - No role for 1:1 at this time  - c/w Prozac 20 mg to address depression and anxiety (pt. scheduled for first dose today- 1/5/24)  - c/ww Gabapentin from 200 mg BID to 200 mg TID to address anxiety.  - Dispo: likely inpt rehab given severe deconditioning. Inpt psych would be ideal but likely limited by mobility/rehab needs.   Will continue to follow while here.

## 2024-01-12 NOTE — PROGRESS NOTE ADULT - SUBJECTIVE AND OBJECTIVE BOX
*************************************  NEUROLOGY PROGRESS NOTE  **************************************    TAYLA MARIE  Male  MRN-[id number]    Subjective: Patient seen and examined with Stroke team and attending. Patient reports feeling well    Interval History:  - No acute events overnight. Denies any episodes of shaking.   - MRI brain w/o showing small acute right temporal lobe infarct, results discussed with patient   - Endorses normal mood    VITAL SIGNS:  Vital Signs Last 24 Hrs  T(C): 36.5 (12 Jan 2024 06:05), Max: 37.1 (11 Jan 2024 12:47)  T(F): 97.7 (12 Jan 2024 06:05), Max: 98.8 (11 Jan 2024 12:47)  HR: 71 (12 Jan 2024 06:05) (71 - 80)  BP: 126/88 (12 Jan 2024 06:05) (112/81 - 131/74)  BP(mean): --  RR: 17 (12 Jan 2024 06:05) (17 - 18)  SpO2: 100% (12 Jan 2024 06:05) (98% - 100%)    Parameters below as of 12 Jan 2024 06:05  Patient On (Oxygen Delivery Method): room air      PHYSICAL EXAMINATION:  General: Well-developed, well nourished, in no acute distress.  Eyes: Conjunctiva and sclera clear.  Neck: supple, nontender, good ROM  Lungs: no respiratory distress  Neurologic:  - Mental Status:  Alert, awake, oriented to person, place, and time (day, month, year); Speech is fluent with intact naming, repetition, and comprehension  - Cranial Nerves II-XII:  VFF, No nystagmus or APD noted, EOMI (2mm and reactive), PERRLA, V1-V3 intact, no facial asymmetry, t/p midline, trap intact.  - Motor:  Strength is 5/5 throughout.  There is no pronator drift.  Normal muscle bulk and tone throughout.  - Reflexes:  2+ and symmetric at the biceps, triceps, brachioradialis, knees, and ankles.  Plantar responses flexor.  - Sensory:  Intact to light touch throughout.  - Coordination:  Finger-nose-finger and heel-knee-shin intact without dysmetria.    - Gait:  Not assessed     LABS:                          10.8   8.47  )-----------( 226      ( 12 Jan 2024 05:18 )             32.7     01-12    133<L>  |  94<L>  |  14  ----------------------------<  85  4.2   |  26  |  0.78    Ca    9.8      12 Jan 2024 05:18  Phos  4.9     01-12  Mg     1.90     01-12    TPro  7.6  /  Alb  3.5  /  TBili  0.6  /  DBili  <0.2  /  AST  63<H>  /  ALT  125<H>  /  AlkPhos  110  01-12    PTT - ( 12 Jan 2024 05:18 )  PTT:78.6 sec      RADIOLOGY & ADDITIONAL STUDIES:        MR Head No Cont (01.11.24 @ 21:00)   Small area of nonspecific diffusion restriction, and susceptibility   artifact in the right temporal lobe white matter, with T2/FLAIR   hyperintensities that extends to the cortical gray matter. Differential   includes cerebritis, acute infarction and/or post ictal changes. Clinical   correlation will determine the need for continued follow-up.    Multiple curvilinear areas of signal void adjacent to the left middle   cerebral artery with abnormal signal within the left insular region. The   possibility of vasculitis or venous thrombosis with associated infarction   is raised. CT or MR angiography of the brain is advised for further   evaluation.       *************************************  NEUROLOGY PROGRESS NOTE  **************************************    TAYLA MARIE  Male  MRN-[id number]    Subjective: Patient seen and examined with Stroke team and attending. Patient reports feeling well    Interval History:  - No acute events overnight. Denies any episodes of shaking.   - MRI brain w/o showing small acute right temporal lobe infarct, results discussed with patient   - Endorses normal mood    VITAL SIGNS:  Vital Signs Last 24 Hrs  T(C): 36.5 (12 Jan 2024 06:05), Max: 37.1 (11 Jan 2024 12:47)  T(F): 97.7 (12 Jan 2024 06:05), Max: 98.8 (11 Jan 2024 12:47)  HR: 71 (12 Jan 2024 06:05) (71 - 80)  BP: 126/88 (12 Jan 2024 06:05) (112/81 - 131/74)  BP(mean): --  RR: 17 (12 Jan 2024 06:05) (17 - 18)  SpO2: 100% (12 Jan 2024 06:05) (98% - 100%)    Parameters below as of 12 Jan 2024 06:05  Patient On (Oxygen Delivery Method): room air      PHYSICAL EXAMINATION:  General: Well-developed, well nourished, in no acute distress.  Eyes: Conjunctiva and sclera clear.  Neck: supple, nontender, good ROM  Lungs: no respiratory distress  Neurologic:  - Mental Status:  Alert, awake, oriented to person, place, and time (day, month, year); Speech is fluent with intact naming, repetition, and comprehension  - Cranial Nerves II-XII:  VFF, No nystagmus or APD noted, EOMI (2mm and reactive), PERRLA, V1-V3 intact, no facial asymmetry, t/p midline, trap intact.  - Motor:  Strength is 5/5 throughout.  There is no pronator drift.  Normal muscle bulk and tone throughout.  - Reflexes:  2+ and symmetric at the biceps, triceps, brachioradialis, knees, and ankles.  Plantar responses flexor.  - Sensory:  Intact to light touch throughout.  - Coordination:  Finger-nose-finger and heel-knee-shin intact without dysmetria.    - Gait:  Not assessed     LABS:                          10.8   8.47  )-----------( 226      ( 12 Jan 2024 05:18 )             32.7     01-12    133<L>  |  94<L>  |  14  ----------------------------<  85  4.2   |  26  |  0.78    Ca    9.8      12 Jan 2024 05:18  Phos  4.9     01-12  Mg     1.90     01-12    TPro  7.6  /  Alb  3.5  /  TBili  0.6  /  DBili  <0.2  /  AST  63<H>  /  ALT  125<H>  /  AlkPhos  110  01-12    PTT - ( 12 Jan 2024 05:18 )  PTT:78.6 sec      RADIOLOGY & ADDITIONAL STUDIES:        MR Head No Cont (01.11.24 @ 21:00)   Small area of nonspecific diffusion restriction, and susceptibility   artifact in the right temporal lobe white matter, with T2/FLAIR   hyperintensities that extends to the cortical gray matter. Differential   includes cerebritis, acute infarction and/or post ictal changes. Clinical   correlation will determine the need for continued follow-up.    Multiple curvilinear areas of signal void adjacent to the left middle   cerebral artery with abnormal signal within the left insular region. The   possibility of vasculitis or venous thrombosis with associated infarction   is raised. CT or MR angiography of the brain is advised for further   evaluation.

## 2024-01-13 LAB
ALBUMIN SERPL ELPH-MCNC: 3.4 G/DL — SIGNIFICANT CHANGE UP (ref 3.3–5)
ALBUMIN SERPL ELPH-MCNC: 3.4 G/DL — SIGNIFICANT CHANGE UP (ref 3.3–5)
ALP SERPL-CCNC: 103 U/L — SIGNIFICANT CHANGE UP (ref 40–120)
ALP SERPL-CCNC: 103 U/L — SIGNIFICANT CHANGE UP (ref 40–120)
ALT FLD-CCNC: 96 U/L — HIGH (ref 4–41)
ALT FLD-CCNC: 96 U/L — HIGH (ref 4–41)
ANION GAP SERPL CALC-SCNC: 13 MMOL/L — SIGNIFICANT CHANGE UP (ref 7–14)
ANION GAP SERPL CALC-SCNC: 13 MMOL/L — SIGNIFICANT CHANGE UP (ref 7–14)
AST SERPL-CCNC: 34 U/L — SIGNIFICANT CHANGE UP (ref 4–40)
AST SERPL-CCNC: 34 U/L — SIGNIFICANT CHANGE UP (ref 4–40)
BILIRUB SERPL-MCNC: 0.5 MG/DL — SIGNIFICANT CHANGE UP (ref 0.2–1.2)
BILIRUB SERPL-MCNC: 0.5 MG/DL — SIGNIFICANT CHANGE UP (ref 0.2–1.2)
BUN SERPL-MCNC: 14 MG/DL — SIGNIFICANT CHANGE UP (ref 7–23)
BUN SERPL-MCNC: 14 MG/DL — SIGNIFICANT CHANGE UP (ref 7–23)
CALCIUM SERPL-MCNC: 10.2 MG/DL — SIGNIFICANT CHANGE UP (ref 8.4–10.5)
CALCIUM SERPL-MCNC: 10.2 MG/DL — SIGNIFICANT CHANGE UP (ref 8.4–10.5)
CHLORIDE SERPL-SCNC: 94 MMOL/L — LOW (ref 98–107)
CHLORIDE SERPL-SCNC: 94 MMOL/L — LOW (ref 98–107)
CO2 SERPL-SCNC: 24 MMOL/L — SIGNIFICANT CHANGE UP (ref 22–31)
CO2 SERPL-SCNC: 24 MMOL/L — SIGNIFICANT CHANGE UP (ref 22–31)
CREAT SERPL-MCNC: 0.88 MG/DL — SIGNIFICANT CHANGE UP (ref 0.5–1.3)
CREAT SERPL-MCNC: 0.88 MG/DL — SIGNIFICANT CHANGE UP (ref 0.5–1.3)
DSDNA AB SER-ACNC: 13 IU/ML — SIGNIFICANT CHANGE UP
DSDNA AB SER-ACNC: 13 IU/ML — SIGNIFICANT CHANGE UP
EGFR: 119 ML/MIN/1.73M2 — SIGNIFICANT CHANGE UP
EGFR: 119 ML/MIN/1.73M2 — SIGNIFICANT CHANGE UP
GLUCOSE SERPL-MCNC: 86 MG/DL — SIGNIFICANT CHANGE UP (ref 70–99)
GLUCOSE SERPL-MCNC: 86 MG/DL — SIGNIFICANT CHANGE UP (ref 70–99)
HCT VFR BLD CALC: 32.3 % — LOW (ref 39–50)
HCT VFR BLD CALC: 32.3 % — LOW (ref 39–50)
HGB BLD-MCNC: 10.7 G/DL — LOW (ref 13–17)
HGB BLD-MCNC: 10.7 G/DL — LOW (ref 13–17)
MAGNESIUM SERPL-MCNC: 1.8 MG/DL — SIGNIFICANT CHANGE UP (ref 1.6–2.6)
MAGNESIUM SERPL-MCNC: 1.8 MG/DL — SIGNIFICANT CHANGE UP (ref 1.6–2.6)
MCHC RBC-ENTMCNC: 30.9 PG — SIGNIFICANT CHANGE UP (ref 27–34)
MCHC RBC-ENTMCNC: 30.9 PG — SIGNIFICANT CHANGE UP (ref 27–34)
MCHC RBC-ENTMCNC: 33.1 GM/DL — SIGNIFICANT CHANGE UP (ref 32–36)
MCHC RBC-ENTMCNC: 33.1 GM/DL — SIGNIFICANT CHANGE UP (ref 32–36)
MCV RBC AUTO: 93.4 FL — SIGNIFICANT CHANGE UP (ref 80–100)
MCV RBC AUTO: 93.4 FL — SIGNIFICANT CHANGE UP (ref 80–100)
NRBC # BLD: 0 /100 WBCS — SIGNIFICANT CHANGE UP (ref 0–0)
NRBC # BLD: 0 /100 WBCS — SIGNIFICANT CHANGE UP (ref 0–0)
NRBC # FLD: 0 K/UL — SIGNIFICANT CHANGE UP (ref 0–0)
NRBC # FLD: 0 K/UL — SIGNIFICANT CHANGE UP (ref 0–0)
PLATELET # BLD AUTO: 221 K/UL — SIGNIFICANT CHANGE UP (ref 150–400)
PLATELET # BLD AUTO: 221 K/UL — SIGNIFICANT CHANGE UP (ref 150–400)
POTASSIUM SERPL-MCNC: 4.5 MMOL/L — SIGNIFICANT CHANGE UP (ref 3.5–5.3)
POTASSIUM SERPL-MCNC: 4.5 MMOL/L — SIGNIFICANT CHANGE UP (ref 3.5–5.3)
POTASSIUM SERPL-SCNC: 4.5 MMOL/L — SIGNIFICANT CHANGE UP (ref 3.5–5.3)
POTASSIUM SERPL-SCNC: 4.5 MMOL/L — SIGNIFICANT CHANGE UP (ref 3.5–5.3)
PROT SERPL-MCNC: 7.6 G/DL — SIGNIFICANT CHANGE UP (ref 6–8.3)
PROT SERPL-MCNC: 7.6 G/DL — SIGNIFICANT CHANGE UP (ref 6–8.3)
RBC # BLD: 3.46 M/UL — LOW (ref 4.2–5.8)
RBC # BLD: 3.46 M/UL — LOW (ref 4.2–5.8)
RBC # FLD: 16 % — HIGH (ref 10.3–14.5)
RBC # FLD: 16 % — HIGH (ref 10.3–14.5)
SODIUM SERPL-SCNC: 131 MMOL/L — LOW (ref 135–145)
SODIUM SERPL-SCNC: 131 MMOL/L — LOW (ref 135–145)
WBC # BLD: 6.29 K/UL — SIGNIFICANT CHANGE UP (ref 3.8–10.5)
WBC # BLD: 6.29 K/UL — SIGNIFICANT CHANGE UP (ref 3.8–10.5)
WBC # FLD AUTO: 6.29 K/UL — SIGNIFICANT CHANGE UP (ref 3.8–10.5)
WBC # FLD AUTO: 6.29 K/UL — SIGNIFICANT CHANGE UP (ref 3.8–10.5)

## 2024-01-13 RX ADMIN — LIDOCAINE 1 PATCH: 4 CREAM TOPICAL at 13:03

## 2024-01-13 RX ADMIN — SODIUM CHLORIDE 1 GRAM(S): 9 INJECTION INTRAMUSCULAR; INTRAVENOUS; SUBCUTANEOUS at 22:40

## 2024-01-13 RX ADMIN — LIDOCAINE 2 PATCH: 4 CREAM TOPICAL at 13:02

## 2024-01-13 RX ADMIN — GABAPENTIN 200 MILLIGRAM(S): 400 CAPSULE ORAL at 14:46

## 2024-01-13 RX ADMIN — APIXABAN 5 MILLIGRAM(S): 2.5 TABLET, FILM COATED ORAL at 17:42

## 2024-01-13 RX ADMIN — OXYCODONE HYDROCHLORIDE 2.5 MILLIGRAM(S): 5 TABLET ORAL at 11:38

## 2024-01-13 RX ADMIN — LIDOCAINE 1 PATCH: 4 CREAM TOPICAL at 04:02

## 2024-01-13 RX ADMIN — PANTOPRAZOLE SODIUM 40 MILLIGRAM(S): 20 TABLET, DELAYED RELEASE ORAL at 05:11

## 2024-01-13 RX ADMIN — APIXABAN 5 MILLIGRAM(S): 2.5 TABLET, FILM COATED ORAL at 11:37

## 2024-01-13 RX ADMIN — Medication 200 MILLIGRAM(S): at 17:42

## 2024-01-13 RX ADMIN — Medication 200 MILLIGRAM(S): at 05:11

## 2024-01-13 RX ADMIN — SODIUM CHLORIDE 1 GRAM(S): 9 INJECTION INTRAMUSCULAR; INTRAVENOUS; SUBCUTANEOUS at 14:47

## 2024-01-13 RX ADMIN — LIDOCAINE 2 PATCH: 4 CREAM TOPICAL at 04:02

## 2024-01-13 RX ADMIN — GABAPENTIN 200 MILLIGRAM(S): 400 CAPSULE ORAL at 05:11

## 2024-01-13 RX ADMIN — Medication 40 MILLIGRAM(S): at 05:12

## 2024-01-13 RX ADMIN — Medication 20 MILLIGRAM(S): at 11:38

## 2024-01-13 RX ADMIN — Medication 2 DROP(S): at 05:11

## 2024-01-13 RX ADMIN — Medication 3 MILLIGRAM(S): at 22:02

## 2024-01-13 RX ADMIN — OXYCODONE HYDROCHLORIDE 5 MILLIGRAM(S): 5 TABLET ORAL at 22:02

## 2024-01-13 RX ADMIN — OXYCODONE HYDROCHLORIDE 5 MILLIGRAM(S): 5 TABLET ORAL at 16:07

## 2024-01-13 RX ADMIN — SODIUM CHLORIDE 1 GRAM(S): 9 INJECTION INTRAMUSCULAR; INTRAVENOUS; SUBCUTANEOUS at 05:11

## 2024-01-13 RX ADMIN — Medication 2 DROP(S): at 17:43

## 2024-01-13 RX ADMIN — OXYCODONE HYDROCHLORIDE 2.5 MILLIGRAM(S): 5 TABLET ORAL at 09:48

## 2024-01-13 RX ADMIN — OXYCODONE HYDROCHLORIDE 5 MILLIGRAM(S): 5 TABLET ORAL at 17:37

## 2024-01-13 RX ADMIN — GABAPENTIN 200 MILLIGRAM(S): 400 CAPSULE ORAL at 22:03

## 2024-01-13 RX ADMIN — Medication 15 MILLILITER(S): at 11:38

## 2024-01-13 RX ADMIN — CHLORHEXIDINE GLUCONATE 1 APPLICATION(S): 213 SOLUTION TOPICAL at 11:37

## 2024-01-13 NOTE — PROGRESS NOTE ADULT - NUTRITIONAL ASSESSMENT
This patient has been assessed with a concern for Malnutrition and has been determined to have a diagnosis/diagnoses of Severe protein-calorie malnutrition.    This patient is being managed with:   Diet Regular-  1000mL Fluid Restriction ([id number])  Supplement Feeding Modality:  Oral  Ensure Plus High Protein Cans or Servings Per Day:  1       Frequency:  Three Times a day  Entered: Carter  3 2024  4:21PM   This patient has been assessed with a concern for Malnutrition and has been determined to have a diagnosis/diagnoses of Severe protein-calorie malnutrition.    This patient is being managed with:   Diet Regular-  1000mL Fluid Restriction ([id number])  Supplement Feeding Modality:  Oral  Ensure Plus High Protein Cans or Servings Per Day:  1       Frequency:  Three Times a day  Entered: Carter  3 2024  4:21PM

## 2024-01-13 NOTE — PROGRESS NOTE ADULT - SUBJECTIVE AND OBJECTIVE BOX
Name of Patient : TAYLA MARIE  MRN: [id number]  Date of visit: 01-13-24       Subjective: Patient seen and examined. No new events except as noted.   Calm, doing okay     REVIEW OF SYSTEMS:    CONSTITUTIONAL: No weakness, fevers or chills  EYES/ENT: No visual changes;  No vertigo or throat pain   NECK: No pain or stiffness  RESPIRATORY: No cough, wheezing, hemoptysis; No shortness of breath  CARDIOVASCULAR: No chest pain or palpitations  GASTROINTESTINAL: No abdominal or epigastric pain. No nausea, vomiting, or hematemesis; No diarrhea or constipation. No melena or hematochezia.  GENITOURINARY: No dysuria, frequency or hematuria  NEUROLOGICAL: No numbness or weakness  SKIN: No itching, burning, rashes, or lesions   All other review of systems is negative unless indicated above.    MEDICATIONS:  MEDICATIONS  (STANDING):  apixaban 5 milliGRAM(s) Oral every 12 hours  chlorhexidine 2% Cloths 1 Application(s) Topical daily  ciprofloxacin  0.3% Ophthalmic Solution for Otic Use 2 Drop(s) Both Ears two times a day  FLUoxetine 20 milliGRAM(s) Oral daily  gabapentin 200 milliGRAM(s) Oral three times a day  hydroxychloroquine 200 milliGRAM(s) Oral two times a day  lidocaine   4% Patch 1 Patch Transdermal every 24 hours  lidocaine   4% Patch 2 Patch Transdermal every 24 hours  melatonin 3 milliGRAM(s) Oral <User Schedule>  multivitamin/minerals/iron Oral Solution (CENTRUM) 15 milliLiter(s) Oral daily  pantoprazole    Tablet 40 milliGRAM(s) Oral before breakfast  predniSONE   Tablet 40 milliGRAM(s) Oral daily  sodium chloride 1 Gram(s) Oral three times a day  sodium chloride 3%. 500 milliLiter(s) (30 mL/Hr) IV Continuous <Continuous>  trimethoprim  160 mG/sulfamethoxazole 800 mG 1 Tablet(s) Oral <User Schedule>      PHYSICAL EXAM:  T(C): 36.6 (01-13-24 @ 13:00), Max: 36.7 (01-13-24 @ 09:00)  HR: 105 (01-13-24 @ 13:00) (77 - 105)  BP: 129/82 (01-13-24 @ 13:00) (121/85 - 129/82)  RR: 18 (01-13-24 @ 13:00) (17 - 18)  SpO2: 100% (01-13-24 @ 13:00) (100% - 100%)  Wt(kg): --  I&O's Summary        Appearance: Normal	  HEENT:  PERRLA   Lymphatic: No lymphadenopathy   Cardiovascular: Normal S1 S2, no JVD  Respiratory: normal effort , clear  Gastrointestinal:  Soft, Non-tender  Skin: No rashes,  warm to touch  Psychiatry:  Mood & affect appropriate  Musculuskeletal: No edema    recent labs, Imaging and EKGs personally reviewed                           10.7   6.29  )-----------( 221      ( 13 Jan 2024 05:15 )             32.3               01-13    131<L>  |  94<L>  |  14  ----------------------------<  86  4.5   |  24  |  0.88    Ca    10.2      13 Jan 2024 05:15  Phos  4.9     01-12  Mg     1.80     01-13    TPro  7.6  /  Alb  3.4  /  TBili  0.5  /  DBili  x   /  AST  34  /  ALT  96<H>  /  AlkPhos  103  01-13    PTT - ( 12 Jan 2024 05:18 )  PTT:78.6 sec                   Urinalysis Basic - ( 13 Jan 2024 05:15 )    Color: x / Appearance: x / SG: x / pH: x  Gluc: 86 mg/dL / Ketone: x  / Bili: x / Urobili: x   Blood: x / Protein: x / Nitrite: x   Leuk Esterase: x / RBC: x / WBC x   Sq Epi: x / Non Sq Epi: x / Bacteria: x                     Name of Patient : TAYLA MARIE  MRN: [id number]  Date of visit: 01-13-24       Subjective: Patient seen and examined. No new events except as noted.   Calm, doing okay     REVIEW OF SYSTEMS:    CONSTITUTIONAL: No weakness, fevers or chills  EYES/ENT: No visual changes;  No vertigo or throat pain   NECK: No pain or stiffness  RESPIRATORY: No cough, wheezing, hemoptysis; No shortness of breath  CARDIOVASCULAR: No chest pain or palpitations  GASTROINTESTINAL: No abdominal or epigastric pain. No nausea, vomiting, or hematemesis; No diarrhea or constipation. No melena or hematochezia.  GENITOURINARY: No dysuria, frequency or hematuria  NEUROLOGICAL: No numbness or weakness  SKIN: No itching, burning, rashes, or lesions   All other review of systems is negative unless indicated above.    MEDICATIONS:  MEDICATIONS  (STANDING):  apixaban 5 milliGRAM(s) Oral every 12 hours  chlorhexidine 2% Cloths 1 Application(s) Topical daily  ciprofloxacin  0.3% Ophthalmic Solution for Otic Use 2 Drop(s) Both Ears two times a day  FLUoxetine 20 milliGRAM(s) Oral daily  gabapentin 200 milliGRAM(s) Oral three times a day  hydroxychloroquine 200 milliGRAM(s) Oral two times a day  lidocaine   4% Patch 1 Patch Transdermal every 24 hours  lidocaine   4% Patch 2 Patch Transdermal every 24 hours  melatonin 3 milliGRAM(s) Oral <User Schedule>  multivitamin/minerals/iron Oral Solution (CENTRUM) 15 milliLiter(s) Oral daily  pantoprazole    Tablet 40 milliGRAM(s) Oral before breakfast  predniSONE   Tablet 40 milliGRAM(s) Oral daily  sodium chloride 1 Gram(s) Oral three times a day  sodium chloride 3%. 500 milliLiter(s) (30 mL/Hr) IV Continuous <Continuous>  trimethoprim  160 mG/sulfamethoxazole 800 mG 1 Tablet(s) Oral <User Schedule>      PHYSICAL EXAM:  T(C): 36.6 (01-13-24 @ 13:00), Max: 36.7 (01-13-24 @ 09:00)  HR: 105 (01-13-24 @ 13:00) (77 - 105)  BP: 129/82 (01-13-24 @ 13:00) (121/85 - 129/82)  RR: 18 (01-13-24 @ 13:00) (17 - 18)  SpO2: 100% (01-13-24 @ 13:00) (100% - 100%)  Wt(kg): --  I&O's Summary        Appearance: Normal	  HEENT:  PERRLA   Lymphatic: No lymphadenopathy   Cardiovascular: Normal S1 S2, no JVD  Respiratory: normal effort , clear  Gastrointestinal:  Soft, Non-tender  Skin: No rashes,  warm to touch  Psychiatry:  Mood & affect appropriate  Musculuskeletal: No edema    recent labs, Imaging and EKGs personally reviewed                           10.7   6.29  )-----------( 221      ( 13 Jan 2024 05:15 )             32.3               01-13    131<L>  |  94<L>  |  14  ----------------------------<  86  4.5   |  24  |  0.88    Ca    10.2      13 Jan 2024 05:15  Phos  4.9     01-12  Mg     1.80     01-13    TPro  7.6  /  Alb  3.4  /  TBili  0.5  /  DBili  x   /  AST  34  /  ALT  96<H>  /  AlkPhos  103  01-13    PTT - ( 12 Jan 2024 05:18 )  PTT:78.6 sec                   Urinalysis Basic - ( 13 Jan 2024 05:15 )    Color: x / Appearance: x / SG: x / pH: x  Gluc: 86 mg/dL / Ketone: x  / Bili: x / Urobili: x   Blood: x / Protein: x / Nitrite: x   Leuk Esterase: x / RBC: x / WBC x   Sq Epi: x / Non Sq Epi: x / Bacteria: x

## 2024-01-13 NOTE — PROGRESS NOTE ADULT - ASSESSMENT
PE  a/c   no evidence of right heart strain   no significant evidence of myocardial injury   normal LV function     Pericardial effusion   inflammatory in setting of SLE   trace effusion   no sign of tamponade  repeat echo in future   fu with rheum for ongoing work up

## 2024-01-13 NOTE — PROGRESS NOTE ADULT - ASSESSMENT
28 years old male with h/o Lupus ( diagnosed in 09/2023, on Plaquenil present to [place] ED on 12/12/23  with complain of chest pain and SOB. Patient reported left sided pleuritic chest pain which started 3 days ago. Pain is progressively worsened, associated with SOB and cough. Patient was seen in OSH ED and was prescribed antibiotics. Patient reported significantly worsening of left sided pleuritic chest pain today. No fever or chills. Patient reported loss of appetite and had a few episode of diarrhea for last 2 days. CTA chest with acute right upper lobe and left lower lobe segmental/subsegmental pulmonary emboli. No CT evidence of right heart strain. New bilateral lower lobe consolidations with areas of central clearing. Pneumonia and pulmonary infarcts are in the differential. New bilateral pleural effusions, small on the left and trace on the right. Bilateral axillary and supraclavicular adenopathy of unclear etiology. Patient was started on heparin ggt transitioned to eliquis on 12/19,  patient with worsening SOB/ hypoxia requiring nasal cannula oxygen supplementation. 12/20  Repeat Xray shows increased moderate left pleural effusion and compressive atelectasis trace right effusion and linear atelectasis. Thoracic team consulted for possible pigtail insertion Bedside US: Large left effusion with septations. Right simple effusion , + pericardial effusion- lower extremity dopplers negative for DVT.    # Pulm effusion/ Fever   S/P thoracentesis , follow up results   heparin for AC  TS care appreciated   O2 supplement   monitor output   Zosyn for now , ABx per ID , completed   LP done  Hematuria noted   Urology eval   Completed course of ABx   IR, S/P Renal biopsy , follow up results   repeat urine study   resume heparin       # Recurrent seizure   Neuro eval  EEG   fall precautions   MRI noted   RO CVA VS other etiologies  transfer to tele  neuro follow up   CT per neuro     # Fever  SIRS   Abx , completed per iD   Rheum adn ID follow up   Renal biopsy done, follow up results       # Hyponatremia/ Seizure   RRT   Neuro follow up       #PE   on heparin , resume after thoracentesis   DVT negative  Heme onc eval   likley lupus related     #Hxof lupus  on hydroxychloroquine   Heme eval   Rheum eval   steroids per rheum       DVT andgIPPX   28 years old male with h/o Lupus ( diagnosed in 09/2023, on Plaquenil present to [place] ED on 12/12/23  with complain of chest pain and SOB. Patient reported left sided pleuritic chest pain which started 3 days ago. Pain is progressively worsened, associated with SOB and cough. Patient was seen in OSH ED and was prescribed antibiotics. Patient reported significantly worsening of left sided pleuritic chest pain today. No fever or chills. Patient reported loss of appetite and had a few episode of diarrhea for last 2 days. CTA chest with acute right upper lobe and left lower lobe segmental/subsegmental pulmonary emboli. No CT evidence of right heart strain. New bilateral lower lobe consolidations with areas of central clearing. Pneumonia and pulmonary infarcts are in the differential. New bilateral pleural effusions, small on the left and trace on the right. Bilateral axillary and supraclavicular adenopathy of unclear etiology. Patient was started on heparin ggt transitioned to eliquis on 12/19,  patient with worsening SOB/ hypoxia requiring nasal cannula oxygen supplementation. 12/20  Repeat Xray shows increased moderate left pleural effusion and compressive atelectasis trace right effusion and linear atelectasis. Thoracic team consulted for possible pigtail insertion Bedside US: Large left effusion with septations. Right simple effusion , + pericardial effusion- lower extremity dopplers negative for DVT.    # Pulm effusion/ Fever   S/P thoracentesis , follow up results   heparin for AC  TS care appreciated   O2 supplement   monitor output   Zosyn for now , ABx per ID , completed   LP done  Hematuria noted   Urology eval   Completed course of ABx   IR, S/P Renal biopsy , follow up results   repeat urine study   resume heparin       # Recurrent seizure   Neuro eval  EEG   fall precautions   MRI noted   RO CVA VS other etiologies  transfer to tele  neuro follow up   CT per neuro     # Fever  SIRS   Abx , completed per iD   Rheum adn ID follow up   Renal biopsy done, follow up results       # Hyponatremia/ Seizure   RRT   Neuro follow up       #PE   on heparin , resume after thoracentesis   DVT negative  Heme onc eval   likley lupus related     #Hxof lupus  on hydroxychloroquine   Heme eval   Rheum eval   steroids per rheum       DVT andgIPPX

## 2024-01-14 LAB
ALBUMIN SERPL ELPH-MCNC: 3.4 G/DL — SIGNIFICANT CHANGE UP (ref 3.3–5)
ALBUMIN SERPL ELPH-MCNC: 3.4 G/DL — SIGNIFICANT CHANGE UP (ref 3.3–5)
ALP SERPL-CCNC: 97 U/L — SIGNIFICANT CHANGE UP (ref 40–120)
ALP SERPL-CCNC: 97 U/L — SIGNIFICANT CHANGE UP (ref 40–120)
ALT FLD-CCNC: 88 U/L — HIGH (ref 4–41)
ALT FLD-CCNC: 88 U/L — HIGH (ref 4–41)
ANION GAP SERPL CALC-SCNC: 11 MMOL/L — SIGNIFICANT CHANGE UP (ref 7–14)
ANION GAP SERPL CALC-SCNC: 11 MMOL/L — SIGNIFICANT CHANGE UP (ref 7–14)
AST SERPL-CCNC: 32 U/L — SIGNIFICANT CHANGE UP (ref 4–40)
AST SERPL-CCNC: 32 U/L — SIGNIFICANT CHANGE UP (ref 4–40)
BILIRUB SERPL-MCNC: 0.4 MG/DL — SIGNIFICANT CHANGE UP (ref 0.2–1.2)
BILIRUB SERPL-MCNC: 0.4 MG/DL — SIGNIFICANT CHANGE UP (ref 0.2–1.2)
BUN SERPL-MCNC: 17 MG/DL — SIGNIFICANT CHANGE UP (ref 7–23)
BUN SERPL-MCNC: 17 MG/DL — SIGNIFICANT CHANGE UP (ref 7–23)
CALCIUM SERPL-MCNC: 9.9 MG/DL — SIGNIFICANT CHANGE UP (ref 8.4–10.5)
CALCIUM SERPL-MCNC: 9.9 MG/DL — SIGNIFICANT CHANGE UP (ref 8.4–10.5)
CHLORIDE SERPL-SCNC: 97 MMOL/L — LOW (ref 98–107)
CHLORIDE SERPL-SCNC: 97 MMOL/L — LOW (ref 98–107)
CO2 SERPL-SCNC: 26 MMOL/L — SIGNIFICANT CHANGE UP (ref 22–31)
CO2 SERPL-SCNC: 26 MMOL/L — SIGNIFICANT CHANGE UP (ref 22–31)
CREAT SERPL-MCNC: 0.74 MG/DL — SIGNIFICANT CHANGE UP (ref 0.5–1.3)
CREAT SERPL-MCNC: 0.74 MG/DL — SIGNIFICANT CHANGE UP (ref 0.5–1.3)
EGFR: 126 ML/MIN/1.73M2 — SIGNIFICANT CHANGE UP
EGFR: 126 ML/MIN/1.73M2 — SIGNIFICANT CHANGE UP
GLUCOSE SERPL-MCNC: 91 MG/DL — SIGNIFICANT CHANGE UP (ref 70–99)
GLUCOSE SERPL-MCNC: 91 MG/DL — SIGNIFICANT CHANGE UP (ref 70–99)
MAGNESIUM SERPL-MCNC: 1.8 MG/DL — SIGNIFICANT CHANGE UP (ref 1.6–2.6)
MAGNESIUM SERPL-MCNC: 1.8 MG/DL — SIGNIFICANT CHANGE UP (ref 1.6–2.6)
POTASSIUM SERPL-MCNC: 4.2 MMOL/L — SIGNIFICANT CHANGE UP (ref 3.5–5.3)
POTASSIUM SERPL-MCNC: 4.2 MMOL/L — SIGNIFICANT CHANGE UP (ref 3.5–5.3)
POTASSIUM SERPL-SCNC: 4.2 MMOL/L — SIGNIFICANT CHANGE UP (ref 3.5–5.3)
POTASSIUM SERPL-SCNC: 4.2 MMOL/L — SIGNIFICANT CHANGE UP (ref 3.5–5.3)
PROT SERPL-MCNC: 7.3 G/DL — SIGNIFICANT CHANGE UP (ref 6–8.3)
PROT SERPL-MCNC: 7.3 G/DL — SIGNIFICANT CHANGE UP (ref 6–8.3)
SODIUM SERPL-SCNC: 134 MMOL/L — LOW (ref 135–145)
SODIUM SERPL-SCNC: 134 MMOL/L — LOW (ref 135–145)

## 2024-01-14 RX ADMIN — LIDOCAINE 2 PATCH: 4 CREAM TOPICAL at 19:00

## 2024-01-14 RX ADMIN — Medication 3 MILLIGRAM(S): at 21:28

## 2024-01-14 RX ADMIN — Medication 200 MILLIGRAM(S): at 05:00

## 2024-01-14 RX ADMIN — Medication 40 MILLIGRAM(S): at 08:53

## 2024-01-14 RX ADMIN — GABAPENTIN 200 MILLIGRAM(S): 400 CAPSULE ORAL at 05:00

## 2024-01-14 RX ADMIN — LIDOCAINE 1 PATCH: 4 CREAM TOPICAL at 12:53

## 2024-01-14 RX ADMIN — SODIUM CHLORIDE 1 GRAM(S): 9 INJECTION INTRAMUSCULAR; INTRAVENOUS; SUBCUTANEOUS at 05:01

## 2024-01-14 RX ADMIN — GABAPENTIN 200 MILLIGRAM(S): 400 CAPSULE ORAL at 21:28

## 2024-01-14 RX ADMIN — GABAPENTIN 200 MILLIGRAM(S): 400 CAPSULE ORAL at 12:51

## 2024-01-14 RX ADMIN — APIXABAN 5 MILLIGRAM(S): 2.5 TABLET, FILM COATED ORAL at 05:01

## 2024-01-14 RX ADMIN — OXYCODONE HYDROCHLORIDE 5 MILLIGRAM(S): 5 TABLET ORAL at 18:56

## 2024-01-14 RX ADMIN — SODIUM CHLORIDE 1 GRAM(S): 9 INJECTION INTRAMUSCULAR; INTRAVENOUS; SUBCUTANEOUS at 21:29

## 2024-01-14 RX ADMIN — CHLORHEXIDINE GLUCONATE 1 APPLICATION(S): 213 SOLUTION TOPICAL at 12:55

## 2024-01-14 RX ADMIN — LIDOCAINE 2 PATCH: 4 CREAM TOPICAL at 12:54

## 2024-01-14 RX ADMIN — Medication 2 DROP(S): at 06:18

## 2024-01-14 RX ADMIN — SODIUM CHLORIDE 1 GRAM(S): 9 INJECTION INTRAMUSCULAR; INTRAVENOUS; SUBCUTANEOUS at 12:54

## 2024-01-14 RX ADMIN — Medication 15 MILLILITER(S): at 12:53

## 2024-01-14 RX ADMIN — OXYCODONE HYDROCHLORIDE 5 MILLIGRAM(S): 5 TABLET ORAL at 12:52

## 2024-01-14 RX ADMIN — Medication 2 DROP(S): at 17:32

## 2024-01-14 RX ADMIN — OXYCODONE HYDROCHLORIDE 5 MILLIGRAM(S): 5 TABLET ORAL at 13:52

## 2024-01-14 RX ADMIN — Medication 20 MILLIGRAM(S): at 12:53

## 2024-01-14 RX ADMIN — Medication 200 MILLIGRAM(S): at 17:33

## 2024-01-14 RX ADMIN — LIDOCAINE 1 PATCH: 4 CREAM TOPICAL at 19:00

## 2024-01-14 RX ADMIN — OXYCODONE HYDROCHLORIDE 5 MILLIGRAM(S): 5 TABLET ORAL at 05:01

## 2024-01-14 RX ADMIN — APIXABAN 5 MILLIGRAM(S): 2.5 TABLET, FILM COATED ORAL at 17:37

## 2024-01-14 RX ADMIN — PANTOPRAZOLE SODIUM 40 MILLIGRAM(S): 20 TABLET, DELAYED RELEASE ORAL at 05:01

## 2024-01-14 NOTE — PROGRESS NOTE ADULT - SUBJECTIVE AND OBJECTIVE BOX
Subjective: Patient seen and examined. No new events except as noted.     SUBJECTIVE/ROS:  nad      MEDICATIONS:  MEDICATIONS  (STANDING):  apixaban 5 milliGRAM(s) Oral every 12 hours  chlorhexidine 2% Cloths 1 Application(s) Topical daily  ciprofloxacin  0.3% Ophthalmic Solution for Otic Use 2 Drop(s) Both Ears two times a day  FLUoxetine 20 milliGRAM(s) Oral daily  gabapentin 200 milliGRAM(s) Oral three times a day  hydroxychloroquine 200 milliGRAM(s) Oral two times a day  lidocaine   4% Patch 1 Patch Transdermal every 24 hours  lidocaine   4% Patch 2 Patch Transdermal every 24 hours  melatonin 3 milliGRAM(s) Oral <User Schedule>  multivitamin/minerals/iron Oral Solution (CENTRUM) 15 milliLiter(s) Oral daily  pantoprazole    Tablet 40 milliGRAM(s) Oral before breakfast  predniSONE   Tablet 40 milliGRAM(s) Oral daily  sodium chloride 1 Gram(s) Oral three times a day  sodium chloride 3%. 500 milliLiter(s) (30 mL/Hr) IV Continuous <Continuous>  trimethoprim  160 mG/sulfamethoxazole 800 mG 1 Tablet(s) Oral <User Schedule>      PHYSICAL EXAM:  T(C): 36.7 (01-14-24 @ 05:00), Max: 36.7 (01-13-24 @ 09:00)  HR: 69 (01-14-24 @ 05:00) (69 - 105)  BP: 109/72 (01-14-24 @ 05:00) (109/72 - 129/82)  RR: 18 (01-14-24 @ 05:00) (17 - 18)  SpO2: 100% (01-14-24 @ 05:00) (100% - 100%)  Wt(kg): --  I&O's Summary          JVP: Normal  Neck: supple  Lung: clear   CV: S1 S2 , Murmur:  Abd: soft  Ext: No edema  neuro: Awake / alert  Psych: flat affect  Skin: normal``    LABS/DATA:    CARDIAC MARKERS:                                10.7   6.29  )-----------( 221      ( 13 Jan 2024 05:15 )             32.3     01-13    131<L>  |  94<L>  |  14  ----------------------------<  86  4.5   |  24  |  0.88    Ca    10.2      13 Jan 2024 05:15  Mg     1.80     01-13    TPro  7.6  /  Alb  3.4  /  TBili  0.5  /  DBili  x   /  AST  34  /  ALT  96<H>  /  AlkPhos  103  01-13    proBNP:   Lipid Profile:   HgA1c:   TSH:     TELE:  EKG:

## 2024-01-15 LAB
ALBUMIN SERPL ELPH-MCNC: 3.3 G/DL — SIGNIFICANT CHANGE UP (ref 3.3–5)
ALBUMIN SERPL ELPH-MCNC: 3.3 G/DL — SIGNIFICANT CHANGE UP (ref 3.3–5)
ALP SERPL-CCNC: 95 U/L — SIGNIFICANT CHANGE UP (ref 40–120)
ALP SERPL-CCNC: 95 U/L — SIGNIFICANT CHANGE UP (ref 40–120)
ALT FLD-CCNC: 94 U/L — HIGH (ref 4–41)
ALT FLD-CCNC: 94 U/L — HIGH (ref 4–41)
ANION GAP SERPL CALC-SCNC: 12 MMOL/L — SIGNIFICANT CHANGE UP (ref 7–14)
ANION GAP SERPL CALC-SCNC: 12 MMOL/L — SIGNIFICANT CHANGE UP (ref 7–14)
AST SERPL-CCNC: 38 U/L — SIGNIFICANT CHANGE UP (ref 4–40)
AST SERPL-CCNC: 38 U/L — SIGNIFICANT CHANGE UP (ref 4–40)
BILIRUB SERPL-MCNC: 0.3 MG/DL — SIGNIFICANT CHANGE UP (ref 0.2–1.2)
BILIRUB SERPL-MCNC: 0.3 MG/DL — SIGNIFICANT CHANGE UP (ref 0.2–1.2)
BUN SERPL-MCNC: 15 MG/DL — SIGNIFICANT CHANGE UP (ref 7–23)
BUN SERPL-MCNC: 15 MG/DL — SIGNIFICANT CHANGE UP (ref 7–23)
CALCIUM SERPL-MCNC: 9.9 MG/DL — SIGNIFICANT CHANGE UP (ref 8.4–10.5)
CALCIUM SERPL-MCNC: 9.9 MG/DL — SIGNIFICANT CHANGE UP (ref 8.4–10.5)
CHLORIDE SERPL-SCNC: 99 MMOL/L — SIGNIFICANT CHANGE UP (ref 98–107)
CHLORIDE SERPL-SCNC: 99 MMOL/L — SIGNIFICANT CHANGE UP (ref 98–107)
CO2 SERPL-SCNC: 25 MMOL/L — SIGNIFICANT CHANGE UP (ref 22–31)
CO2 SERPL-SCNC: 25 MMOL/L — SIGNIFICANT CHANGE UP (ref 22–31)
CREAT SERPL-MCNC: 0.65 MG/DL — SIGNIFICANT CHANGE UP (ref 0.5–1.3)
CREAT SERPL-MCNC: 0.65 MG/DL — SIGNIFICANT CHANGE UP (ref 0.5–1.3)
EGFR: 131 ML/MIN/1.73M2 — SIGNIFICANT CHANGE UP
EGFR: 131 ML/MIN/1.73M2 — SIGNIFICANT CHANGE UP
GLUCOSE SERPL-MCNC: 75 MG/DL — SIGNIFICANT CHANGE UP (ref 70–99)
GLUCOSE SERPL-MCNC: 75 MG/DL — SIGNIFICANT CHANGE UP (ref 70–99)
HCT VFR BLD CALC: 32 % — LOW (ref 39–50)
HCT VFR BLD CALC: 32 % — LOW (ref 39–50)
HGB BLD-MCNC: 10.4 G/DL — LOW (ref 13–17)
HGB BLD-MCNC: 10.4 G/DL — LOW (ref 13–17)
MAGNESIUM SERPL-MCNC: 1.8 MG/DL — SIGNIFICANT CHANGE UP (ref 1.6–2.6)
MAGNESIUM SERPL-MCNC: 1.8 MG/DL — SIGNIFICANT CHANGE UP (ref 1.6–2.6)
MCHC RBC-ENTMCNC: 30.3 PG — SIGNIFICANT CHANGE UP (ref 27–34)
MCHC RBC-ENTMCNC: 30.3 PG — SIGNIFICANT CHANGE UP (ref 27–34)
MCHC RBC-ENTMCNC: 32.5 GM/DL — SIGNIFICANT CHANGE UP (ref 32–36)
MCHC RBC-ENTMCNC: 32.5 GM/DL — SIGNIFICANT CHANGE UP (ref 32–36)
MCV RBC AUTO: 93.3 FL — SIGNIFICANT CHANGE UP (ref 80–100)
MCV RBC AUTO: 93.3 FL — SIGNIFICANT CHANGE UP (ref 80–100)
NRBC # BLD: 0 /100 WBCS — SIGNIFICANT CHANGE UP (ref 0–0)
NRBC # BLD: 0 /100 WBCS — SIGNIFICANT CHANGE UP (ref 0–0)
NRBC # FLD: 0 K/UL — SIGNIFICANT CHANGE UP (ref 0–0)
NRBC # FLD: 0 K/UL — SIGNIFICANT CHANGE UP (ref 0–0)
PLATELET # BLD AUTO: 259 K/UL — SIGNIFICANT CHANGE UP (ref 150–400)
PLATELET # BLD AUTO: 259 K/UL — SIGNIFICANT CHANGE UP (ref 150–400)
POTASSIUM SERPL-MCNC: 4 MMOL/L — SIGNIFICANT CHANGE UP (ref 3.5–5.3)
POTASSIUM SERPL-MCNC: 4 MMOL/L — SIGNIFICANT CHANGE UP (ref 3.5–5.3)
POTASSIUM SERPL-SCNC: 4 MMOL/L — SIGNIFICANT CHANGE UP (ref 3.5–5.3)
POTASSIUM SERPL-SCNC: 4 MMOL/L — SIGNIFICANT CHANGE UP (ref 3.5–5.3)
PROT SERPL-MCNC: 7.4 G/DL — SIGNIFICANT CHANGE UP (ref 6–8.3)
PROT SERPL-MCNC: 7.4 G/DL — SIGNIFICANT CHANGE UP (ref 6–8.3)
RBC # BLD: 3.43 M/UL — LOW (ref 4.2–5.8)
RBC # BLD: 3.43 M/UL — LOW (ref 4.2–5.8)
RBC # FLD: 15.8 % — HIGH (ref 10.3–14.5)
RBC # FLD: 15.8 % — HIGH (ref 10.3–14.5)
SODIUM SERPL-SCNC: 136 MMOL/L — SIGNIFICANT CHANGE UP (ref 135–145)
SODIUM SERPL-SCNC: 136 MMOL/L — SIGNIFICANT CHANGE UP (ref 135–145)
WBC # BLD: 6.53 K/UL — SIGNIFICANT CHANGE UP (ref 3.8–10.5)
WBC # BLD: 6.53 K/UL — SIGNIFICANT CHANGE UP (ref 3.8–10.5)
WBC # FLD AUTO: 6.53 K/UL — SIGNIFICANT CHANGE UP (ref 3.8–10.5)
WBC # FLD AUTO: 6.53 K/UL — SIGNIFICANT CHANGE UP (ref 3.8–10.5)

## 2024-01-15 RX ORDER — ATORVASTATIN CALCIUM 80 MG/1
40 TABLET, FILM COATED ORAL AT BEDTIME
Refills: 0 | Status: DISCONTINUED | OUTPATIENT
Start: 2024-01-15 | End: 2024-01-16

## 2024-01-15 RX ADMIN — APIXABAN 5 MILLIGRAM(S): 2.5 TABLET, FILM COATED ORAL at 05:08

## 2024-01-15 RX ADMIN — Medication 2 DROP(S): at 05:07

## 2024-01-15 RX ADMIN — SODIUM CHLORIDE 1 GRAM(S): 9 INJECTION INTRAMUSCULAR; INTRAVENOUS; SUBCUTANEOUS at 05:09

## 2024-01-15 RX ADMIN — Medication 40 MILLIGRAM(S): at 05:08

## 2024-01-15 RX ADMIN — GABAPENTIN 200 MILLIGRAM(S): 400 CAPSULE ORAL at 05:08

## 2024-01-15 RX ADMIN — PANTOPRAZOLE SODIUM 40 MILLIGRAM(S): 20 TABLET, DELAYED RELEASE ORAL at 06:23

## 2024-01-15 RX ADMIN — CHLORHEXIDINE GLUCONATE 1 APPLICATION(S): 213 SOLUTION TOPICAL at 11:50

## 2024-01-15 RX ADMIN — Medication 1 TABLET(S): at 08:58

## 2024-01-15 RX ADMIN — LIDOCAINE 1 PATCH: 4 CREAM TOPICAL at 01:22

## 2024-01-15 RX ADMIN — LIDOCAINE 2 PATCH: 4 CREAM TOPICAL at 01:22

## 2024-01-15 RX ADMIN — Medication 200 MILLIGRAM(S): at 05:09

## 2024-01-15 RX ADMIN — Medication 20 MILLIGRAM(S): at 11:51

## 2024-01-15 RX ADMIN — Medication 15 MILLILITER(S): at 11:50

## 2024-01-15 NOTE — PROGRESS NOTE ADULT - SUBJECTIVE AND OBJECTIVE BOX
Name of Patient : TAYLA MARIE  MRN: [id number]  Date of visit: 01-15-24      Subjective: Patient seen and examined. No new events except as noted.   doing okay   KARIE in AM     REVIEW OF SYSTEMS:    CONSTITUTIONAL: No weakness, fevers or chills  EYES/ENT: No visual changes;  No vertigo or throat pain   NECK: No pain or stiffness  RESPIRATORY: No cough, wheezing, hemoptysis; No shortness of breath  CARDIOVASCULAR: No chest pain or palpitations  GASTROINTESTINAL: No abdominal or epigastric pain. No nausea, vomiting, or hematemesis; No diarrhea or constipation. No melena or hematochezia.  GENITOURINARY: No dysuria, frequency or hematuria  NEUROLOGICAL: No numbness or weakness  SKIN: No itching, burning, rashes, or lesions   All other review of systems is negative unless indicated above.    MEDICATIONS:  MEDICATIONS  (STANDING):  apixaban 5 milliGRAM(s) Oral every 12 hours  atorvastatin 40 milliGRAM(s) Oral at bedtime  chlorhexidine 2% Cloths 1 Application(s) Topical daily  ciprofloxacin  0.3% Ophthalmic Solution for Otic Use 2 Drop(s) Both Ears two times a day  FLUoxetine 20 milliGRAM(s) Oral daily  gabapentin 200 milliGRAM(s) Oral three times a day  hydroxychloroquine 200 milliGRAM(s) Oral two times a day  lidocaine   4% Patch 2 Patch Transdermal every 24 hours  lidocaine   4% Patch 1 Patch Transdermal every 24 hours  melatonin 3 milliGRAM(s) Oral <User Schedule>  multivitamin/minerals/iron Oral Solution (CENTRUM) 15 milliLiter(s) Oral daily  pantoprazole    Tablet 40 milliGRAM(s) Oral before breakfast  predniSONE   Tablet 40 milliGRAM(s) Oral daily  sodium chloride 1 Gram(s) Oral three times a day  sodium chloride 3%. 500 milliLiter(s) (30 mL/Hr) IV Continuous <Continuous>  trimethoprim  160 mG/sulfamethoxazole 800 mG 1 Tablet(s) Oral <User Schedule>      PHYSICAL EXAM:  T(C): 36.8 (01-15-24 @ 17:00), Max: 36.9 (01-15-24 @ 13:00)  HR: 80 (01-15-24 @ 17:00) (70 - 94)  BP: 128/83 (01-15-24 @ 17:00) (111/68 - 142/67)  RR: 18 (01-15-24 @ 17:00) (18 - 18)  SpO2: 100% (01-15-24 @ 17:00) (98% - 100%)  Wt(kg): --  I&O's Summary        Appearance: Normal	  HEENT:  PERRLA   Lymphatic: No lymphadenopathy   Cardiovascular: Normal S1 S2, no JVD  Respiratory: normal effort , clear  Gastrointestinal:  Soft, Non-tender  Skin: No rashes,  warm to touch  Psychiatry:  Mood & affect appropriate  Musculuskeletal: No edema    recent labs, Imaging and EKGs personally reviewed                           10.4   6.53  )-----------( 259      ( 15 Carter 2024 05:18 )             32.0               01-15    136  |  99  |  15  ----------------------------<  75  4.0   |  25  |  0.65    Ca    9.9      15 Carter 2024 05:18  Mg     1.80     01-15    TPro  7.4  /  Alb  3.3  /  TBili  0.3  /  DBili  x   /  AST  38  /  ALT  94<H>  /  AlkPhos  95  01-15                       Urinalysis Basic - ( 15 Carter 2024 05:18 )    Color: x / Appearance: x / SG: x / pH: x  Gluc: 75 mg/dL / Ketone: x  / Bili: x / Urobili: x   Blood: x / Protein: x / Nitrite: x   Leuk Esterase: x / RBC: x / WBC x   Sq Epi: x / Non Sq Epi: x / Bacteria: x

## 2024-01-15 NOTE — PROGRESS NOTE ADULT - NUTRITIONAL ASSESSMENT
This patient has been assessed with a concern for Malnutrition and has been determined to have a diagnosis/diagnoses of Severe protein-calorie malnutrition.    This patient is being managed with:   Diet NPO after Midnight-     NPO Start Date: 15-Carter-2024   NPO Start Time: 23:59  Except Medications  Entered: Carter 15 2024  6:54AM    Diet Regular-  1000mL Fluid Restriction ([id number])  Supplement Feeding Modality:  Oral  Ensure Plus High Protein Cans or Servings Per Day:  1       Frequency:  Three Times a day  Entered: Carter  3 2024  4:21PM

## 2024-01-15 NOTE — PROGRESS NOTE ADULT - SUBJECTIVE AND OBJECTIVE BOX
Subjective: Patient seen and examined. No new events except as noted.     SUBJECTIVE/ROS:  MEDICATIONS:  MEDICATIONS  (STANDING):  apixaban 5 milliGRAM(s) Oral every 12 hours  chlorhexidine 2% Cloths 1 Application(s) Topical daily  ciprofloxacin  0.3% Ophthalmic Solution for Otic Use 2 Drop(s) Both Ears two times a day  FLUoxetine 20 milliGRAM(s) Oral daily  gabapentin 200 milliGRAM(s) Oral three times a day  hydroxychloroquine 200 milliGRAM(s) Oral two times a day  lidocaine   4% Patch 1 Patch Transdermal every 24 hours  lidocaine   4% Patch 2 Patch Transdermal every 24 hours  melatonin 3 milliGRAM(s) Oral <User Schedule>  multivitamin/minerals/iron Oral Solution (CENTRUM) 15 milliLiter(s) Oral daily  pantoprazole    Tablet 40 milliGRAM(s) Oral before breakfast  predniSONE   Tablet 40 milliGRAM(s) Oral daily  sodium chloride 1 Gram(s) Oral three times a day  sodium chloride 3%. 500 milliLiter(s) (30 mL/Hr) IV Continuous <Continuous>  trimethoprim  160 mG/sulfamethoxazole 800 mG 1 Tablet(s) Oral <User Schedule>      PHYSICAL EXAM:  T(C): 36.3 (01-15-24 @ 05:00), Max: 36.7 (01-14-24 @ 09:10)  HR: 70 (01-15-24 @ 05:00) (66 - 88)  BP: 122/82 (01-15-24 @ 05:00) (108/71 - 142/67)  RR: 18 (01-15-24 @ 05:00) (18 - 18)  SpO2: 100% (01-15-24 @ 05:00) (100% - 100%)  Wt(kg): --  I&O's Summary          JVP: Normal  Neck: supple  Lung: clear   CV: S1 S2 , Murmur:  Abd: soft  Ext: No edema  neuro: Awake / alert  Psych: flat affect  Skin: normal``    LABS/DATA:    CARDIAC MARKERS:                                10.4   6.53  )-----------( 259      ( 15 Carter 2024 05:18 )             32.0     01-14    134<L>  |  97<L>  |  17  ----------------------------<  91  4.2   |  26  |  0.74    Ca    9.9      14 Jan 2024 06:00  Mg     1.80     01-14    TPro  7.3  /  Alb  3.4  /  TBili  0.4  /  DBili  x   /  AST  32  /  ALT  88<H>  /  AlkPhos  97  01-14    proBNP:   Lipid Profile:   HgA1c:   TSH:     TELE:  EKG:

## 2024-01-15 NOTE — PROGRESS NOTE ADULT - ASSESSMENT
28 years old male with h/o Lupus ( diagnosed in 09/2023, on Plaquenil present to [place] ED on 12/12/23  with complain of chest pain and SOB. Patient reported left sided pleuritic chest pain which started 3 days ago. Pain is progressively worsened, associated with SOB and cough. Patient was seen in OSH ED and was prescribed antibiotics. Patient reported significantly worsening of left sided pleuritic chest pain today. No fever or chills. Patient reported loss of appetite and had a few episode of diarrhea for last 2 days. CTA chest with acute right upper lobe and left lower lobe segmental/subsegmental pulmonary emboli. No CT evidence of right heart strain. New bilateral lower lobe consolidations with areas of central clearing. Pneumonia and pulmonary infarcts are in the differential. New bilateral pleural effusions, small on the left and trace on the right. Bilateral axillary and supraclavicular adenopathy of unclear etiology. Patient was started on heparin ggt transitioned to eliquis on 12/19,  patient with worsening SOB/ hypoxia requiring nasal cannula oxygen supplementation. 12/20  Repeat Xray shows increased moderate left pleural effusion and compressive atelectasis trace right effusion and linear atelectasis. Thoracic team consulted for possible pigtail insertion Bedside US: Large left effusion with septations. Right simple effusion , + pericardial effusion- lower extremity dopplers negative for DVT.    # Pulm effusion/ Fever   S/P thoracentesis , follow up results   heparin for AC  TS care appreciated   O2 supplement   monitor output   Zosyn for now , ABx per ID , completed   LP done  Hematuria noted   Urology eval   Completed course of ABx   IR, S/P Renal biopsy , follow up results   repeat urine study   resume heparin   KARIE     # Recurrent seizure   Neuro eval  EEG   fall precautions   MRI noted   RO CVA VS other etiologies  transfer to tele  neuro follow up   CT per neuro     # Fever  SIRS   Abx , completed per iD   Rheum adn ID follow up   Renal biopsy done, follow up results       # Hyponatremia/ Seizure   RRT   Neuro follow up       #PE   on heparin , resume after thoracentesis   DVT negative  Heme onc eval   likley lupus related     #Hxof lupus  on hydroxychloroquine   Heme eval   Rheum eval   steroids per rheum       DVT andgIPPX

## 2024-01-15 NOTE — PROGRESS NOTE ADULT - ASSESSMENT
PE  a/c   no evidence of right heart strain   no significant evidence of myocardial injury   normal LV function     Pericardial effusion   inflammatory in setting of SLE   trace effusion   no sign of tamponade  repeat echo in future   fu with rheum for ongoing work up     CVA  fu with neurology   plan for KARIE

## 2024-01-16 LAB
ALBUMIN SERPL ELPH-MCNC: 3.3 G/DL — SIGNIFICANT CHANGE UP (ref 3.3–5)
ALP SERPL-CCNC: 97 U/L — SIGNIFICANT CHANGE UP (ref 40–120)
ALT FLD-CCNC: 109 U/L — HIGH (ref 4–41)
ANION GAP SERPL CALC-SCNC: 14 MMOL/L — SIGNIFICANT CHANGE UP (ref 7–14)
AST SERPL-CCNC: 47 U/L — HIGH (ref 4–40)
BILIRUB SERPL-MCNC: 0.4 MG/DL — SIGNIFICANT CHANGE UP (ref 0.2–1.2)
BUN SERPL-MCNC: 15 MG/DL — SIGNIFICANT CHANGE UP (ref 7–23)
CALCIUM SERPL-MCNC: 9.6 MG/DL — SIGNIFICANT CHANGE UP (ref 8.4–10.5)
CHLORIDE SERPL-SCNC: 97 MMOL/L — LOW (ref 98–107)
CHOLEST SERPL-MCNC: 290 MG/DL — HIGH
CO2 SERPL-SCNC: 25 MMOL/L — SIGNIFICANT CHANGE UP (ref 22–31)
CREAT SERPL-MCNC: 0.74 MG/DL — SIGNIFICANT CHANGE UP (ref 0.5–1.3)
EGFR: 126 ML/MIN/1.73M2 — SIGNIFICANT CHANGE UP
GLUCOSE SERPL-MCNC: 91 MG/DL — SIGNIFICANT CHANGE UP (ref 70–99)
HCT VFR BLD CALC: 31.8 % — LOW (ref 39–50)
HDLC SERPL-MCNC: 31 MG/DL — LOW
HGB BLD-MCNC: 10.5 G/DL — LOW (ref 13–17)
LIPID PNL WITH DIRECT LDL SERPL: 238 MG/DL — HIGH
MAGNESIUM SERPL-MCNC: 1.8 MG/DL — SIGNIFICANT CHANGE UP (ref 1.6–2.6)
MCHC RBC-ENTMCNC: 31.2 PG — SIGNIFICANT CHANGE UP (ref 27–34)
MCHC RBC-ENTMCNC: 33 GM/DL — SIGNIFICANT CHANGE UP (ref 32–36)
MCV RBC AUTO: 94.4 FL — SIGNIFICANT CHANGE UP (ref 80–100)
NON HDL CHOLESTEROL: 259 MG/DL — HIGH
NRBC # BLD: 0 /100 WBCS — SIGNIFICANT CHANGE UP (ref 0–0)
NRBC # FLD: 0 K/UL — SIGNIFICANT CHANGE UP (ref 0–0)
PHOSPHATE SERPL-MCNC: 3.1 MG/DL — SIGNIFICANT CHANGE UP (ref 2.5–4.5)
PLATELET # BLD AUTO: 266 K/UL — SIGNIFICANT CHANGE UP (ref 150–400)
POTASSIUM SERPL-MCNC: 4.1 MMOL/L — SIGNIFICANT CHANGE UP (ref 3.5–5.3)
POTASSIUM SERPL-SCNC: 4.1 MMOL/L — SIGNIFICANT CHANGE UP (ref 3.5–5.3)
PROT SERPL-MCNC: 7.3 G/DL — SIGNIFICANT CHANGE UP (ref 6–8.3)
RBC # BLD: 3.37 M/UL — LOW (ref 4.2–5.8)
RBC # FLD: 15.7 % — HIGH (ref 10.3–14.5)
SODIUM SERPL-SCNC: 136 MMOL/L — SIGNIFICANT CHANGE UP (ref 135–145)
TRIGL SERPL-MCNC: 105 MG/DL — SIGNIFICANT CHANGE UP
WBC # BLD: 6.13 K/UL — SIGNIFICANT CHANGE UP (ref 3.8–10.5)
WBC # FLD AUTO: 6.13 K/UL — SIGNIFICANT CHANGE UP (ref 3.8–10.5)

## 2024-01-16 PROCEDURE — 93010 ELECTROCARDIOGRAM REPORT: CPT | Mod: 59

## 2024-01-16 PROCEDURE — 93325 DOPPLER ECHO COLOR FLOW MAPG: CPT | Mod: 26,GC

## 2024-01-16 PROCEDURE — 93312 ECHO TRANSESOPHAGEAL: CPT | Mod: 26

## 2024-01-16 PROCEDURE — 99233 SBSQ HOSP IP/OBS HIGH 50: CPT

## 2024-01-16 PROCEDURE — 93320 DOPPLER ECHO COMPLETE: CPT | Mod: 26,GC

## 2024-01-16 PROCEDURE — 71045 X-RAY EXAM CHEST 1 VIEW: CPT | Mod: 26

## 2024-01-16 PROCEDURE — 76376 3D RENDER W/INTRP POSTPROCES: CPT | Mod: 26

## 2024-01-16 RX ORDER — IBUPROFEN 200 MG
400 TABLET ORAL ONCE
Refills: 0 | Status: COMPLETED | OUTPATIENT
Start: 2024-01-16 | End: 2024-01-16

## 2024-01-16 RX ORDER — ATORVASTATIN CALCIUM 80 MG/1
80 TABLET, FILM COATED ORAL AT BEDTIME
Refills: 0 | Status: DISCONTINUED | OUTPATIENT
Start: 2024-01-16 | End: 2024-01-29

## 2024-01-16 RX ORDER — APIXABAN 2.5 MG/1
1 TABLET, FILM COATED ORAL
Qty: 60 | Refills: 0
Start: 2024-01-16 | End: 2024-02-14

## 2024-01-16 RX ADMIN — SODIUM CHLORIDE 1 GRAM(S): 9 INJECTION INTRAMUSCULAR; INTRAVENOUS; SUBCUTANEOUS at 12:08

## 2024-01-16 RX ADMIN — Medication 3 MILLIGRAM(S): at 21:21

## 2024-01-16 RX ADMIN — LIDOCAINE 2 PATCH: 4 CREAM TOPICAL at 12:07

## 2024-01-16 RX ADMIN — CHLORHEXIDINE GLUCONATE 1 APPLICATION(S): 213 SOLUTION TOPICAL at 12:08

## 2024-01-16 RX ADMIN — SODIUM CHLORIDE 1 GRAM(S): 9 INJECTION INTRAMUSCULAR; INTRAVENOUS; SUBCUTANEOUS at 21:18

## 2024-01-16 RX ADMIN — APIXABAN 5 MILLIGRAM(S): 2.5 TABLET, FILM COATED ORAL at 17:30

## 2024-01-16 RX ADMIN — ATORVASTATIN CALCIUM 40 MILLIGRAM(S): 80 TABLET, FILM COATED ORAL at 00:59

## 2024-01-16 RX ADMIN — GABAPENTIN 200 MILLIGRAM(S): 400 CAPSULE ORAL at 01:00

## 2024-01-16 RX ADMIN — Medication 15 MILLILITER(S): at 12:08

## 2024-01-16 RX ADMIN — Medication 400 MILLIGRAM(S): at 12:07

## 2024-01-16 RX ADMIN — Medication 200 MILLIGRAM(S): at 05:54

## 2024-01-16 RX ADMIN — GABAPENTIN 200 MILLIGRAM(S): 400 CAPSULE ORAL at 05:55

## 2024-01-16 RX ADMIN — SODIUM CHLORIDE 1 GRAM(S): 9 INJECTION INTRAMUSCULAR; INTRAVENOUS; SUBCUTANEOUS at 00:59

## 2024-01-16 RX ADMIN — Medication 40 MILLIGRAM(S): at 05:55

## 2024-01-16 RX ADMIN — GABAPENTIN 200 MILLIGRAM(S): 400 CAPSULE ORAL at 12:08

## 2024-01-16 RX ADMIN — ATORVASTATIN CALCIUM 80 MILLIGRAM(S): 80 TABLET, FILM COATED ORAL at 21:18

## 2024-01-16 RX ADMIN — SODIUM CHLORIDE 1 GRAM(S): 9 INJECTION INTRAMUSCULAR; INTRAVENOUS; SUBCUTANEOUS at 05:54

## 2024-01-16 RX ADMIN — GABAPENTIN 200 MILLIGRAM(S): 400 CAPSULE ORAL at 21:18

## 2024-01-16 RX ADMIN — APIXABAN 5 MILLIGRAM(S): 2.5 TABLET, FILM COATED ORAL at 05:54

## 2024-01-16 RX ADMIN — Medication 20 MILLIGRAM(S): at 12:07

## 2024-01-16 RX ADMIN — LIDOCAINE 1 PATCH: 4 CREAM TOPICAL at 12:06

## 2024-01-16 RX ADMIN — Medication 3 MILLIGRAM(S): at 00:59

## 2024-01-16 RX ADMIN — PANTOPRAZOLE SODIUM 40 MILLIGRAM(S): 20 TABLET, DELAYED RELEASE ORAL at 05:54

## 2024-01-16 RX ADMIN — Medication 200 MILLIGRAM(S): at 17:34

## 2024-01-16 NOTE — PROGRESS NOTE ADULT - SUBJECTIVE AND OBJECTIVE BOX
TAYLA FRANK  [id number]    Subjective:        Objective:   Vital Signs Last 24 Hrs  T(C): 36.7 (16 Jan 2024 07:30), Max: 36.9 (15 Carter 2024 13:00)  T(F): 98 (16 Jan 2024 07:30), Max: 98.5 (15 Carter 2024 13:00)  HR: 83 (16 Jan 2024 07:30) (80 - 94)  BP: 103/68 (16 Jan 2024 07:30) (103/68 - 132/82)  BP(mean): --  RR: 18 (16 Jan 2024 07:30) (18 - 19)  SpO2: 98% (16 Jan 2024 07:30) (98% - 100%)    Parameters below as of 16 Jan 2024 07:30  Patient On (Oxygen Delivery Method): room air    Physical Exam:  General: NAD  HEENT: EOMI  CV: well perfused   Lung: No increased WOB,  Abd: non-distended   Neuro: Awake and alert         LABS:  cret                        10.5   6.13  )-----------( 266      ( 16 Jan 2024 06:48 )             31.8     01-16    136  |  97<L>  |  15  ----------------------------<  91  4.1   |  25  |  0.74    Ca    9.6      16 Jan 2024 06:48  Phos  3.1     01-16  Mg     1.80     01-16    TPro  7.3  /  Alb  3.3  /  TBili  0.4  /  DBili  x   /  AST  47<H>  /  ALT  109<H>  /  AlkPhos  97  01-16      Renal Biopsy   Light Microscopy:  Sections show renal cortex.   Glomeruli: There are approximately  15glomeruli per level section. Global or segmental glomerulosclerosis is  not seen. The glomeruli show patent capillary loops and urinary spaces  without significant inflammatory cell infiltrate. The mesangial areas are  in normal thickness. The glomerular capillary walls are delicate without  duplication or spike formation.   Tubules and interstitium : There is no  significant interstitial fibrosis, inflammation, or tubular atrophy.  Vessels: arteries are without significant histopathologic abnormalities.    Immunofluorescence Microscopy:  Examination of submitted tissue reveals renal cortex, containing  approximately 8 glomeruli. Glomeruli shows granular staining in mesangial  and capillary walls for IgG (2+), kappa (1+) and lambda (1+) light  chains. There is not significant glomerular staining for IgA, IgM, C3,  C1q, albumin and fibrinogen. There is no significant tubular basement  membrane or interstitial staining.       TAYLA FRANK  [id number]    Subjective:        Objective:   Vital Signs Last 24 Hrs  T(C): 36.7 (16 Jan 2024 07:30), Max: 36.9 (15 Carter 2024 13:00)  T(F): 98 (16 Jan 2024 07:30), Max: 98.5 (15 Carter 2024 13:00)  HR: 83 (16 Jan 2024 07:30) (80 - 94)  BP: 103/68 (16 Jan 2024 07:30) (103/68 - 132/82)  BP(mean): --  RR: 18 (16 Jan 2024 07:30) (18 - 19)  SpO2: 98% (16 Jan 2024 07:30) (98% - 100%)    Parameters below as of 16 Jan 2024 07:30  Patient On (Oxygen Delivery Method): room air    Physical Exam:  General: NAD  HEENT: EOMI  CV: well perfused   Lung: No increased WOB,  Abd: non-distended   Neuro: Awake and alert         LABS:  cret                        10.5   6.13  )-----------( 266      ( 16 Jan 2024 06:48 )             31.8     01-16    136  |  97<L>  |  15  ----------------------------<  91  4.1   |  25  |  0.74    Ca    9.6      16 Jan 2024 06:48  Phos  3.1     01-16  Mg     1.80     01-16    TPro  7.3  /  Alb  3.3  /  TBili  0.4  /  DBili  x   /  AST  47<H>  /  ALT  109<H>  /  AlkPhos  97  01-16      Renal Biopsy   Light Microscopy:  Sections show renal cortex.   Glomeruli: There are approximately  15glomeruli per level section. Global or segmental glomerulosclerosis is  not seen. The glomeruli show patent capillary loops and urinary spaces  without significant inflammatory cell infiltrate. The mesangial areas are  in normal thickness. The glomerular capillary walls are delicate without  duplication or spike formation.   Tubules and interstitium : There is no  significant interstitial fibrosis, inflammation, or tubular atrophy.  Vessels: arteries are without significant histopathologic abnormalities.    Immunofluorescence Microscopy:  Examination of submitted tissue reveals renal cortex, containing  approximately 8 glomeruli. Glomeruli shows granular staining in mesangial  and capillary walls for IgG (2+), kappa (1+) and lambda (1+) light  chains. There is not significant glomerular staining for IgA, IgM, C3,  C1q, albumin and fibrinogen. There is no significant tubular basement  membrane or interstitial staining.

## 2024-01-16 NOTE — PROGRESS NOTE ADULT - ASSESSMENT
28 year-old male with h/o Lupus (diagnosed in 09/2023 due to rash, oral ulcers, chest pain, and dyspnea, on Plaquenil) who presented to [facility] ED on 12/12/23 with complaints of chest pain and SOB, found to have bilateral acute PE and bilateral pleural effusions. Transferred to [facility] for CT surgery evaluation. Also with fevers now resolved. Rheumatology consulted given SLE history.     Serologies:   Positive: ABDIAS 1:280 speckled, Sm >8, RNP >8, SSA >8, hypocomplementemia  Pr/Cr 1.2  low vitamin D 25 21, elevated vitamin D 1,25 97, ACE elevated 125, PR3 29.8. Repeat PR3 still weakly positive at 27.7   Negative: APS labs    #Fever (resolved) - Fevers resolved after restarting steroids on 40 mg methylprednisolone 12/23-12/25, restarted 60 mg of methylprednisolone 12/28-12/29, then transitioned to PO prednisone.   #Pleural effusion exudate w/negative culture and PCR. Pleural fluid cytology negative.   #Lymphadenopathy   -Repeat CT imaging without obvious infectious source, mild decrease in axillary LAD, submentonian and submaxillary and increase supraclavicular LAD. No mediastinal lymphoadenopathy  -EBV DNA PCR positive. Discussed with ID, low concern for EBV infection    #SLE   # Proteinuria   +ve serology and hypocomplementemia improving after steroid trial   creatinine is stable but proteinuria +ve urine P/cr 1.1. Decreased to 0.7.      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     *****final recs pending****  Recommendations:   -C/w Prednisone 40mg daily, no further urgent immunosuppression in setting of Class I LN   will continue tapering process weekly based on clinical/ laboratory progression  plan to start MMF once acute issues resolved.   -Work up per neurology for c/f temporal cortical stroke and punctate infarct?   -CT CAP with stable axillary Lymphadenopathy   -Per pt's mother, will be going to [facility] acute rehab, please confirm with case management if pt will be able to have transportation to [facility] for follow up in 2-3 weeks after discharge   -Pt with elevated LFTs, work up per primary , important to address as patient will likely be started on a steroid sparing agent in the outpt setting    please consider holding bactrim and/or switching to atovaquone, review other hepatotoxic medication he is also on   please consider liver US (regular + doppler)  -Myomarker panel pending   -Continue with GI ppx       Discussed with Dr. Octavio Landaverde MD   PGY-4  Reachable on TEAMS  Pager [phone]  Rheumatology Fellow 28 year-old male with h/o Lupus (diagnosed in 09/2023 due to rash, oral ulcers, chest pain, and dyspnea, on Plaquenil) who presented to [facility] ED on 12/12/23 with complaints of chest pain and SOB, found to have bilateral acute PE and bilateral pleural effusions. Transferred to [facility] for CT surgery evaluation. Also with fevers now resolved. Rheumatology consulted given SLE history.     Serologies:   Positive: ABDIAS 1:280 speckled, Sm >8, RNP >8, SSA >8, hypocomplementemia  Pr/Cr 1.2  low vitamin D 25 21, elevated vitamin D 1,25 97, ACE elevated 125, PR3 29.8. Repeat PR3 still weakly positive at 27.7   Negative: APS labs    #Fever (resolved) - Fevers resolved after restarting steroids on 40 mg methylprednisolone 12/23-12/25, restarted 60 mg of methylprednisolone 12/28-12/29, then transitioned to PO prednisone.   #Pleural effusion exudate w/negative culture and PCR. Pleural fluid cytology negative.   #Lymphadenopathy   -Repeat CT imaging without obvious infectious source, mild decrease in axillary LAD, submentonian and submaxillary and increase supraclavicular LAD. No mediastinal lymphoadenopathy  -EBV DNA PCR positive. Discussed with ID, low concern for EBV infection    #SLE   # Proteinuria   +ve serology and hypocomplementemia improving after steroid trial   creatinine is stable but proteinuria +ve urine P/cr 1.1. Decreased to 0.7.      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     *****final recs pending****  Recommendations:   -C/w Prednisone 40mg daily, no further urgent immunosuppression in setting of Class I LN   will continue tapering process weekly based on clinical/ laboratory progression  plan to start MMF once acute issues resolved.   -Work up per neurology for c/f temporal cortical stroke and punctate infarct?   -CT CAP with stable axillary Lymphadenopathy   -Per pt's mother, will be going to [place] acute rehab, please confirm with case management if pt will be able to have transportation to [facility] for follow up in 2-3 weeks after discharge   -Pt with elevated LFTs, work up per primary , important to address as patient will likely be started on a steroid sparing agent in the outpt setting    please consider holding bactrim and/or switching to atovaquone, review other hepatotoxic medication he is also on   please consider liver US (regular + doppler)  -Myomarker panel pending   -Continue with GI ppx       Discussed with Dr. Octavio Landaverde MD   PGY-4  Reachable on TEAMS  Pager [phone]  Rheumatology Fellow

## 2024-01-16 NOTE — CHART NOTE - NSCHARTNOTEFT_GEN_A_CORE
Pt not seen today, was down at KARIE.   Discussed with pt's mother who informed service that per her son, he has stopped taking Prednisone over the weekend and that a physician said it was okay to do so.   Per primary team, pt has been taking his steroids as documented in his worklist manager and per discussion with his RN.   As well, no physician advised pt that it would be okay to stop steroids.   Will formally see pt tomorrow to discuss.     As well, no need for LN biopsy at this time since CT CAP demonstrates stable mild bilateral axilla lymphadenopathy.     Also, if pt is at [facility], can be followed by Dr. Ricardo while he is inpt and can transition to [facility] when he is discharged from rehab     Ginny Landaverde MD   PGY-4  Reachable on TEAMS  Pager [phone]  Rheumatology Fellow

## 2024-01-16 NOTE — PROGRESS NOTE ADULT - NUTRITIONAL ASSESSMENT
This patient has been assessed with a concern for Malnutrition and has been determined to have a diagnosis/diagnoses of Severe protein-calorie malnutrition.    This patient is being managed with:   Diet NPO after Midnight-     NPO Start Date: 15-Carter-2024   NPO Start Time: 23:59  Except Medications  Entered: Carter 15 2024  6:54AM    Diet Regular-  1000mL Fluid Restriction ([id number])  Supplement Feeding Modality:  Oral  Ensure Plus High Protein Cans or Servings Per Day:  1       Frequency:  Three Times a day  Entered: Carter  3 2024  4:21PM   This patient has been assessed with a concern for Malnutrition and has been determined to have a diagnosis/diagnoses of Severe protein-calorie malnutrition.    This patient is being managed with:   Diet NPO after Midnight-     NPO Start Date: 15-Carter-2024   NPO Start Time: 23:59  Except Medications  Entered: Carter 15 2024  6:54AM    Diet Regular-  1000mL Fluid Restriction ([id number])  Supplement Feeding Modality:  Oral  Ensure Plus High Protein Cans or Servings Per Day:  1       Frequency:  Three Times a day  Entered: Carter  3 2024  4:21PM

## 2024-01-16 NOTE — PROGRESS NOTE ADULT - SUBJECTIVE AND OBJECTIVE BOX
Neurology Progress Note    SUBJECTIVE/OBJECTIVE/INTERVAL EVENTS: Patient seen and examined at bedside w/ neuro attending and team.     INTERVAL HISTORY:  Patient reports he has "trouble breathing." Is being maintained on RA. Mother asking about CTA head/neck performed on Friday - discussed results with her.    REVIEW OF SYSTEMS: Few questions of a 10-system ROS was performed and is negative except for those items noted above and/or in the HPI.    VITALS & EXAMINATION:  Vital Signs Last 24 Hrs  T(C): 36.7 (16 Jan 2024 07:30), Max: 36.9 (15 Carter 2024 13:00)  T(F): 98 (16 Jan 2024 07:30), Max: 98.5 (15 Carter 2024 13:00)  HR: 83 (16 Jan 2024 07:30) (80 - 94)  BP: 103/68 (16 Jan 2024 07:30) (103/68 - 132/82)  BP(mean): --  RR: 18 (16 Jan 2024 07:30) (18 - 19)  SpO2: 98% (16 Jan 2024 07:30) (98% - 100%)    Parameters below as of 16 Jan 2024 07:30  Patient On (Oxygen Delivery Method): room air    General:  Constitutional: Male, appears stated age, nontoxic, not in distress, lying supine in bed  Head: Normocephalic;   Eyes: clear sclera;   Extremities: No cyanosis;   Resp: appears to be breathing comfortably on RA     Neurological (>12):  MS: Awake, alert.  Oriented person place situation time. Follows commands including cross commands. Attends to examiner.  Language: Speech is clear, fluent, good comprehension  CNs: VFF. EOMI - no nystagmus. No disconjugate gaze. V1-3 intact LT, No facial asymmetry b/l. Hearing grossly normal b/l. Tongue midline and can move side to side.     Motor - Normal bulk and tone throughout  No drift in the UEs x 10 seconds  No drift in the LEs x at least 5 seconds (possible, very subtle drift in LLE)    Sensation: Intact to LT/temperature b/l. Cortical: Extinction on DSS (neglect): none  Coordination: No dysmetria to FTN b/l UE    LABORATORY:  CBC                       10.5   6.13  )-----------( 266      ( 16 Jan 2024 06:48 )             31.8     Chem 01-16    136  |  97<L>  |  15  ----------------------------<  91  4.1   |  25  |  0.74    Ca    9.6      16 Jan 2024 06:48  Phos  3.1     01-16  Mg     1.80     01-16    TPro  7.3  /  Alb  3.3  /  TBili  0.4  /  DBili  x   /  AST  47<H>  /  ALT  109<H>  /  AlkPhos  97  01-16    LFTs LIVER FUNCTIONS - ( 16 Jan 2024 06:48 )  Alb: 3.3 g/dL / Pro: 7.3 g/dL / ALK PHOS: 97 U/L / ALT: 109 U/L / AST: 47 U/L / GGT: x           Coagulopathy   Lipid Panel 01-16 Chol 290<H> LDL -- HDL 31<L> Trig 105  A1c   Cardiac enzymes     U/A Urinalysis Basic - ( 16 Jan 2024 06:48 )    Color: x / Appearance: x / SG: x / pH: x  Gluc: 91 mg/dL / Ketone: x  / Bili: x / Urobili: x   Blood: x / Protein: x / Nitrite: x   Leuk Esterase: x / RBC: x / WBC x   Sq Epi: x / Non Sq Epi: x / Bacteria: x      CSF  Immunological  Other    STUDIES & IMAGING: (EEG, CT, MR, U/S, TTE/KARIE):  MR Head (1/11/2024):  Small area of nonspecific diffusion restriction, and susceptibility   artifact in the right temporal lobe white matter, with T2/FLAIR   hyperintensities that extends to the cortical gray matter. Differential   includes cerebritis, acute infarction and/or post ictal changes. Clinical   correlation will determine the need for continued follow-up.    Multiple curvilinear areas of signal void adjacent to the left middle   cerebral artery with abnormal signal within the left insular region. The   possibility of vasculitis or venous thrombosis with associated infarction   is raised. CT or MR angiography of the brain is advised for further   evaluation.    1/12/2024:  CT HEAD:  Right temporal lobe and left insular/subinsular hypoattenuation.. No   acute intracranial hemorrhage. Consider short-term MRI follow-up as   clinically warranted.    CTA HEAD:  Patent intracranial circulation without flow limiting stenosis.  No evidence of aneurysm. Tiny aneurysms can be beyond the resolution of   CTA technique.    CTA NECK:  No evidence of significant stenosis or occlusion.

## 2024-01-16 NOTE — PROGRESS NOTE ADULT - TIME BILLING
Reviewing the chart, interpreting lab data, discussing case with fellow, interview and examination of patient, and documentation. Pt is at high risk of mortality due to his disease.
I was present with the Resident/ACP during the key portions of the history and exam. I discussed the case with the Resident/ACP and agree with the findings and plan as documented in the Resident's/ACP's note, unless noted below.   ROS otherwise negative
Medical management as above, reviewing chart and coordinating care with primary team/staff, as well as reviewing vitals, radiology, medication list, recent labs, and prior records.    Does not include teaching time.
review of laboratory data, radiology results, discussion with primary team\patient, and monitoring for potential decompensation. Interventions were performed as documented above
Review of patient records, including history, laboratory data, imaging. Patient evaluation and assessment. Coordination of care. Time excludes teaching
Review of patient records, including history, laboratory data, imaging. Patient evaluation and assessment. Coordination of care. Time excludes teaching

## 2024-01-16 NOTE — PROGRESS NOTE ADULT - SUBJECTIVE AND OBJECTIVE BOX
Name of Patient : TAYLA MARIE  MRN: [id number]  Date of visit: 01-16-24       Subjective: Patient seen and examined. No new events except as noted.   Doing okay   KARIE today     REVIEW OF SYSTEMS:    CONSTITUTIONAL: No weakness, fevers or chills  EYES/ENT: No visual changes;  No vertigo or throat pain   NECK: No pain or stiffness  RESPIRATORY: No cough, wheezing, hemoptysis; No shortness of breath  CARDIOVASCULAR: No chest pain or palpitations  GASTROINTESTINAL: No abdominal or epigastric pain. No nausea, vomiting, or hematemesis; No diarrhea or constipation. No melena or hematochezia.  GENITOURINARY: No dysuria, frequency or hematuria  NEUROLOGICAL: No numbness or weakness  SKIN: No itching, burning, rashes, or lesions   All other review of systems is negative unless indicated above.    MEDICATIONS:  MEDICATIONS  (STANDING):  apixaban 5 milliGRAM(s) Oral every 12 hours  atorvastatin 80 milliGRAM(s) Oral at bedtime  chlorhexidine 2% Cloths 1 Application(s) Topical daily  ciprofloxacin  0.3% Ophthalmic Solution for Otic Use 2 Drop(s) Both Ears two times a day  FLUoxetine 20 milliGRAM(s) Oral daily  gabapentin 200 milliGRAM(s) Oral three times a day  hydroxychloroquine 200 milliGRAM(s) Oral two times a day  lidocaine   4% Patch 1 Patch Transdermal every 24 hours  lidocaine   4% Patch 2 Patch Transdermal every 24 hours  melatonin 3 milliGRAM(s) Oral <User Schedule>  multivitamin/minerals/iron Oral Solution (CENTRUM) 15 milliLiter(s) Oral daily  pantoprazole    Tablet 40 milliGRAM(s) Oral before breakfast  predniSONE   Tablet 40 milliGRAM(s) Oral daily  sodium chloride 1 Gram(s) Oral three times a day  sodium chloride 3%. 500 milliLiter(s) (30 mL/Hr) IV Continuous <Continuous>  trimethoprim  160 mG/sulfamethoxazole 800 mG 1 Tablet(s) Oral <User Schedule>      PHYSICAL EXAM:  T(C): 36.4 (01-16-24 @ 11:30), Max: 36.8 (01-15-24 @ 17:00)  HR: 83 (01-16-24 @ 11:30) (80 - 84)  BP: 126/81 (01-16-24 @ 11:30) (103/68 - 132/82)  RR: 19 (01-16-24 @ 11:30) (18 - 19)  SpO2: 100% (01-16-24 @ 11:30) (98% - 100%)  Wt(kg): --  I&O's Summary        Appearance: Normal	  HEENT:  PERRLA   Lymphatic: No lymphadenopathy   Cardiovascular: Normal S1 S2, no JVD  Respiratory: normal effort , clear  Gastrointestinal:  Soft, Non-tender  Skin: No rashes,  warm to touch  Psychiatry:  Mood & affect appropriate  Musculuskeletal: No edema    recent labs, Imaging and EKGs personally reviewed                           10.5   6.13  )-----------( 266      ( 16 Jan 2024 06:48 )             31.8               01-16    136  |  97<L>  |  15  ----------------------------<  91  4.1   |  25  |  0.74    Ca    9.6      16 Jan 2024 06:48  Phos  3.1     01-16  Mg     1.80     01-16    TPro  7.3  /  Alb  3.3  /  TBili  0.4  /  DBili  x   /  AST  47<H>  /  ALT  109<H>  /  AlkPhos  97  01-16                       Urinalysis Basic - ( 16 Jan 2024 06:48 )    Color: x / Appearance: x / SG: x / pH: x  Gluc: 91 mg/dL / Ketone: x  / Bili: x / Urobili: x   Blood: x / Protein: x / Nitrite: x   Leuk Esterase: x / RBC: x / WBC x   Sq Epi: x / Non Sq Epi: x / Bacteria: x                     Name of Patient : TAYLA MARIE  MRN: [id number]  Date of visit: 01-16-24       Subjective: Patient seen and examined. No new events except as noted.   Doing okay   KARIE today     REVIEW OF SYSTEMS:    CONSTITUTIONAL: No weakness, fevers or chills  EYES/ENT: No visual changes;  No vertigo or throat pain   NECK: No pain or stiffness  RESPIRATORY: No cough, wheezing, hemoptysis; No shortness of breath  CARDIOVASCULAR: No chest pain or palpitations  GASTROINTESTINAL: No abdominal or epigastric pain. No nausea, vomiting, or hematemesis; No diarrhea or constipation. No melena or hematochezia.  GENITOURINARY: No dysuria, frequency or hematuria  NEUROLOGICAL: No numbness or weakness  SKIN: No itching, burning, rashes, or lesions   All other review of systems is negative unless indicated above.    MEDICATIONS:  MEDICATIONS  (STANDING):  apixaban 5 milliGRAM(s) Oral every 12 hours  atorvastatin 80 milliGRAM(s) Oral at bedtime  chlorhexidine 2% Cloths 1 Application(s) Topical daily  ciprofloxacin  0.3% Ophthalmic Solution for Otic Use 2 Drop(s) Both Ears two times a day  FLUoxetine 20 milliGRAM(s) Oral daily  gabapentin 200 milliGRAM(s) Oral three times a day  hydroxychloroquine 200 milliGRAM(s) Oral two times a day  lidocaine   4% Patch 1 Patch Transdermal every 24 hours  lidocaine   4% Patch 2 Patch Transdermal every 24 hours  melatonin 3 milliGRAM(s) Oral <User Schedule>  multivitamin/minerals/iron Oral Solution (CENTRUM) 15 milliLiter(s) Oral daily  pantoprazole    Tablet 40 milliGRAM(s) Oral before breakfast  predniSONE   Tablet 40 milliGRAM(s) Oral daily  sodium chloride 1 Gram(s) Oral three times a day  sodium chloride 3%. 500 milliLiter(s) (30 mL/Hr) IV Continuous <Continuous>  trimethoprim  160 mG/sulfamethoxazole 800 mG 1 Tablet(s) Oral <User Schedule>      PHYSICAL EXAM:  T(C): 36.4 (01-16-24 @ 11:30), Max: 36.8 (01-15-24 @ 17:00)  HR: 83 (01-16-24 @ 11:30) (80 - 84)  BP: 126/81 (01-16-24 @ 11:30) (103/68 - 132/82)  RR: 19 (01-16-24 @ 11:30) (18 - 19)  SpO2: 100% (01-16-24 @ 11:30) (98% - 100%)  Wt(kg): --  I&O's Summary        Appearance: Normal	  HEENT:  PERRLA   Lymphatic: No lymphadenopathy   Cardiovascular: Normal S1 S2, no JVD  Respiratory: normal effort , clear  Gastrointestinal:  Soft, Non-tender  Skin: No rashes,  warm to touch  Psychiatry:  Mood & affect appropriate  Musculuskeletal: No edema    recent labs, Imaging and EKGs personally reviewed                           10.5   6.13  )-----------( 266      ( 16 Jan 2024 06:48 )             31.8               01-16    136  |  97<L>  |  15  ----------------------------<  91  4.1   |  25  |  0.74    Ca    9.6      16 Jan 2024 06:48  Phos  3.1     01-16  Mg     1.80     01-16    TPro  7.3  /  Alb  3.3  /  TBili  0.4  /  DBili  x   /  AST  47<H>  /  ALT  109<H>  /  AlkPhos  97  01-16                       Urinalysis Basic - ( 16 Jan 2024 06:48 )    Color: x / Appearance: x / SG: x / pH: x  Gluc: 91 mg/dL / Ketone: x  / Bili: x / Urobili: x   Blood: x / Protein: x / Nitrite: x   Leuk Esterase: x / RBC: x / WBC x   Sq Epi: x / Non Sq Epi: x / Bacteria: x

## 2024-01-16 NOTE — PROGRESS NOTE ADULT - ASSESSMENT
28 years old male with h/o Lupus ( diagnosed in 09/2023, on Plaquenil present to [facility] ED on 12/12/23  with complain of chest pain and SOB. Patient reported left sided pleuritic chest pain which started 3 days ago. Pain is progressively worsened, associated with SOB and cough. Patient was seen in OSH ED and was prescribed antibiotics. Patient reported significantly worsening of left sided pleuritic chest pain today. No fever or chills. Patient reported loss of appetite and had a few episode of diarrhea for last 2 days. CTA chest with acute right upper lobe and left lower lobe segmental/subsegmental pulmonary emboli. No CT evidence of right heart strain. New bilateral lower lobe consolidations with areas of central clearing. Pneumonia and pulmonary infarcts are in the differential. New bilateral pleural effusions, small on the left and trace on the right. Bilateral axillary and supraclavicular adenopathy of unclear etiology. Patient was started on heparin ggt transitioned to eliquis on 12/19,  patient with worsening SOB/ hypoxia requiring nasal cannula oxygen supplementation. 12/20  Repeat Xray shows increased moderate left pleural effusion and compressive atelectasis trace right effusion and linear atelectasis. Thoracic team consulted for possible pigtail insertion Bedside US: Large left effusion with septations. Right simple effusion , + pericardial effusion- lower extremity dopplers negative for DVT.    # Pulm effusion/ Fever   S/P thoracentesis , follow up results   heparin for AC, switched to eliquis   TS care appreciated   O2 supplement   monitor output   Zosyn for now , ABx per ID , completed   LP done  Hematuria noted   Urology eval   Completed course of ABx   IR, S/P Renal biopsy , follow up results   repeat urine study   resume heparin   KARIE today     # Recurrent seizure   Neuro eval  EEG   fall precautions   MRI noted   RO CVA VS other etiologies  transfer to tele  neuro follow up   CT per neuro     # Fever  SIRS   Abx , completed per iD   Rheum adn ID follow up   Renal biopsy done, follow up results       # Hyponatremia/ Seizure   RRT   Neuro follow up       #PE   on heparin , resume after thoracentesis   DVT negative  Heme onc eval   likley lupus related     #Hxof lupus  on hydroxychloroquine   Heme eval   Rheum eval   steroids per rheum       DVT andgIPPX   28 years old male with h/o Lupus ( diagnosed in 09/2023, on Plaquenil present to [facility] ED on 12/12/23  with complain of chest pain and SOB. Patient reported left sided pleuritic chest pain which started 3 days ago. Pain is progressively worsened, associated with SOB and cough. Patient was seen in OSH ED and was prescribed antibiotics. Patient reported significantly worsening of left sided pleuritic chest pain today. No fever or chills. Patient reported loss of appetite and had a few episode of diarrhea for last 2 days. CTA chest with acute right upper lobe and left lower lobe segmental/subsegmental pulmonary emboli. No CT evidence of right heart strain. New bilateral lower lobe consolidations with areas of central clearing. Pneumonia and pulmonary infarcts are in the differential. New bilateral pleural effusions, small on the left and trace on the right. Bilateral axillary and supraclavicular adenopathy of unclear etiology. Patient was started on heparin ggt transitioned to eliquis on 12/19,  patient with worsening SOB/ hypoxia requiring nasal cannula oxygen supplementation. 12/20  Repeat Xray shows increased moderate left pleural effusion and compressive atelectasis trace right effusion and linear atelectasis. Thoracic team consulted for possible pigtail insertion Bedside US: Large left effusion with septations. Right simple effusion , + pericardial effusion- lower extremity dopplers negative for DVT.    # Pulm effusion/ Fever   S/P thoracentesis , follow up results   heparin for AC, switched to eliquis   TS care appreciated   O2 supplement   monitor output   Zosyn for now , ABx per ID , completed   LP done  Hematuria noted   Urology eval   Completed course of ABx   IR, S/P Renal biopsy , follow up results   repeat urine study   resume heparin   KARIE today     # Recurrent seizure   Neuro eval  EEG   fall precautions   MRI noted   RO CVA VS other etiologies  transfer to tele  neuro follow up   CT per neuro     # Fever  SIRS   Abx , completed per iD   Rheum adn ID follow up   Renal biopsy done, follow up results       # Hyponatremia/ Seizure   RRT   Neuro follow up       #PE   on heparin , resume after thoracentesis   DVT negative  Heme onc eval   likley lupus related     #Hxof lupus  on hydroxychloroquine   Heme eval   Rheum eval   steroids per rheum       DVT andgIPPX

## 2024-01-16 NOTE — CHART NOTE - NSCHARTNOTEFT_GEN_A_CORE
KARIE performed, report as follows:     " 1. Left ventricular systolic function is normal. There are no regional wall motion abnormalities seen.   2. Normal right ventricular cavity size and normal systolic function.   3. Structurally normal mitral valve with normal leaflet excursion. There is trace mitral regurgitation.   4. The aortic valve appears trileaflet with normal systolic excursion. There is no evidence of aortic regurgitation.   5. No atheroma in the visualized portions of the proximal ascending aorta. No atheroma in the visualized portions of the transverse aortic arch. No atheroma in the visualized portions of the descending aorta.   6. The left atrium is normal. There is no evidence of left atrial or left atrial appendage thrombus.   7. Agitated saline injection was negative for intracardiac shunt."    [] The above findings do not change impression/recommendations from earlier progress note, please review  [] No further inpatient neurovascular workup required at this time   [] Patient should follow up with Stroke NP, Vaishnavi Solitario or Kalina Miguel, in clinic at [address] [place] [address]. Please email [email or web address] w/ basic PHI.     From neurovascular standpoint, patient is cleared for discharge.     Patient case discussed with stroke attending, Dr. Libman.    Please call with questions: [id number] KARIE performed, report as follows:     " 1. Left ventricular systolic function is normal. There are no regional wall motion abnormalities seen.   2. Normal right ventricular cavity size and normal systolic function.   3. Structurally normal mitral valve with normal leaflet excursion. There is trace mitral regurgitation.   4. The aortic valve appears trileaflet with normal systolic excursion. There is no evidence of aortic regurgitation.   5. No atheroma in the visualized portions of the proximal ascending aorta. No atheroma in the visualized portions of the transverse aortic arch. No atheroma in the visualized portions of the descending aorta.   6. The left atrium is normal. There is no evidence of left atrial or left atrial appendage thrombus.   7. Agitated saline injection was negative for intracardiac shunt."    [] The above findings do not change impression/recommendations from earlier progress note, please review  [] No further inpatient neurovascular workup required at this time   [] Patient should follow up with Stroke NP, Vaishnavi Solitario or Kalina Miguel, in clinic at [address] [place] [address]. Please email [email or web address] w/ basic PHI.     From neurovascular standpoint, patient is cleared for discharge.     Patient case discussed with stroke attending, Dr. Libman.    Please call with questions: [phone]    Stroke attending. Agree with above

## 2024-01-16 NOTE — PROGRESS NOTE ADULT - SUBJECTIVE AND OBJECTIVE BOX
Subjective: Patient seen and examined. No new events except as noted.     SUBJECTIVE/ROS:  nad  MEDICATIONS:  MEDICATIONS  (STANDING):  apixaban 5 milliGRAM(s) Oral every 12 hours  atorvastatin 40 milliGRAM(s) Oral at bedtime  chlorhexidine 2% Cloths 1 Application(s) Topical daily  ciprofloxacin  0.3% Ophthalmic Solution for Otic Use 2 Drop(s) Both Ears two times a day  FLUoxetine 20 milliGRAM(s) Oral daily  gabapentin 200 milliGRAM(s) Oral three times a day  hydroxychloroquine 200 milliGRAM(s) Oral two times a day  lidocaine   4% Patch 1 Patch Transdermal every 24 hours  lidocaine   4% Patch 2 Patch Transdermal every 24 hours  melatonin 3 milliGRAM(s) Oral <User Schedule>  multivitamin/minerals/iron Oral Solution (CENTRUM) 15 milliLiter(s) Oral daily  pantoprazole    Tablet 40 milliGRAM(s) Oral before breakfast  predniSONE   Tablet 40 milliGRAM(s) Oral daily  sodium chloride 1 Gram(s) Oral three times a day  sodium chloride 3%. 500 milliLiter(s) (30 mL/Hr) IV Continuous <Continuous>  trimethoprim  160 mG/sulfamethoxazole 800 mG 1 Tablet(s) Oral <User Schedule>      PHYSICAL EXAM:  T(C): 36.7 (01-16-24 @ 05:00), Max: 36.9 (01-15-24 @ 13:00)  HR: 80 (01-16-24 @ 05:00) (80 - 94)  BP: 132/82 (01-16-24 @ 05:00) (114/72 - 132/82)  RR: 18 (01-16-24 @ 05:00) (18 - 19)  SpO2: 100% (01-16-24 @ 05:00) (98% - 100%)  Wt(kg): --  I&O's Summary          JVP: Normal  Neck: supple  Lung: clear   CV: S1 S2 , Murmur:  Abd: soft  Ext: No edema  neuro: Awake / alert  Psych: flat affect  Skin: normal``    LABS/DATA:    CARDIAC MARKERS:                                10.4   6.53  )-----------( 259      ( 15 Carter 2024 05:18 )             32.0     01-15    136  |  99  |  15  ----------------------------<  75  4.0   |  25  |  0.65    Ca    9.9      15 Carter 2024 05:18  Mg     1.80     01-15    TPro  7.4  /  Alb  3.3  /  TBili  0.3  /  DBili  x   /  AST  38  /  ALT  94<H>  /  AlkPhos  95  01-15    proBNP:   Lipid Profile:   HgA1c:   TSH:     TELE:  EKG:

## 2024-01-17 LAB
ALBUMIN SERPL ELPH-MCNC: 3.1 G/DL — LOW (ref 3.3–5)
ALP SERPL-CCNC: 100 U/L — SIGNIFICANT CHANGE UP (ref 40–120)
ALT FLD-CCNC: 101 U/L — HIGH (ref 4–41)
ANION GAP SERPL CALC-SCNC: 11 MMOL/L — SIGNIFICANT CHANGE UP (ref 7–14)
ANTI PM-SCL-100 PLUS: <20 UNITS — SIGNIFICANT CHANGE UP
ANTI-SAE 1 IGG: <20 UNITS — SIGNIFICANT CHANGE UP
ANTI-SS-A 52 KD AB, IGG PLUS: 73 UNITS — HIGH
ANTI-U1-RNP AB PLUS: 173 UNITS — HIGH
AST SERPL-CCNC: 50 U/L — HIGH (ref 4–40)
BILIRUB SERPL-MCNC: 0.3 MG/DL — SIGNIFICANT CHANGE UP (ref 0.2–1.2)
BUN SERPL-MCNC: 13 MG/DL — SIGNIFICANT CHANGE UP (ref 7–23)
C3 SERPL-MCNC: 69 MG/DL — LOW (ref 90–180)
C4 SERPL-MCNC: 10 MG/DL — SIGNIFICANT CHANGE UP (ref 10–40)
CALCIUM SERPL-MCNC: 9.6 MG/DL — SIGNIFICANT CHANGE UP (ref 8.4–10.5)
CHLORIDE SERPL-SCNC: 100 MMOL/L — SIGNIFICANT CHANGE UP (ref 98–107)
CO2 SERPL-SCNC: 26 MMOL/L — SIGNIFICANT CHANGE UP (ref 22–31)
CREAT SERPL-MCNC: 0.74 MG/DL — SIGNIFICANT CHANGE UP (ref 0.5–1.3)
EGFR: 126 ML/MIN/1.73M2 — SIGNIFICANT CHANGE UP
EJ MYOMARKER3 PLUS: NEGATIVE — SIGNIFICANT CHANGE UP
ENA JO1 AB SER IA-ACNC: <20 UNITS — SIGNIFICANT CHANGE UP
FIBRILLARIN (U3 RNP) PLUS: NEGATIVE — SIGNIFICANT CHANGE UP
GLUCOSE SERPL-MCNC: 83 MG/DL — SIGNIFICANT CHANGE UP (ref 70–99)
HCT VFR BLD CALC: 30.2 % — LOW (ref 39–50)
HGB BLD-MCNC: 10.2 G/DL — LOW (ref 13–17)
KU MYOMARKER3 PLUS: NEGATIVE — SIGNIFICANT CHANGE UP
MAGNESIUM SERPL-MCNC: 1.8 MG/DL — SIGNIFICANT CHANGE UP (ref 1.6–2.6)
MCHC RBC-ENTMCNC: 31.6 PG — SIGNIFICANT CHANGE UP (ref 27–34)
MCHC RBC-ENTMCNC: 33.8 GM/DL — SIGNIFICANT CHANGE UP (ref 32–36)
MCV RBC AUTO: 93.5 FL — SIGNIFICANT CHANGE UP (ref 80–100)
MDA5 (P140)(CADM-140) PLUS: <20 UNITS — SIGNIFICANT CHANGE UP
MI-2 PLUS: NEGATIVE — SIGNIFICANT CHANGE UP
NRBC # BLD: 0 /100 WBCS — SIGNIFICANT CHANGE UP (ref 0–0)
NRBC # FLD: 0 K/UL — SIGNIFICANT CHANGE UP (ref 0–0)
NXP-2 (P140) MYOPLUS: <20 UNITS — SIGNIFICANT CHANGE UP
OJ MYOMARKER3 PLUS: NEGATIVE — SIGNIFICANT CHANGE UP
PHOSPHATE SERPL-MCNC: 4 MG/DL — SIGNIFICANT CHANGE UP (ref 2.5–4.5)
PL-12 PLUS: NEGATIVE — SIGNIFICANT CHANGE UP
PL-7 PLUS: NEGATIVE — SIGNIFICANT CHANGE UP
PLATELET # BLD AUTO: 299 K/UL — SIGNIFICANT CHANGE UP (ref 150–400)
POTASSIUM SERPL-MCNC: 4.5 MMOL/L — SIGNIFICANT CHANGE UP (ref 3.5–5.3)
POTASSIUM SERPL-SCNC: 4.5 MMOL/L — SIGNIFICANT CHANGE UP (ref 3.5–5.3)
PROT SERPL-MCNC: 7 G/DL — SIGNIFICANT CHANGE UP (ref 6–8.3)
RBC # BLD: 3.23 M/UL — LOW (ref 4.2–5.8)
RBC # FLD: 15.3 % — HIGH (ref 10.3–14.5)
SODIUM SERPL-SCNC: 137 MMOL/L — SIGNIFICANT CHANGE UP (ref 135–145)
SRP MYOMARKER3 PLUS: NEGATIVE — SIGNIFICANT CHANGE UP
TIF GAMMA (P155/140) PLUS: <20 UNITS — SIGNIFICANT CHANGE UP
U2 SNRNP PLUS: ABNORMAL
WBC # BLD: 6.17 K/UL — SIGNIFICANT CHANGE UP (ref 3.8–10.5)
WBC # FLD AUTO: 6.17 K/UL — SIGNIFICANT CHANGE UP (ref 3.8–10.5)

## 2024-01-17 PROCEDURE — 93975 VASCULAR STUDY: CPT | Mod: 26

## 2024-01-17 PROCEDURE — 99232 SBSQ HOSP IP/OBS MODERATE 35: CPT | Mod: GC

## 2024-01-17 PROCEDURE — 99232 SBSQ HOSP IP/OBS MODERATE 35: CPT

## 2024-01-17 RX ORDER — MYCOPHENOLATE MOFETIL 250 MG/1
500 CAPSULE ORAL
Refills: 0 | Status: DISCONTINUED | OUTPATIENT
Start: 2024-01-17 | End: 2024-01-18

## 2024-01-17 RX ADMIN — OXYCODONE HYDROCHLORIDE 5 MILLIGRAM(S): 5 TABLET ORAL at 12:35

## 2024-01-17 RX ADMIN — MYCOPHENOLATE MOFETIL 500 MILLIGRAM(S): 250 CAPSULE ORAL at 23:03

## 2024-01-17 RX ADMIN — Medication 15 MILLILITER(S): at 12:06

## 2024-01-17 RX ADMIN — PANTOPRAZOLE SODIUM 40 MILLIGRAM(S): 20 TABLET, DELAYED RELEASE ORAL at 05:57

## 2024-01-17 RX ADMIN — GABAPENTIN 200 MILLIGRAM(S): 400 CAPSULE ORAL at 12:07

## 2024-01-17 RX ADMIN — SODIUM CHLORIDE 1 GRAM(S): 9 INJECTION INTRAMUSCULAR; INTRAVENOUS; SUBCUTANEOUS at 12:07

## 2024-01-17 RX ADMIN — ATORVASTATIN CALCIUM 80 MILLIGRAM(S): 80 TABLET, FILM COATED ORAL at 23:04

## 2024-01-17 RX ADMIN — Medication 40 MILLIGRAM(S): at 05:57

## 2024-01-17 RX ADMIN — GABAPENTIN 200 MILLIGRAM(S): 400 CAPSULE ORAL at 23:04

## 2024-01-17 RX ADMIN — Medication 20 MILLIGRAM(S): at 12:06

## 2024-01-17 RX ADMIN — GABAPENTIN 200 MILLIGRAM(S): 400 CAPSULE ORAL at 05:57

## 2024-01-17 RX ADMIN — Medication 3 MILLIGRAM(S): at 23:03

## 2024-01-17 RX ADMIN — Medication 1 TABLET(S): at 06:39

## 2024-01-17 RX ADMIN — Medication 200 MILLIGRAM(S): at 05:59

## 2024-01-17 RX ADMIN — APIXABAN 5 MILLIGRAM(S): 2.5 TABLET, FILM COATED ORAL at 05:58

## 2024-01-17 RX ADMIN — Medication 200 MILLIGRAM(S): at 23:02

## 2024-01-17 RX ADMIN — APIXABAN 5 MILLIGRAM(S): 2.5 TABLET, FILM COATED ORAL at 23:03

## 2024-01-17 RX ADMIN — SODIUM CHLORIDE 1 GRAM(S): 9 INJECTION INTRAMUSCULAR; INTRAVENOUS; SUBCUTANEOUS at 05:56

## 2024-01-17 RX ADMIN — SODIUM CHLORIDE 1 GRAM(S): 9 INJECTION INTRAMUSCULAR; INTRAVENOUS; SUBCUTANEOUS at 23:03

## 2024-01-17 RX ADMIN — ONDANSETRON 4 MILLIGRAM(S): 8 TABLET, FILM COATED ORAL at 12:34

## 2024-01-17 NOTE — PROGRESS NOTE ADULT - ASSESSMENT
28 year-old male with h/o Lupus (diagnosed in 09/2023 due to rash, oral ulcers, chest pain, and dyspnea, on Plaquenil) who presented to [place] ED on 12/12/23 with complaints of chest pain and SOB, found to have bilateral acute PE and bilateral pleural effusions. Transfer[facility] for [facility] surgery evaluation. Also with fevers now resolved. Rheumatology consulted given SLE history.     Serologies:   Positive: ABDIAS 1:280 speckled, Sm >8, RNP >8, SSA >8, hypocomplementemia  Pr/Cr 1.2  low vitamin D 25 21, elevated vitamin D 1,25 97, ACE elevated 125, PR3 29.8. Repeat PR3 still weakly positive at 27.7   Negative: APS labs    #Fever (resolved) - Fevers resolved after restarting steroids on 40 mg methylprednisolone 12/23-12/25, restarted 60 mg of methylprednisolone 12/28-12/29, then transitioned to PO prednisone.   #Pleural effusion exudate w/negative culture and PCR. Pleural fluid cytology negative.   #Lymphadenopathy   -Repeat CT imaging without obvious infectious source, mild decrease in axillary LAD, submentonian and submaxillary and increase supraclavicular LAD. No mediastinal lymphoadenopathy  -EBV DNA PCR positive. Discussed with ID, low concern for EBV infection  -Repeat CT CAP with only bilateral axilla noted, they are borderline and stable. Will hold off on LN biopsy for now     #SLE   # Proteinuria   +ve serology and hypocomplementemia improving after steroid trial   creatinine is stable but proteinuria +ve urine P/cr 1.1. Decreased to 0.7.      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 CTA H and N unremarkable   -KARIE with no abnormalities     Recommendations:   -C/w Prednisone 40mg daily (since 1/12), no further urgent immunosuppression in setting of Class I LN   will continue tapering process weekly based on clinical/ laboratory progression  plan to start MMF once acute issues resolved.   -Per will be going to [facility] rehab on discharge. Unable to get transport to and from rehab to outpt rheum appts. Dr. Ricardo who is based at [facility] can follow pt while he is at acute rehab as a consultant and pt will transfer care to [facility] on discharge from rehab    -Myomarker panel pending   -Continue with GI ppx     ***Final recs pending***     Ginny Landaverde MD   PGY-4  Reachable on TEAMS  Pager [phone]  Rheumatology Fellow 28 year-old male with h/o Lupus (diagnosed in 09/2023 due to rash, oral ulcers, chest pain, and dyspnea, on Plaquenil) who presented to [place] ED on 12/12/23 with complaints of chest pain and SOB, found to have bilateral acute PE and bilateral pleural effusions. Transferred to [facility] for CT surgery evaluation. Also with fevers now resolved. Rheumatology consulted given SLE history.     Serologies:   Positive: ADBIAS 1:280 speckled, Sm >8, RNP >8, SSA >8, hypocomplementemia  Pr/Cr 1.2  low vitamin D 25 21, elevated vitamin D 1,25 97, ACE elevated 125, PR3 29.8. Repeat PR3 still weakly positive at 27.7   Negative: APS labs    #Fever (resolved) - Fevers resolved after restarting steroids on 40 mg methylprednisolone 12/23-12/25, restarted 60 mg of methylprednisolone 12/28-12/29, then transitioned to PO prednisone.   #Pleural effusion exudate w/negative culture and PCR. Pleural fluid cytology negative.   #Lymphadenopathy   -Repeat CT imaging without obvious infectious source, mild decrease in axillary LAD, submentonian and submaxillary and increase supraclavicular LAD. No mediastinal lymphoadenopathy  -EBV DNA PCR positive. Discussed with ID, low concern for EBV infection  -Repeat CT CAP with only bilateral axilla noted, they are borderline and stable. Will hold off on LN biopsy for now     #SLE   # Proteinuria   +ve serology and hypocomplementemia improving after steroid trial   creatinine is stable but proteinuria +ve urine P/cr 1.1. Decreased to 0.7.      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 CTA H and N unremarkable   -KARIE with no abnormalities     #Elevated LFTs  -Abdominal US: Liver with mildly increased echogenicity, no clots  -Negative autoimmune hepatitis work up, negative smooth muscle, mitochondrial, LKM1   -Medication related?     Recommendations:   -C/w Prednisone 40mg daily (since 1/12), can transition to PO prednisone 30mg on 1/19  -Will start Cellcept 500mg BID. Discussed risks vs benefits with pt and that he will need additional immunosuppression on top of Plaquenil As well, would help with further tapering off of steroids  -With elevated LFTs and hx of elevated CK, will order repeat CK to see if LFTs are from myositis vs liver   -Per will be going to [facility] acute rehab on discharge. Unable to get transport to and from rehab to outpt rheum appts. Dr. Ricardo who is based at [facility] can follow pt while he is at acute rehab as a consultant and pt will transfer care to [facility] on discharge from rehab    -Myomarker panel pending   -Continue with GI and PJOP ppx     Discussed with Dr. Davion Landaverde MD   PGY-4  Reachable on TEAMS  Pager [phone]  Rheumatology Fellow 28 year-old male with h/o Lupus (diagnosed in 09/2023 due to rash, oral ulcers, chest pain, and dyspnea, on Plaquenil) who presented to [place] ED on 12/12/23 with complaints of chest pain and SOB, found to have bilateral acute PE and bilateral pleural effusions. Transferred to [facility] for CT surgery evaluation. Also with fevers now resolved. Rheumatology consulted given SLE history.     Serologies:   Positive: ABDIAS 1:280 speckled, Sm >8, RNP >8, SSA >8, hypocomplementemia  Pr/Cr 1.2  low vitamin D 25 21, elevated vitamin D 1,25 97, ACE elevated 125, PR3 29.8. Repeat PR3 still weakly positive at 27.7   Negative: APS labs    #Fever (resolved) - Fevers resolved after restarting steroids on 40 mg methylprednisolone 12/23-12/25, restarted 60 mg of methylprednisolone 12/28-12/29, then transitioned to PO prednisone.   #Pleural effusion exudate w/negative culture and PCR. Pleural fluid cytology negative.   #Lymphadenopathy   -Repeat CT imaging without obvious infectious source, mild decrease in axillary LAD, submentonian and submaxillary and increase supraclavicular LAD. No mediastinal lymphoadenopathy  -EBV DNA PCR positive. Discussed with ID, low concern for EBV infection  -Repeat CT CAP with only bilateral axilla noted, they are borderline and stable. Will hold off on LN biopsy for now     #SLE   # Proteinuria   +ve serology and hypocomplementemia improving after steroid trial   creatinine is stable but proteinuria +ve urine P/cr 1.1. Decreased to 0.7.      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 CTA H and N unremarkable   -KARIE with no abnormalities     #Elevated LFTs  -Abdominal US: Liver with mildly increased echogenicity, no clots  -Negative autoimmune hepatitis work up, negative smooth muscle, mitochondrial, LKM1   -Medication related?     Recommendations:   -C/w Prednisone 40mg daily (since 1/12), can transition to PO prednisone 30mg on 1/19  -Will start Cellcept 500mg BID. Discussed risks vs benefits with pt and that he will need additional immunosuppression on top of Plaquenil As well, would help with further tapering off of steroids  -With elevated LFTs and hx of elevated CK, will order repeat CK to see if LFTs are from myositis vs liver   -Per will be going to [facility] acute rehab on discharge. Unable to get transport to and from rehab to outpt rheum appts. Dr. Ricardo who is based at [facility] can follow pt while he is at acute rehab as a consultant and pt will transfer care to [facility] on discharge from rehab    -Myomarker panel pending   -Continue with GI and PJP ppx     Discussed with Dr. Davion Landaverde MD   PGY-4  Reachable on TEAMS  Pager [phone]  Rheumatology Fellow

## 2024-01-17 NOTE — PROGRESS NOTE ADULT - NUTRITIONAL ASSESSMENT
This patient has been assessed with a concern for Malnutrition and has been determined to have a diagnosis/diagnoses of Severe protein-calorie malnutrition.    This patient is being managed with:   Diet Regular-  1000mL Fluid Restriction ([id number])  Supplement Feeding Modality:  Oral  Ensure Plus High Protein Cans or Servings Per Day:  1       Frequency:  Three Times a day  Entered: Jan 16 2024  7:07PM

## 2024-01-17 NOTE — PROGRESS NOTE ADULT - ASSESSMENT
PE  a/c   no evidence of right heart strain   no significant evidence of myocardial injury   normal LV function     Pericardial effusion   inflammatory in setting of SLE   trace effusion   no sign of tamponade  repeat echo in future as outpt   fu with rheum for ongoing work up     CVA  fu with neurology   KARIE is normal

## 2024-01-17 NOTE — PROGRESS NOTE ADULT - SUBJECTIVE AND OBJECTIVE BOX
Subjective: Patient seen and examined. No new events except as noted.     SUBJECTIVE/ROS:  MEDICATIONS:  MEDICATIONS  (STANDING):  apixaban 5 milliGRAM(s) Oral every 12 hours  atorvastatin 80 milliGRAM(s) Oral at bedtime  chlorhexidine 2% Cloths 1 Application(s) Topical daily  ciprofloxacin  0.3% Ophthalmic Solution for Otic Use 2 Drop(s) Both Ears two times a day  FLUoxetine 20 milliGRAM(s) Oral daily  gabapentin 200 milliGRAM(s) Oral three times a day  hydroxychloroquine 200 milliGRAM(s) Oral two times a day  lidocaine   4% Patch 1 Patch Transdermal every 24 hours  lidocaine   4% Patch 2 Patch Transdermal every 24 hours  melatonin 3 milliGRAM(s) Oral <User Schedule>  multivitamin/minerals/iron Oral Solution (CENTRUM) 15 milliLiter(s) Oral daily  pantoprazole    Tablet 40 milliGRAM(s) Oral before breakfast  predniSONE   Tablet 40 milliGRAM(s) Oral daily  sodium chloride 1 Gram(s) Oral three times a day  sodium chloride 3%. 500 milliLiter(s) (30 mL/Hr) IV Continuous <Continuous>  trimethoprim  160 mG/sulfamethoxazole 800 mG 1 Tablet(s) Oral <User Schedule>      PHYSICAL EXAM:  T(C): 36.6 (01-17-24 @ 05:30), Max: 36.7 (01-16-24 @ 15:30)  HR: 65 (01-17-24 @ 05:30) (65 - 83)  BP: 115/76 (01-17-24 @ 05:30) (115/76 - 126/81)  RR: 19 (01-17-24 @ 05:30) (18 - 19)  SpO2: 100% (01-17-24 @ 05:30) (99% - 100%)  Wt(kg): --  I&O's Summary          JVP: Normal  Neck: supple  Lung: clear   CV: S1 S2 , Murmur:  Abd: soft  Ext: No edema  neuro: Awake / alert  Psych: flat affect  Skin: normal``    LABS/DATA:    CARDIAC MARKERS:                                10.5   6.13  )-----------( 266      ( 16 Jan 2024 06:48 )             31.8     01-16    136  |  97<L>  |  15  ----------------------------<  91  4.1   |  25  |  0.74    Ca    9.6      16 Jan 2024 06:48  Phos  3.1     01-16  Mg     1.80     01-16    TPro  7.3  /  Alb  3.3  /  TBili  0.4  /  DBili  x   /  AST  47<H>  /  ALT  109<H>  /  AlkPhos  97  01-16    proBNP:   Lipid Profile:   HgA1c:   TSH:     TELE:  EKG:

## 2024-01-17 NOTE — PROGRESS NOTE ADULT - SUBJECTIVE AND OBJECTIVE BOX
Patient is a 28y old  Male who presents with a chief complaint of fever (04 Jan 2024 13:39)      HPI:  28 years old male with h/o Lupus ( diagnosed in 09/2023, on Plaquenil present to [place] ED on 12/12/23  with complain of chest pain and SOB. Patient reported left sided pleuritic chest pain which started 3 days ago. Pain is progressively worsened, associated with SOB and cough. Patient was seen in OSH ED and was prescribed antibiotics. Patient reported significantly worsening of left sided pleuritic chest pain today. No fever or chills. Patient reported loss of appetite and had a few episode of diarrhea for last 2 days. CTA chest with acute right upper lobe and left lower lobe segmental/subsegmental pulmonary emboli. No CT evidence of right heart strain. New bilateral lower lobe consolidations with areas of central clearing. Pneumonia and pulmonary infarcts are in the differential. New bilateral pleural effusions, small on the left and trace on the right. Bilateral axillary and supraclavicular adenopathy of unclear etiology. Patient was started on heparin ggt transitioned to eliquis on 12/19,  patient with worsening SOB/ hypoxia requiring nasal cannula oxygen supplementation. 12/20  Repeat Xray shows increased large left pleural effusion and compressive atelectasis trace right effusion and linear atelectasis. Thoracic team consulted for possible pigtail insertion Bedside US: Large left effusion with septations. Right simple effusion , + pericardial effusion- lower extremity dopplers negative for DVT,   - GI PCR + e coli  - mRSA PCR + Staph aureus   . Patient transferred to [facility] for further management.     (22 Dec 2023 22:52)      REVIEW OF SYSTEMS  weakness    PAST MEDICAL & SURGICAL HISTORY  No pertinent past medical history    LE (lupus erythematosus)    Pulmonary embolism    No significant past surgical history         CURRENT FUNCTIONAL STATUS  follow up scheduled for today    FAMILY HISTORY   No pertinent family history in first degree relatives        RECENT LABS/IMAGING  CBC Full  -  ( 17 Jan 2024 06:07 )  WBC Count : 6.17 K/uL  RBC Count : 3.23 M/uL  Hemoglobin : 10.2 g/dL  Hematocrit : 30.2 %  Platelet Count - Automated : 299 K/uL  Mean Cell Volume : 93.5 fL  Mean Cell Hemoglobin : 31.6 pg  Mean Cell Hemoglobin Concentration : 33.8 gm/dL  Auto Neutrophil # : x  Auto Lymphocyte # : x  Auto Monocyte # : x  Auto Eosinophil # : x  Auto Basophil # : x  Auto Neutrophil % : x  Auto Lymphocyte % : x  Auto Monocyte % : x  Auto Eosinophil % : x  Auto Basophil % : x    01-17    137  |  100  |  13  ----------------------------<  83  4.5   |  26  |  0.74    Ca    9.6      17 Jan 2024 06:07  Phos  4.0     01-17  Mg     1.80     01-17    TPro  7.0  /  Alb  3.1<L>  /  TBili  0.3  /  DBili  x   /  AST  50<H>  /  ALT  101<H>  /  AlkPhos  100  01-17    Urinalysis Basic - ( 17 Jan 2024 06:07 )    Color: x / Appearance: x / SG: x / pH: x  Gluc: 83 mg/dL / Ketone: x  / Bili: x / Urobili: x   Blood: x / Protein: x / Nitrite: x   Leuk Esterase: x / RBC: x / WBC x   Sq Epi: x / Non Sq Epi: x / Bacteria: x        VITALS  T(C): 36.9 (01-17-24 @ 09:00), Max: 36.9 (01-17-24 @ 09:00)  HR: 80 (01-17-24 @ 09:00) (65 - 81)  BP: 121/81 (01-17-24 @ 09:00) (115/76 - 122/79)  RR: 18 (01-17-24 @ 09:00) (18 - 19)  SpO2: 100% (01-17-24 @ 09:00) (99% - 100%)  Wt(kg): --    ALLERGIES  No Known Allergies      MEDICATIONS   albuterol/ipratropium for Nebulization 3 milliLiter(s) Nebulizer every 6 hours PRN  apixaban 5 milliGRAM(s) Oral every 12 hours  atorvastatin 80 milliGRAM(s) Oral at bedtime  benzocaine/menthol Lozenge 1 Lozenge Oral four times a day PRN  chlorhexidine 2% Cloths 1 Application(s) Topical daily  ciprofloxacin  0.3% Ophthalmic Solution for Otic Use 2 Drop(s) Both Ears two times a day  FIRST- Mouthwash  BLM 15 milliLiter(s) Swish and Spit every 4 hours PRN  FLUoxetine 20 milliGRAM(s) Oral daily  gabapentin 200 milliGRAM(s) Oral three times a day  guaiFENesin Oral Liquid (Sugar-Free) 200 milliGRAM(s) Oral every 6 hours PRN  hydroxychloroquine 200 milliGRAM(s) Oral two times a day  lidocaine   4% Patch 2 Patch Transdermal every 24 hours  lidocaine   4% Patch 1 Patch Transdermal every 24 hours  lidocaine 2% Viscous 5 milliLiter(s) Swish and Spit three times a day PRN  melatonin 3 milliGRAM(s) Oral <User Schedule>  multivitamin/minerals/iron Oral Solution (CENTRUM) 15 milliLiter(s) Oral daily  ondansetron Injectable 4 milliGRAM(s) IV Push every 8 hours PRN  oxyCODONE    IR 5 milliGRAM(s) Oral every 6 hours PRN  oxyCODONE    IR 2.5 milliGRAM(s) Oral every 6 hours PRN  pantoprazole    Tablet 40 milliGRAM(s) Oral before breakfast  predniSONE   Tablet 40 milliGRAM(s) Oral daily  sodium chloride 1 Gram(s) Oral three times a day  sodium chloride 3%. 500 milliLiter(s) IV Continuous <Continuous>  trimethoprim  160 mG/sulfamethoxazole 800 mG 1 Tablet(s) Oral <User Schedule>      ----------------------------------------------------------------------------------------  PHYSICAL EXAM  Constitutional - NAD, Comfortable  HEENT - NCAT, EOMI   Chest - no respiratory distress  Cardiovascular - RRR, S1S2   Abdomen -  Soft, NTND  Extremities - No C/C/E, No calf tenderness   Neurologic Exam -                    Cognitive - Awake, Alert, AAO to self, place, date, year, situation     Communication - Fluent, No dysarthria     Cranial Nerves - CN 2-12 intact     Motor - No focal deficits                    LEFT    UE - ShAB 5/5, EF 5/5, EE 5/5, WE 5/5,  5/5                    RIGHT UE - ShAB 5/5, EF 5/5, EE 5/5, WE 5/5,  5/5                    LEFT    LE - HF 5/5, KE 5/5, DF 5/5, PF 5/5                    RIGHT LE - HF 5/5, KE 5/5, DF 5/5, PF 5/5        Sensory - Intact to LT      Balance - WNL Static  Psychiatric - Mood stable, Affect WNL  ----------------------------------------------------------------------------------------  ASSESSMENT/PLAN  27 yo m recent SLE diagnosis, PE and pneumonia transferred to [facility] for thoracic evaluation  s/p chest tube (since removed)   Pain -oxy ir prn, gabapentin, tylenol, lidocaine patch   continue bedside PT and OT  diet- regular, fluid restrict with ensure plus   DVT PPX - eliquis  Rehab - improving with bedside therapy, will monitor updated therapy notes

## 2024-01-17 NOTE — ANESTHESIA FOLLOW-UP NOTE - NSEVALATIONFT_GEN_ALL_CORE
Pt endorsed mild SOB, starting from this AM. Currently, speaking in full sentences and able to lie flat on bed. Pt does not require supplemental O2. As per patient, primary team aware. No other complaints.

## 2024-01-17 NOTE — PROGRESS NOTE ADULT - ASSESSMENT
28 years old male with h/o Lupus ( diagnosed in 09/2023, on Plaquenil present to [place] ED on 12/12/23  with complain of chest pain and SOB. Patient reported left sided pleuritic chest pain which started 3 days ago. Pain is progressively worsened, associated with SOB and cough. Patient was seen in OSH ED and was prescribed antibiotics. Patient reported significantly worsening of left sided pleuritic chest pain today. No fever or chills. Patient reported loss of appetite and had a few episode of diarrhea for last 2 days. CTA chest with acute right upper lobe and left lower lobe segmental/subsegmental pulmonary emboli. No CT evidence of right heart strain. New bilateral lower lobe consolidations with areas of central clearing. Pneumonia and pulmonary infarcts are in the differential. New bilateral pleural effusions, small on the left and trace on the right. Bilateral axillary and supraclavicular adenopathy of unclear etiology. Patient was started on heparin ggt transitioned to eliquis on 12/19,  patient with worsening SOB/ hypoxia requiring nasal cannula oxygen supplementation. 12/20  Repeat Xray shows increased moderate left pleural effusion and compressive atelectasis trace right effusion and linear atelectasis. Thoracic team consulted for possible pigtail insertion Bedside US: Large left effusion with septations. Right simple effusion , + pericardial effusion- lower extremity dopplers negative for DVT.    # Pulm effusion/ Fever   S/P thoracentesis , follow up results   heparin for AC, switched to eliquis   TS care appreciated   O2 supplement   monitor output   Zosyn for now , ABx per ID , completed   LP done  Hematuria noted   Urology eval   Completed course of ABx   IR, S/P Renal biopsy , follow up results   S/P KARIE, negative     # Recurrent seizure   Neuro eval  EEG   fall precautions   MRI noted   RO CVA VS other etiologies  transfer to tele  neuro follow up   CT per neuro     # Fever  SIRS   Abx , completed per iD   Rheum adn ID follow up   Renal biopsy done, follow up results       # Hyponatremia/ Seizure   RRT   Neuro follow up       #PE   on heparin , resume after thoracentesis   DVT negative  Heme onc eval   likley lupus related     #Hxof lupus  on hydroxychloroquine   Heme eval   Rheum eval   steroids per rheum       DVT andgIPPX    D.C planing with outpatient follow up

## 2024-01-17 NOTE — PROGRESS NOTE ADULT - SUBJECTIVE AND OBJECTIVE BOX
TAYLA MARIE  [id number]    Subjective:  No major events. Sitting up, listening to music.   Asked pt about the steroids and why he thought he wasn't taking them. Pt thought he had stopped, but I told him that it was documented that he was taking his medication early in the AM. Denies purposefully not taking any medication in the morning.   States he agrees he should finish his course of steroids, but is eager to get off when possible.   Denies any pain at this time.      Objective:   Vital Signs Last 24 Hrs  T(C): 36.3 (17 Jan 2024 13:00), Max: 36.9 (17 Jan 2024 09:00)  T(F): 97.4 (17 Jan 2024 13:00), Max: 98.5 (17 Jan 2024 09:00)  HR: 107 (17 Jan 2024 13:00) (65 - 107)  BP: 133/81 (17 Jan 2024 13:00) (115/76 - 133/81)  BP(mean): --  RR: 20 (17 Jan 2024 13:00) (18 - 20)  SpO2: 100% (17 Jan 2024 13:00) (99% - 100%)    Parameters below as of 17 Jan 2024 13:00  Patient On (Oxygen Delivery Method): room air      Physical Exam:  General: NAD  HEENT: EOMI  CV: well perfused   Lung: No increased WOB  Abd: non-distended   Neuro: Awake and alert         LABS:  cret                        10.2   6.17  )-----------( 299      ( 17 Jan 2024 06:07 )             30.2     01-17    137  |  100  |  13  ----------------------------<  83  4.5   |  26  |  0.74    Ca    9.6      17 Jan 2024 06:07  Phos  4.0     01-17  Mg     1.80     01-17    TPro  7.0  /  Alb  3.1<L>  /  TBili  0.3  /  DBili  x   /  AST  50<H>  /  ALT  101<H>  /  AlkPhos  100  01-17        Renal Biopsy   Light Microscopy:  Sections show renal cortex.   Glomeruli: There are approximately  15glomeruli per level section. Global or segmental glomerulosclerosis is  not seen. The glomeruli show patent capillary loops and urinary spaces  without significant inflammatory cell infiltrate. The mesangial areas are  in normal thickness. The glomerular capillary walls are delicate without  duplication or spike formation.   Tubules and interstitium : There is no  significant interstitial fibrosis, inflammation, or tubular atrophy.  Vessels: arteries are without significant histopathologic abnormalities.    Immunofluorescence Microscopy:  Examination of submitted tissue reveals renal cortex, containing  approximately 8 glomeruli. Glomeruli shows granular staining in mesangial  and capillary walls for IgG (2+), kappa (1+) and lambda (1+) light  chains. There is not significant glomerular staining for IgA, IgM, C3,  C1q, albumin and fibrinogen. There is no significant tubular basement  membrane or interstitial staining.       TAYLA MARIE  [id number]    Subjective:  No major events. Sitting up, listening to music.   Asked pt about the steroids and why he thought he wasn't taking them. Pt thought he had stopped, but I told him that it was documented that he was taking his medication early in the AM. Denies purposefully not taking any medication in the morning.   States he agrees he should finish his course of steroids, but is eager to get off when possible because he feels angry/agitated while on it.   Denies any pain at this time.      Objective:   Vital Signs Last 24 Hrs  T(C): 36.3 (17 Jan 2024 13:00), Max: 36.9 (17 Jan 2024 09:00)  T(F): 97.4 (17 Jan 2024 13:00), Max: 98.5 (17 Jan 2024 09:00)  HR: 107 (17 Jan 2024 13:00) (65 - 107)  BP: 133/81 (17 Jan 2024 13:00) (115/76 - 133/81)  BP(mean): --  RR: 20 (17 Jan 2024 13:00) (18 - 20)  SpO2: 100% (17 Jan 2024 13:00) (99% - 100%)    Parameters below as of 17 Jan 2024 13:00  Patient On (Oxygen Delivery Method): room air      Physical Exam:  General: NAD  HEENT: EOMI  CV: well perfused   Lung: No increased WOB  Abd: non-distended   Neuro: Awake and alert         LABS:  cret                        10.2   6.17  )-----------( 299      ( 17 Jan 2024 06:07 )             30.2     01-17    137  |  100  |  13  ----------------------------<  83  4.5   |  26  |  0.74    Ca    9.6      17 Jan 2024 06:07  Phos  4.0     01-17  Mg     1.80     01-17    TPro  7.0  /  Alb  3.1<L>  /  TBili  0.3  /  DBili  x   /  AST  50<H>  /  ALT  101<H>  /  AlkPhos  100  01-17        Renal Biopsy   Light Microscopy:  Sections show renal cortex.   Glomeruli: There are approximately  15glomeruli per level section. Global or segmental glomerulosclerosis is  not seen. The glomeruli show patent capillary loops and urinary spaces  without significant inflammatory cell infiltrate. The mesangial areas are  in normal thickness. The glomerular capillary walls are delicate without  duplication or spike formation.   Tubules and interstitium : There is no  significant interstitial fibrosis, inflammation, or tubular atrophy.  Vessels: arteries are without significant histopathologic abnormalities.    Immunofluorescence Microscopy:  Examination of submitted tissue reveals renal cortex, containing  approximately 8 glomeruli. Glomeruli shows granular staining in mesangial  and capillary walls for IgG (2+), kappa (1+) and lambda (1+) light  chains. There is not significant glomerular staining for IgA, IgM, C3,  C1q, albumin and fibrinogen. There is no significant tubular basement  membrane or interstitial staining.       TAYLA MARIE  [id number]    Subjective:  No major events. Sitting up, listening to music.   Asked pt about the steroids and why he thought he wasn't taking them. Pt thought he had stopped, but I told him that it was documented that he was taking his medication early in the AM. Denies purposefully not taking any medication in the morning.   States he agrees he should finish his course of steroids, but is eager to get off when possible because he feels angry/agitated while on it.   Denies any pain at this time.      Objective:   Vital Signs Last 24 Hrs  T(C): 36.3 (17 Jan 2024 13:00), Max: 36.9 (17 Jan 2024 09:00)  T(F): 97.4 (17 Jan 2024 13:00), Max: 98.5 (17 Jan 2024 09:00)  HR: 107 (17 Jan 2024 13:00) (65 - 107)  BP: 133/81 (17 Jan 2024 13:00) (115/76 - 133/81)  RR: 20 (17 Jan 2024 13:00) (18 - 20)  SpO2: 100% (17 Jan 2024 13:00) (99% - 100%)    Parameters below as of 17 Jan 2024 13:00  Patient On (Oxygen Delivery Method): room air    Physical Exam:  General: NAD  HEENT: EOMI  CV: well perfused   Lung: No increased WOB  Abd: non-distended   Neuro: Awake and alert     LABS:                        10.2   6.17  )-----------( 299      ( 17 Jan 2024 06:07 )             30.2     01-17    137  |  100  |  13  ----------------------------<  83  4.5   |  26  |  0.74    Ca    9.6      17 Jan 2024 06:07  Phos  4.0     01-17  Mg     1.80     01-17    TPro  7.0  /  Alb  3.1<L>  /  TBili  0.3  /  DBili  x   /  AST  50<H>  /  ALT  101<H>  /  AlkPhos  100  01-17        Renal Biopsy   Light Microscopy:  Sections show renal cortex.   Glomeruli: There are approximately  15glomeruli per level section. Global or segmental glomerulosclerosis is  not seen. The glomeruli show patent capillary loops and urinary spaces  without significant inflammatory cell infiltrate. The mesangial areas are  in normal thickness. The glomerular capillary walls are delicate without  duplication or spike formation.   Tubules and interstitium : There is no  significant interstitial fibrosis, inflammation, or tubular atrophy.  Vessels: arteries are without significant histopathologic abnormalities.    Immunofluorescence Microscopy:  Examination of submitted tissue reveals renal cortex, containing  approximately 8 glomeruli. Glomeruli shows granular staining in mesangial  and capillary walls for IgG (2+), kappa (1+) and lambda (1+) light  chains. There is not significant glomerular staining for IgA, IgM, C3,  C1q, albumin and fibrinogen. There is no significant tubular basement  membrane or interstitial staining.

## 2024-01-17 NOTE — PROGRESS NOTE ADULT - SUBJECTIVE AND OBJECTIVE BOX
Name of Patient : TAYLA MARIE  MRN: [id number]  Date of visit: 01-17-24 @ 15:53      Subjective: Patient seen and examined. No new events except as noted.   S/P KARIE, negative  doing okay     REVIEW OF SYSTEMS:    CONSTITUTIONAL: No weakness, fevers or chills  EYES/ENT: No visual changes;  No vertigo or throat pain   NECK: No pain or stiffness  RESPIRATORY: No cough, wheezing, hemoptysis; No shortness of breath  CARDIOVASCULAR: No chest pain or palpitations  GASTROINTESTINAL: No abdominal or epigastric pain. No nausea, vomiting, or hematemesis; No diarrhea or constipation. No melena or hematochezia.  GENITOURINARY: No dysuria, frequency or hematuria  NEUROLOGICAL: No numbness or weakness  SKIN: No itching, burning, rashes, or lesions   All other review of systems is negative unless indicated above.    MEDICATIONS:  MEDICATIONS  (STANDING):  apixaban 5 milliGRAM(s) Oral every 12 hours  atorvastatin 80 milliGRAM(s) Oral at bedtime  chlorhexidine 2% Cloths 1 Application(s) Topical daily  ciprofloxacin  0.3% Ophthalmic Solution for Otic Use 2 Drop(s) Both Ears two times a day  FLUoxetine 20 milliGRAM(s) Oral daily  gabapentin 200 milliGRAM(s) Oral three times a day  hydroxychloroquine 200 milliGRAM(s) Oral two times a day  lidocaine   4% Patch 2 Patch Transdermal every 24 hours  lidocaine   4% Patch 1 Patch Transdermal every 24 hours  melatonin 3 milliGRAM(s) Oral <User Schedule>  multivitamin/minerals/iron Oral Solution (CENTRUM) 15 milliLiter(s) Oral daily  pantoprazole    Tablet 40 milliGRAM(s) Oral before breakfast  predniSONE   Tablet 40 milliGRAM(s) Oral daily  sodium chloride 1 Gram(s) Oral three times a day  sodium chloride 3%. 500 milliLiter(s) (30 mL/Hr) IV Continuous <Continuous>  trimethoprim  160 mG/sulfamethoxazole 800 mG 1 Tablet(s) Oral <User Schedule>      PHYSICAL EXAM:  T(C): 36.3 (01-17-24 @ 13:00), Max: 36.9 (01-17-24 @ 09:00)  HR: 107 (01-17-24 @ 13:00) (65 - 107)  BP: 133/81 (01-17-24 @ 13:00) (104/70 - 133/81)  RR: 20 (01-17-24 @ 13:00) (18 - 20)  SpO2: 100% (01-17-24 @ 13:00) (98% - 100%)  Wt(kg): --  I&O's Summary        Appearance: Normal	  HEENT:  PERRLA   Lymphatic: No lymphadenopathy   Cardiovascular: Normal S1 S2, no JVD  Respiratory: normal effort , clear  Gastrointestinal:  Soft, Non-tender  Skin: No rashes,  warm to touch  Psychiatry:  Mood & affect appropriate  Musculuskeletal: No edema    recent labs, Imaging and EKGs personally reviewed                             10.2   6.17  )-----------( 299      ( 17 Jan 2024 06:07 )             30.2               01-17    137  |  100  |  13  ----------------------------<  83  4.5   |  26  |  0.74    Ca    9.6      17 Jan 2024 06:07  Phos  4.0     01-17  Mg     1.80     01-17    TPro  7.0  /  Alb  3.1<L>  /  TBili  0.3  /  DBili  x   /  AST  50<H>  /  ALT  101<H>  /  AlkPhos  100  01-17                       Urinalysis Basic - ( 17 Jan 2024 06:07 )    Color: x / Appearance: x / SG: x / pH: x  Gluc: 83 mg/dL / Ketone: x  / Bili: x / Urobili: x   Blood: x / Protein: x / Nitrite: x   Leuk Esterase: x / RBC: x / WBC x   Sq Epi: x / Non Sq Epi: x / Bacteria: x

## 2024-01-18 ENCOUNTER — TRANSCRIPTION ENCOUNTER (OUTPATIENT)
Age: 29
End: 2024-01-18

## 2024-01-18 LAB
ALBUMIN SERPL ELPH-MCNC: 3.2 G/DL — LOW (ref 3.3–5)
ALP SERPL-CCNC: 107 U/L — SIGNIFICANT CHANGE UP (ref 40–120)
ALT FLD-CCNC: 110 U/L — HIGH (ref 4–41)
ANION GAP SERPL CALC-SCNC: 13 MMOL/L — SIGNIFICANT CHANGE UP (ref 7–14)
AST SERPL-CCNC: 54 U/L — HIGH (ref 4–40)
BILIRUB SERPL-MCNC: 0.3 MG/DL — SIGNIFICANT CHANGE UP (ref 0.2–1.2)
BUN SERPL-MCNC: 17 MG/DL — SIGNIFICANT CHANGE UP (ref 7–23)
CALCIUM SERPL-MCNC: 9.7 MG/DL — SIGNIFICANT CHANGE UP (ref 8.4–10.5)
CHLORIDE SERPL-SCNC: 99 MMOL/L — SIGNIFICANT CHANGE UP (ref 98–107)
CK SERPL-CCNC: 48 U/L — SIGNIFICANT CHANGE UP (ref 30–200)
CO2 SERPL-SCNC: 25 MMOL/L — SIGNIFICANT CHANGE UP (ref 22–31)
CREAT SERPL-MCNC: 0.73 MG/DL — SIGNIFICANT CHANGE UP (ref 0.5–1.3)
EGFR: 126 ML/MIN/1.73M2 — SIGNIFICANT CHANGE UP
GLUCOSE SERPL-MCNC: 94 MG/DL — SIGNIFICANT CHANGE UP (ref 70–99)
HCT VFR BLD CALC: 30 % — LOW (ref 39–50)
HGB BLD-MCNC: 10.2 G/DL — LOW (ref 13–17)
MAGNESIUM SERPL-MCNC: 1.7 MG/DL — SIGNIFICANT CHANGE UP (ref 1.6–2.6)
MCHC RBC-ENTMCNC: 31.8 PG — SIGNIFICANT CHANGE UP (ref 27–34)
MCHC RBC-ENTMCNC: 34 GM/DL — SIGNIFICANT CHANGE UP (ref 32–36)
MCV RBC AUTO: 93.5 FL — SIGNIFICANT CHANGE UP (ref 80–100)
NRBC # BLD: 0 /100 WBCS — SIGNIFICANT CHANGE UP (ref 0–0)
NRBC # FLD: 0 K/UL — SIGNIFICANT CHANGE UP (ref 0–0)
PHOSPHATE SERPL-MCNC: 3.3 MG/DL — SIGNIFICANT CHANGE UP (ref 2.5–4.5)
PLATELET # BLD AUTO: 327 K/UL — SIGNIFICANT CHANGE UP (ref 150–400)
POTASSIUM SERPL-MCNC: 4 MMOL/L — SIGNIFICANT CHANGE UP (ref 3.5–5.3)
POTASSIUM SERPL-SCNC: 4 MMOL/L — SIGNIFICANT CHANGE UP (ref 3.5–5.3)
PROT SERPL-MCNC: 6.9 G/DL — SIGNIFICANT CHANGE UP (ref 6–8.3)
RBC # BLD: 3.21 M/UL — LOW (ref 4.2–5.8)
RBC # FLD: 15.5 % — HIGH (ref 10.3–14.5)
SODIUM SERPL-SCNC: 137 MMOL/L — SIGNIFICANT CHANGE UP (ref 135–145)
WBC # BLD: 8.77 K/UL — SIGNIFICANT CHANGE UP (ref 3.8–10.5)
WBC # FLD AUTO: 8.77 K/UL — SIGNIFICANT CHANGE UP (ref 3.8–10.5)

## 2024-01-18 PROCEDURE — 99232 SBSQ HOSP IP/OBS MODERATE 35: CPT | Mod: GC

## 2024-01-18 RX ORDER — MYCOPHENOLATE MOFETIL 250 MG/1
500 CAPSULE ORAL DAILY
Refills: 0 | Status: DISCONTINUED | OUTPATIENT
Start: 2024-01-19 | End: 2024-01-23

## 2024-01-18 RX ADMIN — SODIUM CHLORIDE 1 GRAM(S): 9 INJECTION INTRAMUSCULAR; INTRAVENOUS; SUBCUTANEOUS at 05:51

## 2024-01-18 RX ADMIN — MYCOPHENOLATE MOFETIL 500 MILLIGRAM(S): 250 CAPSULE ORAL at 13:05

## 2024-01-18 RX ADMIN — Medication 200 MILLIGRAM(S): at 19:04

## 2024-01-18 RX ADMIN — GABAPENTIN 200 MILLIGRAM(S): 400 CAPSULE ORAL at 22:23

## 2024-01-18 RX ADMIN — SODIUM CHLORIDE 1 GRAM(S): 9 INJECTION INTRAMUSCULAR; INTRAVENOUS; SUBCUTANEOUS at 14:28

## 2024-01-18 RX ADMIN — GABAPENTIN 200 MILLIGRAM(S): 400 CAPSULE ORAL at 14:27

## 2024-01-18 RX ADMIN — Medication 20 MILLIGRAM(S): at 13:05

## 2024-01-18 RX ADMIN — CHLORHEXIDINE GLUCONATE 1 APPLICATION(S): 213 SOLUTION TOPICAL at 13:00

## 2024-01-18 RX ADMIN — ATORVASTATIN CALCIUM 80 MILLIGRAM(S): 80 TABLET, FILM COATED ORAL at 22:23

## 2024-01-18 RX ADMIN — APIXABAN 5 MILLIGRAM(S): 2.5 TABLET, FILM COATED ORAL at 19:04

## 2024-01-18 RX ADMIN — APIXABAN 5 MILLIGRAM(S): 2.5 TABLET, FILM COATED ORAL at 13:04

## 2024-01-18 RX ADMIN — Medication 15 MILLILITER(S): at 13:04

## 2024-01-18 RX ADMIN — GABAPENTIN 200 MILLIGRAM(S): 400 CAPSULE ORAL at 05:51

## 2024-01-18 RX ADMIN — SODIUM CHLORIDE 1 GRAM(S): 9 INJECTION INTRAMUSCULAR; INTRAVENOUS; SUBCUTANEOUS at 22:22

## 2024-01-18 RX ADMIN — Medication 3 MILLIGRAM(S): at 21:20

## 2024-01-18 RX ADMIN — PANTOPRAZOLE SODIUM 40 MILLIGRAM(S): 20 TABLET, DELAYED RELEASE ORAL at 05:51

## 2024-01-18 RX ADMIN — Medication 200 MILLIGRAM(S): at 13:01

## 2024-01-18 NOTE — PROGRESS NOTE ADULT - ASSESSMENT
28 years old male with h/o Lupus ( diagnosed in 09/2023, on Plaquenil present to [place] ED on 12/12/23  with complain of chest pain and SOB. Patient reported left sided pleuritic chest pain which started 3 days ago. Pain is progressively worsened, associated with SOB and cough. Patient was seen in OSH ED and was prescribed antibiotics. Patient reported significantly worsening of left sided pleuritic chest pain today. No fever or chills. Patient reported loss of appetite and had a few episode of diarrhea for last 2 days. CTA chest with acute right upper lobe and left lower lobe segmental/subsegmental pulmonary emboli. No CT evidence of right heart strain. New bilateral lower lobe consolidations with areas of central clearing. Pneumonia and pulmonary infarcts are in the differential. New bilateral pleural effusions, small on the left and trace on the right. Bilateral axillary and supraclavicular adenopathy of unclear etiology. Patient was started on heparin ggt transitioned to eliquis on 12/19,  patient with worsening SOB/ hypoxia requiring nasal cannula oxygen supplementation. 12/20  Repeat Xray shows increased moderate left pleural effusion and compressive atelectasis trace right effusion and linear atelectasis. Thoracic team consulted for possible pigtail insertion Bedside US: Large left effusion with septations. Right simple effusion , + pericardial effusion- lower extremity dopplers negative for DVT.    # Pulm effusion/ Fever   S/P thoracentesis , follow up results   heparin for AC, switched to eliquis   TS care appreciated   O2 supplement   monitor output   Zosyn for now , ABx per ID , completed   LP done  Hematuria noted   Urology eval   Completed course of ABx   IR, S/P Renal biopsy , follow up results   S/P KARIE, negative     # Recurrent seizure   Neuro eval  EEG   fall precautions   MRI noted   RO CVA VS other etiologies  transfer to tele  neuro follow up   CT per neuro     # Fever  SIRS   Abx , completed per iD   Rheum adn ID follow up   Renal biopsy done, follow up results       # Hyponatremia/ Seizure   RRT   Neuro follow up       #PE   on heparin , resume after thoracentesis   DVT negative  Heme onc eval   likley lupus related     #Hxof lupus  on hydroxychloroquine   Heme eval   Rheum eval   steroids per rheum       DVT andgIPPX    D.C planing with outpatient follow up

## 2024-01-18 NOTE — PROGRESS NOTE ADULT - SUBJECTIVE AND OBJECTIVE BOX
Subjective: Patient seen and examined. No new events except as noted.     SUBJECTIVE/ROS:  nad      MEDICATIONS:  MEDICATIONS  (STANDING):  apixaban 5 milliGRAM(s) Oral every 12 hours  atorvastatin 80 milliGRAM(s) Oral at bedtime  chlorhexidine 2% Cloths 1 Application(s) Topical daily  ciprofloxacin  0.3% Ophthalmic Solution for Otic Use 2 Drop(s) Both Ears two times a day  FLUoxetine 20 milliGRAM(s) Oral daily  gabapentin 200 milliGRAM(s) Oral three times a day  hydroxychloroquine 200 milliGRAM(s) Oral two times a day  lidocaine   4% Patch 1 Patch Transdermal every 24 hours  lidocaine   4% Patch 2 Patch Transdermal every 24 hours  melatonin 3 milliGRAM(s) Oral <User Schedule>  multivitamin/minerals/iron Oral Solution (CENTRUM) 15 milliLiter(s) Oral daily  mycophenolate mofetil 500 milliGRAM(s) Oral two times a day  pantoprazole    Tablet 40 milliGRAM(s) Oral before breakfast  predniSONE   Tablet 40 milliGRAM(s) Oral once  sodium chloride 1 Gram(s) Oral three times a day  sodium chloride 3%. 500 milliLiter(s) (30 mL/Hr) IV Continuous <Continuous>  trimethoprim  160 mG/sulfamethoxazole 800 mG 1 Tablet(s) Oral <User Schedule>      PHYSICAL EXAM:  T(C): 36.9 (01-17-24 @ 22:30), Max: 36.9 (01-17-24 @ 09:00)  HR: 98 (01-17-24 @ 22:30) (80 - 107)  BP: 127/82 (01-17-24 @ 22:30) (104/70 - 133/81)  RR: 20 (01-17-24 @ 22:30) (18 - 20)  SpO2: 100% (01-17-24 @ 22:30) (98% - 100%)  Wt(kg): --  I&O's Summary          JVP: Normal  Neck: supple  Lung: clear   CV: S1 S2 , Murmur:  Abd: soft  Ext: No edema  neuro: Awake / alert  Psych: flat affect  Skin: normal``    LABS/DATA:    CARDIAC MARKERS:                                10.2   6.17  )-----------( 299      ( 17 Jan 2024 06:07 )             30.2     01-17    137  |  100  |  13  ----------------------------<  83  4.5   |  26  |  0.74    Ca    9.6      17 Jan 2024 06:07  Phos  4.0     01-17  Mg     1.80     01-17    TPro  7.0  /  Alb  3.1<L>  /  TBili  0.3  /  DBili  x   /  AST  50<H>  /  ALT  101<H>  /  AlkPhos  100  01-17    proBNP:   Lipid Profile:   HgA1c:   TSH:     TELE:  EKG:

## 2024-01-18 NOTE — DISCHARGE NOTE PROVIDER - HOSPITAL COURSE
28 years old male with h/o Lupus ( diagnosed in 09/2023, on Plaquenil present to [place] ED on 12/12/23  with complain of chest pain and SOB.   CTA chest with acute right upper lobe and left lower lobe segmental/subsegmental pulmonary emboli. No CT evidence of right heart strain ( s/p Heparin Gtt transitioned to Eliquis)   New bilateral lower lobe consolidations with areas of central clearing. Pneumonia and pulmonary infarcts are in the differential.   New bilateral pleural effusions, small on the left and trace on the right. Bilateral axillary and supraclavicular adenopathy of unclear etiology   patient with worsening SOB/ hypoxia requiring nasal cannula oxygen supplementation. 12/20  Repeat Xray shows increased moderate left pleural effusion and compressive atelectasis trace right effusion and linear atelectasis. Thoracic team consulted for possible pigtail insertion s/p Chest Tube, Now removed     B/L PE s/p Hep Gtt, Now on Eliquis   Heme onc eval   likely lupus related     Hx Of Lupus   C/w Prednisone 40mg daily (since 1/12), can transition to PO prednisone 30mg on 1/19  -Will start Cellcept 500mg BID    Pleural Effusions    S/P thoracentesis , follow up results   s/p Chest Tube insertion, Now removed   s/p Zosyn, Completed course of ABx     Hematuria noted   Urology eval   IR, S/P Renal biopsy , follow up results   S/P KARIE, negative     Recurrent seizure   Neuro eval  EEG   MRI noted     # Fever  SIRS   Abx , completed per iD   Rheum adn ID follow up   Renal biopsy done, follow up results       # Hyponatremia Resolved on Salt tabs       #Heme onc eval   ailin lupus related 28 years old male with h/o Lupus ( diagnosed in 09/2023, on Plaquenil present to [place] ED on 12/12/23  with complain of chest pain and SOB.   CTA chest with acute right upper lobe and left lower lobe segmental/subsegmental pulmonary emboli. No CT evidence of right heart strain ( s/p Heparin Gtt transitioned to Eliquis)   New bilateral lower lobe consolidations with areas of central clearing. Pneumonia and pulmonary infarcts are in the differential.   New bilateral pleural effusions, small on the left and trace on the right. Bilateral axillary and supraclavicular adenopathy of unclear etiology   patient with worsening SOB/ hypoxia requiring nasal cannula oxygen supplementation. 12/20  Repeat Xray shows increased moderate left pleural effusion and compressive atelectasis trace right effusion and linear atelectasis. Thoracic team consulted for possible pigtail insertion s/p Chest Tube, Now removed     B/L PE s/p Hep Gtt, Now on Eliquis   Heme onc eval, hypercoagulability likely SLE related  - heme/onc op fu    Hx Of Lupus   C/w Prednisone 40mg daily (since 1/12), can transition to PO prednisone 30mg on 1/19  -Will start Cellcept 500mg BID, transitioned to 500 mg daily per rheumatology given sympton reduction   - GI and PJP PPx   - rheum f/u op  - pcp fu op     Pleural Effusions    S/P thoracentesis , follow up results   s/p Chest Tube insertion, Now removed   s/p Zosyn, Completed course of ABx   Pericard effusion - repeat TTE per cards in a few weeks for surveillance    Hematuria noted   Urology eval   IR, S/P Renal biopsy , follow up results   S/P KARIE, negative     Recurrent seizure   Neuro eval, op fu   EEG   MRI noted     # Fever  SIRS   Abx , completed per iD   Rheum and ID follow up   Renal biopsy done, follow up results       # Hyponatremia Resolved on Salt tabs   - nephrology following    PT: Home with Home PT    Patient seen and evaluated, no acute somatic complaints. Reviewed discharge medications with patient; All new medications requiring new prescriptions were sent to the pharmacy of patient's choice. Reviewed need for prescription for previous home medications and new prescriptions sent if requested. Medically cleared/stable for discharge as per Dr. Salamanca on 1/19/24 with close outpatient follow up within 1-2 weeks of discharge. Patient understands and agrees with plan of care. 28 years old male with h/o Lupus ( diagnosed in 09/2023, on Plaquenil present to [place] ED on 12/12/23  with complain of chest pain and SOB.   CTA chest with acute right upper lobe and left lower lobe segmental/subsegmental pulmonary emboli. No CT evidence of right heart strain ( s/p Heparin Gtt transitioned to Eliquis)   New bilateral lower lobe consolidations with areas of central clearing. Pneumonia and pulmonary infarcts are in the differential.   New bilateral pleural effusions, small on the left and trace on the right. Bilateral axillary and supraclavicular adenopathy of unclear etiology  patient with worsening SOB/ hypoxia requiring nasal cannula oxygen supplementation. 12/20  Repeat Xray shows increased moderate left pleural effusion and compressive atelectasis trace right effusion and linear atelectasis. Thoracic team consulted for possible pigtail insertion s/p Chest Tube, now removed.      B/L PE s/p Hep Gtt, Now on Eliquis   Heme onc eval, hypercoagulability likely SLE related  - heme/onc op fu    Hx Of Lupus   C/w Prednisone 40mg daily (since 1/12), can transition to PO prednisone 30mg on 1/19  -Will start Cellcept 500mg BID, transitioned to 500 mg daily per rheumatology given sympton reduction   - GI and PJP PPx   - rheum f/u op  - pcp fu op     Pleural Effusions    S/P thoracentesis , follow up results   s/p Chest Tube insertion, Now removed   s/p Zosyn, Completed course of ABx   Pericard effusion - repeat TTE per cards in a few weeks for surveillance    Hematuria noted   Urology eval   IR, S/P Renal biopsy , follow up results   S/P KARIE, negative     Recurrent seizure   Neuro eval, op fu   EEG   MRI noted     #Fever  SIRS   Abx , completed per iD   Rheum and ID follow up   Renal biopsy done, follow up results     # Hyponatremia Resolved on Salt tabs   - nephrology following    PT: Home with Home PT    Patient seen and evaluated, no acute somatic complaints. Reviewed discharge medications with patient; All new medications requiring new prescriptions were sent to the pharmacy of patient's choice. Reviewed need for prescription for previous home medications and new prescriptions sent if requested. Medically cleared/stable for discharge as per Dr. Parish on 1/20/24 with close outpatient follow up within 1-2 weeks of discharge. Patient understands and agrees with plan of care. 28 years old male with h/o Lupus ( diagnosed in 09/2023, on Plaquenil present to [place] ED on 12/12/23  with complain of chest pain and SOB.CTA chest with acute right upper lobe and left lower lobe segmental/subsegmental pulmonary emboli. No CT evidence of right heart strain ( s/p Heparin Gtt transitioned to Eliquis). New bilateral lower lobe consolidations with areas of central clearing. Pneumonia and pulmonary infarcts are in the differential. New bilateral pleural effusions, small on the left and trace on the right. Bilateral axillary and supraclavicular adenopathy of unclear etiology. Pt initially required nasal cannula oxygen however now off oxygen and satting well on RA.    B/L PE s/p Hep Gtt, Now on Eliquis - fully covered and pt was taking this medication previously.  Heme onc eval, hypercoagulability likely SLE related, heme/onc op fu    Hx Of Lupus - discharging on Prednisone 30mg. Holding Cellcept d/t COVID. GI and PCP PPx w/ Protonix and Bactrim while on steroids. 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 MRI     Hyponatremia Resolved on Salt tabs     COVID - asx, finished 3 days of Remdesivir.    Depression - Started Prozac 1/5 per Psych. No need for psych admission. Pt is having a friend pick him up [facility].  BH: psy: improving, no safety concerns, no psychotic symptoms or signs of celestine    Patient seen and evaluated, no acute somatic complaints. Reviewed discharge medications with patient; All new medications requiring new prescriptions were sent to the pharmacy of patient's choice. Reviewed need for prescription for previous home medications and new prescriptions sent if requested. Medically cleared/stable for discharge as per Dr. Salamanca on 1/25/24 with close outpatient follow up within 1-2 weeks of discharge. Patient understands and agrees with plan of care.   Home care set up by CM. PT and OT script given to patient. 28 years old male with h/o Lupus ( diagnosed in 09/2023, on Plaquenil present to [place] ED on 12/12/23  with complain of chest pain and SOB.CTA chest with acute right upper lobe and left lower lobe segmental/subsegmental pulmonary emboli. No CT evidence of right heart strain ( s/p Heparin Gtt transitioned to Eliquis). New bilateral lower lobe consolidations with areas of central clearing. Pneumonia and pulmonary infarcts are in the differential. New bilateral pleural effusions, small on the left and trace on the right. Bilateral axillary and supraclavicular adenopathy of unclear etiology. Pt initially required nasal cannula oxygen however now off oxygen and satting well on RA.    B/L PE s/p Hep Gtt, Now on Eliquis - fully covered and pt was taking this medication previously.  Heme onc eval, hypercoagulability likely SLE related, heme/onc op fu    Hx Of Lupus - discharging on Prednisone 30mg. Holding Cellcept d/t COVID. GI and PCP PPx w/ Protonix and Bactrim while on steroids. 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 MRI     Hyponatremia Resolved on Salt tabs     COVID - asx, finished 3 days of Remdesivir. Finished isolation.    Depression - Started Prozac 1/5 per Psych. No need for psych admission. Pt is having a friend pick him up [facility]  BH: psy: improving, no safety concerns, no psychotic symptoms or signs of celestine    Patient seen and evaluated, no acute somatic complaints. Reviewed discharge medications with patient; All new medications requiring new prescriptions were sent to the pharmacy of patient's choice. Reviewed need for prescription for previous home medications and new prescriptions sent if requested. Medically cleared/stable for discharge as per Dr. Salamanca on 1/29/24 with close outpatient follow up within 1-2 weeks of discharge. Patient understands and agrees with plan of care.   Home care set up by CM. PT and OT script given to patient.

## 2024-01-18 NOTE — PROGRESS NOTE ADULT - ASSESSMENT
PE  a/c   no evidence of right heart strain   no significant evidence of myocardial injury   normal LV function     Pericardial effusion   inflammatory in setting of SLE   trace effusion   no sign of tamponade  repeat echo in future as outpt   fu with rheum for ongoing work up     CVA  fu with neurology   KARIE is normal     Will sign off   please call back with any questions

## 2024-01-18 NOTE — PROGRESS NOTE ADULT - NUTRITIONAL ASSESSMENT
This patient has been assessed with a concern for Malnutrition and has been determined to have a diagnosis/diagnoses of Severe protein-calorie malnutrition.    This patient is being managed with:   Diet Regular-  1000mL Fluid Restriction ([id number])  Supplement Feeding Modality:  Oral  Ensure Plus High Protein Cans or Servings Per Day:  1       Frequency:  Three Times a day  Entered: Jan 16 2024  7:07PM

## 2024-01-18 NOTE — DISCHARGE NOTE PROVIDER - NSFOLLOWUPCLINICS_GEN_ALL_ED_FT
[facility]  Neurology Epilepsy  [address] [facility], [address] [place] [address]  Phone: [phone]  Fax: [phone]  Follow Up Time: 2 weeks    [facility] [place]  Internal Medicine  [address] [place] [address]  Phone: [phone]  Fax: [phone]  Follow Up Time: 1 week     [facility]  Neurology Epilepsy  [address] [facility], [address] [place] [address]  Phone: [phone]  Fax: [phone]  Follow Up Time: 2 weeks    [facility] [place]  Internal Medicine  [address] [place] [address]  Phone: [phone]  Fax: [phone]  Follow Up Time: 1 week    [facility]  Hematology/Oncology  [address] [place] [address]  Phone: [phone]  Fax:

## 2024-01-18 NOTE — DISCHARGE NOTE PROVIDER - CARE PROVIDER_API CALL
Leisa Burch  Hematology/Oncology  [address] [place] [address]  Phone: [phone]  Fax: [phone]  Follow Up Time:    Leisa Burch  Hematology/Oncology  [address] [place] [address]  Phone: [phone]  Fax: [phone]  Follow Up Time:     Manuel Torres  Cardiovascular Disease  [address] [facility], [facility] [address]  [place] [address]  Phone: [phone]  Fax: [phone]  Follow Up Time:     Vaishnavi Solitario  NP in [facility]  [address] [facility], [address]  [place] [address]  Phone: [phone]  Fax: [phone]  Follow Up Time:     Stephanie Sosa  Thoracic Surgery  [address] [place] [address], [address] [place] [address]  Phone: [phone]  Fax: [phone]  Follow Up Time:     pcpdr.  Phone: (   )    -  Fax: (   )    -  Follow Up Time:     Loraine Mann  Pulmonary Disease  [address] [place] [address]  Phone: [phone]  Fax: [phone]  Follow Up Time:    Leisa Burch  Hematology/Oncology  [address] [place] [address]  Phone: [phone]  Fax: [phone]  Follow Up Time:     BrianJovannyid  Cardiovascular Disease  [address] [facility], [facility] [address]  [place] [address]  Phone: [phone]  Fax: [phone]  Follow Up Time:     Vaishnavi Solitario  NP in [facility]  [address] [facility], [address]  [place] [address]  Phone: [phone]  Fax: [phone]  Follow Up Time:     Stephanie Sosa  Thoracic Surgery  [address] [place] [address], [address] [place] [address]  Phone: [phone]  Fax: [phone]  Follow Up Time:     pcpdr.  Phone: (   )    -  Fax: (   )    -  Follow Up Time:     Lisker, Gita Naomi  Internal Medicine  [address] [facility], [address]  [place] [address]  Phone: [phone]  Fax: [phone]  Scheduled Appointment: 01/24/2024 10:30 AM

## 2024-01-18 NOTE — DISCHARGE NOTE PROVIDER - NSDCCPCAREPLAN_GEN_ALL_CORE_FT
PRINCIPAL DISCHARGE DIAGNOSIS  Diagnosis: Acute pulmonary embolism  Assessment and Plan of Treatment: B/L PE s/p Hep Gtt, Now on Eliquis   Heme onc eval   likely lupus related      SECONDARY DISCHARGE DIAGNOSES  Diagnosis: LE (lupus erythematosus)  Assessment and Plan of Treatment: B/L PE s/p Hep Gtt, Now on Eliquis   Heme onc eval   likely lupus related    Diagnosis: Axillary lymphadenopathy  Assessment and Plan of Treatment:     Diagnosis: Hyponatremia  Assessment and Plan of Treatment:      PRINCIPAL DISCHARGE DIAGNOSIS  Diagnosis: Acute pulmonary embolism  Assessment and Plan of Treatment: YOu were Diagnosed with B/L Pulmonary emboli which means blood clots in Both Lungs. You were Treated with Blood thinner called  Heparin  Now on Eliquis   YOu were Seen by Hematology ( Blood Specialist) to find out why you developed these clots. You will Need further work up as outpatient.   Hematology  will help arrange follow up at [facility] for outpatient continuity of care when ready for discharge.   likely lupus related      SECONDARY DISCHARGE DIAGNOSES  Diagnosis: LE (lupus erythematosus)  Assessment and Plan of Treatment: C/w Prednisone 40mg daily (since 1/12), can transition to PO prednisone 30mg on 1/19  -Will start Cellcept 500mg BID. Discussed risks vs benefits with pt and that he will need additional immunosuppression on top of Plaquenil As well, would help with further tapering off of steroids      Diagnosis: Axillary lymphadenopathy  Assessment and Plan of Treatment: You were Noted with some swollen Lyphnodes on CT, Repeat CT showed a mild decrease and there was no indication for Biopsy at this time. Please Follow up with Your PMD in 2-4 weeks    Diagnosis: Hyponatremia  Assessment and Plan of Treatment: Yoyr sodium was noted to be Low, Please Do Not consume more than 1 liter of fluid per Day  Continuw with Your salt tabs and Follow up with Your PMD in 1 week for repeat Level    Diagnosis: Bilateral pleural effusion  Assessment and Plan of Treatment: You were Noted with Fluid in Both Lungs, This is likely Due to Lupus  You has a Chest Tube on The Left side and Treated with Medication, The Chest is now removed   Follow up with a pulmonologist in 1-2 weeks        Diagnosis: Encephalopathy  Assessment and Plan of Treatment: You had a change in your mental status, This has resolved)    -Reported episode of confusion along with episode of incontinence   -No focal deficits on neuro exam  There was a concern for posible seizures, You were Seen by Neurology, They recomended no seizures Medications at this time.      Diagnosis: Seizure cerebral  Assessment and Plan of Treatment: at some point there was a concern for seizures, You were evaluated by the Neurology team, you are being monitored off antiseizure medications.. Please Follow up with your Neurologist as outpatient    Diagnosis: Abnormal MRI  Assessment and Plan of Treatment: YOu had a MRI which showed: incidental finding of a small Stroke,   Continue with Your Blood Thinner Eliquis as prescribed   and follow up with Neurology as oputpatient   2. One episode of generalized convulsion associated with urinary incontinence which may have been seizure, possibly provoked event. Multiple episodes of lower extremity shaking while conversational during episodes, no tongue biting, post ictal period or incontinence likely due to metabolic etiology, less likely focal onset aware motor seizures. EEGs showing mild cerebral dysfunction but no epileptiform activity or seizures, being monitored off antiseizure medications.       PRINCIPAL DISCHARGE DIAGNOSIS  Diagnosis: Acute pulmonary embolism  Assessment and Plan of Treatment: YOu were Diagnosed with B/L Pulmonary emboli which means blood clots in Both Lungs. You were Treated with Blood thinner called  Heparin  Now on Eliquis   YOu were Seen by Hematology ( Blood Specialist) to find out why you developed these clots. You will Need further work up as outpatient.   Hematology  will help arrange follow up at [facility] for outpatient continuity of care when ready for discharge.   likely lupus related      SECONDARY DISCHARGE DIAGNOSES  Diagnosis: Hyponatremia  Assessment and Plan of Treatment: Your sodium was noted to be Low, Please Do Not consume more than 1 liter of fluid per Day  Continuw with Your salt tabs per kidney doctor and Follow up with Your PMD in 1 week for repeat Level    Diagnosis: LE (lupus erythematosus)  Assessment and Plan of Treatment: Continue meds as prescribed, follow up with rheumatology    Diagnosis: Axillary lymphadenopathy  Assessment and Plan of Treatment: You were Noted with some swollen Lyphnodes on CT, Repeat CT showed a mild decrease and there was no indication for Biopsy at this time. Please Follow up with Your PMD in 2-4 weeks    Diagnosis: Bilateral pleural effusion  Assessment and Plan of Treatment: You were Noted with Fluid in Both Lungs, This is likely Due to Lupus  You has a Chest Tube on The Left side and Treated with Medication, The Chest is now removed   Follow up with a pulmonologist in 1-2 weeks    Pericardial effusion (heart fluid due to lupus) - cardiology recommends repeat echocardiogram to ensure improvement      Diagnosis: Encephalopathy  Assessment and Plan of Treatment: You had a change in your mental status, This has resolved)    -Reported episode of confusion along with episode of incontinence   -No focal deficits on neuro exam  There was a concern for posible seizures, You were Seen by Neurology, They recomended no seizures Medications at this time.      Diagnosis: Seizure cerebral  Assessment and Plan of Treatment: at some point there was a concern for seizures, You were evaluated by the Neurology team, you are being monitored off antiseizure medications.. Please Follow up with your Neurologist as outpatient    Diagnosis: Abnormal MRI  Assessment and Plan of Treatment: YOu had a MRI which showed: incidental finding of a small Stroke,   Continue with Your Blood Thinner Eliquis as prescribed   and follow up with Neurology as oputpatient   2. One episode of generalized convulsion associated with urinary incontinence which may have been seizure, possibly provoked event. Multiple episodes of lower extremity shaking while conversational during episodes, no tongue biting, post ictal period or incontinence likely due to metabolic etiology, less likely focal onset aware motor seizures. EEGs showing mild cerebral dysfunction but no epileptiform activity or seizures, being monitored off antiseizure medications.       PRINCIPAL DISCHARGE DIAGNOSIS  Diagnosis: Acute pulmonary embolism  Assessment and Plan of Treatment: You were diagnosed with blood clots in both lungs. You were treated with a blood thinner called  heparin and you improved.  Please continue to take Eliquis as directed. You were seen by hematology (blood specialist) to find out why you developed these clots. You will need further work up as outpatient. Follow-up with hematology within 1-2 weeks of discharge.      SECONDARY DISCHARGE DIAGNOSES  Diagnosis: Hyponatremia  Assessment and Plan of Treatment: Your sodium was noted to be low, please do notconsume more than 1 liter of fluid per day. You were started on salt tabs while in the hospital per nephrology, please continue as directed. Please follow up with your primary care physician in 1 week for repeat labs.    Diagnosis: LE (lupus erythematosus)  Assessment and Plan of Treatment: Continue meds as prescribed, follow up with rheumatology.    Diagnosis: Axillary lymphadenopathy  Assessment and Plan of Treatment: You were noted with some swollen lymph nodes on CT scan. Repeat CT showed a mild decrease and there was no indication for biopsy at this time. Please follow up with your primary care physician within 1-2 weeks of discharge.    Diagnosis: Bilateral pleural effusion  Assessment and Plan of Treatment: You were noted to have fluid in both lungs. This is likely due to lupus.   You had a chest tube placed and were treated with medications. The chest tube is now removed.  You were also found to have a pericardial effusion (fluid around heart due to lupus) - cardiology recommends repeat echocardiogram to ensure improvement. Follow up with a pulmonologist in 1-2 weeks.    Diagnosis: Encephalopathy  Assessment and Plan of Treatment: You had a change in your mental status. This has resolved.  There was a concern for possible seizures. You had an EEG while in the hospital which was negative for seizures. You were seen by neurology. They recomended no seizures medications at this time. Please follow up with your neurologist as outpatient.    Diagnosis: Abnormal MRI  Assessment and Plan of Treatment: You had a MRI which showed: incidental finding of a small stroke.   Continue with your blood thinner Eliquis as prescribed and follow up with neurology as outpatient.     PRINCIPAL DISCHARGE DIAGNOSIS  Diagnosis: Acute pulmonary embolism  Assessment and Plan of Treatment: You were diagnosed with blood clots in both lungs. You were treated with a blood thinner called  heparin and you improved.  Please continue to take Eliquis as directed. You were seen by hematology (blood specialist) to find out why you developed these clots. You will need further work up as outpatient. Follow-up with hematology within 1-2 weeks of discharge.      SECONDARY DISCHARGE DIAGNOSES  Diagnosis: Hyponatremia  Assessment and Plan of Treatment: Your sodium was noted to be low, please do notconsume more than 1 liter of fluid per day. You were started on salt tabs while in the hospital per nephrology, please continue as directed. Please follow up with your primary care physician in 1 week for repeat labs.    Diagnosis: LE (lupus erythematosus)  Assessment and Plan of Treatment:   HOLD Cellcept for now due to COVID infection. Follow up with Rheumatologist regarding when it is safe to resume this medication.  Continue Prednisone 30mg daily. While steroids, take Bactrim for PCP prophylaxis and protonix to protect your stomach.    Diagnosis: Axillary lymphadenopathy  Assessment and Plan of Treatment: You were noted with some swollen lymph nodes on CT scan. Repeat CT showed a mild decrease and there was no indication for biopsy at this time. Please follow up with your primary care physician within 1-2 weeks of discharge.    Diagnosis: Bilateral pleural effusion  Assessment and Plan of Treatment: You were noted to have fluid in both lungs. This is likely due to lupus.   You had a chest tube placed and were treated with medications. The chest tube is now removed.  You were also found to have a pericardial effusion (fluid around heart due to lupus) - cardiology recommends repeat echocardiogram to ensure improvement. Follow up with a pulmonologist in 1-2 weeks.    Diagnosis: Encephalopathy  Assessment and Plan of Treatment: You had a change in your mental status. This has resolved. Likely hospital induced delirium.  There was a concern for possible seizures. You had an EEG while in the hospital which was negative for seizures. You were seen by neurology. They recomended no seizures medications at this time. Please follow up with your neurologist as outpatient.    Diagnosis: Abnormal MRI  Assessment and Plan of Treatment: You had a MRI which showed: incidental finding of a small stroke.   Continue with your blood thinner Eliquis as prescribed and follow up with neurology as outpatient.    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  Follow up with Nephrologist.    Diagnosis: Pericardial effusion  Assessment and Plan of Treatment: Echo (ultrasound of your heart) showed pericardial effusion (fluid in your heart). Please obtain repeat echo in a few weeks for monitoring.  Follow up with PCP and Cardiologist.    Diagnosis: 2019 novel coronavirus disease (COVID-19)  Assessment and Plan of Treatment: You tested positive for COVID on 1/23/24. You were given 3 days of antiviral medication called Remdsivir as you are a high risk for patient for progression of disease. You remained stable without any symptoms.    Diagnosis: Major depression  Assessment and Plan of Treatment: You were seen by psychiatry and started on Prozac.  [facility]     PRINCIPAL DISCHARGE DIAGNOSIS  Diagnosis: Acute pulmonary embolism  Assessment and Plan of Treatment: You were diagnosed with blood clots in both lungs. You were treated with a blood thinner called  heparin and you improved.  Please continue to take Eliquis as directed. You were seen by hematology (blood specialist) to find out why you developed these clots. You will need further work up as outpatient. Follow-up with hematology within 1-2 weeks of discharge.      SECONDARY DISCHARGE DIAGNOSES  Diagnosis: Hyponatremia  Assessment and Plan of Treatment: Your sodium was noted to be low, please do notconsume more than 1 liter of fluid per day. You were started on salt tabs while in the hospital per nephrology, please continue as directed. Please follow up with your primary care physician in 1 week for repeat labs.    Diagnosis: LE (lupus erythematosus)  Assessment and Plan of Treatment:   HOLD Cellcept for now due to COVID infection. Follow up with Rheumatologist regarding when it is safe to resume this medication.  Continue Prednisone 30mg daily. While steroids, take Bactrim for PCP prophylaxis and protonix to protect your stomach.    Diagnosis: Axillary lymphadenopathy  Assessment and Plan of Treatment: You were noted with some swollen lymph nodes on CT scan. Repeat CT showed a mild decrease and there was no indication for biopsy at this time. Please follow up with your primary care physician within 1-2 weeks of discharge.    Diagnosis: Bilateral pleural effusion  Assessment and Plan of Treatment: You were noted to have fluid in both lungs. This is likely due to lupus.   You had a chest tube placed and were treated with medications. The chest tube is now removed.  You were also found to have a pericardial effusion (fluid around heart due to lupus) - cardiology recommends repeat echocardiogram to ensure improvement. Follow up with a pulmonologist in 1-2 weeks.    Diagnosis: Encephalopathy  Assessment and Plan of Treatment: You had a change in your mental status. This has resolved. Likely hospital induced delirium.  There was a concern for possible seizures. You had an EEG while in the hospital which was negative for seizures. You were seen by neurology. They recomended no seizures medications at this time. Please follow up with your neurologist as outpatient.    Diagnosis: Abnormal MRI  Assessment and Plan of Treatment: You had a MRI which showed: incidental finding of a small stroke.   Continue with your blood thinner Eliquis as prescribed and follow up with neurology as outpatient.    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  Follow up with Nephrologist.    Diagnosis: Pericardial effusion  Assessment and Plan of Treatment: Echo (ultrasound of your heart) showed pericardial effusion (fluid in your heart). Please obtain repeat echo in a few weeks for monitoring.  Follow up with PCP and Cardiologist.    Diagnosis: 2019 novel coronavirus disease (COVID-19)  Assessment and Plan of Treatment: You tested positive for COVID on 1/23/24. You were given 3 days of antiviral medication called Remdsivir as you are a high risk for patient for progression of disease. You remained stable without any symptoms. You have not had any fevers for >5 days. Last fever was 1/22 evening.  You finished 5 days of isolation while in the hospital therefore no longer need to quarantine at home however continue to wear a mask around others until day 10.    Diagnosis: Major depression  Assessment and Plan of Treatment: You were seen by psychiatry and started on Prozac.  [facility]

## 2024-01-18 NOTE — PROGRESS NOTE ADULT - SUBJECTIVE AND OBJECTIVE BOX
Name of Patient : TAYLA MARIE  MRN: [id number]  Date of visit: 01-18-24       Subjective: Patient seen and examined. No new events except as noted.   Doing okay     REVIEW OF SYSTEMS:    CONSTITUTIONAL: No weakness, fevers or chills  EYES/ENT: No visual changes;  No vertigo or throat pain   NECK: No pain or stiffness  RESPIRATORY: No cough, wheezing, hemoptysis; No shortness of breath  CARDIOVASCULAR: No chest pain or palpitations  GASTROINTESTINAL: No abdominal or epigastric pain. No nausea, vomiting, or hematemesis; No diarrhea or constipation. No melena or hematochezia.  GENITOURINARY: No dysuria, frequency or hematuria  NEUROLOGICAL: No numbness or weakness  SKIN: No itching, burning, rashes, or lesions   All other review of systems is negative unless indicated above.    MEDICATIONS:  MEDICATIONS  (STANDING):  apixaban 5 milliGRAM(s) Oral every 12 hours  atorvastatin 80 milliGRAM(s) Oral at bedtime  chlorhexidine 2% Cloths 1 Application(s) Topical daily  ciprofloxacin  0.3% Ophthalmic Solution for Otic Use 2 Drop(s) Both Ears two times a day  FLUoxetine 20 milliGRAM(s) Oral daily  gabapentin 200 milliGRAM(s) Oral three times a day  hydroxychloroquine 200 milliGRAM(s) Oral two times a day  lidocaine   4% Patch 1 Patch Transdermal every 24 hours  lidocaine   4% Patch 2 Patch Transdermal every 24 hours  melatonin 3 milliGRAM(s) Oral <User Schedule>  multivitamin/minerals/iron Oral Solution (CENTRUM) 15 milliLiter(s) Oral daily  mycophenolate mofetil 500 milliGRAM(s) Oral two times a day  pantoprazole    Tablet 40 milliGRAM(s) Oral before breakfast  predniSONE   Tablet 40 milliGRAM(s) Oral once  sodium chloride 1 Gram(s) Oral three times a day  sodium chloride 3%. 500 milliLiter(s) (30 mL/Hr) IV Continuous <Continuous>  trimethoprim  160 mG/sulfamethoxazole 800 mG 1 Tablet(s) Oral <User Schedule>      PHYSICAL EXAM:  T(C): 37 (01-18-24 @ 13:45), Max: 37.2 (01-18-24 @ 05:45)  HR: 67 (01-18-24 @ 13:45) (67 - 98)  BP: 138/87 (01-18-24 @ 13:45) (125/75 - 138/87)  RR: 18 (01-18-24 @ 13:45) (18 - 20)  SpO2: 100% (01-18-24 @ 13:45) (99% - 100%)  Wt(kg): --  I&O's Summary        Appearance: Normal	  HEENT:  PERRLA   Lymphatic: No lymphadenopathy   Cardiovascular: Normal S1 S2, no JVD  Respiratory: normal effort , clear  Gastrointestinal:  Soft, Non-tender  Skin: No rashes,  warm to touch  Psychiatry:  Mood & affect appropriate  Musculuskeletal: No edema    recent labs, Imaging and EKGs personally reviewed                           10.2   8.77  )-----------( 327      ( 18 Jan 2024 06:13 )             30.0               01-18    137  |  99  |  17  ----------------------------<  94  4.0   |  25  |  0.73    Ca    9.7      18 Jan 2024 06:13  Phos  3.3     01-18  Mg     1.70     01-18    TPro  6.9  /  Alb  3.2<L>  /  TBili  0.3  /  DBili  x   /  AST  54<H>  /  ALT  110<H>  /  AlkPhos  107  01-18           CARDIAC MARKERS ( 18 Jan 2024 06:13 )  x     / x     / 48 U/L / x     / x                  Urinalysis Basic - ( 18 Jan 2024 06:13 )    Color: x / Appearance: x / SG: x / pH: x  Gluc: 94 mg/dL / Ketone: x  / Bili: x / Urobili: x   Blood: x / Protein: x / Nitrite: x   Leuk Esterase: x / RBC: x / WBC x   Sq Epi: x / Non Sq Epi: x / Bacteria: x

## 2024-01-18 NOTE — DISCHARGE NOTE PROVIDER - CARE PROVIDERS DIRECT ADDRESSES
,[email or web address]

## 2024-01-18 NOTE — DISCHARGE NOTE PROVIDER - NSDCMRMEDTOKEN_GEN_ALL_CORE_FT
apixaban 5 mg oral tablet: 1 tab(s) orally 2 times a day  Eliquis Starter Pack for Treatment of DVT and PE 5 mg oral tablet: 1 tab(s) orally 2 times a day 10mg (2 pills) 2 times a day for the first 7 days   then 5mg 2 times a day for the next 21 days    Continue Eliquis for a total of 3 months  hydroxychloroquine 400 mg oral tablet: 1 tab(s) orally 2 times a day   apixaban 5 mg oral tablet: 1 tab(s) orally 2 times a day  atorvastatin 80 mg oral tablet: 1 tab(s) orally once a day (at bedtime)  FLUoxetine 20 mg oral capsule: 1 cap(s) orally once a day  gabapentin 100 mg oral capsule: 2 cap(s) orally 3 times a day  hydroxychloroquine 200 mg oral tablet: 1 tab(s) orally 2 times a day  hydroxychloroquine 400 mg oral tablet: 1 tab(s) orally 2 times a day  melatonin 3 mg oral tablet: 1 tab(s) orally  mycophenolate mofetil 250 mg oral capsule: 2 cap(s) orally once a day  pantoprazole 40 mg oral delayed release tablet: 1 tab(s) orally once a day (before a meal)  sodium chloride 1 g oral tablet: 1 tab(s) orally 3 times a day  sulfamethoxazole-trimethoprim 800 mg-160 mg oral tablet: 1 tab(s) orally 3 times a week take 1 tablet on mondays, wednesdays, and fridays   apixaban 5 mg oral tablet: 1 tab(s) orally 2 times a day  atorvastatin 80 mg oral tablet: 1 tab(s) orally once a day (at bedtime)  FLUoxetine 20 mg oral capsule: 1 cap(s) orally once a day  gabapentin 100 mg oral capsule: 2 cap(s) orally 3 times a day  hydroxychloroquine 200 mg oral tablet: 1 tab(s) orally 2 times a day  mycophenolate mofetil 250 mg oral capsule: 2 cap(s) orally once a day  pantoprazole 40 mg oral delayed release tablet: 1 tab(s) orally once a day (before a meal)  predniSONE 10 mg oral tablet: 3 tab(s) orally once a day  sodium chloride 1 g oral tablet: 1 tab(s) orally 3 times a day  sulfamethoxazole-trimethoprim 800 mg-160 mg oral tablet: 1 tab(s) orally 3 times a week take 1 tablet on mondays, wednesdays, and fridays   acetaminophen 325 mg oral tablet: 2 tab(s) orally every 6 hours As needed Temp greater or equal to 38C (100.4F), Mild Pain (1 - 3)  apixaban 5 mg oral tablet: 1 tab(s) orally 2 times a day  atorvastatin 80 mg oral tablet: 1 tab(s) orally once a day (at bedtime)  FLUoxetine 20 mg oral capsule: 1 cap(s) orally once a day  gabapentin 100 mg oral capsule: 2 cap(s) orally 3 times a day  hydroxychloroquine 200 mg oral tablet: 1 tab(s) orally 2 times a day  mycophenolate mofetil 250 mg oral capsule: 2 cap(s) orally once a day  pantoprazole 40 mg oral delayed release tablet: 1 tab(s) orally once a day (before a meal)  predniSONE 10 mg oral tablet: 3 tab(s) orally once a day  sodium chloride 1 g oral tablet: 1 tab(s) orally 3 times a day  sulfamethoxazole-trimethoprim 800 mg-160 mg oral tablet: 1 tab(s) orally 3 times a week take 1 tablet on mondays, wednesdays, and fridays   acetaminophen 325 mg oral tablet: 2 tab(s) orally every 6 hours As needed Temp greater or equal to 38C (100.4F), Mild Pain (1 - 3)  apixaban 5 mg oral tablet: 1 tab(s) orally 2 times a day  atorvastatin 80 mg oral tablet: 1 tab(s) orally once a day (at bedtime)  FLUoxetine 20 mg oral capsule: 1 cap(s) orally once a day  gabapentin 100 mg oral capsule: 2 cap(s) orally 3 times a day  hydroxychloroquine 200 mg oral tablet: 1 tab(s) orally 2 times a day  pantoprazole 40 mg oral delayed release tablet: 1 tab(s) orally once a day (before a meal)  predniSONE 10 mg oral tablet: 3 tab(s) orally once a day  sodium chloride 1 g oral tablet: 1 tab(s) orally 3 times a day  sulfamethoxazole-trimethoprim 800 mg-160 mg oral tablet: 1 tab(s) orally 3 times a week take 1 tablet on mondays, wednesdays, and fridays

## 2024-01-18 NOTE — DISCHARGE NOTE PROVIDER - PROVIDER TOKENS
PROVIDER:[TOKEN:[[id number]]] PROVIDER:[TOKEN:[[id number]]],PROVIDER:[TOKEN:[[id number]]],PROVIDER:[TOKEN:[[id number]]],PROVIDER:[TOKEN:[[id number]]],FREE:[LAST:[pcp],FIRST:[drIndigo],PHONE:[(   )    -],FAX:[(   )    -]],PROVIDER:[TOKEN:[[id number]]] PROVIDER:[TOKEN:[[id number]]],PROVIDER:[TOKEN:[[id number]]],PROVIDER:[TOKEN:[[id number]]],PROVIDER:[TOKEN:[[id number]]],FREE:[LAST:[pcp],FIRST:[drIndigo],PHONE:[(   )    -],FAX:[(   )    -]],PROVIDER:[TOKEN:[[id number]],SCHEDULEDAPPT:[01/24/2024],SCHEDULEDAPPTTIME:[10:30 AM]]

## 2024-01-18 NOTE — PROGRESS NOTE ADULT - ASSESSMENT
28 year-old male with h/o Lupus (diagnosed in 09/2023 due to rash, oral ulcers, chest pain, and dyspnea, on Plaquenil) who presented to [place] ED on 12/12/23 with complaints of chest pain and SOB, found to have bilateral acute PE and bilateral pleural effusions. Transferred to [facility] for CT surgery evaluation. Also with fevers now resolved. Rheumatology consulted given SLE history.     Serologies:   Positive: ABDIAS 1:280 speckled, Sm >8, RNP >8, SSA >8, hypocomplementemia  Myomarker: positive anti U1-, SSA 52 73   low vitamin D 25 21, elevated vitamin D 1,25 97, ACE elevated 125, PR3 29.8. Repeat PR3 still weakly positive at 27.7   Negative: APS labs    #Fever (resolved) - Fevers resolved after restarting steroids on 40 mg methylprednisolone 12/23-12/25, restarted 60 mg of methylprednisolone 12/28-12/29, then transitioned to PO prednisone.   #Pleural effusion exudate w/negative culture and PCR. Pleural fluid cytology negative.   #Lymphadenopathy   -Repeat CT imaging without obvious infectious source, mild decrease in axillary LAD, submentonian and submaxillary and increase supraclavicular LAD. No mediastinal lymphoadenopathy  -EBV DNA PCR positive. Discussed with ID, low concern for EBV infection  -Repeat CT CAP with only bilateral axilla noted, they are borderline and stable. Will hold off on LN biopsy for now     #SLE   # Proteinuria   +ve serology and hypocomplementemia improving after steroid trial   creatinine is stable but proteinuria +ve urine P/cr 1.1. Decreased to 0.7.      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 CTA H and N unremarkable   -KARIE with no abnormalities     #Elevated LFTs  -Abdominal US: Liver with mildly increased echogenicity, no clots  -Negative autoimmune hepatitis work up, negative smooth muscle, mitochondrial, LKM1   -Medication related?   -CK wnl on 1/18.     Recommendations:   -C/w Prednisone 40mg daily (since 1/12), can transition to PO prednisone 30mg on 1/19  ***Final recs**   -Per will be going to [facility] rehab on discharge. Unable to get transport to and from rehab to outpt rheum appts. Dr. Ricardo who is based at [facility] can follow pt while he is at acute rehab as a consultant and pt will transfer care to [facility] on discharge from rehab    -Continue with GI and PJP ppx     Discussed with Dr. Davion Landaverde MD   PGY-4  Reachable on TEAMS  Pager [phone]  Rheumatology Fellow 28 year-old male with h/o Lupus (diagnosed in 09/2023 due to rash, oral ulcers, chest pain, and dyspnea, on Plaquenil) who presented to [place] ED on 12/12/23 with complaints of chest pain and SOB, found to have bilateral acute PE and bilateral pleural effusions. Transferred to [facility] for CT surgery evaluation. Also with fevers now resolved. Rheumatology consulted given SLE history.     Serologies:   Positive: ABDIAS 1:280 speckled, Sm >8, RNP >8, SSA >8, hypocomplementemia  Myomarker: positive anti U1-, SSA 52 73   low vitamin D 25 21, elevated vitamin D 1,25 97, ACE elevated 125, PR3 29.8. Repeat PR3 still weakly positive at 27.7   Negative: APS labs    #Fever (resolved) - Fevers resolved after restarting steroids on 40 mg methylprednisolone 12/23-12/25, restarted 60 mg of methylprednisolone 12/28-12/29, then transitioned to PO prednisone.   #Pleural effusion exudate w/negative culture and PCR. Pleural fluid cytology negative.   #Lymphadenopathy   -Repeat CT imaging without obvious infectious source, mild decrease in axillary LAD, submentonian and submaxillary and increase supraclavicular LAD. No mediastinal lymphoadenopathy  -EBV DNA PCR positive. Discussed with ID, low concern for EBV infection  -Repeat CT CAP with only bilateral axilla noted, they are borderline and stable. Will hold off on LN biopsy for now     #SLE   # Proteinuria   +ve serology and hypocomplementemia improving after steroid trial   creatinine is stable but proteinuria +ve urine P/cr 1.1. Decreased to 0.7.      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 CTA H and N unremarkable   -KARIE with no abnormalities     #Elevated LFTs  -Abdominal US: Liver with mildly increased echogenicity, no clots  -Negative autoimmune hepatitis work up, negative smooth muscle, mitochondrial, LKM1   -Medication related?   -CK wnl on 1/18    Recommendations:   -C/w Prednisone 40mg daily (since 1/12), can transition to PO prednisone 30mg on 1/19  ***Final recs**    -Continue with GI and PJP ppx   -Per PT, okay for home discharge instead of acute rehab. Will set up follow up at [facility], please provide clinic information in discharge paperwork.      Discussed with Dr. Davion Landaverde MD   PGY-4  Reachable on TEAMS  Pager [phone]  Rheumatology Fellow 28 year-old male with h/o Lupus (diagnosed in 09/2023 due to rash, oral ulcers, chest pain, and dyspnea, on Plaquenil) who presented to [place] ED on 12/12/23 with complaints of chest pain and SOB, found to have bilateral acute PE and bilateral pleural effusions. Transferred to [facility] for CT surgery evaluation. Also with fevers now resolved. Rheumatology consulted given SLE history.     Serologies:   Positive: ABDIAS 1:280 speckled, Sm >8, RNP >8, SSA >8, hypocomplementemia  Myomarker: positive anti U1-, SSA 52 73   low vitamin D 25 21, elevated vitamin D 1,25 97, ACE elevated 125, PR3 29.8. Repeat PR3 still weakly positive at 27.7   Negative: APS labs    #Fever (resolved) - Fevers resolved after restarting steroids on 40 mg methylprednisolone 12/23-12/25, restarted 60 mg of methylprednisolone 12/28-12/29, then transitioned to PO prednisone.   #Pleural effusion exudate w/negative culture and PCR. Pleural fluid cytology negative.   #Lymphadenopathy   -Repeat CT imaging without obvious infectious source, mild decrease in axillary LAD, submentonian and submaxillary and increase supraclavicular LAD. No mediastinal lymphoadenopathy  -EBV DNA PCR positive. Discussed with ID, low concern for EBV infection  -Repeat CT CAP with only bilateral axilla noted, they are borderline and stable. Will hold off on LN biopsy for now     #SLE   # Proteinuria   +ve serology and hypocomplementemia improving after steroid trial   creatinine is stable but proteinuria +ve urine P/cr 1.1. Decreased to 0.7.      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 CTA H and N unremarkable   -KARIE with no abnormalities     #Elevated LFTs  -Abdominal US: Liver with mildly increased echogenicity, no clots  -Negative autoimmune hepatitis work up, negative smooth muscle, mitochondrial, LKM1   -Medication related?   -CK wnl on 1/18    Recommendations:   -C/w Prednisone 40mg daily (since 1/12), can transition to PO prednisone 30mg on 1/19. Can discharge on prednisone 30mg daily (please give a little more than month's supply incase there is a delay in follow up)  -Will decrease cellcept to 500mg daily since pt endorsed GI symptoms (when pt was seen again in the evening, reported symptoms were gone)     -Continue with GI and PJP ppx (will need on discharge as well)   -Per PT, okay for home discharge instead of acute rehab. Will set up follow up at [facility], please provide clinic information in discharge paperwork.      Discussed with Dr. Davion Landaverde MD   PGY-4  Reachable on TEAMS  Pager [phone]  Rheumatology Fellow

## 2024-01-18 NOTE — DISCHARGE NOTE PROVIDER - NPI NUMBER (FOR SYSADMIN USE ONLY) :
[[id number]] [[id number]],[[id number]],[[id number]],[[id number]],[[id number]] [[id number]],[[id number]],[[id number]],[[id number]],[[id number]],[[id number]]

## 2024-01-18 NOTE — DISCHARGE NOTE PROVIDER - NSDCFUADDAPPT_GEN_ALL_CORE_FT
Heme: Patient should follow up with his PMD 1 week after discharge and 3 months after discharge with a Hematologist. A referral to [facility] has already been made    Rheumatology: [facility]    Patient should follow up with Stroke NP, Vaishnavi Solitario or Kalina Miguel, in clinic at [address] [place] [address]. Please email [email or web address] w/ basic PHI.     CT surgery: Dr. Sosa to monitor pleural effusion resolution and pulmonary Heme: Patient should follow up with his PMD 1 week after discharge and 3 months after discharge with a Hematologist. A referral to [facility] has already been made    Rheumatology: [facility]    Patient should follow up with Stroke NP, Vaishnavi Solitario or Kalina Miguel, in clinic at [address] [place] [address]. Please email [email or web address] w/ basic PHI.     CT surgery: Dr. Sosa to monitor pleural effusion resolution and pulmonary with dr. lisker (you will receive an email/text with information on how to join telehealth appointment) Follow-up with your primary care doctor within 1 week of discharge.     Follow-up with your hematologist at [facility]. A referral to [facility] has been made.    Follow up with Stroke NP, Vaishnavi Solitario or Kalina Miguel, in clinic at [address] [place] [address].     Follow-up with your rheumatologist at [facility] at [place].      Follow-up with your primary care doctor within 1 week of discharge.     Follow-up with your hematologist at [facility]. A referral to [facility] has been made.    Follow up with Stroke NP, Vaishnavi Solitario or Kalina Miguel, in clinic at [address] [place] [address].     Follow-up with your rheumatologist at [facility] at [place].     Follow up with Pulmonologist.    Follow up with Cardiologist.    Follow up with [facility] [phone] or [phone] or [phone], ext. [phone] for psych care coordination

## 2024-01-18 NOTE — DISCHARGE NOTE PROVIDER - NSFOLLOWUPCLINICSTOKEN_GEN_ALL_ED_FT
533866:2 weeks|| ||00\01||False;780197:1 week|| ||00\01||False; 606385:2 weeks|| ||00\01||False;095824:1 week|| ||00\01||False;904162: || ||00\01||False;

## 2024-01-18 NOTE — PROGRESS NOTE ADULT - SUBJECTIVE AND OBJECTIVE BOX
TAYLA MARIE  [id number]    Subjective:  Reports abdominal pain, nausea and loose stools today. Could not tell me when it started, believes in the afternoon? Did start cellcept yesterday evening.   Denies fevers or chills.     Objective:   Vital Signs Last 24 Hrs  T(C): 37 (18 Jan 2024 09:45), Max: 37.2 (18 Jan 2024 05:45)  T(F): 98.6 (18 Jan 2024 09:45), Max: 99 (18 Jan 2024 05:45)  HR: 78 (18 Jan 2024 09:45) (76 - 98)  BP: 135/79 (18 Jan 2024 09:45) (125/75 - 135/79)  BP(mean): --  RR: 18 (18 Jan 2024 09:45) (18 - 20)  SpO2: 100% (18 Jan 2024 09:45) (99% - 100%)    Parameters below as of 18 Jan 2024 09:45  Patient On (Oxygen Delivery Method): room air      Physical Exam:  General: NAD  HEENT: EOMI  CV: well perfused   Lung: No increased WOB  Abd: Soft, non-distended, mildly tender  throughout   Neuro: Awake and alert       LABS:  01-18 @ 06:13 Creatine 48 U/L [30 - 200]  cret                        10.2   8.77  )-----------( 327      ( 18 Jan 2024 06:13 )             30.0     01-18    137  |  99  |  17  ----------------------------<  94  4.0   |  25  |  0.73    Ca    9.7      18 Jan 2024 06:13  Phos  3.3     01-18  Mg     1.70     01-18    TPro  6.9  /  Alb  3.2<L>  /  TBili  0.3  /  DBili  x   /  AST  54<H>  /  ALT  110<H>  /  AlkPhos  107  01-18        Renal Biopsy   Light Microscopy:  Sections show renal cortex.   Glomeruli: There are approximately  15glomeruli per level section. Global or segmental glomerulosclerosis is  not seen. The glomeruli show patent capillary loops and urinary spaces  without significant inflammatory cell infiltrate. The mesangial areas are  in normal thickness. The glomerular capillary walls are delicate without  duplication or spike formation.   Tubules and interstitium : There is no  significant interstitial fibrosis, inflammation, or tubular atrophy.  Vessels: arteries are without significant histopathologic abnormalities.    Immunofluorescence Microscopy:  Examination of submitted tissue reveals renal cortex, containing  approximately 8 glomeruli. Glomeruli shows granular staining in mesangial  and capillary walls for IgG (2+), kappa (1+) and lambda (1+) light  chains. There is not significant glomerular staining for IgA, IgM, C3,  C1q, albumin and fibrinogen. There is no significant tubular basement  membrane or interstitial staining.       TAYLA MARIE  [id number]    Subjective:  Reports abdominal pain, nausea and loose stools today. Could not tell me when it started, believes in the afternoon? Did start cellcept yesterday evening. Denies fevers or chills.     Objective:   Vital Signs Last 24 Hrs  T(C): 37 (18 Jan 2024 09:45), Max: 37.2 (18 Jan 2024 05:45)  T(F): 98.6 (18 Jan 2024 09:45), Max: 99 (18 Jan 2024 05:45)  HR: 78 (18 Jan 2024 09:45) (76 - 98)  BP: 135/79 (18 Jan 2024 09:45) (125/75 - 135/79)  RR: 18 (18 Jan 2024 09:45) (18 - 20)  SpO2: 100% (18 Jan 2024 09:45) (99% - 100%)    Parameters below as of 18 Jan 2024 09:45  Patient On (Oxygen Delivery Method): room air    Physical Exam:  General: NAD  HEENT: EOMI  CV: well perfused   Lung: No increased WOB  Abd: Soft, non-distended, mildly tender  throughout   Neuro: Awake and alert     LABS:  01-18 @ 06:13 Creatine 48 U/L [30 - 200]                        10.2   8.77  )-----------( 327      ( 18 Jan 2024 06:13 )             30.0     01-18    137  |  99  |  17  ----------------------------<  94  4.0   |  25  |  0.73    Ca    9.7      18 Jan 2024 06:13  Phos  3.3     01-18  Mg     1.70     01-18    TPro  6.9  /  Alb  3.2<L>  /  TBili  0.3  /  DBili  x   /  AST  54<H>  /  ALT  110<H>  /  AlkPhos  107  01-18        Renal Biopsy   Light Microscopy:  Sections show renal cortex.   Glomeruli: There are approximately  15glomeruli per level section. Global or segmental glomerulosclerosis is  not seen. The glomeruli show patent capillary loops and urinary spaces  without significant inflammatory cell infiltrate. The mesangial areas are  in normal thickness. The glomerular capillary walls are delicate without  duplication or spike formation.   Tubules and interstitium : There is no  significant interstitial fibrosis, inflammation, or tubular atrophy.  Vessels: arteries are without significant histopathologic abnormalities.    Immunofluorescence Microscopy:  Examination of submitted tissue reveals renal cortex, containing  approximately 8 glomeruli. Glomeruli shows granular staining in mesangial  and capillary walls for IgG (2+), kappa (1+) and lambda (1+) light  chains. There is not significant glomerular staining for IgA, IgM, C3,  C1q, albumin and fibrinogen. There is no significant tubular basement  membrane or interstitial staining.

## 2024-01-18 NOTE — CHART NOTE - NSCHARTNOTEFT_GEN_A_CORE
Source: Patient [x]      other [x] medical chart   Diet rx: Regular: 1000mL Fluid Restriction ([id number])  Supplement Feeding Modality:  Oral Ensure Plus High Protein Cans or Servings Per Day:  1       Frequency:  Three Times a day (01-16-24 @ 19:07) [Active]    Pt 27 yo male with PMHx with recent diagnosis of Lupus (9/2023), Recent PE 12/12 p/w increased dyspnea, chest pain, hypoxia, fever - per chart review. Pt S/P thoracentesis 2/2 Pleural effusion.     At time of visit, Pt awake, alert but weak. Per Pt, his appetite good; no chewing or swallowing difficulty; no nausea, vomiting or diarrhea @ this time. +Last BM (1/7) -> ?Pt constipated? ->> Rec to add bowel regimen per MD discretion.   S/P Swallow Bedside Assessment Adult (03-Jan-2024); SLP rec: Regular and thin liquids with aspiration precautions. Food preferences discussed. No other food related concern voiced at present. RD remains available, Pt made aware.     Pt's height: 65" (12/22)   IBW: 136#+/-10%    Pt's weights: 59.1 kg (1/17/24), 69.2 kg (12/22/23)    Pertinent Medications: Protonix, Zofran (PRN), Multivitamin, Lipitor, 1st mouthwash   Pertinent Labs: (1/18) H/H 10.2/30.0 L, Albumin 3.2 L, AST 54 H,  H;    (1/16) Cholesterol 290 H, HDL 31 L,  H    Skin: +dryness/ecchymosis per flow sheet     Estimated Needs: [x] recalculated:   estimated energy needs: ~1773 - 2068 Kcal/day (@ 30-35 Kcal/kg BW - 59.1 kg)    estimated protein needs: ~83 - 95 gm protein/day (@ 1.4-1.6 gm Protein/kg BW - 59.1 kg)    Previous Nutrition Diagnosis: [x] Malnutrition, severe   Nutrition Diagnosis is [x] ongoing       Nutrition Interventions/Recommendations:   1. PO diet rx: Regular DASH/TLC (cholesterol and sodium restricted); PO supplement: Ensure Plus HP (237 ml/350 kcal, 20 gm Protein) 2x daily;   2. Fluid restriction per MD discretion;  3. Bowel regimen per MD discretion;   4. Continue Multivitamin as ordered;   5. Monitor labs, daily weights, hydration status;

## 2024-01-18 NOTE — DISCHARGE NOTE PROVIDER - NSDCFUSCHEDAPPT_GEN_ALL_CORE_FT
[facility]  [facility]  Scheduled Appointment: 01/29/2024     Lisker, Gita N  [facility]  PULED [address] [place] [address]  Scheduled Appointment: 01/24/2024    [facility]  [facility]  Scheduled Appointment: 01/29/2024     [facility]  [facility]  Scheduled Appointment: 02/07/2024    [facility]  [place] [id number]  Scheduled Appointment: 02/12/2024

## 2024-01-19 ENCOUNTER — TRANSCRIPTION ENCOUNTER (OUTPATIENT)
Age: 29
End: 2024-01-19

## 2024-01-19 ENCOUNTER — NON-APPOINTMENT (OUTPATIENT)
Age: 29
End: 2024-01-19

## 2024-01-19 LAB
ALBUMIN SERPL ELPH-MCNC: 3.1 G/DL — LOW (ref 3.3–5)
ALP SERPL-CCNC: 105 U/L — SIGNIFICANT CHANGE UP (ref 40–120)
ALT FLD-CCNC: 95 U/L — HIGH (ref 4–41)
ANION GAP SERPL CALC-SCNC: 13 MMOL/L — SIGNIFICANT CHANGE UP (ref 7–14)
AST SERPL-CCNC: 42 U/L — HIGH (ref 4–40)
BASOPHILS # BLD AUTO: 0.08 K/UL — SIGNIFICANT CHANGE UP (ref 0–0.2)
BASOPHILS NFR BLD AUTO: 1 % — SIGNIFICANT CHANGE UP (ref 0–2)
BILIRUB SERPL-MCNC: 0.3 MG/DL — SIGNIFICANT CHANGE UP (ref 0.2–1.2)
BUN SERPL-MCNC: 16 MG/DL — SIGNIFICANT CHANGE UP (ref 7–23)
CALCIUM SERPL-MCNC: 9.5 MG/DL — SIGNIFICANT CHANGE UP (ref 8.4–10.5)
CHLORIDE SERPL-SCNC: 99 MMOL/L — SIGNIFICANT CHANGE UP (ref 98–107)
CO2 SERPL-SCNC: 25 MMOL/L — SIGNIFICANT CHANGE UP (ref 22–31)
CREAT SERPL-MCNC: 0.73 MG/DL — SIGNIFICANT CHANGE UP (ref 0.5–1.3)
EGFR: 126 ML/MIN/1.73M2 — SIGNIFICANT CHANGE UP
EOSINOPHIL # BLD AUTO: 0.25 K/UL — SIGNIFICANT CHANGE UP (ref 0–0.5)
EOSINOPHIL NFR BLD AUTO: 3.2 % — SIGNIFICANT CHANGE UP (ref 0–6)
GLUCOSE SERPL-MCNC: 92 MG/DL — SIGNIFICANT CHANGE UP (ref 70–99)
HCT VFR BLD CALC: 31.6 % — LOW (ref 39–50)
HGB BLD-MCNC: 10.5 G/DL — LOW (ref 13–17)
IANC: 4.1 K/UL — SIGNIFICANT CHANGE UP (ref 1.8–7.4)
IMM GRANULOCYTES NFR BLD AUTO: 2.6 % — HIGH (ref 0–0.9)
LYMPHOCYTES # BLD AUTO: 2.57 K/UL — SIGNIFICANT CHANGE UP (ref 1–3.3)
LYMPHOCYTES # BLD AUTO: 33 % — SIGNIFICANT CHANGE UP (ref 13–44)
MCHC RBC-ENTMCNC: 30.9 PG — SIGNIFICANT CHANGE UP (ref 27–34)
MCHC RBC-ENTMCNC: 33.2 GM/DL — SIGNIFICANT CHANGE UP (ref 32–36)
MCV RBC AUTO: 92.9 FL — SIGNIFICANT CHANGE UP (ref 80–100)
MONOCYTES # BLD AUTO: 0.59 K/UL — SIGNIFICANT CHANGE UP (ref 0–0.9)
MONOCYTES NFR BLD AUTO: 7.6 % — SIGNIFICANT CHANGE UP (ref 2–14)
NEUTROPHILS # BLD AUTO: 4.1 K/UL — SIGNIFICANT CHANGE UP (ref 1.8–7.4)
NEUTROPHILS NFR BLD AUTO: 52.6 % — SIGNIFICANT CHANGE UP (ref 43–77)
NRBC # BLD: 0 /100 WBCS — SIGNIFICANT CHANGE UP (ref 0–0)
NRBC # FLD: 0 K/UL — SIGNIFICANT CHANGE UP (ref 0–0)
PLATELET # BLD AUTO: 343 K/UL — SIGNIFICANT CHANGE UP (ref 150–400)
POTASSIUM SERPL-MCNC: 4.2 MMOL/L — SIGNIFICANT CHANGE UP (ref 3.5–5.3)
POTASSIUM SERPL-SCNC: 4.2 MMOL/L — SIGNIFICANT CHANGE UP (ref 3.5–5.3)
PROT SERPL-MCNC: 6.9 G/DL — SIGNIFICANT CHANGE UP (ref 6–8.3)
RBC # BLD: 3.4 M/UL — LOW (ref 4.2–5.8)
RBC # FLD: 15.6 % — HIGH (ref 10.3–14.5)
SODIUM SERPL-SCNC: 137 MMOL/L — SIGNIFICANT CHANGE UP (ref 135–145)
WBC # BLD: 7.79 K/UL — SIGNIFICANT CHANGE UP (ref 3.8–10.5)
WBC # FLD AUTO: 7.79 K/UL — SIGNIFICANT CHANGE UP (ref 3.8–10.5)

## 2024-01-19 PROCEDURE — 99232 SBSQ HOSP IP/OBS MODERATE 35: CPT | Mod: GC

## 2024-01-19 RX ORDER — HYDROXYCHLOROQUINE SULFATE 200 MG
1 TABLET ORAL
Qty: 60 | Refills: 0
Start: 2024-01-19 | End: 2024-02-17

## 2024-01-19 RX ORDER — MAGNESIUM OXIDE 400 MG ORAL TABLET 241.3 MG
400 TABLET ORAL
Refills: 0 | Status: COMPLETED | OUTPATIENT
Start: 2024-01-19 | End: 2024-01-21

## 2024-01-19 RX ORDER — SODIUM CHLORIDE 9 MG/ML
1 INJECTION INTRAMUSCULAR; INTRAVENOUS; SUBCUTANEOUS
Qty: 90 | Refills: 0
Start: 2024-01-19 | End: 2024-02-17

## 2024-01-19 RX ORDER — GABAPENTIN 400 MG/1
2 CAPSULE ORAL
Qty: 180 | Refills: 0
Start: 2024-01-19 | End: 2024-02-17

## 2024-01-19 RX ORDER — PANTOPRAZOLE SODIUM 20 MG/1
1 TABLET, DELAYED RELEASE ORAL
Qty: 30 | Refills: 0
Start: 2024-01-19 | End: 2024-02-17

## 2024-01-19 RX ORDER — ATORVASTATIN CALCIUM 80 MG/1
1 TABLET, FILM COATED ORAL
Qty: 30 | Refills: 0
Start: 2024-01-19 | End: 2024-02-17

## 2024-01-19 RX ORDER — METOCLOPRAMIDE HCL 10 MG
5 TABLET ORAL ONCE
Refills: 0 | Status: DISCONTINUED | OUTPATIENT
Start: 2024-01-19 | End: 2024-01-29

## 2024-01-19 RX ORDER — LANOLIN ALCOHOL/MO/W.PET/CERES
1 CREAM (GRAM) TOPICAL
Qty: 0 | Refills: 0 | DISCHARGE
Start: 2024-01-19

## 2024-01-19 RX ORDER — HYDROXYCHLOROQUINE SULFATE 200 MG
1 TABLET ORAL
Refills: 0 | DISCHARGE

## 2024-01-19 RX ORDER — MUPIROCIN 20 MG/G
1 OINTMENT TOPICAL
Refills: 0 | Status: COMPLETED | OUTPATIENT
Start: 2024-01-19 | End: 2024-01-24

## 2024-01-19 RX ORDER — OXYCODONE HYDROCHLORIDE 5 MG/1
2.5 TABLET ORAL EVERY 6 HOURS
Refills: 0 | Status: DISCONTINUED | OUTPATIENT
Start: 2024-01-19 | End: 2024-01-25

## 2024-01-19 RX ORDER — OXYCODONE HYDROCHLORIDE 5 MG/1
5 TABLET ORAL EVERY 6 HOURS
Refills: 0 | Status: DISCONTINUED | OUTPATIENT
Start: 2024-01-19 | End: 2024-01-25

## 2024-01-19 RX ORDER — FLUOXETINE HCL 10 MG
1 CAPSULE ORAL
Qty: 30 | Refills: 0
Start: 2024-01-19 | End: 2024-02-17

## 2024-01-19 RX ORDER — ACETAMINOPHEN 500 MG
650 TABLET ORAL EVERY 6 HOURS
Refills: 0 | Status: DISCONTINUED | OUTPATIENT
Start: 2024-01-19 | End: 2024-01-29

## 2024-01-19 RX ORDER — MYCOPHENOLATE MOFETIL 250 MG/1
2 CAPSULE ORAL
Qty: 60 | Refills: 0
Start: 2024-01-19 | End: 2024-02-17

## 2024-01-19 RX ADMIN — CHLORHEXIDINE GLUCONATE 1 APPLICATION(S): 213 SOLUTION TOPICAL at 11:32

## 2024-01-19 RX ADMIN — Medication 3 MILLIGRAM(S): at 21:59

## 2024-01-19 RX ADMIN — MYCOPHENOLATE MOFETIL 500 MILLIGRAM(S): 250 CAPSULE ORAL at 11:34

## 2024-01-19 RX ADMIN — Medication 200 MILLIGRAM(S): at 05:09

## 2024-01-19 RX ADMIN — ATORVASTATIN CALCIUM 80 MILLIGRAM(S): 80 TABLET, FILM COATED ORAL at 21:28

## 2024-01-19 RX ADMIN — SODIUM CHLORIDE 1 GRAM(S): 9 INJECTION INTRAMUSCULAR; INTRAVENOUS; SUBCUTANEOUS at 21:28

## 2024-01-19 RX ADMIN — SODIUM CHLORIDE 1 GRAM(S): 9 INJECTION INTRAMUSCULAR; INTRAVENOUS; SUBCUTANEOUS at 13:38

## 2024-01-19 RX ADMIN — Medication 2 DROP(S): at 05:14

## 2024-01-19 RX ADMIN — OXYCODONE HYDROCHLORIDE 2.5 MILLIGRAM(S): 5 TABLET ORAL at 15:29

## 2024-01-19 RX ADMIN — Medication 1 TABLET(S): at 06:14

## 2024-01-19 RX ADMIN — GABAPENTIN 200 MILLIGRAM(S): 400 CAPSULE ORAL at 05:09

## 2024-01-19 RX ADMIN — PANTOPRAZOLE SODIUM 40 MILLIGRAM(S): 20 TABLET, DELAYED RELEASE ORAL at 05:06

## 2024-01-19 RX ADMIN — GABAPENTIN 200 MILLIGRAM(S): 400 CAPSULE ORAL at 21:28

## 2024-01-19 RX ADMIN — APIXABAN 5 MILLIGRAM(S): 2.5 TABLET, FILM COATED ORAL at 18:02

## 2024-01-19 RX ADMIN — Medication 30 MILLIGRAM(S): at 05:09

## 2024-01-19 RX ADMIN — OXYCODONE HYDROCHLORIDE 2.5 MILLIGRAM(S): 5 TABLET ORAL at 15:38

## 2024-01-19 RX ADMIN — Medication 200 MILLIGRAM(S): at 18:02

## 2024-01-19 RX ADMIN — GABAPENTIN 200 MILLIGRAM(S): 400 CAPSULE ORAL at 13:38

## 2024-01-19 RX ADMIN — Medication 200 MILLIGRAM(S): at 06:14

## 2024-01-19 RX ADMIN — SODIUM CHLORIDE 1 GRAM(S): 9 INJECTION INTRAMUSCULAR; INTRAVENOUS; SUBCUTANEOUS at 05:12

## 2024-01-19 RX ADMIN — Medication 15 MILLILITER(S): at 11:33

## 2024-01-19 RX ADMIN — Medication 20 MILLIGRAM(S): at 11:33

## 2024-01-19 RX ADMIN — APIXABAN 5 MILLIGRAM(S): 2.5 TABLET, FILM COATED ORAL at 05:09

## 2024-01-19 NOTE — PROGRESS NOTE ADULT - ATTENDING COMMENTS
Plan as above, minimally interactive with interviews this week, refusing plans but not elaborating reasons or willing to have discussion, mother also notes personality change over last few weeks. Please have psych re-eval him as difficulty advancing plan to transition him from steroids to steroid sparing meds at present.

## 2024-01-19 NOTE — PROGRESS NOTE ADULT - ATTENDING SUPERVISION STATEMENT
Fellow
Resident
Fellow
Resident
Fellow
Fellow
Resident
Resident
Fellow

## 2024-01-19 NOTE — PHARMACOTHERAPY INTERVENTION NOTE - COMMENTS
Discharge medications were reviewed with the patient. Current medication schedule was discussed in detail including: medication name, indication, dose, administration times, side effects, and special instructions. In particular, patient was educated on apixaban including the purpose and administration. Discussed adverse effects in detail and when to seek medical attention (blood in stool, vomit, etc.). Informed patient to notify all providers he is on this and before any planned procedures. All questions and concerns were answered and addressed. Patient verbalized understanding and was provided with educational handouts.    Mireya Kenny, PharmD  Clinical Pharmacy Specialist  [facility] [phone]

## 2024-01-19 NOTE — PROGRESS NOTE ADULT - ASSESSMENT
A/P   # Pulm effusion/ Fever   S/P thoracentesis   -c/w eliquis   -completed abx   LP done  Hematuria noted   Urology eval   Completed course of ABx   IR, S/P Renal biopsy , follow up results   S/P KARIE, negative     # Recurrent seizure   Neuro eval  now stable     # Fever  SIRS   Abx , completed per iD   Rheum adn ID follow up   Renal biopsy done, follow up results       # Hyponatremia/ Seizure   stable now   Neuro follow up       #PE   on eliquis   Heme onc eval   likley lupus related     #Hx of lupus  on hydroxychloroquine   Heme eval   Rheum eval   steroids per rheum       dispo: cleared for d/c , seen by PT and they recommend home PT , pt. didn't qualify for ac rehab or STR ,   mother becomes very upset and says she can't take care of him at home , he is here for over 5 weeks and he is unsteady and she has full time job , so she need more help.   advised her to d/w  / casemanager regarding d/c planning

## 2024-01-19 NOTE — PROGRESS NOTE ADULT - SUBJECTIVE AND OBJECTIVE BOX
TAYLA FRANK  [id number]    Subjective:  Denies abdominal pain or N/V. Discussed trying to increase cellcept again since he isn't having GI issues, however pt is declining without explanation. When we explained that it could help him get off the steroids, he said he would stay on the steroids instead of cellcept.     Objective:   Vital Signs Last 24 Hrs  T(C): 37.1 (19 Jan 2024 19:00), Max: 37.4 (19 Jan 2024 07:15)  T(F): 98.8 (19 Jan 2024 19:00), Max: 99.3 (19 Jan 2024 07:15)  HR: 89 (19 Jan 2024 19:00) (70 - 100)  BP: 133/85 (19 Jan 2024 19:00) (108/73 - 133/85)  BP(mean): --  RR: 18 (19 Jan 2024 19:00) (17 - 18)  SpO2: 100% (19 Jan 2024 19:00) (98% - 100%)    Parameters below as of 19 Jan 2024 19:00  Patient On (Oxygen Delivery Method): room air    Physical Exam:  General: NAD  HEENT: EOMI  CV: well perfused   Lung: No increased WOB  Abd: Soft, non-distended  Neuro: Awake and alert       LABS:  01-18 @ 06:13 Creatine 48 U/L [30 - 200]  cret                        10.5   7.79  )-----------( 343      ( 19 Jan 2024 04:49 )             31.6     01-19    137  |  99  |  16  ----------------------------<  92  4.2   |  25  |  0.73    Ca    9.5      19 Jan 2024 04:49  Phos  3.3     01-18  Mg     1.70     01-18    TPro  6.9  /  Alb  3.1<L>  /  TBili  0.3  /  DBili  x   /  AST  42<H>  /  ALT  95<H>  /  AlkPhos  105  01-19      Renal Biopsy   Light Microscopy:  Sections show renal cortex.   Glomeruli: There are approximately  15glomeruli per level section. Global or segmental glomerulosclerosis is  not seen. The glomeruli show patent capillary loops and urinary spaces  without significant inflammatory cell infiltrate. The mesangial areas are  in normal thickness. The glomerular capillary walls are delicate without  duplication or spike formation.   Tubules and interstitium : There is no  significant interstitial fibrosis, inflammation, or tubular atrophy.  Vessels: arteries are without significant histopathologic abnormalities.    Immunofluorescence Microscopy:  Examination of submitted tissue reveals renal cortex, containing  approximately 8 glomeruli. Glomeruli shows granular staining in mesangial  and capillary walls for IgG (2+), kappa (1+) and lambda (1+) light  chains. There is not significant glomerular staining for IgA, IgM, C3,  C1q, albumin and fibrinogen. There is no significant tubular basement  membrane or interstitial staining.       TAYLA FRANK  [id number]    Subjective:  Denies abdominal pain or N/V. Discussed trying to increase cellcept again since he isn't having GI issues, however pt is declining without explanation. When we explained that it could help him get off the steroids, he said he would stay on the steroids instead of cellcept, but refuses to elaborate further.     Objective:   Vital Signs Last 24 Hrs  T(C): 37.1 (19 Jan 2024 19:00), Max: 37.4 (19 Jan 2024 07:15)  T(F): 98.8 (19 Jan 2024 19:00), Max: 99.3 (19 Jan 2024 07:15)  HR: 89 (19 Jan 2024 19:00) (70 - 100)  BP: 133/85 (19 Jan 2024 19:00) (108/73 - 133/85)  RR: 18 (19 Jan 2024 19:00) (17 - 18)  SpO2: 100% (19 Jan 2024 19:00) (98% - 100%)    Parameters below as of 19 Jan 2024 19:00  Patient On (Oxygen Delivery Method): room air    Physical Exam:  General: NAD  HEENT: EOMI  CV: well perfused   Lung: No increased WOB  Abd: Soft, non-distended  Neuro: Awake and alert     LABS:  01-18 @ 06:13 Creatine 48 U/L [30 - 200]                        10.5   7.79  )-----------( 343      ( 19 Jan 2024 04:49 )             31.6     01-19    137  |  99  |  16  ----------------------------<  92  4.2   |  25  |  0.73    Ca    9.5      19 Jan 2024 04:49  Phos  3.3     01-18  Mg     1.70     01-18    TPro  6.9  /  Alb  3.1<L>  /  TBili  0.3  /  DBili  x   /  AST  42<H>  /  ALT  95<H>  /  AlkPhos  105  01-19      Renal Biopsy   Light Microscopy:  Sections show renal cortex.   Glomeruli: There are approximately  15glomeruli per level section. Global or segmental glomerulosclerosis is  not seen. The glomeruli show patent capillary loops and urinary spaces  without significant inflammatory cell infiltrate. The mesangial areas are  in normal thickness. The glomerular capillary walls are delicate without  duplication or spike formation.   Tubules and interstitium : There is no  significant interstitial fibrosis, inflammation, or tubular atrophy.  Vessels: arteries are without significant histopathologic abnormalities.    Immunofluorescence Microscopy:  Examination of submitted tissue reveals renal cortex, containing  approximately 8 glomeruli. Glomeruli shows granular staining in mesangial  and capillary walls for IgG (2+), kappa (1+) and lambda (1+) light  chains. There is not significant glomerular staining for IgA, IgM, C3,  C1q, albumin and fibrinogen. There is no significant tubular basement  membrane or interstitial staining.

## 2024-01-19 NOTE — PROGRESS NOTE ADULT - SUBJECTIVE AND OBJECTIVE BOX
Patient is a 28y old  Male who presents with a chief complaint of fever (04 Jan 2024 13:39)      INTERVAL HPI/OVERNIGHT EVENTS:  T(C): 37.1 (01-19-24 @ 19:00), Max: 37.4 (01-19-24 @ 07:15)  HR: 89 (01-19-24 @ 19:00) (70 - 100)  BP: 133/85 (01-19-24 @ 19:00) (112/73 - 133/85)  RR: 18 (01-19-24 @ 19:00) (17 - 18)  SpO2: 100% (01-19-24 @ 19:00) (98% - 100%)  Wt(kg): --  I&O's Summary      PAST MEDICAL & SURGICAL HISTORY:  LE (lupus erythematosus)      Pulmonary embolism      No significant past surgical history          SOCIAL HISTORY  Alcohol:  Tobacco:  Illicit substance use:    FAMILY HISTORY:    REVIEW OF SYSTEMS:  CONSTITUTIONAL: No fever, weight loss, or fatigue  EYES: No eye pain, visual disturbances, or discharge  ENMT:  No difficulty hearing, tinnitus, vertigo; No sinus or throat pain  NECK: No pain or stiffness  RESPIRATORY: No cough, wheezing, chills or hemoptysis; No shortness of breath  CARDIOVASCULAR: No chest pain, palpitations, dizziness, or leg swelling  GASTROINTESTINAL: No abdominal or epigastric pain. No nausea, vomiting, or hematemesis; No diarrhea or constipation. No melena or hematochezia.  GENITOURINARY: No dysuria, frequency, hematuria, or incontinence  NEUROLOGICAL: No headaches, memory loss, loss of strength, numbness, or tremors  SKIN: No itching, burning, rashes, or lesions   LYMPH NODES: No enlarged glands  ENDOCRINE: No heat or cold intolerance; No hair loss  MUSCULOSKELETAL: No joint pain or swelling; No muscle, back, or extremity pain  PSYCHIATRIC: No depression, anxiety, mood swings, or difficulty sleeping  HEME/LYMPH: No easy bruising, or bleeding gums  ALLERY AND IMMUNOLOGIC: No hives or eczema    RADIOLOGY & ADDITIONAL TESTS:    Imaging Personally Reviewed:  [ ] YES  [ ] NO    Consultant(s) Notes Reviewed:  [ ] YES  [ ] NO    PHYSICAL EXAM:  GENERAL: NAD, well-groomed, well-developed  HEAD:  Atraumatic, Normocephalic  EYES: EOMI, PERRLA, conjunctiva and sclera clear  ENMT: No tonsillar erythema, exudates, or enlargement; Moist mucous membranes, Good dentition, No lesions  NECK: Supple, No JVD, Normal thyroid  NERVOUS SYSTEM:  Alert & Oriented X3, Good concentration; Motor Strength 5/5 B/L upper and lower extremities; DTRs 2+ intact and symmetric  CHEST/LUNG: Clear to percussion bilaterally; No rales, rhonchi, wheezing, or rubs  HEART: Regular rate and rhythm; No murmurs, rubs, or gallops  ABDOMEN: Soft, Nontender, Nondistended; Bowel sounds present  EXTREMITIES:  2+ Peripheral Pulses, No clubbing, cyanosis, or edema  LYMPH: No lymphadenopathy noted  SKIN: No rashes or lesions    LABS:                        10.5   7.79  )-----------( 343      ( 19 Jan 2024 04:49 )             31.6     01-19    137  |  99  |  16  ----------------------------<  92  4.2   |  25  |  0.73    Ca    9.5      19 Jan 2024 04:49  Phos  3.3     01-18  Mg     1.70     01-18    TPro  6.9  /  Alb  3.1<L>  /  TBili  0.3  /  DBili  x   /  AST  42<H>  /  ALT  95<H>  /  AlkPhos  105  01-19      Urinalysis Basic - ( 19 Jan 2024 04:49 )    Color: x / Appearance: x / SG: x / pH: x  Gluc: 92 mg/dL / Ketone: x  / Bili: x / Urobili: x   Blood: x / Protein: x / Nitrite: x   Leuk Esterase: x / RBC: x / WBC x   Sq Epi: x / Non Sq Epi: x / Bacteria: x      CAPILLARY BLOOD GLUCOSE            Urinalysis Basic - ( 19 Jan 2024 04:49 )    Color: x / Appearance: x / SG: x / pH: x  Gluc: 92 mg/dL / Ketone: x  / Bili: x / Urobili: x   Blood: x / Protein: x / Nitrite: x   Leuk Esterase: x / RBC: x / WBC x   Sq Epi: x / Non Sq Epi: x / Bacteria: x        MEDICATIONS  (STANDING):  apixaban 5 milliGRAM(s) Oral every 12 hours  atorvastatin 80 milliGRAM(s) Oral at bedtime  chlorhexidine 2% Cloths 1 Application(s) Topical daily  ciprofloxacin  0.3% Ophthalmic Solution for Otic Use 2 Drop(s) Both Ears two times a day  FLUoxetine 20 milliGRAM(s) Oral daily  gabapentin 200 milliGRAM(s) Oral three times a day  hydroxychloroquine 200 milliGRAM(s) Oral two times a day  lidocaine   4% Patch 1 Patch Transdermal every 24 hours  lidocaine   4% Patch 2 Patch Transdermal every 24 hours  magnesium oxide 400 milliGRAM(s) Oral three times a day with meals  melatonin 3 milliGRAM(s) Oral <User Schedule>  multivitamin/minerals/iron Oral Solution (CENTRUM) 15 milliLiter(s) Oral daily  mupirocin 2% Ointment 1 Application(s) Both Nostrils two times a day  mycophenolate mofetil 500 milliGRAM(s) Oral daily  pantoprazole    Tablet 40 milliGRAM(s) Oral before breakfast  predniSONE   Tablet 30 milliGRAM(s) Oral daily  sodium chloride 1 Gram(s) Oral three times a day  trimethoprim  160 mG/sulfamethoxazole 800 mG 1 Tablet(s) Oral <User Schedule>    MEDICATIONS  (PRN):  acetaminophen     Tablet .. 650 milliGRAM(s) Oral every 6 hours PRN Temp greater or equal to 38C (100.4F), Mild Pain (1 - 3)  albuterol/ipratropium for Nebulization 3 milliLiter(s) Nebulizer every 6 hours PRN Shortness of Breath and/or Wheezing  benzocaine/menthol Lozenge 1 Lozenge Oral four times a day PRN Sore Throat  FIRST- Mouthwash  BLM 15 milliLiter(s) Swish and Spit every 4 hours PRN Mouth Care  guaiFENesin Oral Liquid (Sugar-Free) 200 milliGRAM(s) Oral every 6 hours PRN Cough  lidocaine 2% Viscous 5 milliLiter(s) Swish and Spit three times a day PRN Mouth Care  metoclopramide   Syrup 5 milliGRAM(s) Oral once PRN migraine  ondansetron Injectable 4 milliGRAM(s) IV Push every 8 hours PRN Nausea and/or Vomiting  oxyCODONE    IR 5 milliGRAM(s) Oral every 6 hours PRN Severe Pain (7 - 10)  oxyCODONE    IR 2.5 milliGRAM(s) Oral every 6 hours PRN Moderate Pain (4 - 6)      Care Discussed with Consultants/Other Providers [ ] YES  [ ] NO

## 2024-01-19 NOTE — DISCHARGE NOTE NURSING/CASE MANAGEMENT/SOCIAL WORK - NSDCFUADDAPPT_GEN_ALL_CORE_FT
Heme: Patient should follow up with his PMD 1 week after discharge and 3 months after discharge with a Hematologist. A referral to [facility] has already been made    Rheumatology: [facility]    Patient should follow up with Stroke NP, Vaishnavi Solitario or Kalina Miguel, in clinic at [address] [place] [address]. Please email [email or web address]/ basic PHI.     CT surgery: Dr. Sosa to monitor pleural effusion resolution and pulmonary with dr. lisker (you will receive an email/text with information on how to join telehealth appointment)

## 2024-01-19 NOTE — PROGRESS NOTE ADULT - ASSESSMENT
28 year-old male with h/o Lupus (diagnosed in 09/2023 due to rash, oral ulcers, chest pain, and dyspnea, on Plaquenil) who presented to [place] ED on 12/12/23 with complaints of chest pain and SOB, found to have bilateral acute PE and bilateral pleural effusions. Transferred to [facility] for CT surgery evaluation. Also with fevers now resolved. Rheumatology consulted given SLE history.     Serologies:   Positive: ABDIAS 1:280 speckled, Sm >8, RNP >8, SSA >8, hypocomplementemia  Myomarker: positive anti U1-, SSA 52 73   low vitamin D 25 21, elevated vitamin D 1,25 97, ACE elevated 125, PR3 29.8. Repeat PR3 still weakly positive at 27.7   Negative: APS labs    #Fever (resolved) - Fevers resolved after restarting steroids on 40 mg methylprednisolone 12/23-12/25, restarted 60 mg of methylprednisolone 12/28-12/29, then transitioned to PO prednisone.   #Pleural effusion exudate w/negative culture and PCR. Pleural fluid cytology negative.   #Lymphadenopathy   -Repeat CT imaging without obvious infectious source, mild decrease in axillary LAD, submentonian and submaxillary and increase supraclavicular LAD. No mediastinal lymphoadenopathy  -EBV DNA PCR positive. Discussed with ID, low concern for EBV infection  -Repeat CT CAP with only bilateral axilla noted, they are borderline and stable. Will hold off on LN biopsy for now     #SLE   # Proteinuria   +ve serology and hypocomplementemia improving after steroid trial   creatinine is stable but proteinuria +ve urine P/cr 1.1. Decreased to 0.7.      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 CTA H and N unremarkable   -KARIE with no abnormalities     #Elevated LFTs  -Abdominal US: Liver with mildly increased echogenicity, no clots  -Negative autoimmune hepatitis work up, negative smooth muscle, mitochondrial, LKM1   -Medication related?   -CK wnl on 1/18    Recommendations:   -C/w prednisone 30mg  -C/w  cellcept to 500mg daily.  Discussed trying to increase cellcept again since he isn't having GI issues, however pt is declining without explanation. When we explained that it could help him get off the steroids, he said he would stay on the steroids instead of cellcept.   -Pt declining medical treatment without clear insight. Consider re-consulting psych for evaluation of capacity   -Unclear if pt is going to [facility] re[facility] vs home? Please message team when ready for discharge to confirm follow up plans   -Continue with GI and PJP ppx (will need on discharge as well)     Discussed with Dr. Davion Landaverde MD   PGY-4  Reachable on TEAMS  Pager [phone]  Rheumatology Fellow 28 year-old male with h/o Lupus (diagnosed in 09/2023 due to rash, oral ulcers, chest pain, and dyspnea, on Plaquenil) who presented to [place] ED on 12/12/23 with complaints of chest pain and SOB, found to have bilateral acute PE and bilateral pleural effusions. Transferred to [facility] for CT surgery evaluation. Also with fevers now resolved. Rheumatology consulted given SLE history.     Serologies:   Positive: ABDIAS 1:280 speckled, Sm >8, RNP >8, SSA >8, hypocomplementemia  Myomarker: positive anti U1-, SSA 52 73   low vitamin D 25 21, elevated vitamin D 1,25 97, ACE elevated 125, PR3 29.8. Repeat PR3 still weakly positive at 27.7   Negative: APS labs    #Fever (resolved) - Fevers resolved after restarting steroids on 40 mg methylprednisolone 12/23-12/25, restarted 60 mg of methylprednisolone 12/28-12/29, then transitioned to PO prednisone.   #Pleural effusion exudate w/negative culture and PCR. Pleural fluid cytology negative.   #Lymphadenopathy   -Repeat CT imaging without obvious infectious source, mild decrease in axillary LAD, submentonian and submaxillary and increase supraclavicular LAD. No mediastinal lymphoadenopathy  -EBV DNA PCR positive. Discussed with ID, low concern for EBV infection  -Repeat CT CAP with only bilateral axilla noted, they are borderline and stable. Will hold off on LN biopsy for now     #SLE   # Proteinuria   +ve serology and hypocomplementemia improving after steroid trial   creatinine is stable but proteinuria +ve urine P/cr 1.1. Decreased to 0.7.      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 CTA H and N unremarkable   -KARIE with no abnormalities     #Elevated LFTs  -Abdominal US: Liver with mildly increased echogenicity, no clots  -Negative autoimmune hepatitis work up, negative smooth muscle, mitochondrial, LKM1   -Medication related?   -CK wnl on 1/18    Recommendations:   -C/w prednisone 30mg  -C/w  cellcept to 500mg daily.  Discussed trying to increase cellcept again since he isn't having GI issues, however pt is declining without explanation. When we explained that it could help him get off the steroids, he said he would stay on the steroids instead of cellcept.   -Pt declining medical treatment without clear insight. Consider re-consulting psych for evaluation of capacity   -Unclear if pt is going to [facility] re[facility] vs home? Please message our team when ready for discharge to confirm follow up plans   -Continue with GI and PJP ppx (will need on discharge as well)     Discussed with Dr. Davion Landaverde MD   PGY-4  Reachable on TEAMS  Pager [phone]  Rheumatology Fellow

## 2024-01-19 NOTE — DISCHARGE NOTE NURSING/CASE MANAGEMENT/SOCIAL WORK - PATIENT PORTAL LINK FT
You can access the FollowMyHealth Patient Portal offered by [facility] by registering at the following website: [email or web address]. By joining [facility]’s FollowMyHealth portal, you will also be able to view your health information using other applications (apps) compatible with our system.

## 2024-01-19 NOTE — CHART NOTE - NSCHARTNOTEFT_GEN_A_CORE
PT notes reviewed, patient ambulated 150' with stand by assist on 1/18/24.  Recommend home when medically cleared

## 2024-01-19 NOTE — PROGRESS NOTE ADULT - NUTRITIONAL ASSESSMENT
This patient has been assessed with a concern for Malnutrition and has been determined to have a diagnosis/diagnoses of Severe protein-calorie malnutrition.    This patient is being managed with:   Diet Regular-  DASH/TLC {Sodium & Cholesterol Restricted} (DASH)  1000mL Fluid Restriction ([id number])  Supplement Feeding Modality:  Oral  Ensure Plus High Protein Cans or Servings Per Day:  1       Frequency:  Two Times a day  Entered: Jan 19 2024  7:30AM

## 2024-01-20 ENCOUNTER — OUTPATIENT (OUTPATIENT)
Dept: OUTPATIENT SERVICES | Facility: HOSPITAL | Age: 29
LOS: 1 days | Discharge: HOME | End: 2024-01-20

## 2024-01-20 DIAGNOSIS — D68.59 OTHER PRIMARY THROMBOPHILIA: ICD-10-CM

## 2024-01-20 RX ORDER — APIXABAN 2.5 MG/1
1 TABLET, FILM COATED ORAL
Qty: 60 | Refills: 0
Start: 2024-01-20 | End: 2024-02-18

## 2024-01-20 RX ORDER — ACETAMINOPHEN 500 MG
2 TABLET ORAL
Qty: 0 | Refills: 0 | DISCHARGE
Start: 2024-01-20

## 2024-01-20 RX ADMIN — APIXABAN 5 MILLIGRAM(S): 2.5 TABLET, FILM COATED ORAL at 18:03

## 2024-01-20 RX ADMIN — SODIUM CHLORIDE 1 GRAM(S): 9 INJECTION INTRAMUSCULAR; INTRAVENOUS; SUBCUTANEOUS at 21:25

## 2024-01-20 RX ADMIN — SODIUM CHLORIDE 1 GRAM(S): 9 INJECTION INTRAMUSCULAR; INTRAVENOUS; SUBCUTANEOUS at 05:28

## 2024-01-20 RX ADMIN — GABAPENTIN 200 MILLIGRAM(S): 400 CAPSULE ORAL at 05:29

## 2024-01-20 RX ADMIN — Medication 2 DROP(S): at 05:28

## 2024-01-20 RX ADMIN — Medication 3 MILLIGRAM(S): at 21:25

## 2024-01-20 RX ADMIN — GABAPENTIN 200 MILLIGRAM(S): 400 CAPSULE ORAL at 21:25

## 2024-01-20 RX ADMIN — SODIUM CHLORIDE 1 GRAM(S): 9 INJECTION INTRAMUSCULAR; INTRAVENOUS; SUBCUTANEOUS at 18:02

## 2024-01-20 RX ADMIN — Medication 30 MILLIGRAM(S): at 05:29

## 2024-01-20 RX ADMIN — APIXABAN 5 MILLIGRAM(S): 2.5 TABLET, FILM COATED ORAL at 05:28

## 2024-01-20 RX ADMIN — OXYCODONE HYDROCHLORIDE 2.5 MILLIGRAM(S): 5 TABLET ORAL at 22:50

## 2024-01-20 RX ADMIN — ATORVASTATIN CALCIUM 80 MILLIGRAM(S): 80 TABLET, FILM COATED ORAL at 21:25

## 2024-01-20 RX ADMIN — Medication 200 MILLIGRAM(S): at 05:28

## 2024-01-20 RX ADMIN — GABAPENTIN 200 MILLIGRAM(S): 400 CAPSULE ORAL at 18:03

## 2024-01-20 RX ADMIN — CHLORHEXIDINE GLUCONATE 1 APPLICATION(S): 213 SOLUTION TOPICAL at 18:04

## 2024-01-20 RX ADMIN — MAGNESIUM OXIDE 400 MG ORAL TABLET 400 MILLIGRAM(S): 241.3 TABLET ORAL at 18:04

## 2024-01-20 RX ADMIN — MAGNESIUM OXIDE 400 MG ORAL TABLET 400 MILLIGRAM(S): 241.3 TABLET ORAL at 09:12

## 2024-01-20 RX ADMIN — Medication 200 MILLIGRAM(S): at 18:03

## 2024-01-20 RX ADMIN — PANTOPRAZOLE SODIUM 40 MILLIGRAM(S): 20 TABLET, DELAYED RELEASE ORAL at 05:29

## 2024-01-20 NOTE — PROGRESS NOTE ADULT - NUTRITIONAL ASSESSMENT
This patient has been assessed with a concern for Malnutrition and has been determined to have a diagnosis/diagnoses of Severe protein-calorie malnutrition.    This patient is being managed with:   Diet Regular-  DASH/TLC {Sodium & Cholesterol Restricted} (DASH)  1000mL Fluid Restriction ([id number])  Supplement Feeding Modality:  Oral  Ensure Plus High Protein Cans or Servings Per Day:  1       Frequency:  Two Times a day  Entered: Jan 19 2024  7:30AM

## 2024-01-20 NOTE — PROGRESS NOTE ADULT - ASSESSMENT
Pt is scheduled for routine colonoscopy 09- and will require lovenox bridging as outlined below     August 29-  Last dose of warfarin  August 30- No Warfarin, Lovenox 120mg sub q at dinner time  August 31 - No Warfarin, Lovenox 120mg sub q at dinner time  September 1- No Warfarin, Lovenox 120mg sub q at dinner time  September 2 - No blood thinner   September 3 - Procedure day no blood thinner  September 4 -Warfarin 7.5mg AND Lovenox 120mg sub q at dinner time  September 5 - Warfarin 7.5mg AND Lovenox 120mg sub q at dinner time  September 6 - Warfarin 7.5mg AND Lovenox 120mg sub q at dinner time  September 7 - Warfarin 7.5mg AND Lovenox 120mg sub q at dinner time  September 8 - Warfarin 7.5mg AND Lovenox 120mg sub q at dinner time  September 9 TEST INR      A/P   # Pulm effusion/ Fever   S/P thoracentesis   -c/w eliquis   -completed abx   IR, S/P Renal biopsy , follow up results   S/P KARIE, negative     # Recurrent seizure   Neuro eval  now stable     # Fever  SIRS   Abx , completed per iD   Rheum adn ID follow up   Renal biopsy done, follow up results       # Hyponatremia/ Seizure   resolved     #PE   on eliquis   Heme onc eval   likley lupus related     #Hx of lupus  on hydroxychloroquine   Heme eval   Rheum eval   steroids per rheum       dispo: pt. is cleared for d/c , called mother and again she refused to take him home , she said her son need help and she works full time and unable to take care of him.   explained that he is seen by PT and recommend home PT , he didn't qualify for [facility] re[facility] or [facility] , but she want  to talk to person who evaluated him for physical therapy and also want to talk to  regarding appeal process as she do not agree that he can come home.     called and updated ACP regarding my conversation with family and will let  contact family and we can give d/c notice, and Mother will start appeal process

## 2024-01-20 NOTE — PROGRESS NOTE ADULT - SUBJECTIVE AND OBJECTIVE BOX
Patient is a 28y old  Male who presents with a chief complaint of fever (04 Jan 2024 13:39)      INTERVAL HPI/OVERNIGHT EVENTS: seen and examined, NAD   T(C): 37.1 (01-20-24 @ 11:02), Max: 37.1 (01-19-24 @ 19:00)  HR: 98 (01-20-24 @ 11:02) (77 - 98)  BP: 121/72 (01-20-24 @ 11:02) (113/78 - 133/85)  RR: 18 (01-20-24 @ 11:02) (17 - 18)  SpO2: 100% (01-20-24 @ 11:02) (100% - 100%)  Wt(kg): --  I&O's Summary      PAST MEDICAL & SURGICAL HISTORY:  LE (lupus erythematosus)      Pulmonary embolism      No significant past surgical history          SOCIAL HISTORY  Alcohol:  Tobacco:  Illicit substance use:    FAMILY HISTORY:    REVIEW OF SYSTEMS:  CONSTITUTIONAL: No fever, weight loss, or fatigue  EYES: No eye pain, visual disturbances, or discharge  ENMT:  No difficulty hearing, tinnitus, vertigo; No sinus or throat pain  NECK: No pain or stiffness  RESPIRATORY: No cough, wheezing, chills or hemoptysis; No shortness of breath  CARDIOVASCULAR: No chest pain, palpitations, dizziness, or leg swelling  GASTROINTESTINAL: No abdominal or epigastric pain. No nausea, vomiting, or hematemesis; No diarrhea or constipation. No melena or hematochezia.  GENITOURINARY: No dysuria, frequency, hematuria, or incontinence  NEUROLOGICAL: No headaches, memory loss, loss of strength, numbness, or tremors  SKIN: No itching, burning, rashes, or lesions   LYMPH NODES: No enlarged glands  ENDOCRINE: No heat or cold intolerance; No hair loss  MUSCULOSKELETAL: No joint pain or swelling; No muscle, back, or extremity pain  PSYCHIATRIC: No depression, anxiety, mood swings, or difficulty sleeping  HEME/LYMPH: No easy bruising, or bleeding gums  ALLERY AND IMMUNOLOGIC: No hives or eczema    RADIOLOGY & ADDITIONAL TESTS:    Imaging Personally Reviewed:  [ ] YES  [ ] NO    Consultant(s) Notes Reviewed:  [ ] YES  [ ] NO    PHYSICAL EXAM:  GENERAL: NAD, well-groomed, well-developed  HEAD:  Atraumatic, Normocephalic  EYES: EOMI, PERRLA, conjunctiva and sclera clear  ENMT: No tonsillar erythema, exudates, or enlargement; Moist mucous membranes, Good dentition, No lesions  NECK: Supple, No JVD, Normal thyroid  NERVOUS SYSTEM:  Alert & Oriented X3, Good concentration; Motor Strength 5/5 B/L upper and lower extremities; DTRs 2+ intact and symmetric  CHEST/LUNG: Clear to percussion bilaterally; No rales, rhonchi, wheezing, or rubs  HEART: Regular rate and rhythm; No murmurs, rubs, or gallops  ABDOMEN: Soft, Nontender, Nondistended; Bowel sounds present  EXTREMITIES:  2+ Peripheral Pulses, No clubbing, cyanosis, or edema  LYMPH: No lymphadenopathy noted  SKIN: No rashes or lesions    LABS:                        10.5   7.79  )-----------( 343      ( 19 Jan 2024 04:49 )             31.6     01-19    137  |  99  |  16  ----------------------------<  92  4.2   |  25  |  0.73    Ca    9.5      19 Jan 2024 04:49    TPro  6.9  /  Alb  3.1<L>  /  TBili  0.3  /  DBili  x   /  AST  42<H>  /  ALT  95<H>  /  AlkPhos  105  01-19      Urinalysis Basic - ( 19 Jan 2024 04:49 )    Color: x / Appearance: x / SG: x / pH: x  Gluc: 92 mg/dL / Ketone: x  / Bili: x / Urobili: x   Blood: x / Protein: x / Nitrite: x   Leuk Esterase: x / RBC: x / WBC x   Sq Epi: x / Non Sq Epi: x / Bacteria: x      CAPILLARY BLOOD GLUCOSE            Urinalysis Basic - ( 19 Jan 2024 04:49 )    Color: x / Appearance: x / SG: x / pH: x  Gluc: 92 mg/dL / Ketone: x  / Bili: x / Urobili: x   Blood: x / Protein: x / Nitrite: x   Leuk Esterase: x / RBC: x / WBC x   Sq Epi: x / Non Sq Epi: x / Bacteria: x        MEDICATIONS  (STANDING):  apixaban 5 milliGRAM(s) Oral every 12 hours  atorvastatin 80 milliGRAM(s) Oral at bedtime  chlorhexidine 2% Cloths 1 Application(s) Topical daily  ciprofloxacin  0.3% Ophthalmic Solution for Otic Use 2 Drop(s) Both Ears two times a day  FLUoxetine 20 milliGRAM(s) Oral daily  gabapentin 200 milliGRAM(s) Oral three times a day  hydroxychloroquine 200 milliGRAM(s) Oral two times a day  lidocaine   4% Patch 2 Patch Transdermal every 24 hours  lidocaine   4% Patch 1 Patch Transdermal every 24 hours  magnesium oxide 400 milliGRAM(s) Oral three times a day with meals  melatonin 3 milliGRAM(s) Oral <User Schedule>  multivitamin/minerals/iron Oral Solution (CENTRUM) 15 milliLiter(s) Oral daily  mupirocin 2% Ointment 1 Application(s) Both Nostrils two times a day  mycophenolate mofetil 500 milliGRAM(s) Oral daily  pantoprazole    Tablet 40 milliGRAM(s) Oral before breakfast  predniSONE   Tablet 30 milliGRAM(s) Oral daily  sodium chloride 1 Gram(s) Oral three times a day  trimethoprim  160 mG/sulfamethoxazole 800 mG 1 Tablet(s) Oral <User Schedule>    MEDICATIONS  (PRN):  acetaminophen     Tablet .. 650 milliGRAM(s) Oral every 6 hours PRN Temp greater or equal to 38C (100.4F), Mild Pain (1 - 3)  albuterol/ipratropium for Nebulization 3 milliLiter(s) Nebulizer every 6 hours PRN Shortness of Breath and/or Wheezing  benzocaine/menthol Lozenge 1 Lozenge Oral four times a day PRN Sore Throat  FIRST- Mouthwash  BLM 15 milliLiter(s) Swish and Spit every 4 hours PRN Mouth Care  guaiFENesin Oral Liquid (Sugar-Free) 200 milliGRAM(s) Oral every 6 hours PRN Cough  lidocaine 2% Viscous 5 milliLiter(s) Swish and Spit three times a day PRN Mouth Care  metoclopramide   Syrup 5 milliGRAM(s) Oral once PRN migraine  ondansetron Injectable 4 milliGRAM(s) IV Push every 8 hours PRN Nausea and/or Vomiting  oxyCODONE    IR 5 milliGRAM(s) Oral every 6 hours PRN Severe Pain (7 - 10)  oxyCODONE    IR 2.5 milliGRAM(s) Oral every 6 hours PRN Moderate Pain (4 - 6)      Care Discussed with Consultants/Other Providers [ ] YES  [ ] NO

## 2024-01-21 RX ADMIN — PANTOPRAZOLE SODIUM 40 MILLIGRAM(S): 20 TABLET, DELAYED RELEASE ORAL at 05:42

## 2024-01-21 RX ADMIN — MUPIROCIN 1 APPLICATION(S): 20 OINTMENT TOPICAL at 05:41

## 2024-01-21 RX ADMIN — Medication 30 MILLIGRAM(S): at 05:42

## 2024-01-21 RX ADMIN — Medication 3 MILLIGRAM(S): at 22:32

## 2024-01-21 RX ADMIN — CHLORHEXIDINE GLUCONATE 1 APPLICATION(S): 213 SOLUTION TOPICAL at 11:39

## 2024-01-21 RX ADMIN — MUPIROCIN 1 APPLICATION(S): 20 OINTMENT TOPICAL at 17:43

## 2024-01-21 RX ADMIN — SODIUM CHLORIDE 1 GRAM(S): 9 INJECTION INTRAMUSCULAR; INTRAVENOUS; SUBCUTANEOUS at 22:32

## 2024-01-21 RX ADMIN — APIXABAN 5 MILLIGRAM(S): 2.5 TABLET, FILM COATED ORAL at 17:43

## 2024-01-21 RX ADMIN — Medication 200 MILLIGRAM(S): at 17:41

## 2024-01-21 RX ADMIN — MAGNESIUM OXIDE 400 MG ORAL TABLET 400 MILLIGRAM(S): 241.3 TABLET ORAL at 11:36

## 2024-01-21 RX ADMIN — SODIUM CHLORIDE 1 GRAM(S): 9 INJECTION INTRAMUSCULAR; INTRAVENOUS; SUBCUTANEOUS at 05:42

## 2024-01-21 RX ADMIN — Medication 200 MILLIGRAM(S): at 05:42

## 2024-01-21 RX ADMIN — MAGNESIUM OXIDE 400 MG ORAL TABLET 400 MILLIGRAM(S): 241.3 TABLET ORAL at 17:42

## 2024-01-21 RX ADMIN — GABAPENTIN 200 MILLIGRAM(S): 400 CAPSULE ORAL at 13:50

## 2024-01-21 RX ADMIN — MYCOPHENOLATE MOFETIL 500 MILLIGRAM(S): 250 CAPSULE ORAL at 11:35

## 2024-01-21 RX ADMIN — APIXABAN 5 MILLIGRAM(S): 2.5 TABLET, FILM COATED ORAL at 05:42

## 2024-01-21 RX ADMIN — GABAPENTIN 200 MILLIGRAM(S): 400 CAPSULE ORAL at 05:41

## 2024-01-21 RX ADMIN — ATORVASTATIN CALCIUM 80 MILLIGRAM(S): 80 TABLET, FILM COATED ORAL at 22:32

## 2024-01-21 RX ADMIN — Medication 20 MILLIGRAM(S): at 11:35

## 2024-01-21 RX ADMIN — MAGNESIUM OXIDE 400 MG ORAL TABLET 400 MILLIGRAM(S): 241.3 TABLET ORAL at 09:13

## 2024-01-21 RX ADMIN — SODIUM CHLORIDE 1 GRAM(S): 9 INJECTION INTRAMUSCULAR; INTRAVENOUS; SUBCUTANEOUS at 11:35

## 2024-01-21 RX ADMIN — Medication 15 MILLILITER(S): at 11:35

## 2024-01-21 RX ADMIN — GABAPENTIN 200 MILLIGRAM(S): 400 CAPSULE ORAL at 22:31

## 2024-01-21 NOTE — PROGRESS NOTE ADULT - NUTRITIONAL ASSESSMENT
This patient has been assessed with a concern for Malnutrition and has been determined to have a diagnosis/diagnoses of Severe protein-calorie malnutrition.    This patient is being managed with:   Diet Regular-  DASH/TLC {Sodium & Cholesterol Restricted} (DASH)  1000mL Fluid Restriction ([id number])  Supplement Feeding Modality:  Oral  Ensure Plus High Protein Cans or Servings Per Day:  1       Frequency:  Two Times a day  Entered: Jan 19 2024  7:30AM

## 2024-01-21 NOTE — PROGRESS NOTE ADULT - SUBJECTIVE AND OBJECTIVE BOX
Patient is a 28y old  Male who presents with a chief complaint of fever (04 Jan 2024 13:39)      INTERVAL HPI/OVERNIGHT EVENTS: seen and examined, NAD   T(C): 36.9 (01-21-24 @ 18:10), Max: 37.1 (01-21-24 @ 02:01)  HR: 88 (01-21-24 @ 18:10) (66 - 90)  BP: 129/78 (01-21-24 @ 18:10) (110/68 - 129/78)  RR: 18 (01-21-24 @ 18:10) (17 - 18)  SpO2: 100% (01-21-24 @ 18:10) (100% - 100%)  Wt(kg): --  I&O's Summary      PAST MEDICAL & SURGICAL HISTORY:  LE (lupus erythematosus)      Pulmonary embolism      No significant past surgical history          SOCIAL HISTORY  Alcohol:  Tobacco:  Illicit substance use:    FAMILY HISTORY:    REVIEW OF SYSTEMS:  CONSTITUTIONAL: No fever, weight loss, or fatigue  EYES: No eye pain, visual disturbances, or discharge  ENMT:  No difficulty hearing, tinnitus, vertigo; No sinus or throat pain  NECK: No pain or stiffness  RESPIRATORY: No cough, wheezing, chills or hemoptysis; No shortness of breath  CARDIOVASCULAR: No chest pain, palpitations, dizziness, or leg swelling  GASTROINTESTINAL: No abdominal or epigastric pain. No nausea, vomiting, or hematemesis; No diarrhea or constipation. No melena or hematochezia.  GENITOURINARY: No dysuria, frequency, hematuria, or incontinence  NEUROLOGICAL: No headaches, memory loss, loss of strength, numbness, or tremors  SKIN: No itching, burning, rashes, or lesions   LYMPH NODES: No enlarged glands  ENDOCRINE: No heat or cold intolerance; No hair loss  MUSCULOSKELETAL: No joint pain or swelling; No muscle, back, or extremity pain  PSYCHIATRIC: No depression, anxiety, mood swings, or difficulty sleeping  HEME/LYMPH: No easy bruising, or bleeding gums  ALLERY AND IMMUNOLOGIC: No hives or eczema    RADIOLOGY & ADDITIONAL TESTS:    Imaging Personally Reviewed:  [ ] YES  [ ] NO    Consultant(s) Notes Reviewed:  [ ] YES  [ ] NO    PHYSICAL EXAM:  GENERAL: NAD, well-groomed, well-developed  HEAD:  Atraumatic, Normocephalic  EYES: EOMI, PERRLA, conjunctiva and sclera clear  ENMT: No tonsillar erythema, exudates, or enlargement; Moist mucous membranes, Good dentition, No lesions  NECK: Supple, No JVD, Normal thyroid  NERVOUS SYSTEM:  Alert & Oriented X3, Good concentration; Motor Strength 5/5 B/L upper and lower extremities; DTRs 2+ intact and symmetric  CHEST/LUNG: Clear to percussion bilaterally; No rales, rhonchi, wheezing, or rubs  HEART: Regular rate and rhythm; No murmurs, rubs, or gallops  ABDOMEN: Soft, Nontender, Nondistended; Bowel sounds present  EXTREMITIES:  2+ Peripheral Pulses, No clubbing, cyanosis, or edema  LYMPH: No lymphadenopathy noted  SKIN: No rashes or lesions    LABS:              CAPILLARY BLOOD GLUCOSE                MEDICATIONS  (STANDING):  apixaban 5 milliGRAM(s) Oral every 12 hours  atorvastatin 80 milliGRAM(s) Oral at bedtime  chlorhexidine 2% Cloths 1 Application(s) Topical daily  ciprofloxacin  0.3% Ophthalmic Solution for Otic Use 2 Drop(s) Both Ears two times a day  FLUoxetine 20 milliGRAM(s) Oral daily  gabapentin 200 milliGRAM(s) Oral three times a day  hydroxychloroquine 200 milliGRAM(s) Oral two times a day  lidocaine   4% Patch 1 Patch Transdermal every 24 hours  lidocaine   4% Patch 2 Patch Transdermal every 24 hours  melatonin 3 milliGRAM(s) Oral <User Schedule>  multivitamin/minerals/iron Oral Solution (CENTRUM) 15 milliLiter(s) Oral daily  mupirocin 2% Ointment 1 Application(s) Both Nostrils two times a day  mycophenolate mofetil 500 milliGRAM(s) Oral daily  pantoprazole    Tablet 40 milliGRAM(s) Oral before breakfast  predniSONE   Tablet 30 milliGRAM(s) Oral daily  sodium chloride 1 Gram(s) Oral three times a day  trimethoprim  160 mG/sulfamethoxazole 800 mG 1 Tablet(s) Oral <User Schedule>    MEDICATIONS  (PRN):  acetaminophen     Tablet .. 650 milliGRAM(s) Oral every 6 hours PRN Temp greater or equal to 38C (100.4F), Mild Pain (1 - 3)  albuterol/ipratropium for Nebulization 3 milliLiter(s) Nebulizer every 6 hours PRN Shortness of Breath and/or Wheezing  benzocaine/menthol Lozenge 1 Lozenge Oral four times a day PRN Sore Throat  FIRST- Mouthwash  BLM 15 milliLiter(s) Swish and Spit every 4 hours PRN Mouth Care  guaiFENesin Oral Liquid (Sugar-Free) 200 milliGRAM(s) Oral every 6 hours PRN Cough  lidocaine 2% Viscous 5 milliLiter(s) Swish and Spit three times a day PRN Mouth Care  metoclopramide   Syrup 5 milliGRAM(s) Oral once PRN migraine  ondansetron Injectable 4 milliGRAM(s) IV Push every 8 hours PRN Nausea and/or Vomiting  oxyCODONE    IR 5 milliGRAM(s) Oral every 6 hours PRN Severe Pain (7 - 10)  oxyCODONE    IR 2.5 milliGRAM(s) Oral every 6 hours PRN Moderate Pain (4 - 6)      Care Discussed with Consultants/Other Providers [ ] YES  [ ] NO

## 2024-01-21 NOTE — PROGRESS NOTE ADULT - ASSESSMENT
A/P   # Pulm effusion/ Fever   S/P thoracentesis   -c/w eliquis   -completed abx   IR, S/P Renal biopsy , follow up results   S/P KARIE, negative     # Recurrent seizure   Neuro eval  now stable     # Fever  SIRS   Abx , completed per iD   Rheum adn ID follow up   Renal biopsy done, follow up results       # Hyponatremia/ Seizure   resolved     #PE   on eliquis   Heme onc eval   likley lupus related     #Hx of lupus  on hydroxychloroquine   Heme eval   Rheum eval   steroids per rheum       dispo: medically cleared for d/c , d/c notice given , mother refuse to take him home , she thinks he need further PT and appeal d/c process.

## 2024-01-22 RX ADMIN — Medication 3 MILLIGRAM(S): at 22:12

## 2024-01-22 RX ADMIN — APIXABAN 5 MILLIGRAM(S): 2.5 TABLET, FILM COATED ORAL at 18:05

## 2024-01-22 RX ADMIN — GABAPENTIN 200 MILLIGRAM(S): 400 CAPSULE ORAL at 22:12

## 2024-01-22 RX ADMIN — Medication 30 MILLIGRAM(S): at 05:24

## 2024-01-22 RX ADMIN — MYCOPHENOLATE MOFETIL 500 MILLIGRAM(S): 250 CAPSULE ORAL at 11:36

## 2024-01-22 RX ADMIN — SODIUM CHLORIDE 1 GRAM(S): 9 INJECTION INTRAMUSCULAR; INTRAVENOUS; SUBCUTANEOUS at 22:13

## 2024-01-22 RX ADMIN — PANTOPRAZOLE SODIUM 40 MILLIGRAM(S): 20 TABLET, DELAYED RELEASE ORAL at 07:23

## 2024-01-22 RX ADMIN — CHLORHEXIDINE GLUCONATE 1 APPLICATION(S): 213 SOLUTION TOPICAL at 11:33

## 2024-01-22 RX ADMIN — MUPIROCIN 1 APPLICATION(S): 20 OINTMENT TOPICAL at 05:23

## 2024-01-22 RX ADMIN — ATORVASTATIN CALCIUM 80 MILLIGRAM(S): 80 TABLET, FILM COATED ORAL at 22:14

## 2024-01-22 RX ADMIN — Medication 200 MILLIGRAM(S): at 18:05

## 2024-01-22 RX ADMIN — SODIUM CHLORIDE 1 GRAM(S): 9 INJECTION INTRAMUSCULAR; INTRAVENOUS; SUBCUTANEOUS at 05:23

## 2024-01-22 RX ADMIN — Medication 2 DROP(S): at 05:23

## 2024-01-22 RX ADMIN — Medication 650 MILLIGRAM(S): at 18:08

## 2024-01-22 RX ADMIN — Medication 1 TABLET(S): at 09:06

## 2024-01-22 RX ADMIN — Medication 2 DROP(S): at 18:04

## 2024-01-22 RX ADMIN — GABAPENTIN 200 MILLIGRAM(S): 400 CAPSULE ORAL at 05:23

## 2024-01-22 RX ADMIN — Medication 200 MILLIGRAM(S): at 05:23

## 2024-01-22 RX ADMIN — MUPIROCIN 1 APPLICATION(S): 20 OINTMENT TOPICAL at 18:05

## 2024-01-22 RX ADMIN — APIXABAN 5 MILLIGRAM(S): 2.5 TABLET, FILM COATED ORAL at 05:23

## 2024-01-22 RX ADMIN — Medication 20 MILLIGRAM(S): at 11:37

## 2024-01-22 RX ADMIN — Medication 15 MILLILITER(S): at 11:35

## 2024-01-22 NOTE — PROGRESS NOTE ADULT - SUBJECTIVE AND OBJECTIVE BOX
Patient is a 28y old  Male who presents with a chief complaint of fever (04 Jan 2024 13:39)      INTERVAL HPI/OVERNIGHT EVENTS: seen and examined , alert and awake   T(C): 36.9 (01-22-24 @ 20:48), Max: 39.2 (01-22-24 @ 18:05)  HR: 84 (01-22-24 @ 20:48) (73 - 100)  BP: 110/62 (01-22-24 @ 20:48) (104/60 - 125/84)  RR: 18 (01-22-24 @ 20:48) (17 - 18)  SpO2: 100% (01-22-24 @ 20:48) (100% - 100%)  Wt(kg): --  I&O's Summary      PAST MEDICAL & SURGICAL HISTORY:  LE (lupus erythematosus)      Pulmonary embolism      No significant past surgical history          SOCIAL HISTORY  Alcohol:  Tobacco:  Illicit substance use:    FAMILY HISTORY:    REVIEW OF SYSTEMS:  CONSTITUTIONAL: No fever, weight loss, or fatigue  EYES: No eye pain, visual disturbances, or discharge  ENMT:  No difficulty hearing, tinnitus, vertigo; No sinus or throat pain  NECK: No pain or stiffness  RESPIRATORY: No cough, wheezing, chills or hemoptysis; No shortness of breath  CARDIOVASCULAR: No chest pain, palpitations, dizziness, or leg swelling  GASTROINTESTINAL: No abdominal or epigastric pain. No nausea, vomiting, or hematemesis; No diarrhea or constipation. No melena or hematochezia.  GENITOURINARY: No dysuria, frequency, hematuria, or incontinence  NEUROLOGICAL: No headaches, memory loss, loss of strength, numbness, or tremors  SKIN: No itching, burning, rashes, or lesions   LYMPH NODES: No enlarged glands  ENDOCRINE: No heat or cold intolerance; No hair loss  MUSCULOSKELETAL: No joint pain or swelling; No muscle, back, or extremity pain  PSYCHIATRIC: No depression, anxiety, mood swings, or difficulty sleeping  HEME/LYMPH: No easy bruising, or bleeding gums  ALLERY AND IMMUNOLOGIC: No hives or eczema    RADIOLOGY & ADDITIONAL TESTS:    Imaging Personally Reviewed:  [ ] YES  [ ] NO    Consultant(s) Notes Reviewed:  [ ] YES  [ ] NO    PHYSICAL EXAM:  GENERAL: NAD, well-groomed, well-developed  HEAD:  Atraumatic, Normocephalic  EYES: EOMI, PERRLA, conjunctiva and sclera clear  ENMT: No tonsillar erythema, exudates, or enlargement; Moist mucous membranes, Good dentition, No lesions  NECK: Supple, No JVD, Normal thyroid  NERVOUS SYSTEM:  Alert & Oriented X3, Good concentration; Motor Strength 5/5 B/L upper and lower extremities; DTRs 2+ intact and symmetric  CHEST/LUNG: Clear to percussion bilaterally; No rales, rhonchi, wheezing, or rubs  HEART: Regular rate and rhythm; No murmurs, rubs, or gallops  ABDOMEN: Soft, Nontender, Nondistended; Bowel sounds present  EXTREMITIES:  2+ Peripheral Pulses, No clubbing, cyanosis, or edema  LYMPH: No lymphadenopathy noted  SKIN: No rashes or lesions    LABS:              CAPILLARY BLOOD GLUCOSE                MEDICATIONS  (STANDING):  apixaban 5 milliGRAM(s) Oral every 12 hours  atorvastatin 80 milliGRAM(s) Oral at bedtime  chlorhexidine 2% Cloths 1 Application(s) Topical daily  ciprofloxacin  0.3% Ophthalmic Solution for Otic Use 2 Drop(s) Both Ears two times a day  FLUoxetine 20 milliGRAM(s) Oral daily  gabapentin 200 milliGRAM(s) Oral three times a day  hydroxychloroquine 200 milliGRAM(s) Oral two times a day  lidocaine   4% Patch 1 Patch Transdermal every 24 hours  lidocaine   4% Patch 2 Patch Transdermal every 24 hours  melatonin 3 milliGRAM(s) Oral <User Schedule>  multivitamin/minerals/iron Oral Solution (CENTRUM) 15 milliLiter(s) Oral daily  mupirocin 2% Ointment 1 Application(s) Both Nostrils two times a day  mycophenolate mofetil 500 milliGRAM(s) Oral daily  pantoprazole    Tablet 40 milliGRAM(s) Oral before breakfast  predniSONE   Tablet 30 milliGRAM(s) Oral daily  sodium chloride 1 Gram(s) Oral three times a day  trimethoprim  160 mG/sulfamethoxazole 800 mG 1 Tablet(s) Oral <User Schedule>    MEDICATIONS  (PRN):  acetaminophen     Tablet .. 650 milliGRAM(s) Oral every 6 hours PRN Temp greater or equal to 38C (100.4F), Mild Pain (1 - 3)  albuterol/ipratropium for Nebulization 3 milliLiter(s) Nebulizer every 6 hours PRN Shortness of Breath and/or Wheezing  benzocaine/menthol Lozenge 1 Lozenge Oral four times a day PRN Sore Throat  FIRST- Mouthwash  BLM 15 milliLiter(s) Swish and Spit every 4 hours PRN Mouth Care  guaiFENesin Oral Liquid (Sugar-Free) 200 milliGRAM(s) Oral every 6 hours PRN Cough  lidocaine 2% Viscous 5 milliLiter(s) Swish and Spit three times a day PRN Mouth Care  metoclopramide   Syrup 5 milliGRAM(s) Oral once PRN migraine  ondansetron Injectable 4 milliGRAM(s) IV Push every 8 hours PRN Nausea and/or Vomiting  oxyCODONE    IR 5 milliGRAM(s) Oral every 6 hours PRN Severe Pain (7 - 10)  oxyCODONE    IR 2.5 milliGRAM(s) Oral every 6 hours PRN Moderate Pain (4 - 6)      Care Discussed with Consultants/Other Providers [ ] YES  [ ] NO

## 2024-01-22 NOTE — PROGRESS NOTE ADULT - ASSESSMENT
A/P   # Pulm effusion/ Fever   S/P thoracentesis   -c/w eliquis   -completed abx   IR, S/P Renal biopsy , follow up results   S/P KARIE, negative     # Recurrent seizure   Neuro eval  now stable     # Fever  SIRS   Abx , completed per iD   Rheum adn ID follow up   Renal biopsy done, follow up results       # Hyponatremia/ Seizure   resolved     #PE   on eliquis   Heme onc eval   likley lupus related     #Hx of lupus  on hydroxychloroquine   Heme eval   Rheum eval   steroids per rheum       dispo: pt. clinically stable , not in any distress , cleared medically for d/c , mother appealed d/c , awaiting decision

## 2024-01-22 NOTE — CHART NOTE - NSCHARTNOTEFT_GEN_A_CORE
Called by rheum recommended to consider psy consult, discussed with Dr Parish attending of record no indication for psy consult at this time

## 2024-01-22 NOTE — PROGRESS NOTE ADULT - NUTRITIONAL ASSESSMENT
This patient has been assessed with a concern for Malnutrition and has been determined to have a diagnosis/diagnoses of Severe protein-calorie malnutrition.    This patient is being managed with:   Diet Regular-  DASH/TLC {Sodium & Cholesterol Restricted} (DASH)  1000mL Fluid Restriction ([id number])  Supplement Feeding Modality:  Oral  Ensure Plus High Protein Cans or Servings Per Day:  1       Frequency:  Two Times a day  Entered: Jan 19 2024  7:30AM

## 2024-01-23 LAB
ALBUMIN SERPL ELPH-MCNC: 3.4 G/DL — SIGNIFICANT CHANGE UP (ref 3.3–5)
ALP SERPL-CCNC: 121 U/L — HIGH (ref 40–120)
ALT FLD-CCNC: 163 U/L — HIGH (ref 4–41)
ANION GAP SERPL CALC-SCNC: 10 MMOL/L — SIGNIFICANT CHANGE UP (ref 7–14)
AST SERPL-CCNC: 106 U/L — HIGH (ref 4–40)
B PERT DNA SPEC QL NAA+PROBE: SIGNIFICANT CHANGE UP
B PERT+PARAPERT DNA PNL SPEC NAA+PROBE: SIGNIFICANT CHANGE UP
BASOPHILS # BLD AUTO: 0.02 K/UL — SIGNIFICANT CHANGE UP (ref 0–0.2)
BASOPHILS NFR BLD AUTO: 0.2 % — SIGNIFICANT CHANGE UP (ref 0–2)
BILIRUB SERPL-MCNC: 0.4 MG/DL — SIGNIFICANT CHANGE UP (ref 0.2–1.2)
BORDETELLA PARAPERTUSSIS (RAPRVP): SIGNIFICANT CHANGE UP
BUN SERPL-MCNC: 20 MG/DL — SIGNIFICANT CHANGE UP (ref 7–23)
C PNEUM DNA SPEC QL NAA+PROBE: SIGNIFICANT CHANGE UP
CALCIUM SERPL-MCNC: 9.6 MG/DL — SIGNIFICANT CHANGE UP (ref 8.4–10.5)
CHLORIDE SERPL-SCNC: 100 MMOL/L — SIGNIFICANT CHANGE UP (ref 98–107)
CO2 SERPL-SCNC: 26 MMOL/L — SIGNIFICANT CHANGE UP (ref 22–31)
CREAT SERPL-MCNC: 0.82 MG/DL — SIGNIFICANT CHANGE UP (ref 0.5–1.3)
EGFR: 122 ML/MIN/1.73M2 — SIGNIFICANT CHANGE UP
EOSINOPHIL # BLD AUTO: 0.02 K/UL — SIGNIFICANT CHANGE UP (ref 0–0.5)
EOSINOPHIL NFR BLD AUTO: 0.2 % — SIGNIFICANT CHANGE UP (ref 0–6)
FLUAV SUBTYP SPEC NAA+PROBE: SIGNIFICANT CHANGE UP
FLUBV RNA SPEC QL NAA+PROBE: SIGNIFICANT CHANGE UP
GLUCOSE BLDC GLUCOMTR-MCNC: 187 MG/DL — HIGH (ref 70–99)
GLUCOSE SERPL-MCNC: 124 MG/DL — HIGH (ref 70–99)
HADV DNA SPEC QL NAA+PROBE: SIGNIFICANT CHANGE UP
HCOV 229E RNA SPEC QL NAA+PROBE: SIGNIFICANT CHANGE UP
HCOV HKU1 RNA SPEC QL NAA+PROBE: SIGNIFICANT CHANGE UP
HCOV NL63 RNA SPEC QL NAA+PROBE: SIGNIFICANT CHANGE UP
HCOV OC43 RNA SPEC QL NAA+PROBE: SIGNIFICANT CHANGE UP
HCT VFR BLD CALC: 31.9 % — LOW (ref 39–50)
HGB BLD-MCNC: 10.8 G/DL — LOW (ref 13–17)
HMPV RNA SPEC QL NAA+PROBE: SIGNIFICANT CHANGE UP
HPIV1 RNA SPEC QL NAA+PROBE: SIGNIFICANT CHANGE UP
HPIV2 RNA SPEC QL NAA+PROBE: SIGNIFICANT CHANGE UP
HPIV3 RNA SPEC QL NAA+PROBE: SIGNIFICANT CHANGE UP
HPIV4 RNA SPEC QL NAA+PROBE: SIGNIFICANT CHANGE UP
IANC: 8.27 K/UL — HIGH (ref 1.8–7.4)
IMM GRANULOCYTES NFR BLD AUTO: 1.1 % — HIGH (ref 0–0.9)
LACTATE SERPL-SCNC: 1.2 MMOL/L — SIGNIFICANT CHANGE UP (ref 0.5–2)
LYMPHOCYTES # BLD AUTO: 1.09 K/UL — SIGNIFICANT CHANGE UP (ref 1–3.3)
LYMPHOCYTES # BLD AUTO: 10.8 % — LOW (ref 13–44)
M PNEUMO DNA SPEC QL NAA+PROBE: SIGNIFICANT CHANGE UP
MAGNESIUM SERPL-MCNC: 2 MG/DL — SIGNIFICANT CHANGE UP (ref 1.6–2.6)
MCHC RBC-ENTMCNC: 31.6 PG — SIGNIFICANT CHANGE UP (ref 27–34)
MCHC RBC-ENTMCNC: 33.9 GM/DL — SIGNIFICANT CHANGE UP (ref 32–36)
MCV RBC AUTO: 93.3 FL — SIGNIFICANT CHANGE UP (ref 80–100)
MONOCYTES # BLD AUTO: 0.55 K/UL — SIGNIFICANT CHANGE UP (ref 0–0.9)
MONOCYTES NFR BLD AUTO: 5.5 % — SIGNIFICANT CHANGE UP (ref 2–14)
NEUTROPHILS # BLD AUTO: 8.27 K/UL — HIGH (ref 1.8–7.4)
NEUTROPHILS NFR BLD AUTO: 82.2 % — HIGH (ref 43–77)
NRBC # BLD: 0 /100 WBCS — SIGNIFICANT CHANGE UP (ref 0–0)
NRBC # FLD: 0 K/UL — SIGNIFICANT CHANGE UP (ref 0–0)
PLATELET # BLD AUTO: 408 K/UL — HIGH (ref 150–400)
POTASSIUM SERPL-MCNC: 4.7 MMOL/L — SIGNIFICANT CHANGE UP (ref 3.5–5.3)
POTASSIUM SERPL-SCNC: 4.7 MMOL/L — SIGNIFICANT CHANGE UP (ref 3.5–5.3)
PROCALCITONIN SERPL-MCNC: 0.17 NG/ML — HIGH (ref 0.02–0.1)
PROT SERPL-MCNC: 7.1 G/DL — SIGNIFICANT CHANGE UP (ref 6–8.3)
RAPID RVP RESULT: DETECTED
RBC # BLD: 3.42 M/UL — LOW (ref 4.2–5.8)
RBC # FLD: 15.5 % — HIGH (ref 10.3–14.5)
RSV RNA SPEC QL NAA+PROBE: SIGNIFICANT CHANGE UP
RV+EV RNA SPEC QL NAA+PROBE: SIGNIFICANT CHANGE UP
SARS-COV-2 RNA SPEC QL NAA+PROBE: DETECTED
SODIUM SERPL-SCNC: 136 MMOL/L — SIGNIFICANT CHANGE UP (ref 135–145)
WBC # BLD: 10.06 K/UL — SIGNIFICANT CHANGE UP (ref 3.8–10.5)
WBC # FLD AUTO: 10.06 K/UL — SIGNIFICANT CHANGE UP (ref 3.8–10.5)

## 2024-01-23 PROCEDURE — 71045 X-RAY EXAM CHEST 1 VIEW: CPT | Mod: 26

## 2024-01-23 PROCEDURE — 99232 SBSQ HOSP IP/OBS MODERATE 35: CPT

## 2024-01-23 PROCEDURE — 99233 SBSQ HOSP IP/OBS HIGH 50: CPT

## 2024-01-23 RX ORDER — REMDESIVIR 5 MG/ML
100 INJECTION INTRAVENOUS EVERY 24 HOURS
Refills: 0 | Status: COMPLETED | OUTPATIENT
Start: 2024-01-24 | End: 2024-01-25

## 2024-01-23 RX ORDER — REMDESIVIR 5 MG/ML
200 INJECTION INTRAVENOUS EVERY 24 HOURS
Refills: 0 | Status: COMPLETED | OUTPATIENT
Start: 2024-01-23 | End: 2024-01-23

## 2024-01-23 RX ORDER — REMDESIVIR 5 MG/ML
INJECTION INTRAVENOUS
Refills: 0 | Status: COMPLETED | OUTPATIENT
Start: 2024-01-23 | End: 2024-01-25

## 2024-01-23 RX ADMIN — GABAPENTIN 200 MILLIGRAM(S): 400 CAPSULE ORAL at 13:14

## 2024-01-23 RX ADMIN — Medication 200 MILLIGRAM(S): at 05:24

## 2024-01-23 RX ADMIN — GABAPENTIN 200 MILLIGRAM(S): 400 CAPSULE ORAL at 05:25

## 2024-01-23 RX ADMIN — Medication 3 MILLIGRAM(S): at 22:34

## 2024-01-23 RX ADMIN — ATORVASTATIN CALCIUM 80 MILLIGRAM(S): 80 TABLET, FILM COATED ORAL at 22:33

## 2024-01-23 RX ADMIN — APIXABAN 5 MILLIGRAM(S): 2.5 TABLET, FILM COATED ORAL at 05:25

## 2024-01-23 RX ADMIN — Medication 30 MILLIGRAM(S): at 05:24

## 2024-01-23 RX ADMIN — SODIUM CHLORIDE 1 GRAM(S): 9 INJECTION INTRAMUSCULAR; INTRAVENOUS; SUBCUTANEOUS at 05:24

## 2024-01-23 RX ADMIN — Medication 200 MILLIGRAM(S): at 18:23

## 2024-01-23 RX ADMIN — Medication 2 DROP(S): at 18:24

## 2024-01-23 RX ADMIN — SODIUM CHLORIDE 1 GRAM(S): 9 INJECTION INTRAMUSCULAR; INTRAVENOUS; SUBCUTANEOUS at 22:34

## 2024-01-23 RX ADMIN — SODIUM CHLORIDE 1 GRAM(S): 9 INJECTION INTRAMUSCULAR; INTRAVENOUS; SUBCUTANEOUS at 13:13

## 2024-01-23 RX ADMIN — Medication 2 DROP(S): at 05:26

## 2024-01-23 RX ADMIN — GABAPENTIN 200 MILLIGRAM(S): 400 CAPSULE ORAL at 22:34

## 2024-01-23 RX ADMIN — REMDESIVIR 200 MILLIGRAM(S): 5 INJECTION INTRAVENOUS at 22:33

## 2024-01-23 RX ADMIN — Medication 15 MILLILITER(S): at 11:50

## 2024-01-23 RX ADMIN — MUPIROCIN 1 APPLICATION(S): 20 OINTMENT TOPICAL at 05:25

## 2024-01-23 RX ADMIN — CHLORHEXIDINE GLUCONATE 1 APPLICATION(S): 213 SOLUTION TOPICAL at 11:49

## 2024-01-23 RX ADMIN — Medication 20 MILLIGRAM(S): at 11:50

## 2024-01-23 RX ADMIN — PANTOPRAZOLE SODIUM 40 MILLIGRAM(S): 20 TABLET, DELAYED RELEASE ORAL at 07:02

## 2024-01-23 RX ADMIN — APIXABAN 5 MILLIGRAM(S): 2.5 TABLET, FILM COATED ORAL at 18:23

## 2024-01-23 NOTE — PROGRESS NOTE ADULT - NUTRITIONAL ASSESSMENT
This patient has been assessed with a concern for Malnutrition and has been determined to have a diagnosis/diagnoses of Severe protein-calorie malnutrition.    This patient is being managed with:   Diet Regular-  DASH/TLC {Sodium & Cholesterol Restricted} (DASH)  1000mL Fluid Restriction ([id number])  Supplement Feeding Modality:  Oral  Ensure Plus High Protein Cans or Servings Per Day:  1       Frequency:  Two Times a day  Entered: Jan 19 2024  7:30AM

## 2024-01-23 NOTE — PROGRESS NOTE ADULT - ASSESSMENT
This is a 29 y/o M w/ SLE on Hydroxychloroquine admitted to [facility] for b/l PE w/ superimposed PNA, transferred to [facility] on 12/22 for thoracic eval of b/l L > R effusions, L loculated, s/p chest. ID was consulted after transfer for persistent fevers despite Zosyn, c/b seizures and transfer to MICU, broadened to Vancomyin/Cefepime then, however infectious workup remained negative. All abx were held, pt was started on steroids per Rheumatology for SLE with improvement, initially on IV methylpred, transitioned to prednisone 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. Currently on Cellcept and prednisone 30 mg, plan per Rheum was to increase cellcept to eventually stop steroids, however pt refused, now pending psychiatry consult.   Pt noted to have a fever today, RVP + for COVID    #COVID, symptomatic   #Fever   #SLE     29 y/o M w/ SLE, class I mesangial lupus nephritis, long hospital course for fevers, pleural effusion, now improved on Cellcept and prednisone, found to have symptomatic COVID infection, not requiring oxygen.     Recommendations:   1. Start Remdesivir x 3 days given immunosuppression  2, Prednisone per Rheum   3. C/w Bactrim for PJP ppx     Thank you for this consult. Inpatient ID consult team will sign off.    Further changes in lab values, imaging studies, or clinical status will not be known to ID inpatient consultants unless specifically communicated by primary team.    Logan Ku MD  Attending Physician  Division of Infectious Diseases  Department of Medicine    Please contact through MS Teams message.  Office: [phone] (after 5 PM or weekend)

## 2024-01-23 NOTE — BH CONSULTATION LIAISON PROGRESS NOTE - NSBHFUPINTERVALHXFT_PSY_A_CORE
Chart, labs, imaging reviewed. Case discussed with the primary team. Overnight, patient did not require or receive PRN medication. COVID positive.    Patient seen and examined at bedside. He was found sitting in a chair. He is able to answer questions logically, reasonably. Oriented x 3. Reported that he is taking all his medications and they are helping him. He feels his mood is a " little better'. Reported that he sleeps a little better. He reported that his mother is worried " because she has never seen me sick like this". He reported that the pain is still strong, but better than before. Patient denied suicidal or homicidal ideations, intent or a plan.     Mother: he has been loopy, responded " I don't know", " I don't remember" to all questions. Not taking his pills and leave it in the cup. Denied that he expressed suicidal ideations. Laughing inappropriately in front of medical team. Reported that he was first diagnosed with Lupus in September 2023. Chart, labs, imaging reviewed. Case discussed with the primary team. Overnight, patient did not require or receive PRN medication. COVID positive.    Patient seen and examined at bedside. He was found sitting in a chair. He is able to answer questions logically, reasonably. Oriented x 3. Reported that he is taking all his medications and they are helping him. He feels his mood is a " little better'. Reported that he sleeps a little better. He reported that his mother is worried " because she has never seen me sick like this". He reported that the pain is still strong, but better than before. Patient denied suicidal or homicidal ideations, intent or a plan.     Mother: States he has been "loopy", responded " I don't know", " I don't remember" to all questions. States he is not taking his pills and leaves it in the cup. Denied that he expressed suicidal ideations. Laughing inappropriately in front of medical team. Reported that he was first diagnosed with Lupus in September 2023.

## 2024-01-23 NOTE — BH CONSULTATION LIAISON PROGRESS NOTE - NSBHATTESTCOMMENTATTENDFT_PSY_A_CORE
agree with above, patient's mood appears improved from last assessment, able to have reasonable and reciprocal conversation about his stay and improvement in his situation and is amenable to changing his mind about rheum recs. AOx4, no SI/HI. Denies AH/VH, delusions or paranoia. No inappropriate laughter or other behavioral issues noted. Mother appears concerned about him going home and wishes for [facility] to go to [facility] or other dispo instead, but unable to provide clear account as to changes in psychiatric status other than med compliance which appear to be very sparse and odd laughter at times, does not note any safety concerns SI/HI or otherwise. Patient denies decreased need for sleep, increased energy, racing thoughts, irritability euphoria or grandiosity. Patient would benefit from psychiatric f/u in outpt setting or in rehab if that is the ultimate dispo. No psych c/i to discharge. 
Chart reviewed. Pt seen with student. Pleasant, cooperative, cognitively intact, though tired. Pain improved. Endorsed depression/anxiety and agreeable to medication as above. No safety concerns. Agree with above assessment/recs. Will continue to follow if here though there are no psych barriers to discharge when med cleared. 
Chart reviewed. Pt seen with resident. Seems depressed, withdrawn, anergic, in pain. Agrees with ongoing psych follow-up and even inpt psych if needed. However he is limited by physical deconditioning/rehab needs. Agree with above assessment/recs. Will continue to follow while here.

## 2024-01-23 NOTE — PROGRESS NOTE ADULT - SUBJECTIVE AND OBJECTIVE BOX
Infectious Diseases Follow Up:    Patient is a 28y old  Male who presents with a chief complaint of fever (04 Jan 2024 13:39)      Interval History/ROS:  ID reconsulted for fever, patient COVID +.   He is feeling okay at this time, has cough/sneezing, no further fevers, no diarrhea. Feeling hungry.     Allergies  No Known Allergies        ANTIMICROBIALS:  hydroxychloroquine 200 two times a day  trimethoprim  160 mG/sulfamethoxazole 800 mG 1 <User Schedule>      Current Abx:     Previous Abx     OTHER MEDS:  MEDICATIONS  (STANDING):  acetaminophen     Tablet .. 650 every 6 hours PRN  albuterol/ipratropium for Nebulization 3 every 6 hours PRN  apixaban 5 every 12 hours  atorvastatin 80 at bedtime  FLUoxetine 20 daily  gabapentin 200 three times a day  guaiFENesin Oral Liquid (Sugar-Free) 200 every 6 hours PRN  melatonin 3 <User Schedule>  metoclopramide   Syrup 5 once PRN  ondansetron Injectable 4 every 8 hours PRN  oxyCODONE    IR 5 every 6 hours PRN  oxyCODONE    IR 2.5 every 6 hours PRN  pantoprazole    Tablet 40 before breakfast  predniSONE   Tablet 30 daily      Vital Signs Last 24 Hrs  T(C): 36.7 (23 Jan 2024 12:30), Max: 39.2 (22 Jan 2024 18:05)  T(F): 98 (23 Jan 2024 12:30), Max: 102.5 (22 Jan 2024 18:05)  HR: 98 (23 Jan 2024 12:30) (84 - 98)  BP: 129/66 (23 Jan 2024 12:30) (104/60 - 130/88)  BP(mean): --  RR: 18 (23 Jan 2024 12:30) (18 - 18)  SpO2: 99% (23 Jan 2024 12:30) (99% - 100%)    Parameters below as of 23 Jan 2024 12:30  Patient On (Oxygen Delivery Method): room air        PHYSICAL EXAM:  GENERAL: NAD, well-developed  HEAD:  Atraumatic, Normocephalic  EYES: EOMI, PERRLA, conjunctiva and sclera clear  NECK: Supple, No JVD  CHEST/LUNG: Clear to auscultation bilaterally; No wheeze  HEART: Regular rate and rhythm; No murmurs, rubs, or gallops  ABDOMEN: Soft, Nontender, Nondistended; Bowel sounds present  EXTREMITIES:  2+ Peripheral Pulses, No clubbing, cyanosis, or edema  PSYCH: AAOx3                  MICROBIOLOGY:  v  .Blood Blood-Peripheral  12-27-23   No growth at 5 days  --  --      .Throat Throat  12-27-23   No Streptococcus pyogenes (Group A) isolated  --  --      .Sputum Sputum  12-27-23   Normal Respiratory Inge present  --    Few polymorphonuclear leukocytes per low power field  Few Squamous epithelial cells per low power field  No organisms seen per oil power field      Pleural Fl Pleural Fluid  12-26-23   No growth at 5 days  --    polymorphonuclear leukocytes seen  No organisms seen  by cytocentrifuge      Pleural Fl Pleural Fluid  12-25-23   No growth at 5 days  --    No polymorphonuclear cells seen seen per low power field  No organisms seen seen per oil power field      Pleural Fl Pleural Fluid  12-25-23   No fungus isolated at 3 weeks.  --  --      .CSF CSF  12-25-23   No acid-fast bacilli isolated at 3 weeks.  --    No polymorphonuclear leukocytes seen  No organisms seen  by cytocentrifuge      .Blood Blood  12-25-23   No growth at 5 days  --  --      .Blood Blood-Venous  12-25-23   No growth at 5 days  --  --      Pleural Fl Pleural Fluid  12-24-23   No growth  --    polymorphonuclear leukocytes seen  No organisms seen  by cytocentrifuge          Rapid RVP Result: Detected (01-23 @ 09:56)        RADIOLOGY:  < from: Xray Chest 1 View- PORTABLE-Urgent (Xray Chest 1 View- PORTABLE-Urgent .) (01.23.24 @ 09:40) >  FINDINGS:  The heart is normal in size.  The lungs are clear.  No pneumothorax or pleural effusion.  No acute osseous abnormalities.    IMPRESSION:  Clear lungs.

## 2024-01-23 NOTE — BH CONSULTATION LIAISON PROGRESS NOTE - NSBHASSESSMENTFT_PSY_ALL_CORE
28 years old male with h/o Lupus ( diagnosed in 09/2023, on Plaquenil present to [place] ED on 12/12/23  with complain of chest pain and SOB admitted for fevers, PE, pleural effusions, course c/b high fever and tonic-clonic seizure, transferred to MICU for post-ictal and infectious monitoring. Psych consulted for depression. Pt had indicated a hx of depression/anxiety, had been in the  and [facility], and now is requesting to speak to someone. However he is rather medically active and compromised.   Reported being depressed for many years, no current safety concerns. C/o poor sleep due to pain  1/04: continues to report that he feels depressed, anxious, with poor sleep and appetite. Hx of trauma, difficulties to care for himself due to psychiatric condition.  1/5: Continues to report depressed mood, improving anxiety. Amenable to starting Prozac 20mg. Future-oriented, helping-seeking, no SIIP.  1/8: Patient c/o pain, depression, anxiety. Denied side effects from prozac.  1/12: P/o is improving, less anxiety, sleeps better, no safety concerns, no psychotic symptoms.  1/23: pt is improving, no safety concerns, no psychotic symptoms.    Plan:   - Continue medical stabilization as you are  - Address pain (pain management)  - PRN for sleep: melatonin 3 mg qhs.  -Consult Holistic nurse to address his mood ( pt is amendable)  - No role for 1:1 at this time  - c/w Prozac 20 mg to address depression and anxiety (pt. scheduled for first dose today- 1/5/24)  - c/ww Gabapentin from 200 mg BID to 200 mg TID to address anxiety.  - Dispo: likely inpt rehab given severe deconditioning. Inpt psych would be ideal but likely limited by mobility/rehab needs.   Will continue to follow while here.   28 years old male with h/o Lupus ( diagnosed in 09/2023, on Plaquenil present to [place] ED on 12/12/23  with complain of chest pain and SOB admitted for fevers, PE, pleural effusions, course c/b high fever and tonic-clonic seizure, transferred to MICU for post-ictal and infectious monitoring. Psych consulted for depression. Pt had indicated a hx of depression/anxiety, had been in the  and [facility], and now is requesting to speak to someone. However he is rather medically active and compromised.   Reported being depressed for many years, no current safety concerns. C/o poor sleep due to pain  1/04: continues to report that he feels depressed, anxious, with poor sleep and appetite. Hx of trauma, difficulties to care for himself due to psychiatric condition.  1/5: Continues to report depressed mood, improving anxiety. Amenable to starting Prozac 20mg. Future-oriented, helping-seeking, no SIIP.  1/8: Patient c/o pain, depression, anxiety. Denied side effects from prozac.  1/12: P/o is improving, less anxiety, sleeps better, no safety concerns, no psychotic symptoms.  1/23: pt is improving, no safety concerns, no psychotic symptoms or signs of celestine. When confronted about not following teams recs states he wishes to follow rheum recs and taper steroids if that is the optimal strategy. Mother unable to articulate clear concerns other than possible delirium.     Plan:   - If concerned for med compliance would ensure that patient takes meds in front of nursing staff, defer to primary team regarding 1:1 for observation of compliance. No need for 1:1 from psychiatric safety perspective.   - Continue medical stabilization as you are  - Address pain (pain management)  - PRN for sleep: melatonin 3 mg qhs.  -Consult Holistic nurse to address his mood ( pt is amendable)  - c/w Prozac 20 mg to address depression and anxiety (pt. scheduled for first dose today- 1/5/24)  - c/ww Gabapentin from 200 mg BID to 200 mg TID to address anxiety.  - Dispo: likely inpt rehab vs. other safe dispo given severe deconditioning.     Will continue to follow while here.

## 2024-01-23 NOTE — CHART NOTE - NSCHARTNOTEFT_GEN_A_CORE
Pt with fever last night, now COVID positive.   Recommend pt hold cellcept for 2 weeks due to COVID.   Pt's mother called rheum office asking to speak to rheumatology fellow. Called her back and she reported concern for pt's capacity. He is not taking medications and crushing them on the floor (states she took pictures of crushed pills). When asked why, he kept repeating over and over again "I don't know."   Reports significant change in his personality, concerned it could be either from hospital delirium vs starting Prozac? Started on fluoxetine by psych service while inpt.   Pt without overt signs of celestine (however, pt's mother reports that he would call her at night stating he has moved rooms or moved to [place] when he in reality has been in the same room)   However, pt also demonstrated concern for capacity with rheumatology service. When asked if we could raise his cellcept to treat his lupus, he stated no several times without clear reasoning to why. Could not explain risk or benefit of stopping cellcept. Was getting agitated and when we told him this will help get him off steroids, he replied "I'll just be on steroids forever then."  Pt with significant lupus dx requiring > 1 month long hospitalization. He could be having hospital acquired delirium, however if pt is to be discharged, there is significant concern from rheumatology perspective that pt will self D/C all medications (steroids, plaquenil, Eliquis, and Cellcept) without full capacity or understanding of what he is doing, which could lead to significant lupus flare that leads another hospitalization.   Overall have lower concern for lupus cerebritis since pt has been high doses of steroids throughout his hospitalization.   Discussed with primary service if psychiatry can be re-consulted to evaluate pt again. They agreed and will consult psych.      Ginny Landaverde MD   PGY-4  Reachable on TEAMS  Pager [phone]  Rheumatology Fellow Pt with fever last night, now COVID positive.   Recommend pt hold cellcept for 2 weeks due to COVID.   Pt's mother called rheum office asking to speak to rheumatology fellow. Called her back and she reported concern for pt's capacity. He is not taking medications and crushing them on the floor (states she took pictures of crushed pills). When asked why, he kept repeating over and over again "I don't know."   Reports significant change in his personality, concerned it could be either from hospital delirium vs starting Prozac? Started on fluoxetine by psych service while inpt.   Pt without overt signs of celestine (however, pt's mother reports that he would call her at night stating he has moved rooms or moved to [place] when he in reality has been in the same room)   However, pt also demonstrated concern for capacity with rheumatology service. When asked if we could raise his cellcept to treat his lupus, he stated no several times without clear reasoning to why. Could not explain risk or benefit of stopping cellcept. Was getting agitated and when we told him this will help get him off steroids, he replied "I'll just be on steroids forever then."  Pt with significant lupus dx requiring > 1 month long hospitalization. He could be having hospital acquired delirium, however if pt is to be discharged, there is significant concern from rheumatology perspective that pt will self D/C all medications (steroids, plaquenil, Eliquis, and Cellcept) without full capacity or understanding of what he is doing, which could lead to significant lupus flare that leads to another hospitalization.   Overall have lower concern for lupus cerebritis since pt has been on high doses of steroids throughout his hospitalization.   Discussed with primary service if psychiatry can be re-consulted to evaluate pt again. They agreed and will consult psych.      Ginny Landaverde MD   PGY-4  Reachable on TEAMS  Pager [phone]  Rheumatology Fellow

## 2024-01-23 NOTE — PROGRESS NOTE ADULT - ASSESSMENT
A/P   Fever 102.5 F / covid swab pos :   -held d/c   as per concern from rheumatology : for refusing meds and possible delerium , psych was consulted   seen by psych   no concern or saftey issue , no need for 1;1 for psych reason   started on prozac     # Pulm effusion/ Fever   S/P thoracentesis   -c/w eliquis   -completed abx   IR, S/P Renal biopsy , follow up results   S/P KARIE, negative     # Recurrent seizure   Neuro eval  now stable     # Hyponatremia/ Seizure   resolved     #PE   on eliquis   Heme onc eval   likley lupus related     #Hx of lupus  on hydroxychloroquine   Heme eval   Rheum eval   steroids per rheum       dispo: pt. lost appeal and was about to d/c home , however spiked 102.5F , covid swab pos , and d/c held   mother still looking for ac rehab , explained that he is being evaluated by PT and he is not candidate for ac rehab   pt. is now coid pos , but asymptomatic.   seen by psych and cleared   ok to be d/c home in am

## 2024-01-23 NOTE — PROGRESS NOTE ADULT - SUBJECTIVE AND OBJECTIVE BOX
Patient is a 28y old  Male who presents with a chief complaint of fever (04 Jan 2024 13:39)      INTERVAL HPI/OVERNIGHT EVENTS: pt. spiked fever 102.5 F , covid swab pos   asymptomatic , on RA   T(C): 36.7 (01-23-24 @ 12:30), Max: 37 (01-23-24 @ 04:30)  HR: 98 (01-23-24 @ 12:30) (84 - 98)  BP: 129/66 (01-23-24 @ 12:30) (110/62 - 130/88)  RR: 18 (01-23-24 @ 12:30) (18 - 18)  SpO2: 99% (01-23-24 @ 12:30) (99% - 100%)  Wt(kg): --  I&O's Summary      PAST MEDICAL & SURGICAL HISTORY:  LE (lupus erythematosus)      Pulmonary embolism      No significant past surgical history          SOCIAL HISTORY  Alcohol:  Tobacco:  Illicit substance use:    FAMILY HISTORY:    REVIEW OF SYSTEMS:  CONSTITUTIONAL: No fever, weight loss, or fatigue  EYES: No eye pain, visual disturbances, or discharge  ENMT:  No difficulty hearing, tinnitus, vertigo; No sinus or throat pain  NECK: No pain or stiffness  RESPIRATORY: No cough, wheezing, chills or hemoptysis; No shortness of breath  CARDIOVASCULAR: No chest pain, palpitations, dizziness, or leg swelling  GASTROINTESTINAL: No abdominal or epigastric pain. No nausea, vomiting, or hematemesis; No diarrhea or constipation. No melena or hematochezia.  GENITOURINARY: No dysuria, frequency, hematuria, or incontinence  NEUROLOGICAL: No headaches, memory loss, loss of strength, numbness, or tremors  SKIN: No itching, burning, rashes, or lesions   LYMPH NODES: No enlarged glands  ENDOCRINE: No heat or cold intolerance; No hair loss  MUSCULOSKELETAL: No joint pain or swelling; No muscle, back, or extremity pain  PSYCHIATRIC: No depression, anxiety, mood swings, or difficulty sleeping  HEME/LYMPH: No easy bruising, or bleeding gums  ALLERY AND IMMUNOLOGIC: No hives or eczema    RADIOLOGY & ADDITIONAL TESTS:    Imaging Personally Reviewed:  [ ] YES  [ ] NO    Consultant(s) Notes Reviewed:  [ ] YES  [ ] NO    PHYSICAL EXAM:  GENERAL: NAD, well-groomed, well-developed  HEAD:  Atraumatic, Normocephalic  EYES: EOMI, PERRLA, conjunctiva and sclera clear  ENMT: No tonsillar erythema, exudates, or enlargement; Moist mucous membranes, Good dentition, No lesions  NECK: Supple, No JVD, Normal thyroid  NERVOUS SYSTEM:  Alert & Oriented X3, Good concentration; Motor Strength 5/5 B/L upper and lower extremities; DTRs 2+ intact and symmetric  CHEST/LUNG: Clear to percussion bilaterally; No rales, rhonchi, wheezing, or rubs  HEART: Regular rate and rhythm; No murmurs, rubs, or gallops  ABDOMEN: Soft, Nontender, Nondistended; Bowel sounds present  EXTREMITIES:  2+ Peripheral Pulses, No clubbing, cyanosis, or edema  LYMPH: No lymphadenopathy noted  SKIN: No rashes or lesions    LABS:                        10.8   10.06 )-----------( 408      ( 23 Jan 2024 16:36 )             31.9     01-23    136  |  100  |  20  ----------------------------<  124<H>  4.7   |  26  |  0.82    Ca    9.6      23 Jan 2024 16:36  Mg     2.00     01-23    TPro  7.1  /  Alb  3.4  /  TBili  0.4  /  DBili  x   /  AST  106<H>  /  ALT  163<H>  /  AlkPhos  121<H>  01-23      Urinalysis Basic - ( 23 Jan 2024 16:36 )    Color: x / Appearance: x / SG: x / pH: x  Gluc: 124 mg/dL / Ketone: x  / Bili: x / Urobili: x   Blood: x / Protein: x / Nitrite: x   Leuk Esterase: x / RBC: x / WBC x   Sq Epi: x / Non Sq Epi: x / Bacteria: x      CAPILLARY BLOOD GLUCOSE            Urinalysis Basic - ( 23 Jan 2024 16:36 )    Color: x / Appearance: x / SG: x / pH: x  Gluc: 124 mg/dL / Ketone: x  / Bili: x / Urobili: x   Blood: x / Protein: x / Nitrite: x   Leuk Esterase: x / RBC: x / WBC x   Sq Epi: x / Non Sq Epi: x / Bacteria: x        MEDICATIONS  (STANDING):  apixaban 5 milliGRAM(s) Oral every 12 hours  atorvastatin 80 milliGRAM(s) Oral at bedtime  chlorhexidine 2% Cloths 1 Application(s) Topical daily  ciprofloxacin  0.3% Ophthalmic Solution for Otic Use 2 Drop(s) Both Ears two times a day  FLUoxetine 20 milliGRAM(s) Oral daily  gabapentin 200 milliGRAM(s) Oral three times a day  hydroxychloroquine 200 milliGRAM(s) Oral two times a day  lidocaine   4% Patch 2 Patch Transdermal every 24 hours  lidocaine   4% Patch 1 Patch Transdermal every 24 hours  melatonin 3 milliGRAM(s) Oral <User Schedule>  multivitamin/minerals/iron Oral Solution (CENTRUM) 15 milliLiter(s) Oral daily  mupirocin 2% Ointment 1 Application(s) Both Nostrils two times a day  pantoprazole    Tablet 40 milliGRAM(s) Oral before breakfast  predniSONE   Tablet 30 milliGRAM(s) Oral daily  remdesivir  IVPB 200 milliGRAM(s) IV Intermittent every 24 hours  remdesivir  IVPB   IV Intermittent   sodium chloride 1 Gram(s) Oral three times a day  trimethoprim  160 mG/sulfamethoxazole 800 mG 1 Tablet(s) Oral <User Schedule>    MEDICATIONS  (PRN):  acetaminophen     Tablet .. 650 milliGRAM(s) Oral every 6 hours PRN Temp greater or equal to 38C (100.4F), Mild Pain (1 - 3)  albuterol/ipratropium for Nebulization 3 milliLiter(s) Nebulizer every 6 hours PRN Shortness of Breath and/or Wheezing  benzocaine/menthol Lozenge 1 Lozenge Oral four times a day PRN Sore Throat  FIRST- Mouthwash  BLM 15 milliLiter(s) Swish and Spit every 4 hours PRN Mouth Care  guaiFENesin Oral Liquid (Sugar-Free) 200 milliGRAM(s) Oral every 6 hours PRN Cough  lidocaine 2% Viscous 5 milliLiter(s) Swish and Spit three times a day PRN Mouth Care  metoclopramide   Syrup 5 milliGRAM(s) Oral once PRN migraine  ondansetron Injectable 4 milliGRAM(s) IV Push every 8 hours PRN Nausea and/or Vomiting  oxyCODONE    IR 2.5 milliGRAM(s) Oral every 6 hours PRN Moderate Pain (4 - 6)  oxyCODONE    IR 5 milliGRAM(s) Oral every 6 hours PRN Severe Pain (7 - 10)      Care Discussed with Consultants/Other Providers [ ] YES  [ ] NO

## 2024-01-24 ENCOUNTER — APPOINTMENT (OUTPATIENT)
Dept: PULMONOLOGY | Facility: CLINIC | Age: 29
End: 2024-01-24

## 2024-01-24 LAB
ALBUMIN SERPL ELPH-MCNC: 3.5 G/DL — SIGNIFICANT CHANGE UP (ref 3.3–5)
ALP SERPL-CCNC: 125 U/L — HIGH (ref 40–120)
ALT FLD-CCNC: 174 U/L — HIGH (ref 4–41)
ANION GAP SERPL CALC-SCNC: 12 MMOL/L — SIGNIFICANT CHANGE UP (ref 7–14)
AST SERPL-CCNC: 102 U/L — HIGH (ref 4–40)
BILIRUB SERPL-MCNC: 0.3 MG/DL — SIGNIFICANT CHANGE UP (ref 0.2–1.2)
BUN SERPL-MCNC: 14 MG/DL — SIGNIFICANT CHANGE UP (ref 7–23)
CALCIUM SERPL-MCNC: 9.8 MG/DL — SIGNIFICANT CHANGE UP (ref 8.4–10.5)
CHLORIDE SERPL-SCNC: 101 MMOL/L — SIGNIFICANT CHANGE UP (ref 98–107)
CO2 SERPL-SCNC: 22 MMOL/L — SIGNIFICANT CHANGE UP (ref 22–31)
CREAT SERPL-MCNC: 0.65 MG/DL — SIGNIFICANT CHANGE UP (ref 0.5–1.3)
CULTURE RESULTS: SIGNIFICANT CHANGE UP
EGFR: 131 ML/MIN/1.73M2 — SIGNIFICANT CHANGE UP
GLUCOSE SERPL-MCNC: 145 MG/DL — HIGH (ref 70–99)
HCT VFR BLD CALC: 32.5 % — LOW (ref 39–50)
HGB BLD-MCNC: 10.9 G/DL — LOW (ref 13–17)
MAGNESIUM SERPL-MCNC: 1.9 MG/DL — SIGNIFICANT CHANGE UP (ref 1.6–2.6)
MCHC RBC-ENTMCNC: 31.3 PG — SIGNIFICANT CHANGE UP (ref 27–34)
MCHC RBC-ENTMCNC: 33.5 GM/DL — SIGNIFICANT CHANGE UP (ref 32–36)
MCV RBC AUTO: 93.4 FL — SIGNIFICANT CHANGE UP (ref 80–100)
NRBC # BLD: 0 /100 WBCS — SIGNIFICANT CHANGE UP (ref 0–0)
NRBC # FLD: 0 K/UL — SIGNIFICANT CHANGE UP (ref 0–0)
PHOSPHATE SERPL-MCNC: 3.3 MG/DL — SIGNIFICANT CHANGE UP (ref 2.5–4.5)
PLATELET # BLD AUTO: 410 K/UL — HIGH (ref 150–400)
POTASSIUM SERPL-MCNC: 4.9 MMOL/L — SIGNIFICANT CHANGE UP (ref 3.5–5.3)
POTASSIUM SERPL-SCNC: 4.9 MMOL/L — SIGNIFICANT CHANGE UP (ref 3.5–5.3)
PROT SERPL-MCNC: 7.3 G/DL — SIGNIFICANT CHANGE UP (ref 6–8.3)
RBC # BLD: 3.48 M/UL — LOW (ref 4.2–5.8)
RBC # FLD: 15.9 % — HIGH (ref 10.3–14.5)
SODIUM SERPL-SCNC: 135 MMOL/L — SIGNIFICANT CHANGE UP (ref 135–145)
SPECIMEN SOURCE: SIGNIFICANT CHANGE UP
WBC # BLD: 13.44 K/UL — HIGH (ref 3.8–10.5)
WBC # FLD AUTO: 13.44 K/UL — HIGH (ref 3.8–10.5)

## 2024-01-24 PROCEDURE — 99232 SBSQ HOSP IP/OBS MODERATE 35: CPT

## 2024-01-24 RX ADMIN — SODIUM CHLORIDE 1 GRAM(S): 9 INJECTION INTRAMUSCULAR; INTRAVENOUS; SUBCUTANEOUS at 13:18

## 2024-01-24 RX ADMIN — MUPIROCIN 1 APPLICATION(S): 20 OINTMENT TOPICAL at 06:42

## 2024-01-24 RX ADMIN — Medication 200 MILLIGRAM(S): at 06:42

## 2024-01-24 RX ADMIN — Medication 20 MILLIGRAM(S): at 11:47

## 2024-01-24 RX ADMIN — APIXABAN 5 MILLIGRAM(S): 2.5 TABLET, FILM COATED ORAL at 06:42

## 2024-01-24 RX ADMIN — GABAPENTIN 200 MILLIGRAM(S): 400 CAPSULE ORAL at 13:19

## 2024-01-24 RX ADMIN — SODIUM CHLORIDE 1 GRAM(S): 9 INJECTION INTRAMUSCULAR; INTRAVENOUS; SUBCUTANEOUS at 06:42

## 2024-01-24 RX ADMIN — APIXABAN 5 MILLIGRAM(S): 2.5 TABLET, FILM COATED ORAL at 17:18

## 2024-01-24 RX ADMIN — SODIUM CHLORIDE 1 GRAM(S): 9 INJECTION INTRAMUSCULAR; INTRAVENOUS; SUBCUTANEOUS at 21:06

## 2024-01-24 RX ADMIN — Medication 3 MILLIGRAM(S): at 21:11

## 2024-01-24 RX ADMIN — ATORVASTATIN CALCIUM 80 MILLIGRAM(S): 80 TABLET, FILM COATED ORAL at 21:07

## 2024-01-24 RX ADMIN — Medication 15 MILLILITER(S): at 11:46

## 2024-01-24 RX ADMIN — Medication 200 MILLIGRAM(S): at 17:18

## 2024-01-24 RX ADMIN — GABAPENTIN 200 MILLIGRAM(S): 400 CAPSULE ORAL at 06:42

## 2024-01-24 RX ADMIN — Medication 30 MILLIGRAM(S): at 06:41

## 2024-01-24 RX ADMIN — PANTOPRAZOLE SODIUM 40 MILLIGRAM(S): 20 TABLET, DELAYED RELEASE ORAL at 06:42

## 2024-01-24 RX ADMIN — CHLORHEXIDINE GLUCONATE 1 APPLICATION(S): 213 SOLUTION TOPICAL at 11:51

## 2024-01-24 RX ADMIN — REMDESIVIR 200 MILLIGRAM(S): 5 INJECTION INTRAVENOUS at 21:11

## 2024-01-24 RX ADMIN — GABAPENTIN 200 MILLIGRAM(S): 400 CAPSULE ORAL at 21:07

## 2024-01-24 RX ADMIN — Medication 1 TABLET(S): at 06:42

## 2024-01-24 NOTE — PROVIDER CONTACT NOTE (OTHER) - RECOMMENDATIONS
Please order breakthrough pain
none at present time
provider notified
Provider notified and is aware
Provider came and spoke with patient, offered alternative areas that tele can be placed, patient still refused tele monitoring
Provider notified.
Re-order IV tylenol.

## 2024-01-24 NOTE — CHART NOTE - NSCHARTNOTEFT_GEN_A_CORE
Notified by RN that patient refused labs draw and IV access (currently receiving Remdesivir for COVID). Spoke with patient and patients mother for >20 minutes and explained the importance of receiving Remdesivir and monitoring LFTs while on Remdesivir. Pt now in agreement- ANM made aware to place IV and obtain labs.

## 2024-01-24 NOTE — PROGRESS NOTE ADULT - ASSESSMENT
A/P     # Covid-19 :   immunocompromised  seen by ID   started on IV RDV x 3 days      # Pulm effusion/ Fever   S/P thoracentesis   -c/w eliquis   -completed abx   IR, S/P Renal biopsy , follow up results   S/P KARIE, negative     # Recurrent seizure   Neuro eval  now stable     # Hyponatremia/ Seizure   resolved     #PE   on eliquis   Heme onc eval   likley lupus related     #Hx of lupus  on hydroxychloroquine   Heme eval   Rheum eval   steroids per rheum       dispo: started on IV RDV x 3 days for covid-19 pos

## 2024-01-24 NOTE — PROVIDER CONTACT NOTE (OTHER) - SITUATION
Patient febrile, temp 103.1
Pt had moderate amount of hematuria.
Patient refuse medication lidocaine patch, Gabapentin, sodium chloride
Patient is A&Ox4, refusing tele monitoring
Patient removed IV
28M PMHx recent diagnosis of Lupus (9/2023), Recent PE 12/12 p/w increased dyspnea, chest pain, hypoxia, and fever.covid positive.
Patient refuse medications apixaban, ciprofloxacin, & hydroxychloroquine. Patient stated "medication is poisoning his body.
pt tachy at 160, pt c/o 7/10 left sided chest pain
Pt c/o 8/10 left upper back pain near chest tube site. Pt received oxycodone PRN two hours ago.
Pt. complaining of difficulty breathing, and 10/10 chest-pain
Pts. temp noted to be 104.1, HR sustaining between 119-120s and BP: 161/69
Patient states he feels cold and shaky

## 2024-01-24 NOTE — PROVIDER CONTACT NOTE (OTHER) - ACTION/TREATMENT ORDERED:
no action/ treatment ordered at this time
Team aware and at bedside, RRT being called
ACP notified. Lab results sent and pt was hemodynamically stable. Urine analysis ordered.
Provider made aware. Will place order for oxycodone 2.5 mg
Provider notified no further actions.
none at present time
IV tylenol, second blood culture, and cooling blanket ordered.
blood cultures ordered  pt got tylenol at 6:35- provider will order more tylenol If indicated
Provider made aware
Team aware, IV Tylenol ordered and given.
Provider notified no further action needed. Will reattempt medication administration later.

## 2024-01-24 NOTE — PROVIDER CONTACT NOTE (OTHER) - DATE AND TIME:
24-Dec-2023 09:00
25-Dec-2023 01:56
15-Carter-2024 13:46
15-Carter-2024 18:22
17-Jan-2024 22:55
25-Dec-2023 09:18
24-Jan-2024 12:26
16-Jan-2024 01:00
30-Dec-2023 23:04
23-Dec-2023 10:00
01-Jan-2024 04:55
30-Dec-2023 19:35

## 2024-01-24 NOTE — PROGRESS NOTE ADULT - ASSESSMENT
This is a 29 y/o M w/ SLE on Hydroxychloroquine admitted to [facility] for b/l PE w/ superimposed PNA, transferred to [facility] on 12/22 for thoracic eval of b/l L > R effusions, L loculated, s/p chest. ID was consulted after transfer for persistent fevers despite Zosyn, c/b seizures and transfer to MICU, broadened to Vancomyin/Cefepime then, however infectious workup remained negative. All abx were held, pt was started on steroids per Rheumatology for SLE with improvement, initially on IV methylpred, transitioned to prednisone 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. Currently on Cellcept and prednisone 30 mg, plan per Rheum was to increase cellcept to eventually stop steroids, however pt refused, now pending psychiatry consult.   Pt noted to have a fever today, RVP + for COVID    #COVID, symptomatic   #Fever   #SLE     29 y/o M w/ SLE, class I mesangial lupus nephritis, long hospital course for fevers, pleural effusion, now improved on Cellcept and prednisone, found to have symptomatic COVID infection, not requiring oxygen.     Recommendations:   1. C/w Remdesivir x 3 days given immunosuppression  2, Prednisone per Rheum   3. C/w Bactrim for PJP ppx     Thank you for this consult. Inpatient ID team will follow.    Logan Ku M.D.  Attending Physician  Division of Infectious Diseases  Department of Medicine    Please contact through [employer].  Office: [phone] (after 5 PM or weekend)

## 2024-01-24 NOTE — PROGRESS NOTE ADULT - SUBJECTIVE AND OBJECTIVE BOX
Infectious Diseases Follow Up:    Patient is a 28y old  Male who presents with a chief complaint of fever (04 Jan 2024 13:39)      Interval History/ROS:  Feeling well today, cough/sneezing improving, no fevers, no SOB    Allergies  No Known Allergies        ANTIMICROBIALS:  hydroxychloroquine 200 two times a day  remdesivir  IVPB    remdesivir  IVPB 100 every 24 hours  trimethoprim  160 mG/sulfamethoxazole 800 mG 1 <User Schedule>      Current Abx:     Previous Abx     OTHER MEDS:  MEDICATIONS  (STANDING):  acetaminophen     Tablet .. 650 every 6 hours PRN  albuterol/ipratropium for Nebulization 3 every 6 hours PRN  apixaban 5 every 12 hours  atorvastatin 80 at bedtime  FLUoxetine 20 daily  gabapentin 200 three times a day  guaiFENesin Oral Liquid (Sugar-Free) 200 every 6 hours PRN  melatonin 3 <User Schedule>  metoclopramide   Syrup 5 once PRN  ondansetron Injectable 4 every 8 hours PRN  oxyCODONE    IR 2.5 every 6 hours PRN  oxyCODONE    IR 5 every 6 hours PRN  pantoprazole    Tablet 40 before breakfast  predniSONE   Tablet 30 daily      Vital Signs Last 24 Hrs  T(C): 36.4 (24 Jan 2024 06:30), Max: 36.9 (23 Jan 2024 22:30)  T(F): 97.5 (24 Jan 2024 06:30), Max: 98.4 (23 Jan 2024 22:30)  HR: 77 (24 Jan 2024 06:30) (77 - 98)  BP: 110/71 (24 Jan 2024 06:30) (110/71 - 129/66)  BP(mean): --  RR: 18 (24 Jan 2024 06:30) (18 - 18)  SpO2: 98% (24 Jan 2024 06:30) (98% - 100%)    Parameters below as of 24 Jan 2024 06:30  Patient On (Oxygen Delivery Method): room air        PHYSICAL EXAM:  GENERAL: NAD, well-developed  HEAD:  Atraumatic, Normocephalic  EYES: EOMI, PERRLA, conjunctiva and sclera clear  NECK: Supple, No JVD  CHEST/LUNG: Clear to auscultation bilaterally; No wheeze  HEART: Regular rate and rhythm; No murmurs, rubs, or gallops  ABDOMEN: Soft, Nontender, Nondistended; Bowel sounds present  EXTREMITIES:  2+ Peripheral Pulses, No clubbing, cyanosis, or edema  PSYCH: AAOx3                          10.8   10.06 )-----------( 408      ( 23 Jan 2024 16:36 )             31.9       01-23    136  |  100  |  20  ----------------------------<  124<H>  4.7   |  26  |  0.82    Ca    9.6      23 Jan 2024 16:36  Mg     2.00     01-23    TPro  7.1  /  Alb  3.4  /  TBili  0.4  /  DBili  x   /  AST  106<H>  /  ALT  163<H>  /  AlkPhos  121<H>  01-23      Urinalysis Basic - ( 23 Jan 2024 16:36 )    Color: x / Appearance: x / SG: x / pH: x  Gluc: 124 mg/dL / Ketone: x  / Bili: x / Urobili: x   Blood: x / Protein: x / Nitrite: x   Leuk Esterase: x / RBC: x / WBC x   Sq Epi: x / Non Sq Epi: x / Bacteria: x        MICROBIOLOGY:  v  .Blood Blood-Peripheral  12-27-23   No growth at 5 days  --  --      .Throat Throat  12-27-23   No Streptococcus pyogenes (Group A) isolated  --  --      .Sputum Sputum  12-27-23   Normal Respiratory Inge present  --    Few polymorphonuclear leukocytes per low power field  Few Squamous epithelial cells per low power field  No organisms seen per oil power field      Pleural Fl Pleural Fluid  12-26-23   No growth at 5 days  --    polymorphonuclear leukocytes seen  No organisms seen  by cytocentrifuge      Pleural Fl Pleural Fluid  12-25-23   No growth at 5 days  --    No polymorphonuclear cells seen seen per low power field  No organisms seen seen per oil power field      Pleural Fl Pleural Fluid  12-25-23   No fungus isolated at 3 weeks.  --  --      .CSF CSF  12-25-23   No acid-fast bacilli isolated at 3 weeks.  --    No polymorphonuclear leukocytes seen  No organisms seen  by cytocentrifuge      .Blood Blood  12-25-23   No growth at 5 days  --  --          Rapid RVP Result: Detected (01-23 @ 09:56)        RADIOLOGY:

## 2024-01-24 NOTE — PROVIDER CONTACT NOTE (OTHER) - BACKGROUND
28M PMHx recent diagnosis of Lupus (9/2023), Recent PE 12/12 p/w increased dyspnea, chest pain, hypoxia, and fever.
Hx Lupus
Patient admitted with pleural effusion
Pt admitted for left sided chest pain
Pt. has lupus
29yo male admitted for pleural effusion
Pt admitted for pleural effusion, lupus flare up
Patient admitted with increased dyspnea on exertion, chest pain, and fever.
Pt admitted for pleural effusion and has PMH of PE and lupus. Pt is on a heparin gtt.
Pt currently has lidocaine patches on the back and hips, along with hot packs.
Hx: Lupus Ertyhematosus, pulmonary throboembolism, hyponatremia

## 2024-01-24 NOTE — PROVIDER CONTACT NOTE (OTHER) - REASON
Patient refusing tele monitoring
Pt. complaining of difficulty breathing, and 10/10 chest-pain
Pt had moderate amount of hematuria.
Patient febrile, temp 103.1
Patient is shaky- temp of 102.9 and /108
Patient refused medication
Pt c/o 8/10 left upper back pain near chest tube site
Pt removed IV
Patient refuse medication
Patient refused labs draw and IV access
Pts. temp noted to be 104.1, HR sustaining between 119-120s and BP: 161/69
pt tachy at 160, pt c/o 7/10 left sided chest pain

## 2024-01-24 NOTE — PROVIDER CONTACT NOTE (OTHER) - ASSESSMENT
Patient is A&Ox4 has the capacity to refuse.
Pt is hemodynamically stable. Pt shows no s/s of acute distress.
Patient's arms shaking - vitals as per flowsheet
Patient is A&Ox4 has the capacity to refuse. Vitals are stable no s/s of distress noted.
Patient refused labs draw and IV access
Pt seen grimacing and guarding left sided
Patient febrile, temp 103.1. . /86. Resp 18 and O2 saturation 98%. Patient denies shortness of breath and chest pain. Denies having the chills.
Tried to talk to patient, unsuccessful. Provider made aware
pt educated on risk and benefits on having an IV. pt verbalized understanding
Pt seen with grimace and guarding
Pts. temp noted to be 104.1, HR sustaining between 119-120s and BP: 161/69
Pt. complaining of difficulty breathing, and 10/10 chest-pain - VS on flowsheet

## 2024-01-24 NOTE — PROGRESS NOTE ADULT - SUBJECTIVE AND OBJECTIVE BOX
Patient is a 28y old  Male who presents with a chief complaint of fever (04 Jan 2024 13:39)      INTERVAL HPI/OVERNIGHT EVENTS: seen and examined , initially refusing for RDV , later agree for IV line   T(C): 36.7 (01-24-24 @ 17:25), Max: 36.9 (01-23-24 @ 22:30)  HR: 95 (01-24-24 @ 17:25) (77 - 95)  BP: 109/69 (01-24-24 @ 17:25) (109/69 - 113/55)  RR: 18 (01-24-24 @ 17:25) (18 - 18)  SpO2: 100% (01-24-24 @ 17:25) (98% - 100%)  Wt(kg): --  I&O's Summary      PAST MEDICAL & SURGICAL HISTORY:  LE (lupus erythematosus)      Pulmonary embolism      No significant past surgical history          SOCIAL HISTORY  Alcohol:  Tobacco:  Illicit substance use:    FAMILY HISTORY:    REVIEW OF SYSTEMS:  CONSTITUTIONAL: No fever, weight loss, or fatigue  EYES: No eye pain, visual disturbances, or discharge  ENMT:  No difficulty hearing, tinnitus, vertigo; No sinus or throat pain  NECK: No pain or stiffness  RESPIRATORY: No cough, wheezing, chills or hemoptysis; No shortness of breath  CARDIOVASCULAR: No chest pain, palpitations, dizziness, or leg swelling  GASTROINTESTINAL: No abdominal or epigastric pain. No nausea, vomiting, or hematemesis; No diarrhea or constipation. No melena or hematochezia.  GENITOURINARY: No dysuria, frequency, hematuria, or incontinence  NEUROLOGICAL: No headaches, memory loss, loss of strength, numbness, or tremors  SKIN: No itching, burning, rashes, or lesions   LYMPH NODES: No enlarged glands  ENDOCRINE: No heat or cold intolerance; No hair loss  MUSCULOSKELETAL: No joint pain or swelling; No muscle, back, or extremity pain  PSYCHIATRIC: No depression, anxiety, mood swings, or difficulty sleeping  HEME/LYMPH: No easy bruising, or bleeding gums  ALLERY AND IMMUNOLOGIC: No hives or eczema    RADIOLOGY & ADDITIONAL TESTS:    Imaging Personally Reviewed:  [ ] YES  [ ] NO    Consultant(s) Notes Reviewed:  [ ] YES  [ ] NO    PHYSICAL EXAM:  GENERAL: NAD, well-groomed, well-developed  HEAD:  Atraumatic, Normocephalic  EYES: EOMI, PERRLA, conjunctiva and sclera clear  ENMT: No tonsillar erythema, exudates, or enlargement; Moist mucous membranes, Good dentition, No lesions  NECK: Supple, No JVD, Normal thyroid  NERVOUS SYSTEM:  Alert & Oriented X3, Good concentration; Motor Strength 5/5 B/L upper and lower extremities; DTRs 2+ intact and symmetric  CHEST/LUNG: Clear to percussion bilaterally; No rales, rhonchi, wheezing, or rubs  HEART: Regular rate and rhythm; No murmurs, rubs, or gallops  ABDOMEN: Soft, Nontender, Nondistended; Bowel sounds present  EXTREMITIES:  2+ Peripheral Pulses, No clubbing, cyanosis, or edema  LYMPH: No lymphadenopathy noted  SKIN: No rashes or lesions    LABS:                        10.9   13.44 )-----------( 410      ( 24 Jan 2024 15:00 )             32.5     01-24    135  |  101  |  14  ----------------------------<  145<H>  4.9   |  22  |  0.65    Ca    9.8      24 Jan 2024 15:00  Phos  3.3     01-24  Mg     1.90     01-24    TPro  7.3  /  Alb  3.5  /  TBili  0.3  /  DBili  x   /  AST  102<H>  /  ALT  174<H>  /  AlkPhos  125<H>  01-24      Urinalysis Basic - ( 24 Jan 2024 15:00 )    Color: x / Appearance: x / SG: x / pH: x  Gluc: 145 mg/dL / Ketone: x  / Bili: x / Urobili: x   Blood: x / Protein: x / Nitrite: x   Leuk Esterase: x / RBC: x / WBC x   Sq Epi: x / Non Sq Epi: x / Bacteria: x      CAPILLARY BLOOD GLUCOSE      POCT Blood Glucose.: 187 mg/dL (23 Jan 2024 21:52)        Urinalysis Basic - ( 24 Jan 2024 15:00 )    Color: x / Appearance: x / SG: x / pH: x  Gluc: 145 mg/dL / Ketone: x  / Bili: x / Urobili: x   Blood: x / Protein: x / Nitrite: x   Leuk Esterase: x / RBC: x / WBC x   Sq Epi: x / Non Sq Epi: x / Bacteria: x        MEDICATIONS  (STANDING):  apixaban 5 milliGRAM(s) Oral every 12 hours  atorvastatin 80 milliGRAM(s) Oral at bedtime  chlorhexidine 2% Cloths 1 Application(s) Topical daily  ciprofloxacin  0.3% Ophthalmic Solution for Otic Use 2 Drop(s) Both Ears two times a day  FLUoxetine 20 milliGRAM(s) Oral daily  gabapentin 200 milliGRAM(s) Oral three times a day  hydroxychloroquine 200 milliGRAM(s) Oral two times a day  lidocaine   4% Patch 1 Patch Transdermal every 24 hours  lidocaine   4% Patch 2 Patch Transdermal every 24 hours  melatonin 3 milliGRAM(s) Oral <User Schedule>  multivitamin/minerals/iron Oral Solution (CENTRUM) 15 milliLiter(s) Oral daily  pantoprazole    Tablet 40 milliGRAM(s) Oral before breakfast  predniSONE   Tablet 30 milliGRAM(s) Oral daily  remdesivir  IVPB   IV Intermittent   remdesivir  IVPB 100 milliGRAM(s) IV Intermittent every 24 hours  sodium chloride 1 Gram(s) Oral three times a day  trimethoprim  160 mG/sulfamethoxazole 800 mG 1 Tablet(s) Oral <User Schedule>    MEDICATIONS  (PRN):  acetaminophen     Tablet .. 650 milliGRAM(s) Oral every 6 hours PRN Temp greater or equal to 38C (100.4F), Mild Pain (1 - 3)  albuterol/ipratropium for Nebulization 3 milliLiter(s) Nebulizer every 6 hours PRN Shortness of Breath and/or Wheezing  benzocaine/menthol Lozenge 1 Lozenge Oral four times a day PRN Sore Throat  FIRST- Mouthwash  BLM 15 milliLiter(s) Swish and Spit every 4 hours PRN Mouth Care  guaiFENesin Oral Liquid (Sugar-Free) 200 milliGRAM(s) Oral every 6 hours PRN Cough  lidocaine 2% Viscous 5 milliLiter(s) Swish and Spit three times a day PRN Mouth Care  metoclopramide   Syrup 5 milliGRAM(s) Oral once PRN migraine  ondansetron Injectable 4 milliGRAM(s) IV Push every 8 hours PRN Nausea and/or Vomiting  oxyCODONE    IR 5 milliGRAM(s) Oral every 6 hours PRN Severe Pain (7 - 10)  oxyCODONE    IR 2.5 milliGRAM(s) Oral every 6 hours PRN Moderate Pain (4 - 6)      Care Discussed with Consultants/Other Providers [ ] YES  [ ] NO

## 2024-01-24 NOTE — PROGRESS NOTE ADULT - NUTRITIONAL ASSESSMENT
This patient has been assessed with a concern for Malnutrition and has been determined to have a diagnosis/diagnoses of Severe protein-calorie malnutrition.    This patient is being managed with:   Diet Regular-  DASH/TLC {Sodium & Cholesterol Restricted} (DASH)  1000mL Fluid Restriction ([id number])  Supplement Feeding Modality:  Oral  Ensure Plus High Protein Cans or Servings Per Day:  1       Frequency:  Two Times a day  Entered: Jan 19 2024  7:30AM

## 2024-01-25 LAB
ALBUMIN SERPL ELPH-MCNC: 3.3 G/DL — SIGNIFICANT CHANGE UP (ref 3.3–5)
ALP SERPL-CCNC: 111 U/L — SIGNIFICANT CHANGE UP (ref 40–120)
ALT FLD-CCNC: 147 U/L — HIGH (ref 4–41)
ANION GAP SERPL CALC-SCNC: 12 MMOL/L — SIGNIFICANT CHANGE UP (ref 7–14)
AST SERPL-CCNC: 70 U/L — HIGH (ref 4–40)
BILIRUB SERPL-MCNC: 0.3 MG/DL — SIGNIFICANT CHANGE UP (ref 0.2–1.2)
BUN SERPL-MCNC: 12 MG/DL — SIGNIFICANT CHANGE UP (ref 7–23)
CALCIUM SERPL-MCNC: 9.9 MG/DL — SIGNIFICANT CHANGE UP (ref 8.4–10.5)
CHLORIDE SERPL-SCNC: 98 MMOL/L — SIGNIFICANT CHANGE UP (ref 98–107)
CO2 SERPL-SCNC: 25 MMOL/L — SIGNIFICANT CHANGE UP (ref 22–31)
CREAT SERPL-MCNC: 0.72 MG/DL — SIGNIFICANT CHANGE UP (ref 0.5–1.3)
EGFR: 127 ML/MIN/1.73M2 — SIGNIFICANT CHANGE UP
GLUCOSE SERPL-MCNC: 90 MG/DL — SIGNIFICANT CHANGE UP (ref 70–99)
HCT VFR BLD CALC: 32 % — LOW (ref 39–50)
HGB BLD-MCNC: 10.6 G/DL — LOW (ref 13–17)
MAGNESIUM SERPL-MCNC: 1.9 MG/DL — SIGNIFICANT CHANGE UP (ref 1.6–2.6)
MCHC RBC-ENTMCNC: 31.6 PG — SIGNIFICANT CHANGE UP (ref 27–34)
MCHC RBC-ENTMCNC: 33.1 GM/DL — SIGNIFICANT CHANGE UP (ref 32–36)
MCV RBC AUTO: 95.5 FL — SIGNIFICANT CHANGE UP (ref 80–100)
NRBC # BLD: 0 /100 WBCS — SIGNIFICANT CHANGE UP (ref 0–0)
NRBC # FLD: 0 K/UL — SIGNIFICANT CHANGE UP (ref 0–0)
PHOSPHATE SERPL-MCNC: 4.9 MG/DL — HIGH (ref 2.5–4.5)
PLATELET # BLD AUTO: 397 K/UL — SIGNIFICANT CHANGE UP (ref 150–400)
POTASSIUM SERPL-MCNC: 4 MMOL/L — SIGNIFICANT CHANGE UP (ref 3.5–5.3)
POTASSIUM SERPL-SCNC: 4 MMOL/L — SIGNIFICANT CHANGE UP (ref 3.5–5.3)
PROT SERPL-MCNC: 6.9 G/DL — SIGNIFICANT CHANGE UP (ref 6–8.3)
RBC # BLD: 3.35 M/UL — LOW (ref 4.2–5.8)
RBC # FLD: 15.7 % — HIGH (ref 10.3–14.5)
SODIUM SERPL-SCNC: 135 MMOL/L — SIGNIFICANT CHANGE UP (ref 135–145)
WBC # BLD: 9.5 K/UL — SIGNIFICANT CHANGE UP (ref 3.8–10.5)
WBC # FLD AUTO: 9.5 K/UL — SIGNIFICANT CHANGE UP (ref 3.8–10.5)

## 2024-01-25 RX ORDER — OXYCODONE HYDROCHLORIDE 5 MG/1
2.5 TABLET ORAL EVERY 6 HOURS
Refills: 0 | Status: DISCONTINUED | OUTPATIENT
Start: 2024-01-25 | End: 2024-01-29

## 2024-01-25 RX ORDER — OXYCODONE HYDROCHLORIDE 5 MG/1
5 TABLET ORAL EVERY 6 HOURS
Refills: 0 | Status: DISCONTINUED | OUTPATIENT
Start: 2024-01-25 | End: 2024-01-29

## 2024-01-25 RX ADMIN — Medication 30 MILLIGRAM(S): at 05:06

## 2024-01-25 RX ADMIN — Medication 15 MILLILITER(S): at 13:37

## 2024-01-25 RX ADMIN — GABAPENTIN 200 MILLIGRAM(S): 400 CAPSULE ORAL at 21:12

## 2024-01-25 RX ADMIN — CHLORHEXIDINE GLUCONATE 1 APPLICATION(S): 213 SOLUTION TOPICAL at 13:43

## 2024-01-25 RX ADMIN — GABAPENTIN 200 MILLIGRAM(S): 400 CAPSULE ORAL at 05:06

## 2024-01-25 RX ADMIN — SODIUM CHLORIDE 1 GRAM(S): 9 INJECTION INTRAMUSCULAR; INTRAVENOUS; SUBCUTANEOUS at 21:12

## 2024-01-25 RX ADMIN — Medication 200 MILLIGRAM(S): at 05:06

## 2024-01-25 RX ADMIN — Medication 200 MILLIGRAM(S): at 17:33

## 2024-01-25 RX ADMIN — APIXABAN 5 MILLIGRAM(S): 2.5 TABLET, FILM COATED ORAL at 05:06

## 2024-01-25 RX ADMIN — ATORVASTATIN CALCIUM 80 MILLIGRAM(S): 80 TABLET, FILM COATED ORAL at 21:12

## 2024-01-25 RX ADMIN — APIXABAN 5 MILLIGRAM(S): 2.5 TABLET, FILM COATED ORAL at 17:33

## 2024-01-25 RX ADMIN — PANTOPRAZOLE SODIUM 40 MILLIGRAM(S): 20 TABLET, DELAYED RELEASE ORAL at 05:06

## 2024-01-25 RX ADMIN — Medication 20 MILLIGRAM(S): at 13:38

## 2024-01-25 RX ADMIN — REMDESIVIR 200 MILLIGRAM(S): 5 INJECTION INTRAVENOUS at 21:15

## 2024-01-25 RX ADMIN — SODIUM CHLORIDE 1 GRAM(S): 9 INJECTION INTRAMUSCULAR; INTRAVENOUS; SUBCUTANEOUS at 05:07

## 2024-01-25 RX ADMIN — Medication 3 MILLIGRAM(S): at 21:12

## 2024-01-25 NOTE — PROGRESS NOTE ADULT - SUBJECTIVE AND OBJECTIVE BOX
Name of Patient : TAYLA MARIE  MRN: [id number]  Date of visit: 01-25-24       Subjective: Patient seen and examined. No new events except as noted.     REVIEW OF SYSTEMS:    CONSTITUTIONAL: No weakness, fevers or chills  EYES/ENT: No visual changes;  No vertigo or throat pain   NECK: No pain or stiffness  RESPIRATORY: No cough, wheezing, hemoptysis; No shortness of breath  CARDIOVASCULAR: No chest pain or palpitations  GASTROINTESTINAL: No abdominal or epigastric pain. No nausea, vomiting, or hematemesis; No diarrhea or constipation. No melena or hematochezia.  GENITOURINARY: No dysuria, frequency or hematuria  NEUROLOGICAL: No numbness or weakness  SKIN: No itching, burning, rashes, or lesions   All other review of systems is negative unless indicated above.    MEDICATIONS:  MEDICATIONS  (STANDING):  apixaban 5 milliGRAM(s) Oral every 12 hours  atorvastatin 80 milliGRAM(s) Oral at bedtime  chlorhexidine 2% Cloths 1 Application(s) Topical daily  ciprofloxacin  0.3% Ophthalmic Solution for Otic Use 2 Drop(s) Both Ears two times a day  FLUoxetine 20 milliGRAM(s) Oral daily  gabapentin 200 milliGRAM(s) Oral three times a day  hydroxychloroquine 200 milliGRAM(s) Oral two times a day  lidocaine   4% Patch 2 Patch Transdermal every 24 hours  lidocaine   4% Patch 1 Patch Transdermal every 24 hours  melatonin 3 milliGRAM(s) Oral <User Schedule>  multivitamin/minerals/iron Oral Solution (CENTRUM) 15 milliLiter(s) Oral daily  pantoprazole    Tablet 40 milliGRAM(s) Oral before breakfast  predniSONE   Tablet 30 milliGRAM(s) Oral daily  remdesivir  IVPB   IV Intermittent   remdesivir  IVPB 100 milliGRAM(s) IV Intermittent every 24 hours  sodium chloride 1 Gram(s) Oral three times a day  trimethoprim  160 mG/sulfamethoxazole 800 mG 1 Tablet(s) Oral <User Schedule>      PHYSICAL EXAM:  T(C): 36.6 (01-25-24 @ 12:30), Max: 36.7 (01-25-24 @ 05:00)  HR: 93 (01-25-24 @ 12:30) (72 - 93)  BP: 125/84 (01-25-24 @ 12:30) (108/64 - 125/84)  RR: 18 (01-25-24 @ 12:30) (18 - 18)  SpO2: 99% (01-25-24 @ 12:30) (98% - 99%)  Wt(kg): --  I&O's Summary        Appearance: Normal	  HEENT:  PERRLA   Lymphatic: No lymphadenopathy   Cardiovascular: Normal S1 S2, no JVD  Respiratory: normal effort , clear  Gastrointestinal:  Soft, Non-tender  Skin: No rashes,  warm to touch  Psychiatry:  Mood & affect appropriate  Musculuskeletal: No edema    recent labs, Imaging and EKGs personally reviewed                           10.6   9.50  )-----------( 397      ( 25 Jan 2024 06:15 )             32.0               01-25    135  |  98  |  12  ----------------------------<  90  4.0   |  25  |  0.72    Ca    9.9      25 Jan 2024 06:15  Phos  4.9     01-25  Mg     1.90     01-25    TPro  6.9  /  Alb  3.3  /  TBili  0.3  /  DBili  x   /  AST  70<H>  /  ALT  147<H>  /  AlkPhos  111  01-25                       Urinalysis Basic - ( 25 Jan 2024 06:15 )    Color: x / Appearance: x / SG: x / pH: x  Gluc: 90 mg/dL / Ketone: x  / Bili: x / Urobili: x   Blood: x / Protein: x / Nitrite: x   Leuk Esterase: x / RBC: x / WBC x   Sq Epi: x / Non Sq Epi: x / Bacteria: x

## 2024-01-25 NOTE — PROGRESS NOTE ADULT - NUTRITIONAL ASSESSMENT
This patient has been assessed with a concern for Malnutrition and has been determined to have a diagnosis/diagnoses of Severe protein-calorie malnutrition.    This patient is being managed with:   Diet Regular-  DASH/TLC {Sodium & Cholesterol Restricted} (DASH)  1000mL Fluid Restriction ([id number])  Supplement Feeding Modality:  Oral  Ensure Plus High Protein Cans or Servings Per Day:  1       Frequency:  Two Times a day  Entered: Jan 19 2024  7:30AM

## 2024-01-25 NOTE — PROGRESS NOTE ADULT - ASSESSMENT
A/P     # Covid-19 :   immunocompromised  seen by ID   started on IV RDV x 3 days      # Pulm effusion/ Fever   S/P thoracentesis   -c/w eliquis   -completed abx   IR, S/P Renal biopsy , follow up results   S/P KARIE, negative     # Recurrent seizure   Neuro eval  now stable     # Hyponatremia/ Seizure   resolved     #PE   on eliquis   Heme onc eval   likley lupus related     #Hx of lupus  on hydroxychloroquine   Heme eval   Rheum eval   steroids per rheum       dispo: started on IV RDV x 3 days for covid-19 pos   D/C planing

## 2024-01-26 LAB
ALBUMIN SERPL ELPH-MCNC: 3.6 G/DL — SIGNIFICANT CHANGE UP (ref 3.3–5)
ALP SERPL-CCNC: 113 U/L — SIGNIFICANT CHANGE UP (ref 40–120)
ALT FLD-CCNC: 131 U/L — HIGH (ref 4–41)
ANION GAP SERPL CALC-SCNC: 14 MMOL/L — SIGNIFICANT CHANGE UP (ref 7–14)
AST SERPL-CCNC: 59 U/L — HIGH (ref 4–40)
BILIRUB SERPL-MCNC: 0.3 MG/DL — SIGNIFICANT CHANGE UP (ref 0.2–1.2)
BUN SERPL-MCNC: 16 MG/DL — SIGNIFICANT CHANGE UP (ref 7–23)
CALCIUM SERPL-MCNC: 9.9 MG/DL — SIGNIFICANT CHANGE UP (ref 8.4–10.5)
CHLORIDE SERPL-SCNC: 99 MMOL/L — SIGNIFICANT CHANGE UP (ref 98–107)
CO2 SERPL-SCNC: 23 MMOL/L — SIGNIFICANT CHANGE UP (ref 22–31)
CREAT SERPL-MCNC: 0.73 MG/DL — SIGNIFICANT CHANGE UP (ref 0.5–1.3)
EGFR: 126 ML/MIN/1.73M2 — SIGNIFICANT CHANGE UP
GLUCOSE SERPL-MCNC: 112 MG/DL — HIGH (ref 70–99)
HCT VFR BLD CALC: 32.8 % — LOW (ref 39–50)
HGB BLD-MCNC: 10.8 G/DL — LOW (ref 13–17)
MAGNESIUM SERPL-MCNC: 1.9 MG/DL — SIGNIFICANT CHANGE UP (ref 1.6–2.6)
MCHC RBC-ENTMCNC: 31.4 PG — SIGNIFICANT CHANGE UP (ref 27–34)
MCHC RBC-ENTMCNC: 32.9 GM/DL — SIGNIFICANT CHANGE UP (ref 32–36)
MCV RBC AUTO: 95.3 FL — SIGNIFICANT CHANGE UP (ref 80–100)
NRBC # BLD: 0 /100 WBCS — SIGNIFICANT CHANGE UP (ref 0–0)
NRBC # FLD: 0 K/UL — SIGNIFICANT CHANGE UP (ref 0–0)
PHOSPHATE SERPL-MCNC: 5.3 MG/DL — HIGH (ref 2.5–4.5)
PLATELET # BLD AUTO: 431 K/UL — HIGH (ref 150–400)
POTASSIUM SERPL-MCNC: 4.1 MMOL/L — SIGNIFICANT CHANGE UP (ref 3.5–5.3)
POTASSIUM SERPL-SCNC: 4.1 MMOL/L — SIGNIFICANT CHANGE UP (ref 3.5–5.3)
PROT SERPL-MCNC: 7.1 G/DL — SIGNIFICANT CHANGE UP (ref 6–8.3)
RBC # BLD: 3.44 M/UL — LOW (ref 4.2–5.8)
RBC # FLD: 15.8 % — HIGH (ref 10.3–14.5)
SODIUM SERPL-SCNC: 136 MMOL/L — SIGNIFICANT CHANGE UP (ref 135–145)
WBC # BLD: 12.2 K/UL — HIGH (ref 3.8–10.5)
WBC # FLD AUTO: 12.2 K/UL — HIGH (ref 3.8–10.5)

## 2024-01-26 PROCEDURE — 99232 SBSQ HOSP IP/OBS MODERATE 35: CPT

## 2024-01-26 RX ADMIN — APIXABAN 5 MILLIGRAM(S): 2.5 TABLET, FILM COATED ORAL at 17:55

## 2024-01-26 RX ADMIN — GABAPENTIN 200 MILLIGRAM(S): 400 CAPSULE ORAL at 05:35

## 2024-01-26 RX ADMIN — Medication 3 MILLIGRAM(S): at 21:44

## 2024-01-26 RX ADMIN — Medication 15 MILLILITER(S): at 13:16

## 2024-01-26 RX ADMIN — SODIUM CHLORIDE 1 GRAM(S): 9 INJECTION INTRAMUSCULAR; INTRAVENOUS; SUBCUTANEOUS at 13:15

## 2024-01-26 RX ADMIN — SODIUM CHLORIDE 1 GRAM(S): 9 INJECTION INTRAMUSCULAR; INTRAVENOUS; SUBCUTANEOUS at 05:34

## 2024-01-26 RX ADMIN — Medication 30 MILLIGRAM(S): at 05:36

## 2024-01-26 RX ADMIN — ATORVASTATIN CALCIUM 80 MILLIGRAM(S): 80 TABLET, FILM COATED ORAL at 21:44

## 2024-01-26 RX ADMIN — CHLORHEXIDINE GLUCONATE 1 APPLICATION(S): 213 SOLUTION TOPICAL at 13:21

## 2024-01-26 RX ADMIN — Medication 20 MILLIGRAM(S): at 13:15

## 2024-01-26 RX ADMIN — SODIUM CHLORIDE 1 GRAM(S): 9 INJECTION INTRAMUSCULAR; INTRAVENOUS; SUBCUTANEOUS at 21:44

## 2024-01-26 RX ADMIN — APIXABAN 5 MILLIGRAM(S): 2.5 TABLET, FILM COATED ORAL at 05:36

## 2024-01-26 RX ADMIN — Medication 1 TABLET(S): at 05:37

## 2024-01-26 RX ADMIN — GABAPENTIN 200 MILLIGRAM(S): 400 CAPSULE ORAL at 21:44

## 2024-01-26 RX ADMIN — PANTOPRAZOLE SODIUM 40 MILLIGRAM(S): 20 TABLET, DELAYED RELEASE ORAL at 05:36

## 2024-01-26 RX ADMIN — GABAPENTIN 200 MILLIGRAM(S): 400 CAPSULE ORAL at 13:16

## 2024-01-26 RX ADMIN — Medication 200 MILLIGRAM(S): at 05:35

## 2024-01-26 RX ADMIN — Medication 200 MILLIGRAM(S): at 17:55

## 2024-01-26 NOTE — BH CONSULTATION LIAISON PROGRESS NOTE - NSBHFUPINTERVALHXFT_PSY_A_CORE
patient AOx3, calm on exam, was previously found yelling at mother, on exam patient apologizes to staff for yelling denies any ill will to staff here, states that his mother is "being a mom" and is very "headstrong" in her protection of him, states "it's just her and me out there". Mood is fair, patient [facility] [place] for [facility] and depression, no signs of confusion currently. Denies suicidality/intent or plan and denies homicidality/intent or plan. Denies AH/VH, delusions or paranoia. Future oriented.

## 2024-01-26 NOTE — BH CONSULTATION LIAISON PROGRESS NOTE - NSBHROSSYSTEMS_PSY_ALL_CORE
Psychiatric
Musculoskeletal.../Psychiatric
Psychiatric

## 2024-01-26 NOTE — PROGRESS NOTE ADULT - ASSESSMENT
This is a 27 y/o M w/ SLE on Hydroxychloroquine admitted to [facility] VS for b/l PE w/ superimposed PNA, transferred to [facility] on 12/22 for thoracic eval of b/l L > R effusions, L loculated, s/p chest. ID was consulted after transfer for persistent fevers despite Zosyn, c/b seizures and transfer to MICU, broadened to Vancomycin/Cefepime then, however infectious workup remained negative. All abx were held, pt was started on steroids per Rheumatology for SLE with improvement, initially on IV methylpred, transitioned to prednisone 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. Currently on Cellcept and prednisone 30 mg, plan per Rheum was to increase cellcept to eventually stop steroids, however pt refused, now pending psychiatry consult.   Pt noted to have a fever today, RVP + for COVID    #COVID, symptomatic   #Fever   #SLE     27 y/o M w/ SLE, class I mesangial lupus nephritis, long hospital course for fevers, pleural effusion, now improved on Cellcept and prednisone, found to have symptomatic COVID infection, not requiring oxygen.     Recommendations:   1. No further treatment for COVID   2, Prednisone per Rheum   3. C/w Bactrim for PJP ppx     Thank you for this consult. Inpatient ID consult team will sign off.    Further changes in lab values, imaging studies, or clinical status will not be known to ID inpatient consultants unless specifically communicated by primary team.    Logan Ku MD  Attending Physician  Division of Infectious Diseases  Department of Medicine    Please contact through MS Teams message.  Office: [phone] (after 5 PM or weekend)

## 2024-01-26 NOTE — BH CONSULTATION LIAISON PROGRESS NOTE - NSBHFUPINTERVALCCFT_PSY_A_CORE
" I need to be better for my family"
I'm okay I'm sorry about the tantrum with my mom, I know she's just being a mom"
" I feel so-so"
" I feel depressed"
" I feel so-so"
" I feel ok"
" I feel so-so"

## 2024-01-26 NOTE — BH CONSULTATION LIAISON PROGRESS NOTE - NSBHMSESPABN_PSY_A_CORE
Soft volume/Decreased productivity/Impaired articulation

## 2024-01-26 NOTE — BH CONSULTATION LIAISON PROGRESS NOTE - CURRENT MEDICATION
MEDICATIONS  (STANDING):  acetaminophen     Tablet .. 650 milliGRAM(s) Oral every 8 hours  chlorhexidine 2% Cloths 1 Application(s) Topical daily  ciprofloxacin  0.3% Ophthalmic Solution for Otic Use 2 Drop(s) Both Ears two times a day  gabapentin 200 milliGRAM(s) Oral two times a day  heparin  Infusion. 1300 Unit(s)/Hr (13 mL/Hr) IV Continuous <Continuous>  hydroxychloroquine 200 milliGRAM(s) Oral two times a day  lidocaine   4% Patch 1 Patch Transdermal every 24 hours  lidocaine   4% Patch 2 Patch Transdermal every 24 hours  melatonin 3 milliGRAM(s) Oral <User Schedule>  multivitamin/minerals/iron Oral Solution (CENTRUM) 15 milliLiter(s) Oral daily  pantoprazole    Tablet 40 milliGRAM(s) Oral before breakfast  polyethylene glycol 3350 17 Gram(s) Oral two times a day  predniSONE   Tablet 60 milliGRAM(s) Oral daily  senna 2 Tablet(s) Oral at bedtime  sodium chloride 1 Gram(s) Oral three times a day  sodium chloride 3%. 500 milliLiter(s) (30 mL/Hr) IV Continuous <Continuous>  trimethoprim  160 mG/sulfamethoxazole 800 mG 1 Tablet(s) Oral <User Schedule>    MEDICATIONS  (PRN):  albuterol/ipratropium for Nebulization 3 milliLiter(s) Nebulizer every 6 hours PRN Shortness of Breath and/or Wheezing  benzocaine/menthol Lozenge 1 Lozenge Oral four times a day PRN Sore Throat  FIRST- Mouthwash  BLM 15 milliLiter(s) Swish and Spit every 4 hours PRN Mouth Care  guaiFENesin Oral Liquid (Sugar-Free) 200 milliGRAM(s) Oral every 6 hours PRN Cough  heparin   Injectable 5500 Unit(s) IV Push every 6 hours PRN For aPTT less than 40  heparin   Injectable 2500 Unit(s) IV Push every 6 hours PRN For aPTT between 40 - 57  lidocaine 2% Viscous 5 milliLiter(s) Swish and Spit three times a day PRN Mouth Care  ondansetron Injectable 4 milliGRAM(s) IV Push every 8 hours PRN Nausea and/or Vomiting  oxyCODONE    IR 2.5 milliGRAM(s) Oral every 6 hours PRN Moderate Pain (4 - 6)  oxyCODONE    IR 5 milliGRAM(s) Oral every 6 hours PRN Severe Pain (7 - 10)  
MEDICATIONS  (STANDING):  cefepime   IVPB 2000 milliGRAM(s) IV Intermittent every 8 hours  chlorhexidine 2% Cloths 1 Application(s) Topical daily  ciprofloxacin  0.3% Ophthalmic Solution for Otic Use 2 Drop(s) Both Ears two times a day  heparin  Infusion. 1300 Unit(s)/Hr (13 mL/Hr) IV Continuous <Continuous>  hydroxychloroquine 200 milliGRAM(s) Oral two times a day  lidocaine   4% Patch 1 Patch Transdermal every 24 hours  melatonin 3 milliGRAM(s) Oral <User Schedule>  methylPREDNISolone sodium succinate Injectable 60 milliGRAM(s) IV Push daily  pantoprazole    Tablet 40 milliGRAM(s) Oral before breakfast  sodium chloride 1 Gram(s) Oral three times a day  sodium chloride 3%. 500 milliLiter(s) (30 mL/Hr) IV Continuous <Continuous>    MEDICATIONS  (PRN):  acetaminophen     Tablet .. 650 milliGRAM(s) Oral every 6 hours PRN Temp greater or equal to 38C (100.4F), Mild Pain (1 - 3)  albuterol/ipratropium for Nebulization 3 milliLiter(s) Nebulizer every 6 hours PRN Shortness of Breath and/or Wheezing  benzocaine/menthol Lozenge 1 Lozenge Oral four times a day PRN Sore Throat  FIRST- Mouthwash  BLM 15 milliLiter(s) Swish and Spit every 4 hours PRN Mouth Care  guaiFENesin Oral Liquid (Sugar-Free) 200 milliGRAM(s) Oral every 6 hours PRN Cough  heparin   Injectable 5500 Unit(s) IV Push every 6 hours PRN For aPTT less than 40  heparin   Injectable 2500 Unit(s) IV Push every 6 hours PRN For aPTT between 40 - 57  lidocaine 2% Viscous 5 milliLiter(s) Swish and Spit three times a day PRN Mouth Care  oxyCODONE    IR 5 milliGRAM(s) Oral every 6 hours PRN Severe Pain (7 - 10)  
MEDICATIONS  (STANDING):  acetaminophen     Tablet .. 650 milliGRAM(s) Oral every 8 hours  chlorhexidine 2% Cloths 1 Application(s) Topical daily  ciprofloxacin  0.3% Ophthalmic Solution for Otic Use 2 Drop(s) Both Ears two times a day  FLUoxetine 20 milliGRAM(s) Oral daily  gabapentin 200 milliGRAM(s) Oral three times a day  heparin  Infusion. 1300 Unit(s)/Hr (13 mL/Hr) IV Continuous <Continuous>  hydroxychloroquine 200 milliGRAM(s) Oral two times a day  lidocaine   4% Patch 1 Patch Transdermal every 24 hours  lidocaine   4% Patch 2 Patch Transdermal every 24 hours  melatonin 3 milliGRAM(s) Oral <User Schedule>  multivitamin/minerals/iron Oral Solution (CENTRUM) 15 milliLiter(s) Oral daily  pantoprazole    Tablet 40 milliGRAM(s) Oral before breakfast  polyethylene glycol 3350 17 Gram(s) Oral two times a day  predniSONE   Tablet 50 milliGRAM(s) Oral daily  senna 2 Tablet(s) Oral at bedtime  sodium chloride 1 Gram(s) Oral three times a day  sodium chloride 3%. 500 milliLiter(s) (30 mL/Hr) IV Continuous <Continuous>  trimethoprim  160 mG/sulfamethoxazole 800 mG 1 Tablet(s) Oral <User Schedule>    MEDICATIONS  (PRN):  albuterol/ipratropium for Nebulization 3 milliLiter(s) Nebulizer every 6 hours PRN Shortness of Breath and/or Wheezing  benzocaine/menthol Lozenge 1 Lozenge Oral four times a day PRN Sore Throat  FIRST- Mouthwash  BLM 15 milliLiter(s) Swish and Spit every 4 hours PRN Mouth Care  guaiFENesin Oral Liquid (Sugar-Free) 200 milliGRAM(s) Oral every 6 hours PRN Cough  heparin   Injectable 2500 Unit(s) IV Push every 6 hours PRN For aPTT between 40 - 57  heparin   Injectable 5500 Unit(s) IV Push every 6 hours PRN For aPTT less than 40  lidocaine 2% Viscous 5 milliLiter(s) Swish and Spit three times a day PRN Mouth Care  ondansetron Injectable 4 milliGRAM(s) IV Push every 8 hours PRN Nausea and/or Vomiting  oxyCODONE    IR 2.5 milliGRAM(s) Oral every 6 hours PRN Moderate Pain (4 - 6)  oxyCODONE    IR 5 milliGRAM(s) Oral every 6 hours PRN Severe Pain (7 - 10)  
MEDICATIONS  (STANDING):  chlorhexidine 2% Cloths 1 Application(s) Topical daily  ciprofloxacin  0.3% Ophthalmic Solution for Otic Use 2 Drop(s) Both Ears two times a day  heparin  Infusion. 1300 Unit(s)/Hr (13 mL/Hr) IV Continuous <Continuous>  hydroxychloroquine 200 milliGRAM(s) Oral two times a day  lidocaine   4% Patch 2 Patch Transdermal every 24 hours  lidocaine   4% Patch 1 Patch Transdermal every 24 hours  melatonin 3 milliGRAM(s) Oral <User Schedule>  multivitamin/minerals/iron Oral Solution (CENTRUM) 15 milliLiter(s) Oral daily  pantoprazole    Tablet 40 milliGRAM(s) Oral before breakfast  polyethylene glycol 3350 17 Gram(s) Oral two times a day  predniSONE   Tablet 60 milliGRAM(s) Oral daily  senna 2 Tablet(s) Oral at bedtime  sodium chloride 1 Gram(s) Oral three times a day  sodium chloride 3%. 500 milliLiter(s) (30 mL/Hr) IV Continuous <Continuous>    MEDICATIONS  (PRN):  acetaminophen     Tablet .. 650 milliGRAM(s) Oral every 6 hours PRN Temp greater or equal to 38C (100.4F), Mild Pain (1 - 3)  albuterol/ipratropium for Nebulization 3 milliLiter(s) Nebulizer every 6 hours PRN Shortness of Breath and/or Wheezing  benzocaine/menthol Lozenge 1 Lozenge Oral four times a day PRN Sore Throat  FIRST- Mouthwash  BLM 15 milliLiter(s) Swish and Spit every 4 hours PRN Mouth Care  guaiFENesin Oral Liquid (Sugar-Free) 200 milliGRAM(s) Oral every 6 hours PRN Cough  heparin   Injectable 5500 Unit(s) IV Push every 6 hours PRN For aPTT less than 40  heparin   Injectable 2500 Unit(s) IV Push every 6 hours PRN For aPTT between 40 - 57  lidocaine 2% Viscous 5 milliLiter(s) Swish and Spit three times a day PRN Mouth Care  ondansetron Injectable 4 milliGRAM(s) IV Push every 8 hours PRN Nausea and/or Vomiting  oxyCODONE    IR 5 milliGRAM(s) Oral every 6 hours PRN Severe Pain (7 - 10)  
MEDICATIONS  (STANDING):  apixaban 5 milliGRAM(s) Oral every 12 hours  chlorhexidine 2% Cloths 1 Application(s) Topical daily  ciprofloxacin  0.3% Ophthalmic Solution for Otic Use 2 Drop(s) Both Ears two times a day  FLUoxetine 20 milliGRAM(s) Oral daily  gabapentin 200 milliGRAM(s) Oral three times a day  hydroxychloroquine 200 milliGRAM(s) Oral two times a day  lidocaine   4% Patch 1 Patch Transdermal every 24 hours  lidocaine   4% Patch 2 Patch Transdermal every 24 hours  melatonin 3 milliGRAM(s) Oral <User Schedule>  multivitamin/minerals/iron Oral Solution (CENTRUM) 15 milliLiter(s) Oral daily  pantoprazole    Tablet 40 milliGRAM(s) Oral before breakfast  predniSONE   Tablet 40 milliGRAM(s) Oral daily  sodium chloride 1 Gram(s) Oral three times a day  sodium chloride 3%. 500 milliLiter(s) (30 mL/Hr) IV Continuous <Continuous>  trimethoprim  160 mG/sulfamethoxazole 800 mG 1 Tablet(s) Oral <User Schedule>    MEDICATIONS  (PRN):  albuterol/ipratropium for Nebulization 3 milliLiter(s) Nebulizer every 6 hours PRN Shortness of Breath and/or Wheezing  benzocaine/menthol Lozenge 1 Lozenge Oral four times a day PRN Sore Throat  FIRST- Mouthwash  BLM 15 milliLiter(s) Swish and Spit every 4 hours PRN Mouth Care  guaiFENesin Oral Liquid (Sugar-Free) 200 milliGRAM(s) Oral every 6 hours PRN Cough  lidocaine 2% Viscous 5 milliLiter(s) Swish and Spit three times a day PRN Mouth Care  ondansetron Injectable 4 milliGRAM(s) IV Push every 8 hours PRN Nausea and/or Vomiting  oxyCODONE    IR 2.5 milliGRAM(s) Oral every 8 hours PRN Moderate to severe pain (4-10)  
MEDICATIONS  (STANDING):  apixaban 5 milliGRAM(s) Oral every 12 hours  atorvastatin 80 milliGRAM(s) Oral at bedtime  chlorhexidine 2% Cloths 1 Application(s) Topical daily  ciprofloxacin  0.3% Ophthalmic Solution for Otic Use 2 Drop(s) Both Ears two times a day  FLUoxetine 20 milliGRAM(s) Oral daily  gabapentin 200 milliGRAM(s) Oral three times a day  hydroxychloroquine 200 milliGRAM(s) Oral two times a day  lidocaine   4% Patch 2 Patch Transdermal every 24 hours  lidocaine   4% Patch 1 Patch Transdermal every 24 hours  melatonin 3 milliGRAM(s) Oral <User Schedule>  multivitamin/minerals/iron Oral Solution (CENTRUM) 15 milliLiter(s) Oral daily  pantoprazole    Tablet 40 milliGRAM(s) Oral before breakfast  predniSONE   Tablet 30 milliGRAM(s) Oral daily  sodium chloride 1 Gram(s) Oral three times a day  trimethoprim  160 mG/sulfamethoxazole 800 mG 1 Tablet(s) Oral <User Schedule>    MEDICATIONS  (PRN):  acetaminophen     Tablet .. 650 milliGRAM(s) Oral every 6 hours PRN Temp greater or equal to 38C (100.4F), Mild Pain (1 - 3)  albuterol/ipratropium for Nebulization 3 milliLiter(s) Nebulizer every 6 hours PRN Shortness of Breath and/or Wheezing  benzocaine/menthol Lozenge 1 Lozenge Oral four times a day PRN Sore Throat  FIRST- Mouthwash  BLM 15 milliLiter(s) Swish and Spit every 4 hours PRN Mouth Care  guaiFENesin Oral Liquid (Sugar-Free) 200 milliGRAM(s) Oral every 6 hours PRN Cough  lidocaine 2% Viscous 5 milliLiter(s) Swish and Spit three times a day PRN Mouth Care  metoclopramide   Syrup 5 milliGRAM(s) Oral once PRN migraine  ondansetron Injectable 4 milliGRAM(s) IV Push every 8 hours PRN Nausea and/or Vomiting  oxyCODONE    IR 2.5 milliGRAM(s) Oral every 6 hours PRN Moderate Pain (4 - 6)  oxyCODONE    IR 5 milliGRAM(s) Oral every 6 hours PRN Severe Pain (7 - 10)  
MEDICATIONS  (STANDING):  apixaban 5 milliGRAM(s) Oral every 12 hours  atorvastatin 80 milliGRAM(s) Oral at bedtime  chlorhexidine 2% Cloths 1 Application(s) Topical daily  ciprofloxacin  0.3% Ophthalmic Solution for Otic Use 2 Drop(s) Both Ears two times a day  FLUoxetine 20 milliGRAM(s) Oral daily  gabapentin 200 milliGRAM(s) Oral three times a day  hydroxychloroquine 200 milliGRAM(s) Oral two times a day  lidocaine   4% Patch 2 Patch Transdermal every 24 hours  lidocaine   4% Patch 1 Patch Transdermal every 24 hours  melatonin 3 milliGRAM(s) Oral <User Schedule>  multivitamin/minerals/iron Oral Solution (CENTRUM) 15 milliLiter(s) Oral daily  mupirocin 2% Ointment 1 Application(s) Both Nostrils two times a day  pantoprazole    Tablet 40 milliGRAM(s) Oral before breakfast  predniSONE   Tablet 30 milliGRAM(s) Oral daily  remdesivir  IVPB   IV Intermittent   sodium chloride 1 Gram(s) Oral three times a day  trimethoprim  160 mG/sulfamethoxazole 800 mG 1 Tablet(s) Oral <User Schedule>    MEDICATIONS  (PRN):  acetaminophen     Tablet .. 650 milliGRAM(s) Oral every 6 hours PRN Temp greater or equal to 38C (100.4F), Mild Pain (1 - 3)  albuterol/ipratropium for Nebulization 3 milliLiter(s) Nebulizer every 6 hours PRN Shortness of Breath and/or Wheezing  benzocaine/menthol Lozenge 1 Lozenge Oral four times a day PRN Sore Throat  FIRST- Mouthwash  BLM 15 milliLiter(s) Swish and Spit every 4 hours PRN Mouth Care  guaiFENesin Oral Liquid (Sugar-Free) 200 milliGRAM(s) Oral every 6 hours PRN Cough  lidocaine 2% Viscous 5 milliLiter(s) Swish and Spit three times a day PRN Mouth Care  metoclopramide   Syrup 5 milliGRAM(s) Oral once PRN migraine  ondansetron Injectable 4 milliGRAM(s) IV Push every 8 hours PRN Nausea and/or Vomiting  oxyCODONE    IR 5 milliGRAM(s) Oral every 6 hours PRN Severe Pain (7 - 10)  oxyCODONE    IR 2.5 milliGRAM(s) Oral every 6 hours PRN Moderate Pain (4 - 6)  
MEDICATIONS  (STANDING):  chlorhexidine 2% Cloths 1 Application(s) Topical daily  ciprofloxacin  0.3% Ophthalmic Solution for Otic Use 2 Drop(s) Both Ears two times a day  FLUoxetine 20 milliGRAM(s) Oral daily  gabapentin 200 milliGRAM(s) Oral three times a day  heparin  Infusion. 1300 Unit(s)/Hr (13 mL/Hr) IV Continuous <Continuous>  hydroxychloroquine 200 milliGRAM(s) Oral two times a day  lidocaine   4% Patch 1 Patch Transdermal every 24 hours  lidocaine   4% Patch 2 Patch Transdermal every 24 hours  melatonin 3 milliGRAM(s) Oral <User Schedule>  multivitamin/minerals/iron Oral Solution (CENTRUM) 15 milliLiter(s) Oral daily  pantoprazole    Tablet 40 milliGRAM(s) Oral before breakfast  polyethylene glycol 3350 17 Gram(s) Oral two times a day  predniSONE   Tablet 50 milliGRAM(s) Oral daily  senna 2 Tablet(s) Oral at bedtime  sodium chloride 1 Gram(s) Oral three times a day  sodium chloride 3%. 500 milliLiter(s) (30 mL/Hr) IV Continuous <Continuous>  trimethoprim  160 mG/sulfamethoxazole 800 mG 1 Tablet(s) Oral <User Schedule>    MEDICATIONS  (PRN):  albuterol/ipratropium for Nebulization 3 milliLiter(s) Nebulizer every 6 hours PRN Shortness of Breath and/or Wheezing  benzocaine/menthol Lozenge 1 Lozenge Oral four times a day PRN Sore Throat  FIRST- Mouthwash  BLM 15 milliLiter(s) Swish and Spit every 4 hours PRN Mouth Care  guaiFENesin Oral Liquid (Sugar-Free) 200 milliGRAM(s) Oral every 6 hours PRN Cough  heparin   Injectable 5500 Unit(s) IV Push every 6 hours PRN For aPTT less than 40  heparin   Injectable 2500 Unit(s) IV Push every 6 hours PRN For aPTT between 40 - 57  lidocaine 2% Viscous 5 milliLiter(s) Swish and Spit three times a day PRN Mouth Care  ondansetron Injectable 4 milliGRAM(s) IV Push every 8 hours PRN Nausea and/or Vomiting  oxyCODONE    IR 2.5 milliGRAM(s) Oral every 8 hours PRN Moderate to severe pain (4-10)  
MEDICATIONS  (STANDING):  acetaminophen     Tablet .. 650 milliGRAM(s) Oral every 8 hours  chlorhexidine 2% Cloths 1 Application(s) Topical daily  ciprofloxacin  0.3% Ophthalmic Solution for Otic Use 2 Drop(s) Both Ears two times a day  gabapentin 200 milliGRAM(s) Oral two times a day  heparin  Infusion. 1300 Unit(s)/Hr (13 mL/Hr) IV Continuous <Continuous>  hydroxychloroquine 200 milliGRAM(s) Oral two times a day  lidocaine   4% Patch 1 Patch Transdermal every 24 hours  lidocaine   4% Patch 2 Patch Transdermal every 24 hours  melatonin 3 milliGRAM(s) Oral <User Schedule>  multivitamin/minerals/iron Oral Solution (CENTRUM) 15 milliLiter(s) Oral daily  pantoprazole    Tablet 40 milliGRAM(s) Oral before breakfast  polyethylene glycol 3350 17 Gram(s) Oral two times a day  senna 2 Tablet(s) Oral at bedtime  sodium chloride 1 Gram(s) Oral three times a day  sodium chloride 3%. 500 milliLiter(s) (30 mL/Hr) IV Continuous <Continuous>  trimethoprim  160 mG/sulfamethoxazole 800 mG 1 Tablet(s) Oral <User Schedule>    MEDICATIONS  (PRN):  albuterol/ipratropium for Nebulization 3 milliLiter(s) Nebulizer every 6 hours PRN Shortness of Breath and/or Wheezing  benzocaine/menthol Lozenge 1 Lozenge Oral four times a day PRN Sore Throat  FIRST- Mouthwash  BLM 15 milliLiter(s) Swish and Spit every 4 hours PRN Mouth Care  guaiFENesin Oral Liquid (Sugar-Free) 200 milliGRAM(s) Oral every 6 hours PRN Cough  heparin   Injectable 5500 Unit(s) IV Push every 6 hours PRN For aPTT less than 40  heparin   Injectable 2500 Unit(s) IV Push every 6 hours PRN For aPTT between 40 - 57  lidocaine 2% Viscous 5 milliLiter(s) Swish and Spit three times a day PRN Mouth Care  ondansetron Injectable 4 milliGRAM(s) IV Push every 8 hours PRN Nausea and/or Vomiting  oxyCODONE    IR 2.5 milliGRAM(s) Oral every 6 hours PRN Moderate Pain (4 - 6)  oxyCODONE    IR 5 milliGRAM(s) Oral every 6 hours PRN Severe Pain (7 - 10)

## 2024-01-26 NOTE — BH CONSULTATION LIAISON PROGRESS NOTE - NSBHMSETHTCONTENT_PSY_A_CORE
Preoccupations
Unremarkable
Unremarkable
Preoccupations
Unremarkable
Preoccupations

## 2024-01-26 NOTE — PROGRESS NOTE ADULT - NUTRITIONAL ASSESSMENT
This patient has been assessed with a concern for Malnutrition and has been determined to have a diagnosis/diagnoses of Severe protein-calorie malnutrition.    This patient is being managed with:   Diet Regular-  DASH/TLC {Sodium & Cholesterol Restricted} (DASH)  1000mL Fluid Restriction ([id number])  Supplement Feeding Modality:  Oral  Ensure Plus High Protein Cans or Servings Per Day:  1       Frequency:  Two Times a day  Entered: Jan 19 2024  7:30AM

## 2024-01-26 NOTE — BH CONSULTATION LIAISON PROGRESS NOTE - NSICDXBHPRIMARYDX_PSY_ALL_CORE
Chronic major depressive disorder   F32.9  

## 2024-01-26 NOTE — CHART NOTE - NSCHARTNOTEFT_GEN_A_CORE
Samantha CONTEH called for patient aggressively yelling at mother on the phone, slamming the table. Writer and ANM spoke with patient who was very apologetic saying his mother stressed him out and became frustrated however understands he cannot be yelling like that while in the hospital and states he will not do it again. Emotional support provided. Pt remains calm. No PRNs were needed. Explained to patient he is medically cleared for discharge today however after his fight with his mom he is going to see if a friend or any other family member will be able to pick him up instead of his mom either later today or tomorrow.

## 2024-01-26 NOTE — BH CONSULTATION LIAISON PROGRESS NOTE - NSBHATTESTTYPEVISIT_PSY_A_CORE
Attending Only
Attending with Resident/Fellow/Student
Resident/Fellow with telephonic supervision
Resident/Fellow with telephonic supervision
Attending with Resident/Fellow/Student
Resident/Fellow with telephonic supervision
Attending with Resident/Fellow/Student

## 2024-01-26 NOTE — BH CONSULTATION LIAISON PROGRESS NOTE - NSBHCONSULTFOLLOWAFTERCARE_PSY_A_CORE FT
[facility] outpatient follow up 
[facility]: [phone] or [phone] or [phone], ext. [phone] for psych care coordination    Alternative at [facility]  [address] [place] [address], [address] Floor, [place] [address]  [phone]  [email or web address]: [phone]   
[facility] outpatient follow up 

## 2024-01-26 NOTE — PROGRESS NOTE ADULT - ASSESSMENT
A/P     # Covid-19 :   immunocompromised  seen by ID   started on IV RDV x 3 days      # Pulm effusion/ Fever   S/P thoracentesis   -c/w eliquis   -completed abx   IR, S/P Renal biopsy , follow up results   S/P KARIE, negative     # Recurrent seizure   Neuro eval  now stable     # Hyponatremia/ Seizure   resolved     #PE   on eliquis   Heme onc eval   likley lupus related     #Hx of lupus  on hydroxychloroquine   Heme eval   Rheum eval   steroids per rheum       D/C planing   completed remdesevir

## 2024-01-26 NOTE — BH CONSULTATION LIAISON PROGRESS NOTE - NSBHPTASSESSDT_PSY_A_CORE
03-Jan-2024 14:01
23-Jan-2024 16:14
12-Jan-2024 13:31
02-Jan-2024 14:24
08-Jan-2024 11:51
26-Jan-2024 12:41
04-Jan-2024 15:17
29-Dec-2023 13:15
05-Jan-2024 12:50

## 2024-01-26 NOTE — BH CONSULTATION LIAISON PROGRESS NOTE - NSBHMSEMOOD_PSY_A_CORE
Depressed
Depressed/Anxious
Depressed
Depressed/Anxious
Depressed
Depressed/Anxious

## 2024-01-26 NOTE — BH CONSULTATION LIAISON PROGRESS NOTE - NSBHCHARTREVIEWVS_PSY_A_CORE FT
Vital Signs Last 24 Hrs  T(C): 36.4 (29 Dec 2023 05:15), Max: 36.6 (28 Dec 2023 20:00)  T(F): 97.5 (29 Dec 2023 05:15), Max: 97.8 (28 Dec 2023 20:00)  HR: 71 (29 Dec 2023 05:15) (69 - 87)  BP: 126/81 (29 Dec 2023 05:15) (126/81 - 150/82)  BP(mean): 94 (28 Dec 2023 22:00) (94 - 99)  RR: 18 (29 Dec 2023 05:15) (18 - 26)  SpO2: 97% (29 Dec 2023 05:15) (97% - 99%)    Parameters below as of 29 Dec 2023 05:15  Patient On (Oxygen Delivery Method): room air    
Vital Signs Last 24 Hrs  T(C): 37.2 (04 Jan 2024 05:08), Max: 37.2 (04 Jan 2024 05:08)  T(F): 99 (04 Jan 2024 05:08), Max: 99 (04 Jan 2024 05:08)  HR: 67 (04 Jan 2024 05:08) (67 - 73)  BP: 104/71 (04 Jan 2024 05:08) (104/71 - 116/66)  BP(mean): --  RR: 18 (04 Jan 2024 05:08) (18 - 18)  SpO2: 100% (04 Jan 2024 05:08) (100% - 100%)    Parameters below as of 04 Jan 2024 05:08  Patient On (Oxygen Delivery Method): room air    
Vital Signs Last 24 Hrs  T(C): 36.5 (02 Jan 2024 05:29), Max: 37.2 (01 Jan 2024 20:44)  T(F): 97.7 (02 Jan 2024 05:29), Max: 98.9 (01 Jan 2024 20:44)  HR: 60 (02 Jan 2024 05:29) (60 - 82)  BP: 115/82 (02 Jan 2024 05:29) (115/82 - 132/85)  BP(mean): --  RR: 18 (02 Jan 2024 05:29) (17 - 18)  SpO2: 100% (02 Jan 2024 05:29) (100% - 100%)    Parameters below as of 02 Jan 2024 05:29  Patient On (Oxygen Delivery Method): room air    
Vital Signs Last 24 Hrs  T(C): 36.7 (23 Jan 2024 12:30), Max: 39.2 (22 Jan 2024 18:05)  T(F): 98 (23 Jan 2024 12:30), Max: 102.5 (22 Jan 2024 18:05)  HR: 98 (23 Jan 2024 12:30) (84 - 98)  BP: 129/66 (23 Jan 2024 12:30) (104/60 - 130/88)  BP(mean): --  RR: 18 (23 Jan 2024 12:30) (18 - 18)  SpO2: 99% (23 Jan 2024 12:30) (99% - 100%)    Parameters below as of 23 Jan 2024 12:30  Patient On (Oxygen Delivery Method): room air    
Vital Signs Last 24 Hrs  T(C): 36.9 (08 Jan 2024 05:18), Max: 36.9 (07 Jan 2024 12:28)  T(F): 98.4 (08 Jan 2024 05:18), Max: 98.5 (07 Jan 2024 12:28)  HR: 70 (08 Jan 2024 05:18) (70 - 93)  BP: 114/75 (08 Jan 2024 05:18) (114/75 - 125/77)  BP(mean): --  RR: 18 (08 Jan 2024 05:18) (18 - 18)  SpO2: 100% (08 Jan 2024 05:18) (100% - 100%)    Parameters below as of 08 Jan 2024 05:18  Patient On (Oxygen Delivery Method): room air    
Vital Signs Last 24 Hrs  T(C): 36.2 (05 Jan 2024 05:09), Max: 36.8 (04 Jan 2024 22:05)  T(F): 97.2 (05 Jan 2024 05:09), Max: 98.3 (04 Jan 2024 22:05)  HR: 68 (05 Jan 2024 05:09) (68 - 81)  BP: 110/67 (05 Jan 2024 05:09) (110/67 - 117/78)  BP(mean): --  RR: 18 (05 Jan 2024 05:09) (17 - 18)  SpO2: 100% (05 Jan 2024 05:09) (98% - 100%)    Parameters below as of 05 Jan 2024 05:09  Patient On (Oxygen Delivery Method): room air    
Vital Signs Last 24 Hrs  T(C): 36.3 (26 Jan 2024 05:40), Max: 36.7 (25 Jan 2024 21:04)  T(F): 97.4 (26 Jan 2024 05:40), Max: 98 (25 Jan 2024 21:04)  HR: 75 (26 Jan 2024 05:40) (75 - 88)  BP: 125/88 (26 Jan 2024 05:40) (120/82 - 125/88)  BP(mean): --  RR: 20 (26 Jan 2024 05:40) (18 - 20)  SpO2: 100% (26 Jan 2024 05:40) (98% - 100%)    Parameters below as of 26 Jan 2024 05:40  Patient On (Oxygen Delivery Method): room air    
Vital Signs Last 24 Hrs  T(C): 36.8 (12 Jan 2024 12:23), Max: 37 (11 Jan 2024 21:40)  T(F): 98.2 (12 Jan 2024 12:23), Max: 98.6 (11 Jan 2024 21:40)  HR: 82 (12 Jan 2024 12:23) (71 - 82)  BP: 115/68 (12 Jan 2024 12:23) (112/84 - 131/74)  BP(mean): --  RR: 18 (12 Jan 2024 12:23) (17 - 18)  SpO2: 99% (12 Jan 2024 12:23) (98% - 100%)    Parameters below as of 12 Jan 2024 06:05  Patient On (Oxygen Delivery Method): room air    
Vital Signs Last 24 Hrs  T(C): 36.8 (03 Jan 2024 12:56), Max: 37.1 (02 Jan 2024 21:26)  T(F): 98.3 (03 Jan 2024 12:56), Max: 98.8 (02 Jan 2024 21:26)  HR: 80 (03 Jan 2024 12:56) (77 - 80)  BP: 117/68 (03 Jan 2024 12:56) (117/68 - 128/76)  BP(mean): --  RR: 18 (03 Jan 2024 12:56) (17 - 18)  SpO2: 100% (03 Jan 2024 12:56) (100% - 100%)    Parameters below as of 03 Jan 2024 12:56  Patient On (Oxygen Delivery Method): room air

## 2024-01-26 NOTE — PROGRESS NOTE ADULT - SUBJECTIVE AND OBJECTIVE BOX
Name of Patient : TAYLA MARIE  MRN: [id number]  Date of visit: 01-26-24 @ 14:11      Subjective: Patient seen and examined. No new events except as noted.   doing okay  D/C planing     REVIEW OF SYSTEMS:    CONSTITUTIONAL: No weakness, fevers or chills  EYES/ENT: No visual changes;  No vertigo or throat pain   NECK: No pain or stiffness  RESPIRATORY: No cough, wheezing, hemoptysis; No shortness of breath  CARDIOVASCULAR: No chest pain or palpitations  GASTROINTESTINAL: No abdominal or epigastric pain. No nausea, vomiting, or hematemesis; No diarrhea or constipation. No melena or hematochezia.  GENITOURINARY: No dysuria, frequency or hematuria  NEUROLOGICAL: No numbness or weakness  SKIN: No itching, burning, rashes, or lesions   All other review of systems is negative unless indicated above.    MEDICATIONS:  MEDICATIONS  (STANDING):  apixaban 5 milliGRAM(s) Oral every 12 hours  atorvastatin 80 milliGRAM(s) Oral at bedtime  chlorhexidine 2% Cloths 1 Application(s) Topical daily  ciprofloxacin  0.3% Ophthalmic Solution for Otic Use 2 Drop(s) Both Ears two times a day  FLUoxetine 20 milliGRAM(s) Oral daily  gabapentin 200 milliGRAM(s) Oral three times a day  hydroxychloroquine 200 milliGRAM(s) Oral two times a day  lidocaine   4% Patch 1 Patch Transdermal every 24 hours  lidocaine   4% Patch 2 Patch Transdermal every 24 hours  melatonin 3 milliGRAM(s) Oral <User Schedule>  multivitamin/minerals/iron Oral Solution (CENTRUM) 15 milliLiter(s) Oral daily  pantoprazole    Tablet 40 milliGRAM(s) Oral before breakfast  predniSONE   Tablet 30 milliGRAM(s) Oral daily  sodium chloride 1 Gram(s) Oral three times a day  trimethoprim  160 mG/sulfamethoxazole 800 mG 1 Tablet(s) Oral <User Schedule>      PHYSICAL EXAM:  T(C): 36.3 (01-26-24 @ 05:40), Max: 36.7 (01-25-24 @ 21:04)  HR: 75 (01-26-24 @ 05:40) (75 - 88)  BP: 125/88 (01-26-24 @ 05:40) (120/82 - 125/88)  RR: 20 (01-26-24 @ 05:40) (18 - 20)  SpO2: 100% (01-26-24 @ 05:40) (98% - 100%)  Wt(kg): --  I&O's Summary    25 Jan 2024 07:01  -  26 Jan 2024 07:00  --------------------------------------------------------  IN: 300 mL / OUT: 0 mL / NET: 300 mL          Appearance: Normal	  HEENT:  PERRLA   Lymphatic: No lymphadenopathy   Cardiovascular: Normal S1 S2, no JVD  Respiratory: normal effort , clear  Gastrointestinal:  Soft, Non-tender  Skin: No rashes,  warm to touch  Psychiatry:  Mood & affect appropriate  Musculuskeletal: No edema    recent labs, Imaging and EKGs personally reviewed     01-25-24 @ 07:01  -  01-26-24 @ 07:00  --------------------------------------------------------  IN: 300 mL / OUT: 0 mL / NET: 300 mL                          10.8   12.20 )-----------( 431      ( 26 Jan 2024 04:00 )             32.8               01-26    136  |  99  |  16  ----------------------------<  112<H>  4.1   |  23  |  0.73    Ca    9.9      26 Jan 2024 04:00  Phos  5.3     01-26  Mg     1.90     01-26    TPro  7.1  /  Alb  3.6  /  TBili  0.3  /  DBili  x   /  AST  59<H>  /  ALT  131<H>  /  AlkPhos  113  01-26                       Urinalysis Basic - ( 26 Jan 2024 04:00 )    Color: x / Appearance: x / SG: x / pH: x  Gluc: 112 mg/dL / Ketone: x  / Bili: x / Urobili: x   Blood: x / Protein: x / Nitrite: x   Leuk Esterase: x / RBC: x / WBC x   Sq Epi: x / Non Sq Epi: x / Bacteria: x

## 2024-01-26 NOTE — PROGRESS NOTE ADULT - SUBJECTIVE AND OBJECTIVE BOX
Infectious Diseases Follow Up:    Patient is a 28y old  Male who presents with a chief complaint of fever (04 Jan 2024 13:39)      Interval History/ROS:  No fevers, feeling okay, still w/ cough.    Allergies  No Known Allergies        ANTIMICROBIALS:  hydroxychloroquine 200 two times a day  trimethoprim  160 mG/sulfamethoxazole 800 mG 1 <User Schedule>      Current Abx:     Previous Abx     OTHER MEDS:  MEDICATIONS  (STANDING):  acetaminophen     Tablet .. 650 every 6 hours PRN  albuterol/ipratropium for Nebulization 3 every 6 hours PRN  apixaban 5 every 12 hours  atorvastatin 80 at bedtime  FLUoxetine 20 daily  gabapentin 200 three times a day  guaiFENesin Oral Liquid (Sugar-Free) 200 every 6 hours PRN  melatonin 3 <User Schedule>  metoclopramide   Syrup 5 once PRN  ondansetron Injectable 4 every 8 hours PRN  oxyCODONE    IR 5 every 6 hours PRN  oxyCODONE    IR 2.5 every 6 hours PRN  pantoprazole    Tablet 40 before breakfast  predniSONE   Tablet 30 daily      Vital Signs Last 24 Hrs  T(C): 36.3 (26 Jan 2024 05:40), Max: 36.7 (25 Jan 2024 21:04)  T(F): 97.4 (26 Jan 2024 05:40), Max: 98 (25 Jan 2024 21:04)  HR: 75 (26 Jan 2024 05:40) (75 - 93)  BP: 125/88 (26 Jan 2024 05:40) (120/82 - 125/88)  BP(mean): --  RR: 20 (26 Jan 2024 05:40) (18 - 20)  SpO2: 100% (26 Jan 2024 05:40) (98% - 100%)    Parameters below as of 26 Jan 2024 05:40  Patient On (Oxygen Delivery Method): room air        PHYSICAL EXAM:  GENERAL: NAD, well-developed  HEAD:  Atraumatic, Normocephalic  EYES: EOMI, PERRLA, conjunctiva and sclera clear  NECK: Supple, No JVD  CHEST/LUNG: Clear to auscultation bilaterally; No wheeze  HEART: Regular rate and rhythm; No murmurs, rubs, or gallops  ABDOMEN: Soft, Nontender, Nondistended; Bowel sounds present  EXTREMITIES:  2+ Peripheral Pulses, No clubbing, cyanosis, or edema  PSYCH: AAOx3                            10.8   12.20 )-----------( 431      ( 26 Jan 2024 04:00 )             32.8       01-26    136  |  99  |  16  ----------------------------<  112<H>  4.1   |  23  |  0.73    Ca    9.9      26 Jan 2024 04:00  Phos  5.3     01-26  Mg     1.90     01-26    TPro  7.1  /  Alb  3.6  /  TBili  0.3  /  DBili  x   /  AST  59<H>  /  ALT  131<H>  /  AlkPhos  113  01-26      Urinalysis Basic - ( 26 Jan 2024 04:00 )    Color: x / Appearance: x / SG: x / pH: x  Gluc: 112 mg/dL / Ketone: x  / Bili: x / Urobili: x   Blood: x / Protein: x / Nitrite: x   Leuk Esterase: x / RBC: x / WBC x   Sq Epi: x / Non Sq Epi: x / Bacteria: x        MICROBIOLOGY:  v  .Blood Blood-Venous  01-23-24   No growth at 48 Hours  --  --      .Blood Blood-Peripheral  12-27-23   No growth at 5 days  --  --      .Throat Throat  12-27-23   No Streptococcus pyogenes (Group A) isolated  --  --      .Sputum Sputum  12-27-23   Normal Respiratory Inge present  --    Few polymorphonuclear leukocytes per low power field  Few Squamous epithelial cells per low power field  No organisms seen per oil power field          Rapid RVP Result: Detected (01-23 @ 09:56)        RADIOLOGY:

## 2024-01-26 NOTE — BH CONSULTATION LIAISON PROGRESS NOTE - NSBHASSESSMENTFT_PSY_ALL_CORE
28 years old male with h/o Lupus ( diagnosed in 09/2023, on Plaquenil present to [place] ED on 12/12/23  with complain of chest pain and SOB admitted for fevers, PE, pleural effusions, course c/b high fever and tonic-clonic seizure, transferred to MICU for post-ictal and infectious monitoring. Psych consulted for depression. Pt had indicated a hx of depression/anxiety, had been in the  and [facility], and now is requesting to speak to someone. However he is rather medically active and compromised.   Reported being depressed for many years, no current safety concerns. C/o poor sleep due to pain  1/04: continues to report that he feels depressed, anxious, with poor sleep and appetite. Hx of trauma, difficulties to care for himself due to psychiatric condition.  1/5: Continues to report depressed mood, improving anxiety. Amenable to starting Prozac 20mg. Future-oriented, helping-seeking, no SIIP.  1/8: Patient c/o pain, depression, anxiety. Denied side effects from prozac.  1/12: P/o is improving, less anxiety, sleeps better, no safety concerns, no psychotic symptoms.  1/23: pt is improving, no safety concerns, no psychotic symptoms or signs of celestine. When confronted about not following teams recs states he wishes to follow rheum recs and taper steroids if that is the optimal strategy. Mother unable to articulate clear concerns other than possible delirium.   1/26: patient calm overall, some interpersonal discord with mother, no SI/HI or safety concerns, [facility] if desired resrouces as below, does not meet criterion for involuntary commitment to inpt psych     Plan:   - If concerned for med compliance would ensure that patient takes meds in front of nursing staff, defer to primary team regarding 1:1 for observation of compliance. No need for 1:1 from psychiatric safety perspective.   - Continue medical stabilization as you are  - Address pain (pain management)  - PRN for sleep: melatonin 3 mg qhs.  -Consult Holistic nurse to address his mood ( pt is amendable)  - c/w Prozac 20 mg to address depression and anxiety (pt. scheduled for first dose today- 1/5/24)  - c/ww Gabapentin from 200 mg BID to 200 mg TID to address anxiety.  - Dispo: no role for involuntary inpt psych, no c/i to discharge to safe dispo     Will continue to follow while here.

## 2024-01-26 NOTE — BH CONSULTATION LIAISON PROGRESS NOTE - GENERAL APPEARANCE
Deformities present

## 2024-01-27 RX ADMIN — Medication 650 MILLIGRAM(S): at 17:15

## 2024-01-27 RX ADMIN — CHLORHEXIDINE GLUCONATE 1 APPLICATION(S): 213 SOLUTION TOPICAL at 13:17

## 2024-01-27 RX ADMIN — SODIUM CHLORIDE 1 GRAM(S): 9 INJECTION INTRAMUSCULAR; INTRAVENOUS; SUBCUTANEOUS at 05:32

## 2024-01-27 RX ADMIN — APIXABAN 5 MILLIGRAM(S): 2.5 TABLET, FILM COATED ORAL at 17:16

## 2024-01-27 RX ADMIN — OXYCODONE HYDROCHLORIDE 5 MILLIGRAM(S): 5 TABLET ORAL at 21:13

## 2024-01-27 RX ADMIN — Medication 15 MILLILITER(S): at 13:17

## 2024-01-27 RX ADMIN — APIXABAN 5 MILLIGRAM(S): 2.5 TABLET, FILM COATED ORAL at 05:32

## 2024-01-27 RX ADMIN — SODIUM CHLORIDE 1 GRAM(S): 9 INJECTION INTRAMUSCULAR; INTRAVENOUS; SUBCUTANEOUS at 13:17

## 2024-01-27 RX ADMIN — Medication 650 MILLIGRAM(S): at 07:23

## 2024-01-27 RX ADMIN — SODIUM CHLORIDE 1 GRAM(S): 9 INJECTION INTRAMUSCULAR; INTRAVENOUS; SUBCUTANEOUS at 21:08

## 2024-01-27 RX ADMIN — Medication 650 MILLIGRAM(S): at 08:47

## 2024-01-27 RX ADMIN — GABAPENTIN 200 MILLIGRAM(S): 400 CAPSULE ORAL at 13:18

## 2024-01-27 RX ADMIN — ATORVASTATIN CALCIUM 80 MILLIGRAM(S): 80 TABLET, FILM COATED ORAL at 21:08

## 2024-01-27 RX ADMIN — OXYCODONE HYDROCHLORIDE 5 MILLIGRAM(S): 5 TABLET ORAL at 21:38

## 2024-01-27 RX ADMIN — Medication 20 MILLIGRAM(S): at 13:18

## 2024-01-27 RX ADMIN — Medication 200 MILLIGRAM(S): at 17:15

## 2024-01-27 RX ADMIN — PANTOPRAZOLE SODIUM 40 MILLIGRAM(S): 20 TABLET, DELAYED RELEASE ORAL at 05:31

## 2024-01-27 RX ADMIN — Medication 3 MILLIGRAM(S): at 21:07

## 2024-01-27 RX ADMIN — GABAPENTIN 200 MILLIGRAM(S): 400 CAPSULE ORAL at 05:31

## 2024-01-27 RX ADMIN — Medication 200 MILLIGRAM(S): at 05:33

## 2024-01-27 RX ADMIN — Medication 30 MILLIGRAM(S): at 05:32

## 2024-01-27 RX ADMIN — GABAPENTIN 200 MILLIGRAM(S): 400 CAPSULE ORAL at 21:07

## 2024-01-27 RX ADMIN — Medication 200 MILLIGRAM(S): at 17:47

## 2024-01-27 NOTE — PROGRESS NOTE ADULT - NUTRITIONAL ASSESSMENT
This patient has been assessed with a concern for Malnutrition and has been determined to have a diagnosis/diagnoses of Severe protein-calorie malnutrition.    This patient is being managed with:   Diet Regular-  DASH/TLC {Sodium & Cholesterol Restricted} (DASH)  1000mL Fluid Restriction ([id number])  Supplement Feeding Modality:  Oral  Ensure Plus High Protein Cans or Servings Per Day:  1       Frequency:  Two Times a day  Entered: Jan 19 2024  7:30AM

## 2024-01-27 NOTE — PROGRESS NOTE ADULT - SUBJECTIVE AND OBJECTIVE BOX
Name of Patient : TAYLA MARIE  MRN: [id number]  Date of visit: 01-27-24       Subjective: Patient seen and examined. No new events except as noted.     REVIEW OF SYSTEMS:    CONSTITUTIONAL: No weakness, fevers or chills  EYES/ENT: No visual changes;  No vertigo or throat pain   NECK: No pain or stiffness  RESPIRATORY: No cough, wheezing, hemoptysis; No shortness of breath  CARDIOVASCULAR: No chest pain or palpitations  GASTROINTESTINAL: No abdominal or epigastric pain. No nausea, vomiting, or hematemesis; No diarrhea or constipation. No melena or hematochezia.  GENITOURINARY: No dysuria, frequency or hematuria  NEUROLOGICAL: No numbness or weakness  SKIN: No itching, burning, rashes, or lesions   All other review of systems is negative unless indicated above.    MEDICATIONS:  MEDICATIONS  (STANDING):  apixaban 5 milliGRAM(s) Oral every 12 hours  atorvastatin 80 milliGRAM(s) Oral at bedtime  chlorhexidine 2% Cloths 1 Application(s) Topical daily  FLUoxetine 20 milliGRAM(s) Oral daily  gabapentin 200 milliGRAM(s) Oral three times a day  hydroxychloroquine 200 milliGRAM(s) Oral two times a day  lidocaine   4% Patch 1 Patch Transdermal every 24 hours  lidocaine   4% Patch 2 Patch Transdermal every 24 hours  melatonin 3 milliGRAM(s) Oral <User Schedule>  multivitamin/minerals/iron Oral Solution (CENTRUM) 15 milliLiter(s) Oral daily  pantoprazole    Tablet 40 milliGRAM(s) Oral before breakfast  predniSONE   Tablet 30 milliGRAM(s) Oral daily  sodium chloride 1 Gram(s) Oral three times a day  trimethoprim  160 mG/sulfamethoxazole 800 mG 1 Tablet(s) Oral <User Schedule>      PHYSICAL EXAM:  T(C): 36.4 (01-27-24 @ 21:57), Max: 36.5 (01-27-24 @ 05:36)  HR: 78 (01-27-24 @ 21:57) (78 - 94)  BP: 124/76 (01-27-24 @ 21:57) (124/76 - 136/88)  RR: 18 (01-27-24 @ 21:57) (18 - 19)  SpO2: 99% (01-27-24 @ 21:57) (99% - 100%)  Wt(kg): --  I&O's Summary    Appearance: Normal	  HEENT:  PERRLA   Lymphatic: No lymphadenopathy   Cardiovascular: Normal S1 S2, no JVD  Respiratory: normal effort , clear  Gastrointestinal:  Soft, Non-tender  Skin: No rashes,  warm to touch  Psychiatry:  Mood & affect appropriate  Musculuskeletal: No edema    recent labs, Imaging and EKGs personally reviewed                           10.8   12.20 )-----------( 431      ( 26 Jan 2024 04:00 )             32.8               01-26    136  |  99  |  16  ----------------------------<  112<H>  4.1   |  23  |  0.73    Ca    9.9      26 Jan 2024 04:00  Phos  5.3     01-26  Mg     1.90     01-26    TPro  7.1  /  Alb  3.6  /  TBili  0.3  /  DBili  x   /  AST  59<H>  /  ALT  131<H>  /  AlkPhos  113  01-26                       Urinalysis Basic - ( 26 Jan 2024 04:00 )    Color: x / Appearance: x / SG: x / pH: x  Gluc: 112 mg/dL / Ketone: x  / Bili: x / Urobili: x   Blood: x / Protein: x / Nitrite: x   Leuk Esterase: x / RBC: x / WBC x   Sq Epi: x / Non Sq Epi: x / Bacteria: x

## 2024-01-28 LAB
CULTURE RESULTS: SIGNIFICANT CHANGE UP
MRSA PCR RESULT.: SIGNIFICANT CHANGE UP
S AUREUS DNA NOSE QL NAA+PROBE: SIGNIFICANT CHANGE UP
SARS-COV-2 RNA SPEC QL NAA+PROBE: DETECTED
SPECIMEN SOURCE: SIGNIFICANT CHANGE UP

## 2024-01-28 PROCEDURE — 71045 X-RAY EXAM CHEST 1 VIEW: CPT | Mod: 26

## 2024-01-28 RX ADMIN — GABAPENTIN 200 MILLIGRAM(S): 400 CAPSULE ORAL at 05:48

## 2024-01-28 RX ADMIN — Medication 15 MILLILITER(S): at 12:20

## 2024-01-28 RX ADMIN — Medication 3 MILLIGRAM(S): at 22:56

## 2024-01-28 RX ADMIN — SODIUM CHLORIDE 1 GRAM(S): 9 INJECTION INTRAMUSCULAR; INTRAVENOUS; SUBCUTANEOUS at 22:57

## 2024-01-28 RX ADMIN — Medication 650 MILLIGRAM(S): at 23:30

## 2024-01-28 RX ADMIN — SODIUM CHLORIDE 1 GRAM(S): 9 INJECTION INTRAMUSCULAR; INTRAVENOUS; SUBCUTANEOUS at 05:35

## 2024-01-28 RX ADMIN — Medication 200 MILLIGRAM(S): at 16:46

## 2024-01-28 RX ADMIN — CHLORHEXIDINE GLUCONATE 1 APPLICATION(S): 213 SOLUTION TOPICAL at 12:27

## 2024-01-28 RX ADMIN — APIXABAN 5 MILLIGRAM(S): 2.5 TABLET, FILM COATED ORAL at 05:35

## 2024-01-28 RX ADMIN — APIXABAN 5 MILLIGRAM(S): 2.5 TABLET, FILM COATED ORAL at 16:46

## 2024-01-28 RX ADMIN — PANTOPRAZOLE SODIUM 40 MILLIGRAM(S): 20 TABLET, DELAYED RELEASE ORAL at 05:35

## 2024-01-28 RX ADMIN — ATORVASTATIN CALCIUM 80 MILLIGRAM(S): 80 TABLET, FILM COATED ORAL at 22:57

## 2024-01-28 RX ADMIN — Medication 200 MILLIGRAM(S): at 05:48

## 2024-01-28 RX ADMIN — GABAPENTIN 200 MILLIGRAM(S): 400 CAPSULE ORAL at 22:57

## 2024-01-28 RX ADMIN — SODIUM CHLORIDE 1 GRAM(S): 9 INJECTION INTRAMUSCULAR; INTRAVENOUS; SUBCUTANEOUS at 12:21

## 2024-01-28 RX ADMIN — Medication 30 MILLIGRAM(S): at 05:36

## 2024-01-28 RX ADMIN — Medication 650 MILLIGRAM(S): at 22:55

## 2024-01-28 RX ADMIN — Medication 20 MILLIGRAM(S): at 12:20

## 2024-01-28 RX ADMIN — GABAPENTIN 200 MILLIGRAM(S): 400 CAPSULE ORAL at 12:21

## 2024-01-28 NOTE — PROGRESS NOTE ADULT - ASSESSMENT
A/P     # Covid-19 :   immunocompromised  seen by ID   completed IV RDV x 3 days      # Pulm effusion/ Fever   S/P thoracentesis   -c/w eliquis   -completed abx   IR, S/P Renal biopsy , follow up results   S/P KARIE, negative     # Recurrent seizure   Neuro eval  now stable     # Hyponatremia/ Seizure   resolved     #PE   on eliquis   Heme onc eval   likley lupus related     #Hx of lupus  on hydroxychloroquine   Heme eval   Rheum eval   steroids per rheum       D/C planing   completed remdesevir

## 2024-01-28 NOTE — CHART NOTE - NSCHARTNOTEFT_GEN_A_CORE
1/28 Night Coverage Med ACP      Notified by Rn pt c/o SOB on RA.  Pt seen and assessed at bedside spo2 100% on RA, pt laying in bed in no apparent acute distress breathing spont non-labored equal b/l c/o SOB.  Pt reports hes been having SOB since he's been here and the severity hasn't changed accompanied by epigastric pain as per pt from the clot.  Pt requesting pain medications. CXR ordered.        ICU Vital Signs Last 24 Hrs  T(C): 36.3 (28 Jan 2024 17:00), Max: 36.7 (28 Jan 2024 05:30)  T(F): 97.3 (28 Jan 2024 17:00), Max: 98 (28 Jan 2024 05:30)  HR: 91 (28 Jan 2024 17:00) (78 - 108)  BP: 120/86 (28 Jan 2024 17:00) (120/86 - 134/94)  RR: 18 (28 Jan 2024 17:00) (18 - 19)  SpO2: 100% (28 Jan 2024 17:00) (99% - 100%)    O2 Parameters below as of 28 Jan 2024 17:00  Patient On (Oxygen Delivery Method): room air    PHYSICAL EXAM:  GENERAL: No acute distress, well-developed  HEAD:  Atraumatic, Normocephalic  NECK: Supple, no lymphadenopathy, no JVD  CHEST/LUNG: CTAB; No wheezes, rales, or rhonchi  HEART: Regular rate and rhythm; No murmurs, rubs, or gallops  ABDOMEN: Soft, non-tender, non-distended; normal bowel sounds, no organomegaly  EXTREMITIES:  2+ peripheral pulses b/l, No clubbing, cyanosis, or edema  NEUROLOGY: A&O x 3, no focal deficits      Madison ANDERSON-C 1/28 Night Coverage Med ACP      Notified by Rn pt c/o SOB on RA.  Pt seen and assessed at bedside spo2 100% on RA, pt laying in bed in no apparent acute distress breathing spont non-labored equal b/l c/o SOB.  Pt reports hes been having SOB since he's been here and the severity hasn't changed.  Pt requesting pain medications. CXR ordered.        ICU Vital Signs Last 24 Hrs  T(C): 36.3 (28 Jan 2024 17:00), Max: 36.7 (28 Jan 2024 05:30)  T(F): 97.3 (28 Jan 2024 17:00), Max: 98 (28 Jan 2024 05:30)  HR: 91 (28 Jan 2024 17:00) (78 - 108)  BP: 120/86 (28 Jan 2024 17:00) (120/86 - 134/94)  RR: 18 (28 Jan 2024 17:00) (18 - 19)  SpO2: 100% (28 Jan 2024 17:00) (99% - 100%)    O2 Parameters below as of 28 Jan 2024 17:00  Patient On (Oxygen Delivery Method): room air    PHYSICAL EXAM:  GENERAL: No acute distress, well-developed  HEAD:  Atraumatic, Normocephalic  NECK: Supple, no lymphadenopathy, no JVD  CHEST/LUNG: CTAB; No wheezes, rales, or rhonchi  HEART: Regular rate and rhythm; No murmurs, rubs, or gallops  ABDOMEN: Soft, non-tender, non-distended; normal bowel sounds, no organomegaly  EXTREMITIES:  2+ peripheral pulses b/l, No clubbing, cyanosis, or edema  NEUROLOGY: A&O x 3, no focal deficits      Madison ANDERSON-C

## 2024-01-28 NOTE — PROGRESS NOTE ADULT - SUBJECTIVE AND OBJECTIVE BOX
Name of Patient : TAYLA MARIE  MRN: [id number]  Date of visit: 01-28-24 @ 15:03      Subjective: Patient seen and examined. No new events except as noted.     REVIEW OF SYSTEMS:    CONSTITUTIONAL: No weakness, fevers or chills  EYES/ENT: No visual changes;  No vertigo or throat pain   NECK: No pain or stiffness  RESPIRATORY: No cough, wheezing, hemoptysis; No shortness of breath  CARDIOVASCULAR: No chest pain or palpitations  GASTROINTESTINAL: No abdominal or epigastric pain. No nausea, vomiting, or hematemesis; No diarrhea or constipation. No melena or hematochezia.  GENITOURINARY: No dysuria, frequency or hematuria  NEUROLOGICAL: No numbness or weakness  SKIN: No itching, burning, rashes, or lesions   All other review of systems is negative unless indicated above.    MEDICATIONS:  MEDICATIONS  (STANDING):  apixaban 5 milliGRAM(s) Oral every 12 hours  atorvastatin 80 milliGRAM(s) Oral at bedtime  chlorhexidine 2% Cloths 1 Application(s) Topical daily  FLUoxetine 20 milliGRAM(s) Oral daily  gabapentin 200 milliGRAM(s) Oral three times a day  hydroxychloroquine 200 milliGRAM(s) Oral two times a day  lidocaine   4% Patch 2 Patch Transdermal every 24 hours  lidocaine   4% Patch 1 Patch Transdermal every 24 hours  melatonin 3 milliGRAM(s) Oral <User Schedule>  multivitamin/minerals/iron Oral Solution (CENTRUM) 15 milliLiter(s) Oral daily  pantoprazole    Tablet 40 milliGRAM(s) Oral before breakfast  predniSONE   Tablet 30 milliGRAM(s) Oral daily  sodium chloride 1 Gram(s) Oral three times a day  trimethoprim  160 mG/sulfamethoxazole 800 mG 1 Tablet(s) Oral <User Schedule>      PHYSICAL EXAM:  T(C): 36.4 (01-28-24 @ 12:30), Max: 36.7 (01-28-24 @ 05:30)  HR: 108 (01-28-24 @ 12:30) (78 - 108)  BP: 134/94 (01-28-24 @ 12:30) (124/76 - 134/94)  RR: 18 (01-28-24 @ 12:30) (18 - 19)  SpO2: 100% (01-28-24 @ 12:30) (99% - 100%)  Wt(kg): --  I&O's Summary        Appearance: Normal	  HEENT:  PERRLA   Lymphatic: No lymphadenopathy   Cardiovascular: Normal S1 S2, no JVD  Respiratory: normal effort , clear  Gastrointestinal:  Soft, Non-tender  Skin: No rashes,  warm to touch  Psychiatry:  Mood & affect appropriate  Musculuskeletal: No edema    recent labs, Imaging and EKGs personally reviewed

## 2024-01-28 NOTE — PROGRESS NOTE ADULT - NUTRITIONAL ASSESSMENT
This patient has been assessed with a concern for Malnutrition and has been determined to have a diagnosis/diagnoses of Severe protein-calorie malnutrition.    This patient is being managed with:   Diet Regular-  DASH/TLC {Sodium & Cholesterol Restricted} (DASH)  1000mL Fluid Restriction ([id number])  Supplement Feeding Modality:  Oral  Ensure Plus High Protein Cans or Servings Per Day:  1       Frequency:  Two Times a day  Entered: Jan 19 2024  7:30AM

## 2024-01-29 VITALS
HEART RATE: 74 BPM | TEMPERATURE: 98 F | DIASTOLIC BLOOD PRESSURE: 89 MMHG | RESPIRATION RATE: 18 BRPM | SYSTOLIC BLOOD PRESSURE: 125 MMHG | OXYGEN SATURATION: 100 %

## 2024-01-29 RX ADMIN — Medication 200 MILLIGRAM(S): at 06:30

## 2024-01-29 RX ADMIN — GABAPENTIN 200 MILLIGRAM(S): 400 CAPSULE ORAL at 06:29

## 2024-01-29 RX ADMIN — Medication 1 TABLET(S): at 06:29

## 2024-01-29 RX ADMIN — Medication 20 MILLIGRAM(S): at 12:10

## 2024-01-29 RX ADMIN — APIXABAN 5 MILLIGRAM(S): 2.5 TABLET, FILM COATED ORAL at 06:29

## 2024-01-29 RX ADMIN — PANTOPRAZOLE SODIUM 40 MILLIGRAM(S): 20 TABLET, DELAYED RELEASE ORAL at 06:30

## 2024-01-29 RX ADMIN — CHLORHEXIDINE GLUCONATE 1 APPLICATION(S): 213 SOLUTION TOPICAL at 12:09

## 2024-01-29 RX ADMIN — LIDOCAINE 2 PATCH: 4 CREAM TOPICAL at 12:09

## 2024-01-29 RX ADMIN — SODIUM CHLORIDE 1 GRAM(S): 9 INJECTION INTRAMUSCULAR; INTRAVENOUS; SUBCUTANEOUS at 06:30

## 2024-01-29 RX ADMIN — Medication 30 MILLIGRAM(S): at 06:30

## 2024-01-29 NOTE — PROGRESS NOTE ADULT - SUBJECTIVE AND OBJECTIVE BOX
Name of Patient : TAYLA MARIE  MRN: [id number]  Date of visit: 01-29-24 @ 15:57      Subjective: Patient seen and examined. No new events except as noted.     REVIEW OF SYSTEMS:    CONSTITUTIONAL: No weakness, fevers or chills  EYES/ENT: No visual changes;  No vertigo or throat pain   NECK: No pain or stiffness  RESPIRATORY: No cough, wheezing, hemoptysis; No shortness of breath  CARDIOVASCULAR: No chest pain or palpitations  GASTROINTESTINAL: No abdominal or epigastric pain. No nausea, vomiting, or hematemesis; No diarrhea or constipation. No melena or hematochezia.  GENITOURINARY: No dysuria, frequency or hematuria  NEUROLOGICAL: No numbness or weakness  SKIN: No itching, burning, rashes, or lesions   All other review of systems is negative unless indicated above.    MEDICATIONS:  MEDICATIONS  (STANDING):  apixaban 5 milliGRAM(s) Oral every 12 hours  atorvastatin 80 milliGRAM(s) Oral at bedtime  chlorhexidine 2% Cloths 1 Application(s) Topical daily  FLUoxetine 20 milliGRAM(s) Oral daily  gabapentin 200 milliGRAM(s) Oral three times a day  hydroxychloroquine 200 milliGRAM(s) Oral two times a day  lidocaine   4% Patch 1 Patch Transdermal every 24 hours  lidocaine   4% Patch 2 Patch Transdermal every 24 hours  melatonin 3 milliGRAM(s) Oral <User Schedule>  multivitamin/minerals/iron Oral Solution (CENTRUM) 15 milliLiter(s) Oral daily  pantoprazole    Tablet 40 milliGRAM(s) Oral before breakfast  predniSONE   Tablet 30 milliGRAM(s) Oral daily  sodium chloride 1 Gram(s) Oral three times a day  trimethoprim  160 mG/sulfamethoxazole 800 mG 1 Tablet(s) Oral <User Schedule>      PHYSICAL EXAM:  T(C): 36.4 (01-29-24 @ 06:28), Max: 36.4 (01-28-24 @ 21:54)  HR: 74 (01-29-24 @ 06:28) (74 - 91)  BP: 125/89 (01-29-24 @ 06:28) (110/71 - 125/89)  RR: 18 (01-29-24 @ 06:28) (18 - 18)  SpO2: 100% (01-29-24 @ 06:28) (100% - 100%)  Wt(kg): --  I&O's Summary        Appearance: Normal	  HEENT:  PERRLA   Lymphatic: No lymphadenopathy   Cardiovascular: Normal S1 S2, no JVD  Respiratory: normal effort , clear  Gastrointestinal:  Soft, Non-tender  Skin: No rashes,  warm to touch  Psychiatry:  Mood & affect appropriate  Musculuskeletal: No edema    recent labs, Imaging and EKGs personally reviewed

## 2024-01-29 NOTE — PROGRESS NOTE ADULT - NUTRITIONAL ASSESSMENT
This patient has been assessed with a concern for Malnutrition and has been determined to have a diagnosis/diagnoses of Severe protein-calorie malnutrition.    This patient is being managed with:   Diet Regular-  DASH/TLC {Sodium & Cholesterol Restricted} (DASH)  1000mL Fluid Restriction ([id number])  Supplement Feeding Modality:  Oral  Ensure Plus High Protein Cans or Servings Per Day:  1       Frequency:  Two Times a day  Entered: Jan 19 2024  7:30AM

## 2024-01-29 NOTE — PROGRESS NOTE ADULT - PROVIDER SPECIALTY LIST ADULT
Anesthesia
Cardiology
Heme/Onc
Heme/Onc
Infectious Disease
Infectious Disease
Internal Medicine
MICU
Nephrology
Neurology
Neurology
Pulmonology
Pulmonology
Rehab Medicine
Rheumatology
CT Surgery
Cardiology
Cardiology
Infectious Disease
Internal Medicine
Rheumatology
Rheumatology
Thoracic Surgery
Cardiology
Infectious Disease
Internal Medicine
MICU
Nephrology
Neurology
Pulmonology
Pulmonology
Rheumatology
Rheumatology
Thoracic Surgery
Cardiology
Cardiology
Infectious Disease
Internal Medicine
Neurology
Rheumatology
Rheumatology
Cardiology
Infectious Disease
Rheumatology
Rheumatology
Internal Medicine
MICU
Pulmonology
Rheumatology

## 2024-01-31 NOTE — PROCEDURE NOTE - NSPATIENTPOSTION_GEN_A_CORE
"Message from the patient:    \"Hi. Just wanting to know if Nidhi Trevino is in favor of the [employer] screenings. I received a paper for them now. I had one done probably 12 years ago or so, and I'm interested in possibly doing it again. I just wanna know if she thinks it's worth the time and money, just let me know. Please, thank you\"  "
Spoke with pt, advised her Nidhi does recommend this to monitor health  
sitting

## 2024-02-07 ENCOUNTER — RESULT REVIEW (OUTPATIENT)
Age: 29
End: 2024-02-07

## 2024-02-07 ENCOUNTER — APPOINTMENT (OUTPATIENT)
Dept: HEMATOLOGY ONCOLOGY | Facility: CLINIC | Age: 29
End: 2024-02-07
Payer: COMMERCIAL

## 2024-02-07 ENCOUNTER — OUTPATIENT (OUTPATIENT)
Dept: OUTPATIENT SERVICES | Facility: HOSPITAL | Age: 29
LOS: 1 days | Discharge: HOME | End: 2024-02-07

## 2024-02-07 VITALS
RESPIRATION RATE: 16 BRPM | OXYGEN SATURATION: 98 % | BODY MASS INDEX: 24.35 KG/M2 | HEART RATE: 100 BPM | TEMPERATURE: 98.8 F | HEIGHT: 64.76 IN | SYSTOLIC BLOOD PRESSURE: 134 MMHG | DIASTOLIC BLOOD PRESSURE: 93 MMHG | WEIGHT: 144.4 LBS

## 2024-02-07 DIAGNOSIS — M32.9 SYSTEMIC LUPUS ERYTHEMATOSUS, UNSPECIFIED: ICD-10-CM

## 2024-02-07 DIAGNOSIS — D68.59 OTHER PRIMARY THROMBOPHILIA: ICD-10-CM

## 2024-02-07 DIAGNOSIS — E78.5 HYPERLIPIDEMIA, UNSPECIFIED: ICD-10-CM

## 2024-02-07 DIAGNOSIS — Z78.9 OTHER SPECIFIED HEALTH STATUS: ICD-10-CM

## 2024-02-07 DIAGNOSIS — E87.1 HYPO-OSMOLALITY AND HYPONATREMIA: ICD-10-CM

## 2024-02-07 DIAGNOSIS — K21.9 GASTRO-ESOPHAGEAL REFLUX DISEASE W/OUT ESOPHAGITIS: ICD-10-CM

## 2024-02-07 DIAGNOSIS — Z60.2 PROBLEMS RELATED TO LIVING ALONE: ICD-10-CM

## 2024-02-07 DIAGNOSIS — F32.A DEPRESSION, UNSPECIFIED: ICD-10-CM

## 2024-02-07 LAB
BASOPHILS # BLD AUTO: 0.08 K/UL — SIGNIFICANT CHANGE UP (ref 0–0.2)
BASOPHILS NFR BLD AUTO: 0.6 % — SIGNIFICANT CHANGE UP (ref 0–2)
CRP SERPL-MCNC: 10 MG/L
DEPRECATED D DIMER PPP IA-ACNC: 473 NG/ML DDU
EOSINOPHIL # BLD AUTO: 0.04 K/UL — SIGNIFICANT CHANGE UP (ref 0–0.5)
EOSINOPHIL NFR BLD AUTO: 0.3 % — SIGNIFICANT CHANGE UP (ref 0–6)
HCT VFR BLD CALC: 36.8 % — LOW (ref 39–50)
HGB BLD-MCNC: 12 G/DL — LOW (ref 13–17)
IMM GRANULOCYTES NFR BLD AUTO: 3.3 % — HIGH (ref 0–0.9)
LYMPHOCYTES # BLD AUTO: 1.19 K/UL — SIGNIFICANT CHANGE UP (ref 1–3.3)
LYMPHOCYTES # BLD AUTO: 8.4 % — LOW (ref 13–44)
MCHC RBC-ENTMCNC: 31.3 PG — SIGNIFICANT CHANGE UP (ref 27–34)
MCHC RBC-ENTMCNC: 32.6 G/DL — SIGNIFICANT CHANGE UP (ref 32–36)
MCV RBC AUTO: 96.1 FL — SIGNIFICANT CHANGE UP (ref 80–100)
MONOCYTES # BLD AUTO: 0.96 K/UL — HIGH (ref 0–0.9)
MONOCYTES NFR BLD AUTO: 6.8 % — SIGNIFICANT CHANGE UP (ref 2–14)
NEUTROPHILS # BLD AUTO: 11.41 K/UL — HIGH (ref 1.8–7.4)
NEUTROPHILS NFR BLD AUTO: 80.6 % — HIGH (ref 43–77)
NRBC # BLD: 0 /100 WBCS — SIGNIFICANT CHANGE UP (ref 0–0)
PLATELET # BLD AUTO: 419 K/UL — HIGH (ref 150–400)
RBC # BLD: 3.83 M/UL — LOW (ref 4.2–5.8)
RBC # FLD: 15.6 % — HIGH (ref 10.3–14.5)
WBC # BLD: 14.15 K/UL — HIGH (ref 3.8–10.5)
WBC # FLD AUTO: 14.15 K/UL — HIGH (ref 3.8–10.5)

## 2024-02-07 PROCEDURE — 99205 OFFICE O/P NEW HI 60 MIN: CPT

## 2024-02-07 RX ORDER — NORMAL SALT TABLETS 1 G/G
1 TABLET ORAL
Qty: 90 | Refills: 2 | Status: ACTIVE | COMMUNITY

## 2024-02-07 RX ORDER — ATORVASTATIN CALCIUM 80 MG/1
80 TABLET, FILM COATED ORAL DAILY
Refills: 0 | Status: ACTIVE | COMMUNITY

## 2024-02-07 RX ORDER — APIXABAN 5 MG/1
5 TABLET, FILM COATED ORAL
Qty: 60 | Refills: 5 | Status: ACTIVE | COMMUNITY
Start: 1900-01-01 | End: 1900-01-01

## 2024-02-07 RX ORDER — PANTOPRAZOLE 40 MG/1
40 TABLET, DELAYED RELEASE ORAL
Qty: 30 | Refills: 0 | Status: ACTIVE | COMMUNITY

## 2024-02-07 RX ORDER — FLUOXETINE 20 MG/1
20 TABLET ORAL
Refills: 0 | Status: ACTIVE | COMMUNITY

## 2024-02-07 RX ORDER — GABAPENTIN 100 MG
100 TABLET ORAL 3 TIMES DAILY
Refills: 0 | Status: ACTIVE | COMMUNITY

## 2024-02-07 SDOH — SOCIAL STABILITY - SOCIAL INSECURITY: PROBLEMS RELATED TO LIVING ALONE: Z60.2

## 2024-02-07 NOTE — ASSESSMENT
[FreeTextEntry1] : TAYLA MARIE is a 28 year man with PMH of SLE (on Plaquenil and steroids), who presents for Hematology evaluation of newly diagnosed PE.  # Pulmonary embolism: He was diagnosed with an acute segmental and subsegmental PE in the RUL and LLL on 12/11/23. D-dimer was elevated (1364). Lower extremity Dopplers were negative. APLS labs were normal. JAK2 mutation was not detected. No prior personal or family history of venous or arterial thromboembolism. Suspected that his PE may have been in the setting of inflammation given his elevated CRP/ESR and pericardial/pleural effusions thought to be inflammatory in etiology.  - Continue Eliquis 5 mg BID. Will plan for 6 months of therapeutic anticoagulation, after which we will likely decrease to prophylactic dosing - CBC and CMP - D-dimer - Factor V Leiden and Prothrombin gene mutation - Patient should return to clinic in 4 months

## 2024-02-07 NOTE — HISTORY OF PRESENT ILLNESS
[de-identified] : TAYLA MARIE is a 28 year man with PMH of SLE (on Plaquenil and steroids), who presents for Hematology evaluation of newly diagnosed PE.   He presented to [facility] on 12/11/23 with several days of left-sided chest pain and dyspnea. D-dimer was elevated (1364). CT-A showed acute segmental and subsegmental PE in the RUL and LLL. He also had new bilateral lower lobe consolidations and pleural effusions. Lower extremity Dopplers were negative. He was transferred to [facility] for further management.   Hematology consulted for hypercoagulable workup and management of PE. Suspected that his PE may have been in the setting of inflammation given his elevated CRP/ESR and pericardial/pleural effusions thought to be inflammatory in etiology. APLS labs showed negative lupus anticoagulant, cardiolipin antibodies, and B2-glycoprotein antibodies. JAK2 mutation was not detected.  He presents today to establish outpatient Hematology care. He is taking Eliquis 5 mg BID with no bleeding complications. No prior personal or family history of venous or arterial thromboembolism.    Family History: - No history of autoimmune disorders - No history of venous or arterial thromboembolism   Social History: - Lives alone - Prior to his hospitalization, he was a full-time student studying Criminology at [employer]

## 2024-02-07 NOTE — REVIEW OF SYSTEMS
[Joint Pain] : joint pain [Joint Stiffness] : joint stiffness [Muscle Pain] : muscle pain [Negative] : Psychiatric

## 2024-02-08 LAB
ALBUMIN SERPL ELPH-MCNC: 4.1 G/DL
ALP BLD-CCNC: 138 U/L
ALT SERPL-CCNC: 248 U/L
ANION GAP SERPL CALC-SCNC: 12 MMOL/L
AST SERPL-CCNC: 98 U/L
BILIRUB SERPL-MCNC: 0.2 MG/DL
BUN SERPL-MCNC: 12 MG/DL
CALCIUM SERPL-MCNC: 10.4 MG/DL
CHLORIDE SERPL-SCNC: 102 MMOL/L
CO2 SERPL-SCNC: 24 MMOL/L
CREAT SERPL-MCNC: 0.66 MG/DL
EGFR: 130 ML/MIN/1.73M2
ERYTHROCYTE [SEDIMENTATION RATE] IN BLOOD: 54 MM/HR — HIGH (ref 0–15)
GLUCOSE SERPL-MCNC: 143 MG/DL
POTASSIUM SERPL-SCNC: 4.8 MMOL/L
PROT SERPL-MCNC: 7.1 G/DL
SODIUM SERPL-SCNC: 138 MMOL/L

## 2024-02-10 LAB
CULTURE RESULTS: SIGNIFICANT CHANGE UP
SPECIMEN SOURCE: SIGNIFICANT CHANGE UP

## 2024-02-12 ENCOUNTER — NON-APPOINTMENT (OUTPATIENT)
Age: 29
End: 2024-02-12

## 2024-02-12 ENCOUNTER — APPOINTMENT (OUTPATIENT)
Dept: RHEUMATOLOGY | Facility: CLINIC | Age: 29
End: 2024-02-12
Payer: MEDICAID

## 2024-02-12 ENCOUNTER — OUTPATIENT (OUTPATIENT)
Dept: OUTPATIENT SERVICES | Facility: HOSPITAL | Age: 29
LOS: 1 days | End: 2024-02-12

## 2024-02-12 VITALS
SYSTOLIC BLOOD PRESSURE: 140 MMHG | HEART RATE: 100 BPM | WEIGHT: 150 LBS | HEIGHT: 64.76 IN | BODY MASS INDEX: 25.3 KG/M2 | DIASTOLIC BLOOD PRESSURE: 90 MMHG | OXYGEN SATURATION: 99 %

## 2024-02-12 DIAGNOSIS — G89.29 DORSALGIA, UNSPECIFIED: ICD-10-CM

## 2024-02-12 DIAGNOSIS — M54.9 DORSALGIA, UNSPECIFIED: ICD-10-CM

## 2024-02-12 LAB
DNA PLOIDY SPEC FC-IMP: NORMAL
PTR INTERP: NORMAL

## 2024-02-12 PROCEDURE — 99214 OFFICE O/P EST MOD 30 MIN: CPT | Mod: GC

## 2024-02-12 NOTE — HISTORY OF PRESENT ILLNESS
[Skin Lesions] : skin lesions [Cough] : cough [Chest Pain] : chest pain [Arthralgias] : arthralgias [FreeTextEntry1] : 28 year-old male with h/o Lupus ([facility], [facility] ulcers, chest pain, and dyspnea, on Plaquenil) who presented to [place] ED on 12/12/23 with complaints of fevers, chest pain and SOB, found to have bilateral acute PE and bilateral pleural effusions. Transferred to [facility] for CT surgery evaluation. DIscharged 1//29/24 from [facility]. Hospitalization include complications below.    Serologies: Positive: ABDIAS 1:280 speckled, Sm >8, RNP >8, SSA >8, hypocomplementemia Myomarker: positive anti U1-, SSA 52 73 low vitamin D 25 21, elevated vitamin D 1,25 97, ACE elevated 125, PR3 29.8. Repeat PR3 still weakly positive at 27.7 Negative: APS labs [facility] ophtho [facility] when [facility] was started on [facility]quenil    #Fever (resolved) - Fevers resolved after restarting steroids on 40 mg methylprednisolone 12/23-12/25, restarted 60 mg of methylprednisolone 12/28-12/29, then transitioned to PO prednisone.  #Pleural effusion exudate w/negative culture and PCR. Pleural fluid cytology negative.  #Lymphadenopathy -Repeat CT imaging without obvious infectious source, mild decrease in axillary LAD, submentonian and submaxillary and increase supraclavicular LAD. No mediastinal lymphadenopathy -EBV DNA PCR positive. Discussed with ID, low concern for EBV infection -Repeat CT CAP with only bilateral axilla noted, they are borderline and stable. Will hold off on LN biopsy for now   #SLE # Proteinuria +ve serology and hypocomplementemia improving after steroid trial creatinine is stable but proteinuria +ve urine P/cr 1.1. Decreased to 0.7. 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   #Elevated CPK resolved   #Bilateral Acute PE with pulmonary infarcts -Labs does not meet criteria for APS PS-PT negative -On Eliquis   #Encephalopathy (resolved) #C/f infarcts -Reported episode of confusion along with episode of incontinence -No focal deficits on neuro exam -Likely multifactorial in the setting of opioids and depression, low suspicion for CNS SLE since pt has been on high dose steroids and had LP with no evidence of inflammation c/f cerebritis -However, MRI Brain with L temporal cortical stroke, punctate focus of diffusion restriction. CTA H and N unremarkable -KARIE with no abnormalities   #Elevated LFTs -Abdominal US: Liver with mildly increased echogenicity, no clots -Negative autoimmune hepatitis work up, negative smooth muscle, mitochondrial, LKM1 -Negative hep serologies  -Medication related? -CK wnl on 1/18  #Rash  -Patchy hair loss -hyperpigmented brown plaques on bilateral conchal bowls -hyperpigmented macules coalescing into patches on the upper chest -hyperpigmented macules and hyperkeratotic plaques on palmar digits as well as subungual hyperkeratosis on several fingernails reticulate erythematous patches on distal fingertips as well as some palmar erythema hyperpigmented patches on plantar toes -Dermatology evaluated inpt: findings c/w SLE. Has discoid lupus of scalp and hands. Chilbains of fingers.   Interval history _________________________________________________________________ Currently on Pred 30mg daily, Plaquenil 200mg BID, Bactrim and PPI. Was not discharged with cellcept due to COVID infection at the end of his hospitalization.  Reporting intermittent pleuritic chest pain on the R side- when he coughs or takes a deep breath. Not as bad when he was hospitalized.  Followed with heme onc, remains on eliquis for 6 months.  Endorsing lower back pain and R hip pain. Back pain has been chronic- since he has been in the [employer]. Hip pain started in the hospital, is unable to lay on his side due to hip pain.  Denies further fevers, endorsing mild weight gain. Has been attempting to work out, using resistance bands. Will start work with PT next month.  Has moved back to his apartment, currently working through seeing all of his follow ups. Will plan to start school again next semester   [Anorexia] : no anorexia [Weight Loss] : no weight loss [Fever] : no fever [Fatigue] : no fatigue [Depression] : no depression [Malar Facial Rash] : no malar facial rash [Skin Nodules] : no skin nodules [Oral Ulcers] : no oral ulcers [Dry Mouth] : no dry mouth [Dysphagia] : no dysphagia [Shortness of Breath] : no shortness of breath [Joint Swelling] : no joint swelling [Joint Warmth] : no joint warmth [Joint Deformity] : no joint deformity [Decreased ROM] : no decreased range of motion [Morning Stiffness] : no morning stiffness [Myalgias] : no myalgias [Muscle Weakness] : no muscle weakness [Visual Changes] : no visual changes [Eye Pain] : no eye pain [Eye Redness] : no eye redness

## 2024-02-12 NOTE — DATA REVIEWED
[FreeTextEntry1] : Laboratory and radiology studies that were personally reviewed at today's visit are summarized in above and bbelow:  CT chest (12-) :  chest tube (left), stable moderate right pleural effusion. .axillary lad and pelvic LAD.

## 2024-02-12 NOTE — ASSESSMENT
[FreeTextEntry1] : 28 year-old male with h/o Lupus ([facility], [facility] ulcers, chest pain, and dyspnea, on Plaquenil) who presented to [place] ED on 12/12/23 with complaints of fevers, chest pain and SOB, found to have bilateral acute PE and bilateral pleural effusions. Transferred to [facility] for CT surgery evaluation. Discharged 1/29/24 from [facility]. Here for post hospital follow up.  #SLE  -Start cellcept 500mg BID. Will recheck monitoring labs in 2 weeks  -Continue with pred 30mg daily for now, will taper further after initiation of cellcept -Hold Bactrim for now with elevated LFTs. C/w PPI  -Continue Plaquenil 200mg BID  -Disease monitoring labs ordered   #Elevated LFTs -Inpt work up as above. Could be from polypharmacy vs lupus hepatitis?  -Will order GGT and CK to evaluate for liver vs muscle source -Hold bactrim as above. If does not improve with holding medication, will likely refer to hepatology.   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 No imaging in his chart.  -R hip with TTP over trochanter area, unable to lay flat on his hip- could be trochanter bursitis? -Has mild weakness of his RLE proximal muscle- 2/2 to pain??  -Xrays of lumbar spine and hips w/ pelvis ordered  #Bilateral PE  -Follows with heme, c/w eliquis. Will need likely for 6 months.   #Pleural effusions -Has appt with his PCP tomorrow, states his PCP will help [facility] with [facility]   RTC in 2 weeks  Discussed with Dr. Jaz Landaverde MD PGY-4 Rheumatology

## 2024-02-12 NOTE — PHYSICAL EXAM
[General Appearance - In No Acute Distress] : in no acute distress [Sclera] : the sclera and conjunctiva were normal [Extraocular Movements] : extraocular movements were intact [Neck Appearance] : the appearance of the neck was normal [] : the neck was supple [Respiration, Rhythm And Depth] : normal respiratory rhythm and effort [Auscultation Breath Sounds / Voice Sounds] : lungs were clear to auscultation bilaterally [Heart Rate And Rhythm] : heart rate was normal and rhythm regular [Heart Sounds] : normal S1 and S2 [Edema] : there was no peripheral edema [Abdomen Soft] : soft [Abdomen Tenderness] : non-tender [Oriented To Time, Place, And Person] : oriented to person, place, and time [Impaired Insight] : insight and judgment were intact [Affect] : the affect was normal [FreeTextEntry1] : Hyperpigmented macules in bilateral ears, upper chest, and on palmar digits  Patchy hair loss on scalp-improving

## 2024-02-15 DIAGNOSIS — R79.89 OTHER SPECIFIED ABNORMAL FINDINGS OF BLOOD CHEMISTRY: ICD-10-CM

## 2024-02-15 DIAGNOSIS — M54.9 DORSALGIA, UNSPECIFIED: ICD-10-CM

## 2024-02-15 DIAGNOSIS — M32.9 SYSTEMIC LUPUS ERYTHEMATOSUS, UNSPECIFIED: ICD-10-CM

## 2024-02-15 DIAGNOSIS — I26.99 OTHER PULMONARY EMBOLISM WITHOUT ACUTE COR PULMONALE: ICD-10-CM

## 2024-02-26 ENCOUNTER — APPOINTMENT (OUTPATIENT)
Dept: RHEUMATOLOGY | Facility: CLINIC | Age: 29
End: 2024-02-26
Payer: COMMERCIAL

## 2024-02-26 ENCOUNTER — OUTPATIENT (OUTPATIENT)
Dept: OUTPATIENT SERVICES | Facility: HOSPITAL | Age: 29
LOS: 1 days | End: 2024-02-26

## 2024-02-26 VITALS
TEMPERATURE: 98 F | HEIGHT: 64.76 IN | BODY MASS INDEX: 26.98 KG/M2 | HEART RATE: 91 BPM | DIASTOLIC BLOOD PRESSURE: 84 MMHG | RESPIRATION RATE: 18 BRPM | WEIGHT: 160 LBS | OXYGEN SATURATION: 99 % | SYSTOLIC BLOOD PRESSURE: 133 MMHG

## 2024-02-26 DIAGNOSIS — R79.89 OTHER SPECIFIED ABNORMAL FINDINGS OF BLOOD CHEMISTRY: ICD-10-CM

## 2024-02-26 DIAGNOSIS — R21 RASH AND OTHER NONSPECIFIC SKIN ERUPTION: ICD-10-CM

## 2024-02-26 LAB
APPEARANCE: CLEAR
BACTERIA: NEGATIVE /HPF
BILIRUBIN URINE: NEGATIVE
BLOOD URINE: NEGATIVE
C3 SERPL-MCNC: 133 MG/DL
C4 SERPL-MCNC: 18 MG/DL
CAST: 0 /LPF
CK SERPL-CCNC: 81 U/L
COLOR: YELLOW
CREAT SPEC-SCNC: 53 MG/DL
CREAT/PROT UR: 1 RATIO
DSDNA AB SER-ACNC: <12 IU/ML
EPITHELIAL CELLS: 0 /HPF
GGT SERPL-CCNC: 360 U/L
GLUCOSE QUALITATIVE U: NEGATIVE MG/DL
KETONES URINE: NEGATIVE MG/DL
LEUKOCYTE ESTERASE URINE: NEGATIVE
MICROSCOPIC-UA: NORMAL
NITRITE URINE: NEGATIVE
PH URINE: 7.5
PROT UR-MCNC: 53 MG/DL
PROTEIN URINE: 30 MG/DL
RED BLOOD CELLS URINE: 0 /HPF
SPECIFIC GRAVITY URINE: 1.01
UROBILINOGEN URINE: 0.2 MG/DL
WHITE BLOOD CELLS URINE: 0 /HPF

## 2024-02-26 PROCEDURE — 99214 OFFICE O/P EST MOD 30 MIN: CPT | Mod: GC

## 2024-02-26 RX ORDER — PREDNISONE 10 MG/1
10 TABLET ORAL
Qty: 180 | Refills: 1 | Status: DISCONTINUED | COMMUNITY
Start: 2024-02-12 | End: 2024-02-26

## 2024-02-26 RX ORDER — PREDNISONE 10 MG/1
10 TABLET ORAL DAILY
Refills: 0 | Status: DISCONTINUED | COMMUNITY
End: 2024-02-26

## 2024-02-27 NOTE — PHYSICAL EXAM
[General Appearance - In No Acute Distress] : in no acute distress [Extraocular Movements] : extraocular movements were intact [Sclera] : the sclera and conjunctiva were normal [Neck Appearance] : the appearance of the neck was normal [] : the neck was supple [Respiration, Rhythm And Depth] : normal respiratory rhythm and effort [Auscultation Breath Sounds / Voice Sounds] : lungs were clear to auscultation bilaterally [Heart Rate And Rhythm] : heart rate was normal and rhythm regular [Heart Sounds] : normal S1 and S2 [Edema] : there was no peripheral edema [Abdomen Soft] : soft [Abdomen Tenderness] : non-tender [Oriented To Time, Place, And Person] : oriented to person, place, and time [Impaired Insight] : insight and judgment were intact [Affect] : the affect was normal [FreeTextEntry1] : Hyperpigmented macules in bilateral ears, upper chest, and on palmar digits  small pustules on dorsal aspect of bilateral hands. Non-tender  Patchy hair loss on scalp-improving

## 2024-02-27 NOTE — ASSESSMENT
[FreeTextEntry1] : 28 year-old male with h/o Lupus ([facility], [facility] ulcers, chest pain, and dyspnea, on Plaquenil) who presented to [place] ED on 12/12/23 with complaints of fevers, chest pain and SOB, found to have bilateral acute PE and bilateral pleural effusions. Transferred to [facility] for CT surgery evaluation. Discharged 1/29/24 from [facility]. Here for post hospital follow up.  #SLE  #Proteinuira - UPC 1.0 with known class I disease  -Increase cellcept to 1000mg BID  -Will switch from prednisone 30 mg to medrol 32mg daily due to liver injury as below.  -Hold Bactrim for now with elevated LFTs. C/w PPI  -Continue Plaquenil 200mg BID  -Repeat CBC, CMP and UPC ordered   #Elevated LFTs -Inpt work up as above. Could be from polypharmacy vs lupus hepatitis?  -Did not improve after holding bactrim -CK wnl and GGT elevated, less likely muscle source -Will refer to hepatology   #Lumbar pack pain #RLE proximal weakness #R hip pain -Dx with Grade 1 L5-S1 anterolisthesis and bilateral L5 spondylolysis  -Has mild weakness of his RLE proximal muscle with is new  -Xrays of lumbar spine and hips w/ pelvis ordered last visit, did not complete  -In setting of known lumbar disease via [facility], will order MRI lumbar spine  #Rash -Hands with bilateral pustules -Refer to dermatology   #Bilateral PE  -Follows with heme, c/w eliquis. Will need likely for 6 months.   #Pleural effusions -[facility] with small R sided pleural effusion   RTC in 2-3 weeks  Discussed with Dr. Jaz Landaverde MD PGY-4 Rheumatology

## 2024-02-27 NOTE — HISTORY OF PRESENT ILLNESS
[Skin Lesions] : skin lesions [Cough] : cough [Chest Pain] : chest pain [Arthralgias] : arthralgias [Muscle Weakness] : muscle weakness [FreeTextEntry1] : Interval history _____________________________ 2/26/2024:  Last seen on 2/12. Since then, also [facility] with [facility].  Is deciding if he wants to [facility] in [facility] or [facility] at [facility] Pt able to log on to his [facility] portal- able to look through visits and labs done  [facility] due to [facility] chest [facility]. CXR with evidence of small R sided pleural effusion.  Creatinine stable at 0.7, LFTs uptrendings  and   Has been holding his bactrim  Denies side effects from cellcept- on 1000mg daily Reports chronic back pain, evaluated by [facility] in [facility], noted to have Grade 1 L5-S1 anterolisthesis and bilateral L5 spondylolysis. Was evaluated by NSGY at that time, no intervention needed. Now with newer RLE weakness, also note by his outpt PT  Also with new rash on bilateral hands, pt unaware of what caused it   [Anorexia] : no anorexia [Weight Loss] : no weight loss [Fever] : no fever [Fatigue] : no fatigue [Depression] : no depression [Malar Facial Rash] : no malar facial rash [Skin Nodules] : no skin nodules [Oral Ulcers] : no oral ulcers [Dry Mouth] : no dry mouth [Dysphagia] : no dysphagia [Shortness of Breath] : no shortness of breath [Joint Swelling] : no joint swelling [Joint Warmth] : no joint warmth [Joint Deformity] : no joint deformity [Decreased ROM] : no decreased range of motion [Morning Stiffness] : no morning stiffness [Myalgias] : no myalgias [Visual Changes] : no visual changes [Eye Pain] : no eye pain [Eye Redness] : no eye redness

## 2024-02-27 NOTE — HISTORY OF PRESENT ILLNESS
[Skin Lesions] : skin lesions [Cough] : cough [Chest Pain] : chest pain [Arthralgias] : arthralgias [Muscle Weakness] : muscle weakness [FreeTextEntry1] : Interval history _____________________________ 2/26/2024:  Last seen on 2/12. Since then, also followed with [facility].  Is deciding if he wants to stay in [facility] or [facility] Pt able to log on to his [facility] portal- able to look through visits and labs done  [facility] due to [facility] chest [facility]. CXR with evidence of small R sided pleural effusion.  Creatinine stable at 0.7, LFTs uptrendings  and   Has been holding his bactrim  Denies side effects from cellcept- on 1000mg daily Reports chronic back pain, [facility] in [facility], noted to have Grade 1 L5-S1 anterolisthesis and bilateral L5 spondylolysis. Was evaluated by NSGY at that time, no intervention needed. Now with newer RLE weakness, also note by his outpt PT  Also with new rash on bilateral hands, pt unaware of what caused it   [Anorexia] : no anorexia [Weight Loss] : no weight loss [Fever] : no fever [Fatigue] : no fatigue [Depression] : no depression [Malar Facial Rash] : no malar facial rash [Skin Nodules] : no skin nodules [Oral Ulcers] : no oral ulcers [Dry Mouth] : no dry mouth [Dysphagia] : no dysphagia [Shortness of Breath] : no shortness of breath [Joint Swelling] : no joint swelling [Joint Warmth] : no joint warmth [Joint Deformity] : no joint deformity [Decreased ROM] : no decreased range of motion [Morning Stiffness] : no morning stiffness [Myalgias] : no myalgias [Visual Changes] : no visual changes [Eye Pain] : no eye pain [Eye Redness] : no eye redness

## 2024-02-27 NOTE — ASSESSMENT
[FreeTextEntry1] : 28 year-old male with h/o Lupus ([facility], oral ulcers, chest pain, and dyspnea, on Plaquenil) who presented to [place] ED on 12/12/23 with complaints of fevers, chest pain and SOB, found to have bilateral acute PE and bilateral pleural effusions. Transferred to [facility] for CT surgery evaluation. Discharged 1/29/24 from [facility]. Here for post hospital follow up.  #SLE  #Proteinuira - UPC 1.0 with known class I disease  -Increase cellcept to 1000mg BID  -Will switch from prednisone 30 mg to medrol 32mg daily due to liver injury as below.  -Hold Bactrim for now with elevated LFTs. C/w PPI  -Continue Plaquenil 200mg BID  -Repeat CBC, CMP and UPC ordered   #Elevated LFTs -Inpt work up as above. Could be from polypharmacy vs lupus hepatitis?  -Did not improve after holding bactrim -CK wnl and GGT elevated, less likely muscle source -Will refer to hepatology   #Lumbar pack pain #RLE proximal weakness #R hip pain -Dx with Grade 1 L5-S1 anterolisthesis and bilateral L5 spondylolysis  -Has mild weakness of his RLE proximal muscle with is new  -Xrays of lumbar spine and hips w/ pelvis ordered last visit, did not complete  -In setting of known lumbar disease via [facility] records, will order MRI lumbar spine  #Rash -Hands with bilateral pustules -Refer to dermatology   #Bilateral PE  -Follows with heme, c/w eliquis. Will need likely for 6 months.   #Pleural effusions -[facility] with small R sided pleural effusion   RTC in 2-3 weeks  Discussed with Dr. Jaz Landaverde MD PGY-4 Rheumatology

## 2024-02-28 DIAGNOSIS — R79.89 OTHER SPECIFIED ABNORMAL FINDINGS OF BLOOD CHEMISTRY: ICD-10-CM

## 2024-02-28 DIAGNOSIS — M32.9 SYSTEMIC LUPUS ERYTHEMATOSUS, UNSPECIFIED: ICD-10-CM

## 2024-02-28 DIAGNOSIS — R29.898 OTHER SYMPTOMS AND SIGNS INVOLVING THE MUSCULOSKELETAL SYSTEM: ICD-10-CM

## 2024-02-28 DIAGNOSIS — R21 RASH AND OTHER NONSPECIFIC SKIN ERUPTION: ICD-10-CM

## 2024-02-28 DIAGNOSIS — R09.1 PLEURISY: ICD-10-CM

## 2024-02-29 ENCOUNTER — APPOINTMENT (OUTPATIENT)
Dept: RADIOLOGY | Facility: CLINIC | Age: 29
End: 2024-02-29

## 2024-03-04 ENCOUNTER — APPOINTMENT (OUTPATIENT)
Dept: THORACIC SURGERY | Facility: CLINIC | Age: 29
End: 2024-03-04
Payer: MEDICAID

## 2024-03-04 ENCOUNTER — APPOINTMENT (OUTPATIENT)
Dept: RADIOLOGY | Facility: HOSPITAL | Age: 29
End: 2024-03-04

## 2024-03-04 ENCOUNTER — OUTPATIENT (OUTPATIENT)
Dept: OUTPATIENT SERVICES | Facility: HOSPITAL | Age: 29
LOS: 1 days | End: 2024-03-04
Payer: MEDICAID

## 2024-03-04 ENCOUNTER — RESULT REVIEW (OUTPATIENT)
Age: 29
End: 2024-03-04

## 2024-03-04 VITALS
HEART RATE: 82 BPM | DIASTOLIC BLOOD PRESSURE: 94 MMHG | BODY MASS INDEX: 26.98 KG/M2 | RESPIRATION RATE: 17 BRPM | WEIGHT: 158 LBS | SYSTOLIC BLOOD PRESSURE: 136 MMHG | HEIGHT: 64 IN | OXYGEN SATURATION: 98 %

## 2024-03-04 DIAGNOSIS — J90 PLEURAL EFFUSION, NOT ELSEWHERE CLASSIFIED: ICD-10-CM

## 2024-03-04 PROCEDURE — 99213 OFFICE O/P EST LOW 20 MIN: CPT

## 2024-03-04 PROCEDURE — 71046 X-RAY EXAM CHEST 2 VIEWS: CPT | Mod: 26

## 2024-03-06 NOTE — HISTORY OF PRESENT ILLNESS
No
[FreeTextEntry1] : Mr. TAYLA MARIE, 28 year old male, ...smoker, w/ hx of Lupus (dx in 09/2023, on Plaquenil) presented to [place] ED on 12/12/23 with c/o chest pain and SOB. CTA chest with acute RUL and LLL segmental/subsegmental PE (s/p Heparin gtt, now on Eliquis). He was also found to have B/L lower lobe consolidation with possible pneumonia vs pulmonary infarcts, B/L pleural effusions (L>R), and B/L axillary and supraclavicular adenopathy. Subsequently he was transferred to [facility] (12/22/23-01/29/24).   12/24/23: s/p thoracentesis, aspirated 40 cc blood-tinged fluid. Path of pleural fluid negative for malignant cells.   12/25/23: S/p right thoracentesis aspirated 7 cc of blood-tinged fluid.   12/26/23: s/p left chest tube insertion. Removed on 01/02/24.   OF NOTE: S/p left kidney core biopsy x 3 on 01/10/2024: Path reveals Minimal mesangial lupus nephritis.   CXR 3/4/2024 reviewed today  He presents today for follow up. Patient reports SOB when walking > 2 blocks, occasional productive cough and generalized CP when taking deep breaths.

## 2024-03-06 NOTE — ASSESSMENT
[FreeTextEntry1] : Mr. TAYLA MARIE, 28 year old male, ...smoker, w/ hx of Lupus (dx in 09/2023, on Plaquenil) presented to [place] ED on 12/12/23 with c/o chest pain and SOB. CTA chest with acute RUL and LLL segmental/subsegmental PE (s/p Heparin gtt, now on Eliquis). He was also found to have B/L lower lobe consolidation with possible pneumonia vs pulmonary infarcts, B/L pleural effusions (L>R), and B/L axillary and supraclavicular adenopathy. Subsequently he was transferred to [facility] (12/22/23-01/29/24).   12/24/23: s/p thoracentesis, aspirated 40 cc blood-tinged fluid. Path of pleural fluid negative for malignant cells.   12/25/23: S/p right thoracentesis aspirated 7 cc of blood-tinged fluid.   12/26/23: s/p left chest tube insertion. Removed on 01/02/24.   OF NOTE: S/p left kidney core biopsy x 3 on 01/10/2024: Path reveals Minimal mesangial lupus nephritis.   CXR 3/4/2024 reviewed today  I have reviewed the patient's medical records and diagnostic images at time of this office consultation and have made the following recommendation: 1. CXR reviewed with patient, minimal effusion noted on CXR. Recommended referral to pulmonologist Dr. Whit Oro for further evaluation. RTC PRN.   Recommendations reviewed with patient during this office visit, and all questions answered; Patient instructed on the importance of follow up and verbalizes understanding.   I, Dr. DESAI, MARITZA LAROSE, personally performed the evaluation and management (E/M) services for this established patient who presents today with (a) new problem(s)/exacerbation of (an) existing condition(s).  That E/M includes conducting the examination, assessing all new/exacerbated conditions, and establishing a new plan of care.  Today, my ACP, Susana Connell/ BOB Hussein, was here to observe my evaluation and management services for this new problem/exacerbated condition to be followed going forward.

## 2024-03-06 NOTE — PHYSICAL EXAM
[Restricted in physically strenuous activity but ambulatory and able to carry out work of a light or sedentary nature] : Status 1- Restricted in physically strenuous activity but ambulatory and able to carry out work of a light or sedentary nature, e.g., light house work, office work [PERRL With Normal Accommodation] : pupils were equal in size, round, and reactive to light [Sclera] : the sclera and conjunctiva were normal [Extraocular Movements] : extraocular movements were intact [Neck Appearance] : the appearance of the neck was normal [Neck Cervical Mass (___cm)] : no neck mass was observed [Thyroid Diffuse Enlargement] : the thyroid was not enlarged [Jugular Venous Distention Increased] : there was no jugular-venous distention [Thyroid Nodule] : there were no palpable thyroid nodules [Auscultation Breath Sounds / Voice Sounds] : lungs were clear to auscultation bilaterally [Heart Sounds] : normal S1 and S2 [Heart Rate And Rhythm] : heart rate was normal and rhythm regular [Murmurs] : no murmurs [Heart Sounds Gallop] : no gallops [Examination Of The Chest] : the chest was normal in appearance [Heart Sounds Pericardial Friction Rub] : no pericardial rub [Chest Visual Inspection Thoracic Asymmetry] : no chest asymmetry [Diminished Respiratory Excursion] : normal chest expansion [2+] : right 2+ [Breast Appearance] : normal in appearance [Bowel Sounds] : normal bowel sounds [Breast Palpation Mass] : no palpable masses [Abdomen Soft] : soft [Abdomen Tenderness] : non-tender [Abdomen Mass (___ Cm)] : no abdominal mass palpated [Supraclavicular Lymph Nodes Enlarged Bilaterally] : supraclavicular [Cervical Lymph Nodes Enlarged Posterior Bilaterally] : posterior cervical [Cervical Lymph Nodes Enlarged Anterior Bilaterally] : anterior cervical [Axillary Lymph Nodes Enlarged Bilaterally] : axillary [No CVA Tenderness] : no ~M costovertebral angle tenderness [No Spinal Tenderness] : no spinal tenderness [Nail Clubbing] : no clubbing  or cyanosis of the fingernails [Abnormal Walk] : normal gait [Musculoskeletal - Swelling] : no joint swelling seen [Motor Tone] : muscle strength and tone were normal [Skin Color & Pigmentation] : normal skin color and pigmentation [Skin Turgor] : normal skin turgor [] : no rash [Sensation] : the sensory exam was normal to light touch and pinprick [Deep Tendon Reflexes (DTR)] : deep tendon reflexes were 2+ and symmetric [No Focal Deficits] : no focal deficits [Oriented To Time, Place, And Person] : oriented to person, place, and time [Impaired Insight] : insight and judgment were intact [Affect] : the affect was normal [FreeTextEntry1] : Deferred

## 2024-03-18 ENCOUNTER — OUTPATIENT (OUTPATIENT)
Dept: OUTPATIENT SERVICES | Facility: HOSPITAL | Age: 29
LOS: 1 days | End: 2024-03-18

## 2024-03-18 ENCOUNTER — APPOINTMENT (OUTPATIENT)
Dept: RHEUMATOLOGY | Facility: CLINIC | Age: 29
End: 2024-03-18
Payer: MEDICAID

## 2024-03-18 VITALS
WEIGHT: 164.5 LBS | BODY MASS INDEX: 28.09 KG/M2 | RESPIRATION RATE: 16 BRPM | SYSTOLIC BLOOD PRESSURE: 133 MMHG | OXYGEN SATURATION: 100 % | HEIGHT: 64 IN | DIASTOLIC BLOOD PRESSURE: 90 MMHG | HEART RATE: 79 BPM | TEMPERATURE: 98.7 F

## 2024-03-18 DIAGNOSIS — R29.898 OTHER SYMPTOMS AND SIGNS INVOLVING THE MUSCULOSKELETAL SYSTEM: ICD-10-CM

## 2024-03-18 DIAGNOSIS — Z79.899 OTHER LONG TERM (CURRENT) DRUG THERAPY: ICD-10-CM

## 2024-03-18 LAB
ALBUMIN SERPL ELPH-MCNC: 4.1 G/DL
ALP BLD-CCNC: 107 U/L
ALT SERPL-CCNC: 61 U/L
ANION GAP SERPL CALC-SCNC: 12 MMOL/L
APPEARANCE: CLEAR
AST SERPL-CCNC: 31 U/L
BACTERIA: NEGATIVE /HPF
BASOPHILS # BLD AUTO: 0.02 K/UL
BASOPHILS NFR BLD AUTO: 0.2 %
BILIRUB SERPL-MCNC: 0.5 MG/DL
BILIRUBIN URINE: NEGATIVE
BLOOD URINE: NEGATIVE
BUN SERPL-MCNC: 11 MG/DL
CALCIUM OXALATE CRYSTALS: PRESENT
CALCIUM SERPL-MCNC: 9.9 MG/DL
CAST: 0 /LPF
CHLORIDE SERPL-SCNC: 103 MMOL/L
CO2 SERPL-SCNC: 27 MMOL/L
COLOR: NORMAL
CREAT SERPL-MCNC: 0.83 MG/DL
CREAT SPEC-SCNC: 259 MG/DL
CREAT/PROT UR: 1 RATIO
EGFR: 122 ML/MIN/1.73M2
EOSINOPHIL # BLD AUTO: 0.05 K/UL
EOSINOPHIL NFR BLD AUTO: 0.6 %
EPITHELIAL CELLS: 1 /HPF
GLUCOSE QUALITATIVE U: NEGATIVE MG/DL
GLUCOSE SERPL-MCNC: 82 MG/DL
HCT VFR BLD CALC: 40 %
HGB BLD-MCNC: 13.1 G/DL
IMM GRANULOCYTES NFR BLD AUTO: 1 %
KETONES URINE: ABNORMAL MG/DL
LEUKOCYTE ESTERASE URINE: NEGATIVE
LYMPHOCYTES # BLD AUTO: 2.99 K/UL
LYMPHOCYTES NFR BLD AUTO: 36.3 %
MAN DIFF?: NORMAL
MCHC RBC-ENTMCNC: 30.6 PG
MCHC RBC-ENTMCNC: 32.8 GM/DL
MCV RBC AUTO: 93.5 FL
MICROSCOPIC-UA: NORMAL
MONOCYTES # BLD AUTO: 1.24 K/UL
MONOCYTES NFR BLD AUTO: 15 %
NEUTROPHILS # BLD AUTO: 3.86 K/UL
NEUTROPHILS NFR BLD AUTO: 46.9 %
NITRITE URINE: NEGATIVE
PH URINE: 6.5
PLATELET # BLD AUTO: 505 K/UL
POTASSIUM SERPL-SCNC: 3.9 MMOL/L
PROT SERPL-MCNC: 7 G/DL
PROT UR-MCNC: 254 MG/DL
PROTEIN URINE: 300 MG/DL
RBC # BLD: 4.28 M/UL
RBC # FLD: 14.8 %
RED BLOOD CELLS URINE: 1 /HPF
REVIEW: NORMAL
SODIUM SERPL-SCNC: 142 MMOL/L
SPECIFIC GRAVITY URINE: 1.02
UROBILINOGEN URINE: 1 MG/DL
WBC # FLD AUTO: 8.24 K/UL
WHITE BLOOD CELLS URINE: 2 /HPF

## 2024-03-18 PROCEDURE — 99214 OFFICE O/P EST MOD 30 MIN: CPT | Mod: GC

## 2024-03-18 RX ORDER — METHYLPREDNISOLONE 16 MG/1
16 TABLET ORAL DAILY
Qty: 60 | Refills: 1 | Status: DISCONTINUED | COMMUNITY
Start: 2024-02-26 | End: 2024-03-18

## 2024-03-19 DIAGNOSIS — M32.9 SYSTEMIC LUPUS ERYTHEMATOSUS, UNSPECIFIED: ICD-10-CM

## 2024-03-19 DIAGNOSIS — R29.898 OTHER SYMPTOMS AND SIGNS INVOLVING THE MUSCULOSKELETAL SYSTEM: ICD-10-CM

## 2024-03-19 DIAGNOSIS — Z79.899 OTHER LONG TERM (CURRENT) DRUG THERAPY: ICD-10-CM

## 2024-03-20 LAB
ALBUMIN SERPL ELPH-MCNC: 3.9 G/DL
ALP BLD-CCNC: 73 U/L
ALT SERPL-CCNC: 31 U/L
ANION GAP SERPL CALC-SCNC: 12 MMOL/L
APPEARANCE: CLEAR
AST SERPL-CCNC: 23 U/L
BACTERIA: NEGATIVE /HPF
BASOPHILS # BLD AUTO: 0.04 K/UL
BASOPHILS NFR BLD AUTO: 0.4 %
BILIRUB SERPL-MCNC: 0.4 MG/DL
BILIRUBIN URINE: NEGATIVE
BLOOD URINE: NEGATIVE
BUN SERPL-MCNC: 11 MG/DL
CALCIUM SERPL-MCNC: 9.9 MG/DL
CAST: 0 /LPF
CHLORIDE SERPL-SCNC: 101 MMOL/L
CO2 SERPL-SCNC: 26 MMOL/L
COLOR: YELLOW
CREAT SERPL-MCNC: 0.99 MG/DL
CREAT SPEC-SCNC: 201 MG/DL
CREAT/PROT UR: 1.2 RATIO
EGFR: 105 ML/MIN/1.73M2
EOSINOPHIL # BLD AUTO: 0.11 K/UL
EOSINOPHIL NFR BLD AUTO: 1 %
EPITHELIAL CELLS: 1 /HPF
GLUCOSE QUALITATIVE U: NEGATIVE MG/DL
GLUCOSE SERPL-MCNC: 107 MG/DL
HCT VFR BLD CALC: 46.5 %
HGB BLD-MCNC: 15.1 G/DL
IMM GRANULOCYTES NFR BLD AUTO: 1.9 %
KETONES URINE: NEGATIVE MG/DL
LEUKOCYTE ESTERASE URINE: NEGATIVE
LYMPHOCYTES # BLD AUTO: 2.3 K/UL
LYMPHOCYTES NFR BLD AUTO: 21 %
MAN DIFF?: NORMAL
MCHC RBC-ENTMCNC: 30.3 PG
MCHC RBC-ENTMCNC: 32.5 GM/DL
MCV RBC AUTO: 93.4 FL
MICROSCOPIC-UA: NORMAL
MONOCYTES # BLD AUTO: 0.68 K/UL
MONOCYTES NFR BLD AUTO: 6.2 %
NEUTROPHILS # BLD AUTO: 7.59 K/UL
NEUTROPHILS NFR BLD AUTO: 69.5 %
NITRITE URINE: NEGATIVE
PH URINE: 6
PLATELET # BLD AUTO: 431 K/UL
POTASSIUM SERPL-SCNC: 3.9 MMOL/L
PROT SERPL-MCNC: 6.4 G/DL
PROT UR-MCNC: 236 MG/DL
PROTEIN URINE: 300 MG/DL
RBC # BLD: 4.98 M/UL
RBC # FLD: 14.3 %
RED BLOOD CELLS URINE: 1 /HPF
SODIUM SERPL-SCNC: 139 MMOL/L
SPECIFIC GRAVITY URINE: 1.02
UROBILINOGEN URINE: 0.2 MG/DL
WBC # FLD AUTO: 10.93 K/UL
WHITE BLOOD CELLS URINE: 2 /HPF

## 2024-03-20 RX ORDER — HYDROXYCHLOROQUINE SULFATE 200 MG/1
200 TABLET, FILM COATED ORAL
Qty: 180 | Refills: 1 | Status: ACTIVE | COMMUNITY
Start: 1900-01-01 | End: 1900-01-01

## 2024-03-20 RX ORDER — MYCOPHENOLATE MOFETIL 500 MG/1
500 TABLET ORAL TWICE DAILY
Qty: 540 | Refills: 0 | Status: ACTIVE | COMMUNITY
Start: 2024-02-12 | End: 1900-01-01

## 2024-03-29 NOTE — ASSESSMENT
[FreeTextEntry1] : 28 year-old male with h/o Lupus ([facility] due to rash, oral ulcers, chest pain, and dyspnea, on Plaquenil) who presented to [place] ED on 12/12/23 with complaints of fevers, chest pain and SOB, found to have bilateral acute PE and bilateral pleural effusions. Transferred to [facility] for CT surgery evaluation. Discharged 1/29/24 from [facility]. Here for post hospital follow up.  #SLE  #Proteinuira - UPC 1.0 with known class I disease  -Repeat labs ordered, if no abnormalities, will increase cellcept to 1500mg BID   -Will taper medrol: 24mg daily x 10 days, 20mg x 10 days, 16mg until follow up  - on cellcept 1g BID , will adjust the dose after labs -C/w holding Bactrim for now with elevated LFTs. C/w PPI  -Continue Plaquenil 200mg BID  -Repeat CBC, CMP and UPC ordered  -[facility], has upcoming appt   #Elevated LFTs (improved)  -Inpt work up as above. Could be from polypharmacy as improved with holding bactrim  -CK wnl and GGT elevated, less likely muscle source  #Lumbar pack pain #RLE proximal weakness #R hip pain -Dx with Grade 1 L5-S1 anterolisthesis and bilateral L5 spondylolysis  -Has mild weakness of his RLE proximal muscle with is new  -Xrays of lumbar spine and hips w/ pelvis ordered last visit, did not complete  -In setting of known lumbar disease via [facility] records, ordered MRI lumbar spine last visit that was denied -Printed script for pt who will [facility] to [facility] to complete  -CK, myoglobin and aldolase ordered this visit- could have steroid induced myopathy?   #Rash -Hands with bilateral pustules, likely warts?  #Bilateral PE  -Follows with heme, c/w eliquis. Will need likely for 6 months.   #Pleural effusions -[facility] with small R sided pleural effusion   RTC in 1 month   Discussed with Dr. Ulysses Landaverde MD PGY-4 Rheumatology

## 2024-03-29 NOTE — HISTORY OF PRESENT ILLNESS
[FreeTextEntry1] : Interval history _____________________________ March 2024:  Reports lower back pain and persistent RLE weakness. MRI ordered last visit, however denied coverage by insurance.  Has app[facility] with [facility] this month  Switched to Medrol 32mg daily last visit due to elevated LFTs.  Current meds: medrol 32mg daily, Plaquenil 200mg BID, and Cellcept 1000mg BID  Denies GI symptoms with cellcept

## 2024-03-29 NOTE — PHYSICAL EXAM
Rx Refill Note  Requested Prescriptions     Pending Prescriptions Disp Refills    HYDROcodone-acetaminophen (NORCO)  MG per tablet 90 tablet 0     Sig: Take 1 tablet by mouth Every 6 (Six) Hours As Needed for Moderate Pain or Severe Pain.      Last office visit with prescribing clinician: 11/7/2023   Last telemedicine visit with prescribing clinician: Visit date not found   Next office visit with prescribing clinician: 3/1/2024           April Godwin MA  11/13/23, 12:42 CST     [General Appearance - In No Acute Distress] : in no acute distress [Sclera] : the sclera and conjunctiva were normal [Extraocular Movements] : extraocular movements were intact [Neck Appearance] : the appearance of the neck was normal [] : the neck was supple [Auscultation Breath Sounds / Voice Sounds] : lungs were clear to auscultation bilaterally [Respiration, Rhythm And Depth] : normal respiratory rhythm and effort [Heart Rate And Rhythm] : heart rate was normal and rhythm regular [Heart Sounds] : normal S1 and S2 [Abdomen Soft] : soft [Edema] : there was no peripheral edema [Abdomen Tenderness] : non-tender [Oriented To Time, Place, And Person] : oriented to person, place, and time [Affect] : the affect was normal [Impaired Insight] : insight and judgment were intact [FreeTextEntry1] : Hyperpigmented macules in bilateral ears, upper chest, and on palmar digits  small pustules on dorsal aspect of bilateral hands. Non-tender  Patchy hair loss on scalp-improving

## 2024-04-11 ENCOUNTER — TRANSCRIPTION ENCOUNTER (OUTPATIENT)
Age: 29
End: 2024-04-11

## 2024-04-11 ENCOUNTER — APPOINTMENT (OUTPATIENT)
Dept: ULTRASOUND IMAGING | Facility: CLINIC | Age: 29
End: 2024-04-11

## 2024-04-16 ENCOUNTER — EMERGENCY (EMERGENCY)
Facility: HOSPITAL | Age: 29
LOS: 1 days | Discharge: HOME | End: 2024-04-16
Attending: STUDENT IN AN ORGANIZED HEALTH CARE EDUCATION/TRAINING PROGRAM | Admitting: STUDENT IN AN ORGANIZED HEALTH CARE EDUCATION/TRAINING PROGRAM
Payer: MEDICAID

## 2024-04-16 VITALS
SYSTOLIC BLOOD PRESSURE: 132 MMHG | OXYGEN SATURATION: 98 % | DIASTOLIC BLOOD PRESSURE: 87 MMHG | TEMPERATURE: 98 F | RESPIRATION RATE: 18 BRPM | HEART RATE: 92 BPM

## 2024-04-16 VITALS
OXYGEN SATURATION: 98 % | TEMPERATURE: 98 F | HEART RATE: 97 BPM | DIASTOLIC BLOOD PRESSURE: 80 MMHG | SYSTOLIC BLOOD PRESSURE: 122 MMHG | RESPIRATION RATE: 18 BRPM

## 2024-04-16 LAB
ALBUMIN SERPL ELPH-MCNC: 3 G/DL — LOW (ref 3.3–5)
ALP SERPL-CCNC: 55 U/L — SIGNIFICANT CHANGE UP (ref 40–120)
ALT FLD-CCNC: 21 U/L — SIGNIFICANT CHANGE UP (ref 4–41)
ANION GAP SERPL CALC-SCNC: 10 MMOL/L — SIGNIFICANT CHANGE UP (ref 7–14)
AST SERPL-CCNC: 27 U/L — SIGNIFICANT CHANGE UP (ref 4–40)
BASOPHILS # BLD AUTO: 0.02 K/UL — SIGNIFICANT CHANGE UP (ref 0–0.2)
BASOPHILS NFR BLD AUTO: 0.5 % — SIGNIFICANT CHANGE UP (ref 0–2)
BILIRUB SERPL-MCNC: 0.3 MG/DL — SIGNIFICANT CHANGE UP (ref 0.2–1.2)
BUN SERPL-MCNC: 10 MG/DL — SIGNIFICANT CHANGE UP (ref 7–23)
CALCIUM SERPL-MCNC: 9 MG/DL — SIGNIFICANT CHANGE UP (ref 8.4–10.5)
CHLORIDE SERPL-SCNC: 105 MMOL/L — SIGNIFICANT CHANGE UP (ref 98–107)
CO2 SERPL-SCNC: 24 MMOL/L — SIGNIFICANT CHANGE UP (ref 22–31)
CREAT SERPL-MCNC: 1.13 MG/DL — SIGNIFICANT CHANGE UP (ref 0.5–1.3)
EGFR: 90 ML/MIN/1.73M2 — SIGNIFICANT CHANGE UP
EOSINOPHIL # BLD AUTO: 0.02 K/UL — SIGNIFICANT CHANGE UP (ref 0–0.5)
EOSINOPHIL NFR BLD AUTO: 0.5 % — SIGNIFICANT CHANGE UP (ref 0–6)
GLUCOSE SERPL-MCNC: 94 MG/DL — SIGNIFICANT CHANGE UP (ref 70–99)
HCT VFR BLD CALC: 47.7 % — SIGNIFICANT CHANGE UP (ref 39–50)
HGB BLD-MCNC: 16.5 G/DL — SIGNIFICANT CHANGE UP (ref 13–17)
IANC: 1.27 K/UL — LOW (ref 1.8–7.4)
IMM GRANULOCYTES NFR BLD AUTO: 0.8 % — SIGNIFICANT CHANGE UP (ref 0–0.9)
LYMPHOCYTES # BLD AUTO: 1.94 K/UL — SIGNIFICANT CHANGE UP (ref 1–3.3)
LYMPHOCYTES # BLD AUTO: 51.3 % — HIGH (ref 13–44)
MCHC RBC-ENTMCNC: 30.6 PG — SIGNIFICANT CHANGE UP (ref 27–34)
MCHC RBC-ENTMCNC: 34.6 GM/DL — SIGNIFICANT CHANGE UP (ref 32–36)
MCV RBC AUTO: 88.3 FL — SIGNIFICANT CHANGE UP (ref 80–100)
MONOCYTES # BLD AUTO: 0.5 K/UL — SIGNIFICANT CHANGE UP (ref 0–0.9)
MONOCYTES NFR BLD AUTO: 13.2 % — SIGNIFICANT CHANGE UP (ref 2–14)
NEUTROPHILS # BLD AUTO: 1.27 K/UL — LOW (ref 1.8–7.4)
NEUTROPHILS NFR BLD AUTO: 33.7 % — LOW (ref 43–77)
NRBC # BLD: 0 /100 WBCS — SIGNIFICANT CHANGE UP (ref 0–0)
NRBC # FLD: 0 K/UL — SIGNIFICANT CHANGE UP (ref 0–0)
PLATELET # BLD AUTO: 343 K/UL — SIGNIFICANT CHANGE UP (ref 150–400)
POTASSIUM SERPL-MCNC: 4.6 MMOL/L — SIGNIFICANT CHANGE UP (ref 3.5–5.3)
POTASSIUM SERPL-SCNC: 4.6 MMOL/L — SIGNIFICANT CHANGE UP (ref 3.5–5.3)
PROT SERPL-MCNC: 6 G/DL — SIGNIFICANT CHANGE UP (ref 6–8.3)
RBC # BLD: 5.4 M/UL — SIGNIFICANT CHANGE UP (ref 4.2–5.8)
RBC # FLD: 13.7 % — SIGNIFICANT CHANGE UP (ref 10.3–14.5)
SODIUM SERPL-SCNC: 139 MMOL/L — SIGNIFICANT CHANGE UP (ref 135–145)
WBC # BLD: 3.78 K/UL — LOW (ref 3.8–10.5)
WBC # FLD AUTO: 3.78 K/UL — LOW (ref 3.8–10.5)

## 2024-04-16 PROCEDURE — 99284 EMERGENCY DEPT VISIT MOD MDM: CPT

## 2024-04-16 PROCEDURE — 93010 ELECTROCARDIOGRAM REPORT: CPT

## 2024-04-16 RX ORDER — HYDROXYCHLOROQUINE SULFATE 200 MG
200 TABLET ORAL ONCE
Refills: 0 | Status: COMPLETED | OUTPATIENT
Start: 2024-04-16 | End: 2024-04-16

## 2024-04-16 RX ORDER — GABAPENTIN 400 MG/1
200 CAPSULE ORAL ONCE
Refills: 0 | Status: COMPLETED | OUTPATIENT
Start: 2024-04-16 | End: 2024-04-16

## 2024-04-16 RX ORDER — APIXABAN 2.5 MG/1
5 TABLET, FILM COATED ORAL ONCE
Refills: 0 | Status: COMPLETED | OUTPATIENT
Start: 2024-04-16 | End: 2024-04-16

## 2024-04-16 RX ORDER — MYCOPHENOLATE MOFETIL 250 MG/1
1500 CAPSULE ORAL ONCE
Refills: 0 | Status: COMPLETED | OUTPATIENT
Start: 2024-04-16 | End: 2024-04-16

## 2024-04-16 RX ADMIN — Medication 200 MILLIGRAM(S): at 22:10

## 2024-04-16 RX ADMIN — GABAPENTIN 200 MILLIGRAM(S): 400 CAPSULE ORAL at 22:10

## 2024-04-16 RX ADMIN — APIXABAN 5 MILLIGRAM(S): 2.5 TABLET, FILM COATED ORAL at 22:10

## 2024-04-16 RX ADMIN — MYCOPHENOLATE MOFETIL 1500 MILLIGRAM(S): 250 CAPSULE ORAL at 22:09

## 2024-04-16 NOTE — ED ADULT TRIAGE NOTE - CHIEF COMPLAINT QUOTE
c/o abnormal lab results, states "they told me I have a lot of protein in my urine and damage to my kidneys from my lupus". endorsing back pain and generalized fatigue. past medical history of lupus and PE

## 2024-04-16 NOTE — ED ADULT NURSE NOTE - OBJECTIVE STATEMENT
Patient received in ED intake spot 10B. A&Ox4 and ambulatory. Past medical history of Lupus. C/o fatigue, "raccoon eyes" and joint pain since September. patient states was told had abnormal lab values outpatient. Well appearing sitting up in stretcher HOB elevated. Respirations even and unlabored with equal chest rise. No acute distress noted. Speaking in full and complete sentences. IV 20g placed to left AC, labs drawn and sent as ordered. Safety maintained. Awaiting further orders.

## 2024-04-17 LAB
APPEARANCE UR: CLEAR — SIGNIFICANT CHANGE UP
BACTERIA # UR AUTO: NEGATIVE /HPF — SIGNIFICANT CHANGE UP
BILIRUB UR-MCNC: NEGATIVE — SIGNIFICANT CHANGE UP
CAST: 4 /LPF — SIGNIFICANT CHANGE UP (ref 0–4)
COLOR SPEC: YELLOW — SIGNIFICANT CHANGE UP
CREAT ?TM UR-MCNC: 278 MG/DL — SIGNIFICANT CHANGE UP
DIFF PNL FLD: ABNORMAL
GLUCOSE UR QL: NEGATIVE MG/DL — SIGNIFICANT CHANGE UP
KETONES UR-MCNC: NEGATIVE MG/DL — SIGNIFICANT CHANGE UP
LEUKOCYTE ESTERASE UR-ACNC: NEGATIVE — SIGNIFICANT CHANGE UP
NITRITE UR-MCNC: NEGATIVE — SIGNIFICANT CHANGE UP
PH UR: 6.5 — SIGNIFICANT CHANGE UP (ref 5–8)
PROT ?TM UR-MCNC: 1326 MG/DL — SIGNIFICANT CHANGE UP
PROT UR-MCNC: >=1000 MG/DL
PROT/CREAT UR-RTO: 4.8 RATIO — HIGH (ref 0–0.2)
RBC CASTS # UR COMP ASSIST: 1 /HPF — SIGNIFICANT CHANGE UP (ref 0–4)
SP GR SPEC: 1.03 — SIGNIFICANT CHANGE UP (ref 1–1.03)
SQUAMOUS # UR AUTO: 1 /HPF — SIGNIFICANT CHANGE UP (ref 0–5)
UROBILINOGEN FLD QL: 1 MG/DL — SIGNIFICANT CHANGE UP (ref 0.2–1)
WBC UR QL: 1 /HPF — SIGNIFICANT CHANGE UP (ref 0–5)

## 2024-04-17 NOTE — ED PROVIDER NOTE - OBJECTIVE STATEMENT
29-year-old male for evaluation of abnormal outpatient results of creatinine.  Reports that he has been [facility] and [facility] and was told that his kidney function is progressively declining.  States he has not been started on any regimen.  Has not had any further testing beyond the biopsy.  Reports he is able to urinate normally without change in color.  Denies pain.  States he just wants to check to make sure he does not need any emergent interventions at this.

## 2024-04-17 NOTE — ED PROVIDER NOTE - PATIENT PORTAL LINK FT
You can access the FollowMyHealth Patient Portal offered by [facility] by registering at the following website: [email or web address]. By joining [facility]’s FollowMyHealth portal, you will also be able to view your health information using other applications (apps) compatible with our system.

## 2024-04-17 NOTE — ED PROVIDER NOTE - CLINICAL SUMMARY MEDICAL DECISION MAKING FREE TEXT BOX
29-year-old male for evaluation of abnormal outpatient results of creatinine.  Reports that he has been [facility] and was told that his kidney function is progressively declining.  States he has not been started on any regimen.  Has not had any further testing beyond the biopsy.  Reports he is able to urinate normally without change in color.  Denies pain.  States he just wants to check to make sure he does not need any emergent interventions at this. r/o keith, poss early lupus nephritis A/P Labs, imaging, medicate, reassess

## 2024-04-22 ENCOUNTER — NON-APPOINTMENT (OUTPATIENT)
Age: 29
End: 2024-04-22

## 2024-04-24 ENCOUNTER — NON-APPOINTMENT (OUTPATIENT)
Age: 29
End: 2024-04-24

## 2024-05-01 ENCOUNTER — NON-APPOINTMENT (OUTPATIENT)
Age: 29
End: 2024-05-01

## 2024-05-01 RX ORDER — METHYLPREDNISOLONE 8 MG/1
8 TABLET ORAL
Qty: 100 | Refills: 0 | Status: DISCONTINUED | COMMUNITY
Start: 2024-03-18 | End: 2024-05-01

## 2024-05-01 RX ORDER — METHYLPREDNISOLONE 8 MG/1
8 TABLET ORAL DAILY
Qty: 180 | Refills: 0 | Status: ACTIVE | COMMUNITY
Start: 2024-05-01 | End: 1900-01-01

## 2024-05-20 ENCOUNTER — OUTPATIENT (OUTPATIENT)
Dept: OUTPATIENT SERVICES | Facility: HOSPITAL | Age: 29
LOS: 1 days | End: 2024-05-20

## 2024-05-20 ENCOUNTER — APPOINTMENT (OUTPATIENT)
Dept: RHEUMATOLOGY | Facility: CLINIC | Age: 29
End: 2024-05-20
Payer: MEDICAID

## 2024-05-20 VITALS
BODY MASS INDEX: 29.71 KG/M2 | HEART RATE: 92 BPM | HEIGHT: 64 IN | SYSTOLIC BLOOD PRESSURE: 125 MMHG | WEIGHT: 174 LBS | OXYGEN SATURATION: 99 % | TEMPERATURE: 98.2 F | RESPIRATION RATE: 16 BRPM | DIASTOLIC BLOOD PRESSURE: 88 MMHG

## 2024-05-20 DIAGNOSIS — M32.9 SYSTEMIC LUPUS ERYTHEMATOSUS, UNSPECIFIED: ICD-10-CM

## 2024-05-20 PROCEDURE — 99214 OFFICE O/P EST MOD 30 MIN: CPT | Mod: GC

## 2024-05-20 RX ORDER — SULFAMETHOXAZOLE AND TRIMETHOPRIM 800; 160 MG/1; MG/1
800-160 TABLET ORAL
Refills: 0 | Status: DISCONTINUED | COMMUNITY
End: 2024-05-20

## 2024-05-20 NOTE — ASSESSMENT
[FreeTextEntry1] : 28 year-old male with h/o Lupus (diagn[facility] due [facility], oral ulcers, chest pain, and dyspnea, on Plaquenil) who presented to [place] ED on 12/12/23 with complaints of fevers, chest pain and SOB, found to have bilateral acute PE and bilateral pleural effusions. Transferred to [facility] for CT surgery evaluation. Discharged 1/29/24 from [facility]. Here for post hospital follow up.  #SLE  #Worsening Proteinuria -S/p repeat renal bx on 5/16. Will reach out to pt's nephrologist for primary bx. Further medication changes pending result, however will likely need to add biologic  -C/w cellcept 1500mg. pt showed labs from hospital visit- labs on 5/18 with normal CBC and CMP -Bactrim switched to atovaquone by nephrologist  -Continue Plaquenil 200mg BID  -Repeat UA and UPC    #Lumbar pack pain w/ R sided sciatica  -Normal CK -Dx with Grade 1 L5-S1 anterolisthesis and bilateral L5 spondylolysis previously -S/p [facility] with [facility] to rule out transverse myelitis. [facility], impression shows Grade 1 anterolisthesis of L5 on S1 vertebra, similar to prior radiographs of 2018. Mild degenerative changes. mild disc bulge with shallow central disc protrusion at L1/2 level. Mild broad-based disc bulge at L5/21 level with pseudodisc bulge due to associated anterolisthesis and mild bilateral foraminal narrowing.  -PT ordered.  -Was taking gabapentin, denies any improvement. Okay to stop   #Elevated LFTs (resolved)  -Inpt work up as above. Could be from polypharmacy as improved with holding bactrim  -CK wnl and GGT elevated, less likely muscle source  #Bilateral PE  -Follows with heme, c/w eliquis. Will need likely for 6 months.   #Pleural effusions -[facility]ray at [facility] with small R sided pleural effusion   RTC in 1 month (or sooner pending bx results)  Discussed with Dr. Jaz Landaverde MD PGY-4 Rheumatology

## 2024-05-20 NOTE — HISTORY OF PRESENT ILLNESS
[FreeTextEntry1] : Interval history _____________________________ May 2024:  S/[facility] at [facility]. Final bx report pending, has appt with his nephrologist, Dr. Cornejo this week.  s/p MRI lumbar spine with his PCP, logged [facility] portal to show report. Shows disc bulge and degenerative disease.  otherwise no complaints Current meds: medrol 24mg daily, plaquenil 200mg BID, Cellcept 1500mg BID, atovaquone per nephro

## 2024-05-20 NOTE — PHYSICAL EXAM
[General Appearance - In No Acute Distress] : in no acute distress [Sclera] : the sclera and conjunctiva were normal [Extraocular Movements] : extraocular movements were intact [Neck Appearance] : the appearance of the neck was normal [] : the neck was supple [Respiration, Rhythm And Depth] : normal respiratory rhythm and effort [Auscultation Breath Sounds / Voice Sounds] : lungs were clear to auscultation bilaterally [Heart Rate And Rhythm] : heart rate was normal and rhythm regular [Heart Sounds] : normal S1 and S2 [Edema] : there was no peripheral edema [Abdomen Soft] : soft [Abdomen Tenderness] : non-tender [Oriented To Time, Place, And Person] : oriented to person, place, and time [Impaired Insight] : insight and judgment were intact [Affect] : the affect was normal [FreeTextEntry1] : No synovitis in upper or lower extremity joints 4+/5 RLE proximal muscle strength, remaining 5/5

## 2024-05-21 DIAGNOSIS — I26.99 OTHER PULMONARY EMBOLISM WITHOUT ACUTE COR PULMONALE: ICD-10-CM

## 2024-05-21 DIAGNOSIS — M32.9 SYSTEMIC LUPUS ERYTHEMATOSUS, UNSPECIFIED: ICD-10-CM

## 2024-05-21 DIAGNOSIS — M54.9 DORSALGIA, UNSPECIFIED: ICD-10-CM

## 2024-05-27 ENCOUNTER — NON-APPOINTMENT (OUTPATIENT)
Age: 29
End: 2024-05-27

## 2024-05-27 LAB
APPEARANCE: CLEAR
BACTERIA: NEGATIVE /HPF
BILIRUBIN URINE: NEGATIVE
BLOOD URINE: NEGATIVE
CAST: 2 /LPF
COLOR: YELLOW
CREAT SPEC-SCNC: 135 MG/DL
CREAT/PROT UR: 4.4 RATIO
EPITHELIAL CELLS: 0 /HPF
GLUCOSE QUALITATIVE U: NEGATIVE MG/DL
KETONES URINE: NEGATIVE MG/DL
LEUKOCYTE ESTERASE URINE: NEGATIVE
MICROSCOPIC-UA: NORMAL
NITRITE URINE: NEGATIVE
PH URINE: 6.5
PROT UR-MCNC: 600 MG/DL
PROTEIN URINE: >=1000 MG/DL
RED BLOOD CELLS URINE: 0 /HPF
SPECIFIC GRAVITY URINE: 1.02
UROBILINOGEN URINE: 1 MG/DL
WHITE BLOOD CELLS URINE: 1 /HPF

## 2024-05-30 ENCOUNTER — NON-APPOINTMENT (OUTPATIENT)
Age: 29
End: 2024-05-30

## 2024-06-06 DIAGNOSIS — I26.99 OTHER PULMONARY EMBOLISM W/OUT ACUTE COR PULMONALE: ICD-10-CM

## 2024-06-18 ENCOUNTER — OUTPATIENT (OUTPATIENT)
Dept: OUTPATIENT SERVICES | Facility: HOSPITAL | Age: 29
LOS: 1 days | Discharge: HOME | End: 2024-06-18

## 2024-06-18 DIAGNOSIS — D68.59 OTHER PRIMARY THROMBOPHILIA: ICD-10-CM

## 2024-06-20 ENCOUNTER — APPOINTMENT (OUTPATIENT)
Dept: HEMATOLOGY ONCOLOGY | Facility: CLINIC | Age: 29
End: 2024-06-20

## 2024-06-23 ENCOUNTER — NON-APPOINTMENT (OUTPATIENT)
Age: 29
End: 2024-06-23

## 2024-06-24 ENCOUNTER — APPOINTMENT (OUTPATIENT)
Dept: RHEUMATOLOGY | Facility: CLINIC | Age: 29
End: 2024-06-24

## 2024-07-05 ENCOUNTER — RX RENEWAL (OUTPATIENT)
Age: 29
End: 2024-07-05

## 2024-07-08 ENCOUNTER — RX RENEWAL (OUTPATIENT)
Age: 29
End: 2024-07-08

## 2024-07-11 ENCOUNTER — RX RENEWAL (OUTPATIENT)
Age: 29
End: 2024-07-11
